# Patient Record
Sex: FEMALE | Race: WHITE | NOT HISPANIC OR LATINO | URBAN - METROPOLITAN AREA
[De-identification: names, ages, dates, MRNs, and addresses within clinical notes are randomized per-mention and may not be internally consistent; named-entity substitution may affect disease eponyms.]

---

## 2016-03-28 RX ORDER — IPRATROPIUM/ALBUTEROL SULFATE 18-103MCG
3 AEROSOL WITH ADAPTER (GRAM) INHALATION
Qty: 0 | Refills: 0 | COMMUNITY
Start: 2016-03-28

## 2016-03-28 RX ORDER — BUDESONIDE, MICRONIZED 100 %
2 POWDER (GRAM) MISCELLANEOUS
Qty: 0 | Refills: 0 | COMMUNITY
Start: 2016-03-28

## 2017-01-04 ENCOUNTER — OUTPATIENT (OUTPATIENT)
Dept: OUTPATIENT SERVICES | Facility: HOSPITAL | Age: 82
LOS: 1 days | End: 2017-01-04
Payer: MEDICARE

## 2017-01-04 ENCOUNTER — APPOINTMENT (OUTPATIENT)
Dept: ULTRASOUND IMAGING | Facility: HOSPITAL | Age: 82
End: 2017-01-04

## 2017-01-04 ENCOUNTER — APPOINTMENT (OUTPATIENT)
Age: 82
End: 2017-01-04

## 2017-01-04 DIAGNOSIS — Z98.89 OTHER SPECIFIED POSTPROCEDURAL STATES: Chronic | ICD-10-CM

## 2017-01-04 DIAGNOSIS — I72.4 ANEURYSM OF ARTERY OF LOWER EXTREMITY: ICD-10-CM

## 2017-01-04 DIAGNOSIS — Z01.810 ENCOUNTER FOR PREPROCEDURAL CARDIOVASCULAR EXAMINATION: ICD-10-CM

## 2017-01-04 PROCEDURE — 93925 LOWER EXTREMITY STUDY: CPT

## 2017-01-11 ENCOUNTER — OUTPATIENT (OUTPATIENT)
Dept: OUTPATIENT SERVICES | Facility: HOSPITAL | Age: 82
LOS: 1 days | End: 2017-01-11
Payer: MEDICARE

## 2017-01-11 VITALS
HEIGHT: 62 IN | WEIGHT: 125 LBS | SYSTOLIC BLOOD PRESSURE: 115 MMHG | TEMPERATURE: 98 F | RESPIRATION RATE: 14 BRPM | OXYGEN SATURATION: 97 % | HEART RATE: 53 BPM | DIASTOLIC BLOOD PRESSURE: 63 MMHG

## 2017-01-11 DIAGNOSIS — J44.9 CHRONIC OBSTRUCTIVE PULMONARY DISEASE, UNSPECIFIED: ICD-10-CM

## 2017-01-11 DIAGNOSIS — C22.0 LIVER CELL CARCINOMA: ICD-10-CM

## 2017-01-11 DIAGNOSIS — Z98.89 OTHER SPECIFIED POSTPROCEDURAL STATES: Chronic | ICD-10-CM

## 2017-01-11 LAB
BLD GP AB SCN SERPL QL: NEGATIVE — SIGNIFICANT CHANGE UP
RH IG SCN BLD-IMP: POSITIVE — SIGNIFICANT CHANGE UP

## 2017-01-11 PROCEDURE — 80053 COMPREHEN METABOLIC PANEL: CPT

## 2017-01-11 PROCEDURE — 86901 BLOOD TYPING SEROLOGIC RH(D): CPT

## 2017-01-11 PROCEDURE — 86850 RBC ANTIBODY SCREEN: CPT

## 2017-01-11 PROCEDURE — 86900 BLOOD TYPING SEROLOGIC ABO: CPT

## 2017-01-11 PROCEDURE — 85027 COMPLETE CBC AUTOMATED: CPT

## 2017-01-11 NOTE — H&P PST ADULT - HISTORY OF PRESENT ILLNESS
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 Patient was admitted about one month ago with declining functional status, was found to have cirrhosis with hepatic encephalopathy; AMS improved and patient was discharge to rehab. Patient states that for at least the last week, she has had a productive cough, with episodes of shaking and chills. No fevers or myalgias. No sore throat, rhinnorhea, nasal congestion or ear pain. No chest pain, SOB, or palpitations. No abdominal pain, nausea, vomiting. Reports diarrhea, but has been taking medications to help pass bowel movement. No dysuria, normal urinary frequency and no urinary uregency. No foul smelling urine, but urine has appeared more yellow recently.    In ED:  Vitals - T 97.8, HR 81, /70, RR 18, O2 sat 100% on room air  Received vancomycin 1 gram IV x1, cefepime 1 gram IV x1, 500 cc NS bolus x 1, cardizem 10 mg IV x1 86 y/o F PMH HTN, atrial fibrillation, hypothyroidism, CLL (diagnosed ~13 years s/p rituxan), cirrhosis of liver c/b encephalopathy, severe MR/AS , hx of COPD with bronchiectasis presents with liver cell carcinoma, pre vascular embolization scheduled for 1/19/17.

## 2017-01-11 NOTE — H&P PST ADULT - NSANTHOSAYNRD_GEN_A_CORE
13.5 inches/No. MARV screening performed.  STOP BANG Legend: 0-2 = LOW Risk; 3-4 = INTERMEDIATE Risk; 5-8 = HIGH Risk

## 2017-01-11 NOTE — H&P PST ADULT - PMH
AF (Atrial Fibrillation)  IF ON ALBUTEROL  Bleeding Esophageal Varices    CKD (chronic kidney disease), stage 3 (moderate)    CLL (Chronic Lymphoblastic Leukemia)    COPD (Chronic Obstructive Pulmonary Disease)    GIB (Gastrointestinal Bleeding)    Portal Hypertension AF (Atrial Fibrillation)  IF ON ALBUTEROL  Bleeding Esophageal Varices    CKD (chronic kidney disease), stage 3 (moderate)    CLL (Chronic Lymphoblastic Leukemia)    COPD (Chronic Obstructive Pulmonary Disease)    GIB (Gastrointestinal Bleeding)    Liver cell carcinoma    Portal Hypertension    Pulmonary HTN  moderate  PVD (peripheral vascular disease)    Severe aortic stenosis by prior echocardiogram

## 2017-01-12 LAB
ALBUMIN SERPL ELPH-MCNC: 3.8 G/DL — SIGNIFICANT CHANGE UP (ref 3.3–5)
ALP SERPL-CCNC: 145 U/L — HIGH (ref 40–120)
ALT FLD-CCNC: 23 U/L — SIGNIFICANT CHANGE UP (ref 10–45)
ANION GAP SERPL CALC-SCNC: 13 MMOL/L — SIGNIFICANT CHANGE UP (ref 5–17)
AST SERPL-CCNC: 35 U/L — SIGNIFICANT CHANGE UP (ref 10–40)
BILIRUB SERPL-MCNC: 3.3 MG/DL — HIGH (ref 0.2–1.2)
BUN SERPL-MCNC: 28 MG/DL — HIGH (ref 7–23)
CALCIUM SERPL-MCNC: 9.9 MG/DL — SIGNIFICANT CHANGE UP (ref 8.4–10.5)
CHLORIDE SERPL-SCNC: 98 MMOL/L — SIGNIFICANT CHANGE UP (ref 96–108)
CO2 SERPL-SCNC: 28 MMOL/L — SIGNIFICANT CHANGE UP (ref 22–31)
CREAT SERPL-MCNC: 1.18 MG/DL — SIGNIFICANT CHANGE UP (ref 0.5–1.3)
GLUCOSE SERPL-MCNC: 100 MG/DL — HIGH (ref 70–99)
HCT VFR BLD CALC: 40 % — SIGNIFICANT CHANGE UP (ref 34.5–45)
HGB BLD-MCNC: 13 G/DL — SIGNIFICANT CHANGE UP (ref 11.5–15.5)
MCHC RBC-ENTMCNC: 32.3 PG — SIGNIFICANT CHANGE UP (ref 27–34)
MCHC RBC-ENTMCNC: 32.5 GM/DL — SIGNIFICANT CHANGE UP (ref 32–36)
MCV RBC AUTO: 99.3 FL — SIGNIFICANT CHANGE UP (ref 80–100)
PLATELET # BLD AUTO: 84 K/UL — LOW (ref 150–400)
POTASSIUM SERPL-MCNC: 5.2 MMOL/L — SIGNIFICANT CHANGE UP (ref 3.5–5.3)
POTASSIUM SERPL-SCNC: 5.2 MMOL/L — SIGNIFICANT CHANGE UP (ref 3.5–5.3)
PROT SERPL-MCNC: 6.2 G/DL — SIGNIFICANT CHANGE UP (ref 6–8.3)
RBC # BLD: 4.03 M/UL — SIGNIFICANT CHANGE UP (ref 3.8–5.2)
RBC # FLD: 15.1 % — HIGH (ref 10.3–14.5)
SODIUM SERPL-SCNC: 139 MMOL/L — SIGNIFICANT CHANGE UP (ref 135–145)
WBC # BLD: 5.25 K/UL — SIGNIFICANT CHANGE UP (ref 3.8–10.5)
WBC # FLD AUTO: 5.25 K/UL — SIGNIFICANT CHANGE UP (ref 3.8–10.5)

## 2017-01-13 ENCOUNTER — OTHER (OUTPATIENT)
Age: 82
End: 2017-01-13

## 2017-01-13 DIAGNOSIS — Z91.013 ALLERGY TO SEAFOOD: ICD-10-CM

## 2017-01-24 ENCOUNTER — OTHER (OUTPATIENT)
Age: 82
End: 2017-01-24

## 2017-01-27 ENCOUNTER — OUTPATIENT (OUTPATIENT)
Dept: INPATIENT UNIT | Facility: HOSPITAL | Age: 82
LOS: 1 days | End: 2017-01-27
Payer: MEDICARE

## 2017-01-27 VITALS
SYSTOLIC BLOOD PRESSURE: 109 MMHG | OXYGEN SATURATION: 99 % | HEART RATE: 56 BPM | DIASTOLIC BLOOD PRESSURE: 54 MMHG | RESPIRATION RATE: 15 BRPM | TEMPERATURE: 98 F

## 2017-01-27 DIAGNOSIS — C22.0 LIVER CELL CARCINOMA: ICD-10-CM

## 2017-01-27 DIAGNOSIS — Z98.89 OTHER SPECIFIED POSTPROCEDURAL STATES: Chronic | ICD-10-CM

## 2017-01-27 DIAGNOSIS — K74.60 UNSPECIFIED CIRRHOSIS OF LIVER: ICD-10-CM

## 2017-01-27 DIAGNOSIS — J43.9 EMPHYSEMA, UNSPECIFIED: ICD-10-CM

## 2017-01-27 DIAGNOSIS — E03.9 HYPOTHYROIDISM, UNSPECIFIED: ICD-10-CM

## 2017-01-27 DIAGNOSIS — I48.2 CHRONIC ATRIAL FIBRILLATION: ICD-10-CM

## 2017-01-27 LAB
APTT BLD: 33.1 SEC — SIGNIFICANT CHANGE UP (ref 27.5–37.4)
INR BLD: 1.26 RATIO — HIGH (ref 0.88–1.16)
PROTHROM AB SERPL-ACNC: 13.6 SEC — HIGH (ref 10–13.1)

## 2017-01-27 PROCEDURE — 36247 INS CATH ABD/L-EXT ART 3RD: CPT

## 2017-01-27 PROCEDURE — 37242 VASC EMBOLIZE/OCCLUDE ARTERY: CPT

## 2017-01-27 PROCEDURE — 36245 INS CATH ABD/L-EXT ART 1ST: CPT | Mod: 59

## 2017-01-27 RX ORDER — IPRATROPIUM BROMIDE 0.2 MG/ML
500 SOLUTION, NON-ORAL INHALATION EVERY 6 HOURS
Qty: 0 | Refills: 0 | Status: DISCONTINUED | OUTPATIENT
Start: 2017-01-27 | End: 2017-01-28

## 2017-01-27 RX ORDER — OXYCODONE HYDROCHLORIDE 5 MG/1
5 TABLET ORAL EVERY 4 HOURS
Qty: 0 | Refills: 0 | Status: DISCONTINUED | OUTPATIENT
Start: 2017-01-27 | End: 2017-01-28

## 2017-01-27 RX ORDER — OXYCODONE HYDROCHLORIDE 5 MG/1
10 TABLET ORAL EVERY 6 HOURS
Qty: 0 | Refills: 0 | Status: DISCONTINUED | OUTPATIENT
Start: 2017-01-27 | End: 2017-01-28

## 2017-01-27 RX ORDER — SPIRONOLACTONE 25 MG/1
25 TABLET, FILM COATED ORAL DAILY
Qty: 0 | Refills: 0 | Status: DISCONTINUED | OUTPATIENT
Start: 2017-01-27 | End: 2017-01-28

## 2017-01-27 RX ORDER — LACTULOSE 10 G/15ML
20 SOLUTION ORAL EVERY 6 HOURS
Qty: 0 | Refills: 0 | Status: DISCONTINUED | OUTPATIENT
Start: 2017-01-27 | End: 2017-01-28

## 2017-01-27 RX ORDER — ONDANSETRON 8 MG/1
4 TABLET, FILM COATED ORAL EVERY 6 HOURS
Qty: 0 | Refills: 0 | Status: DISCONTINUED | OUTPATIENT
Start: 2017-01-27 | End: 2017-01-28

## 2017-01-27 RX ORDER — CHOLECALCIFEROL (VITAMIN D3) 125 MCG
2000 CAPSULE ORAL DAILY
Qty: 0 | Refills: 0 | Status: DISCONTINUED | OUTPATIENT
Start: 2017-01-27 | End: 2017-01-28

## 2017-01-27 RX ORDER — DIGOXIN 250 MCG
0.12 TABLET ORAL EVERY OTHER DAY
Qty: 0 | Refills: 0 | Status: DISCONTINUED | OUTPATIENT
Start: 2017-01-27 | End: 2017-01-28

## 2017-01-27 RX ORDER — LEVOTHYROXINE SODIUM 125 MCG
25 TABLET ORAL DAILY
Qty: 0 | Refills: 0 | Status: DISCONTINUED | OUTPATIENT
Start: 2017-01-27 | End: 2017-01-28

## 2017-01-27 RX ORDER — SODIUM CHLORIDE 9 MG/ML
1000 INJECTION, SOLUTION INTRAVENOUS
Qty: 0 | Refills: 0 | Status: DISCONTINUED | OUTPATIENT
Start: 2017-01-27 | End: 2017-01-28

## 2017-01-27 RX ORDER — ALBUTEROL 90 UG/1
2 AEROSOL, METERED ORAL EVERY 6 HOURS
Qty: 0 | Refills: 0 | Status: DISCONTINUED | OUTPATIENT
Start: 2017-01-27 | End: 2017-01-28

## 2017-01-27 RX ORDER — DIPHENHYDRAMINE HCL 50 MG
50 CAPSULE ORAL ONCE
Qty: 0 | Refills: 0 | Status: DISCONTINUED | OUTPATIENT
Start: 2017-01-27 | End: 2017-01-28

## 2017-01-27 RX ORDER — BUDESONIDE, MICRONIZED 100 %
0.5 POWDER (GRAM) MISCELLANEOUS DAILY
Qty: 0 | Refills: 0 | Status: DISCONTINUED | OUTPATIENT
Start: 2017-01-27 | End: 2017-01-28

## 2017-01-27 RX ORDER — ACETYLCYSTEINE 200 MG/ML
1200 VIAL (ML) MISCELLANEOUS EVERY 12 HOURS
Qty: 0 | Refills: 0 | Status: DISCONTINUED | OUTPATIENT
Start: 2017-01-27 | End: 2017-01-28

## 2017-01-27 RX ADMIN — Medication 500 MICROGRAM(S): at 23:47

## 2017-01-27 RX ADMIN — SODIUM CHLORIDE 75 MILLILITER(S): 9 INJECTION, SOLUTION INTRAVENOUS at 21:37

## 2017-01-27 RX ADMIN — ALBUTEROL 2 PUFF(S): 90 AEROSOL, METERED ORAL at 23:48

## 2017-01-27 RX ADMIN — Medication 1200 MILLIGRAM(S): at 21:35

## 2017-01-27 RX ADMIN — Medication 2000 UNIT(S): at 21:34

## 2017-01-27 RX ADMIN — LACTULOSE 20 GRAM(S): 10 SOLUTION ORAL at 21:36

## 2017-01-27 RX ADMIN — SODIUM CHLORIDE 75 MILLILITER(S): 9 INJECTION, SOLUTION INTRAVENOUS at 23:50

## 2017-01-27 NOTE — H&P ADULT. - ASSESSMENT
88F w/ PMH CLL (13yrs. s/p rituxan), afib, severe MR/AS, COPD, Liver cirrhosis, HCC, now s/p vascular embolization. Pain controlled at this time

## 2017-01-27 NOTE — H&P ADULT. - PROBLEM SELECTOR PLAN 1
s/p vascular embolization   f/u further as op   pain control w/ oxycodone prn  AM LFTs s/p vascular embolization, mucomyst ordered   f/u further as op   pain control w/ oxycodone prn  AM LFTs

## 2017-01-27 NOTE — H&P ADULT. - HISTORY OF PRESENT ILLNESS
88F w/ PMH CLL (13yrs. s/p rituxan), afib, severe MR/AS, COPD, Liver cirrhosis, HCC, now s/p vascular embolization.  Pt doing well, no c/o.  Pain is controlled. Pt to receive mucomyst.  Pt being admitted for o/n monitoring and pain control.  Chart reviewed and case d/w bedside RN- no issues currently, pt just arrived 10 mins ago.  Pt is to remain on bedrest x 4 hrs.    Current vitals are stable.

## 2017-01-27 NOTE — H&P ADULT. - LYMPHATIC
posterior cervical R/supraclavicular L/supraclavicular R/anterior cervical R/anterior cervical L/posterior cervical L

## 2017-01-28 ENCOUNTER — TRANSCRIPTION ENCOUNTER (OUTPATIENT)
Age: 82
End: 2017-01-28

## 2017-01-28 VITALS
OXYGEN SATURATION: 96 % | HEART RATE: 51 BPM | TEMPERATURE: 98 F | RESPIRATION RATE: 18 BRPM | SYSTOLIC BLOOD PRESSURE: 91 MMHG | DIASTOLIC BLOOD PRESSURE: 41 MMHG

## 2017-01-28 LAB
ALBUMIN SERPL ELPH-MCNC: 3.3 G/DL — SIGNIFICANT CHANGE UP (ref 3.3–5)
ALP SERPL-CCNC: 90 U/L — SIGNIFICANT CHANGE UP (ref 40–120)
ALT FLD-CCNC: 46 U/L RC — HIGH (ref 10–45)
ANION GAP SERPL CALC-SCNC: 15 MMOL/L — SIGNIFICANT CHANGE UP (ref 5–17)
AST SERPL-CCNC: 59 U/L — HIGH (ref 10–40)
BASOPHILS # BLD AUTO: 0 K/UL — SIGNIFICANT CHANGE UP (ref 0–0.2)
BASOPHILS NFR BLD AUTO: 0 % — SIGNIFICANT CHANGE UP (ref 0–2)
BILIRUB SERPL-MCNC: 2.1 MG/DL — HIGH (ref 0.2–1.2)
BUN SERPL-MCNC: 32 MG/DL — HIGH (ref 7–23)
CALCIUM SERPL-MCNC: 9.1 MG/DL — SIGNIFICANT CHANGE UP (ref 8.4–10.5)
CHLORIDE SERPL-SCNC: 100 MMOL/L — SIGNIFICANT CHANGE UP (ref 96–108)
CO2 SERPL-SCNC: 24 MMOL/L — SIGNIFICANT CHANGE UP (ref 22–31)
CREAT SERPL-MCNC: 1.25 MG/DL — SIGNIFICANT CHANGE UP (ref 0.5–1.3)
EOSINOPHIL # BLD AUTO: 0 K/UL — SIGNIFICANT CHANGE UP (ref 0–0.5)
EOSINOPHIL NFR BLD AUTO: 0.4 % — SIGNIFICANT CHANGE UP (ref 0–6)
GLUCOSE SERPL-MCNC: 149 MG/DL — HIGH (ref 70–99)
HCT VFR BLD CALC: 35.9 % — SIGNIFICANT CHANGE UP (ref 34.5–45)
HGB BLD-MCNC: 11.7 G/DL — SIGNIFICANT CHANGE UP (ref 11.5–15.5)
LYMPHOCYTES # BLD AUTO: 0.5 K/UL — LOW (ref 1–3.3)
LYMPHOCYTES # BLD AUTO: 7.4 % — LOW (ref 13–44)
MCHC RBC-ENTMCNC: 32.4 PG — SIGNIFICANT CHANGE UP (ref 27–34)
MCHC RBC-ENTMCNC: 32.5 GM/DL — SIGNIFICANT CHANGE UP (ref 32–36)
MCV RBC AUTO: 99.7 FL — SIGNIFICANT CHANGE UP (ref 80–100)
MONOCYTES # BLD AUTO: 0.7 K/UL — SIGNIFICANT CHANGE UP (ref 0–0.9)
MONOCYTES NFR BLD AUTO: 9.3 % — SIGNIFICANT CHANGE UP (ref 2–14)
NEUTROPHILS # BLD AUTO: 6.1 K/UL — SIGNIFICANT CHANGE UP (ref 1.8–7.4)
NEUTROPHILS NFR BLD AUTO: 82.8 % — HIGH (ref 43–77)
PLATELET # BLD AUTO: 89 K/UL — LOW (ref 150–400)
POTASSIUM SERPL-MCNC: 4.8 MMOL/L — SIGNIFICANT CHANGE UP (ref 3.5–5.3)
POTASSIUM SERPL-SCNC: 4.8 MMOL/L — SIGNIFICANT CHANGE UP (ref 3.5–5.3)
PROT SERPL-MCNC: 5.3 G/DL — LOW (ref 6–8.3)
RBC # BLD: 3.61 M/UL — LOW (ref 3.8–5.2)
RBC # FLD: 13.3 % — SIGNIFICANT CHANGE UP (ref 10.3–14.5)
SODIUM SERPL-SCNC: 139 MMOL/L — SIGNIFICANT CHANGE UP (ref 135–145)
WBC # BLD: 7.3 K/UL — SIGNIFICANT CHANGE UP (ref 3.8–10.5)
WBC # FLD AUTO: 7.3 K/UL — SIGNIFICANT CHANGE UP (ref 3.8–10.5)

## 2017-01-28 PROCEDURE — 85027 COMPLETE CBC AUTOMATED: CPT

## 2017-01-28 PROCEDURE — 85610 PROTHROMBIN TIME: CPT

## 2017-01-28 PROCEDURE — P9037: CPT

## 2017-01-28 PROCEDURE — 94640 AIRWAY INHALATION TREATMENT: CPT

## 2017-01-28 PROCEDURE — 37242 VASC EMBOLIZE/OCCLUDE ARTERY: CPT

## 2017-01-28 PROCEDURE — C1887: CPT

## 2017-01-28 PROCEDURE — 76937 US GUIDE VASCULAR ACCESS: CPT

## 2017-01-28 PROCEDURE — 36245 INS CATH ABD/L-EXT ART 1ST: CPT | Mod: 59

## 2017-01-28 PROCEDURE — C1889: CPT

## 2017-01-28 PROCEDURE — 80053 COMPREHEN METABOLIC PANEL: CPT

## 2017-01-28 PROCEDURE — 36247 INS CATH ABD/L-EXT ART 3RD: CPT

## 2017-01-28 PROCEDURE — 85730 THROMBOPLASTIN TIME PARTIAL: CPT

## 2017-01-28 PROCEDURE — C1894: CPT

## 2017-01-28 PROCEDURE — C1769: CPT

## 2017-01-28 PROCEDURE — 36430 TRANSFUSION BLD/BLD COMPNT: CPT

## 2017-01-28 RX ORDER — CHOLECALCIFEROL (VITAMIN D3) 125 MCG
2000 CAPSULE ORAL
Qty: 0 | Refills: 0 | COMMUNITY
Start: 2017-01-28

## 2017-01-28 RX ADMIN — LACTULOSE 20 GRAM(S): 10 SOLUTION ORAL at 06:20

## 2017-01-28 RX ADMIN — LACTULOSE 20 GRAM(S): 10 SOLUTION ORAL at 11:24

## 2017-01-28 RX ADMIN — Medication 25 MICROGRAM(S): at 06:20

## 2017-01-28 RX ADMIN — SPIRONOLACTONE 25 MILLIGRAM(S): 25 TABLET, FILM COATED ORAL at 06:20

## 2017-01-28 RX ADMIN — Medication 500 MICROGRAM(S): at 06:02

## 2017-01-28 RX ADMIN — ALBUTEROL 2 PUFF(S): 90 AEROSOL, METERED ORAL at 06:02

## 2017-01-28 RX ADMIN — Medication 2000 UNIT(S): at 11:22

## 2017-01-28 NOTE — DISCHARGE NOTE ADULT - CARE PROVIDERS DIRECT ADDRESSES
[email or web address]_Unknown

## 2017-01-28 NOTE — DISCHARGE NOTE ADULT - MEDICATION SUMMARY - MEDICATIONS TO TAKE
I will START or STAY ON the medications listed below when I get home from the hospital:    budesonide 0.25 mg/2 mL inhalation suspension  -- 2 milliliter(s) inhaled 2 times a day  -- Indication: For Pulmonary emphysema, unspecified emphysema type    spironolactone 25 mg oral tablet  --  by mouth once a day  -- Indication: For Liver cirrhosis    digoxin 125 mcg (0.125 mg) oral tablet  --  by mouth every 48 hours  -- Indication: For Chronic atrial fibrillation    diltiazem 30 mg oral tablet  -- 1 tab(s) by mouth every 6 hours  -- Indication: For Chronic atrial fibrillation    Coreg 3.125 mg oral tablet  -- 1 tab(s) by mouth 2 times a day  -- Indication: For Hypertension    ipratropium-albuterol 0.5 mg-2.5 mg/3 mLinhalation solution  -- 3 milliliter(s) inhaled 4 times a day  -- Indication: For Pulmonary emphysema, unspecified emphysema type    lactulose 10 g/15 mL oral syrup  -- 30 milliliter(s) by mouth every 6 hours  -- Indication: For Liver cirrhosis    rifAXIMin 550 mg oral tablet  -- 1 tab(s) by mouth 2 times a day  -- Indication: For Liver cell carcinoma    levothyroxine 25 mcg (0.025 mg) oral capsule  -- 1 cap(s) by mouth once a day  -- Indication: For Hypothyroid    cholecalciferol oral tablet  -- 2000 unit(s) by mouth once a day  -- Indication: For Vitamin D deficiency

## 2017-01-28 NOTE — DISCHARGE NOTE ADULT - PATIENT PORTAL LINK FT
“You can access the FollowHealth Patient Portal, offered by [facility], by registering with the following website: [email or web address]”

## 2017-01-28 NOTE — PROVIDER CONTACT NOTE (OTHER) - SITUATION
Pt received from PACU s/p hepatic vascular embolization. 1u platelets ordered for PACU and not given

## 2017-01-28 NOTE — DISCHARGE NOTE ADULT - HOSPITAL COURSE
88F w/ PMH CLL (13yrs. s/p rituxan), afib, severe MR/AS, COPD, Liver cirrhosis, HCC, now s/p vascular embolization. She received Mucomyst x1 and her pain was well controlled on prn Oxycodone IR 5, 10.  AM labs on day one  had mild LFT bump otherwise patient was medically stable with no complains. IR was notified of pt's status and they recommended discharge with outpatient follow-up. 88F w/ PMH CLL (13yrs. s/p rituxan), afib, severe MR/AS, COPD, Liver cirrhosis, HCC, now s/p vascular embolization. She received Mucomyst x1 and her pain was well controlled on prn Oxycodone IR 5, 10.  AM labs on day one  had mild LFT bump otherwise patient was medically stable with no complains. IR/ Dr. Ye was notified of pt's status and agreed with discharge with outpatient follow-up on Monday.

## 2017-01-28 NOTE — PROVIDER CONTACT NOTE (OTHER) - ACTION/TREATMENT ORDERED:
MD aware-states to hold platelets. MD will assign team to pt in AM. Will continue to monitor pt closely.

## 2017-01-28 NOTE — DISCHARGE NOTE ADULT - PLAN OF CARE
Patient would follow up with IR You have liver Cancer and had an IR procedure done. You remained stable without pain overnight.   - Please follow-up with Interventional Radiologist Dr. Ye [phone]  - Your appointment is on Monday Patient would continue with Home meds You have a history of liver cirrhosis. You take Rifaximin, Lactulose and Spironolactone for your liver Cirrhosis  -Please continue with you home meds as prescribed by your Patient would take home meds as prescribed You have a history of Emphysema, lung disease. At home you take Budesonide and Albuterol-Ipratropium for your Emphysema.  -Please continue taking your home meds as prescribed  - follow-up with your pulmonologist per routine Patient would continue taking home meds as prescribed You have a history of Atrial fibrillation, and you take Digoxin and Diltiazem for your A-fib.  -Please continue taking Digoxin and Diltiazem as prescribed  - We recommend that you follow-up with your cardiologist per routine You have a history of liver cirrhosis. You take Rifaximin, Lactulose and Spironolactone for your liver Cirrhosis  -Please continue with you home meds as prescribed by your doctor

## 2017-01-28 NOTE — DISCHARGE NOTE ADULT - CARE PLAN
Principal Discharge DX:	Liver cell carcinoma  Secondary Diagnosis:	Liver cirrhosis  Secondary Diagnosis:	Pulmonary emphysema, unspecified emphysema type  Secondary Diagnosis:	Chronic atrial fibrillation Principal Discharge DX:	Liver cell carcinoma  Goal:	Patient would follow up with IR  Instructions for follow-up, activity and diet:	You have liver Cancer and had an IR procedure done. You remained stable without pain overnight.   - Please follow-up with Interventional Radiologist Dr. Ye [phone]  - Your appointment is on Monday  Secondary Diagnosis:	Liver cirrhosis  Goal:	Patient would continue with Home meds  Instructions for follow-up, activity and diet:	You have a history of liver cirrhosis. You take Rifaximin, Lactulose and Spironolactone for your liver Cirrhosis  -Please continue with you home meds as prescribed by your  Secondary Diagnosis:	Pulmonary emphysema, unspecified emphysema type  Goal:	Patient would take home meds as prescribed  Instructions for follow-up, activity and diet:	You have a history of Emphysema, lung disease. At home you take Budesonide and Albuterol-Ipratropium for your Emphysema.  -Please continue taking your home meds as prescribed  - follow-up with your pulmonologist per routine  Secondary Diagnosis:	Chronic atrial fibrillation  Goal:	Patient would continue taking home meds as prescribed  Instructions for follow-up, activity and diet:	You have a history of Atrial fibrillation, and you take Digoxin and Diltiazem for your A-fib.  -Please continue taking Digoxin and Diltiazem as prescribed  - We recommend that you follow-up with your cardiologist per routine Principal Discharge DX:	Liver cell carcinoma  Goal:	Patient would follow up with IR  Instructions for follow-up, activity and diet:	You have liver Cancer and had an IR procedure done. You remained stable without pain overnight.   - Please follow-up with Interventional Radiologist Dr. Ye [phone]  - Your appointment is on Monday  Secondary Diagnosis:	Liver cirrhosis  Goal:	Patient would continue with Home meds  Instructions for follow-up, activity and diet:	You have a history of liver cirrhosis. You take Rifaximin, Lactulose and Spironolactone for your liver Cirrhosis  -Please continue with you home meds as prescribed by your doctor  Secondary Diagnosis:	Pulmonary emphysema, unspecified emphysema type  Goal:	Patient would take home meds as prescribed  Instructions for follow-up, activity and diet:	You have a history of Emphysema, lung disease. At home you take Budesonide and Albuterol-Ipratropium for your Emphysema.  -Please continue taking your home meds as prescribed  - follow-up with your pulmonologist per routine  Secondary Diagnosis:	Chronic atrial fibrillation  Goal:	Patient would continue taking home meds as prescribed  Instructions for follow-up, activity and diet:	You have a history of Atrial fibrillation, and you take Digoxin and Diltiazem for your A-fib.  -Please continue taking Digoxin and Diltiazem as prescribed  - We recommend that you follow-up with your cardiologist per routine Principal Discharge DX:	Liver cell carcinoma  Goal:	Patient would follow up with IR  Instructions for follow-up, activity and diet:	You have liver Cancer and had an IR procedure done. You remained stable without pain overnight.   - Please follow-up with Interventional Radiologist Dr. Ye [phone]  - Your appointment is on Monday  Secondary Diagnosis:	Liver cirrhosis  Goal:	Patient would continue with Home meds  Instructions for follow-up, activity and diet:	You have a history of liver cirrhosis. You take Rifaximin, Lactulose and Spironolactone for your liver Cirrhosis  -Please continue with you home meds as prescribed by your doctor  Secondary Diagnosis:	Pulmonary emphysema, unspecified emphysema type  Goal:	Patient would take home meds as prescribed  Instructions for follow-up, activity and diet:	You have a history of Emphysema, lung disease. At home you take Budesonide and Albuterol-Ipratropium for your Emphysema.  -Please continue taking your home meds as prescribed  - follow-up with your pulmonologist per routine  Secondary Diagnosis:	Chronic atrial fibrillation  Goal:	Patient would continue taking home meds as prescribed  Instructions for follow-up, activity and diet:	You have a history of Atrial fibrillation, and you take Digoxin and Diltiazem for your A-fib.  -Please continue taking Digoxin and Diltiazem as prescribed  - We recommend that you follow-up with your cardiologist per routine Principal Discharge DX:	Liver cell carcinoma  Goal:	Patient would follow up with IR  Instructions for follow-up, activity and diet:	You have liver Cancer and had an IR procedure done. You remained stable without pain overnight.   - Please follow-up with Interventional Radiologist Dr. Ye [phone]  - Your appointment is on Monday  Secondary Diagnosis:	Liver cirrhosis  Goal:	Patient would continue with Home meds  Instructions for follow-up, activity and diet:	You have a history of liver cirrhosis. You take Rifaximin, Lactulose and Spironolactone for your liver Cirrhosis  -Please continue with you home meds as prescribed by your doctor  Secondary Diagnosis:	Pulmonary emphysema, unspecified emphysema type  Goal:	Patient would take home meds as prescribed  Instructions for follow-up, activity and diet:	You have a history of Emphysema, lung disease. At home you take Budesonide and Albuterol-Ipratropium for your Emphysema.  -Please continue taking your home meds as prescribed  - follow-up with your pulmonologist per routine  Secondary Diagnosis:	Chronic atrial fibrillation  Goal:	Patient would continue taking home meds as prescribed  Instructions for follow-up, activity and diet:	You have a history of Atrial fibrillation, and you take Digoxin and Diltiazem for your A-fib.  -Please continue taking Digoxin and Diltiazem as prescribed  - We recommend that you follow-up with your cardiologist per routine

## 2017-01-28 NOTE — DISCHARGE NOTE ADULT - CARE PROVIDER_API CALL
Fallon Celaya), Critical Care Medicine; Internal Medicine; Pulmonary Disease  [address] [address]  Phone: [phone]  Fax: [phone]    Daniel Boston), Cardiovascular Disease; Internal Medicine  [address] [place] [address]  Phone: [phone]  Fax: [phone]    Ramos Ye), Diagnostic Radiology; [facility]  [address] [place] [address]  Phone: [phone]  Fax: [phone]

## 2017-01-30 PROBLEM — C22.0 LIVER CELL CARCINOMA: Chronic | Status: ACTIVE | Noted: 2017-01-11

## 2017-01-30 PROBLEM — I35.0 NONRHEUMATIC AORTIC (VALVE) STENOSIS: Chronic | Status: ACTIVE | Noted: 2017-01-11

## 2017-01-30 PROBLEM — I27.2 OTHER SECONDARY PULMONARY HYPERTENSION: Chronic | Status: ACTIVE | Noted: 2017-01-11

## 2017-01-30 PROBLEM — I73.9 PERIPHERAL VASCULAR DISEASE, UNSPECIFIED: Chronic | Status: ACTIVE | Noted: 2017-01-11

## 2017-02-01 ENCOUNTER — APPOINTMENT (OUTPATIENT)
Dept: INTERNAL MEDICINE | Facility: CLINIC | Age: 82
End: 2017-02-01

## 2017-02-01 VITALS
SYSTOLIC BLOOD PRESSURE: 108 MMHG | HEIGHT: 62 IN | OXYGEN SATURATION: 93 % | WEIGHT: 125 LBS | TEMPERATURE: 97.7 F | DIASTOLIC BLOOD PRESSURE: 62 MMHG | HEART RATE: 54 BPM | BODY MASS INDEX: 23 KG/M2

## 2017-02-01 VITALS — OXYGEN SATURATION: 98 %

## 2017-02-02 ENCOUNTER — RESULT CHARGE (OUTPATIENT)
Age: 82
End: 2017-02-02

## 2017-02-02 ENCOUNTER — OTHER (OUTPATIENT)
Age: 82
End: 2017-02-02

## 2017-02-02 LAB
GLUCOSE UR-MCNC: NORMAL
HGB UR QL STRIP.AUTO: NORMAL
KETONES UR-MCNC: NORMAL
LEUKOCYTE ESTERASE UR QL STRIP: NORMAL
NITRITE UR QL STRIP: NORMAL
PH UR STRIP: 6
PROT UR STRIP-MCNC: NORMAL
SP GR UR STRIP: 1.02

## 2017-02-02 RX ORDER — ACETYLCYSTEINE 200 MG/ML
0 VIAL (ML) MISCELLANEOUS
Qty: 0 | Refills: 0 | COMMUNITY

## 2017-02-06 ENCOUNTER — OTHER (OUTPATIENT)
Age: 82
End: 2017-02-06

## 2017-02-14 ENCOUNTER — NON-APPOINTMENT (OUTPATIENT)
Age: 82
End: 2017-02-14

## 2017-02-14 ENCOUNTER — APPOINTMENT (OUTPATIENT)
Dept: CARDIOLOGY | Facility: CLINIC | Age: 82
End: 2017-02-14

## 2017-02-14 VITALS
BODY MASS INDEX: 21.9 KG/M2 | HEART RATE: 58 BPM | DIASTOLIC BLOOD PRESSURE: 68 MMHG | WEIGHT: 119 LBS | SYSTOLIC BLOOD PRESSURE: 94 MMHG | HEIGHT: 62 IN

## 2017-02-17 ENCOUNTER — APPOINTMENT (OUTPATIENT)
Age: 82
End: 2017-02-17

## 2017-02-17 ENCOUNTER — APPOINTMENT (OUTPATIENT)
Dept: INTERNAL MEDICINE | Facility: CLINIC | Age: 82
End: 2017-02-17

## 2017-02-17 ENCOUNTER — LABORATORY RESULT (OUTPATIENT)
Age: 82
End: 2017-02-17

## 2017-02-17 VITALS
BODY MASS INDEX: 22.5 KG/M2 | HEART RATE: 64 BPM | WEIGHT: 123 LBS | TEMPERATURE: 97.3 F | SYSTOLIC BLOOD PRESSURE: 100 MMHG | OXYGEN SATURATION: 99 % | DIASTOLIC BLOOD PRESSURE: 64 MMHG

## 2017-02-17 VITALS — SYSTOLIC BLOOD PRESSURE: 102 MMHG | DIASTOLIC BLOOD PRESSURE: 60 MMHG

## 2017-02-17 VITALS
WEIGHT: 120 LBS | HEART RATE: 52 BPM | HEIGHT: 62 IN | RESPIRATION RATE: 17 BRPM | TEMPERATURE: 97.4 F | BODY MASS INDEX: 22.08 KG/M2 | SYSTOLIC BLOOD PRESSURE: 90 MMHG | DIASTOLIC BLOOD PRESSURE: 49 MMHG

## 2017-02-17 LAB
BACTERIA UR CULT: ABNORMAL
INR PPP: 1.12 RATIO
PT BLD: 12.7 SEC

## 2017-02-17 RX ORDER — METHYLPREDNISOLONE 32 MG/1
32 TABLET ORAL
Qty: 2 | Refills: 0 | Status: DISCONTINUED | COMMUNITY
Start: 2017-01-13 | End: 2017-02-17

## 2017-02-22 LAB
AFP-TM SERPL-MCNC: 1.6 NG/ML
ALBUMIN SERPL ELPH-MCNC: 3.7 G/DL
ALP BLD-CCNC: 111 U/L
ALT SERPL-CCNC: 31 U/L
ANION GAP SERPL CALC-SCNC: 15 MMOL/L
AST SERPL-CCNC: 39 U/L
BASOPHILS # BLD AUTO: 0.02 K/UL
BASOPHILS NFR BLD AUTO: 0.4 %
BILIRUB SERPL-MCNC: 2.9 MG/DL
BUN SERPL-MCNC: 28 MG/DL
CALCIUM SERPL-MCNC: 10.1 MG/DL
CHLORIDE SERPL-SCNC: 100 MMOL/L
CO2 SERPL-SCNC: 24 MMOL/L
CREAT SERPL-MCNC: 1.33 MG/DL
EOSINOPHIL # BLD AUTO: 0.1 K/UL
EOSINOPHIL NFR BLD AUTO: 2 %
GLUCOSE SERPL-MCNC: 99 MG/DL
HCT VFR BLD CALC: 35.8 %
HGB BLD-MCNC: 11.7 G/DL
IMM GRANULOCYTES NFR BLD AUTO: 0.2 %
LYMPHOCYTES # BLD AUTO: 0.79 K/UL
LYMPHOCYTES NFR BLD AUTO: 15.8 %
MAN DIFF?: NORMAL
MCHC RBC-ENTMCNC: 32.7 GM/DL
MCHC RBC-ENTMCNC: 32.7 PG
MCV RBC AUTO: 100 FL
MONOCYTES # BLD AUTO: 0.59 K/UL
MONOCYTES NFR BLD AUTO: 11.8 %
NEUTROPHILS # BLD AUTO: 3.48 K/UL
NEUTROPHILS NFR BLD AUTO: 69.8 %
PLATELET # BLD AUTO: 96 K/UL
POTASSIUM SERPL-SCNC: 4.2 MMOL/L
PROT SERPL-MCNC: 6.2 G/DL
RBC # BLD: 3.58 M/UL
RBC # FLD: 16.3 %
SODIUM SERPL-SCNC: 139 MMOL/L
WBC # FLD AUTO: 4.99 K/UL

## 2017-02-23 ENCOUNTER — APPOINTMENT (OUTPATIENT)
Dept: INTERVENTIONAL RADIOLOGY/VASCULAR | Facility: CLINIC | Age: 82
End: 2017-02-23

## 2017-02-23 ENCOUNTER — OTHER (OUTPATIENT)
Age: 82
End: 2017-02-23

## 2017-02-23 ENCOUNTER — OUTPATIENT (OUTPATIENT)
Dept: OUTPATIENT SERVICES | Facility: HOSPITAL | Age: 82
LOS: 1 days | End: 2017-02-23
Payer: MEDICARE

## 2017-02-23 VITALS
DIASTOLIC BLOOD PRESSURE: 66 MMHG | BODY MASS INDEX: 20.16 KG/M2 | RESPIRATION RATE: 16 BRPM | WEIGHT: 121 LBS | HEART RATE: 57 BPM | HEIGHT: 65 IN | OXYGEN SATURATION: 98 % | TEMPERATURE: 98.3 F | SYSTOLIC BLOOD PRESSURE: 105 MMHG

## 2017-02-23 DIAGNOSIS — R09.89 OTHER SPECIFIED SYMPTOMS AND SIGNS INVOLVING THE CIRCULATORY AND RESPIRATORY SYSTEMS: ICD-10-CM

## 2017-02-23 DIAGNOSIS — R05 COUGH: ICD-10-CM

## 2017-02-23 DIAGNOSIS — C22.0 LIVER CELL CARCINOMA: ICD-10-CM

## 2017-02-23 DIAGNOSIS — J06.9 ACUTE UPPER RESPIRATORY INFECTION, UNSPECIFIED: ICD-10-CM

## 2017-02-23 DIAGNOSIS — K74.60 UNSPECIFIED CIRRHOSIS OF LIVER: ICD-10-CM

## 2017-02-23 DIAGNOSIS — D69.6 THROMBOCYTOPENIA, UNSPECIFIED: ICD-10-CM

## 2017-02-23 DIAGNOSIS — Z98.89 OTHER SPECIFIED POSTPROCEDURAL STATES: Chronic | ICD-10-CM

## 2017-02-23 DIAGNOSIS — R82.79 OTHER ABNORMAL FINDINGS ON MICROBIOLOGICAL EXAMINATION OF URINE: ICD-10-CM

## 2017-02-23 DIAGNOSIS — I72.4 ANEURYSM OF ARTERY OF LOWER EXTREMITY: ICD-10-CM

## 2017-02-23 PROCEDURE — 76705 ECHO EXAM OF ABDOMEN: CPT

## 2017-02-23 PROCEDURE — 76705 ECHO EXAM OF ABDOMEN: CPT | Mod: 26

## 2017-02-23 RX ORDER — CIPROFLOXACIN HYDROCHLORIDE 250 MG/1
250 TABLET, FILM COATED ORAL
Qty: 10 | Refills: 0 | Status: DISCONTINUED | COMMUNITY
Start: 2017-02-17 | End: 2017-02-23

## 2017-02-27 ENCOUNTER — RX RENEWAL (OUTPATIENT)
Age: 82
End: 2017-02-27

## 2017-03-16 NOTE — H&P ADULT. - PROBLEM SELECTOR PROBLEM 4
After Visit Summary   3/16/2017    Toby Mcgrath    MRN: [id number]           Patient Information     Date Of Birth          1953        Visit Information        Provider Department      3/16/2017 1:00 PM Florence Amato, PT [facility]        Today's Diagnoses     Chronic pain    -  1    Failed back syndrome of lumbar spine           Follow-ups after your visit        Your next 10 appointments already scheduled     Mar 21, 2017  9:30 AM CDT   Return Visit with JIMBO Sutton CNP   [facility] ([facility])    [address]  [address]  [facility] [id number] [phone]            Mar 23, 2017  1:00 PM CDT   Return Visit with Florence Amato PT   [facility] ([facility])    [address]  [address]  [facility] [id number] [phone]            Apr 03, 2017  9:30 AM CDT   Return Visit with JIMBO Sutton CNP   [facility] ([facility])    [address]  [address]  [facility] [id number] [phone]              Who to contact     If you have questions or need follow up information about today's clinic visit or your schedule please contact [facility] directly at [phone].  Normal or non-critical lab and imaging results will be communicated to you by MyChart, letter or phone within 4 business days after the clinic has received the results. If you do not hear from us within 7 days, please contact the clinic through MyChart or phone. If you have a critical or abnormal lab result, we will notify you by phone as soon as possible.  Submit refill requests through [employer] or call your pharmacy and they will forward the refill request to us. Please allow 3 business days for your refill to be completed.          Additional Information About Your Visit        [employer] Information     [employer] lets you send messages to your  "doctor, view your test results, renew your prescriptions, schedule appointments and more. To sign up, go to [email or web address] . Click on \"Log in\" on the left side of the screen, which will take you to the Welcome page. Then click on \"Sign up Now\" on the right side of the page.     You will be asked to enter the access code listed below, as well as some personal information. Please follow the directions to create your username and password.     Your access code is: [email or web address]  Expires: 4/3/2017 12:19 PM     Your access code will  in 90 days. If you need help or a new code, please call your [facility] clinic or [phone].        Care EveryWhere ID     This is your Care EveryWhere ID. This could be used by other organizations to access your [facility] medical records  [id number]         Blood Pressure from Last 3 Encounters:   17 143/73   17 150/85   17 149/75    Weight from Last 3 Encounters:   17 120.2 kg (265 lb)   17 110.8 kg (244 lb 4.8 oz)   17 111.1 kg (245 lb)              We Performed the Following     NEUROMUSCULAR RE-EDUCATION     THERAPEUTIC EXERCISES        Primary Care Provider Office Phone # Fax #    Connie Haskins [phone]       [facility] [address] [address]        Thank you!     Thank you for choosing [facility]  for your care. Our goal is always to provide you with excellent care. Hearing back from our patients is one way we can continue to improve our services. Please take a few minutes to complete the written survey that you may receive in the mail after your visit with us. Thank you!             Your Updated Medication List - Protect others around you: Learn how to safely use, store and throw away your medicines at www.disposemymeds.org.          This list is accurate as of: 3/16/17  6:35 PM.  Always use your most recent med list.                   Brand Name " Dispense Instructions for use    allopurinol 100 MG tablet    ZYLOPRIM    270 tablet    Take 3 tablets (300 mg) by mouth daily       aspirin 81 MG tablet     100 tablet    Take 1 tablet (81 mg) by mouth daily       blood glucose monitoring lancets     3 Box    Use to test blood sugars 2-3 times daily as directed (lena contour meter)       blood glucose monitoring test strip    no brand specified    3 Box    Use to test blood sugar 2-3 times daily (lena contour meter)       finasteride 5 MG tablet    PROSCAR    30 tablet    Take 1 tablet (5 mg) by mouth daily For prostate enlargement. Please fill on $4 list       FLUoxetine 20 MG capsule    PROzac    270 capsule    TAKE THREE CAPSULES BY MOUTH ONCE DAILY       fluticasone 50 MCG/ACT spray    FLONASE    3 Bottle    Spray 1-2 sprays into both nostrils daily       metFORMIN 500 MG 24 hr tablet    GLUCOPHAGE-XR    90 tablet    Take 1 tablet (500 mg) by mouth daily (with dinner)       omeprazole 20 MG CR capsule    priLOSEC    90 capsule    Use every other day as needed       oxyCODONE 5 MG IR tablet    ROXICODONE    90 tablet    1 tab q4h, PRN max 6 per day. May dispense on/after 3/8/17. 15 days supply       pregabalin 50 MG capsule    LYRICA    90 capsule    Take 1 capsule (50 mg) by mouth 3 times daily       senna-docusate 8.6-50 MG per tablet    SENOKOT-S;PERICOLACE    60 tablet    Take 1 tablet by mouth 2 times daily       simvastatin 20 MG tablet    ZOCOR    90 tablet    Take 1 tablet (20 mg) by mouth At Bedtime       tamsulosin 0.4 MG capsule    FLOMAX    180 capsule    TAKE TWO CAPSULES BY MOUTH ONCE DAILY       triamcinolone 0.1 % cream    KENALOG    30 g    Apply sparingly to affected area twice daily for 7 days. Then stop and use only for flares          Chronic atrial fibrillation

## 2017-03-20 ENCOUNTER — RX RENEWAL (OUTPATIENT)
Age: 82
End: 2017-03-20

## 2017-03-20 ENCOUNTER — APPOINTMENT (OUTPATIENT)
Dept: CARDIOLOGY | Facility: CLINIC | Age: 82
End: 2017-03-20

## 2017-03-20 ENCOUNTER — NON-APPOINTMENT (OUTPATIENT)
Age: 82
End: 2017-03-20

## 2017-03-20 VITALS
DIASTOLIC BLOOD PRESSURE: 60 MMHG | WEIGHT: 122 LBS | HEART RATE: 59 BPM | SYSTOLIC BLOOD PRESSURE: 114 MMHG | RESPIRATION RATE: 14 BRPM | BODY MASS INDEX: 20.33 KG/M2 | OXYGEN SATURATION: 94 % | HEIGHT: 65 IN

## 2017-03-20 RX ORDER — LEVOFLOXACIN 250 MG/1
250 TABLET, FILM COATED ORAL
Qty: 7 | Refills: 0 | Status: COMPLETED | COMMUNITY
Start: 2016-11-22

## 2017-03-20 RX ORDER — DOXYCYCLINE HYCLATE 100 MG/1
100 CAPSULE ORAL
Qty: 14 | Refills: 0 | Status: COMPLETED | COMMUNITY
Start: 2017-02-01

## 2017-04-18 ENCOUNTER — RX RENEWAL (OUTPATIENT)
Age: 82
End: 2017-04-18

## 2017-04-18 ENCOUNTER — EMERGENCY (EMERGENCY)
Facility: HOSPITAL | Age: 82
LOS: 1 days | Discharge: HOME | End: 2017-04-18
Attending: EMERGENCY MEDICINE | Admitting: EMERGENCY MEDICINE
Payer: MEDICARE

## 2017-04-18 VITALS
DIASTOLIC BLOOD PRESSURE: 40 MMHG | HEART RATE: 52 BPM | TEMPERATURE: 98 F | OXYGEN SATURATION: 100 % | SYSTOLIC BLOOD PRESSURE: 111 MMHG | RESPIRATION RATE: 15 BRPM

## 2017-04-18 VITALS
DIASTOLIC BLOOD PRESSURE: 48 MMHG | HEART RATE: 70 BPM | OXYGEN SATURATION: 100 % | SYSTOLIC BLOOD PRESSURE: 110 MMHG | RESPIRATION RATE: 16 BRPM

## 2017-04-18 DIAGNOSIS — Z98.89 OTHER SPECIFIED POSTPROCEDURAL STATES: Chronic | ICD-10-CM

## 2017-04-18 LAB
ALBUMIN SERPL ELPH-MCNC: 3.5 G/DL — SIGNIFICANT CHANGE UP (ref 3.3–5)
ALP SERPL-CCNC: 128 U/L — HIGH (ref 40–120)
ALT FLD-CCNC: 23 U/L — SIGNIFICANT CHANGE UP (ref 4–33)
AMYLASE P1 CFR SERPL: 113 U/L — SIGNIFICANT CHANGE UP (ref 25–125)
APTT BLD: 38.7 SEC — HIGH (ref 27.5–37.4)
AST SERPL-CCNC: 40 U/L — HIGH (ref 4–32)
BASE EXCESS BLDV CALC-SCNC: 2 MMOL/L — SIGNIFICANT CHANGE UP
BASOPHILS # BLD AUTO: 0.02 K/UL — SIGNIFICANT CHANGE UP (ref 0–0.2)
BASOPHILS NFR BLD AUTO: 0.3 % — SIGNIFICANT CHANGE UP (ref 0–2)
BASOPHILS NFR SPEC: 0 % — SIGNIFICANT CHANGE UP (ref 0–2)
BILIRUB SERPL-MCNC: 1.9 MG/DL — HIGH (ref 0.2–1.2)
BLOOD GAS VENOUS - CREATININE: 1.4 MG/DL — HIGH (ref 0.5–1.3)
BUN SERPL-MCNC: 26 MG/DL — HIGH (ref 7–23)
CALCIUM SERPL-MCNC: 10 MG/DL — SIGNIFICANT CHANGE UP (ref 8.4–10.5)
CHLORIDE BLDV-SCNC: 104 MMOL/L — SIGNIFICANT CHANGE UP (ref 96–108)
CHLORIDE SERPL-SCNC: 105 MMOL/L — SIGNIFICANT CHANGE UP (ref 98–107)
CK MB BLD-MCNC: 1.76 NG/ML — SIGNIFICANT CHANGE UP (ref 1–4.7)
CK MB BLD-MCNC: SIGNIFICANT CHANGE UP (ref 0–2.5)
CK SERPL-CCNC: 39 U/L — SIGNIFICANT CHANGE UP (ref 25–170)
CO2 SERPL-SCNC: 25 MMOL/L — SIGNIFICANT CHANGE UP (ref 22–31)
CREAT SERPL-MCNC: 1.49 MG/DL — HIGH (ref 0.5–1.3)
DIGOXIN SERPL-MCNC: 0.5 NG/ML — LOW (ref 0.8–2)
EOSINOPHIL # BLD AUTO: 0.07 K/UL — SIGNIFICANT CHANGE UP (ref 0–0.5)
EOSINOPHIL NFR BLD AUTO: 1.2 % — SIGNIFICANT CHANGE UP (ref 0–6)
EOSINOPHIL NFR FLD: 0 % — SIGNIFICANT CHANGE UP (ref 0–6)
GAS PNL BLDV: 138 MMOL/L — SIGNIFICANT CHANGE UP (ref 136–146)
GLUCOSE BLDV-MCNC: 129 — HIGH (ref 70–99)
GLUCOSE SERPL-MCNC: 136 MG/DL — HIGH (ref 70–99)
HCO3 BLDV-SCNC: 25 MMOL/L — SIGNIFICANT CHANGE UP (ref 20–27)
HCT VFR BLD CALC: 34.7 % — SIGNIFICANT CHANGE UP (ref 34.5–45)
HCT VFR BLDV CALC: 35.7 % — SIGNIFICANT CHANGE UP (ref 34.5–45)
HGB BLD-MCNC: 11.3 G/DL — LOW (ref 11.5–15.5)
HGB BLDV-MCNC: 11.6 G/DL — SIGNIFICANT CHANGE UP (ref 11.5–15.5)
IMM GRANULOCYTES NFR BLD AUTO: 0.3 % — SIGNIFICANT CHANGE UP (ref 0–1.5)
INR BLD: 1.19 — HIGH (ref 0.88–1.17)
LACTATE BLDV-MCNC: 2.6 MMOL/L — HIGH (ref 0.5–2)
LIDOCAIN IGE QN: 39.4 U/L — SIGNIFICANT CHANGE UP (ref 7–60)
LYMPHOCYTES # BLD AUTO: 0.62 K/UL — LOW (ref 1–3.3)
LYMPHOCYTES # BLD AUTO: 10.7 % — LOW (ref 13–44)
LYMPHOCYTES NFR SPEC AUTO: 8 % — LOW (ref 13–44)
MANUAL SMEAR VERIFICATION: SIGNIFICANT CHANGE UP
MCHC RBC-ENTMCNC: 32.6 % — SIGNIFICANT CHANGE UP (ref 32–36)
MCHC RBC-ENTMCNC: 32.7 PG — SIGNIFICANT CHANGE UP (ref 27–34)
MCV RBC AUTO: 100.3 FL — HIGH (ref 80–100)
METAMYELOCYTES # FLD: 1 % — SIGNIFICANT CHANGE UP (ref 0–1)
MONOCYTES # BLD AUTO: 0.25 K/UL — SIGNIFICANT CHANGE UP (ref 0–0.9)
MONOCYTES NFR BLD AUTO: 4.3 % — SIGNIFICANT CHANGE UP (ref 2–14)
MONOCYTES NFR BLD: 3 % — SIGNIFICANT CHANGE UP (ref 2–9)
NEUTROPHIL AB SER-ACNC: 76 % — SIGNIFICANT CHANGE UP (ref 43–77)
NEUTROPHILS # BLD AUTO: 4.82 K/UL — SIGNIFICANT CHANGE UP (ref 1.8–7.4)
NEUTROPHILS NFR BLD AUTO: 83.2 % — HIGH (ref 43–77)
NEUTS BAND # BLD: 8 % — HIGH (ref 0–6)
PCO2 BLDV: 48 MMHG — SIGNIFICANT CHANGE UP (ref 41–51)
PH BLDV: 7.37 PH — SIGNIFICANT CHANGE UP (ref 7.32–7.43)
PLATELET # BLD AUTO: 82 K/UL — LOW (ref 150–400)
PMV BLD: 11 FL — SIGNIFICANT CHANGE UP (ref 7–13)
PO2 BLDV: 26 MMHG — LOW (ref 35–40)
POTASSIUM BLDV-SCNC: 4.4 MMOL/L — SIGNIFICANT CHANGE UP (ref 3.4–4.5)
POTASSIUM SERPL-MCNC: 4.6 MMOL/L — SIGNIFICANT CHANGE UP (ref 3.5–5.3)
POTASSIUM SERPL-SCNC: 4.6 MMOL/L — SIGNIFICANT CHANGE UP (ref 3.5–5.3)
PROT SERPL-MCNC: 5.7 G/DL — LOW (ref 6–8.3)
PROTHROM AB SERPL-ACNC: 13.4 SEC — HIGH (ref 9.8–13.1)
RBC # BLD: 3.46 M/UL — LOW (ref 3.8–5.2)
RBC # FLD: 14.7 % — HIGH (ref 10.3–14.5)
SAO2 % BLDV: 33.5 % — LOW (ref 60–85)
SODIUM SERPL-SCNC: 143 MMOL/L — SIGNIFICANT CHANGE UP (ref 135–145)
TROPONIN T SERPL-MCNC: < 0.06 NG/ML — SIGNIFICANT CHANGE UP (ref 0–0.06)
VARIANT LYMPHS # BLD: 4 % — SIGNIFICANT CHANGE UP
WBC # BLD: 5.8 K/UL — SIGNIFICANT CHANGE UP (ref 3.8–10.5)
WBC # FLD AUTO: 5.8 K/UL — SIGNIFICANT CHANGE UP (ref 3.8–10.5)

## 2017-04-18 PROCEDURE — 71010: CPT | Mod: 26

## 2017-04-18 PROCEDURE — 99285 EMERGENCY DEPT VISIT HI MDM: CPT | Mod: 25,GC

## 2017-04-18 PROCEDURE — 93010 ELECTROCARDIOGRAM REPORT: CPT | Mod: 59

## 2017-04-18 RX ORDER — SODIUM CHLORIDE 9 MG/ML
500 INJECTION INTRAMUSCULAR; INTRAVENOUS; SUBCUTANEOUS ONCE
Qty: 0 | Refills: 0 | Status: COMPLETED | OUTPATIENT
Start: 2017-04-18 | End: 2017-04-18

## 2017-04-18 RX ADMIN — SODIUM CHLORIDE 250 MILLILITER(S): 9 INJECTION INTRAMUSCULAR; INTRAVENOUS; SUBCUTANEOUS at 22:24

## 2017-04-18 NOTE — ED PROVIDER NOTE - OBJECTIVE STATEMENT
88F h.o a fib, CLL COPD cirrhosis liver CA states she had abdo cramps and diarrhea today and got light headed while on toilet. Family was present and states she passed out for several seconds. Now feels better but has mild cramps. She takes lactulose and has soft stools but this was more than usual. No CP, SOB, HA, fever, neuro deficits. Review of Symptoms in all systems otherwise negative, except as indicated. Family states her heart rate is slow in 50s often. 88F h.o a fib, CLL COPD cirrhosis liver CA states she had abdo cramps and diarrhea today and got light headed while on toilet. Family was present and states she passed out for several seconds. Now feels better but has mild cramps. She takes lactulose and has soft stools but this was more than usual.  No CP, SOB, HA, rectal bleeding, fever, neuro deficits. Review of Symptoms in all systems otherwise negative, except as indicated. Family states her heart rate is slow in 50s often.

## 2017-04-18 NOTE — ED ADULT NURSE NOTE - OBJECTIVE STATEMENT
Received pt in [facility]. AA0X3. Brought in after syncopal episode tonight while visiting family at [facility]. Pt is on lactulose and had bowel movement then became faint in wheelchair. Daughters stated that syncopal episode lasted 10 seconds approx. Denies dizziness, headache or chest pain at present. VS as noted. Noted to be 40s-50s on cardiac monitor. As per family that is baseline. Dr. Bishop aware. IV placed, labs sent. Will monitor.

## 2017-04-18 NOTE — ED PROVIDER NOTE - MEDICAL DECISION MAKING DETAILS
Syncope possibly related to diarrhea and dehydration, however in view of extensive history and a fib with slow VR, we will check labs CE Dig level CXR. Will observe on monitor and hydrate

## 2017-04-18 NOTE — ED PROVIDER NOTE - PROGRESS NOTE DETAILS
Feels much better and wishes to go home. No pain no light headedness. VSS HR 60, able to get up and walk. Labs and xrays reviewed. No indication for emergency admission. Patient given copies of all her results and EKG and advised to see PCP/cardiologist ASAP. DC with daughters

## 2017-04-18 NOTE — ED PROVIDER NOTE - PMH
AF (Atrial Fibrillation)  IF ON ALBUTEROL  Bleeding Esophageal Varices    CKD (chronic kidney disease), stage 3 (moderate)    CLL (Chronic Lymphoblastic Leukemia)    COPD (Chronic Obstructive Pulmonary Disease)    GIB (Gastrointestinal Bleeding)    Liver cell carcinoma    Portal Hypertension    Pulmonary HTN  moderate  PVD (peripheral vascular disease)    Severe aortic stenosis by prior echocardiogram

## 2017-04-18 NOTE — ED ADULT NURSE NOTE - CHIEF COMPLAINT QUOTE
Pt had witnessed syncopal episode x2 while visiting a family member at [facility].  No head trauma.  Pt bradycardic in triage, history of A-fib.

## 2017-04-18 NOTE — ED ADULT TRIAGE NOTE - CHIEF COMPLAINT QUOTE
Pt had witnessed syncopal episode x2 while visiting a family member at [facility].  No head trauma.  Pt bradycardic in triage, history of A-fib.

## 2017-04-19 ENCOUNTER — INPATIENT (INPATIENT)
Facility: HOSPITAL | Age: 82
LOS: 1 days | Discharge: HOME | DRG: 394 | End: 2017-04-21
Attending: HOSPITALIST | Admitting: INTERNAL MEDICINE
Payer: MEDICARE

## 2017-04-19 VITALS
TEMPERATURE: 98 F | OXYGEN SATURATION: 99 % | HEART RATE: 55 BPM | SYSTOLIC BLOOD PRESSURE: 119 MMHG | RESPIRATION RATE: 20 BRPM | DIASTOLIC BLOOD PRESSURE: 43 MMHG

## 2017-04-19 DIAGNOSIS — R55 SYNCOPE AND COLLAPSE: ICD-10-CM

## 2017-04-19 DIAGNOSIS — J44.9 CHRONIC OBSTRUCTIVE PULMONARY DISEASE, UNSPECIFIED: ICD-10-CM

## 2017-04-19 DIAGNOSIS — Z98.89 OTHER SPECIFIED POSTPROCEDURAL STATES: Chronic | ICD-10-CM

## 2017-04-19 DIAGNOSIS — K62.9 DISEASE OF ANUS AND RECTUM, UNSPECIFIED: ICD-10-CM

## 2017-04-19 DIAGNOSIS — I48.91 UNSPECIFIED ATRIAL FIBRILLATION: ICD-10-CM

## 2017-04-19 DIAGNOSIS — K62.5 HEMORRHAGE OF ANUS AND RECTUM: ICD-10-CM

## 2017-04-19 DIAGNOSIS — N18.3 CHRONIC KIDNEY DISEASE, STAGE 3 (MODERATE): ICD-10-CM

## 2017-04-19 LAB
ALBUMIN SERPL ELPH-MCNC: 3.4 G/DL — SIGNIFICANT CHANGE UP (ref 3.3–5)
ALP SERPL-CCNC: 120 U/L — SIGNIFICANT CHANGE UP (ref 40–120)
ALT FLD-CCNC: 23 U/L RC — SIGNIFICANT CHANGE UP (ref 10–45)
ANION GAP SERPL CALC-SCNC: 13 MMOL/L — SIGNIFICANT CHANGE UP (ref 5–17)
APTT BLD: 32.5 SEC — SIGNIFICANT CHANGE UP (ref 27.5–37.4)
AST SERPL-CCNC: 39 U/L — SIGNIFICANT CHANGE UP (ref 10–40)
BASOPHILS # BLD AUTO: 0 K/UL — SIGNIFICANT CHANGE UP (ref 0–0.2)
BASOPHILS NFR BLD AUTO: 0.3 % — SIGNIFICANT CHANGE UP (ref 0–2)
BILIRUB SERPL-MCNC: 2.2 MG/DL — HIGH (ref 0.2–1.2)
BLD GP AB SCN SERPL QL: NEGATIVE — SIGNIFICANT CHANGE UP
BUN SERPL-MCNC: 27 MG/DL — HIGH (ref 7–23)
CALCIUM SERPL-MCNC: 10 MG/DL — SIGNIFICANT CHANGE UP (ref 8.4–10.5)
CHLORIDE SERPL-SCNC: 104 MMOL/L — SIGNIFICANT CHANGE UP (ref 96–108)
CO2 SERPL-SCNC: 22 MMOL/L — SIGNIFICANT CHANGE UP (ref 22–31)
CREAT SERPL-MCNC: 1.15 MG/DL — SIGNIFICANT CHANGE UP (ref 0.5–1.3)
EOSINOPHIL # BLD AUTO: 0.1 K/UL — SIGNIFICANT CHANGE UP (ref 0–0.5)
EOSINOPHIL NFR BLD AUTO: 1.7 % — SIGNIFICANT CHANGE UP (ref 0–6)
GAS PNL BLDV: SIGNIFICANT CHANGE UP
GLUCOSE SERPL-MCNC: 88 MG/DL — SIGNIFICANT CHANGE UP (ref 70–99)
HCT VFR BLD CALC: 32.8 % — LOW (ref 34.5–45)
HGB BLD-MCNC: 11.2 G/DL — LOW (ref 11.5–15.5)
INR BLD: 1.26 RATIO — HIGH (ref 0.88–1.16)
LYMPHOCYTES # BLD AUTO: 0.8 K/UL — LOW (ref 1–3.3)
LYMPHOCYTES # BLD AUTO: 14.3 % — SIGNIFICANT CHANGE UP (ref 13–44)
MCHC RBC-ENTMCNC: 34 GM/DL — SIGNIFICANT CHANGE UP (ref 32–36)
MCHC RBC-ENTMCNC: 34.4 PG — HIGH (ref 27–34)
MCV RBC AUTO: 101 FL — HIGH (ref 80–100)
MONOCYTES # BLD AUTO: 0.7 K/UL — SIGNIFICANT CHANGE UP (ref 0–0.9)
MONOCYTES NFR BLD AUTO: 12.2 % — SIGNIFICANT CHANGE UP (ref 2–14)
NEUTROPHILS # BLD AUTO: 4.2 K/UL — SIGNIFICANT CHANGE UP (ref 1.8–7.4)
NEUTROPHILS NFR BLD AUTO: 71.4 % — SIGNIFICANT CHANGE UP (ref 43–77)
PLATELET # BLD AUTO: 62 K/UL — LOW (ref 150–400)
POTASSIUM SERPL-MCNC: 4.8 MMOL/L — SIGNIFICANT CHANGE UP (ref 3.5–5.3)
POTASSIUM SERPL-SCNC: 4.8 MMOL/L — SIGNIFICANT CHANGE UP (ref 3.5–5.3)
PROT SERPL-MCNC: 5.5 G/DL — LOW (ref 6–8.3)
PROTHROM AB SERPL-ACNC: 13.7 SEC — HIGH (ref 9.8–12.7)
RBC # BLD: 3.25 M/UL — LOW (ref 3.8–5.2)
RBC # FLD: 13.3 % — SIGNIFICANT CHANGE UP (ref 10.3–14.5)
RH IG SCN BLD-IMP: POSITIVE — SIGNIFICANT CHANGE UP
SODIUM SERPL-SCNC: 139 MMOL/L — SIGNIFICANT CHANGE UP (ref 135–145)
WBC # BLD: 5.9 K/UL — SIGNIFICANT CHANGE UP (ref 3.8–10.5)
WBC # FLD AUTO: 5.9 K/UL — SIGNIFICANT CHANGE UP (ref 3.8–10.5)

## 2017-04-19 PROCEDURE — 99223 1ST HOSP IP/OBS HIGH 75: CPT

## 2017-04-19 PROCEDURE — 71010: CPT | Mod: 26

## 2017-04-19 PROCEDURE — 99285 EMERGENCY DEPT VISIT HI MDM: CPT | Mod: GC

## 2017-04-19 RX ORDER — DIGOXIN 250 MCG
0 TABLET ORAL
Qty: 0 | Refills: 0 | COMMUNITY

## 2017-04-19 RX ORDER — SODIUM CHLORIDE 9 MG/ML
500 INJECTION INTRAMUSCULAR; INTRAVENOUS; SUBCUTANEOUS ONCE
Qty: 0 | Refills: 0 | Status: COMPLETED | OUTPATIENT
Start: 2017-04-19 | End: 2017-04-19

## 2017-04-19 RX ORDER — LEVOTHYROXINE SODIUM 125 MCG
25 TABLET ORAL DAILY
Qty: 0 | Refills: 0 | Status: DISCONTINUED | OUTPATIENT
Start: 2017-04-19 | End: 2017-04-21

## 2017-04-19 RX ORDER — IPRATROPIUM/ALBUTEROL SULFATE 18-103MCG
3 AEROSOL WITH ADAPTER (GRAM) INHALATION EVERY 6 HOURS
Qty: 0 | Refills: 0 | Status: DISCONTINUED | OUTPATIENT
Start: 2017-04-19 | End: 2017-04-21

## 2017-04-19 RX ORDER — CARVEDILOL PHOSPHATE 80 MG/1
3.12 CAPSULE, EXTENDED RELEASE ORAL EVERY 12 HOURS
Qty: 0 | Refills: 0 | Status: DISCONTINUED | OUTPATIENT
Start: 2017-04-19 | End: 2017-04-21

## 2017-04-19 RX ORDER — SODIUM CHLORIDE 9 MG/ML
1000 INJECTION, SOLUTION INTRAVENOUS
Qty: 0 | Refills: 0 | Status: DISCONTINUED | OUTPATIENT
Start: 2017-04-19 | End: 2017-04-19

## 2017-04-19 RX ORDER — SPIRONOLACTONE 25 MG/1
25 TABLET, FILM COATED ORAL DAILY
Qty: 0 | Refills: 0 | Status: DISCONTINUED | OUTPATIENT
Start: 2017-04-19 | End: 2017-04-20

## 2017-04-19 RX ORDER — CHOLECALCIFEROL (VITAMIN D3) 125 MCG
2000 CAPSULE ORAL DAILY
Qty: 0 | Refills: 0 | Status: DISCONTINUED | OUTPATIENT
Start: 2017-04-19 | End: 2017-04-21

## 2017-04-19 RX ORDER — CARVEDILOL PHOSPHATE 80 MG/1
1 CAPSULE, EXTENDED RELEASE ORAL
Qty: 0 | Refills: 0 | COMMUNITY

## 2017-04-19 RX ORDER — BUDESONIDE, MICRONIZED 100 %
0.25 POWDER (GRAM) MISCELLANEOUS
Qty: 0 | Refills: 0 | Status: DISCONTINUED | OUTPATIENT
Start: 2017-04-19 | End: 2017-04-21

## 2017-04-19 RX ORDER — DIGOXIN 250 MCG
0.12 TABLET ORAL DAILY
Qty: 0 | Refills: 0 | Status: DISCONTINUED | OUTPATIENT
Start: 2017-04-19 | End: 2017-04-20

## 2017-04-19 RX ORDER — PANTOPRAZOLE SODIUM 20 MG/1
40 TABLET, DELAYED RELEASE ORAL EVERY 12 HOURS
Qty: 0 | Refills: 0 | Status: DISCONTINUED | OUTPATIENT
Start: 2017-04-19 | End: 2017-04-21

## 2017-04-19 RX ADMIN — Medication 2000 UNIT(S): at 21:29

## 2017-04-19 RX ADMIN — SODIUM CHLORIDE 500 MILLILITER(S): 9 INJECTION INTRAMUSCULAR; INTRAVENOUS; SUBCUTANEOUS at 12:26

## 2017-04-19 RX ADMIN — CARVEDILOL PHOSPHATE 3.12 MILLIGRAM(S): 80 CAPSULE, EXTENDED RELEASE ORAL at 20:34

## 2017-04-19 RX ADMIN — PANTOPRAZOLE SODIUM 40 MILLIGRAM(S): 20 TABLET, DELAYED RELEASE ORAL at 20:34

## 2017-04-19 RX ADMIN — SPIRONOLACTONE 25 MILLIGRAM(S): 25 TABLET, FILM COATED ORAL at 21:28

## 2017-04-19 RX ADMIN — Medication 0.25 MILLIGRAM(S): at 21:35

## 2017-04-19 NOTE — H&P ADULT. - PROBLEM SELECTOR PLAN 1
NPO for now  will start Protonix 40 mg IVP q 12  GI evaluation NPO for now  will start Protonix 40 mg IVP q 12  GI evaluation  CBC q 6

## 2017-04-19 NOTE — H&P ADULT. - PROBLEM SELECTOR PLAN 3
Duonebs q 6 prn Not on AC   will continue Digoxin 125 mcg qd Not on AC (h/o GI bleed)  will continue Digoxin 125 mcg qd  will continue Diltiazem 30 mg q 6

## 2017-04-19 NOTE — H&P ADULT. - PROBLEM SELECTOR PLAN 2
Not on AC   will continue Digoxin 125 mcg qd Telemetry monitoring  Will check TTE  Fall precautions Telemetry monitoring  Will check TTE  Last CHEN 2/11/16 showed severe AS,severe MR,EF 65%  Fall precautions

## 2017-04-19 NOTE — ED PROVIDER NOTE - OBJECTIVE STATEMENT
Pt is an 87 YO F w/ hx of HCC, Afib not on AC, COPD, CLL, CKD p/w BRBPR. As per pt, had near syncopal episode last night x 2 w/ BM, seen at [facility] ED and discharged. As per pt and family at bedside, pt had mild stomach discomfort and BRBPR x 2 and brought to ED for evaluation. No new LOC, no AMS, No CP, N/V/D, fevers ,chills, weakness or other symptosm. last evaluated by GI 7 yrs ago for BRBPR, due to bleeding form external hemorrhoids, negative colonoscopy, no other episodes.

## 2017-04-19 NOTE — ED ADULT NURSE NOTE - OBJECTIVE STATEMENT
87 y/o female c/o bright red blood in stool this am, history of hemorrhoids.  States she had diarrhea with abd discomfort/ cramps past few days but noticed bright red blood in stool this am.  Pt denies abd pain, nausea, vomiting, diarrhea, fever, chills.  Abd soft.  No acute respiratory distress noted. 87 y/o female BIB family for BRBPR,  pt had near syncopal episode x 2 with BM last night. Pt with history of hemorrhoids as family and pt.  Pt seen at [facility] ED and discharged. As per pt and family, pt had mild stomach discomfort and BRBPR x 2 and brought to ED for evaluation. No n/v/d,  no chest pain, no  fevers ,no chills, no weakness.  Abd soft.  No acute respiratory distress noted.

## 2017-04-19 NOTE — ED PROVIDER NOTE - MEDICAL DECISION MAKING DETAILS
87 YO F w/ rectal bleeding, near syncopal episode last night, hemodynamically stable and well appearing today, guaiac positive stool w/ stable h/H since last night, d/w family about care and will admit, labs, type and screen, and admit.

## 2017-04-19 NOTE — ED PROVIDER NOTE - ATTENDING CONTRIBUTION TO CARE
Nemes - 87yo F w diarrhea yesterday and 2 episodes of syncope, evaluated at [facility] yesterday and DC home. Today for 2-3 episodes of mucus w blood specks from the rectum. No abdom pains, no other stx. Exam wnl, abdomen soft, nontender. Moist mucosae. Will get basic labs, likely admit, poss bloody diarrhea vs internal hemorrhoid vs diverticular bleed. Will trend CBC

## 2017-04-19 NOTE — H&P ADULT. - ASSESSMENT
88F with h/o HCC, hepatic encephalopathy, esophageal varices, Afib not on AC, COPD, CLL, CKD who presents today with c/o syncope and bright red blood per rectum x 1 day. On exam, vital signs are stable, labs show stable H/H. 88F with h/o HCC, hepatic encephalopathy, esophageal varices, Afib not on AC, severe AS,COPD, CLL, CKD who presents today with c/o syncope and bright red blood per rectum x 1 day. On exam, vital signs are stable, labs show stable H/H. 88F with h/o HCC, hepatic encephalopathy, esophageal varices, Afib not on AC, severe AS/MR,COPD, CLL, CKD who presents today with c/o syncope and bright red blood per rectum x 1 day. On exam, vital signs are stable, labs show stable H/H.

## 2017-04-19 NOTE — ED PROVIDER NOTE - GASTROINTESTINAL, MLM
Abdomen soft, non-tender, no guarding. Rectal: small non-bleeding external hemorrhodis, mixed red blood w/ stool, grosly guaiac positive

## 2017-04-19 NOTE — H&P ADULT. - PROBLEM SELECTOR PROBLEM 4
CKD (chronic kidney disease), stage 3 (moderate) Chronic obstructive pulmonary disease, unspecified COPD type

## 2017-04-20 LAB
HCT VFR BLD CALC: 29.4 % — LOW (ref 34.5–45)
HCT VFR BLD CALC: 32.3 % — LOW (ref 34.5–45)
HGB BLD-MCNC: 10.2 G/DL — LOW (ref 11.5–15.5)
HGB BLD-MCNC: 10.7 G/DL — LOW (ref 11.5–15.5)
MAGNESIUM SERPL-MCNC: 1.8 MG/DL — SIGNIFICANT CHANGE UP (ref 1.6–2.6)
MCHC RBC-ENTMCNC: 33.2 GM/DL — SIGNIFICANT CHANGE UP (ref 32–36)
MCHC RBC-ENTMCNC: 34 PG — SIGNIFICANT CHANGE UP (ref 27–34)
MCHC RBC-ENTMCNC: 34.7 GM/DL — SIGNIFICANT CHANGE UP (ref 32–36)
MCHC RBC-ENTMCNC: 35.2 PG — HIGH (ref 27–34)
MCV RBC AUTO: 102 FL — HIGH (ref 80–100)
MCV RBC AUTO: 102 FL — HIGH (ref 80–100)
PHOSPHATE SERPL-MCNC: 3.4 MG/DL — SIGNIFICANT CHANGE UP (ref 2.5–4.5)
PLATELET # BLD AUTO: 48 K/UL — LOW (ref 150–400)
PLATELET # BLD AUTO: 49 K/UL — LOW (ref 150–400)
RBC # BLD: 2.89 M/UL — LOW (ref 3.8–5.2)
RBC # BLD: 3.16 M/UL — LOW (ref 3.8–5.2)
RBC # FLD: 13.1 % — SIGNIFICANT CHANGE UP (ref 10.3–14.5)
RBC # FLD: 13.1 % — SIGNIFICANT CHANGE UP (ref 10.3–14.5)
WBC # BLD: 3.3 K/UL — LOW (ref 3.8–10.5)
WBC # BLD: 3.4 K/UL — LOW (ref 3.8–10.5)
WBC # FLD AUTO: 3.3 K/UL — LOW (ref 3.8–10.5)
WBC # FLD AUTO: 3.4 K/UL — LOW (ref 3.8–10.5)

## 2017-04-20 PROCEDURE — 99223 1ST HOSP IP/OBS HIGH 75: CPT | Mod: GC

## 2017-04-20 PROCEDURE — 99233 SBSQ HOSP IP/OBS HIGH 50: CPT

## 2017-04-20 RX ORDER — DIGOXIN 250 MCG
0.12 TABLET ORAL EVERY OTHER DAY
Qty: 0 | Refills: 0 | Status: DISCONTINUED | OUTPATIENT
Start: 2017-04-21 | End: 2017-04-21

## 2017-04-20 RX ADMIN — PANTOPRAZOLE SODIUM 40 MILLIGRAM(S): 20 TABLET, DELAYED RELEASE ORAL at 09:44

## 2017-04-20 RX ADMIN — Medication 0.25 MILLIGRAM(S): at 09:44

## 2017-04-20 RX ADMIN — Medication 3 MILLILITER(S): at 06:04

## 2017-04-20 RX ADMIN — Medication 25 MICROGRAM(S): at 06:04

## 2017-04-20 RX ADMIN — Medication 0.25 MILLIGRAM(S): at 22:30

## 2017-04-20 RX ADMIN — Medication 3 MILLILITER(S): at 14:07

## 2017-04-20 RX ADMIN — Medication 3 MILLILITER(S): at 23:41

## 2017-04-20 RX ADMIN — Medication 2000 UNIT(S): at 09:44

## 2017-04-20 RX ADMIN — Medication 3 MILLILITER(S): at 00:36

## 2017-04-20 RX ADMIN — CARVEDILOL PHOSPHATE 3.12 MILLIGRAM(S): 80 CAPSULE, EXTENDED RELEASE ORAL at 22:30

## 2017-04-20 RX ADMIN — Medication 3 MILLILITER(S): at 17:31

## 2017-04-20 RX ADMIN — PANTOPRAZOLE SODIUM 40 MILLIGRAM(S): 20 TABLET, DELAYED RELEASE ORAL at 22:31

## 2017-04-21 ENCOUNTER — TRANSCRIPTION ENCOUNTER (OUTPATIENT)
Age: 82
End: 2017-04-21

## 2017-04-21 VITALS
DIASTOLIC BLOOD PRESSURE: 49 MMHG | SYSTOLIC BLOOD PRESSURE: 112 MMHG | HEART RATE: 54 BPM | RESPIRATION RATE: 18 BRPM | TEMPERATURE: 98 F | OXYGEN SATURATION: 95 %

## 2017-04-21 LAB
ALBUMIN SERPL ELPH-MCNC: 3 G/DL — LOW (ref 3.3–5)
ALP SERPL-CCNC: 90 U/L — SIGNIFICANT CHANGE UP (ref 40–120)
ALT FLD-CCNC: 18 U/L RC — SIGNIFICANT CHANGE UP (ref 10–45)
ANION GAP SERPL CALC-SCNC: 10 MMOL/L — SIGNIFICANT CHANGE UP (ref 5–17)
APPEARANCE UR: CLEAR — SIGNIFICANT CHANGE UP
AST SERPL-CCNC: 31 U/L — SIGNIFICANT CHANGE UP (ref 10–40)
BILIRUB SERPL-MCNC: 2.2 MG/DL — HIGH (ref 0.2–1.2)
BILIRUB UR-MCNC: NEGATIVE — SIGNIFICANT CHANGE UP
BUN SERPL-MCNC: 18 MG/DL — SIGNIFICANT CHANGE UP (ref 7–23)
CALCIUM SERPL-MCNC: 9.6 MG/DL — SIGNIFICANT CHANGE UP (ref 8.4–10.5)
CHLORIDE SERPL-SCNC: 106 MMOL/L — SIGNIFICANT CHANGE UP (ref 96–108)
CO2 SERPL-SCNC: 25 MMOL/L — SIGNIFICANT CHANGE UP (ref 22–31)
COLOR SPEC: SIGNIFICANT CHANGE UP
CREAT SERPL-MCNC: 1.01 MG/DL — SIGNIFICANT CHANGE UP (ref 0.5–1.3)
DIFF PNL FLD: NEGATIVE — SIGNIFICANT CHANGE UP
DIGOXIN SERPL-MCNC: 0.5 NG/ML — LOW (ref 0.8–2)
EPI CELLS # UR: SIGNIFICANT CHANGE UP /HPF
GLUCOSE SERPL-MCNC: 86 MG/DL — SIGNIFICANT CHANGE UP (ref 70–99)
GLUCOSE UR QL: NEGATIVE — SIGNIFICANT CHANGE UP
HCT VFR BLD CALC: 30.6 % — LOW (ref 34.5–45)
HGB BLD-MCNC: 10.2 G/DL — LOW (ref 11.5–15.5)
KETONES UR-MCNC: NEGATIVE — SIGNIFICANT CHANGE UP
LEUKOCYTE ESTERASE UR-ACNC: ABNORMAL
MCHC RBC-ENTMCNC: 33.4 GM/DL — SIGNIFICANT CHANGE UP (ref 32–36)
MCHC RBC-ENTMCNC: 34.1 PG — HIGH (ref 27–34)
MCV RBC AUTO: 102 FL — HIGH (ref 80–100)
NITRITE UR-MCNC: NEGATIVE — SIGNIFICANT CHANGE UP
PH UR: 6 — SIGNIFICANT CHANGE UP (ref 5–8)
PLATELET # BLD AUTO: 50 K/UL — LOW (ref 150–400)
POTASSIUM SERPL-MCNC: 4.3 MMOL/L — SIGNIFICANT CHANGE UP (ref 3.5–5.3)
POTASSIUM SERPL-SCNC: 4.3 MMOL/L — SIGNIFICANT CHANGE UP (ref 3.5–5.3)
PROT SERPL-MCNC: 4.7 G/DL — LOW (ref 6–8.3)
PROT UR-MCNC: NEGATIVE — SIGNIFICANT CHANGE UP
RBC # BLD: 3 M/UL — LOW (ref 3.8–5.2)
RBC # FLD: 12.9 % — SIGNIFICANT CHANGE UP (ref 10.3–14.5)
SODIUM SERPL-SCNC: 141 MMOL/L — SIGNIFICANT CHANGE UP (ref 135–145)
SP GR SPEC: 1.01 — LOW (ref 1.01–1.02)
UROBILINOGEN FLD QL: NEGATIVE — SIGNIFICANT CHANGE UP
WBC # BLD: 2.7 K/UL — LOW (ref 3.8–10.5)
WBC # FLD AUTO: 2.7 K/UL — LOW (ref 3.8–10.5)
WBC UR QL: SIGNIFICANT CHANGE UP /HPF (ref 0–5)

## 2017-04-21 PROCEDURE — 82947 ASSAY GLUCOSE BLOOD QUANT: CPT

## 2017-04-21 PROCEDURE — 86901 BLOOD TYPING SEROLOGIC RH(D): CPT

## 2017-04-21 PROCEDURE — 99239 HOSP IP/OBS DSCHRG MGMT >30: CPT

## 2017-04-21 PROCEDURE — 84132 ASSAY OF SERUM POTASSIUM: CPT

## 2017-04-21 PROCEDURE — 82803 BLOOD GASES ANY COMBINATION: CPT

## 2017-04-21 PROCEDURE — 80162 ASSAY OF DIGOXIN TOTAL: CPT

## 2017-04-21 PROCEDURE — 99232 SBSQ HOSP IP/OBS MODERATE 35: CPT | Mod: GC

## 2017-04-21 PROCEDURE — 85027 COMPLETE CBC AUTOMATED: CPT

## 2017-04-21 PROCEDURE — 86850 RBC ANTIBODY SCREEN: CPT

## 2017-04-21 PROCEDURE — 83605 ASSAY OF LACTIC ACID: CPT

## 2017-04-21 PROCEDURE — 82330 ASSAY OF CALCIUM: CPT

## 2017-04-21 PROCEDURE — 82435 ASSAY OF BLOOD CHLORIDE: CPT

## 2017-04-21 PROCEDURE — 85014 HEMATOCRIT: CPT

## 2017-04-21 PROCEDURE — 85610 PROTHROMBIN TIME: CPT

## 2017-04-21 PROCEDURE — 80053 COMPREHEN METABOLIC PANEL: CPT

## 2017-04-21 PROCEDURE — 84295 ASSAY OF SERUM SODIUM: CPT

## 2017-04-21 PROCEDURE — 84100 ASSAY OF PHOSPHORUS: CPT

## 2017-04-21 PROCEDURE — 99285 EMERGENCY DEPT VISIT HI MDM: CPT | Mod: 25

## 2017-04-21 PROCEDURE — 85730 THROMBOPLASTIN TIME PARTIAL: CPT

## 2017-04-21 PROCEDURE — 82272 OCCULT BLD FECES 1-3 TESTS: CPT

## 2017-04-21 PROCEDURE — 83735 ASSAY OF MAGNESIUM: CPT

## 2017-04-21 PROCEDURE — 81001 URINALYSIS AUTO W/SCOPE: CPT

## 2017-04-21 PROCEDURE — 94640 AIRWAY INHALATION TREATMENT: CPT

## 2017-04-21 PROCEDURE — 71045 X-RAY EXAM CHEST 1 VIEW: CPT

## 2017-04-21 PROCEDURE — 93005 ELECTROCARDIOGRAM TRACING: CPT

## 2017-04-21 PROCEDURE — 86900 BLOOD TYPING SEROLOGIC ABO: CPT

## 2017-04-21 RX ORDER — POLYETHYLENE GLYCOL 3350 17 G/17G
17 POWDER, FOR SOLUTION ORAL
Qty: 0 | Refills: 0 | Status: DISCONTINUED | OUTPATIENT
Start: 2017-04-21 | End: 2017-04-21

## 2017-04-21 RX ORDER — LACTULOSE 10 G/15ML
30 SOLUTION ORAL
Qty: 0 | Refills: 1 | COMMUNITY
Start: 2017-04-21 | End: 2017-06-19

## 2017-04-21 RX ORDER — LACTULOSE 10 G/15ML
10 SOLUTION ORAL
Qty: 0 | Refills: 0 | Status: DISCONTINUED | OUTPATIENT
Start: 2017-04-21 | End: 2017-04-21

## 2017-04-21 RX ORDER — LACTULOSE 10 G/15ML
15 SOLUTION ORAL
Qty: 900 | Refills: 1 | OUTPATIENT
Start: 2017-04-21 | End: 2017-06-19

## 2017-04-21 RX ORDER — SPIRONOLACTONE 25 MG/1
25 TABLET, FILM COATED ORAL DAILY
Qty: 0 | Refills: 0 | Status: DISCONTINUED | OUTPATIENT
Start: 2017-04-21 | End: 2017-04-21

## 2017-04-21 RX ADMIN — Medication 25 MICROGRAM(S): at 05:59

## 2017-04-21 RX ADMIN — Medication 3 MILLILITER(S): at 05:59

## 2017-04-21 RX ADMIN — Medication 0.25 MILLIGRAM(S): at 09:20

## 2017-04-21 RX ADMIN — Medication 0.12 MILLIGRAM(S): at 13:29

## 2017-04-21 RX ADMIN — Medication 3 MILLILITER(S): at 13:30

## 2017-04-21 RX ADMIN — PANTOPRAZOLE SODIUM 40 MILLIGRAM(S): 20 TABLET, DELAYED RELEASE ORAL at 09:20

## 2017-04-21 RX ADMIN — Medication 2000 UNIT(S): at 09:20

## 2017-04-21 NOTE — DISCHARGE NOTE ADULT - HOSPITAL COURSE
88F with h/o HCC, hepatic encephalopathy, esophageal varices, Afib not on AC, Severe AS, Severe MR  COPD, CLL, CKD who presents today with c/o syncope and bright red blood per rectum x 1 day.Pt was visiting a friend at [facility] when she had a large loose bowel movement and subsequently passed out. She was taken to the ED , evaluated and subsequently discharged home with impression of possible viral GE.   While at home she had another bowel movement and this time she noted mucus and 2 blood clots, described as bright red.  The patient was admitted in Tele. No chest pain or SOB noted. Her Hb has been stable and GI- Dr Macdonald wanted observation, serial H/H. She remained afebrile, no more P/R bleed noted. Lactulose was decreased to 15 ml po bid, and she was c/w Rifaximin, Aldactone, PPI. Card- Dr Boston was contacted. Per his recommendation no need of any Echo or cardiac w/u. He will clear him for the IR microwave ablation in a week. Dr Ye-IR was informed. GI cleared as her Hb stable and she remained stable on Tele with Cardizem, Digoxin, Coreg. Plan of care was discussed in details with daughters.

## 2017-04-21 NOTE — DISCHARGE NOTE ADULT - ADDITIONAL INSTRUCTIONS
1. Keep your appointment with Dr Barber for microwave ablation of HCC  2. Card- Dr Boston will clear you prior to procedure.  3. Take all medicines.  3. F/U with Dr. MATT Macdonald (GI) outpatient.

## 2017-04-21 NOTE — PROVIDER CONTACT NOTE (OTHER) - ACTION/TREATMENT ORDERED:
NP made aware. AM labs ordered for magnesium and phosphorous.
hold Cardizem . will closely monitor the pt.

## 2017-04-21 NOTE — DISCHARGE NOTE ADULT - CARE PLAN
Principal Discharge DX:	Lower GI bleed  Goal:	resolved  Instructions for follow-up, activity and diet:	c/w all medicines and f/u with GI- Dr MATT Macdonald  Secondary Diagnosis:	HCC (hepatocellular carcinoma)  Instructions for follow-up, activity and diet:	You have appointment with Dr Ye(IR) for micro-ablation on 5/3/17. Cardiologist-Dr Boston aware to clear you prior to procedure. Please call for an appointment.  Secondary Diagnosis:	Atrial fibrillation, unspecified type  Instructions for follow-up, activity and diet:	Stable heart rate. c/w home medicines- coreg, Cardizem, digoxin. Dr Boston will f/u outpatient.  Secondary Diagnosis:	COPD (chronic obstructive pulmonary disease)  Instructions for follow-up, activity and diet:	c/w all home bronchodilators and inhaled steroids.  Secondary Diagnosis:	Severe aortic stenosis by prior echocardiogram  Instructions for follow-up, activity and diet:	No SOB, chest pain, Stable per DR. Boston

## 2017-04-21 NOTE — DISCHARGE NOTE ADULT - CARE PROVIDER_API CALL
Daniel Boston), Cardiovascular Disease; Internal Medicine  [address] [place] [address]  Phone: [phone]  Fax: [phone]    Ramos Ye), Diagnostic Radiology; VascularIntervent Radiology  [address] [address]  Phone: [phone]  Fax: [phone]    Neema Macdonald), Gastroenterology; Internal Medicine  [address] [place] [address] [place] [address]  Phone: [phone]  Fax: [phone]

## 2017-04-21 NOTE — DISCHARGE NOTE ADULT - CARE PROVIDERS DIRECT ADDRESSES
[email or web address]

## 2017-04-21 NOTE — DISCHARGE NOTE ADULT - PATIENT PORTAL LINK FT
“You can access the FollowHealth Patient Portal, offered by [facility], by registering with the following website: [email or web address]”

## 2017-04-21 NOTE — DISCHARGE NOTE ADULT - PLAN OF CARE
resolved c/w all medicines and f/u with GI- Dr MATT Macdonald You have appointment with Dr Ye(IR) for micro-ablation on 5/3/17. Cardiologist-Dr Boston aware to clear you prior to procedure. Please call for an appointment. No SOB, chest pain, Stable per DR. Boston c/w all home bronchodilators and inhaled steroids. Stable heart rate. c/w home medicines- coreg, Cardizem, digoxin. Dr Boston will f/u outpatient.

## 2017-04-21 NOTE — DISCHARGE NOTE ADULT - SECONDARY DIAGNOSIS.
HCC (hepatocellular carcinoma) Atrial fibrillation, unspecified type COPD (chronic obstructive pulmonary disease) Severe aortic stenosis by prior echocardiogram

## 2017-04-21 NOTE — DISCHARGE NOTE ADULT - MEDICATION SUMMARY - MEDICATIONS TO CHANGE
I will SWITCH the dose or number of times a day I take the medications listed below when I get home from the hospital:    lactulose 10 g/15 mL oral syrup  -- 30 milliliter(s) by mouth every 6 hours

## 2017-04-21 NOTE — DISCHARGE NOTE ADULT - MEDICATION SUMMARY - MEDICATIONS TO TAKE
I will START or STAY ON the medications listed below when I get home from the hospital:    budesonide 0.25 mg/2 mL inhalation suspension  -- 2 milliliter(s) inhaled 2 times a day  -- Indication: For COPD    spironolactone 25 mg oral tablet  --  by mouth once a day  -- Indication: For water pill    digoxin 125 mcg (0.125 mg) oral tablet  -- 1 tab(s) by mouth every other day  -- Indication: For Atrial fibrillation, unspecified type    diltiazem 30 mg oral tablet  -- 1 tab(s) by mouth every 6 hours  -- Indication: For Atrial fibrillation, unspecified type    carvedilol 3.125 mg oral tablet  -- 1 tab(s) by mouth 2 times a day, As Needed if systolic is >100  -- Indication: For hypertension    ipratropium-albuterol 0.5 mg-2.5 mg/3 mLinhalation solution  -- 3 milliliter(s) inhaled 2 times a day  -- Indication: For Copd    lactulose 10 g/15 mL oral syrup  -- 15 milliliter(s) by mouth 2 times a day  -- Indication: For Constipation/encephalopathy    rifAXIMin 550 mg oral tablet  -- 1 tab(s) by mouth 2 times a day  -- Indication: For encephalopathy/HCC    levothyroxine 25 mcg (0.025 mg) oral capsule  -- 1 cap(s) by mouth once a day  -- Indication: For hypothyroidism    cholecalciferol oral tablet  -- 2000 unit(s) by mouth once a day  -- Indication: For vitamin D

## 2017-05-01 ENCOUNTER — APPOINTMENT (OUTPATIENT)
Dept: CARDIOLOGY | Facility: CLINIC | Age: 82
End: 2017-05-01

## 2017-05-01 ENCOUNTER — NON-APPOINTMENT (OUTPATIENT)
Age: 82
End: 2017-05-01

## 2017-05-01 ENCOUNTER — APPOINTMENT (OUTPATIENT)
Dept: INTERNAL MEDICINE | Facility: CLINIC | Age: 82
End: 2017-05-01

## 2017-05-01 VITALS
DIASTOLIC BLOOD PRESSURE: 68 MMHG | WEIGHT: 122 LBS | HEIGHT: 63 IN | TEMPERATURE: 97.7 F | SYSTOLIC BLOOD PRESSURE: 108 MMHG | BODY MASS INDEX: 21.62 KG/M2 | OXYGEN SATURATION: 98 % | HEART RATE: 51 BPM

## 2017-05-01 VITALS
DIASTOLIC BLOOD PRESSURE: 41 MMHG | WEIGHT: 122 LBS | TEMPERATURE: 97.6 F | HEIGHT: 63 IN | BODY MASS INDEX: 21.62 KG/M2 | HEART RATE: 54 BPM | SYSTOLIC BLOOD PRESSURE: 112 MMHG | RESPIRATION RATE: 14 BRPM | OXYGEN SATURATION: 98 %

## 2017-05-01 DIAGNOSIS — Z78.9 OTHER SPECIFIED HEALTH STATUS: ICD-10-CM

## 2017-05-01 DIAGNOSIS — R93.5 ABNORMAL FINDINGS ON DIAGNOSTIC IMAGING OF OTHER ABDOMINAL REGIONS, INCLUDING RETROPERITONEUM: ICD-10-CM

## 2017-05-03 ENCOUNTER — APPOINTMENT (OUTPATIENT)
Dept: CT IMAGING | Facility: HOSPITAL | Age: 82
End: 2017-05-03

## 2017-05-03 ENCOUNTER — INPATIENT (INPATIENT)
Facility: HOSPITAL | Age: 82
LOS: 5 days | Discharge: HOME | DRG: 981 | End: 2017-05-09
Attending: STUDENT IN AN ORGANIZED HEALTH CARE EDUCATION/TRAINING PROGRAM | Admitting: INTERNAL MEDICINE
Payer: MEDICARE

## 2017-05-03 VITALS
TEMPERATURE: 98 F | HEART RATE: 51 BPM | RESPIRATION RATE: 22 BRPM | SYSTOLIC BLOOD PRESSURE: 149 MMHG | DIASTOLIC BLOOD PRESSURE: 64 MMHG | OXYGEN SATURATION: 96 %

## 2017-05-03 DIAGNOSIS — C22.0 LIVER CELL CARCINOMA: ICD-10-CM

## 2017-05-03 DIAGNOSIS — J44.9 CHRONIC OBSTRUCTIVE PULMONARY DISEASE, UNSPECIFIED: ICD-10-CM

## 2017-05-03 DIAGNOSIS — I35.0 NONRHEUMATIC AORTIC (VALVE) STENOSIS: ICD-10-CM

## 2017-05-03 DIAGNOSIS — I95.9 HYPOTENSION, UNSPECIFIED: ICD-10-CM

## 2017-05-03 DIAGNOSIS — Z98.89 OTHER SPECIFIED POSTPROCEDURAL STATES: Chronic | ICD-10-CM

## 2017-05-03 DIAGNOSIS — I48.91 UNSPECIFIED ATRIAL FIBRILLATION: ICD-10-CM

## 2017-05-03 DIAGNOSIS — N18.3 CHRONIC KIDNEY DISEASE, STAGE 3 (MODERATE): ICD-10-CM

## 2017-05-03 DIAGNOSIS — K74.60 UNSPECIFIED CIRRHOSIS OF LIVER: ICD-10-CM

## 2017-05-03 DIAGNOSIS — Z29.9 ENCOUNTER FOR PROPHYLACTIC MEASURES, UNSPECIFIED: ICD-10-CM

## 2017-05-03 LAB
ALBUMIN SERPL ELPH-MCNC: 3.8 G/DL — SIGNIFICANT CHANGE UP (ref 3.3–5)
ALP SERPL-CCNC: 105 U/L — SIGNIFICANT CHANGE UP (ref 40–120)
ALT FLD-CCNC: 25 U/L RC — SIGNIFICANT CHANGE UP (ref 10–45)
ANION GAP SERPL CALC-SCNC: 12 MMOL/L — SIGNIFICANT CHANGE UP (ref 5–17)
ANION GAP SERPL CALC-SCNC: 13 MMOL/L — SIGNIFICANT CHANGE UP (ref 5–17)
APTT BLD: 36.2 SEC — SIGNIFICANT CHANGE UP (ref 27.5–37.4)
AST SERPL-CCNC: 41 U/L — HIGH (ref 10–40)
BILIRUB SERPL-MCNC: 2 MG/DL — HIGH (ref 0.2–1.2)
BLD GP AB SCN SERPL QL: NEGATIVE — SIGNIFICANT CHANGE UP
BUN SERPL-MCNC: 27 MG/DL — HIGH (ref 7–23)
BUN SERPL-MCNC: 28 MG/DL — HIGH (ref 7–23)
CALCIUM SERPL-MCNC: 10.5 MG/DL — SIGNIFICANT CHANGE UP (ref 8.4–10.5)
CALCIUM SERPL-MCNC: 8.6 MG/DL — SIGNIFICANT CHANGE UP (ref 8.4–10.5)
CHLORIDE SERPL-SCNC: 105 MMOL/L — SIGNIFICANT CHANGE UP (ref 96–108)
CHLORIDE SERPL-SCNC: 107 MMOL/L — SIGNIFICANT CHANGE UP (ref 96–108)
CO2 SERPL-SCNC: 21 MMOL/L — LOW (ref 22–31)
CO2 SERPL-SCNC: 26 MMOL/L — SIGNIFICANT CHANGE UP (ref 22–31)
CREAT SERPL-MCNC: 1.03 MG/DL — SIGNIFICANT CHANGE UP (ref 0.5–1.3)
CREAT SERPL-MCNC: 1.14 MG/DL — SIGNIFICANT CHANGE UP (ref 0.5–1.3)
GAS PNL BLDA: SIGNIFICANT CHANGE UP
GLUCOSE SERPL-MCNC: 109 MG/DL — HIGH (ref 70–99)
GLUCOSE SERPL-MCNC: 94 MG/DL — SIGNIFICANT CHANGE UP (ref 70–99)
HCT VFR BLD CALC: 23.9 % — LOW (ref 34.5–45)
HCT VFR BLD CALC: 26.5 % — LOW (ref 34.5–45)
HCT VFR BLD CALC: 34.5 % — SIGNIFICANT CHANGE UP (ref 34.5–45)
HGB BLD-MCNC: 11.5 G/DL — SIGNIFICANT CHANGE UP (ref 11.5–15.5)
HGB BLD-MCNC: 8 G/DL — LOW (ref 11.5–15.5)
HGB BLD-MCNC: 9.3 G/DL — LOW (ref 11.5–15.5)
INR BLD: 1.18 RATIO — HIGH (ref 0.88–1.16)
MCHC RBC-ENTMCNC: 33.3 GM/DL — SIGNIFICANT CHANGE UP (ref 32–36)
MCHC RBC-ENTMCNC: 33.6 GM/DL — SIGNIFICANT CHANGE UP (ref 32–36)
MCHC RBC-ENTMCNC: 33.8 PG — SIGNIFICANT CHANGE UP (ref 27–34)
MCHC RBC-ENTMCNC: 34.1 PG — HIGH (ref 27–34)
MCHC RBC-ENTMCNC: 34.5 PG — HIGH (ref 27–34)
MCHC RBC-ENTMCNC: 35.2 GM/DL — SIGNIFICANT CHANGE UP (ref 32–36)
MCV RBC AUTO: 101 FL — HIGH (ref 80–100)
MCV RBC AUTO: 102 FL — HIGH (ref 80–100)
MCV RBC AUTO: 98 FL — SIGNIFICANT CHANGE UP (ref 80–100)
PLATELET # BLD AUTO: 105 K/UL — LOW (ref 150–400)
PLATELET # BLD AUTO: 110 K/UL — LOW (ref 150–400)
PLATELET # BLD AUTO: 63 K/UL — LOW (ref 150–400)
POTASSIUM SERPL-MCNC: 4.4 MMOL/L — SIGNIFICANT CHANGE UP (ref 3.5–5.3)
POTASSIUM SERPL-MCNC: 4.6 MMOL/L — SIGNIFICANT CHANGE UP (ref 3.5–5.3)
POTASSIUM SERPL-SCNC: 4.4 MMOL/L — SIGNIFICANT CHANGE UP (ref 3.5–5.3)
POTASSIUM SERPL-SCNC: 4.6 MMOL/L — SIGNIFICANT CHANGE UP (ref 3.5–5.3)
PROT SERPL-MCNC: 5.9 G/DL — LOW (ref 6–8.3)
PROTHROM AB SERPL-ACNC: 12.9 SEC — HIGH (ref 9.8–12.7)
RBC # BLD: 2.38 M/UL — LOW (ref 3.8–5.2)
RBC # BLD: 2.7 M/UL — LOW (ref 3.8–5.2)
RBC # BLD: 3.37 M/UL — LOW (ref 3.8–5.2)
RBC # FLD: 13.2 % — SIGNIFICANT CHANGE UP (ref 10.3–14.5)
RBC # FLD: 13.4 % — SIGNIFICANT CHANGE UP (ref 10.3–14.5)
RBC # FLD: 14.4 % — SIGNIFICANT CHANGE UP (ref 10.3–14.5)
RH IG SCN BLD-IMP: POSITIVE — SIGNIFICANT CHANGE UP
SODIUM SERPL-SCNC: 141 MMOL/L — SIGNIFICANT CHANGE UP (ref 135–145)
SODIUM SERPL-SCNC: 143 MMOL/L — SIGNIFICANT CHANGE UP (ref 135–145)
WBC # BLD: 4.6 K/UL — SIGNIFICANT CHANGE UP (ref 3.8–10.5)
WBC # BLD: 7 K/UL — SIGNIFICANT CHANGE UP (ref 3.8–10.5)
WBC # BLD: 9 K/UL — SIGNIFICANT CHANGE UP (ref 3.8–10.5)
WBC # FLD AUTO: 4.6 K/UL — SIGNIFICANT CHANGE UP (ref 3.8–10.5)
WBC # FLD AUTO: 7 K/UL — SIGNIFICANT CHANGE UP (ref 3.8–10.5)
WBC # FLD AUTO: 9 K/UL — SIGNIFICANT CHANGE UP (ref 3.8–10.5)

## 2017-05-03 PROCEDURE — 99291 CRITICAL CARE FIRST HOUR: CPT

## 2017-05-03 PROCEDURE — 71010: CPT | Mod: 26

## 2017-05-03 PROCEDURE — 77013 CT GUIDE FOR TISSUE ABLATION: CPT | Mod: 26

## 2017-05-03 PROCEDURE — 47382 PERCUT ABLATE LIVER RF: CPT

## 2017-05-03 RX ORDER — NOREPINEPHRINE BITARTRATE/D5W 8 MG/250ML
0.01 PLASTIC BAG, INJECTION (ML) INTRAVENOUS
Qty: 8 | Refills: 0 | Status: DISCONTINUED | OUTPATIENT
Start: 2017-05-03 | End: 2017-05-05

## 2017-05-03 RX ORDER — CHOLECALCIFEROL (VITAMIN D3) 125 MCG
2000 CAPSULE ORAL DAILY
Qty: 0 | Refills: 0 | Status: DISCONTINUED | OUTPATIENT
Start: 2017-05-03 | End: 2017-05-09

## 2017-05-03 RX ORDER — SODIUM CHLORIDE 9 MG/ML
1000 INJECTION INTRAMUSCULAR; INTRAVENOUS; SUBCUTANEOUS
Qty: 0 | Refills: 0 | Status: DISCONTINUED | OUTPATIENT
Start: 2017-05-03 | End: 2017-05-08

## 2017-05-03 RX ORDER — HYDROMORPHONE HYDROCHLORIDE 2 MG/ML
0.5 INJECTION INTRAMUSCULAR; INTRAVENOUS; SUBCUTANEOUS ONCE
Qty: 0 | Refills: 0 | Status: DISCONTINUED | OUTPATIENT
Start: 2017-05-03 | End: 2017-05-03

## 2017-05-03 RX ORDER — PHENYLEPHRINE HYDROCHLORIDE 10 MG/ML
1 INJECTION INTRAVENOUS
Qty: 80 | Refills: 0 | Status: DISCONTINUED | OUTPATIENT
Start: 2017-05-03 | End: 2017-05-03

## 2017-05-03 RX ORDER — ACETAMINOPHEN 500 MG
1000 TABLET ORAL ONCE
Qty: 0 | Refills: 0 | Status: COMPLETED | OUTPATIENT
Start: 2017-05-03 | End: 2017-05-03

## 2017-05-03 RX ORDER — LEVOTHYROXINE SODIUM 125 MCG
25 TABLET ORAL DAILY
Qty: 0 | Refills: 0 | Status: DISCONTINUED | OUTPATIENT
Start: 2017-05-03 | End: 2017-05-09

## 2017-05-03 RX ORDER — BUDESONIDE, MICRONIZED 100 %
0.25 POWDER (GRAM) MISCELLANEOUS
Qty: 0 | Refills: 0 | Status: DISCONTINUED | OUTPATIENT
Start: 2017-05-03 | End: 2017-05-09

## 2017-05-03 RX ORDER — HYDROMORPHONE HYDROCHLORIDE 2 MG/ML
0.25 INJECTION INTRAMUSCULAR; INTRAVENOUS; SUBCUTANEOUS ONCE
Qty: 0 | Refills: 0 | Status: DISCONTINUED | OUTPATIENT
Start: 2017-05-03 | End: 2017-05-03

## 2017-05-03 RX ORDER — NOREPINEPHRINE BITARTRATE/D5W 8 MG/250ML
0.01 PLASTIC BAG, INJECTION (ML) INTRAVENOUS
Qty: 8 | Refills: 0 | Status: DISCONTINUED | OUTPATIENT
Start: 2017-05-03 | End: 2017-05-03

## 2017-05-03 RX ORDER — LACTULOSE 10 G/15ML
10 SOLUTION ORAL
Qty: 0 | Refills: 0 | Status: DISCONTINUED | OUTPATIENT
Start: 2017-05-03 | End: 2017-05-04

## 2017-05-03 RX ADMIN — Medication 1000 MILLIGRAM(S): at 15:45

## 2017-05-03 RX ADMIN — HYDROMORPHONE HYDROCHLORIDE 0.25 MILLIGRAM(S): 2 INJECTION INTRAMUSCULAR; INTRAVENOUS; SUBCUTANEOUS at 16:20

## 2017-05-03 RX ADMIN — HYDROMORPHONE HYDROCHLORIDE 0.5 MILLIGRAM(S): 2 INJECTION INTRAMUSCULAR; INTRAVENOUS; SUBCUTANEOUS at 22:40

## 2017-05-03 RX ADMIN — LACTULOSE 10 GRAM(S): 10 SOLUTION ORAL at 20:44

## 2017-05-03 RX ADMIN — HYDROMORPHONE HYDROCHLORIDE 0.25 MILLIGRAM(S): 2 INJECTION INTRAMUSCULAR; INTRAVENOUS; SUBCUTANEOUS at 16:05

## 2017-05-03 RX ADMIN — HYDROMORPHONE HYDROCHLORIDE 0.5 MILLIGRAM(S): 2 INJECTION INTRAMUSCULAR; INTRAVENOUS; SUBCUTANEOUS at 22:55

## 2017-05-03 RX ADMIN — Medication 400 MILLIGRAM(S): at 15:30

## 2017-05-03 NOTE — H&P ADULT. - ATTENDING COMMENTS
Overall patient was unstable post extubation with respiratory distress requiring reintubation and pressors given hypotension. Patient had Hgb drop from 11.5 to 8.0 likely due to abdominal wall hematoma so MICU Was called and accepting patient. As discussed with other provider full H and P will be done by MICU as patient was not accepted to medicine floor service upon initial evaluation and MICU was called instead.
88F Hx decompensated HCV Cirrhosis, esoph varices, hepatic encephalopathy, Afib not on AC, Severe AS, Severe MR  COPD, CLL, CKD underwent IR RFA (radiofrequency ablation) of hepatoma in segment 6 of the liver. This was complicated by reintubation and need for pressors in PACU. The patient is now extubated but still requiring pressors for BP.   Admit to ICU for pressor support, transfusion of PRBCs or Platelet prn, pain and sedation plan in close monitoring setting.

## 2017-05-03 NOTE — H&P ADULT. - PMH
AF (Atrial Fibrillation)  IF ON ALBUTEROL  Bleeding Esophageal Varices    CKD (chronic kidney disease), stage 3 (moderate)    CLL (Chronic Lymphoblastic Leukemia)    COPD (Chronic Obstructive Pulmonary Disease)    GIB (Gastrointestinal Bleeding)    Liver cell carcinoma    Portal Hypertension    Pulmonary HTN  moderate  PVD (peripheral vascular disease)    Severe aortic stenosis by prior echocardiogram
AF (Atrial Fibrillation)  IF ON ALBUTEROL  Bleeding Esophageal Varices    CKD (chronic kidney disease), stage 3 (moderate)    CLL (Chronic Lymphoblastic Leukemia)    COPD (Chronic Obstructive Pulmonary Disease)    GIB (Gastrointestinal Bleeding)    Liver cell carcinoma    Portal Hypertension    Pulmonary HTN  moderate  PVD (peripheral vascular disease)    Severe aortic stenosis by prior echocardiogram

## 2017-05-03 NOTE — H&P ADULT. - PROBLEM SELECTOR PLAN 1
s/p IR embolization with IR. Dr Ye  -get cbc at 4pm per discussion with IR
-Patient with hypotension s/p radiofrequency ablation of segment 6 of the liver, started on neosynephrine in the PACU  -Will transition to Levophed given patient's bradycardia  -Monitor and attempt to wean pressors

## 2017-05-03 NOTE — H&P ADULT. - NSHPATTENDINGPLANDISCUSS_GEN_ALL_CORE
MICU resident Heladio, [facility], Anesthesia Dr. Petit and IR Dr. Ye
ICU and Primary Team

## 2017-05-03 NOTE — H&P ADULT. - ASSESSMENT
88F with h/o decompensated liver cirrhosis (hx of varices and hepatic encephalopathy), Afib not on AC, Severe AS, Severe MR  COPD, CLL, CKD s/p radiofrequency of a hepatoma in segment 6 of the liver. This was complicated by reintubation and need for pressors. The patient is now extubated but still requiring pressors for BP.

## 2017-05-03 NOTE — H&P ADULT. - PROBLEM SELECTOR PLAN 5
creatinine stable at baseline  -monitor creatinine
-No AC as patient is s/p ablation  -SCDs for DVT prophylaxis

## 2017-05-03 NOTE — H&P ADULT. - HISTORY OF PRESENT ILLNESS
88F with h/o decompensated liver cirrhosis (hx of varices and hepatic encephalopathy), Afib not on AC, Severe AS, Severe MR  COPD, CLL, CKD admitted for observation s/p ablation of hepatoma with IR, Dr. Ye. 88F with h/o decompensated liver cirrhosis (hx of varices and hepatic encephalopathy), Afib not on AC, Severe AS, Severe MR  COPD, CLL, CKDs/p ablation of hepatoma with IR, Dr. Ye.

## 2017-05-03 NOTE — H&P ADULT. - FAMILY HISTORY
No pertinent family history in first degree relatives
No pertinent family history in first degree relatives

## 2017-05-03 NOTE — H&P ADULT. - PROBLEM SELECTOR PROBLEM 4
Chronic obstructive pulmonary disease, unspecified COPD type
CKD (chronic kidney disease), stage 3 (moderate)

## 2017-05-03 NOTE — H&P ADULT. - ASSESSMENT
88F with h/o decompensated liver cirrhosis (hx of varices and hepatic encephalopathy), Afib not on AC, Severe AS, Severe MR  COPD, CLL, CKD admitted for observation s/p ablation of hepatoma with IR, Dr. Ye. 88F with h/o decompensated liver cirrhosis (hx of varices and hepatic encephalopathy), Afib not on AC, Severe AS, Severe MR  COPD, CLL, CKD s/p ablation of hepatoma with IR, Dr. Ye.

## 2017-05-03 NOTE — H&P ADULT. - HISTORY OF PRESENT ILLNESS
88F with h/o decompensated liver cirrhosis (hx of varices and hepatic encephalopathy), Afib not on AC, Severe AS, Severe MR  COPD, CLL, CKD who presented to the hospital for IR ablation of a hepatoma. This procedure was performed 88F with h/o decompensated liver cirrhosis (hx of varices and hepatic encephalopathy), Afib not on AC, Severe AS, Severe MR  COPD, CLL, CKD who presented to the hospital for IR radiofrequency ablation of a hepatoma in segment 6. This procedure was performed and the patient was extubated. The patient developed respiratory acidosis and was reintubated in the PACU. The patient was also found to be hypotensive and placed on neosynephrine. She was able to be extubated a second time but pressors were unable to be titrated off. The patient received 2 units of platelets pre-operatively. Given inability to take of pressors, the MICU was consulted for admission. The patient denies shortness of breath, chest pain, fevers/chills, and nausea/vomiting. He endorses right upper quadrant pain.

## 2017-05-03 NOTE — H&P ADULT. - PROBLEM SELECTOR PROBLEM 3
Severe aortic stenosis by prior echocardiogram
Chronic obstructive pulmonary disease, unspecified COPD type

## 2017-05-03 NOTE — H&P ADULT. - PSH
Esophageal Rupture    History of repair of hip fracture
Esophageal Rupture    History of repair of hip fracture

## 2017-05-04 LAB
ALBUMIN SERPL ELPH-MCNC: 3 G/DL — LOW (ref 3.3–5)
ALP SERPL-CCNC: 75 U/L — SIGNIFICANT CHANGE UP (ref 40–120)
ALT FLD-CCNC: 37 U/L RC — SIGNIFICANT CHANGE UP (ref 10–45)
AMMONIA BLD-MCNC: 74 UMOL/L — HIGH (ref 11–55)
ANION GAP SERPL CALC-SCNC: 12 MMOL/L — SIGNIFICANT CHANGE UP (ref 5–17)
ANION GAP SERPL CALC-SCNC: 13 MMOL/L — SIGNIFICANT CHANGE UP (ref 5–17)
AST SERPL-CCNC: 71 U/L — HIGH (ref 10–40)
BILIRUB DIRECT SERPL-MCNC: 0.6 MG/DL — HIGH (ref 0–0.2)
BILIRUB INDIRECT FLD-MCNC: 2.3 MG/DL — HIGH (ref 0.2–1)
BILIRUB SERPL-MCNC: 2.9 MG/DL — HIGH (ref 0.2–1.2)
BUN SERPL-MCNC: 28 MG/DL — HIGH (ref 7–23)
BUN SERPL-MCNC: 31 MG/DL — HIGH (ref 7–23)
CALCIUM SERPL-MCNC: 8.5 MG/DL — SIGNIFICANT CHANGE UP (ref 8.4–10.5)
CALCIUM SERPL-MCNC: 8.5 MG/DL — SIGNIFICANT CHANGE UP (ref 8.4–10.5)
CHLORIDE SERPL-SCNC: 108 MMOL/L — SIGNIFICANT CHANGE UP (ref 96–108)
CHLORIDE SERPL-SCNC: 109 MMOL/L — HIGH (ref 96–108)
CO2 SERPL-SCNC: 21 MMOL/L — LOW (ref 22–31)
CO2 SERPL-SCNC: 22 MMOL/L — SIGNIFICANT CHANGE UP (ref 22–31)
CREAT SERPL-MCNC: 1.09 MG/DL — SIGNIFICANT CHANGE UP (ref 0.5–1.3)
CREAT SERPL-MCNC: 1.19 MG/DL — SIGNIFICANT CHANGE UP (ref 0.5–1.3)
GLUCOSE SERPL-MCNC: 116 MG/DL — HIGH (ref 70–99)
GLUCOSE SERPL-MCNC: 91 MG/DL — SIGNIFICANT CHANGE UP (ref 70–99)
HCT VFR BLD CALC: 23.2 % — LOW (ref 34.5–45)
HCT VFR BLD CALC: 26.3 % — LOW (ref 34.5–45)
HGB BLD-MCNC: 8.2 G/DL — LOW (ref 11.5–15.5)
HGB BLD-MCNC: 9 G/DL — LOW (ref 11.5–15.5)
MAGNESIUM SERPL-MCNC: 1.7 MG/DL — SIGNIFICANT CHANGE UP (ref 1.6–2.6)
MAGNESIUM SERPL-MCNC: 1.9 MG/DL — SIGNIFICANT CHANGE UP (ref 1.6–2.6)
MCHC RBC-ENTMCNC: 34.2 PG — HIGH (ref 27–34)
MCHC RBC-ENTMCNC: 34.4 GM/DL — SIGNIFICANT CHANGE UP (ref 32–36)
MCHC RBC-ENTMCNC: 34.7 PG — HIGH (ref 27–34)
MCHC RBC-ENTMCNC: 35.6 GM/DL — SIGNIFICANT CHANGE UP (ref 32–36)
MCV RBC AUTO: 97.7 FL — SIGNIFICANT CHANGE UP (ref 80–100)
MCV RBC AUTO: 99.2 FL — SIGNIFICANT CHANGE UP (ref 80–100)
PHOSPHATE SERPL-MCNC: 2.7 MG/DL — SIGNIFICANT CHANGE UP (ref 2.5–4.5)
PHOSPHATE SERPL-MCNC: 3.3 MG/DL — SIGNIFICANT CHANGE UP (ref 2.5–4.5)
PLATELET # BLD AUTO: 102 K/UL — LOW (ref 150–400)
PLATELET # BLD AUTO: 108 K/UL — LOW (ref 150–400)
POTASSIUM SERPL-MCNC: 4.2 MMOL/L — SIGNIFICANT CHANGE UP (ref 3.5–5.3)
POTASSIUM SERPL-MCNC: 4.7 MMOL/L — SIGNIFICANT CHANGE UP (ref 3.5–5.3)
POTASSIUM SERPL-SCNC: 4.2 MMOL/L — SIGNIFICANT CHANGE UP (ref 3.5–5.3)
POTASSIUM SERPL-SCNC: 4.7 MMOL/L — SIGNIFICANT CHANGE UP (ref 3.5–5.3)
PROT SERPL-MCNC: 4.9 G/DL — LOW (ref 6–8.3)
RBC # BLD: 2.37 M/UL — LOW (ref 3.8–5.2)
RBC # BLD: 2.65 M/UL — LOW (ref 3.8–5.2)
RBC # FLD: 14.7 % — HIGH (ref 10.3–14.5)
RBC # FLD: 14.7 % — HIGH (ref 10.3–14.5)
SODIUM SERPL-SCNC: 142 MMOL/L — SIGNIFICANT CHANGE UP (ref 135–145)
SODIUM SERPL-SCNC: 143 MMOL/L — SIGNIFICANT CHANGE UP (ref 135–145)
WBC # BLD: 10.5 K/UL — SIGNIFICANT CHANGE UP (ref 3.8–10.5)
WBC # BLD: 7.9 K/UL — SIGNIFICANT CHANGE UP (ref 3.8–10.5)
WBC # FLD AUTO: 10.5 K/UL — SIGNIFICANT CHANGE UP (ref 3.8–10.5)
WBC # FLD AUTO: 7.9 K/UL — SIGNIFICANT CHANGE UP (ref 3.8–10.5)

## 2017-05-04 PROCEDURE — 93010 ELECTROCARDIOGRAM REPORT: CPT

## 2017-05-04 PROCEDURE — 99233 SBSQ HOSP IP/OBS HIGH 50: CPT | Mod: GC

## 2017-05-04 PROCEDURE — 99024 POSTOP FOLLOW-UP VISIT: CPT

## 2017-05-04 RX ORDER — MIDODRINE HYDROCHLORIDE 2.5 MG/1
10 TABLET ORAL EVERY 8 HOURS
Qty: 0 | Refills: 0 | Status: DISCONTINUED | OUTPATIENT
Start: 2017-05-04 | End: 2017-05-05

## 2017-05-04 RX ORDER — SODIUM CHLORIDE 9 MG/ML
250 INJECTION INTRAMUSCULAR; INTRAVENOUS; SUBCUTANEOUS ONCE
Qty: 0 | Refills: 0 | Status: DISCONTINUED | OUTPATIENT
Start: 2017-05-04 | End: 2017-05-04

## 2017-05-04 RX ORDER — ALBUMIN HUMAN 25 %
250 VIAL (ML) INTRAVENOUS ONCE
Qty: 0 | Refills: 0 | Status: COMPLETED | OUTPATIENT
Start: 2017-05-04 | End: 2017-05-04

## 2017-05-04 RX ORDER — LACTULOSE 10 G/15ML
10 SOLUTION ORAL THREE TIMES A DAY
Qty: 0 | Refills: 0 | Status: DISCONTINUED | OUTPATIENT
Start: 2017-05-04 | End: 2017-05-04

## 2017-05-04 RX ORDER — LACTULOSE 10 G/15ML
20 SOLUTION ORAL ONCE
Qty: 0 | Refills: 0 | Status: COMPLETED | OUTPATIENT
Start: 2017-05-04 | End: 2017-05-04

## 2017-05-04 RX ORDER — ACETAMINOPHEN 500 MG
650 TABLET ORAL EVERY 6 HOURS
Qty: 0 | Refills: 0 | Status: DISCONTINUED | OUTPATIENT
Start: 2017-05-04 | End: 2017-05-09

## 2017-05-04 RX ORDER — LACTULOSE 10 G/15ML
20 SOLUTION ORAL EVERY 6 HOURS
Qty: 0 | Refills: 0 | Status: DISCONTINUED | OUTPATIENT
Start: 2017-05-04 | End: 2017-05-05

## 2017-05-04 RX ORDER — MAGNESIUM SULFATE 500 MG/ML
2 VIAL (ML) INJECTION ONCE
Qty: 0 | Refills: 0 | Status: COMPLETED | OUTPATIENT
Start: 2017-05-04 | End: 2017-05-04

## 2017-05-04 RX ADMIN — Medication 125 MILLILITER(S): at 06:00

## 2017-05-04 RX ADMIN — LACTULOSE 20 GRAM(S): 10 SOLUTION ORAL at 21:38

## 2017-05-04 RX ADMIN — SODIUM CHLORIDE 50 MILLILITER(S): 9 INJECTION INTRAMUSCULAR; INTRAVENOUS; SUBCUTANEOUS at 11:51

## 2017-05-04 RX ADMIN — Medication 25 MICROGRAM(S): at 05:56

## 2017-05-04 RX ADMIN — LACTULOSE 20 GRAM(S): 10 SOLUTION ORAL at 18:09

## 2017-05-04 RX ADMIN — Medication 2000 UNIT(S): at 11:43

## 2017-05-04 RX ADMIN — MIDODRINE HYDROCHLORIDE 10 MILLIGRAM(S): 2.5 TABLET ORAL at 18:04

## 2017-05-04 RX ADMIN — Medication 1.04 MICROGRAM(S)/KG/MIN: at 21:38

## 2017-05-04 RX ADMIN — LACTULOSE 10 GRAM(S): 10 SOLUTION ORAL at 11:45

## 2017-05-04 RX ADMIN — Medication 50 GRAM(S): at 16:40

## 2017-05-04 RX ADMIN — Medication 1.04 MICROGRAM(S)/KG/MIN: at 11:50

## 2017-05-04 RX ADMIN — Medication 650 MILLIGRAM(S): at 05:57

## 2017-05-04 RX ADMIN — LACTULOSE 10 GRAM(S): 10 SOLUTION ORAL at 05:56

## 2017-05-04 RX ADMIN — Medication 0.25 MILLIGRAM(S): at 16:40

## 2017-05-05 ENCOUNTER — OTHER (OUTPATIENT)
Age: 82
End: 2017-05-05

## 2017-05-05 LAB
ALBUMIN SERPL ELPH-MCNC: 3.3 G/DL — SIGNIFICANT CHANGE UP (ref 3.3–5)
ALP SERPL-CCNC: 76 U/L — SIGNIFICANT CHANGE UP (ref 40–120)
ALT FLD-CCNC: 46 U/L RC — HIGH (ref 10–45)
AMMONIA BLD-MCNC: 60 UMOL/L — HIGH (ref 11–55)
ANION GAP SERPL CALC-SCNC: 12 MMOL/L — SIGNIFICANT CHANGE UP (ref 5–17)
APTT BLD: 32.7 SEC — SIGNIFICANT CHANGE UP (ref 27.5–37.4)
AST SERPL-CCNC: 90 U/L — HIGH (ref 10–40)
BILIRUB SERPL-MCNC: 2.3 MG/DL — HIGH (ref 0.2–1.2)
BUN SERPL-MCNC: 27 MG/DL — HIGH (ref 7–23)
CALCIUM SERPL-MCNC: 8.8 MG/DL — SIGNIFICANT CHANGE UP (ref 8.4–10.5)
CHLORIDE SERPL-SCNC: 109 MMOL/L — HIGH (ref 96–108)
CO2 SERPL-SCNC: 22 MMOL/L — SIGNIFICANT CHANGE UP (ref 22–31)
CREAT SERPL-MCNC: 1.17 MG/DL — SIGNIFICANT CHANGE UP (ref 0.5–1.3)
GLUCOSE SERPL-MCNC: 131 MG/DL — HIGH (ref 70–99)
HCT VFR BLD CALC: 23.5 % — LOW (ref 34.5–45)
HGB BLD-MCNC: 8.4 G/DL — LOW (ref 11.5–15.5)
INR BLD: 1.42 RATIO — HIGH (ref 0.88–1.16)
MAGNESIUM SERPL-MCNC: 2.4 MG/DL — SIGNIFICANT CHANGE UP (ref 1.6–2.6)
MCHC RBC-ENTMCNC: 34.9 PG — HIGH (ref 27–34)
MCHC RBC-ENTMCNC: 35.5 GM/DL — SIGNIFICANT CHANGE UP (ref 32–36)
MCV RBC AUTO: 98.2 FL — SIGNIFICANT CHANGE UP (ref 80–100)
PHOSPHATE SERPL-MCNC: 2.4 MG/DL — LOW (ref 2.5–4.5)
PLATELET # BLD AUTO: 101 K/UL — LOW (ref 150–400)
POTASSIUM SERPL-MCNC: 4.5 MMOL/L — SIGNIFICANT CHANGE UP (ref 3.5–5.3)
POTASSIUM SERPL-SCNC: 4.5 MMOL/L — SIGNIFICANT CHANGE UP (ref 3.5–5.3)
PROT SERPL-MCNC: 5.2 G/DL — LOW (ref 6–8.3)
PROTHROM AB SERPL-ACNC: 15.6 SEC — HIGH (ref 9.8–12.7)
RBC # BLD: 2.4 M/UL — LOW (ref 3.8–5.2)
RBC # FLD: 14.6 % — HIGH (ref 10.3–14.5)
SODIUM SERPL-SCNC: 143 MMOL/L — SIGNIFICANT CHANGE UP (ref 135–145)
WBC # BLD: 9.8 K/UL — SIGNIFICANT CHANGE UP (ref 3.8–10.5)
WBC # FLD AUTO: 9.8 K/UL — SIGNIFICANT CHANGE UP (ref 3.8–10.5)

## 2017-05-05 PROCEDURE — 99233 SBSQ HOSP IP/OBS HIGH 50: CPT | Mod: GC

## 2017-05-05 PROCEDURE — 99024 POSTOP FOLLOW-UP VISIT: CPT

## 2017-05-05 RX ORDER — MIDODRINE HYDROCHLORIDE 2.5 MG/1
20 TABLET ORAL EVERY 8 HOURS
Qty: 0 | Refills: 0 | Status: DISCONTINUED | OUTPATIENT
Start: 2017-05-05 | End: 2017-05-07

## 2017-05-05 RX ORDER — MIDODRINE HYDROCHLORIDE 2.5 MG/1
20 TABLET ORAL EVERY 8 HOURS
Qty: 0 | Refills: 0 | Status: DISCONTINUED | OUTPATIENT
Start: 2017-05-05 | End: 2017-05-05

## 2017-05-05 RX ORDER — LACTULOSE 10 G/15ML
20 SOLUTION ORAL EVERY 6 HOURS
Qty: 0 | Refills: 0 | Status: DISCONTINUED | OUTPATIENT
Start: 2017-05-05 | End: 2017-05-07

## 2017-05-05 RX ORDER — LACTULOSE 10 G/15ML
20 SOLUTION ORAL
Qty: 0 | Refills: 0 | Status: DISCONTINUED | OUTPATIENT
Start: 2017-05-05 | End: 2017-05-05

## 2017-05-05 RX ORDER — LACTULOSE 10 G/15ML
200 SOLUTION ORAL ONCE
Qty: 0 | Refills: 0 | Status: COMPLETED | OUTPATIENT
Start: 2017-05-05 | End: 2017-05-05

## 2017-05-05 RX ORDER — IPRATROPIUM/ALBUTEROL SULFATE 18-103MCG
3 AEROSOL WITH ADAPTER (GRAM) INHALATION EVERY 6 HOURS
Qty: 0 | Refills: 0 | Status: DISCONTINUED | OUTPATIENT
Start: 2017-05-05 | End: 2017-05-09

## 2017-05-05 RX ADMIN — Medication 1 MILLIGRAM(S): at 10:00

## 2017-05-05 RX ADMIN — Medication 0.25 MILLIGRAM(S): at 17:00

## 2017-05-05 RX ADMIN — MIDODRINE HYDROCHLORIDE 20 MILLIGRAM(S): 2.5 TABLET ORAL at 09:24

## 2017-05-05 RX ADMIN — Medication 3 MILLILITER(S): at 17:00

## 2017-05-05 RX ADMIN — Medication 3 MILLILITER(S): at 12:12

## 2017-05-05 RX ADMIN — Medication 25 MICROGRAM(S): at 06:00

## 2017-05-05 RX ADMIN — MIDODRINE HYDROCHLORIDE 20 MILLIGRAM(S): 2.5 TABLET ORAL at 17:22

## 2017-05-05 RX ADMIN — LACTULOSE 20 GRAM(S): 10 SOLUTION ORAL at 04:11

## 2017-05-05 RX ADMIN — Medication 2000 UNIT(S): at 13:44

## 2017-05-05 RX ADMIN — SODIUM CHLORIDE 50 MILLILITER(S): 9 INJECTION INTRAMUSCULAR; INTRAVENOUS; SUBCUTANEOUS at 01:46

## 2017-05-05 RX ADMIN — LACTULOSE 200 GRAM(S): 10 SOLUTION ORAL at 03:30

## 2017-05-05 RX ADMIN — MIDODRINE HYDROCHLORIDE 10 MILLIGRAM(S): 2.5 TABLET ORAL at 00:05

## 2017-05-05 RX ADMIN — LACTULOSE 20 GRAM(S): 10 SOLUTION ORAL at 06:00

## 2017-05-05 RX ADMIN — LACTULOSE 20 GRAM(S): 10 SOLUTION ORAL at 00:05

## 2017-05-05 RX ADMIN — Medication 63.75 MILLIMOLE(S): at 04:52

## 2017-05-05 NOTE — DIETITIAN INITIAL EVALUATION ADULT. - PROBLEM SELECTOR PLAN 1
-Patient with hypotension s/p radiofrequency ablation of segment 6 of the liver, started on neosynephrine in the PACU  -Will transition to Levophed given patient's bradycardia  -Monitor and attempt to wean pressors

## 2017-05-05 NOTE — DIETITIAN INITIAL EVALUATION ADULT. - PROBLEM SELECTOR PLAN 2
-Patient is s/p ablation  -Continue with pain control  -Resume cirrhosis medications as blood pressure stabilizes

## 2017-05-05 NOTE — DIETITIAN INITIAL EVALUATION ADULT. - ENERGY NEEDS
Ht: 64"  Wt: 122.3  BMI: 21 kg/m2   IBW:120  (+/-10%)   within IBW range  Edema: 1+ generalized   Skin: no pressure injuries

## 2017-05-05 NOTE — DIETITIAN INITIAL EVALUATION ADULT. - OTHER INFO
Pt seen for: length of stay  Adm dx: MICU adm for hypotension following IR ablation for hepatoma   GI issues: denies N/V  Last BM: multiple BM today w/ lactulose rx.   Food allergies: shellsish     Vit/supplement PTA: vitamin D

## 2017-05-06 LAB
ALBUMIN SERPL ELPH-MCNC: 2.9 G/DL — LOW (ref 3.3–5)
ALP SERPL-CCNC: 72 U/L — SIGNIFICANT CHANGE UP (ref 40–120)
ALT FLD-CCNC: 37 U/L RC — SIGNIFICANT CHANGE UP (ref 10–45)
ANION GAP SERPL CALC-SCNC: 12 MMOL/L — SIGNIFICANT CHANGE UP (ref 5–17)
APTT BLD: 31 SEC — SIGNIFICANT CHANGE UP (ref 27.5–37.4)
AST SERPL-CCNC: 69 U/L — HIGH (ref 10–40)
BASOPHILS # BLD AUTO: 0 K/UL — SIGNIFICANT CHANGE UP (ref 0–0.2)
BASOPHILS NFR BLD AUTO: 0.2 % — SIGNIFICANT CHANGE UP (ref 0–2)
BILIRUB SERPL-MCNC: 1.6 MG/DL — HIGH (ref 0.2–1.2)
BLD GP AB SCN SERPL QL: NEGATIVE — SIGNIFICANT CHANGE UP
BUN SERPL-MCNC: 25 MG/DL — HIGH (ref 7–23)
CALCIUM SERPL-MCNC: 8.1 MG/DL — LOW (ref 8.4–10.5)
CHLORIDE SERPL-SCNC: 108 MMOL/L — SIGNIFICANT CHANGE UP (ref 96–108)
CO2 SERPL-SCNC: 20 MMOL/L — LOW (ref 22–31)
CREAT SERPL-MCNC: 1.03 MG/DL — SIGNIFICANT CHANGE UP (ref 0.5–1.3)
EOSINOPHIL # BLD AUTO: 0.1 K/UL — SIGNIFICANT CHANGE UP (ref 0–0.5)
EOSINOPHIL NFR BLD AUTO: 2.4 % — SIGNIFICANT CHANGE UP (ref 0–6)
GLUCOSE SERPL-MCNC: 93 MG/DL — SIGNIFICANT CHANGE UP (ref 70–99)
HCT VFR BLD CALC: 21 % — CRITICAL LOW (ref 34.5–45)
HCT VFR BLD CALC: 21.3 % — LOW (ref 34.5–45)
HCT VFR BLD CALC: 23 % — LOW (ref 34.5–45)
HCT VFR BLD CALC: 24 % — LOW (ref 34.5–45)
HGB BLD-MCNC: 7.4 G/DL — LOW (ref 11.5–15.5)
HGB BLD-MCNC: 7.4 G/DL — LOW (ref 11.5–15.5)
HGB BLD-MCNC: 7.9 G/DL — LOW (ref 11.5–15.5)
HGB BLD-MCNC: 8.2 G/DL — LOW (ref 11.5–15.5)
INR BLD: 1.39 RATIO — HIGH (ref 0.88–1.16)
LYMPHOCYTES # BLD AUTO: 0.8 K/UL — LOW (ref 1–3.3)
LYMPHOCYTES # BLD AUTO: 18.8 % — SIGNIFICANT CHANGE UP (ref 13–44)
MAGNESIUM SERPL-MCNC: 2 MG/DL — SIGNIFICANT CHANGE UP (ref 1.6–2.6)
MCHC RBC-ENTMCNC: 34.1 GM/DL — SIGNIFICANT CHANGE UP (ref 32–36)
MCHC RBC-ENTMCNC: 34.1 PG — HIGH (ref 27–34)
MCHC RBC-ENTMCNC: 34.3 PG — HIGH (ref 27–34)
MCHC RBC-ENTMCNC: 34.4 GM/DL — SIGNIFICANT CHANGE UP (ref 32–36)
MCHC RBC-ENTMCNC: 34.5 PG — HIGH (ref 27–34)
MCHC RBC-ENTMCNC: 34.8 GM/DL — SIGNIFICANT CHANGE UP (ref 32–36)
MCHC RBC-ENTMCNC: 35.4 GM/DL — SIGNIFICANT CHANGE UP (ref 32–36)
MCHC RBC-ENTMCNC: 35.4 PG — HIGH (ref 27–34)
MCV RBC AUTO: 100 FL — SIGNIFICANT CHANGE UP (ref 80–100)
MCV RBC AUTO: 99.4 FL — SIGNIFICANT CHANGE UP (ref 80–100)
MCV RBC AUTO: 99.8 FL — SIGNIFICANT CHANGE UP (ref 80–100)
MCV RBC AUTO: 99.9 FL — SIGNIFICANT CHANGE UP (ref 80–100)
MONOCYTES # BLD AUTO: 0.5 K/UL — SIGNIFICANT CHANGE UP (ref 0–0.9)
MONOCYTES NFR BLD AUTO: 12.5 % — SIGNIFICANT CHANGE UP (ref 2–14)
NEUTROPHILS # BLD AUTO: 2.8 K/UL — SIGNIFICANT CHANGE UP (ref 1.8–7.4)
NEUTROPHILS NFR BLD AUTO: 66.2 % — SIGNIFICANT CHANGE UP (ref 43–77)
PHOSPHATE SERPL-MCNC: 2.5 MG/DL — SIGNIFICANT CHANGE UP (ref 2.5–4.5)
PLATELET # BLD AUTO: 55 K/UL — LOW (ref 150–400)
PLATELET # BLD AUTO: 61 K/UL — LOW (ref 150–400)
PLATELET # BLD AUTO: 61 K/UL — LOW (ref 150–400)
PLATELET # BLD AUTO: 76 K/UL — LOW (ref 150–400)
POTASSIUM SERPL-MCNC: 4.3 MMOL/L — SIGNIFICANT CHANGE UP (ref 3.5–5.3)
POTASSIUM SERPL-SCNC: 4.3 MMOL/L — SIGNIFICANT CHANGE UP (ref 3.5–5.3)
PROT SERPL-MCNC: 4.7 G/DL — LOW (ref 6–8.3)
PROTHROM AB SERPL-ACNC: 15.1 SEC — HIGH (ref 9.8–12.7)
RBC # BLD: 2.1 M/UL — LOW (ref 3.8–5.2)
RBC # BLD: 2.14 M/UL — LOW (ref 3.8–5.2)
RBC # BLD: 2.3 M/UL — LOW (ref 3.8–5.2)
RBC # BLD: 2.4 M/UL — LOW (ref 3.8–5.2)
RBC # FLD: 14.2 % — SIGNIFICANT CHANGE UP (ref 10.3–14.5)
RBC # FLD: 14.3 % — SIGNIFICANT CHANGE UP (ref 10.3–14.5)
RBC # FLD: 14.4 % — SIGNIFICANT CHANGE UP (ref 10.3–14.5)
RBC # FLD: 14.4 % — SIGNIFICANT CHANGE UP (ref 10.3–14.5)
RH IG SCN BLD-IMP: POSITIVE — SIGNIFICANT CHANGE UP
SODIUM SERPL-SCNC: 140 MMOL/L — SIGNIFICANT CHANGE UP (ref 135–145)
WBC # BLD: 4.3 K/UL — SIGNIFICANT CHANGE UP (ref 3.8–10.5)
WBC # BLD: 4.3 K/UL — SIGNIFICANT CHANGE UP (ref 3.8–10.5)
WBC # BLD: 4.9 K/UL — SIGNIFICANT CHANGE UP (ref 3.8–10.5)
WBC # BLD: 5.5 K/UL — SIGNIFICANT CHANGE UP (ref 3.8–10.5)
WBC # FLD AUTO: 4.3 K/UL — SIGNIFICANT CHANGE UP (ref 3.8–10.5)
WBC # FLD AUTO: 4.3 K/UL — SIGNIFICANT CHANGE UP (ref 3.8–10.5)
WBC # FLD AUTO: 4.9 K/UL — SIGNIFICANT CHANGE UP (ref 3.8–10.5)
WBC # FLD AUTO: 5.5 K/UL — SIGNIFICANT CHANGE UP (ref 3.8–10.5)

## 2017-05-06 PROCEDURE — 99233 SBSQ HOSP IP/OBS HIGH 50: CPT | Mod: GC

## 2017-05-06 PROCEDURE — 74176 CT ABD & PELVIS W/O CONTRAST: CPT | Mod: 26

## 2017-05-06 PROCEDURE — 99222 1ST HOSP IP/OBS MODERATE 55: CPT

## 2017-05-06 RX ORDER — DIGOXIN 250 MCG
0.12 TABLET ORAL EVERY OTHER DAY
Qty: 0 | Refills: 0 | Status: DISCONTINUED | OUTPATIENT
Start: 2017-05-06 | End: 2017-05-09

## 2017-05-06 RX ADMIN — Medication 3 MILLILITER(S): at 12:21

## 2017-05-06 RX ADMIN — LACTULOSE 20 GRAM(S): 10 SOLUTION ORAL at 05:52

## 2017-05-06 RX ADMIN — SODIUM CHLORIDE 50 MILLILITER(S): 9 INJECTION INTRAMUSCULAR; INTRAVENOUS; SUBCUTANEOUS at 04:22

## 2017-05-06 RX ADMIN — MIDODRINE HYDROCHLORIDE 20 MILLIGRAM(S): 2.5 TABLET ORAL at 11:27

## 2017-05-06 RX ADMIN — Medication 2000 UNIT(S): at 11:27

## 2017-05-06 RX ADMIN — Medication 0.25 MILLIGRAM(S): at 05:35

## 2017-05-06 RX ADMIN — Medication 0.25 MILLIGRAM(S): at 18:38

## 2017-05-06 RX ADMIN — MIDODRINE HYDROCHLORIDE 20 MILLIGRAM(S): 2.5 TABLET ORAL at 17:44

## 2017-05-06 RX ADMIN — Medication 3 MILLILITER(S): at 05:35

## 2017-05-06 RX ADMIN — Medication 3 MILLILITER(S): at 00:15

## 2017-05-06 RX ADMIN — MIDODRINE HYDROCHLORIDE 20 MILLIGRAM(S): 2.5 TABLET ORAL at 02:28

## 2017-05-06 RX ADMIN — LACTULOSE 20 GRAM(S): 10 SOLUTION ORAL at 11:27

## 2017-05-06 RX ADMIN — LACTULOSE 20 GRAM(S): 10 SOLUTION ORAL at 17:44

## 2017-05-06 RX ADMIN — Medication 25 MICROGRAM(S): at 05:52

## 2017-05-06 RX ADMIN — Medication 0.12 MILLIGRAM(S): at 11:39

## 2017-05-06 RX ADMIN — Medication 3 MILLILITER(S): at 17:29

## 2017-05-07 LAB
ANION GAP SERPL CALC-SCNC: 14 MMOL/L — SIGNIFICANT CHANGE UP (ref 5–17)
BUN SERPL-MCNC: 22 MG/DL — SIGNIFICANT CHANGE UP (ref 7–23)
CALCIUM SERPL-MCNC: 8.5 MG/DL — SIGNIFICANT CHANGE UP (ref 8.4–10.5)
CHLORIDE SERPL-SCNC: 108 MMOL/L — SIGNIFICANT CHANGE UP (ref 96–108)
CO2 SERPL-SCNC: 17 MMOL/L — LOW (ref 22–31)
CREAT SERPL-MCNC: 0.91 MG/DL — SIGNIFICANT CHANGE UP (ref 0.5–1.3)
GLUCOSE SERPL-MCNC: 89 MG/DL — SIGNIFICANT CHANGE UP (ref 70–99)
HCT VFR BLD CALC: 25.1 % — LOW (ref 34.5–45)
HCT VFR BLD CALC: 25.6 % — LOW (ref 34.5–45)
HGB BLD-MCNC: 8.2 G/DL — LOW (ref 11.5–15.5)
HGB BLD-MCNC: 9 G/DL — LOW (ref 11.5–15.5)
MAGNESIUM SERPL-MCNC: 1.9 MG/DL — SIGNIFICANT CHANGE UP (ref 1.6–2.6)
MCHC RBC-ENTMCNC: 32.7 GM/DL — SIGNIFICANT CHANGE UP (ref 32–36)
MCHC RBC-ENTMCNC: 32.9 PG — SIGNIFICANT CHANGE UP (ref 27–34)
MCHC RBC-ENTMCNC: 35.2 GM/DL — SIGNIFICANT CHANGE UP (ref 32–36)
MCHC RBC-ENTMCNC: 35.3 PG — HIGH (ref 27–34)
MCV RBC AUTO: 100 FL — SIGNIFICANT CHANGE UP (ref 80–100)
MCV RBC AUTO: 101 FL — HIGH (ref 80–100)
PHOSPHATE SERPL-MCNC: 2.3 MG/DL — LOW (ref 2.5–4.5)
PLATELET # BLD AUTO: 71 K/UL — LOW (ref 150–400)
PLATELET # BLD AUTO: 82 K/UL — LOW (ref 150–400)
POTASSIUM SERPL-MCNC: 4.7 MMOL/L — SIGNIFICANT CHANGE UP (ref 3.5–5.3)
POTASSIUM SERPL-SCNC: 4.7 MMOL/L — SIGNIFICANT CHANGE UP (ref 3.5–5.3)
RBC # BLD: 2.49 M/UL — LOW (ref 3.8–5.2)
RBC # BLD: 2.55 M/UL — LOW (ref 3.8–5.2)
RBC # FLD: 14.8 % — HIGH (ref 10.3–14.5)
RBC # FLD: 15.1 % — HIGH (ref 10.3–14.5)
SODIUM SERPL-SCNC: 139 MMOL/L — SIGNIFICANT CHANGE UP (ref 135–145)
WBC # BLD: 5.1 K/UL — SIGNIFICANT CHANGE UP (ref 3.8–10.5)
WBC # BLD: 5.4 K/UL — SIGNIFICANT CHANGE UP (ref 3.8–10.5)
WBC # FLD AUTO: 5.1 K/UL — SIGNIFICANT CHANGE UP (ref 3.8–10.5)
WBC # FLD AUTO: 5.4 K/UL — SIGNIFICANT CHANGE UP (ref 3.8–10.5)

## 2017-05-07 PROCEDURE — 99233 SBSQ HOSP IP/OBS HIGH 50: CPT | Mod: GC

## 2017-05-07 RX ORDER — LACTULOSE 10 G/15ML
20 SOLUTION ORAL EVERY 6 HOURS
Qty: 0 | Refills: 0 | Status: DISCONTINUED | OUTPATIENT
Start: 2017-05-07 | End: 2017-05-08

## 2017-05-07 RX ORDER — MIDODRINE HYDROCHLORIDE 2.5 MG/1
10 TABLET ORAL THREE TIMES A DAY
Qty: 0 | Refills: 0 | Status: DISCONTINUED | OUTPATIENT
Start: 2017-05-07 | End: 2017-05-08

## 2017-05-07 RX ADMIN — LACTULOSE 20 GRAM(S): 10 SOLUTION ORAL at 00:06

## 2017-05-07 RX ADMIN — LACTULOSE 20 GRAM(S): 10 SOLUTION ORAL at 13:04

## 2017-05-07 RX ADMIN — MIDODRINE HYDROCHLORIDE 10 MILLIGRAM(S): 2.5 TABLET ORAL at 22:46

## 2017-05-07 RX ADMIN — LACTULOSE 20 GRAM(S): 10 SOLUTION ORAL at 18:54

## 2017-05-07 RX ADMIN — Medication 3 MILLILITER(S): at 11:38

## 2017-05-07 RX ADMIN — Medication 25 MICROGRAM(S): at 05:48

## 2017-05-07 RX ADMIN — Medication 0.25 MILLIGRAM(S): at 17:34

## 2017-05-07 RX ADMIN — Medication 3 MILLILITER(S): at 17:14

## 2017-05-07 RX ADMIN — MIDODRINE HYDROCHLORIDE 20 MILLIGRAM(S): 2.5 TABLET ORAL at 04:24

## 2017-05-07 RX ADMIN — MIDODRINE HYDROCHLORIDE 10 MILLIGRAM(S): 2.5 TABLET ORAL at 13:05

## 2017-05-07 RX ADMIN — Medication 2000 UNIT(S): at 13:04

## 2017-05-07 NOTE — PHYSICAL THERAPY INITIAL EVALUATION ADULT - ADDITIONAL COMMENTS
as per pt, resides in a PH with daughter, 6STE with rails, 6 stairs indoor with rail, PTA, pt required assist and RW for amb, required assist for ADLs, +HHA, 7days a week, pt unsure about hours. as per pt, resides in a PH with daughter, 6STE with rails, 6 stairs indoor with rail, PTA, pt amb (I) with RW, required S for stair negotiation, required assist for ADLs, +HHA, 7days a week, pt unsure about hours.

## 2017-05-07 NOTE — PHYSICAL THERAPY INITIAL EVALUATION ADULT - PERTINENT HX OF CURRENT PROBLEM, REHAB EVAL
88yoF h/o decompensated liver cirrhosis, s/p microwave ablation of liver tumor (5/3/17), c/b developing respiratory acidosis and hypotension, 5/6/17 CT ABD, mod volume hemoperitoneum, cirrhosis, smaller liver lesion, portal hypotension, 5/4/17 ECG, siunus with PVCs,  5/13/17 CXR, pleural edema.

## 2017-05-07 NOTE — PHYSICAL THERAPY INITIAL EVALUATION ADULT - DISCHARGE DISPOSITION, PT EVAL
anticipate Home with Home PT for strengthening, bed mob, transfer, gait and balance training, home with assist as needed/home w/ assist/home/home w/ home PT

## 2017-05-08 ENCOUNTER — APPOINTMENT (OUTPATIENT)
Dept: CARDIOLOGY | Facility: CLINIC | Age: 82
End: 2017-05-08

## 2017-05-08 LAB
ANION GAP SERPL CALC-SCNC: 13 MMOL/L — SIGNIFICANT CHANGE UP (ref 5–17)
BUN SERPL-MCNC: 20 MG/DL — SIGNIFICANT CHANGE UP (ref 7–23)
CALCIUM SERPL-MCNC: 8.3 MG/DL — LOW (ref 8.4–10.5)
CHLORIDE SERPL-SCNC: 108 MMOL/L — SIGNIFICANT CHANGE UP (ref 96–108)
CO2 SERPL-SCNC: 20 MMOL/L — LOW (ref 22–31)
CREAT SERPL-MCNC: 0.88 MG/DL — SIGNIFICANT CHANGE UP (ref 0.5–1.3)
GLUCOSE SERPL-MCNC: 92 MG/DL — SIGNIFICANT CHANGE UP (ref 70–99)
HCT VFR BLD CALC: 25.6 % — LOW (ref 34.5–45)
HCT VFR BLD CALC: 26.6 % — LOW (ref 34.5–45)
HGB BLD-MCNC: 8.7 G/DL — LOW (ref 11.5–15.5)
HGB BLD-MCNC: 9 G/DL — LOW (ref 11.5–15.5)
MAGNESIUM SERPL-MCNC: 1.7 MG/DL — SIGNIFICANT CHANGE UP (ref 1.6–2.6)
MCHC RBC-ENTMCNC: 33.8 GM/DL — SIGNIFICANT CHANGE UP (ref 32–36)
MCHC RBC-ENTMCNC: 34.1 GM/DL — SIGNIFICANT CHANGE UP (ref 32–36)
MCHC RBC-ENTMCNC: 34.4 PG — HIGH (ref 27–34)
MCHC RBC-ENTMCNC: 34.7 PG — HIGH (ref 27–34)
MCV RBC AUTO: 102 FL — HIGH (ref 80–100)
MCV RBC AUTO: 102 FL — HIGH (ref 80–100)
PHOSPHATE SERPL-MCNC: 2.3 MG/DL — LOW (ref 2.5–4.5)
PLATELET # BLD AUTO: 76 K/UL — LOW (ref 150–400)
PLATELET # BLD AUTO: 83 K/UL — LOW (ref 150–400)
POTASSIUM SERPL-MCNC: 4.3 MMOL/L — SIGNIFICANT CHANGE UP (ref 3.5–5.3)
POTASSIUM SERPL-SCNC: 4.3 MMOL/L — SIGNIFICANT CHANGE UP (ref 3.5–5.3)
RBC # BLD: 2.52 M/UL — LOW (ref 3.8–5.2)
RBC # BLD: 2.62 M/UL — LOW (ref 3.8–5.2)
RBC # FLD: 15.2 % — HIGH (ref 10.3–14.5)
RBC # FLD: 15.4 % — HIGH (ref 10.3–14.5)
SODIUM SERPL-SCNC: 141 MMOL/L — SIGNIFICANT CHANGE UP (ref 135–145)
WBC # BLD: 4.6 K/UL — SIGNIFICANT CHANGE UP (ref 3.8–10.5)
WBC # BLD: 5 K/UL — SIGNIFICANT CHANGE UP (ref 3.8–10.5)
WBC # FLD AUTO: 4.6 K/UL — SIGNIFICANT CHANGE UP (ref 3.8–10.5)
WBC # FLD AUTO: 5 K/UL — SIGNIFICANT CHANGE UP (ref 3.8–10.5)

## 2017-05-08 PROCEDURE — 71010: CPT | Mod: 26

## 2017-05-08 PROCEDURE — 99233 SBSQ HOSP IP/OBS HIGH 50: CPT | Mod: GC

## 2017-05-08 PROCEDURE — 99223 1ST HOSP IP/OBS HIGH 75: CPT

## 2017-05-08 RX ORDER — LACTULOSE 10 G/15ML
20 SOLUTION ORAL EVERY 8 HOURS
Qty: 0 | Refills: 0 | Status: DISCONTINUED | OUTPATIENT
Start: 2017-05-08 | End: 2017-05-09

## 2017-05-08 RX ORDER — SODIUM,POTASSIUM PHOSPHATES 278-250MG
1 POWDER IN PACKET (EA) ORAL
Qty: 0 | Refills: 0 | Status: DISCONTINUED | OUTPATIENT
Start: 2017-05-09 | End: 2017-05-09

## 2017-05-08 RX ORDER — MIDODRINE HYDROCHLORIDE 2.5 MG/1
5 TABLET ORAL THREE TIMES A DAY
Qty: 0 | Refills: 0 | Status: DISCONTINUED | OUTPATIENT
Start: 2017-05-08 | End: 2017-05-09

## 2017-05-08 RX ADMIN — MIDODRINE HYDROCHLORIDE 5 MILLIGRAM(S): 2.5 TABLET ORAL at 13:26

## 2017-05-08 RX ADMIN — Medication 25 MICROGRAM(S): at 05:20

## 2017-05-08 RX ADMIN — Medication 0.12 MILLIGRAM(S): at 12:46

## 2017-05-08 RX ADMIN — MIDODRINE HYDROCHLORIDE 10 MILLIGRAM(S): 2.5 TABLET ORAL at 05:21

## 2017-05-08 RX ADMIN — Medication 0.25 MILLIGRAM(S): at 17:10

## 2017-05-08 RX ADMIN — Medication 3 MILLILITER(S): at 17:10

## 2017-05-08 RX ADMIN — LACTULOSE 20 GRAM(S): 10 SOLUTION ORAL at 12:46

## 2017-05-08 RX ADMIN — Medication 0.25 MILLIGRAM(S): at 05:30

## 2017-05-08 RX ADMIN — Medication 2000 UNIT(S): at 12:46

## 2017-05-08 RX ADMIN — Medication 3 MILLILITER(S): at 11:48

## 2017-05-08 RX ADMIN — LACTULOSE 20 GRAM(S): 10 SOLUTION ORAL at 22:18

## 2017-05-08 RX ADMIN — Medication 3 MILLILITER(S): at 05:30

## 2017-05-08 RX ADMIN — MIDODRINE HYDROCHLORIDE 5 MILLIGRAM(S): 2.5 TABLET ORAL at 22:18

## 2017-05-08 RX ADMIN — LACTULOSE 20 GRAM(S): 10 SOLUTION ORAL at 05:21

## 2017-05-09 ENCOUNTER — TRANSCRIPTION ENCOUNTER (OUTPATIENT)
Age: 82
End: 2017-05-09

## 2017-05-09 ENCOUNTER — OTHER (OUTPATIENT)
Age: 82
End: 2017-05-09

## 2017-05-09 VITALS
TEMPERATURE: 98 F | SYSTOLIC BLOOD PRESSURE: 101 MMHG | HEART RATE: 86 BPM | OXYGEN SATURATION: 96 % | RESPIRATION RATE: 18 BRPM | DIASTOLIC BLOOD PRESSURE: 65 MMHG

## 2017-05-09 LAB
ALBUMIN SERPL ELPH-MCNC: 3.1 G/DL — LOW (ref 3.3–5)
ALP SERPL-CCNC: 107 U/L — SIGNIFICANT CHANGE UP (ref 40–120)
ALT FLD-CCNC: 26 U/L RC — SIGNIFICANT CHANGE UP (ref 10–45)
AST SERPL-CCNC: 48 U/L — HIGH (ref 10–40)
BILIRUB DIRECT SERPL-MCNC: 0.6 MG/DL — HIGH (ref 0–0.2)
BILIRUB INDIRECT FLD-MCNC: 1.6 MG/DL — HIGH (ref 0.2–1)
BILIRUB SERPL-MCNC: 2.2 MG/DL — HIGH (ref 0.2–1.2)
BUN SERPL-MCNC: 17 MG/DL — SIGNIFICANT CHANGE UP (ref 7–23)
CALCIUM SERPL-MCNC: 8.9 MG/DL — SIGNIFICANT CHANGE UP (ref 8.4–10.5)
CHLORIDE SERPL-SCNC: 106 MMOL/L — SIGNIFICANT CHANGE UP (ref 96–108)
CO2 SERPL-SCNC: 21 MMOL/L — LOW (ref 22–31)
CREAT SERPL-MCNC: 0.85 MG/DL — SIGNIFICANT CHANGE UP (ref 0.5–1.3)
CULTURE RESULTS: SIGNIFICANT CHANGE UP
CULTURE RESULTS: SIGNIFICANT CHANGE UP
GLUCOSE SERPL-MCNC: 102 MG/DL — HIGH (ref 70–99)
HCT VFR BLD CALC: 26.3 % — LOW (ref 34.5–45)
HGB BLD-MCNC: 9.1 G/DL — LOW (ref 11.5–15.5)
MCHC RBC-ENTMCNC: 34.4 GM/DL — SIGNIFICANT CHANGE UP (ref 32–36)
MCHC RBC-ENTMCNC: 34.8 PG — HIGH (ref 27–34)
MCV RBC AUTO: 101 FL — HIGH (ref 80–100)
PLATELET # BLD AUTO: 80 K/UL — LOW (ref 150–400)
POTASSIUM SERPL-MCNC: 4.2 MMOL/L — SIGNIFICANT CHANGE UP (ref 3.5–5.3)
POTASSIUM SERPL-SCNC: 4.2 MMOL/L — SIGNIFICANT CHANGE UP (ref 3.5–5.3)
PROT SERPL-MCNC: 5.1 G/DL — LOW (ref 6–8.3)
RBC # BLD: 2.6 M/UL — LOW (ref 3.8–5.2)
RBC # FLD: 15.4 % — HIGH (ref 10.3–14.5)
SODIUM SERPL-SCNC: 140 MMOL/L — SIGNIFICANT CHANGE UP (ref 135–145)
SPECIMEN SOURCE: SIGNIFICANT CHANGE UP
SPECIMEN SOURCE: SIGNIFICANT CHANGE UP
WBC # BLD: 4.7 K/UL — SIGNIFICANT CHANGE UP (ref 3.8–10.5)
WBC # FLD AUTO: 4.7 K/UL — SIGNIFICANT CHANGE UP (ref 3.8–10.5)

## 2017-05-09 PROCEDURE — P9040: CPT

## 2017-05-09 PROCEDURE — 82330 ASSAY OF CALCIUM: CPT

## 2017-05-09 PROCEDURE — 85014 HEMATOCRIT: CPT

## 2017-05-09 PROCEDURE — 86923 COMPATIBILITY TEST ELECTRIC: CPT

## 2017-05-09 PROCEDURE — 82435 ASSAY OF BLOOD CHLORIDE: CPT

## 2017-05-09 PROCEDURE — P9045: CPT

## 2017-05-09 PROCEDURE — 80076 HEPATIC FUNCTION PANEL: CPT

## 2017-05-09 PROCEDURE — C1889: CPT

## 2017-05-09 PROCEDURE — 80048 BASIC METABOLIC PNL TOTAL CA: CPT

## 2017-05-09 PROCEDURE — 80053 COMPREHEN METABOLIC PANEL: CPT

## 2017-05-09 PROCEDURE — 85610 PROTHROMBIN TIME: CPT

## 2017-05-09 PROCEDURE — 71045 X-RAY EXAM CHEST 1 VIEW: CPT

## 2017-05-09 PROCEDURE — 86900 BLOOD TYPING SEROLOGIC ABO: CPT

## 2017-05-09 PROCEDURE — 99239 HOSP IP/OBS DSCHRG MGMT >30: CPT

## 2017-05-09 PROCEDURE — 97110 THERAPEUTIC EXERCISES: CPT

## 2017-05-09 PROCEDURE — 86850 RBC ANTIBODY SCREEN: CPT

## 2017-05-09 PROCEDURE — 94002 VENT MGMT INPAT INIT DAY: CPT

## 2017-05-09 PROCEDURE — 94640 AIRWAY INHALATION TREATMENT: CPT

## 2017-05-09 PROCEDURE — 83735 ASSAY OF MAGNESIUM: CPT

## 2017-05-09 PROCEDURE — 74176 CT ABD & PELVIS W/O CONTRAST: CPT

## 2017-05-09 PROCEDURE — 82947 ASSAY GLUCOSE BLOOD QUANT: CPT

## 2017-05-09 PROCEDURE — 93005 ELECTROCARDIOGRAM TRACING: CPT

## 2017-05-09 PROCEDURE — 77013 CT GUIDE FOR TISSUE ABLATION: CPT

## 2017-05-09 PROCEDURE — 36430 TRANSFUSION BLD/BLD COMPNT: CPT

## 2017-05-09 PROCEDURE — 84100 ASSAY OF PHOSPHORUS: CPT

## 2017-05-09 PROCEDURE — 84132 ASSAY OF SERUM POTASSIUM: CPT

## 2017-05-09 PROCEDURE — 97116 GAIT TRAINING THERAPY: CPT

## 2017-05-09 PROCEDURE — 87040 BLOOD CULTURE FOR BACTERIA: CPT

## 2017-05-09 PROCEDURE — 86901 BLOOD TYPING SEROLOGIC RH(D): CPT

## 2017-05-09 PROCEDURE — 83605 ASSAY OF LACTIC ACID: CPT

## 2017-05-09 PROCEDURE — 85027 COMPLETE CBC AUTOMATED: CPT

## 2017-05-09 PROCEDURE — 99024 POSTOP FOLLOW-UP VISIT: CPT

## 2017-05-09 PROCEDURE — P9037: CPT

## 2017-05-09 PROCEDURE — 85730 THROMBOPLASTIN TIME PARTIAL: CPT

## 2017-05-09 PROCEDURE — 97161 PT EVAL LOW COMPLEX 20 MIN: CPT

## 2017-05-09 PROCEDURE — 47382 PERCUT ABLATE LIVER RF: CPT

## 2017-05-09 PROCEDURE — 82803 BLOOD GASES ANY COMBINATION: CPT

## 2017-05-09 PROCEDURE — 82140 ASSAY OF AMMONIA: CPT

## 2017-05-09 PROCEDURE — 84295 ASSAY OF SERUM SODIUM: CPT

## 2017-05-09 RX ORDER — CARVEDILOL PHOSPHATE 80 MG/1
1 CAPSULE, EXTENDED RELEASE ORAL
Qty: 0 | Refills: 0 | COMMUNITY

## 2017-05-09 RX ORDER — LACTULOSE 10 G/15ML
20 SOLUTION ORAL EVERY 12 HOURS
Qty: 0 | Refills: 0 | Status: DISCONTINUED | OUTPATIENT
Start: 2017-05-09 | End: 2017-05-09

## 2017-05-09 RX ADMIN — Medication 0.25 MILLIGRAM(S): at 17:59

## 2017-05-09 RX ADMIN — LACTULOSE 20 GRAM(S): 10 SOLUTION ORAL at 06:04

## 2017-05-09 RX ADMIN — Medication 3 MILLILITER(S): at 10:03

## 2017-05-09 RX ADMIN — Medication 2000 UNIT(S): at 12:50

## 2017-05-09 RX ADMIN — Medication 1 TABLET(S): at 09:27

## 2017-05-09 RX ADMIN — Medication 3 MILLILITER(S): at 17:43

## 2017-05-09 RX ADMIN — Medication 25 MICROGRAM(S): at 06:04

## 2017-05-09 RX ADMIN — MIDODRINE HYDROCHLORIDE 5 MILLIGRAM(S): 2.5 TABLET ORAL at 06:04

## 2017-05-09 NOTE — DISCHARGE NOTE ADULT - MEDICATION SUMMARY - MEDICATIONS TO TAKE
I will START or STAY ON the medications listed below when I get home from the hospital:    budesonide 0.25 mg/2 mL inhalation suspension  -- 2 milliliter(s) inhaled 2 times a day  -- Indication: For Shortness of breath    spironolactone 25 mg oral tablet  --  by mouth once a day  -- Indication: For HTN    digoxin 125 mcg (0.125 mg) oral tablet  -- 1 tab(s) by mouth every other day  -- Indication: For Afib    ipratropium-albuterol 0.5 mg-2.5 mg/3 mLinhalation solution  -- 3 milliliter(s) inhaled 2 times a day  -- Indication: For Chronic obstructive pulmonary disease, unspecified COPD type    lactulose 10 g/15 mL oral syrup  -- 15 milliliter(s) by mouth 2 times a day  -- Indication: For Hepatic cirrhosis, unspecified hepatic cirrhosis type    rifAXIMin 550 mg oral tablet  -- 1 tab(s) by mouth 2 times a day  -- Indication: For Hepatic cirrhosis, unspecified hepatic cirrhosis type    levothyroxine 25 mcg (0.025 mg) oral capsule  -- 1 cap(s) by mouth once a day  -- Indication: For Hypothyroidism    cholecalciferol oral tablet  -- 2000 unit(s) by mouth once a day  -- Indication: For HLD

## 2017-05-09 NOTE — DISCHARGE NOTE ADULT - SECONDARY DIAGNOSIS.
Hepatic cirrhosis, unspecified hepatic cirrhosis type Atrial fibrillation, unspecified type Hemoperitoneum

## 2017-05-09 NOTE — DISCHARGE NOTE ADULT - PATIENT PORTAL LINK FT
“You can access the FollowHealth Patient Portal, offered by [facility], by registering with the following website: [email or web address]”

## 2017-05-09 NOTE — DISCHARGE NOTE ADULT - CARE PROVIDER_API CALL
Ramos Ye), Diagnostic Radiology; VascularIntervent Radiology  [address] [address]  Phone: [phone]  Fax: [phone]    Daniel Boston), Cardiovascular Disease; Internal Medicine  [facility] [place] [address]  Phone: [phone]  Fax: [phone]

## 2017-05-09 NOTE — DISCHARGE NOTE ADULT - CARE PLAN
Principal Discharge DX:	Hypotension  Goal:	You were admitted for low BP  Instructions for follow-up, activity and diet:	Follow up your cardiologist to restart coreg and diltiazem as outpatient as these medications may potentially decrease your bp.  Secondary Diagnosis:	Hepatic cirrhosis, unspecified hepatic cirrhosis type  Goal:	No signs of hepatic encephalopathy  Instructions for follow-up, activity and diet:	Please continue to take lactulose and titrate to BM 2-3 times per day. Continue to follow up with Interventional Radiology as instructed as outpatient.  Secondary Diagnosis:	Atrial fibrillation, unspecified type  Goal:	Continue to take digoxin as prescribed.  Instructions for follow-up, activity and diet:	Hold coreg and Cardizem for now. Discuss with your cardiologist regarding when to restart them.  Secondary Diagnosis:	Hemoperitoneum  Goal:	Bleeding stopped  Instructions for follow-up, activity and diet:	Your CT scan showed bleeding inside your abdomen; likely secondary to the recent IR procedure. We monitored you during your stay here in the hospital. You currently has stable hemoglobin and blood pressure.

## 2017-05-09 NOTE — DISCHARGE NOTE ADULT - HOSPITAL COURSE
88F with h/o decompensated liver cirrhosis (hx of varices and hepatic encephalopathy), Afib not on AC, Severe AS, Severe MR  COPD, CLL, CKD who presented to the hospital for IR radiofrequency ablation of a hepatoma in segment 6. This procedure was performed and the patient was extubated. The patient developed respiratory acidosis and was reintubated in the PACU. The patient was also found to be hypotensive and placed on neosynephrine. She was able to be extubated a second time but pressors were unable to be titrated off. The patient received 2 units of platelets pre-operatively. Given inability to take of pressors, the MICU was consulted for admission. The patient denies shortness of breath, chest pain, fevers/chills, and nausea/vomiting. She endorses right upper quadrant pain.    After admission, pt received a unit of PRBC, on levophed titrating for SBP > 100. Increased lactulose to TID. Levo was titrated down. Started on Midodrine 10 TID. Overnight, patient was increased on laculose. She remains on levophed gtt throughout the night. Patient tolerated midodrin PO. Mental status responds to stimuli. Finaly small BM. On 5/5, Mdodrine increased to 20 TID and able to take of levophed in the morning. Pt having multiple BM's and transferred to floor. On 5/6, patient restarted on digoxin. Discussed code status with family who is agreeable with DNR but not DNI. BP titrated back down to 10mg TID with stable vitals. CT A/P identified moderate volume hemoperitoneum likely secondary to IR procedure. IR was reconsulted and recommended patient to undergo CTA if bp unstable. CBC check q6hs overnight.  Hb stable and BP stable overnight and down titrating midodrine again in the am. On 5/9, IR reevaluated patient cleared patient to be discharged with follow up scheduled. 88F with h/o liver cirrhosis (hx of varices and hepatic encephalopathy), Afib not on AC, Severe AS, Severe MR, pulm HTN, COPD, CLL, CKD, recent admission in 4/2017 for GI bleed(managed conservatively) who presented to the hospital for IR radiofrequency ablation of a hepatoma in segment 6. Postprocedure was complicated by acute respiratory failure with hypoxia and patient was reintubated in the PACU. The patient was also found to be hypotensive and placed on neosynephrine gtt. She was able to be extubated a second time but pressors were unable to be titrated off. The patient received 2 units of platelets pre-operatively. Given inability to take off pressors, the MICU was consulted and patient was admitted to MICU. The patient denies shortness of breath, chest pain, fevers/chills, and nausea/vomiting. She endorses right upper quadrant pain.    After admission, pt received a unit of PRBC, on levophed titrating for SBP > 100. Levo was titrated down and patient was started on midodrine. Lactulose was titrated accordingly. On 5/6, patient restarted on digoxin. Discussed code status with family who is agreeable with DNR but not DNI. CT A/P done which showed moderate volume hemoperitoneum. IR was reconsulted and recommended to consider CTA if patient was unstable for possible embolization. H/H monitored frequently and remained stable. Midodrine was titrated off and BP remained stable as well. Patient's home meds(coreg, cardizem, aldactone) were held. Aldactone resumed on discharge given hx of liver cirrhosis, severe AS/MR. On 5/9, IR reevaluated patient cleared patient to be discharged with follow up scheduled. Patient to follow up with Dr. Boston as outpatient to determine when to resume coreg and cardizem. 88F with h/o liver cirrhosis (hx of varices and hepatic encephalopathy), Afib not on AC, Severe AS, Severe MR, pulm HTN, COPD, CLL, CKD, recent admission in 4/2017 for GI bleed(managed conservatively) who presented to the hospital for IR radiofrequency ablation of a hepatoma in segment 6. Postprocedure was complicated by acute respiratory failure with hypoxia and patient was reintubated in the PACU. The patient was also found to be hypotensive and placed on neosynephrine gtt. She was able to be extubated a second time but pressors were unable to be titrated off. The patient received 2 units of platelets pre-operatively. Given inability to take off pressors, the MICU was consulted and patient was admitted to MICU and started on levophed gtt.     After admission, pt received a unit of PRBC for acute blood loss anemia. CT A/P done which showed moderate volume hemoperitoneum. IR was reconsulted and recommended to consider CTA if patient was unstable for possible embolization. H/H monitored frequently and remained stable. Levo was eventually titrated down and patient was started on midodrine. Lactulose was titrated accordingly. On 5/6, patient restarted on digoxin. Discussed code status with family who is agreeable with DNR but not DNI. Midodrine was titrated off and BP remained stable. Patient's home meds(coreg, cardizem, aldactone) were held. Aldactone resumed on discharge given hx of liver cirrhosis, severe AS/MR. On 5/9, IR reevaluated patient cleared patient to be discharged with follow up scheduled. Patient to follow up with Dr. Boston as outpatient to determine when to resume coreg and cardizem.

## 2017-05-09 NOTE — DISCHARGE NOTE ADULT - MEDICATION SUMMARY - MEDICATIONS TO STOP TAKING
I will STOP taking the medications listed below when I get home from the hospital:    diltiazem 30 mg oral tablet  -- 1 tab(s) by mouth every 6 hours    carvedilol 3.125 mg oral tablet  -- 1 tab(s) by mouth 2 times a day, As Needed if systolic is >100

## 2017-05-09 NOTE — DISCHARGE NOTE ADULT - PLAN OF CARE
You were admitted for low BP Follow up your cardiologist to restart coreg and diltiazem as outpatient as these medications may potentially decrease your bp. No signs of hepatic encephalopathy Please continue to take lactulose and titrate to BM 2-3 times per day. Continue to follow up with Interventional Radiology as instructed as outpatient. Continue to take digoxin as prescribed. Hold coreg and Cardizem for now. Discuss with your cardiologist regarding when to restart them. Bleeding stopped Your CT scan showed bleeding inside your abdomen; likely secondary to the recent IR procedure. We monitored you during your stay here in the hospital. You currently has stable hemoglobin and blood pressure.

## 2017-05-10 ENCOUNTER — NON-APPOINTMENT (OUTPATIENT)
Age: 82
End: 2017-05-10

## 2017-05-10 ENCOUNTER — APPOINTMENT (OUTPATIENT)
Dept: CARDIOLOGY | Facility: CLINIC | Age: 82
End: 2017-05-10

## 2017-05-10 VITALS
HEART RATE: 82 BPM | BODY MASS INDEX: 21.62 KG/M2 | SYSTOLIC BLOOD PRESSURE: 124 MMHG | HEIGHT: 63 IN | WEIGHT: 122 LBS | OXYGEN SATURATION: 99 % | DIASTOLIC BLOOD PRESSURE: 65 MMHG

## 2017-05-12 ENCOUNTER — APPOINTMENT (OUTPATIENT)
Dept: CARDIOLOGY | Facility: CLINIC | Age: 82
End: 2017-05-12

## 2017-05-22 ENCOUNTER — OTHER (OUTPATIENT)
Age: 82
End: 2017-05-22

## 2017-05-24 NOTE — PATIENT PROFILE ADULT. - ALCOHOL USE HISTORY SINGLE SELECT
Infectious Disease Progress Note    Requested by: Dr. Yoli Gaming, dr. Henry Garnett    Reason: sepsis, acute paraplegia    Current abx Prior abx   Meropenem since 5/15  Fluconazole since 5/16/17   Cefepime 4/20-4/21  vancomycin  4/20-4/24  Gentamicin 4/21-4/24  Metronidazole  4/20-4/24  Ceftriaxone  4/24- 5/15  linezolid 5/14-5/17     Lines:   PICC RUE 4/26    Assessment :    77 y.o., right handed female, with an established history of hypertension, complete heart block with permanent pacemaker placed, diabetes mellitus, diabetic peripheral neuropathy, obesity, admitted to [facility] on 4/20/17. Clinical presentation consistent with  Sepsis (POA)  secondary to group B streptococcus bloodstream infection (positive blood cultures 4/20, negative blood cultures 4/21). Most likely source of bloodstream infection is infected right psoas hematoma/abscess. 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 No signs of neurological recovery on today's exam     2 week h/o pain at the site of abdominal pacemaker, tenderness at the site - however, patient doesn't have erythema at the site of pacemaker. No fluid collection noted at pacemaker pocket site per usg 4/24. Quick clearance of bacteremia would argue against endocarditis/endovascular infection. At this time risk of removal of pacemaker/general pocket change exceed the benefit. Discussed with cardiology. Would recommend close monitoring. Abdominal usg 4/24 doesn't reveal evidence of abscess/fluid collection at the site of pacemaker pocket. Enterococcus in urine cultures 4/20 likely colonizer    Acute renal failure: nephrology consult appreciated. Difficult to determine exact etiology of ARF at this time. Slight improvement in creatinine today.      Low grade fever overnight - Ct scan 5/4 reveals increasing focal area within the right psoas muscle inferior to the previously drained area. could represent either spread of infection inferiorly or intramuscular hematoma. S/p ct guided IR drain check - old drain removed. New drain placed inferiorly on 5/10    No evidence of pneumonia    High grade fevers with tm:102 on 5/10, tm:103 on 5/13 - likely due to aspiration pneumonia/hcap. two of two sets of blood cultures 5/14 positive for coagulase negative staphylococcus likely contaminant. Negative repeat blood cultures. unable to perform thoracentesis since inadequate fluid. Improved respiratory status/blood pressure after switch to meropenem    Acute renal failure: multifactorial - nephrology consult appreciated     bilateral LE dvt - s/p IVC filter placement 5/11    No leukocytosis     Resolved fevers. No evidence of abdominal wall cellulitis noted on ct scan. Clinically better    Recommendations:    1. cont fluconazole (d7/14) to cover for possible candida cystitis. Continue ceftriaxone till 6/14/17  to complete treatment for infected psoas abscess  2. D/c picc line once antibiotic therapy completed  3. D/c planning per primary team      Above plan was discussed in details with patient. Please call me if any further questions or concerns. Will continue to participate in the care of this patient. subjective:    Feels better. Denies cp, sob. Unable to move legs. No nausea, vomiting. No new rash/itching/joint pain/back pain. Home Medication List    Details   gabapentin (NEURONTIN) 400 mg capsule Take 1 Cap by mouth two (2) times a day. Indications: NEUROPATHIC PAIN  Qty: 30 Cap, Refills: 0    Associated Diagnoses: Iliopsoas muscle hematoma, right, subsequent encounter      cholecalciferol (VITAMIN D3) 1,000 unit tablet Take 2 Tabs by mouth daily. Indications: PREVENTION OF VITAMIN D DEFICIENCY  Qty: 30 Tab, Refills: 0      SITagliptin (JANUVIA) 50 mg tablet Take 50 mg by mouth daily.  Indications: type 2 diabetes mellitus      potassium chloride (KLOR-CON M20) 20 mEq tablet Take 20 mEq by mouth two (2) times a day. Indications: HYPOKALEMIA PREVENTION      ondansetron (ZOFRAN ODT) 4 mg disintegrating tablet Take 4 mg by mouth every eight (8) hours as needed for Nausea. cyclobenzaprine (FLEXERIL) 10 mg tablet Take 10 mg by mouth as needed for Muscle Spasm(s). Indications: MUSCLE SPASM      carvedilol (COREG) 6.25 mg tablet Take 1 Tab by mouth two (2) times daily (with meals). Indications: hypertension  Qty: 30 Tab, Refills: 0    Associated Diagnoses: Benign hypertensive heart disease with systolic CHF, NYHA class 2 (HCC)      acetaminophen (TYLENOL) 325 mg tablet Take 2 Tabs by mouth every four (4) hours as needed (for fever or pain level less than 5/10). Indications: Fever, Pain  Qty: 30 Tab, Refills: 0    Associated Diagnoses: Iliopsoas muscle hematoma, right, subsequent encounter      allopurinol (ZYLOPRIM) 100 mg tablet Take 1 Tab by mouth daily. Indications: GOUT  Qty: 15 Tab, Refills: 0    Associated Diagnoses: Chronic gout, unspecified cause, unspecified site      insulin glargine (LANTUS) 100 unit/mL injection 15 Units by SubCUTAneous route nightly. Indications: type 2 diabetes mellitus  Qty: 1 Vial, Refills: 0    Associated Diagnoses: Type 2 diabetes mellitus with diabetic neuropathy, with long-term current use of insulin ([facility])      docusate sodium (COLACE) 100 mg capsule Take 1 Cap by mouth daily (after breakfast). Indications: Constipation  Qty: 15 Cap, Refills: 0      senna-docusate (PERICOLACE) 8.6-50 mg per tablet Take 2 Tabs by mouth daily (after dinner). Indications: Constipation  Qty: 30 Tab, Refills: 0      oxyCODONE-acetaminophen (PERCOCET 7.5) 7.5-325 mg per tablet Take 1 Tab by mouth every four (4) hours as needed (for pain level greater than 4/10). Max Daily Amount: 6 Tabs.  Indications: Pain  Qty: 50 Tab, Refills: 0    Associated Diagnoses: Iliopsoas muscle hematoma, right, subsequent encounter      bumetanide (BUMEX) 1 mg tablet TAKE 1 TABLET TWICE A DAY  Qty: 180 Tab, Refills: 1      pravastatin (PRAVACHOL) 40 mg tablet Take 40 mg by mouth nightly. Indications: DYSLIPIDEMIA      ferrous sulfate 325 mg (65 mg iron) tablet Take 1 Tab by mouth two (2) times daily (with meals).   Qty: 30 Tab, Refills: 0      insulin aspart (NOVOLOG) 100 unit/mL injection INITIATE INSULIN CORRECTIVE PROTOCOL (HR): Normal Insulin Sensitivity  For Blood Sugar (mg/dL) of:    Less than 150 =   0 units          150 -199 =   2 units 200 -249 =   4 units 250 -299 =   6 units 300 -349 =   8 units 350 and above =   10 units If 2 glucose readings are above 200 mg/dL  Qty: 10 mL, Refills: 6             Current Facility-Administered Medications   Medication Dose Route Frequency    magnesium sulfate 2 g/50 ml IVPB (premix or compounded)  2 g IntraVENous ONCE    fluconazole (DIFLUCAN) tablet 200 mg  200 mg Oral DAILY    cefTRIAXone (ROCEPHIN) 2 g in 0.9% sodium chloride (MBP/ADV) 50 mL MBP  2 g IntraVENous Q24H    albuterol-ipratropium (DUO-NEB) 2.5 MG-0.5 MG/3 ML  3 mL Nebulization Q6HWA RT    famotidine (PEPCID) tablet 20 mg  20 mg Oral BID    insulin lispro (HUMALOG) injection   SubCUTAneous AC&HS    insulin glargine (LANTUS) injection 7 Units  7 Units SubCUTAneous DAILY    0.9% sodium chloride infusion 250 mL  250 mL IntraVENous PRN    midodrine (PROAMITINE) tablet 10 mg  10 mg Oral Q8H    acetaminophen (TYLENOL) suppository 650 mg  650 mg Rectal Q6H PRN    diclofenac (VOLTAREN) 1 % topical gel 2 g  2 g Topical Q8H PRN    allopurinol (ZYLOPRIM) tablet 50 mg  50 mg Oral DAILY    aluminum-magnesium hydroxide (MAALOX) oral suspension 15 mL  15 mL Oral QID PRN    Lactobacillus Acidoph & Bulgar (FLORANEX) tablet 1 Tab  1 Tab Oral BID    sodium chloride (NS) flush 5-10 mL  5-10 mL IntraVENous Q8H    naloxone (NARCAN) injection 0.1 mg  0.1 mg IntraVENous Multiple    acetaminophen (TYLENOL) tablet 650 mg  650 mg Oral Q4H PRN    0.9% sodium chloride infusion 250 mL  250 mL IntraVENous PRN    diphenhydrAMINE (BENADRYL) capsule 50 mg  50 mg Oral Q4H PRN    polyethylene glycol (MIRALAX) packet 17 g  17 g Oral TID    cholecalciferol (VITAMIN D3) tablet 2,000 Units  2,000 Units Oral DAILY    docusate sodium (COLACE) capsule 100 mg  100 mg Oral DAILY AFTER BREAKFAST    oxyCODONE-acetaminophen (PERCOCET 7.5) 7.5-325 mg per tablet 1 Tab  1 Tab Oral Q4H PRN    pravastatin (PRAVACHOL) tablet 40 mg  40 mg Oral QHS    senna-docusate (PERICOLACE) 8.6-50 mg per tablet 2 Tab  2 Tab Oral PCD    folic acid (FOLVITE) tablet 1 mg  1 mg Oral DAILY    ondansetron (ZOFRAN) injection 4 mg  4 mg IntraVENous Q8H PRN    glucose chewable tablet 16 g  16 g Oral PRN    glucagon (GLUCAGEN) injection 1 mg  1 mg IntraMUSCular PRN    dextrose (D50W) injection syrg 12.5-25 g  25-50 mL IntraVENous PRN       Allergies: Vancomycin; Ampicillin; Bactrim [sulfamethoxazole-trimethoprim]; Blueberry; Ciprofloxacin; Codeine; Crestor [rosuvastatin]; Darvocet a500 [propoxyphene n-acetaminophen]; Demerol [meperidine]; Levaquin [levofloxacin]; Lipitor [atorvastatin]; Magnesium oxide; Minocin [minocycline]; Pcn [penicillins]; Pravachol [pravastatin]; Sulfa (sulfonamide antibiotics); Ultracet [tramadol-acetaminophen]; Vicodin [hydrocodone-acetaminophen]; Vytorin 10-10 [ezetimibe-simvastatin]; and Percodan [oxycodone hcl-oxycodone-asa]    Temp (24hrs), Av.8 °F (36.6 °C), Min:97 °F (36.1 °C), Max:98.8 °F (37.1 °C)    Visit Vitals    /74 (BP 1 Location: Left arm, BP Patient Position: At rest)    Pulse 75    Temp 97 °F (36.1 °C)    Resp 18    Ht 5' 7\" (1.702 m)    Wt 110.9 kg (244 lb 6.4 oz)    SpO2 99%    Breastfeeding No    BMI 38.28 kg/m2       ROS: patient unable to communicate fluently. Detailed ros not feasible.     Physical Exam:    General: Well developed, well nourished female laying on the bed, AAOx3 NAD    General:   awake alert and oriented   HEENT:  Normocephalic, atraumatic, PERRL, EOMI, no scleral icterus or pallor; no conjunctival hemmohage;  nasal and oral mucous are moist and without evidence of lesions. Neck supple, no bruits. Lymph Nodes:   no cervical, axillary or inguinal adenopathy   Lungs:   non-labored, bilaterally clear to auscultation- no crackles wheezes rales or rhonchi   Heart:   s1 and s2 irregular; no rubs or gallops, no edema, + pedal pulses   Abdomen:  soft, non-distended, active bowel sounds, no hepatomegaly, no splenomegaly. no tenderness over the left abdominal pacemaker, no overlying erythema or fluctuance, drain right lower abdomen with serous drainage, mild linnette umbilical tenderness   Genitourinary:  deferred   Extremities:   no clubbing, cyanosis; no joint effusions or swelling; muscle mass appropriate for age   Neurologic:  No gross focal sensory abnormalities; 5/5 muscle strength to upper extremities. 0/5 strength in lower extremities. Cranial nerves intact                        Skin:  Surgical scars abdomen, left neck well healed   Back:   no paraspinal muscle guarding or rigidity, no right CVA tenderness     Psychiatric:  No suicidal or homicidal ideations, appropriate mood and affect         Labs: Results:   Chemistry Recent Labs      05/23/17 2056 05/23/17 0528 05/22/17 0449   GLU   --   77  79   NA   --   138  139   K  3.5  3.5  3.6   CL   --   100  102   CO2   --   30  29   BUN   --   13  14   CREA   --   1.09  1.08   CA   --   8.5  8.5   AGAP   --   8  8   BUCR   --   12  13   AP   --   157*  168*   TP   --   6.0*  6.0*   ALB   --   2.0*  2.1*   GLOB   --   4.0  3.9   AGRAT   --   0.5*  0.5*      CBC w/Diff Recent Labs      05/24/17   0500  05/23/17 0528 05/22/17   0449   WBC  13.3*  13.5*  11.3   RBC  3.30*  3.28*  3.24*   HGB  8.5*  8.5*  8.4*   HCT  26.4*  26.2*  26.0*   PLT  219  193  188   GRANS  75*  70  80*   LYMPH  16*  19*  13*   EOS  1  0  4      Microbiology No results for input(s): CULT in the last 72 hours.        RADIOLOGY:    All available imaging studies/reports in [facility] for this admission were reviewed    Dr. Peter Farooq, Infectious Disease Specialist  [phone]  May 24, 2017  3:49 PM yes...

## 2017-06-06 ENCOUNTER — RX RENEWAL (OUTPATIENT)
Age: 82
End: 2017-06-06

## 2017-06-14 ENCOUNTER — RX RENEWAL (OUTPATIENT)
Age: 82
End: 2017-06-14

## 2017-06-15 ENCOUNTER — RX RENEWAL (OUTPATIENT)
Age: 82
End: 2017-06-15

## 2017-06-19 ENCOUNTER — RX RENEWAL (OUTPATIENT)
Age: 82
End: 2017-06-19

## 2017-06-22 ENCOUNTER — MEDICATION RENEWAL (OUTPATIENT)
Age: 82
End: 2017-06-22

## 2017-06-22 ENCOUNTER — RX RENEWAL (OUTPATIENT)
Age: 82
End: 2017-06-22

## 2017-06-24 ENCOUNTER — APPOINTMENT (OUTPATIENT)
Dept: MRI IMAGING | Facility: IMAGING CENTER | Age: 82
End: 2017-06-24

## 2017-06-24 ENCOUNTER — OUTPATIENT (OUTPATIENT)
Dept: OUTPATIENT SERVICES | Facility: HOSPITAL | Age: 82
LOS: 1 days | End: 2017-06-24
Payer: MEDICARE

## 2017-06-24 DIAGNOSIS — Z00.8 ENCOUNTER FOR OTHER GENERAL EXAMINATION: ICD-10-CM

## 2017-06-24 DIAGNOSIS — Z98.89 OTHER SPECIFIED POSTPROCEDURAL STATES: Chronic | ICD-10-CM

## 2017-06-24 PROCEDURE — A9585: CPT

## 2017-06-24 PROCEDURE — 74183 MRI ABD W/O CNTR FLWD CNTR: CPT

## 2017-06-24 PROCEDURE — 82565 ASSAY OF CREATININE: CPT

## 2017-06-29 ENCOUNTER — APPOINTMENT (OUTPATIENT)
Dept: INTERVENTIONAL RADIOLOGY/VASCULAR | Facility: CLINIC | Age: 82
End: 2017-06-29

## 2017-06-29 VITALS
HEART RATE: 67 BPM | RESPIRATION RATE: 16 BRPM | DIASTOLIC BLOOD PRESSURE: 54 MMHG | SYSTOLIC BLOOD PRESSURE: 97 MMHG | TEMPERATURE: 98.2 F | OXYGEN SATURATION: 100 %

## 2017-07-26 ENCOUNTER — MEDICATION RENEWAL (OUTPATIENT)
Age: 82
End: 2017-07-26

## 2017-07-27 ENCOUNTER — MEDICATION RENEWAL (OUTPATIENT)
Age: 82
End: 2017-07-27

## 2017-07-28 ENCOUNTER — APPOINTMENT (OUTPATIENT)
Age: 82
End: 2017-07-28
Payer: MEDICARE

## 2017-07-28 VITALS
HEIGHT: 63 IN | BODY MASS INDEX: 20.91 KG/M2 | TEMPERATURE: 97.6 F | HEART RATE: 64 BPM | WEIGHT: 118 LBS | SYSTOLIC BLOOD PRESSURE: 114 MMHG | RESPIRATION RATE: 18 BRPM | DIASTOLIC BLOOD PRESSURE: 67 MMHG

## 2017-07-28 PROCEDURE — 99214 OFFICE O/P EST MOD 30 MIN: CPT

## 2017-08-28 ENCOUNTER — RX RENEWAL (OUTPATIENT)
Age: 82
End: 2017-08-28

## 2017-09-11 ENCOUNTER — RX RENEWAL (OUTPATIENT)
Age: 82
End: 2017-09-11

## 2017-09-25 ENCOUNTER — APPOINTMENT (OUTPATIENT)
Dept: CARDIOLOGY | Facility: CLINIC | Age: 82
End: 2017-09-25
Payer: MEDICARE

## 2017-09-25 ENCOUNTER — NON-APPOINTMENT (OUTPATIENT)
Age: 82
End: 2017-09-25

## 2017-09-25 VITALS
OXYGEN SATURATION: 99 % | WEIGHT: 116 LBS | SYSTOLIC BLOOD PRESSURE: 128 MMHG | HEART RATE: 58 BPM | BODY MASS INDEX: 20.55 KG/M2 | DIASTOLIC BLOOD PRESSURE: 72 MMHG

## 2017-09-25 PROCEDURE — 93000 ELECTROCARDIOGRAM COMPLETE: CPT

## 2017-09-25 PROCEDURE — 99215 OFFICE O/P EST HI 40 MIN: CPT

## 2017-10-02 ENCOUNTER — LABORATORY RESULT (OUTPATIENT)
Age: 82
End: 2017-10-02

## 2017-10-02 LAB
ALBUMIN SERPL ELPH-MCNC: 3.7 G/DL
ALP BLD-CCNC: 124 U/L
ALT SERPL-CCNC: 28 U/L
ANION GAP SERPL CALC-SCNC: 17 MMOL/L
AST SERPL-CCNC: 46 U/L
BILIRUB SERPL-MCNC: 2.5 MG/DL
BUN SERPL-MCNC: 30 MG/DL
CALCIUM SERPL-MCNC: 11 MG/DL
CHLORIDE SERPL-SCNC: 105 MMOL/L
CO2 SERPL-SCNC: 21 MMOL/L
CREAT SERPL-MCNC: 1.3 MG/DL
GLUCOSE SERPL-MCNC: 103 MG/DL
INR PPP: 1.08 RATIO
POTASSIUM SERPL-SCNC: 4.4 MMOL/L
PROT SERPL-MCNC: 6.1 G/DL
PT BLD: 12.2 SEC
SODIUM SERPL-SCNC: 143 MMOL/L

## 2017-10-03 LAB
AFP-TM SERPL-MCNC: 1.8 NG/ML
BASOPHILS # BLD AUTO: 0.02 K/UL
BASOPHILS NFR BLD AUTO: 0.5 %
EOSINOPHIL # BLD AUTO: 0.04 K/UL
EOSINOPHIL NFR BLD AUTO: 1 %
HCT VFR BLD CALC: 32.9 %
HGB BLD-MCNC: 10.9 G/DL
IMM GRANULOCYTES NFR BLD AUTO: 0.2 %
LYMPHOCYTES # BLD AUTO: 0.7 K/UL
LYMPHOCYTES NFR BLD AUTO: 17.1 %
MAN DIFF?: NORMAL
MCHC RBC-ENTMCNC: 32.7 PG
MCHC RBC-ENTMCNC: 33.1 GM/DL
MCV RBC AUTO: 98.8 FL
MONOCYTES # BLD AUTO: 0.49 K/UL
MONOCYTES NFR BLD AUTO: 12 %
NEUTROPHILS # BLD AUTO: 2.83 K/UL
NEUTROPHILS NFR BLD AUTO: 69.2 %
PLATELET # BLD AUTO: 73 K/UL
RBC # BLD: 3.33 M/UL
RBC # FLD: 15.3 %
WBC # FLD AUTO: 4.09 K/UL

## 2017-10-07 RX ORDER — PROPRANOLOL HCL 160 MG
1 CAPSULE, EXTENDED RELEASE 24HR ORAL
Qty: 0 | Refills: 0 | COMMUNITY
Start: 2017-10-07

## 2017-10-07 RX ORDER — PROPRANOLOL HCL 160 MG
0.5 CAPSULE, EXTENDED RELEASE 24HR ORAL
Qty: 0 | Refills: 0 | COMMUNITY
Start: 2017-10-07

## 2017-10-15 ENCOUNTER — INPATIENT (INPATIENT)
Facility: HOSPITAL | Age: 82
LOS: 2 days | Discharge: HOME | DRG: 871 | End: 2017-10-18
Attending: HOSPITALIST | Admitting: INTERNAL MEDICINE
Payer: MEDICARE

## 2017-10-15 VITALS
DIASTOLIC BLOOD PRESSURE: 34 MMHG | TEMPERATURE: 97 F | HEART RATE: 43 BPM | HEIGHT: 63 IN | WEIGHT: 113.98 LBS | OXYGEN SATURATION: 99 % | RESPIRATION RATE: 19 BRPM | SYSTOLIC BLOOD PRESSURE: 95 MMHG

## 2017-10-15 DIAGNOSIS — N17.9 ACUTE KIDNEY FAILURE, UNSPECIFIED: ICD-10-CM

## 2017-10-15 DIAGNOSIS — R50.9 FEVER, UNSPECIFIED: ICD-10-CM

## 2017-10-15 DIAGNOSIS — N30.00 ACUTE CYSTITIS WITHOUT HEMATURIA: ICD-10-CM

## 2017-10-15 DIAGNOSIS — C91.10 CHRONIC LYMPHOCYTIC LEUKEMIA OF B-CELL TYPE NOT HAVING ACHIEVED REMISSION: ICD-10-CM

## 2017-10-15 DIAGNOSIS — I35.0 NONRHEUMATIC AORTIC (VALVE) STENOSIS: ICD-10-CM

## 2017-10-15 DIAGNOSIS — Z98.89 OTHER SPECIFIED POSTPROCEDURAL STATES: Chronic | ICD-10-CM

## 2017-10-15 DIAGNOSIS — I48.91 UNSPECIFIED ATRIAL FIBRILLATION: ICD-10-CM

## 2017-10-15 DIAGNOSIS — K74.60 UNSPECIFIED CIRRHOSIS OF LIVER: ICD-10-CM

## 2017-10-15 DIAGNOSIS — Z29.9 ENCOUNTER FOR PROPHYLACTIC MEASURES, UNSPECIFIED: ICD-10-CM

## 2017-10-15 LAB
ALBUMIN SERPL ELPH-MCNC: 3.5 G/DL — SIGNIFICANT CHANGE UP (ref 3.3–5)
ALP SERPL-CCNC: 97 U/L — SIGNIFICANT CHANGE UP (ref 40–120)
ALT FLD-CCNC: 20 U/L RC — SIGNIFICANT CHANGE UP (ref 10–45)
AMMONIA BLD-MCNC: 51 UMOL/L — SIGNIFICANT CHANGE UP (ref 11–55)
ANION GAP SERPL CALC-SCNC: 13 MMOL/L — SIGNIFICANT CHANGE UP (ref 5–17)
APPEARANCE UR: CLEAR — SIGNIFICANT CHANGE UP
APTT BLD: 33.7 SEC — SIGNIFICANT CHANGE UP (ref 27.5–37.4)
AST SERPL-CCNC: 36 U/L — SIGNIFICANT CHANGE UP (ref 10–40)
BACTERIA # UR AUTO: ABNORMAL /HPF
BASE EXCESS BLDV CALC-SCNC: 1.1 MMOL/L — SIGNIFICANT CHANGE UP (ref -2–2)
BASOPHILS # BLD AUTO: 0 K/UL — SIGNIFICANT CHANGE UP (ref 0–0.2)
BASOPHILS NFR BLD AUTO: 0.3 % — SIGNIFICANT CHANGE UP (ref 0–2)
BILIRUB SERPL-MCNC: 2.6 MG/DL — HIGH (ref 0.2–1.2)
BILIRUB UR-MCNC: NEGATIVE — SIGNIFICANT CHANGE UP
BUN SERPL-MCNC: 39 MG/DL — HIGH (ref 7–23)
CA-I SERPL-SCNC: 1.32 MMOL/L — HIGH (ref 1.12–1.3)
CALCIUM SERPL-MCNC: 10.7 MG/DL — HIGH (ref 8.4–10.5)
CHLORIDE BLDV-SCNC: 103 MMOL/L — SIGNIFICANT CHANGE UP (ref 96–108)
CHLORIDE SERPL-SCNC: 100 MMOL/L — SIGNIFICANT CHANGE UP (ref 96–108)
CO2 BLDV-SCNC: 29 MMOL/L — SIGNIFICANT CHANGE UP (ref 22–30)
CO2 SERPL-SCNC: 23 MMOL/L — SIGNIFICANT CHANGE UP (ref 22–31)
COLOR SPEC: YELLOW — SIGNIFICANT CHANGE UP
CREAT SERPL-MCNC: 1.66 MG/DL — HIGH (ref 0.5–1.3)
DIFF PNL FLD: ABNORMAL
DIGOXIN SERPL-MCNC: 0.5 NG/ML — LOW (ref 0.8–2)
EOSINOPHIL # BLD AUTO: 0.1 K/UL — SIGNIFICANT CHANGE UP (ref 0–0.5)
EOSINOPHIL NFR BLD AUTO: 1.2 % — SIGNIFICANT CHANGE UP (ref 0–6)
GAS PNL BLDV: 132 MMOL/L — LOW (ref 136–145)
GAS PNL BLDV: SIGNIFICANT CHANGE UP
GAS PNL BLDV: SIGNIFICANT CHANGE UP
GLUCOSE BLDV-MCNC: 121 MG/DL — HIGH (ref 70–99)
GLUCOSE SERPL-MCNC: 120 MG/DL — HIGH (ref 70–99)
GLUCOSE UR QL: NEGATIVE — SIGNIFICANT CHANGE UP
HCO3 BLDV-SCNC: 27 MMOL/L — SIGNIFICANT CHANGE UP (ref 21–29)
HCT VFR BLD CALC: 34.5 % — SIGNIFICANT CHANGE UP (ref 34.5–45)
HCT VFR BLDA CALC: 37 % — LOW (ref 39–50)
HGB BLD CALC-MCNC: 12.1 G/DL — SIGNIFICANT CHANGE UP (ref 11.5–15.5)
HGB BLD-MCNC: 12.3 G/DL — SIGNIFICANT CHANGE UP (ref 11.5–15.5)
INR BLD: 1.28 RATIO — HIGH (ref 0.88–1.16)
KETONES UR-MCNC: NEGATIVE — SIGNIFICANT CHANGE UP
LACTATE BLDV-MCNC: 2.3 MMOL/L — HIGH (ref 0.7–2)
LEUKOCYTE ESTERASE UR-ACNC: ABNORMAL
LYMPHOCYTES # BLD AUTO: 0.9 K/UL — LOW (ref 1–3.3)
LYMPHOCYTES # BLD AUTO: 14.3 % — SIGNIFICANT CHANGE UP (ref 13–44)
MCHC RBC-ENTMCNC: 35.7 GM/DL — SIGNIFICANT CHANGE UP (ref 32–36)
MCHC RBC-ENTMCNC: 37.1 PG — HIGH (ref 27–34)
MCV RBC AUTO: 104 FL — HIGH (ref 80–100)
MONOCYTES # BLD AUTO: 0.9 K/UL — SIGNIFICANT CHANGE UP (ref 0–0.9)
MONOCYTES NFR BLD AUTO: 14.5 % — HIGH (ref 2–14)
NEUTROPHILS # BLD AUTO: 4.4 K/UL — SIGNIFICANT CHANGE UP (ref 1.8–7.4)
NEUTROPHILS NFR BLD AUTO: 69.7 % — SIGNIFICANT CHANGE UP (ref 43–77)
NITRITE UR-MCNC: NEGATIVE — SIGNIFICANT CHANGE UP
OTHER CELLS CSF MANUAL: 5 ML/DL — LOW (ref 18–22)
PCO2 BLDV: 52 MMHG — HIGH (ref 35–50)
PH BLDV: 7.34 — LOW (ref 7.35–7.45)
PH UR: 6 — SIGNIFICANT CHANGE UP (ref 5–8)
PLAT MORPH BLD: NORMAL — SIGNIFICANT CHANGE UP
PLATELET # BLD AUTO: 63 K/UL — LOW (ref 150–400)
PO2 BLDV: 24 MMHG — LOW (ref 25–45)
POTASSIUM BLDV-SCNC: 4.8 MMOL/L — SIGNIFICANT CHANGE UP (ref 3.5–5)
POTASSIUM SERPL-MCNC: 4.8 MMOL/L — SIGNIFICANT CHANGE UP (ref 3.5–5.3)
POTASSIUM SERPL-SCNC: 4.8 MMOL/L — SIGNIFICANT CHANGE UP (ref 3.5–5.3)
PROT SERPL-MCNC: 6 G/DL — SIGNIFICANT CHANGE UP (ref 6–8.3)
PROT UR-MCNC: NEGATIVE — SIGNIFICANT CHANGE UP
PROTHROM AB SERPL-ACNC: 13.9 SEC — HIGH (ref 9.8–12.7)
RBC # BLD: 3.32 M/UL — LOW (ref 3.8–5.2)
RBC # FLD: 13.2 % — SIGNIFICANT CHANGE UP (ref 10.3–14.5)
RBC BLD AUTO: SIGNIFICANT CHANGE UP
SAO2 % BLDV: 32 % — LOW (ref 67–88)
SODIUM SERPL-SCNC: 136 MMOL/L — SIGNIFICANT CHANGE UP (ref 135–145)
SP GR SPEC: 1.01 — LOW (ref 1.01–1.02)
UROBILINOGEN FLD QL: NEGATIVE — SIGNIFICANT CHANGE UP
WBC # BLD: 6.3 K/UL — SIGNIFICANT CHANGE UP (ref 3.8–10.5)
WBC # FLD AUTO: 6.3 K/UL — SIGNIFICANT CHANGE UP (ref 3.8–10.5)
WBC UR QL: SIGNIFICANT CHANGE UP /HPF (ref 0–5)

## 2017-10-15 PROCEDURE — 71010: CPT | Mod: 26

## 2017-10-15 PROCEDURE — 99285 EMERGENCY DEPT VISIT HI MDM: CPT | Mod: GC

## 2017-10-15 RX ORDER — ACETAMINOPHEN 500 MG
325 TABLET ORAL EVERY 6 HOURS
Qty: 0 | Refills: 0 | Status: DISCONTINUED | OUTPATIENT
Start: 2017-10-15 | End: 2017-10-16

## 2017-10-15 RX ORDER — LACTULOSE 10 G/15ML
20 SOLUTION ORAL ONCE
Qty: 0 | Refills: 0 | Status: COMPLETED | OUTPATIENT
Start: 2017-10-15 | End: 2017-10-15

## 2017-10-15 RX ORDER — IPRATROPIUM/ALBUTEROL SULFATE 18-103MCG
3 AEROSOL WITH ADAPTER (GRAM) INHALATION EVERY 12 HOURS
Qty: 0 | Refills: 0 | Status: DISCONTINUED | OUTPATIENT
Start: 2017-10-15 | End: 2017-10-18

## 2017-10-15 RX ORDER — PETROLATUM,WHITE
1 JELLY (GRAM) TOPICAL THREE TIMES A DAY
Qty: 0 | Refills: 0 | Status: DISCONTINUED | OUTPATIENT
Start: 2017-10-15 | End: 2017-10-18

## 2017-10-15 RX ORDER — ACETAMINOPHEN 500 MG
650 TABLET ORAL EVERY 6 HOURS
Qty: 0 | Refills: 0 | Status: DISCONTINUED | OUTPATIENT
Start: 2017-10-15 | End: 2017-10-15

## 2017-10-15 RX ORDER — CHOLECALCIFEROL (VITAMIN D3) 125 MCG
2000 CAPSULE ORAL DAILY
Qty: 0 | Refills: 0 | Status: DISCONTINUED | OUTPATIENT
Start: 2017-10-15 | End: 2017-10-18

## 2017-10-15 RX ORDER — DIGOXIN 250 MCG
0.12 TABLET ORAL DAILY
Qty: 0 | Refills: 0 | Status: DISCONTINUED | OUTPATIENT
Start: 2017-10-15 | End: 2017-10-15

## 2017-10-15 RX ORDER — BUDESONIDE, MICRONIZED 100 %
0.25 POWDER (GRAM) MISCELLANEOUS
Qty: 0 | Refills: 0 | Status: DISCONTINUED | OUTPATIENT
Start: 2017-10-15 | End: 2017-10-18

## 2017-10-15 RX ORDER — IPRATROPIUM BROMIDE 0.2 MG/ML
500 SOLUTION, NON-ORAL INHALATION ONCE
Qty: 0 | Refills: 0 | Status: COMPLETED | OUTPATIENT
Start: 2017-10-15 | End: 2017-10-15

## 2017-10-15 RX ORDER — CEFTRIAXONE 500 MG/1
1 INJECTION, POWDER, FOR SOLUTION INTRAMUSCULAR; INTRAVENOUS EVERY 24 HOURS
Qty: 0 | Refills: 0 | Status: DISCONTINUED | OUTPATIENT
Start: 2017-10-15 | End: 2017-10-18

## 2017-10-15 RX ORDER — CEFTRIAXONE 500 MG/1
1 INJECTION, POWDER, FOR SOLUTION INTRAMUSCULAR; INTRAVENOUS ONCE
Qty: 0 | Refills: 0 | Status: COMPLETED | OUTPATIENT
Start: 2017-10-15 | End: 2017-10-15

## 2017-10-15 RX ORDER — DIGOXIN 250 MCG
0.12 TABLET ORAL EVERY OTHER DAY
Qty: 0 | Refills: 0 | Status: DISCONTINUED | OUTPATIENT
Start: 2017-10-16 | End: 2017-10-18

## 2017-10-15 RX ORDER — LEVOTHYROXINE SODIUM 125 MCG
25 TABLET ORAL DAILY
Qty: 0 | Refills: 0 | Status: DISCONTINUED | OUTPATIENT
Start: 2017-10-15 | End: 2017-10-18

## 2017-10-15 RX ORDER — SODIUM CHLORIDE 9 MG/ML
500 INJECTION INTRAMUSCULAR; INTRAVENOUS; SUBCUTANEOUS ONCE
Qty: 0 | Refills: 0 | Status: COMPLETED | OUTPATIENT
Start: 2017-10-15 | End: 2017-10-15

## 2017-10-15 RX ORDER — LACTULOSE 10 G/15ML
20 SOLUTION ORAL
Qty: 0 | Refills: 0 | Status: DISCONTINUED | OUTPATIENT
Start: 2017-10-15 | End: 2017-10-18

## 2017-10-15 RX ORDER — SODIUM CHLORIDE 9 MG/ML
250 INJECTION INTRAMUSCULAR; INTRAVENOUS; SUBCUTANEOUS ONCE
Qty: 0 | Refills: 0 | Status: COMPLETED | OUTPATIENT
Start: 2017-10-15 | End: 2017-10-15

## 2017-10-15 RX ADMIN — CEFTRIAXONE 100 GRAM(S): 500 INJECTION, POWDER, FOR SOLUTION INTRAMUSCULAR; INTRAVENOUS at 18:38

## 2017-10-15 RX ADMIN — LACTULOSE 20 GRAM(S): 10 SOLUTION ORAL at 17:38

## 2017-10-15 RX ADMIN — Medication 650 MILLIGRAM(S): at 20:40

## 2017-10-15 RX ADMIN — Medication 1 APPLICATION(S): at 23:30

## 2017-10-15 RX ADMIN — Medication 500 MICROGRAM(S): at 17:38

## 2017-10-15 RX ADMIN — LACTULOSE 20 GRAM(S): 10 SOLUTION ORAL at 23:03

## 2017-10-15 RX ADMIN — SODIUM CHLORIDE 166.67 MILLILITER(S): 9 INJECTION INTRAMUSCULAR; INTRAVENOUS; SUBCUTANEOUS at 13:19

## 2017-10-15 RX ADMIN — SODIUM CHLORIDE 500 MILLILITER(S): 9 INJECTION INTRAMUSCULAR; INTRAVENOUS; SUBCUTANEOUS at 22:16

## 2017-10-15 NOTE — ED ADULT NURSE NOTE - OBJECTIVE STATEMENT
88 y/o female hx of liver cirrhosis, afib, pulm HTN, COPD, CLL, CKD, GI bleed, presents to the ED c/o fever tmax 101.2F, this AM. Family endorsing decreased appetite, slight cough, abdominal pain due to constipation which resolved, bradycardia to 40s since beginning propranolol. Denies CP, n/v, burning/difficult urination, diarrhea, constipation, abdominal pain. Abdomen soft, nontender, nondistended, A&Ox2, in no respiratory distress, no CP, resting comfortably with family at bedside and safety maintained.

## 2017-10-15 NOTE — H&P ADULT - NSHPPHYSICALEXAM_GEN_ALL_CORE
Vital Signs Last 24 Hrs  T(C): 38.3 (15 Oct 2017 20:22), Max: 38.3 (15 Oct 2017 20:22)  T(F): 101 (15 Oct 2017 20:22), Max: 101 (15 Oct 2017 20:22)  HR: 56 (15 Oct 2017 20:22) (43 - 59)  BP: 97/48 (15 Oct 2017 20:22) (95/34 - 129/47)  RR: 16 (15 Oct 2017 20:22) (16 - 19)  SpO2: 96% (15 Oct 2017 20:22) (96% - 100%)    PHYSICAL EXAM:  Constitutional: cachectic, NAD, appears chronically ill  Eyes: EOMI, PERRL  ENMT: moist mucous membranes, no oral lesions, no rinhorrhea, no post-nasal drip  Neck: no lymphadenopathy, supple neck, no thyromegaly  Breasts: deferred  Back: no CVA tenderness, kyphosis  Respiratory: CTAB  Cardiovascular: IV/VI ARCADIO, regular rhythm, no rubs or gallops appreciated  Gastrointestinal: soft, nontender, no suprapubic tenderness; no hepatosplenomegaly, NBS  Genitourinary: deferred  Rectal: deferred  Extremities: echymotic; mild tenderness bilaterally; symmetrical in size  Vascular: +2 DP pulses bilateraly  Neurological: AOx3, does not know why she is in the hospital; no focal deficits appreciated  Skin: echymotic  Lymph Nodes: no lymphadenopathy or splenomegaly  Musculoskeletal: no clubbing; no joint effusions; normal ROM  Psychiatric: calm, appropriate, following commands

## 2017-10-15 NOTE — ED PROVIDER NOTE - MEDICAL DECISION MAKING DETAILS
ARMY:  Pt's abdomen is benign.  Pt is well appearing, in no distress.  Labs c/w acute kidney injury.  Low grade fever per family in setting of unclear 'immune deficiency' and KARRIE with chronic cirrhosis warrants admission for renal support, further infectious work-up.

## 2017-10-15 NOTE — H&P ADULT - PROBLEM SELECTOR PLAN 1
-febrile, now hypotensive  -complicated by KARRIE; will treat for 7-10 w/ abx  -follow up urine and blood cultures  -continue w/ ceftriaxone 1 g QD  -if septic and hypotensive, give small amounts of fluids in setting of severe AS

## 2017-10-15 NOTE — H&P ADULT - HISTORY OF PRESENT ILLNESS
89 yo F w/ hx liver cirrhosis (hx of varices, hepatic encephalopathy), Afib (not on AC), severe AS, severe MR, COPD (not on O2), pulm HTN, CLL, CKD II-III (baseline Cr ~0.8), recently discharged on 5/2017, for IR radiofrequency ablation of hepatoma, complicated by acute respiratory failure requiring intubation + pressors for hypotension, found to have hemoperitonemum, treated nonoperatively; reports not currently on treatment for CLL. Brought in by her family for fever 101.2 F this AM. Per the family, patient has been more withdrawn, sleeping more; denies any localizing symptoms, but recently had the flu shot. Denies any rinorrhea; chronic cough but nonproductive, no shortness of breath. Denies any dysuria; when more altered and confused, she is incontinent, per family. Family reports that whenever her temperature reaches 99.5, she gets Tylenol; no true fevers recorded since morning of presentation. Has had decreased PO intake. Had an episode of abdominal pain 2-3 days ago, daughter thinks 2/2 change in formulation of lactulose; switched to regular formulation, then had 4 bowel movements, loose, nonwatery, no blood. Daughters also report that she has recently started propanolol (used to be on carvedilol, discontinued by PMD; started on propanolol by GI with cardiology agreeing.    Denies any sick contacts. In the ED had an episode of chills; given ceftriaxone 1 g x1. Family says that has waxing and waning mental status w/ "liver flap" but that at baseline she is able to ambulate with a walker, go shopping for food, able to go on trips / vacation; per them, her laying in bed all day dramatic change from baseline. Report that she also has a new ?stage 1 ulcer.

## 2017-10-15 NOTE — ED ADULT NURSE REASSESSMENT NOTE - NS ED NURSE REASSESS COMMENT FT1
Pt straight cathed for urine under sterile procedure with two RNs present. 500cc of clear yellow urine outputted, tolerated procedure well.

## 2017-10-15 NOTE — ED PROVIDER NOTE - ATTENDING CONTRIBUTION TO CARE
Attending MD [place].  Pt is an 88 yr old female with hx of liver cirrhosis (hx varices, hepatic encephalopathy), afib with no AC, severe AS, severe MR, pulm HTN, COPD, CLL, CKD, recent admission in april of this year for GI bleed which was managed conservatively given poor surg candidate.  Pt had complicated course at that admission.  Now pt presents with low grade fever.  When temp reaches 99.5 family administers Tylenol.  Today temp jamie to 101.  Family concerned 2/2 hx.  Decreased appetite, mild cough for sev’l days.  Pt endorsed episode of abdominal pain 3 days ago 2/2 change in lactulose and inability to pass stool transiently that has now completely resolved.  Mental status is at pt’s baseline.  PT recently switched from carvedilol to propranolol, carvedilol d/c’d but hepatologist recommended continuing because of varices.  Pt has known bradycardia but will remain on this regimen.  Her cardiologist is aware of planned continued double medication.  EKG unchanged morphology from baseline.  Pt has no fever currently and will not be dosed significant fluids given concern for flash pulm edema risk with marked AS.

## 2017-10-15 NOTE — H&P ADULT - NSHPSOCIALHISTORY_GEN_ALL_CORE
Lvies at home with daughter and her daughter's partner  Denies smoking, drinking or drugs  No recent sick contacts

## 2017-10-15 NOTE — H&P ADULT - PROBLEM SELECTOR PLAN 3
-CHADsVASC 8 but not on anticoagulation in setting of recent significant bleeds  -on rate control and rhythm control w/ digoxin and propanolol  -high risk for TIAs, and has hx of them per daughter

## 2017-10-15 NOTE — ED PROVIDER NOTE - GASTROINTESTINAL, MLM
"Pt arrived via EMS from home after what family reports to be ""seizures related to psych issues. \"" pt was admitted to [facility] for about 6 days 2 weeks ago after the first episode occurred. Pt reports she has had about 22 episodes of \""seizures\"" since she was sent home. Pt denies any incontinence, tongue biting. Pt currently alert and oriented x4, no episodes of postictal. Pt appears anxious, dry heaving. Pt reports a headache. Pt shaky.     " Abdomen soft, non-tender, no guarding.

## 2017-10-15 NOTE — H&P ADULT - ATTENDING COMMENTS
NIGHT HOSPITALIST:  Patient UNKNOWN to me previously, assigned to me at this point via the ER to admit this 89 y/o F--patient followed by the hepatology service at [facility] and by Dr. 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  History and review of systems from daughter with patient alert to self/hospital but defers to daughter.  Patient with nonproductive cough.  No chest pain/pressure.  No dyspnea.  No rigors.  No abdominal pain.  No dysuria.  No HA, no focal weakness.  No back pain, no tearing back pain.  No hematuria.  Remaining review of systems not contributory.  No tobacco or ethanol history.  Physical exam with an elderly, cachectic, chronically ill appearing F, nontoxic. NIGHT HOSPITALIST:  Patient UNKNOWN to me previously, assigned to me at this point via the ER to admit this 89 y/o F--patient followed by the hepatology service at [facility] and by Dr. 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  History and review of systems from daughter with patient alert to self/hospital but defers to daughter.  Patient with nonproductive cough.  No chest pain/pressure.  No dyspnea.  No rigors.  No abdominal pain.  No dysuria.  No HA, no focal weakness.  No back pain, no tearing back pain.  No hematuria.  Remaining review of systems not contributory.  No tobacco or ethanol history.  Physical exam with an elderly, cachectic, chronically ill appearing F, nontoxic.  BP  94/41  Afebrile.  HR  51.   RR 14.  HEENT< PERRL, EOMI, neck supple, chest poor effort, grossly clear, bony rib cage.  Cor with harsh 4/5 ARCADIO with no audible S2 from AS.  Abdomen soft, nontender, no rebound, normal bowel sounds.  Ext with diffuse sarcopenia.  No coarse tremor.  Neurologic exam as above.  Alert, oriented to person/hospital.  Interactive with daughter and examiner with minimal need for prompting.  Speech fluent.  Poor cognition with patient defers to daughter.  UE/LE 5/5.    WBC6 6.3.  Hgb 12.3.  Platelets of 63K.  INR 1.3.  Digoxin level of 0.5.  NH3 level nondiagnostic.  EKG tracing reviewed with sinus edel at 53 with nonspecific ST T changes.  Chest radiograph reviewed with no infiltrate or effusion.  Na_+ 136.  K+ 4.8.  Random glucose of 120.  Cr 0.8 (May 2017)>>1.6.  Alb 3.5.  Admitted for now resolved fever and confusional state.  No clear source but concern for SBP.  Would obtain abdominal US.  Follow urine/blood cultures.  Would be conservative with Tylenol in this patient with hepatic insufficiency.  Would follow creatinine but would temporarily HOLD diuretics and reevaluate in the AM>  Critical AS is problematic with no viable medical management approach>>would inform Dr. Boston of patient's admission.  Unclear to the goals of therapy>>daughters in attendance are not yet ready to discuss advance directives nor goals of care.  Prognosis is guarded and long term prognosis is poor.  Emotional support provided to patient/ daughter in attendance.  Daughters aware of course and agree with plan/care as above.  Care assumed by the DAY HOSPITALIST. NIGHT HOSPITALIST:  Patient UNKNOWN to me previously, assigned to me at this point via the ER to admit this 88 y/o F--patient followed by the hepatology service at [facility] and by Dr. 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  History and review of systems from daughter with patient alert to self/hospital but defers to daughter.  Patient with nonproductive cough.  No chest pain/pressure.  No dyspnea.  No rigors.  No abdominal pain.  No dysuria.  No HA, no focal weakness.  No back pain, no tearing back pain.  No hematuria.  Remaining review of systems not contributory.  No tobacco or ethanol history.  Physical exam with an elderly, cachectic, chronically ill appearing F, nontoxic.  BP  94/41  Afebrile.  HR  51.   RR 14.  HEENT< PERRL, EOMI, neck supple, chest poor effort, grossly clear, bony rib cage.  Cor with harsh 4/5 ARCADIO with no audible S2 from AS.  Abdomen soft, nontender, no rebound, normal bowel sounds.  Ext with diffuse sarcopenia.  No coarse tremor.  Neurologic exam as above.  Alert, oriented to person/hospital.  Interactive with daughter and examiner with minimal need for prompting.  Speech fluent.  Poor cognition with patient defers to daughter.  UE/LE 5/5.    WBC6 6.3.  Hgb 12.3.  Platelets of 63K.  INR 1.3.  Digoxin level of 0.5.  NH3 level nondiagnostic.  EKG tracing reviewed with sinus edel at 53 with nonspecific ST T changes.  Chest radiograph reviewed with no infiltrate or effusion.  Na_+ 136.  K+ 4.8.  Random glucose of 120.  Cr 0.8 (May 2017)>>1.6.  Alb 3.5.  Admitted for now resolved fever and confusional state.  No clear source but concern for SBP.  Would obtain abdominal US.  Follow urine/blood cultures.  Would be conservative with Tylenol in this patient with hepatic insufficiency.  Would follow creatinine but would temporarily HOLD diuretics and reevaluate in the AM>  Critical AS is problematic with no viable medical management approach>>would inform Dr. Boston of patient's admission.  Unclear to the goals of therapy>>daughters in attendance are not yet ready to discuss advance directives nor goals of care.  Daughters aware of risks/benefits of antibiotics and agree to continue as such.  Prognosis is guarded and long term prognosis is poor.  Emotional support provided to patient/ daughter in attendance.  Daughters aware of course and agree with plan/care as above.  Care assumed by the DAY HOSPITALIST.

## 2017-10-15 NOTE — H&P ADULT - PROBLEM SELECTOR PLAN 4
-KARRIE on CKD; suspect 2/2 poor PO intake + sepsis 2/2 UTI  -renally dose medications  -avoid nephrotoxins  -in setting of hypotension, will give small amount of IV fluid and monitor  -strict I/Os

## 2017-10-15 NOTE — ED PROVIDER NOTE - OBJECTIVE STATEMENT
88F with h/o liver cirrhosis (hx of varices and hepatic encephalopathy), Afib not on AC, Severe AS, Severe MR, pulm HTN, COPD, CLL, CKD, recent admission in 4/2017 for GI bleed(managed conservatively) who presented to the hospital for IR radiofrequency ablation of a hepatoma in segment 6. Postprocedure was complicated by acute respiratory failure with hypoxia and patient was reintubated in the PACU. The patient was also found to be hypotensive and placed on neosynephrine gtt. She was able to be extubated a second time but pressors were unable to be titrated off. The patient received 2 units of platelets pre-operatively. Given inability to take off pressors, the MICU was consulted and patient was admitted to MICU and started on levophed gtt.     After admission, pt received a unit of PRBC for acute blood loss anemia. CT A/P done which showed moderate volume hemoperitoneum. IR was reconsulted and recommended to consider CTA if patient was unstable for possible embolization. H/H monitored frequently and remained stable. Levo was eventually titrated down and patient was started on midodrine. Lactulose was titrated accordingly. On 5/6, patient restarted on digoxin. Discussed code status with family who is agreeable with DNR but not DN 88F with h/o liver cirrhosis (hx of varices and hepatic encephalopathy), Afib not on AC, Severe AS, Severe MR, pulm HTN, COPD, CLL, CKD, admission in 4/2017 for GI bleed(managed conservatively), recent admission for IR radiofrequency ablation of a hepatoma complicated by acute respiratory failure requiring intubation and hypotension requiring pressors, found to have hemoperitoneum, treated nonoperatively.  Not on active chemo.  She comes today with fever 88F with h/o liver cirrhosis (hx of varices and hepatic encephalopathy), Afib not on AC, Severe AS, Severe MR, pulm HTN, COPD, CLL, CKD, admission in 4/2017 for GI bleed(managed conservatively), recent admission for IR radiofrequency ablation of a hepatoma complicated by acute respiratory failure requiring intubation and hypotension requiring pressors, found to have hemoperitoneum, treated nonoperatively.  Not on active chemo.  She comes today with fever of 101.2 this morning.  For the past few days family has been measuring temperature tmax 99.5.  Whenever temp reaches 99.5 family has been giving tylenol.  No true fever until this morning.  Last tylenol 6 hours ago.  Patient has had mildly decreased appetite, had slight cough.  Had episode of abdominal pain precipitated by change to formulation of lactulose and inability to have bowel movement, resolved when switched to regular formulation, had 4 bowel movements yesterday (usually has 3/day).  Of note patient has been bradycardic for the past week to 40s since starting propranolol (carvedilol was discontinued by PMD and then GI doctor wanted beta blocker for varices).  Cardiologist is aware.

## 2017-10-15 NOTE — H&P ADULT - NSHPREVIEWOFSYSTEMS_GEN_ALL_CORE
REVIEW OF SYSTEMS    General:	fevers, no weight changes, + chills, fatigue w/o focal weakness  Skin/Breast: multiple echymoses / easing bleeding / fragile skin, tolerates only paper tape; no new rashes  Ophthalmologic: no vision changes  ENMT: no oral sores, no sore throat, no neck swelling, no hoarseness / potato voice, no rinorrhea, +cough (chronic, nonproductive)  Respiratry and Thorax: denies shortness of breath  Cardiovascular: denies any chest pain, palpitations  Gastrointestinal: brief episode of abdominal discomfort, improved with bowel movement; denies any constipation; no melena or hematochezia  Genitourinary: ?incontinence when delirious; no dysuria  Musculoskeletal: no myalgias or arthralgias, no hx of arthritis  Neurological: intermittent delirium 2/2 hepatic encephalopathy + TIAs, no focal residual deficits  Psychiatric: denies hallucinations; reports mood good  Hematology/Lymphatics: denies any lymphadenopathy or abdominal swelling; ?easy bleeding, multiple echymoses  Endocrine: denies any weight changes; hx of hypothyroidism  Allergic/Immunologic: multiple allergies listed, to macrolides and penicillins (rashes)

## 2017-10-15 NOTE — H&P ADULT - PROBLEM SELECTOR PLAN 6
-hepatically dose medications  -continue w/ rifaximin and with lactulose  -monitor CMP  -no evidence of ascites on exam  -holding propanolol in setting of hypotension from sepsis

## 2017-10-15 NOTE — H&P ADULT - NSHPLABSRESULTS_GEN_ALL_CORE
Conclusions:  1. Mitral annular calcification and calcified mitral  leaflets  Severe mitral regurgitation. Mean transmitral  valve gradient equals 3 mm Hg, consistent with mild mitral  stenosis.  2. Calcified trileaflet aortic valve with decreased  opening. Peak transaortic valve gradient equals 100 mm Hg,  estimated aortic valve area equals 0.5 sqcm (by continuity  equation), consistent with severe aortic stenosis. Moderate  aortic regurgitation.  3. Severely dilated left atrium.  LA volume index = 77  cc/m2.  4. Moderate concentric left ventricular hypertrophy.  5. Normal left ventricular systolic function.  6. Normal right ventricular size and function.  7. Normal tricuspid valve.   Moderate tricuspid  regurgitation.  8. Estimated pulmonary artery systolic pressure equals 56  mm Hg, assuming right atrial pressure equals 8 mm Hg,  consistent with moderate pulmonary pressures. LABS:                        12.3   6.3   )-----------( 63       ( 15 Oct 2017 11:29 )             34.5     WBC Trend: 6.3<--  10-15    136  |  100  |  39<H>  ----------------------------<  120<H>  4.8   |  23  |  1.66<H>    Ca    10.7<H>      15 Oct 2017 11:29    TPro  6.0  /  Alb  3.5  /  TBili  2.6<H>  /  DBili  x   /  AST  36  /  ALT  20  /  AlkPhos  97  10-15    Creatinine Trend: 1.66<--  PT/INR - ( 15 Oct 2017 11:29 )   PT: 13.9 sec;   INR: 1.28 ratio         PTT - ( 15 Oct 2017 11:29 )  PTT:33.7 sec      Urinalysis Basic - ( 15 Oct 2017 14:41 )    Color: Yellow / Appearance: Clear / S.009 / pH: x  Gluc: x / Ketone: Negative  / Bili: Negative / Urobili: Negative   Blood: x / Protein: Negative / Nitrite: Negative   Leuk Esterase: Small / RBC: x / WBC 6-10 /HPF   Sq Epi: x / Non Sq Epi: x / Bacteria: Few /HPF          RADIOLOGY & ADDITIONAL TESTS:    Imaging Personally Reviewed: yes  Normal cardiac silhoutte, no focal consolidations or pleural effusions, normal hilar and mediastinal structures. ' grossly clear lungs.    Consultant(s) Notes Reviewed:  None available    Care Discussed with Consultants/Other Providers:    TTE 2016  Conclusions:  1. Mitral annular calcification and calcified mitral  leaflets  Severe mitral regurgitation. Mean transmitral  valve gradient equals 3 mm Hg, consistent with mild mitral  stenosis.  2. Calcified trileaflet aortic valve with decreased  opening. Peak transaortic valve gradient equals 100 mm Hg,  estimated aortic valve area equals 0.5 sqcm (by continuity  equation), consistent with severe aortic stenosis. Moderate  aortic regurgitation.  3. Severely dilated left atrium.  LA volume index = 77  cc/m2.  4. Moderate concentric left ventricular hypertrophy.  5. Normal left ventricular systolic function.  6. Normal right ventricular size and function.  7. Normal tricuspid valve.   Moderate tricuspid  regurgitation.  8. Estimated pulmonary artery systolic pressure equals 56  mm Hg, assuming right atrial pressure equals 8 mm Hg,  consistent with moderate pulmonary pressures.

## 2017-10-15 NOTE — H&P ADULT - ASSESSMENT
89 yo F w/ hx liver cirrhosis (hx of varices, hepatic encephalopathy), Afib (not on AC), severe AS, severe MR, COPD (not on O2), pulm HTN, CLL, CKD II-III (baseline Cr ~0.8), presenting with fever, malaise, fatigue, now with fever and hypotension, suspect likely 2/2 UTI.

## 2017-10-15 NOTE — H&P ADULT - PROBLEM SELECTOR PLAN 2
-currently no evidence of fluid overload  -holding diuretics in setting of acute infection, poor PO intake, and KARRIE  -has not had TTE since 2016; may need recheck this admission  -follow up with cardiology as outpatient

## 2017-10-15 NOTE — H&P ADULT - PROBLEM SELECTOR PLAN 5
-not currently on chemotherapy  -no acute issues  -check CBC w/ diff in AM  -review outpatient records

## 2017-10-15 NOTE — ED ADULT NURSE NOTE - CHPI ED SYMPTOMS NEG
no decreased eating/drinking/no headache/no abdominal pain/no vomiting/no shortness of breath/no rash/no diarrhea/no chills/no cough

## 2017-10-15 NOTE — H&P ADULT - PROBLEM SELECTOR PLAN 7
-subq heparin for DVT ppx  -fall precuations  -PT / OT in AM, suspect high risk for decompensation given multiple comorbidites

## 2017-10-16 DIAGNOSIS — I48.2 CHRONIC ATRIAL FIBRILLATION: ICD-10-CM

## 2017-10-16 LAB
ALBUMIN SERPL ELPH-MCNC: 3.3 G/DL — SIGNIFICANT CHANGE UP (ref 3.3–5)
ALP SERPL-CCNC: 87 U/L — SIGNIFICANT CHANGE UP (ref 40–120)
ALT FLD-CCNC: 19 U/L — SIGNIFICANT CHANGE UP (ref 10–45)
ANION GAP SERPL CALC-SCNC: 12 MMOL/L — SIGNIFICANT CHANGE UP (ref 5–17)
ANISOCYTOSIS BLD QL: SIGNIFICANT CHANGE UP
AST SERPL-CCNC: 32 U/L — SIGNIFICANT CHANGE UP (ref 10–40)
BASOPHILS # BLD AUTO: 0.01 K/UL — SIGNIFICANT CHANGE UP (ref 0–0.2)
BASOPHILS NFR BLD AUTO: 0.2 % — SIGNIFICANT CHANGE UP (ref 0–2)
BILIRUB DIRECT SERPL-MCNC: 0.5 MG/DL — HIGH (ref 0–0.2)
BILIRUB INDIRECT FLD-MCNC: 1.4 MG/DL — HIGH (ref 0.2–1)
BILIRUB SERPL-MCNC: 1.9 MG/DL — HIGH (ref 0.2–1.2)
BILIRUB SERPL-MCNC: 1.9 MG/DL — HIGH (ref 0.2–1.2)
BUN SERPL-MCNC: 33 MG/DL — HIGH (ref 7–23)
CALCIUM SERPL-MCNC: 9.5 MG/DL — SIGNIFICANT CHANGE UP (ref 8.4–10.5)
CHLORIDE SERPL-SCNC: 103 MMOL/L — SIGNIFICANT CHANGE UP (ref 96–108)
CO2 SERPL-SCNC: 23 MMOL/L — SIGNIFICANT CHANGE UP (ref 22–31)
CREAT SERPL-MCNC: 1.38 MG/DL — HIGH (ref 0.5–1.3)
DIGOXIN SERPL-MCNC: 0.4 NG/ML — LOW (ref 0.8–2)
EOSINOPHIL # BLD AUTO: 0.04 K/UL — SIGNIFICANT CHANGE UP (ref 0–0.5)
EOSINOPHIL NFR BLD AUTO: 1 % — SIGNIFICANT CHANGE UP (ref 0–6)
FOLATE SERPL-MCNC: 12.6 NG/ML — SIGNIFICANT CHANGE UP (ref 4.8–24.2)
GLUCOSE SERPL-MCNC: 145 MG/DL — HIGH (ref 70–99)
HAPTOGLOB SERPL-MCNC: <20 MG/DL — LOW (ref 34–200)
HCT VFR BLD CALC: 31.9 % — LOW (ref 34.5–45)
HGB BLD-MCNC: 10.6 G/DL — LOW (ref 11.5–15.5)
IMM GRANULOCYTES NFR BLD AUTO: 0.2 % — SIGNIFICANT CHANGE UP (ref 0–1.5)
LDH SERPL L TO P-CCNC: 291 U/L — HIGH (ref 50–242)
LYMPHOCYTES # BLD AUTO: 0.55 K/UL — LOW (ref 1–3.3)
LYMPHOCYTES # BLD AUTO: 13.2 % — SIGNIFICANT CHANGE UP (ref 13–44)
MACROCYTES BLD QL: SIGNIFICANT CHANGE UP
MANUAL SMEAR VERIFICATION: SIGNIFICANT CHANGE UP
MCHC RBC-ENTMCNC: 32.5 PG — SIGNIFICANT CHANGE UP (ref 27–34)
MCHC RBC-ENTMCNC: 33.2 GM/DL — SIGNIFICANT CHANGE UP (ref 32–36)
MCV RBC AUTO: 97.9 FL — SIGNIFICANT CHANGE UP (ref 80–100)
MONOCYTES # BLD AUTO: 0.36 K/UL — SIGNIFICANT CHANGE UP (ref 0–0.9)
MONOCYTES NFR BLD AUTO: 8.6 % — SIGNIFICANT CHANGE UP (ref 2–14)
NEUTROPHILS # BLD AUTO: 3.2 K/UL — SIGNIFICANT CHANGE UP (ref 1.8–7.4)
NEUTROPHILS NFR BLD AUTO: 76.8 % — SIGNIFICANT CHANGE UP (ref 43–77)
PLAT MORPH BLD: NORMAL — SIGNIFICANT CHANGE UP
PLATELET # BLD AUTO: 58 K/UL — LOW (ref 150–400)
POTASSIUM SERPL-MCNC: 4.3 MMOL/L — SIGNIFICANT CHANGE UP (ref 3.5–5.3)
POTASSIUM SERPL-SCNC: 4.3 MMOL/L — SIGNIFICANT CHANGE UP (ref 3.5–5.3)
PROT SERPL-MCNC: 5.7 G/DL — LOW (ref 6–8.3)
RAPID RVP RESULT: SIGNIFICANT CHANGE UP
RBC # BLD: 3.26 M/UL — LOW (ref 3.8–5.2)
RBC # BLD: 3.26 M/UL — LOW (ref 3.8–5.2)
RBC # FLD: 14.7 % — HIGH (ref 10.3–14.5)
RBC BLD AUTO: ABNORMAL
RETICS #: 60.7 K/UL — SIGNIFICANT CHANGE UP (ref 25–125)
RETICS/RBC NFR: 1.9 % — SIGNIFICANT CHANGE UP (ref 0.5–2.5)
SODIUM SERPL-SCNC: 138 MMOL/L — SIGNIFICANT CHANGE UP (ref 135–145)
T4 FREE SERPL-MCNC: 1.3 NG/DL — SIGNIFICANT CHANGE UP (ref 0.9–1.8)
TSH SERPL-MCNC: 3.11 UIU/ML — SIGNIFICANT CHANGE UP (ref 0.27–4.2)
VIT B12 SERPL-MCNC: 362 PG/ML — SIGNIFICANT CHANGE UP (ref 243–894)
WBC # BLD: 4.17 K/UL — SIGNIFICANT CHANGE UP (ref 3.8–10.5)
WBC # FLD AUTO: 4.17 K/UL — SIGNIFICANT CHANGE UP (ref 3.8–10.5)

## 2017-10-16 PROCEDURE — 12345: CPT | Mod: GC,NC

## 2017-10-16 PROCEDURE — 76705 ECHO EXAM OF ABDOMEN: CPT | Mod: 26

## 2017-10-16 PROCEDURE — 99223 1ST HOSP IP/OBS HIGH 75: CPT | Mod: GC

## 2017-10-16 RX ORDER — PROPRANOLOL HCL 160 MG
20 CAPSULE, EXTENDED RELEASE 24HR ORAL
Qty: 0 | Refills: 0 | Status: DISCONTINUED | OUTPATIENT
Start: 2017-10-16 | End: 2017-10-16

## 2017-10-16 RX ORDER — ACETAMINOPHEN 500 MG
650 TABLET ORAL EVERY 12 HOURS
Qty: 0 | Refills: 0 | Status: DISCONTINUED | OUTPATIENT
Start: 2017-10-16 | End: 2017-10-18

## 2017-10-16 RX ADMIN — Medication 0.12 MILLIGRAM(S): at 18:36

## 2017-10-16 RX ADMIN — Medication 0.25 MILLIGRAM(S): at 08:42

## 2017-10-16 RX ADMIN — Medication 1 APPLICATION(S): at 21:34

## 2017-10-16 RX ADMIN — CEFTRIAXONE 100 GRAM(S): 500 INJECTION, POWDER, FOR SOLUTION INTRAMUSCULAR; INTRAVENOUS at 18:37

## 2017-10-16 RX ADMIN — Medication 0.25 MILLIGRAM(S): at 19:02

## 2017-10-16 RX ADMIN — Medication 3 MILLILITER(S): at 07:47

## 2017-10-16 RX ADMIN — Medication 2000 UNIT(S): at 12:36

## 2017-10-16 RX ADMIN — Medication 1 APPLICATION(S): at 18:37

## 2017-10-16 RX ADMIN — Medication 25 MICROGRAM(S): at 06:16

## 2017-10-16 RX ADMIN — Medication 1 APPLICATION(S): at 06:16

## 2017-10-16 RX ADMIN — Medication 3 MILLILITER(S): at 18:37

## 2017-10-16 RX ADMIN — LACTULOSE 20 GRAM(S): 10 SOLUTION ORAL at 06:16

## 2017-10-16 RX ADMIN — LACTULOSE 20 GRAM(S): 10 SOLUTION ORAL at 18:37

## 2017-10-16 NOTE — PROGRESS NOTE ADULT - SUBJECTIVE AND OBJECTIVE BOX
Patient is a 89y old  Female who presents with a chief complaint of Fever (15 Oct 2017 20:22)    SUBJECTIVE / OVERNIGHT EVENTS:  Patient seen at bedside in the ED. She reports feeling chills overnight, but denies increasing or new shortness of breath, dysuria, lightheadedness, confusion, or discomfort.       MEDICATIONS  (STANDING):  ALBUTerol/ipratropium for Nebulization 3 milliLiter(s) Nebulizer every 12 hours  AQUAPHOR (petrolatum Ointment) 1 Application(s) Topical three times a day  buDESOnide   0.25 milliGRAM(s) Respule 0.25 milliGRAM(s) Inhalation two times a day  cefTRIAXone   IVPB 1 Gram(s) IV Intermittent every 24 hours  cholecalciferol 2000 Unit(s) Oral daily  digoxin     Tablet 0.125 milliGRAM(s) Oral every other day  lactulose Syrup 20 Gram(s) Oral two times a day  levothyroxine 25 MICROGram(s) Oral daily  rifaximin 550 milliGRAM(s) Oral two times a day    MEDICATIONS  (PRN):  acetaminophen   Tablet 650 milliGRAM(s) Oral every 12 hours PRN For Temp greater than 38 C (100.4 F)        CAPILLARY BLOOD GLUCOSE        I&O's Summary    PHYSICAL EXAM:  	Constitutional: cachectic, NAD, appears chronically ill  	Eyes: EOMI, PERRL  	ENMT: moist mucous membranes, no oral lesions, no rinhorrhea, no post-nasal drip  	Neck: no lymphadenopathy, supple neck, no thyromegaly  	Back: no CVA tenderness, kyphosis  	Respiratory: CTAB  	Cardiovascular: IV/VI ARCADIO, regular rhythm, no rubs or gallops appreciated  	Gastrointestinal: soft, nontender, no suprapubic tenderness; no hepatosplenomegaly, NBS  	Extremities: echymotic; mild tenderness bilaterally; symmetrical in size  	Vascular: +2 DP pulses bilateraly  	Neurological: AOx3,  	Skin: echymotic, telangiectasias observed  	Lymph Nodes: no lymphadenopathy or splenomegaly  	Musculoskeletal: no clubbing; no joint effusions; normal ROM              Psychiatric: calm, appropriate, following commands    LABS:                        12.3   6.3   )-----------( 63       ( 15 Oct 2017 11:29 )             34.5     WBC Trend: 6.3<--  10-15    136  |  100  |  39<H>  ----------------------------<  120<H>  4.8   |  23  |  1.66<H>    Ca    10.7<H>      15 Oct 2017 11:29    TPro  6.0  /  Alb  3.5  /  TBili  2.6<H>  /  DBili  x   /  AST  36  /  ALT  20  /  AlkPhos  97  10-15    Creatinine Trend: 1.66<--  PT/INR - ( 15 Oct 2017 11:29 )   PT: 13.9 sec;   INR: 1.28 ratio         PTT - ( 15 Oct 2017 11:29 )  PTT:33.7 sec      Urinalysis Basic - ( 15 Oct 2017 14:41 )    Color: Yellow / Appearance: Clear / S.009 / pH: x  Gluc: x / Ketone: Negative  / Bili: Negative / Urobili: Negative   Blood: x / Protein: Negative / Nitrite: Negative   Leuk Esterase: Small / RBC: x / WBC 6-10 /HPF   Sq Epi: x / Non Sq Epi: x / Bacteria: Few /HPF      Chest Xray: Clear lungs    Ventricular Rate 53 BPM    Atrial Rate 53 BPM    P-R Interval 200 ms    QRS Duration 92 ms     ms    QTc 411 ms    P Axis 71 degrees    R Axis 49 degrees    T Axis -37 degrees    Diagnosis Line SINUS BRADYCARDIA  ST & T WAVE ABNORMALITY, CONSIDER ANTEROLATERAL ISCHEMIA

## 2017-10-16 NOTE — PROGRESS NOTE ADULT - SUBJECTIVE AND OBJECTIVE BOX
Patient is a 89y old  Female who presents with a chief complaint of Fever (15 Oct 2017 20:22)    SUBJECTIVE / OVERNIGHT EVENTS:  Patient seen at bedside in the ED. She reports feeling chills overnight, but denies increasing or new shortness of breath, dysuria, lightheadedness, confusion, or discomfort.  Reports slept well overnight. Denies pain. Febrile overnight to 101 F, got acetaminophen; hypotensive, but near baseline.    MEDICATIONS  (STANDING):  ALBUTerol/ipratropium for Nebulization 3 milliLiter(s) Nebulizer every 12 hours  AQUAPHOR (petrolatum Ointment) 1 Application(s) Topical three times a day  buDESOnide   0.25 milliGRAM(s) Respule 0.25 milliGRAM(s) Inhalation two times a day  cefTRIAXone   IVPB 1 Gram(s) IV Intermittent every 24 hours  cholecalciferol 2000 Unit(s) Oral daily  digoxin     Tablet 0.125 milliGRAM(s) Oral every other day  lactulose Syrup 20 Gram(s) Oral two times a day  levothyroxine 25 MICROGram(s) Oral daily  rifaximin 550 milliGRAM(s) Oral two times a day    MEDICATIONS  (PRN):  acetaminophen   Tablet 650 milliGRAM(s) Oral every 12 hours PRN For Temp greater than 38 C (100.4 F)        CAPILLARY BLOOD GLUCOSE    ICU Vital Signs Last 24 Hrs  T(C): 37.1 (16 Oct 2017 15:28), Max: 38.3 (15 Oct 2017 20:22)  T(F): 98.8 (16 Oct 2017 15:28), Max: 101 (15 Oct 2017 20:22)  HR: 74 (16 Oct 2017 15:28) (51 - 74)  BP: 103/47 (16 Oct 2017 15:28) (88/46 - 129/47)  RR: 18 (16 Oct 2017 15:28) (16 - 18)  SpO2: 95% (16 Oct 2017 15:28) (95% - 99%)      I&O's Summary    PHYSICAL EXAM:  	Constitutional: cachectic, NAD, appears chronically ill  	Eyes: EOMI, PERRL  	ENMT: moist mucous membranes, no oral lesions, no rhinorrhea no post-nasal drip  	Neck: no lymphadenopathy, supple neck, no thyromegaly  	Back: no CVA tenderness, kyphosis  	Respiratory: CTAB  	Cardiovascular: IV/VI ARCADIO, regular rhythm, no rubs or gallops appreciated  	Gastrointestinal: soft, nontender, no suprapubic tenderness; no hepatosplenomegaly, NBS  	Extremities: ecchymotic mild tenderness bilaterally; symmetrical in size  	Vascular: +2 DP pulses bilateraly  	Neurological: AOx3,  	Skin: ecchymotic, telangiectasias observed  	Lymph Nodes: no lymphadenopathy or splenomegaly  	Musculoskeletal: no clubbing; no joint effusions; normal ROM              Psychiatric: calm, appropriate, following commands    LABS:                        12.3   6.3   )-----------( 63       ( 15 Oct 2017 11:29 )             34.5     WBC Trend: 6.3<--  10-15    136  |  100  |  39<H>  ----------------------------<  120<H>  4.8   |  23  |  1.66<H>    Ca    10.7<H>      15 Oct 2017 11:29    TPro  6.0  /  Alb  3.5  /  TBili  2.6<H>  /  DBili  x   /  AST  36  /  ALT  20  /  AlkPhos  97  10-15    Creatinine Trend: 1.66<--  PT/INR - ( 15 Oct 2017 11:29 )   PT: 13.9 sec;   INR: 1.28 ratio         PTT - ( 15 Oct 2017 11:29 )  PTT:33.7 sec      Urinalysis Basic - ( 15 Oct 2017 14:41 )    Color: Yellow / Appearance: Clear / S.009 / pH: x  Gluc: x / Ketone: Negative  / Bili: Negative / Urobili: Negative   Blood: x / Protein: Negative / Nitrite: Negative   Leuk Esterase: Small / RBC: x / WBC 6-10 /HPF   Sq Epi: x / Non Sq Epi: x / Bacteria: Few /HPF      Chest Xray: Clear lungs  EKG: bradycardic, with ?LV strain, sinus    Ventricular Rate 53 BPM    Atrial Rate 53 BPM    P-R Interval 200 ms    QRS Duration 92 ms     ms    QTc 411 ms    P Axis 71 degrees    R Axis 49 degrees    T Axis -37 degrees    Diagnosis Line SINUS BRADYCARDIA  ST & T WAVE ABNORMALITY, CONSIDER ANTEROLATERAL ISCHEMIA        < from: US Abdomen Limited (10.16.17 @ 09:52) >  EXAM:  US ABDOMEN LIMITED                        PROCEDURE DATE:  10/16/2017    INTERPRETATION:  Clinical information: Cirrhosis, evaluate for ascites   prior to paracentesis.  Comparison: CT abdomen pelvis dated 6 May.  Findings: Sonographic survey of the abdomen reveals no ascites. Bilateral   Pleural effusions are noted.  Impression: No sonographic evidence of ascites.

## 2017-10-17 ENCOUNTER — TRANSCRIPTION ENCOUNTER (OUTPATIENT)
Age: 82
End: 2017-10-17

## 2017-10-17 LAB
-  AMIKACIN: SIGNIFICANT CHANGE UP
-  AMPICILLIN/SULBACTAM: SIGNIFICANT CHANGE UP
-  AMPICILLIN: SIGNIFICANT CHANGE UP
-  AZTREONAM: SIGNIFICANT CHANGE UP
-  CEFAZOLIN: SIGNIFICANT CHANGE UP
-  CEFEPIME: SIGNIFICANT CHANGE UP
-  CEFOXITIN: SIGNIFICANT CHANGE UP
-  CEFTAZIDIME: SIGNIFICANT CHANGE UP
-  CEFTRIAXONE: SIGNIFICANT CHANGE UP
-  CIPROFLOXACIN: SIGNIFICANT CHANGE UP
-  ERTAPENEM: SIGNIFICANT CHANGE UP
-  GENTAMICIN: SIGNIFICANT CHANGE UP
-  IMIPENEM: SIGNIFICANT CHANGE UP
-  LEVOFLOXACIN: SIGNIFICANT CHANGE UP
-  MEROPENEM: SIGNIFICANT CHANGE UP
-  NITROFURANTOIN: SIGNIFICANT CHANGE UP
-  PIPERACILLIN/TAZOBACTAM: SIGNIFICANT CHANGE UP
-  TOBRAMYCIN: SIGNIFICANT CHANGE UP
-  TRIMETHOPRIM/SULFAMETHOXAZOLE: SIGNIFICANT CHANGE UP
ALBUMIN SERPL ELPH-MCNC: 3 G/DL — LOW (ref 3.3–5)
ALP SERPL-CCNC: 94 U/L — SIGNIFICANT CHANGE UP (ref 40–120)
ALT FLD-CCNC: 17 U/L — SIGNIFICANT CHANGE UP (ref 10–45)
ANION GAP SERPL CALC-SCNC: 11 MMOL/L — SIGNIFICANT CHANGE UP (ref 5–17)
AST SERPL-CCNC: 32 U/L — SIGNIFICANT CHANGE UP (ref 10–40)
BILIRUB SERPL-MCNC: 1.4 MG/DL — HIGH (ref 0.2–1.2)
BUN SERPL-MCNC: 31 MG/DL — HIGH (ref 7–23)
CALCIUM SERPL-MCNC: 9.8 MG/DL — SIGNIFICANT CHANGE UP (ref 8.4–10.5)
CHLORIDE SERPL-SCNC: 105 MMOL/L — SIGNIFICANT CHANGE UP (ref 96–108)
CO2 SERPL-SCNC: 25 MMOL/L — SIGNIFICANT CHANGE UP (ref 22–31)
CREAT SERPL-MCNC: 1.29 MG/DL — SIGNIFICANT CHANGE UP (ref 0.5–1.3)
CULTURE RESULTS: SIGNIFICANT CHANGE UP
DIGOXIN SERPL-MCNC: 0.4 NG/ML — LOW (ref 0.8–2)
GLUCOSE SERPL-MCNC: 95 MG/DL — SIGNIFICANT CHANGE UP (ref 70–99)
HCT VFR BLD CALC: 34.2 % — LOW (ref 34.5–45)
HGB BLD-MCNC: 11.7 G/DL — SIGNIFICANT CHANGE UP (ref 11.5–15.5)
MCHC RBC-ENTMCNC: 33.1 PG — SIGNIFICANT CHANGE UP (ref 27–34)
MCHC RBC-ENTMCNC: 34.2 GM/DL — SIGNIFICANT CHANGE UP (ref 32–36)
MCV RBC AUTO: 96.9 FL — SIGNIFICANT CHANGE UP (ref 80–100)
METHOD TYPE: SIGNIFICANT CHANGE UP
ORGANISM # SPEC MICROSCOPIC CNT: SIGNIFICANT CHANGE UP
ORGANISM # SPEC MICROSCOPIC CNT: SIGNIFICANT CHANGE UP
PLATELET # BLD AUTO: 56 K/UL — LOW (ref 150–400)
POTASSIUM SERPL-MCNC: 4.3 MMOL/L — SIGNIFICANT CHANGE UP (ref 3.5–5.3)
POTASSIUM SERPL-SCNC: 4.3 MMOL/L — SIGNIFICANT CHANGE UP (ref 3.5–5.3)
PROT SERPL-MCNC: 5.8 G/DL — LOW (ref 6–8.3)
RBC # BLD: 3.53 M/UL — LOW (ref 3.8–5.2)
RBC # FLD: 14.6 % — HIGH (ref 10.3–14.5)
SODIUM SERPL-SCNC: 141 MMOL/L — SIGNIFICANT CHANGE UP (ref 135–145)
SPECIMEN SOURCE: SIGNIFICANT CHANGE UP
WBC # BLD: 3.73 K/UL — LOW (ref 3.8–10.5)
WBC # FLD AUTO: 3.73 K/UL — LOW (ref 3.8–10.5)

## 2017-10-17 PROCEDURE — 99223 1ST HOSP IP/OBS HIGH 75: CPT | Mod: GC

## 2017-10-17 PROCEDURE — 99233 SBSQ HOSP IP/OBS HIGH 50: CPT | Mod: GC

## 2017-10-17 RX ORDER — PROPRANOLOL HCL 160 MG
10 CAPSULE, EXTENDED RELEASE 24HR ORAL
Qty: 0 | Refills: 0 | Status: DISCONTINUED | OUTPATIENT
Start: 2017-10-17 | End: 2017-10-18

## 2017-10-17 RX ADMIN — Medication 0.25 MILLIGRAM(S): at 06:25

## 2017-10-17 RX ADMIN — CEFTRIAXONE 100 GRAM(S): 500 INJECTION, POWDER, FOR SOLUTION INTRAMUSCULAR; INTRAVENOUS at 18:02

## 2017-10-17 RX ADMIN — LACTULOSE 20 GRAM(S): 10 SOLUTION ORAL at 05:21

## 2017-10-17 RX ADMIN — Medication 1 APPLICATION(S): at 05:21

## 2017-10-17 RX ADMIN — Medication 2000 UNIT(S): at 12:29

## 2017-10-17 RX ADMIN — Medication 1 APPLICATION(S): at 20:48

## 2017-10-17 RX ADMIN — LACTULOSE 20 GRAM(S): 10 SOLUTION ORAL at 18:03

## 2017-10-17 RX ADMIN — Medication 1 APPLICATION(S): at 13:53

## 2017-10-17 RX ADMIN — Medication 3 MILLILITER(S): at 18:02

## 2017-10-17 RX ADMIN — Medication 0.25 MILLIGRAM(S): at 18:43

## 2017-10-17 RX ADMIN — Medication 3 MILLILITER(S): at 05:21

## 2017-10-17 RX ADMIN — Medication 25 MICROGRAM(S): at 05:21

## 2017-10-17 RX ADMIN — Medication 10 MILLIGRAM(S): at 18:12

## 2017-10-17 NOTE — DISCHARGE NOTE ADULT - NSFTFAASSIST2FT_GEN_ALL_CORE
Chronic frailty, worsened in setting of UTI; uses rolling walker at baseline, recommended for home w/ home PT

## 2017-10-17 NOTE — DISCHARGE NOTE ADULT - HOME CARE AGENCY
[facility] - [phone] - Registered Nurse will contact you to arrange initial visit.  Physical Therapy to follow.

## 2017-10-17 NOTE — DIETITIAN INITIAL EVALUATION ADULT. - NS FNS WEIGHT CHANGE REASON
unintentional/Pt's daughter reports pt weighed ~123 pounds 1 year ago and pt states the last time she was weighed at her doctors office she weighed 114 pounds, reflects dosing wt. Noted on 4/21/17 pt had a standing wt of 116.8 pounds, noted higher bed weights during previous admission. Standing wt at time of assessment was 111.7 pounds, reflecting total wt loss of 11.3 pounds.

## 2017-10-17 NOTE — DIETITIAN INITIAL EVALUATION ADULT. - ORAL INTAKE PTA
Pt's daughter reports pt has fluctuating po intake overall and 3-4 days PTA due feeling very sick pt was eating poorly. Typical intake: juice and coffee with oatmeal and blueberries or cereal or yogurt or eggs with toast for breakfast; sandwich for lunch; chicken, steak or tofu with vegetables for dinner. Pt was also taking Vitamin D PTA.

## 2017-10-17 NOTE — CHART NOTE - NSCHARTNOTEFT_GEN_A_CORE
Upon Nutritional Assessment by the Registered Dietitian your patient was determined to meet criteria / has evidence of the following diagnosis/diagnoses:          [ ]  Mild Protein Calorie Malnutrition        [X]  Moderate Protein Calorie Malnutrition        [ ] Severe Protein Calorie Malnutrition        [ ] Unspecified Protein Calorie Malnutrition        [ ] Underweight / BMI <19        [ ] Morbid Obesity / BMI > 40      Findings as based on:  [X] Comprehensive nutrition assessment   [ ] Nutrition Focused Physical Exam  [ ] Other:       Nutrition Plan/Recommendations:    Consider liberalize to soft, low sodium diet. Pt declines Ensure and other nutritional supplements at this time.  Encourage po intake with nutrient rich foods.       PROVIDER Section:     By signing this assessment you are acknowledging and agree with the diagnosis/diagnoses assigned by the Registered Dietitian    Comments:

## 2017-10-17 NOTE — DISCHARGE NOTE ADULT - PLAN OF CARE
Clear urinary infection -please continue taking your antibiotics as prescribed to treat your urinary tract infection  -please call your primary doctor to schedule a follow up appointment with your primary doctor in 1-2 weeks after discharge  -If after you finish your antibiotics you continue to have fevers, chills, painful urination, increased urinary frequency, or other symptoms of a urinary tract infection, please seek medical attention Prevent complications from cirrhosis -While you were in the hospital, we decreased your normal dose of propanolol from 20 mg twice a day since your heart rate was very low and your blood pressure was low in the setting of an infection. We are discharging you on propanolol 10 mg twice a day, a decreased dose, since your blood pressure might still be low in the setting of a recuperating infection. We are also holding your spirinolactone while you finish your antibiotics. After you clear your infection, please follow up with your gastroenterologist and your cardiologist to see if you can restart your regular dose of propanolol 20 mg twice a day and your spirinolactone.  -Please continue taking yoru lactulose and rifaximin as prescribed to prevent hepatic encephalopathy; if you are having diarrhea, you can decrease the dose; you should target to have 2-3 soft bowel movements per day  -If you develop swelling in your abdomen or worsening confusion, please call your hepatologist, as your medications may need to become adjusted  -It's ok for you to take Tylenol, but do not take more than 2,000 mg per day (decreased dose for cirrhosis patients) Prevent complciations from kidney injury You presented with an acute Please follow up with your primary doctor after discharge and your pulmonologist  It is ok for you to continue with your home medications for COPD -please continue taking your antibiotics as prescribed to treat your urinary tract infection; please continue taking them until Saturday   -please call your primary docto10/21/17  -If after you finish your antibiotics you continue to have fevers, chills, painful urination, increased urinary frequency, or other symptoms of a urinary tract infection, please seek medical attention You presented with an acute kidney injury which improved with supportive care for your infection  Please follow up with your primary care doctor to monitor your kidney function periodically; do not start any medications, including over the counter medication, without discussing it with your doctor, as both your lower kidney and liver function can result in medication levels to rise to harmful levels. Prevent complications of atrial fibrillation -We decreased your propanolol from 20 mg twice a day to 10 mg twice a day since your hear rate was very slow when you first came to the hospital (~40 beats per minute); you can use the same pills, but please cut them in half instead of taking the whole pill. After you finish your antibiotics, please follow up with your cardiologist and your hepatologist to see if you can go back to your regular dose.  -You are not on anticoagulation to prevent strokes given your high risk of bleeding from anticoagulation; please follow up with your primary doctor and your cardiologist after discharge to monitor your aortic stenosis  -Given that your heart rate was very slow and you had a kidney injury, we are also holding your digoxin; please follow up with your cardiologist after discharge to see if it is safe for you to restart it Prevent complications of aortic stenosis and monitor for symptoms You had evidence of exam and previous echocardiogram (Feb 2016) that you have severe aortic stenosis; however, you did not have any danger symptoms during your admission (no chest pain, no fainting episodes); still, this makes it difficult for your heart to handle fluid well. Please monitor for signs of fluid overload (worsening shortness of breath, pink frothy sputum, rapid weight gain of water weight, or lower leg swelling that is getting worse and worse). We are holding your diuretic, spirinolactone, since you came in with a kidney injury; after you finish your antibiotics, please follow up with your primary doctor, your cardiologist, and your hepatologist, as you will likely need to restart your spironolactone  -Please follow up with your cardiologist for further counseling on management of your aortic stenosis Monitor for signs of CLL Your counts during your hospitalizations were stable; please follow up with your hematologist / oncologist after discharge. If you develop gland swelling (neck, armpit, groin) or enlarged spleen or easy bleeding, please see urgent medical attention. Prevent COPD exacerbation Prevent complications from kidney injury -We decreased your propanolol from 20 mg twice a day to 10 mg twice a day since your hear rate was very slow when you first came to the hospital (~40 beats per minute); you can use the same pills, but please cut them in half instead of taking the whole pill. After you finish your antibiotics, please follow up with your cardiologist and your hepatologist to see if you can go back to your regular dose.  -You are not on anticoagulation to prevent strokes given your high risk of bleeding from anticoagulation; please follow up with your primary doctor and your cardiologist after discharge to monitor your aortic stenosis

## 2017-10-17 NOTE — DISCHARGE NOTE ADULT - NS AS ACTIVITY OBS
Showering allowed/Walking-Indoors allowed Showering allowed/Walking-Indoors allowed/Stairs allowed/Activity allowed as tolerated/Walking-Outdoors allowed

## 2017-10-17 NOTE — DISCHARGE NOTE ADULT - MEDICATION SUMMARY - MEDICATIONS TO TAKE
I will START or STAY ON the medications listed below when I get home from the hospital:    budesonide 0.25 mg/2 mL inhalation suspension  -- 2 milliliter(s) inhaled 2 times a day  -- Indication: For COPD    Tylenol 500 mg oral tablet  -- 1 tab(s) by mouth 3 times a day, As Needed  -- Indication: For Pain, fevers    propranolol 20 mg oral tablet  -- 0.5 tab(s) by mouth 2 times a day  -- Indication: For Atrial Fibrillation, Cirrhosis    digoxin 125 mcg (0.125 mg) oral tablet  -- 1 tab(s) by mouth every other day  -- Indication: For Atrial Fibrillation    ipratropium-albuterol 0.5 mg-2.5 mg/3 mLinhalation solution  -- 3 milliliter(s) inhaled 2-3 times a day, As Needed  -- Indication: For COPD    lactulose 10 g/15 mL oral syrup  -- 30 milliliter(s) by mouth 2-4 times a day, As Needed; titrate to 2-3 soft bowel movements per day  -- Indication: For Cirrhosis (prevention of hepatic encephalopathy)    rifAXIMin 550 mg oral tablet  -- 1 tab(s) by mouth 2 times a day  -- Indication: For Cirrhosis (prevention of hepatic encephalopathy)    ciprofloxacin 250 mg oral tablet  -- 1 tab(s) by mouth every 12 hours  -- Indication: For Klebsiella urinary tract infection    levothyroxine 25 mcg (0.025 mg) oral capsule  -- 1 cap(s) by mouth once a day  -- Indication: For Hypothyroidism    Vitamin D3 2000 intl units oral tablet  -- 1 tab(s) by mouth once a day  -- Indication: For Nutrition I will START or STAY ON the medications listed below when I get home from the hospital:    budesonide 0.25 mg/2 mL inhalation suspension  -- 2 milliliter(s) inhaled 2 times a day  -- Indication: For COPD    Tylenol 500 mg oral tablet  -- 1 tab(s) by mouth 3 times a day, As Needed  -- Indication: For Pain, fevers    propranolol 20 mg oral tablet  -- 0.5 tab(s) by mouth 2 times a day  -- Indication: For Atrial Fibrillation, Cirrhosis    digoxin 125 mcg (0.125 mg) oral tablet  -- 1 tab(s) by mouth every other day (at bedtime)  -- Indication: For Atrial Fibrillation    ipratropium-albuterol 0.5 mg-2.5 mg/3 mLinhalation solution  -- 3 milliliter(s) inhaled 2-3 times a day, As Needed  -- Indication: For COPD    lactulose 10 g/15 mL oral syrup  -- 30 milliliter(s) by mouth 2-4 times a day, As Needed; titrate to 2-3 soft bowel movements per day  -- Indication: For Cirrhosis (prevention of hepatic encephalopathy)    rifAXIMin 550 mg oral tablet  -- 1 tab(s) by mouth 2 times a day  -- Indication: For Cirrhosis (prevention of hepatic encephalopathy)    ciprofloxacin 250 mg oral tablet  -- 1 tab(s) by mouth every 12 hours  -- Indication: For Klebsiella urinary tract infection    levothyroxine 25 mcg (0.025 mg) oral capsule  -- 1 cap(s) by mouth once a day  -- Indication: For Hypothyroidism    Vitamin D3 2000 intl units oral tablet  -- 1 tab(s) by mouth once a day  -- Indication: For Nutrition

## 2017-10-17 NOTE — DISCHARGE NOTE ADULT - SECONDARY DIAGNOSIS.
Cirrhosis of liver without ascites, unspecified hepatic cirrhosis type KARRIE (acute kidney injury) Chronic atrial fibrillation Severe aortic stenosis by prior echocardiogram CLL (chronic lymphocytic leukemia) COPD (chronic obstructive pulmonary disease)

## 2017-10-17 NOTE — CONSULT NOTE ADULT - ASSESSMENT
89 yo F w/ hx liver cirrhosis (hx of varices, hepatic encephalopathy), Afib (not on AC), severe AS, severe MR, COPD (not on O2), pulm HTN, CLL, CKD II-III (baseline Cr ~0.8), presenting with fever, malaise, fatigue, now with fever and hypotension, suspect likely 2/2 UTI. Urine cultures positive for Klebsiella, sensitivities pending.

## 2017-10-17 NOTE — DIETITIAN INITIAL EVALUATION ADULT. - OTHER INFO
Nutrition consult received and appreciated. Pt reports her appetite has improved and reports good po intake. Noted 85% of breakfast documented and 75% of dinner last night. Pt declines Ensure and other nutrition supplements, health shakes or providing food preferences at this time. Reviewed alternate menu options and menu ordering procedure. Pt denies nausea or vomiting. Pt reports having 3 bowel movements today and reports normally having 2-3/day with lactulose. No chewing or swallowing difficulties at this time. Pt confirmed allergy to shellfish.

## 2017-10-17 NOTE — PROGRESS NOTE ADULT - SUBJECTIVE AND OBJECTIVE BOX
Patient is a 89y old  Female who presents with a chief complaint of Fever (15 Oct 2017 20:22)    SUBJECTIVE / OVERNIGHT EVENTS:  Patient seen at bedside. When asked, admitted to some dryness of mouth. Advised to drink water when thirsty, but instructed how not to overdo it. Endorses discomfort as a result of her lactulose. She has had two bowel movements this morning. Denies lightheadedness, dysuria, abdominal pain, dizziness, vertigo, fever, chills. During rounds, received call from telemetry, patient had run of nonsustained WCT x4 beats; transiently tachycardic.     MEDICATIONS  (STANDING):  ALBUTerol/ipratropium for Nebulization 3 milliLiter(s) Nebulizer every 12 hours  AQUAPHOR (petrolatum Ointment) 1 Application(s) Topical three times a day  buDESOnide   0.25 milliGRAM(s) Respule 0.25 milliGRAM(s) Inhalation two times a day  cefTRIAXone   IVPB 1 Gram(s) IV Intermittent every 24 hours  cholecalciferol 2000 Unit(s) Oral daily  digoxin     Tablet 0.125 milliGRAM(s) Oral every other day  lactulose Syrup 20 Gram(s) Oral two times a day  levothyroxine 25 MICROGram(s) Oral daily  rifaximin 550 milliGRAM(s) Oral two times a day    MEDICATIONS  (PRN):  acetaminophen   Tablet 650 milliGRAM(s) Oral every 12 hours PRN For Temp greater than 38 C (100.4 F)        CAPILLARY BLOOD GLUCOSE        I&O's Summary    16 Oct 2017 07:  -  17 Oct 2017 07:00  --------------------------------------------------------  IN: 250 mL / OUT: 0 mL / NET: 250 mL    17 Oct 2017 07:  -  17 Oct 2017 11:16  --------------------------------------------------------  IN: 285 mL / OUT: 0 mL / NET: 285 mL    Vital Signs Last 24 Hrs  T(C): 36.7 (17 Oct 2017 10:58), Max: 37.1 (16 Oct 2017 15:28)  T(F): 98.1 (17 Oct 2017 10:58), Max: 98.8 (16 Oct 2017 15:28)  HR: 53 (17 Oct 2017 10:58) (53 - 74)  BP: 101/46 (17 Oct 2017 10:58) (99/55 - 110/45)  RR: 18 (17 Oct 2017 10:58) (18 - 19)  SpO2: 95% (17 Oct 2017 10:58) (95% - 97%)    PHYSICAL EXAM:  	Constitutional: cachectic, NAD, appears chronically ill  	Eyes: EOMI, PERRL  	ENMT: Dry mucous membranes. no oral lesions, no rhinorrhea no post-nasal drip  	Neck: no lymphadenopathy, supple neck, no thyromegaly  	Back: no CVA tenderness, kyphosis  	Respiratory: CTAB  	Cardiovascular: IV/VI ARCADIO, regular rhythm, no rubs or gallops appreciated, S2 inaudible  	Gastrointestinal: soft, nontender, no suprapubic tenderness; no hepatosplenomegaly, NBS  	Extremities: ecchymotic mild tenderness bilaterally; symmetrical in size  	Vascular: +2 DP pulses bilaterally  	Neurological: AOx3,  	Skin: ecchymotic, telangiectasias observed  	Lymph Nodes: no lymphadenopathy or splenomegaly  	Musculoskeletal: no clubbing; no joint effusions; normal ROM              Psychiatric: calm, appropriate, following commands    LABS:                        11.7   3.73  )-----------( 56       ( 17 Oct 2017 09:13 )             34.2     WBC Trend: 3.73<--, 4.17<--, 6.3<--  10-17    141  |  105  |  31<H>  ----------------------------<  95  4.3   |  25  |  1.29    Ca    9.8      17 Oct 2017 08:53    TPro  5.8<L>  /  Alb  3.0<L>  /  TBili  1.4<H>  /  DBili  x   /  AST  32  /  ALT  17  /  AlkPhos  94  10-17    Creatinine Trend: 1.29<--, 1.38<--, 1.66<--  PT/INR - ( 15 Oct 2017 11:29 )   PT: 13.9 sec;   INR: 1.28 ratio         PTT - ( 15 Oct 2017 11:29 )  PTT:33.7 sec      Urinalysis Basic - ( 15 Oct 2017 14:41 )    Color: Yellow / Appearance: Clear / S.009 / pH: x  Gluc: x / Ketone: Negative  / Bili: Negative / Urobili: Negative   Blood: x / Protein: Negative / Nitrite: Negative   Leuk Esterase: Small / RBC: x / WBC 6-10 /HPF   Sq Epi: x / Non Sq Epi: x / Bacteria: Few /HPF      Culture - Urine (10.15.17 @ 16:34)    Specimen Source: .Urine Catheterized    Culture Results:   >100,000 CFU/ml Klebsiella pneumoniae        RADIOLOGY & ADDITIONAL TESTS:    Imaging Personally Reviewed:    Consultant(s) Notes Reviewed:  Cardiology 9MS4 note; attending signature pending)    Care Discussed with Consultants/Other Providers:

## 2017-10-17 NOTE — DIETITIAN INITIAL EVALUATION ADULT. - NUTRITION INTERVENTIONS
Pt and pt's daughter prefer to fill out menu selections rather than provide food preferences at this time./food preferences as requested by patient (specify)/nutrient dense snacks

## 2017-10-17 NOTE — DIETITIAN INITIAL EVALUATION ADULT. - NS AS NUTRI INTERV COLLABORAT
Malnutrition notification electronically placed in documents section in [place], nutrition intervention discussed with medical provider.

## 2017-10-17 NOTE — DISCHARGE NOTE ADULT - CARE PROVIDERS DIRECT ADDRESSES
,[email or web address]

## 2017-10-17 NOTE — DISCHARGE NOTE ADULT - PROVIDER TOKENS
TOKGREGG:'[id number]' TOKEN:'[id number]',TOKEN:'[id number] TOKEN:'[id number]'

## 2017-10-17 NOTE — DISCHARGE NOTE ADULT - CARE PLAN
Principal Discharge DX:	Acute cystitis without hematuria  Goal:	Clear urinary infection  Instructions for follow-up, activity and diet:	-please continue taking your antibiotics as prescribed to treat your urinary tract infection  -please call your primary doctor to schedule a follow up appointment with your primary doctor in 1-2 weeks after discharge  -If after you finish your antibiotics you continue to have fevers, chills, painful urination, increased urinary frequency, or other symptoms of a urinary tract infection, please seek medical attention  Secondary Diagnosis:	Cirrhosis of liver without ascites, unspecified hepatic cirrhosis type  Goal:	Prevent complications from cirrhosis  Instructions for follow-up, activity and diet:	-While you were in the hospital, we decreased your normal dose of propanolol from 20 mg twice a day since your heart rate was very low and your blood pressure was low in the setting of an infection. We are discharging you on propanolol 10 mg twice a day, a decreased dose, since your blood pressure might still be low in the setting of a recuperating infection. We are also holding your spirinolactone while you finish your antibiotics. After you clear your infection, please follow up with your gastroenterologist and your cardiologist to see if you can restart your regular dose of propanolol 20 mg twice a day and your spirinolactone.  -Please continue taking yoru lactulose and rifaximin as prescribed to prevent hepatic encephalopathy; if you are having diarrhea, you can decrease the dose; you should target to have 2-3 soft bowel movements per day  -If you develop swelling in your abdomen or worsening confusion, please call your hepatologist, as your medications may need to become adjusted  -It's ok for you to take Tylenol, but do not take more than 2,000 mg per day (decreased dose for cirrhosis patients)  Secondary Diagnosis:	KARRIE (acute kidney injury)  Goal:	Prevent complciations from kidney injury  Instructions for follow-up, activity and diet:	You presented with an acute  Secondary Diagnosis:	Chronic atrial fibrillation  Secondary Diagnosis:	Severe aortic stenosis by prior echocardiogram  Secondary Diagnosis:	CLL (chronic lymphocytic leukemia)  Secondary Diagnosis:	COPD (chronic obstructive pulmonary disease) Principal Discharge DX:	Acute cystitis without hematuria  Goal:	Clear urinary infection  Instructions for follow-up, activity and diet:	-please continue taking your antibiotics as prescribed to treat your urinary tract infection; please continue taking them until Saturday   -please call your primary docto10/21/17  -If after you finish your antibiotics you continue to have fevers, chills, painful urination, increased urinary frequency, or other symptoms of a urinary tract infection, please seek medical attention  Secondary Diagnosis:	Cirrhosis of liver without ascites, unspecified hepatic cirrhosis type  Goal:	Prevent complications from cirrhosis  Instructions for follow-up, activity and diet:	-While you were in the hospital, we decreased your normal dose of propanolol from 20 mg twice a day since your heart rate was very low and your blood pressure was low in the setting of an infection. We are discharging you on propanolol 10 mg twice a day, a decreased dose, since your blood pressure might still be low in the setting of a recuperating infection. We are also holding your spirinolactone while you finish your antibiotics. After you clear your infection, please follow up with your gastroenterologist and your cardiologist to see if you can restart your regular dose of propanolol 20 mg twice a day and your spirinolactone.  -Please continue taking yoru lactulose and rifaximin as prescribed to prevent hepatic encephalopathy; if you are having diarrhea, you can decrease the dose; you should target to have 2-3 soft bowel movements per day  -If you develop swelling in your abdomen or worsening confusion, please call your hepatologist, as your medications may need to become adjusted  -It's ok for you to take Tylenol, but do not take more than 2,000 mg per day (decreased dose for cirrhosis patients)  Secondary Diagnosis:	KARRIE (acute kidney injury)  Goal:	Prevent complciations from kidney injury  Instructions for follow-up, activity and diet:	You presented with an acute kidney injury which improved with supportive care for your infection  Please follow up with your primary care doctor to monitor your kidney function periodically; do not start any medications, including over the counter medication, without discussing it with your doctor, as both your lower kidney and liver function can result in medication levels to rise to harmful levels.  Secondary Diagnosis:	Chronic atrial fibrillation  Goal:	Prevent complications of atrial fibrillation  Instructions for follow-up, activity and diet:	-We decreased your propanolol from 20 mg twice a day to 10 mg twice a day since your hear rate was very slow when you first came to the hospital (~40 beats per minute); you can use the same pills, but please cut them in half instead of taking the whole pill. After you finish your antibiotics, please follow up with your cardiologist and your hepatologist to see if you can go back to your regular dose.  -You are not on anticoagulation to prevent strokes given your high risk of bleeding from anticoagulation; please follow up with your primary doctor and your cardiologist after discharge to monitor your aortic stenosis  -Given that your heart rate was very slow and you had a kidney injury, we are also holding your digoxin; please follow up with your cardiologist after discharge to see if it is safe for you to restart it  Secondary Diagnosis:	Severe aortic stenosis by prior echocardiogram  Goal:	Prevent complications of aortic stenosis and monitor for symptoms  Instructions for follow-up, activity and diet:	You had evidence of exam and previous echocardiogram (Feb 2016) that you have severe aortic stenosis; however, you did not have any danger symptoms during your admission (no chest pain, no fainting episodes); still, this makes it difficult for your heart to handle fluid well. Please monitor for signs of fluid overload (worsening shortness of breath, pink frothy sputum, rapid weight gain of water weight, or lower leg swelling that is getting worse and worse). We are holding your diuretic, spirinolactone, since you came in with a kidney injury; after you finish your antibiotics, please follow up with your primary doctor, your cardiologist, and your hepatologist, as you will likely need to restart your spironolactone  -Please follow up with your cardiologist for further counseling on management of your aortic stenosis  Secondary Diagnosis:	CLL (chronic lymphocytic leukemia)  Goal:	Monitor for signs of CLL  Instructions for follow-up, activity and diet:	Your counts during your hospitalizations were stable; please follow up with your hematologist / oncologist after discharge. If you develop gland swelling (neck, armpit, groin) or enlarged spleen or easy bleeding, please see urgent medical attention.  Secondary Diagnosis:	COPD (chronic obstructive pulmonary disease)  Goal:	Prevent COPD exacerbation  Instructions for follow-up, activity and diet:	Please follow up with your primary doctor after discharge and your pulmonologist  It is ok for you to continue with your home medications for COPD Principal Discharge DX:	Acute cystitis without hematuria  Goal:	Clear urinary infection  Instructions for follow-up, activity and diet:	-please continue taking your antibiotics as prescribed to treat your urinary tract infection; please continue taking them until Saturday   -please call your primary docto10/21/17  -If after you finish your antibiotics you continue to have fevers, chills, painful urination, increased urinary frequency, or other symptoms of a urinary tract infection, please seek medical attention  Secondary Diagnosis:	Cirrhosis of liver without ascites, unspecified hepatic cirrhosis type  Goal:	Prevent complications from cirrhosis  Instructions for follow-up, activity and diet:	-While you were in the hospital, we decreased your normal dose of propanolol from 20 mg twice a day since your heart rate was very low and your blood pressure was low in the setting of an infection. We are discharging you on propanolol 10 mg twice a day, a decreased dose, since your blood pressure might still be low in the setting of a recuperating infection. We are also holding your spirinolactone while you finish your antibiotics. After you clear your infection, please follow up with your gastroenterologist and your cardiologist to see if you can restart your regular dose of propanolol 20 mg twice a day and your spirinolactone.  -Please continue taking yoru lactulose and rifaximin as prescribed to prevent hepatic encephalopathy; if you are having diarrhea, you can decrease the dose; you should target to have 2-3 soft bowel movements per day  -If you develop swelling in your abdomen or worsening confusion, please call your hepatologist, as your medications may need to become adjusted  -It's ok for you to take Tylenol, but do not take more than 2,000 mg per day (decreased dose for cirrhosis patients)  Secondary Diagnosis:	KARRIE (acute kidney injury)  Goal:	Prevent complications from kidney injury  Instructions for follow-up, activity and diet:	You presented with an acute kidney injury which improved with supportive care for your infection  Please follow up with your primary care doctor to monitor your kidney function periodically; do not start any medications, including over the counter medication, without discussing it with your doctor, as both your lower kidney and liver function can result in medication levels to rise to harmful levels.  Secondary Diagnosis:	Chronic atrial fibrillation  Goal:	Prevent complications of atrial fibrillation  Instructions for follow-up, activity and diet:	-We decreased your propanolol from 20 mg twice a day to 10 mg twice a day since your hear rate was very slow when you first came to the hospital (~40 beats per minute); you can use the same pills, but please cut them in half instead of taking the whole pill. After you finish your antibiotics, please follow up with your cardiologist and your hepatologist to see if you can go back to your regular dose.  -You are not on anticoagulation to prevent strokes given your high risk of bleeding from anticoagulation; please follow up with your primary doctor and your cardiologist after discharge to monitor your aortic stenosis  -Given that your heart rate was very slow and you had a kidney injury, we are also holding your digoxin; please follow up with your cardiologist after discharge to see if it is safe for you to restart it  Secondary Diagnosis:	Severe aortic stenosis by prior echocardiogram  Goal:	Prevent complications of aortic stenosis and monitor for symptoms  Instructions for follow-up, activity and diet:	You had evidence of exam and previous echocardiogram (Feb 2016) that you have severe aortic stenosis; however, you did not have any danger symptoms during your admission (no chest pain, no fainting episodes); still, this makes it difficult for your heart to handle fluid well. Please monitor for signs of fluid overload (worsening shortness of breath, pink frothy sputum, rapid weight gain of water weight, or lower leg swelling that is getting worse and worse). We are holding your diuretic, spirinolactone, since you came in with a kidney injury; after you finish your antibiotics, please follow up with your primary doctor, your cardiologist, and your hepatologist, as you will likely need to restart your spironolactone  -Please follow up with your cardiologist for further counseling on management of your aortic stenosis  Secondary Diagnosis:	CLL (chronic lymphocytic leukemia)  Goal:	Monitor for signs of CLL  Instructions for follow-up, activity and diet:	Your counts during your hospitalizations were stable; please follow up with your hematologist / oncologist after discharge. If you develop gland swelling (neck, armpit, groin) or enlarged spleen or easy bleeding, please see urgent medical attention.  Secondary Diagnosis:	COPD (chronic obstructive pulmonary disease)  Goal:	Prevent COPD exacerbation  Instructions for follow-up, activity and diet:	Please follow up with your primary doctor after discharge and your pulmonologist  It is ok for you to continue with your home medications for COPD Principal Discharge DX:	Acute cystitis without hematuria  Goal:	Clear urinary infection  Instructions for follow-up, activity and diet:	-please continue taking your antibiotics as prescribed to treat your urinary tract infection; please continue taking them until Saturday   -please call your primary docto10/21/17  -If after you finish your antibiotics you continue to have fevers, chills, painful urination, increased urinary frequency, or other symptoms of a urinary tract infection, please seek medical attention  Secondary Diagnosis:	Cirrhosis of liver without ascites, unspecified hepatic cirrhosis type  Goal:	Prevent complications from cirrhosis  Instructions for follow-up, activity and diet:	-While you were in the hospital, we decreased your normal dose of propanolol from 20 mg twice a day since your heart rate was very low and your blood pressure was low in the setting of an infection. We are discharging you on propanolol 10 mg twice a day, a decreased dose, since your blood pressure might still be low in the setting of a recuperating infection. We are also holding your spirinolactone while you finish your antibiotics. After you clear your infection, please follow up with your gastroenterologist and your cardiologist to see if you can restart your regular dose of propanolol 20 mg twice a day and your spirinolactone.  -Please continue taking yoru lactulose and rifaximin as prescribed to prevent hepatic encephalopathy; if you are having diarrhea, you can decrease the dose; you should target to have 2-3 soft bowel movements per day  -If you develop swelling in your abdomen or worsening confusion, please call your hepatologist, as your medications may need to become adjusted  -It's ok for you to take Tylenol, but do not take more than 2,000 mg per day (decreased dose for cirrhosis patients)  Secondary Diagnosis:	KARRIE (acute kidney injury)  Goal:	Prevent complications from kidney injury  Instructions for follow-up, activity and diet:	You presented with an acute kidney injury which improved with supportive care for your infection  Please follow up with your primary care doctor to monitor your kidney function periodically; do not start any medications, including over the counter medication, without discussing it with your doctor, as both your lower kidney and liver function can result in medication levels to rise to harmful levels.  Secondary Diagnosis:	Chronic atrial fibrillation  Goal:	Prevent complications of atrial fibrillation  Instructions for follow-up, activity and diet:	-We decreased your propanolol from 20 mg twice a day to 10 mg twice a day since your hear rate was very slow when you first came to the hospital (~40 beats per minute); you can use the same pills, but please cut them in half instead of taking the whole pill. After you finish your antibiotics, please follow up with your cardiologist and your hepatologist to see if you can go back to your regular dose.  -You are not on anticoagulation to prevent strokes given your high risk of bleeding from anticoagulation; please follow up with your primary doctor and your cardiologist after discharge to monitor your aortic stenosis  Secondary Diagnosis:	Severe aortic stenosis by prior echocardiogram  Goal:	Prevent complications of aortic stenosis and monitor for symptoms  Instructions for follow-up, activity and diet:	You had evidence of exam and previous echocardiogram (Feb 2016) that you have severe aortic stenosis; however, you did not have any danger symptoms during your admission (no chest pain, no fainting episodes); still, this makes it difficult for your heart to handle fluid well. Please monitor for signs of fluid overload (worsening shortness of breath, pink frothy sputum, rapid weight gain of water weight, or lower leg swelling that is getting worse and worse). We are holding your diuretic, spirinolactone, since you came in with a kidney injury; after you finish your antibiotics, please follow up with your primary doctor, your cardiologist, and your hepatologist, as you will likely need to restart your spironolactone  -Please follow up with your cardiologist for further counseling on management of your aortic stenosis  Secondary Diagnosis:	CLL (chronic lymphocytic leukemia)  Goal:	Monitor for signs of CLL  Instructions for follow-up, activity and diet:	Your counts during your hospitalizations were stable; please follow up with your hematologist / oncologist after discharge. If you develop gland swelling (neck, armpit, groin) or enlarged spleen or easy bleeding, please see urgent medical attention.  Secondary Diagnosis:	COPD (chronic obstructive pulmonary disease)  Goal:	Prevent COPD exacerbation  Instructions for follow-up, activity and diet:	Please follow up with your primary doctor after discharge and your pulmonologist  It is ok for you to continue with your home medications for COPD

## 2017-10-17 NOTE — DIETITIAN INITIAL EVALUATION ADULT. - NUTRITION INTERVENTION
Meals and Snack/Medical Food Supplements Medical Food Supplements/Meals and Snack/Collaboration and Referral of Nutrition Care

## 2017-10-17 NOTE — CONSULT NOTE ADULT - PROBLEM SELECTOR RECOMMENDATION 9
- Per daughter, baseline HR is upper-mid 50's  - Hx of TIA's  - On digoxin currently, OK to restart propranolol  - Trend vitals  **********************Pending attending approval************** - Per daughter, baseline HR is upper-mid 50's  - Hx of TIA's  - On digoxin currently, OK to restart propranolol  - Trend vitals

## 2017-10-17 NOTE — DISCHARGE NOTE ADULT - MEDICATION SUMMARY - MEDICATIONS TO STOP TAKING
I will STOP taking the medications listed below when I get home from the hospital:    spironolactone 25 mg oral tablet  --  by mouth once a day

## 2017-10-17 NOTE — DIETITIAN INITIAL EVALUATION ADULT. - ENERGY NEEDS
Ht: 64“-stated, Wt: 111.7lbs- standing 10/17 , BMI: 19.2kg/m2, IBW: 120 lbs (+/-10%), %IBW: 93%  Pertinent Information: Pt presented with fever, malaise, fatigue, admitted with fever and hypotension.  89 yo F w/ hx liver cirrhosis (hx of varices, hepatic encephalopathy), Afib (not on AC), severe AS, severe MR, COPD (not on O2), pulm HTN, CLL- not on chemo, CKD II-III   no edema or pressure injury

## 2017-10-17 NOTE — DISCHARGE NOTE ADULT - PATIENT PORTAL LINK FT
“You can access the [facility] Patient Portal, offered by [facility], by registering with the following website: [email or web address]”

## 2017-10-17 NOTE — DISCHARGE NOTE ADULT - MEDICATION SUMMARY - MEDICATIONS TO CHANGE
I will SWITCH the dose or number of times a day I take the medications listed below when I get home from the hospital:    propranolol 20 mg oral tablet  -- 1 tab(s) by mouth 2 times a day

## 2017-10-17 NOTE — PHYSICAL THERAPY INITIAL EVALUATION ADULT - PERTINENT HX OF CURRENT PROBLEM, REHAB EVAL
89 yo F brought in by family for fever. Pt more withdrawn, sleeping more. 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  Report new ?stage 1 ulcer. CXR: grossly clear lungs.  Urine cultures positive for Klebsiella

## 2017-10-17 NOTE — DISCHARGE NOTE ADULT - CONDITIONS AT DISCHARGE
medically stable. medically stable. Patient PIVL removed. no  evidence of infiltration noted at discharge. Patient skin intact, vital signs stable, Patient with no complaints of SOB, or chest pain at discharge. Pt discharged as per MD orders in stable condition. Pt verbalized understanding discharge instructions. Pt provided with printed discharge instructions as well as medication list .

## 2017-10-17 NOTE — DISCHARGE NOTE ADULT - HOSPITAL COURSE
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 Brought in by her family for fever 101.2 F. Per the family, patient has been more withdrawn, sleeping more; denies any localizing symptoms, but recently had the flu shot. Denies any rinorrhea; chronic cough but nonproductive, no shortness of breath. Daughters also report that she has recently started propanolol (used to be on carvedilol, discontinued by PMD; started on propanolol by GI with cardiology agreeing.  In the hospital, the patient was started on ceftriaxone for a UTI. Her blood pressures remained in the 90's-100s systolic. Due to bradycardia in the 50's the patient's propanolol was temporarily discontinued, but then restarted after consultation with house cardiology. 87 yo F w/ hx liver cirrhosis (hx of varices, hepatic encephalopathy), Afib (not on AC), severe AS, severe MR, COPD (not on O2), pulm HTN, CLL, CKD II-III (baseline Cr ~0.8), recently discharged on 5/2017, for IR radiofrequency ablation of hepatoma, complicated by acute respiratory failure requiring intubation + pressors for hypotension, found to have hemoperitonemum, treated nonoperatively; reports not currently on treatment for CLL; presented to [facility] on 10/15/17, brought in by her family for fever 101.2 F, found to have . Per the family, patient has been more withdrawn, sleeping more; denies any localizing symptoms, but recently had the flu shot. Denies any rinorrhea; chronic cough but nonproductive, no shortness of breath. Daughters also report that she has recently started propanolol (used to be on carvedilol, discontinued by PMD; started on propanolol by GI with cardiology agreeing.  In the hospital, the patient was started on ceftriaxone for a UTI. Her blood pressures remained in the 90's-100s systolic. Due to bradycardia in the 50's the patient's propanolol was temporarily discontinued, but then restarted after consultation with house cardiology. 87 yo F w/ hx liver cirrhosis (hx of varices, hepatic encephalopathy), Afib (not on AC), severe AS, severe MR, COPD (not on O2), pulm HTN, CLL, CKD II-III (baseline Cr ~0.8), recently discharged on 5/2017, for IR radiofrequency ablation of hepatoma, complicated by acute respiratory failure requiring intubation + pressors for hypotension, found to have hemoperitonemum, treated nonoperatively; reports not currently on treatment for CLL; presented to [facility] on 10/15/17, brought in by her family for fever 101.2 F, found to have UTI 2/2 pan-sensitive klebsiella. Per the family, patient has been more withdrawn, sleeping more; denies any localizing symptoms, but recently had the flu shot. Denies any rinorrhea; chronic cough but nonproductive, no shortness of breath. Daughters also report that she has recently started propanolol (used to be on carvedilol, discontinued by PMD; started on propanolol by GI with cardiology agreeing.  In the hospital, the patient was started on ceftriaxone for a UTI. Her blood pressures remained in the 90's-100s systolic. Due to bradycardia in the 40-50's the patient's propanolol was temporarily discontinued, but then restarted after consultation with house cardiology; she was restarted at a lower dose of 10 mg BID and her heart rate improved to 50-60s. Patient's symptoms improved during hospitalization. She was switched from ceftriaxone to ciprofloxacin 250 mg BID after sensitivities came back. Patient's spirinolactone was held in setting of UTI complicated by KARRIE; instructed patient to hold it until she finished antibiotics and she followed up with her PMD, cardiologist, and hepatologist. Digoxin levels on admission were not elevated. She was euvolemic during hospitalization; limited ultrasound of the abdomen was negative for ascites, making SBP less likely. Was seen by PT, recommended home with home PT. Discussed plan with patient's daughter, Julio, agreeable with plan. Patient stable for discharge on 10/15/17.

## 2017-10-17 NOTE — CONSULT NOTE ADULT - ATTENDING COMMENTS
Patient interviewed, examined and chart reviewed.  Case discussed with fellow AND MS4.  Agree w/ Assessment and Plan as outlined.    Nicola Lynch MD [facility]  P: [phone]  Spectra:  [phone]  Office: [phone]

## 2017-10-17 NOTE — DISCHARGE NOTE ADULT - CARE PROVIDER_API CALL
Daniel Boston (MD), Cardiovascular Disease; Internal Medicine  [address] [address]  Phone: [phone]  Fax: [phone] Daniel Boston), Cardiovascular Disease; Internal Medicine  [address] [address]  Phone: [phone]  Fax: [phone]    Neema Macdonald), Gastroenterology; Internal Medicine  [address] [place] [address]  Phone: [phone]  Fax: [phone] Daniel Boston), Cardiovascular Disease; Internal Medicine  [address] [facility] [address]  [place] [address]  Phone: [phone]  Fax: [phone]    Neema Macdonald), Gastroenterology; Internal Medicine  [address] [place] [address]  Phone: [phone]  Fax: [phone]    Fallon Celaya), Critical Care Medicine; Internal Medicine; Pulmonary Disease  [address] [facility] [address]  [place] [address]  Phone: [phone]  Fax: [phone]

## 2017-10-17 NOTE — DISCHARGE NOTE ADULT - ADDITIONAL INSTRUCTIONS
Follow up with your primary doctor within 1-2 weeks after discharge  Follow up with your cardiologist and your hepatologist to see if it safe to restart your spirinolactone and your original dose of propanolol after you complete your antibiotic regiment; it was held due to low blood pressure, slow heart rate, and kidney injury; please follow up with them in 1-2 weeks after you are discharged

## 2017-10-18 VITALS
SYSTOLIC BLOOD PRESSURE: 114 MMHG | DIASTOLIC BLOOD PRESSURE: 72 MMHG | OXYGEN SATURATION: 96 % | RESPIRATION RATE: 18 BRPM | HEART RATE: 54 BPM | TEMPERATURE: 98 F

## 2017-10-18 LAB
ANION GAP SERPL CALC-SCNC: 10 MMOL/L — SIGNIFICANT CHANGE UP (ref 5–17)
BUN SERPL-MCNC: 30 MG/DL — HIGH (ref 7–23)
CALCIUM SERPL-MCNC: 9.4 MG/DL — SIGNIFICANT CHANGE UP (ref 8.4–10.5)
CHLORIDE SERPL-SCNC: 103 MMOL/L — SIGNIFICANT CHANGE UP (ref 96–108)
CO2 SERPL-SCNC: 24 MMOL/L — SIGNIFICANT CHANGE UP (ref 22–31)
CREAT SERPL-MCNC: 1.06 MG/DL — SIGNIFICANT CHANGE UP (ref 0.5–1.3)
GLUCOSE SERPL-MCNC: 82 MG/DL — SIGNIFICANT CHANGE UP (ref 70–99)
HCT VFR BLD CALC: 29.7 % — LOW (ref 34.5–45)
HGB BLD-MCNC: 10.3 G/DL — LOW (ref 11.5–15.5)
MCHC RBC-ENTMCNC: 33.1 PG — SIGNIFICANT CHANGE UP (ref 27–34)
MCHC RBC-ENTMCNC: 34.7 GM/DL — SIGNIFICANT CHANGE UP (ref 32–36)
MCV RBC AUTO: 95.5 FL — SIGNIFICANT CHANGE UP (ref 80–100)
PLATELET # BLD AUTO: 63 K/UL — LOW (ref 150–400)
POTASSIUM SERPL-MCNC: 4.2 MMOL/L — SIGNIFICANT CHANGE UP (ref 3.5–5.3)
POTASSIUM SERPL-SCNC: 4.2 MMOL/L — SIGNIFICANT CHANGE UP (ref 3.5–5.3)
RBC # BLD: 3.11 M/UL — LOW (ref 3.8–5.2)
RBC # FLD: 14.4 % — SIGNIFICANT CHANGE UP (ref 10.3–14.5)
SODIUM SERPL-SCNC: 137 MMOL/L — SIGNIFICANT CHANGE UP (ref 135–145)
WBC # BLD: 4.16 K/UL — SIGNIFICANT CHANGE UP (ref 3.8–10.5)
WBC # FLD AUTO: 4.16 K/UL — SIGNIFICANT CHANGE UP (ref 3.8–10.5)

## 2017-10-18 PROCEDURE — 99232 SBSQ HOSP IP/OBS MODERATE 35: CPT | Mod: GC

## 2017-10-18 PROCEDURE — 99239 HOSP IP/OBS DSCHRG MGMT >30: CPT

## 2017-10-18 RX ORDER — CIPROFLOXACIN LACTATE 400MG/40ML
1 VIAL (ML) INTRAVENOUS
Qty: 8 | Refills: 0 | OUTPATIENT
Start: 2017-10-18 | End: 2017-10-22

## 2017-10-18 RX ORDER — DIGOXIN 250 MCG
1 TABLET ORAL
Qty: 0 | Refills: 0 | COMMUNITY

## 2017-10-18 RX ORDER — SPIRONOLACTONE 25 MG/1
0 TABLET, FILM COATED ORAL
Qty: 0 | Refills: 0 | COMMUNITY

## 2017-10-18 RX ORDER — CIPROFLOXACIN LACTATE 400MG/40ML
250 VIAL (ML) INTRAVENOUS EVERY 12 HOURS
Qty: 0 | Refills: 0 | Status: DISCONTINUED | OUTPATIENT
Start: 2017-10-18 | End: 2017-10-18

## 2017-10-18 RX ADMIN — LACTULOSE 20 GRAM(S): 10 SOLUTION ORAL at 17:20

## 2017-10-18 RX ADMIN — Medication 0.25 MILLIGRAM(S): at 17:20

## 2017-10-18 RX ADMIN — Medication 1 APPLICATION(S): at 14:32

## 2017-10-18 RX ADMIN — Medication 1 APPLICATION(S): at 05:26

## 2017-10-18 RX ADMIN — Medication 10 MILLIGRAM(S): at 17:20

## 2017-10-18 RX ADMIN — Medication 0.25 MILLIGRAM(S): at 05:26

## 2017-10-18 RX ADMIN — Medication 25 MICROGRAM(S): at 05:26

## 2017-10-18 RX ADMIN — Medication 3 MILLILITER(S): at 05:26

## 2017-10-18 RX ADMIN — Medication 10 MILLIGRAM(S): at 05:28

## 2017-10-18 RX ADMIN — Medication 3 MILLILITER(S): at 17:20

## 2017-10-18 RX ADMIN — Medication 2000 UNIT(S): at 12:05

## 2017-10-18 RX ADMIN — Medication 250 MILLIGRAM(S): at 17:20

## 2017-10-18 RX ADMIN — LACTULOSE 20 GRAM(S): 10 SOLUTION ORAL at 05:26

## 2017-10-18 NOTE — PROGRESS NOTE ADULT - PROBLEM SELECTOR PLAN 2
-currently no evidence of fluid overload  -holding diuretics in setting of acute infection, poor PO intake, and KARRIE  -has not had TTE since 2016; may need recheck this admission  -follow up with cardiology as outpatient.
-currently no evidence of fluid overload  -holding diuretics in setting of acute infection, poor PO intake, and KARRIE  -has not had TTE since 2016; may need recheck this admission  -follow up with cardiology as outpatient.
-currently no evidence of fluid overload except for mild crackles; otherwise euvolemic  -holding diuretics in setting of acute infection, poor PO intake, and KARRIE; ok to restart spirinolactone 25 mg after abx course finished  -has not had TTE since 2016; will instruct to follow up with cardiology as outpatient.
-currently no evidence of fluid overload  -holding diuretics in setting of acute infection, poor PO intake, and KARRIE  -has not had TTE since 2016; may need recheck this admission  -follow up with cardiology as outpatient.
-currently no evidence of fluid overload  -holding diuretics in setting of acute infection, poor PO intake, and KARRIE  -has not had TTE since 2016; may need recheck this admission  -follow up with cardiology as outpatient.

## 2017-10-18 NOTE — PROGRESS NOTE ADULT - PROBLEM SELECTOR PLAN 6
Plan: -hepatically dose medications  -continue w/ rifaximin and with lactulose  -monitor CMP  -no evidence of ascites on exam, monitoring for signs of SBP, holding propanolol due to risk of bradycardia
Plan: -hepatically dose medications  -continue w/ rifaximin and with lactulose  -monitor CMP  -no evidence of ascites on exam, monitoring for signs of SBP, holding propanolol due to risk of bradycardia
-Stable  -hepatically dose medications  -continue w/ rifaximin and with lactulose (no encephalopathy on exam this morning)  -monitor CMP  -no evidence of ascites on ultrasound abdomen  -restarted propanolol at reduced dose in setting of recovering infection and bradycardia; hemodynamically stable; will have patient follow up with hepatology (Dr. Macdonald)
Plan: -hepatically dose medications  -continue w/ rifaximin and with lactulose  -monitor CMP  -no evidence of ascites on exam, monitoring for signs of SBP, holding propanolol due to risk of bradycardia
Plan: -hepatically dose medications  -continue w/ rifaximin and with lactulose  -monitor CMP  -no evidence of ascites on exam, monitoring for signs of SBP, holding propanolol due to risk of bradycardia

## 2017-10-18 NOTE — PROGRESS NOTE ADULT - PROBLEM SELECTOR PLAN 5
CLL (chronic lymphocytic leukemia).  Plan: -not currently on chemotherapy  -no acute issues  -check CBC w/ diff in AM  -review outpatient records.
CLL (chronic lymphocytic leukemia).  Plan: -not currently on chemotherapy, no lymphadenopathy, thrombocytopenia, or splenomegaly on exam  -no acute issues  -monitor CBC; follow up as outpatient

## 2017-10-18 NOTE — PROGRESS NOTE ADULT - ATTENDING COMMENTS
Patient interviewed, examined and chart reviewed.  Case discussed with ms4. Agree w/ Assessment and Plan as outlined.   Stable for d/c home from a CV standpoint.    Nicola Lynch MD [facility]  P: [phone]  Spectra:  [phone]  Office: [phone]
fever, complicated uti 2/2 klebsiella, acute kidney injury now improving, acute encephalopathy likely 2/2 UTI now improving. abdominal US neg for ascites, will not pursue sbp workup. continue rifaxmin and lactulose.   bradycardia to 40s present on admission, b blocker held temporarily and pt with 4 beats WCT, PAF overnight. Resume b blocker today but given bradycardia on admission will start lower dosing 10mg bid,. cardiology to see pt today.  PT consult pending.  continue remainder of plan per resident note.
fever, complicated uti 2/2 klebsiella, acute kidney injury now improving, acute encephalopathy likely 2/2 UTI now rsolved.   will d/c pt on cipro to complete 7 day course of antibiotics.  bradycardia to 40s on admission likely 2/2 b blocker- pt now with baseline HR in 50s on 10mg bid propranolol. will continue this and digoxin on d/c with close cards followup  PT consult recommending home PT.  pt is medically stable for discharge today.  d/c time 45 mins.
fever, complicated uti, acute kidney injury, acute encephalopathy likely 2/2 UTI. no other source of infection obvious. abdominal US neg for ascites, will not pursue sbp workup. continue rifaxmin and lactulose. titrate to 3-4 BMS per day. possible mild component of hepatic encephalopathy contributing to altered mental status (ammonia level borderline). f/u blood and urine cx.   continue remainder of plan per resident note.

## 2017-10-18 NOTE — PROGRESS NOTE ADULT - PROBLEM SELECTOR PROBLEM 1
Acute cystitis without hematuria
Chronic atrial fibrillation
Acute cystitis without hematuria
Acute cystitis without hematuria

## 2017-10-18 NOTE — PROGRESS NOTE ADULT - SUBJECTIVE AND OBJECTIVE BOX
Pt is an 89 y/o F with cc of fever and delirium admitted on 10/15. Recently started on low dose BB for Portal HTN  Background is remarkable for:  Severe AS/ 3+AI  Modeate MR  Severe PH  PAFL  HCC  Cirrhosis/ Portal HTN/ Varices/ UGIB  COPD/bronchiectasis  CLL  Hypothyroidism    Subjective:  Pt reports feeling well. ambulatory and sx free. Denies chest pain, SOB, vertigo.  Per daughter, HR at baseline is normally mid-upper 50's    MEDICATIONS  (STANDING):  ALBUTerol/ipratropium for Nebulization 3 milliLiter(s) Nebulizer every 12 hours  AQUAPHOR (petrolatum Ointment) 1 Application(s) Topical three times a day  buDESOnide   0.25 milliGRAM(s) Respule 0.25 milliGRAM(s) Inhalation two times a day  cholecalciferol 2000 Unit(s) Oral daily  digoxin     Tablet 0.125 milliGRAM(s) Oral every other day  lactulose Syrup 20 Gram(s) Oral two times a day  levothyroxine 25 MICROGram(s) Oral daily  propranolol 10 milliGRAM(s) Oral two times a day  rifaximin 550 milliGRAM(s) Oral two times a day    I&O's Summary    17 Oct 2017 07:01  -  18 Oct 2017 07:00  --------------------------------------------------------  IN: 825 mL / OUT: 200 mL / NET: 625 mL    ICU Vital Signs Last 24 Hrs  T(C): 36.4 (18 Oct 2017 04:22), Max: 36.7 (17 Oct 2017 20:18)  T(F): 97.6 (18 Oct 2017 04:22), Max: 98.1 (17 Oct 2017 20:18)  HR: 57 (18 Oct 2017 05:24) (51 - 72)  BP: 122/64 (18 Oct 2017 05:24) (93/53 - 125/80)  BP(mean): --  ABP: --  ABP(mean): --  RR: 18 (18 Oct 2017 04:22) (18 - 19)  SpO2: 99% (18 Oct 2017 04:22) (96% - 99%)    PHYSICAL EXAM:  	Eyes: EOMI, PERRL  	ENMT: no oral lesions, no rhinorrhea no post-nasal drip  	Neck: no lymphadenopathy, supple neck, no thyromegaly  	Back: no CVA tenderness, kyphosis  	Respiratory: CTAB  	Cardiovascular: Bradycardic. Absent A2 .. Loud P@ III/VI ARCADIO.  III?VI similar decr murmur @ apex.  Early apical diastolic murmur   	consistent w/ Ugo [place] no rubs or gallops appreciated  	Gastrointestinal: soft, nontender, no suprapubic tenderness; no hepatosplenomegaly, NBS  	Extremities:  symmetrical in size, symmetrical motor strength  	Vascular: +2 pulses bilateraly  	Neurological: AOx3,  	Skin: ecchymotic, telangiectasias observed  	Lymph Nodes: no lymphadenopathy or splenomegaly  	Musculoskeletal: no clubbing; no joint effusions; normal ROM                          10.3   4.16  )-----------( 63       ( 18 Oct 2017 08:30 )             29.7   10-18    137  |  103  |  30<H>  ----------------------------<  82  4.2   |  24  |  1.06    Ca    9.4      18 Oct 2017 08:33    TPro  5.8<L>  /  Alb  3.0<L>  /  TBili  1.4<H>  /  DBili  x   /  AST  32  /  ALT  17  /  AlkPhos  94  10-17 Pt is an 89 y/o F with cc of fever and delirium admitted on 10/15. Recently started on low dose BB for Portal HTN  Background is remarkable for:  Severe AS/ 3+AI  Moderate MR  Severe PH  PAFL  HCC  Cirrhosis/ Portal HTN/ Varices/ UGIB  COPD/bronchiectasis  CLL  Hypothyroidism    Subjective:  Pt reports feeling well. ambulatory and sx free. Denies chest pain, SOB, vertigo.  Per daughter, HR at baseline is normally mid-upper 50's    MEDICATIONS  (STANDING):  ALBUTerol/ipratropium for Nebulization 3 milliLiter(s) Nebulizer every 12 hours  AQUAPHOR (petrolatum Ointment) 1 Application(s) Topical three times a day  buDESOnide   0.25 milliGRAM(s) Respule 0.25 milliGRAM(s) Inhalation two times a day  cholecalciferol 2000 Unit(s) Oral daily  digoxin     Tablet 0.125 milliGRAM(s) Oral every other day  lactulose Syrup 20 Gram(s) Oral two times a day  levothyroxine 25 MICROGram(s) Oral daily  propranolol 10 milliGRAM(s) Oral two times a day  rifaximin 550 milliGRAM(s) Oral two times a day    I&O's Summary    17 Oct 2017 07:01  -  18 Oct 2017 07:00  --------------------------------------------------------  IN: 825 mL / OUT: 200 mL / NET: 625 mL    ICU Vital Signs Last 24 Hrs  T(C): 36.4 (18 Oct 2017 04:22), Max: 36.7 (17 Oct 2017 20:18)  T(F): 97.6 (18 Oct 2017 04:22), Max: 98.1 (17 Oct 2017 20:18)  HR: 57 (18 Oct 2017 05:24) (51 - 72)  BP: 122/64 (18 Oct 2017 05:24) (93/53 - 125/80)  BP(mean): --  ABP: --  ABP(mean): --  RR: 18 (18 Oct 2017 04:22) (18 - 19)  SpO2: 99% (18 Oct 2017 04:22) (96% - 99%)    PHYSICAL EXAM:  	Eyes: EOMI, PERRL  	ENMT: no oral lesions, no rhinorrhea no post-nasal drip  	Neck: no lymphadenopathy, supple neck, no thyromegaly  	Back: no CVA tenderness, kyphosis  	Respiratory: Coarse (?Velcro?) rales at both bases  	Cardiovascular: Bradycardic. Absent A2 .. Loud P@ III/VI ARCADIO.  III?VI similar decr murmur @ apex.  Early apical diastolic murmur   	consistent w/ Ugo [place] no rubs or gallops appreciated  	Gastrointestinal: soft, nontender, no suprapubic tenderness; no hepatosplenomegaly, NBS  	Extremities:  symmetrical in size, symmetrical motor strength  	Vascular: +2 pulses bilateraly  	Neurological: AOx3,  	Skin: ecchymotic, telangiectasias observed  	Lymph Nodes: no lymphadenopathy or splenomegaly  	Musculoskeletal: no clubbing; no joint effusions; normal ROM                          10.3   4.16  )-----------( 63       ( 18 Oct 2017 08:30 )             29.7   10-18    137  |  103  |  30<H>  ----------------------------<  82  4.2   |  24  |  1.06    Ca    9.4      18 Oct 2017 08:33    TPro  5.8<L>  /  Alb  3.0<L>  /  TBili  1.4<H>  /  DBili  x   /  AST  32  /  ALT  17  /  AlkPhos  94  10-17    TELE: NSR 50-65 w/ transient dec in rate to high 40s.

## 2017-10-18 NOTE — PROGRESS NOTE ADULT - PROBLEM SELECTOR PROBLEM 2
Severe aortic stenosis by prior echocardiogram

## 2017-10-18 NOTE — PROGRESS NOTE ADULT - PROBLEM SELECTOR PROBLEM 4
KARRIE (acute kidney injury)

## 2017-10-18 NOTE — PROGRESS NOTE ADULT - PROBLEM SELECTOR PLAN 4
KARRIE (acute kidney injury).  Plan: -KARRIE on CKD; suspect 2/2 poor PO intake + sepsis 2/2 UTI  -renally dose medications  -avoid nephrotoxins  -in setting of hypotension, will give small amount of IV fluid and monitor  -strict I/Os.

## 2017-10-18 NOTE — PROGRESS NOTE ADULT - PROBLEM SELECTOR PLAN 7
Plan: -subq heparin for DVT ppx  -fall precuations  -PT / OT in AM, suspect high risk for decompensation given multiple comorbidites.
Plan: -subq heparin for DVT ppx  -fall precautions  -PT / OT in AM, suspect high risk for decompensation given multiple comorbidities.
Plan: -subq heparin for DVT ppx  -fall precautions, rolling walker  -PT / OT

## 2017-10-18 NOTE — PROGRESS NOTE ADULT - ASSESSMENT
87 yo F w/ hx liver cirrhosis (hx of varices, hepatic encephalopathy), Afib (not on AC), severe AS, severe MR, COPD (not on O2), pulm HTN, CLL, CKD II-III (baseline Cr ~0.8), presenting with fever, malaise, fatigue, now with fever and hypotension, suspect likely 2/2 UTI. Urine cultures positive for Klebsiella, sensitivities pending. Patient overall improved.
89 yo F w/ hx liver cirrhosis (hx of varices, hepatic encephalopathy), Afib (not on AC), severe AS, severe MR, COPD (not on O2), pulm HTN, CLL, CKD II-III (baseline Cr ~0.8), presenting with fever, malaise, fatigue, now with fever and hypotension, suspect likely 2/2 UTI. Urine cultures positive for Klebsiella, sensitivities pending.
89 yo F w/ hx liver cirrhosis (hx of varices, hepatic encephalopathy), Afib (not on AC), severe AS, severe MR, COPD (not on O2), pulm HTN, CLL, CKD II-III (baseline Cr ~0.8), presenting with fever, malaise, fatigue, now with fever and hypotension, suspect likely 2/2 UTI. Urine cultures positive for Klebsiella, sensitivities pending.
89 yo F w/ hx liver cirrhosis (hx of varices, hepatic encephalopathy), Afib (not on AC), severe AS, severe MR, COPD (not on O2), pulm HTN, CLL, CKD II-III (baseline Cr ~0.8), presenting with fever, malaise, fatigue, now with fever and hypotension, suspect likely 2/2 UTI. Urine cultures positive for Klebsiella, sensitivities show ok to transition to ciprofloxacin. Dispo: likely discharge home today to complete oral abx course at home; home with home PT
87 yo F w/ hx liver cirrhosis (hx of varices, hepatic encephalopathy), Afib (not on AC), severe AS, severe MR, COPD (not on O2), pulm HTN, CLL, CKD II-III (baseline Cr ~0.8), presenting with fever, malaise, fatigue, now with fever and hypotension, suspect likely 2/2 UTI.
87 yo F w/ hx liver cirrhosis (hx of varices, hepatic encephalopathy), Afib (not on AC), severe AS, severe MR, COPD (not on O2), pulm HTN, CLL, CKD II-III (baseline Cr ~0.8), presenting with fever, malaise, fatigue, now with fever and hypotension, suspect likely 2/2 UTI.

## 2017-10-18 NOTE — PROGRESS NOTE ADULT - PROBLEM SELECTOR PLAN 3
-CHADsVASC 8 but not on anticoagulation in setting of recent significant bleeds  -on rate control and rhythm control w/ digoxin and propanolol - d/c'd propanolol due to bradycardia on EKG. Patient had several beats of nonsustained wide-complex tachycardia on telemetry. Cardiology consulted on whether to continue beta blockade and on dosing given bradycardia.  -high risk for TIAs, and has hx of them per daughter.
-CHADsVASC 8 but not on anticoagulation in setting of recent significant bleeds  -on rate control and rhythm control w/ digoxin and propanolol - will DC propanolol due to bradycardia on EKG. Patient had several beats of nonsustained wide-complex tachycardia on telemetry. Cardiology consulted on whether to continue beta blockade.  -high risk for TIAs, and has hx of them per daughter.
-CHADsVASC 8 but not on anticoagulation in setting of recent significant bleeds, cirrhosis (HASBLED score 4)  -on rate control and rhythm control w/ digoxin and propanolol; restarted propanolol 10 mg BID (decreased compared to patient's home 20 BID) in setting of recovering infection  -high risk for TIAs, and has hx of them per daughter; neuro exam today normal
-CHADsVASC 8 but not on anticoagulation in setting of recent significant bleeds  -on rate control and rhythm control w/ digoxin and propanolol - will DC propanolol due to bradycardia on EKG  -high risk for TIAs, and has hx of them per daughter.
-CHADsVASC 8 but not on anticoagulation in setting of recent significant bleeds  -on rate control and rhythm control w/ digoxin and propanolol - will DC propanolol due to bradycardia on EKG  -high risk for TIAs, and has hx of them per daughter.

## 2017-10-18 NOTE — PROGRESS NOTE ADULT - PROBLEM SELECTOR PLAN 1
- Per daughter, baseline HR is upper-mid 50's  - Hx of TIA's  - On digoxin currently, propranolol restarted 10/17  - Vitals are at baseline (BP 93/53 - 125/80) , (HR 51-73). Trend
-febrile, now hypotensive  -complicated by KARRIE; will treat for 7-10 w/ abx  -follow up blood cultures, urine sensitivities. Urine culture positive for klebsiella.  -continue w/ ceftriaxone 1 g QD  -if septic and hypotensive, give small amounts of fluids in setting of severe AS.
-febrile, now hypotensive  -complicated by KARRIE; will treat for 7-10 w/ abx  -follow up blood cultures, urine sensitivities. Urine culture positive for klebsiella.  -continue w/ ceftriaxone 1 g QD  -if septic and hypotensive, give small amounts of fluids in setting of severe AS.
-fever resolved, BP stable  -complicated by KARRIE; will treat for 7 days total (l10/15/17 through Sat 10/21/17)  -urine culture showing Klebsiella, sensitive to ciprofloxacin. Will start on oral ciprofloxacin and instruct to finish on discharge
-febrile, now hypotensive  -complicated by AKRRIE; will treat for 7-10 w/ abx  -follow up urine and blood cultures  -continue w/ ceftriaxone 1 g QD  -if septic and hypotensive, give small amounts of fluids in setting of severe AS.
-febrile, now hypotensive  -complicated by KARRIE; will treat for 7-10 w/ abx  -follow up urine and blood cultures  -continue w/ ceftriaxone 1 g QD  -if septic and hypotensive, give small amounts of fluids in setting of severe AS.

## 2017-10-18 NOTE — PROGRESS NOTE ADULT - SUBJECTIVE AND OBJECTIVE BOX
Patient is a 89y old  Female who presents with a chief complaint of Fever (17 Oct 2017 13:48)      SUBJECTIVE / OVERNIGHT EVENTS: No overnight events, slept well. Patient feels like she is at her baseline. Ambulating using rolling walker. No difficulties with diet, toileting. Denies chest pain, shortness of breath, dysuria, abdominal pain, or bleeding. Denies any syncope or pre-syncope episodes. Telemetry showed sinus 50-60s this AM after starting propanolol 10 mg overnight.     MEDICATIONS  (STANDING):  ALBUTerol/ipratropium for Nebulization 3 milliLiter(s) Nebulizer every 12 hours  AQUAPHOR (petrolatum Ointment) 1 Application(s) Topical three times a day  buDESOnide   0.25 milliGRAM(s) Respule 0.25 milliGRAM(s) Inhalation two times a day  cefTRIAXone   IVPB 1 Gram(s) IV Intermittent every 24 hours  cholecalciferol 2000 Unit(s) Oral daily  digoxin     Tablet 0.125 milliGRAM(s) Oral every other day  lactulose Syrup 20 Gram(s) Oral two times a day  levothyroxine 25 MICROGram(s) Oral daily  propranolol 10 milliGRAM(s) Oral two times a day  rifaximin 550 milliGRAM(s) Oral two times a day    MEDICATIONS  (PRN):  acetaminophen   Tablet 650 milliGRAM(s) Oral every 12 hours PRN For Temp greater than 38 C (100.4 F)        CAPILLARY BLOOD GLUCOSE    ICU Vital Signs Last 24 Hrs  T(C): 36.4 (18 Oct 2017 04:22), Max: 36.7 (17 Oct 2017 10:58)  T(F): 97.6 (18 Oct 2017 04:22), Max: 98.1 (17 Oct 2017 10:58)  HR: 57 (18 Oct 2017 05:24) (51 - 72)  BP: 122/64 (18 Oct 2017 05:24) (93/53 - 125/80)  RR: 18 (18 Oct 2017 04:22) (18 - 19)  SpO2: 99% (18 Oct 2017 04:22) (95% - 99%)      I&O's Summary    17 Oct 2017 07:01  -  18 Oct 2017 07:00  --------------------------------------------------------  IN: 825 mL / OUT: 200 mL / NET: 625 mL        PHYSICAL EXAM:  	Constitutional: cachectic, NAD, appears chronically ill  	Eyes: EOMI, PERRL  	ENMT: moist mucuous membranes, no oral lesions, no rhinorrhea no post-nasal drip  	Neck: no lymphadenopathy, supple neck, no thyromegaly  	Back: no CVA tenderness, kyphosis  	Respiratory: Basal crackles; otherwise clear lungs; good chest expansion, no wheezing.  	Cardiovascular: IV/VI ARCADIO, regular rhythm, no rubs or gallops appreciated, S2 inaudible  	Gastrointestinal: soft, nontender, no suprapubic tenderness; no hepatosplenomegaly, NBS  	Extremities: ecchymotic mild tenderness bilaterally; symmetrical in size  	Vascular: +2 DP pulses bilaterally  	Neurological: AOx3,  	Skin: ecchymotic, telangiectasias observed  	Lymph Nodes: no lymphadenopathy or splenomegaly  	Musculoskeletal: no clubbing; no joint effusions; normal ROM              Psychiatric: calm, appropriate, following commands      LABS:                        11.7   3.73  )-----------( 56       ( 17 Oct 2017 09:13 )             34.2     WBC Trend: 3.73<--, 4.17<--, 6.3<--  10-17    141  |  105  |  31<H>  ----------------------------<  95  4.3   |  25  |  1.29    Ca    9.8      17 Oct 2017 08:53    TPro  5.8<L>  /  Alb  3.0<L>  /  TBili  1.4<H>  /  DBili  x   /  AST  32  /  ALT  17  /  AlkPhos  94  10-17    Creatinine Trend: 1.29<--, 1.38<--, 1.66<--      RADIOLOGY & ADDITIONAL TESTS:    Imaging Personally Reviewed:    Consultant(s) Notes Reviewed:  Cardiology    Care Discussed with Consultants/Other Providers:

## 2017-10-20 LAB
CULTURE RESULTS: SIGNIFICANT CHANGE UP
CULTURE RESULTS: SIGNIFICANT CHANGE UP
SPECIMEN SOURCE: SIGNIFICANT CHANGE UP
SPECIMEN SOURCE: SIGNIFICANT CHANGE UP

## 2017-10-23 ENCOUNTER — APPOINTMENT (OUTPATIENT)
Dept: MRI IMAGING | Facility: CLINIC | Age: 82
End: 2017-10-23
Payer: MEDICARE

## 2017-10-23 ENCOUNTER — OUTPATIENT (OUTPATIENT)
Dept: OUTPATIENT SERVICES | Facility: HOSPITAL | Age: 82
LOS: 1 days | End: 2017-10-23
Payer: MEDICARE

## 2017-10-23 DIAGNOSIS — Z98.89 OTHER SPECIFIED POSTPROCEDURAL STATES: Chronic | ICD-10-CM

## 2017-10-23 DIAGNOSIS — C22.0 LIVER CELL CARCINOMA: ICD-10-CM

## 2017-10-23 PROCEDURE — 74183 MRI ABD W/O CNTR FLWD CNTR: CPT | Mod: 26

## 2017-10-23 PROCEDURE — 74183 MRI ABD W/O CNTR FLWD CNTR: CPT

## 2017-10-23 PROCEDURE — A9585: CPT

## 2017-10-27 ENCOUNTER — RX RENEWAL (OUTPATIENT)
Age: 82
End: 2017-10-27

## 2017-10-30 ENCOUNTER — APPOINTMENT (OUTPATIENT)
Dept: CARDIOLOGY | Facility: CLINIC | Age: 82
End: 2017-10-30

## 2017-11-03 ENCOUNTER — OUTPATIENT (OUTPATIENT)
Dept: OUTPATIENT SERVICES | Facility: HOSPITAL | Age: 82
LOS: 1 days | End: 2017-11-03
Payer: MEDICARE

## 2017-11-03 ENCOUNTER — APPOINTMENT (OUTPATIENT)
Age: 82
End: 2017-11-03
Payer: MEDICARE

## 2017-11-03 ENCOUNTER — APPOINTMENT (OUTPATIENT)
Dept: INTERVENTIONAL RADIOLOGY/VASCULAR | Facility: CLINIC | Age: 82
End: 2017-11-03
Payer: MEDICARE

## 2017-11-03 VITALS
HEIGHT: 63 IN | BODY MASS INDEX: 21.42 KG/M2 | HEART RATE: 56 BPM | TEMPERATURE: 97.7 F | RESPIRATION RATE: 14 BRPM | SYSTOLIC BLOOD PRESSURE: 137 MMHG | WEIGHT: 120.9 LBS | DIASTOLIC BLOOD PRESSURE: 67 MMHG

## 2017-11-03 VITALS
HEART RATE: 50 BPM | SYSTOLIC BLOOD PRESSURE: 101 MMHG | TEMPERATURE: 98.6 F | DIASTOLIC BLOOD PRESSURE: 43 MMHG | RESPIRATION RATE: 18 BRPM

## 2017-11-03 VITALS — OXYGEN SATURATION: 100 %

## 2017-11-03 DIAGNOSIS — C22.0 LIVER CELL CARCINOMA: ICD-10-CM

## 2017-11-03 DIAGNOSIS — Z98.89 OTHER SPECIFIED POSTPROCEDURAL STATES: Chronic | ICD-10-CM

## 2017-11-03 PROCEDURE — 76705 ECHO EXAM OF ABDOMEN: CPT | Mod: 26

## 2017-11-03 PROCEDURE — 99215 OFFICE O/P EST HI 40 MIN: CPT

## 2017-11-03 PROCEDURE — C9744: CPT

## 2017-11-03 PROCEDURE — 99214 OFFICE O/P EST MOD 30 MIN: CPT

## 2017-11-06 ENCOUNTER — OTHER (OUTPATIENT)
Age: 82
End: 2017-11-06

## 2017-11-15 ENCOUNTER — APPOINTMENT (OUTPATIENT)
Dept: CARDIOLOGY | Facility: CLINIC | Age: 82
End: 2017-11-15
Payer: MEDICARE

## 2017-11-15 VITALS
SYSTOLIC BLOOD PRESSURE: 128 MMHG | DIASTOLIC BLOOD PRESSURE: 82 MMHG | BODY MASS INDEX: 20.19 KG/M2 | OXYGEN SATURATION: 93 % | WEIGHT: 114 LBS | HEART RATE: 80 BPM

## 2017-11-15 PROCEDURE — 99214 OFFICE O/P EST MOD 30 MIN: CPT

## 2017-11-15 PROCEDURE — 93000 ELECTROCARDIOGRAM COMPLETE: CPT

## 2017-12-01 ENCOUNTER — APPOINTMENT (OUTPATIENT)
Dept: INTERNAL MEDICINE | Facility: CLINIC | Age: 82
End: 2017-12-01
Payer: MEDICARE

## 2017-12-01 VITALS
DIASTOLIC BLOOD PRESSURE: 70 MMHG | HEART RATE: 45 BPM | OXYGEN SATURATION: 96 % | TEMPERATURE: 97.6 F | SYSTOLIC BLOOD PRESSURE: 127 MMHG | WEIGHT: 116 LBS | HEIGHT: 63 IN | BODY MASS INDEX: 20.55 KG/M2

## 2017-12-01 VITALS — HEART RATE: 50 BPM | OXYGEN SATURATION: 98 %

## 2017-12-01 PROCEDURE — 99213 OFFICE O/P EST LOW 20 MIN: CPT

## 2017-12-15 ENCOUNTER — MEDICATION RENEWAL (OUTPATIENT)
Age: 82
End: 2017-12-15

## 2017-12-19 PROCEDURE — 85014 HEMATOCRIT: CPT

## 2017-12-19 PROCEDURE — 82435 ASSAY OF BLOOD CHLORIDE: CPT

## 2017-12-19 PROCEDURE — 76705 ECHO EXAM OF ABDOMEN: CPT

## 2017-12-19 PROCEDURE — 82248 BILIRUBIN DIRECT: CPT

## 2017-12-19 PROCEDURE — 80048 BASIC METABOLIC PNL TOTAL CA: CPT

## 2017-12-19 PROCEDURE — 87486 CHLMYD PNEUM DNA AMP PROBE: CPT

## 2017-12-19 PROCEDURE — 84295 ASSAY OF SERUM SODIUM: CPT

## 2017-12-19 PROCEDURE — 80053 COMPREHEN METABOLIC PANEL: CPT

## 2017-12-19 PROCEDURE — 51701 INSERT BLADDER CATHETER: CPT

## 2017-12-19 PROCEDURE — 97162 PT EVAL MOD COMPLEX 30 MIN: CPT

## 2017-12-19 PROCEDURE — 84443 ASSAY THYROID STIM HORMONE: CPT

## 2017-12-19 PROCEDURE — 87186 SC STD MICRODIL/AGAR DIL: CPT

## 2017-12-19 PROCEDURE — 71045 X-RAY EXAM CHEST 1 VIEW: CPT

## 2017-12-19 PROCEDURE — 99285 EMERGENCY DEPT VISIT HI MDM: CPT | Mod: 25

## 2017-12-19 PROCEDURE — 82140 ASSAY OF AMMONIA: CPT

## 2017-12-19 PROCEDURE — 83010 ASSAY OF HAPTOGLOBIN QUANT: CPT

## 2017-12-19 PROCEDURE — 87798 DETECT AGENT NOS DNA AMP: CPT

## 2017-12-19 PROCEDURE — 83605 ASSAY OF LACTIC ACID: CPT

## 2017-12-19 PROCEDURE — 80162 ASSAY OF DIGOXIN TOTAL: CPT

## 2017-12-19 PROCEDURE — 82607 VITAMIN B-12: CPT

## 2017-12-19 PROCEDURE — 81001 URINALYSIS AUTO W/SCOPE: CPT

## 2017-12-19 PROCEDURE — 85045 AUTOMATED RETICULOCYTE COUNT: CPT

## 2017-12-19 PROCEDURE — 85730 THROMBOPLASTIN TIME PARTIAL: CPT

## 2017-12-19 PROCEDURE — 82803 BLOOD GASES ANY COMBINATION: CPT

## 2017-12-19 PROCEDURE — 82247 BILIRUBIN TOTAL: CPT

## 2017-12-19 PROCEDURE — 87040 BLOOD CULTURE FOR BACTERIA: CPT

## 2017-12-19 PROCEDURE — 82947 ASSAY GLUCOSE BLOOD QUANT: CPT

## 2017-12-19 PROCEDURE — 87086 URINE CULTURE/COLONY COUNT: CPT

## 2017-12-19 PROCEDURE — 82746 ASSAY OF FOLIC ACID SERUM: CPT

## 2017-12-19 PROCEDURE — 84439 ASSAY OF FREE THYROXINE: CPT

## 2017-12-19 PROCEDURE — 85027 COMPLETE CBC AUTOMATED: CPT

## 2017-12-19 PROCEDURE — 94640 AIRWAY INHALATION TREATMENT: CPT

## 2017-12-19 PROCEDURE — 84132 ASSAY OF SERUM POTASSIUM: CPT

## 2017-12-19 PROCEDURE — 83615 LACTATE (LD) (LDH) ENZYME: CPT

## 2017-12-19 PROCEDURE — 87633 RESP VIRUS 12-25 TARGETS: CPT

## 2017-12-19 PROCEDURE — 82330 ASSAY OF CALCIUM: CPT

## 2017-12-19 PROCEDURE — 87581 M.PNEUMON DNA AMP PROBE: CPT

## 2017-12-19 PROCEDURE — 85610 PROTHROMBIN TIME: CPT

## 2017-12-20 ENCOUNTER — MEDICATION RENEWAL (OUTPATIENT)
Age: 82
End: 2017-12-20

## 2017-12-27 ENCOUNTER — NON-APPOINTMENT (OUTPATIENT)
Age: 82
End: 2017-12-27

## 2017-12-27 ENCOUNTER — APPOINTMENT (OUTPATIENT)
Dept: CARDIOLOGY | Facility: CLINIC | Age: 82
End: 2017-12-27
Payer: MEDICARE

## 2017-12-27 VITALS
BODY MASS INDEX: 20.55 KG/M2 | RESPIRATION RATE: 14 BRPM | HEIGHT: 63 IN | HEART RATE: 52 BPM | DIASTOLIC BLOOD PRESSURE: 64 MMHG | SYSTOLIC BLOOD PRESSURE: 139 MMHG | WEIGHT: 116 LBS

## 2017-12-27 PROCEDURE — 93000 ELECTROCARDIOGRAM COMPLETE: CPT

## 2017-12-27 PROCEDURE — 99214 OFFICE O/P EST MOD 30 MIN: CPT

## 2018-01-03 ENCOUNTER — MEDICATION RENEWAL (OUTPATIENT)
Age: 83
End: 2018-01-03

## 2018-01-10 ENCOUNTER — OUTPATIENT (OUTPATIENT)
Dept: OUTPATIENT SERVICES | Facility: HOSPITAL | Age: 83
LOS: 1 days | End: 2018-01-10
Payer: MEDICARE

## 2018-01-10 ENCOUNTER — APPOINTMENT (OUTPATIENT)
Dept: MRI IMAGING | Facility: CLINIC | Age: 83
End: 2018-01-10
Payer: MEDICARE

## 2018-01-10 DIAGNOSIS — Z98.89 OTHER SPECIFIED POSTPROCEDURAL STATES: Chronic | ICD-10-CM

## 2018-01-10 DIAGNOSIS — R93.5 ABNORMAL FINDINGS ON DIAGNOSTIC IMAGING OF OTHER ABDOMINAL REGIONS, INCLUDING RETROPERITONEUM: ICD-10-CM

## 2018-01-10 PROCEDURE — A9585: CPT

## 2018-01-10 PROCEDURE — 74183 MRI ABD W/O CNTR FLWD CNTR: CPT | Mod: 26

## 2018-01-10 PROCEDURE — 82565 ASSAY OF CREATININE: CPT

## 2018-01-10 PROCEDURE — 74183 MRI ABD W/O CNTR FLWD CNTR: CPT

## 2018-01-18 ENCOUNTER — APPOINTMENT (OUTPATIENT)
Dept: INTERVENTIONAL RADIOLOGY/VASCULAR | Facility: CLINIC | Age: 83
End: 2018-01-18
Payer: MEDICARE

## 2018-01-18 VITALS
SYSTOLIC BLOOD PRESSURE: 91 MMHG | HEART RATE: 50 BPM | WEIGHT: 112 LBS | TEMPERATURE: 98 F | RESPIRATION RATE: 16 BRPM | DIASTOLIC BLOOD PRESSURE: 51 MMHG | BODY MASS INDEX: 19.84 KG/M2 | OXYGEN SATURATION: 97 %

## 2018-01-18 DIAGNOSIS — R93.5 ABNORMAL FINDINGS ON DIAGNOSTIC IMAGING OF OTHER ABDOMINAL REGIONS, INCLUDING RETROPERITONEUM: ICD-10-CM

## 2018-01-18 DIAGNOSIS — Z87.09 PERSONAL HISTORY OF OTHER DISEASES OF THE RESPIRATORY SYSTEM: ICD-10-CM

## 2018-01-18 PROCEDURE — 99215 OFFICE O/P EST HI 40 MIN: CPT

## 2018-02-02 ENCOUNTER — APPOINTMENT (OUTPATIENT)
Dept: INTERNAL MEDICINE | Facility: CLINIC | Age: 83
End: 2018-02-02
Payer: MEDICARE

## 2018-02-02 VITALS
TEMPERATURE: 98 F | HEART RATE: 54 BPM | OXYGEN SATURATION: 99 % | SYSTOLIC BLOOD PRESSURE: 100 MMHG | DIASTOLIC BLOOD PRESSURE: 60 MMHG

## 2018-02-02 PROCEDURE — 99214 OFFICE O/P EST MOD 30 MIN: CPT

## 2018-02-04 ENCOUNTER — INPATIENT (INPATIENT)
Facility: HOSPITAL | Age: 83
LOS: 4 days | Discharge: HOME | DRG: 177 | End: 2018-02-09
Attending: STUDENT IN AN ORGANIZED HEALTH CARE EDUCATION/TRAINING PROGRAM | Admitting: HOSPITALIST
Payer: MEDICARE

## 2018-02-04 VITALS
RESPIRATION RATE: 18 BRPM | OXYGEN SATURATION: 95 % | DIASTOLIC BLOOD PRESSURE: 76 MMHG | SYSTOLIC BLOOD PRESSURE: 110 MMHG | HEART RATE: 56 BPM

## 2018-02-04 DIAGNOSIS — Z98.89 OTHER SPECIFIED POSTPROCEDURAL STATES: Chronic | ICD-10-CM

## 2018-02-04 LAB
ALBUMIN SERPL ELPH-MCNC: 3.2 G/DL — LOW (ref 3.3–5)
ALP SERPL-CCNC: 87 U/L — SIGNIFICANT CHANGE UP (ref 40–120)
ALT FLD-CCNC: 25 U/L RC — SIGNIFICANT CHANGE UP (ref 10–45)
ANION GAP SERPL CALC-SCNC: 13 MMOL/L — SIGNIFICANT CHANGE UP (ref 5–17)
APPEARANCE UR: CLEAR — SIGNIFICANT CHANGE UP
AST SERPL-CCNC: 49 U/L — HIGH (ref 10–40)
BACTERIA # UR AUTO: ABNORMAL /HPF
BASOPHILS # BLD AUTO: 0 K/UL — SIGNIFICANT CHANGE UP (ref 0–0.2)
BASOPHILS NFR BLD AUTO: 0.4 % — SIGNIFICANT CHANGE UP (ref 0–2)
BILIRUB SERPL-MCNC: 3.1 MG/DL — HIGH (ref 0.2–1.2)
BILIRUB UR-MCNC: NEGATIVE — SIGNIFICANT CHANGE UP
BUN SERPL-MCNC: 29 MG/DL — HIGH (ref 7–23)
CALCIUM SERPL-MCNC: 9.6 MG/DL — SIGNIFICANT CHANGE UP (ref 8.4–10.5)
CHLORIDE SERPL-SCNC: 101 MMOL/L — SIGNIFICANT CHANGE UP (ref 96–108)
CO2 SERPL-SCNC: 22 MMOL/L — SIGNIFICANT CHANGE UP (ref 22–31)
COLOR SPEC: YELLOW — SIGNIFICANT CHANGE UP
CREAT SERPL-MCNC: 1.5 MG/DL — HIGH (ref 0.5–1.3)
DIFF PNL FLD: NEGATIVE — SIGNIFICANT CHANGE UP
EOSINOPHIL # BLD AUTO: 0 K/UL — SIGNIFICANT CHANGE UP (ref 0–0.5)
EOSINOPHIL NFR BLD AUTO: 1.3 % — SIGNIFICANT CHANGE UP (ref 0–6)
GAS PNL BLDV: SIGNIFICANT CHANGE UP
GLUCOSE SERPL-MCNC: 100 MG/DL — HIGH (ref 70–99)
GLUCOSE UR QL: NEGATIVE — SIGNIFICANT CHANGE UP
HCT VFR BLD CALC: 31.1 % — LOW (ref 34.5–45)
HGB BLD-MCNC: 11 G/DL — LOW (ref 11.5–15.5)
KETONES UR-MCNC: NEGATIVE — SIGNIFICANT CHANGE UP
LEUKOCYTE ESTERASE UR-ACNC: NEGATIVE — SIGNIFICANT CHANGE UP
LYMPHOCYTES # BLD AUTO: 0.4 K/UL — LOW (ref 1–3.3)
LYMPHOCYTES # BLD AUTO: 12.9 % — LOW (ref 13–44)
MCHC RBC-ENTMCNC: 35.4 GM/DL — SIGNIFICANT CHANGE UP (ref 32–36)
MCHC RBC-ENTMCNC: 36.7 PG — HIGH (ref 27–34)
MCV RBC AUTO: 104 FL — HIGH (ref 80–100)
MONOCYTES # BLD AUTO: 0.3 K/UL — SIGNIFICANT CHANGE UP (ref 0–0.9)
MONOCYTES NFR BLD AUTO: 9.4 % — SIGNIFICANT CHANGE UP (ref 2–14)
NEUTROPHILS # BLD AUTO: 2.6 K/UL — SIGNIFICANT CHANGE UP (ref 1.8–7.4)
NEUTROPHILS NFR BLD AUTO: 76 % — SIGNIFICANT CHANGE UP (ref 43–77)
NITRITE UR-MCNC: NEGATIVE — SIGNIFICANT CHANGE UP
PH UR: 6 — SIGNIFICANT CHANGE UP (ref 5–8)
PLATELET # BLD AUTO: 50 K/UL — SIGNIFICANT CHANGE UP (ref 150–400)
POTASSIUM SERPL-MCNC: 4.6 MMOL/L — SIGNIFICANT CHANGE UP (ref 3.5–5.3)
POTASSIUM SERPL-SCNC: 4.6 MMOL/L — SIGNIFICANT CHANGE UP (ref 3.5–5.3)
PROT SERPL-MCNC: 5.4 G/DL — LOW (ref 6–8.3)
PROT UR-MCNC: NEGATIVE — SIGNIFICANT CHANGE UP
RBC # BLD: 2.99 M/UL — LOW (ref 3.8–5.2)
RBC # FLD: 13.4 % — SIGNIFICANT CHANGE UP (ref 10.3–14.5)
RBC CASTS # UR COMP ASSIST: SIGNIFICANT CHANGE UP /HPF (ref 0–2)
SODIUM SERPL-SCNC: 136 MMOL/L — SIGNIFICANT CHANGE UP (ref 135–145)
SP GR SPEC: 1.02 — SIGNIFICANT CHANGE UP (ref 1.01–1.02)
UROBILINOGEN FLD QL: NEGATIVE — SIGNIFICANT CHANGE UP
WBC # BLD: 3.8 K/UL — SIGNIFICANT CHANGE UP (ref 3.8–10.5)
WBC # FLD AUTO: 3.8 K/UL — SIGNIFICANT CHANGE UP (ref 3.8–10.5)
WBC UR QL: SIGNIFICANT CHANGE UP /HPF (ref 0–5)

## 2018-02-04 PROCEDURE — 99285 EMERGENCY DEPT VISIT HI MDM: CPT | Mod: 25,GC

## 2018-02-04 PROCEDURE — 71045 X-RAY EXAM CHEST 1 VIEW: CPT | Mod: 26

## 2018-02-04 RX ORDER — SODIUM CHLORIDE 9 MG/ML
1000 INJECTION INTRAMUSCULAR; INTRAVENOUS; SUBCUTANEOUS ONCE
Qty: 0 | Refills: 0 | Status: DISCONTINUED | OUTPATIENT
Start: 2018-02-04 | End: 2018-02-04

## 2018-02-04 RX ORDER — SODIUM CHLORIDE 9 MG/ML
500 INJECTION INTRAMUSCULAR; INTRAVENOUS; SUBCUTANEOUS ONCE
Qty: 0 | Refills: 0 | Status: COMPLETED | OUTPATIENT
Start: 2018-02-04 | End: 2018-02-04

## 2018-02-04 RX ORDER — IPRATROPIUM/ALBUTEROL SULFATE 18-103MCG
3 AEROSOL WITH ADAPTER (GRAM) INHALATION ONCE
Qty: 0 | Refills: 0 | Status: COMPLETED | OUTPATIENT
Start: 2018-02-04 | End: 2018-02-04

## 2018-02-04 RX ADMIN — SODIUM CHLORIDE 500 MILLILITER(S): 9 INJECTION INTRAMUSCULAR; INTRAVENOUS; SUBCUTANEOUS at 23:04

## 2018-02-04 RX ADMIN — Medication 3 MILLILITER(S): at 23:38

## 2018-02-04 NOTE — ED PROVIDER NOTE - PHYSICAL EXAMINATION
Attending MD Cline: A & O x 3, fatigued appearing, EOMI b/l, PERRL b/l; lungs bilateral scattered rhonchi posterior lung fields, heart with reg rhythm without murmur; abdomen soft NTND; extremities with no edema; affect appropriate. neuro exam non focal with no motor or sensory deficits.

## 2018-02-04 NOTE — ED PROVIDER NOTE - OBJECTIVE STATEMENT
89yo F with hx of liver cancer, CLL, aortic stenosis, hemorrhagic stroke, presents today with cough, fever for 4d. Cough getting more wet according to family. No AMS. fevers to 102 at home, taking tylenol by mouth. went to PMD on Friday and was given prescription of doxy to take when having continued fever. she had a fever and started to take doxy since yesterday, D2 of doxy today. last dose tylenol 730pm. denies dysuria, diarrhea, abdominal pain, cp, sob.

## 2018-02-04 NOTE — ED PROVIDER NOTE - NS ED ROS FT
ROS: denies HA, weakness, dizziness, nausea/vomiting, chest pain, diaphoresis, abdominal pain, diarrhea, joint pain, neuro deficits, dysuria/hematuria, rash    +f/c, cough

## 2018-02-04 NOTE — ED PROVIDER NOTE - ATTENDING CONTRIBUTION TO CARE
Attending MD Cline:  I personally have seen and examined this patient.  Resident note reviewed and agree on plan of care and except where noted.  See HPI, PE, and MDM for details.

## 2018-02-04 NOTE — ED ADULT NURSE NOTE - OBJECTIVE STATEMENT
89y female with hx of aortic stenosis, copd, liver ca presents to the ER c/o ams. PT BIBEMS. pt oriented x person and place. pt unable to answer most of nurses questions- family members provided hx- pt able to speak coherently. pt has wet, non productive cough. as per family since Thursday pt has become increasingly weak and confused, pt ambulating less and moaning to communicate with family. pt having multiple BM/ day due to lactulose. pt abdomen soft non tender. scattered bruising throughout body. pt febrile in ER. family states Tylenol given at 1730. ekg completed, pt on cm. pending md conn. will reassess.

## 2018-02-04 NOTE — ED PROVIDER NOTE - MEDICAL DECISION MAKING DETAILS
Attending MD Cline: 89F with COPD not on home O2, CLL, liver CA presenting with cough x 4 days, exam with scattered rhonchi b/l and pt febrile, ddx includes influenza, COPD exacerbation, PNA. Plan for RVP, labs, CXR, bronchodilators and reassess. Likely admission given advanced age, medical comorbidities and general ill appearance

## 2018-02-05 ENCOUNTER — APPOINTMENT (OUTPATIENT)
Age: 83
End: 2018-02-05

## 2018-02-05 DIAGNOSIS — R63.8 OTHER SYMPTOMS AND SIGNS CONCERNING FOOD AND FLUID INTAKE: ICD-10-CM

## 2018-02-05 DIAGNOSIS — N17.9 ACUTE KIDNEY FAILURE, UNSPECIFIED: ICD-10-CM

## 2018-02-05 DIAGNOSIS — K74.60 UNSPECIFIED CIRRHOSIS OF LIVER: ICD-10-CM

## 2018-02-05 DIAGNOSIS — B97.4 RESPIRATORY SYNCYTIAL VIRUS AS THE CAUSE OF DISEASES CLASSIFIED ELSEWHERE: ICD-10-CM

## 2018-02-05 DIAGNOSIS — I48.91 UNSPECIFIED ATRIAL FIBRILLATION: ICD-10-CM

## 2018-02-05 DIAGNOSIS — Z29.9 ENCOUNTER FOR PROPHYLACTIC MEASURES, UNSPECIFIED: ICD-10-CM

## 2018-02-05 DIAGNOSIS — R05 COUGH: ICD-10-CM

## 2018-02-05 LAB
ALBUMIN SERPL ELPH-MCNC: 3 G/DL — LOW (ref 3.3–5)
ALP SERPL-CCNC: 79 U/L — SIGNIFICANT CHANGE UP (ref 40–120)
ALT FLD-CCNC: 21 U/L RC — SIGNIFICANT CHANGE UP (ref 10–45)
ANION GAP SERPL CALC-SCNC: 11 MMOL/L — SIGNIFICANT CHANGE UP (ref 5–17)
APTT BLD: 33 SEC — SIGNIFICANT CHANGE UP (ref 27.5–37.4)
AST SERPL-CCNC: 44 U/L — HIGH (ref 10–40)
BILIRUB SERPL-MCNC: 2.5 MG/DL — HIGH (ref 0.2–1.2)
BUN SERPL-MCNC: 30 MG/DL — HIGH (ref 7–23)
CALCIUM SERPL-MCNC: 9.2 MG/DL — SIGNIFICANT CHANGE UP (ref 8.4–10.5)
CHLORIDE SERPL-SCNC: 101 MMOL/L — SIGNIFICANT CHANGE UP (ref 96–108)
CO2 SERPL-SCNC: 23 MMOL/L — SIGNIFICANT CHANGE UP (ref 22–31)
CREAT SERPL-MCNC: 1.41 MG/DL — HIGH (ref 0.5–1.3)
GLUCOSE SERPL-MCNC: 161 MG/DL — HIGH (ref 70–99)
HCT VFR BLD CALC: 29.1 % — LOW (ref 34.5–45)
HGB BLD-MCNC: 10.3 G/DL — LOW (ref 11.5–15.5)
INR BLD: 1.45 RATIO — HIGH (ref 0.88–1.16)
MCHC RBC-ENTMCNC: 35.6 GM/DL — SIGNIFICANT CHANGE UP (ref 32–36)
MCHC RBC-ENTMCNC: 36.7 PG — HIGH (ref 27–34)
MCV RBC AUTO: 103 FL — HIGH (ref 80–100)
PLATELET # BLD AUTO: 38 K/UL — LOW (ref 150–400)
POTASSIUM SERPL-MCNC: 4.2 MMOL/L — SIGNIFICANT CHANGE UP (ref 3.5–5.3)
POTASSIUM SERPL-SCNC: 4.2 MMOL/L — SIGNIFICANT CHANGE UP (ref 3.5–5.3)
PROT SERPL-MCNC: 5.1 G/DL — LOW (ref 6–8.3)
PROTHROM AB SERPL-ACNC: 15.8 SEC — HIGH (ref 9.8–12.7)
RAPID RVP RESULT: DETECTED
RBC # BLD: 2.82 M/UL — LOW (ref 3.8–5.2)
RBC # FLD: 13.4 % — SIGNIFICANT CHANGE UP (ref 10.3–14.5)
RSV RNA SPEC QL NAA+PROBE: DETECTED
SODIUM SERPL-SCNC: 135 MMOL/L — SIGNIFICANT CHANGE UP (ref 135–145)
WBC # BLD: 2.7 K/UL — LOW (ref 3.8–10.5)
WBC # FLD AUTO: 2.7 K/UL — LOW (ref 3.8–10.5)

## 2018-02-05 PROCEDURE — 12345: CPT | Mod: GC,NC

## 2018-02-05 PROCEDURE — 99223 1ST HOSP IP/OBS HIGH 75: CPT | Mod: GC

## 2018-02-05 RX ORDER — LACTULOSE 10 G/15ML
200 SOLUTION ORAL
Qty: 0 | Refills: 0 | Status: DISCONTINUED | OUTPATIENT
Start: 2018-02-05 | End: 2018-02-05

## 2018-02-05 RX ORDER — ACETAMINOPHEN 500 MG
500 TABLET ORAL ONCE
Qty: 0 | Refills: 0 | Status: COMPLETED | OUTPATIENT
Start: 2018-02-05 | End: 2018-02-05

## 2018-02-05 RX ORDER — CEFEPIME 1 G/1
INJECTION, POWDER, FOR SOLUTION INTRAMUSCULAR; INTRAVENOUS
Qty: 0 | Refills: 0 | Status: DISCONTINUED | OUTPATIENT
Start: 2018-02-05 | End: 2018-02-09

## 2018-02-05 RX ORDER — PROPRANOLOL HCL 160 MG
10 CAPSULE, EXTENDED RELEASE 24HR ORAL
Qty: 0 | Refills: 0 | Status: DISCONTINUED | OUTPATIENT
Start: 2018-02-05 | End: 2018-02-09

## 2018-02-05 RX ORDER — ACETAMINOPHEN 500 MG
500 TABLET ORAL ONCE
Qty: 0 | Refills: 0 | Status: DISCONTINUED | OUTPATIENT
Start: 2018-02-05 | End: 2018-02-05

## 2018-02-05 RX ORDER — CEFEPIME 1 G/1
2000 INJECTION, POWDER, FOR SOLUTION INTRAMUSCULAR; INTRAVENOUS EVERY 24 HOURS
Qty: 0 | Refills: 0 | Status: DISCONTINUED | OUTPATIENT
Start: 2018-02-06 | End: 2018-02-09

## 2018-02-05 RX ORDER — ACETAMINOPHEN 500 MG
1000 TABLET ORAL ONCE
Qty: 0 | Refills: 0 | Status: DISCONTINUED | OUTPATIENT
Start: 2018-02-05 | End: 2018-02-05

## 2018-02-05 RX ORDER — LACTULOSE 10 G/15ML
30 SOLUTION ORAL
Qty: 0 | Refills: 0 | Status: DISCONTINUED | OUTPATIENT
Start: 2018-02-05 | End: 2018-02-07

## 2018-02-05 RX ORDER — SODIUM CHLORIDE 9 MG/ML
500 INJECTION INTRAMUSCULAR; INTRAVENOUS; SUBCUTANEOUS
Qty: 0 | Refills: 0 | Status: COMPLETED | OUTPATIENT
Start: 2018-02-05 | End: 2018-02-05

## 2018-02-05 RX ORDER — IPRATROPIUM/ALBUTEROL SULFATE 18-103MCG
3 AEROSOL WITH ADAPTER (GRAM) INHALATION EVERY 4 HOURS
Qty: 0 | Refills: 0 | Status: DISCONTINUED | OUTPATIENT
Start: 2018-02-05 | End: 2018-02-09

## 2018-02-05 RX ORDER — LACTULOSE 10 G/15ML
200 SOLUTION ORAL
Qty: 0 | Refills: 0 | Status: DISCONTINUED | OUTPATIENT
Start: 2018-02-05 | End: 2018-02-06

## 2018-02-05 RX ORDER — ACETAMINOPHEN 500 MG
1 TABLET ORAL
Qty: 0 | Refills: 0 | COMMUNITY

## 2018-02-05 RX ORDER — CEFEPIME 1 G/1
2000 INJECTION, POWDER, FOR SOLUTION INTRAMUSCULAR; INTRAVENOUS ONCE
Qty: 0 | Refills: 0 | Status: COMPLETED | OUTPATIENT
Start: 2018-02-05 | End: 2018-02-05

## 2018-02-05 RX ORDER — CEFEPIME 1 G/1
INJECTION, POWDER, FOR SOLUTION INTRAMUSCULAR; INTRAVENOUS
Qty: 0 | Refills: 0 | Status: DISCONTINUED | OUTPATIENT
Start: 2018-02-05 | End: 2018-02-05

## 2018-02-05 RX ORDER — LEVOTHYROXINE SODIUM 125 MCG
12.5 TABLET ORAL AT BEDTIME
Qty: 0 | Refills: 0 | Status: DISCONTINUED | OUTPATIENT
Start: 2018-02-05 | End: 2018-02-08

## 2018-02-05 RX ORDER — BUDESONIDE, MICRONIZED 100 %
0.25 POWDER (GRAM) MISCELLANEOUS
Qty: 0 | Refills: 0 | Status: DISCONTINUED | OUTPATIENT
Start: 2018-02-05 | End: 2018-02-09

## 2018-02-05 RX ORDER — LEVOTHYROXINE SODIUM 125 MCG
25 TABLET ORAL DAILY
Qty: 0 | Refills: 0 | Status: DISCONTINUED | OUTPATIENT
Start: 2018-02-05 | End: 2018-02-05

## 2018-02-05 RX ORDER — DIGOXIN 250 MCG
0.12 TABLET ORAL EVERY OTHER DAY
Qty: 0 | Refills: 0 | Status: DISCONTINUED | OUTPATIENT
Start: 2018-02-06 | End: 2018-02-09

## 2018-02-05 RX ORDER — LACTULOSE 10 G/15ML
20 SOLUTION ORAL
Qty: 0 | Refills: 0 | Status: DISCONTINUED | OUTPATIENT
Start: 2018-02-05 | End: 2018-02-05

## 2018-02-05 RX ADMIN — Medication 200 MILLIGRAM(S): at 18:25

## 2018-02-05 RX ADMIN — Medication 200 MILLIGRAM(S): at 08:10

## 2018-02-05 RX ADMIN — Medication 200 MILLIGRAM(S): at 01:42

## 2018-02-05 RX ADMIN — Medication 25 MICROGRAM(S): at 06:31

## 2018-02-05 RX ADMIN — Medication 12.5 MICROGRAM(S): at 23:16

## 2018-02-05 RX ADMIN — Medication 3 MILLILITER(S): at 06:31

## 2018-02-05 RX ADMIN — SODIUM CHLORIDE 75 MILLILITER(S): 9 INJECTION INTRAMUSCULAR; INTRAVENOUS; SUBCUTANEOUS at 17:48

## 2018-02-05 RX ADMIN — LACTULOSE 20 GRAM(S): 10 SOLUTION ORAL at 09:55

## 2018-02-05 RX ADMIN — Medication 3 MILLILITER(S): at 18:45

## 2018-02-05 RX ADMIN — LACTULOSE 30 GRAM(S): 10 SOLUTION ORAL at 23:17

## 2018-02-05 RX ADMIN — CEFEPIME 100 MILLIGRAM(S): 1 INJECTION, POWDER, FOR SOLUTION INTRAMUSCULAR; INTRAVENOUS at 20:09

## 2018-02-05 RX ADMIN — Medication 3 MILLILITER(S): at 23:16

## 2018-02-05 RX ADMIN — Medication 3 MILLILITER(S): at 14:42

## 2018-02-05 RX ADMIN — Medication 3 MILLILITER(S): at 10:43

## 2018-02-05 NOTE — PROGRESS NOTE ADULT - SUBJECTIVE AND OBJECTIVE BOX
Patient is a 89y old  Female who presents with a chief complaint of Fever and cough x few days (05 Feb 2018 03:11)     INTERVAL HPI/OVERNIGHT EVENTS:  Pt unable to answer questions this AM. Per family at bedside, she is confused and has been lethargic and tired.    MEDICATIONS  (STANDING):  ALBUTerol/ipratropium for Nebulization 3 milliLiter(s) Nebulizer every 4 hours  levothyroxine 25 MICROGram(s) Oral daily  propranolol 10 milliGRAM(s) Oral two times a day  rifaximin 550 milliGRAM(s) Oral two times a day    MEDICATIONS  (PRN):  lactulose Syrup 20 Gram(s) Oral two times a day PRN HE prophylaxis    Allergies:  codeine (Rash)  erythromycin (Rash)  iv dye (Rash)  penicillin (Rash)  shellfish (Rash)    Intolerances:  adhesives (Other)  warfarin (Other)      REVIEW OF SYSTEMS:  Unable to obtain 2/2 pt's clinical status.    VITAL SIGNS:  T(C): 37 (02-05-18 @ 12:25), Max: 39.2 (02-04-18 @ 22:37)  HR: 72 (02-05-18 @ 12:25) (56 - 81)  BP: 95/58 (02-05-18 @ 12:25) (95/58 - 114/50)  RR: 18 (02-05-18 @ 12:25) (16 - 18)  SpO2: 94% (02-05-18 @ 12:25) (93% - 98%)    PHYSICAL EXAM:  General: Elderly lady lying in bed asleep, lethargic.  Eyes: Conjunctiva and sclera clear  ENMT: Moist mucous membranes  Neck: Supple  Nervous System: AAOX0, lethargic  Psych: Appropriate affect and mood  Chest: Clear to auscultation bilaterally; no rales, rhonchi, or wheezing; transmitted upper airway sounds audible  Heart: Regular rate and rhythm; systolic ejection murmur audible  Abd: +BS, soft, nontender, nondistended  Ext:  no LE edema    LABS:                      10.3   2.7   )-----------( 38       ( 05 Feb 2018 11:01 )             29.1     05 Feb 2018 11:01    135    |  101    |  30     ----------------------------<  161    4.2     |  23     |  1.41     Ca    9.2        05 Feb 2018 11:01    TPro  5.1    /  Alb  3.0    /  TBili  2.5    /  DBili  x      /  AST  44     /  ALT  21     /  AlkPhos  79     05 Feb 2018 11:01    PT/INR - ( 05 Feb 2018 11:01 )   PT: 15.8 sec;   INR: 1.45 ratio    PTT - ( 05 Feb 2018 11:01 )  PTT:33.0 sec      RADIOLOGY & ADDITIONAL TESTS:  < from: Xray Chest 1 View- PORTABLE-Urgent (02.04.18 @ 23:24) >  FINDINGS:   The heart size is not accurately evaluated on this projection.   The lungs are clear. There are no pleural effusions or pneumothorax.    IMPRESSION:   Clear lungs.    < end of copied text >    Imaging Personally Reviewed:  [X] YES Patient is a 89y old  Female who presents with a chief complaint of Fever and cough x few days (05 Feb 2018 03:11)     INTERVAL HPI/OVERNIGHT EVENTS:  Pt unable to answer questions this AM. Per family at bedside, she is confused and has been lethargic and tired.    MEDICATIONS  (STANDING):  ALBUTerol/ipratropium for Nebulization 3 milliLiter(s) Nebulizer every 4 hours  levothyroxine 25 MICROGram(s) Oral daily  propranolol 10 milliGRAM(s) Oral two times a day  rifaximin 550 milliGRAM(s) Oral two times a day    MEDICATIONS  (PRN):  lactulose Syrup 20 Gram(s) Oral two times a day PRN HE prophylaxis    Allergies:  codeine (Rash)  erythromycin (Rash)  iv dye (Rash)  penicillin (Rash)  shellfish (Rash)    Intolerances:  adhesives (Other)  warfarin (Other)      REVIEW OF SYSTEMS:  Unable to obtain 2/2 pt's clinical status.    VITAL SIGNS:  T(C): 37 (02-05-18 @ 12:25), Max: 39.2 (02-04-18 @ 22:37)  HR: 72 (02-05-18 @ 12:25) (56 - 81)  BP: 95/58 (02-05-18 @ 12:25) (95/58 - 114/50)  RR: 18 (02-05-18 @ 12:25) (16 - 18)  SpO2: 94% (02-05-18 @ 12:25) (93% - 98%)    PHYSICAL EXAM:  General: Elderly lady lying in bed asleep, lethargic.  Eyes: Conjunctiva and sclera clear  ENMT: Moist mucous membranes  Neck: Supple  Nervous System: AAOX0, lethargic  Psych: Appropriate affect and mood  Chest: Clear to auscultation bilaterally; no rales, rhonchi, or wheezing; transmitted upper airway sounds audible  Heart: Regular rate and rhythm; systolic ejection murmur audible  Abd: +BS, soft, nontender, nondistended  Ext:  no LE edema    LABS:           personally reviewed             10.3   2.7   )-----------( 38       ( 05 Feb 2018 11:01 )             29.1     05 Feb 2018 11:01    135    |  101    |  30     ----------------------------<  161    4.2     |  23     |  1.41     Ca    9.2        05 Feb 2018 11:01    TPro  5.1    /  Alb  3.0    /  TBili  2.5    /  DBili  x      /  AST  44     /  ALT  21     /  AlkPhos  79     05 Feb 2018 11:01    PT/INR - ( 05 Feb 2018 11:01 )   PT: 15.8 sec;   INR: 1.45 ratio    PTT - ( 05 Feb 2018 11:01 )  PTT:33.0 sec      RADIOLOGY & ADDITIONAL TESTS:  < from: Xray Chest 1 View- PORTABLE-Urgent (02.04.18 @ 23:24) >  FINDINGS:   The heart size is not accurately evaluated on this projection.   The lungs are clear. There are no pleural effusions or pneumothorax.    IMPRESSION:   Clear lungs.    < end of copied text >    Imaging Personally Reviewed:  [X] YES

## 2018-02-05 NOTE — DIETITIAN INITIAL EVALUATION ADULT. - NUTRITION INTERVENTION
Meals and Snack/Medical Food Supplements Medical Food Supplements/Nutrition Education/Collaboration and Referral of Nutrition Care/Meals and Snack

## 2018-02-05 NOTE — SWALLOW BEDSIDE ASSESSMENT ADULT - SLP PERTINENT HISTORY OF CURRENT PROBLEM
FOOD ALLERGY: SHELLFISH. 88 y/o F hx liver cirrhosis c/b varices and hepatic encephalopathy, Esophageal Rupture, atrial fibrillation not on anticoagulation, severe AS, severe MR, COPD not on home oxygen, pulmonary hypertension, CKD3, CLL presents with fever and productive cough for the past several days. Per patient's daughters at bedside, she was recently evaluated for these symptoms by PMD and prescribed a course of doxycycline to be initiated if she became febrile. Patient's daughter gave her the first dose yesterday. She sounds increasingly "wet" in reference to her cough. She also endorses decreased appetite, denies abdominal pain nausea, vomiting or diarrhea.

## 2018-02-05 NOTE — H&P ADULT - PROBLEM SELECTOR PLAN 1
Patient relative leukopenia and fever, clinical features of respiratory viral infection including productive cough, myalgias, weakness and decreased appetite. RVP positive for RSV. Chest imaging appears grossly clear.   -Monitor fever curve and hemodynamics  -Follow up pending blood and urine cultures  -Monitor off antibiotics for now  -Patient with significant cardiac history of severe AS and moderate MR; currently appears slightly hypovolemic. Careful fluid resuscitation with frequent assessment of volume status. Patient relative leukopenia and fever, clinical features of respiratory viral infection including productive cough, myalgias, weakness and decreased appetite. RVP positive for RSV. Chest imaging appears grossly clear.   -Monitor fever curve and hemodynamics  -Follow up pending blood and urine cultures  -Favor to treat with ceftriaxone and azithromycin   -Patient with significant cardiac history of severe AS and moderate MR; currently appears slightly hypovolemic. Careful fluid resuscitation with frequent assessment of volume status.

## 2018-02-05 NOTE — DIETITIAN INITIAL EVALUATION ADULT. - PT NOT SOURCE
Pt was able to answer some questions but was weak, fatigued, and had difficulty speaking at times Pt was able to answer some questions but was weak, fatigued, and had difficulty speaking at times- daughter at bedside helped to provide subjective information

## 2018-02-05 NOTE — H&P ADULT - PROBLEM SELECTOR PLAN 4
Patient with significant CHADSVASC 5 scoring for age, female gender, hx TIA however she has had hx of GIB and is not a candidate for anticoagulation.  Presently rate controlled on digoxin and propranolol.

## 2018-02-05 NOTE — ED ADULT NURSE REASSESSMENT NOTE - NS ED NURSE REASSESS COMMENT FT1
MD Pimentel states pt does not need tele monitoring. MD garnica delayed tylenol due to recent tylenol dosing prior to coming to ER.

## 2018-02-05 NOTE — DIETITIAN INITIAL EVALUATION ADULT. - FACTORS AFF FOOD INTAKE
persistent lack of appetite/Pt with persistent lack of appetite that has worsened within the past week. Pt also with difficulty swallowing pills, daughter suspects she might have difficulty swallowing certain foods although pt has never complained of this. SLP consult in place for swallowing evaluation. ?swallowing difficulty- speech and swallow attempted to see patient today, however, was unable to assess patient during time of visit/persistent lack of appetite

## 2018-02-05 NOTE — SWALLOW BEDSIDE ASSESSMENT ADULT - ADDITIONAL RECOMMENDATIONS
Maintain good oral hygiene.  This service will continue to follow to assess for improved swallow function and candidacy for PO diet when pt medically optimized. MD Banda aware and on board with plan. Maintain good oral hygiene.  This service will continue to follow to assess for improved swallow function and candidacy for PO diet when pt medically optimized. MD Banda aware and on board with plan. Of note, pt's daughter expressed concern to this service re: dehydration and IVF. MD made aware.

## 2018-02-05 NOTE — PROGRESS NOTE ADULT - PROBLEM SELECTOR PLAN 1
Patient relative leukopenia and fever, clinical features of respiratory viral infection including productive cough, myalgias, weakness and decreased appetite. RVP positive for RSV. Chest imaging appears grossly clear.  - Monitor fever curve  - Follow up BCxs and UCx, urine legionella  - Treat with levaquin

## 2018-02-05 NOTE — DIETITIAN INITIAL EVALUATION ADULT. - NS AS NUTRI INTERV ED CONTENT
Discussed the importance of po intake, consumption of meals/supplements to meet increased nutrient needs, small frequent meals during periods of increased appetite. RD remains available as needed and per follow-up protocol.

## 2018-02-05 NOTE — DIETITIAN INITIAL EVALUATION ADULT. - NS FNS WEIGHT CHANGE REASON
unintentional/No weight taken to assess weight change. Daughter reports  pounds for the past few months but states she has probably lost more weight secondary to poor po intake. Daughter reports  pounds for the past few months but states she has probably lost more weight secondary to poor po intake./unintentional

## 2018-02-05 NOTE — PROVIDER CONTACT NOTE (OTHER) - ASSESSMENT
pt A&OX1-2, currently no complaints of pain, SOB or HA. All other VSS
hr74, 122/58, 20rr, 97%ra
pt A&OX1-2, no complaints of SOB, CP or HA. temp elevated, all other VSS

## 2018-02-05 NOTE — PROGRESS NOTE ADULT - PROBLEM SELECTOR PLAN 6
DVT ppx: SCDs in setting of h/o GIB    Vicki Silvestre MD - PGY 1  Team 3  [phone]  After 7pm, contact [phone]

## 2018-02-05 NOTE — H&P ADULT - ASSESSMENT
89 y/o F hx liver cirrhosis c/b varices and hepatic encephalopathy, atrial fibrillation not on anticoagulation, severe AS, severe MR, COPD not on home oxygen, pulmonary hypertension, CKD3, CLL presents with viral syndrome secondary to RSV.

## 2018-02-05 NOTE — DIETITIAN INITIAL EVALUATION ADULT. - ENERGY NEEDS
Height: 64 inches, Weight: no weight taken at this time, BMI: unable to assess at this time, IBW: 120 pounds    Other pertinent medical information: 88yo female with nutrition consult for poor PO intake. Presented with fever, cough, decreased appetite for the past few days. PMH: bleeding, esophogeal varices, CKD stage 3, chronic lymphoblastic leukemia, COPD, GI bleeding, liver cell carcinoma, portan HTN, pulmonary HTN, PVD, severe aortic stenosis, afib. Height: 64 inches, Weight: 107 pounds, BMI: 18.4kg/m2, IBW: 120 pounds, 89%IBW    Other pertinent medical information: Per chart, this is a 89 Y/O female presented with fever, cough, decreased appetite for the past few days. PMH: bleeding, esophogeal varices, CKD stage 3, chronic lymphoblastic leukemia, COPD, GI bleeding, liver cell carcinoma, portal HTN, pulmonary HTN, PVD, severe aortic stenosis, afib.

## 2018-02-05 NOTE — H&P ADULT - PROBLEM SELECTOR PLAN 3
KARRIE on CKD, with baseline creatinine 1.0 most recently. She denies decreased urination, dark urine. Likely prerenal azotemia in the setting of ongoing viral illness.   -s/p 500 cc bolus  -monitor trend BUN/creatinine  -renally dose all medications, hold nephrotoxins

## 2018-02-05 NOTE — DIETITIAN INITIAL EVALUATION ADULT. - OTHER INFO
Pt seen for poor PO intake. Pt reports po intake <25% with chronic lack of appetite that has worsened within the past week. No new weight taken to assess weight change. Weight history per medical chart: 111.7 pounds (10/17), 122.3 pounds (5/17), 126.3 pounds (2/16). Pt daughter reports last week pt was 112 pounds, but suspects she has lost even more weight secondary to poor po intake. No n/v reported. Chronic loose stools secondary to lactulose syrup, +BM yesterday. Pt seen for poor PO intake consult on [facility]. Pt reports po intake <25% with lack of appetite that has worsened within the past week. Recent wt changes noted above. Weight history per previous RD notes: 111.7 pounds (10/17/17), 122.3 pounds (5/17/17), 126.3 pounds (2/16/16). No N+V reported. Pt reports loose stools- note pt receiving lactulose syrup- RN aware. +BM yesterday.

## 2018-02-05 NOTE — PROGRESS NOTE ADULT - PROBLEM SELECTOR PLAN 2
Patient follows with Dr. Macdonald outpatient. Has hx of varices though no recent assessment. Compliant with HE prophylaxis. More encephalopathic this AM in setting of infection with worsening lethargy.  - C/w propranolol 10mg BID  - C/w Rifaximin and Lactulose: goal 2-3 BMs daily

## 2018-02-05 NOTE — PROGRESS NOTE ADULT - PROBLEM SELECTOR PLAN 3
KARRIE on CKD, with baseline creatinine 1.0 most recently. Likely prerenal azotemia in the setting of ongoing viral illness and dehydration on presentation. S/p 500 cc bolus.  - Monitor Cr  - Renally dose meds, hold nephrotoxins  - Very careful fluid resuscitation in setting of severe AS

## 2018-02-05 NOTE — CHART NOTE - NSCHARTNOTEFT_GEN_A_CORE
Upon Nutritional Assessment by the Registered Dietitian your patient was determined to meet criteria / has evidence of the following diagnosis/diagnoses:          [ ]  Mild Protein Calorie Malnutrition        [ ]  Moderate Protein Calorie Malnutrition        [X] Severe Protein Calorie Malnutrition        [ ] Unspecified Protein Calorie Malnutrition        [X] Underweight / BMI <19        [ ] Morbid Obesity / BMI > 40      Findings as based on:  [X] Comprehensive nutrition assessment   [X] Nutrition Focused Physical Exam: severe temporal, clavicle, shoulders, and thigh muscle mass loss  [X] Other: <75% nutrition needs >/= 1 month, 3.58% wt loss x 4 months, BMI = 18.4kg/m2      Nutrition Plan/Recommendations:  Recommend mechanical soft diet as medically feasible. Consistency/texture per pending speech and swallow recommendations. Recommend 3 Ensure Pudding per day to increase po intake.        PROVIDER Section:     By signing this assessment you are acknowledging and agree with the diagnosis/diagnoses assigned by the Registered Dietitian    Comments:

## 2018-02-05 NOTE — DIETITIAN INITIAL EVALUATION ADULT. - ADHERENCE
PTA pt on low sodium, low fat diet w/ small protein serving at every meal./good PTA pt on low sodium, low fat diet with small protein serving at every meal./good

## 2018-02-05 NOTE — DIETITIAN INITIAL EVALUATION ADULT. - PERTINENT LABORATORY DATA
2/5 BUN 30H, Creatinine 1.41H, Gluc 161H, eGFR 33L 2/5 BUN 30H, Creatinine 1.41H, Gluc 161H, eGFR 33L, AST 44

## 2018-02-05 NOTE — DIETITIAN INITIAL EVALUATION ADULT. - NUTRITIONGOAL OUTCOME1
Pt to meet >75% estimated needs through PO intake; no further weight loss Pt to meet >75% estimated nutrient needs

## 2018-02-05 NOTE — H&P ADULT - NSHPREVIEWOFSYSTEMS_GEN_ALL_CORE
REVIEW OF SYSTEMS:    CONSTITUTIONAL: Endorses weakness and fever  EYES/ENT: No visual changes;  No vertigo or throat pain   NECK: No pain or stiffness  RESPIRATORY: Endorses cough; No shortness of breath  CARDIOVASCULAR: No chest pain or palpitations  GASTROINTESTINAL: No abdominal or epigastric pain. No nausea, vomiting, or hematemesis; No diarrhea or constipation. No melena or hematochezia.  GENITOURINARY: No dysuria, frequency or hematuria  NEUROLOGICAL: No numbness or weakness  SKIN: Endorses increasing fragility of skin on lower extremities  All other review of systems is negative unless indicated above.

## 2018-02-05 NOTE — DIETITIAN INITIAL EVALUATION ADULT. - SIGNS/SYMPTOMS
NFPE performed, history of weight loss, suspected additional weight loss, dietary recall <75% nutrition needs >/= 1 month, severe muscle mass loss, 3.58% wt loss x 4 months, BMI = 18.4kg/m2

## 2018-02-05 NOTE — H&P ADULT - ATTENDING COMMENTS
89F hx liver cirrhosis c/b varices and hepatic encephalopathy, atrial fibrillation, severe AS, severe MR, COPD, pulmonary hypertension, CKD3, CLL presents URI symptoms , febrile and tachycardic in mild resp distress, lung are rhodochrous , labs with leukopenia , KARRIE, liver test elevated slightly above baseline , rvp + for RSV; will provide supportive care, meets GOLD criteria for COPD exacerbation requiring antibiotics.

## 2018-02-05 NOTE — DIETITIAN INITIAL EVALUATION ADULT. - ORAL INTAKE PTA
Pt reports chronic lack of appetite that has worsened within the past week. Usually has 3 small meals/day, diet recall: yogurt, muffin, or cereal for breakfast; sandwich or salad for lunch; chicken with vegetables, salad for dinner. Daughter reports pt has difficulty swallowing pills, SLP consult for swallow assessment in place. Daughter reports she feeds patient soft foods at home (muffins, bread, etc.), pt on mechanical soft diet currently. No supplements taken at home. Pt reports allergy to shellfish./poor Pt reports persistent lack of appetite that has worsened within the past week. Usually has 3 small meals/day, diet recall: yogurt, muffin, or cereal for breakfast; sandwich or salad for lunch; chicken with vegetables, salad for dinner. Daughter reports pt has difficulty swallowing pills PTA. Daughter reports she feeds patient soft foods at home (muffins, bread, etc.). No supplements taken at home. Pt reports allergy to shellfish./poor

## 2018-02-05 NOTE — DIETITIAN INITIAL EVALUATION ADULT. - NS AS NUTRI INTERV MEDICAL AND FOOD SUPPLEMENTS
Ensure Enlive vanilla TID; Ensure pudding BID./Commercial beverage Commercial beverage/Recommend 3 Ensure pudding per day (provides additional 510 calories, 12 grams protein). Discussed with NP. Commercial beverage/Recommend 3 Ensure pudding per day (provides additional 510 calories, 12 grams protein). Discussed with team.

## 2018-02-05 NOTE — DIETITIAN INITIAL EVALUATION ADULT. - PHYSICAL APPEARANCE
No weight taken to assess BMI. NFPE performed, pt meets criteria for severe protein calorie malnutrition in context of chronic illness. Skin: no edema noted, WDL except bruising./emaciated BMI =18.4kg/m2, NFPE performed with RD. Findings include severe temporal, clavicle, shoulders, and thigh muscle mass loss. Skin: no edema noted, WDL except bruising./emaciated

## 2018-02-05 NOTE — DIETITIAN INITIAL EVALUATION ADULT. - NS AS NUTRI INTERV MEALS SNACK
Continue mechanical soft diet Continue regular, mechanical soft diet as medically feasible. Consistency/texture per pending speech and swallow recommendations.

## 2018-02-05 NOTE — PROVIDER CONTACT NOTE (OTHER) - BACKGROUND
pt admitted for AMS, fatigue and cough
pt admitted with rsv, npo
pt admitted for AMS, fatigue and cough.

## 2018-02-05 NOTE — H&P ADULT - NSHPLABSRESULTS_GEN_ALL_CORE
11.0   3.8   )-----------( 50       ( 2018 22:54 )             31.1       -    136  |  101  |  29<H>  ----------------------------<  100<H>  4.6   |  22  |  1.50<H>    Ca    9.6      2018 22:54    TPro  5.4<L>  /  Alb  3.2<L>  /  TBili  3.1<H>  /  DBili  x   /  AST  49<H>  /  ALT  25  /  AlkPhos  87  -              Urinalysis Basic - ( 2018 23:06 )    Color: Yellow / Appearance: Clear / S.018 / pH: x  Gluc: x / Ketone: Negative  / Bili: Negative / Urobili: Negative   Blood: x / Protein: Negative / Nitrite: Negative   Leuk Esterase: Negative / RBC: 0-2 /HPF / WBC 0-2 /HPF   Sq Epi: x / Non Sq Epi: x / Bacteria: Moderate /HPF            Lactate Trend            CAPILLARY BLOOD GLUCOSE    CXR: Grossly clear lungs 11.0   3.8   )-----------( 50       ( 2018 22:54 )             31.1       -    136  |  101  |  29<H>  ----------------------------<  100<H>  4.6   |  22  |  1.50<H>    Ca    9.6      2018 22:54    TPro  5.4<L>  /  Alb  3.2<L>  /  TBili  3.1<H>  /  DBili  x   /  AST  49<H>  /  ALT  25  /  AlkPhos  87  -              Urinalysis Basic - ( 2018 23:06 )    Color: Yellow / Appearance: Clear / S.018 / pH: x  Gluc: x / Ketone: Negative  / Bili: Negative / Urobili: Negative   Blood: x / Protein: Negative / Nitrite: Negative   Leuk Esterase: Negative / RBC: 0-2 /HPF / WBC 0-2 /HPF   Sq Epi: x / Non Sq Epi: x / Bacteria: Moderate /HPF            Lactate Trend            CAPILLARY BLOOD GLUCOSE    CXR personally reviewed : Grossly clear lungs, no focal consolidation    EKG personally reviewed: sinus rhythm , < 1mm s-t depression in I , V3-4

## 2018-02-05 NOTE — H&P ADULT - HISTORY OF PRESENT ILLNESS
CC/HPI:    88 y/o F hx liver cirrhosis c/b varices and hepatic encephalopathy, atrial fibrillation not on anticoagulation, severe AS, severe MR, COPD not on home oxygen, pulmonary hypertension, CKD3, CLL presents with fever and productive cough for the past several days.     Per patient's daughters at bedside, she was recently evaluated for these symptoms by PMD and prescribed a course of doxycycline to be initiated if she became febrile. Patient's daughter gave her the first dose yesterday. She sounds increasingly "wet" in reference to her cough. She also endorses decreased appetite, denies abdominal pain nausea, vomiting or diarrhea. She denies sick contacts or recent illness or hospitalization. Per patient's daughters, she has been increasingly fatigued and weak, unable to ambulate with a rolling walker as is her baseline. They have also noted her to have occasional difficulty swallowing, especially with larger pills, though she has been eventually tolerating them overall.     She was most recently admitted in 10/2017 for hepatic encephalopathy in the setting of Klebsiella urinary tract infection and improved with antibiotics.     ER Vital Signs: Temp 102.5, HR 50s, BP 110s/50-80s, RR 18, 93-98 on RA  She received Tylenol 500 x1, 500 cc bolus

## 2018-02-05 NOTE — DIETITIAN INITIAL EVALUATION ADULT. - SOURCE
patient/Medical chart; pt daughter present and able to help answer questions patient/Medical chart; pt daughter present at bedside, previous RD notes Team, Medical chart; pt daughter present at bedside, previous RD notes/patient

## 2018-02-05 NOTE — H&P ADULT - NSHPPHYSICALEXAM_GEN_ALL_CORE
GENERAL: Thin, chronically ill appearing  HEAD:  Atraumatic, Normocephalic  ENT: EOMI, PERRLA, conjunctiva and sclera clear, Neck supple, No JVD, dry mucous membranes, no pharyngeal erythema, no tonsillar enlargement or exudate  CHEST/LUNG: Diffuse rhonchi   HEART: Regular rate and rhythm; No murmurs, rubs, or gallops  ABDOMEN: Soft, Nontender, Nondistended; Bowel sounds present, no organomegaly  EXTREMITIES:  No clubbing, cyanosis, or edema  PSYCH: Nl behavior, nl affect  NEUROLOGY: AAOx2, non-focal, cranial nerves intact  SKIN: Chronic venous stasis changes bilateral lower extremities. Thin, fragile skin. Vital Signs Last 24 Hrs  T(C): 37.1 (05 Feb 2018 02:57), Max: 39.2 (04 Feb 2018 22:37)  T(F): 98.7 (05 Feb 2018 02:57), Max: 102.5 (04 Feb 2018 22:37)  HR: 57 (05 Feb 2018 02:57) (56 - 58)  BP: 108/88 (05 Feb 2018 02:57) (108/88 - 114/50)  BP(mean): --  RR: 17 (05 Feb 2018 02:57) (17 - 18)  SpO2: 94% (05 Feb 2018 02:57) (93% - 98%)    GENERAL: Thin, chronically ill appearing  HEAD:  Atraumatic, Normocephalic  ENT: EOMI, PERRLA, conjunctiva and sclera clear, Neck supple, No JVD, dry mucous membranes, no pharyngeal erythema, no tonsillar enlargement or exudate  CHEST/LUNG: Diffuse rhonchi   HEART: Regular rate and rhythm; No murmurs, rubs, or gallops  ABDOMEN: Soft, Nontender, Nondistended; Bowel sounds present, no organomegaly  EXTREMITIES:  No clubbing, cyanosis, or edema  PSYCH: Nl behavior, nl affect  NEUROLOGY: AAOx2, non-focal, cranial nerves intact  SKIN: Chronic venous stasis changes bilateral lower extremities. Thin, fragile skin.

## 2018-02-05 NOTE — PROGRESS NOTE ADULT - PROBLEM SELECTOR PLAN 4
Patient with significant CHADSVASC 5 with h/o GIB. Not on AC.  - C/w rate control with digoxin and propranolol

## 2018-02-05 NOTE — H&P ADULT - PROBLEM SELECTOR PLAN 2
Patient follows with Dr. Macdonald outpatient. Has hx of varices though no recent assessment. Compliant with HE prophylaxis and presently at baseline mental status per daughters at bedside. Hepatology c/s in AM.   -Continue with propranolol 10mg BID  -Continue with Rifaximin and Lactulose as needed to maintain 2-3 soft BM daily  -Patient does not take any diuretics at per medication review

## 2018-02-05 NOTE — SWALLOW BEDSIDE ASSESSMENT ADULT - SWALLOW EVAL: DIAGNOSIS
Attempted to see pt for bedside swallow evaluation. Pt transferred from ED to floor. RN currently with pt. This service to f/u later today as schedule permits. RN and pt's daughter made aware. Pt presents with an oral and suspected pharyngeal dysphagia superimposed upon a baseline cough and characterized by delayed oral transit time, delay in trigger of the pharyngeal swallow, reduced hyolaryngeal elevation upon palpation, repeat swallows post intake (up to 3xs) suggestive of pharyngeal residue and cough post PO intake of most conservative textures. Baseline cough makes subjective assessment difficult to a degree. Hoarse vocal quality.

## 2018-02-06 DIAGNOSIS — R13.10 DYSPHAGIA, UNSPECIFIED: ICD-10-CM

## 2018-02-06 LAB
ANION GAP SERPL CALC-SCNC: 13 MMOL/L — SIGNIFICANT CHANGE UP (ref 5–17)
BUN SERPL-MCNC: 30 MG/DL — HIGH (ref 7–23)
CALCIUM SERPL-MCNC: 9.1 MG/DL — SIGNIFICANT CHANGE UP (ref 8.4–10.5)
CHLORIDE SERPL-SCNC: 102 MMOL/L — SIGNIFICANT CHANGE UP (ref 96–108)
CO2 SERPL-SCNC: 24 MMOL/L — SIGNIFICANT CHANGE UP (ref 22–31)
CREAT SERPL-MCNC: 1.32 MG/DL — HIGH (ref 0.5–1.3)
GLUCOSE SERPL-MCNC: 97 MG/DL — SIGNIFICANT CHANGE UP (ref 70–99)
HCT VFR BLD CALC: 30.7 % — LOW (ref 34.5–45)
HGB BLD-MCNC: 10.4 G/DL — LOW (ref 11.5–15.5)
MAGNESIUM SERPL-MCNC: 1.7 MG/DL — SIGNIFICANT CHANGE UP (ref 1.6–2.6)
MANUAL SMEAR VERIFICATION: SIGNIFICANT CHANGE UP
MCHC RBC-ENTMCNC: 33.8 PG — SIGNIFICANT CHANGE UP (ref 27–34)
MCHC RBC-ENTMCNC: 33.9 GM/DL — SIGNIFICANT CHANGE UP (ref 32–36)
MCV RBC AUTO: 99.7 FL — SIGNIFICANT CHANGE UP (ref 80–100)
PHOSPHATE SERPL-MCNC: 2.4 MG/DL — LOW (ref 2.5–4.5)
PLAT MORPH BLD: SIGNIFICANT CHANGE UP
PLATELET # BLD AUTO: 38 K/UL — LOW (ref 150–400)
POTASSIUM SERPL-MCNC: 4.3 MMOL/L — SIGNIFICANT CHANGE UP (ref 3.5–5.3)
POTASSIUM SERPL-SCNC: 4.3 MMOL/L — SIGNIFICANT CHANGE UP (ref 3.5–5.3)
RBC # BLD: 3.08 M/UL — LOW (ref 3.8–5.2)
RBC # FLD: 15.5 % — HIGH (ref 10.3–14.5)
RBC BLD AUTO: SIGNIFICANT CHANGE UP
SODIUM SERPL-SCNC: 139 MMOL/L — SIGNIFICANT CHANGE UP (ref 135–145)
WBC # BLD: 2.72 K/UL — LOW (ref 3.8–10.5)
WBC # FLD AUTO: 2.72 K/UL — LOW (ref 3.8–10.5)

## 2018-02-06 PROCEDURE — 99233 SBSQ HOSP IP/OBS HIGH 50: CPT | Mod: GC

## 2018-02-06 RX ORDER — SODIUM CHLORIDE 9 MG/ML
1000 INJECTION, SOLUTION INTRAVENOUS
Qty: 0 | Refills: 0 | Status: DISCONTINUED | OUTPATIENT
Start: 2018-02-06 | End: 2018-02-07

## 2018-02-06 RX ADMIN — Medication 3 MILLILITER(S): at 18:26

## 2018-02-06 RX ADMIN — CEFEPIME 100 MILLIGRAM(S): 1 INJECTION, POWDER, FOR SOLUTION INTRAMUSCULAR; INTRAVENOUS at 18:59

## 2018-02-06 RX ADMIN — Medication 3 MILLILITER(S): at 02:15

## 2018-02-06 RX ADMIN — Medication 62.5 MILLIMOLE(S): at 10:34

## 2018-02-06 RX ADMIN — Medication 0.25 MILLIGRAM(S): at 05:07

## 2018-02-06 RX ADMIN — LACTULOSE 30 GRAM(S): 10 SOLUTION ORAL at 05:08

## 2018-02-06 RX ADMIN — Medication 3 MILLILITER(S): at 09:29

## 2018-02-06 RX ADMIN — SODIUM CHLORIDE 50 MILLILITER(S): 9 INJECTION, SOLUTION INTRAVENOUS at 13:42

## 2018-02-06 RX ADMIN — Medication 3 MILLILITER(S): at 05:06

## 2018-02-06 RX ADMIN — Medication 0.12 MILLIGRAM(S): at 11:33

## 2018-02-06 RX ADMIN — Medication 10 MILLIGRAM(S): at 05:07

## 2018-02-06 RX ADMIN — Medication 12.5 MICROGRAM(S): at 22:57

## 2018-02-06 RX ADMIN — Medication 3 MILLILITER(S): at 13:42

## 2018-02-06 RX ADMIN — LACTULOSE 30 GRAM(S): 10 SOLUTION ORAL at 18:26

## 2018-02-06 RX ADMIN — LACTULOSE 30 GRAM(S): 10 SOLUTION ORAL at 11:32

## 2018-02-06 RX ADMIN — Medication 0.25 MILLIGRAM(S): at 18:25

## 2018-02-06 RX ADMIN — Medication 3 MILLILITER(S): at 22:57

## 2018-02-06 NOTE — PROGRESS NOTE ADULT - PROBLEM SELECTOR PLAN 1
Patient relative leukopenia and fever, clinical features of respiratory viral infection including productive cough, myalgias, weakness and decreased appetite. RVP positive for RSV. Chest imaging appears grossly clear but pt continuing to spike high fevers.  - Monitor fever curve  - Follow up BCxs and UCx, urine legionella  - Escalated antibiotics to cefepime  - Monitor white count Patient relative leukopenia and fever, clinical features of respiratory viral infection including productive cough, myalgias, weakness and decreased appetite. RVP positive for RSV. Chest imaging appears grossly clear but pt continuing to spike high fevers.  - Monitor fever curve  - Follow up BCxs and UCx, urine legionella  - Escalated antibiotics to cefepime given persistent fever  - Monitor white count

## 2018-02-06 NOTE — PROGRESS NOTE ADULT - ASSESSMENT
88 y/o F hx liver cirrhosis c/b varices and hepatic encephalopathy, atrial fibrillation not on anticoagulation, severe AS, severe MR, COPD not on home oxygen, pulmonary hypertension, CKD3, CLL presents with viral syndrome secondary to RSV.

## 2018-02-06 NOTE — PROVIDER CONTACT NOTE (MEDICATION) - SITUATION
pt ordered for both lactulose PO and lactulose enema at 0600hrs. Does provider want pt to have both? or is enema only if Pt cannot tolerate PO?
pt had 5 bm's throughout day. receiving lactulose q6hrs. order states titrate for 2-3 bm's per day. does provider want pt to receive midnight dose?

## 2018-02-06 NOTE — PROGRESS NOTE ADULT - SUBJECTIVE AND OBJECTIVE BOX
Patient is a 89y old  Female who presents with a chief complaint of Fever and cough x few days (05 Feb 2018 03:11)     INTERVAL HPI/OVERNIGHT EVENTS:  Pt spiked a fever yesterday evening to 102.8. Pt has no complaints thi morning, more alert than yesterday.    MEDICATIONS  (STANDING):  ALBUTerol/ipratropium for Nebulization 3 milliLiter(s) Nebulizer every 4 hours  buDESOnide   0.25 milliGRAM(s) Respule 0.25 milliGRAM(s) Inhalation two times a day  cefepime  IVPB      cefepime  IVPB 2000 milliGRAM(s) IV Intermittent every 24 hours  digoxin     Tablet 0.125 milliGRAM(s) Oral every other day  lactulose Syrup 30 Gram(s) Oral four times a day  levothyroxine Injectable 12.5 MICROGram(s) IV Push at bedtime  propranolol 10 milliGRAM(s) Oral two times a day  rifaximin 550 milliGRAM(s) Oral two times a day    MEDICATIONS  (PRN):    Allergies:  codeine (Rash)  erythromycin (Rash)  iv dye (Rash)  penicillin (Rash)  shellfish (Rash)    Intolerances:  adhesives (Other)  warfarin (Other)      REVIEW OF SYSTEMS:  Constitutional: Denies fever, chills  Resp: Denies SOB, cough  CV: Denies chest pain, palpitations  GI: Denies abdominal pain, N/V/D/C  : Denies dysuria  Neuro: Denies HA  Skin: Denies rashes  Lymph Nodes: Denies enlarged glands  MSK: Denies swelling    VITAL SIGNS:  T(C): 37.1 (02-06-18 @ 11:24), Max: 39.3 (02-05-18 @ 18:02)  HR: 69 (02-06-18 @ 11:24) (64 - 85)  BP: 139/56 (02-06-18 @ 11:24) (95/55 - 139/56)  RR: 18 (02-06-18 @ 11:24) (18 - 20)  SpO2: 94% (02-06-18 @ 11:24) (94% - 97%)    PHYSICAL EXAM:  General: Cachectic elderly lady seen lying in bed in NAD  Eyes: Conjunctiva and sclera clear  ENMT: Moist mucous membranes  Neck: Supple  Nervous System: AAOX2, oriented to person and place  Psych: Appropriate affect and mood  Chest: Clear to auscultation bilaterally; no rales, rhonchi, or wheezing  Heart: Regular rate and rhythm; systolic ejection murmur  Abd: +BS, soft, nontender, nondistended  Ext:  no LE edema    LABS:    06 Feb 2018 07:50    139    |  102    |  30     ----------------------------<  97     4.3     |  24     |  1.32     Ca    9.1        06 Feb 2018 07:50  Phos  2.4       06 Feb 2018 07:50  Mg     1.7       06 Feb 2018 07:50      BLOOD CULTURE  02-05 @ 06:31   No growth to date.  --  --  02-05 @ 06:21   >100,000 CFU/ml Klebsiella pneumoniae  --  --    Consultant(s) Notes Reviewed:  Speech and swallow, dietician    Care Discussed with Consultants/Other Providers: Patient is a 89y old  Female who presents with a chief complaint of Fever and cough x few days (05 Feb 2018 03:11)     INTERVAL HPI/OVERNIGHT EVENTS:  Pt spiked a fever yesterday evening to 102.8. Pt has no complaints thi morning, more alert than yesterday.    MEDICATIONS  (STANDING):  ALBUTerol/ipratropium for Nebulization 3 milliLiter(s) Nebulizer every 4 hours  buDESOnide   0.25 milliGRAM(s) Respule 0.25 milliGRAM(s) Inhalation two times a day  cefepime  IVPB      cefepime  IVPB 2000 milliGRAM(s) IV Intermittent every 24 hours  digoxin     Tablet 0.125 milliGRAM(s) Oral every other day  lactulose Syrup 30 Gram(s) Oral four times a day  levothyroxine Injectable 12.5 MICROGram(s) IV Push at bedtime  propranolol 10 milliGRAM(s) Oral two times a day  rifaximin 550 milliGRAM(s) Oral two times a day    MEDICATIONS  (PRN):    Allergies:  codeine (Rash)  erythromycin (Rash)  iv dye (Rash)  penicillin (Rash)  shellfish (Rash)    Intolerances:  adhesives (Other)  warfarin (Other)      REVIEW OF SYSTEMS:  Constitutional: Denies fever, chills  Resp: Denies SOB, cough  CV: Denies chest pain, palpitations  GI: Denies abdominal pain, N/V/D/C  : Denies dysuria  Neuro: Denies HA  Skin: Denies rashes  Lymph Nodes: Denies enlarged glands  MSK: Denies swelling    VITAL SIGNS:  T(C): 37.1 (02-06-18 @ 11:24), Max: 39.3 (02-05-18 @ 18:02)  HR: 69 (02-06-18 @ 11:24) (64 - 85)  BP: 139/56 (02-06-18 @ 11:24) (95/55 - 139/56)  RR: 18 (02-06-18 @ 11:24) (18 - 20)  SpO2: 94% (02-06-18 @ 11:24) (94% - 97%)    PHYSICAL EXAM:  General: Cachectic elderly lady seen lying in bed in NAD  Eyes: Conjunctiva and sclera clear  ENMT: Moist mucous membranes  Neck: Supple  Nervous System: AAOX2, oriented to person and place  Psych: Appropriate affect and mood  Chest: Clear to auscultation bilaterally; no rales, rhonchi, or wheezing  Heart: Regular rate and rhythm; systolic ejection murmur  Abd: +BS, soft, nontender, nondistended  Ext:  no LE edema    LABS:  personally reviewed    06 Feb 2018 07:50    139    |  102    |  30     ----------------------------<  97     4.3     |  24     |  1.32     Ca    9.1        06 Feb 2018 07:50  Phos  2.4       06 Feb 2018 07:50  Mg     1.7       06 Feb 2018 07:50      BLOOD CULTURE  02-05 @ 06:31   No growth to date.  --  --  02-05 @ 06:21   >100,000 CFU/ml Klebsiella pneumoniae  --  --    Consultant(s) Notes Reviewed:  Speech and swallow, dietician    Care Discussed with Consultants/Other Providers:

## 2018-02-06 NOTE — PROGRESS NOTE ADULT - PROBLEM SELECTOR PLAN 2
Pt with increased difficulty swallowing since getting sick. S+S eval recommended NPO.  - Keep pt NPO except meds for now  - Spoke to family and they agreed to repeat S+S assessment once pt more alert. Was pretty lethargic during previous assessment and not oriented. Will re-consult S+S when pt's encephalopathy improves.  - Start IVF: D5NS @50cc/hr. Keep at low rate 2/2 severe aortic stenosis

## 2018-02-06 NOTE — PROGRESS NOTE ADULT - PROBLEM SELECTOR PLAN 3
Patient follows with Dr. Macdonald outpatient. Has hx of varices though no recent assessment. Compliant with HE prophylaxis. Fluctuating alertness and orientation.  - C/w propranolol 10mg BID  - C/w Rifaximin and Lactulose: goal 2-3 BMs daily

## 2018-02-06 NOTE — PROGRESS NOTE ADULT - PROBLEM SELECTOR PLAN 7
DVT ppx: SCDs in setting of h/o GIB    Vicki Silvestre MD - PGY 1  Team 3  [phone]  After 7pm, contact [phone]

## 2018-02-07 ENCOUNTER — TRANSCRIPTION ENCOUNTER (OUTPATIENT)
Age: 83
End: 2018-02-07

## 2018-02-07 LAB
-  AMIKACIN: SIGNIFICANT CHANGE UP
-  AMPICILLIN/SULBACTAM: SIGNIFICANT CHANGE UP
-  AMPICILLIN: SIGNIFICANT CHANGE UP
-  AZTREONAM: SIGNIFICANT CHANGE UP
-  CEFAZOLIN: SIGNIFICANT CHANGE UP
-  CEFEPIME: SIGNIFICANT CHANGE UP
-  CEFOXITIN: SIGNIFICANT CHANGE UP
-  CEFTAZIDIME: SIGNIFICANT CHANGE UP
-  CEFTRIAXONE: SIGNIFICANT CHANGE UP
-  CIPROFLOXACIN: SIGNIFICANT CHANGE UP
-  ERTAPENEM: SIGNIFICANT CHANGE UP
-  GENTAMICIN: SIGNIFICANT CHANGE UP
-  IMIPENEM: SIGNIFICANT CHANGE UP
-  LEVOFLOXACIN: SIGNIFICANT CHANGE UP
-  MEROPENEM: SIGNIFICANT CHANGE UP
-  NITROFURANTOIN: SIGNIFICANT CHANGE UP
-  PIPERACILLIN/TAZOBACTAM: SIGNIFICANT CHANGE UP
-  TOBRAMYCIN: SIGNIFICANT CHANGE UP
-  TRIMETHOPRIM/SULFAMETHOXAZOLE: SIGNIFICANT CHANGE UP
ANION GAP SERPL CALC-SCNC: 12 MMOL/L — SIGNIFICANT CHANGE UP (ref 5–17)
BUN SERPL-MCNC: 32 MG/DL — HIGH (ref 7–23)
CALCIUM SERPL-MCNC: 8.9 MG/DL — SIGNIFICANT CHANGE UP (ref 8.4–10.5)
CHLORIDE SERPL-SCNC: 109 MMOL/L — HIGH (ref 96–108)
CO2 SERPL-SCNC: 24 MMOL/L — SIGNIFICANT CHANGE UP (ref 22–31)
CREAT SERPL-MCNC: 1.11 MG/DL — SIGNIFICANT CHANGE UP (ref 0.5–1.3)
CULTURE RESULTS: SIGNIFICANT CHANGE UP
GLUCOSE SERPL-MCNC: 107 MG/DL — HIGH (ref 70–99)
HCT VFR BLD CALC: 28.6 % — LOW (ref 34.5–45)
HGB BLD-MCNC: 9.3 G/DL — LOW (ref 11.5–15.5)
MAGNESIUM SERPL-MCNC: 1.8 MG/DL — SIGNIFICANT CHANGE UP (ref 1.6–2.6)
MCHC RBC-ENTMCNC: 32.5 GM/DL — SIGNIFICANT CHANGE UP (ref 32–36)
MCHC RBC-ENTMCNC: 32.5 PG — SIGNIFICANT CHANGE UP (ref 27–34)
MCV RBC AUTO: 100 FL — SIGNIFICANT CHANGE UP (ref 80–100)
METHOD TYPE: SIGNIFICANT CHANGE UP
ORGANISM # SPEC MICROSCOPIC CNT: SIGNIFICANT CHANGE UP
ORGANISM # SPEC MICROSCOPIC CNT: SIGNIFICANT CHANGE UP
PHOSPHATE SERPL-MCNC: 3 MG/DL — SIGNIFICANT CHANGE UP (ref 2.5–4.5)
PLATELET # BLD AUTO: 47 K/UL — LOW (ref 150–400)
POTASSIUM SERPL-MCNC: 3.9 MMOL/L — SIGNIFICANT CHANGE UP (ref 3.5–5.3)
POTASSIUM SERPL-SCNC: 3.9 MMOL/L — SIGNIFICANT CHANGE UP (ref 3.5–5.3)
RBC # BLD: 2.86 M/UL — LOW (ref 3.8–5.2)
RBC # FLD: 15.1 % — HIGH (ref 10.3–14.5)
SODIUM SERPL-SCNC: 145 MMOL/L — SIGNIFICANT CHANGE UP (ref 135–145)
SPECIMEN SOURCE: SIGNIFICANT CHANGE UP
WBC # BLD: 2.4 K/UL — LOW (ref 3.8–10.5)
WBC # FLD AUTO: 2.4 K/UL — LOW (ref 3.8–10.5)

## 2018-02-07 PROCEDURE — 74230 X-RAY XM SWLNG FUNCJ C+: CPT | Mod: 26

## 2018-02-07 PROCEDURE — 99233 SBSQ HOSP IP/OBS HIGH 50: CPT | Mod: GC

## 2018-02-07 RX ORDER — SODIUM CHLORIDE 9 MG/ML
1000 INJECTION, SOLUTION INTRAVENOUS
Qty: 0 | Refills: 0 | Status: DISCONTINUED | OUTPATIENT
Start: 2018-02-07 | End: 2018-02-08

## 2018-02-07 RX ORDER — LACTULOSE 10 G/15ML
20 SOLUTION ORAL
Qty: 0 | Refills: 0 | Status: DISCONTINUED | OUTPATIENT
Start: 2018-02-07 | End: 2018-02-08

## 2018-02-07 RX ADMIN — Medication 3 MILLILITER(S): at 06:50

## 2018-02-07 RX ADMIN — CEFEPIME 100 MILLIGRAM(S): 1 INJECTION, POWDER, FOR SOLUTION INTRAMUSCULAR; INTRAVENOUS at 18:47

## 2018-02-07 RX ADMIN — SODIUM CHLORIDE 50 MILLILITER(S): 9 INJECTION, SOLUTION INTRAVENOUS at 17:31

## 2018-02-07 RX ADMIN — Medication 12.5 MICROGRAM(S): at 23:32

## 2018-02-07 RX ADMIN — Medication 0.25 MILLIGRAM(S): at 06:50

## 2018-02-07 RX ADMIN — Medication 3 MILLILITER(S): at 13:39

## 2018-02-07 RX ADMIN — Medication 3 MILLILITER(S): at 09:53

## 2018-02-07 RX ADMIN — Medication 0.25 MILLIGRAM(S): at 17:31

## 2018-02-07 RX ADMIN — SODIUM CHLORIDE 50 MILLILITER(S): 9 INJECTION, SOLUTION INTRAVENOUS at 09:53

## 2018-02-07 RX ADMIN — Medication 3 MILLILITER(S): at 03:01

## 2018-02-07 RX ADMIN — Medication 3 MILLILITER(S): at 23:32

## 2018-02-07 RX ADMIN — Medication 3 MILLILITER(S): at 17:31

## 2018-02-07 NOTE — PROGRESS NOTE ADULT - PROBLEM SELECTOR PLAN 2
Pt with increased difficulty swallowing since getting sick. S+S eval recommended NPO.  - Keep pt NPO except meds for now  - Spoke to family and they agreed to repeat S+S assessment once pt more alert. Was pretty lethargic during previous assessment and not oriented. Pt more alert and fully oriented this AM: will re-consult S+S.  - C/w IVF: D5NS @50cc/hr while NPO. Keep at low rate 2/2 severe aortic stenosis

## 2018-02-07 NOTE — DISCHARGE NOTE ADULT - PATIENT PORTAL LINK FT
You can access the [facility] Patient Portal, offered by [facility], by registering with the following website: [email or web address]

## 2018-02-07 NOTE — DISCHARGE NOTE ADULT - MEDICATION SUMMARY - MEDICATIONS TO TAKE
I will START or STAY ON the medications listed below when I get home from the hospital:    budesonide 0.25 mg/2 mL inhalation suspension  -- 2 milliliter(s) inhaled 2 times a day  -- Indication: For COPD    propranolol 20 mg oral tablet  -- 0.5 tab(s) by mouth 2 times a day  -- Indication: For Varices    digoxin 125 mcg (0.125 mg) oral tablet  -- 1 tab(s) by mouth every other day (at bedtime)  -- Indication: For Varices    ipratropium-albuterol 0.5 mg-2.5 mg/3 mLinhalation solution  -- 3 milliliter(s) inhaled 2-3 times a day, As Needed  -- Indication: For COPD    lactulose 10 g/15 mL oral syrup  -- 30 milliliter(s) by mouth 2-4 times a day, As Needed; titrate to 2-3 soft bowel movements per day  -- Indication: For Hepatic encephalopathy    rifAXIMin 550 mg oral tablet  -- 1 tab(s) by mouth 2 times a day  -- Indication: For Cirrhosis    levothyroxine 25 mcg (0.025 mg) oral capsule  -- 1 cap(s) by mouth once a day  -- Indication: For Hypothyroid    Vitamin D3 2000 intl units oral tablet  -- 1 tab(s) by mouth once a day  -- Indication: For Nutrition, metabolism, and development symptoms I will START or STAY ON the medications listed below when I get home from the hospital:    budesonide 0.25 mg/2 mL inhalation suspension  -- 2 milliliter(s) inhaled 2 times a day  -- Indication: For COPD    propranolol 20 mg oral tablet  -- 0.5 tab(s) by mouth 2 times a day  -- Indication: For Varices    digoxin 125 mcg (0.125 mg) oral tablet  -- 1 tab(s) by mouth every other day (at bedtime)  -- Indication: For Varices    ipratropium-albuterol 0.5 mg-2.5 mg/3 mLinhalation solution  -- 3 milliliter(s) inhaled 2-3 times a day, As Needed  -- Indication: For COPD    lactulose 10 g/15 mL oral syrup  -- 30 milliliter(s) by mouth 2-4 times a day, As Needed; titrate to 2-3 soft bowel movements per day  -- Indication: For Hepatic encephalopathy    rifAXIMin 550 mg oral tablet  -- 1 tab(s) by mouth 2 times a day  -- Indication: For Cirrhosis    levoFLOXacin 500 mg oral tablet  -- 1 tab(s) by mouth once a day starting 2/9/18  -- Avoid prolonged or excessive exposure to direct and/or artificial sunlight while taking this medication.  Do not take dairy products, antacids, or iron preparations within one hour of this medication.  Finish all this medication unless otherwise directed by prescriber.  May cause drowsiness or dizziness.  Medication should be taken with plenty of water.    -- Indication: For Pneumonia    levothyroxine 25 mcg (0.025 mg) oral capsule  -- 1 cap(s) by mouth once a day  -- Indication: For Hypothyroid    Vitamin D3 2000 intl units oral tablet  -- 1 tab(s) by mouth once a day  -- Indication: For Nutrition, metabolism, and development symptoms

## 2018-02-07 NOTE — SWALLOW BEDSIDE ASSESSMENT ADULT - PHARYNGEAL PHASE
Multiple swallows/Delayed pharyngeal swallow/Cough post oral intake/Decreased laryngeal elevation
Cough post oral intake/Delayed pharyngeal swallow/Multiple swallows

## 2018-02-07 NOTE — PHYSICAL THERAPY INITIAL EVALUATION ADULT - PERTINENT HX OF CURRENT PROBLEM, REHAB EVAL
89 yo F hx liver cirrhosis c/b varices and hepatic encephalopathy, atrial fibrillation not on anticoagulation, severe AS, severe MR, COPD not on home oxygen, pulmonary hypertension, CKD3, CLL presents with viral syndrome secondary to RSV.

## 2018-02-07 NOTE — DISCHARGE NOTE ADULT - PLAN OF CARE
Follow-up with your primary care doctor You came to the hospital because you were not feeling well and had become more confused than usual. You were found to have a viral illness called RSV. You were given IV fluids and started on antibiotics to treat a possible overlying bacterial infection of the lungs. You should continue to take your medications as prescribed and follow-up with your primary care doctor within 1-2 weeks. You came to the hospital because you were not feeling well and had become more confused than usual. You were found to have a viral illness called RSV. You were given IV fluids and started on antibiotics to treat a possible overlying bacterial infection of the lungs. You should continue to take your medications as prescribed and follow-up with your primary care doctor within 1-2 weeks.     You were also started on antibiotics to cover for pneumonia from bacteria due to your risk of aspiration. Please finish your course of levaquin.

## 2018-02-07 NOTE — SWALLOW BEDSIDE ASSESSMENT ADULT - ASR SWALLOW LABIAL MOBILITY
within functional limits
labial retraction WFL, unable to assess further 2/2 reduced command following.

## 2018-02-07 NOTE — SWALLOW BEDSIDE ASSESSMENT ADULT - NS ASR SWALLOW FINDINGS DISCUS
covering RN; pt's daughter in law; MD Silvestre/Physician/Nursing/Patient/Family
pt's daughters; MD Banda/Physician/Nursing/Patient/Family

## 2018-02-07 NOTE — PROGRESS NOTE ADULT - SUBJECTIVE AND OBJECTIVE BOX
Patient is a 89y old  Female who presents with a chief complaint of Fever and cough x few days (05 Feb 2018 03:11)     INTERVAL HPI/OVERNIGHT EVENTS:  Pt states she is feeling well but is having some diarrhea.    MEDICATIONS  (STANDING):  ALBUTerol/ipratropium for Nebulization 3 milliLiter(s) Nebulizer every 4 hours  buDESOnide   0.25 milliGRAM(s) Respule 0.25 milliGRAM(s) Inhalation two times a day  cefepime  IVPB      cefepime  IVPB 2000 milliGRAM(s) IV Intermittent every 24 hours  dextrose 5% + sodium chloride 0.9%. 1000 milliLiter(s) (50 mL/Hr) IV Continuous <Continuous>  digoxin     Tablet 0.125 milliGRAM(s) Oral every other day  lactulose Syrup 30 Gram(s) Oral four times a day  levothyroxine Injectable 12.5 MICROGram(s) IV Push at bedtime  propranolol 10 milliGRAM(s) Oral two times a day  rifaximin 550 milliGRAM(s) Oral two times a day    MEDICATIONS  (PRN):    Allergies:  codeine (Rash)  erythromycin (Rash)  iv dye (Rash)  penicillin (Rash)  shellfish (Rash)    Intolerances:  adhesives (Other)  warfarin (Other)      REVIEW OF SYSTEMS:  Constitutional: Denies fever, chills  Resp: Denies SOB, cough  CV: Denies chest pain  GI: Denies abdominal pain, N/V  : Denies dysuria, hematuria  Neuro: Denies HA  Skin: Denies rashes  MSK: Denies swelling    VITAL SIGNS:  T(C): 36.3 (02-07-18 @ 06:43), Max: 37.1 (02-06-18 @ 11:24)  HR: 58 (02-07-18 @ 06:43) (58 - 69)  BP: 108/52 (02-07-18 @ 06:43) (95/56 - 139/56)  RR: 18 (02-07-18 @ 06:43) (18 - 18)  SpO2: 95% (02-07-18 @ 06:43) (94% - 96%)    PHYSICAL EXAM:  General: Cachectic elderly female seen lying in bed in NAD  Eyes: Conjunctiva and sclera clear  ENMT: Moist mucous membranes  Neck: Supple  Nervous System: AAOX3  Psych: Appropriate affect and mood  Chest: Course breath sounds diffusely throughout lung fields, no wheezing or rhonchi  Heart: Regular rate and rhythm; systolic ejection murmur  Abd: +BS, soft, nontender, nondistended  Ext:  no LE edema    LABS:  Ca    9.1        06 Feb 2018 07:50    BLOOD CULTURE  02-05 @ 06:31   No growth to date.  --  --    Culture - Urine (02.05.18 @ 06:21)    Specimen Source: .Urine Catheterized    Culture Results:   >100,000 CFU/ml Klebsiella pneumoniae    Consultant(s) Notes Reviewed:  S+S    Care Discussed with Consultants/Other Providers: Patient is a 89y old  Female who presents with a chief complaint of Fever and cough x few days (05 Feb 2018 03:11)     INTERVAL HPI/OVERNIGHT EVENTS:  Pt states she is feeling well but is having some diarrhea. 11 BMs documented overnight.    MEDICATIONS  (STANDING):  ALBUTerol/ipratropium for Nebulization 3 milliLiter(s) Nebulizer every 4 hours  buDESOnide   0.25 milliGRAM(s) Respule 0.25 milliGRAM(s) Inhalation two times a day  cefepime  IVPB      cefepime  IVPB 2000 milliGRAM(s) IV Intermittent every 24 hours  dextrose 5% + sodium chloride 0.9%. 1000 milliLiter(s) (50 mL/Hr) IV Continuous <Continuous>  digoxin     Tablet 0.125 milliGRAM(s) Oral every other day  lactulose Syrup 30 Gram(s) Oral four times a day  levothyroxine Injectable 12.5 MICROGram(s) IV Push at bedtime  propranolol 10 milliGRAM(s) Oral two times a day  rifaximin 550 milliGRAM(s) Oral two times a day    MEDICATIONS  (PRN):    Allergies:  codeine (Rash)  erythromycin (Rash)  iv dye (Rash)  penicillin (Rash)  shellfish (Rash)    Intolerances:  adhesives (Other)  warfarin (Other)      REVIEW OF SYSTEMS:  Constitutional: Denies fever, chills  Resp: Denies SOB, cough  CV: Denies chest pain  GI: Denies abdominal pain, N/V  : Denies dysuria, hematuria  Neuro: Denies HA  Skin: Denies rashes  MSK: Denies swelling    VITAL SIGNS:  T(C): 36.3 (02-07-18 @ 06:43), Max: 37.1 (02-06-18 @ 11:24)  HR: 58 (02-07-18 @ 06:43) (58 - 69)  BP: 108/52 (02-07-18 @ 06:43) (95/56 - 139/56)  RR: 18 (02-07-18 @ 06:43) (18 - 18)  SpO2: 95% (02-07-18 @ 06:43) (94% - 96%)    PHYSICAL EXAM:  General: Cachectic elderly female seen lying in bed in NAD  Eyes: Conjunctiva and sclera clear  ENMT: Moist mucous membranes  Neck: Supple  Nervous System: AAOX3  Psych: Appropriate affect and mood  Chest: Course breath sounds diffusely throughout lung fields, no wheezing or rhonchi  Heart: Regular rate and rhythm; systolic ejection murmur  Abd: +BS, soft, nontender, nondistended  Ext:  no LE edema    LABS:  Ca    9.1        06 Feb 2018 07:50    BLOOD CULTURE  02-05 @ 06:31   No growth to date.  --  --    Culture - Urine (02.05.18 @ 06:21)    Specimen Source: .Urine Catheterized    Culture Results:   >100,000 CFU/ml Klebsiella pneumoniae    Consultant(s) Notes Reviewed:  S+S    Care Discussed with Consultants/Other Providers: Patient is a 89y old  Female who presents with a chief complaint of Fever and cough x few days (05 Feb 2018 03:11)     INTERVAL HPI/OVERNIGHT EVENTS:  Pt states she is feeling well but is having some diarrhea. 11 BMs documented overnight.  Being evaluated for swallow this morning.    MEDICATIONS  (STANDING):  ALBUTerol/ipratropium for Nebulization 3 milliLiter(s) Nebulizer every 4 hours  buDESOnide   0.25 milliGRAM(s) Respule 0.25 milliGRAM(s) Inhalation two times a day  cefepime  IVPB      cefepime  IVPB 2000 milliGRAM(s) IV Intermittent every 24 hours  dextrose 5% + sodium chloride 0.9%. 1000 milliLiter(s) (50 mL/Hr) IV Continuous <Continuous>  digoxin     Tablet 0.125 milliGRAM(s) Oral every other day  lactulose Syrup 30 Gram(s) Oral four times a day  levothyroxine Injectable 12.5 MICROGram(s) IV Push at bedtime  propranolol 10 milliGRAM(s) Oral two times a day  rifaximin 550 milliGRAM(s) Oral two times a day    MEDICATIONS  (PRN):    Allergies:  codeine (Rash)  erythromycin (Rash)  iv dye (Rash)  penicillin (Rash)  shellfish (Rash)    Intolerances:  adhesives (Other)  warfarin (Other)      REVIEW OF SYSTEMS:  Constitutional: Denies fever, chills  Resp: Denies SOB, cough  CV: Denies chest pain  GI: Denies abdominal pain, N/V  : Denies dysuria, hematuria  Neuro: Denies HA  Skin: Denies rashes  MSK: Denies swelling    VITAL SIGNS:  T(C): 36.3 (02-07-18 @ 06:43), Max: 37.1 (02-06-18 @ 11:24)  HR: 58 (02-07-18 @ 06:43) (58 - 69)  BP: 108/52 (02-07-18 @ 06:43) (95/56 - 139/56)  RR: 18 (02-07-18 @ 06:43) (18 - 18)  SpO2: 95% (02-07-18 @ 06:43) (94% - 96%)    PHYSICAL EXAM:  General: Cachectic elderly female seen lying in bed in NAD  Eyes: Conjunctiva and sclera clear  ENMT: Moist mucous membranes  Neck: Supple  Nervous System: AAOX3  Psych: Appropriate affect and mood  Chest: Course breath sounds diffusely throughout lung fields, no wheezing or rhonchi  Heart: Regular rate and rhythm; systolic ejection murmur  Abd: +BS, soft, nontender, nondistended  Ext:  no LE edema    LABS:  personally reviewed  Ca    9.1        06 Feb 2018 07:50    BLOOD CULTURE  02-05 @ 06:31   No growth to date.  --  --    Culture - Urine (02.05.18 @ 06:21)    Specimen Source: .Urine Catheterized    Culture Results:   >100,000 CFU/ml Klebsiella pneumoniae    Consultant(s) Notes Reviewed:  S+S    Care Discussed with Consultants/Other Providers: speech therapist Theresa bautista

## 2018-02-07 NOTE — DISCHARGE NOTE ADULT - INSTRUCTIONS
Regular diet. You are at risk of aspirating, however given your family's concern of you not eating you were restarted on a pleasure feed diet. Please eat what is most comfortable to you, but please take precautions to eat as safely as possible (softer foods, thickened liquids, chin tuck during eating, and sitting up while eating).

## 2018-02-07 NOTE — SWALLOW BEDSIDE ASSESSMENT ADULT - ASPIRATION PRECAUTIONS
for secretions and if enteral feeds are initiated./yes
for secretions and if enteral feeds are initiated./yes

## 2018-02-07 NOTE — SWALLOW BEDSIDE ASSESSMENT ADULT - SWALLOW EVAL: ORAL MUSCULATURE
anomalies present
anomalies present/unable to assess due to poor participation/comprehension/Unable to fully assess 2/2 reduced command following and significant baseline cough impacting assessment

## 2018-02-07 NOTE — DISCHARGE NOTE ADULT - OTHER SIGNIFICANT FINDINGS
Recommendations:   · Diagnostic Impressions	Pt presents with an oropharyngeal dysphagia characterized by delayed oral transit time, reduced A-P transport with oral residue post swallow, pharyngeal residue post swallow and silent laryngeal penetration without retrieval. Laryngeal penetration is to the level of the vocal folds with thin liquid and thick puree texture with subsequent silent aspiration with thick puree texture. Esophageal findings include retention of material in the visualized portion of the cervical esophagus. Disorders: reduced lingual strength/ROM/Rate of motion, reduced BOT to posterior pharyngeal wall contact, reduced hyo-laryngeal excursion, reduced laryngeal closure, reduced pharyngeal contractility, signs of reduced supraglottic sensation, signs of reduced subglottic sensation.	  · Recommended Consistencies	NPO, with non-oral nutrition/hydration/medications.	  · Aspiration Precautions	yes  for secretions and if enteral feeds are initiated.	  · Additional Recommendations	Maintain good oral hygiene.

## 2018-02-07 NOTE — SWALLOW VFSS/MBS ASSESSMENT ADULT - ORAL PHASE
Reduced anterior - posterior transport/Incomplete tongue to palate contact/Mild premature spillover to the valleculae/Delayed oral transit time/Residue in oral cavity/Uncontrolled bolus / spillover in leona-pharynx Incomplete tongue to palate contact/Uncontrolled bolus / spillover in leona-pharynx/Mild premature spillover to the valleculae within functional limits Residue in oral cavity/Reduced anterior - posterior transport Residue in oral cavity/Severe oral residue post swallow, + repeat swallow/Delayed oral transit time/Reduced anterior - posterior transport

## 2018-02-07 NOTE — DISCHARGE NOTE ADULT - CARE PROVIDER_API CALL
Daniel Boston), Cardiovascular Disease; Internal Medicine  [address] [address]  Phone: [phone]  Fax: [phone]    Neema Macdonald), Gastroenterology; Internal Medicine  [address] [place] [address]  Phone: [phone]  Fax: [phone]

## 2018-02-07 NOTE — SWALLOW VFSS/MBS ASSESSMENT ADULT - MODE OF PRESENTATION
fed by clinician/spoon cup/self fed/fed by clinician/spoon self fed/cup/spoon/fed by clinician fed by clinician spoon/fed by clinician cup/spoon/self fed/fed by clinician

## 2018-02-07 NOTE — DISCHARGE NOTE ADULT - HOME CARE AGENCY
[facility] [phone]  RN will call prior to visit   services to start day following discharge  Physical therapy to follow

## 2018-02-07 NOTE — SWALLOW BEDSIDE ASSESSMENT ADULT - ASR SWALLOW RECOMMEND DIAG
MD, please write order for Modified Barium Swallow Study. Will schedule exam upon receipt of order in Radiology.
Defer at this time. Will not likely change clinical management.

## 2018-02-07 NOTE — SWALLOW BEDSIDE ASSESSMENT ADULT - SLP PERTINENT HISTORY OF CURRENT PROBLEM
FOOD ALLERGY: SHELLFISH. 89 y/o F hx liver cirrhosis c/b varices and hepatic encephalopathy, Esophageal Rupture, atrial fibrillation not on anticoagulation, severe AS, severe MR, COPD not on home oxygen, pulmonary hypertension, CKD3, CLL presents with fever and productive cough for the past several days. Per patient's daughters at bedside, she was recently evaluated for these symptoms by PMD and prescribed a course of doxycycline to be initiated if she became febrile. Patient's daughter gave her the first dose yesterday. She sounds increasingly "wet" in reference to her cough. She also endorses decreased appetite, denies abdominal pain nausea, vomiting or diarrhea.

## 2018-02-07 NOTE — SWALLOW VFSS/MBS ASSESSMENT ADULT - PHARYNGEAL PHASE COMMENTS
Swallow was not consistently captured under fluoro. When fluoro re-initiated increase in amount of material in the laryngeal vestibule noted on the vocal folds and laryngeal surface of the epiglottis.

## 2018-02-07 NOTE — DISCHARGE NOTE ADULT - MEDICATION SUMMARY - MEDICATIONS TO STOP TAKING
I will STOP taking the medications listed below when I get home from the hospital:    doxycycline hyclate 100 mg oral capsule  -- 1 cap(s) by mouth 2 times a day

## 2018-02-07 NOTE — DISCHARGE NOTE ADULT - HOSPITAL COURSE
89 y/o F with PMH liver cirrhosis (c/b varices and hepatic encephalopathy), afib (not on AC), severe AS, severe MR, COPD (not on home O2) pulmonary hypertension, CKD3, and CLL presents with viral syndrome secondary to RSV. Pt had a high grade temperature on admission and was encephalopathic. CXR was clear and did not show any focal pneumonia however given pt's lethargy and altered mental status, along with her high grade fevers, pt was started on abx to cover possible CAP. Pt continued to spike high grade fevers and continued to be encephalopathic so her coverage was broadened. On day 3, her mental status and vital signs improved. During admission, pt was worked up for difficulty swallowing her medications. S+S evaluated her and MBS showed _____________________________ 88 y/o F with PMH liver cirrhosis (c/b varices and hepatic encephalopathy), afib (not on AC), severe AS, severe MR, COPD (not on home O2) pulmonary hypertension, CKD3, and CLL presents with viral syndrome secondary to RSV. Pt had a high grade temperature on admission and was encephalopathic. CXR was clear and did not show any focal pneumonia however given pt's lethargy and altered mental status, along with her high grade fevers, pt was started on abx to cover possible CAP. Pt continued to spike high grade fevers and continued to be encephalopathic so her coverage was broadened. On day 3, her mental status and vital signs improved. During admission, pt was worked up for difficulty swallowing her medications. S+S evaluated her and MBS showed silent aspiration with all consistencies. GOC conversation initiated with pt and family and they decided to continue with regular diet despite aspiration risks. Pt's respiratory status improved and she was found stable for discharge home. 89 y/o F with PMH liver cirrhosis (c/b varices and hepatic encephalopathy), afib (not on AC), severe AS, severe MR, COPD (not on home O2) pulmonary hypertension, CKD3, and CLL presents with viral syndrome secondary to RSV. Pt had a high grade temperature on admission and was encephalopathic. CXR was clear and did not show any focal pneumonia however given pt's lethargy and altered mental status, along with her high grade fevers, pt was started on abx to cover possible CAP. Pt continued to spike high grade fevers and continued to be encephalopathic so her coverage was broadened. On day 3, her mental status and vital signs improved. During admission, pt was worked up for difficulty swallowing her medications. S+S evaluated her and MBS showed silent aspiration with all consistencies. GOC conversation initiated with pt and family and they decided to continue with regular diet despite aspiration risks. Pt's respiratory status improved and she was found stable for discharge home. She is to complete a course of antibiotics at home.

## 2018-02-07 NOTE — SWALLOW BEDSIDE ASSESSMENT ADULT - SWALLOW EVAL: PATIENT/FAMILY GOALS STATEMENT
To eat/drink. Daughter in law reports pt has been tolerating medication crushed in applesauce without difficulty. Noted difficulty with large pill when it was not crushed.
Pt's daughter reports that on Friday it was difficult for pt to swallow pills, "getting stuck on tongue" despite drinking water. Daughter states that when pt is "sick, runs a fever" she gets delirious. Reports that pt has had reduced PO intake and has not wanted to eat/drink. Reports pt was given "poached eggs, protein shakes, soup, fine chicken. Daughter states that when pt is "not sick" she eats a regular texture diet. At baseline, pt is "an incredibly slow eater" and chews a significant amount. + coughing with food and liquid. Reports this has been occurring for a number of years. Reports having a swallow evaluation x1 here at this facility but has not had any further dysphagia w/u. {t had several pnas in the past, but daughter believes unrelated to aspiration. Pt also does not consistently sit upright after meals despite daughter's encouragement and prefers to lay reclined in the bed.

## 2018-02-07 NOTE — SWALLOW VFSS/MBS ASSESSMENT ADULT - LARYNGEAL PENETRATION AFTER THE SWALLOW - SILENT
Mild/of residue on the laryngeal surface of the epiglottis during repeat swallow, without retrieval./Mild/Trace of residue, shallow, on the laryngeal surface of the epiglottis/Mild

## 2018-02-07 NOTE — PROGRESS NOTE ADULT - PROBLEM SELECTOR PLAN 3
Patient follows with Dr. Macdonald outpatient. Has hx of varices though no recent assessment. Compliant with HE prophylaxis. Fluctuating alertness and orientation.  - C/w propranolol 10mg BID  - C/w Rifaximin and Lactulose: goal 2-3 BMs daily Patient follows with Dr. Macdonald outpatient. Has hx of varices though no recent assessment. Compliant with HE prophylaxis. Fluctuating alertness and orientation.  - C/w propranolol 10mg BID  - C/w Rifaximin and Lactulose: goal 2-3 BMs daily. Will decrease lactulose to 20 BID.

## 2018-02-07 NOTE — SWALLOW BEDSIDE ASSESSMENT ADULT - SLP GENERAL OBSERVATIONS
Pt received OOB in chair. Awake and alert. Hoarse vocal quality. Able to make wants/needs known. + baseline cough.
Pt received in bed. On RA. A&Ox1 (to self only). Daughter reports pt has delirium which she attributes to pt developing a fever. ALONZO Woody made aware of daughter's concern. Temp taken by RN WNL. Pt with inconsistent command following. Paucity of verbal output. Hoarse vocal quality. Significant baseline cough. RR 22.

## 2018-02-07 NOTE — PHYSICAL THERAPY INITIAL EVALUATION ADULT - DISCHARGE DISPOSITION, PT EVAL
home w/ home PT/TBD pending further ambulation and stair negotiation assessment. Anticipating Home with home PT for safety assessment, gait, balance, & strength training and to return pt to baseline functional mobility status. Pt owns rollator and wheelchair for long distance. Supervision/assist with mobility skills at this time (pt states HHA 11 hrs/7days and daughter at all times)/home w/ assist home w/ assist/Home with home PT for safety assessment, gait, balance, & strength training and to return pt to baseline functional mobility status. Pt owns rollator and wheelchair for long distance. Supervision/assist with mobility skills at this time (pt states HHA 11 hrs/7days and daughter at all times)/home w/ home PT

## 2018-02-07 NOTE — PHYSICAL THERAPY INITIAL EVALUATION ADULT - ADDITIONAL COMMENTS
Patient was living at home with daughter. Has 5 steps to enter the home and another 5 steps to get into bedroom. Has HHA 11 hrs/7days and ambulates using rollator and wheelchair for long distance.

## 2018-02-07 NOTE — PROGRESS NOTE ADULT - PROBLEM SELECTOR PLAN 1
Patient relative leukopenia and fever, clinical features of respiratory viral infection including productive cough, myalgias, weakness and decreased appetite. RVP positive for RSV. Chest imaging appears grossly clear but pt spiked high grade fevers. Afebrile over past 24 hours.  - Monitor fever curve  - Follow up BCxs and UCx, urine legionella  - C/w cefepime  - Monitor white count

## 2018-02-07 NOTE — DISCHARGE NOTE ADULT - ADDITIONAL INSTRUCTIONS
1) Follow-up with your primary care doctor within 1-2 weeks. 1) Follow-up with your primary care doctor within 1-2 weeks. Please finish your levaquin course.     If you notice side effects from levaquin (such as muscle aches) please call your doctor. If you have worsening shortness of breath or fevers please seek medical attention.

## 2018-02-07 NOTE — PROGRESS NOTE ADULT - PROBLEM SELECTOR PLAN 7
DVT ppx: SCDs in setting of h/o GIB    Vicki Silvestre MD - PGY 1  Team 3  [phone]  After 7pm, contact [phone]

## 2018-02-07 NOTE — SWALLOW BEDSIDE ASSESSMENT ADULT - COMMENTS
Hx cont:  She denies sick contacts or recent illness or hospitalization. Per patient's daughters, she has been increasingly fatigued and weak, unable to ambulate with a rolling walker as is her baseline. They have also noted her to have occasional difficulty swallowing, especially with larger pills, though she has been eventually tolerating them overall. She was most recently admitted in 10/2017 for hepatic encephalopathy in the setting of Klebsiella urinary tract infection and improved with antibiotics. ER Vital Signs: Temp 102.5, HR 50s, BP 110s/50-80s, RR 18, 93-98 on RA. She received Tylenol 500 x1, 500 cc bolus. Presents with viral syndrome secondary to RSV. +URI symptoms febrile and tachycardic in mild resp distress, lung are rhodochrous , labs with leukopenia. KARRIE on CKD. Meets GOLD criteria for COPD exacerbation requiring antibiotics. Medicine: Pt unable to answer questions this AM. Per family at bedside, she is confused and has been lethargic and tired. 2/4 CXR: Clear lungs.    SWALLOW HISTORY:  Known to this service. Bedside swallow evaluation 2/9/16: Patient presented with grossly functional oropharyngeal swallow. There are no overt signs of laryngeal penetration or aspiration across all consistencies trialed. Although patient denies dysphagia and odynophagia, there appears to be effortful swallowing on some thicker consistencies. In light of CT findings suggesting heterogeneity of left lateral tongue base, would recommend follow up by ENT for direct visualization, as suggested by radiologist. Rx from 2/9/16: continue Regular; ENT; to assess base of tongue (see CT spine findings).
Hx cont:  She denies sick contacts or recent illness or hospitalization. Per patient's daughters, she has been increasingly fatigued and weak, unable to ambulate with a rolling walker as is her baseline. They have also noted her to have occasional difficulty swallowing, especially with larger pills, though she has been eventually tolerating them overall. She was most recently admitted in 10/2017 for hepatic encephalopathy in the setting of Klebsiella urinary tract infection and improved with antibiotics. ER Vital Signs: Temp 102.5, HR 50s, BP 110s/50-80s, RR 18, 93-98 on RA. She received Tylenol 500 x1, 500 cc bolus. Presents with viral syndrome secondary to RSV. +URI symptoms febrile and tachycardic in mild resp distress, lung are rhodochrous , labs with leukopenia. KARRIE on CKD. Meets GOLD criteria for COPD exacerbation requiring antibiotics. Medicine: Pt unable to answer questions this AM. Per family at bedside, she is confused and has been lethargic and tired. 2/4 CXR: Clear lungs. 2/6 Medicine: Pt continuing to spike high fevers. Escalated antibiotics to cefepime given persistent fever. Spoke to family and they agreed to repeat S+S assessment once pt more alert. Was pretty lethargic during previous assessment and not oriented. Will re-consult S+S when pt's encephalopathy improves. Pt with clinical improvement but still with fluctuating mental status. 2/7 Afebrile over past 24 hours. Pt more alert and fully oriented this AM: will re-consult S+S.      Hx cont

## 2018-02-07 NOTE — SWALLOW VFSS/MBS ASSESSMENT ADULT - LARYNGEAL PENETRATION DURING THE SWALLOW - SILENT
Mild deep without retrieval/Mild/Trace Mild/deep without complete retrieval, trace amount remaining with descent to the vocal folds

## 2018-02-07 NOTE — SWALLOW VFSS/MBS ASSESSMENT ADULT - RESIDUE IN VALLECULAE
Moderate Mild/Moderate Trace Trace/Mild Reduced to mild to moderate amount as trials continued/Severe

## 2018-02-07 NOTE — SWALLOW VFSS/MBS ASSESSMENT ADULT - THE ABOVE FINDINGS WERE DISCUSSED WITH
pt's daughters and daughter in law/Nursing/Patient/Family pt's daughters and daughter in law; MD Silvestre/Nursing/Patient/Family

## 2018-02-07 NOTE — SWALLOW VFSS/MBS ASSESSMENT ADULT - DIAGNOSTIC IMPRESSIONS
Pt presents with an oropharyngeal dysphagia characterized by delayed oral transit time, reduced A-P transport with oral residue post swallow, pharyngeal residue post swallow and silent laryngeal penetration without retrieval. Laryngeal penetration is to the level of the vocal folds with thin liquid and thick puree texture with subsequent silent aspiration with thick puree texture. Esophageal findings include retention of material and air distention in the visualized portion of the cervical esophagus. Pt presents with an oropharyngeal dysphagia characterized by delayed oral transit time, reduced A-P transport with oral residue post swallow, pharyngeal residue post swallow and silent laryngeal penetration without retrieval. Laryngeal penetration is to the level of the vocal folds with thin liquid and thick puree texture with subsequent silent aspiration with thick puree texture. Esophageal findings include retention of material in the visualized portion of the cervical esophagus.  Disorders: reduced lingual strength/ROM/Rate of motion, reduced BOT to posterior pharyngeal wall contact, reduced hyo-laryngeal excursion, reduced laryngeal closure, reduced pharyngeal contractility, signs of reduced supraglottic sensation, signs of reduced subglottic sensation.

## 2018-02-07 NOTE — SWALLOW BEDSIDE ASSESSMENT ADULT - SWALLOW EVAL: RECOMMENDED DIET
NPO, with non-oral nutrition/hydration/medications.
NPO, with non-oral nutrition/hydration/medications.

## 2018-02-07 NOTE — SWALLOW BEDSIDE ASSESSMENT ADULT - SWALLOW EVAL: PROGNOSIS
Cont: SWALLOW HISTORY: Known to this service. Bedside swallow evaluation 2/9/16: Patient presented with grossly functional oropharyngeal swallow. There are no overt signs of laryngeal penetration or aspiration across all consistencies trialed. Although patient denies dysphagia and odynophagia, there appears to be effortful swallowing on some thicker consistencies. In light of CT findings suggesting heterogeneity of left lateral tongue base, would recommend follow up by ENT for direct visualization, as suggested by radiologist. Rx from 2/9/16: continue Regular; ENT; to assess base of tongue (see CT spine findings).

## 2018-02-07 NOTE — PROVIDER CONTACT NOTE (OTHER) - ACTION/TREATMENT ORDERED:
MD aware. will give IV tylenol
MD will reorder fluid and is aware of BP. Evening BP meds to be held.
MD notified, will continue to monitor.
see above

## 2018-02-07 NOTE — SWALLOW VFSS/MBS ASSESSMENT ADULT - ADDITIONAL INFORMATION
Cont: SWALLOW HISTORY: Known to this service. Bedside swallow evaluation 2/9/16: Patient presented with grossly functional oropharyngeal swallow. There are no overt signs of laryngeal penetration or aspiration across all consistencies trialed. Although patient denies dysphagia and odynophagia, there appears to be effortful swallowing on some thicker consistencies. In light of CT findings suggesting heterogeneity of left lateral tongue base, would recommend follow up by ENT for direct visualization, as suggested by radiologist. Rx from 2/9/16: continue Regular; ENT; to assess base of tongue (see CT spine findings). Cont: SWALLOW HISTORY: Known to this service. Bedside swallow evaluation 2/9/16: Patient presented with grossly functional oropharyngeal swallow. There are no overt signs of laryngeal penetration or aspiration across all consistencies trialed. Although patient denies dysphagia and odynophagia, there appears to be effortful swallowing on some thicker consistencies. In light of CT findings suggesting heterogeneity of left lateral tongue base, would recommend follow up by ENT for direct visualization, as suggested by radiologist. Rx from 2/9/16: continue Regular; ENT; to assess base of tongue (see CT spine findings).      Calcification of arytenoid cartilage.   Lordosis of cervical spine.

## 2018-02-07 NOTE — SWALLOW VFSS/MBS ASSESSMENT ADULT - COMMENTS
Hx cont:  She denies sick contacts or recent illness or hospitalization. Per patient's daughters, she has been increasingly fatigued and weak, unable to ambulate with a rolling walker as is her baseline. They have also noted her to have occasional difficulty swallowing, especially with larger pills, though she has been eventually tolerating them overall. She was most recently admitted in 10/2017 for hepatic encephalopathy in the setting of Klebsiella urinary tract infection and improved with antibiotics. ER Vital Signs: Temp 102.5, HR 50s, BP 110s/50-80s, RR 18, 93-98 on RA. She received Tylenol 500 x1, 500 cc bolus. Presents with viral syndrome secondary to RSV. +URI symptoms febrile and tachycardic in mild resp distress, lung are rhodochrous , labs with leukopenia. KARRIE on CKD. Meets GOLD criteria for COPD exacerbation requiring antibiotics. Medicine: Pt unable to answer questions this AM. Per family at bedside, she is confused and has been lethargic and tired. 2/4 CXR: Clear lungs. 2/6 Medicine: Pt continuing to spike high fevers. Escalated antibiotics to cefepime given persistent fever. Spoke to family and they agreed to repeat S+S assessment once pt more alert. Was pretty lethargic during previous assessment and not oriented. Will re-consult S+S when pt's encephalopathy improves. Pt with clinical improvement but still with fluctuating mental status. 2/7 Afebrile over past 24 hours. Pt more alert and fully oriented this AM: will re-consult S+S.      Hx cont

## 2018-02-07 NOTE — DISCHARGE NOTE ADULT - CARE PLAN
Principal Discharge DX:	RSV (respiratory syncytial virus infection)  Goal:	Follow-up with your primary care doctor  Assessment and plan of treatment:	You came to the hospital because you were not feeling well and had become more confused than usual. You were found to have a viral illness called RSV. You were given IV fluids and started on antibiotics to treat a possible overlying bacterial infection of the lungs. You should continue to take your medications as prescribed and follow-up with your primary care doctor within 1-2 weeks. Principal Discharge DX:	RSV (respiratory syncytial virus infection)  Goal:	Follow-up with your primary care doctor  Assessment and plan of treatment:	You came to the hospital because you were not feeling well and had become more confused than usual. You were found to have a viral illness called RSV. You were given IV fluids and started on antibiotics to treat a possible overlying bacterial infection of the lungs. You should continue to take your medications as prescribed and follow-up with your primary care doctor within 1-2 weeks.     You were also started on antibiotics to cover for pneumonia from bacteria due to your risk of aspiration. Please finish your course of levaquin.

## 2018-02-08 LAB
ANION GAP SERPL CALC-SCNC: 9 MMOL/L — SIGNIFICANT CHANGE UP (ref 5–17)
BUN SERPL-MCNC: 32 MG/DL — HIGH (ref 7–23)
CALCIUM SERPL-MCNC: 8.9 MG/DL — SIGNIFICANT CHANGE UP (ref 8.4–10.5)
CHLORIDE SERPL-SCNC: 111 MMOL/L — HIGH (ref 96–108)
CO2 SERPL-SCNC: 25 MMOL/L — SIGNIFICANT CHANGE UP (ref 22–31)
CREAT SERPL-MCNC: 0.94 MG/DL — SIGNIFICANT CHANGE UP (ref 0.5–1.3)
GLUCOSE SERPL-MCNC: 99 MG/DL — SIGNIFICANT CHANGE UP (ref 70–99)
HCT VFR BLD CALC: 28.8 % — LOW (ref 34.5–45)
HGB BLD-MCNC: 9.8 G/DL — LOW (ref 11.5–15.5)
MAGNESIUM SERPL-MCNC: 2 MG/DL — SIGNIFICANT CHANGE UP (ref 1.6–2.6)
MCHC RBC-ENTMCNC: 33.7 PG — SIGNIFICANT CHANGE UP (ref 27–34)
MCHC RBC-ENTMCNC: 34 GM/DL — SIGNIFICANT CHANGE UP (ref 32–36)
MCV RBC AUTO: 99 FL — SIGNIFICANT CHANGE UP (ref 80–100)
PHOSPHATE SERPL-MCNC: 2.2 MG/DL — LOW (ref 2.5–4.5)
PLATELET # BLD AUTO: 45 K/UL — LOW (ref 150–400)
POTASSIUM SERPL-MCNC: 3.7 MMOL/L — SIGNIFICANT CHANGE UP (ref 3.5–5.3)
POTASSIUM SERPL-SCNC: 3.7 MMOL/L — SIGNIFICANT CHANGE UP (ref 3.5–5.3)
RBC # BLD: 2.91 M/UL — LOW (ref 3.8–5.2)
RBC # FLD: 14.9 % — HIGH (ref 10.3–14.5)
SODIUM SERPL-SCNC: 145 MMOL/L — SIGNIFICANT CHANGE UP (ref 135–145)
WBC # BLD: 2.44 K/UL — LOW (ref 3.8–10.5)
WBC # FLD AUTO: 2.44 K/UL — LOW (ref 3.8–10.5)

## 2018-02-08 PROCEDURE — 99233 SBSQ HOSP IP/OBS HIGH 50: CPT | Mod: GC

## 2018-02-08 RX ORDER — LACTULOSE 10 G/15ML
30 SOLUTION ORAL
Qty: 0 | Refills: 0 | Status: DISCONTINUED | OUTPATIENT
Start: 2018-02-08 | End: 2018-02-09

## 2018-02-08 RX ORDER — POTASSIUM PHOSPHATE, MONOBASIC POTASSIUM PHOSPHATE, DIBASIC 236; 224 MG/ML; MG/ML
30 INJECTION, SOLUTION INTRAVENOUS ONCE
Qty: 0 | Refills: 0 | Status: COMPLETED | OUTPATIENT
Start: 2018-02-08 | End: 2018-02-08

## 2018-02-08 RX ORDER — LEVOTHYROXINE SODIUM 125 MCG
25 TABLET ORAL DAILY
Qty: 0 | Refills: 0 | Status: DISCONTINUED | OUTPATIENT
Start: 2018-02-08 | End: 2018-02-09

## 2018-02-08 RX ORDER — PETROLATUM,WHITE
1 JELLY (GRAM) TOPICAL DAILY
Qty: 0 | Refills: 0 | Status: DISCONTINUED | OUTPATIENT
Start: 2018-02-08 | End: 2018-02-09

## 2018-02-08 RX ADMIN — Medication 3 MILLILITER(S): at 05:47

## 2018-02-08 RX ADMIN — LACTULOSE 30 GRAM(S): 10 SOLUTION ORAL at 17:46

## 2018-02-08 RX ADMIN — Medication 10 MILLIGRAM(S): at 05:47

## 2018-02-08 RX ADMIN — Medication 0.25 MILLIGRAM(S): at 05:47

## 2018-02-08 RX ADMIN — Medication 3 MILLILITER(S): at 14:07

## 2018-02-08 RX ADMIN — LACTULOSE 20 GRAM(S): 10 SOLUTION ORAL at 05:46

## 2018-02-08 RX ADMIN — Medication 0.12 MILLIGRAM(S): at 11:57

## 2018-02-08 RX ADMIN — Medication 0.25 MILLIGRAM(S): at 17:46

## 2018-02-08 RX ADMIN — Medication 25 MICROGRAM(S): at 11:58

## 2018-02-08 RX ADMIN — Medication 3 MILLILITER(S): at 17:46

## 2018-02-08 RX ADMIN — Medication 10 MILLIGRAM(S): at 17:46

## 2018-02-08 RX ADMIN — POTASSIUM PHOSPHATE, MONOBASIC POTASSIUM PHOSPHATE, DIBASIC 50 MILLIMOLE(S): 236; 224 INJECTION, SOLUTION INTRAVENOUS at 11:57

## 2018-02-08 RX ADMIN — CEFEPIME 100 MILLIGRAM(S): 1 INJECTION, POWDER, FOR SOLUTION INTRAMUSCULAR; INTRAVENOUS at 20:10

## 2018-02-08 RX ADMIN — Medication 3 MILLILITER(S): at 22:17

## 2018-02-08 RX ADMIN — Medication 3 MILLILITER(S): at 10:43

## 2018-02-08 NOTE — PROGRESS NOTE ADULT - PROBLEM SELECTOR PLAN 1
Pt with increased difficulty swallowing since getting sick. S+S eval and MBS recommended NPO.  - Keep pt NPO except meds for now  - Awaiting decision by family re peg. Reached out to daughter Julio this AM but unable to get in touch. Will call back later to clarify any questions family has.  - C/w IVF: D5NS @50cc/hr while NPO. Keep at low rate 2/2 severe aortic stenosis

## 2018-02-08 NOTE — PROGRESS NOTE ADULT - PROBLEM SELECTOR PLAN 4
KARRIE on CKD, with baseline creatinine 1.0 most recently. Likely prerenal azotemia in the setting of ongoing viral illness and dehydration on presentation. S/p 500 cc bolus. Improving.  - Monitor Cr  - Renally dose meds, hold nephrotoxins  - Very careful fluid resuscitation in setting of severe AS

## 2018-02-08 NOTE — PROGRESS NOTE ADULT - SUBJECTIVE AND OBJECTIVE BOX
Patient is a 89y old  Female who presents with a chief complaint of Fever and cough x few days (07 Feb 2018 13:04)     INTERVAL HPI/OVERNIGHT EVENTS:  No acute events overnight. Diarrhea has improved with 1 BM in last 24 hours. Pt sleepy this morning, unable to answer questions.    MEDICATIONS  (STANDING):  ALBUTerol/ipratropium for Nebulization 3 milliLiter(s) Nebulizer every 4 hours  buDESOnide   0.25 milliGRAM(s) Respule 0.25 milliGRAM(s) Inhalation two times a day  cefepime  IVPB      cefepime  IVPB 2000 milliGRAM(s) IV Intermittent every 24 hours  dextrose 5% + sodium chloride 0.9%. 1000 milliLiter(s) (50 mL/Hr) IV Continuous <Continuous>  digoxin     Tablet 0.125 milliGRAM(s) Oral every other day  lactulose Syrup 20 Gram(s) Oral two times a day  levothyroxine Injectable 12.5 MICROGram(s) IV Push at bedtime  propranolol 10 milliGRAM(s) Oral two times a day  rifaximin 550 milliGRAM(s) Oral two times a day    MEDICATIONS  (PRN):    Allergies:  codeine (Rash)  erythromycin (Rash)  iv dye (Rash)  penicillin (Rash)  shellfish (Rash)    Intolerances:  adhesives (Other)  warfarin (Other)      REVIEW OF SYSTEMS:  Unable to obtain 2/2 pt lethargy.    VITAL SIGNS:  T(C): 36.6 (02-08-18 @ 05:54), Max: 36.6 (02-08-18 @ 05:54)  HR: 66 (02-08-18 @ 05:54) (63 - 73)  BP: 121/52 (02-08-18 @ 05:54) (90/52 - 121/52)  RR: 18 (02-08-18 @ 05:54) (18 - 18)  SpO2: 95% (02-08-18 @ 05:54) (94% - 95%)    PHYSICAL EXAM:  General: Elderly woman seen lying in bed asleep  Eyes: Conjunctiva and sclera clear  ENMT: Moist mucous membranes  Neck: Supple  Nervous System: Lethargic, falling asleep during conversation  Psych: Appropriate affect and mood when awake  Chest: Clear to auscultation bilaterally with coarse breath sounds  Heart: Regular rate and rhythm; no murmurs, rubs, or gallops  Abd: +BS, soft, nontender, nondistended  Ext:  no LE edema    LABS:                      9.3    2.40  )-----------( 47       ( 07 Feb 2018 09:20 )             28.6     07 Feb 2018 09:20    145    |  109    |  32     ----------------------------<  107    3.9     |  24     |  1.11     Ca    8.9        07 Feb 2018 09:20  Phos  3.0       07 Feb 2018 09:20  Mg     1.8       07 Feb 2018 09:20      BLOOD CULTURE  02-05 @ 06:31   No growth to date.  --  --  02-05 @ 06:21   >100,000 CFU/ml Klebsiella pneumoniae  --  Klebsiella pneumoniae    RADIOLOGY & ADDITIONAL TESTS:  · Diagnostic Impressions	Pt presents with an oropharyngeal dysphagia characterized by delayed oral transit time, reduced A-P transport with oral residue post swallow, pharyngeal residue post swallow and silent laryngeal penetration without retrieval. Laryngeal penetration is to the level of the vocal folds with thin liquid and thick puree texture with subsequent silent aspiration with thick puree texture. Esophageal findings include retention of material and air distention in the visualized portion of the cervical esophagus.	  · Recommended Consistencies	NPO, with non-oral nutrition/hydration/medications.	    Imaging Personally Reviewed:  [ ] YES     Consultant(s) Notes Reviewed:  S+S    Care Discussed with Consultants/Other Providers: Patient is a 89y old  Female who presents with a chief complaint of Fever and cough x few days (07 Feb 2018 13:04)     INTERVAL HPI/OVERNIGHT EVENTS:  No acute events overnight. Diarrhea has improved with 1 BM in last 24 hours. Pt sleepy this morning, unable to answer questions.    MEDICATIONS  (STANDING):  ALBUTerol/ipratropium for Nebulization 3 milliLiter(s) Nebulizer every 4 hours  buDESOnide   0.25 milliGRAM(s) Respule 0.25 milliGRAM(s) Inhalation two times a day  cefepime  IVPB      cefepime  IVPB 2000 milliGRAM(s) IV Intermittent every 24 hours  dextrose 5% + sodium chloride 0.9%. 1000 milliLiter(s) (50 mL/Hr) IV Continuous <Continuous>  digoxin     Tablet 0.125 milliGRAM(s) Oral every other day  lactulose Syrup 20 Gram(s) Oral two times a day  levothyroxine Injectable 12.5 MICROGram(s) IV Push at bedtime  propranolol 10 milliGRAM(s) Oral two times a day  rifaximin 550 milliGRAM(s) Oral two times a day    MEDICATIONS  (PRN):    Allergies:  codeine (Rash)  erythromycin (Rash)  iv dye (Rash)  penicillin (Rash)  shellfish (Rash)    Intolerances:  adhesives (Other)  warfarin (Other)      REVIEW OF SYSTEMS:  Unable to obtain 2/2 pt lethargy.    VITAL SIGNS:  T(C): 36.6 (02-08-18 @ 05:54), Max: 36.6 (02-08-18 @ 05:54)  HR: 66 (02-08-18 @ 05:54) (63 - 73)  BP: 121/52 (02-08-18 @ 05:54) (90/52 - 121/52)  RR: 18 (02-08-18 @ 05:54) (18 - 18)  SpO2: 95% (02-08-18 @ 05:54) (94% - 95%)    PHYSICAL EXAM:  General: Elderly woman seen lying in bed asleep  Eyes: Conjunctiva and sclera clear  ENMT: Moist mucous membranes  Neck: Supple  Nervous System: Lethargic, falling asleep during conversation  Psych: Appropriate affect and mood when awake  Chest: Clear to auscultation bilaterally with coarse breath sounds  Heart: Regular rate and rhythm; no murmurs, rubs, or gallops  Abd: +BS, soft, nontender, nondistended  Ext:  no LE edema    LABS:       personally reviewed                 9.3    2.40  )-----------( 47       ( 07 Feb 2018 09:20 )             28.6     07 Feb 2018 09:20    145    |  109    |  32     ----------------------------<  107    3.9     |  24     |  1.11     Ca    8.9        07 Feb 2018 09:20  Phos  3.0       07 Feb 2018 09:20  Mg     1.8       07 Feb 2018 09:20      BLOOD CULTURE  02-05 @ 06:31   No growth to date.  --  --  02-05 @ 06:21   >100,000 CFU/ml Klebsiella pneumoniae  --  Klebsiella pneumoniae    RADIOLOGY & ADDITIONAL TESTS:  · Diagnostic Impressions	Pt presents with an oropharyngeal dysphagia characterized by delayed oral transit time, reduced A-P transport with oral residue post swallow, pharyngeal residue post swallow and silent laryngeal penetration without retrieval. Laryngeal penetration is to the level of the vocal folds with thin liquid and thick puree texture with subsequent silent aspiration with thick puree texture. Esophageal findings include retention of material and air distention in the visualized portion of the cervical esophagus.	  · Recommended Consistencies	NPO, with non-oral nutrition/hydration/medications.	    Imaging Personally Reviewed:  [ x] YES    Consultant(s) Notes Reviewed:  S+S    Care Discussed with Consultants/Other Providers:

## 2018-02-08 NOTE — PROGRESS NOTE ADULT - PROBLEM SELECTOR PLAN 3
Patient follows with Dr. Macdonald outpatient. Has hx of varices though no recent assessment. Compliant with HE prophylaxis. Fluctuating alertness and orientation.  - C/w propranolol 10mg BID  - C/w Rifaximin and Lactulose: goal 2-3 BMs daily. Increase lactulose to 30 BID.

## 2018-02-08 NOTE — PROVIDER CONTACT NOTE (MEDICATION) - BACKGROUND
admitted with cough, RSV +, hx liver CA - on lactulose regularly.
pt admitted for cough, copd exacerbation . RVP. hx of pulmonary htn, ckd.
admitted with RSV. hx liver ca

## 2018-02-08 NOTE — PROVIDER CONTACT NOTE (MEDICATION) - ASSESSMENT
Pt NPO until 1900 last night, 2/5, pt only had 1 BM yesterday. First dose of lactulose given PO at midnight. Pt able to tolerate PO
pt currently getting potassium phosphate for phosphorus of 2.2. pt has 1 IV. pt has no am labs.
5 loose bm's

## 2018-02-08 NOTE — PROGRESS NOTE ADULT - PROBLEM SELECTOR PLAN 7
DVT ppx: SCDs in setting of h/o GIB    Vicki Silvestre MD - PGY 1  Team 3  [phone]  After 7pm, contact [phone]

## 2018-02-08 NOTE — PROGRESS NOTE ADULT - PROBLEM SELECTOR PLAN 2
Patient relative leukopenia and fever, clinical features of respiratory viral infection including productive cough, myalgias, weakness and decreased appetite. RVP positive for RSV. Chest imaging appears grossly clear but pt spiked high grade fevers. Afebrile over past 48 hours with marked improvement in symptoms.  - Monitor fever curve  - Follow up BCxs  - C/w cefepime  - Monitor white count

## 2018-02-08 NOTE — CHART NOTE - NSCHARTNOTEFT_GEN_A_CORE
Spoke to pt and daughters at length at bedside regarding results of S+S evaluation and MBS. Discussed findings of MBS that patient is silently aspirating on all consistencies and that she is at increased risk of pneumonia if she continues to take food PO. Alternatives to PO intake were discussed such as PEG but pt did not want a feeding tube and understood the risks of continuing a PO diet. Her daughters also understood the risks of developing PNA if she continued PO and that there were no consistencies that were safe to eat. Given the history, she may have been aspirating prior to hospitalization and there is a small chance that she will improve once she is feeling better, less weak and less lethargic but that this is not likely. Family and pt agreed that she should continue eating what she felt comfortable eating such as mechanical soft foods. Team will restart pleasure feeds on patient.    Vicki Silvestre MD  Team 3

## 2018-02-09 VITALS
TEMPERATURE: 98 F | SYSTOLIC BLOOD PRESSURE: 127 MMHG | RESPIRATION RATE: 18 BRPM | HEART RATE: 60 BPM | DIASTOLIC BLOOD PRESSURE: 68 MMHG | OXYGEN SATURATION: 98 %

## 2018-02-09 PROCEDURE — 85027 COMPLETE CBC AUTOMATED: CPT

## 2018-02-09 PROCEDURE — 71045 X-RAY EXAM CHEST 1 VIEW: CPT

## 2018-02-09 PROCEDURE — 99239 HOSP IP/OBS DSCHRG MGMT >30: CPT

## 2018-02-09 PROCEDURE — 87633 RESP VIRUS 12-25 TARGETS: CPT

## 2018-02-09 PROCEDURE — 99285 EMERGENCY DEPT VISIT HI MDM: CPT | Mod: 25

## 2018-02-09 PROCEDURE — 83735 ASSAY OF MAGNESIUM: CPT

## 2018-02-09 PROCEDURE — 81001 URINALYSIS AUTO W/SCOPE: CPT

## 2018-02-09 PROCEDURE — 87086 URINE CULTURE/COLONY COUNT: CPT

## 2018-02-09 PROCEDURE — 85610 PROTHROMBIN TIME: CPT

## 2018-02-09 PROCEDURE — 74230 X-RAY XM SWLNG FUNCJ C+: CPT

## 2018-02-09 PROCEDURE — 82435 ASSAY OF BLOOD CHLORIDE: CPT

## 2018-02-09 PROCEDURE — 82803 BLOOD GASES ANY COMBINATION: CPT

## 2018-02-09 PROCEDURE — 97530 THERAPEUTIC ACTIVITIES: CPT

## 2018-02-09 PROCEDURE — 97162 PT EVAL MOD COMPLEX 30 MIN: CPT

## 2018-02-09 PROCEDURE — 80053 COMPREHEN METABOLIC PANEL: CPT

## 2018-02-09 PROCEDURE — 80048 BASIC METABOLIC PNL TOTAL CA: CPT

## 2018-02-09 PROCEDURE — 85730 THROMBOPLASTIN TIME PARTIAL: CPT

## 2018-02-09 PROCEDURE — 87040 BLOOD CULTURE FOR BACTERIA: CPT

## 2018-02-09 PROCEDURE — 87486 CHLMYD PNEUM DNA AMP PROBE: CPT

## 2018-02-09 PROCEDURE — 94640 AIRWAY INHALATION TREATMENT: CPT

## 2018-02-09 PROCEDURE — 83605 ASSAY OF LACTIC ACID: CPT

## 2018-02-09 PROCEDURE — 84100 ASSAY OF PHOSPHORUS: CPT

## 2018-02-09 PROCEDURE — 82330 ASSAY OF CALCIUM: CPT

## 2018-02-09 PROCEDURE — 92611 MOTION FLUOROSCOPY/SWALLOW: CPT

## 2018-02-09 PROCEDURE — 85014 HEMATOCRIT: CPT

## 2018-02-09 PROCEDURE — 84132 ASSAY OF SERUM POTASSIUM: CPT

## 2018-02-09 PROCEDURE — 82947 ASSAY GLUCOSE BLOOD QUANT: CPT

## 2018-02-09 PROCEDURE — 97116 GAIT TRAINING THERAPY: CPT

## 2018-02-09 PROCEDURE — 87581 M.PNEUMON DNA AMP PROBE: CPT

## 2018-02-09 PROCEDURE — 93005 ELECTROCARDIOGRAM TRACING: CPT

## 2018-02-09 PROCEDURE — 92610 EVALUATE SWALLOWING FUNCTION: CPT

## 2018-02-09 PROCEDURE — 84295 ASSAY OF SERUM SODIUM: CPT

## 2018-02-09 PROCEDURE — 87186 SC STD MICRODIL/AGAR DIL: CPT

## 2018-02-09 PROCEDURE — 87798 DETECT AGENT NOS DNA AMP: CPT

## 2018-02-09 RX ADMIN — LACTULOSE 30 GRAM(S): 10 SOLUTION ORAL at 06:17

## 2018-02-09 RX ADMIN — Medication 0.25 MILLIGRAM(S): at 06:15

## 2018-02-09 RX ADMIN — Medication 25 MICROGRAM(S): at 06:16

## 2018-02-09 RX ADMIN — Medication 3 MILLILITER(S): at 11:58

## 2018-02-09 RX ADMIN — Medication 3 MILLILITER(S): at 06:14

## 2018-02-09 RX ADMIN — Medication 10 MILLIGRAM(S): at 06:15

## 2018-02-09 NOTE — PROGRESS NOTE ADULT - PROBLEM SELECTOR PLAN 1
Pt with increased difficulty swallowing since getting sick. S+S eval and MBS recommended NPO. After GOC discussion with pt and family, decision made by them to restart regular diet.  - Mechanical soft diet

## 2018-02-09 NOTE — PROGRESS NOTE ADULT - PROBLEM SELECTOR PLAN 7
DVT ppx: SCDs in setting of h/o GIB    Vicki Silvestre MD - PGY 1  Team 3  [phone]  After 7pm, contact [phone]

## 2018-02-09 NOTE — PROGRESS NOTE ADULT - SUBJECTIVE AND OBJECTIVE BOX
Patient is a 89y old  Female who presents with a chief complaint of Fever and cough x few days (07 Feb 2018 13:04)     INTERVAL HPI/OVERNIGHT EVENTS:  Pt restarted on PO food last night, tolerating well. Continues to have cough but says she feels well.    MEDICATIONS  (STANDING):  ALBUTerol/ipratropium for Nebulization 3 milliLiter(s) Nebulizer every 4 hours  buDESOnide   0.25 milliGRAM(s) Respule 0.25 milliGRAM(s) Inhalation two times a day  cefepime  IVPB      cefepime  IVPB 2000 milliGRAM(s) IV Intermittent every 24 hours  digoxin     Tablet 0.125 milliGRAM(s) Oral every other day  lactulose Syrup 30 Gram(s) Oral two times a day  levothyroxine 25 MICROGram(s) Oral daily  propranolol 10 milliGRAM(s) Oral two times a day  rifaximin 550 milliGRAM(s) Oral two times a day    MEDICATIONS  (PRN):  AQUAPHOR (petrolatum Ointment) 1 Application(s) Topical daily PRN Dry skin    Allergies:  codeine (Rash)  erythromycin (Rash)  iv dye (Rash)  penicillin (Rash)  shellfish (Rash)    Intolerances:  adhesives (Other)  warfarin (Other)      REVIEW OF SYSTEMS:  Constitutional: Denies fever  Resp: +cough; denies SOB  CV: Denies chest pain, palpitations  GI: Denies abdominal pain, N/V/D/C  : Denies dysuria  Neuro: +weakness  Skin: Denies rashes  Lymph Nodes: Denies enlarged glands  MSK: Denies joint pain    VITAL SIGNS:  T(C): 36.7 (02-09-18 @ 06:00), Max: 36.7 (02-08-18 @ 13:43)  HR: 60 (02-09-18 @ 06:00) (60 - 78)  BP: 127/68 (02-09-18 @ 06:00) (106/50 - 129/68)  RR: 18 (02-09-18 @ 06:00) (18 - 18)  SpO2: 98% (02-09-18 @ 06:00) (95% - 98%)    PHYSICAL EXAM:  General: Cachectic elderly female seen sitting in bed in NAD  Eyes: Conjunctiva and sclera clear  ENMT: Moist mucous membranes  Neck: Supple  Nervous System: AAOX3  Psych: Appropriate affect and mood  Chest: Course breath sounds throughout lung fields with rales, mild expiratory wheezes  Heart: Regular rate and rhythm; no murmurs, rubs, or gallops  Abd: +BS, soft, nontender, nondistended  Ext:  no LE edema    LABS:  Ca    8.9        08 Feb 2018 08:08

## 2018-02-09 NOTE — PROGRESS NOTE ADULT - ATTENDING COMMENTS
Pt with clinical improvement in mental status, back to baseline as per daughters at bedside.  s/p levaquin this morning for presumed superimposed bacterial PNA.   Continue to monitor clinical status    Lorrie Molina MD  Division of Hospital Medicine  Pager: [phone]  Office: [phone]
Discharge planning discussed with patient, daughters, residents and consultants. Pt with clinical improvement on current IV abx and mental status much improved. Pt and family in agreement with pleasure feeds at risk of aspiration. Pt clinically stable to be discharged home today. All questions and concerns were addressed
s/p MBS, demonstrating aspiration. Will have discussion with the daughters re: pleasure feeds.  Pt continues to improve on current regimen. Will transition to po meds when she is ready for discharge    Lorrie Molina MD  Division of Hospital Medicine  Pager: [phone]  Office: [phone]
Pt with clinical improvement but still with fluctuating mental status. Evaluated by s/s yesterday but pt was not alert enough to participate in the evaluation. Given the persistent fever while on levaquin, abx regimen escalated to cefepime. Will reassess her swallowing when she is more alert. Discussed the care with the daughters at the bedside and they are in agreement.     Lorrie Molina MD  Division of Hospital Medicine  Pager: [phone]  Office: [phone]
Pt with much improved mental status, tolerating speech evaluation much better this morning. Afebrile > 24 hrs. Will continue IV abx and tamiflu for her pneumonia and pending MBS for swallow eval.     Lorrie Molina MD  Division of Hospital Medicine  Pager: [phone]  Office: [phone]

## 2018-02-14 ENCOUNTER — APPOINTMENT (OUTPATIENT)
Dept: INTERNAL MEDICINE | Facility: CLINIC | Age: 83
End: 2018-02-14
Payer: MEDICARE

## 2018-02-14 VITALS
WEIGHT: 113 LBS | BODY MASS INDEX: 20.02 KG/M2 | DIASTOLIC BLOOD PRESSURE: 76 MMHG | TEMPERATURE: 97.5 F | OXYGEN SATURATION: 100 % | SYSTOLIC BLOOD PRESSURE: 110 MMHG | HEART RATE: 55 BPM

## 2018-02-14 DIAGNOSIS — Z87.898 PERSONAL HISTORY OF OTHER SPECIFIED CONDITIONS: ICD-10-CM

## 2018-02-14 PROCEDURE — 99214 OFFICE O/P EST MOD 30 MIN: CPT

## 2018-02-15 PROBLEM — Z87.898 HISTORY OF HOARSENESS: Status: RESOLVED | Noted: 2017-12-01 | Resolved: 2018-02-15

## 2018-02-22 ENCOUNTER — FORM ENCOUNTER (OUTPATIENT)
Age: 83
End: 2018-02-22

## 2018-02-22 ENCOUNTER — APPOINTMENT (OUTPATIENT)
Dept: INTERNAL MEDICINE | Facility: CLINIC | Age: 83
End: 2018-02-22
Payer: MEDICARE

## 2018-02-22 VITALS
TEMPERATURE: 97.7 F | OXYGEN SATURATION: 100 % | DIASTOLIC BLOOD PRESSURE: 60 MMHG | SYSTOLIC BLOOD PRESSURE: 94 MMHG | HEART RATE: 46 BPM

## 2018-02-22 PROCEDURE — 99214 OFFICE O/P EST MOD 30 MIN: CPT

## 2018-02-23 ENCOUNTER — APPOINTMENT (OUTPATIENT)
Dept: RADIOLOGY | Facility: CLINIC | Age: 83
End: 2018-02-23

## 2018-02-23 ENCOUNTER — APPOINTMENT (OUTPATIENT)
Dept: RADIOLOGY | Facility: IMAGING CENTER | Age: 83
End: 2018-02-23
Payer: MEDICARE

## 2018-02-23 ENCOUNTER — APPOINTMENT (OUTPATIENT)
Dept: CT IMAGING | Facility: IMAGING CENTER | Age: 83
End: 2018-02-23
Payer: MEDICARE

## 2018-02-23 ENCOUNTER — APPOINTMENT (OUTPATIENT)
Age: 83
End: 2018-02-23

## 2018-02-23 ENCOUNTER — OUTPATIENT (OUTPATIENT)
Dept: OUTPATIENT SERVICES | Facility: HOSPITAL | Age: 83
LOS: 1 days | End: 2018-02-23
Payer: MEDICARE

## 2018-02-23 VITALS
DIASTOLIC BLOOD PRESSURE: 39 MMHG | RESPIRATION RATE: 17 BRPM | HEART RATE: 51 BPM | SYSTOLIC BLOOD PRESSURE: 102 MMHG | TEMPERATURE: 97.3 F

## 2018-02-23 DIAGNOSIS — Z98.89 OTHER SPECIFIED POSTPROCEDURAL STATES: Chronic | ICD-10-CM

## 2018-02-23 DIAGNOSIS — S09.90XA UNSPECIFIED INJURY OF HEAD, INITIAL ENCOUNTER: ICD-10-CM

## 2018-02-23 LAB
RAPID RVP RESULT: DETECTED
RSV RNA SPEC QL NAA+PROBE: DETECTED

## 2018-02-23 PROCEDURE — 72040 X-RAY EXAM NECK SPINE 2-3 VW: CPT | Mod: 26

## 2018-02-23 PROCEDURE — 71046 X-RAY EXAM CHEST 2 VIEWS: CPT

## 2018-02-23 PROCEDURE — 70450 CT HEAD/BRAIN W/O DYE: CPT | Mod: 26

## 2018-02-23 PROCEDURE — 71046 X-RAY EXAM CHEST 2 VIEWS: CPT | Mod: 26

## 2018-02-23 PROCEDURE — 72070 X-RAY EXAM THORAC SPINE 2VWS: CPT | Mod: 26

## 2018-02-23 PROCEDURE — 72100 X-RAY EXAM L-S SPINE 2/3 VWS: CPT | Mod: 26

## 2018-02-23 PROCEDURE — 70450 CT HEAD/BRAIN W/O DYE: CPT

## 2018-02-23 PROCEDURE — 72040 X-RAY EXAM NECK SPINE 2-3 VW: CPT

## 2018-02-23 PROCEDURE — 72100 X-RAY EXAM L-S SPINE 2/3 VWS: CPT

## 2018-02-23 PROCEDURE — 72070 X-RAY EXAM THORAC SPINE 2VWS: CPT

## 2018-02-28 ENCOUNTER — LABORATORY RESULT (OUTPATIENT)
Age: 83
End: 2018-02-28

## 2018-02-28 ENCOUNTER — APPOINTMENT (OUTPATIENT)
Dept: INTERNAL MEDICINE | Facility: CLINIC | Age: 83
End: 2018-02-28
Payer: MEDICARE

## 2018-02-28 VITALS
WEIGHT: 106.8 LBS | BODY MASS INDEX: 18.92 KG/M2 | DIASTOLIC BLOOD PRESSURE: 60 MMHG | HEART RATE: 48 BPM | SYSTOLIC BLOOD PRESSURE: 120 MMHG | OXYGEN SATURATION: 99 % | TEMPERATURE: 97.8 F

## 2018-02-28 DIAGNOSIS — R19.7 DIARRHEA, UNSPECIFIED: ICD-10-CM

## 2018-02-28 PROCEDURE — 36415 COLL VENOUS BLD VENIPUNCTURE: CPT

## 2018-02-28 PROCEDURE — 99214 OFFICE O/P EST MOD 30 MIN: CPT | Mod: 25

## 2018-03-05 ENCOUNTER — NON-APPOINTMENT (OUTPATIENT)
Age: 83
End: 2018-03-05

## 2018-03-05 ENCOUNTER — APPOINTMENT (OUTPATIENT)
Dept: CARDIOLOGY | Facility: CLINIC | Age: 83
End: 2018-03-05
Payer: MEDICARE

## 2018-03-05 VITALS
WEIGHT: 106 LBS | SYSTOLIC BLOOD PRESSURE: 102 MMHG | DIASTOLIC BLOOD PRESSURE: 62 MMHG | HEART RATE: 50 BPM | HEIGHT: 63 IN | BODY MASS INDEX: 18.78 KG/M2

## 2018-03-05 DIAGNOSIS — E83.52 HYPERCALCEMIA: ICD-10-CM

## 2018-03-05 LAB
ALBUMIN SERPL ELPH-MCNC: 3.4 G/DL
ALP BLD-CCNC: 136 U/L
ALT SERPL-CCNC: 24 U/L
ANION GAP SERPL CALC-SCNC: 17 MMOL/L
AST SERPL-CCNC: 41 U/L
BASOPHILS # BLD AUTO: 0.05 K/UL
BASOPHILS NFR BLD AUTO: 0.9 %
BILIRUB SERPL-MCNC: 3.6 MG/DL
BUN SERPL-MCNC: 27 MG/DL
CALCIUM SERPL-MCNC: 11.6 MG/DL
CHLORIDE SERPL-SCNC: 101 MMOL/L
CO2 SERPL-SCNC: 26 MMOL/L
CREAT SERPL-MCNC: 1.37 MG/DL
EOSINOPHIL # BLD AUTO: 0.18 K/UL
EOSINOPHIL NFR BLD AUTO: 3.5
GLUCOSE SERPL-MCNC: 95 MG/DL
HBA1C MFR BLD HPLC: 4.2 %
HCT VFR BLD CALC: 33.9 %
HGB BLD-MCNC: 11 G/DL
LYMPHOCYTES # BLD AUTO: 0.9 K/UL
LYMPHOCYTES NFR BLD AUTO: 17.7 %
MAN DIFF?: NORMAL
MCHC RBC-ENTMCNC: 32.4 GM/DL
MCHC RBC-ENTMCNC: 34.3 PG
MCV RBC AUTO: 105.6 FL
MONOCYTES # BLD AUTO: 0.63 K/UL
MONOCYTES NFR BLD AUTO: 12.4 %
NEUTROPHILS # BLD AUTO: 3.26 K/UL
NEUTROPHILS NFR BLD AUTO: 63.7 %
PLATELET # BLD AUTO: 108 K/UL
POTASSIUM SERPL-SCNC: 4.6 MMOL/L
PROT SERPL-MCNC: 6.1 G/DL
RBC # BLD: 3.21 M/UL
RBC # FLD: 17.9 %
SODIUM SERPL-SCNC: 144 MMOL/L
TSH SERPL-ACNC: 3.14 UIU/ML
WBC # FLD AUTO: 5.11 K/UL

## 2018-03-05 PROCEDURE — 99214 OFFICE O/P EST MOD 30 MIN: CPT

## 2018-03-05 PROCEDURE — 93000 ELECTROCARDIOGRAM COMPLETE: CPT

## 2018-03-09 ENCOUNTER — APPOINTMENT (OUTPATIENT)
Age: 83
End: 2018-03-09
Payer: MEDICARE

## 2018-03-09 VITALS
HEART RATE: 51 BPM | DIASTOLIC BLOOD PRESSURE: 49 MMHG | RESPIRATION RATE: 17 BRPM | WEIGHT: 111 LBS | SYSTOLIC BLOOD PRESSURE: 96 MMHG | BODY MASS INDEX: 19.67 KG/M2 | HEIGHT: 63 IN | TEMPERATURE: 98.2 F

## 2018-03-09 LAB
ALBUMIN SERPL ELPH-MCNC: 3.4 G/DL
ALP BLD-CCNC: 222 U/L
ALT SERPL-CCNC: 21 U/L
ANION GAP SERPL CALC-SCNC: 15 MMOL/L
AST SERPL-CCNC: 44 U/L
BASOPHILS # BLD AUTO: 0.02 K/UL
BASOPHILS NFR BLD AUTO: 0.4 %
BILIRUB SERPL-MCNC: 2.5 MG/DL
BUN SERPL-MCNC: 31 MG/DL
CALCIUM SERPL-MCNC: 10.4 MG/DL
CHLORIDE SERPL-SCNC: 102 MMOL/L
CO2 SERPL-SCNC: 23 MMOL/L
CREAT SERPL-MCNC: 1.46 MG/DL
EOSINOPHIL # BLD AUTO: 0.09 K/UL
EOSINOPHIL NFR BLD AUTO: 1.9 %
GLUCOSE SERPL-MCNC: 110 MG/DL
HCT VFR BLD CALC: 32.2 %
HGB BLD-MCNC: 10.9 G/DL
IMM GRANULOCYTES NFR BLD AUTO: 0 %
INR PPP: 1.2 RATIO
LYMPHOCYTES # BLD AUTO: 0.67 K/UL
LYMPHOCYTES NFR BLD AUTO: 14.3 %
MAN DIFF?: NORMAL
MCHC RBC-ENTMCNC: 33.9 GM/DL
MCHC RBC-ENTMCNC: 34.8 PG
MCV RBC AUTO: 102.9 FL
MONOCYTES # BLD AUTO: 0.53 K/UL
MONOCYTES NFR BLD AUTO: 11.3 %
NEUTROPHILS # BLD AUTO: 3.36 K/UL
NEUTROPHILS NFR BLD AUTO: 72.1 %
PLATELET # BLD AUTO: 101 K/UL
POTASSIUM SERPL-SCNC: 4.5 MMOL/L
PROT SERPL-MCNC: 6 G/DL
PT BLD: 13.6 SEC
RBC # BLD: 3.13 M/UL
RBC # FLD: 16.7 %
SODIUM SERPL-SCNC: 140 MMOL/L
WBC # FLD AUTO: 4.67 K/UL

## 2018-03-09 PROCEDURE — 99214 OFFICE O/P EST MOD 30 MIN: CPT

## 2018-03-10 LAB — AFP-TM SERPL-MCNC: 1.2 NG/ML

## 2018-03-14 ENCOUNTER — APPOINTMENT (OUTPATIENT)
Dept: INTERNAL MEDICINE | Facility: CLINIC | Age: 83
End: 2018-03-14
Payer: MEDICARE

## 2018-03-14 VITALS
HEART RATE: 45 BPM | TEMPERATURE: 97.2 F | WEIGHT: 111 LBS | HEIGHT: 63 IN | BODY MASS INDEX: 19.67 KG/M2 | OXYGEN SATURATION: 99 %

## 2018-03-14 PROCEDURE — 99214 OFFICE O/P EST MOD 30 MIN: CPT

## 2018-03-19 ENCOUNTER — RX RENEWAL (OUTPATIENT)
Age: 83
End: 2018-03-19

## 2018-03-27 ENCOUNTER — MEDICATION RENEWAL (OUTPATIENT)
Age: 83
End: 2018-03-27

## 2018-03-27 ENCOUNTER — RESULT CHARGE (OUTPATIENT)
Age: 83
End: 2018-03-27

## 2018-03-27 LAB
GLUCOSE UR-MCNC: NORMAL
HGB UR QL STRIP.AUTO: NORMAL
KETONES UR-MCNC: NORMAL
LEUKOCYTE ESTERASE UR QL STRIP: NORMAL
NITRITE UR QL STRIP: NORMAL
PH UR STRIP: 5.5
PROT UR STRIP-MCNC: NORMAL
SP GR UR STRIP: 1.01

## 2018-03-30 LAB
APPEARANCE: ABNORMAL
BACTERIA UR CULT: ABNORMAL
BACTERIA: ABNORMAL
BILIRUBIN URINE: NEGATIVE
BLOOD URINE: NEGATIVE
COLOR: ABNORMAL
GLUCOSE QUALITATIVE U: NEGATIVE MG/DL
HYALINE CASTS: 0 /LPF
KETONES URINE: ABNORMAL
LEUKOCYTE ESTERASE URINE: ABNORMAL
MICROSCOPIC-UA: NORMAL
NITRITE URINE: NEGATIVE
PH URINE: 5
PROTEIN URINE: NEGATIVE MG/DL
RED BLOOD CELLS URINE: 8 /HPF
SPECIFIC GRAVITY URINE: 1.02
SQUAMOUS EPITHELIAL CELLS: 5 /HPF
UROBILINOGEN URINE: 1 MG/DL
WHITE BLOOD CELLS URINE: 211 /HPF

## 2018-04-09 ENCOUNTER — MEDICATION RENEWAL (OUTPATIENT)
Age: 83
End: 2018-04-09

## 2018-04-11 ENCOUNTER — APPOINTMENT (OUTPATIENT)
Dept: INTERNAL MEDICINE | Facility: CLINIC | Age: 83
End: 2018-04-11
Payer: MEDICARE

## 2018-04-11 VITALS
TEMPERATURE: 97.4 F | HEART RATE: 49 BPM | HEIGHT: 63 IN | OXYGEN SATURATION: 99 % | BODY MASS INDEX: 19.84 KG/M2 | WEIGHT: 112 LBS | DIASTOLIC BLOOD PRESSURE: 64 MMHG | SYSTOLIC BLOOD PRESSURE: 108 MMHG

## 2018-04-11 PROCEDURE — 99214 OFFICE O/P EST MOD 30 MIN: CPT

## 2018-05-07 ENCOUNTER — NON-APPOINTMENT (OUTPATIENT)
Age: 83
End: 2018-05-07

## 2018-05-07 ENCOUNTER — APPOINTMENT (OUTPATIENT)
Dept: CARDIOLOGY | Facility: CLINIC | Age: 83
End: 2018-05-07
Payer: MEDICARE

## 2018-05-07 VITALS
DIASTOLIC BLOOD PRESSURE: 60 MMHG | HEART RATE: 80 BPM | HEIGHT: 63 IN | SYSTOLIC BLOOD PRESSURE: 98 MMHG | RESPIRATION RATE: 16 BRPM

## 2018-05-07 PROCEDURE — 99214 OFFICE O/P EST MOD 30 MIN: CPT

## 2018-05-07 PROCEDURE — 93000 ELECTROCARDIOGRAM COMPLETE: CPT

## 2018-05-07 NOTE — PROVIDER CONTACT NOTE (OTHER) - ACTION/TREATMENT ORDERED:
medication stopped; contents aspirated, terbutaline given by MD, IV d/c'd.
Total Volume (Ccs): 0.5
Include Z78.9 (Other Specified Conditions Influencing Health Status) As An Associated Diagnosis?: No
X Size Of Lesion In Cm (Optional): 0
Administered By (Optional): MICHAEL Cheung
Kenalog Preparation: Kenalog
Detail Level: Detailed
Expiration Date For Kenalog (Optional): AUG 2019
Lot # For Kenalog (Optional): [id number]
Medical Necessity Clause: This procedure was medically necessary because the lesions that were treated were:
Concentration Of Kenalog Solution Injected (Mg/Ml): 40.0
Treatment Number (Optional): 1
Consent: The risks of atrophy were reviewed with the patient.

## 2018-05-10 ENCOUNTER — LABORATORY RESULT (OUTPATIENT)
Age: 83
End: 2018-05-10

## 2018-05-10 ENCOUNTER — APPOINTMENT (OUTPATIENT)
Dept: INTERNAL MEDICINE | Facility: CLINIC | Age: 83
End: 2018-05-10
Payer: MEDICARE

## 2018-05-10 VITALS
OXYGEN SATURATION: 100 % | DIASTOLIC BLOOD PRESSURE: 60 MMHG | SYSTOLIC BLOOD PRESSURE: 104 MMHG | HEART RATE: 64 BPM | TEMPERATURE: 97.4 F

## 2018-05-10 DIAGNOSIS — R27.8 OTHER LACK OF COORDINATION: ICD-10-CM

## 2018-05-10 LAB
GLUCOSE UR-MCNC: NORMAL
HGB UR QL STRIP.AUTO: NORMAL
KETONES UR-MCNC: NORMAL
LEUKOCYTE ESTERASE UR QL STRIP: NORMAL
NITRITE UR QL STRIP: POSITIVE
PH UR STRIP: 5.5
PROT UR STRIP-MCNC: NORMAL
SP GR UR STRIP: 1.02

## 2018-05-10 PROCEDURE — 36415 COLL VENOUS BLD VENIPUNCTURE: CPT

## 2018-05-10 PROCEDURE — 82570 ASSAY OF URINE CREATININE: CPT | Mod: QW

## 2018-05-10 PROCEDURE — 99214 OFFICE O/P EST MOD 30 MIN: CPT | Mod: 25

## 2018-05-10 PROCEDURE — 81003 URINALYSIS AUTO W/O SCOPE: CPT | Mod: QW

## 2018-05-10 RX ORDER — DOXYCYCLINE HYCLATE 100 MG/1
100 CAPSULE ORAL TWICE DAILY
Qty: 14 | Refills: 0 | Status: DISCONTINUED | COMMUNITY
Start: 2018-02-02 | End: 2018-05-10

## 2018-05-10 RX ORDER — CIPROFLOXACIN HYDROCHLORIDE 250 MG/1
250 TABLET, FILM COATED ORAL
Qty: 14 | Refills: 0 | Status: DISCONTINUED | COMMUNITY
Start: 2018-02-28 | End: 2018-05-10

## 2018-05-10 RX ORDER — NITROFURANTOIN (MONOHYDRATE/MACROCRYSTALS) 25; 75 MG/1; MG/1
100 CAPSULE ORAL
Qty: 14 | Refills: 0 | Status: DISCONTINUED | COMMUNITY
Start: 2018-03-27 | End: 2018-05-10

## 2018-05-10 RX ORDER — CIPROFLOXACIN HYDROCHLORIDE 250 MG/1
250 TABLET, FILM COATED ORAL
Qty: 20 | Refills: 0 | Status: DISCONTINUED | COMMUNITY
Start: 2018-04-11 | End: 2018-05-10

## 2018-05-14 LAB
ALBUMIN SERPL ELPH-MCNC: 3.6 G/DL
ALP BLD-CCNC: 126 U/L
ALT SERPL-CCNC: 34 U/L
ANION GAP SERPL CALC-SCNC: 16 MMOL/L
APPEARANCE: ABNORMAL
AST SERPL-CCNC: 53 U/L
BACTERIA UR CULT: ABNORMAL
BACTERIA: ABNORMAL
BASOPHILS # BLD AUTO: 0.02 K/UL
BASOPHILS NFR BLD AUTO: 0.4 %
BILIRUB SERPL-MCNC: 3.8 MG/DL
BILIRUBIN URINE: NEGATIVE
BLOOD URINE: NEGATIVE
BUN SERPL-MCNC: 38 MG/DL
CALCIUM OXALATE CRYSTALS: ABNORMAL
CALCIUM SERPL-MCNC: 10.7 MG/DL
CHLORIDE SERPL-SCNC: 105 MMOL/L
CO2 SERPL-SCNC: 23 MMOL/L
COLOR: ABNORMAL
CREAT SERPL-MCNC: 1.36 MG/DL
EOSINOPHIL # BLD AUTO: 0.09 K/UL
EOSINOPHIL NFR BLD AUTO: 1.8 %
GLUCOSE QUALITATIVE U: NEGATIVE MG/DL
GLUCOSE SERPL-MCNC: 100 MG/DL
HCT VFR BLD CALC: 39.4 %
HGB BLD-MCNC: 12.6 G/DL
HYALINE CASTS: 0 /LPF
IMM GRANULOCYTES NFR BLD AUTO: 0.2 %
KETONES URINE: ABNORMAL
LEUKOCYTE ESTERASE URINE: ABNORMAL
LYMPHOCYTES # BLD AUTO: 1.36 K/UL
LYMPHOCYTES NFR BLD AUTO: 26.9 %
MAN DIFF?: NORMAL
MCHC RBC-ENTMCNC: 32 GM/DL
MCHC RBC-ENTMCNC: 33.4 PG
MCV RBC AUTO: 104.5 FL
MICROSCOPIC-UA: NORMAL
MONOCYTES # BLD AUTO: 0.43 K/UL
MONOCYTES NFR BLD AUTO: 8.5 %
NEUTROPHILS # BLD AUTO: 3.14 K/UL
NEUTROPHILS NFR BLD AUTO: 62.2 %
NITRITE URINE: POSITIVE
PH URINE: 5.5
PLATELET # BLD AUTO: 93 K/UL
POTASSIUM SERPL-SCNC: 4.9 MMOL/L
PROT SERPL-MCNC: 6 G/DL
PROTEIN URINE: ABNORMAL MG/DL
RBC # BLD: 3.77 M/UL
RBC # FLD: 16.4 %
RED BLOOD CELLS URINE: 1 /HPF
SODIUM SERPL-SCNC: 144 MMOL/L
SPECIFIC GRAVITY URINE: 1.02
SQUAMOUS EPITHELIAL CELLS: 7 /HPF
URINE COMMENTS: NORMAL
UROBILINOGEN URINE: 1 MG/DL
WBC # FLD AUTO: 5.05 K/UL
WHITE BLOOD CELLS URINE: 12 /HPF

## 2018-06-07 ENCOUNTER — RX RENEWAL (OUTPATIENT)
Age: 83
End: 2018-06-07

## 2018-06-08 LAB
APPEARANCE: ABNORMAL
BACTERIA: ABNORMAL
BILIRUBIN URINE: NEGATIVE
BLOOD URINE: NEGATIVE
CALCIUM OXALATE CRYSTALS: ABNORMAL
COLOR: ABNORMAL
GLUCOSE QUALITATIVE U: NEGATIVE MG/DL
HYALINE CASTS: 0 /LPF
KETONES URINE: ABNORMAL
LEUKOCYTE ESTERASE URINE: ABNORMAL
MICROSCOPIC-UA: NORMAL
NITRITE URINE: POSITIVE
PH URINE: 5
PROTEIN URINE: ABNORMAL MG/DL
RED BLOOD CELLS URINE: 9 /HPF
SPECIFIC GRAVITY URINE: 1.02
SQUAMOUS EPITHELIAL CELLS: 5 /HPF
UROBILINOGEN URINE: 1 MG/DL
WHITE BLOOD CELLS URINE: 17 /HPF

## 2018-06-09 ENCOUNTER — INPATIENT (INPATIENT)
Facility: HOSPITAL | Age: 83
LOS: 4 days | Discharge: NURSING, REHAB OR LONG-TERM CARE | DRG: 871 | End: 2018-06-14
Attending: HOSPITALIST | Admitting: HOSPITALIST
Payer: MEDICARE

## 2018-06-09 VITALS
HEART RATE: 92 BPM | SYSTOLIC BLOOD PRESSURE: 149 MMHG | TEMPERATURE: 99 F | RESPIRATION RATE: 17 BRPM | OXYGEN SATURATION: 99 % | DIASTOLIC BLOOD PRESSURE: 85 MMHG

## 2018-06-09 DIAGNOSIS — Z98.89 OTHER SPECIFIED POSTPROCEDURAL STATES: Chronic | ICD-10-CM

## 2018-06-09 DIAGNOSIS — N30.90 CYSTITIS, UNSPECIFIED WITHOUT HEMATURIA: ICD-10-CM

## 2018-06-09 DIAGNOSIS — A41.9 SEPSIS, UNSPECIFIED ORGANISM: ICD-10-CM

## 2018-06-09 DIAGNOSIS — K74.60 UNSPECIFIED CIRRHOSIS OF LIVER: ICD-10-CM

## 2018-06-09 DIAGNOSIS — J44.9 CHRONIC OBSTRUCTIVE PULMONARY DISEASE, UNSPECIFIED: ICD-10-CM

## 2018-06-09 DIAGNOSIS — N17.9 ACUTE KIDNEY FAILURE, UNSPECIFIED: ICD-10-CM

## 2018-06-09 DIAGNOSIS — G93.41 METABOLIC ENCEPHALOPATHY: ICD-10-CM

## 2018-06-09 DIAGNOSIS — I48.2 CHRONIC ATRIAL FIBRILLATION: ICD-10-CM

## 2018-06-09 DIAGNOSIS — Z29.9 ENCOUNTER FOR PROPHYLACTIC MEASURES, UNSPECIFIED: ICD-10-CM

## 2018-06-09 DIAGNOSIS — E03.9 HYPOTHYROIDISM, UNSPECIFIED: ICD-10-CM

## 2018-06-09 LAB
ALBUMIN SERPL ELPH-MCNC: 3.4 G/DL — SIGNIFICANT CHANGE UP (ref 3.3–5)
ALP SERPL-CCNC: 114 U/L — SIGNIFICANT CHANGE UP (ref 40–120)
ALT FLD-CCNC: 32 U/L — SIGNIFICANT CHANGE UP (ref 10–45)
AMMONIA BLD-MCNC: 99 UMOL/L — HIGH (ref 11–55)
ANION GAP SERPL CALC-SCNC: 13 MMOL/L — SIGNIFICANT CHANGE UP (ref 5–17)
APPEARANCE UR: CLEAR — SIGNIFICANT CHANGE UP
AST SERPL-CCNC: 42 U/L — HIGH (ref 10–40)
BASE EXCESS BLDV CALC-SCNC: 3.8 MMOL/L — HIGH (ref -2–2)
BASOPHILS # BLD AUTO: 0 K/UL — SIGNIFICANT CHANGE UP (ref 0–0.2)
BASOPHILS NFR BLD AUTO: 0.7 % — SIGNIFICANT CHANGE UP (ref 0–2)
BILIRUB SERPL-MCNC: 3.7 MG/DL — HIGH (ref 0.2–1.2)
BILIRUB UR-MCNC: NEGATIVE — SIGNIFICANT CHANGE UP
BUN SERPL-MCNC: 35 MG/DL — HIGH (ref 7–23)
CA-I SERPL-SCNC: 1.26 MMOL/L — SIGNIFICANT CHANGE UP (ref 1.12–1.3)
CALCIUM SERPL-MCNC: 10 MG/DL — SIGNIFICANT CHANGE UP (ref 8.4–10.5)
CHLORIDE BLDV-SCNC: 104 MMOL/L — SIGNIFICANT CHANGE UP (ref 96–108)
CHLORIDE SERPL-SCNC: 102 MMOL/L — SIGNIFICANT CHANGE UP (ref 96–108)
CK MB CFR SERPL CALC: 2.8 NG/ML — SIGNIFICANT CHANGE UP (ref 0–3.8)
CK SERPL-CCNC: 32 U/L — SIGNIFICANT CHANGE UP (ref 25–170)
CO2 BLDV-SCNC: 29 MMOL/L — SIGNIFICANT CHANGE UP (ref 22–30)
CO2 SERPL-SCNC: 26 MMOL/L — SIGNIFICANT CHANGE UP (ref 22–31)
COLOR SPEC: YELLOW — SIGNIFICANT CHANGE UP
CREAT SERPL-MCNC: 1.3 MG/DL — SIGNIFICANT CHANGE UP (ref 0.5–1.3)
DIFF PNL FLD: NEGATIVE — SIGNIFICANT CHANGE UP
EOSINOPHIL # BLD AUTO: 0.1 K/UL — SIGNIFICANT CHANGE UP (ref 0–0.5)
EOSINOPHIL NFR BLD AUTO: 1.1 % — SIGNIFICANT CHANGE UP (ref 0–6)
EPI CELLS # UR: SIGNIFICANT CHANGE UP /HPF
GAS PNL BLDV: 138 MMOL/L — SIGNIFICANT CHANGE UP (ref 136–145)
GAS PNL BLDV: SIGNIFICANT CHANGE UP
GAS PNL BLDV: SIGNIFICANT CHANGE UP
GLUCOSE BLDV-MCNC: 117 MG/DL — HIGH (ref 70–99)
GLUCOSE SERPL-MCNC: 114 MG/DL — HIGH (ref 70–99)
GLUCOSE UR QL: NEGATIVE — SIGNIFICANT CHANGE UP
HCO3 BLDV-SCNC: 28 MMOL/L — SIGNIFICANT CHANGE UP (ref 21–29)
HCT VFR BLD CALC: 40.3 % — SIGNIFICANT CHANGE UP (ref 34.5–45)
HCT VFR BLDA CALC: 41 % — SIGNIFICANT CHANGE UP (ref 39–50)
HGB BLD CALC-MCNC: 13.4 G/DL — SIGNIFICANT CHANGE UP (ref 11.5–15.5)
HGB BLD-MCNC: 13.7 G/DL — SIGNIFICANT CHANGE UP (ref 11.5–15.5)
KETONES UR-MCNC: NEGATIVE — SIGNIFICANT CHANGE UP
LACTATE BLDV-MCNC: 2.6 MMOL/L — HIGH (ref 0.7–2)
LACTATE BLDV-MCNC: 2.9 MMOL/L — HIGH (ref 0.7–2)
LEUKOCYTE ESTERASE UR-ACNC: ABNORMAL
LYMPHOCYTES # BLD AUTO: 1.5 K/UL — SIGNIFICANT CHANGE UP (ref 1–3.3)
LYMPHOCYTES # BLD AUTO: 25.9 % — SIGNIFICANT CHANGE UP (ref 13–44)
MCHC RBC-ENTMCNC: 34.1 GM/DL — SIGNIFICANT CHANGE UP (ref 32–36)
MCHC RBC-ENTMCNC: 35.7 PG — HIGH (ref 27–34)
MCV RBC AUTO: 105 FL — HIGH (ref 80–100)
MONOCYTES # BLD AUTO: 0.5 K/UL — SIGNIFICANT CHANGE UP (ref 0–0.9)
MONOCYTES NFR BLD AUTO: 8.2 % — SIGNIFICANT CHANGE UP (ref 2–14)
NEUTROPHILS # BLD AUTO: 3.8 K/UL — SIGNIFICANT CHANGE UP (ref 1.8–7.4)
NEUTROPHILS NFR BLD AUTO: 64 % — SIGNIFICANT CHANGE UP (ref 43–77)
NITRITE UR-MCNC: POSITIVE
PCO2 BLDV: 41 MMHG — SIGNIFICANT CHANGE UP (ref 35–50)
PH BLDV: 7.45 — SIGNIFICANT CHANGE UP (ref 7.35–7.45)
PH UR: 6 — SIGNIFICANT CHANGE UP (ref 5–8)
PHOSPHATE SERPL-MCNC: 2.6 MG/DL — SIGNIFICANT CHANGE UP (ref 2.5–4.5)
PLATELET # BLD AUTO: 109 K/UL — LOW (ref 150–400)
PO2 BLDV: 41 MMHG — SIGNIFICANT CHANGE UP (ref 25–45)
POTASSIUM BLDV-SCNC: 4.8 MMOL/L — SIGNIFICANT CHANGE UP (ref 3.5–5.3)
POTASSIUM SERPL-MCNC: 4.9 MMOL/L — SIGNIFICANT CHANGE UP (ref 3.5–5.3)
POTASSIUM SERPL-SCNC: 4.9 MMOL/L — SIGNIFICANT CHANGE UP (ref 3.5–5.3)
PROT SERPL-MCNC: 6 G/DL — SIGNIFICANT CHANGE UP (ref 6–8.3)
PROT UR-MCNC: SIGNIFICANT CHANGE UP
RBC # BLD: 3.84 M/UL — SIGNIFICANT CHANGE UP (ref 3.8–5.2)
RBC # FLD: 14 % — SIGNIFICANT CHANGE UP (ref 10.3–14.5)
SAO2 % BLDV: 72 % — SIGNIFICANT CHANGE UP (ref 67–88)
SODIUM SERPL-SCNC: 141 MMOL/L — SIGNIFICANT CHANGE UP (ref 135–145)
SP GR SPEC: 1.02 — SIGNIFICANT CHANGE UP (ref 1.01–1.02)
TROPONIN T SERPL-MCNC: 0.01 NG/ML — SIGNIFICANT CHANGE UP (ref 0–0.06)
UROBILINOGEN FLD QL: 4 MG/DL
WBC # BLD: 5.9 K/UL — SIGNIFICANT CHANGE UP (ref 3.8–10.5)
WBC # FLD AUTO: 5.9 K/UL — SIGNIFICANT CHANGE UP (ref 3.8–10.5)
WBC UR QL: >50 /HPF (ref 0–5)

## 2018-06-09 PROCEDURE — 99223 1ST HOSP IP/OBS HIGH 75: CPT | Mod: AI,GC

## 2018-06-09 PROCEDURE — 93010 ELECTROCARDIOGRAM REPORT: CPT

## 2018-06-09 PROCEDURE — 99291 CRITICAL CARE FIRST HOUR: CPT | Mod: GC

## 2018-06-09 PROCEDURE — 71045 X-RAY EXAM CHEST 1 VIEW: CPT | Mod: 26

## 2018-06-09 RX ORDER — SODIUM CHLORIDE 9 MG/ML
1000 INJECTION, SOLUTION INTRAVENOUS
Qty: 0 | Refills: 0 | Status: DISCONTINUED | OUTPATIENT
Start: 2018-06-09 | End: 2018-06-09

## 2018-06-09 RX ORDER — MEROPENEM 1 G/30ML
500 INJECTION INTRAVENOUS
Qty: 0 | Refills: 0 | Status: DISCONTINUED | OUTPATIENT
Start: 2018-06-09 | End: 2018-06-11

## 2018-06-09 RX ORDER — MEROPENEM 1 G/30ML
500 INJECTION INTRAVENOUS EVERY 8 HOURS
Qty: 0 | Refills: 0 | Status: DISCONTINUED | OUTPATIENT
Start: 2018-06-09 | End: 2018-06-09

## 2018-06-09 RX ORDER — MEROPENEM 1 G/30ML
500 INJECTION INTRAVENOUS EVERY 12 HOURS
Qty: 0 | Refills: 0 | Status: DISCONTINUED | OUTPATIENT
Start: 2018-06-10 | End: 2018-06-11

## 2018-06-09 RX ORDER — LACTULOSE 10 G/15ML
20 SOLUTION ORAL ONCE
Qty: 0 | Refills: 0 | Status: DISCONTINUED | OUTPATIENT
Start: 2018-06-09 | End: 2018-06-09

## 2018-06-09 RX ORDER — LACTULOSE 10 G/15ML
200 SOLUTION ORAL THREE TIMES A DAY
Qty: 0 | Refills: 0 | Status: DISCONTINUED | OUTPATIENT
Start: 2018-06-09 | End: 2018-06-11

## 2018-06-09 RX ORDER — LEVOTHYROXINE SODIUM 125 MCG
20 TABLET ORAL AT BEDTIME
Qty: 0 | Refills: 0 | Status: DISCONTINUED | OUTPATIENT
Start: 2018-06-09 | End: 2018-06-11

## 2018-06-09 RX ORDER — IPRATROPIUM/ALBUTEROL SULFATE 18-103MCG
3 AEROSOL WITH ADAPTER (GRAM) INHALATION EVERY 6 HOURS
Qty: 0 | Refills: 0 | Status: DISCONTINUED | OUTPATIENT
Start: 2018-06-09 | End: 2018-06-13

## 2018-06-09 RX ORDER — MEROPENEM 1 G/30ML
500 INJECTION INTRAVENOUS ONCE
Qty: 0 | Refills: 0 | Status: COMPLETED | OUTPATIENT
Start: 2018-06-09 | End: 2018-06-09

## 2018-06-09 RX ORDER — MEROPENEM 1 G/30ML
500 INJECTION INTRAVENOUS ONCE
Qty: 0 | Refills: 0 | Status: DISCONTINUED | OUTPATIENT
Start: 2018-06-09 | End: 2018-06-09

## 2018-06-09 RX ORDER — ACETAMINOPHEN 500 MG
650 TABLET ORAL ONCE
Qty: 0 | Refills: 0 | Status: COMPLETED | OUTPATIENT
Start: 2018-06-09 | End: 2018-06-09

## 2018-06-09 RX ORDER — CEFTRIAXONE 500 MG/1
1 INJECTION, POWDER, FOR SOLUTION INTRAMUSCULAR; INTRAVENOUS ONCE
Qty: 0 | Refills: 0 | Status: COMPLETED | OUTPATIENT
Start: 2018-06-09 | End: 2018-06-09

## 2018-06-09 RX ORDER — CEFTRIAXONE 500 MG/1
1 INJECTION, POWDER, FOR SOLUTION INTRAMUSCULAR; INTRAVENOUS ONCE
Qty: 0 | Refills: 0 | Status: DISCONTINUED | OUTPATIENT
Start: 2018-06-09 | End: 2018-06-09

## 2018-06-09 RX ORDER — LACTULOSE 10 G/15ML
200 SOLUTION ORAL THREE TIMES A DAY
Qty: 0 | Refills: 0 | Status: DISCONTINUED | OUTPATIENT
Start: 2018-06-09 | End: 2018-06-09

## 2018-06-09 RX ORDER — MEROPENEM 1 G/30ML
INJECTION INTRAVENOUS
Qty: 0 | Refills: 0 | Status: DISCONTINUED | OUTPATIENT
Start: 2018-06-09 | End: 2018-06-09

## 2018-06-09 RX ORDER — SODIUM CHLORIDE 9 MG/ML
1000 INJECTION, SOLUTION INTRAVENOUS
Qty: 0 | Refills: 0 | Status: DISCONTINUED | OUTPATIENT
Start: 2018-06-09 | End: 2018-06-11

## 2018-06-09 RX ORDER — SODIUM CHLORIDE 9 MG/ML
1000 INJECTION, SOLUTION INTRAVENOUS
Qty: 0 | Refills: 0 | Status: COMPLETED | OUTPATIENT
Start: 2018-06-09 | End: 2018-06-09

## 2018-06-09 RX ORDER — SODIUM CHLORIDE 9 MG/ML
3 INJECTION INTRAMUSCULAR; INTRAVENOUS; SUBCUTANEOUS ONCE
Qty: 0 | Refills: 0 | Status: COMPLETED | OUTPATIENT
Start: 2018-06-09 | End: 2018-06-09

## 2018-06-09 RX ORDER — LACTULOSE 10 G/15ML
200 SOLUTION ORAL ONCE
Qty: 0 | Refills: 0 | Status: COMPLETED | OUTPATIENT
Start: 2018-06-09 | End: 2018-06-09

## 2018-06-09 RX ADMIN — CEFTRIAXONE 100 GRAM(S): 500 INJECTION, POWDER, FOR SOLUTION INTRAMUSCULAR; INTRAVENOUS at 09:57

## 2018-06-09 RX ADMIN — SODIUM CHLORIDE 3 MILLILITER(S): 9 INJECTION INTRAMUSCULAR; INTRAVENOUS; SUBCUTANEOUS at 09:44

## 2018-06-09 RX ADMIN — SODIUM CHLORIDE 2000 MILLILITER(S): 9 INJECTION, SOLUTION INTRAVENOUS at 10:07

## 2018-06-09 RX ADMIN — MEROPENEM 100 MILLIGRAM(S): 1 INJECTION INTRAVENOUS at 20:22

## 2018-06-09 RX ADMIN — SODIUM CHLORIDE 100 MILLILITER(S): 9 INJECTION, SOLUTION INTRAVENOUS at 20:11

## 2018-06-09 RX ADMIN — Medication 20 MICROGRAM(S): at 22:29

## 2018-06-09 RX ADMIN — Medication 100 MILLIGRAM(S): at 11:52

## 2018-06-09 RX ADMIN — Medication 650 MILLIGRAM(S): at 09:45

## 2018-06-09 RX ADMIN — LACTULOSE 200 GRAM(S): 10 SOLUTION ORAL at 12:44

## 2018-06-09 RX ADMIN — LACTULOSE 200 GRAM(S): 10 SOLUTION ORAL at 22:30

## 2018-06-09 NOTE — ED ADULT NURSE REASSESSMENT NOTE - NS ED NURSE REASSESS COMMENT FT1
patient resting comfortably in bed in no acute distress. VSS. waiting for LR to finish infusing and to repeat blood work as ordered by MD Ames. family aware

## 2018-06-09 NOTE — H&P ADULT - PROBLEM SELECTOR PLAN 9
- per family, holding chemical prophylaxis  - Discussed code status, daughter would like to discuss with family, currently full code - per family, holding chemical prophylaxis  - Discussed code status, pt DNR but not DNI and would want trial of pressors if needed for BP support

## 2018-06-09 NOTE — H&P ADULT - ATTENDING COMMENTS
I was physically present for the key portions of the evaluation and management (E/M) service provided.  I agree with the above history, physical, and plan which I have reviewed and edited where appropriate. Pt with AMS likely 2/2 HE + sepsis 2/2 UTI and PNA. Will treat with meropenem give pcn allergy. Overall prognosis poor. Daughter would like to consult with OP H/O pharmacologic DVT ppx, will d/w OP H/O. Pt unabel to take any PO, however would not place NG tube given known hx of varices and bleed risk. Pt currently DNR but not DNI and would want trial of pressors if needed for BP support.

## 2018-06-09 NOTE — CHART NOTE - NSCHARTNOTEFT_GEN_A_CORE
TO BE COMPLETED WITHIN 6 HOURS OF INITIAL ASSESSMENT:    For use in patients that have 2 sepsis criteria and new organ dysfunction   •	New or increased oxygen requirement  •	Creatinine >2mg/dL  •	Bilirubin>2mg/dL  •	Platelet <100,00/mm3  •	INR >1.5, PTT>60  •	Lactate >2    If patient persistent hypotension (SBP<90) or any lactate >4 then provider evaluation (Physician/PA/NP) within 30 minutes of bolus completion is required.    Vital Signs Last 24 Hrs  T(C): 36.4 (09 Jun 2018 16:31), Max: 37.1 (09 Jun 2018 09:46)  T(F): 97.5 (09 Jun 2018 16:31), Max: 98.7 (09 Jun 2018 09:46)  HR: 81 (09 Jun 2018 16:31) (73 - 92)  BP: 119/59 (09 Jun 2018 16:31) (119/59 - 149/85)  BP(mean): --  RR: 18 (09 Jun 2018 16:31) (15 - 18)  SpO2: 98% (09 Jun 2018 16:31) (98% - 99%)  		  LUNGS:  [  ]Clear bilaterally [  ] Wheeze [  ] Rhonchi [X] Rales [  ] Crackles; Other:  HEART: [  ]RRR [  ]No murmur [X]Normal S1S2 [  ]Tachycardia;  Other: Irregularly irregular  CAPILLARY REFULL:  	Fingers: [  ] less than 2 seconds [  ] more than 2 seconds                                           Toes: [  ]  less than 2 seconds [  ] more than 2 seconds   PERIPHERAL PULSES:  Radial: [X] Palpable  [  ]  non-palpable                                         Dorsalis Pedis: [X] Palpable  [  ] non-palpable                                         Posterior Tibial: [  ] Palpable  [  ] non-palpable                                          Other:  SKIN:   [  ]  Diaphoretic  [  ]  mottling  [X]  Cold extremities  [  ]  Warm [  ]  Dry                      Other: Periorbital ecchymosis of L eye    BEDSIDE ULTRASOUND FINDINGS (IF APPLICABLE):    Labs:  09 Jun 2018 09:59    141    |  102    |  35     ----------------------------<  114    4.9     |  26     |  1.30     Ca    10.0       09 Jun 2018 09:59  Phos  2.6       09 Jun 2018 09:59    TPro  6.0    /  Alb  3.4    /  TBili  3.7    /  DBili  x      /  AST  42     /  ALT  32     /  AlkPhos  114    09 Jun 2018 09:59                          13.7   5.9   )-----------( 109      ( 09 Jun 2018 09:59 )             40.3       Lactate: 2.9    Plan (orders must be placed in EMR):     [X]  Check Repeat Lactate   [  ]  No change in current plan  [  ]  Start Vasopressors:  [  ]  Repeat Fluid Bolus:  [X]  other: Titrate lactulose to     Care Discussed with Consultants/Other Providers [ ] YES  [ ] NO TO BE COMPLETED WITHIN 6 HOURS OF INITIAL ASSESSMENT:    For use in patients that have 2 sepsis criteria and new organ dysfunction   •	New or increased oxygen requirement  •	Creatinine >2mg/dL  •	Bilirubin>2mg/dL  •	Platelet <100,00/mm3  •	INR >1.5, PTT>60  •	Lactate >2    If patient persistent hypotension (SBP<90) or any lactate >4 then provider evaluation (Physician/PA/NP) within 30 minutes of bolus completion is required.    Vital Signs Last 24 Hrs  T(C): 36.4 (09 Jun 2018 16:31), Max: 37.1 (09 Jun 2018 09:46)  T(F): 97.5 (09 Jun 2018 16:31), Max: 98.7 (09 Jun 2018 09:46)  HR: 81 (09 Jun 2018 16:31) (73 - 92)  BP: 119/59 (09 Jun 2018 16:31) (119/59 - 149/85)  BP(mean): --  RR: 18 (09 Jun 2018 16:31) (15 - 18)  SpO2: 98% (09 Jun 2018 16:31) (98% - 99%)  		  LUNGS:  [  ]Clear bilaterally [  ] Wheeze [  ] Rhonchi [X] Rales [  ] Crackles; Other:  HEART: [  ]RRR [  ]No murmur [X]Normal S1S2 [  ]Tachycardia;  Other: Irregularly irregular  CAPILLARY REFULL:  	Fingers: [  ] less than 2 seconds [  ] more than 2 seconds                                           Toes: [  ]  less than 2 seconds [  ] more than 2 seconds   PERIPHERAL PULSES:  Radial: [X] Palpable  [  ]  non-palpable                                         Dorsalis Pedis: [X] Palpable  [  ] non-palpable                                         Posterior Tibial: [  ] Palpable  [  ] non-palpable                                          Other:  SKIN:   [  ]  Diaphoretic  [  ]  mottling  [X]  Cold extremities  [  ]  Warm [  ]  Dry                      Other: Periorbital ecchymosis of L eye    BEDSIDE ULTRASOUND FINDINGS (IF APPLICABLE):    Labs:  09 Jun 2018 09:59    141    |  102    |  35     ----------------------------<  114    4.9     |  26     |  1.30     Ca    10.0       09 Jun 2018 09:59  Phos  2.6       09 Jun 2018 09:59    TPro  6.0    /  Alb  3.4    /  TBili  3.7    /  DBili  x      /  AST  42     /  ALT  32     /  AlkPhos  114    09 Jun 2018 09:59                          13.7   5.9   )-----------( 109      ( 09 Jun 2018 09:59 )             40.3       Lactate: 2.9    Plan (orders must be placed in EMR):     [X]  Check Repeat Lactate   [  ]  No change in current plan  [  ]  Start Vasopressors:  [  ]  Repeat Fluid Bolus:  [X]  other: Titrate lactulose to 2-3 soft stools per day, continue with antibiotics for UTI and PNA    Care Discussed with Consultants/Other Providers [ ] YES  [ ] NO

## 2018-06-09 NOTE — H&P ADULT - PMH
AF (Atrial Fibrillation)  IF ON ALBUTEROL  Bleeding Esophageal Varices    CKD (chronic kidney disease), stage 3 (moderate)    CLL (Chronic Lymphoblastic Leukemia)    COPD (Chronic Obstructive Pulmonary Disease)    GIB (Gastrointestinal Bleeding)    Liver cell carcinoma    Portal Hypertension    Pulmonary HTN  moderate  PVD (peripheral vascular disease)    Severe aortic stenosis by prior echocardiogram AF (Atrial Fibrillation)    Bleeding Esophageal Varices    CKD (chronic kidney disease), stage 3 (moderate)    CLL (Chronic Lymphoblastic Leukemia)    COPD (Chronic Obstructive Pulmonary Disease)    GIB (Gastrointestinal Bleeding)    Liver cell carcinoma    Portal Hypertension    Pulmonary HTN  moderate  PVD (peripheral vascular disease)    Severe aortic stenosis by prior echocardiogram

## 2018-06-09 NOTE — H&P ADULT - PROBLEM SELECTOR PLAN 3
- UA positive per outpatient records and here, was started on cipro outpatient  - will start zosyn to cover UTI and possible asp pna - UA positive per outpatient records and here, was started on cipro outpatient  - will start meropenem to cover UTI and asp pna

## 2018-06-09 NOTE — ED PROVIDER NOTE - CRITICAL CARE PROVIDED
direct patient care (not related to procedure)/documentation/additional history taking/consult w/ pt's family directly relating to pts condition/conducted a detailed discussion of DNR status

## 2018-06-09 NOTE — H&P ADULT - NSHPPHYSICALEXAM_GEN_ALL_CORE
GENERAL: lethargic,   HEAD:  Atraumatic, Normocephalic  EYES: EOMI, PERRLA, conjunctiva and sclera clear  NECK: Supple, No JVD  CHEST/LUNG: Clear to auscultation bilaterally; No wheeze  HEART: Regular rate and rhythm; No murmurs, rubs, or gallops  ABDOMEN: Soft, Nontender, Nondistended; Bowel sounds present  EXTREMITIES:  2+ Peripheral Pulses, No clubbing, cyanosis, or edema  PSYCH: AAOx3  NEUROLOGY: non-focal  SKIN: No rashes or lesions GENERAL: lethargic, arousable to physical stimuli  HEAD:  Atraumatic, Normocephalic  EYES: pupils reactive b/l  NECK: Supple  CHEST/LUNG: b/l lower lobe crackles  HEART: irregularly irregular rhythm, regular rate, systolic murmur  ABDOMEN: Soft, Nontender, Nondistended; Bowel sounds present  EXTREMITIES:  2+ Peripheral Pulses, purple ecchymoses on all 4 extremities  PSYCH: AOx0, nonverbal at this time  NEUROLOGY: unable to assess  SKIN: diffusely dry, ecchymoses on all extremities

## 2018-06-09 NOTE — H&P ADULT - PROBLEM SELECTOR PLAN 5
- CHADSVASc - 5 however has had ICH in past and not on AC  - c/w home dose digoxin IV, will check level in AM - baseline creatinine appears to be around 0.9  - admission creatinine 1.3, in setting of likely dehydration and sepsis  - c/w IVF  - renally dose meds - baseline creatinine appears to be around 0.9  - admission creatinine 1.3, in setting of likely dehydration and sepsis  - c/w IVF  - renally dose meropenem

## 2018-06-09 NOTE — ED PROVIDER NOTE - OBJECTIVE STATEMENT
This is an 89F w/Hx of CLL, Afib not on AC due to bleeding, liver cancer, cirrhosis c/b varices and hepatic encephalopathy on rifaximin/lactulose, frequent UTIs who presents for altered mental status. The patient has had 2-3 days of increasing confusion and abnormal behavior typical of when she gets an infection. Her doctor ran a urine culture and urinalysis positive for klebsiella and she was started on cipro yesterday. Since then she has continued to have worse mental status. No reported fevers. 1 episode of vomiting en route by EMS, pt ?aspirated. No other vomiting per family. No diarrhea beyond usual after administration of lactulose, which she has not had today. No skin rash, no trauma.

## 2018-06-09 NOTE — H&P ADULT - PROBLEM SELECTOR PLAN 2
- tachycardic with AMS meeting qsofa criteria however did not meet sirs criteria  - also with lactic acidosis, now downtrending  - s/p 1L LR, c/w IV hydration  - antibiotics as above - tachycardic with AMS meeting qsofa criteria   - also with lactic acidosis, now downtrending  - s/p 1L LR, c/w IV hydration  - antibiotics as above

## 2018-06-09 NOTE — H&P ADULT - PROBLEM SELECTOR PLAN 6
- unable to use home inhalers given mental status  - will start on duonebs  - saturating well on RA - CHADSVASc - 5 however has had ICH in past and not on AC  - c/w home dose digoxin IV, will check level in AM

## 2018-06-09 NOTE — ED PROVIDER NOTE - MEDICAL DECISION MAKING DETAILS
ATTENDING MD Kwaku: Likely urosepsis, ?aspiration with vomiting and pneumonia, no increased work of breathing an normal O2 sats. Exam suggestive of hepatic encephalopathy. Will treat for sepsis, antibiotics by culture, lactulose, reassess. Will be admitted.

## 2018-06-09 NOTE — H&P ADULT - NSHPLABSRESULTS_GEN_ALL_CORE
Complete Blood Count + Automated Diff (06.09.18 @ 09:59)    WBC Count: 5.9 K/uL    RBC Count: 3.84 M/uL    Hemoglobin: 13.7 g/dL    Hematocrit: 40.3 %    Mean Cell Volume: 105.0 fl    Mean Cell Hemoglobin: 35.7 pg    Mean Cell Hemoglobin Conc: 34.1 gm/dL    Red Cell Distrib Width: 14.0 %    Platelet Count - Automated: 109 K/uL    Auto Neutrophil #: 3.8 K/uL    Auto Lymphocyte #: 1.5 K/uL    Auto Monocyte #: 0.5 K/uL    Auto Eosinophil #: 0.1 K/uL    Auto Basophil #: 0.0 K/uL    Auto Neutrophil %: 64.0: Differential percentages must be correlated with absolute numbers for  clinical significance. %    Auto Lymphocyte %: 25.9 %    Auto Monocyte %: 8.2 %    Auto Eosinophil %: 1.1 %    Auto Basophil %: 0.7 %    Comprehensive Metabolic Panel (06.09.18 @ 09:59)    Sodium, Serum: 141 mmol/L    Potassium, Serum: 4.9 mmol/L    Chloride, Serum: 102 mmol/L    Carbon Dioxide, Serum: 26 mmol/L    Anion Gap, Serum: 13 mmol/L    Blood Urea Nitrogen, Serum: 35 mg/dL    Creatinine, Serum: 1.30 mg/dL    Glucose, Serum: 114 mg/dL    Calcium, Total Serum: 10.0 mg/dL    Protein Total, Serum: 6.0 g/dL    Albumin, Serum: 3.4 g/dL    Bilirubin Total, Serum: 3.7 mg/dL    Alkaline Phosphatase, Serum: 114 U/L    Aspartate Aminotransferase (AST/SGOT): 42 U/L    Alanine Aminotransferase (ALT/SGPT): 32 U/L    eGFR if Non : 36: Interpretative comment      Blood Gas Venous - Lactate (06.09.18 @ 14:38)    Blood Gas Venous - Lactate: 2.6 mmoL/L    Ammonia, Serum (06.09.18 @ 14:42)    Ammonia, Serum: 99 umol/L

## 2018-06-09 NOTE — ED PROVIDER NOTE - PROGRESS NOTE DETAILS
worsening clonus, giving lactulose S/p lactulose looks improved, repeat lactate pending Lactate downtrending. Small bowel movement after enema. Clonus improved. Pt more awake, but still not awake enough for PO. Will give another enema, hopefully will be improved enough at that point to switch to PO meds. Lactate downtrending. Small bowel movement after enema. Clonus improved. Pt more awake, but still not awake enough for PO. Think will benefit from another enema, hopefully will be improved enough at that point to switch to PO meds. D/w MAR about additional enema asking me to hold the order and she will make her own assessment. As this patient has been admitted, I will defer to admitting team's judgement

## 2018-06-09 NOTE — ED ADULT NURSE NOTE - OBJECTIVE STATEMENT
88 yo female with PMH UTIs, A-fib, liver CA, hepatic encephelopathy presents to the ED from home via EMS c/o AMS since this AM. family states patient started having hallucinations 4 days ago, had urine testing done and was diagnosed with a UTI and started on antibiotics yesterday. family states patient started to "become altered" last night but woke up this AM and was more altered then usual. patient arrives to ED awake, non-verbal but responds to pain. family states family baseline is AAOx1, disoriented to place and time. lung sounds diminished on right lung, clear on left lobe of lung. cap refill <3sec. abdomen is soft, non-tender, non-distended. bruising noted to left arm. laceration noted to right side of forehead, dressing dry and intact. family states patient did not have laceration to right side of forehead and believes it was from EMS. straight catheter procedure performed with 2RNs at bedside. sterile technique maintained and patient tolerated well. 150cc dark yellow urine noted. A-fib on the monitor. SOBEIDA KRAUSE at the bedside. 88 yo female with PMH UTIs, A-fib, liver CA, hepatic encephelopathy presents to the ED from home via EMS c/o AMS since this AM. family states patient started having hallucinations 4 days ago, had urine testing done and was diagnosed with a UTI and started on antibiotics yesterday. family states patient started to "become altered" last night but woke up this AM and was more altered then usual. patient arrives to ED awake, non-verbal but responds to pain. family states family baseline is AAOx1, disoriented to place and time. lung sounds diminished on right lung, clear on left lobe of lung. cap refill <3sec. abdomen is soft, non-tender, non-distended. bruising noted to left arm. laceration noted to right side of forehead, dressing dry and intact. laceraton noted to right knee and left wrist. family states patient did not have laceration to right side of forehead and believes it was from EMS. straight catheter procedure performed with 2RNs at bedside. sterile technique maintained and patient tolerated well. 150cc dark yellow urine noted. A-fib on the monitor. SOBEIDA KRAUSE at the bedside. 89 yo female with PMH UTIs, A-fib, liver CA, hepatic encephelopathy presents to the ED from home via EMS c/o AMS since this AM. family states patient started having hallucinations 4 days ago, had urine testing done and was diagnosed with a UTI and started on antibiotics yesterday. family states patient started to "become altered" last night but woke up this AM and was more altered then usual. patient arrives to ED awake, non-verbal but responds to pain. family states family baseline is AAOx1, disoriented to place and time. lung sounds diminished on right lung, clear on left lobe of lung. cap refill <3sec. family states had productive yellow cough x2day. no coughing noted in ED. abdomen is soft, non-tender, non-distended. bruising noted to left arm. laceration noted to right side of forehead, dressing dry and intact. laceration noted to right knee and left wrist. family states patient did not have laceration to right side of forehead and believes it was from EMS. straight catheter procedure performed with 2RNs at bedside. sterile technique maintained and patient tolerated well. 150cc dark yellow urine noted. A-fib on the monitor. SOBEIDA KRAUSE at the bedside. 88 yo female with PMH UTIs, A-fib, liver CA, hepatic encephelopathy presents to the ED from home via EMS c/o AMS since this AM. family states patient started having hallucinations 4 days ago, had urine testing done and was diagnosed with a UTI and started on antibiotics yesterday. family states patient started to "become altered" last night but woke up this AM and was more altered then usual. patient arrives to ED awake, non-verbal but responds to pain. family states family baseline is AAOx1, disoriented to place and time. lung sounds diminished on right lung, clear on left lobe of lung. cap refill <3sec. family states had productive yellow cough x2day. no coughing noted in ED. abdomen is soft, non-tender, non-distended. bruising noted to left arm. laceration noted to right side of forehead, dressing dry and intact. laceration noted to right knee and left upper arm and wrist. family states patient did not have laceration to right side of forehead and believes it was from EMS. hematoma noted around left eye. straight catheter procedure performed with 2RNs at bedside. sterile technique maintained and patient tolerated well. 150cc dark yellow urine noted. A-fib on the monitor. PALAK. MD at the bedside.

## 2018-06-09 NOTE — H&P ADULT - PROBLEM SELECTOR PLAN 1
likely 2/2 infection (cystitis +/- asp pna) with component of hepatic encephalopathy  - presenting with 1 day of depressed mental status, cough with sputum, +UA from PMD on ciprofloxacin for 1 day  - afebrile at home and at hospital  - in the setting of decreased BM this past week despite taking lactulose and rifaximin  - previously hospitalization with MBS showing silent aspiration with all consistencies and patient currently on pleasure feeds  - ammonia elevated however does not correlate clinically to encephalopathy  - f/u blood and urine cultures  - will c/w zosyn for now pending cultures to cover UTI and asp pna  - c/w lactulose enema given mental status likely 2/2 infection (cystitis +/- asp pna) with component of hepatic encephalopathy  - presenting with 1 day of depressed mental status, cough with sputum, +UA from PMD on ciprofloxacin for 1 day  - afebrile at home and at hospital  - in the setting of decreased BM this past week despite taking lactulose and rifaximin  - previously hospitalization with MBS showing silent aspiration with all consistencies and patient currently on pleasure feeds  - ammonia elevated however does not correlate clinically to encephalopathy  - f/u blood and urine cultures  - meropenem for now pending cultures to cover UTI and asp pna  - c/w lactulose enema given mental status likely 2/2 infection (cystitis +/- asp pna) with component of hepatic encephalopathy  - presenting with 1 day of depressed mental status, cough with sputum, +UA from PMD on ciprofloxacin for 1 day  - afebrile at home and at hospital  - in the setting of decreased BM this past week despite taking lactulose and rifaximin  - previously hospitalization with MBS showing silent aspiration with all consistencies and patient currently on pleasure feeds  - ammonia elevated however does not correlate clinically to encephalopathy  - f/u blood and urine cultures  - meropenem for now pending cultures to cover UTI and asp pna  - c/w lactulose enema titrating to 3 BM a day likely 2/2 infection (cystitis +/- asp pna) with component of hepatic encephalopathy  - presenting with 1 day of depressed mental status, cough with sputum, +UA from PMD on ciprofloxacin for 1 day  - afebrile at home and at hospital  - in the setting of decreased BM this past week despite taking lactulose and rifaximin  - previously hospitalization with MBS showing silent aspiration with all consistencies and patient currently on pleasure feeds  - ammonia elevated however does not correlate clinically to encephalopathy  - f/u blood and urine cultures  - meropenem for now pending cultures to cover UTI and asp pna, renally dosed  - c/w lactulose enema titrating to 3 BM a day

## 2018-06-09 NOTE — H&P ADULT - PROBLEM SELECTOR PLAN 8
- previously hospitalizations with thrombocytopenia, CBC appears to be hemoconcentrated based on previous labs, will recheck and start hep sq - previous hospitalizations with thrombocytopenia, CBC appears to be hemoconcentrated based on previous labs, will recheck and start hep sq if able given hepatic coagulopathy - c/w home dose levothyroxine IV

## 2018-06-09 NOTE — ED PROVIDER NOTE - PHYSICAL EXAMINATION
ATTENDING MD Kwaku: GEN: groaning, obtunded, responsive to pain in all extremities, no sensical speech  HEAD: NCAT  EYES: clear, anicteric, pupils equal round and reactive to light, no photophobia, no pptopsis  ENT: mucus membranes are dry, some small yellow-green  vomitus in mouth and nmore on bed, no stridor  RESP: normal O2 sat, no tachypnea, no increased WOB, decreased breath sounds in R base otherwise clear lungs  CV: tachycardic, irregular, 2+ distal pulses, no LE edema  GI: abd soft, nontender, nondistended, no fluid wave, light brown stool on rectal  : no CVAT, no suprapuic fullness  MSK: no extremity deformity or stepoff,   SKIN: old appearing bruise on face above L-eye, 1cm , old-appearing bruise on R-foot 1cm  NEURO: localizes to pain, opens eyes to opain, no rigidity, +inducible clonus, moving all extremities, responsive to tactile stimuli in all extremities

## 2018-06-09 NOTE — ED PROVIDER NOTE - CARE PLAN
Principal Discharge DX:	Sepsis, due to unspecified organism  Secondary Diagnosis:	Urinary tract infection without hematuria, site unspecified  Secondary Diagnosis:	Hepatic encephalopathy syndrome

## 2018-06-09 NOTE — H&P ADULT - ASSESSMENT
89F with cirrhosis with varices, afib not on AC (hx of ICH), COPD, CLL (not currently on treatment), AS presenting with 1 day of decreased mental status and tachycardia 2/2 cystitis and hepatic encephalopathy.

## 2018-06-09 NOTE — H&P ADULT - HISTORY OF PRESENT ILLNESS
89F with cirrhosis with varices, afib not on AC (hx of ICH), COPD, CLL (not currently on treatment), AS presenting with 1 day of decreased mental status. History is obtained from daughter and daugther-in-law at bedside. Patient is unable to answer questions due to mental status. Patient lives with daughter with HHA and was noted over the past 2 days to be more lethargic and uninterested in doing her normal daily activities. Her PMD was contacted and urine specimen was sent as this has happened in the past when she has UTIs. Patient does not endorse typical UTI symptoms when she gets infections. UA was positive and patient was started on ciprofloxacin on 6/8. Over the past few days she has also had a cough with yellow thick sputum which is new. Daughter denies any fevers, chills at home. Throughout the day yesterday her mental status continued to worsen despite 2 doses of ciprofloxacin and therefore EMS was called.     Of note, patient is on rifaximin and lactulose for cirrhosis. This past week patient has been have less bowel movements, 1-2 a day, in which the daughter had to physically remove stool one day because of constipation. At baseline, patient is AOx3 per family, uses a rollator walker at home.    Patient previously hospitalized in February for AMS and pna and was treated with levaquin. MBS was done which showed silent aspiration with all consistencies. PEG tube was explained to family and after a GOC discussion it was decided to send patient home with pleasure feeds. Per daughter-in-law at bedside, patient has mostly been sticking to suggested diet however sometimes eat things that were suggested to avoid. 89F with cirrhosis with varices and HCC, afib not on AC (hx of ICH), COPD, CLL (not currently on treatment), AS presenting with 1 day of decreased mental status. History is obtained from daughter and daugther-in-law at bedside. Patient is unable to answer questions due to mental status. Patient lives with daughter with HHA and was noted over the past 2 days to be more lethargic and uninterested in doing her normal daily activities. Her PMD was contacted and urine specimen was sent as this has happened in the past when she has UTIs. Patient does not endorse typical UTI symptoms when she gets infections. UA was positive and patient was started on ciprofloxacin on 6/8. Over the past few days she has also had a cough with yellow thick sputum which is new. Daughter denies any fevers, chills at home. Throughout the day yesterday her mental status continued to worsen despite 2 doses of ciprofloxacin and therefore EMS was called.     Of note, patient is on rifaximin and lactulose for cirrhosis. This past week patient has been have less bowel movements, 1-2 a day, in which the daughter had to physically remove stool one day because of constipation. At baseline, patient is AOx3 per family, uses a rollator walker at home.    Patient previously hospitalized in February for AMS and pna and was treated with levaquin. MBS was done which showed silent aspiration with all consistencies. PEG tube was explained to family and after a GOC discussion it was decided to send patient home with pleasure feeds. Per daughter-in-law at bedside, patient has mostly been sticking to suggested diet however sometimes eat things that were suggested to avoid.

## 2018-06-09 NOTE — H&P ADULT - PROBLEM SELECTOR PLAN 4
- currently unable to take PO  - lactulose enemas as needed for 2-3 BM/day  - will resume propranolol and spironolactone when able

## 2018-06-10 LAB
CULTURE RESULTS: SIGNIFICANT CHANGE UP
SPECIMEN SOURCE: SIGNIFICANT CHANGE UP

## 2018-06-10 PROCEDURE — 99233 SBSQ HOSP IP/OBS HIGH 50: CPT | Mod: GC

## 2018-06-10 RX ORDER — DIGOXIN 250 MCG
0.1 TABLET ORAL EVERY OTHER DAY
Qty: 0 | Refills: 0 | Status: DISCONTINUED | OUTPATIENT
Start: 2018-06-10 | End: 2018-06-11

## 2018-06-10 RX ORDER — ACETAMINOPHEN 500 MG
1000 TABLET ORAL ONCE
Qty: 0 | Refills: 0 | Status: COMPLETED | OUTPATIENT
Start: 2018-06-10 | End: 2018-06-10

## 2018-06-10 RX ADMIN — Medication 400 MILLIGRAM(S): at 23:01

## 2018-06-10 RX ADMIN — LACTULOSE 200 GRAM(S): 10 SOLUTION ORAL at 16:28

## 2018-06-10 RX ADMIN — MEROPENEM 100 MILLIGRAM(S): 1 INJECTION INTRAVENOUS at 06:41

## 2018-06-10 RX ADMIN — Medication 1000 MILLIGRAM(S): at 23:16

## 2018-06-10 RX ADMIN — MEROPENEM 100 MILLIGRAM(S): 1 INJECTION INTRAVENOUS at 17:44

## 2018-06-10 RX ADMIN — Medication 20 MICROGRAM(S): at 23:00

## 2018-06-10 NOTE — PROGRESS NOTE ADULT - PROBLEM SELECTOR PLAN 3
- UA positive per outpatient records and here, was started on cipro outpatient  - will start meropenem to cover UTI and asp pna - UA positive per outpatient records and here, was started on cipro outpatient  - c/w meropenem to cover UTI and asp pna - UA positive per outpatient records and here, was started on cipro outpatient  - however urine cultures negative  - c/w meropenem to cover UTI and asp pna

## 2018-06-10 NOTE — PROGRESS NOTE ADULT - SUBJECTIVE AND OBJECTIVE BOX
Patient is a 89y old  Female who presents with a chief complaint of decreased mental status (2018 15:03)      SUBJECTIVE / OVERNIGHT EVENTS:    MEDICATIONS  (STANDING):  lactated ringers. 1000 milliLiter(s) (100 mL/Hr) IV Continuous <Continuous>  levothyroxine Injectable 20 MICROGram(s) IV Push at bedtime  meropenem  IVPB 500     meropenem  IVPB 500 milliGRAM(s) IV Intermittent every 12 hours    MEDICATIONS  (PRN):  ALBUTerol/ipratropium for Nebulization 3 milliLiter(s) Nebulizer every 6 hours PRN Shortness of Breath and/or Wheezing  lactulose Retention Enema 200 Gram(s) Rectal three times a day PRN induce bowel movement        PHYSICAL EXAM:  Vital Signs Last 24 Hrs  T(C): 36.3 (10 Blair 2018 00:17), Max: 37.1 (2018 09:46)  T(F): 97.4 (10 Blair 2018 00:17), Max: 98.7 (2018 09:46)  HR: 89 (10 Blair 2018 00:17) (73 - 92)  BP: 132/70 (10 Blair 2018 00:17) (119/59 - 149/85)  BP(mean): --  RR: 18 (10 Blair 2018 00:17) (15 - 18)  SpO2: 95% (10 Blair 2018 00:17) (95% - 99%)    GENERAL: lethargic, arousable to physical stimuli  HEAD:  Atraumatic, Normocephalic  EYES: pupils reactive b/l  NECK: Supple  CHEST/LUNG: b/l lower lobe crackles  HEART: irregularly irregular rhythm, regular rate, systolic murmur  ABDOMEN: Soft, Nontender, Nondistended; Bowel sounds present  EXTREMITIES:  2+ Peripheral Pulses, purple ecchymoses on all 4 extremities  PSYCH: AOx0, nonverbal at this time  NEUROLOGY: unable to assess  SKIN: diffusely dry, ecchymoses on all extremities    LABS:  (- @ 09:59)                        13.7  5.9 )-----------( 109                 40.3    Neutrophils = 3.8 (64.0%)  Lymphocytes = 1.5 (25.9%)  Eosinophils = 0.1 (1.1%)  Basophils = 0.0 (0.7%)  Monocytes = 0.5 (8.2%)  Bands = --%    WBC Trend: 5.9<--  Hb Trend: 13.7<--  Plt Trend: 109<--      141  |  102  |  35<H>  ----------------------------<  114<H>  4.9   |  26  |  1.30    Ca    10.0      2018 09:59  Phos  2.6         TPro  6.0  /  Alb  3.4  /  TBili  3.7<H>  /  DBili  x   /  AST  42<H>  /  ALT  32  /  AlkPhos  114      Creatinine Trend: 1.30<--    CARDIAC MARKERS ( 2018 09:59 )  Trop 0.01 ng/mL / CK 32 U/L / CKMB x           Urinalysis Basic - ( 2018 09:59 )    Color: Yellow / Appearance: Clear / S.019 / pH: x  Gluc: x / Ketone: Negative  / Bili: Negative / Urobili: 4 mg/dL   Blood: x / Protein: Trace / Nitrite: Positive   Leuk Esterase: Large / RBC: x / WBC >50 /HPF   Sq Epi: x / Non Sq Epi: Occasional /HPF / Bacteria: x        Microbiology:  Urine Cx:  Blood Cx:    RADIOLOGY & ADDITIONAL TESTS:  X- Ray:  CT:  Ultrasound:  [ ] imaging personally reviewed and interpreted by me    Consultant(s) Notes Reviewed:      Care Discussed with Consultants/Other Providers: Patient is a 89y old  Female who presents with a chief complaint of decreased mental status (2018 15:03)      SUBJECTIVE / OVERNIGHT EVENTS: No acute events overnight. Patient seen this morning slightly more alert and arousable however still unable to engage or follow commands.     MEDICATIONS  (STANDING):  lactated ringers. 1000 milliLiter(s) (100 mL/Hr) IV Continuous <Continuous>  levothyroxine Injectable 20 MICROGram(s) IV Push at bedtime  meropenem  IVPB 500     meropenem  IVPB 500 milliGRAM(s) IV Intermittent every 12 hours    MEDICATIONS  (PRN):  ALBUTerol/ipratropium for Nebulization 3 milliLiter(s) Nebulizer every 6 hours PRN Shortness of Breath and/or Wheezing  lactulose Retention Enema 200 Gram(s) Rectal three times a day PRN induce bowel movement        PHYSICAL EXAM:  Vital Signs Last 24 Hrs  T(C): 36.3 (10 Blair 2018 00:17), Max: 37.1 (2018 09:46)  T(F): 97.4 (10 Blair 2018 00:17), Max: 98.7 (2018 09:46)  HR: 89 (10 Blair 2018 00:17) (73 - 92)  BP: 132/70 (10 Blair 2018 00:17) (119/59 - 149/85)  BP(mean): --  RR: 18 (10 Blair 2018 00:17) (15 - 18)  SpO2: 95% (10 Blair 2018 00:17) (95% - 99%)    GENERAL: lethargic, arousable to physical stimuli  HEAD:  Atraumatic, Normocephalic  EYES: pupils reactive b/l  NECK: Supple  CHEST/LUNG: b/l lower lobe crackles  HEART: irregularly irregular rhythm, regular rate, systolic murmur  ABDOMEN: Soft, Nontender, Nondistended; Bowel sounds present  EXTREMITIES:  2+ Peripheral Pulses, purple ecchymoses on all 4 extremities  PSYCH: AOx0, nonverbal at this time  NEUROLOGY: unable to assess  SKIN: diffusely dry, ecchymoses on all extremities    LABS:  ( @ 09:59)                        13.7  5.9 )-----------( 109                 40.3    Neutrophils = 3.8 (64.0%)  Lymphocytes = 1.5 (25.9%)  Eosinophils = 0.1 (1.1%)  Basophils = 0.0 (0.7%)  Monocytes = 0.5 (8.2%)  Bands = --%    WBC Trend: 5.9<--  Hb Trend: 13.7<--  Plt Trend: 109<--      141  |  102  |  35<H>  ----------------------------<  114<H>  4.9   |  26  |  1.30    Ca    10.0      2018 09:59  Phos  2.6         TPro  6.0  /  Alb  3.4  /  TBili  3.7<H>  /  DBili  x   /  AST  42<H>  /  ALT  32  /  AlkPhos  114      Creatinine Trend: 1.30<--    CARDIAC MARKERS ( 2018 09:59 )  Trop 0.01 ng/mL / CK 32 U/L / CKMB x           Urinalysis Basic - ( 2018 09:59 )    Color: Yellow / Appearance: Clear / S.019 / pH: x  Gluc: x / Ketone: Negative  / Bili: Negative / Urobili: 4 mg/dL   Blood: x / Protein: Trace / Nitrite: Positive   Leuk Esterase: Large / RBC: x / WBC >50 /HPF   Sq Epi: x / Non Sq Epi: Occasional /HPF / Bacteria: x        Microbiology:  Urine Cx: pending  Blood Cx: pending    RADIOLOGY & ADDITIONAL TESTS:  X- Ray:  CT:  Ultrasound:  [ ] imaging personally reviewed and interpreted by me    Consultant(s) Notes Reviewed:      Care Discussed with Consultants/Other Providers:

## 2018-06-10 NOTE — CHART NOTE - NSCHARTNOTEFT_GEN_A_CORE
Called by night nurse around 10:40 PM - pt having total body dolor, unable to take PO - gave IV Tylenol to start - will assess and escalate as needed Called by night nurse around 10:40 PM - pt having total body dolor, unable to take PO - gave IV Tylenol to start - will assess and escalate as needed.  Per my assessment, unclear etiology of TBD - likely in the setting of multiple bruises on L arm and L eye.  Attempted to interact with pt, but she began to cry screaming "Oh God," and I was unable to examine her.  May benefit from a social work consult.

## 2018-06-10 NOTE — PROGRESS NOTE ADULT - PROBLEM SELECTOR PLAN 7
- unable to use home inhalers given mental status  - will start on duonebs prn  - saturating well on RA

## 2018-06-10 NOTE — PROGRESS NOTE ADULT - PROBLEM SELECTOR PLAN 2
- tachycardic with AMS meeting qsofa criteria   - also with lactic acidosis, now downtrending  - s/p 1L LR, c/w IV hydration  - antibiotics as above

## 2018-06-10 NOTE — PROGRESS NOTE ADULT - PROBLEM SELECTOR PLAN 1
likely 2/2 infection (cystitis +/- asp pna) with component of hepatic encephalopathy  - presenting with 1 day of depressed mental status, cough with sputum, +UA from PMD on ciprofloxacin for 1 day  - afebrile at home and at hospital  - in the setting of decreased BM this past week despite taking lactulose and rifaximin  - previously hospitalization with MBS showing silent aspiration with all consistencies and patient currently on pleasure feeds  - ammonia elevated however does not correlate clinically to encephalopathy  - f/u blood and urine cultures  - meropenem for now pending cultures to cover UTI and asp pna, renally dosed  - c/w lactulose enema titrating to 3 BM a day likely 2/2 infection (cystitis + asp pna) with component of hepatic encephalopathy  - presenting with 1 day of depressed mental status, cough with sputum, +UA from PMD on ciprofloxacin for 1 day  - afebrile at home and at hospital  - in the setting of decreased BM this past week despite taking lactulose and rifaximin  - previously hospitalization with MBS showing silent aspiration with all consistencies and patient currently on pleasure feeds  - ammonia elevated however does not correlate clinically to encephalopathy  - f/u blood and urine cultures  - meropenem for now pending cultures to cover UTI and asp pna, renally dosed  - c/w lactulose enema titrating to 3 BM a day likely 2/2 aspiration pneumonia with component of hepatic encephalopathy  - presenting with 1 day of depressed mental status, cough with sputum, +UA from PMD on ciprofloxacin for 1 day  - afebrile at home and at hospital  - in the setting of decreased BM this past week despite taking lactulose and rifaximin  - previously hospitalization with MBS showing silent aspiration with all consistencies and patient currently on pleasure feeds  - ammonia elevated however does not correlate clinically to encephalopathy  - urine cultures with <10k normal farzad, blood cultures NGTD, does not appear to have UTI based on cultures  - meropenem to cover asp pna, renally dosed  - c/w lactulose enema titrating to 3 BM a day

## 2018-06-10 NOTE — PROGRESS NOTE ADULT - PROBLEM SELECTOR PLAN 5
- baseline creatinine appears to be around 0.9  - admission creatinine 1.3, in setting of likely dehydration and sepsis  - c/w IVF  - renally dose meropenem

## 2018-06-10 NOTE — PROGRESS NOTE ADULT - PROBLEM SELECTOR PLAN 6
- CHADSVASc - 5 however has had ICH in past and not on AC  - c/w home dose digoxin IV, will check level in AM

## 2018-06-11 ENCOUNTER — TRANSCRIPTION ENCOUNTER (OUTPATIENT)
Age: 83
End: 2018-06-11

## 2018-06-11 LAB
ANION GAP SERPL CALC-SCNC: 12 MMOL/L — SIGNIFICANT CHANGE UP (ref 5–17)
BACTERIA UR CULT: ABNORMAL
BUN SERPL-MCNC: 34 MG/DL — HIGH (ref 7–23)
CALCIUM SERPL-MCNC: 8.8 MG/DL — SIGNIFICANT CHANGE UP (ref 8.4–10.5)
CHLORIDE SERPL-SCNC: 108 MMOL/L — SIGNIFICANT CHANGE UP (ref 96–108)
CO2 SERPL-SCNC: 22 MMOL/L — SIGNIFICANT CHANGE UP (ref 22–31)
CREAT SERPL-MCNC: 1.13 MG/DL — SIGNIFICANT CHANGE UP (ref 0.5–1.3)
DIGOXIN SERPL-MCNC: 0.7 NG/ML — LOW (ref 0.8–2)
GLUCOSE SERPL-MCNC: 72 MG/DL — SIGNIFICANT CHANGE UP (ref 70–99)
HCT VFR BLD CALC: 34 % — LOW (ref 34.5–45)
HGB BLD-MCNC: 11.3 G/DL — LOW (ref 11.5–15.5)
MCHC RBC-ENTMCNC: 33.4 GM/DL — SIGNIFICANT CHANGE UP (ref 32–36)
MCHC RBC-ENTMCNC: 35.1 PG — HIGH (ref 27–34)
MCV RBC AUTO: 105 FL — HIGH (ref 80–100)
PLATELET # BLD AUTO: 76 K/UL — LOW (ref 150–400)
POTASSIUM SERPL-MCNC: 4.5 MMOL/L — SIGNIFICANT CHANGE UP (ref 3.5–5.3)
POTASSIUM SERPL-SCNC: 4.5 MMOL/L — SIGNIFICANT CHANGE UP (ref 3.5–5.3)
RBC # BLD: 3.23 M/UL — LOW (ref 3.8–5.2)
RBC # FLD: 14 % — SIGNIFICANT CHANGE UP (ref 10.3–14.5)
SODIUM SERPL-SCNC: 142 MMOL/L — SIGNIFICANT CHANGE UP (ref 135–145)
WBC # BLD: 3.6 K/UL — LOW (ref 3.8–10.5)
WBC # FLD AUTO: 3.6 K/UL — LOW (ref 3.8–10.5)

## 2018-06-11 PROCEDURE — 99233 SBSQ HOSP IP/OBS HIGH 50: CPT | Mod: GC

## 2018-06-11 RX ORDER — SODIUM CHLORIDE 9 MG/ML
1000 INJECTION, SOLUTION INTRAVENOUS
Qty: 0 | Refills: 0 | Status: DISCONTINUED | OUTPATIENT
Start: 2018-06-11 | End: 2018-06-14

## 2018-06-11 RX ORDER — LACTULOSE 10 G/15ML
30 SOLUTION ORAL
Qty: 0 | Refills: 0 | Status: DISCONTINUED | OUTPATIENT
Start: 2018-06-11 | End: 2018-06-14

## 2018-06-11 RX ORDER — LACTULOSE 10 G/15ML
30 SOLUTION ORAL THREE TIMES A DAY
Qty: 0 | Refills: 0 | Status: DISCONTINUED | OUTPATIENT
Start: 2018-06-11 | End: 2018-06-11

## 2018-06-11 RX ORDER — LEVOTHYROXINE SODIUM 125 MCG
25 TABLET ORAL AT BEDTIME
Qty: 0 | Refills: 0 | Status: DISCONTINUED | OUTPATIENT
Start: 2018-06-11 | End: 2018-06-14

## 2018-06-11 RX ORDER — DIGOXIN 250 MCG
0.12 TABLET ORAL EVERY OTHER DAY
Qty: 0 | Refills: 0 | Status: DISCONTINUED | OUTPATIENT
Start: 2018-06-13 | End: 2018-06-14

## 2018-06-11 RX ADMIN — SODIUM CHLORIDE 100 MILLILITER(S): 9 INJECTION, SOLUTION INTRAVENOUS at 12:02

## 2018-06-11 RX ADMIN — LACTULOSE 30 GRAM(S): 10 SOLUTION ORAL at 12:02

## 2018-06-11 RX ADMIN — LACTULOSE 30 GRAM(S): 10 SOLUTION ORAL at 17:13

## 2018-06-11 RX ADMIN — MEROPENEM 100 MILLIGRAM(S): 1 INJECTION INTRAVENOUS at 06:37

## 2018-06-11 RX ADMIN — LACTULOSE 30 GRAM(S): 10 SOLUTION ORAL at 21:55

## 2018-06-11 RX ADMIN — Medication 0.1 MILLIGRAM(S): at 12:02

## 2018-06-11 RX ADMIN — LACTULOSE 30 GRAM(S): 10 SOLUTION ORAL at 15:02

## 2018-06-11 RX ADMIN — Medication 25 MICROGRAM(S): at 21:54

## 2018-06-11 RX ADMIN — MEROPENEM 100 MILLIGRAM(S): 1 INJECTION INTRAVENOUS at 17:13

## 2018-06-11 NOTE — DISCHARGE NOTE ADULT - CONDITION (STATED IN TERMS THAT PERMIT A SPECIFIC MEASURABLE COMPARISON WITH CONDITION ON ADMISSION):
Patient's clinical status drastically improved from admission and was at baseline mental status upon discharge. Patient is discharged to rehab for optimization of strength and mobility before returning home.

## 2018-06-11 NOTE — PROGRESS NOTE ADULT - PROBLEM SELECTOR PLAN 4
- currently unable to take PO  - lactulose enemas as needed for 2-3 BM/day  - will resume propranolol and spironolactone when able - baseline creatinine appears to be around 0.9  - admission creatinine 1.3, in setting of likely dehydration and sepsis  - c/w IVF  - renally dose meropenem

## 2018-06-11 NOTE — DISCHARGE NOTE ADULT - PLAN OF CARE
complete course of antibiotics You were admitted to the hospital because of lethargy, fever and coughing. You were found to likely have had an aspiration event causing an infection in your lungs. You were started on IV antibiotics and drastically improved. You will be treated with a 7 day course of antibiotics to be completed on 6/15. Please continue to take this with the last dose on 6/15 in the evening to complete the treatment course. Please ensure to follow the dysphagia diet in an attempt to decrease future risk of aspiration. Please continue to take small bites at a slow pace and ensure swallowing between each bite. Please maintain an upright posture while eating and for at least 30 minutes after eating. If you experience any more shortness of breath, increased cough with increased sputum, fevers, chills or lethargy please contact your doctor. You were admitted to the hospital with increased lethargy because of an aspiration pneumonia as well as lethargy due to decreased bowel movements which in the setting of cirrhosis causes ammonia to build up in your blood and causes confusion and lethargy. Please ensure to take the lactulose 3-4 a day to ensure at least 3 bowel movements a day to decrease the risk of this happening again. Please also continue to take you rifaximin. If you experience any increased confusion or lethargy similar to this episode please contact your doctor. Please continue to take your medications as prescribed and follow up with your cardiologist as previously scheduled. You were admitted to the hospital because of lethargy, fever and coughing. You were found to likely have had an aspiration event causing an infection in your lungs. You were started on IV antibiotics and drastically improved. You will be treated with a 7 day course of antibiotics to be completed on 6/16. Please continue to take this with the last dose on 6/16 in the evening to complete the treatment course. Please ensure to follow the dysphagia diet in an attempt to decrease future risk of aspiration. Please continue to take small bites at a slow pace and ensure swallowing between each bite. Please maintain an upright posture while eating and for at least 30 minutes after eating. If you experience any more shortness of breath, increased cough with increased sputum, fevers, chills or lethargy please contact your doctor.

## 2018-06-11 NOTE — PHYSICAL THERAPY INITIAL EVALUATION ADULT - PERTINENT HX OF CURRENT PROBLEM, REHAB EVAL
88 yo F p/w AMS. The pt has had 2-3 days of increasing confusion and abnormal behavior typical of when she gets an infection. Her doctor ran a urine culture and urinalysis positive for klebsiella and she was started on cipro yesterday. Since then she has continued to have worsened mental status. +metabolic encephalopthy.

## 2018-06-11 NOTE — PHYSICAL THERAPY INITIAL EVALUATION ADULT - DISCHARGE DISPOSITION, PT EVAL
rehabilitation facility/[facility]; if family decides to take pt home, pt will require assist with all functional mobility

## 2018-06-11 NOTE — PROGRESS NOTE ADULT - PROBLEM SELECTOR PLAN 5
- baseline creatinine appears to be around 0.9  - admission creatinine 1.3, in setting of likely dehydration and sepsis  - c/w IVF  - renally dose meropenem - CHADSVASc - 5 however has had ICH in past and not on AC  - c/w home dose digoxin

## 2018-06-11 NOTE — DISCHARGE NOTE ADULT - ADDITIONAL INSTRUCTIONS
Please continue to take the antibiotics through 6/15 to complete the treatment course for aspiration pneumonia. Please also continue to take your lactulose at least 3 times a day to ensure you have atleast 3 bowel movements a day. Please follow up with your PCP after discharge. Please continue to take the clindamycin through 6/15 and levaquin until 6/16 to complete the treatment course for aspiration pneumonia. Please also continue to take your lactulose at least 3 times a day to ensure you have atleast 3 bowel movements a day. Please follow up with your PCP after discharge.

## 2018-06-11 NOTE — DISCHARGE NOTE ADULT - HOSPITAL COURSE
89F with cirrhosis with varices, afib not on AC (hx of ICH), COPD, CLL (not currently on treatment), AS presented with 1 day of decreased mental status, cough with sputum and decreased bowel movements. History is obtained from daughter and daughter-in-law at bedside. Patient was noted over the past 2 days PTA to be more lethargic, PMD was contacted and urine specimen was sent as this has happened in the past when she has UTIs. UA was positive and patient was started on ciprofloxacin 1 day PTA. At baseline, patient is AOx3 and walks with rollator.    Patient previously hospitalized in February for AMS and pna and was treated with levaquin. MBS was done which showed silent aspiration with all consistencies. PEG tube was explained to family and after a GOC discussion it was decided to send patient home with pleasure feeds. In ED patient was noted to be febrile, otherwise HDS. CXR revealed atelectasis vs pna on left and patient was started on meropenem (pcn allergy) for asp pna. UA+ however urine culture negative. Patient was given lactualose enemas to induce BM while NPO for mental status. By second day of admission patients mental status greatly improved, was more alert and able to answer questions. Patient continued to improve with lactulose and antibiotics and was started on PO meds and diet (after reviewing aspiration risk with family) on day 3 of admission. 89F with cirrhosis with varices, afib not on AC (hx of ICH), COPD, CLL (not currently on treatment), AS presented with 1 day of decreased mental status, cough with sputum and decreased bowel movements. History is obtained from daughter and daughter-in-law at bedside. Patient was noted over the past 2 days PTA to be more lethargic, PMD was contacted and urine specimen was sent as this has happened in the past when she has UTIs. UA was positive and patient was started on ciprofloxacin 1 day PTA. At baseline, patient is AOx3 and walks with rollator.    Patient previously hospitalized in February for AMS and pna and was treated with levaquin. MBS was done which showed silent aspiration with all consistencies. PEG tube was explained to family and after a GOC discussion it was decided to send patient home with pleasure feeds. In ED patient was noted to be febrile, otherwise HDS. CXR revealed atelectasis vs pna on left and patient was started on meropenem (pcn allergy) for asp pna. UA+ however urine culture negative. Patient was given lactualose enemas to induce BM while NPO for mental status. By second day of admission patients mental status greatly improved, was more alert and able to answer questions. Patient continued to improve with lactulose and antibiotics and was started on PO meds and diet (after reviewing aspiration risk with family - mechanical soft, honey thick) on day 3 of admission. She was switched to PO antiobiotics (clinda and levaquin given pcn allergy) for 7day course for asp pna. Mental status was back to baseline for d/c. PT rec rehab and pt d/c to [facility].

## 2018-06-11 NOTE — PROGRESS NOTE ADULT - PROBLEM SELECTOR PLAN 8
- c/w home dose levothyroxine IV - per family, holding chemical prophylaxis  - Discussed code status, pt DNR but not DNI and would want trial of pressors if needed for BP support  - diet: pleasure feeds dysphagia diet per family request  - dispo: home with HHA

## 2018-06-11 NOTE — DISCHARGE NOTE ADULT - PROVIDER TOKENS
TOKGREGG:'[id number]' TOKEN:'[id number]:'[id number]:'[id number]'

## 2018-06-11 NOTE — PROGRESS NOTE ADULT - PROBLEM SELECTOR PLAN 3
- UA positive per outpatient records and here, was started on cipro outpatient  - however urine cultures negative - currently unable to take PO  - lactulose enemas as needed for 2-3 BM/day  - will resume propranolol and spironolactone when able

## 2018-06-11 NOTE — DISCHARGE NOTE ADULT - CARE PROVIDER_API CALL
Neema Macdonald), Gastroenterology; Internal Medicine  [address] [place] [address]  Phone: [phone]  Fax: [phone] Neema Macdonald), Gastroenterology; Internal Medicine  [address] [place] [address]  Phone: [phone]  Fax: [phone]    Fallon Celaya), Critical Care Medicine; Internal Medicine; Pulmonary Disease  [address] [facility] [address]  [place] [address]  Phone: [phone]  Fax: [phone]    Jose L Hendrix), Hematology; Internal Medicine; Medical Oncology  [address] [facility] [address]  [place] [address]  Phone: [phone]  Fax: [phone]

## 2018-06-11 NOTE — PROGRESS NOTE ADULT - PROBLEM SELECTOR PLAN 7
- unable to use home inhalers given mental status  - will start on duonebs prn  - saturating well on RA - c/w home dose levothyroxine

## 2018-06-11 NOTE — DISCHARGE NOTE ADULT - MEDICATION SUMMARY - MEDICATIONS TO TAKE
I will START or STAY ON the medications listed below when I get home from the hospital:    budesonide 0.25 mg/2 mL inhalation suspension  -- 2 milliliter(s) inhaled 2 times a day  -- Indication: For COPD (chronic obstructive pulmonary disease)    spironolactone 25 mg oral tablet  -- 1 tab(s) by mouth once per day  -- Indication: For Cirrhosis of liver    digoxin 125 mcg (0.125 mg) oral tablet  -- 1 tab(s) by mouth every other day (at bedtime)  -- Indication: For Chronic atrial fibrillation    propranolol 20 mg oral tablet  -- 0.5 tab(s) by mouth 2 times a day  -- Indication: For Cirrhosis of liver    ipratropium-albuterol 0.5 mg-2.5 mg/3 mLinhalation solution  -- 3 milliliter(s) by nebulizer  -3 times a day, As Needed  -- Indication: For COPD (chronic obstructive pulmonary disease)    lactulose 10 g/15 mL oral syrup  -- 30 milliliter(s) by mouth 2-4 times a day, As Needed; titrate to 2-3 soft bowel movements per day  -- Indication: For Cirrhosis of liver    clindamycin 300 mg oral capsule  -- 1 cap(s) by mouth every 8 hours through 6/15  -- Indication: For Aspiration pneumonia    rifAXIMin 550 mg oral tablet  -- 1 tab(s) by mouth 2 times a day  -- Indication: For Cirrhosis of liver    levoFLOXacin 750 mg oral tablet  -- 1 tab(s) by mouth every 48 hours through 6/15  -- Indication: For Aspiration pneumonia    levothyroxine 25 mcg (0.025 mg) oral capsule  -- 1 cap(s) by mouth once a day  -- Indication: For Hypothyroidism    Vitamin D3 2000 intl units oral tablet  -- 1 tab(s) by mouth once a day  -- Indication: For Health Maintenance    Vitamin C 500 mg oral tablet  -- 1 tab(s) by mouth once a day  -- Indication: For Health Maintenance I will START or STAY ON the medications listed below when I get home from the hospital:    budesonide 0.25 mg/2 mL inhalation suspension  -- 2 milliliter(s) inhaled 2 times a day  -- Indication: For COPD (chronic obstructive pulmonary disease)    spironolactone 25 mg oral tablet  -- 1 tab(s) by mouth once per day  -- Indication: For Cirrhosis of liver    digoxin 125 mcg (0.125 mg) oral tablet  -- 1 tab(s) by mouth every other day (at bedtime)  -- Indication: For Chronic atrial fibrillation    propranolol 20 mg oral tablet  -- 0.5 tab(s) by mouth 2 times a day  -- Indication: For Cirrhosis of liver    ipratropium-albuterol 0.5 mg-2.5 mg/3 mLinhalation solution  -- 3 milliliter(s) by nebulizer  -3 times a day, As Needed  -- Indication: For COPD (chronic obstructive pulmonary disease)    lactulose 10 g/15 mL oral syrup  -- 30 milliliter(s) by mouth 2-4 times a day, As Needed; titrate to 2-3 soft bowel movements per day  -- Indication: For Cirrhosis of liver    clindamycin 300 mg oral capsule  -- 1 cap(s) by mouth every 8 hours through 6/15  -- Indication: For Aspiration pneumonia    rifAXIMin 550 mg oral tablet  -- 1 tab(s) by mouth 2 times a day  -- Indication: For Cirrhosis of liver    levoFLOXacin 750 mg oral tablet  -- 1 tab(s) by mouth every 48 hours through 6/16  -- Indication: For Aspiration pneumonia    levothyroxine 25 mcg (0.025 mg) oral capsule  -- 1 cap(s) by mouth once a day  -- Indication: For Hypothyroidism    Vitamin D3 2000 intl units oral tablet  -- 1 tab(s) by mouth once a day  -- Indication: For Health Maintenance    Vitamin C 500 mg oral tablet  -- 1 tab(s) by mouth once a day  -- Indication: For Health Maintenance

## 2018-06-11 NOTE — DISCHARGE NOTE ADULT - CARE PROVIDERS DIRECT ADDRESSES
[email or web address]

## 2018-06-11 NOTE — PROGRESS NOTE ADULT - PROBLEM SELECTOR PLAN 2
- tachycardic with AMS meeting qsofa criteria   - also with lactic acidosis, now normalized  - s/p 1L LR, c/w IV hydration  - antibiotics as above - tachycardic with AMS meeting qsofa criteria on admission  - also with lactic acidosis, now normalized  - s/p 1L LR, c/w IV hydration until increased PO intake  - antibiotics as above

## 2018-06-11 NOTE — PROGRESS NOTE ADULT - PROBLEM SELECTOR PLAN 1
likely 2/2 aspiration pneumonia with component of hepatic encephalopathy  - presenting with 1 day of depressed mental status, cough with sputum, +UA from PMD on ciprofloxacin for 1 day  - afebrile at home and at hospital  - in the setting of decreased BM this past week despite taking lactulose and rifaximin  - previously hospitalization with MBS showing silent aspiration with all consistencies and patient currently on pleasure feeds  - ammonia elevated however does not correlate clinically to encephalopathy  - urine cultures with <10k normal farzad, blood cultures NGTD, does not appear to have UTI based on cultures  - meropenem to cover asp pna, renally dosed  - c/w lactulose enema titrating to 3 BM a day likely 2/2 aspiration pneumonia with component of hepatic encephalopathy  - presented with 1 day of depressed mental status, cough with sputum, +UA from PMD on ciprofloxacin for 1 day  - afebrile at home and at hospital  - in the setting of decreased BM this past week despite taking lactulose and rifaximin  - previously hospitalization with MBS showing silent aspiration with all consistencies and patient currently on pleasure feeds  - ammonia elevated however does not correlate clinically to encephalopathy  - urine cultures with <10k normal farzad, blood cultures NGTD, does not appear to have UTI based on cultures  - meropenem to cover asp pna, renally dosed  - mental status greatly improved and now awake and alert, per family discussion previously and on this admission would like to continue with pleasure feeds knowing risk of aspiration, will start dysphagia diet with aspiration precautions  - c/w lactulose, titrating to 3 BM/day

## 2018-06-11 NOTE — DISCHARGE NOTE ADULT - PATIENT PORTAL LINK FT
You can access the [facility] Portal, offered by [facility], by registering with the following website: [email or web address]

## 2018-06-11 NOTE — PROGRESS NOTE ADULT - PROBLEM SELECTOR PLAN 6
- CHADSVASc - 5 however has had ICH in past and not on AC  - c/w home dose digoxin IV, will check level in AM - was unable to use home inhalers given mental status on admission, will restart given improved mental status  - saturating well on RA

## 2018-06-11 NOTE — PHYSICAL THERAPY INITIAL EVALUATION ADULT - ADDITIONAL COMMENTS
per daughter: pt lives in private house with daughter, 6 steps to enter, 7 steps to pt's bedroom/bathroom where daughter states pt can stay on 1 floor. HHA 11hrs M-F, 9hrs Saturday&Sunday. Pt amb with a RW at baseline, able to negotiate stairs with assist.

## 2018-06-11 NOTE — CONSULT NOTE ADULT - GENERAL COMMENTS
frail female lying in the bed and was not in distress and slightly slow mentally but better than admit with the medical interventions,.

## 2018-06-11 NOTE — PROGRESS NOTE ADULT - PROBLEM SELECTOR PLAN 9
- per family, holding chemical prophylaxis  - Discussed code status, pt DNR but not DNI and would want trial of pressors if needed for BP support
- per family, holding chemical prophylaxis  - Discussed code status, pt DNR but not DNI and would want trial of pressors if needed for BP support

## 2018-06-11 NOTE — CONSULT NOTE ADULT - ASSESSMENT
pt is a 89 year old female with a history of cryptogenic cirrhosis, hepatoma, atrial fibrillation not on AC, history of ICH, COPD, CLL not on chemo at this time. The pt was treated with bendamustine and Rituxan about 8 years ago and has done well. The daughter was present and she stated that she has low platelet counts chronically. The pt about one or two days before admit was not eating or drinking and was becoming confused. There were no reports of fever but the pt was placed on cipro as a outpt for two doses. she did not improve and was brought into the ER. The pt at home was constipated and has been on rifaximin an lactulose.  The pt was lethargic and was told to come to the  ER.  The UA was + after the cipro on 6/8. The cough was yellow and antibiotics were given. The pt with fluids and above treatments is much more alert and is conversant. A large part of the problem was probably from dehydration and perhaps infection and elevated ammonia.     The daughter told me that the pt developed a hematoma with her cirrhosis and was treated with chemoembolization and the tumor is stable and not likely contributing to the present toxic metabolic state. The pt is not eating at home and probably not keeping up with her fluids and a feeding tube was refused and a ng tube was not placed due to varices. The white cell count was 3.6 hemoglobin 11.3 and platelet count was 66619 the bun was 34 and the creatinine was 1.13. A cxr was notable for left lower lung atelectasis and there is question that a pna is present.      The pt is not a candidate for aggressive therapy for the hepatoma and at this time she will be supported thru with fluids, antibiotics as needed and meds to control the high ammonia levels. A long talk was held with the daughter at the bedside and supportive care is in order.

## 2018-06-11 NOTE — DISCHARGE NOTE ADULT - CARE PLAN
Principal Discharge DX:	Aspiration pneumonia  Secondary Diagnosis:	Cirrhosis of liver  Secondary Diagnosis:	Chronic atrial fibrillation  Secondary Diagnosis:	Hypothyroidism Principal Discharge DX:	Aspiration pneumonia  Secondary Diagnosis:	Cirrhosis of liver  Secondary Diagnosis:	Chronic atrial fibrillation Principal Discharge DX:	Aspiration pneumonia  Goal:	complete course of antibiotics  Assessment and plan of treatment:	You were admitted to the hospital because of lethargy, fever and coughing. You were found to likely have had an aspiration event causing an infection in your lungs. You were started on IV antibiotics and drastically improved. You will be treated with a 7 day course of antibiotics to be completed on 6/15. Please continue to take this with the last dose on 6/15 in the evening to complete the treatment course. Please ensure to follow the dysphagia diet in an attempt to decrease future risk of aspiration. Please continue to take small bites at a slow pace and ensure swallowing between each bite. Please maintain an upright posture while eating and for at least 30 minutes after eating. If you experience any more shortness of breath, increased cough with increased sputum, fevers, chills or lethargy please contact your doctor.  Secondary Diagnosis:	Cirrhosis of liver  Assessment and plan of treatment:	You were admitted to the hospital with increased lethargy because of an aspiration pneumonia as well as lethargy due to decreased bowel movements which in the setting of cirrhosis causes ammonia to build up in your blood and causes confusion and lethargy. Please ensure to take the lactulose 3-4 a day to ensure at least 3 bowel movements a day to decrease the risk of this happening again. Please also continue to take you rifaximin. If you experience any increased confusion or lethargy similar to this episode please contact your doctor.  Secondary Diagnosis:	Chronic atrial fibrillation  Assessment and plan of treatment:	Please continue to take your medications as prescribed and follow up with your cardiologist as previously scheduled. Principal Discharge DX:	Aspiration pneumonia  Goal:	complete course of antibiotics  Assessment and plan of treatment:	You were admitted to the hospital because of lethargy, fever and coughing. You were found to likely have had an aspiration event causing an infection in your lungs. You were started on IV antibiotics and drastically improved. You will be treated with a 7 day course of antibiotics to be completed on 6/16. Please continue to take this with the last dose on 6/16 in the evening to complete the treatment course. Please ensure to follow the dysphagia diet in an attempt to decrease future risk of aspiration. Please continue to take small bites at a slow pace and ensure swallowing between each bite. Please maintain an upright posture while eating and for at least 30 minutes after eating. If you experience any more shortness of breath, increased cough with increased sputum, fevers, chills or lethargy please contact your doctor.  Secondary Diagnosis:	Cirrhosis of liver  Assessment and plan of treatment:	You were admitted to the hospital with increased lethargy because of an aspiration pneumonia as well as lethargy due to decreased bowel movements which in the setting of cirrhosis causes ammonia to build up in your blood and causes confusion and lethargy. Please ensure to take the lactulose 3-4 a day to ensure at least 3 bowel movements a day to decrease the risk of this happening again. Please also continue to take you rifaximin. If you experience any increased confusion or lethargy similar to this episode please contact your doctor.  Secondary Diagnosis:	Chronic atrial fibrillation  Assessment and plan of treatment:	Please continue to take your medications as prescribed and follow up with your cardiologist as previously scheduled.

## 2018-06-11 NOTE — PROGRESS NOTE ADULT - SUBJECTIVE AND OBJECTIVE BOX
Patient is a 89y old  Female who presents with a chief complaint of decreased mental status (2018 15:03)      SUBJECTIVE / OVERNIGHT EVENTS:    MEDICATIONS  (STANDING):  digoxin  Injectable 0.1 milliGRAM(s) IV Push every other day  lactated ringers. 1000 milliLiter(s) (100 mL/Hr) IV Continuous <Continuous>  levothyroxine Injectable 20 MICROGram(s) IV Push at bedtime  meropenem  IVPB 500     meropenem  IVPB 500 milliGRAM(s) IV Intermittent every 12 hours    MEDICATIONS  (PRN):  ALBUTerol/ipratropium for Nebulization 3 milliLiter(s) Nebulizer every 6 hours PRN Shortness of Breath and/or Wheezing  lactulose Retention Enema 200 Gram(s) Rectal three times a day PRN induce bowel movement        CAPILLARY BLOOD GLUCOSE        I&O's Summary    10 Blair 2018 07:01  -  2018 07:00  --------------------------------------------------------  IN: 50 mL / OUT: 0 mL / NET: 50 mL        PHYSICAL EXAM:  Vital Signs Last 24 Hrs  T(C): 36.6 (10 Blair 2018 22:01), Max: 37 (10 Blair 2018 16:11)  T(F): 97.9 (10 Blair 2018 22:01), Max: 98.6 (10 Blair 2018 16:11)  HR: 88 (10 Blair 2018 22:01) (88 - 89)  BP: 97/44 (10 Blair 2018 22:01) (97/44 - 126/53)  BP(mean): --  RR: 18 (10 Blair 2018 22:01) (18 - 18)  SpO2: 93% (10 Blair 2018 22:01) (93% - 98%)      GENERAL: awake, alert, able to verbalize answers  HEAD:  Atraumatic, Normocephalic  EYES: pupils reactive b/l, ecchymosis on left eye  NECK: Supple  CHEST/LUNG: b/l lower lobe crackles, upper airway congestion heard, occasional coughing  HEART: irregularly irregular rhythm, regular rate, systolic murmur  ABDOMEN: Soft, Nontender, Nondistended; Bowel sounds present, no ascites appreciated  EXTREMITIES:  2+ Peripheral Pulses, purple ecchymoses on all 4 extremities  PSYCH: AOx0, nonverbal at this time  NEUROLOGY: unable to assess  SKIN: diffusely dry, ecchymoses on all extremities    LABS:    WBC Trend: 5.9<--  Hb Trend: 13.7<--  Plt Trend: 109<--      141  |  102  |  35<H>  ----------------------------<  114<H>  4.9   |  26  |  1.30    Ca    10.0      2018 09:59  Phos  2.6         TPro  6.0  /  Alb  3.4  /  TBili  3.7<H>  /  DBili  x   /  AST  42<H>  /  ALT  32  /  AlkPhos  114      Creatinine Trend: 1.30<--    CARDIAC MARKERS ( 2018 09:59 )  Trop 0.01 ng/mL / CK 32 U/L / CKMB x           Urinalysis Basic - ( 2018 09:59 )    Color: Yellow / Appearance: Clear / S.019 / pH: x  Gluc: x / Ketone: Negative  / Bili: Negative / Urobili: 4 mg/dL   Blood: x / Protein: Trace / Nitrite: Positive   Leuk Esterase: Large / RBC: x / WBC >50 /HPF   Sq Epi: x / Non Sq Epi: Occasional /HPF / Bacteria: x        Microbiology:  Urine Cx:  Blood Cx:    RADIOLOGY & ADDITIONAL TESTS:  X- Ray:  CT:  Ultrasound:  [ ] imaging personally reviewed and interpreted by me    Consultant(s) Notes Reviewed:      Care Discussed with Consultants/Other Providers: Patient is a 89y old  Female who presents with a chief complaint of decreased mental status (2018 15:03)      SUBJECTIVE / OVERNIGHT EVENTS: No acute events overnight. Patient seen this morning more alert and responsive. Able to answer questions. Denies any CP, SOB, abdominal pain. Reports some cough.     MEDICATIONS  (STANDING):  digoxin  Injectable 0.1 milliGRAM(s) IV Push every other day  lactated ringers. 1000 milliLiter(s) (100 mL/Hr) IV Continuous <Continuous>  levothyroxine Injectable 20 MICROGram(s) IV Push at bedtime  meropenem  IVPB 500     meropenem  IVPB 500 milliGRAM(s) IV Intermittent every 12 hours    MEDICATIONS  (PRN):  ALBUTerol/ipratropium for Nebulization 3 milliLiter(s) Nebulizer every 6 hours PRN Shortness of Breath and/or Wheezing  lactulose Retention Enema 200 Gram(s) Rectal three times a day PRN induce bowel movement        CAPILLARY BLOOD GLUCOSE        I&O's Summary    10 Blair 2018 07:01  -  2018 07:00  --------------------------------------------------------  IN: 50 mL / OUT: 0 mL / NET: 50 mL        PHYSICAL EXAM:  Vital Signs Last 24 Hrs  T(C): 36.6 (10 Blair 2018 22:01), Max: 37 (10 Blair 2018 16:11)  T(F): 97.9 (10 Blair 2018 22:01), Max: 98.6 (10 Blair 2018 16:11)  HR: 88 (10 Blair 2018 22:01) (88 - 89)  BP: 97/44 (10 Blair 2018 22:01) (97/44 - 126/53)  BP(mean): --  RR: 18 (10 Blair 2018 22:01) (18 - 18)  SpO2: 93% (10 Blair 2018 22:01) (93% - 98%)      GENERAL: awake, alert, able to verbalize answers  HEAD:  Atraumatic, Normocephalic  EYES: pupils reactive b/l, ecchymosis on left eye  NECK: Supple  CHEST/LUNG: b/l lower lobe crackles, upper airway congestion heard, occasional coughing  HEART: irregularly irregular rhythm, regular rate, systolic murmur  ABDOMEN: Soft, Nontender, Nondistended; Bowel sounds present, no ascites appreciated  EXTREMITIES:  2+ Peripheral Pulses, purple ecchymoses on all 4 extremities  PSYCH: AOx2  NEUROLOGY: moving all 4 extremities freely  SKIN: diffusely dry, ecchymoses on all extremities    LABS:    WBC Trend: 5.9<--  Hb Trend: 13.7<--  Plt Trend: 109<--      141  |  102  |  35<H>  ----------------------------<  114<H>  4.9   |  26  |  1.30    Ca    10.0      2018 09:59  Phos  2.6         TPro  6.0  /  Alb  3.4  /  TBili  3.7<H>  /  DBili  x   /  AST  42<H>  /  ALT  32  /  AlkPhos  114      Creatinine Trend: 1.30<--    CARDIAC MARKERS ( 2018 09:59 )  Trop 0.01 ng/mL / CK 32 U/L / CKMB x           Urinalysis Basic - ( 2018 09:59 )    Color: Yellow / Appearance: Clear / S.019 / pH: x  Gluc: x / Ketone: Negative  / Bili: Negative / Urobili: 4 mg/dL   Blood: x / Protein: Trace / Nitrite: Positive   Leuk Esterase: Large / RBC: x / WBC >50 /HPF   Sq Epi: x / Non Sq Epi: Occasional /HPF / Bacteria: x        Microbiology:  Urine Cx: negative  Blood Cx: negative

## 2018-06-11 NOTE — CONSULT NOTE ADULT - SUBJECTIVE AND OBJECTIVE BOX
The pt is a 89 year old female with a history of cryptogenic cirrhosis, hepatoma, atrial fibrillation not on AC, history of ICH, COPD, CLL not on chemo at this time. The pt was treated with bendamustine and Rituxan about 8 years ago and has done well. The daughter was present and she stated that she has low platelet counts chronically. The pt about one or two days before admit was not eating or drinking and was becoming confused. There were no reports of fever but the pt was placed on cipro as a outpt for two doses. she did not improve and was brought into the ER. The pt at home was constipated and has been on rifaximin an lactulose.  The pt was lethargic and was told to come to the  ER.  The UA was + after the cipro on 6/8. The cough was yellow and antibiotics were given. The pt with fluids and above treatments is much more alert and is conversant. A large part of the problem was probably from dehydration and perhaps infection and elevated ammonia.     The daughter told me that the pt developed a hematoma with her cirrhosis and was treated with chemoembolization and the tumor is stable and not likely contributing to the present toxic metabolic state. The pt is not eating at home and probably not keeping up with her fluids and a feeding tube was refused and a ng tube was not placed due to varices. The white cell count was 3.6 hemoglobin 11.3 and platelet count was 19855 the bun was 34 and the creatinine was 1.13. A cxr was notable for left lower lung atelectasis and there is question that a pna is present.      The pt is not a candidate for aggressive therapy for the hepatoma and at this time she will be supported thru with fluids, antibiotics as needed and meds to control the high ammonia levels. A long talk was held with the daughter at the bedside and supportive care is in order.  PAST MEDICAL & SURGICAL HISTORY:  PVD (peripheral vascular disease)  Liver cell carcinoma  Severe aortic stenosis by prior echocardiogram  Pulmonary HTN: moderate  CKD (chronic kidney disease), stage 3 (moderate)  COPD (Chronic Obstructive Pulmonary Disease)  AF (Atrial Fibrillation)  Portal Hypertension  Bleeding Esophageal Varices  CLL (Chronic Lymphoblastic Leukemia)  GIB (Gastrointestinal Bleeding)  History of repair of hip fracture  Esophageal Rupture  Vital Signs Last 24 Hrs  T(C): 36.7 (11 Jun 2018 11:27), Max: 37 (10 Blair 2018 16:11)  T(F): 98.1 (11 Jun 2018 11:27), Max: 98.6 (10 Blair 2018 16:11)  HR: 66 (11 Jun 2018 11:27) (66 - 89)  BP: 92/54 (11 Jun 2018 11:27) (92/54 - 116/51)  BP(mean): --  RR: 18 (11 Jun 2018 11:27) (18 - 18)  SpO2: 97% (11 Jun 2018 11:27) (93% - 97%)  MEDICATIONS  (STANDING):  digoxin  Injectable 0.1 milliGRAM(s) IV Push every other day  lactated ringers. 1000 milliLiter(s) (100 mL/Hr) IV Continuous <Continuous>  lactulose Syrup 30 Gram(s) Oral three times a day  levothyroxine Injectable 20 MICROGram(s) IV Push at bedtime  meropenem  IVPB 500     meropenem  IVPB 500 milliGRAM(s) IV Intermittent every 12 hours  rifaximin 550 milliGRAM(s) Oral two times a day

## 2018-06-12 LAB
ANION GAP SERPL CALC-SCNC: 11 MMOL/L — SIGNIFICANT CHANGE UP (ref 5–17)
BUN SERPL-MCNC: 31 MG/DL — HIGH (ref 7–23)
CALCIUM SERPL-MCNC: 9.3 MG/DL — SIGNIFICANT CHANGE UP (ref 8.4–10.5)
CHLORIDE SERPL-SCNC: 111 MMOL/L — HIGH (ref 96–108)
CO2 SERPL-SCNC: 25 MMOL/L — SIGNIFICANT CHANGE UP (ref 22–31)
CREAT SERPL-MCNC: 1.05 MG/DL — SIGNIFICANT CHANGE UP (ref 0.5–1.3)
GLUCOSE SERPL-MCNC: 87 MG/DL — SIGNIFICANT CHANGE UP (ref 70–99)
HCT VFR BLD CALC: 35.6 % — SIGNIFICANT CHANGE UP (ref 34.5–45)
HGB BLD-MCNC: 11.9 G/DL — SIGNIFICANT CHANGE UP (ref 11.5–15.5)
MCHC RBC-ENTMCNC: 33.4 GM/DL — SIGNIFICANT CHANGE UP (ref 32–36)
MCHC RBC-ENTMCNC: 34.3 PG — HIGH (ref 27–34)
MCV RBC AUTO: 102.6 FL — HIGH (ref 80–100)
PLATELET # BLD AUTO: 77 K/UL — LOW (ref 150–400)
POTASSIUM SERPL-MCNC: 4 MMOL/L — SIGNIFICANT CHANGE UP (ref 3.5–5.3)
POTASSIUM SERPL-SCNC: 4 MMOL/L — SIGNIFICANT CHANGE UP (ref 3.5–5.3)
RBC # BLD: 3.47 M/UL — LOW (ref 3.8–5.2)
RBC # FLD: 15.9 % — HIGH (ref 10.3–14.5)
SODIUM SERPL-SCNC: 147 MMOL/L — HIGH (ref 135–145)
WBC # BLD: 3.66 K/UL — LOW (ref 3.8–10.5)
WBC # FLD AUTO: 3.66 K/UL — LOW (ref 3.8–10.5)

## 2018-06-12 PROCEDURE — 99233 SBSQ HOSP IP/OBS HIGH 50: CPT | Mod: GC

## 2018-06-12 RX ADMIN — LACTULOSE 30 GRAM(S): 10 SOLUTION ORAL at 11:33

## 2018-06-12 RX ADMIN — Medication 300 MILLIGRAM(S): at 18:56

## 2018-06-12 RX ADMIN — Medication 25 MICROGRAM(S): at 21:00

## 2018-06-12 RX ADMIN — LACTULOSE 30 GRAM(S): 10 SOLUTION ORAL at 23:56

## 2018-06-12 RX ADMIN — Medication 300 MILLIGRAM(S): at 06:41

## 2018-06-12 RX ADMIN — LACTULOSE 30 GRAM(S): 10 SOLUTION ORAL at 06:41

## 2018-06-12 RX ADMIN — LACTULOSE 30 GRAM(S): 10 SOLUTION ORAL at 18:56

## 2018-06-12 RX ADMIN — Medication 300 MILLIGRAM(S): at 21:00

## 2018-06-12 NOTE — PROGRESS NOTE ADULT - PROBLEM SELECTOR PLAN 6
- was unable to use home inhalers given mental status on admission, will restart given improved mental status  - saturating well on RA

## 2018-06-12 NOTE — PROGRESS NOTE ADULT - SUBJECTIVE AND OBJECTIVE BOX
Note Author: Tacho Ruelas, PGY 2  [facility] Pager: [phone]  [facility] Pager: [phone]    Patient is a 89y old  Female who presents with a chief complaint of decreased mental status (11 Jun 2018 15:09)      SUBJECTIVE / OVERNIGHT EVENTS:  No acute events overnight. Patient seen and examined in AM. Mentating well, no complaints, having     Patient denied fevers, CP, SOB, lower extremity pain.     MEDICATIONS  (STANDING):  clindamycin   Capsule 300 milliGRAM(s) Oral every 8 hours  digoxin     Tablet 0.125 milliGRAM(s) Oral every other day  lactated ringers. 1000 milliLiter(s) (100 mL/Hr) IV Continuous <Continuous>  lactulose Syrup 30 Gram(s) Oral four times a day  levoFLOXacin  Tablet 750 milliGRAM(s) Oral every 48 hours  levothyroxine 25 MICROGram(s) Oral at bedtime  rifaximin 550 milliGRAM(s) Oral two times a day    MEDICATIONS  (PRN):  ALBUTerol/ipratropium for Nebulization 3 milliLiter(s) Nebulizer every 6 hours PRN Shortness of Breath and/or Wheezing      CAPILLARY BLOOD GLUCOSE      I&O's Summary    12 Jun 2018 07:01  -  13 Jun 2018 07:00  --------------------------------------------------------  IN: 300 mL / OUT: 0 mL / NET: 300 mL        Vital Signs Last 24 Hrs  T(C): 37.4 (12 Jun 2018 21:10), Max: 37.4 (12 Jun 2018 21:10)  T(F): 99.3 (12 Jun 2018 21:10), Max: 99.3 (12 Jun 2018 21:10)  HR: 91 (12 Jun 2018 21:10) (60 - 91)  BP: 116/71 (12 Jun 2018 21:10) (100/60 - 116/71)  BP(mean): --  RR: 18 (12 Jun 2018 21:10) (18 - 18)  SpO2: 100% (12 Jun 2018 21:10) (96% - 100%)    PHYSICAL EXAM:  GENERAL: awake, alert, able to verbalize answers  HEAD:  Atraumatic, Normocephalic  EYES: pupils reactive b/l, ecchymosis on left eye  NECK: Supple  CHEST/LUNG: b/l lower lobe crackles, upper airway congestion heard, occasional coughing  HEART: irregularly irregular rhythm, regular rate, systolic murmur  ABDOMEN: Soft, Nontender, Nondistended; Bowel sounds present, no ascites appreciated  EXTREMITIES:  2+ Peripheral Pulses, purple ecchymoses on all 4 extremities  PSYCH: AOx2  NEUROLOGY: moving all 4 extremities freely  SKIN: diffusely dry, ecchymoses on all extremities      LABS:                        11.9   3.66  )-----------( 77       ( 12 Jun 2018 09:29 )             35.6       06-12    147<H>  |  111<H>  |  31<H>  ----------------------------<  87  4.0   |  25  |  1.05    Ca    9.3      12 Jun 2018 07:36      RADIOLOGY & ADDITIONAL TESTS:    Imaging Personally Reviewed:    Consultant(s) Notes Reviewed:      Care Discussed with Consultants/Other Providers:

## 2018-06-12 NOTE — PROGRESS NOTE ADULT - SUBJECTIVE AND OBJECTIVE BOX
MEDICATIONS  (STANDING):  clindamycin   Capsule 300 milliGRAM(s) Oral every 8 hours  lactated ringers. 1000 milliLiter(s) (100 mL/Hr) IV Continuous <Continuous>  lactulose Syrup 30 Gram(s) Oral four times a day  levoFLOXacin  Tablet 750 milliGRAM(s) Oral every 48 hours  levothyroxine 25 MICROGram(s) Oral at bedtime  rifaximin 550 milliGRAM(s) Oral two times a day     The pt is a 89 year old female with a history of cryptogenic cirrhosis, hepatoma, atrial fibrillation not on AC, history of ICH, COPD, CLL not on chemo at this time. The pt was treated with bendamustine and Rituxan about 8 years ago and has done well. The daughter was present and she stated that she has low platelet counts chronically. The pt about one or two days before admit was not eating or drinking and was becoming confused. There were no reports of fever but the pt was placed on cipro as a outpt for two doses. she did not improve and was brought into the ER. The pt at home was constipated and has been on rifaximin an lactulose.  The pt was lethargic and was told to come to the  ER.  The UA was + after the cipro on 6/8. The cough was yellow and antibiotics were given. The pt with fluids and above treatments is much more alert and is conversant. A large part of the problem was probably from dehydration and perhaps infection and elevated ammonia.     The daughter told me that the pt developed a hematoma with her cirrhosis and was treated with chemoembolization and the tumor is stable and not likely contributing to the present toxic metabolic state. The pt is not eating at home and probably not keeping up with her fluids and a feeding tube was refused and a ng tube was not placed due to varices. The white cell count was 3.6 hemoglobin 11.3 and platelet count was 14771 the bun was 34 and the creatinine was 1.13. A cxr was notable for left lower lung atelectasis and there is question that a pna is present.      The pt is not a candidate for aggressive therapy for the hepatoma and at this time she will be supported thru with fluids, antibiotics as needed and meds to control the high ammonia levels. A long talk was held with the daughter at the bedside and supportive care is in order.6/12 the pt is looking better and urine grew out klebsiella and she is on Levaquin   PAST MEDICAL & SURGICAL HISTORY:  PVD (peripheral vascular disease)  Liver cell carcinoma  Severe aortic stenosis by prior echocardiogram  Pulmonary HTN: moderate  CKD (chronic kidney disease), stage 3 (moderate)  COPD (Chronic Obstructive Pulmonary Disease)  AF (Atrial Fibrillation)  Portal Hypertension  Bleeding Esophageal Varices  CLL (Chronic Lymphoblastic Leukemia)  GIB (Gastrointestinal Bleeding)  History of repair of hip fracture  Esophageal Rupture  pe pt looked stronger and was not in distress daughter at the bedside and we went over the hospital plans mouth clear heart rr abdomen soft extremities no edema.

## 2018-06-12 NOTE — PROGRESS NOTE ADULT - PROBLEM SELECTOR PLAN 8
- per family, holding chemical prophylaxis  - Discussed code status, pt DNR but not DNI and would want trial of pressors if needed for BP support  - diet: pleasure feeds dysphagia diet per family request  - dispo: home with HHA

## 2018-06-12 NOTE — PROGRESS NOTE ADULT - PROBLEM SELECTOR PLAN 1
likely 2/2 aspiration pneumonia with component of hepatic encephalopathy  - presented with 1 day of depressed mental status, cough with sputum, +UA from PMD on ciprofloxacin for 1 day  - afebrile at home and at hospital  - in the setting of decreased BM this past week despite taking lactulose and rifaximin  - previously hospitalization with MBS showing silent aspiration with all consistencies and patient currently on pleasure feeds  - ammonia elevated however does not correlate clinically to encephalopathy  - urine cultures with <10k normal farzad, blood cultures NGTD, does not appear to have UTI based on cultures  - meropenem to cover asp pna, renally dosed  - mental status greatly improved and now awake and alert, per family discussion previously and on this admission would like to continue with pleasure feeds knowing risk of aspiration, will start dysphagia diet with aspiration precautions  - c/w lactulose, titrating to 3 BM/day

## 2018-06-12 NOTE — PROGRESS NOTE ADULT - PROBLEM SELECTOR PLAN 2
- tachycardic with AMS meeting qsofa criteria on admission  - also with lactic acidosis, now normalized  - s/p 1L LR, c/w IV hydration until increased PO intake  - antibiotics as above

## 2018-06-13 PROCEDURE — 99233 SBSQ HOSP IP/OBS HIGH 50: CPT | Mod: GC

## 2018-06-13 RX ORDER — SPIRONOLACTONE 25 MG/1
25 TABLET, FILM COATED ORAL DAILY
Qty: 0 | Refills: 0 | Status: DISCONTINUED | OUTPATIENT
Start: 2018-06-13 | End: 2018-06-14

## 2018-06-13 RX ORDER — PROPRANOLOL HCL 160 MG
10 CAPSULE, EXTENDED RELEASE 24HR ORAL
Qty: 0 | Refills: 0 | Status: DISCONTINUED | OUTPATIENT
Start: 2018-06-13 | End: 2018-06-14

## 2018-06-13 RX ORDER — IPRATROPIUM/ALBUTEROL SULFATE 18-103MCG
3 AEROSOL WITH ADAPTER (GRAM) INHALATION EVERY 6 HOURS
Qty: 0 | Refills: 0 | Status: DISCONTINUED | OUTPATIENT
Start: 2018-06-13 | End: 2018-06-14

## 2018-06-13 RX ADMIN — Medication 25 MICROGRAM(S): at 21:12

## 2018-06-13 RX ADMIN — Medication 300 MILLIGRAM(S): at 05:19

## 2018-06-13 RX ADMIN — Medication 10 MILLIGRAM(S): at 18:21

## 2018-06-13 RX ADMIN — LACTULOSE 30 GRAM(S): 10 SOLUTION ORAL at 05:19

## 2018-06-13 RX ADMIN — SPIRONOLACTONE 25 MILLIGRAM(S): 25 TABLET, FILM COATED ORAL at 13:41

## 2018-06-13 RX ADMIN — Medication 0.12 MILLIGRAM(S): at 12:37

## 2018-06-13 RX ADMIN — LACTULOSE 30 GRAM(S): 10 SOLUTION ORAL at 18:20

## 2018-06-13 RX ADMIN — Medication 300 MILLIGRAM(S): at 13:41

## 2018-06-13 RX ADMIN — Medication 300 MILLIGRAM(S): at 21:12

## 2018-06-13 RX ADMIN — LACTULOSE 30 GRAM(S): 10 SOLUTION ORAL at 12:37

## 2018-06-13 NOTE — PROGRESS NOTE ADULT - PROBLEM SELECTOR PLAN 4
- baseline creatinine appears to be around 0.9  - admission creatinine 1.3, in setting of likely dehydration and sepsis  - s/p IV hydration, continue with PO intake

## 2018-06-13 NOTE — PROGRESS NOTE ADULT - SUBJECTIVE AND OBJECTIVE BOX
MEDICATIONS  (STANDING):  clindamycin   Capsule 300 milliGRAM(s) Oral every 8 hours  lactated ringers. 1000 milliLiter(s) (100 mL/Hr) IV Continuous <Continuous>  lactulose Syrup 30 Gram(s) Oral four times a day  levoFLOXacin  Tablet 750 milliGRAM(s) Oral every 48 hours  levothyroxine 25 MICROGram(s) Oral at bedtime  rifaximin 550 milliGRAM(s) Oral two times a day     The pt is a 89 year old female with a history of cryptogenic cirrhosis, hepatoma, atrial fibrillation not on AC, history of ICH, COPD, CLL not on chemo at this time. The pt was treated with bendamustine and Rituxan about 8 years ago and has done well. The daughter was present and she stated that she has low platelet counts chronically. The pt about one or two days before admit was not eating or drinking and was becoming confused. There were no reports of fever but the pt was placed on cipro as a outpt for two doses. she did not improve and was brought into the ER. The pt at home was constipated and has been on rifaximin an lactulose.  The pt was lethargic and was told to come to the  ER.  The UA was + after the cipro on 6/8. The cough was yellow and antibiotics were given. The pt with fluids and above treatments is much more alert and is conversant. A large part of the problem was probably from dehydration and perhaps infection and elevated ammonia.     The daughter told me that the pt developed a hematoma with her cirrhosis and was treated with chemoembolization and the tumor is stable and not likely contributing to the present toxic metabolic state. The pt is not eating at home and probably not keeping up with her fluids and a feeding tube was refused and a ng tube was not placed due to varices. The white cell count was 3.6 hemoglobin 11.3 and platelet count was [id number] the bun was 34 and the creatinine was 1.13. A cxr was notable for left lower lung atelectasis and there is question that a pna is present.      The pt is not a candidate for aggressive therapy for the hepatoma and at this time she will be supported thru with fluids, antibiotics as needed and meds to control the high ammonia levels. A long talk was held with the daughter at the bedside and supportive care is in order.6/12 the pt is looking better and urine grew out klebsiella and she is on Levaquin.PAST MEDICAL & SURGICAL HISTORY:  PVD (peripheral vascular disease)  Liver cell carcinoma  Severe aortic stenosis by prior echocardiogram  Pulmonary HTN: moderate  CKD (chronic kidney disease), stage 3 (moderate)  COPD (Chronic Obstructive Pulmonary Disease)  AF (Atrial Fibrillation)  ICU Vital Signs Last 24 Hrs  T(C): 37 (13 Jun 2018 08:11), Max: 37.4 (12 Jun 2018 21:10)  T(F): 98.6 (13 Jun 2018 08:11), Max: 99.3 (12 Jun 2018 21:10)  HR: 73 (13 Jun 2018 08:11) (60 - 91)  BP: 115/60 (13 Jun 2018 08:11) (100/60 - 116/71)  BP(mean): --  ABP: --  ABP(mean): --  RR: 18 (13 Jun 2018 08:11) (18 - 18)  SpO2: 99% (13 Jun 2018 08:11) (99% - 100%)  Portal Hypertension  Bleeding Esophageal Varices  CLL (Chronic Lymphoblastic Leukemia)  GIB (Gastrointestinal Bleeding)  History of repair of hip fracture  Esophageal Rupture  pe pt looked stronger and was not in distress daughter at the bedside and we went over the hospital plans mouth clear heart rr abdomen soft extremities no edema.                        11.9   3.66  )-----------( 77       ( 12 Jun 2018 09:29 )             35.6   Basic Metabolic Panel in AM (06.12.18 @ 07:36)    Sodium, Serum: 147 mmol/L    Potassium, Serum: 4.0 mmol/L    Chloride, Serum: 111 mmol/L    Carbon Dioxide, Serum: 25 mmol/L    Anion Gap, Serum: 11 mmol/L    Blood Urea Nitrogen, Serum: 31 mg/dL    Creatinine, Serum: 1.05 mg/dL    Glucose, Serum: 87 mg/dL    Calcium, Total Serum: 9.3 mg/dL

## 2018-06-13 NOTE — PROGRESS NOTE ADULT - PROBLEM SELECTOR PLAN 8
- per family, holding chemical prophylaxis  - Discussed code status, pt DNR but not DNI and would want trial of pressors if needed for BP support  - diet: pleasure feeds dysphagia diet per family request  - dispo: home with HHA - per family, holding chemical prophylaxis  - Discussed code status, pt DNR but not DNI and would want trial of pressors if needed for BP support  - diet: pleasure feeds dysphagia diet per family request  - dispo: PT rec [facility], family undecided at this time, will discuss with CM regarding rehab duration possibilties

## 2018-06-13 NOTE — PROGRESS NOTE ADULT - PROBLEM SELECTOR PLAN 3
- currently unable to take PO  - lactulose enemas as needed for 2-3 BM/day  - will resume propranolol and spironolactone today

## 2018-06-13 NOTE — PROGRESS NOTE ADULT - PROBLEM SELECTOR PLAN 2
- tachycardic with AMS meeting qsofa criteria on admission  - also with lactic acidosis, now normalized  - s/p IVF resuscitation, now tolerating PO diet  - antibiotics as above

## 2018-06-13 NOTE — PROGRESS NOTE ADULT - PROBLEM SELECTOR PLAN 1
likely 2/2 aspiration pneumonia with component of hepatic encephalopathy  - presented with 1 day of depressed mental status, cough with sputum, +UA from PMD on ciprofloxacin for 1 day  - afebrile at home and at hospital  - in the setting of decreased BM this past week despite taking lactulose and rifaximin  - previously hospitalization with MBS showing silent aspiration with all consistencies and patient currently on pleasure feeds  - urine cultures with <10k normal farzad, blood cultures NGTD, does not appear to have UTI based on cultures  - transitioned to clinda and levaquin to cover asp pna, hepatically dosed  - mental status greatly improved and now awake and alert, per family discussion previously and on this admission would like to continue with pleasure feeds knowing risk of aspiration, c/w dysphagia diet with aspiration precautions  - c/w lactulose, titrating to 3 BM/day likely 2/2 aspiration pneumonia with component of hepatic encephalopathy  - presented with 1 day of depressed mental status, cough with sputum, +UA from PMD on ciprofloxacin for 1 day  - afebrile at home and at hospital  - in the setting of decreased BM this past week despite taking lactulose and rifaximin  - previously hospitalization with MBS showing silent aspiration with all consistencies and patient currently on pleasure feeds  - urine cultures with <10k normal farzad, blood cultures NGTD, does not appear to have UTI based on cultures  - transitioned to clinda and levaquin to cover asp pna, hepatically dosed - to complete 7 day course on 6/15  - mental status greatly improved and now awake and alert, per family discussion previously and on this admission would like to continue with pleasure feeds knowing risk of aspiration, c/w dysphagia diet with aspiration precautions  - c/w lactulose, titrating to 3 BM/day

## 2018-06-13 NOTE — PROGRESS NOTE ADULT - SUBJECTIVE AND OBJECTIVE BOX
Patient is a 89y old  Female who presents with a chief complaint of decreased mental status (11 Jun 2018 15:09)      SUBJECTIVE / OVERNIGHT EVENTS:    MEDICATIONS  (STANDING):  clindamycin   Capsule 300 milliGRAM(s) Oral every 8 hours  digoxin     Tablet 0.125 milliGRAM(s) Oral every other day  lactated ringers. 1000 milliLiter(s) (100 mL/Hr) IV Continuous <Continuous>  lactulose Syrup 30 Gram(s) Oral four times a day  levoFLOXacin  Tablet 750 milliGRAM(s) Oral every 48 hours  levothyroxine 25 MICROGram(s) Oral at bedtime  rifaximin 550 milliGRAM(s) Oral two times a day    MEDICATIONS  (PRN):  ALBUTerol/ipratropium for Nebulization 3 milliLiter(s) Nebulizer every 6 hours PRN Shortness of Breath and/or Wheezing        I&O's Summary    12 Jun 2018 07:01  -  13 Jun 2018 07:00  --------------------------------------------------------  IN: 300 mL / OUT: 0 mL / NET: 300 mL        PHYSICAL EXAM:  Vital Signs Last 24 Hrs  T(C): 37.4 (12 Jun 2018 21:10), Max: 37.4 (12 Jun 2018 21:10)  T(F): 99.3 (12 Jun 2018 21:10), Max: 99.3 (12 Jun 2018 21:10)  HR: 91 (12 Jun 2018 21:10) (60 - 91)  BP: 116/71 (12 Jun 2018 21:10) (100/60 - 116/71)  BP(mean): --  RR: 18 (12 Jun 2018 21:10) (18 - 18)  SpO2: 100% (12 Jun 2018 21:10) (96% - 100%)    GENERAL: awake, alert, able to verbalize answers  HEAD:  Atraumatic, Normocephalic  EYES: pupils reactive b/l, ecchymosis on left eye  NECK: Supple  CHEST/LUNG: b/l lower lobe crackles, upper airway congestion heard, occasional coughing  HEART: irregularly irregular rhythm, regular rate, systolic murmur  ABDOMEN: Soft, Nontender, Nondistended; Bowel sounds present, no ascites appreciated  EXTREMITIES:  2+ Peripheral Pulses, purple ecchymoses on all 4 extremities  PSYCH: AOx2  NEUROLOGY: moving all 4 extremities freely  SKIN: diffusely dry, ecchymoses on all extremities    LABS:  (06-12 @ 09:29)                        11.9  3.66 )-----------( 77                 35.6    Neutrophils = -- (--%)  Lymphocytes = -- (--%)  Eosinophils = -- (--%)  Basophils = -- (--%)  Monocytes = -- (--%)  Bands = --%    WBC Trend: 3.66<--, 3.6<--, 5.9<--  Hb Trend: 11.9<--, 11.3<--, 13.7<--  Plt Trend: 77<--, 76<--, 109<--  06-12    147<H>  |  111<H>  |  31<H>  ----------------------------<  87  4.0   |  25  |  1.05    Ca    9.3      12 Jun 2018 07:36      Creatinine Trend: 1.05<--, 1.13<--, 1.30<--            Microbiology:  Urine Cx:  Blood Cx:    RADIOLOGY & ADDITIONAL TESTS:  X- Ray:  CT:  Ultrasound:  [ ] imaging personally reviewed and interpreted by me    Consultant(s) Notes Reviewed:      Care Discussed with Consultants/Other Providers: Patient is a 89y old  Female who presents with a chief complaint of decreased mental status (11 Jun 2018 15:09)      SUBJECTIVE / OVERNIGHT EVENTS: no acute events overnight. patient seen this morning eating breakfast with no difficulties, no coughing or choking. Patient states she feels well, denies any SOB, CP, abdominal pain, N/V.     MEDICATIONS  (STANDING):  clindamycin   Capsule 300 milliGRAM(s) Oral every 8 hours  digoxin     Tablet 0.125 milliGRAM(s) Oral every other day  lactated ringers. 1000 milliLiter(s) (100 mL/Hr) IV Continuous <Continuous>  lactulose Syrup 30 Gram(s) Oral four times a day  levoFLOXacin  Tablet 750 milliGRAM(s) Oral every 48 hours  levothyroxine 25 MICROGram(s) Oral at bedtime  rifaximin 550 milliGRAM(s) Oral two times a day    MEDICATIONS  (PRN):  ALBUTerol/ipratropium for Nebulization 3 milliLiter(s) Nebulizer every 6 hours PRN Shortness of Breath and/or Wheezing        I&O's Summary    12 Jun 2018 07:01  -  13 Jun 2018 07:00  --------------------------------------------------------  IN: 300 mL / OUT: 0 mL / NET: 300 mL        PHYSICAL EXAM:  Vital Signs Last 24 Hrs  T(C): 37.4 (12 Jun 2018 21:10), Max: 37.4 (12 Jun 2018 21:10)  T(F): 99.3 (12 Jun 2018 21:10), Max: 99.3 (12 Jun 2018 21:10)  HR: 91 (12 Jun 2018 21:10) (60 - 91)  BP: 116/71 (12 Jun 2018 21:10) (100/60 - 116/71)  BP(mean): --  RR: 18 (12 Jun 2018 21:10) (18 - 18)  SpO2: 100% (12 Jun 2018 21:10) (96% - 100%)    GENERAL: awake, alert  HEAD:  Atraumatic, Normocephalic  EYES: pupils reactive b/l, ecchymosis on left eye  NECK: Supple  CHEST/LUNG: b/l lower lobe crackles, upper airway congestion heard, occasional coughing  HEART: irregularly irregular rhythm, regular rate, systolic murmur  ABDOMEN: Soft, Nontender, Nondistended; Bowel sounds present, no ascites appreciated  EXTREMITIES:  2+ Peripheral Pulses, purple ecchymoses on all 4 extremities  PSYCH: AOx3  NEUROLOGY: moving all 4 extremities freely  SKIN: diffusely dry, ecchymoses on all extremities    LABS:  (06-12 @ 09:29)                        11.9  3.66 )-----------( 77                 35.6    Neutrophils = -- (--%)  Lymphocytes = -- (--%)  Eosinophils = -- (--%)  Basophils = -- (--%)  Monocytes = -- (--%)  Bands = --%    WBC Trend: 3.66<--, 3.6<--, 5.9<--  Hb Trend: 11.9<--, 11.3<--, 13.7<--  Plt Trend: 77<--, 76<--, 109<--  06-12    147<H>  |  111<H>  |  31<H>  ----------------------------<  87  4.0   |  25  |  1.05    Ca    9.3      12 Jun 2018 07:36      Creatinine Trend: 1.05<--, 1.13<--, 1.30<--            Microbiology:  Urine Cx:  Blood Cx:    RADIOLOGY & ADDITIONAL TESTS:  X- Ray:  CT:  Ultrasound:  [ ] imaging personally reviewed and interpreted by me    Consultant(s) Notes Reviewed:      Care Discussed with Consultants/Other Providers:

## 2018-06-14 VITALS
HEART RATE: 99 BPM | DIASTOLIC BLOOD PRESSURE: 56 MMHG | OXYGEN SATURATION: 97 % | SYSTOLIC BLOOD PRESSURE: 110 MMHG | TEMPERATURE: 98 F | RESPIRATION RATE: 18 BRPM

## 2018-06-14 PROCEDURE — 51701 INSERT BLADDER CATHETER: CPT

## 2018-06-14 PROCEDURE — 84295 ASSAY OF SERUM SODIUM: CPT

## 2018-06-14 PROCEDURE — 82803 BLOOD GASES ANY COMBINATION: CPT

## 2018-06-14 PROCEDURE — 87040 BLOOD CULTURE FOR BACTERIA: CPT

## 2018-06-14 PROCEDURE — 82330 ASSAY OF CALCIUM: CPT

## 2018-06-14 PROCEDURE — 80162 ASSAY OF DIGOXIN TOTAL: CPT

## 2018-06-14 PROCEDURE — 80048 BASIC METABOLIC PNL TOTAL CA: CPT

## 2018-06-14 PROCEDURE — 71045 X-RAY EXAM CHEST 1 VIEW: CPT

## 2018-06-14 PROCEDURE — 82550 ASSAY OF CK (CPK): CPT

## 2018-06-14 PROCEDURE — 81001 URINALYSIS AUTO W/SCOPE: CPT

## 2018-06-14 PROCEDURE — 84132 ASSAY OF SERUM POTASSIUM: CPT

## 2018-06-14 PROCEDURE — 80053 COMPREHEN METABOLIC PANEL: CPT

## 2018-06-14 PROCEDURE — 82140 ASSAY OF AMMONIA: CPT

## 2018-06-14 PROCEDURE — 97161 PT EVAL LOW COMPLEX 20 MIN: CPT

## 2018-06-14 PROCEDURE — 96375 TX/PRO/DX INJ NEW DRUG ADDON: CPT | Mod: XU

## 2018-06-14 PROCEDURE — 99285 EMERGENCY DEPT VISIT HI MDM: CPT | Mod: 25

## 2018-06-14 PROCEDURE — 83605 ASSAY OF LACTIC ACID: CPT

## 2018-06-14 PROCEDURE — 87086 URINE CULTURE/COLONY COUNT: CPT

## 2018-06-14 PROCEDURE — 84484 ASSAY OF TROPONIN QUANT: CPT

## 2018-06-14 PROCEDURE — 97116 GAIT TRAINING THERAPY: CPT

## 2018-06-14 PROCEDURE — 82947 ASSAY GLUCOSE BLOOD QUANT: CPT

## 2018-06-14 PROCEDURE — 99239 HOSP IP/OBS DSCHRG MGMT >30: CPT

## 2018-06-14 PROCEDURE — 84100 ASSAY OF PHOSPHORUS: CPT

## 2018-06-14 PROCEDURE — 82435 ASSAY OF BLOOD CHLORIDE: CPT

## 2018-06-14 PROCEDURE — 85027 COMPLETE CBC AUTOMATED: CPT

## 2018-06-14 PROCEDURE — 93005 ELECTROCARDIOGRAM TRACING: CPT | Mod: XU

## 2018-06-14 PROCEDURE — 82553 CREATINE MB FRACTION: CPT

## 2018-06-14 PROCEDURE — 96374 THER/PROPH/DIAG INJ IV PUSH: CPT | Mod: XU

## 2018-06-14 PROCEDURE — 85014 HEMATOCRIT: CPT

## 2018-06-14 PROCEDURE — 97530 THERAPEUTIC ACTIVITIES: CPT

## 2018-06-14 RX ORDER — CIPROFLOXACIN LACTATE 400MG/40ML
1 VIAL (ML) INTRAVENOUS
Qty: 0 | Refills: 0 | COMMUNITY
Start: 2018-06-14 | End: 2018-06-16

## 2018-06-14 RX ORDER — CIPROFLOXACIN LACTATE 400MG/40ML
1 VIAL (ML) INTRAVENOUS
Qty: 0 | Refills: 0 | COMMUNITY
Start: 2018-06-14 | End: 2018-06-15

## 2018-06-14 RX ADMIN — LACTULOSE 30 GRAM(S): 10 SOLUTION ORAL at 05:34

## 2018-06-14 RX ADMIN — Medication 300 MILLIGRAM(S): at 05:29

## 2018-06-14 RX ADMIN — SPIRONOLACTONE 25 MILLIGRAM(S): 25 TABLET, FILM COATED ORAL at 05:34

## 2018-06-14 RX ADMIN — LACTULOSE 30 GRAM(S): 10 SOLUTION ORAL at 11:56

## 2018-06-14 RX ADMIN — Medication 300 MILLIGRAM(S): at 13:54

## 2018-06-14 RX ADMIN — Medication 10 MILLIGRAM(S): at 05:29

## 2018-06-14 NOTE — PROGRESS NOTE ADULT - PROBLEM SELECTOR PLAN 8
- per family, holding chemical prophylaxis  - Discussed code status, pt DNR but not DNI and would want trial of pressors if needed for BP support  - diet: pleasure feeds dysphagia diet per family request  - dispo: [facility]

## 2018-06-14 NOTE — PROGRESS NOTE ADULT - SUBJECTIVE AND OBJECTIVE BOX
Patient is a 89y old  Female who presents with a chief complaint of decreased mental status (11 Jun 2018 15:09)      SUBJECTIVE / OVERNIGHT EVENTS:    MEDICATIONS  (STANDING):  clindamycin   Capsule 300 milliGRAM(s) Oral every 8 hours  digoxin     Tablet 0.125 milliGRAM(s) Oral every other day  lactated ringers. 1000 milliLiter(s) (100 mL/Hr) IV Continuous <Continuous>  lactulose Syrup 30 Gram(s) Oral four times a day  levoFLOXacin  Tablet 750 milliGRAM(s) Oral every 48 hours  levothyroxine 25 MICROGram(s) Oral at bedtime  propranolol 10 milliGRAM(s) Oral two times a day  rifaximin 550 milliGRAM(s) Oral two times a day  spironolactone 25 milliGRAM(s) Oral daily    MEDICATIONS  (PRN):  ALBUTerol/ipratropium for Nebulization 3 milliLiter(s) Nebulizer every 6 hours PRN Shortness of Breath and/or Wheezing        CAPILLARY BLOOD GLUCOSE        I&O's Summary    13 Jun 2018 07:01  -  14 Jun 2018 07:00  --------------------------------------------------------  IN: 300 mL / OUT: 0 mL / NET: 300 mL        PHYSICAL EXAM:  Vital Signs Last 24 Hrs  T(C): 36.8 (13 Jun 2018 22:07), Max: 37 (13 Jun 2018 08:11)  T(F): 98.2 (13 Jun 2018 22:07), Max: 98.6 (13 Jun 2018 08:11)  HR: 71 (14 Jun 2018 05:36) (69 - 96)  BP: 107/66 (14 Jun 2018 05:36) (104/58 - 115/60)  BP(mean): --  RR: 18 (13 Jun 2018 22:07) (18 - 18)  SpO2: 100% (13 Jun 2018 22:07) (95% - 100%)    GENERAL: awake, alert  HEAD:  Atraumatic, Normocephalic  EYES: pupils reactive b/l, ecchymosis on left eye  NECK: Supple  CHEST/LUNG: b/l lower lobe crackles, upper airway congestion heard, occasional coughing  HEART: irregularly irregular rhythm, regular rate, systolic murmur  ABDOMEN: Soft, Nontender, Nondistended; Bowel sounds present, no ascites appreciated  EXTREMITIES:  2+ Peripheral Pulses, purple ecchymoses on all 4 extremities  PSYCH: AOx3  NEUROLOGY: moving all 4 extremities freely  SKIN: diffusely dry, ecchymoses on all extremities    LABS:    WBC Trend: 3.66<--, 3.6<--, 5.9<--  Hb Trend: 11.9<--, 11.3<--, 13.7<--  Plt Trend: 77<--, 76<--, 109<--  06-12    147<H>  |  111<H>  |  31<H>  ----------------------------<  87  4.0   |  25  |  1.05    Ca    9.3      12 Jun 2018 07:36      Creatinine Trend: 1.05<--, 1.13<--, 1.30<--            Microbiology:  Urine Cx:  Blood Cx:    RADIOLOGY & ADDITIONAL TESTS:  X- Ray:  CT:  Ultrasound:  [ ] imaging personally reviewed and interpreted by me    Consultant(s) Notes Reviewed:      Care Discussed with Consultants/Other Providers: Patient is a 89y old  Female who presents with a chief complaint of decreased mental status (11 Jun 2018 15:09)      SUBJECTIVE / OVERNIGHT EVENTS: no acute events overnight. Patient seen with daughter at bedside this morning. Eating breakfast and has no complaints. Denies any CP, SOB, cough, abdominal pain, N/V.     MEDICATIONS  (STANDING):  clindamycin   Capsule 300 milliGRAM(s) Oral every 8 hours  digoxin     Tablet 0.125 milliGRAM(s) Oral every other day  lactated ringers. 1000 milliLiter(s) (100 mL/Hr) IV Continuous <Continuous>  lactulose Syrup 30 Gram(s) Oral four times a day  levoFLOXacin  Tablet 750 milliGRAM(s) Oral every 48 hours  levothyroxine 25 MICROGram(s) Oral at bedtime  propranolol 10 milliGRAM(s) Oral two times a day  rifaximin 550 milliGRAM(s) Oral two times a day  spironolactone 25 milliGRAM(s) Oral daily    MEDICATIONS  (PRN):  ALBUTerol/ipratropium for Nebulization 3 milliLiter(s) Nebulizer every 6 hours PRN Shortness of Breath and/or Wheezing        CAPILLARY BLOOD GLUCOSE        I&O's Summary    13 Jun 2018 07:01  -  14 Jun 2018 07:00  --------------------------------------------------------  IN: 300 mL / OUT: 0 mL / NET: 300 mL        PHYSICAL EXAM:  Vital Signs Last 24 Hrs  T(C): 36.8 (13 Jun 2018 22:07), Max: 37 (13 Jun 2018 08:11)  T(F): 98.2 (13 Jun 2018 22:07), Max: 98.6 (13 Jun 2018 08:11)  HR: 71 (14 Jun 2018 05:36) (69 - 96)  BP: 107/66 (14 Jun 2018 05:36) (104/58 - 115/60)  BP(mean): --  RR: 18 (13 Jun 2018 22:07) (18 - 18)  SpO2: 100% (13 Jun 2018 22:07) (95% - 100%)    GENERAL: awake, alert  HEAD:  Atraumatic, Normocephalic  EYES: pupils reactive b/l, ecchymosis on left eye  NECK: Supple  CHEST/LUNG: upper airway congestion auscultated, no wheezing or rales  HEART: irregularly irregular rhythm, regular rate, systolic murmur  ABDOMEN: Soft, Nontender, Nondistended; Bowel sounds present, no ascites appreciated  EXTREMITIES:  2+ Peripheral Pulses, purple ecchymoses on all 4 extremities  PSYCH: AOx2  NEUROLOGY: moving all 4 extremities freely  SKIN: diffusely dry, ecchymoses on all extremities    LABS:    WBC Trend: 3.66<--, 3.6<--, 5.9<--  Hb Trend: 11.9<--, 11.3<--, 13.7<--  Plt Trend: 77<--, 76<--, 109<--  06-12    147<H>  |  111<H>  |  31<H>  ----------------------------<  87  4.0   |  25  |  1.05    Ca    9.3      12 Jun 2018 07:36      Creatinine Trend: 1.05<--, 1.13<--, 1.30<--            Microbiology:  Urine Cx: negative  Blood Cx: negative

## 2018-06-14 NOTE — PROGRESS NOTE ADULT - PROBLEM SELECTOR PROBLEM 2
Sepsis due to undetermined organism

## 2018-06-14 NOTE — PROGRESS NOTE ADULT - SUBJECTIVE AND OBJECTIVE BOX
MEDICATIONS  (STANDING):  clindamycin   Capsule 300 milliGRAM(s) Oral every 8 hours  lactated ringers. 1000 milliLiter(s) (100 mL/Hr) IV Continuous <Continuous>  lactulose Syrup 30 Gram(s) Oral four times a day  levoFLOXacin  Tablet 750 milliGRAM(s) Oral every 48 hours  levothyroxine 25 MICROGram(s) Oral at bedtime  rifaximin 550 milliGRAM(s) Oral two times a day     The pt is a 89 year old female with a history of cryptogenic cirrhosis, hepatoma, atrial fibrillation not on AC, history of ICH, COPD, CLL not on chemo at this time. The pt was treated with bendamustine and Rituxan about 8 years ago and has done well. The daughter was present and she stated that she has low platelet counts chronically. The pt about one or two days before admit was not eating or drinking and was becoming confused. There were no reports of fever but the pt was placed on cipro as a outpt for two doses. she did not improve and was brought into the ER. The pt at home was constipated and has been on rifaximin an lactulose.  The pt was lethargic and was told to come to the  ER.  The UA was + after the cipro on 6/8. The cough was yellow and antibiotics were given. The pt with fluids and above treatments is much more alert and is conversant. A large part of the problem was probably from dehydration and perhaps infection and elevated ammonia.     The daughter told me that the pt developed a hematoma with her cirrhosis and was treated with chemoembolization and the tumor is stable and not likely contributing to the present toxic metabolic state. The pt is not eating at home and probably not keeping up with her fluids and a feeding tube was refused and a ng tube was not placed due to varices. The white cell count was 3.6 hemoglobin 11.3 and platelet count was 92188 the bun was 34 and the creatinine was 1.13. A cxr was notable for left lower lung atelectasis and there is question that a pna is present.      The pt is not a candidate for aggressive therapy for the hepatoma and at this time she will be supported thru with fluids, antibiotics as needed and meds to control the high ammonia levels. A long talk was held with the daughter at the bedside and supportive care is in order.6/12 the pt is looking better and urine grew out klebsiella and she is on Levaquin. 6/14 stable clinically and looks better she will be going to [facility] on this date.  PVD (peripheral vascular disease)  Liver cell carcinoma  Severe aortic stenosis by prior echocardiogram  Pulmonary HTN: moderate  CKD (chronic kidney disease), stage 3 (moderate)  COPD (Chronic Obstructive Pulmonary Disease)  AF (Atrial Fibrillation)  RR: 18 (13 Jun 2018 08:11) (18 - 18)  SpO2: 99% (13 Jun 2018 08:11) (99% - 100%)  Portal Hypertension  Bleeding Esophageal Varices  CLL (Chronic Lymphoblastic Leukemia)  GIB (Gastrointestinal Bleeding)  History of repair of hip fracture  Esophageal Rupture  pe pt looked stronger and was not in distress daughter at the bedside and we went over the hospital plans mouth clear heart rr abdomen soft extremities no edema.              Basic Metabolic Panel in AM (06.12.18 @ 07:36)    Sodium, Serum: 147 mmol/L    Potassium, Serum: 4.0 mmol/L    Chloride, Serum: 111 mmol/L    Carbon Dioxide, Serum: 25 mmol/L    Anion Gap, Serum: 11 mmol/L    Blood Urea Nitrogen, Serum: 31 mg/dL    Creatinine, Serum: 1.05 mg/dL    Glucose, Serum: 87 mg/dL    Calcium, Total Serum: 9.3 mg/dL     eGFR if African American: 55 mL/min/1.73M2    PE pt looks the best that I have seen her and she will need to continue on the po fluids

## 2018-06-14 NOTE — PROGRESS NOTE ADULT - ASSESSMENT
.6/12 the pt is looking better and urine grew out klebsiella and she is on Levaquin
6/14 stable clinically and looks better she will be going to [facility] on this date.
89F with cirrhosis with varices, afib not on AC (hx of ICH), COPD, CLL (not currently on treatment), AS presenting with 1 day of decreased mental status and tachycardia 2/2 asp pna and hepatic encephalopathy.
89F with cirrhosis with varices, afib not on AC (hx of ICH), COPD, CLL (not currently on treatment), AS presenting with 1 day of decreased mental status and tachycardia 2/2 asp pna and hepatic encephalopathy.
89F with cirrhosis with varices, afib not on AC (hx of ICH), COPD, CLL (not currently on treatment), AS presenting with 1 day of decreased mental status and tachycardia 2/2 cystitis and hepatic encephalopathy.
pt looking better and stable from heme onc point of view.
89F with cirrhosis with varices, afib not on AC (hx of ICH), COPD, CLL (not currently on treatment), AS presenting with 1 day of decreased mental status and tachycardia 2/2 cystitis and hepatic encephalopathy.
89F with cirrhosis with varices, afib not on AC (hx of ICH), COPD, CLL (not currently on treatment), AS presenting with 1 day of decreased mental status and tachycardia 2/2 cystitis and hepatic encephalopathy.

## 2018-06-14 NOTE — PROGRESS NOTE ADULT - PROBLEM SELECTOR PLAN 3
- currently unable to take PO  - lactulose enemas as needed for 2-3 BM/day  - c/w propranolol and spironolactone - c/w lactulose 4x/day titrating to 3BM/day  - c/w propranolol and spironolactone

## 2018-06-14 NOTE — PROGRESS NOTE ADULT - ATTENDING COMMENTS
I was physically present for the key portions of the evaluation and management (E/M) service provided.  I agree with the above history, physical, and plan which I have reviewed and edited where appropriate. Pt clinically improved, afebrile, mentating at baseline. PT recommended TANI vs. home pt, pt's daughter would like to take her home. Will start DC planning with PO antibiotics to complete total 7 day course for aspiration pna. Pt and daughter understand aspiration risk of continued oral feeds.
I was physically present for the key portions of the evaluation and management (E/M) service provided.  I agree with the above history, physical, and plan which I have reviewed and edited where appropriate. Pt clinically stable and ready for discharge to rehab. Explained adverse effects of medications, including abx-related d.ciff with patient and daughter at bedside. Stable for discharge to rehab.    Total discharge time: 45 minutes.
I was physically present for the key portions of the evaluation and management (E/M) service provided.  I agree with the above history, physical, and plan which I have reviewed and edited where appropriate. Pt chronically ill with cirrhosis and varices, afib not on AC due to prior ICH now with HE and AMS 2/2 sepsis 2/2 aspiration PNA, clinically improved on meropenem. Discussed with daughter liver disease not protective against DVTs, daughter deferring DVT ppx decision to OP H/O Dr. Soto, who advised against it. Daughter also declining SCDs at present due to pt discomfort and easy bruising. Understands risks of DVT but would like to hold off on ppx at this time. Otherwise, patient euvolemic with improving mental status. Will continue iv meropenem, gentle fluid hydration, lactulose enemas. Will monitor on tele with q4 vital signs for now until clinically more stable. Guarded prognosis. Pt DNR but not DNI and would want trial of pressors if needed for BP management.
I was physically present for the key portions of the evaluation and management (E/M) service provided.  I agree with the above history, physical, and plan which I have reviewed and edited where appropriate. Pt clinically improved today- mental status back to baseline, respiratory status stable. Will perform bedside dysphagia and revert to PO medications as tolerated. Plan to complete total 7 day course of abx for aspiration pna.

## 2018-06-14 NOTE — PROGRESS NOTE ADULT - PROVIDER SPECIALTY LIST ADULT
Heme/Onc
Internal Medicine

## 2018-06-14 NOTE — PROGRESS NOTE ADULT - PROBLEM SELECTOR PLAN 1
likely 2/2 aspiration pneumonia with component of hepatic encephalopathy  - presented with 1 day of depressed mental status, cough with sputum, +UA from PMD on ciprofloxacin for 1 day  - afebrile at home and at hospital  - in the setting of decreased BM this past week despite taking lactulose and rifaximin  - previously hospitalization with MBS showing silent aspiration with all consistencies and patient currently on pleasure feeds  - urine cultures with <10k normal farzad, blood cultures NGTD, does not appear to have UTI based on cultures  - transitioned to clinda and levaquin to cover asp pna, hepatically dosed - to complete 7 day course on 6/15  - mental status greatly improved and now awake and alert, per family discussion previously and on this admission would like to continue with pleasure feeds knowing risk of aspiration, c/w dysphagia diet with aspiration precautions  - c/w lactulose, titrating to 3 BM/day resolved, likely 2/2 aspiration pneumonia with component of hepatic encephalopathy in setting of dec BM at home  - presented with 1 day of depressed mental status, cough with sputum  - afebrile at home and at hospital  - previously hospitalization with MBS showing silent aspiration with all consistencies and patient currently on pleasure feeds  - urine cultures with <10k normal farzad, blood cultures NGTD, does not appear to have UTI based on cultures  - transitioned to clinda and levaquin to cover asp pna, hepatically dosed - to complete 7 day course on 6/15  - per family discussion previously and on this admission would like to continue with pleasure feeds knowing risk of aspiration, c/w dysphagia diet with aspiration precautions  - c/w lactulose, titrating to 3 BM/day

## 2018-06-19 ENCOUNTER — FORM ENCOUNTER (OUTPATIENT)
Age: 83
End: 2018-06-19

## 2018-06-20 ENCOUNTER — OUTPATIENT (OUTPATIENT)
Dept: OUTPATIENT SERVICES | Facility: HOSPITAL | Age: 83
LOS: 1 days | End: 2018-06-20
Payer: MEDICARE

## 2018-06-20 DIAGNOSIS — Z00.00 ENCOUNTER FOR GENERAL ADULT MEDICAL EXAMINATION WITHOUT ABNORMAL FINDINGS: ICD-10-CM

## 2018-06-20 DIAGNOSIS — Z98.89 OTHER SPECIFIED POSTPROCEDURAL STATES: Chronic | ICD-10-CM

## 2018-06-20 PROCEDURE — 93970 EXTREMITY STUDY: CPT

## 2018-06-20 PROCEDURE — 93970 EXTREMITY STUDY: CPT | Mod: 26

## 2018-06-22 ENCOUNTER — APPOINTMENT (OUTPATIENT)
Dept: HEPATOLOGY | Facility: CLINIC | Age: 83
End: 2018-06-22
Payer: MEDICARE

## 2018-06-22 VITALS
HEART RATE: 61 BPM | WEIGHT: 112 LBS | DIASTOLIC BLOOD PRESSURE: 40 MMHG | RESPIRATION RATE: 12 BRPM | TEMPERATURE: 97.5 F | SYSTOLIC BLOOD PRESSURE: 91 MMHG | HEIGHT: 63 IN | BODY MASS INDEX: 19.84 KG/M2

## 2018-06-22 PROCEDURE — 99214 OFFICE O/P EST MOD 30 MIN: CPT

## 2018-06-22 RX ORDER — LACTULOSE 10 G/15ML
20 SOLUTION ORAL
Qty: 3600 | Refills: 3 | Status: DISCONTINUED | COMMUNITY
Start: 2018-05-03 | End: 2018-06-22

## 2018-07-10 ENCOUNTER — APPOINTMENT (OUTPATIENT)
Dept: INTERNAL MEDICINE | Facility: CLINIC | Age: 83
End: 2018-07-10

## 2018-07-11 ENCOUNTER — FORM ENCOUNTER (OUTPATIENT)
Age: 83
End: 2018-07-11

## 2018-07-13 ENCOUNTER — NON-APPOINTMENT (OUTPATIENT)
Age: 83
End: 2018-07-13

## 2018-07-13 ENCOUNTER — APPOINTMENT (OUTPATIENT)
Dept: CARDIOLOGY | Facility: CLINIC | Age: 83
End: 2018-07-13
Payer: MEDICARE

## 2018-07-13 VITALS — HEIGHT: 63 IN | HEART RATE: 94 BPM | SYSTOLIC BLOOD PRESSURE: 98 MMHG | DIASTOLIC BLOOD PRESSURE: 60 MMHG

## 2018-07-13 PROCEDURE — 99214 OFFICE O/P EST MOD 30 MIN: CPT

## 2018-07-13 PROCEDURE — 93000 ELECTROCARDIOGRAM COMPLETE: CPT

## 2018-07-17 ENCOUNTER — MEDICATION RENEWAL (OUTPATIENT)
Age: 83
End: 2018-07-17

## 2018-07-19 ENCOUNTER — APPOINTMENT (OUTPATIENT)
Dept: INTERNAL MEDICINE | Facility: CLINIC | Age: 83
End: 2018-07-19
Payer: MEDICARE

## 2018-07-19 VITALS
WEIGHT: 118 LBS | BODY MASS INDEX: 21.17 KG/M2 | HEIGHT: 62.5 IN | SYSTOLIC BLOOD PRESSURE: 96 MMHG | OXYGEN SATURATION: 96 % | DIASTOLIC BLOOD PRESSURE: 70 MMHG | TEMPERATURE: 97.9 F | HEART RATE: 80 BPM

## 2018-07-19 DIAGNOSIS — J45.909 UNSPECIFIED ASTHMA, UNCOMPLICATED: ICD-10-CM

## 2018-07-19 DIAGNOSIS — N39.0 URINARY TRACT INFECTION, SITE NOT SPECIFIED: ICD-10-CM

## 2018-07-19 DIAGNOSIS — T17.908A UNSPECIFIED FOREIGN BODY IN RESPIRATORY TRACT, PART UNSPECIFIED CAUSING OTHER INJURY, INITIAL ENCOUNTER: ICD-10-CM

## 2018-07-19 DIAGNOSIS — B34.9 VIRAL INFECTION, UNSPECIFIED: ICD-10-CM

## 2018-07-19 DIAGNOSIS — K76.9 LIVER DISEASE, UNSPECIFIED: ICD-10-CM

## 2018-07-19 DIAGNOSIS — Z86.19 PERSONAL HISTORY OF OTHER INFECTIOUS AND PARASITIC DISEASES: ICD-10-CM

## 2018-07-19 DIAGNOSIS — Z87.828 PERSONAL HISTORY OF OTHER (HEALED) PHYSICAL INJURY AND TRAUMA: ICD-10-CM

## 2018-07-19 DIAGNOSIS — Z87.440 PERSONAL HISTORY OF URINARY (TRACT) INFECTIONS: ICD-10-CM

## 2018-07-19 DIAGNOSIS — R41.82 ALTERED MENTAL STATUS, UNSPECIFIED: ICD-10-CM

## 2018-07-19 DIAGNOSIS — Z87.898 PERSONAL HISTORY OF OTHER SPECIFIED CONDITIONS: ICD-10-CM

## 2018-07-19 PROCEDURE — 99496 TRANSJ CARE MGMT HIGH F2F 7D: CPT

## 2018-07-19 RX ORDER — CIPROFLOXACIN HYDROCHLORIDE 250 MG/1
250 TABLET, FILM COATED ORAL
Qty: 14 | Refills: 0 | Status: DISCONTINUED | COMMUNITY
Start: 2018-06-08 | End: 2018-07-19

## 2018-07-19 RX ORDER — ACETYLCYSTEINE 100 MG/ML
10 SOLUTION ORAL; RESPIRATORY (INHALATION) TWICE DAILY
Qty: 30 | Refills: 5 | Status: DISCONTINUED | COMMUNITY
Start: 2018-02-22 | End: 2018-07-19

## 2018-07-19 RX ORDER — MULTIVIT-MIN/FOLIC/VIT K/LYCOP 400-300MCG
1000 TABLET ORAL
Refills: 0 | Status: ACTIVE | COMMUNITY

## 2018-07-19 RX ORDER — NITROFURANTOIN (MONOHYDRATE/MACROCRYSTALS) 25; 75 MG/1; MG/1
100 CAPSULE ORAL
Qty: 14 | Refills: 0 | Status: DISCONTINUED | COMMUNITY
Start: 2018-05-10 | End: 2018-07-19

## 2018-07-20 PROBLEM — T17.908A ASPIRATION, CHRONIC PULMONARY: Status: ACTIVE | Noted: 2018-02-15

## 2018-07-20 NOTE — HEALTH RISK ASSESSMENT
[Intercurrent ED visits] : went to ED [Intercurrent hospitalizations] : was admitted to the hospital  [Any fall with injury in past year] : Patient reported fall with injury in the past year [0] : 2) Feeling down, depressed, or hopeless: Not at all (0) [Patient declined mammogram] : Patient declined mammogram [Patient declined PAP Smear] : Patient declined PAP Smear [Patient declined bone density test] : Patient declined bone density test [Patient declined colonoscopy] : Patient declined colonoscopy [HIV test declined] : HIV test declined [Hepatitis C test declined] : Hepatitis C test declined [Change in mental status noted] : Change in mental status noted [Behavior] : difficulty with behavior [Learning/Retaining New Information] : difficulty learning/retaining new information [None] : None [With Family] : lives with family [# of Members in Household ___] :  household currently consist of [unfilled] member(s) [Retired] : retired [College] : College [] :  [# Of Children ___] : has [unfilled] children [Feels Safe at Home] : Feels safe at home [Fully functional (bathing, dressing, toileting, transferring, walking, feeding)] : Fully functional (bathing, dressing, toileting, transferring, walking, feeding) [Reports changes in hearing] : Reports changes in hearing [Reports changes in vision] : Reports changes in vision [Reports changes in dental health] : Reports changes in dental health [Smoke Detector] : smoke detector [Carbon Monoxide Detector] : carbon monoxide detector [Seat Belt] :  uses seat belt [Discussed at today's visit] : Advance Directives Discussed at today's visit [Designated Healthcare Proxy] : Designated healthcare proxy [Name: ___] : Health Care Proxy's Name: [unfilled]  [Relationship: ___] : Relationship: [unfilled] [DNR] : DNR [] : No [de-identified] : cardiology [[id number]] : 0 [Sexually Active] : not sexually active [Guns at Home] : no guns at home [Sunscreen] : does not use sunscreen [Travel to Developing Areas] : does not  travel to developing areas [TB Exposure] : is not being exposed to tuberculosis [Caregiver Concerns] : does not have caregiver concerns [de-identified] : with assistance [de-identified] : needs assistance

## 2018-07-20 NOTE — ASSESSMENT
[FreeTextEntry1] : Altered MS - clinically much improved after treatment for UTI/pneumonia\par Will monitor labs = she declined today\par Keep well-hydrated\par Completed antibiotics\par Keep wounds clean and cover with bacitracin and Telfa pads\par GI f/u\par \par Bronchiectasis\par Clinically stable\par Monitor CXR\par Unable to give sputum for C&S\par FVL declined\par Robitussin DM bid as needed\par Chest PT\par Budesonide nebs bid\par Duoneb nebs bid and prn\par Flonase daily\par \par Aspiration\par Had PT/OT/ST in rehab\par Had f/u swallowing evaluation and improved\par Diet was therefore advanced\par Aspiration precautions\par ENT f/u\par Rest voice\par Warm fluids\par Gargles/lozenges\par Steam/humidifier\par PT /OT/ST\par \par F/U with Cardiology\par F/U with Hepatology\par F/U with Renal\par F/U with all MD's\par Continue meds, diet, exercise as outlined\par RTC 4-6 weeks and as needed\par To call if worse \par To call for any medical/pulmonary issues\par D/W patient and daughter\par All questions answered\par Attempted to address code status and level of care but daughter declined to do so\par They do not want CPR for patient

## 2018-07-20 NOTE — COUNSELING
[Weight management counseling provided] : Weight management [Healthy eating counseling provided] : healthy eating [Activity counseling provided] : activity [Weight Self Once Weekly] : Weight self once weekly [Low Salt Diet] : Low salt diet [Walking] : Walking [None] : None [Good understanding] : Patient has a good understanding of lifestyle changes and the steps needed to achieve self management goals

## 2018-07-20 NOTE — REVIEW OF SYSTEMS
[Fatigue] : fatigue [Vision Problems] : vision problems [Hearing Loss] : hearing loss [Confusion] : confusion [Easy Bruising] : easy bruising [Negative] : Heme/Lymph

## 2018-07-20 NOTE — PHYSICAL EXAM
[No Acute Distress] : no acute distress [Well-Appearing] : well-appearing [Normal Voice/Communication] : normal voice/communication [Normal Sclera/Conjunctiva] : normal sclera/conjunctiva [PERRL] : pupils equal round and reactive to light [EOMI] : extraocular movements intact [Normal Outer Ear/Nose] : the outer ears and nose were normal in appearance [Normal Oropharynx] : the oropharynx was normal [No JVD] : no jugular venous distention [Supple] : supple [No Respiratory Distress] : no respiratory distress  [Clear to Auscultation] : lungs were clear to auscultation bilaterally [No Accessory Muscle Use] : no accessory muscle use [Normal Rate] : normal rate  [Normal S1, S2] : normal S1 and S2 [No Extremity Clubbing/Cyanosis] : no extremity clubbing/cyanosis [Soft] : abdomen soft [Non Tender] : non-tender [Normal Bowel Sounds] : normal bowel sounds [No CVA Tenderness] : no CVA  tenderness [No Spinal Tenderness] : no spinal tenderness [Speech Grossly Normal] : speech grossly normal [Alert and Oriented x3] : oriented to person, place, and time [High Complexity requires an extensive number of possible diagnoses and/or the management options, extensive complexity of the medical data (tests, etc.) to be reviewed, and a high risk of significant complications, morbidity, and/or mortality as well as c] : High Complexity  [de-identified] : thin [de-identified] : No ST [de-identified] : no stridor [de-identified] : R=16; no wheezing [de-identified] : murmurs loud and unchanged [de-identified] : no cords; mild edema [de-identified] : in wheelchair

## 2018-07-20 NOTE — HISTORY OF PRESENT ILLNESS
[Post-hospitalization from ___ Hospital] : Post-hospitalization from [unfilled] Hospital [Admitted on: ___] : The patient was admitted on [unfilled] [Discharged on ___] : discharged on [unfilled] [Discharge Summary] : discharge summary [Discharge Med List] : discharge medication list [Patient Contacted By: ____] : and contacted by [unfilled] [FreeTextEntry2] : Had UTI and on Cipro\par Had altered MS\par BIBA to [facility]-\par Developed pneumonia - felt due to aspiration.\par Treated with IV antibiotics.\par Discharged to [facility] 6/14/18 to 7/13/18.\par Had f/u swallowing study and diet /fluids liberalized.\par Stronger.\par Feeling well.\par Just had 89 birthday on 7/16/18.

## 2018-07-23 PROBLEM — J45.909 ASTHMATIC BRONCHITIS, UNSPECIFIED ASTHMA SEVERITY, UNCOMPLICATED: Status: RESOLVED | Noted: 2018-02-15 | Resolved: 2018-07-23

## 2018-08-10 NOTE — PROGRESS NOTE ADULT - SUBJECTIVE AND OBJECTIVE BOX
190 Patient is a 89y old  Female who presents with a chief complaint of Fever (15 Oct 2017 20:22)    SUBJECTIVE / OVERNIGHT EVENTS:  Patient seen at bedside. When asked, admitted to some dryness of mouth. Advised to drink water when thirsty, but instructed how not to overdo it. Endorses discomfort as a result of her lactulose. She has had two bowel movements this morning. Denies lightheadedness, dysuria, abdominal pain, dizziness, vertigo, fever, chills.      MEDICATIONS  (STANDING):  ALBUTerol/ipratropium for Nebulization 3 milliLiter(s) Nebulizer every 12 hours  AQUAPHOR (petrolatum Ointment) 1 Application(s) Topical three times a day  buDESOnide   0.25 milliGRAM(s) Respule 0.25 milliGRAM(s) Inhalation two times a day  cefTRIAXone   IVPB 1 Gram(s) IV Intermittent every 24 hours  cholecalciferol 2000 Unit(s) Oral daily  digoxin     Tablet 0.125 milliGRAM(s) Oral every other day  lactulose Syrup 20 Gram(s) Oral two times a day  levothyroxine 25 MICROGram(s) Oral daily  rifaximin 550 milliGRAM(s) Oral two times a day    MEDICATIONS  (PRN):  acetaminophen   Tablet 650 milliGRAM(s) Oral every 12 hours PRN For Temp greater than 38 C (100.4 F)        CAPILLARY BLOOD GLUCOSE        I&O's Summary    16 Oct 2017 07:  -  17 Oct 2017 07:00  --------------------------------------------------------  IN: 250 mL / OUT: 0 mL / NET: 250 mL    17 Oct 2017 07:  -  17 Oct 2017 11:16  --------------------------------------------------------  IN: 285 mL / OUT: 0 mL / NET: 285 mL    Vital Signs Last 24 Hrs  T(C): 36.7 (17 Oct 2017 10:58), Max: 37.1 (16 Oct 2017 15:28)  T(F): 98.1 (17 Oct 2017 10:58), Max: 98.8 (16 Oct 2017 15:28)  HR: 53 (17 Oct 2017 10:58) (53 - 74)  BP: 101/46 (17 Oct 2017 10:58) (99/55 - 110/45)  RR: 18 (17 Oct 2017 10:58) (18 - 19)  SpO2: 95% (17 Oct 2017 10:58) (95% - 97%)    PHYSICAL EXAM:  	Constitutional: cachectic, NAD, appears chronically ill  	Eyes: EOMI, PERRL  	ENMT: Dry mucous membranes. no oral lesions, no rhinorrhea no post-nasal drip  	Neck: no lymphadenopathy, supple neck, no thyromegaly  	Back: no CVA tenderness, kyphosis  	Respiratory: CTAB  	Cardiovascular: IV/VI ARCADIO, regular rhythm, no rubs or gallops appreciated  	Gastrointestinal: soft, nontender, no suprapubic tenderness; no hepatosplenomegaly, NBS  	Extremities: ecchymotic mild tenderness bilaterally; symmetrical in size  	Vascular: +2 DP pulses bilateraly  	Neurological: AOx3,  	Skin: ecchymotic, telangiectasias observed  	Lymph Nodes: no lymphadenopathy or splenomegaly  	Musculoskeletal: no clubbing; no joint effusions; normal ROM              Psychiatric: calm, appropriate, following commands    LABS:                        11.7   3.73  )-----------( 56       ( 17 Oct 2017 09:13 )             34.2     WBC Trend: 3.73<--, 4.17<--, 6.3<--  10-17    141  |  105  |  31<H>  ----------------------------<  95  4.3   |  25  |  1.29    Ca    9.8      17 Oct 2017 08:53    TPro  5.8<L>  /  Alb  3.0<L>  /  TBili  1.4<H>  /  DBili  x   /  AST  32  /  ALT  17  /  AlkPhos  94  10-17    Creatinine Trend: 1.29<--, 1.38<--, 1.66<--  PT/INR - ( 15 Oct 2017 11:29 )   PT: 13.9 sec;   INR: 1.28 ratio         PTT - ( 15 Oct 2017 11:29 )  PTT:33.7 sec      Urinalysis Basic - ( 15 Oct 2017 14:41 )    Color: Yellow / Appearance: Clear / S.009 / pH: x  Gluc: x / Ketone: Negative  / Bili: Negative / Urobili: Negative   Blood: x / Protein: Negative / Nitrite: Negative   Leuk Esterase: Small / RBC: x / WBC 6-10 /HPF   Sq Epi: x / Non Sq Epi: x / Bacteria: Few /HPF      Culture - Urine (10.15.17 @ 16:34)    Specimen Source: .Urine Catheterized    Culture Results:   >100,000 CFU/ml Klebsiella pneumoniae        RADIOLOGY & ADDITIONAL TESTS:    Imaging Personally Reviewed:    Consultant(s) Notes Reviewed:      Care Discussed with Consultants/Other Providers:

## 2018-08-22 ENCOUNTER — APPOINTMENT (OUTPATIENT)
Dept: CARDIOLOGY | Facility: CLINIC | Age: 83
End: 2018-08-22
Payer: MEDICARE

## 2018-08-22 ENCOUNTER — NON-APPOINTMENT (OUTPATIENT)
Age: 83
End: 2018-08-22

## 2018-08-22 VITALS
HEART RATE: 61 BPM | BODY MASS INDEX: 19.74 KG/M2 | DIASTOLIC BLOOD PRESSURE: 58 MMHG | HEIGHT: 62.5 IN | WEIGHT: 110 LBS | RESPIRATION RATE: 14 BRPM | SYSTOLIC BLOOD PRESSURE: 96 MMHG | OXYGEN SATURATION: 99 %

## 2018-08-22 PROCEDURE — 93000 ELECTROCARDIOGRAM COMPLETE: CPT

## 2018-08-22 PROCEDURE — 99214 OFFICE O/P EST MOD 30 MIN: CPT

## 2018-08-27 ENCOUNTER — APPOINTMENT (OUTPATIENT)
Dept: INTERNAL MEDICINE | Facility: CLINIC | Age: 83
End: 2018-08-27
Payer: MEDICARE

## 2018-08-27 VITALS
DIASTOLIC BLOOD PRESSURE: 60 MMHG | BODY MASS INDEX: 19.99 KG/M2 | HEART RATE: 61 BPM | SYSTOLIC BLOOD PRESSURE: 110 MMHG | HEIGHT: 62.25 IN | OXYGEN SATURATION: 99 % | TEMPERATURE: 97.5 F | WEIGHT: 110 LBS

## 2018-08-27 DIAGNOSIS — R49.0 DYSPHONIA: ICD-10-CM

## 2018-08-27 PROCEDURE — 99214 OFFICE O/P EST MOD 30 MIN: CPT

## 2018-08-27 NOTE — HISTORY OF PRESENT ILLNESS
[Family Member] : family member [de-identified] : See narrative below. [FreeTextEntry1] : Comes in for f/u medical/pulmonary visit.\par Brought in by daughter.\par Denies chest pain.\par Daughter notices occasional cough recently.\par No hemoptysis.\par No further fevers.\par Denies SOB and wheezing.\par No edema.\par Denies abdominal pain or n/v. \par Eating better.\par No UTI symptoms.\par Has chronic hoarseness.\par No further falls.\par Daughter feels MS improved.

## 2018-08-27 NOTE — PHYSICAL EXAM
[No Acute Distress] : no acute distress [Well-Appearing] : well-appearing [Normal Voice/Communication] : normal voice/communication [Normal Sclera/Conjunctiva] : normal sclera/conjunctiva [PERRL] : pupils equal round and reactive to light [EOMI] : extraocular movements intact [Normal Outer Ear/Nose] : the outer ears and nose were normal in appearance [Normal Oropharynx] : the oropharynx was normal [No JVD] : no jugular venous distention [Supple] : supple [No Respiratory Distress] : no respiratory distress  [Clear to Auscultation] : lungs were clear to auscultation bilaterally [No Accessory Muscle Use] : no accessory muscle use [Normal Rate] : normal rate  [Normal S1, S2] : normal S1 and S2 [No Extremity Clubbing/Cyanosis] : no extremity clubbing/cyanosis [Soft] : abdomen soft [Non Tender] : non-tender [Normal Bowel Sounds] : normal bowel sounds [No CVA Tenderness] : no CVA  tenderness [No Spinal Tenderness] : no spinal tenderness [Speech Grossly Normal] : speech grossly normal [Alert and Oriented x3] : oriented to person, place, and time [de-identified] : thin [de-identified] : No ST [de-identified] : no stridor [de-identified] : R=16; no wheezing [de-identified] : murmurs loud and unchanged [de-identified] : no cords; mild edema

## 2018-08-27 NOTE — ASSESSMENT
[FreeTextEntry1] : Altered MS - clinically much improved \par Will monitor labs / urine\par Keep well-hydrated\par GI f/u\par Continue lactulose/Xifaxan\par \par Bronchiectasis\par Clinically stable\par Robitussin DM bid as needed\par Chest PT\par Budesonide nebs bid\par Duoneb nebs bid and prn\par Flonase daily as needed\par She declined PEG\par Aspiration precautions\par \par Hoarseness\par ENT f/u\par Rest voice\par Warm fluids\par Gargles/lozenges\par Steam/humidifier\par Speech therapy \par PT \par \par RTC 12 weeks and as needed\par To call if worse \par To call for any medical/pulmonary issues\par D/W patient and daughter\par Continue meds, diet, exercise as outlined\par F/U with all MD's

## 2018-08-27 NOTE — REVIEW OF SYSTEMS
[Fatigue] : fatigue [Vision Problems] : vision problems [Hearing Loss] : hearing loss [Hoarseness] : hoarseness [Lower Ext Edema] : lower extremity edema [Cough] : cough [Diarrhea] : diarrhea [Memory Loss] : memory loss [Easy Bleeding] : easy bleeding [Easy Bruising] : easy bruising [Negative] : Heme/Lymph

## 2018-08-27 NOTE — HEALTH RISK ASSESSMENT
[Any fall with injury in past year] : Patient reported fall with injury in the past year [0] : 2) Feeling down, depressed, or hopeless: Not at all (0) [] : No [de-identified] : cardiology [[id number]] : 0

## 2018-09-21 ENCOUNTER — APPOINTMENT (OUTPATIENT)
Dept: HEPATOLOGY | Facility: CLINIC | Age: 83
End: 2018-09-21
Payer: MEDICARE

## 2018-09-21 VITALS
WEIGHT: 109 LBS | HEART RATE: 104 BPM | TEMPERATURE: 97.5 F | SYSTOLIC BLOOD PRESSURE: 108 MMHG | RESPIRATION RATE: 12 BRPM | BODY MASS INDEX: 21.4 KG/M2 | DIASTOLIC BLOOD PRESSURE: 68 MMHG | HEIGHT: 60 IN

## 2018-09-21 PROCEDURE — 99214 OFFICE O/P EST MOD 30 MIN: CPT

## 2018-09-22 LAB
AFP-TM SERPL-MCNC: 1.4 NG/ML
ALBUMIN SERPL ELPH-MCNC: 3.7 G/DL
ALP BLD-CCNC: 102 U/L
ALT SERPL-CCNC: 19 U/L
ANION GAP SERPL CALC-SCNC: 17 MMOL/L
AST SERPL-CCNC: 44 U/L
BASOPHILS # BLD AUTO: 0.03 K/UL
BASOPHILS NFR BLD AUTO: 0.6 %
BILIRUB SERPL-MCNC: 2.4 MG/DL
BUN SERPL-MCNC: 39 MG/DL
CALCIUM SERPL-MCNC: 10.5 MG/DL
CHLORIDE SERPL-SCNC: 103 MMOL/L
CO2 SERPL-SCNC: 21 MMOL/L
CREAT SERPL-MCNC: 1.3 MG/DL
EOSINOPHIL # BLD AUTO: 0.06 K/UL
EOSINOPHIL NFR BLD AUTO: 1.1 %
GLUCOSE SERPL-MCNC: 59 MG/DL
HCT VFR BLD CALC: 37.1 %
HGB BLD-MCNC: 12.2 G/DL
IMM GRANULOCYTES NFR BLD AUTO: 0.2 %
INR PPP: 1.11 RATIO
LYMPHOCYTES # BLD AUTO: 1.4 K/UL
LYMPHOCYTES NFR BLD AUTO: 26.1 %
MAN DIFF?: NORMAL
MCHC RBC-ENTMCNC: 32.9 GM/DL
MCHC RBC-ENTMCNC: 34.4 PG
MCV RBC AUTO: 104.5 FL
MONOCYTES # BLD AUTO: 0.43 K/UL
MONOCYTES NFR BLD AUTO: 8 %
NEUTROPHILS # BLD AUTO: 3.43 K/UL
NEUTROPHILS NFR BLD AUTO: 64 %
PLATELET # BLD AUTO: 101 K/UL
POTASSIUM SERPL-SCNC: 5.1 MMOL/L
PROT SERPL-MCNC: 5.8 G/DL
PT BLD: 12.6 SEC
RBC # BLD: 3.55 M/UL
RBC # FLD: 16 %
SODIUM SERPL-SCNC: 141 MMOL/L
WBC # FLD AUTO: 5.36 K/UL

## 2018-09-27 ENCOUNTER — APPOINTMENT (OUTPATIENT)
Dept: INTERNAL MEDICINE | Facility: CLINIC | Age: 83
End: 2018-09-27
Payer: MEDICARE

## 2018-09-27 VITALS
WEIGHT: 108 LBS | DIASTOLIC BLOOD PRESSURE: 60 MMHG | SYSTOLIC BLOOD PRESSURE: 100 MMHG | BODY MASS INDEX: 21.09 KG/M2 | HEART RATE: 62 BPM | TEMPERATURE: 97.6 F | OXYGEN SATURATION: 99 %

## 2018-09-27 PROCEDURE — 99214 OFFICE O/P EST MOD 30 MIN: CPT

## 2018-09-27 NOTE — HEALTH RISK ASSESSMENT
[No falls in past year] : Patient reported no falls in the past year [0] : 2) Feeling down, depressed, or hopeless: Not at all (0) [] : No [de-identified] : hepatology, cardiology [[id number]] : 0

## 2018-09-27 NOTE — ASSESSMENT
[FreeTextEntry1] : Daughter reports that MS seems off.\par Notes increased cough at hs with episode of green sputum/// had sick exposure\par Had diarrhea ? new UTI\par \par U/A with micro and urine C&S sent \par RVP sent\par To collect sputum for C&S and to do CXR\par Has PRN RX for antibiotics\par If not improving or worsening to go to ER \par RTC as needed\par To call for any medical issues

## 2018-09-27 NOTE — REVIEW OF SYSTEMS
[Fatigue] : fatigue [Vision Problems] : vision problems [Hearing Loss] : hearing loss [Confusion] : confusion [Easy Bruising] : easy bruising [Negative] : Heme/Lymph [Hoarseness] : hoarseness [Lower Ext Edema] : lower extremity edema [Cough] : cough [Diarrhea] : diarrhea

## 2018-09-27 NOTE — PHYSICAL EXAM
[No Acute Distress] : no acute distress [Well-Appearing] : well-appearing [Normal Voice/Communication] : normal voice/communication [Normal Sclera/Conjunctiva] : normal sclera/conjunctiva [PERRL] : pupils equal round and reactive to light [EOMI] : extraocular movements intact [Normal Outer Ear/Nose] : the outer ears and nose were normal in appearance [Normal Oropharynx] : the oropharynx was normal [Supple] : supple [No Respiratory Distress] : no respiratory distress  [Clear to Auscultation] : lungs were clear to auscultation bilaterally [No Accessory Muscle Use] : no accessory muscle use [Normal Rate] : normal rate  [Normal S1, S2] : normal S1 and S2 [No Extremity Clubbing/Cyanosis] : no extremity clubbing/cyanosis [Soft] : abdomen soft [Non Tender] : non-tender [Normal Bowel Sounds] : normal bowel sounds [No CVA Tenderness] : no CVA  tenderness [No Spinal Tenderness] : no spinal tenderness [No Rash] : no rash [Speech Grossly Normal] : speech grossly normal [Alert and Oriented x3] : oriented to person, place, and time [Normal Affect] : the affect was normal [de-identified] : thin [de-identified] : No ST [de-identified] : no stridor [de-identified] : R=16; no wheezing [de-identified] : murmurs loud and unchanged [de-identified] : no cords; mild b/l pedal edema [de-identified] : ambulating; MS seems intact

## 2018-09-27 NOTE — HISTORY OF PRESENT ILLNESS
[Family Member] : family member [FreeTextEntry8] : Comes in for acute medical visit. Daughter going on vacation on 10/3/18.\par Wants to be certain that patient is not getting ill.\par Daughter and partner with acute URI's - patient lives them and she was exposed to them while ill.\par Daughter has noticed worsening of mental status.\par No fever.\par Had diarrhea. No abdominal pain or n/v.\par Appetite and po intake good.\par Denies chest pain, SOB, hemoptysis. Daughter thinks that she is coughing more at night. Reports one episode of green sputum.\par Denies hematuria or flank pain or dysuria. Wants to make sure that no UTI after diarrhea last week.

## 2018-09-28 LAB
APPEARANCE: ABNORMAL
BACTERIA: ABNORMAL
BILIRUBIN URINE: NEGATIVE
BLOOD URINE: NEGATIVE
COLOR: YELLOW
GLUCOSE QUALITATIVE U: NEGATIVE MG/DL
HYALINE CASTS: 6 /LPF
KETONES URINE: NEGATIVE
LEUKOCYTE ESTERASE URINE: ABNORMAL
MICROSCOPIC-UA: NORMAL
NITRITE URINE: POSITIVE
PH URINE: 5.5
PROTEIN URINE: NEGATIVE MG/DL
RAPID RVP RESULT: NOT DETECTED
RED BLOOD CELLS URINE: 6 /HPF
SPECIFIC GRAVITY URINE: 1.01
SQUAMOUS EPITHELIAL CELLS: 9 /HPF
UROBILINOGEN URINE: 1 MG/DL
WHITE BLOOD CELLS URINE: 7 /HPF

## 2018-09-30 ENCOUNTER — FORM ENCOUNTER (OUTPATIENT)
Age: 83
End: 2018-09-30

## 2018-10-01 ENCOUNTER — APPOINTMENT (OUTPATIENT)
Dept: ULTRASOUND IMAGING | Facility: CLINIC | Age: 83
End: 2018-10-01
Payer: MEDICARE

## 2018-10-01 ENCOUNTER — APPOINTMENT (OUTPATIENT)
Dept: RADIOLOGY | Facility: CLINIC | Age: 83
End: 2018-10-01
Payer: MEDICARE

## 2018-10-01 ENCOUNTER — OUTPATIENT (OUTPATIENT)
Dept: OUTPATIENT SERVICES | Facility: HOSPITAL | Age: 83
LOS: 1 days | End: 2018-10-01
Payer: MEDICARE

## 2018-10-01 DIAGNOSIS — K74.60 UNSPECIFIED CIRRHOSIS OF LIVER: ICD-10-CM

## 2018-10-01 DIAGNOSIS — Z98.89 OTHER SPECIFIED POSTPROCEDURAL STATES: Chronic | ICD-10-CM

## 2018-10-01 LAB
BACTERIA SPT CULT: NORMAL
BACTERIA UR CULT: ABNORMAL

## 2018-10-01 PROCEDURE — 93975 VASCULAR STUDY: CPT

## 2018-10-01 PROCEDURE — 71046 X-RAY EXAM CHEST 2 VIEWS: CPT | Mod: 26

## 2018-10-01 PROCEDURE — 93975 VASCULAR STUDY: CPT | Mod: 26

## 2018-10-01 PROCEDURE — 71046 X-RAY EXAM CHEST 2 VIEWS: CPT

## 2018-10-05 ENCOUNTER — APPOINTMENT (OUTPATIENT)
Dept: CARDIOLOGY | Facility: CLINIC | Age: 83
End: 2018-10-05

## 2018-10-21 ENCOUNTER — INPATIENT (INPATIENT)
Facility: HOSPITAL | Age: 83
LOS: 7 days | Discharge: HOME | DRG: 441 | End: 2018-10-29
Attending: HOSPITALIST | Admitting: HOSPITALIST
Payer: MEDICARE

## 2018-10-21 VITALS
SYSTOLIC BLOOD PRESSURE: 121 MMHG | DIASTOLIC BLOOD PRESSURE: 43 MMHG | OXYGEN SATURATION: 100 % | HEART RATE: 51 BPM | RESPIRATION RATE: 20 BRPM

## 2018-10-21 DIAGNOSIS — D69.6 THROMBOCYTOPENIA, UNSPECIFIED: ICD-10-CM

## 2018-10-21 DIAGNOSIS — K74.60 UNSPECIFIED CIRRHOSIS OF LIVER: ICD-10-CM

## 2018-10-21 DIAGNOSIS — Z98.89 OTHER SPECIFIED POSTPROCEDURAL STATES: Chronic | ICD-10-CM

## 2018-10-21 DIAGNOSIS — Z29.9 ENCOUNTER FOR PROPHYLACTIC MEASURES, UNSPECIFIED: ICD-10-CM

## 2018-10-21 DIAGNOSIS — I48.91 UNSPECIFIED ATRIAL FIBRILLATION: ICD-10-CM

## 2018-10-21 DIAGNOSIS — G93.40 ENCEPHALOPATHY, UNSPECIFIED: ICD-10-CM

## 2018-10-21 DIAGNOSIS — R11.2 NAUSEA WITH VOMITING, UNSPECIFIED: ICD-10-CM

## 2018-10-21 DIAGNOSIS — I35.0 NONRHEUMATIC AORTIC (VALVE) STENOSIS: ICD-10-CM

## 2018-10-21 DIAGNOSIS — E87.5 HYPERKALEMIA: ICD-10-CM

## 2018-10-21 DIAGNOSIS — R00.1 BRADYCARDIA, UNSPECIFIED: ICD-10-CM

## 2018-10-21 DIAGNOSIS — N17.9 ACUTE KIDNEY FAILURE, UNSPECIFIED: ICD-10-CM

## 2018-10-21 LAB
ALBUMIN SERPL ELPH-MCNC: 3.4 G/DL — SIGNIFICANT CHANGE UP (ref 3.3–5)
ALBUMIN SERPL ELPH-MCNC: 3.6 G/DL — SIGNIFICANT CHANGE UP (ref 3.3–5)
ALBUMIN SERPL ELPH-MCNC: 3.8 G/DL — SIGNIFICANT CHANGE UP (ref 3.3–5)
ALP SERPL-CCNC: 73 U/L — SIGNIFICANT CHANGE UP (ref 40–120)
ALP SERPL-CCNC: 86 U/L — SIGNIFICANT CHANGE UP (ref 40–120)
ALP SERPL-CCNC: 96 U/L — SIGNIFICANT CHANGE UP (ref 40–120)
ALT FLD-CCNC: 15 U/L — SIGNIFICANT CHANGE UP (ref 10–45)
ALT FLD-CCNC: 25 U/L — SIGNIFICANT CHANGE UP (ref 10–45)
ALT FLD-CCNC: 28 U/L — SIGNIFICANT CHANGE UP (ref 10–45)
AMMONIA BLD-MCNC: 151 UMOL/L — HIGH (ref 11–55)
ANION GAP SERPL CALC-SCNC: 10 MMOL/L — SIGNIFICANT CHANGE UP (ref 5–17)
ANION GAP SERPL CALC-SCNC: 14 MMOL/L — SIGNIFICANT CHANGE UP (ref 5–17)
ANION GAP SERPL CALC-SCNC: 15 MMOL/L — SIGNIFICANT CHANGE UP (ref 5–17)
APPEARANCE UR: ABNORMAL
APTT BLD: 30.8 SEC — SIGNIFICANT CHANGE UP (ref 27.5–37.4)
AST SERPL-CCNC: 37 U/L — SIGNIFICANT CHANGE UP (ref 10–40)
AST SERPL-CCNC: 43 U/L — HIGH (ref 10–40)
AST SERPL-CCNC: 93 U/L — HIGH (ref 10–40)
BASOPHILS # BLD AUTO: 0 K/UL — SIGNIFICANT CHANGE UP (ref 0–0.2)
BASOPHILS NFR BLD AUTO: 0.2 % — SIGNIFICANT CHANGE UP (ref 0–2)
BILIRUB SERPL-MCNC: 2.6 MG/DL — HIGH (ref 0.2–1.2)
BILIRUB SERPL-MCNC: 2.6 MG/DL — HIGH (ref 0.2–1.2)
BILIRUB SERPL-MCNC: 2.9 MG/DL — HIGH (ref 0.2–1.2)
BILIRUB UR-MCNC: NEGATIVE — SIGNIFICANT CHANGE UP
BLD GP AB SCN SERPL QL: NEGATIVE — SIGNIFICANT CHANGE UP
BUN SERPL-MCNC: 60 MG/DL — HIGH (ref 7–23)
BUN SERPL-MCNC: 60 MG/DL — HIGH (ref 7–23)
BUN SERPL-MCNC: 61 MG/DL — HIGH (ref 7–23)
CALCIUM SERPL-MCNC: 10.4 MG/DL — SIGNIFICANT CHANGE UP (ref 8.4–10.5)
CALCIUM SERPL-MCNC: 9.9 MG/DL — SIGNIFICANT CHANGE UP (ref 8.4–10.5)
CALCIUM SERPL-MCNC: 9.9 MG/DL — SIGNIFICANT CHANGE UP (ref 8.4–10.5)
CHLORIDE SERPL-SCNC: 102 MMOL/L — SIGNIFICANT CHANGE UP (ref 96–108)
CHLORIDE SERPL-SCNC: 102 MMOL/L — SIGNIFICANT CHANGE UP (ref 96–108)
CHLORIDE SERPL-SCNC: 103 MMOL/L — SIGNIFICANT CHANGE UP (ref 96–108)
CK MB CFR SERPL CALC: 1.9 NG/ML — SIGNIFICANT CHANGE UP (ref 0–3.8)
CK SERPL-CCNC: 31 U/L — SIGNIFICANT CHANGE UP (ref 25–170)
CO2 SERPL-SCNC: 21 MMOL/L — LOW (ref 22–31)
CO2 SERPL-SCNC: 21 MMOL/L — LOW (ref 22–31)
CO2 SERPL-SCNC: 23 MMOL/L — SIGNIFICANT CHANGE UP (ref 22–31)
COLOR SPEC: YELLOW — SIGNIFICANT CHANGE UP
CREAT ?TM UR-MCNC: 132 MG/DL — SIGNIFICANT CHANGE UP
CREAT SERPL-MCNC: 1.59 MG/DL — HIGH (ref 0.5–1.3)
CREAT SERPL-MCNC: 1.63 MG/DL — HIGH (ref 0.5–1.3)
CREAT SERPL-MCNC: 1.72 MG/DL — HIGH (ref 0.5–1.3)
D DIMER BLD IA.RAPID-MCNC: 1137 NG/ML DDU — HIGH
DIFF PNL FLD: NEGATIVE — SIGNIFICANT CHANGE UP
EOSINOPHIL # BLD AUTO: 0.1 K/UL — SIGNIFICANT CHANGE UP (ref 0–0.5)
EOSINOPHIL NFR BLD AUTO: 1.4 % — SIGNIFICANT CHANGE UP (ref 0–6)
FIBRINOGEN PPP-MCNC: 369 MG/DL — SIGNIFICANT CHANGE UP (ref 310–510)
GAS PNL BLDV: SIGNIFICANT CHANGE UP
GAS PNL BLDV: SIGNIFICANT CHANGE UP
GLUCOSE SERPL-MCNC: 106 MG/DL — HIGH (ref 70–99)
GLUCOSE SERPL-MCNC: 106 MG/DL — HIGH (ref 70–99)
GLUCOSE SERPL-MCNC: 116 MG/DL — HIGH (ref 70–99)
GLUCOSE UR QL: NEGATIVE — SIGNIFICANT CHANGE UP
HCT VFR BLD CALC: 36.2 % — SIGNIFICANT CHANGE UP (ref 34.5–45)
HGB BLD-MCNC: 12 G/DL — SIGNIFICANT CHANGE UP (ref 11.5–15.5)
INR BLD: 1.19 RATIO — HIGH (ref 0.88–1.16)
KETONES UR-MCNC: NEGATIVE — SIGNIFICANT CHANGE UP
LDH SERPL L TO P-CCNC: 429 U/L — HIGH (ref 50–242)
LEUKOCYTE ESTERASE UR-ACNC: ABNORMAL
LYMPHOCYTES # BLD AUTO: 1.1 K/UL — SIGNIFICANT CHANGE UP (ref 1–3.3)
LYMPHOCYTES # BLD AUTO: 18.1 % — SIGNIFICANT CHANGE UP (ref 13–44)
MCHC RBC-ENTMCNC: 33.1 GM/DL — SIGNIFICANT CHANGE UP (ref 32–36)
MCHC RBC-ENTMCNC: 34.7 PG — HIGH (ref 27–34)
MCV RBC AUTO: 105 FL — HIGH (ref 80–100)
MONOCYTES # BLD AUTO: 0.7 K/UL — SIGNIFICANT CHANGE UP (ref 0–0.9)
MONOCYTES NFR BLD AUTO: 10.8 % — SIGNIFICANT CHANGE UP (ref 2–14)
NEUTROPHILS # BLD AUTO: 4.3 K/UL — SIGNIFICANT CHANGE UP (ref 1.8–7.4)
NEUTROPHILS NFR BLD AUTO: 69.5 % — SIGNIFICANT CHANGE UP (ref 43–77)
NITRITE UR-MCNC: NEGATIVE — SIGNIFICANT CHANGE UP
PH UR: 6 — SIGNIFICANT CHANGE UP (ref 5–8)
PLATELET # BLD AUTO: 100 K/UL — LOW (ref 150–400)
POTASSIUM SERPL-MCNC: 5.5 MMOL/L — HIGH (ref 3.5–5.3)
POTASSIUM SERPL-MCNC: 5.6 MMOL/L — HIGH (ref 3.5–5.3)
POTASSIUM SERPL-MCNC: >9 MMOL/L — CRITICAL HIGH (ref 3.5–5.3)
POTASSIUM SERPL-SCNC: 5.5 MMOL/L — HIGH (ref 3.5–5.3)
POTASSIUM SERPL-SCNC: 5.6 MMOL/L — HIGH (ref 3.5–5.3)
POTASSIUM SERPL-SCNC: >9 MMOL/L — CRITICAL HIGH (ref 3.5–5.3)
POTASSIUM UR-SCNC: 92 MMOL/L — SIGNIFICANT CHANGE UP
PROT SERPL-MCNC: 5.6 G/DL — LOW (ref 6–8.3)
PROT SERPL-MCNC: 6.2 G/DL — SIGNIFICANT CHANGE UP (ref 6–8.3)
PROT SERPL-MCNC: 6.5 G/DL — SIGNIFICANT CHANGE UP (ref 6–8.3)
PROT UR-MCNC: ABNORMAL
PROTHROM AB SERPL-ACNC: 12.9 SEC — HIGH (ref 9.8–12.7)
RBC # BLD: 3.45 M/UL — LOW (ref 3.8–5.2)
RBC # FLD: 14.8 % — HIGH (ref 10.3–14.5)
RH IG SCN BLD-IMP: POSITIVE — SIGNIFICANT CHANGE UP
SODIUM SERPL-SCNC: 133 MMOL/L — LOW (ref 135–145)
SODIUM SERPL-SCNC: 139 MMOL/L — SIGNIFICANT CHANGE UP (ref 135–145)
SODIUM SERPL-SCNC: 139 MMOL/L — SIGNIFICANT CHANGE UP (ref 135–145)
SODIUM UR-SCNC: 26 MMOL/L — SIGNIFICANT CHANGE UP
SP GR SPEC: 1.02 — SIGNIFICANT CHANGE UP (ref 1.01–1.02)
TROPONIN T, HIGH SENSITIVITY RESULT: 40 NG/L — SIGNIFICANT CHANGE UP (ref 0–51)
UROBILINOGEN FLD QL: ABNORMAL
WBC # BLD: 6.2 K/UL — SIGNIFICANT CHANGE UP (ref 3.8–10.5)
WBC # FLD AUTO: 6.2 K/UL — SIGNIFICANT CHANGE UP (ref 3.8–10.5)

## 2018-10-21 PROCEDURE — 70450 CT HEAD/BRAIN W/O DYE: CPT | Mod: 26

## 2018-10-21 PROCEDURE — 99291 CRITICAL CARE FIRST HOUR: CPT | Mod: GC

## 2018-10-21 PROCEDURE — 99223 1ST HOSP IP/OBS HIGH 75: CPT

## 2018-10-21 PROCEDURE — 74176 CT ABD & PELVIS W/O CONTRAST: CPT | Mod: 26

## 2018-10-21 PROCEDURE — 93010 ELECTROCARDIOGRAM REPORT: CPT

## 2018-10-21 PROCEDURE — 71045 X-RAY EXAM CHEST 1 VIEW: CPT | Mod: 26

## 2018-10-21 RX ORDER — SODIUM CHLORIDE 9 MG/ML
1000 INJECTION, SOLUTION INTRAVENOUS
Qty: 0 | Refills: 0 | Status: DISCONTINUED | OUTPATIENT
Start: 2018-10-21 | End: 2018-10-21

## 2018-10-21 RX ORDER — SODIUM CHLORIDE 9 MG/ML
500 INJECTION, SOLUTION INTRAVENOUS ONCE
Qty: 0 | Refills: 0 | Status: DISCONTINUED | OUTPATIENT
Start: 2018-10-21 | End: 2018-10-21

## 2018-10-21 RX ORDER — ONDANSETRON 8 MG/1
4 TABLET, FILM COATED ORAL ONCE
Qty: 0 | Refills: 0 | Status: COMPLETED | OUTPATIENT
Start: 2018-10-21 | End: 2018-10-21

## 2018-10-21 RX ORDER — CEFTRIAXONE 500 MG/1
1 INJECTION, POWDER, FOR SOLUTION INTRAMUSCULAR; INTRAVENOUS ONCE
Qty: 0 | Refills: 0 | Status: COMPLETED | OUTPATIENT
Start: 2018-10-21 | End: 2018-10-21

## 2018-10-21 RX ORDER — LACTULOSE 10 G/15ML
20 SOLUTION ORAL ONCE
Qty: 0 | Refills: 0 | Status: COMPLETED | OUTPATIENT
Start: 2018-10-21 | End: 2018-10-21

## 2018-10-21 RX ORDER — CHOLECALCIFEROL (VITAMIN D3) 125 MCG
2000 CAPSULE ORAL DAILY
Qty: 0 | Refills: 0 | Status: DISCONTINUED | OUTPATIENT
Start: 2018-10-21 | End: 2018-10-22

## 2018-10-21 RX ORDER — CEFTRIAXONE 500 MG/1
1 INJECTION, POWDER, FOR SOLUTION INTRAMUSCULAR; INTRAVENOUS EVERY 24 HOURS
Qty: 0 | Refills: 0 | Status: DISCONTINUED | OUTPATIENT
Start: 2018-10-22 | End: 2018-10-24

## 2018-10-21 RX ORDER — IPRATROPIUM/ALBUTEROL SULFATE 18-103MCG
3 AEROSOL WITH ADAPTER (GRAM) INHALATION EVERY 12 HOURS
Qty: 0 | Refills: 0 | Status: DISCONTINUED | OUTPATIENT
Start: 2018-10-21 | End: 2018-10-29

## 2018-10-21 RX ORDER — LACTULOSE 10 G/15ML
20 SOLUTION ORAL
Qty: 0 | Refills: 0 | Status: DISCONTINUED | OUTPATIENT
Start: 2018-10-21 | End: 2018-10-22

## 2018-10-21 RX ORDER — DIGOXIN 250 MCG
0.12 TABLET ORAL DAILY
Qty: 0 | Refills: 0 | Status: DISCONTINUED | OUTPATIENT
Start: 2018-10-22 | End: 2018-10-22

## 2018-10-21 RX ORDER — BUDESONIDE, MICRONIZED 100 %
0.25 POWDER (GRAM) MISCELLANEOUS
Qty: 0 | Refills: 0 | Status: DISCONTINUED | OUTPATIENT
Start: 2018-10-21 | End: 2018-10-29

## 2018-10-21 RX ORDER — ASCORBIC ACID 60 MG
1 TABLET,CHEWABLE ORAL
Qty: 0 | Refills: 0 | COMMUNITY

## 2018-10-21 RX ORDER — ONDANSETRON 8 MG/1
4 TABLET, FILM COATED ORAL EVERY 6 HOURS
Qty: 0 | Refills: 0 | Status: DISCONTINUED | OUTPATIENT
Start: 2018-10-21 | End: 2018-10-29

## 2018-10-21 RX ORDER — SPIRONOLACTONE 25 MG/1
1 TABLET, FILM COATED ORAL
Qty: 0 | Refills: 0 | COMMUNITY

## 2018-10-21 RX ORDER — LEVOTHYROXINE SODIUM 125 MCG
25 TABLET ORAL DAILY
Qty: 0 | Refills: 0 | Status: DISCONTINUED | OUTPATIENT
Start: 2018-10-21 | End: 2018-10-22

## 2018-10-21 RX ORDER — SODIUM CHLORIDE 9 MG/ML
1000 INJECTION, SOLUTION INTRAVENOUS
Qty: 0 | Refills: 0 | Status: DISCONTINUED | OUTPATIENT
Start: 2018-10-21 | End: 2018-10-22

## 2018-10-21 RX ADMIN — Medication 0.25 MILLIGRAM(S): at 22:09

## 2018-10-21 RX ADMIN — CEFTRIAXONE 100 GRAM(S): 500 INJECTION, POWDER, FOR SOLUTION INTRAMUSCULAR; INTRAVENOUS at 18:11

## 2018-10-21 RX ADMIN — LACTULOSE 20 GRAM(S): 10 SOLUTION ORAL at 15:55

## 2018-10-21 RX ADMIN — ONDANSETRON 4 MILLIGRAM(S): 8 TABLET, FILM COATED ORAL at 15:27

## 2018-10-21 RX ADMIN — SODIUM CHLORIDE 70 MILLILITER(S): 9 INJECTION, SOLUTION INTRAVENOUS at 15:27

## 2018-10-21 NOTE — H&P ADULT - HISTORY OF PRESENT ILLNESS
89 year-old woman with PMH of CLL s/p Rituximab, COPD, bronchiectasis, AFib (not on anticoagulation, severe aortic stenosis and mitral regurgitation, HTN, cervical osteomyelitis, h/o variceal bleed s/p banding, mini-strokes, Alzheimer's, decompensated cirrhosis with recurrent encephalopathy and HCC s/p ablation and incomplete treatment, presenting for nausea and vomiting. Patient was brought in from --.    In the ED patient's vitals were ---. Labs were significant for hyperkalemia (K>9). 89 year-old woman with PMH of CLL s/p Rituximab, COPD, bronchiectasis, AFib (not on anticoagulation, severe aortic stenosis and mitral regurgitation, HTN, cervical osteomyelitis, h/o variceal bleed s/p banding, mini-strokes, Alzheimer's, decompensated cirrhosis with recurrent encephalopathy and HCC s/p ablation and incomplete treatment, presenting for nausea and vomiting for 2 days. Family states patient had non-productive cough for a few days, which resolved. Since today, she had NBNB vomiting x 4 and decreased po intake. Patient complained fo abdominal pain once and had 1 BM today (baseline of 3-4 BMs on lactulose).    She had a UTI a few weeks ago, treated with ---. Has no rivero at home and denies  complaints, but had 2-3 episodes of nocturia (which is more than baseline). Patient ambulates with walker and can have conversation at baseline. At present she's responding and states 'I want to go home'.    In the ED patient's vitals were ---. EKG show sinus bradycardia (HR 47), T wave inversion in V1, V2, V3 and 1st degree heart block. Labs were significant for hyperkalemia (K>9). 89 year-old woman with PMH of CLL s/p Rituximab, COPD, bronchiectasis, AFib (not on anticoagulation, severe aortic stenosis and mitral regurgitation, HTN, cervical osteomyelitis, h/o variceal bleed s/p banding, mini-strokes, Alzheimer's, decompensated cirrhosis with recurrent encephalopathy and HCC s/p ablation and incomplete treatment, presenting for nausea and vomiting for 2 days. Family states patient had non-productive cough for a few days, which resolved. Since today, she had NBNB vomiting x 4 and decreased po intake. Patient complained fo abdominal pain once and had 1 BM today (baseline of 3-4 BMs on lactulose).    She had an E. Coli UTI a few weeks ago, treated with cipro bid for 5 days. Has no rivero at home and denies  complaints, but had 2-3 episodes of nocturia (which is more than baseline). Patient ambulates with walker and can have conversation at baseline. At present she's responding and states 'I want to go home'.    In the ED patient's vitals were ---. Patient received ceftriaxone and was started on LR 70 cc/hr. EKG shows sinus bradycardia (HR 47), T wave inversion in V1, V2, V3 and 1st degree heart block. Labs were significant for hyperkalemia (K>9) and Cr 1.72. 89 year-old woman with PMH of CLL s/p Rituximab, COPD, bronchiectasis, AFib (not on anticoagulation, severe aortic stenosis and mitral regurgitation, HTN, cervical osteomyelitis, h/o variceal bleed s/p banding, mini-strokes, Alzheimer's, decompensated cirrhosis with recurrent encephalopathy and HCC s/p ablation and incomplete treatment, presenting for nausea and vomiting for 2 days. Family states patient had non-productive cough for a few days, which resolved. Since today, she had NBNB vomiting x 4 and decreased po intake. Patient complained fo abdominal pain once and had 1 BM today (baseline of 3-4 BMs on lactulose).    She had an E. Coli UTI a few weeks ago, treated with cipro bid for 5 days. Has no rivero at home and denies  complaints, but had 2-3 episodes of nocturia (which is more than baseline). Patient ambulates with walker and can have conversation at baseline. At present she's responding and states 'I want to go home'.    Patient received ceftriaxone and was started on LR 70 cc/hr. EKG shows sinus bradycardia (HR 47), T wave inversion in V1, V2, V3 and 1st degree heart block. Labs were significant for hyperkalemia (K>9) and Cr 1.72.

## 2018-10-21 NOTE — H&P ADULT - PROBLEM SELECTOR PLAN 4
- Monitor on telemetry - Monitor on telemetry  - Hold propanolol Likely prerenal in the setting of nausea/vomiting and decreased PO intake  - BUN:creatinine ratio >30; likely prerenal KARRIE  - C/w NS @75cc/hr given cirrhosis and AS, monitor fluid status closely  - Will repeat BMP in an hour  - Avoid nephrotoxic agents and renally dose medications Likely prerenal in the setting of nausea/vomiting and decreased PO intake  - BUN:creatinine ratio >30; likely prerenal KARRIE  - C/w NS @75cc/hr given cirrhosis and AS, monitor fluid status closely  - Will repeat BMP in an hour  - Avoid nephrotoxic agents and renally dose medications  - F/u Urine lytes

## 2018-10-21 NOTE — H&P ADULT - PROBLEM SELECTOR PLAN 5
- F/u digoxin level Pt follows up with Dr. Macdonald as outpatient for cirrhosis- likely 2/2 alcohol use in the past  - Hepatology consult in AM  - Pt currently appears to be compensated with INR, T bili, liver enzymes at baseline  - No abnormal liver findings on CTAP  - Pt currently afebrile with no leukocytosis, although SBP is on the differential; c/w Ceftriaxone for empiric treatment, consider paracentesis if pt with no improvement  - C/w rifaximin, lactulose titrated to 2-3 BMs/day  - Holding propranolol in the setting of bradycardia  - Holding spironolactone in the setting of KARRIE and dehydration  - Monitor mental status daily  - Check ammonia level in AM  - Trend MELD labs daily; score today is 18 Pt follows up with Dr. Macdonald as outpatient for cirrhosis- likely 2/2 alcohol use in the past  - Hepatology consult in AM  - Pt currently appears to be compensated with INR, T bili, liver enzymes at baseline  - No abnormal liver findings on CTAP  - Pt currently afebrile with no leukocytosis, although SBP is on the differential; c/w Ceftriaxone for empiric treatment, consider paracentesis if pt with no improvement  - C/w rifaximin, lactulose titrated to 2-3 BMs/day  - Holding propranolol in the setting of bradycardia  - Holding spironolactone in the setting of KARRIE and dehydration  - Monitor mental status daily  - Check ammonia level in AM  - Trend MELD labs daily; score today is 18  - Patient with hyperbilirubinemia. F/u LDH to r/o DIC ; haptoglobin was <20.

## 2018-10-21 NOTE — H&P ADULT - PROBLEM SELECTOR PLAN 7
Pt with sinus bradycardia on telemetry, with rates ranging from 40-50, which appears to be new  - Holding propranolol  - TWI seen in V1-V3, pt currently encephalopathic, unable to assess for ongoing chest pain  - Continue to monitor on telemetry

## 2018-10-21 NOTE — H&P ADULT - PROBLEM SELECTOR PROBLEM 3
AF (Atrial Fibrillation) Hyperkalemia R/O Urinary tract infection without hematuria, site unspecified

## 2018-10-21 NOTE — H&P ADULT - NSHPLABSRESULTS_GEN_ALL_CORE
CBC Full  -  ( 21 Oct 2018 14:52 )  WBC Count : 6.2 K/uL  Hemoglobin : 12.0 g/dL  Hematocrit : 36.2 %  Platelet Count - Automated : 100 K/uL  Mean Cell Volume : 105.0 fl  Mean Cell Hemoglobin : 34.7 pg  Mean Cell Hemoglobin Concentration : 33.1 gm/dL  Auto Neutrophil # : 4.3 K/uL  Auto Lymphocyte # : 1.1 K/uL  Auto Monocyte # : 0.7 K/uL  Auto Eosinophil # : 0.1 K/uL  Auto Basophil # : 0.0 K/uL  Auto Neutrophil % : 69.5 %  Auto Lymphocyte % : 18.1 %  Auto Monocyte % : 10.8 %  Auto Eosinophil % : 1.4 %  Auto Basophil % : 0.2 %    10-21    139  |  102  |  60<H>  ----------------------------<  106<H>  5.5<H>   |  23  |  1.72<H>    Ca    10.4      21 Oct 2018 15:47    TPro  6.2  /  Alb  3.8  /  TBili  2.9<H>  /  DBili  x   /  AST  43<H>  /  ALT  28  /  AlkPhos  96  10-21

## 2018-10-21 NOTE — ED PROVIDER NOTE - PMH
AF (Atrial Fibrillation)    Bleeding Esophageal Varices    CKD (chronic kidney disease), stage 3 (moderate)    CLL (Chronic Lymphoblastic Leukemia)    COPD (Chronic Obstructive Pulmonary Disease)    GIB (Gastrointestinal Bleeding)    Liver cell carcinoma    Portal Hypertension    Pulmonary HTN  moderate  PVD (peripheral vascular disease)    Severe aortic stenosis by prior echocardiogram

## 2018-10-21 NOTE — ED PROVIDER NOTE - CARE PLAN
Principal Discharge DX:	KARRIE (acute kidney injury)  Secondary Diagnosis:	Nausea & vomiting  Secondary Diagnosis:	Liver cell carcinoma

## 2018-10-21 NOTE — H&P ADULT - ATTENDING COMMENTS
Saw patient at bedside with daughters and resident.  Pt altered and not able to provide history.  1. TME 2/2 infection SBP vs. UTI vs. hepatic encephalopathy- grade 3 encephalopathy w/ asterxis   -Continue abx, check paracentesis, and ammonia level  -F/U blood cultures, urine cultures  2. Nausea  Malignancy vs. thrombus vs. atypical ACS  -TWI in inferior leads, trend trops, would manage medically if this was the case given high risk comorbids  3. Thrombocytopenia   4. Hyperkalemia  5. KARRIE vs. CKD with proteinuria  -Would need albumin challenge before dx of hepatorenal  -Check urine lytes  6. Macrocytosis  -Check B12/folate and reticulocyte ocunt  7Hyperbilirubinemia  Check LDH/Haptoglobin  8. Afib not on A/C 2/2 significant bleed risk  9. Bradycardia- hold any BB    Will update note after final resident completion.  Saw pt on 10/21 7PM Saw patient at bedside with daughters and resident.  Pt altered and not able to provide history.  1. TME 2/2 infection SBP vs. UTI vs. hepatic encephalopathy- grade 3 encephalopathy w/ asterixes   -Continue abx, check paracentesis, and ammonia level  -F/U blood cultures, urine cultures  2. Nausea  Malignancy vs. thrombus vs. atypical ACS  -TWI in inferior leads, trend trops, would manage medically if this was the case given high risk comorbids  3. Thrombocytopenia   4. Hyperkalemia  5. KARRIE vs. CKD with proteinuria  -Would need albumin challenge before dx of hepatorenal  -Check urine lytes  6. Macrocytosis  -Check B12/folate and reticulocyte ocunt  7Hyperbilirubinemia  Check LDH/Haptoglobin  8. Afib not on A/C 2/2 significant bleed risk  9. Bradycardia- hold any BB    Saw pt on 10/21 7PM

## 2018-10-21 NOTE — H&P ADULT - PROBLEM SELECTOR PLAN 10
Pt with severe AS seen on TTE from 2016; currently follows with Dr. Boston as an outpatient  - Continue to monitor and assess fluid status daily; c/w gentle IVF hydration Pt with severe AS seen on TTE from 2016; currently follows with Dr. Boston as an outpatient  - Continue to monitor and assess fluid status daily; c/w gentle IVF hydration    Problem 11: Prophylactic measure  - Patient is DNR, but not DNI. Family would like pressors.

## 2018-10-21 NOTE — H&P ADULT - PROBLEM SELECTOR PLAN 9
Pt currently not on AC 2/2 history of ICH in the past  - CHADSVASC score 6  - C/w digoxin  - Pt currently in sinus bradycardia

## 2018-10-21 NOTE — ED PROVIDER NOTE - CRITICAL CARE PROVIDED
direct patient care (not related to procedure)/consult w/ pt's family directly relating to pts condition/additional history taking/interpretation of diagnostic studies/consultation with other physicians/documentation

## 2018-10-21 NOTE — H&P ADULT - NSHPLABSRESULTS_GEN_ALL_CORE
CBC Full  -  ( 21 Oct 2018 14:52 )  WBC Count : 6.2 K/uL  Hemoglobin : 12.0 g/dL  Hematocrit : 36.2 %  Platelet Count - Automated : 100 K/uL  Mean Cell Volume : 105.0 fl  Mean Cell Hemoglobin : 34.7 pg  Mean Cell Hemoglobin Concentration : 33.1 gm/dL  Auto Neutrophil # : 4.3 K/uL  Auto Lymphocyte # : 1.1 K/uL  Auto Monocyte # : 0.7 K/uL  Auto Eosinophil # : 0.1 K/uL  Auto Basophil # : 0.0 K/uL  Auto Neutrophil % : 69.5 %  Auto Lymphocyte % : 18.1 %  Auto Monocyte % : 10.8 %  Auto Eosinophil % : 1.4 %  Auto Basophil % : 0.2 %    10-21    139  |  102  |  60<H>  ----------------------------<  106<H>  5.5<H>   |  23  |  1.72<H>    Ca    10.4      21 Oct 2018 15:47    TPro  6.2  /  Alb  3.8  /  TBili  2.9<H>  /  DBili  x   /  AST  43<H>  /  ALT  28  /  AlkPhos  96  10-21 CBC Full  -  ( 21 Oct 2018 14:52 )  WBC Count : 6.2 K/uL  Hemoglobin : 12.0 g/dL  Hematocrit : 36.2 %  Platelet Count - Automated : 100 K/uL  Mean Cell Volume : 105.0 fl  Mean Cell Hemoglobin : 34.7 pg  Mean Cell Hemoglobin Concentration : 33.1 gm/dL  Auto Neutrophil # : 4.3 K/uL  Auto Lymphocyte # : 1.1 K/uL  Auto Monocyte # : 0.7 K/uL  Auto Eosinophil # : 0.1 K/uL  Auto Basophil # : 0.0 K/uL  Auto Neutrophil % : 69.5 %  Auto Lymphocyte % : 18.1 %  Auto Monocyte % : 10.8 %  Auto Eosinophil % : 1.4 %  Auto Basophil % : 0.2 %    10-21    139  |  102  |  60<H>  ----------------------------<  106<H>  5.5<H>   |  23  |  1.72<H>    Ca    10.4      21 Oct 2018 15:47    TPro  6.2  /  Alb  3.8  /  TBili  2.9<H>  /  DBili  x   /  AST  43<H>  /  ALT  28  /  AlkPhos  96  10-21    EKG: sinus bradycardia (HR 47), T wave inversion in V1, V2, V3 and 1st degree heart block.

## 2018-10-21 NOTE — H&P ADULT - PROBLEM SELECTOR PLAN 3
- Continue LR 70 cc/hr - Continue LR 70 cc/hr  - Trend BMP - Continue LR 70 cc/hr  - Trend BMP  - Hold spironolactone Pt recently treated as outpatient for UTI 3 weeks ago with 7 days of Cipro; urine culture grew pan-sensitive E. coli  - C/w Ceftriaxone; pt with no history of resistant organisms in the past (E. coli, Klebsiella)  - F/u urine culture; UA grossly positive  - Irwin placed for urine output monitoring given KARRIE

## 2018-10-21 NOTE — H&P ADULT - NSHPPHYSICALEXAM_GEN_ALL_CORE
Vital Signs Last 24 Hrs  T(C): 36.6 (21 Oct 2018 14:46), Max: 36.6 (21 Oct 2018 14:46)  T(F): 97.8 (21 Oct 2018 14:46), Max: 97.8 (21 Oct 2018 14:46)  HR: 45 (21 Oct 2018 15:55) (45 - 51)  BP: 136/39 (21 Oct 2018 15:55) (121/43 - 136/39)  BP(mean): --  RR: 20 (21 Oct 2018 15:55) (20 - 20)  SpO2: 98% (21 Oct 2018 15:55) (97% - 100%)
Vital Signs Last 24 Hrs  T(C): 36.6 (21 Oct 2018 14:46), Max: 36.6 (21 Oct 2018 14:46)  T(F): 97.8 (21 Oct 2018 14:46), Max: 97.8 (21 Oct 2018 14:46)  HR: 46 (21 Oct 2018 18:14) (45 - 51)  BP: 143/42 (21 Oct 2018 18:14) (121/43 - 143/42)  BP(mean): --  RR: 15 (21 Oct 2018 18:14) (15 - 20)  SpO2: 98% (21 Oct 2018 18:14) (97% - 100%)    GENERAL: NAD, cachectic  HEAD:  Atraumatic, Normocephalic  EYES: EOMI, conjunctiva and sclera clear  NECK: Supple, No JVD  CHEST/LUNG: Clear to auscultation bilaterally; No wheeze, ronchi or rales  HEART: No rubs, or gallops, systolic ejection murmur  ABDOMEN: Soft, Nontender, Nondistended; Bowel sounds present  EXTREMITIES:  2+ Peripheral Pulses, No clubbing, cyanosis, or edema  NEUROLOGY: Responds to questions, follows commands, otherwise unable to assess  SKIN: Multiple echymoses on bilateral arms

## 2018-10-21 NOTE — H&P ADULT - HISTORY OF PRESENT ILLNESS
A 89 year-old woman with PMH of CLL s/p Rituximab, COPD, bronchiectasis, AFib (not on anticoagulation, severe aortic stenosis and mitral regurgitation, HTN, cervical osteomyelitis, h/o variceal bleed s/p banding, mini-strokes and Alzheimer's, returns for follow up regarding decompensated cirrhosis s/p recurrent hepatic encephalopathy, and HCC post ablation with incomplete treatment.

## 2018-10-21 NOTE — H&P ADULT - PROBLEM SELECTOR PLAN 8
Likely in the setting of cirrhosis  - Baseline platelet count in the 60s-80s; currently 100, may be hemoconcentrated  - Trend CBC daily

## 2018-10-21 NOTE — ED PROVIDER NOTE - OBJECTIVE STATEMENT
89 y/o F w/ liver cancer, cirrhosis, afib not on AC p/w 5 episodes nausea, vomiting, lethargy since yesterday. Also with easy bruising, significant bleeding with minor stress. +Weight loss, decrease in BMs although pt not tolerating PO well. Will evaluate for SBP, SBO vs other. No fever, chills, cough, SOB, CP, abdominal pain.

## 2018-10-21 NOTE — H&P ADULT - NSHPREVIEWOFSYSTEMS_GEN_ALL_CORE
CONSTITUTIONAL: + weakness, no fevers or chills  EYES/ENT: No visual changes;  No vertigo or throat pain   NECK: No pain or stiffness  RESPIRATORY: + cough, no wheezing, hemoptysis; No shortness of breath  CARDIOVASCULAR: No chest pain or palpitations  GASTROINTESTINAL: No abdominal or epigastric pain. + nausea, + vomiting, no hematemesis; No diarrhea or constipation. No melena or hematochezia.  GENITOURINARY: No dysuria, frequency or hematuria  NEUROLOGICAL: No numbness or weakness  SKIN: No itching, rashes

## 2018-10-21 NOTE — ED PROVIDER NOTE - PHYSICAL EXAMINATION
PHYSICAL EXAM:  GENERAL: frail, lethargic appearing, laying in bed  HEAD:  NCAT  EYES: EOMI, PERRLA, conjunctiva and sclera clear  ENMT: No tonsillar erythema, exudates, or enlargement  NECK: Supple, No JVD, Normal thyroid  CHEST/LUNG: Clear to auscultation bilaterally; No rales, rhonchi, wheezing, or rubs  HEART: bradycardic, irregular rhythm; grade II/VI systolic murmur, no rubs or gallops  ABDOMEN: Soft, Nontender, mildly distended; Bowel sounds present  VASCULAR:  2+ Peripheral Pulses, No clubbing, cyanosis, or edema  SKIN: No rashes or lesions  NERVOUS SYSTEM:  Alert & Oriented X3, +flapping tremor, no focal deficits

## 2018-10-21 NOTE — H&P ADULT - ASSESSMENT
89 year-old woman with PMH of CLL s/p Rituximab, COPD, bronchiectasis, AFib (not on anticoagulation, severe aortic stenosis and mitral regurgitation, HTN, cervical osteomyelitis, h/o variceal bleed s/p banding, mini-strokes, Alzheimer's, decompensated cirrhosis with recurrent encephalopathy and HCC s/p ablation and incomplete treatment, concerning for ---. 89 year-old woman with PMH of CLL s/p Rituximab, COPD, bronchiectasis, AFib (not on anticoagulation, severe aortic stenosis and mitral regurgitation, HTN, cervical osteomyelitis, h/o variceal bleed s/p banding, mini-strokes, Alzheimer's, decompensated cirrhosis with recurrent encephalopathy and HCC s/p ablation and incomplete treatment, concerning for UTI vs 89 year-old woman with PMH of CLL s/p Rituximab, COPD, bronchiectasis, AFib (not on anticoagulation, severe aortic stenosis and mitral regurgitation, HTN, cervical osteomyelitis, h/o variceal bleed s/p banding, mini-strokes, Alzheimer's, decompensated cirrhosis with recurrent encephalopathy and HCC s/p ablation and incomplete treatment, concerning for UTI vs gastroenteritis vs failure to thrive. 89 year-old woman with PMH of CLL s/p Rituximab, COPD, bronchiectasis, AFib (not on anticoagulation, severe aortic stenosis and mitral regurgitation, HTN, cervical osteomyelitis, h/o variceal bleed s/p banding, mini-strokes, Alzheimer's, decompensated cirrhosis with recurrent encephalopathy and HCC s/p ablation and incomplete treatment, concerning for UTI vs hepatic encephalopathy vs failure to thrive. 89 year-old woman with PMH of CLL s/p Rituximab, COPD, bronchiectasis, AFib (not on anticoagulation, severe aortic stenosis and mitral regurgitation, HTN, cervical osteomyelitis, h/o variceal bleed s/p banding, mini-strokes, Alzheimer's, decompensated cirrhosis with recurrent encephalopathy and HCC s/p ablation and incomplete treatment, concerning for UTI vs hepatic encephalopathy vs failure to thrive vs digoxin toxicity. 89 year-old woman with PMH of CLL s/p Rituximab, COPD, bronchiectasis, AFib (not on anticoagulation, severe aortic stenosis and mitral regurgitation, HTN, cervical osteomyelitis, h/o variceal bleed s/p banding, mini-strokes, Alzheimer's, decompensated cirrhosis with recurrent encephalopathy and HCC s/p ablation and incomplete treatment, concerning for UTI vs NSTEMI vs failure to thrive vs digoxin toxicity.

## 2018-10-21 NOTE — ED PROVIDER NOTE - CHIEF COMPLAINT
The patient is a 89y Female complaining of The patient is a 89y Female complaining of nausea/vomiting

## 2018-10-21 NOTE — H&P ADULT - PMH
AF (Atrial Fibrillation)    Bleeding Esophageal Varices    CKD (chronic kidney disease), stage 3 (moderate)    CLL (Chronic Lymphoblastic Leukemia)    COPD (Chronic Obstructive Pulmonary Disease)    GIB (Gastrointestinal Bleeding)    Liver cell carcinoma    Portal Hypertension    Pulmonary HTN  moderate  PVD (peripheral vascular disease)    Severe aortic stenosis by prior echocardiogram
AF (Atrial Fibrillation)    Bleeding Esophageal Varices    CKD (chronic kidney disease), stage 3 (moderate)    CLL (Chronic Lymphoblastic Leukemia)    COPD (Chronic Obstructive Pulmonary Disease)    GIB (Gastrointestinal Bleeding)    Liver cell carcinoma    Portal Hypertension    Pulmonary HTN  moderate  PVD (peripheral vascular disease)    Severe aortic stenosis by prior echocardiogram

## 2018-10-21 NOTE — H&P ADULT - PROBLEM SELECTOR PLAN 6
DVT: heparin SQ DVT: heparin SQ  Diet: dysphagia diet (Patient is on pleasure feeds at home and history of aspiration pneumonia) Likely in the setting of KARRIE  - Repeat BMP now  - If K+ remains high may need medical management with insulin and D50  - Holding spironolactone  - C/w IVF to treat pre-renal KARRIE

## 2018-10-21 NOTE — ED ADULT NURSE REASSESSMENT NOTE - NS ED NURSE REASSESS COMMENT FT1
Report received from Patricia ROSADO. A&Ox4 gross neuro intact, lungs cta bilaterally, no difficulty speaking in complete sentences, s1s2 heart sounds heard, pulses x 4, deleon x4, abdomen soft nontender nondistended, skin intact. Irwin in place voiding clear yellow urine. Safety and comfort measures maintained. Continuous cardiac monitoring maintained. Remains bradycardic. Report received from Patricia ROSADO. A&Ox1 to self when name called gross neuro intact, lungs cta bilaterally, no difficulty speaking in complete sentences, s1s2 heart sounds heard, pulses x 4, deleon x4, abdomen soft nontender distended, skin intact. Irwin in place voiding clear yellow urine. Safety and comfort measures maintained. Continuous cardiac monitoring maintained. Remains bradycardic. Report received from Patricia ROSADO. A&Ox1 to self when name called, lethargic, lungs cta bilaterally, no difficulty speaking in complete sentences, s1s2 heart sounds heard, pulses x 4, deleon x4, abdomen soft nontender distended, skin intact. Irwin in place voiding clear yellow urine. Safety and comfort measures maintained. Continuous cardiac monitoring maintained. Remains bradycardic. Report received from Patricia ROSADO. A&Ox1 to self when name called, lethargic, lungs cta bilaterally, no difficulty speaking in complete sentences, s1s2 heart sounds heard, pulses x 4, deleon x4, abdomen soft nontender distended, skin intact. Irwin in place voiding clear yellow urine. Safety and comfort measures maintained. Continuous cardiac monitoring maintained. Remains bradycardic. DNR at this time. Family at bedside.

## 2018-10-21 NOTE — H&P ADULT - PROBLEM SELECTOR PLAN 1
- F/u UA, Blood, urine, stool culture, GI PCR  - IVF  - Continue with ciprofloxacin - F/u UA, Blood, urine, stool culture, GI PCR  - IVF  - s/p ceftriaxone - F/u urine, blood, stool culture, GI PCR  - Continue LR at 75 cc/hr  - Continue ceftriaxone - F/u urine, blood cultures  - Continue LR at 75 cc/hr; zofran prn  - Continue ceftriaxone - F/u urine, blood cultures. Continue trending trops.  - Continue LR at 75 cc/hr; zofran prn  - Continue ceftriaxone Pt presenting with acute onset of nausea and vomiting beginning this morning, with 4-5 episodes. Pt currently denies abdominal pain  - CT abdomen/pelvis negative for any acute pathology, only finding of cirrhosis with small amount of ascites  - N/V may be 2/2 ongoing infection versus gastroenteritis versus ACS  - QTc normal; c/w Zofran PRN for nausea/vomiting  - Trend troponin; initial troponin 48  - C/w NS @75cc/hr for dehydration; pt appears to be hypovolemic on exam  - Infectious workup ongoing Pt with encephalopathy on admission, baseline AAOx3 and walks with a walker  - Differential includes metabolic 2/2 infection, toxic (less likely from ingestion), neurologic, hepatic encephalopathy  - C/w Ceftriaxone empirically for UTI; no other abdominal sources of infection seen on CTAP. F/u blood and urine cx.  - CT head negative for any acute changes  - Per pt's family, pt has been having 3-4 BMs/day for the past 2 days prior to today- will c/w home lactulose dose and monitor for BMs  - F/u ammonia level

## 2018-10-21 NOTE — H&P ADULT - PROBLEM SELECTOR PLAN 2
Irwin inserted for urinary retention.  - Monitor I&os  - Continue IVF Pt with encephalopathy on admission, baseline AAOx3 and walks with a walker  - Differential includes metabolic 2/2 infection, toxic (less likely from ingestion), neurologic, hepatic encephalopathy  - C/w Ceftriaxone empirically for UTI; no other abdominal sources of infection seen on CTAP  - CT head negative for any acute changes  - Per pt's family, pt has been having 3-4 BMs/day for the past 2 days prior to today- will c/w home lactulose dose and monitor for BMs Pt presenting with acute onset of nausea and vomiting beginning this morning, with 4-5 episodes. Pt currently denies abdominal pain  - CT abdomen/pelvis negative for any acute pathology, only finding of cirrhosis with small amount of ascites  - N/V may be 2/2 ongoing infection versus gastroenteritis versus ACS  - QTc normal; c/w Zofran PRN for nausea/vomiting  - Trend troponin; initial troponin 48  - C/w NS @75cc/hr for dehydration; pt appears to be hypovolemic on exam  - Infectious workup ongoing  - F/u RUQ ultrasound with dopplers to r/o portal vein thrombus  - f/u digoxin level, TSH  - Trend trops, f/u TTE

## 2018-10-21 NOTE — H&P ADULT - PROBLEM SELECTOR PROBLEM 5
Prophylactic measure AF (Atrial Fibrillation) Cirrhosis of liver with ascites, unspecified hepatic cirrhosis type

## 2018-10-21 NOTE — ED ADULT NURSE NOTE - OBJECTIVE STATEMENT
89 y/o female BIBA for multiple episodes of n/v. Pt has long medical hx including liver CA. Denies chest pain, sob, ha, diarrhea , abdominal pain, f/c, urinary symptoms, hematuria. A&Ox4, bradycardic, pink palpable to gluteus, MAEx4, lungs CTA, abd soft nondistended. Pt resting comfortably with VSS, no complaints at this time. Patient's bed in the lowest position, explained plan of care to patient and family members. Will continue to reassess. Upon placement of IV, pt bled around sight, MD aware.

## 2018-10-21 NOTE — ED PROVIDER NOTE - MEDICAL DECISION MAKING DETAILS
Rand Cutler MD - Attending Physician: Pt here with nausea/vomiting, known Liver Ca/Varices. Significant bleeding on even minor stress. Labs, monitor, imaging

## 2018-10-21 NOTE — ED PROVIDER NOTE - ATTENDING CONTRIBUTION TO CARE
Rand Cutler MD - Attending Physician: I have personally seen and examined this patient with the resident/fellow.  I have fully participated in the care of this patient. I have reviewed all pertinent clinical information, including history, physical exam, plan and the Resident/Fellow’s note and agree except as noted. See MDM

## 2018-10-22 LAB
ALBUMIN SERPL ELPH-MCNC: 3.4 G/DL — SIGNIFICANT CHANGE UP (ref 3.3–5)
ALP SERPL-CCNC: 91 U/L — SIGNIFICANT CHANGE UP (ref 40–120)
ALT FLD-CCNC: 25 U/L — SIGNIFICANT CHANGE UP (ref 10–45)
ANION GAP SERPL CALC-SCNC: 12 MMOL/L — SIGNIFICANT CHANGE UP (ref 5–17)
ANISOCYTOSIS BLD QL: SLIGHT — SIGNIFICANT CHANGE UP
APTT BLD: 29.2 SEC — SIGNIFICANT CHANGE UP (ref 27.5–37.4)
AST SERPL-CCNC: 36 U/L — SIGNIFICANT CHANGE UP (ref 10–40)
BASOPHILS # BLD AUTO: 0 K/UL — SIGNIFICANT CHANGE UP (ref 0–0.2)
BILIRUB SERPL-MCNC: 3.3 MG/DL — HIGH (ref 0.2–1.2)
BUN SERPL-MCNC: 60 MG/DL — HIGH (ref 7–23)
CALCIUM SERPL-MCNC: 9.7 MG/DL — SIGNIFICANT CHANGE UP (ref 8.4–10.5)
CHLORIDE SERPL-SCNC: 104 MMOL/L — SIGNIFICANT CHANGE UP (ref 96–108)
CO2 SERPL-SCNC: 21 MMOL/L — LOW (ref 22–31)
CREAT SERPL-MCNC: 1.59 MG/DL — HIGH (ref 0.5–1.3)
CULTURE RESULTS: NO GROWTH — SIGNIFICANT CHANGE UP
DIGOXIN SERPL-MCNC: 0.8 NG/ML — SIGNIFICANT CHANGE UP (ref 0.8–2)
ELLIPTOCYTES BLD QL SMEAR: SLIGHT — SIGNIFICANT CHANGE UP
EOSINOPHIL # BLD AUTO: 0.1 K/UL — SIGNIFICANT CHANGE UP (ref 0–0.5)
GLUCOSE BLDC GLUCOMTR-MCNC: 109 MG/DL — HIGH (ref 70–99)
GLUCOSE BLDC GLUCOMTR-MCNC: 110 MG/DL — HIGH (ref 70–99)
GLUCOSE BLDC GLUCOMTR-MCNC: 119 MG/DL — HIGH (ref 70–99)
GLUCOSE BLDC GLUCOMTR-MCNC: 207 MG/DL — HIGH (ref 70–99)
GLUCOSE SERPL-MCNC: 106 MG/DL — HIGH (ref 70–99)
HCT VFR BLD CALC: 36.9 % — SIGNIFICANT CHANGE UP (ref 34.5–45)
HGB BLD-MCNC: 12.7 G/DL — SIGNIFICANT CHANGE UP (ref 11.5–15.5)
INR BLD: 1.2 RATIO — HIGH (ref 0.88–1.16)
LACTATE SERPL-SCNC: 2.3 MMOL/L — HIGH (ref 0.7–2)
LYMPHOCYTES # BLD AUTO: 1.1 K/UL — SIGNIFICANT CHANGE UP (ref 1–3.3)
LYMPHOCYTES # BLD AUTO: 10 % — LOW (ref 13–44)
MACROCYTES BLD QL: SLIGHT — SIGNIFICANT CHANGE UP
MAGNESIUM SERPL-MCNC: 2.1 MG/DL — SIGNIFICANT CHANGE UP (ref 1.6–2.6)
MCHC RBC-ENTMCNC: 34.4 GM/DL — SIGNIFICANT CHANGE UP (ref 32–36)
MCHC RBC-ENTMCNC: 35.7 PG — HIGH (ref 27–34)
MCV RBC AUTO: 104 FL — HIGH (ref 80–100)
MICROCYTES BLD QL: SLIGHT — SIGNIFICANT CHANGE UP
MONOCYTES # BLD AUTO: 0.8 K/UL — SIGNIFICANT CHANGE UP (ref 0–0.9)
MONOCYTES NFR BLD AUTO: 4 % — SIGNIFICANT CHANGE UP (ref 2–14)
NEUTROPHILS # BLD AUTO: 7.8 K/UL — HIGH (ref 1.8–7.4)
NEUTROPHILS NFR BLD AUTO: 85 % — HIGH (ref 43–77)
NEUTS BAND # BLD: 1 % — SIGNIFICANT CHANGE UP (ref 0–8)
OVALOCYTES BLD QL SMEAR: SLIGHT — SIGNIFICANT CHANGE UP
PHOSPHATE SERPL-MCNC: 3.2 MG/DL — SIGNIFICANT CHANGE UP (ref 2.5–4.5)
PLAT MORPH BLD: NORMAL — SIGNIFICANT CHANGE UP
PLATELET # BLD AUTO: 91 K/UL — LOW (ref 150–400)
POIKILOCYTOSIS BLD QL AUTO: SLIGHT — SIGNIFICANT CHANGE UP
POTASSIUM SERPL-MCNC: 5 MMOL/L — SIGNIFICANT CHANGE UP (ref 3.5–5.3)
POTASSIUM SERPL-SCNC: 5 MMOL/L — SIGNIFICANT CHANGE UP (ref 3.5–5.3)
PROT SERPL-MCNC: 5.6 G/DL — LOW (ref 6–8.3)
PROTHROM AB SERPL-ACNC: 13.1 SEC — HIGH (ref 9.8–12.7)
RBC # BLD: 3.56 M/UL — LOW (ref 3.8–5.2)
RBC # FLD: 13.8 % — SIGNIFICANT CHANGE UP (ref 10.3–14.5)
RBC BLD AUTO: ABNORMAL
SODIUM SERPL-SCNC: 137 MMOL/L — SIGNIFICANT CHANGE UP (ref 135–145)
SPECIMEN SOURCE: SIGNIFICANT CHANGE UP
TSH SERPL-MCNC: 3.49 UIU/ML — SIGNIFICANT CHANGE UP (ref 0.27–4.2)
WBC # BLD: 9.8 K/UL — SIGNIFICANT CHANGE UP (ref 3.8–10.5)
WBC # FLD AUTO: 9.8 K/UL — SIGNIFICANT CHANGE UP (ref 3.8–10.5)

## 2018-10-22 PROCEDURE — 99233 SBSQ HOSP IP/OBS HIGH 50: CPT | Mod: GC

## 2018-10-22 PROCEDURE — 93306 TTE W/DOPPLER COMPLETE: CPT | Mod: 26

## 2018-10-22 PROCEDURE — 71045 X-RAY EXAM CHEST 1 VIEW: CPT | Mod: 26

## 2018-10-22 PROCEDURE — 99222 1ST HOSP IP/OBS MODERATE 55: CPT

## 2018-10-22 PROCEDURE — 93975 VASCULAR STUDY: CPT | Mod: 26

## 2018-10-22 RX ORDER — ALBUMIN HUMAN 25 %
250 VIAL (ML) INTRAVENOUS EVERY 6 HOURS
Qty: 0 | Refills: 0 | Status: COMPLETED | OUTPATIENT
Start: 2018-10-22 | End: 2018-10-23

## 2018-10-22 RX ORDER — LACTULOSE 10 G/15ML
20 SOLUTION ORAL
Qty: 0 | Refills: 0 | Status: COMPLETED | OUTPATIENT
Start: 2018-10-22 | End: 2018-10-23

## 2018-10-22 RX ORDER — LACTULOSE 10 G/15ML
20 SOLUTION ORAL ONCE
Qty: 0 | Refills: 0 | Status: DISCONTINUED | OUTPATIENT
Start: 2018-10-22 | End: 2018-10-22

## 2018-10-22 RX ORDER — ALBUMIN HUMAN 25 %
250 VIAL (ML) INTRAVENOUS EVERY 6 HOURS
Qty: 0 | Refills: 0 | Status: DISCONTINUED | OUTPATIENT
Start: 2018-10-22 | End: 2018-10-22

## 2018-10-22 RX ORDER — LEVOTHYROXINE SODIUM 125 MCG
12.5 TABLET ORAL AT BEDTIME
Qty: 0 | Refills: 0 | Status: DISCONTINUED | OUTPATIENT
Start: 2018-10-22 | End: 2018-10-25

## 2018-10-22 RX ORDER — INFLUENZA VIRUS VACCINE 15; 15; 15; 15 UG/.5ML; UG/.5ML; UG/.5ML; UG/.5ML
0.5 SUSPENSION INTRAMUSCULAR ONCE
Qty: 0 | Refills: 0 | Status: DISCONTINUED | OUTPATIENT
Start: 2018-10-22 | End: 2018-10-29

## 2018-10-22 RX ORDER — DEXTROSE 50 % IN WATER 50 %
50 SYRINGE (ML) INTRAVENOUS ONCE
Qty: 0 | Refills: 0 | Status: COMPLETED | OUTPATIENT
Start: 2018-10-22 | End: 2018-10-22

## 2018-10-22 RX ORDER — INSULIN HUMAN 100 [IU]/ML
5 INJECTION, SOLUTION SUBCUTANEOUS ONCE
Qty: 0 | Refills: 0 | Status: COMPLETED | OUTPATIENT
Start: 2018-10-22 | End: 2018-10-22

## 2018-10-22 RX ORDER — DIGOXIN 250 MCG
0.12 TABLET ORAL DAILY
Qty: 0 | Refills: 0 | Status: DISCONTINUED | OUTPATIENT
Start: 2018-10-22 | End: 2018-10-23

## 2018-10-22 RX ORDER — LACTULOSE 10 G/15ML
200 SOLUTION ORAL ONCE
Qty: 0 | Refills: 0 | Status: COMPLETED | OUTPATIENT
Start: 2018-10-22 | End: 2018-10-22

## 2018-10-22 RX ORDER — LACTULOSE 10 G/15ML
20 SOLUTION ORAL
Qty: 0 | Refills: 0 | Status: DISCONTINUED | OUTPATIENT
Start: 2018-10-22 | End: 2018-10-22

## 2018-10-22 RX ADMIN — CEFTRIAXONE 100 GRAM(S): 500 INJECTION, POWDER, FOR SOLUTION INTRAMUSCULAR; INTRAVENOUS at 18:28

## 2018-10-22 RX ADMIN — Medication 0.12 MILLIGRAM(S): at 16:30

## 2018-10-22 RX ADMIN — LACTULOSE 20 GRAM(S): 10 SOLUTION ORAL at 18:54

## 2018-10-22 RX ADMIN — Medication 12.5 MICROGRAM(S): at 23:01

## 2018-10-22 RX ADMIN — SODIUM CHLORIDE 75 MILLILITER(S): 9 INJECTION, SOLUTION INTRAVENOUS at 01:26

## 2018-10-22 RX ADMIN — INSULIN HUMAN 5 UNIT(S): 100 INJECTION, SOLUTION SUBCUTANEOUS at 01:34

## 2018-10-22 RX ADMIN — Medication 125 MILLILITER(S): at 19:49

## 2018-10-22 RX ADMIN — Medication 50 MILLILITER(S): at 01:34

## 2018-10-22 RX ADMIN — LACTULOSE 200 GRAM(S): 10 SOLUTION ORAL at 06:50

## 2018-10-22 RX ADMIN — LACTULOSE 20 GRAM(S): 10 SOLUTION ORAL at 22:07

## 2018-10-22 RX ADMIN — LACTULOSE 20 GRAM(S): 10 SOLUTION ORAL at 16:30

## 2018-10-22 RX ADMIN — LACTULOSE 20 GRAM(S): 10 SOLUTION ORAL at 20:05

## 2018-10-22 RX ADMIN — LACTULOSE 20 GRAM(S): 10 SOLUTION ORAL at 23:54

## 2018-10-22 NOTE — CONSULT NOTE ADULT - ATTENDING COMMENTS
89 yo F with decompensated ALD cirrhosis with history of varices, PSE, and HCC, with mild cognitive impairment at baseline per her family as well as recurrent UTIs, admitted with acute AMS that was preceded by 2-3 days of nausea, non-bloody vomiting, and loss of appetite. They report that she was adherent with her home medications.    Currently, she is lethargic, opens her eyes and withdraws to noxious stimuli, but does not respond to verbal stimuli or follow commands, +asterixis. She appears hypovolemic on exam but hemodynamically stable and afebrile. Abdomen is non-distended. No edema.    Labs are notable for mild KARRIE (Cr 1.59, above her baseline Cr of 1.1-1.2), likely due to hypovolemia / pre-renal azotemia. UA with bacteria but was not clean catch.    Suspect PSE was triggered by dehydration in context of recent vomiting and poor oral intake, as well as possible UTI. No ascites on ultrasound to suggest SBP as a trigger.    Recommend aggressive "induction" regimen of lactulose via NGT, q2-3 hours until mental status improves, as well as rifaximin 550 mg ngt bid. If mental status continues to deteriorate despite these treatments, she may require ETT intubation for airway protection. This possibility was discussed with her family at bedside; currently, per their wishes, she is DNR but not DNI (okay for trial of intubation if it becomes necessary).    Agree with continued IV antibiotics for possible UTI. Also recommend to discontinue 1/2 NS and instead give boluses of NS as needed for hypovolemia (with close monitoring of volume status given her cardiac history) and as needed to replace stool losses while receiving aggressive lactulose.

## 2018-10-22 NOTE — CONSULT NOTE ADULT - ASSESSMENT
Impression:  89 year-old woman with PMH of CLL s/p Rituximab, COPD, bronchiectasis, AFib, severe AS and MR, HTN, cervical osteomyelitis, h/o variceal bleed s/p banding, TIAs, Alzheimer's, history of decompensated cirrhosis with recurrent encephalopathy and HCC s/p ablation and incomplete treatment, presents with encephalopathy.    Problems:  1) Encephalopathy - likely toxic metabolic in setting of infection (urinary source?)  2) Decompensated cirrhosis, felt likely to be 2/2 ETOH, MELD-Na 21 on admission        -varices: history of banding at outside facility in past, on Coreg > 1 year        - ascites: minimal on CT this admission, on spironolactone only as outpatient         - HE: history of recurrent HE, on lactulose and rifaximin as outpatient         - HCC: history of lesion s/p microwave ablation partially successful, patient's family wishes no further intervention  3) Likely urinary tract infection    Recs:  - full infectious workup per primary team, empiric abx   - no ascitic fluid to tap so unlikely SBP  - continue Xifaxan BID  - titrate lactulose to 2-3 soft bowel movements daily  - frequent orientation  - continue beta blocker for esophageal varices as BP tolerates  - ok to continue spironolactone as well   - trend CMP, INR, CBC daily  - low salt dysphagia diet Impression:  89 year-old woman with PMH of CLL s/p Rituximab, COPD, bronchiectasis, AFib, severe AS and MR, HTN, cervical osteomyelitis, h/o variceal bleed s/p banding, TIAs, Alzheimer's, history of decompensated cirrhosis with recurrent encephalopathy and HCC s/p ablation and incomplete treatment, presents with encephalopathy.    Problems:  1) Encephalopathy - likely toxic metabolic in setting of infection (urinary source?)  2) Decompensated cirrhosis, felt likely to be 2/2 ETOH, MELD-Na 21 on admission        -varices: history of banding at outside facility in past, on Coreg > 1 year        - ascites: minimal on CT this admission, on spironolactone only as outpatient         - HE: history of recurrent HE, on lactulose and rifaximin as outpatient         - HCC: history of lesion s/p microwave ablation partially successful, patient's family wishes no further intervention  3) Likely urinary tract infection    Recs:  - full infectious workup per primary team, empiric abx   - no ascitic fluid to tap so unlikely SBP  - continue Xifaxan BID  - titrate lactulose to 2-3 soft bowel movements daily - please place Dobhoff tube if unable to take by mouth   - frequent orientation  - continue beta blocker for esophageal varices as BP tolerates  - ok to continue spironolactone as well   - trend CMP, INR, CBC daily  - low salt dysphagia diet Impression:  89 year-old woman with PMH of CLL s/p Rituximab, COPD, bronchiectasis, AFib, severe AS and MR, HTN, cervical osteomyelitis, h/o variceal bleed s/p banding, TIAs, Alzheimer's, history of decompensated cirrhosis with recurrent encephalopathy and HCC s/p ablation and incomplete treatment, presents with encephalopathy.    Problems:  1) Encephalopathy - likely toxic metabolic in setting of infection (urinary source?)  2) Decompensated cirrhosis, felt likely to be 2/2 ETOH, MELD-Na 21 on admission        -varices: history of banding at outside facility in past, on Coreg > 1 year        - ascites: minimal on CT this admission, on spironolactone only as outpatient         - HE: history of recurrent HE, on lactulose and rifaximin as outpatient         - HCC: history of lesion s/p microwave ablation partially successful, patient's family wishes no further intervention  3) Likely urinary tract infection    Recs:  - full infectious workup per primary team, empiric abx   - no ascitic fluid to tap so unlikely SBP  - continue Xifaxan BID  - titrate lactulose to 2-3 soft bowel movements daily - please place Dobhoff tube if unable to take by mouth   - frequent orientation  - continue beta blocker for esophageal varices as BP tolerates  - hold spironolactone given hypovolemia on exam; recommend careful volume replacement with NS boluses with close monitoring for any development of fluid overload  - trend CMP, INR, CBC daily  - NPO until mental status improves

## 2018-10-22 NOTE — PROGRESS NOTE ADULT - PROBLEM SELECTOR PLAN 6
Likely in the setting of KARRIE  - Repeat BMP now  - If K+ remains high may need medical management with insulin and D50  - Holding spironolactone  - C/w IVF to treat pre-renal KARRIE Likely in the setting of KARRIE  - Repeat BMP now  - s/p insulin and D50 x1  - Holding spironolactone  - C/w IVF to treat pre-renal KARRIE

## 2018-10-22 NOTE — PROGRESS NOTE ADULT - PROBLEM SELECTOR PLAN 3
Pt recently treated as outpatient for UTI 3 weeks ago with 7 days of Cipro; urine culture grew pan-sensitive E. coli  - C/w Ceftriaxone; pt with no history of resistant organisms in the past (E. coli, Klebsiella)  - F/u urine culture; UA grossly positive  - Irwin placed for urine output monitoring given KARRIE Pt recently treated as outpatient for UTI 3 weeks ago with 7 days of Cipro; urine culture grew pan-sensitive E. coli  - C/w Ceftriaxone; pt with no history of resistant organisms in the past (E. coli, Klebsiella)  - F/u urine culture; UA equivocally positive  - Irwin placed for urine output monitoring given KARRIE

## 2018-10-22 NOTE — PROGRESS NOTE ADULT - ASSESSMENT
89 year-old woman with PMH of CLL s/p Rituximab, COPD, bronchiectasis, AFib (not on anticoagulation, severe aortic stenosis and mitral regurgitation, HTN, cervical osteomyelitis, h/o variceal bleed s/p banding, mini-strokes, Alzheimer's, decompensated cirrhosis with recurrent encephalopathy and HCC s/p ablation and incomplete treatment, concerning for UTI vs NSTEMI vs failure to thrive vs digoxin toxicity. 89 year-old woman with PMH of CLL s/p Rituximab, COPD, bronchiectasis, AFib (not on anticoagulation, severe aortic stenosis and mitral regurgitation, HTN, cervical osteomyelitis, h/o variceal bleed s/p banding, mini-strokes, Alzheimer's, decompensated cirrhosis with recurrent encephalopathy and HCC s/p ablation and incomplete treatment, concerning for metabolic encephalopathy 2/2 UTI vs hepatic decompensation.

## 2018-10-22 NOTE — PROGRESS NOTE ADULT - PROBLEM SELECTOR PLAN 2
Pt presenting with acute onset of nausea and vomiting beginning this morning, with 4-5 episodes. Pt currently denies abdominal pain  - CT abdomen/pelvis negative for any acute pathology, only finding of cirrhosis with small amount of ascites  - N/V may be 2/2 ongoing infection versus gastroenteritis versus ACS  - QTc normal; c/w Zofran PRN for nausea/vomiting  - Trend troponin; initial troponin 48  - C/w NS @75cc/hr for dehydration; pt appears to be hypovolemic on exam  - Infectious workup ongoing  - F/u RUQ ultrasound with dopplers to r/o portal vein thrombus  - f/u digoxin level, TSH  - Trend trops, f/u TTE Pt presenting with acute onset of nausea and vomiting beginning this morning, with 4-5 episodes. Pt currently denies abdominal pain  - CT abdomen/pelvis negative for any acute pathology, only finding of cirrhosis with small amount of ascites  - N/V may be 2/2 ongoing infection versus gastroenteritis versus ACS  - QTc normal; c/w Zofran PRN for nausea/vomiting  - Trend troponin; initial troponin 48  - C/w NS @75cc/hr for dehydration; pt appears to be hypovolemic on exam  - Infectious workup ongoing  - RUQ ultrasound with dopplers shows no portal vein thrombus  - TSH wnl; f/u digoxin level  - Troponins downtrending; f/u TTE

## 2018-10-22 NOTE — PROGRESS NOTE ADULT - PROBLEM SELECTOR PLAN 4
Likely prerenal in the setting of nausea/vomiting and decreased PO intake  - BUN:creatinine ratio >30; likely prerenal KARRIE  - C/w NS @75cc/hr given cirrhosis and AS, monitor fluid status closely  - Will repeat BMP in an hour  - Avoid nephrotoxic agents and renally dose medications  - F/u Urine lytes Likely prerenal in the setting of nausea/vomiting and decreased PO intake  - BUN:creatinine ratio >30; likely prerenal KARRIE  - C/w NS @75cc/hr given cirrhosis and AS, monitor fluid status closely  - Will repeat BMP in an hour  - Avoid nephrotoxic agents and renally dose medications  - FENa= 0.2%

## 2018-10-22 NOTE — CONSULT NOTE ADULT - SUBJECTIVE AND OBJECTIVE BOX
HEPATOLOGY INITIAL CONSULT:    HPI:  89 year-old woman with PMH of CLL s/p Rituximab, COPD, bronchiectasis, AFib (not on anticoagulation, severe aortic stenosis and mitral regurgitation, HTN, cervical osteomyelitis, h/o variceal bleed s/p banding, mini-strokes, Alzheimer's, decompensated cirrhosis with recurrent encephalopathy and HCC s/p ablation and incomplete treatment, presenting for nausea and vomiting for 2 days. Family states patient had non-productive cough for a few days, which resolved. Since today, she had NBNB vomiting x 4 and decreased po intake. Patient complained fo abdominal pain once and had 1 BM today (baseline of 3-4 BMs on lactulose).    She had an E. Coli UTI a few weeks ago, treated with cipro bid for 5 days. Has no rivero at home and denies  complaints, but had 2-3 episodes of nocturia (which is more than baseline). Patient ambulates with walker and can have conversation at baseline. At present she's responding and states 'I want to go home'.    Patient received ceftriaxone and was started on LR 70 cc/hr. EKG shows sinus bradycardia (HR 47), T wave inversion in V1, V2, V3 and 1st degree heart block. Labs were significant for hyperkalemia (K>9) and Cr 1.72. (21 Oct 2018 18:00)      Outpatient GI Provider: JACKELYN Macdonald    PAST MEDICAL & SURGICAL HISTORY:  PVD (peripheral vascular disease)  Liver cell carcinoma  Severe aortic stenosis by prior echocardiogram  Pulmonary HTN: moderate  CKD (chronic kidney disease), stage 3 (moderate)  COPD (Chronic Obstructive Pulmonary Disease)  AF (Atrial Fibrillation)  Portal Hypertension  Bleeding Esophageal Varices  CLL (Chronic Lymphoblastic Leukemia)  GIB (Gastrointestinal Bleeding)  History of repair of hip fracture  Esophageal Rupture      Review of Systems: Negative except as per HPI.    MEDICATIONS  (STANDING):  buDESOnide   0.25 milliGRAM(s) Respule 0.25 milliGRAM(s) Inhalation two times a day  cefTRIAXone   IVPB 1 Gram(s) IV Intermittent every 24 hours  cholecalciferol 2000 Unit(s) Oral daily  digoxin     Tablet 0.125 milliGRAM(s) Oral daily  lactulose Syrup 20 Gram(s) Oral two times a day  levothyroxine 25 MICROGram(s) Oral daily  rifaximin 550 milliGRAM(s) Oral two times a day  sodium chloride 0.45%. 1000 milliLiter(s) (75 mL/Hr) IV Continuous <Continuous>    MEDICATIONS  (PRN):  ALBUTerol/ipratropium for Nebulization 3 milliLiter(s) Nebulizer every 12 hours PRN Shortness of Breath and/or Wheezing  ondansetron Injectable 4 milliGRAM(s) IV Push every 6 hours PRN Nausea and/or Vomiting      ALLERGIES:  adhesives (Other)  warfarin (Other)      SOCIAL HISTORY:  - Alcohol: denies   - Recreational drug use: denies     FAMILY HISTORY:  No pertinent family history in first degree relatives      Vital Signs Last 24 Hrs  T(C): 36.5 (22 Oct 2018 05:00), Max: 36.7 (21 Oct 2018 20:13)  T(F): 97.7 (22 Oct 2018 05:00), Max: 98 (21 Oct 2018 20:13)  HR: 47 (22 Oct 2018 05:00) (45 - 51)  BP: 134/50 (22 Oct 2018 05:00) (104/62 - 143/42)  BP(mean): --  RR: 22 (22 Oct 2018 05:00) (15 - 22)  SpO2: 96% (22 Oct 2018 05:00) (96% - 100%)    PHYSICAL EXAM:  Constitutional: no acute distress  Eyes: no icterus  Neck: no masses, no LAD  Respiratory: normal inspiratory effort; no wheezing or crackles  Cardiovascular: RRR, normal S1/S2, no murmurs/rubs/gallops  Gastrointestinal: soft, nondistended, nontender, +BS  Extremities: no LE edema  Neurological: alert, not oriented, + asterixis   Skin: no rashes, bruises, petechiae    LABS:                        12.0   6.2   )-----------( 100      ( 21 Oct 2018 14:52 )             36.2     10-21    139  |  103  |  60<H>  ----------------------------<  116<H>  5.6<H>   |  21<L>  |  1.63<H>    Ca    9.9      21 Oct 2018 22:52    TPro  5.6<L>  /  Alb  3.4  /  TBili  2.6<H>  /  DBili  x   /  AST  37  /  ALT  25  /  AlkPhos  86  10-21    PT/INR - ( 21 Oct 2018 14:52 )   PT: 12.9 sec;   INR: 1.19 ratio         PTT - ( 21 Oct 2018 14:52 )  PTT:30.8 sec  LIVER FUNCTIONS - ( 21 Oct 2018 22:52 )  Alb: 3.4 g/dL / Pro: 5.6 g/dL / ALK PHOS: 86 U/L / ALT: 25 U/L / AST: 37 U/L / GGT: x             RADIOLOGY & ADDITIONAL STUDIES:  < from: CT Abdomen and Pelvis No Cont (10.21.18 @ 16:26) >    EXAM:  CT ABDOMEN AND PELVIS                            PROCEDURE DATE:  10/21/2018            INTERPRETATION:  CLINICAL INFORMATION: Abdominal pain clinical concern   for small bowel obstruction    COMPARISON: CT scan of the abdomen and pelvis acquired 5/6/2017.    PROCEDURE:   CT of the Abdomen and Pelvis was performed without intravenous contrast.   Intravenous contrast: None.  Oral contrast: None.  Sagittal and coronal reformats were performed.    FINDINGS:    LOWER CHEST: The heart is enlarged. Right lower lobe compressive   atelectasis. Left lower lobe linear atelectasis.    LIVER: Cirrhotic liver morphology. Right hepatic lobe calcified   granuloma. High density material is seen within the left lobe of the   liver, likely secondary to the patient's prior chemoembolization   procedure.  BILE DUCTS: Normal caliber.  GALLBLADDER: Cholelithiasis.  SPLEEN: Within normal limits.  PANCREAS: Within normal limits.  ADRENALS: Within normal limits.  KIDNEYS/URETERS: Bilateral renal cysts, stable from prior examination   5/6/2017. Left lower pole 2 mm nonobstructing renal calculus. No   hydronephrosis bilaterally.    Evaluation of the deep pelvis limited by streak artifact from left-sided   total hip replacement with plasty.    BLADDER: Within normal limits.  REPRODUCTIVE ORGANS: Multiple large degenerating uterine fibroids. No   adnexal mass.    BOWEL: No bowel obstruction. Duodenal diverticulum, unchanged. Scattered   colonic diverticulosis without evidence of diverticulitis. Appendix is   normal.  PERITONEUM: Small abdominal free fluid.  VESSELS:  Extensive atherosclerotic changes of the aorta and bilateral   iliac vessels.    RETROPERITONEUM: No lymphadenopathy.    ABDOMINAL WALL: Within normal limits.  BONES: Patient is status post left-sided total hip replacement with   plasty. Multilevel degenerative changes of the spine. High density bone   island present within the left iliac crest.    IMPRESSION:   No bowel obstruction as clinically questioned.  No acute intra-abdominal pathology.  Evaluation of the previously noted liver masses is limited without the   administration of IV contrast.                      TEOFILO GONSALVES M.D., RADIOLOGY RESIDENT  This document has been electronically signed.  LYLA VELA M.D.,ATTENDING RADIOLOGIST  This document has been electronically signed. Oct 21 2018  4:55PM                < end of copied text >    < from: CT Head No Cont (10.21.18 @ 16:23) >    EXAM:  CT BRAIN                            PROCEDURE DATE:  10/21/2018            INTERPRETATION:  INDICATIONS:  Confusion/delerium, altered loc,   unexplained      TECHNIQUE:  Serial axial images were obtained from the skull base to the   vertex without intravenous contrast. Coronal and sagittal reformatted   images were obtained.    COMPARISON EXAMINATION: Noncontrast CT scan of head acquired 2/23/2018    FINDINGS:    VENTRICLES AND SULCI:  The CT examination demonstrates age-appropriate   generalized volume loss as manifested by the enlargement of the   ventricles, cisternal spaces, and cortical sulci throughout.    INTRA-AXIAL:  No mass or hemorrhage. There is the redemonstration of a   chronic appearing left basal ganglia lacunar infarct, unchanged.  Mild to   moderate microvascular type white matter changes are again seen.    EXTRA-AXIAL:  No mass or collection is seen.    VISUALIZED SINUSES:  Clear.    VISUALIZED MASTOIDS:  Clear.    CALVARIUM:  Normal.    MISCELLANEOUS:   Patient is status post bilateral lens replacement surgery.    IMPRESSION:    There is no acute intracranial hemorrhage, mass effect, midline shift or   extra axial collections.     Stable exam.        TEOFILO GONSALVES M.D., RADIOLOGY RESIDENT  This document has been electronically signed.  ANNALISA STANFORD M.D., ATTENDING RADIOLOGIST  This document has been electronically signed. Oct 21 2018  4:59PM                < end of copied text > HEPATOLOGY INITIAL CONSULT:    HPI:  89 year-old woman with PMH of CLL s/p Rituximab, COPD, bronchiectasis, AFib (not on anticoagulation, severe aortic stenosis and mitral regurgitation, HTN, cervical osteomyelitis, h/o variceal bleed s/p banding, mini-strokes, Alzheimer's, decompensated cirrhosis with recurrent encephalopathy and HCC s/p ablation and incomplete treatment, presenting for nausea and vomiting for 2 days. Family states patient had non-productive cough for a few days, which resolved. Since today, she had NBNB vomiting x 4 and decreased po intake. Patient complained fo abdominal pain once and had 1 BM today (baseline of 3-4 BMs on lactulose).    She had an E. Coli UTI a few weeks ago, treated with cipro bid for 5 days. Has no rivero at home and denies  complaints, but had 2-3 episodes of nocturia (which is more than baseline). Patient ambulates with walker and can have conversation at baseline. At present she's responding and states 'I want to go home'.    Patient received ceftriaxone and was started on LR 70 cc/hr. EKG shows sinus bradycardia (HR 47), T wave inversion in V1, V2, V3 and 1st degree heart block. Labs were significant for hyperkalemia (K>9) and Cr 1.72. (21 Oct 2018 18:00)      Outpatient GI Provider: JACKELYN Macdonald    PAST MEDICAL & SURGICAL HISTORY:  PVD (peripheral vascular disease)  Liver cell carcinoma  Severe aortic stenosis by prior echocardiogram  Pulmonary HTN: moderate  CKD (chronic kidney disease), stage 3 (moderate)  COPD (Chronic Obstructive Pulmonary Disease)  AF (Atrial Fibrillation)  Portal Hypertension  Bleeding Esophageal Varices  CLL (Chronic Lymphoblastic Leukemia)  GIB (Gastrointestinal Bleeding)  History of repair of hip fracture  Esophageal Rupture      Review of Systems: Negative except as per HPI.    MEDICATIONS  (STANDING):  buDESOnide   0.25 milliGRAM(s) Respule 0.25 milliGRAM(s) Inhalation two times a day  cefTRIAXone   IVPB 1 Gram(s) IV Intermittent every 24 hours  cholecalciferol 2000 Unit(s) Oral daily  digoxin     Tablet 0.125 milliGRAM(s) Oral daily  lactulose Syrup 20 Gram(s) Oral two times a day  levothyroxine 25 MICROGram(s) Oral daily  rifaximin 550 milliGRAM(s) Oral two times a day  sodium chloride 0.45%. 1000 milliLiter(s) (75 mL/Hr) IV Continuous <Continuous>    MEDICATIONS  (PRN):  ALBUTerol/ipratropium for Nebulization 3 milliLiter(s) Nebulizer every 12 hours PRN Shortness of Breath and/or Wheezing  ondansetron Injectable 4 milliGRAM(s) IV Push every 6 hours PRN Nausea and/or Vomiting      ALLERGIES:  adhesives (Other)  warfarin (Other)      SOCIAL HISTORY:  - Alcohol: denies   - Recreational drug use: denies     FAMILY HISTORY:  No pertinent family history in first degree relatives      Vital Signs Last 24 Hrs  T(C): 36.5 (22 Oct 2018 05:00), Max: 36.7 (21 Oct 2018 20:13)  T(F): 97.7 (22 Oct 2018 05:00), Max: 98 (21 Oct 2018 20:13)  HR: 47 (22 Oct 2018 05:00) (45 - 51)  BP: 134/50 (22 Oct 2018 05:00) (104/62 - 143/42)  BP(mean): --  RR: 22 (22 Oct 2018 05:00) (15 - 22)  SpO2: 96% (22 Oct 2018 05:00) (96% - 100%)    PHYSICAL EXAM:  Constitutional: no acute distress  Eyes: no icterus  Neck: no masses, no LAD  Respiratory: normal inspiratory effort; no wheezing or crackles  Cardiovascular: RRR, normal S1/S2, no murmurs/rubs/gallops  Gastrointestinal: soft, nondistended, nontender, +BS  Extremities: no LE edema, dry skin with decreased skin turgor  Neurological: lethargic, eyes closed, withdraws to noxious stimuli, non-verbal, + asterixis   Skin: no rashes, bruises, petechiae    LABS:                        12.0   6.2   )-----------( 100      ( 21 Oct 2018 14:52 )             36.2     10-21    139  |  103  |  60<H>  ----------------------------<  116<H>  5.6<H>   |  21<L>  |  1.63<H>    Ca    9.9      21 Oct 2018 22:52    TPro  5.6<L>  /  Alb  3.4  /  TBili  2.6<H>  /  DBili  x   /  AST  37  /  ALT  25  /  AlkPhos  86  10-21    PT/INR - ( 21 Oct 2018 14:52 )   PT: 12.9 sec;   INR: 1.19 ratio         PTT - ( 21 Oct 2018 14:52 )  PTT:30.8 sec  LIVER FUNCTIONS - ( 21 Oct 2018 22:52 )  Alb: 3.4 g/dL / Pro: 5.6 g/dL / ALK PHOS: 86 U/L / ALT: 25 U/L / AST: 37 U/L / GGT: x             RADIOLOGY & ADDITIONAL STUDIES:  < from: CT Abdomen and Pelvis No Cont (10.21.18 @ 16:26) >    EXAM:  CT ABDOMEN AND PELVIS                            PROCEDURE DATE:  10/21/2018            INTERPRETATION:  CLINICAL INFORMATION: Abdominal pain clinical concern   for small bowel obstruction    COMPARISON: CT scan of the abdomen and pelvis acquired 5/6/2017.    PROCEDURE:   CT of the Abdomen and Pelvis was performed without intravenous contrast.   Intravenous contrast: None.  Oral contrast: None.  Sagittal and coronal reformats were performed.    FINDINGS:    LOWER CHEST: The heart is enlarged. Right lower lobe compressive   atelectasis. Left lower lobe linear atelectasis.    LIVER: Cirrhotic liver morphology. Right hepatic lobe calcified   granuloma. High density material is seen within the left lobe of the   liver, likely secondary to the patient's prior chemoembolization   procedure.  BILE DUCTS: Normal caliber.  GALLBLADDER: Cholelithiasis.  SPLEEN: Within normal limits.  PANCREAS: Within normal limits.  ADRENALS: Within normal limits.  KIDNEYS/URETERS: Bilateral renal cysts, stable from prior examination   5/6/2017. Left lower pole 2 mm nonobstructing renal calculus. No   hydronephrosis bilaterally.    Evaluation of the deep pelvis limited by streak artifact from left-sided   total hip replacement with plasty.    BLADDER: Within normal limits.  REPRODUCTIVE ORGANS: Multiple large degenerating uterine fibroids. No   adnexal mass.    BOWEL: No bowel obstruction. Duodenal diverticulum, unchanged. Scattered   colonic diverticulosis without evidence of diverticulitis. Appendix is   normal.  PERITONEUM: Small abdominal free fluid.  VESSELS:  Extensive atherosclerotic changes of the aorta and bilateral   iliac vessels.    RETROPERITONEUM: No lymphadenopathy.    ABDOMINAL WALL: Within normal limits.  BONES: Patient is status post left-sided total hip replacement with   plasty. Multilevel degenerative changes of the spine. High density bone   island present within the left iliac crest.    IMPRESSION:   No bowel obstruction as clinically questioned.  No acute intra-abdominal pathology.  Evaluation of the previously noted liver masses is limited without the   administration of IV contrast.                      TEOFILO GONSALVES M.D., RADIOLOGY RESIDENT  This document has been electronically signed.  LYLA VELA M.D.,ATTENDING RADIOLOGIST  This document has been electronically signed. Oct 21 2018  4:55PM                < end of copied text >    < from: CT Head No Cont (10.21.18 @ 16:23) >    EXAM:  CT BRAIN                            PROCEDURE DATE:  10/21/2018            INTERPRETATION:  INDICATIONS:  Confusion/delerium, altered loc,   unexplained      TECHNIQUE:  Serial axial images were obtained from the skull base to the   vertex without intravenous contrast. Coronal and sagittal reformatted   images were obtained.    COMPARISON EXAMINATION: Noncontrast CT scan of head acquired 2/23/2018    FINDINGS:    VENTRICLES AND SULCI:  The CT examination demonstrates age-appropriate   generalized volume loss as manifested by the enlargement of the   ventricles, cisternal spaces, and cortical sulci throughout.    INTRA-AXIAL:  No mass or hemorrhage. There is the redemonstration of a   chronic appearing left basal ganglia lacunar infarct, unchanged.  Mild to   moderate microvascular type white matter changes are again seen.    EXTRA-AXIAL:  No mass or collection is seen.    VISUALIZED SINUSES:  Clear.    VISUALIZED MASTOIDS:  Clear.    CALVARIUM:  Normal.    MISCELLANEOUS:   Patient is status post bilateral lens replacement surgery.    IMPRESSION:    There is no acute intracranial hemorrhage, mass effect, midline shift or   extra axial collections.     Stable exam.        TEOFILO GONSALVES M.D., RADIOLOGY RESIDENT  This document has been electronically signed.  ANNALISA STANFORD M.D., ATTENDING RADIOLOGIST  This document has been electronically signed. Oct 21 2018  4:59PM                < end of copied text >

## 2018-10-22 NOTE — PROGRESS NOTE ADULT - PROBLEM SELECTOR PLAN 5
Pt follows up with Dr. Macdonald as outpatient for cirrhosis- likely 2/2 alcohol use in the past  - Hepatology consult in AM  - Pt currently appears to be compensated with INR, T bili, liver enzymes at baseline  - No abnormal liver findings on CTAP  - Pt currently afebrile with no leukocytosis, although SBP is on the differential; c/w Ceftriaxone for empiric treatment, consider paracentesis if pt with no improvement  - C/w rifaximin, lactulose titrated to 2-3 BMs/day  - Holding propranolol in the setting of bradycardia  - Holding spironolactone in the setting of KARRIE and dehydration  - Monitor mental status daily  - Check ammonia level in AM  - Trend MELD labs daily; score today is 18  - Patient with hyperbilirubinemia. F/u LDH to r/o DIC ; haptoglobin was <20. Pt follows up with Dr. Macdonald as outpatient for cirrhosis- likely 2/2 alcohol use in the past  - Appreciate hepatology recs  - Pt currently appears to be compensated with INR, T bili, liver enzymes at baseline  - No abnormal liver findings on CTAP  - Pt currently afebrile with no leukocytosis, although SBP is on the differential; c/w Ceftriaxone for empiric treatment, consider paracentesis if pt with no improvement  - C/w rifaximin, lactulose titrated to 2-3 BMs/day  - Holding propranolol in the setting of bradycardia  - Holding spironolactone in the setting of KARRIE and dehydration  - Monitor mental status daily  - Trend MELD labs daily; score today is 18  - Patient with hyperbilirubinemia. LDH elevated, haptoglobin was <20, but low concern for DIC

## 2018-10-22 NOTE — PROGRESS NOTE ADULT - PROBLEM SELECTOR PLAN 10
Pt with severe AS seen on TTE from 2016; currently follows with Dr. Boston as an outpatient  - Continue to monitor and assess fluid status daily; c/w gentle IVF hydration    Problem 11: Prophylactic measure  - Patient is DNR, but not DNI. Family would like pressors.

## 2018-10-22 NOTE — PROGRESS NOTE ADULT - PROBLEM SELECTOR PLAN 1
Pt with encephalopathy on admission, baseline AAOx3 and walks with a walker  - Differential includes metabolic 2/2 infection, toxic (less likely from ingestion), neurologic, hepatic encephalopathy  - C/w Ceftriaxone empirically for UTI; no other abdominal sources of infection seen on CTAP. F/u blood and urine cx.  - CT head negative for any acute changes  - Per pt's family, pt has been having 3-4 BMs/day for the past 2 days prior to today- will c/w home lactulose dose and monitor for BMs  - F/u ammonia level Pt with encephalopathy on admission, baseline AAOx3 and walks with a walker  - Differential includes metabolic 2/2 infection, toxic (less likely from ingestion), neurologic, hepatic encephalopathy  - C/w Ceftriaxone empirically for UTI; no other abdominal sources of infection seen on CTAP. F/u blood and urine cx.  - CT head negative for any acute changes  - Per pt's family, pt has been having 3-4 BMs/day for the past 2 days prior to admission- GI recommends lactulose via NGT

## 2018-10-22 NOTE — PROVIDER CONTACT NOTE (OTHER) - ACTION/TREATMENT ORDERED:
Provider arrived to bedside. Ordered lactulose enema in setting of ammonia level. No other further interventions.

## 2018-10-23 LAB
ALBUMIN SERPL ELPH-MCNC: 2.8 G/DL — LOW (ref 3.3–5)
ALP SERPL-CCNC: 73 U/L — SIGNIFICANT CHANGE UP (ref 40–120)
ALT FLD-CCNC: 20 U/L — SIGNIFICANT CHANGE UP (ref 10–45)
ANION GAP SERPL CALC-SCNC: 11 MMOL/L — SIGNIFICANT CHANGE UP (ref 5–17)
AST SERPL-CCNC: 30 U/L — SIGNIFICANT CHANGE UP (ref 10–40)
BASOPHILS # BLD AUTO: 0.01 K/UL — SIGNIFICANT CHANGE UP (ref 0–0.2)
BASOPHILS NFR BLD AUTO: 0.2 % — SIGNIFICANT CHANGE UP (ref 0–2)
BILIRUB SERPL-MCNC: 2.6 MG/DL — HIGH (ref 0.2–1.2)
BUN SERPL-MCNC: 47 MG/DL — HIGH (ref 7–23)
CALCIUM SERPL-MCNC: 9.3 MG/DL — SIGNIFICANT CHANGE UP (ref 8.4–10.5)
CHLORIDE SERPL-SCNC: 112 MMOL/L — HIGH (ref 96–108)
CO2 SERPL-SCNC: 18 MMOL/L — LOW (ref 22–31)
CREAT SERPL-MCNC: 1.33 MG/DL — HIGH (ref 0.5–1.3)
EOSINOPHIL # BLD AUTO: 0.06 K/UL — SIGNIFICANT CHANGE UP (ref 0–0.5)
EOSINOPHIL NFR BLD AUTO: 1.3 % — SIGNIFICANT CHANGE UP (ref 0–6)
GAS PNL BLDV: SIGNIFICANT CHANGE UP
GLUCOSE BLDC GLUCOMTR-MCNC: 124 MG/DL — HIGH (ref 70–99)
GLUCOSE BLDC GLUCOMTR-MCNC: 89 MG/DL — SIGNIFICANT CHANGE UP (ref 70–99)
GLUCOSE BLDC GLUCOMTR-MCNC: 95 MG/DL — SIGNIFICANT CHANGE UP (ref 70–99)
GLUCOSE BLDC GLUCOMTR-MCNC: 97 MG/DL — SIGNIFICANT CHANGE UP (ref 70–99)
GLUCOSE SERPL-MCNC: 102 MG/DL — HIGH (ref 70–99)
HCT VFR BLD CALC: 34 % — LOW (ref 34.5–45)
HGB BLD-MCNC: 10.9 G/DL — LOW (ref 11.5–15.5)
IMM GRANULOCYTES NFR BLD AUTO: 0.2 % — SIGNIFICANT CHANGE UP (ref 0–1.5)
LYMPHOCYTES # BLD AUTO: 0.72 K/UL — LOW (ref 1–3.3)
LYMPHOCYTES # BLD AUTO: 15.4 % — SIGNIFICANT CHANGE UP (ref 13–44)
MAGNESIUM SERPL-MCNC: 2 MG/DL — SIGNIFICANT CHANGE UP (ref 1.6–2.6)
MCHC RBC-ENTMCNC: 32.1 GM/DL — SIGNIFICANT CHANGE UP (ref 32–36)
MCHC RBC-ENTMCNC: 34.2 PG — HIGH (ref 27–34)
MCV RBC AUTO: 106.6 FL — HIGH (ref 80–100)
MONOCYTES # BLD AUTO: 0.3 K/UL — SIGNIFICANT CHANGE UP (ref 0–0.9)
MONOCYTES NFR BLD AUTO: 6.4 % — SIGNIFICANT CHANGE UP (ref 2–14)
NEUTROPHILS # BLD AUTO: 3.57 K/UL — SIGNIFICANT CHANGE UP (ref 1.8–7.4)
NEUTROPHILS NFR BLD AUTO: 76.5 % — SIGNIFICANT CHANGE UP (ref 43–77)
PHOSPHATE SERPL-MCNC: 3 MG/DL — SIGNIFICANT CHANGE UP (ref 2.5–4.5)
PLATELET # BLD AUTO: 77 K/UL — LOW (ref 150–400)
POTASSIUM SERPL-MCNC: 4.2 MMOL/L — SIGNIFICANT CHANGE UP (ref 3.5–5.3)
POTASSIUM SERPL-SCNC: 4.2 MMOL/L — SIGNIFICANT CHANGE UP (ref 3.5–5.3)
PROT SERPL-MCNC: 5 G/DL — LOW (ref 6–8.3)
RBC # BLD: 3.19 M/UL — LOW (ref 3.8–5.2)
RBC # FLD: 15.8 % — HIGH (ref 10.3–14.5)
SODIUM SERPL-SCNC: 141 MMOL/L — SIGNIFICANT CHANGE UP (ref 135–145)
WBC # BLD: 4.67 K/UL — SIGNIFICANT CHANGE UP (ref 3.8–10.5)
WBC # FLD AUTO: 4.67 K/UL — SIGNIFICANT CHANGE UP (ref 3.8–10.5)

## 2018-10-23 PROCEDURE — 99223 1ST HOSP IP/OBS HIGH 75: CPT

## 2018-10-23 PROCEDURE — 99233 SBSQ HOSP IP/OBS HIGH 50: CPT | Mod: GC

## 2018-10-23 RX ORDER — LACTULOSE 10 G/15ML
20 SOLUTION ORAL EVERY 6 HOURS
Qty: 0 | Refills: 0 | Status: DISCONTINUED | OUTPATIENT
Start: 2018-10-23 | End: 2018-10-23

## 2018-10-23 RX ORDER — VANCOMYCIN HCL 1 G
1000 VIAL (EA) INTRAVENOUS ONCE
Qty: 0 | Refills: 0 | Status: COMPLETED | OUTPATIENT
Start: 2018-10-23 | End: 2018-10-23

## 2018-10-23 RX ORDER — LACTULOSE 10 G/15ML
20 SOLUTION ORAL THREE TIMES A DAY
Qty: 0 | Refills: 0 | Status: DISCONTINUED | OUTPATIENT
Start: 2018-10-23 | End: 2018-10-23

## 2018-10-23 RX ORDER — ACETAMINOPHEN 500 MG
325 TABLET ORAL ONCE
Qty: 0 | Refills: 0 | Status: COMPLETED | OUTPATIENT
Start: 2018-10-23 | End: 2018-10-23

## 2018-10-23 RX ORDER — LACTULOSE 10 G/15ML
20 SOLUTION ORAL EVERY 4 HOURS
Qty: 0 | Refills: 0 | Status: DISCONTINUED | OUTPATIENT
Start: 2018-10-23 | End: 2018-10-24

## 2018-10-23 RX ADMIN — Medication 125 MILLILITER(S): at 02:14

## 2018-10-23 RX ADMIN — Medication 325 MILLIGRAM(S): at 10:00

## 2018-10-23 RX ADMIN — Medication 125 MILLILITER(S): at 11:06

## 2018-10-23 RX ADMIN — LACTULOSE 20 GRAM(S): 10 SOLUTION ORAL at 11:07

## 2018-10-23 RX ADMIN — LACTULOSE 20 GRAM(S): 10 SOLUTION ORAL at 02:02

## 2018-10-23 RX ADMIN — LACTULOSE 20 GRAM(S): 10 SOLUTION ORAL at 05:03

## 2018-10-23 RX ADMIN — Medication 0.25 MILLIGRAM(S): at 06:53

## 2018-10-23 RX ADMIN — Medication 250 MILLIGRAM(S): at 09:11

## 2018-10-23 RX ADMIN — Medication 125 MILLILITER(S): at 16:06

## 2018-10-23 RX ADMIN — CEFTRIAXONE 100 GRAM(S): 500 INJECTION, POWDER, FOR SOLUTION INTRAMUSCULAR; INTRAVENOUS at 18:36

## 2018-10-23 RX ADMIN — Medication 325 MILLIGRAM(S): at 09:12

## 2018-10-23 RX ADMIN — Medication 0.12 MILLIGRAM(S): at 05:05

## 2018-10-23 RX ADMIN — Medication 0.25 MILLIGRAM(S): at 18:36

## 2018-10-23 RX ADMIN — Medication 12.5 MICROGRAM(S): at 21:41

## 2018-10-23 RX ADMIN — LACTULOSE 20 GRAM(S): 10 SOLUTION ORAL at 21:45

## 2018-10-23 RX ADMIN — LACTULOSE 20 GRAM(S): 10 SOLUTION ORAL at 18:36

## 2018-10-23 NOTE — PROGRESS NOTE ADULT - PROBLEM SELECTOR PLAN 9
Pt currently not on AC 2/2 history of ICH in the past  - CHADSVASC score 6  - C/w digoxin  - Pt currently in sinus bradycardia Pt currently not on AC 2/2 history of ICH in the past  - CHADSVASC score 6  - Hold digoxin  - Pt currently in sinus bradycardia

## 2018-10-23 NOTE — PROGRESS NOTE ADULT - SUBJECTIVE AND OBJECTIVE BOX
HA JACQUES  89y Female    Patient is a 89y old  Female who presents with a chief complaint of Nausea, vomiting (21 Oct 2018 18:00)    INTERVAL HPI/OVERNIGHT EVENTS:     MEDICATIONS  (STANDING):  albumin human  5% IVPB 250 milliLiter(s) IV Intermittent every 6 hours  buDESOnide   0.25 milliGRAM(s) Respule 0.25 milliGRAM(s) Inhalation two times a day  cefTRIAXone   IVPB 1 Gram(s) IV Intermittent every 24 hours  digoxin     Tablet 0.125 milliGRAM(s) Oral daily  influenza   Vaccine 0.5 milliLiter(s) IntraMuscular once  lactulose Syrup 20 Gram(s) Oral three times a day  levothyroxine Injectable 12.5 MICROGram(s) IV Push at bedtime  rifaximin 550 milliGRAM(s) Oral two times a day    MEDICATIONS  (PRN):  ALBUTerol/ipratropium for Nebulization 3 milliLiter(s) Nebulizer every 12 hours PRN Shortness of Breath and/or Wheezing  ondansetron Injectable 4 milliGRAM(s) IV Push every 6 hours PRN Nausea and/or Vomiting    Vital Signs Last 24 Hrs  T(C): 36.5 (23 Oct 2018 05:33), Max: 36.9 (22 Oct 2018 15:20)  T(F): 97.7 (23 Oct 2018 05:33), Max: 98.5 (22 Oct 2018 15:20)  HR: 58 (23 Oct 2018 05:33) (48 - 58)  BP: 146/54 (23 Oct 2018 05:33) (129/42 - 151/67)  BP(mean): 95 (22 Oct 2018 20:06) (95 - 95)  RR: 18 (23 Oct 2018 05:33) (16 - 21)  SpO2: 100% (23 Oct 2018 05:33) (98% - 100%)    PHYSICAL EXAM:      LABS: JACQUESHA ROYAL  89y Female    Patient is a 89y old  Female who presents with a chief complaint of Nausea, vomiting (21 Oct 2018 18:00)    INTERVAL HPI/OVERNIGHT EVENTS: R arm is notable more erythematous and warm to touch compared to yesterday. Peripheral IV was removed on this arm; patient was TTP on R arm. Had 6 BMs yesterday and received all doses of lactulose via NGT. Mental status is similar to yesterday; not responsive or following commands, but says 'ouch', withdraws from painful stimuli and moves extremities independently.    MEDICATIONS  (STANDING):  albumin human  5% IVPB 250 milliLiter(s) IV Intermittent every 6 hours  buDESOnide   0.25 milliGRAM(s) Respule 0.25 milliGRAM(s) Inhalation two times a day  cefTRIAXone   IVPB 1 Gram(s) IV Intermittent every 24 hours  digoxin     Tablet 0.125 milliGRAM(s) Oral daily  influenza   Vaccine 0.5 milliLiter(s) IntraMuscular once  lactulose Syrup 20 Gram(s) Oral every 6 hours  levothyroxine Injectable 12.5 MICROGram(s) IV Push at bedtime  rifaximin 550 milliGRAM(s) Oral two times a day    MEDICATIONS  (PRN):  ALBUTerol/ipratropium for Nebulization 3 milliLiter(s) Nebulizer every 12 hours PRN Shortness of Breath and/or Wheezing  ondansetron Injectable 4 milliGRAM(s) IV Push every 6 hours PRN Nausea and/or Vomiting    Vital Signs Last 24 Hrs  T(C): 36.5 (23 Oct 2018 05:33), Max: 36.9 (22 Oct 2018 15:20)  T(F): 97.7 (23 Oct 2018 05:33), Max: 98.5 (22 Oct 2018 15:20)  HR: 58 (23 Oct 2018 05:33) (48 - 58)  BP: 146/54 (23 Oct 2018 05:33) (129/42 - 151/67)  BP(mean): 95 (22 Oct 2018 20:06) (95 - 95)  RR: 18 (23 Oct 2018 05:33) (16 - 21)  SpO2: 100% (23 Oct 2018 05:33) (98% - 100%)    PHYSICAL EXAM:  GENERAL: NAD, cachectic  HEAD:  Atraumatic, Normocephalic  EYES: Eyes closed  NECK: Supple, No JVD  CHEST/LUNG: Clear to auscultation bilaterally; No wheeze, ronchi or rales  HEART: Regular rate and rhythm; No murmurs, rubs, or gallops  ABDOMEN: Soft, Nontender, Nondistended; Bowel sounds present  EXTREMITIES:  No clubbing, cyanosis, or edema  PSYCH: AAOx0  NEUROLOGY: Unable to assess  SKIN: Multiple ecchymoses on bilateral arms; R arm is erythematous, warm and tender to palpation    LABS:  Pending labs

## 2018-10-23 NOTE — PROGRESS NOTE ADULT - PROBLEM SELECTOR PLAN 1
Pt with encephalopathy on admission, baseline AAOx3 and walks with a walker  - Differential includes metabolic 2/2 infection, toxic (less likely from ingestion), neurologic, hepatic encephalopathy  - C/w Ceftriaxone empirically for UTI; no other abdominal sources of infection seen on CTAP. F/u blood and urine cx.  - CT head negative for any acute changes  - Per pt's family, pt has been having 3-4 BMs/day for the past 2 days prior to admission- GI recommends lactulose via NGT Pt with encephalopathy on admission, baseline AAOx3 and walks with a walker  - Differential includes metabolic 2/2 infection, toxic (less likely from ingestion), neurologic, hepatic encephalopathy  - C/w Ceftriaxone empirically for UTI; no other abdominal sources of infection seen on CTAP. Blood and urine culture show NGTD  - CT head negative for any acute changes  - Per pt's family, pt has been having 3-4 BMs/day for the past 2 days prior to admission  - All medications via NGT

## 2018-10-23 NOTE — PROGRESS NOTE ADULT - ASSESSMENT
89 year-old woman with PMH of CLL s/p Rituximab, COPD, bronchiectasis, AFib, severe AS and MR, HTN, cervical osteomyelitis, h/o variceal bleed s/p banding, TIAs, Alzheimer's, history of decompensated cirrhosis with recurrent encephalopathy and HCC s/p ablation and incomplete treatment, presents with encephalopathy.    Problems:  1) Encephalopathy - likely toxic metabolic in setting of infection (urinary source?)  2) Decompensated cirrhosis, felt likely to be 2/2 ETOH, MELD-Na 21 on admission        -varices: history of banding at outside facility in past, on Coreg > 1 year        - ascites: minimal on CT this admission, on spironolactone only as outpatient         - HE: history of recurrent HE, on lactulose and rifaximin as outpatient         - HCC: history of lesion s/p microwave ablation partially successful, patient's family wishes no further intervention  3) Likely urinary tract infection    Recs:  - full infectious workup per primary team, empiric abx   - no ascitic fluid to tap so unlikely SBP  - continue Xifaxan BID  - c/w lactulose q4h   - frequent orientation  - continue beta blocker for esophageal varices as BP tolerates  - hold spironolactone given hypovolemia on exam  - recommend continuing careful volume replacement with NS boluses with close monitoring for any development of fluid overload  - continue albumin q6h  - trend CMP, INR, CBC daily  - NPO until mental status improves 89 year-old woman with PMH of CLL s/p Rituximab, COPD, bronchiectasis, AFib, severe AS and MR, HTN, cervical osteomyelitis, h/o variceal bleed s/p banding, TIAs, Alzheimer's, history of decompensated cirrhosis with recurrent encephalopathy and HCC s/p ablation and incomplete treatment, presents with encephalopathy.    Problems:  1) Encephalopathy - likely toxic metabolic in setting of infection, suspect urinary etiology  2) Decompensated cirrhosis, MELD-Na 21 on admission        -varices: history of banding at outside facility in past, on Coreg > 1 year        - ascites: minimal on CT this admission, on spironolactone only as outpatient         - HE: history of recurrent HE, on lactulose and rifaximin as outpatient         - HCC: history of lesion s/p microwave ablation partially successful, patient's family wishes no further intervention  3) Likely urinary tract infection    Recs:  -  infectious workup per primary team, empiric abx, f/u Ucx and Bcx  - continue Xifaxan BID  - c/w lactulose q4h   - continue beta blocker for esophageal varices as BP tolerates  - recommend continuing careful volume replacement with NS boluses with close monitoring for any development of fluid overload  - continue albumin q6h  - trend CMP, INR, CBC daily  - NPO until mental status improves

## 2018-10-23 NOTE — PROGRESS NOTE ADULT - PROBLEM SELECTOR PLAN 10
Pt with severe AS seen on TTE from 2016; currently follows with Dr. Boston as an outpatient  - Continue to monitor and assess fluid status daily; c/w gentle IVF hydration    Problem 11: Prophylactic measure  - Patient is DNR, but not DNI. Family would like pressors. Pt with severe AS seen on TTE from 2016; currently follows with Dr. Boston as an outpatient  - Continue to monitor and assess fluid status daily; c/w gentle IVF hydration    Problem 11: Cellulitis of R arm  - Follow up blood cultures, peripheral IV removed from R arm  - Patient started on vancomycin; f/u trough    Problem 12: Prophylactic measure  - Patient is DNR, but not DNI. Family would like pressors.

## 2018-10-23 NOTE — CONSULT NOTE ADULT - ASSESSMENT
89 year-old woman with PMH of CLL s/p Rituximab, COPD, bronchiectasis, AFib (not on anticoagulation, severe aortic stenosis and mitral regurgitation, HTN, cervical osteomyelitis, h/o variceal bleed s/p banding, mini-strokes, Alzheimer's, decompensated cirrhosis with recurrent encephalopathy and HCC s/p ablation and incomplete treatment, presenting for nausea and vomiting for 2 days.  ·	Patient has critical AS with nl LV function but LVH which likely means small cavity and low cardiac output. Clinically she appears dry so reasonable for very cautious hydration with understanding that patient's window of euvolemia is small.   ·	Sinus edel agree with holding jeffy blockers. If HR over 60 would resume propranolol. There is likely little benefit to dig at this time.   ·	Continue care as per primary team and hepatology.   ·	D/w daughter at bedside.     =======================================================================  Nader Adams MD [facility]    Please page [phone] with questions  On nights and weekend please call the office [phone].

## 2018-10-23 NOTE — PROGRESS NOTE ADULT - PROBLEM SELECTOR PLAN 4
Likely prerenal in the setting of nausea/vomiting and decreased PO intake  - BUN:creatinine ratio >30; likely prerenal KARRIE  - C/w NS @75cc/hr given cirrhosis and AS, monitor fluid status closely  - Will repeat BMP in an hour  - Avoid nephrotoxic agents and renally dose medications  - FENa= 0.2% Improving. Likely prerenal in the setting of nausea/vomiting and decreased PO intake  - BUN:creatinine ratio >30; likely prerenal KARRIE  - C/w NS @75cc/hr given cirrhosis and AS, monitor fluid status closely  - Will repeat BMP in an hour  - Avoid nephrotoxic agents and renally dose medications  - FENa= 0.2%

## 2018-10-23 NOTE — PROGRESS NOTE ADULT - SUBJECTIVE AND OBJECTIVE BOX
Chief Complaint:  Patient is a 89y old  Female who presents with a chief complaint of Nausea, vomiting (23 Oct 2018 07:25)      Interval Events:   No acute event overnight. Pt had 7 watery BM overnight.     Allergies:  adhesives (Other)  codeine (Rash)  erythromycin (Rash)  iv dye (Rash)  penicillin (Rash)  shellfish (Rash)  warfarin (Other)      Hospital Medications:  albumin human  5% IVPB 250 milliLiter(s) IV Intermittent every 6 hours  ALBUTerol/ipratropium for Nebulization 3 milliLiter(s) Nebulizer every 12 hours PRN  buDESOnide   0.25 milliGRAM(s) Respule 0.25 milliGRAM(s) Inhalation two times a day  cefTRIAXone   IVPB 1 Gram(s) IV Intermittent every 24 hours  digoxin     Tablet 0.125 milliGRAM(s) Oral daily  influenza   Vaccine 0.5 milliLiter(s) IntraMuscular once  lactulose Syrup 20 Gram(s) Oral three times a day  levothyroxine Injectable 12.5 MICROGram(s) IV Push at bedtime  ondansetron Injectable 4 milliGRAM(s) IV Push every 6 hours PRN  rifaximin 550 milliGRAM(s) Oral two times a day      PMHX/PSHX:  PVD (peripheral vascular disease)  Liver cell carcinoma  Severe aortic stenosis by prior echocardiogram  Pulmonary HTN  CKD (chronic kidney disease), stage 3 (moderate)  COPD (Chronic Obstructive Pulmonary Disease)  AF (Atrial Fibrillation)  Portal Hypertension  Pneumonia  Metastasis to Liver  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  HTN (Hypertension)  Bleeding Esophageal Varices  CLL (Chronic Lymphoblastic Leukemia)  GIB (Gastrointestinal Bleeding)  History of repair of hip fracture  Esophageal Rupture      Family history:  No pertinent family history in first degree relatives      ROS:     General:  No wt loss, fevers, chills, night sweats, fatigue,   Eyes:  Good vision, no reported pain  ENT:  No sore throat, pain, runny nose, dysphagia  CV:  No pain, palpitations, hypo/hypertension  Resp:  No dyspnea, cough, tachypnea, wheezing  GI:  See HPI  :  No pain, bleeding, incontinence, nocturia  Muscle:  No pain, weakness  Neuro:  No weakness, tingling, memory problems  Psych:  No fatigue, insomnia, mood problems, depression  Endocrine:  No polyuria, polydipsia, cold/heat intolerance  Heme:  No petechiae, ecchymosis, easy bruisability  Skin:  No rash, edema      PHYSICAL EXAM:     GENERAL:  Appears stated age, lethargic  HEENT:  NC/AT,  conjunctivae clear, sclera -anicteric  CHEST:  Full & symmetric excursion, no increased effort, breath sounds clear  HEART:  Regular rhythm, S1, S2, no murmur/rub/S3/S4,  no edema  ABDOMEN:  Soft, non-tender, non-distended, normoactive bowel sounds,  no masses ,no hepato-splenomegaly,   EXTREMITIES:  no cyanosis,clubbing or edema  SKIN:  No rash/erythema/ecchymoses/petechiae/wounds/abscess/warm/dry  NEURO:  lethargic, no following verbal command    Vital Signs:  Vital Signs Last 24 Hrs  T(C): 36.5 (23 Oct 2018 05:33), Max: 36.9 (22 Oct 2018 15:20)  T(F): 97.7 (23 Oct 2018 05:33), Max: 98.5 (22 Oct 2018 15:20)  HR: 58 (23 Oct 2018 05:33) (48 - 58)  BP: 146/54 (23 Oct 2018 05:33) (141/51 - 151/67)  BP(mean): 95 (22 Oct 2018 20:06) (95 - 95)  RR: 18 (23 Oct 2018 05:33) (16 - 18)  SpO2: 100% (23 Oct 2018 05:33) (98% - 100%)  Daily Height in cm: 154.94 (22 Oct 2018 18:39)    Daily     LABS:                        12.7   9.8   )-----------( 91       ( 22 Oct 2018 14:12 )             36.9     10-22    137  |  104  |  60<H>  ----------------------------<  106<H>  5.0   |  21<L>  |  1.59<H>    Ca    9.7      22 Oct 2018 14:10  Phos  3.2     10-22  Mg     2.1     10-22    TPro  5.6<L>  /  Alb  3.4  /  TBili  3.3<H>  /  DBili  x   /  AST  36  /  ALT  25  /  AlkPhos  91  1022    LIVER FUNCTIONS - ( 22 Oct 2018 14:10 )  Alb: 3.4 g/dL / Pro: 5.6 g/dL / ALK PHOS: 91 U/L / ALT: 25 U/L / AST: 36 U/L / GGT: x           PT/INR - ( 22 Oct 2018 14:11 )   PT: 13.1 sec;   INR: 1.20 ratio         PTT - ( 22 Oct 2018 14:11 )  PTT:29.2 sec  Urinalysis Basic - ( 21 Oct 2018 17:10 )    Color: Yellow / Appearance: Slightly Turbid / S.021 / pH: x  Gluc: x / Ketone: Negative  / Bili: Negative / Urobili: 3 mg/dL   Blood: x / Protein: 30 mg/dL / Nitrite: Negative   Leuk Esterase: Large / RBC: 2 /hpf / WBC 12 /hpf   Sq Epi: x / Non Sq Epi: 5 /hpf / Bacteria: Many          Imaging: Chief Complaint:  Patient is a 89y old  Female who presents with a chief complaint of Nausea, vomiting (23 Oct 2018 07:25)      Interval Events:   No acute event overnight. Pt had 7 watery BM overnight.     Allergies:  adhesives (Other)  codeine (Rash)  erythromycin (Rash)  iv dye (Rash)  penicillin (Rash)  shellfish (Rash)  warfarin (Other)      Hospital Medications:  albumin human  5% IVPB 250 milliLiter(s) IV Intermittent every 6 hours  ALBUTerol/ipratropium for Nebulization 3 milliLiter(s) Nebulizer every 12 hours PRN  buDESOnide   0.25 milliGRAM(s) Respule 0.25 milliGRAM(s) Inhalation two times a day  cefTRIAXone   IVPB 1 Gram(s) IV Intermittent every 24 hours  digoxin     Tablet 0.125 milliGRAM(s) Oral daily  influenza   Vaccine 0.5 milliLiter(s) IntraMuscular once  lactulose Syrup 20 Gram(s) Oral three times a day  levothyroxine Injectable 12.5 MICROGram(s) IV Push at bedtime  ondansetron Injectable 4 milliGRAM(s) IV Push every 6 hours PRN  rifaximin 550 milliGRAM(s) Oral two times a day      PMHX/PSHX:  PVD (peripheral vascular disease)  Liver cell carcinoma  Severe aortic stenosis by prior echocardiogram  Pulmonary HTN  CKD (chronic kidney disease), stage 3 (moderate)  COPD (Chronic Obstructive Pulmonary Disease)  AF (Atrial Fibrillation)  Portal Hypertension  Pneumonia  Metastasis to Liver  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  HTN (Hypertension)  Bleeding Esophageal Varices  CLL (Chronic Lymphoblastic Leukemia)  GIB (Gastrointestinal Bleeding)  History of repair of hip fracture  Esophageal Rupture      Family history:  No pertinent family history in first degree relatives      ROS:     General:  No wt loss, fevers, chills, night sweats, fatigue,   Eyes:  Good vision, no reported pain  ENT:  No sore throat, pain, runny nose, dysphagia  CV:  No pain, palpitations, hypo/hypertension  Resp:  No dyspnea, cough, tachypnea, wheezing  GI:  See HPI  :  No pain, bleeding, incontinence, nocturia  Muscle:  No pain, weakness  Neuro:  No weakness, tingling, memory problems  Psych:  No fatigue, insomnia, mood problems, depression  Endocrine:  No polyuria, polydipsia, cold/heat intolerance  Heme:  No petechiae, ecchymosis, easy bruisability  Skin:  No rash, edema      PHYSICAL EXAM:     GENERAL:  Appears stated age, lethargic  HEENT:  NC/AT,  conjunctivae clear, sclera -anicteric  CHEST:  Full & symmetric excursion, no increased effort, breath sounds clear  HEART:  Regular rhythm, S1, S2, no murmur/rub/S3/S4,  no edema  ABDOMEN:  Soft, non-tender, non-distended, normoactive bowel sounds,  no masses ,no hepato-splenomegaly,   EXTREMITIES:  no cyanosis,clubbing or edema  SKIN:  Erythema/purple discoloration and blister to RUE, milder erythema to LUE. No warmth or purulence. No induration.  NEURO:  lethargic, no following verbal command    Vital Signs:  Vital Signs Last 24 Hrs  T(C): 36.5 (23 Oct 2018 05:33), Max: 36.9 (22 Oct 2018 15:20)  T(F): 97.7 (23 Oct 2018 05:33), Max: 98.5 (22 Oct 2018 15:20)  HR: 58 (23 Oct 2018 05:33) (48 - 58)  BP: 146/54 (23 Oct 2018 05:33) (141/51 - 151/67)  BP(mean): 95 (22 Oct 2018 20:06) (95 - 95)  RR: 18 (23 Oct 2018 05:33) (16 - 18)  SpO2: 100% (23 Oct 2018 05:33) (98% - 100%)  Daily Height in cm: 154.94 (22 Oct 2018 18:39)    Daily     LABS:                        12.7   9.8   )-----------( 91       ( 22 Oct 2018 14:12 )             36.9     10-22    137  |  104  |  60<H>  ----------------------------<  106<H>  5.0   |  21<L>  |  1.59<H>    Ca    9.7      22 Oct 2018 14:10  Phos  3.2     10-22  Mg     2.1     10-22    TPro  5.6<L>  /  Alb  3.4  /  TBili  3.3<H>  /  DBili  x   /  AST  36  /  ALT  25  /  AlkPhos  91  10-22    LIVER FUNCTIONS - ( 22 Oct 2018 14:10 )  Alb: 3.4 g/dL / Pro: 5.6 g/dL / ALK PHOS: 91 U/L / ALT: 25 U/L / AST: 36 U/L / GGT: x           PT/INR - ( 22 Oct 2018 14:11 )   PT: 13.1 sec;   INR: 1.20 ratio         PTT - ( 22 Oct 2018 14:11 )  PTT:29.2 sec  Urinalysis Basic - ( 21 Oct 2018 17:10 )    Color: Yellow / Appearance: Slightly Turbid / S.021 / pH: x  Gluc: x / Ketone: Negative  / Bili: Negative / Urobili: 3 mg/dL   Blood: x / Protein: 30 mg/dL / Nitrite: Negative   Leuk Esterase: Large / RBC: 2 /hpf / WBC 12 /hpf   Sq Epi: x / Non Sq Epi: 5 /hpf / Bacteria: Many          Imaging:

## 2018-10-23 NOTE — PROGRESS NOTE ADULT - PROBLEM SELECTOR PLAN 2
Pt presenting with acute onset of nausea and vomiting beginning this morning, with 4-5 episodes. Pt currently denies abdominal pain  - CT abdomen/pelvis negative for any acute pathology, only finding of cirrhosis with small amount of ascites  - N/V may be 2/2 ongoing infection versus gastroenteritis versus ACS  - QTc normal; c/w Zofran PRN for nausea/vomiting  - Trend troponin; initial troponin 48  - C/w NS @75cc/hr for dehydration; pt appears to be hypovolemic on exam  - Infectious workup ongoing  - RUQ ultrasound with dopplers shows no portal vein thrombus  - TSH wnl; f/u digoxin level  - Troponins downtrending; f/u TTE Pt presenting with acute onset of nausea and vomiting beginning this morning, with 4-5 episodes. Pt currently denies abdominal pain  - CT abdomen/pelvis negative for any acute pathology, only finding of cirrhosis with small amount of ascites  - N/V may be 2/2 ongoing infection versus gastroenteritis versus ACS  - QTc normal; c/w Zofran PRN for nausea/vomiting  - Troponin downtrending  - C/w NS @75cc/hr for dehydration; pt appears to be hypovolemic on exam  - Infectious workup ongoing  - RUQ ultrasound with dopplers shows no portal vein thrombus  - TSH and digoxin wml  - TTE shows normal EF  - Patient's symptoms are concerning for digoxin toxicity, even though level was therapeutic. (nausea, vomting, hyperkalemia, altered mental status). Plan to hold digoxin.

## 2018-10-23 NOTE — PROGRESS NOTE ADULT - ASSESSMENT
89 year-old woman with PMH of CLL s/p Rituximab, COPD, bronchiectasis, AFib (not on anticoagulation, severe aortic stenosis and mitral regurgitation, HTN, cervical osteomyelitis, h/o variceal bleed s/p banding, mini-strokes, Alzheimer's, decompensated cirrhosis with recurrent encephalopathy and HCC s/p ablation and incomplete treatment, concerning for metabolic encephalopathy 2/2 UTI vs hepatic decompensation.

## 2018-10-23 NOTE — CONSULT NOTE ADULT - SUBJECTIVE AND OBJECTIVE BOX
Cardiology Consult  Faculty Cardiology  ==========================================================================    Chief Complaint:     HPI:  89 year-old woman with PMH of CLL s/p Rituximab, COPD, bronchiectasis, AFib (not on anticoagulation, severe aortic stenosis and mitral regurgitation, HTN, cervical osteomyelitis, h/o variceal bleed s/p banding, mini-strokes, Alzheimer's, decompensated cirrhosis with recurrent encephalopathy and HCC s/p ablation and incomplete treatment, presenting for nausea and vomiting for 2 days. Family states patient had non-productive cough for a few days, which resolved. Since today, she had NBNB vomiting x 4 and decreased po intake. Patient complained fo abdominal pain once and had 1 BM today (baseline of 3-4 BMs on lactulose).    She had an E. Coli UTI a few weeks ago, treated with cipro bid for 5 days. Has no rivero at home and denies  complaints, but had 2-3 episodes of nocturia (which is more than baseline). Patient ambulates with walker and can have conversation at baseline. At present she's responding and states 'I want to go home'.    Patient received ceftriaxone and was started on LR 70 cc/hr. EKG shows sinus bradycardia (HR 47), T wave inversion in V1, V2, V3 and 1st degree heart block. Labs were significant for hyperkalemia (K>9) and Cr 1.72. 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  HTN (Hypertension)  Bleeding Esophageal Varices  CLL (Chronic Lymphoblastic Leukemia)  GIB (Gastrointestinal Bleeding)    PSH:   History of repair of hip fracture  Esophageal Rupture    Medications:   albumin human  5% IVPB 250 milliLiter(s) IV Intermittent every 6 hours  ALBUTerol/ipratropium for Nebulization 3 milliLiter(s) Nebulizer every 12 hours PRN  buDESOnide   0.25 milliGRAM(s) Respule 0.25 milliGRAM(s) Inhalation two times a day  cefTRIAXone   IVPB 1 Gram(s) IV Intermittent every 24 hours  influenza   Vaccine 0.5 milliLiter(s) IntraMuscular once  lactulose Syrup 20 Gram(s) Oral every 6 hours  levothyroxine Injectable 12.5 MICROGram(s) IV Push at bedtime  ondansetron Injectable 4 milliGRAM(s) IV Push every 6 hours PRN  rifaximin 550 milliGRAM(s) Oral two times a day    Allergies:  adhesives (Other)  codeine (Rash)  erythromycin (Rash)  iv dye (Rash)  penicillin (Rash)  shellfish (Rash)  warfarin (Other)    FAMILY HISTORY:  No pertinent family history in first degree relatives    Social History:  Smoking: No active  Alcohol:  Drugs:    Review of Systems:  Constitutional: [ ] Fever [ ] Chills [ ] Fatigue [ ] Weight change   HEENT: [ ] Blurred vision [ ] Eye Pain [ ] Headache [ ] Runny nose [ ] Sore Throat   Respiratory: [ ] Cough [ ] Wheezing [ ] Shortness of breath  Cardiovascular: [ ] Chest Pain [ ] Palpitations [ ] LYNNE [ ] PND [ ] Orthopnea  Gastrointestinal: [ ] Abdominal Pain [ ] Diarrhea [ ] Constipation [ ] Hemorrhoids [ ] Nausea [ ] Vomiting  Genitourinary: [ ] Nocturia [ ] Dysuria [ ] Incontinence  Extremities: [ ] Swelling [ ] Joint Pain  Neurologic: [ ] Focal deficit [ ] Paresthesias [ ] Syncope  Lymphatic: [ ] Swelling [ ] Lymphadenopathy   Skin: [ ] Rash [ ] Ecchymoses [ ] Wounds [ ] Lesions  Psychiatry: [ ] Depression [ ] Suicidal/Homicidal Ideation [ ] Anxiety [ ] Sleep Disturbances  [X ] 10 point review of systems is otherwise negative except as mentioned above            [ ]Unable to obtain    Physical Exam:  T(C): 36.6 (10-23-18 @ 11:41), Max: 36.6 (10-22-18 @ 18:09)  HR: 74 (10-23-18 @ 11:41) (48 - 74)  BP: 115/59 (10-23-18 @ 11:41) (112/54 - 151/67)  RR: 18 (10-23-18 @ 11:41) (18 - 18)  SpO2: 100% (10-23-18 @ 11:41) (98% - 100%)  Wt(kg): --    10-22 @ 07:01  -  10-23 @ 07:00  --------------------------------------------------------  IN: 0 mL / OUT: 1000 mL / NET: -1000 mL    10-23 @ 07:01  -  10-23 @ 15:39  --------------------------------------------------------  IN: 0 mL / OUT: 350 mL / NET: -350 mL      Daily Height in cm: 154.94 (22 Oct 2018 18:39)    Daily     Appearance: [x ] Normal [x ] NAD  Eyes: [x ] PERRL [x ] EOMI  HENT: [x ] Normal oral muscosa [x ]NC/AT  Neck: [x] Supple [x] FROM [x] No JVD  Cardiovascular: [x ] S1, S2 [x ] RRR [x ] No m/r/g [x ]No edema  Respiratory: [x ] Clear to auscultation bilaterally [x ] Normal Effort  Gastrointestinal: [x ] Soft [x ] Non-tender [x ] Non-distended [x ] BS+  Musculoskeletal: [x ] No clubbing [x] No joint deformity   Neurologic: [x ] Non-focal  Lymphatic: [x ] No lymphadenopathy  Psychiatry: [x ] AAOx3 [x ] Mood & affect appropriate  Skin: [ x] No rashes [ x] No ecchymoses [x ] No cyanosis [x ] warm and dry    Procedural Access Site: [ ] No hematoma [ ] Non-tender to palpation [ ] 2+ pulse [ ] No bruit [ ] No Ecchymosis    Cardiovascular Diagnostic Testing:  ECG:    Echo:    Stress Testing:    Cath:    Interpretation of Telemetry:    Imaging:    Labs:                        12.7   9.8   )-----------( 91       ( 22 Oct 2018 14:12 )             36.9     10-23    141  |  112<H>  |  47<H>  ----------------------------<  102<H>  4.2   |  18<L>  |  1.33<H>    Ca    9.3      23 Oct 2018 13:50  Phos  3.0     10-23  Mg     2.0     10-23    TPro  5.0<L>  /  Alb  2.8<L>  /  TBili  2.6<H>  /  DBili  x   /  AST  30  /  ALT  20  /  AlkPhos  73  10-23    PT/INR - ( 22 Oct 2018 14:11 )   PT: 13.1 sec;   INR: 1.20 ratio         PTT - ( 22 Oct 2018 14:11 )  PTT:29.2 sec  CARDIAC MARKERS ( 21 Oct 2018 22:52 )  x     / x     / 31 U/L / x     / 1.9 ng/mL  CARDIAC MARKERS ( 21 Oct 2018 15:47 )  x     / x     / 35 U/L / x     / 2.0 ng/mL            Thyroid Stimulating Hormone, Serum: 3.49 uIU/mL (10-22 @ 06:24) Cardiology Consult  [facility] Card[facility]  ==========================================================================    Chief Complaint: bradycardia, Severe AS    HPI:  89 year-old woman with PMH of CLL s/p Rituximab, COPD, bronchiectasis, AFib (not on anticoagulation, severe aortic stenosis and mitral regurgitation, HTN, cervical osteomyelitis, h/o variceal bleed s/p banding, mini-strokes, Alzheimer's, decompensated cirrhosis with recurrent encephalopathy and HCC s/p ablation and incomplete treatment, presenting for nausea and vomiting for 2 days. Family states patient had non-productive cough for a few days, which resolved. Since today, she had NBNB vomiting x 4 and decreased po intake. Patient complained fo abdominal pain once and had 1 BM today (baseline of 3-4 BMs on lactulose).    She had an E. Coli UTI a few weeks ago, treated with cipro bid for 5 days. Has no rivero at home and denies  complaints, but had 2-3 episodes of nocturia (which is more than baseline). Patient ambulates with walker and can have conversation at baseline. At present she's responding and states 'I want to go home'.    Patient received ceftriaxone and was started on LR 70 cc/hr. EKG shows sinus bradycardia (HR 47), T wave inversion in V1, V2, V3 and 1st degree heart block. Labs were significant for hyperkalemia (K>9) and Cr 1.72. (21 Oct 2018 18:00)    Patient does not participate in the history. Daughter present confirms history as stated above. Patient has a history of critical AS and severe AR confirmed on echo this admission. Normal LV systolic function but with LVH. She has PAF currently in NSR. She in not on A/C due to increased bleeding risk. She also has PHTN. 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  HTN (Hypertension)  Bleeding Esophageal Varices  CLL (Chronic Lymphoblastic Leukemia)  GIB (Gastrointestinal Bleeding)    PSH:   History of repair of hip fracture  Esophageal Rupture    Medications:   albumin human  5% IVPB 250 milliLiter(s) IV Intermittent every 6 hours  ALBUTerol/ipratropium for Nebulization 3 milliLiter(s) Nebulizer every 12 hours PRN  buDESOnide   0.25 milliGRAM(s) Respule 0.25 milliGRAM(s) Inhalation two times a day  cefTRIAXone   IVPB 1 Gram(s) IV Intermittent every 24 hours  influenza   Vaccine 0.5 milliLiter(s) IntraMuscular once  lactulose Syrup 20 Gram(s) Oral every 6 hours  levothyroxine Injectable 12.5 MICROGram(s) IV Push at bedtime  ondansetron Injectable 4 milliGRAM(s) IV Push every 6 hours PRN  rifaximin 550 milliGRAM(s) Oral two times a day    Allergies:  adhesives (Other)  codeine (Rash)  erythromycin (Rash)  iv dye (Rash)  penicillin (Rash)  shellfish (Rash)  warfarin (Other)    FAMILY HISTORY:  No pertinent family history in first degree relatives    Social History:  Smoking: No active  Alcohol:  Drugs:    Review of Systems:  Constitutional: [ ] Fever [ ] Chills [ ] Fatigue [ ] Weight change   HEENT: [ ] Blurred vision [ ] Eye Pain [ ] Headache [ ] Runny nose [ ] Sore Throat   Respiratory: [ ] Cough [ ] Wheezing [ ] Shortness of breath  Cardiovascular: [ ] Chest Pain [ ] Palpitations [ ] LYNNE [ ] PND [ ] Orthopnea  Gastrointestinal: [ ] Abdominal Pain [ ] Diarrhea [ ] Constipation [ ] Hemorrhoids [ ] Nausea [ ] Vomiting  Genitourinary: [ ] Nocturia [ ] Dysuria [ ] Incontinence  Extremities: [ ] Swelling [ ] Joint Pain  Neurologic: [ ] Focal deficit [ ] Paresthesias [ ] Syncope  Lymphatic: [ ] Swelling [ ] Lymphadenopathy   Skin: [ ] Rash [ ] Ecchymoses [ ] Wounds [ ] Lesions  Psychiatry: [ ] Depression [ ] Suicidal/Homicidal Ideation [ ] Anxiety [ ] Sleep Disturbances  [X ] 10 point review of systems is otherwise negative except as mentioned above            [ ]Unable to obtain    Physical Exam:  T(C): 36.6 (10-23-18 @ 11:41), Max: 36.6 (10-22-18 @ 18:09)  HR: 74 (10-23-18 @ 11:41) (48 - 74)  BP: 115/59 (10-23-18 @ 11:41) (112/54 - 151/67)  RR: 18 (10-23-18 @ 11:41) (18 - 18)  SpO2: 100% (10-23-18 @ 11:41) (98% - 100%)  Wt(kg): --    10-22 @ 07:01  -  10-23 @ 07:00  --------------------------------------------------------  IN: 0 mL / OUT: 1000 mL / NET: -1000 mL    10-23 @ 07:01  -  10-23 @ 15:39  --------------------------------------------------------  IN: 0 mL / OUT: 350 mL / NET: -350 mL      Daily Height in cm: 154.94 (22 Oct 2018 18:39)    Daily     Appearance: [x ] Normal [x ] NAD  Eyes: [x ] PERRL [x ] EOMI  HENT: [x ] Normal oral muscosa [x ]NC/AT  Neck: [x] Supple [x] FROM [x] No JVD  Cardiovascular: [x ] S1, blunted A2 [x ] RRR [x ] 3/6 ARCADIO[x ]No edema  Respiratory: [x ] Clear to auscultation bilaterally [x ] Normal Effort  Gastrointestinal: [x ] Soft [x ] Non-tender [x ] Non-distended [x ] BS+  Musculoskeletal: [x ] No clubbing [x] No joint deformity   Neurologic: [x ]Unable to assess  Lymphatic: [x ] No lymphadenopathy  Psychiatry: unable to assess  Skin: [ x] Changes consistent with PAD.  [ x] No ecchymoses [x ] No cyanosis [x ] warm and dry    Procedural Access Site: [ ] No hematoma [ ] Non-tender to palpation [ ] 2+ pulse [ ] No bruit [ ] No Ecchymosis    Cardiovascular Diagnostic Testing:  ECG: Sinus edel with anterolateral t-wave changes concerning for ischemia. These are present on old ECGs but seem more pronounced.     Echo: < from: Transthoracic Echocardiogram (10.22.18 @ 20:27) >  1. Mitral annular calcification. Severe mitral  regurgitation.  2. Calcified trileaflet aortic valve with decreased  opening. Peak transaortic valve gradient equals 85 mm Hg,  mean transaortic valve gradient equals 48 mm Hg, estimated  aortic valve area equals 0.5 sqcm (by continuity equation),  aortic valve velocity time integral equals 126 cm,  consistent with severe aortic stenosis. Severe aortic  regurgitation.  3. Severe left atrial enlargement.  LA volume index = 53  cc/m2.  4. Moderate concentric left ventricular hypertrophy.  5. Normal left ventricular systolic function.  6. Severe right atrial enlargement.  7. Right ventricle midly dilated with mildly reduced  systolic function.  8. Normal tricuspid valve. Mild tricuspid regurgitation.  9. Normal pulmonic valve. Minimal pulmonic regurgitation.  Estimated pulmonary artery systolic pressure equals 49  mm  Hg, assuming right atrial pressure equals 8 mm Hg,  consistent with mild pulmonary pressures.    < end of copied text >      Stress Testing:    Cath:    Interpretation of Telemetry:    Imaging:    Labs:                        12.7   9.8   )-----------( 91       ( 22 Oct 2018 14:12 )             36.9     10-23    141  |  112<H>  |  47<H>  ----------------------------<  102<H>  4.2   |  18<L>  |  1.33<H>    Ca    9.3      23 Oct 2018 13:50  Phos  3.0     10-23  Mg     2.0     10-23    TPro  5.0<L>  /  Alb  2.8<L>  /  TBili  2.6<H>  /  DBili  x   /  AST  30  /  ALT  20  /  AlkPhos  73  10-23    PT/INR - ( 22 Oct 2018 14:11 )   PT: 13.1 sec;   INR: 1.20 ratio         PTT - ( 22 Oct 2018 14:11 )  PTT:29.2 sec  CARDIAC MARKERS ( 21 Oct 2018 22:52 )  x     / x     / 31 U/L / x     / 1.9 ng/mL  CARDIAC MARKERS ( 21 Oct 2018 15:47 )  x     / x     / 35 U/L / x     / 2.0 ng/mL            Thyroid Stimulating Hormone, Serum: 3.49 uIU/mL (10-22 @ 06:24)

## 2018-10-23 NOTE — PROGRESS NOTE ADULT - PROBLEM SELECTOR PLAN 3
Pt recently treated as outpatient for UTI 3 weeks ago with 7 days of Cipro; urine culture grew pan-sensitive E. coli  - C/w Ceftriaxone; pt with no history of resistant organisms in the past (E. coli, Klebsiella)  - F/u urine culture; UA equivocally positive  - Irwin placed for urine output monitoring given KARRIE

## 2018-10-23 NOTE — PROGRESS NOTE ADULT - PROBLEM SELECTOR PLAN 6
Likely in the setting of KARRIE  - Repeat BMP now  - s/p insulin and D50 x1  - Holding spironolactone  - C/w IVF to treat pre-renal KARRIE Now resolved. Likely in the setting of KARRIE  - Repeat BMP now  - s/p insulin and D50 x1  - Holding spironolactone  - C/w IVF to treat pre-renal KARRIE

## 2018-10-23 NOTE — PROGRESS NOTE ADULT - PROBLEM SELECTOR PLAN 5
Pt follows up with Dr. Macdonald as outpatient for cirrhosis- likely 2/2 alcohol use in the past  - Appreciate hepatology recs  - Pt currently appears to be compensated with INR, T bili, liver enzymes at baseline  - No abnormal liver findings on CTAP  - Pt currently afebrile with no leukocytosis, although SBP is on the differential; c/w Ceftriaxone for empiric treatment, consider paracentesis if pt with no improvement  - C/w rifaximin, lactulose titrated to 2-3 BMs/day  - Holding propranolol in the setting of bradycardia  - Holding spironolactone in the setting of KARRIE and dehydration  - Monitor mental status daily  - Trend MELD labs daily; score today is 18  - Patient with hyperbilirubinemia. LDH elevated, haptoglobin was <20, but low concern for DIC Pt follows up with Dr. Macdonald as outpatient for cirrhosis- likely 2/2 alcohol use in the past  - Appreciate hepatology recs  - Pt currently appears to be compensated with INR, T bili, liver enzymes at baseline  - No abnormal liver findings on CTAP  - Pt currently afebrile with no leukocytosis, although SBP is on the differential; c/w Ceftriaxone for empiric treatment, consider paracentesis if pt with no improvement  - C/w rifaximin, lactulose titrated to 2-3 BMs/day  - Holding propranolol in the setting of bradycardia  - Holding spironolactone in the setting of KARRIE and dehydration  - Monitor mental status daily  - Trend MELD labs daily  - Patient with hyperbilirubinemia. LDH elevated, haptoglobin was <20, but low concern for DIC

## 2018-10-24 LAB
ALBUMIN SERPL ELPH-MCNC: 3.4 G/DL — SIGNIFICANT CHANGE UP (ref 3.3–5)
ALP SERPL-CCNC: 77 U/L — SIGNIFICANT CHANGE UP (ref 40–120)
ALT FLD-CCNC: 16 U/L — SIGNIFICANT CHANGE UP (ref 10–45)
ANION GAP SERPL CALC-SCNC: 11 MMOL/L — SIGNIFICANT CHANGE UP (ref 5–17)
AST SERPL-CCNC: 26 U/L — SIGNIFICANT CHANGE UP (ref 10–40)
BASOPHILS # BLD AUTO: 0 K/UL — SIGNIFICANT CHANGE UP (ref 0–0.2)
BASOPHILS NFR BLD AUTO: 0.4 % — SIGNIFICANT CHANGE UP (ref 0–2)
BILIRUB SERPL-MCNC: 2.7 MG/DL — HIGH (ref 0.2–1.2)
BUN SERPL-MCNC: 41 MG/DL — HIGH (ref 7–23)
CALCIUM SERPL-MCNC: 9.7 MG/DL — SIGNIFICANT CHANGE UP (ref 8.4–10.5)
CHLORIDE SERPL-SCNC: 111 MMOL/L — HIGH (ref 96–108)
CO2 SERPL-SCNC: 25 MMOL/L — SIGNIFICANT CHANGE UP (ref 22–31)
CREAT SERPL-MCNC: 1.28 MG/DL — SIGNIFICANT CHANGE UP (ref 0.5–1.3)
EOSINOPHIL # BLD AUTO: 0.1 K/UL — SIGNIFICANT CHANGE UP (ref 0–0.5)
EOSINOPHIL NFR BLD AUTO: 1.4 % — SIGNIFICANT CHANGE UP (ref 0–6)
GLUCOSE BLDC GLUCOMTR-MCNC: 83 MG/DL — SIGNIFICANT CHANGE UP (ref 70–99)
GLUCOSE BLDC GLUCOMTR-MCNC: 88 MG/DL — SIGNIFICANT CHANGE UP (ref 70–99)
GLUCOSE BLDC GLUCOMTR-MCNC: 91 MG/DL — SIGNIFICANT CHANGE UP (ref 70–99)
GLUCOSE SERPL-MCNC: 88 MG/DL — SIGNIFICANT CHANGE UP (ref 70–99)
HCT VFR BLD CALC: 31.5 % — LOW (ref 34.5–45)
HGB BLD-MCNC: 10.7 G/DL — LOW (ref 11.5–15.5)
INR BLD: 1.36 RATIO — HIGH (ref 0.88–1.16)
LYMPHOCYTES # BLD AUTO: 0.7 K/UL — LOW (ref 1–3.3)
LYMPHOCYTES # BLD AUTO: 17.4 % — SIGNIFICANT CHANGE UP (ref 13–44)
MAGNESIUM SERPL-MCNC: 1.9 MG/DL — SIGNIFICANT CHANGE UP (ref 1.6–2.6)
MCHC RBC-ENTMCNC: 33.9 GM/DL — SIGNIFICANT CHANGE UP (ref 32–36)
MCHC RBC-ENTMCNC: 35.9 PG — HIGH (ref 27–34)
MCV RBC AUTO: 106 FL — HIGH (ref 80–100)
MONOCYTES # BLD AUTO: 0.4 K/UL — SIGNIFICANT CHANGE UP (ref 0–0.9)
MONOCYTES NFR BLD AUTO: 10 % — SIGNIFICANT CHANGE UP (ref 2–14)
NEUTROPHILS # BLD AUTO: 3 K/UL — SIGNIFICANT CHANGE UP (ref 1.8–7.4)
NEUTROPHILS NFR BLD AUTO: 70.8 % — SIGNIFICANT CHANGE UP (ref 43–77)
PHOSPHATE SERPL-MCNC: 2.7 MG/DL — SIGNIFICANT CHANGE UP (ref 2.5–4.5)
PLATELET # BLD AUTO: 76 K/UL — LOW (ref 150–400)
POTASSIUM SERPL-MCNC: 4.1 MMOL/L — SIGNIFICANT CHANGE UP (ref 3.5–5.3)
POTASSIUM SERPL-SCNC: 4.1 MMOL/L — SIGNIFICANT CHANGE UP (ref 3.5–5.3)
PROT SERPL-MCNC: 5.4 G/DL — LOW (ref 6–8.3)
PROTHROM AB SERPL-ACNC: 14.9 SEC — HIGH (ref 9.8–12.7)
RBC # BLD: 2.98 M/UL — LOW (ref 3.8–5.2)
RBC # FLD: 13.5 % — SIGNIFICANT CHANGE UP (ref 10.3–14.5)
SODIUM SERPL-SCNC: 147 MMOL/L — HIGH (ref 135–145)
VANCOMYCIN FLD-MCNC: 7.5 UG/ML — SIGNIFICANT CHANGE UP
WBC # BLD: 4.2 K/UL — SIGNIFICANT CHANGE UP (ref 3.8–10.5)
WBC # FLD AUTO: 4.2 K/UL — SIGNIFICANT CHANGE UP (ref 3.8–10.5)

## 2018-10-24 PROCEDURE — 99233 SBSQ HOSP IP/OBS HIGH 50: CPT | Mod: GC

## 2018-10-24 PROCEDURE — 99232 SBSQ HOSP IP/OBS MODERATE 35: CPT | Mod: GC

## 2018-10-24 RX ORDER — LACTULOSE 10 G/15ML
20 SOLUTION ORAL EVERY 6 HOURS
Qty: 0 | Refills: 0 | Status: DISCONTINUED | OUTPATIENT
Start: 2018-10-24 | End: 2018-10-29

## 2018-10-24 RX ORDER — SODIUM CHLORIDE 9 MG/ML
1000 INJECTION, SOLUTION INTRAVENOUS
Qty: 0 | Refills: 0 | Status: DISCONTINUED | OUTPATIENT
Start: 2018-10-24 | End: 2018-10-25

## 2018-10-24 RX ADMIN — LACTULOSE 20 GRAM(S): 10 SOLUTION ORAL at 02:25

## 2018-10-24 RX ADMIN — LACTULOSE 20 GRAM(S): 10 SOLUTION ORAL at 06:00

## 2018-10-24 RX ADMIN — Medication 0.25 MILLIGRAM(S): at 06:41

## 2018-10-24 RX ADMIN — SODIUM CHLORIDE 50 MILLILITER(S): 9 INJECTION, SOLUTION INTRAVENOUS at 11:17

## 2018-10-24 RX ADMIN — Medication 12.5 MICROGRAM(S): at 21:37

## 2018-10-24 RX ADMIN — LACTULOSE 20 GRAM(S): 10 SOLUTION ORAL at 12:03

## 2018-10-24 RX ADMIN — Medication 0.25 MILLIGRAM(S): at 17:25

## 2018-10-24 RX ADMIN — LACTULOSE 20 GRAM(S): 10 SOLUTION ORAL at 17:25

## 2018-10-24 NOTE — PROGRESS NOTE ADULT - SUBJECTIVE AND OBJECTIVE BOX
HA JACQUES  89y Female    Patient is a 89y old  Female who presents with a chief complaint of Nausea, vomiting (21 Oct 2018 18:00)    INTERVAL HPI/OVERNIGHT EVENTS:    MEDICATIONS  (STANDING):  buDESOnide   0.25 milliGRAM(s) Respule 0.25 milliGRAM(s) Inhalation two times a day  cefTRIAXone   IVPB 1 Gram(s) IV Intermittent every 24 hours  influenza   Vaccine 0.5 milliLiter(s) IntraMuscular once  lactulose Syrup 20 Gram(s) Oral every 4 hours  levothyroxine Injectable 12.5 MICROGram(s) IV Push at bedtime  rifaximin 550 milliGRAM(s) Oral two times a day    MEDICATIONS  (PRN):  ALBUTerol/ipratropium for Nebulization 3 milliLiter(s) Nebulizer every 12 hours PRN Shortness of Breath and/or Wheezing  ondansetron Injectable 4 milliGRAM(s) IV Push every 6 hours PRN Nausea and/or Vomiting    Vital Signs Last 24 Hrs  T(C): 36.6 (24 Oct 2018 04:38), Max: 36.6 (23 Oct 2018 11:41)  T(F): 97.9 (24 Oct 2018 04:38), Max: 97.9 (23 Oct 2018 11:41)  HR: 58 (24 Oct 2018 04:38) (54 - 74)  BP: 102/43 (24 Oct 2018 04:38) (102/43 - 152/71)  BP(mean): --  RR: 18 (24 Oct 2018 04:38) (18 - 18)  SpO2: 100% (24 Oct 2018 04:38) (98% - 100%)    PHYSICAL EXAM:  SKIN: Multiple ecchymoses on bilateral arms; R arm is erythematous, warm and tender to palpation    LABS: ANA MARIA JACQUESIS  89y Female    Patient is a 89y old  Female who presents with a chief complaint of Nausea, vomiting (21 Oct 2018 18:00)    INTERVAL HPI/OVERNIGHT EVENTS: Patient is awake and responding to questions this AM and A+Ox1. States that the R arm is not painful, but appears considerably erythematous and shiny. Patient was retaining overnight and was straight catheterized.    MEDICATIONS  (STANDING):  buDESOnide   0.25 milliGRAM(s) Respule 0.25 milliGRAM(s) Inhalation two times a day  cefTRIAXone   IVPB 1 Gram(s) IV Intermittent every 24 hours  influenza   Vaccine 0.5 milliLiter(s) IntraMuscular once  lactulose Syrup 20 Gram(s) Oral every 4 hours  levothyroxine Injectable 12.5 MICROGram(s) IV Push at bedtime  rifaximin 550 milliGRAM(s) Oral two times a day    MEDICATIONS  (PRN):  ALBUTerol/ipratropium for Nebulization 3 milliLiter(s) Nebulizer every 12 hours PRN Shortness of Breath and/or Wheezing  ondansetron Injectable 4 milliGRAM(s) IV Push every 6 hours PRN Nausea and/or Vomiting    Vital Signs Last 24 Hrs  T(C): 36.6 (24 Oct 2018 04:38), Max: 36.6 (23 Oct 2018 11:41)  T(F): 97.9 (24 Oct 2018 04:38), Max: 97.9 (23 Oct 2018 11:41)  HR: 58 (24 Oct 2018 04:38) (54 - 74)  BP: 102/43 (24 Oct 2018 04:38) (102/43 - 152/71)  BP(mean): --  RR: 18 (24 Oct 2018 04:38) (18 - 18)  SpO2: 100% (24 Oct 2018 04:38) (98% - 100%)    PHYSICAL EXAM:  GENERAL: NAD, cachectic  HEAD:  Atraumatic, Normocephalic  EYES: EOMI, conjunctiva and sclera clear  NECK: Supple, No JVD  CHEST/LUNG: Clear to auscultation bilaterally; No wheeze, ronchi or rales  HEART: No rubs, or gallops, systolic ejection murmur  ABDOMEN: Soft, Nontender, Nondistended; Bowel sounds present  EXTREMITIES:  No edema on lower extremities  PSYCH: AAOx1  NEUROLOGY: non-focal  SKIN: Multiple ecchymoses on bilateral arms; R arm is erythematous, swolloen and warm and tender to palpation    LABS: ANA MARIA JACQUESIS  89y Female    Patient is a 89y old  Female who presents with a chief complaint of Nausea, vomiting (21 Oct 2018 18:00)    INTERVAL HPI/OVERNIGHT EVENTS: Patient is awake and responding to questions this AM and A+Ox1. States that the R arm is not painful, but appears considerably erythematous and shiny. Patient was retaining overnight and was straight catheterized.    MEDICATIONS  (STANDING):  buDESOnide   0.25 milliGRAM(s) Respule 0.25 milliGRAM(s) Inhalation two times a day  cefTRIAXone   IVPB 1 Gram(s) IV Intermittent every 24 hours  influenza   Vaccine 0.5 milliLiter(s) IntraMuscular once  lactulose Syrup 20 Gram(s) Oral every 4 hours  levothyroxine Injectable 12.5 MICROGram(s) IV Push at bedtime  rifaximin 550 milliGRAM(s) Oral two times a day    MEDICATIONS  (PRN):  ALBUTerol/ipratropium for Nebulization 3 milliLiter(s) Nebulizer every 12 hours PRN Shortness of Breath and/or Wheezing  ondansetron Injectable 4 milliGRAM(s) IV Push every 6 hours PRN Nausea and/or Vomiting    Vital Signs Last 24 Hrs  T(C): 36.6 (24 Oct 2018 04:38), Max: 36.6 (23 Oct 2018 11:41)  T(F): 97.9 (24 Oct 2018 04:38), Max: 97.9 (23 Oct 2018 11:41)  HR: 58 (24 Oct 2018 04:38) (54 - 74)  BP: 102/43 (24 Oct 2018 04:38) (102/43 - 152/71)  BP(mean): --  RR: 18 (24 Oct 2018 04:38) (18 - 18)  SpO2: 100% (24 Oct 2018 04:38) (98% - 100%)    PHYSICAL EXAM:  GENERAL: NAD, cachectic  HEAD:  Atraumatic, Normocephalic  EYES: EOMI, conjunctiva and sclera clear  NECK: Supple, No JVD  CHEST/LUNG: Clear to auscultation bilaterally; No wheeze, ronchi or rales  HEART: No rubs, or gallops, systolic ejection murmur  ABDOMEN: Soft, Nontender, Nondistended; Bowel sounds present  EXTREMITIES:  No edema on lower extremities  PSYCH: AAOx1  NEUROLOGY: non-focal  SKIN: Multiple ecchymoses on bilateral arms; R arm is erythematous, swollen and warm, but nontender to palpation    LABS:  CBC Full  -  ( 24 Oct 2018 10:49 )  WBC Count : 4.2 K/uL  Hemoglobin : 10.7 g/dL  Hematocrit : 31.5 %  Platelet Count - Automated : 76 K/uL  Mean Cell Volume : 106.0 fl  Mean Cell Hemoglobin : 35.9 pg  Mean Cell Hemoglobin Concentration : 33.9 gm/dL  Auto Neutrophil # : 3.0 K/uL  Auto Lymphocyte # : 0.7 K/uL  Auto Monocyte # : 0.4 K/uL  Auto Eosinophil # : 0.1 K/uL  Auto Basophil # : 0.0 K/uL  Auto Neutrophil % : 70.8 %  Auto Lymphocyte % : 17.4 %  Auto Monocyte % : 10.0 %  Auto Eosinophil % : 1.4 %  Auto Basophil % : 0.4 %    10-24    147<H>  |  111<H>  |  41<H>  ----------------------------<  88  4.1   |  25  |  1.28    Ca    9.7      24 Oct 2018 10:30  Phos  2.7     10-24  Mg     1.9     10-24    TPro  5.4<L>  /  Alb  3.4  /  TBili  2.7<H>  /  DBili  x   /  AST  26  /  ALT  16  /  AlkPhos  77  10-24

## 2018-10-24 NOTE — PROGRESS NOTE ADULT - SUBJECTIVE AND OBJECTIVE BOX
Chief Complaint:  Patient is a 89y old  Female who presents with a chief complaint of Nausea, vomiting (24 Oct 2018 07:25)      Interval Events:   This morning patient much more awake. Able to express thirst and also stating that she feels much better this morning.      Allergies:  adhesives (Other)  codeine (Rash)  erythromycin (Rash)  iv dye (Rash)  penicillin (Rash)  shellfish (Rash)  warfarin (Other)        Hospital Medications:  ALBUTerol/ipratropium for Nebulization 3 milliLiter(s) Nebulizer every 12 hours PRN  buDESOnide   0.25 milliGRAM(s) Respule 0.25 milliGRAM(s) Inhalation two times a day  cefTRIAXone   IVPB 1 Gram(s) IV Intermittent every 24 hours  influenza   Vaccine 0.5 milliLiter(s) IntraMuscular once  lactulose Syrup 20 Gram(s) Oral every 4 hours  levothyroxine Injectable 12.5 MICROGram(s) IV Push at bedtime  ondansetron Injectable 4 milliGRAM(s) IV Push every 6 hours PRN  rifaximin 550 milliGRAM(s) Oral two times a day      PMHX/PSHX:  PVD (peripheral vascular disease)  Liver cell carcinoma  Severe aortic stenosis by prior echocardiogram  Pulmonary HTN  CKD (chronic kidney disease), stage 3 (moderate)  COPD (Chronic Obstructive Pulmonary Disease)  AF (Atrial Fibrillation)  Portal Hypertension  Pneumonia  Metastasis to Liver  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  HTN (Hypertension)  Bleeding Esophageal Varices  CLL (Chronic Lymphoblastic Leukemia)  GIB (Gastrointestinal Bleeding)  History of repair of hip fracture  Esophageal Rupture      Family history:  No pertinent family history in first degree relatives      PHYSICAL EXAM:   Vital Signs:  Vital Signs Last 24 Hrs  T(C): 36.6 (24 Oct 2018 04:38), Max: 36.6 (23 Oct 2018 11:41)  T(F): 97.9 (24 Oct 2018 04:38), Max: 97.9 (23 Oct 2018 11:41)  HR: 58 (24 Oct 2018 04:38) (54 - 74)  BP: 102/43 (24 Oct 2018 04:38) (102/43 - 152/71)  BP(mean): --  RR: 18 (24 Oct 2018 04:38) (18 - 18)  SpO2: 100% (24 Oct 2018 04:38) (98% - 100%)  Daily     Daily     GENERAL:  NAD, asking for water  CHEST:  no increased effort  ABDOMEN:  Soft, non-tender, non-distended, normoactive bowel sounds,  no masses , no hepato-splenomegaly, no signs of chronic liver disease  EXTEREMITIES:  erythematous RUE noted  NEURO:  easily arousable, oriented    LABS:                        10.9   4.67  )-----------( 77       ( 23 Oct 2018 16:17 )             34.0     Mean Cell Volume: 106.6 fl (10-23-18 @ 16:17)    10-23    141  |  112<H>  |  47<H>  ----------------------------<  102<H>  4.2   |  18<L>  |  1.33<H>    Ca    9.3      23 Oct 2018 13:50  Phos  3.0     10-23  Mg     2.0     10-23    TPro  5.0<L>  /  Alb  2.8<L>  /  TBili  2.6<H>  /  DBili  x   /  AST  30  /  ALT  20  /  AlkPhos  73  10-23    LIVER FUNCTIONS - ( 23 Oct 2018 13:50 )  Alb: 2.8 g/dL / Pro: 5.0 g/dL / ALK PHOS: 73 U/L / ALT: 20 U/L / AST: 30 U/L / GGT: x           PT/INR - ( 22 Oct 2018 14:11 )   PT: 13.1 sec;   INR: 1.20 ratio         PTT - ( 22 Oct 2018 14:11 )  PTT:29.2 sec                            10.9   4.67  )-----------( 77       ( 23 Oct 2018 16:17 )             34.0                         12.7   9.8   )-----------( 91       ( 22 Oct 2018 14:12 )             36.9                         12.0   6.2   )-----------( 100      ( 21 Oct 2018 14:52 )             36.2     Imaging: Chief Complaint:  Patient is a 89y old  Female who presents with a chief complaint of Nausea, vomiting (24 Oct 2018 07:25)      Interval Events:   This morning patient much more awake. Able to express thirst and also stating that she feels much better this morning.      Allergies:  adhesives (Other)  codeine (Rash)  erythromycin (Rash)  iv dye (Rash)  penicillin (Rash)  shellfish (Rash)  warfarin (Other)        Hospital Medications:  ALBUTerol/ipratropium for Nebulization 3 milliLiter(s) Nebulizer every 12 hours PRN  buDESOnide   0.25 milliGRAM(s) Respule 0.25 milliGRAM(s) Inhalation two times a day  cefTRIAXone   IVPB 1 Gram(s) IV Intermittent every 24 hours  influenza   Vaccine 0.5 milliLiter(s) IntraMuscular once  lactulose Syrup 20 Gram(s) Oral every 4 hours  levothyroxine Injectable 12.5 MICROGram(s) IV Push at bedtime  ondansetron Injectable 4 milliGRAM(s) IV Push every 6 hours PRN  rifaximin 550 milliGRAM(s) Oral two times a day      PMHX/PSHX:  PVD (peripheral vascular disease)  Liver cell carcinoma  Severe aortic stenosis by prior echocardiogram  Pulmonary HTN  CKD (chronic kidney disease), stage 3 (moderate)  COPD (Chronic Obstructive Pulmonary Disease)  AF (Atrial Fibrillation)  Portal Hypertension  Pneumonia  Metastasis to Liver  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  HTN (Hypertension)  Bleeding Esophageal Varices  CLL (Chronic Lymphoblastic Leukemia)  GIB (Gastrointestinal Bleeding)  History of repair of hip fracture  Esophageal Rupture      Family history:  No pertinent family history in first degree relatives      PHYSICAL EXAM:   Vital Signs:  Vital Signs Last 24 Hrs  T(C): 36.6 (24 Oct 2018 04:38), Max: 36.6 (23 Oct 2018 11:41)  T(F): 97.9 (24 Oct 2018 04:38), Max: 97.9 (23 Oct 2018 11:41)  HR: 58 (24 Oct 2018 04:38) (54 - 74)  BP: 102/43 (24 Oct 2018 04:38) (102/43 - 152/71)  BP(mean): --  RR: 18 (24 Oct 2018 04:38) (18 - 18)  SpO2: 100% (24 Oct 2018 04:38) (98% - 100%)  Daily     Daily     GENERAL:  NAD, asking for water  CV: RRR  CHEST:  no increased effort, CTAB  ABDOMEN:  Soft, non-tender, non-distended, normoactive bowel sounds,  no masses , no hepato-splenomegaly,   EXTEREMITIES:  Cachexia, decreased skin turgor, no edema  NEURO:  easily arousable, oriented x3, +mild psychomotor slowing, +mild asterixis  SKIN:  Decreased skin turgor, erythematous RUE with blister    LABS:                        10.9   4.67  )-----------( 77       ( 23 Oct 2018 16:17 )             34.0     Mean Cell Volume: 106.6 fl (10-23-18 @ 16:17)    10-23    141  |  112<H>  |  47<H>  ----------------------------<  102<H>  4.2   |  18<L>  |  1.33<H>    Ca    9.3      23 Oct 2018 13:50  Phos  3.0     10-23  Mg     2.0     10-23    TPro  5.0<L>  /  Alb  2.8<L>  /  TBili  2.6<H>  /  DBili  x   /  AST  30  /  ALT  20  /  AlkPhos  73  10-23    LIVER FUNCTIONS - ( 23 Oct 2018 13:50 )  Alb: 2.8 g/dL / Pro: 5.0 g/dL / ALK PHOS: 73 U/L / ALT: 20 U/L / AST: 30 U/L / GGT: x           PT/INR - ( 22 Oct 2018 14:11 )   PT: 13.1 sec;   INR: 1.20 ratio         PTT - ( 22 Oct 2018 14:11 )  PTT:29.2 sec                            10.9   4.67  )-----------( 77       ( 23 Oct 2018 16:17 )             34.0                         12.7   9.8   )-----------( 91       ( 22 Oct 2018 14:12 )             36.9                         12.0   6.2   )-----------( 100      ( 21 Oct 2018 14:52 )             36.2     Imaging:

## 2018-10-24 NOTE — PROGRESS NOTE ADULT - PROBLEM SELECTOR PLAN 4
Improving. Likely prerenal in the setting of nausea/vomiting and decreased PO intake  - BUN:creatinine ratio >30; likely prerenal KARRIE  - C/w NS @75cc/hr given cirrhosis and AS, monitor fluid status closely  - Will repeat BMP in an hour  - Avoid nephrotoxic agents and renally dose medications  - FENa= 0.2%

## 2018-10-24 NOTE — PROGRESS NOTE ADULT - PROBLEM SELECTOR PLAN 7
Pt with sinus bradycardia on telemetry, with rates ranging from 40-50, which appears to be new  - Holding propranolol  - TWI seen in V1-V3, pt currently encephalopathic, unable to assess for ongoing chest pain  - Continue to monitor on telemetry Pt with sinus bradycardia on telemetry, with rates ranging from 40-50, which appears to be new  - Holding propranolol  - TWI seen in V1-V3, pt currently encephalopathic, unable to assess for ongoing chest pain  - Continue to monitor on telemetry, hold digoxin

## 2018-10-24 NOTE — PROGRESS NOTE ADULT - PROBLEM SELECTOR PLAN 3
Pt recently treated as outpatient for UTI 3 weeks ago with 7 days of Cipro; urine culture grew pan-sensitive E. coli  - C/w Ceftriaxone; pt with no history of resistant organisms in the past (E. coli, Klebsiella)  - F/u urine culture; UA equivocally positive  - Irwin placed for urine output monitoring given KARRIE Pt recently treated as outpatient for UTI 3 weeks ago with 7 days of Cipro; urine culture grew pan-sensitive E. coli  - C/w Ceftriaxone; pt with no history of resistant organisms in the past (E. coli, Klebsiella)  - Urine culture shows NGTD; UA equivocally positive  - Continue with TOV protocol Pt recently treated as outpatient for UTI 3 weeks ago with 7 days of Cipro; urine culture grew pan-sensitive E. coli  - last day of ceftriaxone on 10/24; pt with no history of resistant organisms in the past (E. coli, Klebsiella)  - Urine culture shows NGTD; UA equivocally positive  - Continue with TOV protocol

## 2018-10-24 NOTE — PROGRESS NOTE ADULT - ASSESSMENT
89 year-old woman with PMH of CLL s/p Rituximab, COPD, bronchiectasis, AFib (not on anticoagulation, severe aortic stenosis and mitral regurgitation, HTN, cervical osteomyelitis, h/o variceal bleed s/p banding, mini-strokes, Alzheimer's, decompensated cirrhosis with recurrent encephalopathy and HCC s/p ablation and incomplete treatment, concerning for metabolic encephalopathy 2/2 UTI vs hepatic decompensation. 89 year-old woman with PMH of CLL s/p Rituximab, COPD, bronchiectasis, AFib (not on anticoagulation, severe aortic stenosis and mitral regurgitation, HTN, cervical osteomyelitis, h/o variceal bleed s/p banding, mini-strokes, Alzheimer's, decompensated cirrhosis with recurrent encephalopathy and HCC s/p ablation and incomplete treatment, concerning for metabolic encephalopathy 2/2 UTI vs hepatic decompensation vs digoxin toxicity.

## 2018-10-24 NOTE — CDI QUERY NOTE - NSCDIOTHERTXTBX_GEN_ALL_CORE_HH
- Pt was admitted w/metabolic encephalopathy 2/2 UTI vs hepatic decompensation vs dig toxicity  - pt was treated as an outpatient 3weeks ago for UTI w/7 days of Cipro  - Medicine notes document r/o UTI: UA equivocally positive  - UCX negative  - pt treated w/IV Ceftriaxone from 10/21 - 24 for UTI  - pt has a h/o decompensated liver failure/hepatic encephalopathy, alcoholic liver cirrhosis w/ascites, pt being treated w/Rifaximin and Lactulose  - 10/23 Medicine note documents pt has R arm cellulitis at site of prior IV, then attending statement says no evidence of cellulitis, received IV Vancox1   - no SBP noted, no abdominal source of infection on CT A/P, BCX negative  - Medicine notes documented pt has KARRIE on CKD3 2/2 sepsis  - 10/23 Hepatology note documents does not meet SIRS criteria, decompensated cirrhosis: MELD-Na 21 on admission, liver function appears stable overall  - 0/24 Medicine note documents symptoms concerning for dig toxicity even though level is therapeutic (N/V, hyperkalemia, AMS)  - on admission: pt afebrile, not tachycardic, RR 15-20, WBC normal, Lactate 2.8, Creatinine elevated w/KARRIE, UA positive but UCX negative, BCX negative    Please clarify the status of sepsis in your notes:  > Sepsis ruled in  > Sepsis ruled out  > Sepsis resolving    Please document the probable/likely/suspected etiology of sepsis:  > Due to local infection (specify)  > Due to device, implant, prosthesis or other medical or surgical care (specify)  > Postoperative  > Other  > Unknown    Please document if it was present on admission:

## 2018-10-24 NOTE — PROGRESS NOTE ADULT - PROBLEM SELECTOR PLAN 2
Pt presenting with acute onset of nausea and vomiting beginning this morning, with 4-5 episodes. Pt currently denies abdominal pain  - CT abdomen/pelvis negative for any acute pathology, only finding of cirrhosis with small amount of ascites  - N/V may be 2/2 ongoing infection versus gastroenteritis versus ACS  - QTc normal; c/w Zofran PRN for nausea/vomiting  - Troponin downtrending  - C/w NS @75cc/hr for dehydration; pt appears to be hypovolemic on exam  - Infectious workup ongoing  - RUQ ultrasound with dopplers shows no portal vein thrombus  - TSH and digoxin wml  - TTE shows normal EF  - Patient's symptoms are concerning for digoxin toxicity, even though level was therapeutic. (nausea, vomting, hyperkalemia, altered mental status). Plan to hold digoxin. Pt presenting with acute onset of nausea and vomiting beginning this morning, with 4-5 episodes. Pt currently denies abdominal pain  - CT abdomen/pelvis negative for any acute pathology, only finding of cirrhosis with small amount of ascites  - N/V may be 2/2 ongoing infection versus gastroenteritis versus ACS  - QTc normal; c/w Zofran PRN for nausea/vomiting  - Troponin downtrending  - C/w NS @75cc/hr for dehydration; pt appears to be hypovolemic on exam  - Infectious workup ongoing  - RUQ ultrasound with dopplers shows no portal vein thrombus  - TSH and digoxin wnl  - TTE shows normal EF  - Patient's symptoms are concerning for digoxin toxicity, even though level was therapeutic. (nausea, vomting, hyperkalemia, altered mental status). Holding digoxin.

## 2018-10-24 NOTE — PROGRESS NOTE ADULT - PROBLEM SELECTOR PLAN 9
Pt currently not on AC 2/2 history of ICH in the past  - CHADSVASC score 6  - Hold digoxin  - Pt currently in sinus bradycardia Pt currently not on AC 2/2 history of ICH in the past  - CHADSVASC score 6  - Hold digoxin  - Pt currently in sinus bradycardia with occasional PATs

## 2018-10-24 NOTE — PROGRESS NOTE ADULT - PROBLEM SELECTOR PLAN 5
Pt follows up with Dr. Macdonald as outpatient for cirrhosis- likely 2/2 alcohol use in the past  - Appreciate hepatology recs  - Pt currently appears to be compensated with INR, T bili, liver enzymes at baseline  - No abnormal liver findings on CTAP  - Pt currently afebrile with no leukocytosis, although SBP is on the differential; c/w Ceftriaxone for empiric treatment, consider paracentesis if pt with no improvement  - C/w rifaximin, lactulose titrated to 2-3 BMs/day  - Holding propranolol in the setting of bradycardia  - Holding spironolactone in the setting of KARRIE and dehydration  - Monitor mental status daily  - Trend MELD labs daily  - Patient with hyperbilirubinemia. LDH elevated, haptoglobin was <20, but low concern for DIC Pt follows up with Dr. Macdonald as outpatient for cirrhosis- likely 2/2 alcohol use in the past  - Appreciate hepatology recs  - Pt currently appears to be compensated with INR, T bili, liver enzymes at baseline  - No abnormal liver findings on CTAP  - Pt currently afebrile with no leukocytosis, although SBP is on the differential  - C/w rifaximin, lactulose titrated to 2-3 BMs/day  - Holding propranolol in the setting of bradycardia  - Holding spironolactone in the setting of KARRIE and dehydration  - Monitor mental status daily  - Trend MELD labs daily  - Patient with hyperbilirubinemia. LDH elevated, haptoglobin was <20, but low concern for DIC

## 2018-10-24 NOTE — PROGRESS NOTE ADULT - ASSESSMENT
89 year-old woman with PMH of CLL s/p Rituximab, COPD, bronchiectasis, AFib, severe AS and MR, HTN, cervical osteomyelitis, h/o variceal bleed s/p banding, TIAs, Alzheimer's, history of decompensated cirrhosis with recurrent encephalopathy and HCC s/p ablation and incomplete treatment, presents with encephalopathy.    Problems:  1) Encephalopathy - likely toxic metabolic in setting of infection now with significant improvement  2) Decompensated cirrhosis, MELD-Na 21 on admission        - varices: history of banding at outside facility in past, on Coreg > 1 year        - ascites: minimal on CT this admission, on spironolactone only as outpatient         - HE: history of recurrent HE, on lactulose and rifaximin as outpatient         - HCC: history of lesion s/p microwave ablation partially successful, patient's family wishes no further intervention  3) Likely urinary tract infection vs cellulitis    Recommendations:  -  infectious workup per primary team, empiric abx, f/u Ucx and Bcx  - continue Xifaxan BID  - c/w lactulose q4h   - continue beta blocker for esophageal varices as BP tolerates  - recommend continuing careful volume replacement with NS boluses or PO intake now that patient is awake with close monitoring for any development of fluid overload  - trend CMP, INR, CBC daily    Tania Conte, PGY-4  Gastroenterology Fellow  Pager x [phone] or [phone]  (After 5 pm or on weekends please page GI on call) 89 year-old woman with PMH of CLL s/p Rituximab, COPD, bronchiectasis, AFib, severe AS and MR, HTN, cervical osteomyelitis, h/o variceal bleed s/p banding, TIAs, Alzheimer's, history of decompensated cirrhosis with recurrent encephalopathy and HCC s/p ablation and incomplete treatment, presents with encephalopathy.    Problems:  1) Encephalopathy - likely toxic / metabolic and PSE in setting of infection now with significant improvement  2) Decompensated cirrhosis, MELD-Na 21 on admission        - varices: history of banding at outside facility in past, on Coreg > 1 year        - ascites: minimal on CT this admission, on spironolactone only as outpatient         - HE: history of recurrent HE, on lactulose and rifaximin as outpatient         - HCC: history of lesion s/p microwave ablation partially successful, patient's family wishes no further intervention  3) Likely urinary tract infection vs cellulitis    Recommendations:  -  infectious workup per primary team, empiric abx, f/u Ucx and Bcx  - continue Xifaxan BID  - c/w lactulose q4h   - continue beta blocker for esophageal varices as BP tolerates  - recommend continuing careful volume replacement with NS boluses or PO intake now that patient is awake with close monitoring for any development of fluid overload  - trend CMP, INR, CBC daily    Tania Conte, PGY-4  Gastroenterology Fellow  Pager x [phone] or [phone]  (After 5 pm or on weekends please page GI on call)

## 2018-10-24 NOTE — PROGRESS NOTE ADULT - PROBLEM SELECTOR PLAN 6
Now resolved. Likely in the setting of KARRIE  - Repeat BMP now  - s/p insulin and D50 x1  - Holding spironolactone  - C/w IVF to treat pre-renal KARRIE

## 2018-10-24 NOTE — PROGRESS NOTE ADULT - PROBLEM SELECTOR PLAN 10
Pt with severe AS seen on TTE from 2016; currently follows with Dr. Boston as an outpatient  - Continue to monitor and assess fluid status daily; c/w gentle IVF hydration    Problem 11: Cellulitis of R arm  - Follow up blood cultures, peripheral IV removed from R arm  - Patient started on vancomycin; f/u trough    Problem 12: Prophylactic measure  - Patient is DNR, but not DNI. Family would like pressors. Pt with severe AS seen on TTE from 2016; currently follows with Dr. Boston as an outpatient  - Continue to monitor and assess fluid status daily; c/w gentle IVF hydration    Problem 11: IV infiltration of R arm  - Blood cultures show NGTD, peripheral IV removed from R arm  - s/p vancomycin x 1    Problem 12: Prophylactic measure  - Patient is DNR, but not DNI. Family would like pressors.

## 2018-10-25 LAB
ALBUMIN SERPL ELPH-MCNC: 3.3 G/DL — SIGNIFICANT CHANGE UP (ref 3.3–5)
ALP SERPL-CCNC: 82 U/L — SIGNIFICANT CHANGE UP (ref 40–120)
ALT FLD-CCNC: 16 U/L — SIGNIFICANT CHANGE UP (ref 10–45)
ANION GAP SERPL CALC-SCNC: 10 MMOL/L — SIGNIFICANT CHANGE UP (ref 5–17)
AST SERPL-CCNC: 30 U/L — SIGNIFICANT CHANGE UP (ref 10–40)
BASOPHILS # BLD AUTO: 0 K/UL — SIGNIFICANT CHANGE UP (ref 0–0.2)
BASOPHILS NFR BLD AUTO: 0 % — SIGNIFICANT CHANGE UP (ref 0–2)
BILIRUB SERPL-MCNC: 2.6 MG/DL — HIGH (ref 0.2–1.2)
BUN SERPL-MCNC: 38 MG/DL — HIGH (ref 7–23)
CALCIUM SERPL-MCNC: 10 MG/DL — SIGNIFICANT CHANGE UP (ref 8.4–10.5)
CHLORIDE SERPL-SCNC: 113 MMOL/L — HIGH (ref 96–108)
CO2 SERPL-SCNC: 24 MMOL/L — SIGNIFICANT CHANGE UP (ref 22–31)
CREAT SERPL-MCNC: 1.04 MG/DL — SIGNIFICANT CHANGE UP (ref 0.5–1.3)
DIGOXIN SERPL-MCNC: 0.5 NG/ML — LOW (ref 0.8–2)
EOSINOPHIL # BLD AUTO: 0.1 K/UL — SIGNIFICANT CHANGE UP (ref 0–0.5)
EOSINOPHIL NFR BLD AUTO: 2.4 % — SIGNIFICANT CHANGE UP (ref 0–6)
GLUCOSE BLDC GLUCOMTR-MCNC: 117 MG/DL — HIGH (ref 70–99)
GLUCOSE BLDC GLUCOMTR-MCNC: 88 MG/DL — SIGNIFICANT CHANGE UP (ref 70–99)
GLUCOSE SERPL-MCNC: 123 MG/DL — HIGH (ref 70–99)
HCT VFR BLD CALC: 33.7 % — LOW (ref 34.5–45)
HGB BLD-MCNC: 11.1 G/DL — LOW (ref 11.5–15.5)
INR BLD: 1.35 RATIO — HIGH (ref 0.88–1.16)
LYMPHOCYTES # BLD AUTO: 0.6 K/UL — LOW (ref 1–3.3)
LYMPHOCYTES # BLD AUTO: 12.3 % — LOW (ref 13–44)
MAGNESIUM SERPL-MCNC: 1.9 MG/DL — SIGNIFICANT CHANGE UP (ref 1.6–2.6)
MCHC RBC-ENTMCNC: 32.9 GM/DL — SIGNIFICANT CHANGE UP (ref 32–36)
MCHC RBC-ENTMCNC: 35 PG — HIGH (ref 27–34)
MCV RBC AUTO: 106 FL — HIGH (ref 80–100)
MONOCYTES # BLD AUTO: 0.4 K/UL — SIGNIFICANT CHANGE UP (ref 0–0.9)
MONOCYTES NFR BLD AUTO: 9.2 % — SIGNIFICANT CHANGE UP (ref 2–14)
NEUTROPHILS # BLD AUTO: 3.5 K/UL — SIGNIFICANT CHANGE UP (ref 1.8–7.4)
NEUTROPHILS NFR BLD AUTO: 76.1 % — SIGNIFICANT CHANGE UP (ref 43–77)
PHOSPHATE SERPL-MCNC: 2.1 MG/DL — LOW (ref 2.5–4.5)
PLATELET # BLD AUTO: 77 K/UL — LOW (ref 150–400)
POTASSIUM SERPL-MCNC: 4.4 MMOL/L — SIGNIFICANT CHANGE UP (ref 3.5–5.3)
POTASSIUM SERPL-SCNC: 4.4 MMOL/L — SIGNIFICANT CHANGE UP (ref 3.5–5.3)
PROT SERPL-MCNC: 5.6 G/DL — LOW (ref 6–8.3)
PROTHROM AB SERPL-ACNC: 14.7 SEC — HIGH (ref 9.8–12.7)
RBC # BLD: 3.17 M/UL — LOW (ref 3.8–5.2)
RBC # FLD: 13.8 % — SIGNIFICANT CHANGE UP (ref 10.3–14.5)
SODIUM SERPL-SCNC: 147 MMOL/L — HIGH (ref 135–145)
WBC # BLD: 4.6 K/UL — SIGNIFICANT CHANGE UP (ref 3.8–10.5)
WBC # FLD AUTO: 4.6 K/UL — SIGNIFICANT CHANGE UP (ref 3.8–10.5)

## 2018-10-25 PROCEDURE — 99232 SBSQ HOSP IP/OBS MODERATE 35: CPT | Mod: GC

## 2018-10-25 PROCEDURE — 99233 SBSQ HOSP IP/OBS HIGH 50: CPT | Mod: GC

## 2018-10-25 PROCEDURE — 93010 ELECTROCARDIOGRAM REPORT: CPT

## 2018-10-25 RX ORDER — SODIUM CHLORIDE 9 MG/ML
1000 INJECTION, SOLUTION INTRAVENOUS
Qty: 0 | Refills: 0 | Status: DISCONTINUED | OUTPATIENT
Start: 2018-10-25 | End: 2018-10-25

## 2018-10-25 RX ORDER — POTASSIUM PHOSPHATE, MONOBASIC POTASSIUM PHOSPHATE, DIBASIC 236; 224 MG/ML; MG/ML
15 INJECTION, SOLUTION INTRAVENOUS ONCE
Qty: 0 | Refills: 0 | Status: COMPLETED | OUTPATIENT
Start: 2018-10-25 | End: 2018-10-25

## 2018-10-25 RX ORDER — PROPRANOLOL HCL 160 MG
10 CAPSULE, EXTENDED RELEASE 24HR ORAL
Qty: 0 | Refills: 0 | Status: DISCONTINUED | OUTPATIENT
Start: 2018-10-25 | End: 2018-10-29

## 2018-10-25 RX ORDER — DIGOXIN 250 MCG
0.12 TABLET ORAL DAILY
Qty: 0 | Refills: 0 | Status: DISCONTINUED | OUTPATIENT
Start: 2018-10-25 | End: 2018-10-29

## 2018-10-25 RX ORDER — LEVOTHYROXINE SODIUM 125 MCG
25 TABLET ORAL DAILY
Qty: 0 | Refills: 0 | Status: DISCONTINUED | OUTPATIENT
Start: 2018-10-25 | End: 2018-10-29

## 2018-10-25 RX ORDER — SODIUM CHLORIDE 9 MG/ML
1000 INJECTION, SOLUTION INTRAVENOUS
Qty: 0 | Refills: 0 | Status: DISCONTINUED | OUTPATIENT
Start: 2018-10-25 | End: 2018-10-27

## 2018-10-25 RX ADMIN — Medication 25 MICROGRAM(S): at 17:52

## 2018-10-25 RX ADMIN — POTASSIUM PHOSPHATE, MONOBASIC POTASSIUM PHOSPHATE, DIBASIC 62.5 MILLIMOLE(S): 236; 224 INJECTION, SOLUTION INTRAVENOUS at 17:22

## 2018-10-25 RX ADMIN — SODIUM CHLORIDE 50 MILLILITER(S): 9 INJECTION, SOLUTION INTRAVENOUS at 17:21

## 2018-10-25 RX ADMIN — SODIUM CHLORIDE 50 MILLILITER(S): 9 INJECTION, SOLUTION INTRAVENOUS at 22:27

## 2018-10-25 RX ADMIN — Medication 0.12 MILLIGRAM(S): at 13:04

## 2018-10-25 RX ADMIN — LACTULOSE 20 GRAM(S): 10 SOLUTION ORAL at 17:22

## 2018-10-25 RX ADMIN — LACTULOSE 20 GRAM(S): 10 SOLUTION ORAL at 05:25

## 2018-10-25 RX ADMIN — LACTULOSE 20 GRAM(S): 10 SOLUTION ORAL at 23:07

## 2018-10-25 RX ADMIN — Medication 0.25 MILLIGRAM(S): at 05:25

## 2018-10-25 RX ADMIN — LACTULOSE 20 GRAM(S): 10 SOLUTION ORAL at 13:04

## 2018-10-25 RX ADMIN — Medication 0.25 MILLIGRAM(S): at 17:22

## 2018-10-25 RX ADMIN — Medication 10 MILLIGRAM(S): at 22:11

## 2018-10-25 NOTE — PROGRESS NOTE ADULT - SUBJECTIVE AND OBJECTIVE BOX
Cardiology Follow Up  ==========================================  CC:     HPI:    Review Of Systems:  Negative except as documented above    Medications:    buDESOnide   0.25 milliGRAM(s) Respule   0.25 milliGRAM(s) Inhalation (10-25-18 @ 05:25)   0.25 milliGRAM(s) Inhalation (10-24-18 @ 17:25)    digoxin     Tablet   0.125 milliGRAM(s) Oral (10-25-18 @ 13:04)    lactulose Syrup   20 Gram(s) Oral (10-25-18 @ 13:04)   20 Gram(s) Oral (10-25-18 @ 05:25)   20 Gram(s) Oral (10-24-18 @ 17:25)    levothyroxine Injectable   12.5 MICROGram(s) IV Push (10-24-18 @ 21:37)    rifaximin   550 milliGRAM(s) Oral (10-25-18 @ 05:25)   550 milliGRAM(s) Oral (10-24-18 @ 17:25)        Telemetry:     Vital Signs Last 24 Hrs  T(C): 36.9 (25 Oct 2018 11:56), Max: 37.1 (25 Oct 2018 04:10)  T(F): 98.4 (25 Oct 2018 11:56), Max: 98.7 (25 Oct 2018 04:10)  HR: 58 (25 Oct 2018 11:56) (58 - 100)  BP: 113/68 (25 Oct 2018 11:56) (98/62 - 129/62)  BP(mean): --  RR: 18 (25 Oct 2018 11:56) (17 - 18)  SpO2: 93% (25 Oct 2018 11:56) (92% - 94%)  I&O's Summary    24 Oct 2018 07:01  -  25 Oct 2018 07:00  --------------------------------------------------------  IN: 0 mL / OUT: 550 mL / NET: -550 mL    25 Oct 2018 07:01  -  25 Oct 2018 16:39  --------------------------------------------------------  IN: 260 mL / OUT: 0 mL / NET: 260 mL        Physical Exam:  General: [x ] NAD  HEENT: [x ] EOMI [ x] PERRL  Cor: [x ] S1,S2 [x ] RRR [x ] No M/R/G   Lungs: [x ] CTA B/L [ x] Normal effort  Abd: [x ] Soft [x ] Non-tender [x ] +BS  Ext: [ x] No edema [x ] No cyanosis    Labs:                        11.1   4.6   )-----------( 77       ( 25 Oct 2018 11:43 )             33.7     10-25    147<H>  |  113<H>  |  38<H>  ----------------------------<  123<H>  4.4   |  24  |  1.04    Ca    10.0      25 Oct 2018 11:42  Phos  2.1     10-25  Mg     1.9     10-25    TPro  5.6<L>  /  Alb  3.3  /  TBili  2.6<H>  /  DBili  x   /  AST  30  /  ALT  16  /  AlkPhos  82  10-25 Cardiology Follow Up  ==========================================  CC: AF    HPI: Patient much more alert today. She denies cardiac complaint. She did go into AF which she has a history of. Rates were elevated at times.     Review Of Systems:  Negative except as documented above    Medications:    buDESOnide   0.25 milliGRAM(s) Respule   0.25 milliGRAM(s) Inhalation (10-25-18 @ 05:25)   0.25 milliGRAM(s) Inhalation (10-24-18 @ 17:25)    digoxin     Tablet   0.125 milliGRAM(s) Oral (10-25-18 @ 13:04)    lactulose Syrup   20 Gram(s) Oral (10-25-18 @ 13:04)   20 Gram(s) Oral (10-25-18 @ 05:25)   20 Gram(s) Oral (10-24-18 @ 17:25)    levothyroxine Injectable   12.5 MICROGram(s) IV Push (10-24-18 @ 21:37)    rifaximin   550 milliGRAM(s) Oral (10-25-18 @ 05:25)   550 milliGRAM(s) Oral (10-24-18 @ 17:25)        Telemetry: AF up to 130s    Vital Signs Last 24 Hrs  T(C): 36.9 (25 Oct 2018 11:56), Max: 37.1 (25 Oct 2018 04:10)  T(F): 98.4 (25 Oct 2018 11:56), Max: 98.7 (25 Oct 2018 04:10)  HR: 58 (25 Oct 2018 11:56) (58 - 100)  BP: 113/68 (25 Oct 2018 11:56) (98/62 - 129/62)  BP(mean): --  RR: 18 (25 Oct 2018 11:56) (17 - 18)  SpO2: 93% (25 Oct 2018 11:56) (92% - 94%)  I&O's Summary    24 Oct 2018 07:01  -  25 Oct 2018 07:00  --------------------------------------------------------  IN: 0 mL / OUT: 550 mL / NET: -550 mL    25 Oct 2018 07:01  -  25 Oct 2018 16:39  --------------------------------------------------------  IN: 260 mL / OUT: 0 mL / NET: 260 mL        Physical Exam:  General: [x ] NAD  HEENT: [x ] EOMI [ x] PERRL  Cor: [x ] S1,S2 [x ] IRIR [x ] No M/R/G   Lungs: [x ] CTA B/L [ x] Normal effort  Abd: [x ] Soft [x ] Non-tender [x ] +BS  Ext: [ x] No edema [x ] No cyanosis    Labs:                        11.1   4.6   )-----------( 77       ( 25 Oct 2018 11:43 )             33.7     10-25    147<H>  |  113<H>  |  38<H>  ----------------------------<  123<H>  4.4   |  24  |  1.04    Ca    10.0      25 Oct 2018 11:42  Phos  2.1     10-25  Mg     1.9     10-25    TPro  5.6<L>  /  Alb  3.3  /  TBili  2.6<H>  /  DBili  x   /  AST  30  /  ALT  16  /  AlkPhos  82  10-25

## 2018-10-25 NOTE — PROVIDER CONTACT NOTE (OTHER) - SITUATION
notified by tele tech, pt had PAF up to 140 for 3.1 seconds; PAF up to 150 for 48 secs. VSS. Pt remains asymptomatic

## 2018-10-25 NOTE — PROGRESS NOTE ADULT - PROBLEM SELECTOR PLAN 2
Pt presenting with acute onset of nausea and vomiting beginning this morning, with 4-5 episodes. Pt currently denies abdominal pain  - CT abdomen/pelvis negative for any acute pathology, only finding of cirrhosis with small amount of ascites  - N/V may be 2/2 ongoing infection versus gastroenteritis versus ACS  - QTc normal; c/w Zofran PRN for nausea/vomiting  - Troponin downtrending  - C/w NS @75cc/hr for dehydration; pt appears to be hypovolemic on exam  - Infectious workup ongoing  - RUQ ultrasound with dopplers shows no portal vein thrombus  - TSH and digoxin wnl  - TTE shows normal EF  - Patient's symptoms are concerning for digoxin toxicity, even though level was therapeutic. (nausea, vomting, hyperkalemia, altered mental status). Holding digoxin. Pt presenting with acute onset of nausea and vomiting beginning this morning, with 4-5 episodes. Pt currently denies abdominal pain  - CT abdomen/pelvis negative for any acute pathology, only finding of cirrhosis with small amount of ascites  - N/V may be 2/2 ongoing infection versus gastroenteritis versus ACS  - QTc normal; c/w Zofran PRN for nausea/vomiting  - Troponin downtrending  - C/w NS @75cc/hr for dehydration; pt appears to be hypovolemic on exam  - Infectious workup negative so far.  - RUQ ultrasound with dopplers shows no portal vein thrombus  - TSH and digoxin wnl  - TTE shows normal EF  - Patient's symptoms were initially concerning for digoxin toxicity, even though level was therapeutic. (nausea, vomting, hyperkalemia, altered mental status). Holding digoxin. Pt presenting with acute onset of nausea and vomiting beginning this morning, with 4-5 episodes. Pt currently denies abdominal pain  - CT abdomen/pelvis negative for any acute pathology, only finding of cirrhosis with small amount of ascites  - N/V may be 2/2 ongoing infection versus gastroenteritis versus ACS  - QTc normal; c/w Zofran PRN for nausea/vomiting  - Troponin downtrending  - C/w NS @75cc/hr for dehydration; pt appears to be hypovolemic on exam  - Infectious workup negative so far.  - RUQ ultrasound with dopplers shows no portal vein thrombus  - TSH and digoxin wnl  - TTE shows normal EF  - Patient's symptoms were initially concerning for digoxin toxicity, even though level was therapeutic. (nausea, vomting, hyperkalemia, altered mental status). Digoxin was intermittently held.

## 2018-10-25 NOTE — PHYSICAL THERAPY INITIAL EVALUATION ADULT - ADDITIONAL COMMENTS
copied forward: per daughter: pt lives in private house with daughter, 6 steps to enter, 7 steps to pt's bedroom/bathroom where daughter states pt can stay on 1 floor. HHA 11hrs M-F, 9hrs Saturday&Sunday. Pt amb with a RW at baseline, able to negotiate stairs with assist.
PTA pt was living in a  + Stairs (6 steps into home, and 6-7 steps up to bed room + bilat hand rails on both sets) and was independent in all functional mobility uses a RW for gait occationally, has daughter and home health aid service for all ADL's as needed.

## 2018-10-25 NOTE — PROVIDER CONTACT NOTE (OTHER) - ACTION/TREATMENT ORDERED:
MD Roberts made aware; per MD Roberts-EKG; dig serum with Am labs. awaiting orders. will continue to monitor.

## 2018-10-25 NOTE — PROGRESS NOTE ADULT - PROBLEM SELECTOR PLAN 5
Pt follows up with Dr. Macdonald as outpatient for cirrhosis- likely 2/2 alcohol use in the past  - Appreciate hepatology recs  - Pt currently appears to be compensated with INR, T bili, liver enzymes at baseline  - No abnormal liver findings on CTAP  - Pt currently afebrile with no leukocytosis, although SBP is on the differential  - C/w rifaximin, lactulose titrated to 2-3 BMs/day  - Holding propranolol in the setting of bradycardia  - Holding spironolactone in the setting of KARRIE and dehydration  - Monitor mental status daily  - Trend MELD labs daily  - Patient with hyperbilirubinemia. LDH elevated, haptoglobin was <20, but low concern for DIC Pt follows up with Dr. Macdonald as outpatient for cirrhosis- likely 2/2 alcohol use in the past  - Appreciate hepatology recs  - Pt currently appears to be compensated with INR, T bili, liver enzymes at baseline  - No abnormal liver findings on CTAP  - Pt currently afebrile with no leukocytosis, although SBP is on the differential  - C/w rifaximin, lactulose titrated to 2-3 BMs/day  - Restart propranolol  - Holding spironolactone in the setting of KARRIE and dehydration  - Monitor mental status daily  - Trend MELD labs daily  - Patient with hyperbilirubinemia. LDH elevated, haptoglobin was <20, but low concern for DIC

## 2018-10-25 NOTE — PHYSICAL THERAPY INITIAL EVALUATION ADULT - CRITERIA FOR SKILLED THERAPEUTIC INTERVENTIONS
impairments found/therapy frequency/risk reduction/prevention/anticipated discharge recommendation/rehab potential/anticipated equipment needs at discharge/predicted duration of therapy intervention/functional limitations in following categories

## 2018-10-25 NOTE — PROGRESS NOTE ADULT - PROBLEM SELECTOR PLAN 3
Pt recently treated as outpatient for UTI 3 weeks ago with 7 days of Cipro; urine culture grew pan-sensitive E. coli  - last day of ceftriaxone on 10/24; pt with no history of resistant organisms in the past (E. coli, Klebsiella)  - Urine culture shows NGTD; UA equivocally positive  - Continue with TOV protocol Pt recently treated as outpatient for UTI 3 weeks ago with 7 days of Cipro; urine culture grew pan-sensitive E. coli  - Completed ceftriaxone on 10/24; pt with no history of resistant organisms in the past (E. coli, Klebsiella)  - Urine culture shows NGTD; UA equivocally positive  - Patient voiding on her own.

## 2018-10-25 NOTE — CHART NOTE - NSCHARTNOTEFT_GEN_A_CORE
Called by nurse 0350 regarding telemetry reading of sustained atrial fibrillation. Paroxysmal atrial fibrillation was noted earlier tonight. PAF up to 140 for 3.1 seconds and up to 150 for 48 seconds, HR 80 on cardiac monitor. Paroxysmal atrial fibrillation was noted 0010 with PAF up to 140 for 3.1 seconds and up to 150 for 48 seconds, HR 80 on cardiac monitor. Patient was asymptomatic. Patient was kept on monitoring for further change. Called by nursing again regarding conversion to atrial fibrillation 0320. STAT EKG was ordered and serum digoxin level was added to AM labs. Patient was seen and examined. Patient does not feel any chest pain, SOB, or dizziness. No diaphoresis or palpitation. BP 98/62, HR  on tele, T 98.7. Alert and oriented x2. Patient was able to carry on conversation. Irregular rate noted on cardiac auscultation. Clear lungs b/l. Discussed with MAR, and decided to keep monitoring vitals at this time and holding off of acute intervention in the setting of low BP and history of bradycardia during current hospitalization. Instructed nurse to page provider if HR continues to be >130 on tele. Also instructed to obtain another set of vitals @ 0600. Will discuss with day team. Paroxysmal atrial fibrillation was noted 0010 with PAF up to 140 for 3.1 seconds and up to 150 for 48 seconds, HR 80 on cardiac monitor. Patient was asymptomatic. Patient was kept on monitoring for further change. Called by nursing again regarding conversion to atrial fibrillation 0320. STAT EKG was ordered and serum digoxin level was added to AM labs. Patient was seen and examined. Patient does not feel any chest pain, SOB, or dizziness. No diaphoresis or palpitation. BP 98/62, HR  on tele, T 98.7. Alert and oriented x2. Patient was able to carry on conversation. Irregular rate noted on cardiac auscultation. Clear lungs b/l. Discussed with MAR, and decided to keep monitoring vitals at this time and holding off of acute intervention in the setting of low BP, loose BM x6 overnight, and history of bradycardia during current hospitalization. Instructed nurse to page provider if HR continues to be >130 on tele. Also instructed to obtain another set of vitals @ 0600. Will discuss with day team. Paroxysmal atrial fibrillation was noted 0010 with PAF up to 140 for 3.1 seconds and up to 150 for 48 seconds, HR 80 on cardiac monitor. Patient was asymptomatic. Patient was kept on monitoring for further change. Called by nursing again regarding conversion to atrial fibrillation 0350. STAT EKG was ordered and serum digoxin level was added to AM labs. Patient was seen and examined. Patient does not feel any chest pain, SOB, or dizziness. No diaphoresis or palpitation. BP 98/62, HR  on tele, T 98.7. Alert and oriented x2. Patient was able to carry on conversation. Irregular rate noted on cardiac auscultation. Clear lungs b/l. Discussed with MAR, and decided to keep monitoring vitals at this time and holding off of acute intervention in the setting of low BP, loose BM x6 overnight, and history of bradycardia during current hospitalization. Instructed nurse to page provider if HR continues to be >130 on tele. Also instructed to obtain another set of vitals @ 0600. Will discuss with day team.

## 2018-10-25 NOTE — PROGRESS NOTE ADULT - PROBLEM SELECTOR PLAN 10
Pt with severe AS seen on TTE from 2016; currently follows with Dr. Boston as an outpatient  - Continue to monitor and assess fluid status daily; c/w gentle IVF hydration    Problem 11: IV infiltration of R arm  - Blood cultures show NGTD, peripheral IV removed from R arm  - s/p vancomycin x 1    Problem 12: Prophylactic measure  - Patient is DNR, but not DNI. Family would like pressors.

## 2018-10-25 NOTE — PHYSICAL THERAPY INITIAL EVALUATION ADULT - PRECAUTIONS/LIMITATIONS, REHAB EVAL
fall precautions/cardiac precautions
fall precautions/10/24: Patient more alert today, will monitor and possibly remove NGT if stays this alert. Decreased lactulose, was having 6 BMs/day. 10/25: Removed NGT, tolerating po. Mental status is stable, patient is alert.

## 2018-10-25 NOTE — PROGRESS NOTE ADULT - PROBLEM SELECTOR PLAN 7
Pt with sinus bradycardia on telemetry, with rates ranging from 40-50, which appears to be new  - Holding propranolol  - TWI seen in V1-V3, pt currently encephalopathic, unable to assess for ongoing chest pain  - Continue to monitor on telemetry, hold digoxin Resolved, patietn now in Afib. Presented with sinus bradycardia on telemetry, with rates ranging from 40-50.  - Holding propranolol  - TWI seen in V1-V3, pt currently encephalopathic, unable to assess for ongoing chest pain  - Continue to monitor on telemetry, restart digoxin and propanolol.

## 2018-10-25 NOTE — PROGRESS NOTE ADULT - ASSESSMENT
89 year-old woman with PMH of CLL s/p Rituximab, COPD, bronchiectasis, AFib, severe AS and MR, HTN, cervical osteomyelitis, h/o variceal bleed s/p banding, TIAs, Alzheimer's, history of decompensated cirrhosis with recurrent encephalopathy and HCC s/p ablation and incomplete treatment, presents with encephalopathy.    Problems:  1) Encephalopathy - likely toxic / metabolic and PSE in setting of infection now with significant improvement  2) Decompensated cirrhosis, MELD-Na 21 on admission        - varices: history of banding at outside facility in past, on Coreg > 1 year        - ascites: minimal on CT this admission, on spironolactone only as outpatient         - HE: history of recurrent HE, on lactulose and rifaximin as outpatient         - HCC: history of lesion s/p microwave ablation partially successful, patient's family wishes no further intervention  3) Likely urinary tract infection vs cellulitis    Recommendations:  - continue with pureed diet, ensure patient hydration  - continue Xifaxan BID  - c/w lactulose now at home dose  - continue beta blocker for esophageal varices as BP tolerates  - trend CMP, INR, CBC daily  - follow up with Dr. Macdonald outpatient    Tania Conte, PGY-4  Gastroenterology Fellow  Pager x [phone] or [phone]  (After 5 pm or on weekends please page GI on call) 89 year-old woman with PMH of CLL s/p Rituximab, COPD, bronchiectasis, AFib, severe AS and MR, HTN, cervical osteomyelitis, h/o variceal bleed s/p banding, TIAs, Alzheimer's, history of decompensated cirrhosis with recurrent encephalopathy and HCC s/p ablation and incomplete treatment, presents with encephalopathy.    Problems:  1) Encephalopathy - likely toxic / metabolic and PSE in setting of infection now with significant improvement  2) Decompensated cirrhosis, MELD-Na 21 on admission        - varices: history of banding at outside facility in past, on Coreg > 1 year        - ascites: minimal on CT this admission, on spironolactone only as outpatient         - HE: history of recurrent HE, on lactulose and rifaximin as outpatient         - HCC: history of lesion s/p microwave ablation partially successful, patient's family wishes no further intervention  3) Likely urinary tract infection vs cellulitis    Recommendations:  - continue with pureed diet, ensure patient hydration and consider IV hydration given hypernatremia and KARRIE  - would have PT see patient before discharge especially given that she has steps in her house  - continue Xifaxan BID  - c/w lactulose now at home dose  - continue beta blocker for esophageal varices as BP tolerates  - trend CMP, INR, CBC daily  - follow up with Dr. Macdonald outpatient    Tania Conte, PGY-4  Gastroenterology Fellow  Pager x [phone] or [phone]  (After 5 pm or on weekends please page GI on call)

## 2018-10-25 NOTE — PROGRESS NOTE ADULT - SUBJECTIVE AND OBJECTIVE BOX
Chief Complaint:  Patient is a 89y old  Female who presents with a chief complaint of Nausea, vomiting (25 Oct 2018 07:23)      Interval Events:   The patient was started on IVF yesterday.  This morning patient was noted to go into atrial fibrillation with RVR.      Allergies:  adhesives (Other)  codeine (Rash)  erythromycin (Rash)  iv dye (Rash)  penicillin (Rash)  shellfish (Rash)  warfarin (Other)        Hospital Medications:  ALBUTerol/ipratropium for Nebulization 3 milliLiter(s) Nebulizer every 12 hours PRN  buDESOnide   0.25 milliGRAM(s) Respule 0.25 milliGRAM(s) Inhalation two times a day  digoxin     Tablet 0.125 milliGRAM(s) Oral daily  influenza   Vaccine 0.5 milliLiter(s) IntraMuscular once  lactulose Syrup 20 Gram(s) Oral every 6 hours  levothyroxine 25 MICROGram(s) Oral daily  ondansetron Injectable 4 milliGRAM(s) IV Push every 6 hours PRN  propranolol 10 milliGRAM(s) Oral two times a day  rifaximin 550 milliGRAM(s) Oral two times a day      PMHX/PSHX:  PVD (peripheral vascular disease)  Liver cell carcinoma  Severe aortic stenosis by prior echocardiogram  Pulmonary HTN  CKD (chronic kidney disease), stage 3 (moderate)  COPD (Chronic Obstructive Pulmonary Disease)  AF (Atrial Fibrillation)  Portal Hypertension  Pneumonia  Metastasis to Liver  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  HTN (Hypertension)  Bleeding Esophageal Varices  CLL (Chronic Lymphoblastic Leukemia)  GIB (Gastrointestinal Bleeding)  History of repair of hip fracture  Esophageal Rupture      Family history:  No pertinent family history in first degree relatives      PHYSICAL EXAM:   Vital Signs:  Vital Signs Last 24 Hrs  T(C): 37.1 (25 Oct 2018 04:10), Max: 37.1 (25 Oct 2018 04:10)  T(F): 98.7 (25 Oct 2018 04:10), Max: 98.7 (25 Oct 2018 04:10)  HR: 100 (25 Oct 2018 04:10) (53 - 100)  BP: 98/62 (25 Oct 2018 04:10) (93/46 - 129/62)  BP(mean): --  RR: 18 (25 Oct 2018 04:10) (17 - 18)  SpO2: 94% (25 Oct 2018 04:10) (92% - 100%)  Daily     Daily     GENERAL:  Appears stated age, well-groomed, well-nourished, no distress  CHEST:  no increased effort  ABDOMEN:  Soft, non-tender, non-distended, normoactive bowel sounds,  no masses , no hepato-splenomegaly, no signs of chronic liver disease  EXTEREMITIES:  no cyanosis, clubbing or edema  NEURO:  Alert, oriented, no tremor, no asterixis    LABS:                        10.7   4.2   )-----------( 76       ( 24 Oct 2018 10:49 )             31.5       10-24    147<H>  |  111<H>  |  41<H>  ----------------------------<  88  4.1   |  25  |  1.28    Ca    9.7      24 Oct 2018 10:30  Phos  2.7     10-24  Mg     1.9     10-24    TPro  5.4<L>  /  Alb  3.4  /  TBili  2.7<H>  /  DBili  x   /  AST  26  /  ALT  16  /  AlkPhos  77  10-24    LIVER FUNCTIONS - ( 24 Oct 2018 10:30 )  Alb: 3.4 g/dL / Pro: 5.4 g/dL / ALK PHOS: 77 U/L / ALT: 16 U/L / AST: 26 U/L / GGT: x           PT/INR - ( 24 Oct 2018 10:48 )   PT: 14.9 sec;   INR: 1.36 ratio                                     10.7   4.2   )-----------( 76       ( 24 Oct 2018 10:49 )             31.5                         10.9   4.67  )-----------( 77       ( 23 Oct 2018 16:17 )             34.0                         12.7   9.8   )-----------( 91       ( 22 Oct 2018 14:12 )             36.9     Imaging: Chief Complaint:  Patient is a 89y old  Female who presents with a chief complaint of Nausea, vomiting (25 Oct 2018 07:23)      Interval Events:   The patient was started on IVF yesterday.  This morning patient was noted to go into atrial fibrillation with RVR.      Allergies:  adhesives (Other)  codeine (Rash)  erythromycin (Rash)  iv dye (Rash)  penicillin (Rash)  shellfish (Rash)  warfarin (Other)        Hospital Medications:  ALBUTerol/ipratropium for Nebulization 3 milliLiter(s) Nebulizer every 12 hours PRN  buDESOnide   0.25 milliGRAM(s) Respule 0.25 milliGRAM(s) Inhalation two times a day  digoxin     Tablet 0.125 milliGRAM(s) Oral daily  influenza   Vaccine 0.5 milliLiter(s) IntraMuscular once  lactulose Syrup 20 Gram(s) Oral every 6 hours  levothyroxine 25 MICROGram(s) Oral daily  ondansetron Injectable 4 milliGRAM(s) IV Push every 6 hours PRN  propranolol 10 milliGRAM(s) Oral two times a day  rifaximin 550 milliGRAM(s) Oral two times a day      PMHX/PSHX:  PVD (peripheral vascular disease)  Liver cell carcinoma  Severe aortic stenosis by prior echocardiogram  Pulmonary HTN  CKD (chronic kidney disease), stage 3 (moderate)  COPD (Chronic Obstructive Pulmonary Disease)  AF (Atrial Fibrillation)  Portal Hypertension  Pneumonia  Metastasis to Liver  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  HTN (Hypertension)  Bleeding Esophageal Varices  CLL (Chronic Lymphoblastic Leukemia)  GIB (Gastrointestinal Bleeding)  History of repair of hip fracture  Esophageal Rupture      Family history:  No pertinent family history in first degree relatives      PHYSICAL EXAM:   Vital Signs:  Vital Signs Last 24 Hrs  T(C): 37.1 (25 Oct 2018 04:10), Max: 37.1 (25 Oct 2018 04:10)  T(F): 98.7 (25 Oct 2018 04:10), Max: 98.7 (25 Oct 2018 04:10)  HR: 100 (25 Oct 2018 04:10) (53 - 100)  BP: 98/62 (25 Oct 2018 04:10) (93/46 - 129/62)  BP(mean): --  RR: 18 (25 Oct 2018 04:10) (17 - 18)  SpO2: 94% (25 Oct 2018 04:10) (92% - 100%)  Daily     Daily     GENERAL:  Appears stated age, well-groomed, eating pureed diet  CHEST:  no increased effort  ABDOMEN:  Soft, non-tender, non-distended, normoactive bowel sounds,  no masses , no hepato-splenomegaly, no signs of chronic liver disease  EXTEREMITIES:  arm still erythematous but less then yesterday  NEURO:  Alert, oriented, no tremor, no asterixis    LABS:                        10.7   4.2   )-----------( 76       ( 24 Oct 2018 10:49 )             31.5       10-24    147<H>  |  111<H>  |  41<H>  ----------------------------<  88  4.1   |  25  |  1.28    Ca    9.7      24 Oct 2018 10:30  Phos  2.7     10-24  Mg     1.9     10-24    TPro  5.4<L>  /  Alb  3.4  /  TBili  2.7<H>  /  DBili  x   /  AST  26  /  ALT  16  /  AlkPhos  77  10-24    LIVER FUNCTIONS - ( 24 Oct 2018 10:30 )  Alb: 3.4 g/dL / Pro: 5.4 g/dL / ALK PHOS: 77 U/L / ALT: 16 U/L / AST: 26 U/L / GGT: x           PT/INR - ( 24 Oct 2018 10:48 )   PT: 14.9 sec;   INR: 1.36 ratio                                     10.7   4.2   )-----------( 76       ( 24 Oct 2018 10:49 )             31.5                         10.9   4.67  )-----------( 77       ( 23 Oct 2018 16:17 )             34.0                         12.7   9.8   )-----------( 91       ( 22 Oct 2018 14:12 )             36.9     Imaging: Chief Complaint:  Patient is a 89y old  Female who presents with a chief complaint of Nausea, vomiting (25 Oct 2018 07:23)      Interval Events:   The patient was started on IVF yesterday.  This morning patient was noted to go into atrial fibrillation with RVR.      Allergies:  adhesives (Other)  codeine (Rash)  erythromycin (Rash)  iv dye (Rash)  penicillin (Rash)  shellfish (Rash)  warfarin (Other)        Hospital Medications:  ALBUTerol/ipratropium for Nebulization 3 milliLiter(s) Nebulizer every 12 hours PRN  buDESOnide   0.25 milliGRAM(s) Respule 0.25 milliGRAM(s) Inhalation two times a day  digoxin     Tablet 0.125 milliGRAM(s) Oral daily  influenza   Vaccine 0.5 milliLiter(s) IntraMuscular once  lactulose Syrup 20 Gram(s) Oral every 6 hours  levothyroxine 25 MICROGram(s) Oral daily  ondansetron Injectable 4 milliGRAM(s) IV Push every 6 hours PRN  propranolol 10 milliGRAM(s) Oral two times a day  rifaximin 550 milliGRAM(s) Oral two times a day      PMHX/PSHX:  PVD (peripheral vascular disease)  Liver cell carcinoma  Severe aortic stenosis by prior echocardiogram  Pulmonary HTN  CKD (chronic kidney disease), stage 3 (moderate)  COPD (Chronic Obstructive Pulmonary Disease)  AF (Atrial Fibrillation)  Portal Hypertension  Pneumonia  Metastasis to Liver  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  HTN (Hypertension)  Bleeding Esophageal Varices  CLL (Chronic Lymphoblastic Leukemia)  GIB (Gastrointestinal Bleeding)  History of repair of hip fracture  Esophageal Rupture      Family history:  No pertinent family history in first degree relatives      PHYSICAL EXAM:   Vital Signs:  Vital Signs Last 24 Hrs  T(C): 37.1 (25 Oct 2018 04:10), Max: 37.1 (25 Oct 2018 04:10)  T(F): 98.7 (25 Oct 2018 04:10), Max: 98.7 (25 Oct 2018 04:10)  HR: 100 (25 Oct 2018 04:10) (53 - 100)  BP: 98/62 (25 Oct 2018 04:10) (93/46 - 129/62)  BP(mean): --  RR: 18 (25 Oct 2018 04:10) (17 - 18)  SpO2: 94% (25 Oct 2018 04:10) (92% - 100%)  Daily     Daily     GENERAL:  Appears stated age, well-groomed, eating pureed diet  CHEST:  no increased effort  ABDOMEN:  Soft, non-tender, non-distended, normoactive bowel sounds,  no masses , no hepato-splenomegaly, no signs of chronic liver disease  EXTEREMITIES:  arm still erythematous but less then yesterday  NEURO:  Alert, oriented except to year, no tremor, no asterixis    LABS:                        10.7   4.2   )-----------( 76       ( 24 Oct 2018 10:49 )             31.5       10-24    147<H>  |  111<H>  |  41<H>  ----------------------------<  88  4.1   |  25  |  1.28    Ca    9.7      24 Oct 2018 10:30  Phos  2.7     10-24  Mg     1.9     10-24    TPro  5.4<L>  /  Alb  3.4  /  TBili  2.7<H>  /  DBili  x   /  AST  26  /  ALT  16  /  AlkPhos  77  10-24    LIVER FUNCTIONS - ( 24 Oct 2018 10:30 )  Alb: 3.4 g/dL / Pro: 5.4 g/dL / ALK PHOS: 77 U/L / ALT: 16 U/L / AST: 26 U/L / GGT: x           PT/INR - ( 24 Oct 2018 10:48 )   PT: 14.9 sec;   INR: 1.36 ratio                                     10.7   4.2   )-----------( 76       ( 24 Oct 2018 10:49 )             31.5                         10.9   4.67  )-----------( 77       ( 23 Oct 2018 16:17 )             34.0                         12.7   9.8   )-----------( 91       ( 22 Oct 2018 14:12 )             36.9     Imaging:

## 2018-10-25 NOTE — PROGRESS NOTE ADULT - ASSESSMENT
89 year-old woman with PMH of CLL s/p Rituximab, COPD, bronchiectasis, AFib (not on anticoagulation, severe aortic stenosis and mitral regurgitation, HTN, cervical osteomyelitis, h/o variceal bleed s/p banding, mini-strokes, Alzheimer's, decompensated cirrhosis with recurrent encephalopathy and HCC s/p ablation and incomplete treatment, concerning for metabolic encephalopathy 2/2 UTI vs hepatic decompensation vs digoxin toxicity. 89 year-old woman with PMH of CLL s/p Rituximab, COPD, bronchiectasis, AFib (not on anticoagulation, severe aortic stenosis and mitral regurgitation, HTN, cervical osteomyelitis, h/o variceal bleed s/p banding, mini-strokes, Alzheimer's, decompensated cirrhosis with recurrent encephalopathy and HCC s/p ablation and incomplete treatment, concerning for metabolic encephalopathy 2/2 UTI vs hepatic decompensation.

## 2018-10-25 NOTE — PROGRESS NOTE ADULT - PROBLEM SELECTOR PLAN 9
Pt currently not on AC 2/2 history of ICH in the past  - CHADSVASC score 6  - Hold digoxin  - Pt currently in sinus bradycardia with occasional PATs Pt currently not on AC 2/2 history of ICH in the past  - CHADSVASC score 6  - Continue digoxin  - Pt currently in sinus bradycardia with occasional PATs

## 2018-10-25 NOTE — PROVIDER CONTACT NOTE (OTHER) - ASSESSMENT
Pt Aox2. Pt denies any chest pain, chest palpitation, sob. Pt has hx of afib. SR 80s on cardiac monitor
Pt aox1-2. VSS. pt denies any chest pain, chest palpitation, sob.
pt failed tov. Dc hauser'ruyd @ 1400 10/23. TOV due @2200. Pt failed TOV. .
Pt is AOx0, lethargic, less responsive.

## 2018-10-25 NOTE — PHYSICAL THERAPY INITIAL EVALUATION ADULT - PLANNED THERAPY INTERVENTIONS, PT EVAL
bed mobility training/Pt will negotiate 1 flight of stairs indepenedently in 2 weeks./gait training/balance training/strengthening/transfer training

## 2018-10-25 NOTE — PROGRESS NOTE ADULT - PROBLEM SELECTOR PLAN 1
Pt with encephalopathy on admission, baseline AAOx3 and walks with a walker  - Differential includes metabolic 2/2 infection, toxic (less likely from ingestion), neurologic, hepatic encephalopathy  - Last dose of ceftriaxone on 10/24 for UTI; no other abdominal sources of infection seen on CTAP. Blood and urine culture show NGTD  - CT head negative for any acute changes  - Per pt's family, pt has been having 3-4 BMs/day for the past 2 days prior to admission  - All medications via NGT. Will monitor mental status, and consider restarting on po intake if patient remains alert.  - Continue to hold digoxin for now. Pt with encephalopathy on admission, baseline AAOx3 and walks with a walker  - Differential includes metabolic 2/2 infection, toxic (less likely from ingestion), neurologic, hepatic encephalopathy  - Last dose of ceftriaxone on 10/24 for UTI; no other abdominal sources of infection seen on CTAP. Blood and urine culture show NGTD  - CT head negative for any acute changes  - Per pt's family, pt has been having 3-4 BMs/day for the past 2 days prior to admission  - Plan to remove NGT, since patient is alert enough to take po intake and medications.

## 2018-10-25 NOTE — PHYSICAL THERAPY INITIAL EVALUATION ADULT - PERTINENT HX OF CURRENT PROBLEM, REHAB EVAL
89 yo F Hx CLL, COPD, bronchiectasis, AFib, MR, HTN, cervical osteomyelitis, h/o variceal bleed s/p banding, mini-strokes, Alzheimer's, decompensated cirrhosis with recurrent encephalopathy and HCC s/p ablation and incomplete treatment, adm with nausea/vomiting x2 days.
89 year-old woman with PMH of CLL s/p Rituximab, COPD, bronchiectasis, AFib (not on anticoagulation, severe aortic stenosis and mitral regurgitation, HTN, cervical osteomyelitis, h/o variceal bleed s/p banding, mini-strokes, Alzheimer's, decompensated cirrhosis with recurrent encephalopathy and HCC s/p ablation admitted for sepsis likely 2/2 UTI with hepatic encephalopathy.

## 2018-10-25 NOTE — PROGRESS NOTE ADULT - ASSESSMENT
89 year-old woman with PMH of CLL s/p Rituximab, COPD, bronchiectasis, AFib (not on anticoagulation, severe aortic stenosis and mitral regurgitation, HTN, cervical osteomyelitis, h/o variceal bleed s/p banding, mini-strokes, Alzheimer's, decompensated cirrhosis with recurrent encephalopathy and HCC s/p ablation and incomplete treatment, presenting for nausea and vomiting for 2 days.  ·	Patient did return to AF likely driven by underlying illness and the fact the medications had to be held. She is now back on Propranolol and Dig. She remains in AF but rates are better controlled and patient is without symptoms  ·	Patient is not a candidate for full A/C  ·	Continue strict I and O. Patient is likely still on the dry side. Encourage PO intake  ·	D/w family at bedside.     =======================================================================  Nader Adams MD [facility]    Please page [phone] with questions  On nights and weekend please call the office [phone].

## 2018-10-25 NOTE — PROGRESS NOTE ADULT - SUBJECTIVE AND OBJECTIVE BOX
Past Medical History


Past Medical History:  No Pertinent History


Past Surgical History:  No Surgical History


Alcohol Use:  Occasionally


Drug Use:  Marijuana





Adult General


Chief Complaint


Chief Complaint:  LACERATION/AVULSION





HPI


HPI





Patient is a 53  year old F who was cutting up cabbage for a cabbage stew and 

cut into her L hand. Her tetanus is UTD.





Review of Systems


Review of Systems





Constitutional: Denies fever or chills 


Respiratory: Denies cough or shortness of breath 


Cardiovascular: Denies chest pain.


GI: Denies abdominal pain, nausea, vomiting, bloody stools or diarrhea 


Musculoskeletal: Denies back pain. Reports L hand pain. 


Integument: Reports L hand laceration.


Neurologic: Denies headache, focal weakness or sensory changes 








All other systems were reviewed and found to be within normal limits, except as 

documented in this note.





Allergies


Allergies





Allergies








Coded Allergies Type Severity Reaction Last Updated Verified


 


  No Known Drug Allergies    8/24/17 No











Physical Exam


Physical Exam





Constitutional: Well developed, well nourished, no acute distress, non-toxic 

appearance.  


Neck: Normal range of motion, no tenderness, supple, no stridor.  


Cardiovascular:Heart rate regular rhythm, no murmur 


Lungs & Thorax:  Bilateral breath sounds clear to auscultation 


Abdomen: Bowel sounds normal, soft, no tenderness, no masses, no pulsatile 

masses.  


Skin: 2.0 cm laceration on palmar aspect of L hand just proximal to 5th finger.

   


Extremities: No cyanosis, no clubbing, ROM intact, no edema. Laceration to L 

hand. Full ROM of hand and fingers. Cap refill intact.


Neurologic: Alert and oriented X 3, normal motor function, normal sensory 

function, no focal deficits noted. MIld tingling of finger.


Psychologic: Affect normal, judgement normal, mood normal.





Current Patient Data


Vital Signs





 Vital Signs








  Date Time  Temp Pulse Resp B/P (MAP) Pulse Ox O2 Delivery O2 Flow Rate FiO2


 


9/23/18 17:20 98.0 74 18 142/80 (100) 100 Room Air  





 98.0       











EKG


EKG


[]





Radiology/Procedures


Radiology/Procedures


[]





Course & Med Decision Making


Course & Med Decision Making


Pertinent Labs and Imaging studies reviewed. (See chart for details)





Absorbable sutures placed. Pt to monitor for any concerns for infection. Pt has 

full ROM currently. She has mild tingling of 5th finger which I would expect 

with location of wound. Discussed if she continues to have any sensory changes 

or deficits that she should f/u with her PCP or orthopedics for further 

evaluation.





Dragon Disclaimer


Dragon Disclaimer


This electronic medical record was generated, in whole or in part, using a 

voice recognition dictation system.











Staff Physician Addendum:


I was working in the ER during the course of this patient's visit.  I was 

available for consultation as needed, but I was not directly involved in the 

care of this patient.





PROCEDURE


Procedure


Wound was anesthetized with 1% licodaine (3 mls) and then cleansed with 

betadine soap and saline.  Wound was explored and there is no FB, no evidence 

of tendon injury. Wound edges were then approximated with 5-0 vicryl.  Pt 

tolerated procedure well and wound was bandaged prior to discharge.





Departure


Departure


Impression:  


 Primary Impression:  


 Hand laceration


Disposition:  01 HOME, SELF-CARE


Condition:  IMPROVED


Referrals:  


NO PCP (PCP)








LOGAN IVERSON MD


Patient Instructions:  Absorbable Suture Repair-Brief


Scripts


Naproxen (NAPROSYN) 500 Mg Tablet


1 TAB PO BID, #20 TAB 2 Refills


   Prov: PUJA JO         9/23/18











PUJA JO Sep 23, 2018 17:53


CONNER HEMPHILL MD Sep 24, 2018 12:32 HA JACQUES  89y Female    Patient is a 89y old  Female who presents with a chief complaint of Nausea, vomiting (21 Oct 2018 18:00)    INTERVAL HPI/OVERNIGHT EVENTS:    MEDICATIONS  (STANDING):  buDESOnide   0.25 milliGRAM(s) Respule 0.25 milliGRAM(s) Inhalation two times a day  influenza   Vaccine 0.5 milliLiter(s) IntraMuscular once  lactulose Syrup 20 Gram(s) Oral every 6 hours  levothyroxine Injectable 12.5 MICROGram(s) IV Push at bedtime  rifaximin 550 milliGRAM(s) Oral two times a day  sodium chloride 0.45%. 1000 milliLiter(s) (50 mL/Hr) IV Continuous <Continuous>    MEDICATIONS  (PRN):  ALBUTerol/ipratropium for Nebulization 3 milliLiter(s) Nebulizer every 12 hours PRN Shortness of Breath and/or Wheezing  ondansetron Injectable 4 milliGRAM(s) IV Push every 6 hours PRN Nausea and/or Vomiting    Vital Signs Last 24 Hrs  T(C): 37.1 (25 Oct 2018 04:10), Max: 37.1 (25 Oct 2018 04:10)  T(F): 98.7 (25 Oct 2018 04:10), Max: 98.7 (25 Oct 2018 04:10)  HR: 100 (25 Oct 2018 04:10) (53 - 100)  BP: 98/62 (25 Oct 2018 04:10) (93/46 - 129/62)  BP(mean): --  RR: 18 (25 Oct 2018 04:10) (17 - 18)  SpO2: 94% (25 Oct 2018 04:10) (92% - 100%)    PHYSICAL EXAM:  SKIN: Multiple ecchymoses on bilateral arms; R arm is erythematous, swollen and warm, but nontender to palpation    LABS: HA JACQUES  89y Female    Patient is a 89y old  Female who presents with a chief complaint of Nausea, vomiting (21 Oct 2018 18:00)    INTERVAL HPI/OVERNIGHT EVENTS: Patient converted to sustained A fib overnight with HR . A fib was not intervened overnight, d/t low BP. Patient remains alert this morning and was tolerating po intake. She was in a pleasant mood, responding to questions, feeding herself. Had 6 BMs over 24 hours, so further doses of lactulose were held overnight, but will be resumed this AM. Per family, patient is closer to baseline but not fully improved.    MEDICATIONS  (STANDING):  buDESOnide   0.25 milliGRAM(s) Respule 0.25 milliGRAM(s) Inhalation two times a day  influenza   Vaccine 0.5 milliLiter(s) IntraMuscular once  lactulose Syrup 20 Gram(s) Oral every 6 hours  levothyroxine Injectable 12.5 MICROGram(s) IV Push at bedtime  rifaximin 550 milliGRAM(s) Oral two times a day  sodium chloride 0.45%. 1000 milliLiter(s) (50 mL/Hr) IV Continuous <Continuous>    MEDICATIONS  (PRN):  ALBUTerol/ipratropium for Nebulization 3 milliLiter(s) Nebulizer every 12 hours PRN Shortness of Breath and/or Wheezing  ondansetron Injectable 4 milliGRAM(s) IV Push every 6 hours PRN Nausea and/or Vomiting    Vital Signs Last 24 Hrs  T(C): 37.1 (25 Oct 2018 04:10), Max: 37.1 (25 Oct 2018 04:10)  T(F): 98.7 (25 Oct 2018 04:10), Max: 98.7 (25 Oct 2018 04:10)  HR: 100 (25 Oct 2018 04:10) (53 - 100)  BP: 98/62 (25 Oct 2018 04:10) (93/46 - 129/62)  BP(mean): --  RR: 18 (25 Oct 2018 04:10) (17 - 18)  SpO2: 94% (25 Oct 2018 04:10) (92% - 100%)    PHYSICAL EXAM:  GENERAL: NAD, cachectic, alert, feeding herself, answering questions  HEAD:  Atraumatic, Normocephalic  EYES: EOMI, conjunctiva and sclera clear  NECK: Supple, No JVD  CHEST/LUNG: Clear to auscultation bilaterally; No wheeze, ronchi or rales  HEART: systolic ejection murmur, no rubs, or gallops  ABDOMEN: Soft, Nontender, Nondistended; Bowel sounds present  EXTREMITIES: Lower extremities are non-edematous, with chronic skin changes; LE skin is purple in appearance  SKIN: Multiple ecchymoses on bilateral arms; R arm is erythematous, swollen and warm, but nontender to palpation    LABS:  Pending HA JACQUES  89y Female    Patient is a 89y old  Female who presents with a chief complaint of Nausea, vomiting (21 Oct 2018 18:00)    INTERVAL HPI/OVERNIGHT EVENTS: Patient converted to sustained A fib overnight with HR . A fib was not intervened overnight, d/t low BP. Patient remains alert this morning and was tolerating po intake. She was in a pleasant mood, responding to questions, feeding herself. Had 6 BMs over 24 hours, so further doses of lactulose were held overnight, but will be resumed this AM. Per family, patient is closer to baseline but not fully improved.    MEDICATIONS  (STANDING):  buDESOnide   0.25 milliGRAM(s) Respule 0.25 milliGRAM(s) Inhalation two times a day  influenza   Vaccine 0.5 milliLiter(s) IntraMuscular once  lactulose Syrup 20 Gram(s) Oral every 6 hours  levothyroxine Injectable 12.5 MICROGram(s) IV Push at bedtime  rifaximin 550 milliGRAM(s) Oral two times a day  sodium chloride 0.45%. 1000 milliLiter(s) (50 mL/Hr) IV Continuous <Continuous>    MEDICATIONS  (PRN):  ALBUTerol/ipratropium for Nebulization 3 milliLiter(s) Nebulizer every 12 hours PRN Shortness of Breath and/or Wheezing  ondansetron Injectable 4 milliGRAM(s) IV Push every 6 hours PRN Nausea and/or Vomiting    Vital Signs Last 24 Hrs  T(C): 37.1 (25 Oct 2018 04:10), Max: 37.1 (25 Oct 2018 04:10)  T(F): 98.7 (25 Oct 2018 04:10), Max: 98.7 (25 Oct 2018 04:10)  HR: 100 (25 Oct 2018 04:10) (53 - 100)  BP: 98/62 (25 Oct 2018 04:10) (93/46 - 129/62)  BP(mean): --  RR: 18 (25 Oct 2018 04:10) (17 - 18)  SpO2: 94% (25 Oct 2018 04:10) (92% - 100%)    PHYSICAL EXAM:  GENERAL: NAD, cachectic, alert, feeding herself, answering questions  HEAD:  Atraumatic, Normocephalic  EYES: EOMI, conjunctiva and sclera clear  NECK: Supple, No JVD  CHEST/LUNG: Clear to auscultation bilaterally; No wheeze, ronchi or rales  HEART: systolic ejection murmur, no rubs, or gallops  ABDOMEN: Soft, Nontender, Nondistended; Bowel sounds present  EXTREMITIES: Lower extremities are non-edematous, with chronic skin changes; LE skin is purple in appearance  SKIN: Multiple ecchymoses on bilateral arms; R arm is less erythematous, swollen and warm compared to before, but nontender to palpation    LABS:  CBC Full  -  ( 25 Oct 2018 11:43 )  WBC Count : 4.6 K/uL  Hemoglobin : 11.1 g/dL  Hematocrit : 33.7 %  Platelet Count - Automated : 77 K/uL  Mean Cell Volume : 106.0 fl  Mean Cell Hemoglobin : 35.0 pg  Mean Cell Hemoglobin Concentration : 32.9 gm/dL  Auto Neutrophil # : 3.5 K/uL  Auto Lymphocyte # : 0.6 K/uL  Auto Monocyte # : 0.4 K/uL  Auto Eosinophil # : 0.1 K/uL  Auto Basophil # : 0.0 K/uL  Auto Neutrophil % : 76.1 %  Auto Lymphocyte % : 12.3 %  Auto Monocyte % : 9.2 %  Auto Eosinophil % : 2.4 %  Auto Basophil % : 0.0 %

## 2018-10-26 ENCOUNTER — APPOINTMENT (OUTPATIENT)
Dept: CARDIOLOGY | Facility: CLINIC | Age: 83
End: 2018-10-26

## 2018-10-26 ENCOUNTER — TRANSCRIPTION ENCOUNTER (OUTPATIENT)
Age: 83
End: 2018-10-26

## 2018-10-26 LAB
ALBUMIN SERPL ELPH-MCNC: 3.1 G/DL — LOW (ref 3.3–5)
ALP SERPL-CCNC: 82 U/L — SIGNIFICANT CHANGE UP (ref 40–120)
ALT FLD-CCNC: 16 U/L — SIGNIFICANT CHANGE UP (ref 10–45)
ANION GAP SERPL CALC-SCNC: 11 MMOL/L — SIGNIFICANT CHANGE UP (ref 5–17)
APPEARANCE UR: CLEAR — SIGNIFICANT CHANGE UP
AST SERPL-CCNC: 26 U/L — SIGNIFICANT CHANGE UP (ref 10–40)
BACTERIA # UR AUTO: NEGATIVE — SIGNIFICANT CHANGE UP
BILIRUB SERPL-MCNC: 1.9 MG/DL — HIGH (ref 0.2–1.2)
BILIRUB UR-MCNC: NEGATIVE — SIGNIFICANT CHANGE UP
BUN SERPL-MCNC: 37 MG/DL — HIGH (ref 7–23)
CALCIUM SERPL-MCNC: 9.6 MG/DL — SIGNIFICANT CHANGE UP (ref 8.4–10.5)
CHLORIDE SERPL-SCNC: 112 MMOL/L — HIGH (ref 96–108)
CO2 SERPL-SCNC: 22 MMOL/L — SIGNIFICANT CHANGE UP (ref 22–31)
COLOR SPEC: YELLOW — SIGNIFICANT CHANGE UP
COMMENT - URINE: SIGNIFICANT CHANGE UP
CREAT SERPL-MCNC: 0.88 MG/DL — SIGNIFICANT CHANGE UP (ref 0.5–1.3)
DIFF PNL FLD: NEGATIVE — SIGNIFICANT CHANGE UP
EPI CELLS # UR: 7 /HPF — HIGH
GLUCOSE SERPL-MCNC: 125 MG/DL — HIGH (ref 70–99)
GLUCOSE UR QL: NEGATIVE — SIGNIFICANT CHANGE UP
HCT VFR BLD CALC: 33 % — LOW (ref 34.5–45)
HGB BLD-MCNC: 11.1 G/DL — LOW (ref 11.5–15.5)
HYALINE CASTS # UR AUTO: 0 /LPF — SIGNIFICANT CHANGE UP (ref 0–2)
KETONES UR-MCNC: NEGATIVE — SIGNIFICANT CHANGE UP
LEUKOCYTE ESTERASE UR-ACNC: ABNORMAL
MAGNESIUM SERPL-MCNC: 1.7 MG/DL — SIGNIFICANT CHANGE UP (ref 1.6–2.6)
MCHC RBC-ENTMCNC: 33.6 GM/DL — SIGNIFICANT CHANGE UP (ref 32–36)
MCHC RBC-ENTMCNC: 35.6 PG — HIGH (ref 27–34)
MCV RBC AUTO: 106 FL — HIGH (ref 80–100)
NITRITE UR-MCNC: NEGATIVE — SIGNIFICANT CHANGE UP
PH UR: 6 — SIGNIFICANT CHANGE UP (ref 5–8)
PHOSPHATE SERPL-MCNC: 2.1 MG/DL — LOW (ref 2.5–4.5)
PLATELET # BLD AUTO: 86 K/UL — LOW (ref 150–400)
POTASSIUM SERPL-MCNC: 4 MMOL/L — SIGNIFICANT CHANGE UP (ref 3.5–5.3)
POTASSIUM SERPL-SCNC: 4 MMOL/L — SIGNIFICANT CHANGE UP (ref 3.5–5.3)
PROT SERPL-MCNC: 5.3 G/DL — LOW (ref 6–8.3)
PROT UR-MCNC: ABNORMAL
RBC # BLD: 3.11 M/UL — LOW (ref 3.8–5.2)
RBC # FLD: 13.5 % — SIGNIFICANT CHANGE UP (ref 10.3–14.5)
RBC CASTS # UR COMP ASSIST: 3 /HPF — SIGNIFICANT CHANGE UP (ref 0–4)
SODIUM SERPL-SCNC: 145 MMOL/L — SIGNIFICANT CHANGE UP (ref 135–145)
SP GR SPEC: 1.03 — HIGH (ref 1.01–1.02)
UROBILINOGEN FLD QL: NEGATIVE — SIGNIFICANT CHANGE UP
WBC # BLD: 5 K/UL — SIGNIFICANT CHANGE UP (ref 3.8–10.5)
WBC # FLD AUTO: 5 K/UL — SIGNIFICANT CHANGE UP (ref 3.8–10.5)
WBC UR QL: 9 /HPF — HIGH (ref 0–5)

## 2018-10-26 PROCEDURE — 99232 SBSQ HOSP IP/OBS MODERATE 35: CPT | Mod: GC

## 2018-10-26 PROCEDURE — 99233 SBSQ HOSP IP/OBS HIGH 50: CPT | Mod: GC

## 2018-10-26 PROCEDURE — 99232 SBSQ HOSP IP/OBS MODERATE 35: CPT

## 2018-10-26 RX ORDER — POTASSIUM PHOSPHATE, MONOBASIC POTASSIUM PHOSPHATE, DIBASIC 236; 224 MG/ML; MG/ML
15 INJECTION, SOLUTION INTRAVENOUS ONCE
Qty: 0 | Refills: 0 | Status: COMPLETED | OUTPATIENT
Start: 2018-10-26 | End: 2018-10-26

## 2018-10-26 RX ADMIN — LACTULOSE 20 GRAM(S): 10 SOLUTION ORAL at 18:58

## 2018-10-26 RX ADMIN — Medication 10 MILLIGRAM(S): at 18:58

## 2018-10-26 RX ADMIN — Medication 0.25 MILLIGRAM(S): at 18:58

## 2018-10-26 RX ADMIN — LACTULOSE 20 GRAM(S): 10 SOLUTION ORAL at 05:45

## 2018-10-26 RX ADMIN — Medication 0.25 MILLIGRAM(S): at 05:46

## 2018-10-26 RX ADMIN — Medication 0.12 MILLIGRAM(S): at 05:46

## 2018-10-26 RX ADMIN — POTASSIUM PHOSPHATE, MONOBASIC POTASSIUM PHOSPHATE, DIBASIC 62.5 MILLIMOLE(S): 236; 224 INJECTION, SOLUTION INTRAVENOUS at 13:17

## 2018-10-26 RX ADMIN — Medication 25 MICROGRAM(S): at 05:46

## 2018-10-26 RX ADMIN — Medication 10 MILLIGRAM(S): at 05:46

## 2018-10-26 NOTE — PROGRESS NOTE ADULT - PROBLEM SELECTOR PLAN 1
Pt with encephalopathy on admission, baseline AAOx3 and walks with a walker  - Differential includes metabolic 2/2 infection, toxic (less likely from ingestion), neurologic, hepatic encephalopathy  - Last dose of ceftriaxone on 10/24 for UTI; no other abdominal sources of infection seen on CTAP. Blood and urine culture show NGTD  - CT head negative for any acute changes  - Per pt's family, pt has been having 3-4 BMs/day for the past 2 days prior to admission  - Plan to remove NGT, since patient is alert enough to take po intake and medications. Pt follows up with Dr. Macdonald as outpatient for cirrhosis- likely 2/2 alcohol use in the past  - Appreciate hepatology recs  - Pt currently appears to be compensated with INR, T bili, liver enzymes at baseline  - No abnormal liver findings on CTAP  - Pt currently afebrile with no leukocytosis, although SBP is on the differential  - C/w rifaximin, lactulose titrated to 2-3 BMs/day and propranolol  - Holding spironolactone in the setting of KARRIE and dehydration  - Monitor mental status daily  - Trend MELD labs daily  - Patient with hyperbilirubinemia. LDH elevated, haptoglobin was <20, but low concern for DIC

## 2018-10-26 NOTE — DIETITIAN INITIAL EVALUATION ADULT. - NS AS NUTRI INTERV MEALS SNACK
Recommend continue with regular diet, no therapeutic diet restrictions. Tolerating texture modified diet, defer texture to speech/swallow. Continue to obtain/provide food preferences as feasible. Reviewed menu with daughter. RD remains available./General/healthful diet

## 2018-10-26 NOTE — PROGRESS NOTE ADULT - SUBJECTIVE AND OBJECTIVE BOX
Cardiology Follow Up  ==========================================  CC:     HPI:    Review Of Systems:  Negative except as documented above    Medications:    buDESOnide   0.25 milliGRAM(s) Respule   0.25 milliGRAM(s) Inhalation (10-26-18 @ 05:46)   0.25 milliGRAM(s) Inhalation (10-25-18 @ 17:22)    digoxin     Tablet   0.125 milliGRAM(s) Oral (10-26-18 @ 05:46)    lactulose Syrup   20 Gram(s) Oral (10-26-18 @ 05:45)   20 Gram(s) Oral (10-25-18 @ 23:07)   20 Gram(s) Oral (10-25-18 @ 17:22)    levothyroxine   25 MICROGram(s) Oral (10-26-18 @ 05:46)   25 MICROGram(s) Oral (10-25-18 @ 17:52)    potassium phosphate IVPB   62.5 mL/Hr IV Intermittent (10-26-18 @ 13:17)    potassium phosphate IVPB   62.5 mL/Hr IV Intermittent (10-25-18 @ 17:22)    propranolol   10 milliGRAM(s) Oral (10-26-18 @ 05:46)   10 milliGRAM(s) Oral (10-25-18 @ 22:11)    rifaximin   550 milliGRAM(s) Oral (10-26-18 @ 05:45)   550 milliGRAM(s) Oral (10-25-18 @ 17:22)    sodium chloride 0.45%.   50 mL/Hr IV Continuous (10-25-18 @ 15:50)    sodium chloride 0.45%.   50 mL/Hr IV Continuous (10-25-18 @ 19:36)        Telemetry:     Vital Signs Last 24 Hrs  T(C): 36.4 (26 Oct 2018 12:33), Max: 36.8 (25 Oct 2018 21:03)  T(F): 97.5 (26 Oct 2018 12:33), Max: 98.2 (25 Oct 2018 21:03)  HR: 80 (26 Oct 2018 12:33) (62 - 87)  BP: 133/78 (26 Oct 2018 12:33) (100/62 - 133/78)  BP(mean): --  RR: 18 (26 Oct 2018 12:33) (17 - 18)  SpO2: 96% (26 Oct 2018 12:33) (94% - 96%)  I&O's Summary    25 Oct 2018 07:01  -  26 Oct 2018 07:00  --------------------------------------------------------  IN: 700 mL / OUT: 0 mL / NET: 700 mL    26 Oct 2018 07:01  -  26 Oct 2018 15:31  --------------------------------------------------------  IN: 460 mL / OUT: 0 mL / NET: 460 mL        Physical Exam:  General: [x ] NAD  HEENT: [x ] EOMI [ x] PERRL  Cor: [x ] S1,S2 [x ] RRR [x ] No M/R/G   Lungs: [x ] CTA B/L [ x] Normal effort  Abd: [x ] Soft [x ] Non-tender [x ] +BS  Ext: [ x] No edema [x ] No cyanosis    Labs:                        11.1   5.0   )-----------( 86       ( 26 Oct 2018 11:31 )             33.0     10-26    145  |  112<H>  |  37<H>  ----------------------------<  125<H>  4.0   |  22  |  0.88    Ca    9.6      26 Oct 2018 11:29  Phos  2.1     10-26  Mg     1.7     10-26    TPro  5.3<L>  /  Alb  3.1<L>  /  TBili  1.9<H>  /  DBili  x   /  AST  26  /  ALT  16  /  AlkPhos  82  10-26

## 2018-10-26 NOTE — DIETITIAN INITIAL EVALUATION ADULT. - PROBLEM SELECTOR PLAN 2
Pt presenting with acute onset of nausea and vomiting beginning this morning, with 4-5 episodes. Pt currently denies abdominal pain  - CT abdomen/pelvis negative for any acute pathology, only finding of cirrhosis with small amount of ascites  - N/V may be 2/2 ongoing infection versus gastroenteritis versus ACS  - QTc normal; c/w Zofran PRN for nausea/vomiting  - Trend troponin; initial troponin 48  - C/w NS @75cc/hr for dehydration; pt appears to be hypovolemic on exam  - Infectious workup ongoing  - F/u RUQ ultrasound with dopplers to r/o portal vein thrombus  - f/u digoxin level, TSH  - Trend trops, f/u TTE

## 2018-10-26 NOTE — PROGRESS NOTE ADULT - PROBLEM SELECTOR PLAN 5
Pt follows up with Dr. Macdonald as outpatient for cirrhosis- likely 2/2 alcohol use in the past  - Appreciate hepatology recs  - Pt currently appears to be compensated with INR, T bili, liver enzymes at baseline  - No abnormal liver findings on CTAP  - Pt currently afebrile with no leukocytosis, although SBP is on the differential  - C/w rifaximin, lactulose titrated to 2-3 BMs/day  - Restart propranolol  - Holding spironolactone in the setting of KARRIE and dehydration  - Monitor mental status daily  - Trend MELD labs daily  - Patient with hyperbilirubinemia. LDH elevated, haptoglobin was <20, but low concern for DIC Improving. Likely prerenal in the setting of nausea/vomiting and decreased PO intake  - BUN:creatinine ratio >30; likely prerenal KARRIE  - C/w half NS @75cc/hr given cirrhosis and AS, monitor fluid status closely  - Will repeat BMP in an hour  - Avoid nephrotoxic agents and renally dose medications  - FENa= 0.2%

## 2018-10-26 NOTE — DIETITIAN INITIAL EVALUATION ADULT. - PROBLEM SELECTOR PLAN 3
Pt recently treated as outpatient for UTI 3 weeks ago with 7 days of Cipro; urine culture grew pan-sensitive E. coli  - C/w Ceftriaxone; pt with no history of resistant organisms in the past (E. coli, Klebsiella)  - F/u urine culture; UA grossly positive  - Irwin placed for urine output monitoring given KARRIE

## 2018-10-26 NOTE — PROGRESS NOTE ADULT - ASSESSMENT
89 year-old woman with PMH of CLL s/p Rituximab, COPD, bronchiectasis, AFib (not on anticoagulation, severe aortic stenosis and mitral regurgitation, HTN, cervical osteomyelitis, h/o variceal bleed s/p banding, mini-strokes, Alzheimer's, decompensated cirrhosis with recurrent encephalopathy and HCC s/p ablation and incomplete treatment, concerning for metabolic encephalopathy 2/2 UTI vs hepatic decompensation. 89 year-old woman with PMH of CLL s/p Rituximab, COPD, bronchiectasis, AFib (not on anticoagulation, severe aortic stenosis and mitral regurgitation, HTN, cervical osteomyelitis, h/o variceal bleed s/p banding, mini-strokes, Alzheimer's, decompensated cirrhosis with recurrent encephalopathy and HCC s/p ablation and incomplete treatment, concerning for metabolic encephalopathy 2/2 likely hepatic decompensation.

## 2018-10-26 NOTE — DIETITIAN INITIAL EVALUATION ADULT. - OTHER INFO
Pt seen for nutrition consult on 6TOW. Pt observed at breakfast time, consumed Pt will meet >75% of meal so far and tolerating texture modified diet since yesterday s/p removal of NGT per daughter. Pt with extended history of poor/fair po PTA, documented in previous RD notes and continued to current assessment. Pt with decreased appetite likely in setting of advanced age. Pt wt history per daughter and confirmed by chart is pt with  lbs, however had lost 5 lb prior to 2/2018 admit, wt noted as 112 lbs. Pt then lost additional 4 lbs from 2/2018 to 6/2018, noted with wt 108 lbs on 6/13/2018. Pt current wt noted at 107 lbs, indicating total 5 lb wt loss since 2/2018, but relatively stable since 6/2018. Continue to monitor. Per daughter, pt can tolerate most foods in regular texture diet per daughter and was working with speech/swallow as outpatient to tailor diet needs. Also was tolerating thin liquids at times. When pt would be feeling weak, daughter would change diet to thicker foods and beverages at home. Daughter does not want mother to be seen by speech and swallow at this time. No c/o N/V/D/C at this time, pt had been admitted with N/V x 2 days. Daughter amenable to ensure pudding supplements, declines nutritional supplement drinks. Reviewed pureed menu options with daughter.

## 2018-10-26 NOTE — PROGRESS NOTE ADULT - PROBLEM SELECTOR PLAN 3
Pt recently treated as outpatient for UTI 3 weeks ago with 7 days of Cipro; urine culture grew pan-sensitive E. coli  - Completed ceftriaxone on 10/24; pt with no history of resistant organisms in the past (E. coli, Klebsiella)  - Urine culture shows NGTD; UA equivocally positive  - Patient voiding on her own. Resolved. Pt presented with acute onset of nausea and vomiting beginning this morning, with 4-5 episodes. Pt currently denies abdominal pain  - CT abdomen/pelvis negative for any acute pathology, only finding of cirrhosis with small amount of ascites  - N/V may be 2/2 ongoing infection versus gastroenteritis versus ACS  - QTc normal; c/w Zofran PRN for nausea/vomiting  - Troponin downtrending  - C/w half NS @75cc/hr for dehydration; pt appears to be hypovolemic on exam  - Infectious workup negative so far.  - RUQ ultrasound with dopplers shows no portal vein thrombus  - TSH and digoxin wnl  - TTE shows normal EF  - Patient's symptoms were initially concerning for digoxin toxicity, even though level was therapeutic. (nausea, vomting, hyperkalemia, altered mental status). Digoxin was intermittently held, but now continued.

## 2018-10-26 NOTE — DIETITIAN INITIAL EVALUATION ADULT. - PROBLEM SELECTOR PLAN 1
Pt with encephalopathy on admission, baseline AAOx3 and walks with a walker  - Differential includes metabolic 2/2 infection, toxic (less likely from ingestion), neurologic, hepatic encephalopathy  - C/w Ceftriaxone empirically for UTI; no other abdominal sources of infection seen on CTAP. F/u blood and urine cx.  - CT head negative for any acute changes  - Per pt's family, pt has been having 3-4 BMs/day for the past 2 days prior to today- will c/w home lactulose dose and monitor for BMs  - F/u ammonia level

## 2018-10-26 NOTE — PROGRESS NOTE ADULT - PROBLEM SELECTOR PLAN 4
Improving. Likely prerenal in the setting of nausea/vomiting and decreased PO intake  - BUN:creatinine ratio >30; likely prerenal KARRIE  - C/w NS @75cc/hr given cirrhosis and AS, monitor fluid status closely  - Will repeat BMP in an hour  - Avoid nephrotoxic agents and renally dose medications  - FENa= 0.2% Pt recently treated as outpatient for UTI 3 weeks ago with 7 days of Cipro; urine culture grew pan-sensitive E. coli  - Completed ceftriaxone on 10/24; pt with no history of resistant organisms in the past (E. coli, Klebsiella)  - Urine culture shows NGTD; UA equivocally positive  - Patient voiding on her own.

## 2018-10-26 NOTE — DIETITIAN INITIAL EVALUATION ADULT. - PROBLEM SELECTOR PLAN 4
Likely prerenal in the setting of nausea/vomiting and decreased PO intake  - BUN:creatinine ratio >30; likely prerenal KARRIE  - C/w NS @75cc/hr given cirrhosis and AS, monitor fluid status closely  - Will repeat BMP in an hour  - Avoid nephrotoxic agents and renally dose medications  - F/u Urine lytes

## 2018-10-26 NOTE — DISCHARGE NOTE ADULT - MEDICATION SUMMARY - MEDICATIONS TO TAKE
I will START or STAY ON the medications listed below when I get home from the hospital:    budesonide 0.25 mg/2 mL inhalation suspension  -- 2 milliliter(s) inhaled 2 times a day  -- Indication: For COPD    propranolol 20 mg oral tablet  -- 0.5 tab(s) by mouth 2 times a day  -- Indication: For Atrial fibrillation    digoxin 125 mcg (0.125 mg) oral tablet  -- 1 tab(s) by mouth every other day (at bedtime)  -- Indication: For Atrial fibrillation     ipratropium-albuterol 0.5 mg-2.5 mg/3 mLinhalation solution  -- 3 milliliter(s) by nebulizer 2 times a day, As Needed  -- Indication: For COPD    lactulose 10 g/15 mL oral syrup  -- 30 milliliter(s) by mouth 2-4 times a day, As Needed; titrate to 2-3 soft bowel movements per day  -- Indication: For Cirrhosis of liver with ascites, unspecified hepatic cirrhosis type    rifAXIMin 550 mg oral tablet  -- 1 tab(s) by mouth 2 times a day  -- Indication: For Cirrhosis of liver with ascites, unspecified hepatic cirrhosis type    levothyroxine 25 mcg (0.025 mg) oral capsule  -- 1 cap(s) by mouth once a day  -- Indication: For Hypothyroidism     Vitamin D3 2000 intl units oral tablet  -- 1 tab(s) by mouth once a day  -- Indication: For Supplement

## 2018-10-26 NOTE — CHART NOTE - NSCHARTNOTEFT_GEN_A_CORE
Upon Nutritional Assessment by the Registered Dietitian your patient was determined to meet criteria / has evidence of the following diagnosis/diagnoses:          [ ]  Mild Protein Calorie Malnutrition        [ ]  Moderate Protein Calorie Malnutrition        [x] Severe Protein Calorie Malnutrition        [ ] Unspecified Protein Calorie Malnutrition        [ ] Underweight / BMI <19        [ ] Morbid Obesity / BMI > 40      Findings as based on:  [x] Comprehensive nutrition assessment   [ ] Nutrition Focused Physical Exam  [x] Other: inadequate po intake > 1 month, 5 lb (4.5% wt loss) x 8 months and history of additional wt loss prior, severe muscle wasting, fat loss,   in setting of swallowing difficulty and decreased appetite with advanced age and h/o catabolic illness      Nutrition Plan/Recommendations:    1. Continue current diet order, no therapeutic diet restrictions indicated at this time. Continue texture modified diet per pt tolerance. Defer texture to speech/swallow.  2. Continue to provide food preferences within diet restrictions as feasible   3. Provide 2 texture modified ensure pudding to help pt meet estimated nutrient needs  4. Feeding assistance and encouragement at meals as needed  5. Monitor lytes and replete as needed  6. Will continue to monitor po intake, wt, labs, f/u per protocol     RD remains available. Merline Rojo RD, Pager: [phone]       PROVIDER Section:     By signing this assessment you are acknowledging and agree with the diagnosis/diagnoses assigned by the Registered Dietitian    Comments: Upon Nutritional Assessment by the Registered Dietitian your patient was determined to meet criteria / has evidence of the following diagnosis/diagnoses:          [ ]  Mild Protein Calorie Malnutrition        [ ]  Moderate Protein Calorie Malnutrition        [x] Severe Protein Calorie Malnutrition        [ ] Unspecified Protein Calorie Malnutrition        [ ] Underweight / BMI <19        [ ] Morbid Obesity / BMI > 40      Findings as based on:  [x] Comprehensive nutrition assessment   [ ] Nutrition Focused Physical Exam  [x] Other: inadequate po intake > 1 month, 5 lb (4.5% wt loss) x 8 months and history of additional wt loss prior, severe muscle wasting, fat loss,   in setting of swallowing difficulty and decreased appetite with advanced age and h/o catabolic illness      Nutrition Plan/Recommendations:    1. Continue current diet order, no therapeutic diet restrictions indicated at this time. Continue texture modified diet per pt tolerance. Defer texture to speech/swallow.  2. Continue to provide food preferences within diet restrictions as feasible  3. Provide 2 texture modified ensure pudding and 2 healthshakes to help pt meet estimated nutrient needs  4. Feeding assistance and encouragement at meals as needed  5. Monitor lytes and replete as needed  6. Will continue to monitor po intake, wt, labs, f/u per protocol     RD remains available. Merline Rojo RD, Pager: [phone]       PROVIDER Section:     By signing this assessment you are acknowledging and agree with the diagnosis/diagnoses assigned by the Registered Dietitian    Comments:

## 2018-10-26 NOTE — PROGRESS NOTE ADULT - PROBLEM SELECTOR PLAN 7
Resolved, patietn now in Afib. Presented with sinus bradycardia on telemetry, with rates ranging from 40-50.  - Holding propranolol  - TWI seen in V1-V3, pt currently encephalopathic, unable to assess for ongoing chest pain  - Continue to monitor on telemetry, restart digoxin and propanolol. Likely in the setting of cirrhosis  - Baseline platelet count in the 60s-80s; currently 100, may be hemoconcentrated  - Trend CBC daily

## 2018-10-26 NOTE — DISCHARGE NOTE ADULT - HOME CARE AGENCY
A nurse will visit you in your home from [facility].  They will contact you to confirm their visit.  Call [phone] with any questions.

## 2018-10-26 NOTE — DISCHARGE NOTE ADULT - PATIENT PORTAL LINK FT
You can access the [facility] Patient Portal, offered by [facility], by registering with the following website: [email or web address]

## 2018-10-26 NOTE — DIETITIAN INITIAL EVALUATION ADULT. - ENERGY NEEDS
Height: 61 inches, Weight: 107 pounds  BMI: 20.2 kg/m2 IBW: 105 pounds (+/-10%), %IBW: 102%  Pertinent Info: No edema noted, no pressure injuries noted at this time in nursing flow sheet.  Other pertinent info: Pt is 89yoF admitted for N/V x 2 days, acute encephalopathy. Pt with small ascites and infectious work up negative so far. Extensive PMH significant for chronic lymphoblastic leukemia s/p Rituximab, Alzheimer's, mini strokes, cirrhosis with encephalopathy and HCC s/p ablation, h/o variceal bleeding s/p banding, COPD, Afib no AC, HTN, CKD III, GIB. Height: 61 inches, Weight: 107 pounds  BMI: 20.2 kg/m2 IBW: 105 pounds (+/-10%), %IBW: 102%  Pertinent Info: No edema noted, no pressure injuries noted at this time in nursing flow sheet.  Other pertinent info: Pt is 89yoF admitted for N/V x 2 days, acute encephalopathy. Pt with small ascites and infectious work up negative so far. Pt noted with low phosphorus lab and elevated sodium lab on 10/25. Extensive PMH significant for chronic lymphoblastic leukemia s/p Rituximab, Alzheimer's, mini strokes, cirrhosis with encephalopathy and HCC s/p ablation, h/o variceal bleeding s/p banding, COPD, Afib no AC, HTN, CKD III, GIB.

## 2018-10-26 NOTE — PROGRESS NOTE ADULT - PROBLEM SELECTOR PLAN 8
Likely in the setting of cirrhosis  - Baseline platelet count in the 60s-80s; currently 100, may be hemoconcentrated  - Trend CBC daily Pt currently not on AC 2/2 history of ICH in the past  - CHADSVASC score 6  - Continue digoxin  - Pt currently in Afib rate controlled

## 2018-10-26 NOTE — PROGRESS NOTE ADULT - PROBLEM SELECTOR PLAN 2
Pt presenting with acute onset of nausea and vomiting beginning this morning, with 4-5 episodes. Pt currently denies abdominal pain  - CT abdomen/pelvis negative for any acute pathology, only finding of cirrhosis with small amount of ascites  - N/V may be 2/2 ongoing infection versus gastroenteritis versus ACS  - QTc normal; c/w Zofran PRN for nausea/vomiting  - Troponin downtrending  - C/w NS @75cc/hr for dehydration; pt appears to be hypovolemic on exam  - Infectious workup negative so far.  - RUQ ultrasound with dopplers shows no portal vein thrombus  - TSH and digoxin wnl  - TTE shows normal EF  - Patient's symptoms were initially concerning for digoxin toxicity, even though level was therapeutic. (nausea, vomting, hyperkalemia, altered mental status). Digoxin was intermittently held. Resolved. Pt with encephalopathy on admission, baseline AAOx3 and walks with a walker  - Differential includes metabolic 2/2 infection, toxic (less likely from ingestion), neurologic, hepatic encephalopathy  - Last dose of ceftriaxone on 10/24 for UTI; no other abdominal sources of infection seen on CTAP. Blood and urine culture show NGTD  - CT head negative for any acute changes  - Per pt's family, pt has been having 3-4 BMs/day for the past 2 days prior to admission

## 2018-10-26 NOTE — PROGRESS NOTE ADULT - SUBJECTIVE AND OBJECTIVE BOX
HA JACQUES  89y Female    Patient is a 89y old  Female who presents with a chief complaint of Nausea, vomiting (21 Oct 2018 18:00)    INTERVAL HPI/OVERNIGHT EVENTS:     MEDICATIONS  (STANDING):  buDESOnide   0.25 milliGRAM(s) Respule 0.25 milliGRAM(s) Inhalation two times a day  digoxin     Tablet 0.125 milliGRAM(s) Oral daily  influenza   Vaccine 0.5 milliLiter(s) IntraMuscular once  lactulose Syrup 20 Gram(s) Oral every 6 hours  levothyroxine 25 MICROGram(s) Oral daily  propranolol 10 milliGRAM(s) Oral two times a day  rifaximin 550 milliGRAM(s) Oral two times a day  sodium chloride 0.45%. 1000 milliLiter(s) (50 mL/Hr) IV Continuous <Continuous>    MEDICATIONS  (PRN):  ALBUTerol/ipratropium for Nebulization 3 milliLiter(s) Nebulizer every 12 hours PRN Shortness of Breath and/or Wheezing  ondansetron Injectable 4 milliGRAM(s) IV Push every 6 hours PRN Nausea and/or Vomiting    Vital Signs Last 24 Hrs  T(C): 36.7 (26 Oct 2018 04:18), Max: 36.9 (25 Oct 2018 11:56)  T(F): 98 (26 Oct 2018 04:18), Max: 98.4 (25 Oct 2018 11:56)  HR: 64 (26 Oct 2018 04:18) (58 - 87)  BP: 122/51 (26 Oct 2018 04:18) (100/62 - 122/51)  BP(mean): --  RR: 18 (26 Oct 2018 04:18) (17 - 18)  SpO2: 94% (26 Oct 2018 04:18) (93% - 95%)    PHYSICAL EXAM:    SKIN: Multiple ecchymoses on bilateral arms; R arm is less erythematous, swollen and warm compared to before, but nontender to palpation    LABS: ANA MARIA JACQUESIS  89y Female    Patient is a 89y old  Female who presents with a chief complaint of Nausea, vomiting (21 Oct 2018 18:00)    INTERVAL HPI/OVERNIGHT EVENTS: Patient is sitting in a chair, feeding herself, alert and able to have a conversation. Telemetry shows Afib with HR . No acute events overnight.    MEDICATIONS  (STANDING):  buDESOnide   0.25 milliGRAM(s) Respule 0.25 milliGRAM(s) Inhalation two times a day  digoxin     Tablet 0.125 milliGRAM(s) Oral daily  influenza   Vaccine 0.5 milliLiter(s) IntraMuscular once  lactulose Syrup 20 Gram(s) Oral every 6 hours  levothyroxine 25 MICROGram(s) Oral daily  propranolol 10 milliGRAM(s) Oral two times a day  rifaximin 550 milliGRAM(s) Oral two times a day  sodium chloride 0.45%. 1000 milliLiter(s) (50 mL/Hr) IV Continuous <Continuous>    MEDICATIONS  (PRN):  ALBUTerol/ipratropium for Nebulization 3 milliLiter(s) Nebulizer every 12 hours PRN Shortness of Breath and/or Wheezing  ondansetron Injectable 4 milliGRAM(s) IV Push every 6 hours PRN Nausea and/or Vomiting    Vital Signs Last 24 Hrs  T(C): 36.7 (26 Oct 2018 04:18), Max: 36.9 (25 Oct 2018 11:56)  T(F): 98 (26 Oct 2018 04:18), Max: 98.4 (25 Oct 2018 11:56)  HR: 64 (26 Oct 2018 04:18) (58 - 87)  BP: 122/51 (26 Oct 2018 04:18) (100/62 - 122/51)  BP(mean): --  RR: 18 (26 Oct 2018 04:18) (17 - 18)  SpO2: 94% (26 Oct 2018 04:18) (93% - 95%)    PHYSICAL EXAM:  GENERAL: NAD, cachectic  HEAD:  Atraumatic, Normocephalic  EYES: EOMI, conjunctiva and sclera clear  NECK: Supple, No JVD  CHEST/LUNG: Clear to auscultation bilaterally; No wheeze, ronchi or rales  HEART: Systolic ejection murmur, no rubs, or gallops  ABDOMEN: Soft, Nontender, Nondistended; Bowel sounds present  EXTREMITIES: No clubbing or edema  NEUROLOGY: non-focal  SKIN: Multiple ecchymoses on bilateral arms; R arm is no longer swollen    LABS:  Pending

## 2018-10-26 NOTE — PROGRESS NOTE ADULT - PROBLEM SELECTOR PLAN 9
Pt currently not on AC 2/2 history of ICH in the past  - CHADSVASC score 6  - Continue digoxin  - Pt currently in sinus bradycardia with occasional PATs Pt with severe AS seen on TTE from 2016; currently follows with Dr. Boston as an outpatient  - Continue to monitor and assess fluid status daily; c/w gentle IVF hydration    Problem 11: IV infiltration of R arm  - Blood cultures show NGTD, peripheral IV removed from R arm  - s/p vancomycin x 1, now improved. Unlikely cellulitis.    Problem 12: Prophylactic measure  Patient is DNR, but not DNI. Family would like pressors.  Dispo: Home with home PT

## 2018-10-26 NOTE — DISCHARGE NOTE ADULT - CARE PROVIDER_API CALL
Fallon Celaya), Critical Care Medicine; Internal Medicine; Pulmonary Disease  [address] [address]  Phone: [phone]  Fax: [phone] Fallon Celaya), Critical Care Medicine; Internal Medicine; Pulmonary Disease  [address] [facility] [address]  [place] [address]  Phone: [phone]  Fax: [phone]    Daniel Boston), Cardiovascular Disease; Internal Medicine  [address] [facility] [address]  [place] [address]  Phone: [phone]  Fax: [phone]

## 2018-10-26 NOTE — PROGRESS NOTE ADULT - ASSESSMENT
Impression:  # Encephalopathy - Now significantly improved. Suspected to be secondary to infection (UTI, s/p Ceftriaxone) or PSE (on treatment with lactulose and rifaximin). CT Head negative.   # Decompensated cirrhosis, MELD-Na 14 yesterday (AM labs not drawn)        - Varices: Reported hx of banding, previously on Coreg, now on propranolol        - Ascites: Small on CT (10/21/18), on Spironolactone only as outpatient         - HE: history of recurrent HE, on lactulose and rifaximin as outpatient         - HCC: History of lesion s/p microwave ablation partially successful, patient's family wishes no further intervention  # Urinary tract infection: s/p Ceftriaxone    Recommendations:  - Check AM BMP for Na after IVF  - Continue with Xifaxan and lactulose  - Continue Propranolol   - Daily CMP, INR, CBC  - Follow up with Dr. Torres Ho MD  Gastroenterology Fellow  [phone]  Please page on call fellow on weekends and after 5pm on weekdays

## 2018-10-26 NOTE — DIETITIAN INITIAL EVALUATION ADULT. - SOURCE
daughter at bedside, electronic medical record, previous RD notes/other (specify)/patient/family/significant other

## 2018-10-26 NOTE — DIETITIAN INITIAL EVALUATION ADULT. - NS AS NUTRI INTERV MEDICAL AND FOOD SUPPLEMENTS
Recommend provide 2 texture modified ensure puddings (to provide additional 340 kcal, 8g pro) per pt preference to help pt meet estimated nutrient needs./Commercial food Recommend provide 2 texture modified ensure puddings (to provide additional 340 kcal, 8g pro) per pt preference and 2 health shakes to help pt meet estimated nutrient needs./Commercial food

## 2018-10-26 NOTE — DIETITIAN INITIAL EVALUATION ADULT. - ORAL INTAKE PTA
poor/Pt with fair to poor po intake PTA. Pt with shellfish allergy, documented in chart as well. Pt was taking vitamin C and vitamin D supplements PTA.

## 2018-10-26 NOTE — DISCHARGE NOTE ADULT - HOSPITAL COURSE
89 year-old woman with PMH of CLL s/p Rituximab, COPD, bronchiectasis, AFib (not on anticoagulation, severe aortic stenosis and mitral regurgitation, HTN, cervical osteomyelitis, h/o variceal bleed s/p banding, mini-strokes, Alzheimer's, decompensated cirrhosis with recurrent encephalopathy and HCC s/p ablation and incomplete treatment, presenting for nausea and vomiting for 2 days. Family states patient had non-productive cough for a few days, which resolved. Since today, she had NBNB vomiting x 4 and decreased po intake. Patient received ceftriaxone and was started on LR 70 cc/hr. EKG shows sinus bradycardia (HR 47), T wave inversion in V1, V2, V3 and 1st degree heart block. Labs were significant for hyperkalemia (K>9) and Cr 1.72.    Hospital course: 89 year-old woman with PMH of CLL s/p Rituximab, COPD, bronchiectasis, AFib (not on anticoagulation, severe aortic stenosis and mitral regurgitation, HTN, cervical osteomyelitis, h/o variceal bleed s/p banding, mini-strokes, Alzheimer's, decompensated cirrhosis with recurrent encephalopathy and HCC s/p ablation and incomplete treatment, presenting for nausea and vomiting for 2 days. Family states patient had non-productive cough for a few days, which resolved. Since today, she had NBNB vomiting x 4 and decreased po intake. Patient received ceftriaxone and was started on LR 70 cc/hr. EKG shows sinus bradycardia (HR 47), T wave inversion in V1, V2, V3 and 1st degree heart block. Labs were significant for hyperkalemia (K>9) and Cr 1.72.    Hospital course:  Patient has weakly positive UA and was treated empirically for UTI with ceftriaxone for 3 days. On presentation, patient was not alert enough to consume medications or po intake, so NGT was placed to administer lactulose and remaining medications. Hepatology was consulted and agreed with the plan and recommended uptitrating lactulose, given high serum ammonia level. Patient's mental status improved in a few days, and she was talking, responding to questions, feeding herself and NGT could be removed eventually. 89 year-old woman with PMH of CLL s/p Rituximab, COPD, bronchiectasis, AFib (not on anticoagulation, severe aortic stenosis and mitral regurgitation, HTN, cervical osteomyelitis, h/o variceal bleed s/p banding, mini-strokes, Alzheimer's, decompensated cirrhosis with recurrent encephalopathy and HCC s/p ablation and incomplete treatment, presenting for nausea and vomiting for 2 days. Family states patient had non-productive cough for a few days, which resolved. Since today, she had NBNB vomiting x 4 and decreased po intake. Patient received ceftriaxone and was started on LR 70 cc/hr. EKG shows sinus bradycardia (HR 47), T wave inversion in V1, V2, V3 and 1st degree heart block. Labs were significant for hyperkalemia (K>9) and Cr 1.72.    Hospital course:  Patient has weakly positive UA and was treated empirically for UTI with ceftriaxone for 3 days. On presentation, patient was not alert enough to consume medications or po intake, so NGT was placed to administer lactulose and remaining medications. Hepatology was consulted and agreed with the plan and recommended uptitrating lactulose, given high serum ammonia level. Patient's mental status improved in a few days, and she was talking, responding to questions, feeding herself and NGT could be removed eventually. Speech and swallow evaluation was done and patient was recommended dysphagia 3 diet. Patient was bradycardic on presentation but later converted to Afib. Rate was controlled with propanolol and digoxin was resumed. Spironolactone was held d/t hyperkalemia and since patient was volume deficient. Patient is now hemodynamically stable for discharge to home with home PT. 89 year-old woman with PMH of CLL s/p Rituximab, COPD, bronchiectasis, AFib (not on anticoagulation, severe aortic stenosis and mitral regurgitation, HTN, cervical osteomyelitis, h/o variceal bleed s/p banding, mini-strokes, Alzheimer's, decompensated cirrhosis with recurrent encephalopathy and HCC s/p ablation and incomplete treatment, presenting for nausea and vomiting for 2 days. Family states patient had non-productive cough for a few days, which resolved. Since today, she had NBNB vomiting x 4 and decreased po intake. Patient received ceftriaxone and was started on LR 70 cc/hr. EKG shows sinus bradycardia (HR 47), T wave inversion in V1, V2, V3 and 1st degree heart block. Labs were significant for hyperkalemia (K>9) and Cr 1.72.    Hospital course:  Patient has weakly positive UA and was treated empirically for UTI with ceftriaxone for 3 days. On presentation, patient was not alert enough to consume medications or po intake, so NGT was placed to administer lactulose and remaining medications. Hepatology was consulted and agreed with the plan and recommended uptitrating lactulose, given high serum ammonia level. Patient's mental status improved in a few days, and she was talking, responding to questions, feeding herself and NGT could be removed eventually. Speech and swallow evaluation was done and patient was recommended dysphagia 3 diet. Patient was bradycardic on presentation but later converted to Afib. Rate was controlled with propanolol and digoxin was resumed. Spironolactone was intially held d/t hyperkalemia and later on held due to low SBP 90s. Patient is now hemodynamically stable for discharge to home with home PT. 89 year-old woman with PMH of CLL s/p Rituximab, COPD, bronchiectasis, AFib (not on anticoagulation, severe aortic stenosis and mitral regurgitation, HTN, cervical osteomyelitis, h/o variceal bleed s/p banding, mini-strokes, Alzheimer's, decompensated cirrhosis with recurrent encephalopathy and HCC s/p ablation and incomplete treatment, presenting for nausea and vomiting for 2 days. Family states patient had non-productive cough for a few days, which resolved. Since today, she had NBNB vomiting x 4 and decreased po intake. Patient received ceftriaxone and was started on LR 70 cc/hr. EKG shows sinus bradycardia (HR 47), T wave inversion in V1, V2, V3 and 1st degree heart block. Labs were significant for hyperkalemia (K>9) and Cr 1.72.    Hospital course:  Patient has weakly positive UA and was treated empirically for UTI with ceftriaxone for 3 days. On presentation, patient was not alert enough to consume medications or po intake, so NGT was placed to administer lactulose and remaining medications. Hepatology was consulted and agreed with the plan and recommended uptitrating lactulose, given high serum ammonia level. Patient's mental status improved in a few days, and she was talking, responding to questions, feeding herself and NGT could be removed eventually. Speech and swallow evaluation was done and patient was recommended dysphagia 3 diet. Patient was bradycardic on presentation but later converted to Afib. Rate was controlled with propanolol and digoxin was resumed. Spironolactone was held d/t hyperkalemia (resolved) and low BP during admission and upon discharge. She was advised to follow up with her hepatologist regarding resuming this medicaiton. Patient is now hemodynamically stable for discharge to home with home PT.

## 2018-10-26 NOTE — DISCHARGE NOTE ADULT - PLAN OF CARE
Management of condition You were very confused when admitted to the hospital and your ammonia level was high. We treated you for a urine infection and increased your dose of lactulose and your mental status improved. You were seen by the liver doctors in the hospital, who recommended increasing your lactulose and continuing rifaximin. Please follow up with your liver doctor when you leave the hospital and continue to take your medications as prescribed. We You were seen by the liver doctors in the hospital, who recommended increasing your lactulose and continuing rifaximin. Please follow up with your liver doctor when you leave the hospital and continue to take your medications as prescribed. We also held your spironolactone in the hospital, because you fluid deficient. You had nausea and vomiting at home, which resolved on its own in the hospital. We held your digoxin for a few day, since digoxin toxicity can have similar symptoms, but resumed the medication later on. We treated you for a urinary infection with 3 days of ceftriaxone. We continued your home medication of digoxin. The intravenous line on your right arm was leaking, so we removed it. Your right arm appeared swollen and red initially, but the swelling has since subsided. If you continue to have swelling or pain on that arm, you can try to apply warm compresses to the area. You were very confused when admitted to the hospital and your ammonia level was high. We treated you for an urine infection and increased your dose of lactulose and your mental status improved. You were seen by the liver doctors in the hospital, who recommended increasing your lactulose and continuing rifaximin. Please follow up with your liver doctor when you leave the hospital and continue to take your medications as prescribed. We also held your spironolactone in the hospital, because you are fluid deficient. You had nausea and vomiting at home, which resolved on its own in the hospital. We held your digoxin for a few days, since digoxin toxicity can have similar symptoms, but resumed the medication later on. We continued your home medication of digoxin and propanolol. You were seen by the liver doctors in the hospital, who recommended increasing your lactulose and continuing rifaximin. Please follow up with your liver doctor when you leave the hospital and continue to take your medications as prescribed. We also held your spironolactone in the hospital, because you are fluid deficient. Please follow up with your liver doctor within 1-2 weeks of discharge regarding restarting this medication.

## 2018-10-26 NOTE — DISCHARGE NOTE ADULT - CARE PLAN
Principal Discharge DX:	Encephalopathy acute  Goal:	Management of condition  Secondary Diagnosis:	Cirrhosis of liver with ascites, unspecified hepatic cirrhosis type  Goal:	Management of condition  Secondary Diagnosis:	Nausea & vomiting  Goal:	Management of condition  Secondary Diagnosis:	UTI (urinary tract infection)  Goal:	Management of condition  Secondary Diagnosis:	AF (Atrial Fibrillation)  Goal:	Management of condition  Secondary Diagnosis:	IV infiltration  Goal:	Management of condition Principal Discharge DX:	Encephalopathy acute  Goal:	Management of condition  Assessment and plan of treatment:	You were very confused when admitted to the hospital and your ammonia level was high. We treated you for a urine infection and increased your dose of lactulose and your mental status improved.  Secondary Diagnosis:	Cirrhosis of liver with ascites, unspecified hepatic cirrhosis type  Goal:	Management of condition  Assessment and plan of treatment:	You were seen by the liver doctors in the hospital, who recommended increasing your lactulose and continuing rifaximin. Please follow up with your liver doctor when you leave the hospital and continue to take your medications as prescribed. We  Secondary Diagnosis:	Nausea & vomiting  Goal:	Management of condition  Secondary Diagnosis:	UTI (urinary tract infection)  Goal:	Management of condition  Secondary Diagnosis:	AF (Atrial Fibrillation)  Goal:	Management of condition  Secondary Diagnosis:	IV infiltration  Goal:	Management of condition Principal Discharge DX:	Encephalopathy acute  Goal:	Management of condition  Assessment and plan of treatment:	You were very confused when admitted to the hospital and your ammonia level was high. We treated you for a urine infection and increased your dose of lactulose and your mental status improved.  Secondary Diagnosis:	Cirrhosis of liver with ascites, unspecified hepatic cirrhosis type  Goal:	Management of condition  Assessment and plan of treatment:	You were seen by the liver doctors in the hospital, who recommended increasing your lactulose and continuing rifaximin. Please follow up with your liver doctor when you leave the hospital and continue to take your medications as prescribed. We also held your spironolactone in the hospital, because you fluid deficient.  Secondary Diagnosis:	Nausea & vomiting  Goal:	Management of condition  Assessment and plan of treatment:	You had nausea and vomiting at home, which resolved on its own in the hospital. We held your digoxin for a few day, since digoxin toxicity can have similar symptoms, but resumed the medication later on.  Secondary Diagnosis:	UTI (urinary tract infection)  Goal:	Management of condition  Assessment and plan of treatment:	We treated you for a urinary infection with 3 days of ceftriaxone.  Secondary Diagnosis:	AF (Atrial Fibrillation)  Goal:	Management of condition  Assessment and plan of treatment:	We continued your home medication of digoxin.  Secondary Diagnosis:	IV infiltration  Goal:	Management of condition  Assessment and plan of treatment:	The intravenous line on your right arm was leaking, so we removed it. Your right arm appeared swollen and red initially, but the swelling has since subsided. If you continue to have swelling or pain on that arm, you can try to apply warm compresses to the area. Principal Discharge DX:	Encephalopathy acute  Goal:	Management of condition  Assessment and plan of treatment:	You were very confused when admitted to the hospital and your ammonia level was high. We treated you for an urine infection and increased your dose of lactulose and your mental status improved.  Secondary Diagnosis:	Cirrhosis of liver with ascites, unspecified hepatic cirrhosis type  Goal:	Management of condition  Assessment and plan of treatment:	You were seen by the liver doctors in the hospital, who recommended increasing your lactulose and continuing rifaximin. Please follow up with your liver doctor when you leave the hospital and continue to take your medications as prescribed. We also held your spironolactone in the hospital, because you are fluid deficient.  Secondary Diagnosis:	Nausea & vomiting  Goal:	Management of condition  Assessment and plan of treatment:	You had nausea and vomiting at home, which resolved on its own in the hospital. We held your digoxin for a few days, since digoxin toxicity can have similar symptoms, but resumed the medication later on.  Secondary Diagnosis:	UTI (urinary tract infection)  Goal:	Management of condition  Assessment and plan of treatment:	We treated you for a urinary infection with 3 days of ceftriaxone.  Secondary Diagnosis:	AF (Atrial Fibrillation)  Goal:	Management of condition  Assessment and plan of treatment:	We continued your home medication of digoxin and propanolol.  Secondary Diagnosis:	IV infiltration  Goal:	Management of condition  Assessment and plan of treatment:	The intravenous line on your right arm was leaking, so we removed it. Your right arm appeared swollen and red initially, but the swelling has since subsided. If you continue to have swelling or pain on that arm, you can try to apply warm compresses to the area. Principal Discharge DX:	Encephalopathy acute  Goal:	Management of condition  Assessment and plan of treatment:	You were very confused when admitted to the hospital and your ammonia level was high. We treated you for an urine infection and increased your dose of lactulose and your mental status improved.  Secondary Diagnosis:	Cirrhosis of liver with ascites, unspecified hepatic cirrhosis type  Goal:	Management of condition  Assessment and plan of treatment:	You were seen by the liver doctors in the hospital, who recommended increasing your lactulose and continuing rifaximin. Please follow up with your liver doctor when you leave the hospital and continue to take your medications as prescribed. We also held your spironolactone in the hospital, because you are fluid deficient. Please follow up with your liver doctor within 1-2 weeks of discharge regarding restarting this medication.  Secondary Diagnosis:	Nausea & vomiting  Goal:	Management of condition  Assessment and plan of treatment:	You had nausea and vomiting at home, which resolved on its own in the hospital. We held your digoxin for a few days, since digoxin toxicity can have similar symptoms, but resumed the medication later on.  Secondary Diagnosis:	UTI (urinary tract infection)  Goal:	Management of condition  Assessment and plan of treatment:	We treated you for a urinary infection with 3 days of ceftriaxone.  Secondary Diagnosis:	AF (Atrial Fibrillation)  Goal:	Management of condition  Assessment and plan of treatment:	We continued your home medication of digoxin and propanolol.  Secondary Diagnosis:	IV infiltration  Goal:	Management of condition  Assessment and plan of treatment:	The intravenous line on your right arm was leaking, so we removed it. Your right arm appeared swollen and red initially, but the swelling has since subsided. If you continue to have swelling or pain on that arm, you can try to apply warm compresses to the area.

## 2018-10-26 NOTE — DISCHARGE NOTE ADULT - SECONDARY DIAGNOSIS.
Cirrhosis of liver with ascites, unspecified hepatic cirrhosis type Nausea & vomiting UTI (urinary tract infection) AF (Atrial Fibrillation) IV infiltration

## 2018-10-26 NOTE — DIETITIAN INITIAL EVALUATION ADULT. - PROBLEM SELECTOR PLAN 5
Pt follows up with Dr. Macdonald as outpatient for cirrhosis- likely 2/2 alcohol use in the past  - Hepatology consult in AM  - Pt currently appears to be compensated with INR, T bili, liver enzymes at baseline  - No abnormal liver findings on CTAP  - Pt currently afebrile with no leukocytosis, although SBP is on the differential; c/w Ceftriaxone for empiric treatment, consider paracentesis if pt with no improvement  - C/w rifaximin, lactulose titrated to 2-3 BMs/day  - Holding propranolol in the setting of bradycardia  - Holding spironolactone in the setting of KARRIE and dehydration  - Monitor mental status daily  - Check ammonia level in AM  - Trend MELD labs daily; score today is 18  - Patient with hyperbilirubinemia. F/u LDH to r/o DIC ; haptoglobin was <20.

## 2018-10-26 NOTE — DIETITIAN INITIAL EVALUATION ADULT. - PROBLEM SELECTOR PLAN 6
Likely in the setting of KARRIE  - Repeat BMP now  - If K+ remains high may need medical management with insulin and D50  - Holding spironolactone  - C/w IVF to treat pre-renal KARRIE

## 2018-10-26 NOTE — DIETITIAN INITIAL EVALUATION ADULT. - PHYSICAL APPEARANCE
BMI 20.2, pt with visible muscle wasting and fat loss at time of assessment. Pt with history of NFPE confirming severe muscle wasting in chart, still present on current admit. Deferred NFPE at time of current assessment as pt was eating breakfast.

## 2018-10-26 NOTE — PROGRESS NOTE ADULT - ASSESSMENT
89 year-old woman with PMH of CLL s/p Rituximab, COPD, bronchiectasis, AFib (not on anticoagulation, severe aortic stenosis and mitral regurgitation, HTN, cervical osteomyelitis, h/o variceal bleed s/p banding, mini-strokes, Alzheimer's, decompensated cirrhosis with recurrent encephalopathy and HCC s/p ablation and incomplete treatment, presenting for nausea and vomiting for 2 days.  ·	Continues to do well from a cardiac standpoint. SHe remains in AF but rate controlled.   ·	Continue present cardiac Rx  ·	Strict I and O.    =======================================================================  Nader Adams MD [facility]    Please page [phone] with questions  On nights and weekend please call the office [phone].

## 2018-10-26 NOTE — PROGRESS NOTE ADULT - PROBLEM SELECTOR PLAN 6
Now resolved. Likely in the setting of KARRIE  - Repeat BMP now  - s/p insulin and D50 x1  - Holding spironolactone  - C/w IVF to treat pre-renal KARRIE Now resolved. Likely in the setting of KARRIE  - Repeat BMP now  - s/p insulin and D50 x1  - Holding spironolactone

## 2018-10-26 NOTE — PROGRESS NOTE ADULT - SUBJECTIVE AND OBJECTIVE BOX
Chief Complaint:  Patient is a 89y old  Female who presents with a chief complaint of Nausea, vomiting (26 Oct 2018 07:24)    Interval Events:   No acute overnight events; patient has no complaints this morning    Allergies:  adhesives (Other)  codeine (Rash)  erythromycin (Rash)  iv dye (Rash)  penicillin (Rash)  shellfish (Rash)  warfarin (Other)    Hospital Medications:  ALBUTerol/ipratropium for Nebulization 3 milliLiter(s) Nebulizer every 12 hours PRN  buDESOnide   0.25 milliGRAM(s) Respule 0.25 milliGRAM(s) Inhalation two times a day  digoxin     Tablet 0.125 milliGRAM(s) Oral daily  influenza   Vaccine 0.5 milliLiter(s) IntraMuscular once  lactulose Syrup 20 Gram(s) Oral every 6 hours  levothyroxine 25 MICROGram(s) Oral daily  ondansetron Injectable 4 milliGRAM(s) IV Push every 6 hours PRN  propranolol 10 milliGRAM(s) Oral two times a day  rifaximin 550 milliGRAM(s) Oral two times a day  sodium chloride 0.45%. 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  HTN (Hypertension)  Bleeding Esophageal Varices  CLL (Chronic Lymphoblastic Leukemia)  GIB (Gastrointestinal Bleeding)  History of repair of hip fracture  Esophageal Rupture    ROS:   General:  No fevers, chills or night sweats  ENT:  No sore throat or dysphagia  CV:  No pain or palpitations  Resp:  No dyspnea, cough or  wheezing  GI:  No pain, No nausea, No vomiting, No diarrhea, No constipation  Skin:  No rash or edema    PHYSICAL EXAM:   Vital Signs:  Vital Signs Last 24 Hrs  T(C): 36.3 (26 Oct 2018 09:05), Max: 36.9 (25 Oct 2018 11:56)  T(F): 97.4 (26 Oct 2018 09:05), Max: 98.4 (25 Oct 2018 11:56)  HR: 70 (26 Oct 2018 09:05) (58 - 87)  BP: 109/50 (26 Oct 2018 09:05) (100/62 - 122/51)  BP(mean): --  RR: 18 (26 Oct 2018 09:05) (17 - 18)  SpO2: 96% (26 Oct 2018 09:05) (93% - 96%)  Daily     Daily Weight in k.5 (26 Oct 2018 09:10)    GENERAL:  NAD, Appears stated age  HEENT:  NC/AT,  conjunctivae clear and pink, sclera -anicteric  CHEST:  Normal Effort, Breath sounds clear  HEART:  S1 + S2, no murmurs  ABDOMEN:  Soft, non-tender, non-distended, normoactive bowel sounds  EXTEREMITIES:  no cyanosis or edema  SKIN:  Warm & Dry.  NEURO:  Alert, oriented x 2    LABS:                        11.1   4.6   )-----------( 77       ( 25 Oct 2018 11:43 )             33.7     Mean Cell Volume: 106.0 fl (10-25- @ 11:43)    10-25    147<H>  |  113<H>  |  38<H>  ----------------------------<  123<H>  4.4   |  24  |  1.04    Ca    10.0      25 Oct 2018 11:42  Phos  2.1     10-25  Mg     1.9     10-25    TPro  5.6<L>  /  Alb  3.3  /  TBili  2.6<H>  /  DBili  x   /  AST  30  /  ALT  16  /  AlkPhos  82  10    LIVER FUNCTIONS - ( 25 Oct 2018 11:42 )  Alb: 3.3 g/dL / Pro: 5.6 g/dL / ALK PHOS: 82 U/L / ALT: 16 U/L / AST: 30 U/L / GGT: x           PT/INR - ( 25 Oct 2018 11:44 )   PT: 14.7 sec;   INR: 1.35 ratio

## 2018-10-26 NOTE — DISCHARGE NOTE ADULT - CARE PROVIDERS DIRECT ADDRESSES
[email or web address]

## 2018-10-26 NOTE — DIETITIAN INITIAL EVALUATION ADULT. - NS AS NUTRI INTERV COLLABORAT
Discussed nutrition recommendations with Team 2. Malnutrition alert placed, will continue to monitor. Discussed nutrition recommendations with Team 2. Malnutrition alert placed, will continue to monitor. Monitor lytes and replete as needed.

## 2018-10-27 DIAGNOSIS — T80.1XXA VASCULAR COMPLICATIONS FOLLOWING INFUSION, TRANSFUSION AND THERAPEUTIC INJECTION, INITIAL ENCOUNTER: ICD-10-CM

## 2018-10-27 DIAGNOSIS — K72.90 HEPATIC FAILURE, UNSPECIFIED WITHOUT COMA: ICD-10-CM

## 2018-10-27 DIAGNOSIS — E87.0 HYPEROSMOLALITY AND HYPERNATREMIA: ICD-10-CM

## 2018-10-27 LAB
ALBUMIN SERPL ELPH-MCNC: 2.9 G/DL — LOW (ref 3.3–5)
ALP SERPL-CCNC: 82 U/L — SIGNIFICANT CHANGE UP (ref 40–120)
ALT FLD-CCNC: 16 U/L — SIGNIFICANT CHANGE UP (ref 10–45)
ANION GAP SERPL CALC-SCNC: 14 MMOL/L — SIGNIFICANT CHANGE UP (ref 5–17)
AST SERPL-CCNC: 28 U/L — SIGNIFICANT CHANGE UP (ref 10–40)
BASOPHILS # BLD AUTO: 0.01 K/UL — SIGNIFICANT CHANGE UP (ref 0–0.2)
BASOPHILS NFR BLD AUTO: 0.2 % — SIGNIFICANT CHANGE UP (ref 0–2)
BILIRUB SERPL-MCNC: 1.8 MG/DL — HIGH (ref 0.2–1.2)
BUN SERPL-MCNC: 36 MG/DL — HIGH (ref 7–23)
CALCIUM SERPL-MCNC: 9.8 MG/DL — SIGNIFICANT CHANGE UP (ref 8.4–10.5)
CHLORIDE SERPL-SCNC: 110 MMOL/L — HIGH (ref 96–108)
CO2 SERPL-SCNC: 23 MMOL/L — SIGNIFICANT CHANGE UP (ref 22–31)
CREAT SERPL-MCNC: 0.89 MG/DL — SIGNIFICANT CHANGE UP (ref 0.5–1.3)
CULTURE RESULTS: SIGNIFICANT CHANGE UP
CULTURE RESULTS: SIGNIFICANT CHANGE UP
EOSINOPHIL # BLD AUTO: 0.12 K/UL — SIGNIFICANT CHANGE UP (ref 0–0.5)
EOSINOPHIL NFR BLD AUTO: 2.4 % — SIGNIFICANT CHANGE UP (ref 0–6)
GLUCOSE SERPL-MCNC: 149 MG/DL — HIGH (ref 70–99)
HCT VFR BLD CALC: 35.6 % — SIGNIFICANT CHANGE UP (ref 34.5–45)
HGB BLD-MCNC: 11.5 G/DL — SIGNIFICANT CHANGE UP (ref 11.5–15.5)
IMM GRANULOCYTES NFR BLD AUTO: 0.2 % — SIGNIFICANT CHANGE UP (ref 0–1.5)
INR BLD: 1.33 RATIO — HIGH (ref 0.88–1.16)
LYMPHOCYTES # BLD AUTO: 0.66 K/UL — LOW (ref 1–3.3)
LYMPHOCYTES # BLD AUTO: 13 % — SIGNIFICANT CHANGE UP (ref 13–44)
MCHC RBC-ENTMCNC: 32.3 GM/DL — SIGNIFICANT CHANGE UP (ref 32–36)
MCHC RBC-ENTMCNC: 34.8 PG — HIGH (ref 27–34)
MCV RBC AUTO: 107.9 FL — HIGH (ref 80–100)
MONOCYTES # BLD AUTO: 0.39 K/UL — SIGNIFICANT CHANGE UP (ref 0–0.9)
MONOCYTES NFR BLD AUTO: 7.7 % — SIGNIFICANT CHANGE UP (ref 2–14)
NEUTROPHILS # BLD AUTO: 3.87 K/UL — SIGNIFICANT CHANGE UP (ref 1.8–7.4)
NEUTROPHILS NFR BLD AUTO: 76.5 % — SIGNIFICANT CHANGE UP (ref 43–77)
PHOSPHATE SERPL-MCNC: 2.4 MG/DL — LOW (ref 2.5–4.5)
PLATELET # BLD AUTO: 85 K/UL — LOW (ref 150–400)
POTASSIUM SERPL-MCNC: 4.4 MMOL/L — SIGNIFICANT CHANGE UP (ref 3.5–5.3)
POTASSIUM SERPL-SCNC: 4.4 MMOL/L — SIGNIFICANT CHANGE UP (ref 3.5–5.3)
PROT SERPL-MCNC: 5.2 G/DL — LOW (ref 6–8.3)
PROTHROM AB SERPL-ACNC: 15.1 SEC — HIGH (ref 10–13.1)
RBC # BLD: 3.3 M/UL — LOW (ref 3.8–5.2)
RBC # FLD: 15.5 % — HIGH (ref 10.3–14.5)
SODIUM SERPL-SCNC: 147 MMOL/L — HIGH (ref 135–145)
SPECIMEN SOURCE: SIGNIFICANT CHANGE UP
SPECIMEN SOURCE: SIGNIFICANT CHANGE UP
WBC # BLD: 5.06 K/UL — SIGNIFICANT CHANGE UP (ref 3.8–10.5)
WBC # FLD AUTO: 5.06 K/UL — SIGNIFICANT CHANGE UP (ref 3.8–10.5)

## 2018-10-27 PROCEDURE — 99233 SBSQ HOSP IP/OBS HIGH 50: CPT | Mod: GC

## 2018-10-27 RX ORDER — POTASSIUM PHOSPHATE, MONOBASIC POTASSIUM PHOSPHATE, DIBASIC 236; 224 MG/ML; MG/ML
15 INJECTION, SOLUTION INTRAVENOUS ONCE
Qty: 0 | Refills: 0 | Status: COMPLETED | OUTPATIENT
Start: 2018-10-27 | End: 2018-10-27

## 2018-10-27 RX ORDER — SODIUM CHLORIDE 9 MG/ML
1000 INJECTION, SOLUTION INTRAVENOUS
Qty: 0 | Refills: 0 | Status: COMPLETED | OUTPATIENT
Start: 2018-10-27 | End: 2018-10-27

## 2018-10-27 RX ADMIN — Medication 0.25 MILLIGRAM(S): at 06:19

## 2018-10-27 RX ADMIN — LACTULOSE 20 GRAM(S): 10 SOLUTION ORAL at 06:20

## 2018-10-27 RX ADMIN — LACTULOSE 20 GRAM(S): 10 SOLUTION ORAL at 20:24

## 2018-10-27 RX ADMIN — POTASSIUM PHOSPHATE, MONOBASIC POTASSIUM PHOSPHATE, DIBASIC 62.5 MILLIMOLE(S): 236; 224 INJECTION, SOLUTION INTRAVENOUS at 20:23

## 2018-10-27 RX ADMIN — Medication 25 MICROGRAM(S): at 06:20

## 2018-10-27 RX ADMIN — Medication 0.12 MILLIGRAM(S): at 06:20

## 2018-10-27 RX ADMIN — LACTULOSE 20 GRAM(S): 10 SOLUTION ORAL at 01:04

## 2018-10-27 RX ADMIN — Medication 10 MILLIGRAM(S): at 06:20

## 2018-10-27 RX ADMIN — SODIUM CHLORIDE 50 MILLILITER(S): 9 INJECTION, SOLUTION INTRAVENOUS at 20:30

## 2018-10-27 RX ADMIN — Medication 0.25 MILLIGRAM(S): at 20:24

## 2018-10-27 NOTE — PROGRESS NOTE ADULT - PROBLEM SELECTOR PROBLEM 4
R/O Urinary tract infection without hematuria, site unspecified Cirrhosis of liver with ascites, unspecified hepatic cirrhosis type

## 2018-10-27 NOTE — PROGRESS NOTE ADULT - PROBLEM SELECTOR PLAN 1
Pt follows up with Dr. Macdonald as outpatient for cirrhosis- likely 2/2 alcohol use in the past  - Appreciate hepatology recs  - Pt currently appears to be compensated with INR, T bili, liver enzymes at baseline  - No abnormal liver findings on CTAP  - Pt currently afebrile with no leukocytosis, although SBP is on the differential  - C/w rifaximin, lactulose titrated to 2-3 BMs/day and propranolol  - Holding spironolactone in the setting of KARRIE and dehydration  - Monitor mental status daily  - Trend MELD labs daily  - Patient with hyperbilirubinemia. LDH elevated, haptoglobin was <20, but low concern for DIC Resolved. Pt with encephalopathy on admission, baseline AAOx3 and walks with a walker  - Most likely 2/2 hepatic encephalopathy, but differential includes infection, toxic (less likely from ingestion), neurologic  - Last dose of ceftriaxone on 10/24 for UTI; no other abdominal sources of infection seen on CTAP. Blood and urine culture show NGTD  - CT head negative for any acute changes  - Per pt's family, pt has been having 3-4 BMs/day for the past 2 days prior to admission. Low concern for SBP.  - Continue with lactulose and rifaximin. Resolved. metabolic encephalopathy on admission, baseline AAOx3 and walks with a walker  - Most likely 2/2 hepatic encephalopathy, but possible due to UTI? now s/p treatment resolved  - Last dose of ceftriaxone on 10/24 for UTI; no other abdominal sources of infection seen on CTAP. Blood and urine culture show NGTD  - CT head negative for any acute changes  - Per pt's family, pt has been having 3-4 BMs/day for the past 2 days prior to admission.  - Continue with lactulose and rifaximin.

## 2018-10-27 NOTE — PROGRESS NOTE ADULT - SUBJECTIVE AND OBJECTIVE BOX
HA JACQUES  89y Female    Patient is a 89y old  Female who presents with a chief complaint of Nausea, vomiting (21 Oct 2018 18:00)    INTERVAL HPI/OVERNIGHT EVENTS:     MEDICATIONS  (STANDING):    Vital Signs Last 24 Hrs      PHYSICAL EXAM:  SKIN: Multiple ecchymoses on bilateral arms; R arm is no longer swollen    LABS:  Pending HA JACQUES  89y Female    Patient is a 89y old  Female who presents with a chief complaint of Nausea, vomiting (21 Oct 2018 18:00)    INTERVAL HPI/OVERNIGHT EVENTS: No acute events overnight. Patient was sleeping and appeared comfortable. She was afebrile overnight. On telemetry, patient was A fib with HR 70-80.    MEDICATIONS  (STANDING):  buDESOnide   0.25 milliGRAM(s) Respule 0.25 milliGRAM(s) Inhalation two times a day  digoxin     Tablet 0.125 milliGRAM(s) Oral daily  influenza   Vaccine 0.5 milliLiter(s) IntraMuscular once  lactulose Syrup 20 Gram(s) Oral every 6 hours  levothyroxine 25 MICROGram(s) Oral daily  propranolol 10 milliGRAM(s) Oral two times a day  rifaximin 550 milliGRAM(s) Oral two times a day  sodium chloride 0.45%. 1000 milliLiter(s) (50 mL/Hr) IV Continuous <Continuous>    MEDICATIONS  (PRN):  ALBUTerol/ipratropium for Nebulization 3 milliLiter(s) Nebulizer every 12 hours PRN Shortness of Breath and/or Wheezing  ondansetron Injectable 4 milliGRAM(s) IV Push every 6 hours PRN Nausea and/or Vomiting    Vital Signs Last 24 Hrs  T(C): 36.5 (27 Oct 2018 04:33), Max: 36.6 (26 Oct 2018 16:29)  T(F): 97.7 (27 Oct 2018 04:33), Max: 97.9 (26 Oct 2018 16:29)  HR: 60 (27 Oct 2018 04:33) (60 - 80)  BP: 102/61 (27 Oct 2018 04:33) (102/50 - 133/78)  BP(mean): --  RR: 18 (27 Oct 2018 04:33) (17 - 19)  SpO2: 94% (27 Oct 2018 04:33) (94% - 100%)    PHYSICAL EXAM:  SKIN: Multiple ecchymoses on bilateral arms; R arm is no longer swollen    LABS:  Pending ANA MARIA JACQUESIS  89y Female    Patient is a 89y old  Female who presents with a chief complaint of Nausea, vomiting (21 Oct 2018 18:00)    INTERVAL HPI/OVERNIGHT EVENTS: No acute events overnight. Patient was sleeping and appeared comfortable. She was afebrile overnight. On telemetry, patient was A fib with HR 70-80.    MEDICATIONS  (STANDING):  buDESOnide   0.25 milliGRAM(s) Respule 0.25 milliGRAM(s) Inhalation two times a day  digoxin     Tablet 0.125 milliGRAM(s) Oral daily  influenza   Vaccine 0.5 milliLiter(s) IntraMuscular once  lactulose Syrup 20 Gram(s) Oral every 6 hours  levothyroxine 25 MICROGram(s) Oral daily  propranolol 10 milliGRAM(s) Oral two times a day  rifaximin 550 milliGRAM(s) Oral two times a day  sodium chloride 0.45%. 1000 milliLiter(s) (50 mL/Hr) IV Continuous <Continuous>    MEDICATIONS  (PRN):  ALBUTerol/ipratropium for Nebulization 3 milliLiter(s) Nebulizer every 12 hours PRN Shortness of Breath and/or Wheezing  ondansetron Injectable 4 milliGRAM(s) IV Push every 6 hours PRN Nausea and/or Vomiting    Vital Signs Last 24 Hrs  T(C): 36.5 (27 Oct 2018 04:33), Max: 36.6 (26 Oct 2018 16:29)  T(F): 97.7 (27 Oct 2018 04:33), Max: 97.9 (26 Oct 2018 16:29)  HR: 60 (27 Oct 2018 04:33) (60 - 80)  BP: 102/61 (27 Oct 2018 04:33) (102/50 - 133/78)  BP(mean): --  RR: 18 (27 Oct 2018 04:33) (17 - 19)  SpO2: 94% (27 Oct 2018 04:33) (94% - 100%)    PHYSICAL EXAM:  GENERAL: NAD, sleeping comfortably  HEAD:  Atraumatic, Normocephalic  EYES: EOMI, conjunctiva and sclera clear  NECK: Supple, No JVD  CHEST/LUNG: Clear to auscultation bilaterally; No wheeze, ronchi or rales  HEART: Systolic ejection murmur, no rubs, or gallops  ABDOMEN: Soft, Nontender, Nondistended; Bowel sounds present  EXTREMITIES: chronic purple discoloration of skin on all extremities, no edema  NEUROLOGY: non-focal  SKIN: Multiple ecchymoses on bilateral arms; R arm has decreased swelling from before    LABS:  Pending

## 2018-10-27 NOTE — PROGRESS NOTE ADULT - PROBLEM SELECTOR PLAN 3
Resolved. Pt presented with acute onset of nausea and vomiting beginning this morning, with 4-5 episodes. Pt currently denies abdominal pain  - CT abdomen/pelvis negative for any acute pathology, only finding of cirrhosis with small amount of ascites  - N/V may be 2/2 ongoing infection versus gastroenteritis versus ACS  - QTc normal; c/w Zofran PRN for nausea/vomiting  - Troponin downtrending  - C/w half NS @75cc/hr for dehydration; pt appears to be hypovolemic on exam  - Infectious workup negative so far.  - RUQ ultrasound with dopplers shows no portal vein thrombus  - TSH and digoxin wnl  - TTE shows normal EF  - Patient's symptoms were initially concerning for digoxin toxicity, even though level was therapeutic. (nausea, vomting, hyperkalemia, altered mental status). Digoxin was intermittently held, but now continued. Pt currently not on AC 2/2 history of ICH in the past  - CHADSVASC score 6  - Continue digoxin  - Pt currently in Afib rate controlled

## 2018-10-27 NOTE — PROGRESS NOTE ADULT - ASSESSMENT
89 year-old woman with PMH of CLL s/p Rituximab, COPD, bronchiectasis, AFib (not on anticoagulation, severe aortic stenosis and mitral regurgitation, HTN, cervical osteomyelitis, h/o variceal bleed s/p banding, mini-strokes, Alzheimer's, decompensated cirrhosis with recurrent encephalopathy and HCC s/p ablation and incomplete treatment, concerning for metabolic encephalopathy 2/2 likely hepatic decompensation. 89 year-old woman with PMH of CLL s/p Rituximab, COPD, bronchiectasis, AFib (not on anticoagulation, severe aortic stenosis and mitral regurgitation, HTN, cervical osteomyelitis, h/o variceal bleed s/p banding, mini-strokes, Alzheimer's, decompensated cirrhosis with recurrent encephalopathy and HCC s/p ablation and incomplete treatment, concerning for metabolic encephalopathy 2/2 likely hepatic decompensation, now resolved.

## 2018-10-27 NOTE — PROGRESS NOTE ADULT - PROBLEM SELECTOR PLAN 9
Pt with severe AS seen on TTE from 2016; currently follows with Dr. Boston as an outpatient  - Continue to monitor and assess fluid status daily; c/w gentle IVF hydration    Problem 11: IV infiltration of R arm  - Blood cultures show NGTD, peripheral IV removed from R arm  - s/p vancomycin x 1, now improved. Unlikely cellulitis.    Problem 12: Prophylactic measure  Patient is DNR, but not DNI. Family would like pressors.  Dispo: Home with home PT - Blood cultures show NGTD, peripheral IV removed from R arm  - s/p vancomycin x 1, now improved. Unlikely cellulitis.

## 2018-10-27 NOTE — PROGRESS NOTE ADULT - PROBLEM SELECTOR PLAN 7
Likely in the setting of cirrhosis  - Baseline platelet count in the 60s-80s; currently 100, may be hemoconcentrated  - Trend CBC daily - Blood cultures show NGTD, peripheral IV removed from R arm  - s/p vancomycin x 1, now improved. Unlikely cellulitis.

## 2018-10-27 NOTE — PROGRESS NOTE ADULT - PROBLEM SELECTOR PLAN 8
Pt currently not on AC 2/2 history of ICH in the past  - CHADSVASC score 6  - Continue digoxin  - Pt currently in Afib rate controlled Patient is DNR, but not DNI. Family would like pressors.  Dispo: Home with home PT complete 3 days of ceftriaxone, afebrile, no symptoms, urine cx NGTD  -repeat UA minimally positive, no indication for further treatment

## 2018-10-27 NOTE — PROGRESS NOTE ADULT - PROBLEM SELECTOR PLAN 2
Resolved. Pt with encephalopathy on admission, baseline AAOx3 and walks with a walker  - Differential includes metabolic 2/2 infection, toxic (less likely from ingestion), neurologic, hepatic encephalopathy  - Last dose of ceftriaxone on 10/24 for UTI; no other abdominal sources of infection seen on CTAP. Blood and urine culture show NGTD  - CT head negative for any acute changes  - Per pt's family, pt has been having 3-4 BMs/day for the past 2 days prior to admission Improved. Likely prerenal in the setting of nausea/vomiting and decreased PO intake  - BUN:creatinine ratio >30; likely prerenal KARRIE  - C/w half NS @75cc/hr given cirrhosis and AS, monitor fluid status closely  - Avoid nephrotoxic agents and renally dose medications  - FENa= 0.2% Improved. Likely prerenal in the setting of nausea/vomiting and decreased PO intake  - BUN:creatinine ratio >30; likely prerenal KARRIE  - d/c IVF  - Avoid nephrotoxic agents and renally dose medications  - FENa= 0.2% Improved. Likely prerenal in the setting of nausea/vomiting and decreased PO intake, fena 0.2  - d/c IVF  - bmp daily  - Avoid nephrotoxic agents and renally dose medications

## 2018-10-27 NOTE — PROGRESS NOTE ADULT - PROBLEM SELECTOR PLAN 4
Pt recently treated as outpatient for UTI 3 weeks ago with 7 days of Cipro; urine culture grew pan-sensitive E. coli  - Completed ceftriaxone on 10/24; pt with no history of resistant organisms in the past (E. coli, Klebsiella)  - Urine culture shows NGTD; UA equivocally positive  - Patient voiding on her own. Pt follows up with Dr. Macdonald as outpatient for cirrhosis- likely 2/2 alcohol use in the past  - Appreciate hepatology recs  - Pt currently appears to be compensated with INR, T bili, liver enzymes at baseline  - No abnormal liver findings on CTAP  - Pt currently afebrile with no leukocytosis, although SBP is on the differential  - C/w rifaximin, lactulose titrated to 2-3 BMs/day and propranolol  - Holding spironolactone in the setting of KARRIE and dehydration  - Monitor mental status daily  - Trend MELD labs daily  - Patient with hyperbilirubinemia. LDH elevated, haptoglobin was <20, but low concern for DIC

## 2018-10-27 NOTE — PROGRESS NOTE ADULT - PROBLEM SELECTOR PLAN 6
Now resolved. Likely in the setting of KARRIE  - Repeat BMP now  - s/p insulin and D50 x1  - Holding spironolactone Likely in the setting of cirrhosis  - Baseline platelet count in the 60s-80s; currently 100, may be hemoconcentrated  - Trend CBC daily Pt with severe AS seen on TTE from 2016; currently follows with Dr. Boston as an outpatient  - Continue to monitor and assess fluid status daily

## 2018-10-27 NOTE — PROGRESS NOTE ADULT - PROBLEM SELECTOR PLAN 5
Improving. Likely prerenal in the setting of nausea/vomiting and decreased PO intake  - BUN:creatinine ratio >30; likely prerenal KARRIE  - C/w half NS @75cc/hr given cirrhosis and AS, monitor fluid status closely  - Will repeat BMP in an hour  - Avoid nephrotoxic agents and renally dose medications  - FENa= 0.2% Pt with severe AS seen on TTE from 2016; currently follows with Dr. Boston as an outpatient  - Continue to monitor and assess fluid status daily; c/w gentle IVF hydration Pt with severe AS seen on TTE from 2016; currently follows with Dr. Boston as an outpatient  - Continue to monitor and assess fluid status daily Na 147, low dose 1/2 NS today  -repeat bmp

## 2018-10-28 LAB
ANION GAP SERPL CALC-SCNC: 11 MMOL/L — SIGNIFICANT CHANGE UP (ref 5–17)
BUN SERPL-MCNC: 36 MG/DL — HIGH (ref 7–23)
CALCIUM SERPL-MCNC: 9.8 MG/DL — SIGNIFICANT CHANGE UP (ref 8.4–10.5)
CHLORIDE SERPL-SCNC: 111 MMOL/L — HIGH (ref 96–108)
CO2 SERPL-SCNC: 23 MMOL/L — SIGNIFICANT CHANGE UP (ref 22–31)
CREAT SERPL-MCNC: 1 MG/DL — SIGNIFICANT CHANGE UP (ref 0.5–1.3)
CULTURE RESULTS: SIGNIFICANT CHANGE UP
GLUCOSE SERPL-MCNC: 122 MG/DL — HIGH (ref 70–99)
PHOSPHATE SERPL-MCNC: 2.8 MG/DL — SIGNIFICANT CHANGE UP (ref 2.5–4.5)
POTASSIUM SERPL-MCNC: 4.5 MMOL/L — SIGNIFICANT CHANGE UP (ref 3.5–5.3)
POTASSIUM SERPL-SCNC: 4.5 MMOL/L — SIGNIFICANT CHANGE UP (ref 3.5–5.3)
SODIUM SERPL-SCNC: 145 MMOL/L — SIGNIFICANT CHANGE UP (ref 135–145)
SPECIMEN SOURCE: SIGNIFICANT CHANGE UP

## 2018-10-28 PROCEDURE — 99232 SBSQ HOSP IP/OBS MODERATE 35: CPT | Mod: GC

## 2018-10-28 RX ORDER — SODIUM CHLORIDE 9 MG/ML
1000 INJECTION, SOLUTION INTRAVENOUS
Qty: 0 | Refills: 0 | Status: DISCONTINUED | OUTPATIENT
Start: 2018-10-28 | End: 2018-10-28

## 2018-10-28 RX ADMIN — LACTULOSE 20 GRAM(S): 10 SOLUTION ORAL at 11:09

## 2018-10-28 RX ADMIN — Medication 0.12 MILLIGRAM(S): at 07:02

## 2018-10-28 RX ADMIN — Medication 0.25 MILLIGRAM(S): at 17:16

## 2018-10-28 RX ADMIN — LACTULOSE 20 GRAM(S): 10 SOLUTION ORAL at 07:02

## 2018-10-28 RX ADMIN — Medication 10 MILLIGRAM(S): at 17:16

## 2018-10-28 RX ADMIN — LACTULOSE 20 GRAM(S): 10 SOLUTION ORAL at 23:22

## 2018-10-28 RX ADMIN — SODIUM CHLORIDE 50 MILLILITER(S): 9 INJECTION, SOLUTION INTRAVENOUS at 12:28

## 2018-10-28 RX ADMIN — Medication 0.25 MILLIGRAM(S): at 07:02

## 2018-10-28 RX ADMIN — LACTULOSE 20 GRAM(S): 10 SOLUTION ORAL at 00:16

## 2018-10-28 RX ADMIN — Medication 10 MILLIGRAM(S): at 07:02

## 2018-10-28 RX ADMIN — Medication 25 MICROGRAM(S): at 07:02

## 2018-10-28 RX ADMIN — LACTULOSE 20 GRAM(S): 10 SOLUTION ORAL at 17:16

## 2018-10-28 NOTE — PROGRESS NOTE ADULT - PROBLEM SELECTOR PLAN 6
Pt with severe AS seen on TTE from 2016; currently follows with Dr. Boston as an outpatient  - Continue to monitor and assess fluid status daily severe AS seen on TTE from 2016; currently follows with Dr. Boston as an outpatient  - Continue to monitor and assess fluid status daily

## 2018-10-28 NOTE — PROGRESS NOTE ADULT - PROBLEM SELECTOR PLAN 10
DVT ppx: None given bleeding risk and thrombocytopenia  Diet: Dysphagia 1 with nectar thick liquids  Dispo: Home with home PT DVT ppx: None given bleeding risk and thrombocytopenia, venodynes  Diet: Dysphagia 1 with nectar thick liquids  Dispo: Home with home PT. Patient is medically stable for discharge, daughter refusing wants her monitored for another day, will given 24 hours discharge notice

## 2018-10-28 NOTE — PROGRESS NOTE ADULT - PROBLEM SELECTOR PLAN 4
Pt currently not on AC 2/2 history of ICH in the past  - CHADSVASC score 6  - Continue digoxin  - Pt currently in Afib, rate controlled

## 2018-10-28 NOTE — PROGRESS NOTE ADULT - PROBLEM SELECTOR PLAN 9
- Blood cultures show NGTD, peripheral IV removed from R arm  - s/p vancomycin x 1, now improved. Unlikely cellulitis. - Blood cultures show NGTD, peripheral IV removed from R arm  - s/p vancomycin x 1, now improved. No cellulitis.

## 2018-10-28 NOTE — PROGRESS NOTE ADULT - ASSESSMENT
89 year-old woman with PMH of CLL s/p Rituximab, COPD, bronchiectasis, AFib (not on anticoagulation, severe aortic stenosis and mitral regurgitation, HTN, cervical osteomyelitis, h/o variceal bleed s/p banding, mini-strokes, Alzheimer's, decompensated cirrhosis with recurrent encephalopathy and HCC s/p ablation and incomplete treatment admitted for hepatic encephalopathy, now resolved.

## 2018-10-28 NOTE — PROGRESS NOTE ADULT - SUBJECTIVE AND OBJECTIVE BOX
Patient is a 89y old  Female who presents with a chief complaint of Nausea, vomiting (27 Oct 2018 09:21)      INTERVAL HPI/OVERNIGHT EVENTS:    MEDICATIONS (STANDING):  buDESOnide   0.25 milliGRAM(s) Respule 0.25 milliGRAM(s) Inhalation two times a day  digoxin     Tablet 0.125 milliGRAM(s) Oral daily  influenza   Vaccine 0.5 milliLiter(s) IntraMuscular once  lactulose Syrup 20 Gram(s) Oral every 6 hours  levothyroxine 25 MICROGram(s) Oral daily  propranolol 10 milliGRAM(s) Oral two times a day  rifaximin 550 milliGRAM(s) Oral two times a day    MEDICATIONS  (PRN):  ALBUTerol/ipratropium for Nebulization 3 milliLiter(s) Nebulizer every 12 hours PRN  ondansetron Injectable 4 milliGRAM(s) IV Push every 6 hours PRN      REVIEW OF SYSTEMS:  CONSTITUTIONAL: No fever, weight loss, or fatigue  EYES: No eye pain, visual disturbances, or discharge  ENMT:  No difficulty hearing, tinnitus, vertigo; No sinus or throat pain  NECK: No pain or stiffness  RESPIRATORY: No cough, wheezing, chills or hemoptysis; No shortness of breath  CARDIOVASCULAR: No chest pain, palpitations, dizziness, or leg swelling  GASTROINTESTINAL: No abdominal or epigastric pain. No nausea, vomiting, or hematemesis; No diarrhea or constipation. No melena or hematochezia.  GENITOURINARY: No dysuria, frequency, hematuria, or incontinence  NEUROLOGICAL: No headaches, memory loss, loss of strength, numbness, or tremors  SKIN: No itching, burning, rashes, or lesions   MUSCULOSKELETAL: No joint pain or swelling; No muscle, back, or extremity pain    T(F): 97.9 (10-28-18 @ 04:38), Max: 98 (10-27-18 @ 12:46)  HR: 69 (10-28-18 @ 04:38) (66 - 88)  BP: 102/60 (10-28-18 @ 04:38) (98/67 - 108/66)  RR: 18 (10-28-18 @ 04:38) (17 - 18)  SpO2: 100% (10-28-18 @ 04:38) (95% - 100%)  Wt(kg): --  CAPILLARY BLOOD GLUCOSE        I&O's Summary    27 Oct 2018 07:01  -  28 Oct 2018 07:00  --------------------------------------------------------  IN: 1020 mL / OUT: 1 mL / NET: 1019 mL        PHYSICAL EXAM:  GENERAL: NAD, well-groomed, well-developed  HEAD:  Atraumatic, Normocephalic  EYES: EOMI, PERRLA, conjunctiva and sclera clear  ENMT: No tonsillar erythema, exudates, or enlargement; Moist mucous membranes  NECK: Supple, No JVD, Normal thyroid  NERVOUS SYSTEM:  Alert & Oriented X3, Good concentration; Motor Strength 5/5 B/L upper and lower extremities  CHEST/LUNG: Clear to percussion bilaterally; No rales, rhonchi, wheezing, or rubs  HEART: Regular rate and rhythm; No murmurs, rubs, or gallops  ABDOMEN: Soft, Nontender, Nondistended; Bowel sounds present  EXTREMITIES:  2+ Peripheral Pulses, No clubbing, cyanosis, or edema  LYMPH: No lymphadenopathy noted  SKIN: No rashes or lesions    LABS:                        11.5   5.06  )-----------( 85       ( 27 Oct 2018 15:22 )             35.6     10-    147<H>  |  110<H>  |  36<H>  ----------------------------<  149<H>  4.4   |  23  |  0.89    Ca    9.8      27 Oct 2018 12:23  Phos  2.4     10-  Mg     1.7     10-26    TPro  5.2<L>  /  Alb  2.9<L>  /  TBili  1.8<H>  /  DBili  x   /  AST  28  /  ALT  16  /  AlkPhos  82  10-    PT/INR - ( 27 Oct 2018 15:22 )   PT: 15.1 sec;   INR: 1.33 ratio           Urinalysis Basic - ( 26 Oct 2018 19:25 )    Color: Yellow / Appearance: Clear / S.026 / pH: x  Gluc: x / Ketone: Negative  / Bili: Negative / Urobili: Negative   Blood: x / Protein: 30 mg/dL / Nitrite: Negative   Leuk Esterase: Small / RBC: 3 /hpf / WBC 9 /hpf   Sq Epi: x / Non Sq Epi: 7 /hpf / Bacteria: Negative          RADIOLOGY & ADDITIONAL TESTS:    Lisseth Macdonald  Internal Medicine PGY-3  Pager #[phone] ([place]) / [phone] ([facility]) Patient is a 89y old  Female who presents with a chief complaint of Nausea, vomiting (27 Oct 2018 09:21)      INTERVAL HPI/OVERNIGHT EVENTS: Pt seen and examined at bedside. No overnight events. Pt with 4 bowel movements in past 24 hours. Pt currently denies nausea, vomiting, abdominal pain or dysuria.    MEDICATIONS (STANDING):  buDESOnide   0.25 milliGRAM(s) Respule 0.25 milliGRAM(s) Inhalation two times a day  digoxin     Tablet 0.125 milliGRAM(s) Oral daily  influenza   Vaccine 0.5 milliLiter(s) IntraMuscular once  lactulose Syrup 20 Gram(s) Oral every 6 hours  levothyroxine 25 MICROGram(s) Oral daily  propranolol 10 milliGRAM(s) Oral two times a day  rifaximin 550 milliGRAM(s) Oral two times a day    MEDICATIONS  (PRN):  ALBUTerol/ipratropium for Nebulization 3 milliLiter(s) Nebulizer every 12 hours PRN  ondansetron Injectable 4 milliGRAM(s) IV Push every 6 hours PRN      REVIEW OF SYSTEMS:  CONSTITUTIONAL: No fever, weight loss, or fatigue  EYES: No eye pain, visual disturbances, or discharge  ENMT:  No difficulty hearing, tinnitus, vertigo; No sinus or throat pain  NECK: No pain or stiffness  RESPIRATORY: No cough, wheezing, chills or hemoptysis; No shortness of breath  CARDIOVASCULAR: No chest pain, palpitations, dizziness, or leg swelling  GASTROINTESTINAL: No abdominal or epigastric pain. No nausea, vomiting, or hematemesis; No diarrhea or constipation. No melena or hematochezia.  GENITOURINARY: No dysuria, frequency, hematuria, or incontinence  NEUROLOGICAL: No headaches, memory loss, loss of strength, numbness, or tremors  SKIN: No itching, burning, rashes, or lesions   MUSCULOSKELETAL: No joint pain or swelling; No muscle, back, or extremity pain    T(F): 97.9 (10-28-18 @ 04:38), Max: 98 (10-27-18 @ 12:46)  HR: 69 (10-28-18 @ 04:38) (66 - 88)  BP: 102/60 (10-28-18 @ 04:38) (98/67 - 108/66)  RR: 18 (10-28-18 @ 04:38) (17 - 18)  SpO2: 100% (10-28-18 @ 04:38) (95% - 100%)  Wt(kg): --  CAPILLARY BLOOD GLUCOSE        I&O's Summary    27 Oct 2018 07:01  -  28 Oct 2018 07:00  --------------------------------------------------------  IN: 1020 mL / OUT: 1 mL / NET: 1019 mL        PHYSICAL EXAM:  GENERAL: NAD, well-groomed, well-developed  HEAD:  Atraumatic, Normocephalic  EYES: EOMI, PERRLA, conjunctiva and sclera clear  ENMT: No tonsillar erythema, exudates, or enlargement; Moist mucous membranes  NECK: Supple, No JVD  NERVOUS SYSTEM:  Alert & Oriented X3, Good concentration; Motor Strength 5/5 B/L upper and lower extremities  CHEST/LUNG: Clear to percussion bilaterally; No rales, rhonchi, wheezing, or rubs  HEART: Regular rate and rhythm; grade 4/6 systolic murmur, no rubs or gallops  ABDOMEN: Soft, Nontender, Nondistended; Bowel sounds present  EXTREMITIES:  2+ Peripheral Pulses, No clubbing, cyanosis, or edema  LYMPH: No lymphadenopathy noted  SKIN: Bruised, ecchymotic extremities    LABS:                        11.5   5.06  )-----------( 85       ( 27 Oct 2018 15:22 )             35.6     10-    147<H>  |  110<H>  |  36<H>  ----------------------------<  149<H>  4.4   |  23  |  0.89    Ca    9.8      27 Oct 2018 12:23  Phos  2.4     10-  Mg     1.7     10-    TPro  5.2<L>  /  Alb  2.9<L>  /  TBili  1.8<H>  /  DBili  x   /  AST  28  /  ALT  16  /  AlkPhos  82  10-27    PT/INR - ( 27 Oct 2018 15:22 )   PT: 15.1 sec;   INR: 1.33 ratio           Urinalysis Basic - ( 26 Oct 2018 19:25 )    Color: Yellow / Appearance: Clear / S.026 / pH: x  Gluc: x / Ketone: Negative  / Bili: Negative / Urobili: Negative   Blood: x / Protein: 30 mg/dL / Nitrite: Negative   Leuk Esterase: Small / RBC: 3 /hpf / WBC 9 /hpf   Sq Epi: x / Non Sq Epi: 7 /hpf / Bacteria: Negative          RADIOLOGY & ADDITIONAL TESTS: No new imaging    Lisseth Torres  Internal Medicine PGY-3  Pager #[phone] ([place]) / [phone] ([facility]) Patient is a 89y old  Female who presents with a chief complaint of Nausea, vomiting (27 Oct 2018 09:21)      INTERVAL HPI/OVERNIGHT EVENTS: Pt seen and examined at bedside. No overnight events. Pt with 4 bowel movements in past 24 hours. Pt currently denies nausea, vomiting, abdominal pain or dysuria, fever, dysuria.     MEDICATIONS (STANDING):  buDESOnide   0.25 milliGRAM(s) Respule 0.25 milliGRAM(s) Inhalation two times a day  digoxin     Tablet 0.125 milliGRAM(s) Oral daily  influenza   Vaccine 0.5 milliLiter(s) IntraMuscular once  lactulose Syrup 20 Gram(s) Oral every 6 hours  levothyroxine 25 MICROGram(s) Oral daily  propranolol 10 milliGRAM(s) Oral two times a day  rifaximin 550 milliGRAM(s) Oral two times a day    MEDICATIONS  (PRN):  ALBUTerol/ipratropium for Nebulization 3 milliLiter(s) Nebulizer every 12 hours PRN  ondansetron Injectable 4 milliGRAM(s) IV Push every 6 hours PRN      REVIEW OF SYSTEMS:  CONSTITUTIONAL: No fever, weight loss, or fatigue  EYES: No eye pain, visual disturbances, or discharge  ENMT:  No difficulty hearing, tinnitus, vertigo; No sinus or throat pain  NECK: No pain or stiffness  RESPIRATORY: No cough, wheezing, chills or hemoptysis; No shortness of breath  CARDIOVASCULAR: No chest pain, palpitations, dizziness, or leg swelling  GASTROINTESTINAL: No abdominal or epigastric pain. No nausea, vomiting, or hematemesis; No diarrhea or constipation. No melena or hematochezia.  GENITOURINARY: No dysuria, frequency, hematuria, or incontinence  NEUROLOGICAL: No headaches, memory loss, loss of strength, numbness, or tremors  SKIN: No itching, burning, rashes, or lesions   MUSCULOSKELETAL: No joint pain or swelling; No muscle, back, or extremity pain    T(F): 97.9 (10-28-18 @ 04:38), Max: 98 (10-27-18 @ 12:46)  HR: 69 (10-28-18 @ 04:38) (66 - 88)  BP: 102/60 (10-28-18 @ 04:38) (98/67 - 108/66)  RR: 18 (10-28-18 @ 04:38) (17 - 18)  SpO2: 100% (10-28-18 @ 04:38) (95% - 100%)  Wt(kg): --  CAPILLARY BLOOD GLUCOSE        I&O's Summary    27 Oct 2018 07:01  -  28 Oct 2018 07:00  --------------------------------------------------------  IN: 1020 mL / OUT: 1 mL / NET: 1019 mL        PHYSICAL EXAM:  GENERAL: NAD, well-groomed, well-developed  HEAD:  Atraumatic, Normocephalic  EYES: EOMI, PERRLA, conjunctiva and sclera clear  ENMT: No tonsillar erythema, exudates, or enlargement; Moist mucous membranes  NECK: Supple, No JVD  NERVOUS SYSTEM:  Alert & Oriented X3, Good concentration; Motor Strength 5/5 B/L upper and lower extremities  CHEST/LUNG: Clear to percussion bilaterally; No rales, rhonchi, wheezing, or rubs  HEART: Regular rate and rhythm; grade 4/6 systolic murmur, no rubs or gallops  ABDOMEN: Soft, Nontender, Nondistended; Bowel sounds present  EXTREMITIES:  2+ Peripheral Pulses, No clubbing, cyanosis, or edema  LYMPH: No lymphadenopathy noted  SKIN: chronic Bruised, ecchymotic extremities    LABS:                        11.5   5.06  )-----------( 85       ( 27 Oct 2018 15:22 )             35.6     10-27    147<H>  |  110<H>  |  36<H>  ----------------------------<  149<H>  4.4   |  23  |  0.89    Ca    9.8      27 Oct 2018 12:23  Phos  2.4     10-27  Mg     1.7     10-    TPro  5.2<L>  /  Alb  2.9<L>  /  TBili  1.8<H>  /  DBili  x   /  AST  28  /  ALT  16  /  AlkPhos  82  10-27    PT/INR - ( 27 Oct 2018 15:22 )   PT: 15.1 sec;   INR: 1.33 ratio           Urinalysis Basic - ( 26 Oct 2018 19:25 )    Color: Yellow / Appearance: Clear / S.026 / pH: x  Gluc: x / Ketone: Negative  / Bili: Negative / Urobili: Negative   Blood: x / Protein: 30 mg/dL / Nitrite: Negative   Leuk Esterase: Small / RBC: 3 /hpf / WBC 9 /hpf   Sq Epi: x / Non Sq Epi: 7 /hpf / Bacteria: Negative          RADIOLOGY & ADDITIONAL TESTS: No new imaging    Lisseth Macdonald  Internal Medicine PGY-3  Pager #[phone] ([place]) / [phone] ([facility])

## 2018-10-28 NOTE — PROGRESS NOTE ADULT - PROBLEM SELECTOR PLAN 2
Pt follows up with Dr. Macdonald as outpatient for cirrhosis- likely 2/2 alcohol use in the past  - Appreciate hepatology recs  - Pt currently appears to be compensated with INR, T bili, liver enzymes at baseline  - No abnormal liver findings on CTAP  - Pt currently afebrile with no leukocytosis, although SBP is on the differential  - C/w rifaximin, lactulose titrated to 2-3 BMs/day and propranolol  - Holding spironolactone in the setting of KARRIE and dehydration  - Trend MELD labs daily Pt follows up with Dr. Macdonald as outpatient for cirrhosis- likely 2/2 alcohol use in the past  - Appreciate hepatology recs  - Pt currently appears to be compensated with INR, T bili, liver enzymes at baseline  - No abnormal liver findings on CTAP  - Pt currently afebrile with no leukocytosis, although SBP is on the differential  - C/w rifaximin, lactulose titrated to 2-3 BMs/day and propranolol  - Holding spironolactone in the setting of recent KARRIE/dehydration, will likely hold on discharge as euvolemic, outpatient hepatology followup  - Trend MELD labs daily

## 2018-10-28 NOTE — PROGRESS NOTE ADULT - PROBLEM SELECTOR PLAN 3
Resolved. Likely prerenal in the setting of nausea/vomiting and decreased PO intake, fena 0.2  - Will give small boluses of 1/2 NS PRN, pt now eating and drinking  - Trend bmp daily  - Avoid nephrotoxic agents and renally dose medications

## 2018-10-28 NOTE — PROGRESS NOTE ADULT - PROBLEM SELECTOR PLAN 1
Resolved  - Last dose of ceftriaxone on 10/24 for UTI; no other abdominal sources of infection seen on CTAP. Blood and urine cultures show NGTD  - CT head negative for any acute changes  - Continue with lactulose and rifaximin Resolved, likely hepatic encephalopathy and possible UTI  - Last dose of ceftriaxone on 10/24 for UTI; no other abdominal sources of infection seen on CTAP. Blood and urine cultures show NGTD  - CT head negative for any acute changes  - Continue with lactulose and rifaximin

## 2018-10-28 NOTE — PROGRESS NOTE ADULT - PROBLEM SELECTOR PLAN 8
Completed 3 days of ceftriaxone, afebrile, no symptoms, urine cx NGTD  -repeat UA minimally positive, no indication for further treatment

## 2018-10-28 NOTE — PROGRESS NOTE ADULT - PROBLEM SELECTOR PLAN 5
Na 147 yesterday, given small bolus of 1/2 NS  -repeat bmp today  - Closely monitor fluid status Na 147 yesterday, given small bolus of 1/2 NS  - Na 145 today  - no need for further labs

## 2018-10-29 VITALS — OXYGEN SATURATION: 100 % | TEMPERATURE: 98 F | RESPIRATION RATE: 18 BRPM | HEART RATE: 59 BPM

## 2018-10-29 LAB
CULTURE RESULTS: SIGNIFICANT CHANGE UP
SPECIMEN SOURCE: SIGNIFICANT CHANGE UP

## 2018-10-29 PROCEDURE — 99239 HOSP IP/OBS DSCHRG MGMT >30: CPT

## 2018-10-29 RX ORDER — SPIRONOLACTONE 25 MG/1
1 TABLET, FILM COATED ORAL
Qty: 0 | Refills: 0 | COMMUNITY

## 2018-10-29 RX ADMIN — Medication 10 MILLIGRAM(S): at 06:21

## 2018-10-29 RX ADMIN — Medication 0.25 MILLIGRAM(S): at 06:21

## 2018-10-29 RX ADMIN — Medication 25 MICROGRAM(S): at 06:21

## 2018-10-29 RX ADMIN — Medication 0.12 MILLIGRAM(S): at 06:21

## 2018-10-29 RX ADMIN — LACTULOSE 20 GRAM(S): 10 SOLUTION ORAL at 06:21

## 2018-10-29 NOTE — CHART NOTE - NSCHARTNOTEFT_GEN_A_CORE
Nutrition Follow-up  Chart reviewed, events noted.        Source: Patient [x]    Family [ ]     other [x] Comprehensive review of medical records     Diet :   pt's diet was changed to dysphagia 3 with thin liquids by resident on 10/28.     Patient reports her appetite is pretty good. Pt reports consuming about 50% of meals. Pt likes the health shakes provided but has not tried the ensure pudding yet. RD assisted pt opening container to eat and drink. Encouraged pt to consume nutrient rich foods. No N+V. Last BM was today.      Enteral /Parenteral Nutrition: n/a       Current Weight: Weight (kg): 48.5 (10-22 @ 18:39)- 107 pounds- no new wt   % Weight Change    Pertinent Medications: MEDICATIONS  (STANDING):  buDESOnide   0.25 milliGRAM(s) Respule 0.25 milliGRAM(s) Inhalation two times a day  digoxin     Tablet 0.125 milliGRAM(s) Oral daily  influenza   Vaccine 0.5 milliLiter(s) IntraMuscular once  lactulose Syrup 20 Gram(s) Oral every 6 hours  levothyroxine 25 MICROGram(s) Oral daily  propranolol 10 milliGRAM(s) Oral two times a day  rifaximin 550 milliGRAM(s) Oral two times a day    MEDICATIONS  (PRN):  ALBUTerol/ipratropium for Nebulization 3 milliLiter(s) Nebulizer every 12 hours PRN Shortness of Breath and/or Wheezing  ondansetron Injectable 4 milliGRAM(s) IV Push every 6 hours PRN Nausea and/or Vomiting    Pertinent Labs:  10-28 Na145 mmol/L Glu 122 mg/dL<H> K+ 4.5 mmol/L Cr  1.00 mg/dL BUN 36 mg/dL<H> 10-28 Phos 2.8 mg/dL 10-27 Alb 2.9 g/dL<L>      Skin: No edema or pressure injury documented.     Estimated Needs:   [x] no change since previous assessment  [ ] recalculated:       Previous Nutrition Diagnosis:    [x] Malnutrition (severe)         Nutrition Diagnosis is [x] ongoing- being addressed with health shakes, ensure pudding          Interventions:     Recommend  1) Continue current diet. Defer texture/consistency to SLP/MD within goals of care.  2) Encourage po intake with nutrient dense foods and continue to provide health shakes.   3) Provide food preferences as able.   4) Monitor weight, lab values, skin, po intake and GI tolerance.     Monitoring and Evaluation:   follow up per protocol  RD to remain available for further nutritional interventions as indicated.   Apoorva Fallon, MS, RD, CDN #[phone]

## 2018-10-29 NOTE — PROGRESS NOTE ADULT - SUBJECTIVE AND OBJECTIVE BOX
CONTACT Sal Pastor MD                                                                                                                                              Internal Medicine PGY-1   [facility] Pager [phone], [facility] Pager [phone]  On weekdays after 7pm and weekends after 12pm, please contact [phone]      Patient is a 89y old  Female who presents with a chief complaint of Nausea, vomiting (28 Oct 2018 08:09)      INTERVAL HPI/OVERNIGHT EVENTS:  - No acute events overnight   - Denies CP, SOB, HA, dizziness, Fever, Abd pain, diarrhea or constipation  - Plan for discharge home today     T(C): 36.9 (10-29-18 @ 04:24), Max: 36.9 (10-29-18 @ 00:06)  HR: 67 (10-29-18 @ 04:24) (67 - 94)  BP: 98/60 (10-29-18 @ 04:24) (95/56 - 112/68)  RR: 18 (10-29-18 @ 04:24) (17 - 19)  SpO2: 98% (10-29-18 @ 04:24) (96% - 98%)    PHYSICAL EXAM:  GENERAL: NAD, frail appearing   HEAD:  Atraumatic, Normocephalic  EYES: EOMI, conjunctiva and sclera clear  NECK: Supple, No JVD  NERVOUS SYSTEM:  Fully oriented  CHEST/LUNG: R basilar posterior lungs has fine crackles, L posterior lung clear.   HEART: Regular rate and rhythm; +crescendo-decrescendo murmur 2/6 loudest at upper left sternal border   ABDOMEN: Soft, Nontender, Nondistended; Bowel sounds present. No suprapubic tenderness   EXTREMITIES:  Dark purple/bluish skin discoloration on b/l forearms, and b/l chins, may be ecchymoses. No asterixis   LYMPH: No lymphadenopathy noted  SKIN: No rashes or lesions    I&O's Summary    28 Oct 2018 07:01  -  29 Oct 2018 07:00  --------------------------------------------------------  IN: 830 mL / OUT: 0 mL / NET: 830 mL    29 Oct 2018 07:01  -  29 Oct 2018 11:40  --------------------------------------------------------  IN: 220 mL / OUT: 0 mL / NET: 220 mL        LABS:                        11.5   5.06  )-----------( 85       ( 27 Oct 2018 15:22 )             35.6     10-28    145  |  111<H>  |  36<H>  ----------------------------<  122<H>  4.5   |  23  |  1.00    Ca    9.8      28 Oct 2018 11:21  Phos  2.8     10-28    TPro  5.2<L>  /  Alb  2.9<L>  /  TBili  1.8<H>  /  DBili  x   /  AST  28  /  ALT  16  /  AlkPhos  82  10-27    PT/INR - ( 27 Oct 2018 15:22 )   PT: 15.1 sec;   INR: 1.33 ratio       CAPILLARY BLOOD GLUCOSE      HOSPITAL MEDICATIONS:    MEDICATIONS  (STANDING):  buDESOnide   0.25 milliGRAM(s) Respule 0.25 milliGRAM(s) Inhalation two times a day  digoxin     Tablet 0.125 milliGRAM(s) Oral daily  influenza   Vaccine 0.5 milliLiter(s) IntraMuscular once  lactulose Syrup 20 Gram(s) Oral every 6 hours  levothyroxine 25 MICROGram(s) Oral daily  propranolol 10 milliGRAM(s) Oral two times a day  rifaximin 550 milliGRAM(s) Oral two times a day      MEDICATIONS  (PRN):  ALBUTerol/ipratropium for Nebulization 3 milliLiter(s) Nebulizer every 12 hours PRN Shortness of Breath and/or Wheezing  ondansetron Injectable 4 milliGRAM(s) IV Push every 6 hours PRN Nausea and/or Vomiting      HOME MEDICATIONS  Home Medications:  budesonide 0.25 mg/2 mL inhalation suspension: 2 milliliter(s) inhaled 2 times a day (09 Jun 2018 14:47)  digoxin 125 mcg (0.125 mg) oral tablet: 1 tab(s) orally every other day (at bedtime) (09 Jun 2018 14:47)  ipratropium-albuterol 0.5 mg-2.5 mg/3 mLinhalation solution: 3 milliliter(s) by nebulizer 2 times a day, As Needed (21 Oct 2018 19:47)  lactulose 10 g/15 mL oral syrup: 30 milliliter(s) orally 2-4 times a day, As Needed; titrate to 2-3 soft bowel movements per day (09 Jun 2018 14:47)  levothyroxine 25 mcg (0.025 mg) oral capsule: 1 cap(s) orally once a day (09 Jun 2018 14:47)  propranolol 20 mg oral tablet: 0.5 tab(s) orally 2 times a day (09 Jun 2018 14:47)  rifAXIMin 550 mg oral tablet: 1 tab(s) orally 2 times a day (09 Jun 2018 14:47)  spironolactone 25 mg oral tablet: 1 tab(s) orally 2 times a day (21 Oct 2018 19:46)  Vitamin D3 2000 intl units oral tablet: 1 tab(s) orally once a day (09 Jun 2018 14:47)

## 2018-10-29 NOTE — PROGRESS NOTE ADULT - PROBLEM SELECTOR PROBLEM 10
Prophylactic measure
Nonrheumatic aortic valve stenosis
Prophylactic measure
Prophylactic measure

## 2018-10-29 NOTE — PROGRESS NOTE ADULT - PROBLEM SELECTOR PLAN 9
- Blood cultures show NGTD, peripheral IV removed from R arm  - s/p vancomycin x 1, now improved. No cellulitis.

## 2018-10-29 NOTE — PROGRESS NOTE ADULT - PROBLEM SELECTOR PLAN 6
severe AS seen on TTE from 2016; currently follows with Dr. Boston as an outpatient  - Continue to monitor and assess fluid status daily

## 2018-10-29 NOTE — PROGRESS NOTE ADULT - ATTENDING COMMENTS
Mental status appears to be back to her baseline. Tolerating dysphagia diet and nectar-thick liquids and ambulated with PT yesterday. Awaiting lab results to ensure she is no longer dehydrated and that hypernatremia has resolved. Continue rifaximin and maintenance-dosed lactulose (titrated to 3-4 BMs/day). Infectious work-up negative and can likely be discharged off antibiotics.
Mental status with slight improvement today, more responsive to noxious stimuli though still lethargic and mainly non-verbal.   Does not meet SIRS criteria but has RUE > LUE swelling and erythema, likely combination of thrombophlebitis and ecchymoses but cannot exclude the possibility of cellulitis. Recommend to add MRSA coverage with vancomycin. Urine and blood cultures negative thus far but would also continue ceftriaxone empirically for gram-negative coverage, especially given recent UTIs.  Clinically still appears hypovolemic with dry mucous membranes and decreased skin turgor, though renal function slowly improving (Cr 1.33 today from 1.59 yesterday, though still above baseline Cr of 1.1-1.2). Continue 5% albumin infusions and would recommend to add 250-500 mL NS boluses as needed as well, especially to replace stool losses while on lactulose therapy.  Continue rifaximin and lactulose via NGT.  Cannot exclude the possibility of progression of her HCC based on limited recent imaging (US and non-contrast CT only), though liver function appears stable overall.
Mental status much improved today, now alert and oriented x3 though with mild psychomotor slowing and mild asterixis still appreciated. Able to phonate clearly and likely can resume some form of oral diet; awaiting swallow evaluation. If unable to be safely started on oral diet/liquids ASAP then should receive D5 1/2 NS infusion both for maintenance IVF as well as for correction of her free water deficit given hypernatremia on labs. She also still appears hypovolemic on exam and would benefit from either continued albumin 5% or NS boluses, with close monitoring. Infectious work-up negative thus far but remains on ceftriaxone empirically given recurrent UTIs.
Clinically improved, now alert and oriented to self, place, situation, and season but not month or year (this may be a baseline deficit per patient and her family). No asterixis on exam. Continue maintenance dosing of rifaximin/lactulose. Discussed home titration of lactulose with patient's daughters.    Still appears mildly hypovolemic on exam and with labs notable for Na 147, though renal function progressively improving. Tolerating dysphagia diet and nectar-thick liquids. If hypernatremia persists despite her oral intake then would recommend D5 1/2 NS replacement via IV prior to discharge.    Has severe protein-calorie malnutrition and would benefit from Nutrition and PT consults/evaluations prior to discharge.
Patient seen and examined. Assessment and plan reviewed and discussed with medical team.   1. Acute encephalopathy: suspect due to decompensated liver failure/hepatic encephalopathy. much improved. cont lactulose/rifximin. monitor BMs. Sepsis ruled out. no evidence of SBP. tolerating diet.  2. KARRIE on CKD 3: resolving. likely pre-renal in etiology with N/V poor PO.   3. Afib: better rate control with propranolol and digoxin. no AC  4. Bilateral UE swelling: likely sec to IV infiltration. no evidence of cellulitis. s/p 1 dose Vanco. elevate UE and pain control.   d/c planning home  D/c time 40 mins
Patient seen and examined. Assessment and plan reviewed and discussed with medical team.   1. Acute encephalopathy: suspect due to decompensated liver failure/hepatic encephalopathy/UTI/. NGT for lactulose monitor BMs, cont with ABx therapy. follow up infectious w/u. no evidence of SBP.   2. UTI: cont abx and follow up cultures.   3. Bradycardia: hold propranolol. monitor HR.   4. KARRIE on CKD 3: sec to sepsis. will monitor on Abx and lactulose.   5. Afib: rate control. not on AC  6. Bilateral UE swelling: likely sec to IV infiltration. no evidence of cellulitis. s/p 1 dose vanco. elevate UE and pain control.   OVerall prognosis guarded. will cont with above therapy and monitor closely.   D/w daughter at bedside
stable for discharge with outpatient hepatology followup  please give 24 hour discharge notice to daughter
Patient seen and examined. Assessment and plan reviewed and discussed with medical team.   1. Acute encephalopathy: suspect due to decompensated liver failure/hepatic encephalopathy/UTI/. NGT for lactulose monitor BMs, cont with ABx therapy. follow up infectious w/u. no evidence of SBP.   2. UTI: cont abx and follow up cultures.   3. Bradycardia: hold propranolol. monitor HR.   4. KARRIE on CKD 3: sec to sepsis. will monitor on Abx and lactulose.   5. Afib: rate control. not on AC  OVerall prognosis guarded. will cont with above therapy and monitor closely.   D/w daughters at bedside
Patient seen and examined. Assessment and plan reviewed and discussed with medical team.   1. Acute encephalopathy: suspect due to decompensated liver failure/hepatic encephalopathy. much improved. cont lactulose/rifximin. monitor BMs. Sepsis ruled out. no evidence of SBP. Advance diet as tolerated.   2. KARRIE on CKD 3: resolving. likely pre-renal in etiology with N/V poor PO.   3. Afib: converted from SR to Afib overnight. resume back propranolol and digoxin. no AC  4. Bilateral UE swelling: likely sec to IV infiltration. no evidence of cellulitis. s/p 1 dose vanco. elevate UE and pain control.   OVerall prognosis guarded but improved given improving mental status.   D/w daughter at bedside
Patient seen and examined. Assessment and plan reviewed and discussed with medical team.   1. Acute encephalopathy: suspect due to decompensated liver failure/hepatic encephalopathy. much improved. NGT for lactulose/rifximin. monitor BMs. Sepsis ruled out. no evidence of SBP. Will consider restarting oral diet.   2. Bradycardia: hold propranolol. monitor HR.   3. KARRIE on CKD 3: resolving. likely pre-renal in etiology with N/V poor PO.   4. Afib: rate control. not on AC.   5. Bilateral UE swelling: likely sec to IV infiltration. no evidence of cellulitis. s/p 1 dose vanco. elevate UE and pain control.   OVerall prognosis guarded but improved given improcing mental status.   D/w daughter at bedside
Patient seen and examined. Assessment and plan reviewed and discussed with medical team.   1. Acute encephalopathy: suspect due to decompensated liver failure/hepatic encephalopathy. much improved. cont lactulose/rifximin. monitor BMs. Sepsis ruled out. no evidence of SBP. Advance diet as tolerated.   2. KARRIE on CKD 3: resolving. likely pre-renal in etiology with N/V poor PO.   3. Afib: better rate control with propranolol and digoxin. no AC  4. Bilateral UE swelling: likely sec to IV infiltration. no evidence of cellulitis. s/p 1 dose Vanco. elevate UE and pain control.   OVerall prognosis guarded but improved given improving mental status.   D/w daughter at bedside
consider discharge home joycelyn if remains stable

## 2018-10-29 NOTE — PROGRESS NOTE ADULT - PROBLEM SELECTOR PLAN 2
Pt follows up with Dr. Macdonald as outpatient for cirrhosis- likely 2/2 alcohol use in the past  - Appreciate hepatology recs  - Currently appears to be compensated with last INR, T bili, liver enzymes at baseline  - No abnormal liver findings on CTAP  - C/w rifaximin, lactulose titrated to 2-3 BMs/day and propranolol  - Holding spironolactone on discharge in the setting of recent KARRIE/dehydration, outpatient hepatology followup

## 2018-10-29 NOTE — PROGRESS NOTE ADULT - REASON FOR ADMISSION
Nausea, vomiting

## 2018-10-29 NOTE — PROGRESS NOTE ADULT - PROBLEM SELECTOR PLAN 10
DVT ppx: None given bleeding risk and thrombocytopenia, venodynes  Diet: Dysphagia 1 with nectar thick liquids  Dispo: Home with home PT (face to face only). Patient is medically stable for discharge, daughter refusing; she wants her monitored for another day. Gave 24 hours discharge notice on Sunday

## 2018-10-29 NOTE — PROGRESS NOTE ADULT - PROBLEM SELECTOR PLAN 3
Resolved. Likely prerenal in the setting of nausea/vomiting and decreased PO intake, fena 0.2  - small boluses of 1/2 NS PRN, pt now eating and drinking  - Avoid nephrotoxic agents and renally dose medications

## 2018-10-29 NOTE — PROGRESS NOTE ADULT - ASSESSMENT
CONTACT Sal Pastor MD                                                                                                                                              Internal Medicine PGY-1   [facility] Pager [phone], [facility] Pager [phone]  On weekdays after 7pm and weekends after 12pm, please contact [phone]      Patient is a 89y old  Female who presents with a chief complaint of Nausea, vomiting (28 Oct 2018 08:09)      INTERVAL HPI/OVERNIGHT EVENTS:  - No acute events overnight   - Denies CP, SOB, HA, dizziness, Fever, Abd pain, diarrhea or constipation  - Plan for discharge home today     T(C): 36.9 (10-29-18 @ 04:24), Max: 36.9 (10-29-18 @ 00:06)  HR: 67 (10-29-18 @ 04:24) (67 - 94)  BP: 98/60 (10-29-18 @ 04:24) (95/56 - 112/68)  RR: 18 (10-29-18 @ 04:24) (17 - 19)  SpO2: 98% (10-29-18 @ 04:24) (96% - 98%)    PHYSICAL EXAM:  GENERAL: NAD, frail appearing   HEAD:  Atraumatic, Normocephalic  EYES: EOMI, conjunctiva and sclera clear  NECK: Supple, No JVD  NERVOUS SYSTEM:  Fully oriented  CHEST/LUNG: R basilar posterior lungs has fine crackles, L posterior lung clear.   HEART: Regular rate and rhythm; +crescendo-decrescendo murmur 2/6 loudest at upper left sternal border   ABDOMEN: Soft, Nontender, Nondistended; Bowel sounds present. No suprapubic tenderness   EXTREMITIES:  Dark purple/bluish skin discoloration on b/l forearms, and b/l chins, may be ecchymoses. No asterixis   LYMPH: No lymphadenopathy noted  SKIN: No rashes or lesions    I&O's Summary    28 Oct 2018 07:01  -  29 Oct 2018 07:00  --------------------------------------------------------  IN: 830 mL / OUT: 0 mL / NET: 830 mL    29 Oct 2018 07:01  -  29 Oct 2018 11:40  --------------------------------------------------------  IN: 220 mL / OUT: 0 mL / NET: 220 mL        LABS:                        11.5   5.06  )-----------( 85       ( 27 Oct 2018 15:22 )             35.6     10-28    145  |  111<H>  |  36<H>  ----------------------------<  122<H>  4.5   |  23  |  1.00    Ca    9.8      28 Oct 2018 11:21  Phos  2.8     10-28    TPro  5.2<L>  /  Alb  2.9<L>  /  TBili  1.8<H>  /  DBili  x   /  AST  28  /  ALT  16  /  AlkPhos  82  10-27    PT/INR - ( 27 Oct 2018 15:22 )   PT: 15.1 sec;   INR: 1.33 ratio       CAPILLARY BLOOD GLUCOSE      HOSPITAL MEDICATIONS:    MEDICATIONS  (STANDING):  buDESOnide   0.25 milliGRAM(s) Respule 0.25 milliGRAM(s) Inhalation two times a day  digoxin     Tablet 0.125 milliGRAM(s) Oral daily  influenza   Vaccine 0.5 milliLiter(s) IntraMuscular once  lactulose Syrup 20 Gram(s) Oral every 6 hours  levothyroxine 25 MICROGram(s) Oral daily  propranolol 10 milliGRAM(s) Oral two times a day  rifaximin 550 milliGRAM(s) Oral two times a day      MEDICATIONS  (PRN):  ALBUTerol/ipratropium for Nebulization 3 milliLiter(s) Nebulizer every 12 hours PRN Shortness of Breath and/or Wheezing  ondansetron Injectable 4 milliGRAM(s) IV Push every 6 hours PRN Nausea and/or Vomiting      HOME MEDICATIONS  Home Medications:  budesonide 0.25 mg/2 mL inhalation suspension: 2 milliliter(s) inhaled 2 times a day (09 Jun 2018 14:47)  digoxin 125 mcg (0.125 mg) oral tablet: 1 tab(s) orally every other day (at bedtime) (09 Jun 2018 14:47)  ipratropium-albuterol 0.5 mg-2.5 mg/3 mLinhalation solution: 3 milliliter(s) by nebulizer 2 times a day, As Needed (21 Oct 2018 19:47)  lactulose 10 g/15 mL oral syrup: 30 milliliter(s) orally 2-4 times a day, As Needed; titrate to 2-3 soft bowel movements per day (09 Jun 2018 14:47)  levothyroxine 25 mcg (0.025 mg) oral capsule: 1 cap(s) orally once a day (09 Jun 2018 14:47)  propranolol 20 mg oral tablet: 0.5 tab(s) orally 2 times a day (09 Jun 2018 14:47)  rifAXIMin 550 mg oral tablet: 1 tab(s) orally 2 times a day (09 Jun 2018 14:47)  spironolactone 25 mg oral tablet: 1 tab(s) orally 2 times a day (21 Oct 2018 19:46)  Vitamin D3 2000 intl units oral tablet: 1 tab(s) orally once a day (09 Jun 2018 14:47) 89 year-old woman with PMH of CLL s/p Rituximab, COPD, bronchiectasis, AFib (not on anticoagulation, severe aortic stenosis and mitral regurgitation, HTN, cervical osteomyelitis, h/o variceal bleed s/p banding, mini-strokes, Alzheimer's, decompensated cirrhosis with recurrent encephalopathy and HCC s/p ablation and incomplete treatment admitted for hepatic encephalopathy, now resolved.

## 2018-10-30 ENCOUNTER — INBOUND DOCUMENT (OUTPATIENT)
Age: 83
End: 2018-10-30

## 2018-10-30 NOTE — DISCUSSION/SUMMARY
[Home] : patient was discharged to home [Follow Up Appt with Provider within 7 days] : follow up appt with provider within 7 days [FreeTextEntry1] : Spoke with daughter Jenny regarding this patient's discharge from from [facility].  She was hospitalized from 10/21/18-10/29/18.  Jenny reports that her mom is doing ok but they would like her to be better.  [facility] will be visiting her home today to evaluate.  No medication changes were made in the hospital.  A transition of care appointment has been scheduled.  Jenny was reminded to call the office for any issues or concerns prior to the appointment here.  Dr Celaya was made aware. [FreeTextEntry3] : A transition of care appointment has been scheduled for 11/5/18 with Dr Celaya

## 2018-11-02 ENCOUNTER — NON-APPOINTMENT (OUTPATIENT)
Age: 83
End: 2018-11-02

## 2018-11-02 ENCOUNTER — APPOINTMENT (OUTPATIENT)
Dept: CARDIOLOGY | Facility: CLINIC | Age: 83
End: 2018-11-02
Payer: MEDICARE

## 2018-11-02 VITALS — RESPIRATION RATE: 14 BRPM | SYSTOLIC BLOOD PRESSURE: 98 MMHG | DIASTOLIC BLOOD PRESSURE: 70 MMHG | HEART RATE: 52 BPM

## 2018-11-02 DIAGNOSIS — I34.0 NONRHEUMATIC MITRAL (VALVE) INSUFFICIENCY: ICD-10-CM

## 2018-11-02 PROCEDURE — 99215 OFFICE O/P EST HI 40 MIN: CPT

## 2018-11-02 PROCEDURE — 93000 ELECTROCARDIOGRAM COMPLETE: CPT

## 2018-11-02 RX ORDER — SPIRONOLACTONE 50 MG/1
50 TABLET ORAL
Qty: 30 | Refills: 0 | Status: COMPLETED | COMMUNITY
Start: 2018-09-21

## 2018-11-02 NOTE — HISTORY OF PRESENT ILLNESS
[FreeTextEntry1] : I saw her last in August, and she was recently hospitalized at [facility], presenting with N/V x 2 days and subsequently mental status changes.  She was given IV fluid and was treated for a UTI.  Hepatology was consulted as the ammonia level was elevated, and lactulose was administered by NG tube.  EKG recordings varied between SB and A. fib.  When she improved, she was d/c'd to home with PT.\par \par As she returns today, she complains of fatigue, but describes no cardiac sxs.  She reports no episodes of chest pain or unusual dyspnea.  She describes no palpitations.  She reports no orthopnea or PND.   At discharge, she was advised to hold spironolactone; LE edema has not been a problem.  Appetite is only fair.\par \par

## 2018-11-02 NOTE — PHYSICAL EXAM
[General Appearance - Well Developed] : well developed [Normal Appearance] : normal appearance [Well Groomed] : well groomed [General Appearance - Well Nourished] : well nourished [General Appearance - In No Acute Distress] : no acute distress [Normal Conjunctiva] : the conjunctiva exhibited no abnormalities [Eyelids - No Xanthelasma] : the eyelids demonstrated no xanthelasmas [Normal Jugular Venous A Waves Present] : normal jugular venous A waves present [Normal Jugular Venous V Waves Present] : normal jugular venous V waves present [Respiration, Rhythm And Depth] : normal respiratory rhythm and effort [Exaggerated Use Of Accessory Muscles For Inspiration] : no accessory muscle use [Auscultation Breath Sounds / Voice Sounds] : lungs were clear to auscultation bilaterally [Heart Rate And Rhythm] : heart rate and rhythm were normal [Bowel Sounds] : normal bowel sounds [Abnormal Walk] : normal gait [Nail Clubbing] : no clubbing of the fingernails [Cyanosis, Localized] : no localized cyanosis [Skin Color & Pigmentation] : normal skin color and pigmentation [] : no rash [Oriented To Time, Place, And Person] : oriented to person, place, and time [Impaired Insight] : insight and judgment were intact [Affect] : the affect was normal [Mood] : the mood was normal [FreeTextEntry1] : 2+ pulses in the upper and lower extremities. No edema.

## 2018-11-02 NOTE — REASON FOR VISIT
[FreeTextEntry1] : Steffanie Bustamante returns today for followup after a recent hospital stay, regarding aortic and mitral valve disease, paroxysmal atrial flutter and systolic hypertension.

## 2018-11-02 NOTE — DISCUSSION/SUMMARY
[FreeTextEntry1] : Paroxysmal atrial flutter - rate controlled.  Continue digoxin QOD and propranolol.  Not a candidate for anti-coagulation.\par \par Severe AS and moderate AI; moderate mitral and tricuspid valve insufficiency; remains compensated on current regimen of meds; will continue same.  Not a candidate for valve replacement.\par \par LE edema - not a problem at this time; will remain off spironolactone for now.  \par \par Continue low salt diet; encouraged her to maintain her calorie intake.\par \par She will return in 3 weeks.

## 2018-11-02 NOTE — ASSESSMENT
[FreeTextEntry1] : Paroxysmal atrial flutter, not a candidate for anti-coagulation. \par \par Aortic valve disease\par      severe aortic stenosis and moderate aortic valve insufficiency. Auscultatory characteristics of the murmur more consistent with a moderate AS; presence of AI may cause overestimation of the degree of AS on echo.       \par      moderate mitral and tricuspid valve insufficiency.\par \par Hx. of systolic hypertension of the elderly, accentuated by a widened pulse pressure in the presence of aortic valve insufficiency. \par \par Dependent edema, seems predominantly due to venous insufficiency.\par \par COPD. \par \par Moderate pulmonary hypertension, probably the combined effect of intrinsic lung disease and valvular heart disease.\par \par CLL with anemia and thrombocytopenia, requiring intermittent treatment with chemotherapy.\par \par Cirrhosis, esophageal varices.

## 2018-11-05 ENCOUNTER — APPOINTMENT (OUTPATIENT)
Dept: INTERNAL MEDICINE | Facility: CLINIC | Age: 83
End: 2018-11-05
Payer: MEDICARE

## 2018-11-05 VITALS
BODY MASS INDEX: 20.45 KG/M2 | HEART RATE: 87 BPM | WEIGHT: 114 LBS | HEIGHT: 62.5 IN | OXYGEN SATURATION: 98 % | TEMPERATURE: 97.4 F

## 2018-11-05 DIAGNOSIS — K80.20 CALCULUS OF GALLBLADDER W/OUT CHOLECYSTITIS W/OUT OBSTRUCTION: ICD-10-CM

## 2018-11-05 DIAGNOSIS — Z23 ENCOUNTER FOR IMMUNIZATION: ICD-10-CM

## 2018-11-05 DIAGNOSIS — Z87.19 PERSONAL HISTORY OF OTHER DISEASES OF THE DIGESTIVE SYSTEM: ICD-10-CM

## 2018-11-05 DIAGNOSIS — R41.82 ALTERED MENTAL STATUS, UNSPECIFIED: ICD-10-CM

## 2018-11-05 DIAGNOSIS — G93.40 ENCEPHALOPATHY, UNSPECIFIED: ICD-10-CM

## 2018-11-05 PROCEDURE — 90662 IIV NO PRSV INCREASED AG IM: CPT

## 2018-11-05 PROCEDURE — 99496 TRANSJ CARE MGMT HIGH F2F 7D: CPT | Mod: 25

## 2018-11-05 PROCEDURE — 36415 COLL VENOUS BLD VENIPUNCTURE: CPT

## 2018-11-05 PROCEDURE — G0008: CPT

## 2018-11-06 PROBLEM — K80.20 CHOLELITHIASIS: Status: ACTIVE | Noted: 2018-11-06

## 2018-11-06 PROBLEM — Z87.19 HISTORY OF CHOLELITHIASIS: Status: RESOLVED | Noted: 2018-11-06 | Resolved: 2018-11-06

## 2018-11-06 PROBLEM — R41.82 ALTERED MENTAL STATUS: Status: ACTIVE | Noted: 2018-07-20

## 2018-11-06 PROBLEM — G93.40 ENCEPHALOPATHY: Status: ACTIVE | Noted: 2018-11-06

## 2018-11-06 NOTE — PHYSICAL EXAM
[No Acute Distress] : no acute distress [Well-Appearing] : well-appearing [Normal Voice/Communication] : normal voice/communication [Normal Sclera/Conjunctiva] : normal sclera/conjunctiva [PERRL] : pupils equal round and reactive to light [EOMI] : extraocular movements intact [Normal Outer Ear/Nose] : the outer ears and nose were normal in appearance [Normal Oropharynx] : the oropharynx was normal [Supple] : supple [No Respiratory Distress] : no respiratory distress  [Clear to Auscultation] : lungs were clear to auscultation bilaterally [No Accessory Muscle Use] : no accessory muscle use [Normal Rate] : normal rate  [Normal S1, S2] : normal S1 and S2 [No Extremity Clubbing/Cyanosis] : no extremity clubbing/cyanosis [Soft] : abdomen soft [Non Tender] : non-tender [Normal Bowel Sounds] : normal bowel sounds [No CVA Tenderness] : no CVA  tenderness [No Spinal Tenderness] : no spinal tenderness [No Rash] : no rash [Speech Grossly Normal] : speech grossly normal [Normal Affect] : the affect was normal [Alert and Oriented x3] : oriented to person, place, and time [High Complexity requires an extensive number of possible diagnoses and/or the management options, extensive complexity of the medical data (tests, etc.) to be reviewed, and a high risk of significant complications, morbidity, and/or mortality as well as c] : High Complexity  [de-identified] : thin [de-identified] : No ST [de-identified] : no stridor [de-identified] : R=16; no wheezing [de-identified] : murmurs loud and unchanged [de-identified] : no cords; mild b/l pedal edema [de-identified] :  MS seems intact/ in wheelchair; moves all extremities

## 2018-11-06 NOTE — HEALTH RISK ASSESSMENT
[Intercurrent ED visits] : went to ED [Intercurrent hospitalizations] : was admitted to the hospital  [Any fall with injury in past year] : Patient reported fall with injury in the past year [0] : 2) Feeling down, depressed, or hopeless: Not at all (0) [Patient declined mammogram] : Patient declined mammogram [Patient declined PAP Smear] : Patient declined PAP Smear [Patient declined bone density test] : Patient declined bone density test [Patient declined colonoscopy] : Patient declined colonoscopy [HIV test declined] : HIV test declined [Hepatitis C test declined] : Hepatitis C test declined [Change in mental status noted] : Change in mental status noted [None] : None [With Family] : lives with family [# of Members in Household ___] :  household currently consist of [unfilled] member(s) [Retired] : retired [College] : College [] :  [# Of Children ___] : has [unfilled] children [Feels Safe at Home] : Feels safe at home [Reports changes in hearing] : Reports changes in hearing [Reports changes in vision] : Reports changes in vision [Smoke Detector] : smoke detector [Carbon Monoxide Detector] : carbon monoxide detector [Seat Belt] :  uses seat belt [Sunscreen] : uses sunscreen [Discussed at today's visit] : Advance Directives Discussed at today's visit [Designated Healthcare Proxy] : Designated healthcare proxy [Name: ___] : Health Care Proxy's Name: [unfilled]  [Relationship: ___] : Relationship: [unfilled] [] : No [de-identified] : cardiology; hepatology [[id number]] : 0 [Sexually Active] : not sexually active [Reports changes in dental health] : Reports no changes in dental health [Guns at Home] : no guns at home [Travel to Developing Areas] : does not  travel to developing areas [TB Exposure] : is not being exposed to tuberculosis [Caregiver Concerns] : does not have caregiver concerns

## 2018-11-06 NOTE — HISTORY OF PRESENT ILLNESS
[Post-hospitalization from ___ Hospital] : Post-hospitalization from [unfilled] Hospital [Admitted on: ___] : The patient was admitted on [unfilled] [Discharged on ___] : discharged on [unfilled] [Discharge Summary] : discharge summary [Discharge Med List] : discharge medication list [Patient Contacted By: ____] : and contacted by [unfilled] [FreeTextEntry2] : Per daughter Julio\par Had 2 days of abdominal discomfort and increased BM's followed by multiple episodes of n/v.\par Unable to keep up with oral hydration.\par Became dehydrated with poor mental status.\par BIBA to ER and admitted.\par MS became extremely poor necessitating NGT placement to give lactulose to treat hepatic encephalopathy,\par Seen by hepatology and cardiology.\par Given IVF.\par Treated initially for  UTI with 3 days of IV ceftriaxone.\par Had abdominal US positive for gallstones\par Clinically improved and sent home.\par Doing okay.\par MS fairly good.\par No fevers.\par Has mild edema and Aldactone 25 mg daily resumed as per Cardiology.\par No increased BM.\par Normal urination.\par Denies abdominal pain or n/v.\par Denies chest pain, SOB.\par Has cough at night. No sputum or hemoptysis.

## 2018-11-06 NOTE — REVIEW OF SYSTEMS
[Fatigue] : fatigue [Vision Problems] : vision problems [Hearing Loss] : hearing loss [Hoarseness] : hoarseness [Lower Ext Edema] : lower extremity edema [Cough] : cough [Diarrhea] : diarrhea [Confusion] : confusion [Easy Bruising] : easy bruising [Negative] : Heme/Lymph

## 2018-11-06 NOTE — ASSESSMENT
[FreeTextEntry1] : Steffanie was recently admitted with altered MS.\par Likely due to worsened hepatic encephalopathy in the setting of dehydration due to acute n/v/d.\par ? triggered by gastroenteritis / cholelithiasis/ dig toxicity\par She was also treated for an acute UTI with a 3 day course of IV ceftriaxone.\par \par Altered MS/ Hepatic encephalopathy\par MS improved. No further N/V/D.\par On lactulose/Xifaxan\par Hepatology f/u\par Keep well-hydrated\par Dig and Aldactone resumed as per Cardiology\par F/U with Dr. [place]\par Monitor weight\par CBC, CMP sent\par \par Cholelithiasis\par Not a surgical candidate\par Monitor US\par Low fat diet\par GI f/u\par Watch for fever, RUQ pain, jaundice, n/v\par \par Recurrent UTI\par S/P antibiotic therapy\par Collect urine for repeat u/a with micro and urine C&S\par \par \par Flu vaccine given\par RTC 4-6 weeks and as needed\par To call for any medical issues\par D/W patient and Julio in detail and all questions answered\par

## 2018-11-06 NOTE — COUNSELING
[Weight management counseling provided] : Weight management [Healthy eating counseling provided] : healthy eating [Activity counseling provided] : activity [Weight Self Once Weekly] : Weight self once weekly [Low Salt Diet] : Low salt diet [Walking] : Walking [None] : None

## 2018-11-07 LAB
ALBUMIN SERPL ELPH-MCNC: 3.3 G/DL
ALP BLD-CCNC: 97 U/L
ALT SERPL-CCNC: 21 U/L
ANION GAP SERPL CALC-SCNC: 11 MMOL/L
APPEARANCE: ABNORMAL
AST SERPL-CCNC: 40 U/L
BACTERIA: NEGATIVE
BASOPHILS # BLD AUTO: 0.03 K/UL
BASOPHILS NFR BLD AUTO: 0.6 %
BILIRUB SERPL-MCNC: 1.9 MG/DL
BILIRUBIN URINE: NEGATIVE
BLOOD URINE: NEGATIVE
BUN SERPL-MCNC: 33 MG/DL
CALCIUM OXALATE CRYSTALS: ABNORMAL
CALCIUM SERPL-MCNC: 9.6 MG/DL
CHLORIDE SERPL-SCNC: 104 MMOL/L
CO2 SERPL-SCNC: 21 MMOL/L
COLOR: YELLOW
CREAT SERPL-MCNC: 1.29 MG/DL
EOSINOPHIL # BLD AUTO: 0.09 K/UL
EOSINOPHIL NFR BLD AUTO: 1.7 %
GLUCOSE QUALITATIVE U: NEGATIVE MG/DL
GLUCOSE SERPL-MCNC: 93 MG/DL
HCT VFR BLD CALC: 33.1 %
HGB BLD-MCNC: 10.9 G/DL
HYALINE CASTS: 0 /LPF
IMM GRANULOCYTES NFR BLD AUTO: 0.2 %
KETONES URINE: ABNORMAL
LEUKOCYTE ESTERASE URINE: ABNORMAL
LYMPHOCYTES # BLD AUTO: 1.2 K/UL
LYMPHOCYTES NFR BLD AUTO: 22.6 %
MAN DIFF?: NORMAL
MCHC RBC-ENTMCNC: 32.8 PG
MCHC RBC-ENTMCNC: 32.9 GM/DL
MCV RBC AUTO: 99.7 FL
MICROSCOPIC-UA: NORMAL
MONOCYTES # BLD AUTO: 0.54 K/UL
MONOCYTES NFR BLD AUTO: 10.2 %
NEUTROPHILS # BLD AUTO: 3.45 K/UL
NEUTROPHILS NFR BLD AUTO: 64.7 %
NITRITE URINE: NEGATIVE
PH URINE: 5.5
PLATELET # BLD AUTO: 121 K/UL
POTASSIUM SERPL-SCNC: 4.5 MMOL/L
PROT SERPL-MCNC: 5.4 G/DL
PROTEIN URINE: ABNORMAL MG/DL
RBC # BLD: 3.32 M/UL
RBC # FLD: 15.4 %
RED BLOOD CELLS URINE: 0 /HPF
SODIUM SERPL-SCNC: 136 MMOL/L
SPECIFIC GRAVITY URINE: 1.02
SQUAMOUS EPITHELIAL CELLS: 18 /HPF
URINE COMMENTS: NORMAL
UROBILINOGEN URINE: NEGATIVE MG/DL
WBC # FLD AUTO: 5.32 K/UL
WHITE BLOOD CELLS URINE: 17 /HPF

## 2018-11-09 LAB — BACTERIA UR CULT: NORMAL

## 2018-11-11 PROCEDURE — P9045: CPT

## 2018-11-11 PROCEDURE — 81001 URINALYSIS AUTO W/SCOPE: CPT

## 2018-11-11 PROCEDURE — 97162 PT EVAL MOD COMPLEX 30 MIN: CPT

## 2018-11-11 PROCEDURE — 80162 ASSAY OF DIGOXIN TOTAL: CPT

## 2018-11-11 PROCEDURE — 82140 ASSAY OF AMMONIA: CPT

## 2018-11-11 PROCEDURE — 71045 X-RAY EXAM CHEST 1 VIEW: CPT

## 2018-11-11 PROCEDURE — 93975 VASCULAR STUDY: CPT

## 2018-11-11 PROCEDURE — 86901 BLOOD TYPING SEROLOGIC RH(D): CPT

## 2018-11-11 PROCEDURE — 99285 EMERGENCY DEPT VISIT HI MDM: CPT | Mod: 25

## 2018-11-11 PROCEDURE — 84300 ASSAY OF URINE SODIUM: CPT

## 2018-11-11 PROCEDURE — 82570 ASSAY OF URINE CREATININE: CPT

## 2018-11-11 PROCEDURE — 85014 HEMATOCRIT: CPT

## 2018-11-11 PROCEDURE — 83735 ASSAY OF MAGNESIUM: CPT

## 2018-11-11 PROCEDURE — 85384 FIBRINOGEN ACTIVITY: CPT

## 2018-11-11 PROCEDURE — 85379 FIBRIN DEGRADATION QUANT: CPT

## 2018-11-11 PROCEDURE — 82962 GLUCOSE BLOOD TEST: CPT

## 2018-11-11 PROCEDURE — 85730 THROMBOPLASTIN TIME PARTIAL: CPT

## 2018-11-11 PROCEDURE — 83605 ASSAY OF LACTIC ACID: CPT

## 2018-11-11 PROCEDURE — 85610 PROTHROMBIN TIME: CPT

## 2018-11-11 PROCEDURE — 82947 ASSAY GLUCOSE BLOOD QUANT: CPT

## 2018-11-11 PROCEDURE — 82553 CREATINE MB FRACTION: CPT

## 2018-11-11 PROCEDURE — 84295 ASSAY OF SERUM SODIUM: CPT

## 2018-11-11 PROCEDURE — 93005 ELECTROCARDIOGRAM TRACING: CPT

## 2018-11-11 PROCEDURE — 83615 LACTATE (LD) (LDH) ENZYME: CPT

## 2018-11-11 PROCEDURE — 84484 ASSAY OF TROPONIN QUANT: CPT

## 2018-11-11 PROCEDURE — 80053 COMPREHEN METABOLIC PANEL: CPT

## 2018-11-11 PROCEDURE — 93306 TTE W/DOPPLER COMPLETE: CPT

## 2018-11-11 PROCEDURE — 82435 ASSAY OF BLOOD CHLORIDE: CPT

## 2018-11-11 PROCEDURE — 86850 RBC ANTIBODY SCREEN: CPT

## 2018-11-11 PROCEDURE — 80048 BASIC METABOLIC PNL TOTAL CA: CPT

## 2018-11-11 PROCEDURE — 74176 CT ABD & PELVIS W/O CONTRAST: CPT

## 2018-11-11 PROCEDURE — 84133 ASSAY OF URINE POTASSIUM: CPT

## 2018-11-11 PROCEDURE — 86900 BLOOD TYPING SEROLOGIC ABO: CPT

## 2018-11-11 PROCEDURE — 87086 URINE CULTURE/COLONY COUNT: CPT

## 2018-11-11 PROCEDURE — 70450 CT HEAD/BRAIN W/O DYE: CPT

## 2018-11-11 PROCEDURE — 84100 ASSAY OF PHOSPHORUS: CPT

## 2018-11-11 PROCEDURE — 84132 ASSAY OF SERUM POTASSIUM: CPT

## 2018-11-11 PROCEDURE — 84443 ASSAY THYROID STIM HORMONE: CPT

## 2018-11-11 PROCEDURE — 96374 THER/PROPH/DIAG INJ IV PUSH: CPT

## 2018-11-11 PROCEDURE — 87040 BLOOD CULTURE FOR BACTERIA: CPT

## 2018-11-11 PROCEDURE — 94640 AIRWAY INHALATION TREATMENT: CPT

## 2018-11-11 PROCEDURE — 85027 COMPLETE CBC AUTOMATED: CPT

## 2018-11-11 PROCEDURE — 82330 ASSAY OF CALCIUM: CPT

## 2018-11-11 PROCEDURE — 82803 BLOOD GASES ANY COMBINATION: CPT

## 2018-11-11 PROCEDURE — 82550 ASSAY OF CK (CPK): CPT

## 2018-11-13 ENCOUNTER — APPOINTMENT (OUTPATIENT)
Dept: INTERNAL MEDICINE | Facility: CLINIC | Age: 83
End: 2018-11-13

## 2018-11-19 ENCOUNTER — APPOINTMENT (OUTPATIENT)
Dept: CARDIOLOGY | Facility: CLINIC | Age: 83
End: 2018-11-19
Payer: MEDICARE

## 2018-11-19 VITALS
WEIGHT: 113 LBS | SYSTOLIC BLOOD PRESSURE: 104 MMHG | HEIGHT: 62.5 IN | HEART RATE: 53 BPM | BODY MASS INDEX: 20.28 KG/M2 | DIASTOLIC BLOOD PRESSURE: 55 MMHG | OXYGEN SATURATION: 100 % | RESPIRATION RATE: 16 BRPM

## 2018-11-19 PROCEDURE — 99214 OFFICE O/P EST MOD 30 MIN: CPT

## 2018-11-19 PROCEDURE — 93000 ELECTROCARDIOGRAM COMPLETE: CPT

## 2018-11-19 NOTE — DISCUSSION/SUMMARY
[FreeTextEntry1] : Paroxysmal atrial flutter - continue digoxin QOD and propranolol.  Not a candidate for anti-coagulation.\par \par Severe AS and moderate AI; moderate mitral and tricuspid valve insufficiency; remains compensated on current regimen of meds; will continue same.  Not a candidate for valve replacement.\par \par LE edema - seems adequately controlled at this time on spironolactone bid.  She will continue to monitor this, and will call me if it worsens.  \par \par She will continue on a low salt diet; I reminded her to maintain her calorie intake.\par \par She will return in 2 months.

## 2018-11-19 NOTE — HISTORY OF PRESENT ILLNESS
[FreeTextEntry1] : I saw her last on Nov.2; she returns in the company of her aide.  \par \par Since I saw her last, she tells me she has remained well.  She reports no cardiac sxs.  There have been no episodes of chest pain or unusual dyspnea.  She describes no palpitations.  She reports no orthopnea or PND.   \par \par I spoke to her daughter last week, and currently she is taking spironolactone bid, as the LE edema had recurred.  It seems adequately controlled on the current dose.  \par \par She tells me her appetite has improved.

## 2018-11-19 NOTE — REASON FOR VISIT
[FreeTextEntry1] : Steffanie Bustamante returns today for followup regarding aortic and mitral valve disease, paroxysmal atrial flutter and systolic hypertension.

## 2018-11-20 ENCOUNTER — RX RENEWAL (OUTPATIENT)
Age: 83
End: 2018-11-20

## 2018-11-28 ENCOUNTER — APPOINTMENT (OUTPATIENT)
Dept: INTERNAL MEDICINE | Facility: CLINIC | Age: 83
End: 2018-11-28
Payer: MEDICARE

## 2018-11-28 VITALS
SYSTOLIC BLOOD PRESSURE: 100 MMHG | OXYGEN SATURATION: 98 % | DIASTOLIC BLOOD PRESSURE: 60 MMHG | BODY MASS INDEX: 20.34 KG/M2 | WEIGHT: 113 LBS | HEART RATE: 71 BPM | TEMPERATURE: 97.4 F

## 2018-11-28 PROCEDURE — 99214 OFFICE O/P EST MOD 30 MIN: CPT

## 2018-11-29 RX ORDER — CIPROFLOXACIN HYDROCHLORIDE 250 MG/1
250 TABLET, FILM COATED ORAL
Qty: 20 | Refills: 0 | Status: DISCONTINUED | COMMUNITY
Start: 2018-09-27 | End: 2018-11-29

## 2018-11-29 NOTE — PHYSICAL EXAM
[No Acute Distress] : no acute distress [Well-Appearing] : well-appearing [Normal Voice/Communication] : normal voice/communication [Normal Sclera/Conjunctiva] : normal sclera/conjunctiva [PERRL] : pupils equal round and reactive to light [EOMI] : extraocular movements intact [Normal Outer Ear/Nose] : the outer ears and nose were normal in appearance [Normal Oropharynx] : the oropharynx was normal [Supple] : supple [No Respiratory Distress] : no respiratory distress  [Clear to Auscultation] : lungs were clear to auscultation bilaterally [No Accessory Muscle Use] : no accessory muscle use [Normal Rate] : normal rate  [Normal S1, S2] : normal S1 and S2 [No Extremity Clubbing/Cyanosis] : no extremity clubbing/cyanosis [Soft] : abdomen soft [Non Tender] : non-tender [Normal Bowel Sounds] : normal bowel sounds [No CVA Tenderness] : no CVA  tenderness [No Spinal Tenderness] : no spinal tenderness [No Rash] : no rash [Speech Grossly Normal] : speech grossly normal [Normal Affect] : the affect was normal [Alert and Oriented x3] : oriented to person, place, and time [de-identified] : thin [de-identified] : No ST [de-identified] : no stridor [de-identified] : R=16; no wheezing [de-identified] : murmurs loud and unchanged [de-identified] :  MS seems intact/ in wheelchair; moves all extremities

## 2018-11-29 NOTE — HISTORY OF PRESENT ILLNESS
[Formal Caregiver] : formal caregiver [Family Member] : family member [FreeTextEntry8] : Comes in for acute medical visit.\par Exposed to sick family members with URI.\par Has developed URI symptoms.\par Nasal discharge.\par Yesterday with fever to 101.\par Reduced MS and tachycardia.\par Given Tylenol.\par Today feeling better.\par MS improved.\par Afebrile.\par Taking po.\par No UTI symptoms.\par No n/v/d.\par No abdominal pain.\par Denies chest pain, SOB/wheezing, hemoptysis.\par Coughing more with sputum.

## 2018-11-29 NOTE — HEALTH RISK ASSESSMENT
[One fall no injury in past year] : Patient reported one fall in the past year without injury [0] : 2) Feeling down, depressed, or hopeless: Not at all (0) [] : No [de-identified] : cardiology [[id number]] : 0

## 2018-11-29 NOTE — ASSESSMENT
[FreeTextEntry1] : Suspect acute viral URI with cough\par Hold on CXR for now\par No sputum for C&S\par Rest\par Fluids\par Tylenol as needed for fever control\par Robitussin DM as needed for cough\par Doxycycline 100 mg bid for 5-7 days given propensity for acute bronchitis\par Gargles / lozenges\par Steam\par Saline nasal rinses\par To call if worse or if not improving\par To call for any medical issues\par RTC as needed\par \par COPD\par Clinically stable at present despite URI\par Continue nebs with budesonide bid\par Continue duoneb nebs bid - can use qid if needed\par No need for po steroids\par Had flu vaccine

## 2018-11-29 NOTE — REVIEW OF SYSTEMS
[Fever] : fever [Fatigue] : fatigue [Vision Problems] : vision problems [Hearing Loss] : hearing loss [Hoarseness] : hoarseness [Lower Ext Edema] : lower extremity edema [Cough] : cough [Diarrhea] : diarrhea [Confusion] : confusion [Easy Bruising] : easy bruising [Negative] : Heme/Lymph

## 2018-12-18 ENCOUNTER — INPATIENT (INPATIENT)
Facility: HOSPITAL | Age: 83
LOS: 8 days | Discharge: HOME | DRG: 65 | End: 2018-12-27
Attending: STUDENT IN AN ORGANIZED HEALTH CARE EDUCATION/TRAINING PROGRAM | Admitting: HOSPITALIST
Payer: MEDICARE

## 2018-12-18 DIAGNOSIS — Z98.89 OTHER SPECIFIED POSTPROCEDURAL STATES: Chronic | ICD-10-CM

## 2018-12-18 PROCEDURE — 93010 ELECTROCARDIOGRAM REPORT: CPT

## 2018-12-18 PROCEDURE — 99285 EMERGENCY DEPT VISIT HI MDM: CPT | Mod: GC,25

## 2018-12-18 PROCEDURE — 70450 CT HEAD/BRAIN W/O DYE: CPT | Mod: 26

## 2018-12-18 PROCEDURE — 72125 CT NECK SPINE W/O DYE: CPT | Mod: 26

## 2018-12-18 NOTE — ED ADULT NURSE NOTE - OBJECTIVE STATEMENT
89F s/p fall at home while getting off toilet. Patient had lac to nose, and forehead. Pt c/o pain to b/l knees. patient was at dermotologist and had skin removed to L arm today. L arm is wrapped. patient is mildly confused at baseline as per family. Patient states she is never sure of what the date is, patient states she is at [facility], patient is aware of her identity. Patient is not sure if she remembers exactly the circumstances of her fall. patient does not take blood thinners. Patient has b/l strong grasping strength to upper extremities. Patient pupils equal and briskly reactive. Patient has history of head bleed r/t stroke as per family. Patient denies SOB and chest pain. 20 Ga IV to R Upper Arm.

## 2018-12-18 NOTE — ED ADULT NURSE NOTE - NSIMPLEMENTINTERV_GEN_ALL_ED
Implemented All Fall with Harm Risk Interventions:  [place] to call system. Call bell, personal items and telephone within reach. Instruct patient to call for assistance. Room bathroom lighting operational. Non-slip footwear when patient is off stretcher. Physically safe environment: no spills, clutter or unnecessary equipment. Stretcher in lowest position, wheels locked, appropriate side rails in place. Provide visual cue, wrist band, yellow gown, etc. Monitor gait and stability. Monitor for mental status changes and reorient to person, place, and time. Review medications for side effects contributing to fall risk. Reinforce activity limits and safety measures with patient and family. Provide visual clues: red socks.

## 2018-12-19 VITALS
OXYGEN SATURATION: 97 % | RESPIRATION RATE: 18 BRPM | DIASTOLIC BLOOD PRESSURE: 46 MMHG | TEMPERATURE: 98 F | HEART RATE: 101 BPM | SYSTOLIC BLOOD PRESSURE: 65 MMHG

## 2018-12-19 DIAGNOSIS — K74.60 UNSPECIFIED CIRRHOSIS OF LIVER: ICD-10-CM

## 2018-12-19 DIAGNOSIS — R55 SYNCOPE AND COLLAPSE: ICD-10-CM

## 2018-12-19 DIAGNOSIS — I63.9 CEREBRAL INFARCTION, UNSPECIFIED: ICD-10-CM
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
## 2018-12-19 DIAGNOSIS — I27.20 PULMONARY HYPERTENSION, UNSPECIFIED: ICD-10-CM

## 2018-12-19 DIAGNOSIS — J44.9 CHRONIC OBSTRUCTIVE PULMONARY DISEASE, UNSPECIFIED: ICD-10-CM

## 2018-12-19 DIAGNOSIS — I35.0 NONRHEUMATIC AORTIC (VALVE) STENOSIS: ICD-10-CM

## 2018-12-19 DIAGNOSIS — E03.9 HYPOTHYROIDISM, UNSPECIFIED: ICD-10-CM

## 2018-12-19 DIAGNOSIS — I48.91 UNSPECIFIED ATRIAL FIBRILLATION: ICD-10-CM

## 2018-12-19 LAB
ALBUMIN SERPL ELPH-MCNC: 3.3 G/DL — SIGNIFICANT CHANGE UP (ref 3.3–5)
ALP SERPL-CCNC: 110 U/L — SIGNIFICANT CHANGE UP (ref 40–120)
ALT FLD-CCNC: 24 U/L — SIGNIFICANT CHANGE UP (ref 10–45)
ANION GAP SERPL CALC-SCNC: 13 MMOL/L — SIGNIFICANT CHANGE UP (ref 5–17)
ANION GAP SERPL CALC-SCNC: 15 MMOL/L — SIGNIFICANT CHANGE UP (ref 5–17)
APPEARANCE UR: CLEAR — SIGNIFICANT CHANGE UP
AST SERPL-CCNC: 40 U/L — SIGNIFICANT CHANGE UP (ref 10–40)
BASOPHILS # BLD AUTO: 0 K/UL — SIGNIFICANT CHANGE UP (ref 0–0.2)
BASOPHILS NFR BLD AUTO: 0 % — SIGNIFICANT CHANGE UP (ref 0–2)
BILIRUB SERPL-MCNC: 2.1 MG/DL — HIGH (ref 0.2–1.2)
BILIRUB UR-MCNC: NEGATIVE — SIGNIFICANT CHANGE UP
BUN SERPL-MCNC: 49 MG/DL — HIGH (ref 7–23)
BUN SERPL-MCNC: 51 MG/DL — HIGH (ref 7–23)
CALCIUM SERPL-MCNC: 10.1 MG/DL — SIGNIFICANT CHANGE UP (ref 8.4–10.5)
CALCIUM SERPL-MCNC: 9.4 MG/DL — SIGNIFICANT CHANGE UP (ref 8.4–10.5)
CHLORIDE SERPL-SCNC: 102 MMOL/L — SIGNIFICANT CHANGE UP (ref 96–108)
CHLORIDE SERPL-SCNC: 104 MMOL/L — SIGNIFICANT CHANGE UP (ref 96–108)
CO2 SERPL-SCNC: 20 MMOL/L — LOW (ref 22–31)
CO2 SERPL-SCNC: 21 MMOL/L — LOW (ref 22–31)
COLOR SPEC: YELLOW — SIGNIFICANT CHANGE UP
CREAT SERPL-MCNC: 1.26 MG/DL — SIGNIFICANT CHANGE UP (ref 0.5–1.3)
CREAT SERPL-MCNC: 1.35 MG/DL — HIGH (ref 0.5–1.3)
DIFF PNL FLD: NEGATIVE — SIGNIFICANT CHANGE UP
DIGOXIN SERPL-MCNC: 0.8 NG/ML — SIGNIFICANT CHANGE UP (ref 0.8–2)
EOSINOPHIL # BLD AUTO: 0 K/UL — SIGNIFICANT CHANGE UP (ref 0–0.5)
EOSINOPHIL NFR BLD AUTO: 0.4 % — SIGNIFICANT CHANGE UP (ref 0–6)
GAS PNL BLDV: SIGNIFICANT CHANGE UP
GAS PNL BLDV: SIGNIFICANT CHANGE UP
GLUCOSE SERPL-MCNC: 110 MG/DL — HIGH (ref 70–99)
GLUCOSE SERPL-MCNC: 92 MG/DL — SIGNIFICANT CHANGE UP (ref 70–99)
GLUCOSE UR QL: NEGATIVE — SIGNIFICANT CHANGE UP
HCT VFR BLD CALC: 40.6 % — SIGNIFICANT CHANGE UP (ref 34.5–45)
HGB BLD-MCNC: 13.5 G/DL — SIGNIFICANT CHANGE UP (ref 11.5–15.5)
KETONES UR-MCNC: NEGATIVE — SIGNIFICANT CHANGE UP
LEUKOCYTE ESTERASE UR-ACNC: NEGATIVE — SIGNIFICANT CHANGE UP
LYMPHOCYTES # BLD AUTO: 0.8 K/UL — LOW (ref 1–3.3)
LYMPHOCYTES # BLD AUTO: 11.1 % — LOW (ref 13–44)
MCHC RBC-ENTMCNC: 33.3 GM/DL — SIGNIFICANT CHANGE UP (ref 32–36)
MCHC RBC-ENTMCNC: 34.8 PG — HIGH (ref 27–34)
MCV RBC AUTO: 104 FL — HIGH (ref 80–100)
MONOCYTES # BLD AUTO: 0.4 K/UL — SIGNIFICANT CHANGE UP (ref 0–0.9)
MONOCYTES NFR BLD AUTO: 5.8 % — SIGNIFICANT CHANGE UP (ref 2–14)
NEUTROPHILS # BLD AUTO: 6 K/UL — SIGNIFICANT CHANGE UP (ref 1.8–7.4)
NEUTROPHILS NFR BLD AUTO: 82.7 % — HIGH (ref 43–77)
NITRITE UR-MCNC: NEGATIVE — SIGNIFICANT CHANGE UP
PH UR: 6 — SIGNIFICANT CHANGE UP (ref 5–8)
PLATELET # BLD AUTO: 109 K/UL — LOW (ref 150–400)
POTASSIUM SERPL-MCNC: 5.3 MMOL/L — SIGNIFICANT CHANGE UP (ref 3.5–5.3)
POTASSIUM SERPL-MCNC: 5.4 MMOL/L — HIGH (ref 3.5–5.3)
POTASSIUM SERPL-SCNC: 5.3 MMOL/L — SIGNIFICANT CHANGE UP (ref 3.5–5.3)
POTASSIUM SERPL-SCNC: 5.4 MMOL/L — HIGH (ref 3.5–5.3)
PROT SERPL-MCNC: 6 G/DL — SIGNIFICANT CHANGE UP (ref 6–8.3)
PROT UR-MCNC: SIGNIFICANT CHANGE UP
RBC # BLD: 3.89 M/UL — SIGNIFICANT CHANGE UP (ref 3.8–5.2)
RBC # FLD: 13.6 % — SIGNIFICANT CHANGE UP (ref 10.3–14.5)
SODIUM SERPL-SCNC: 137 MMOL/L — SIGNIFICANT CHANGE UP (ref 135–145)
SODIUM SERPL-SCNC: 138 MMOL/L — SIGNIFICANT CHANGE UP (ref 135–145)
SP GR SPEC: 1.02 — SIGNIFICANT CHANGE UP (ref 1.01–1.02)
TROPONIN T, HIGH SENSITIVITY RESULT: 32 NG/L — SIGNIFICANT CHANGE UP (ref 0–51)
TROPONIN T, HIGH SENSITIVITY RESULT: 33 NG/L — SIGNIFICANT CHANGE UP (ref 0–51)
UROBILINOGEN FLD QL: ABNORMAL
WBC # BLD: 7.3 K/UL — SIGNIFICANT CHANGE UP (ref 3.8–10.5)
WBC # FLD AUTO: 7.3 K/UL — SIGNIFICANT CHANGE UP (ref 3.8–10.5)

## 2018-12-19 PROCEDURE — 99223 1ST HOSP IP/OBS HIGH 75: CPT

## 2018-12-19 PROCEDURE — 73110 X-RAY EXAM OF WRIST: CPT | Mod: 26,LT

## 2018-12-19 PROCEDURE — 73130 X-RAY EXAM OF HAND: CPT | Mod: 26,LT

## 2018-12-19 PROCEDURE — 70547 MR ANGIOGRAPHY NECK W/O DYE: CPT | Mod: 26

## 2018-12-19 PROCEDURE — 70551 MRI BRAIN STEM W/O DYE: CPT | Mod: 26

## 2018-12-19 PROCEDURE — 72170 X-RAY EXAM OF PELVIS: CPT | Mod: 26

## 2018-12-19 PROCEDURE — 71045 X-RAY EXAM CHEST 1 VIEW: CPT | Mod: 26

## 2018-12-19 PROCEDURE — 73562 X-RAY EXAM OF KNEE 3: CPT | Mod: 26,LT

## 2018-12-19 RX ORDER — LEVOTHYROXINE SODIUM 125 MCG
25 TABLET ORAL DAILY
Qty: 0 | Refills: 0 | Status: DISCONTINUED | OUTPATIENT
Start: 2018-12-19 | End: 2018-12-27

## 2018-12-19 RX ORDER — DIGOXIN 250 MCG
0.12 TABLET ORAL EVERY OTHER DAY
Qty: 0 | Refills: 0 | Status: DISCONTINUED | OUTPATIENT
Start: 2018-12-19 | End: 2018-12-27

## 2018-12-19 RX ORDER — SPIRONOLACTONE 25 MG/1
25 TABLET, FILM COATED ORAL DAILY
Qty: 0 | Refills: 0 | Status: DISCONTINUED | OUTPATIENT
Start: 2018-12-19 | End: 2018-12-24

## 2018-12-19 RX ORDER — ACETAMINOPHEN 500 MG
650 TABLET ORAL ONCE
Qty: 0 | Refills: 0 | Status: DISCONTINUED | OUTPATIENT
Start: 2018-12-19 | End: 2018-12-19

## 2018-12-19 RX ORDER — ACETAMINOPHEN 500 MG
650 TABLET ORAL EVERY 6 HOURS
Qty: 0 | Refills: 0 | Status: DISCONTINUED | OUTPATIENT
Start: 2018-12-19 | End: 2018-12-27

## 2018-12-19 RX ORDER — ALBUTEROL 90 UG/1
2 AEROSOL, METERED ORAL EVERY 6 HOURS
Qty: 0 | Refills: 0 | Status: DISCONTINUED | OUTPATIENT
Start: 2018-12-19 | End: 2018-12-19

## 2018-12-19 RX ORDER — DIGOXIN 250 MCG
0.12 TABLET ORAL DAILY
Qty: 0 | Refills: 0 | Status: DISCONTINUED | OUTPATIENT
Start: 2018-12-19 | End: 2018-12-19

## 2018-12-19 RX ORDER — CHOLECALCIFEROL (VITAMIN D3) 125 MCG
2000 CAPSULE ORAL DAILY
Qty: 0 | Refills: 0 | Status: DISCONTINUED | OUTPATIENT
Start: 2018-12-19 | End: 2018-12-27

## 2018-12-19 RX ORDER — ENOXAPARIN SODIUM 100 MG/ML
40 INJECTION SUBCUTANEOUS EVERY 24 HOURS
Qty: 0 | Refills: 0 | Status: DISCONTINUED | OUTPATIENT
Start: 2018-12-19 | End: 2018-12-19

## 2018-12-19 RX ORDER — HEPARIN SODIUM 5000 [USP'U]/ML
5000 INJECTION INTRAVENOUS; SUBCUTANEOUS EVERY 12 HOURS
Qty: 0 | Refills: 0 | Status: DISCONTINUED | OUTPATIENT
Start: 2018-12-19 | End: 2018-12-27

## 2018-12-19 RX ORDER — TIOTROPIUM BROMIDE 18 UG/1
1 CAPSULE ORAL; RESPIRATORY (INHALATION) DAILY
Qty: 0 | Refills: 0 | Status: DISCONTINUED | OUTPATIENT
Start: 2018-12-19 | End: 2018-12-19

## 2018-12-19 RX ORDER — BACITRACIN ZINC 500 UNIT/G
1 OINTMENT IN PACKET (EA) TOPICAL ONCE
Qty: 0 | Refills: 0 | Status: COMPLETED | OUTPATIENT
Start: 2018-12-19 | End: 2018-12-19

## 2018-12-19 RX ORDER — SODIUM CHLORIDE 9 MG/ML
500 INJECTION INTRAMUSCULAR; INTRAVENOUS; SUBCUTANEOUS ONCE
Qty: 0 | Refills: 0 | Status: COMPLETED | OUTPATIENT
Start: 2018-12-19 | End: 2018-12-19

## 2018-12-19 RX ORDER — TETANUS TOXOID, REDUCED DIPHTHERIA TOXOID AND ACELLULAR PERTUSSIS VACCINE, ADSORBED 5; 2.5; 8; 8; 2.5 [IU]/.5ML; [IU]/.5ML; UG/.5ML; UG/.5ML; UG/.5ML
0.5 SUSPENSION INTRAMUSCULAR ONCE
Qty: 0 | Refills: 0 | Status: COMPLETED | OUTPATIENT
Start: 2018-12-19 | End: 2018-12-27

## 2018-12-19 RX ORDER — ACETAMINOPHEN 500 MG
325 TABLET ORAL ONCE
Qty: 0 | Refills: 0 | Status: COMPLETED | OUTPATIENT
Start: 2018-12-19 | End: 2018-12-19

## 2018-12-19 RX ORDER — IPRATROPIUM/ALBUTEROL SULFATE 18-103MCG
3 AEROSOL WITH ADAPTER (GRAM) INHALATION EVERY 12 HOURS
Qty: 0 | Refills: 0 | Status: DISCONTINUED | OUTPATIENT
Start: 2018-12-19 | End: 2018-12-27

## 2018-12-19 RX ORDER — BUDESONIDE, MICRONIZED 100 %
0.25 POWDER (GRAM) MISCELLANEOUS
Qty: 0 | Refills: 0 | Status: DISCONTINUED | OUTPATIENT
Start: 2018-12-19 | End: 2018-12-19

## 2018-12-19 RX ORDER — PROPRANOLOL HCL 160 MG
10 CAPSULE, EXTENDED RELEASE 24HR ORAL
Qty: 0 | Refills: 0 | Status: DISCONTINUED | OUTPATIENT
Start: 2018-12-19 | End: 2018-12-24

## 2018-12-19 RX ORDER — LACTULOSE 10 G/15ML
20 SOLUTION ORAL
Qty: 0 | Refills: 0 | Status: DISCONTINUED | OUTPATIENT
Start: 2018-12-19 | End: 2018-12-20

## 2018-12-19 RX ADMIN — SODIUM CHLORIDE 500 MILLILITER(S): 9 INJECTION INTRAMUSCULAR; INTRAVENOUS; SUBCUTANEOUS at 02:02

## 2018-12-19 RX ADMIN — Medication 650 MILLIGRAM(S): at 14:34

## 2018-12-19 RX ADMIN — LACTULOSE 20 GRAM(S): 10 SOLUTION ORAL at 18:21

## 2018-12-19 RX ADMIN — Medication 1 APPLICATION(S): at 01:33

## 2018-12-19 RX ADMIN — Medication 2000 UNIT(S): at 12:21

## 2018-12-19 RX ADMIN — Medication 3 MILLILITER(S): at 18:22

## 2018-12-19 RX ADMIN — LACTULOSE 20 GRAM(S): 10 SOLUTION ORAL at 12:21

## 2018-12-19 RX ADMIN — SPIRONOLACTONE 25 MILLIGRAM(S): 25 TABLET, FILM COATED ORAL at 12:22

## 2018-12-19 RX ADMIN — Medication 10 MILLIGRAM(S): at 12:22

## 2018-12-19 NOTE — ED PROVIDER NOTE - PROGRESS NOTE DETAILS
updated patient and family on results. agree with plan for admission.  - berto Huerta patient and family refusing splint. patient had recent excision with dermatology and and wound needs to be dressed everyday. upon reassessment patient does not have snuff box tenderness. will defer splint. family to follow-up with ortho hand as outpatient for eval and repeat xray. - resident Ran Huerta

## 2018-12-19 NOTE — H&P ADULT - NSHPLABSRESULTS_GEN_ALL_CORE
.  LABS:                         13.5   7.3   )-----------( 109      ( 19 Dec 2018 00:50 )             40.6         138  |  104  |  51<H>  ----------------------------<  92  5.3   |  21<L>  |  1.26    Ca    9.4      19 Dec 2018 04:56    TPro  6.0  /  Alb  3.3  /  TBili  2.1<H>  /  DBili  x   /  AST  40  /  ALT  24  /  AlkPhos  110        Urinalysis Basic - ( 19 Dec 2018 01:53 )    Color: Yellow / Appearance: Clear / S.020 / pH: x  Gluc: x / Ketone: Negative  / Bili: Negative / Urobili: 3 mg/dL   Blood: x / Protein: Trace / Nitrite: Negative   Leuk Esterase: Negative / RBC: x / WBC x   Sq Epi: x / Non Sq Epi: x / Bacteria: x            RADIOLOGY, EKG & ADDITIONAL TESTS: Reviewed.    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

## 2018-12-19 NOTE — H&P ADULT - NSHPREVIEWOFSYSTEMS_GEN_ALL_CORE
GENERAL: No fevers, no chills.  EYES: No blurry vision,  No photophobia  ENT: No sore throat.  No dysphagia  Cardiovascular: No chest pain, palpitations, orthopnea  Pulmonary: No cough, no wheezing. No shortness of breath  Gastrointestinal: No abdominal pain, no diarrhea, no constipation.   : No dysuria.  No hematuria  Musculoskeletal: pain in left knee and left wrist and forehead  Dermatology:  No rashes.  Neuro: No Headache.  No vertigo.  No dizziness.  Psych: No anxiety, no depression.  Denies suicidal thoughts.

## 2018-12-19 NOTE — H&P ADULT - ASSESSMENT
Patient is a 89 year old female with CKD, CLL, liver cirrhosis  A fib (not on AC, given hemorrhagic CVA- told by outpatient neurology she can not even be on aspirin), pulmonary hypertension, aortic valve stenosis and hypothyroidism presents to the ED with syncope likely vasovagal however will r/o acute ischemic infarct Patient is a 89 year old female with CKD, CLL, liver cirrhosis  A fib (not on AC, given hemorrhagic CVA- told by outpatient neurology she can not even be on aspirin), pulmonary hypertension, aortic valve stenosis and hypothyroidism presents to the ED with syncope likely vasovagal however will r/o acute ischemic infarct.

## 2018-12-19 NOTE — ED PROVIDER NOTE - OBJECTIVE STATEMENT
89F, pmh of pulm HTN, AF, COPD, hemorrhagic stroke presenting after a fall. Patient had unwitnessed fall and was found down for unknown period of time. Patient remembers walking to the bathroom but is unsure why she fell. Denies any changes in vision, chest pain, difficulty breathing, abdominal pain, nausea or vomiting.

## 2018-12-19 NOTE — H&P ADULT - HISTORY OF PRESENT ILLNESS
Patient is a 89 year old female with CKD, CLL, liver cirrhosis  A fib (not on AC, given hemorrhagic CVA- told by outpatient neurology she can not even be on aspirin), pulmonary hypertension, aortic valve stenosis and hypothyroidism presents to the ED with syncope. Patient last spoke to her daughter around 9 pm and was fine. Her other daughter called around 9:30 pm and when the patient did not  the phone she rushed to the patients house where she found her on the floor of the bathroom. Daughter noticed stool in the toilet. Patient was awake when the daughter found her but she does not recall the event. The daughter did not appreciate any tongue biting, shaking, incontinence. The patient was relatively alert. The patient remembers being in the bathroom but does not remember how she felt prior that. Daughter remarks that the patient has been more confused and agitated in the last two weeks. Currently, she denies chest pain, shortness of breath, palpitations, dizziness, lightheadedness.     In the ED, HR 90s, BP 99. Trop negative x 2. Cr 1.26 (baseline 1.00). CT head with right frontal subacute infarct. CT spine, XR knee/chest/pelvis unremarkable. 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 EKG with TWI in lateral lead  (noted on old ekg, slightly deeper today) Patient is a 89 year old female with CKD stage 3, CLL in remission, liver cirrhosis  A fib (not on AC, given hemorrhagic CVA- told by outpatient neurology she can not even be on aspirin), pulmonary hypertension, aortic valve stenosis and hypothyroidism presents to the ED with syncope. Patient last spoke to her daughter around 9 pm and was fine. Her other daughter called around 9:30 pm and when the patient did not  the phone she rushed to the patients house where she found her on the floor of the bathroom. Daughter noticed stool in the toilet. Patient was awake when the daughter found her but she does not recall the event. The daughter did not appreciate any tongue biting, shaking, incontinence. The patient was relatively alert. The patient remembers being in the bathroom but does not remember how she felt prior that. Daughter remarks that the patient has been more confused and agitated in the last two weeks. Currently, she denies chest pain, shortness of breath, palpitations, dizziness, lightheadedness.     In the ED, HR 90s, BP 99. Trop negative x 2. Cr 1.26 (baseline 1.00). CT head with right frontal subacute infarct. CT spine, XR knee/chest/pelvis unremarkable. 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 EKG with TWI in lateral lead  (noted on old ekg, slightly deeper today)

## 2018-12-19 NOTE — ED PROVIDER NOTE - ATTENDING CONTRIBUTION TO CARE
GEN: no acute respiratory distress. nontoxic, speaking comfortably in full sentences  HEENT: abrasion forehead  and nose no active bleeding. face symmetrical. PERRL 4mm, EOMI, normal auditory canal b/l, normal TM b/l. no hemotympanum. nose midline and without discharge,  MMM, oropharynx wnl.  Neck: no JVD, trachea midline, no LAD  CV: irreg RR. +S1S2, no murmur. 2+ pulses in 4 extremities  Chest: CTA B/l. no wheezing, rales, rhonchi. no retractions. good air movement.   ABD: +BS, soft, non distended, non tender. No lesions,   : no cva or suprapubic tenderness  MSK: No clubbing, cyanosis, edema. FROM of all extremities. +large abrasion over anterior left knee  tenderness to palpation. left wrist ttp. no snuff box ttp. FROM. No midline or paraspinal tenderness. no spinal step-offs.  Neuro: GCS15. CN 2-12 intact; Sensation intact, motor 5/5 throughout.   SKIN: see above    89F, pmh of pulm HTN, Afib, COPD,hx hemorrhagic stroke s/p fall. cause unclear pt in afib reg rate. plan: labs, imaging, admit. abrasions not requiring primary closure.

## 2018-12-19 NOTE — ED PROVIDER NOTE - CARE PLAN
Principal Discharge DX:	Fall Principal Discharge DX:	CVA (cerebral vascular accident)  Secondary Diagnosis:	Syncope

## 2018-12-19 NOTE — ED PROVIDER NOTE - PHYSICAL EXAMINATION
General: well appearing female, no acute distress   HEENT: blood on face, skin tear on forehead and bridge of nose, EOMI  REspiratory: no difficulty breathing, no shortness of breath   Cardiac: regular rate and rhythm   Abdomen: soft, non-tender   MSK: no midline spinal tenderness, left wrist tenderness   Neuro: A&Ox3, 5/5 strength in all extremities

## 2018-12-19 NOTE — H&P ADULT - PROBLEM SELECTOR PLAN 1
- patient likely with vasovagal episode given syncope immediately following bowel movement, however given CT head finding will need to r/o acute ischemic stroke  - CT head with subacute right front infarct, will get MRI brain and carotid US; echocardiogram ordered   - troponin negative x 2  - c/w telemetry  - given  cc bolus, however given severe AS will hold off on further IVF  - orthostatics ordered STAT  - Case discussed with neurologist as bedside Dr. Calloway - patient likely with vasovagal episode given syncope immediately following bowel movement, however given CT head finding will need to r/o acute ischemic stroke  - CT head with subacute right front infarct, will get MRI brain and carotid US; echocardiogram ordered   - troponin negative x 2  - c/w telemetry  - given  cc bolus, however given severe AS will hold off on further IVF  - orthostatics ordered STAT  - Case discussed with neurologist as bedside Dr. Calloway  - patients daughter REFUSING aspirin - patient likely with vasovagal episode given syncope immediately following bowel movement, however given CT head finding will need to r/o acute ischemic stroke  - CT head with subacute right front infarct, will get MRI brain, MRA brain and MRA neck; echocardiogram ordered   - troponin negative x 2  - c/w telemetry  - given  cc bolus, however given severe AS will hold off on further IVF  - orthostatics ordered STAT  - Case discussed with neurologist as bedside Dr. Calloway  - patients daughter REFUSING aspirin

## 2018-12-19 NOTE — ED PROVIDER NOTE - MEDICAL DECISION MAKING DETAILS
89F presenting s/p fall. unknown etiology of fall. concern for stroke, acs. plan for cbc, cmp, ct head/neck, xray right wrist. likely admission.

## 2018-12-19 NOTE — CONSULT NOTE ADULT - SUBJECTIVE AND OBJECTIVE BOX
HPI: 89 year old female with pmhx of CVA (hemorraghic CVA), AF not on AC.  who presented to ER after a fall yesterday after BM.  Patient doesn't remember the exact episode.  Daughter found her on the floor next to toilet after BM.  Patient has multiple abrasions on scalp/forehead, left knee.          Review of Systems:  All review of systems negative, except for those marked:  General: Fall with brief LOC.  Denies any tongue biting, occured after BM.  Denies any CP/SOB/N/V.  		     PAST MEDICAL & SURGICAL HISTORY:  PVD (peripheral vascular disease)  Liver cell carcinoma  Severe aortic stenosis by prior echocardiogram  Pulmonary HTN: moderate  CKD (chronic kidney disease), stage 3 (moderate)  COPD (Chronic Obstructive Pulmonary Disease)  AF (Atrial Fibrillation)  Portal Hypertension  Bleeding Esophageal Varices  CLL (Chronic Lymphoblastic Leukemia)  GIB (Gastrointestinal Bleeding)  History of repair of hip fracture  Esophageal Rupture    Past Hospitalizations:  MEDICATIONS  (STANDING):  diphtheria/tetanus/pertussis (acellular) Vaccine (ADAcel) 0.5 milliLiter(s) IntraMuscular once    MEDICATIONS  (PRN):    Allergies    codeine (Rash)  erythromycin (Rash)  iv dye (Rash)  penicillin (Rash)  shellfish (Rash)    Intolerances    adhesives (Other)  warfarin (Other)        FAMILY HISTORY:  No pertinent family history in first degree relatives      Social History  Lives with: family/daughter     Vital Signs Last 24 Hrs  T(C): 36.7 (19 Dec 2018 08:30), Max: 36.8 (19 Dec 2018 06:10)  T(F): 98.1 (19 Dec 2018 08:30), Max: 98.2 (19 Dec 2018 06:10)  HR: 91 (19 Dec 2018 08:30) (83 - 101)  BP: 99/64 (19 Dec 2018 08:30) (99/64 - 108/57)  BP(mean): --  RR: 17 (19 Dec 2018 08:30) (16 - 18)  SpO2: 97% (19 Dec 2018 08:30) (97% - 98%)  Daily     Daily          NEUROLOGIC EXAM  Mental Status:     Oriented to time/place/person; Good eye contact ; follow simple commands ;  Age appropriate   speech fluent  Cranial Nerves:   PERRL, EOMI, no facial asymmetry , V1-V3 intact , symmetric palate, tongue midline.  frontal abrasions.     Muscle Strength:	 moves all 4 extremities with good strength, at least 4/5.  Muscle Tone:	Normal tone     Plantar Response:	Plantar reflexes flexion bilaterally  Sensation:		Intact to pain, light touch,   Coordination/	No dysmetria in finger to nose test bilaterally  Cerebellum	  Tandem Gait/Romberg	Not tested    Lab Results:                        13.5   7.3   )-----------( 109      ( 19 Dec 2018 00:50 )             40.6     12-19    138  |  104  |  51<H>  ----------------------------<  92  5.3   |  21<L>  |  1.26    Ca    9.4      19 Dec 2018 04:56    TPro  6.0  /  Alb  3.3  /  TBili  2.1<H>  /  DBili  x   /  AST  40  /  ALT  24  /  AlkPhos  110  12-19    LIVER FUNCTIONS - ( 19 Dec 2018 00:50 )  Alb: 3.3 g/dL / Pro: 6.0 g/dL / ALK PHOS: 110 U/L / ALT: 24 U/L / AST: 40 U/L / GGT: x                Imaging Studies:    < from: CT Head No Cont (12.18.18 @ 23:41) >  IMPRESSION:     Head: Right frontal infarct of uncertain age likely subacute. Correlate   with onset of symptomatology.    Cervical spine: Multilevel degenerative changes without evidence of a   fracture or traumatic spondylolisthesis.    Call Back:  I discussed this case with Dr. Huerta at 12:54 AM on   12/19/2018.  Hospital policies for call back including read back policy   were followed. The verbal communication call back supplements this   written report.      < end of copied text >

## 2018-12-19 NOTE — H&P ADULT - NSHPPHYSICALEXAM_GEN_ALL_CORE
Vital Signs Last 24 Hrs  T(C): 36.7 (19 Dec 2018 08:30), Max: 36.8 (19 Dec 2018 06:10)  T(F): 98.1 (19 Dec 2018 08:30), Max: 98.2 (19 Dec 2018 06:10)  HR: 91 (19 Dec 2018 08:30) (83 - 101)  BP: 99/64 (19 Dec 2018 08:30) (99/64 - 108/57)  BP(mean): --  RR: 17 (19 Dec 2018 08:30) (16 - 18)  SpO2: 97% (19 Dec 2018 08:30) (97% - 98%)    GENERAL: NAD, frail appearing  HEAD:  Atraumatic, Normocephalic  EYES: EOMI, PERRLA, conjunctiva and sclera clear  ENT: Pharynx not erythematous  PULMONARY: Clear to auscultation bilaterally; No wheeze  CARDIOVASCULAR: Regular rate and rhythm; No murmurs, rubs, or gallops  ABDOMEN: Soft, Nontender, Nondistended; Bowel sounds present  EXTREMITIES:  2+ Peripheral Pulses, No clubbing, cyanosis, or edema  MUSCULOSKELETAL: No calf tenderness  PSYCH: AAOx3, normal affect  NEUROLOGY: CN2-12 grossly intact, no gross focal sensory or motor deficits   SKIN: large abrasion and bruising on forehead, bruising on left knee and left wrist- wrapped

## 2018-12-19 NOTE — ED ADULT NURSE REASSESSMENT NOTE - NS ED NURSE REASSESS COMMENT FT1
Patient is straight cath using sterile technique as per hospital policy as per order. Two RNs at bedside.

## 2018-12-20 ENCOUNTER — TRANSCRIPTION ENCOUNTER (OUTPATIENT)
Age: 83
End: 2018-12-20

## 2018-12-20 DIAGNOSIS — D69.6 THROMBOCYTOPENIA, UNSPECIFIED: ICD-10-CM

## 2018-12-20 DIAGNOSIS — Z29.9 ENCOUNTER FOR PROPHYLACTIC MEASURES, UNSPECIFIED: ICD-10-CM

## 2018-12-20 LAB
ANION GAP SERPL CALC-SCNC: 12 MMOL/L — SIGNIFICANT CHANGE UP (ref 5–17)
BUN SERPL-MCNC: 42 MG/DL — HIGH (ref 7–23)
CALCIUM SERPL-MCNC: 9.8 MG/DL — SIGNIFICANT CHANGE UP (ref 8.4–10.5)
CHLORIDE SERPL-SCNC: 105 MMOL/L — SIGNIFICANT CHANGE UP (ref 96–108)
CHOLEST SERPL-MCNC: 131 MG/DL — SIGNIFICANT CHANGE UP (ref 10–199)
CO2 SERPL-SCNC: 21 MMOL/L — LOW (ref 22–31)
CREAT SERPL-MCNC: 1.17 MG/DL — SIGNIFICANT CHANGE UP (ref 0.5–1.3)
CULTURE RESULTS: SIGNIFICANT CHANGE UP
GLUCOSE SERPL-MCNC: 85 MG/DL — SIGNIFICANT CHANGE UP (ref 70–99)
HDLC SERPL-MCNC: 61 MG/DL — SIGNIFICANT CHANGE UP
LIPID PNL WITH DIRECT LDL SERPL: 60 MG/DL — SIGNIFICANT CHANGE UP
MAGNESIUM SERPL-MCNC: 1.9 MG/DL — SIGNIFICANT CHANGE UP (ref 1.6–2.6)
POTASSIUM SERPL-MCNC: 5 MMOL/L — SIGNIFICANT CHANGE UP (ref 3.5–5.3)
POTASSIUM SERPL-SCNC: 5 MMOL/L — SIGNIFICANT CHANGE UP (ref 3.5–5.3)
SODIUM SERPL-SCNC: 138 MMOL/L — SIGNIFICANT CHANGE UP (ref 135–145)
SPECIMEN SOURCE: SIGNIFICANT CHANGE UP
TOTAL CHOLESTEROL/HDL RATIO MEASUREMENT: 2.1 RATIO — LOW (ref 3.3–7.1)
TRIGL SERPL-MCNC: 49 MG/DL — SIGNIFICANT CHANGE UP (ref 10–149)

## 2018-12-20 PROCEDURE — 99223 1ST HOSP IP/OBS HIGH 75: CPT | Mod: GC

## 2018-12-20 PROCEDURE — 93306 TTE W/DOPPLER COMPLETE: CPT | Mod: 26

## 2018-12-20 PROCEDURE — 99233 SBSQ HOSP IP/OBS HIGH 50: CPT

## 2018-12-20 RX ORDER — LACTULOSE 10 G/15ML
20 SOLUTION ORAL ONCE
Qty: 0 | Refills: 0 | Status: COMPLETED | OUTPATIENT
Start: 2018-12-20 | End: 2018-12-20

## 2018-12-20 RX ORDER — LACTULOSE 10 G/15ML
20 SOLUTION ORAL
Qty: 0 | Refills: 0 | Status: DISCONTINUED | OUTPATIENT
Start: 2018-12-20 | End: 2018-12-27

## 2018-12-20 RX ORDER — BACITRACIN ZINC 500 UNIT/G
1 OINTMENT IN PACKET (EA) TOPICAL DAILY
Qty: 0 | Refills: 0 | Status: DISCONTINUED | OUTPATIENT
Start: 2018-12-20 | End: 2018-12-27

## 2018-12-20 RX ADMIN — LACTULOSE 20 GRAM(S): 10 SOLUTION ORAL at 17:38

## 2018-12-20 RX ADMIN — Medication 10 MILLIGRAM(S): at 05:13

## 2018-12-20 RX ADMIN — HEPARIN SODIUM 5000 UNIT(S): 5000 INJECTION INTRAVENOUS; SUBCUTANEOUS at 12:48

## 2018-12-20 RX ADMIN — Medication 0.12 MILLIGRAM(S): at 09:10

## 2018-12-20 RX ADMIN — Medication 3 MILLILITER(S): at 05:13

## 2018-12-20 RX ADMIN — LACTULOSE 20 GRAM(S): 10 SOLUTION ORAL at 05:13

## 2018-12-20 RX ADMIN — LACTULOSE 20 GRAM(S): 10 SOLUTION ORAL at 15:27

## 2018-12-20 RX ADMIN — Medication 2000 UNIT(S): at 09:09

## 2018-12-20 RX ADMIN — Medication 25 MICROGRAM(S): at 05:13

## 2018-12-20 RX ADMIN — Medication 3 MILLILITER(S): at 17:38

## 2018-12-20 RX ADMIN — SPIRONOLACTONE 25 MILLIGRAM(S): 25 TABLET, FILM COATED ORAL at 05:14

## 2018-12-20 RX ADMIN — LACTULOSE 20 GRAM(S): 10 SOLUTION ORAL at 23:06

## 2018-12-20 RX ADMIN — Medication 1 APPLICATION(S): at 21:48

## 2018-12-20 NOTE — DISCHARGE NOTE ADULT - CARE PROVIDER_API CALL
Fallon Celaya), Critical Care Medicine; Internal Medicine; Pulmonary Disease  [address] [address]  Phone: [phone]  Fax: [phone] Fallon Celaya), Critical Care Medicine; Internal Medicine; Pulmonary Disease  [address] [address]  Phone: [phone]  Fax: [phone] Fallon Celaya), Critical Care Medicine; Internal Medicine; Pulmonary Disease  [address] [facility] [address]  [place] [address]  Phone: [phone]  Fax: [phone]    Daniel Boston), Cardiovascular Disease; Internal Medicine  [address] [facility] [address]  [place] [address]  Phone: [phone]  Fax: [phone]    Parth Hdez), Neurology  [address] [place] [address]  Phone: [phone]  Fax: [phone]

## 2018-12-20 NOTE — DISCHARGE NOTE ADULT - MEDICATION SUMMARY - MEDICATIONS TO TAKE
I will START or STAY ON the medications listed below when I get home from the hospital:    acetaminophen 325 mg oral tablet  -- 2 tab(s) by mouth every 6 hours, As needed, Moderate Pain (4 - 6)  -- Indication: For Pain    propranolol 20 mg oral tablet  -- 0.5 tab(s) by mouth 2 times a day  -- Indication: For Pulmonary HTN    digoxin 125 mcg (0.125 mg) oral tablet  -- 1 tab(s) by mouth every other day (at bedtime)  -- Indication: For Pulmonary HTN    ipratropium-albuterol 0.5 mg-2.5 mg/3 mLinhalation solution  -- 3 milliliter(s) by nebulizer 2 times a day, As Needed  -- Indication: For Chronic obstructive pulmonary disease, unspecified COPD type    bacitracin 500 units/g topical ointment  -- 1 application on skin once a day  -- Indication: For Fall    lactulose 10 g/15 mL oral syrup  -- 30 milliliter(s) by mouth 2-4 times a day, As Needed; titrate to 2-3 soft bowel movements per day  -- Indication: For Cirrhosis of liver without ascites, unspecified hepatic cirrhosis type    rifAXIMin 550 mg oral tablet  -- 1 tab(s) by mouth 2 times a day  -- Indication: For Cirrhosis of liver without ascites, unspecified hepatic cirrhosis type    levothyroxine 25 mcg (0.025 mg) oral capsule  -- 1 cap(s) by mouth once a day  -- Indication: For Hypothyroidism, unspecified type    Multiple Vitamins oral tablet  -- 1 tab(s) by mouth once a day  -- Indication: For Supplement    ascorbic acid 500 mg oral tablet  -- 1 tab(s) by mouth once a day  -- Indication: For Supplement    Vitamin D3 2000 intl units oral tablet  -- 1 tab(s) by mouth once a day  -- Indication: For Supplement

## 2018-12-20 NOTE — DISCHARGE NOTE ADULT - CARE PLAN
Principal Discharge DX:	Syncope and collapse  Secondary Diagnosis:	CVA (cerebral vascular accident)  Secondary Diagnosis:	Cirrhosis of liver without ascites, unspecified hepatic cirrhosis type Principal Discharge DX:	Syncope and collapse  Goal:	Improved symptoms  Assessment and plan of treatment:	HOME CARE INSTRUCTIONS  Have someone stay with you until you feel stable.  Do not drive, operate machinery, or play sports until your caregiver says it is okay.  Keep all follow-up appointments as directed by your caregiver.   Lie down right away if you start feeling like you might faint. Breathe deeply and steadily. Wait until all the symptoms have passed.Drink enough fluids to keep your urine clear or pale yellow.  If you are taking blood pressure or heart medicine, get up slowly, taking several minutes to sit and then stand. This can reduce dizziness.  SEEK IMMEDIATE MEDICAL CARE IF:  You have a severe headache.  You have unusual pain in the chest, abdomen, or back.  You are bleeding from the mouth or rectum, or you have black or tarry stool.  You have an irregular or very fast heartbeat.  You have pain with breathing.  You have repeated fainting or seizure-like jerking during an episode.  You faint when sitting or lying down.  You have confusion.  You have difficulty walking.  You have severe weakness.  You have vision problems.  If you fainted, call your local emergency services. Do not drive yourself to the hospital  Secondary Diagnosis:	CVA (cerebral vascular accident)  Assessment and plan of treatment:	History of TIA, continue medications as ordered. TIA is a small transient stroke . A stroke is a brain attack that occurs when an artery or a blood vessel becomes occluded breaks, interrupting blood flow to an area of the brain cells begin to die. Please call 911 for facial droop slurring speech, unable to move a limb or sudden weakness in one limb or one side of the body or confusion.  Secondary Diagnosis:	Cirrhosis of liver without ascites, unspecified hepatic cirrhosis type  Assessment and plan of treatment:	continue with medication as prescribed by your doctor.  Follow-up with your primary care physician within 1 week. Call for appointment.

## 2018-12-20 NOTE — PROGRESS NOTE ADULT - SUBJECTIVE AND OBJECTIVE BOX
Patient is a 89y old  Female who presents with a chief complaint of syncope (20 Dec 2018 06:08)      SUBJECTIVE / OVERNIGHT EVENTS:    patient seen and examined. feels good. no cp. sob, palpitations or abdominal pain. had URI prior to admission. eating and drinking well. no fevers, chills, night sweats    ROS:  14 point ROS negative in detail except stated as above    MEDICATIONS  (STANDING):  ALBUTerol/ipratropium for Nebulization 3 milliLiter(s) Nebulizer every 12 hours  cholecalciferol 2000 Unit(s) Oral daily  digoxin     Tablet 0.125 milliGRAM(s) Oral every other day  diphtheria/tetanus/pertussis (acellular) Vaccine (ADAcel) 0.5 milliLiter(s) IntraMuscular once  heparin  Injectable 5000 Unit(s) SubCutaneous every 12 hours  lactulose Syrup 20 Gram(s) Oral two times a day  levothyroxine 25 MICROGram(s) Oral daily  propranolol 10 milliGRAM(s) Oral two times a day  rifaximin 550 milliGRAM(s) Oral two times a day  spironolactone 25 milliGRAM(s) Oral daily    MEDICATIONS  (PRN):  acetaminophen   Tablet .. 650 milliGRAM(s) Oral every 6 hours PRN Moderate Pain (4 - 6)      CAPILLARY BLOOD GLUCOSE        I&O's Summary    19 Dec 2018 07:01  -  20 Dec 2018 07:00  --------------------------------------------------------  IN: 100 mL / OUT: 0 mL / NET: 100 mL        PHYSICAL EXAM:  Vital Signs Last 24 Hrs  T(C): 36.4 (20 Dec 2018 04:45), Max: 37 (19 Dec 2018 18:20)  T(F): 97.5 (20 Dec 2018 04:45), Max: 98.6 (19 Dec 2018 18:20)  HR: 79 (20 Dec 2018 04:45) (61 - 79)  BP: 111/61 (20 Dec 2018 04:45) (94/48 - 111/61)  BP(mean): --  RR: 18 (20 Dec 2018 04:45) (17 - 18)  SpO2: 98% (20 Dec 2018 04:45) (93% - 98%)    	GENERAL:  frail   	HEAD:  Atraumatic, Normocephalic  	EYES: EOMI, PERRL, conjunctiva and sclera clear    CHEST: Clear to auscultation bilaterally; No wheezes rales or rhonchyi  	CARDIOVASCULAR: s1 s2 systolic ejection mumur 3/6  	ABDOMEN: Soft, Nontender, Nondistended; Bowel sounds present  	EXTREMITIES:  2+ Peripheral Pulses, No clubbing, cyanosis, or edema venous stasis changes  	PSYCH: AAOx3, normal affect  	NEUROLOGY: CN2-12 grossly intact, no gross focal sensory or motor deficits   SKIN: large abrasion and bruising on forehead, bruising on left knee and left wrist- wrapped    LABS:                        11.4   3.8   )-----------( 74       ( 20 Dec 2018 09:33 )             34.1     12-    138  |  105  |  42<H>  ----------------------------<  85  5.0   |  21<L>  |  1.17    Ca    9.8      20 Dec 2018 09:29  Mg     1.9         TPro  6.0  /  Alb  3.3  /  TBili  2.1<H>  /  DBili  x   /  AST  40  /  ALT  24  /  AlkPhos  110  -          Urinalysis Basic - ( 19 Dec 2018 01:53 )    Color: Yellow / Appearance: Clear / S.020 / pH: x  Gluc: x / Ketone: Negative  / Bili: Negative / Urobili: 3 mg/dL   Blood: x / Protein: Trace / Nitrite: Negative   Leuk Esterase: Negative / RBC: x / WBC x   Sq Epi: x / Non Sq Epi: x / Bacteria: x          Care Discussed with Consultants/Other Providers:  LAYLA Moon Patient is a 89y old  Female who presents with a chief complaint of syncope (20 Dec 2018 06:08)      SUBJECTIVE / OVERNIGHT EVENTS:    patient seen and examined. feels good. no cp. sob, palpitations or abdominal pain. had URI prior to admission. eating and drinking well. no fevers, chills, night sweats    ROS:  14 point ROS negative in detail except stated as above    MEDICATIONS  (STANDING):  ALBUTerol/ipratropium for Nebulization 3 milliLiter(s) Nebulizer every 12 hours  cholecalciferol 2000 Unit(s) Oral daily  digoxin     Tablet 0.125 milliGRAM(s) Oral every other day  diphtheria/tetanus/pertussis (acellular) Vaccine (ADAcel) 0.5 milliLiter(s) IntraMuscular once  heparin  Injectable 5000 Unit(s) SubCutaneous every 12 hours  lactulose Syrup 20 Gram(s) Oral two times a day  levothyroxine 25 MICROGram(s) Oral daily  propranolol 10 milliGRAM(s) Oral two times a day  rifaximin 550 milliGRAM(s) Oral two times a day  spironolactone 25 milliGRAM(s) Oral daily    MEDICATIONS  (PRN):  acetaminophen   Tablet .. 650 milliGRAM(s) Oral every 6 hours PRN Moderate Pain (4 - 6)      CAPILLARY BLOOD GLUCOSE        I&O's Summary    19 Dec 2018 07:01  -  20 Dec 2018 07:00  --------------------------------------------------------  IN: 100 mL / OUT: 0 mL / NET: 100 mL        PHYSICAL EXAM:  Vital Signs Last 24 Hrs  T(C): 36.4 (20 Dec 2018 04:45), Max: 37 (19 Dec 2018 18:20)  T(F): 97.5 (20 Dec 2018 04:45), Max: 98.6 (19 Dec 2018 18:20)  HR: 79 (20 Dec 2018 04:45) (61 - 79)  BP: 111/61 (20 Dec 2018 04:45) (94/48 - 111/61)  BP(mean): --  RR: 18 (20 Dec 2018 04:45) (17 - 18)  SpO2: 98% (20 Dec 2018 04:45) (93% - 98%)    	GENERAL:  frail   	HEAD:  Atraumatic, Normocephalic  	EYES: EOMI, PERRL, conjunctiva and sclera clear    CHEST: Clear to auscultation bilaterally; No wheezes rales or rhonchyi  	CARDIOVASCULAR: s1 s2 systolic ejection mumur 3/6  	ABDOMEN: Soft, Nontender, Nondistended; Bowel sounds present  	EXTREMITIES:  2+ Peripheral Pulses, No clubbing, cyanosis, or edema venous stasis changes  	PSYCH: AAOx3, normal affect  	NEUROLOGY: CN2-12 grossly intact, no gross focal sensory or motor deficits   SKIN: large abrasion and bruising on forehead, bruising on left knee and left wrist- wrapped    LABS:                        11.4   3.8   )-----------( 74       ( 20 Dec 2018 09:33 )             34.1         138  |  105  |  42<H>  ----------------------------<  85  5.0   |  21<L>  |  1.17    Ca    9.8      20 Dec 2018 09:29  Mg     1.9         TPro  6.0  /  Alb  3.3  /  TBili  2.1<H>  /  DBili  x   /  AST  40  /  ALT  24  /  AlkPhos  110            Urinalysis Basic - ( 19 Dec 2018 01:53 )    Color: Yellow / Appearance: Clear / S.020 / pH: x  Gluc: x / Ketone: Negative  / Bili: Negative / Urobili: 3 mg/dL   Blood: x / Protein: Trace / Nitrite: Negative   Leuk Esterase: Negative / RBC: x / WBC x   Sq Epi: x / Non Sq Epi: x / Bacteria: x      Brain MRI:    Magnetic resonance imaging of the brain was carried out with transaxial   SPGR, FLAIR, fast spin echo T2 weighted images, axial susceptibility   weighted series, diffusion weighted series and sagittal T1 weighted   series on a 1.5 [place] magnet.    Comparison is made with the prior CT of 2018.    Ventricular and sulcal prominence is consistent with age-appropriate   involutional change. Small vessel white matter ischemic changes are noted.    The right frontal cortical infarct does not appear restricted on ADC   mapping consistent with a subacute infarct. No infarcts are identified.   Acute hemorrhage is identified. There is a small amount of hemosiderin   staining in the left posterior frontal parietal parasagittal region. Has   been previous bilateral lens replacement surgery. Left maxillary sinus   mucosal thickening is noted.    Sellar and parasellar structures are unremarkable.      MRA head and neck:      Magnetic resonance angiography of the carotid and vertebral circulation   the neck was obtained using 2D time-of-flight technique with an   additional 3D time-of-flight acquisition through the carotid bifurcation.   The intracranial circulation centered the [id number] was evaluated   using 3D time-of-flight technique.       MRA of the head and neck is limited by artifact. No gross narrowing of   the carotid vertebral arteries is identified.      The proximal vertebral arteries are identified. The distal vertebral   arteries are not well seen. The basilar artery is visualized as is the   left posterior cerebral artery. The anterior and middle cerebral arteries   are visualized along the proximal segments. Limited evaluation of the   distal middle cerebral arteries.        IMPRESSION: Subacute right frontal cortical infarct. Atrophy. Small   vessel white matter ischemic changes. Old left posterior frontal parietal   cortical hemosiderin deposition. Limited MRA of the head and neck. For   further evaluation CT angiography may be helpful.        Care Discussed with Consultants/Other Providers:  LAYLA Moon

## 2018-12-20 NOTE — PROGRESS NOTE ADULT - ASSESSMENT
Patient is a 89 year old female with CKD, CLL, liver cirrhosis  A fib (not on AC, given hemorrhagic CVA- told by outpatient neurology she can not even be on aspirin), pulmonary hypertension, aortic valve stenosis and hypothyroidism presents to the ED with syncope likely vasovagal however will r/o acute ischemic infarct.

## 2018-12-20 NOTE — DISCHARGE NOTE ADULT - NS AS DC FOLLOWUP STROKE INST
Smoking Cessation/Influenza vaccination (VIS Pub Date: August 7, 2015)/Stroke (includes: TIA/SAH/ICH/Ischemic Stroke) Influenza vaccination (VIS Pub Date: August 7, 2015)/Smoking Cessation Influenza vaccination (VIS Pub Date: August 7, 2015)

## 2018-12-20 NOTE — DISCHARGE NOTE ADULT - MEDICATION SUMMARY - MEDICATIONS TO STOP TAKING
I will STOP taking the medications listed below when I get home from the hospital:    spironolactone 25 mg oral tablet  -- 1 tab(s) by mouth once a day

## 2018-12-20 NOTE — PROGRESS NOTE ADULT - PROBLEM SELECTOR PLAN 1
- differentials include cardiac from severe aortic stenosis noted on 2d echo from 10/18/ vasovagal following her BM or neurological given subacute infarct noted on MRI  follow up neurology recs  cardiology c/s re: aortic stenosis  repeat 2d echo  - troponin negative x 2  - d/u orthostatics  - patients daughter REFUSING aspirin

## 2018-12-20 NOTE — DISCHARGE NOTE ADULT - SECONDARY DIAGNOSIS.
CVA (cerebral vascular accident) Cirrhosis of liver without ascites, unspecified hepatic cirrhosis type

## 2018-12-20 NOTE — PROGRESS NOTE ADULT - SUBJECTIVE AND OBJECTIVE BOX
HA JACQUES  Patient is a 89y old  Female who presents with a chief complaint of syncope (19 Dec 2018 10:33)    HPI:  Patient is a 89 year old female with CKD stage 3, CLL in remission, liver cirrhosis  A fib (not on AC, given hemorrhagic CVA- told by outpatient neurology she can not even be on aspirin), pulmonary hypertension, aortic valve stenosis and hypothyroidism presents to the ED with syncope. Patient last spoke to her daughter around 9 pm and was fine. Her other daughter called around 9:30 pm and when the patient did not  the phone she rushed to the patients house where she found her on the floor of the bathroom. Daughter noticed stool in the toilet. Patient was awake when the daughter found her but she does not recall the event. The daughter did not appreciate any tongue biting, shaking, incontinence. The patient was relatively alert. The patient remembers being in the bathroom but does not remember how she felt prior that. Daughter remarks that the patient has been more confused and agitated in the last two weeks. Currently, she denies chest pain, shortness of breath, palpitations, dizziness, lightheadedness.     In the ED, HR 90s, BP 99. Trop negative x 2. Cr 1.26 (baseline 1.00). CT head with right frontal subacute infarct. CT spine, XR knee/chest/pelvis unremarkable. 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 EKG with TWI in lateral lead  (noted on old ekg, slightly deeper today) (19 Dec 2018 10:33)    No events overnight. Patient feels well, requesting to go home.    acetaminophen   Tablet .. 650 milliGRAM(s) Oral every 6 hours PRN  ALBUTerol/ipratropium for Nebulization 3 milliLiter(s) Nebulizer every 12 hours  cholecalciferol 2000 Unit(s) Oral daily  digoxin     Tablet 0.125 milliGRAM(s) Oral every other day  diphtheria/tetanus/pertussis (acellular) Vaccine (ADAcel) 0.5 milliLiter(s) IntraMuscular once  heparin  Injectable 5000 Unit(s) SubCutaneous every 12 hours  lactulose Syrup 20 Gram(s) Oral two times a day  levothyroxine 25 MICROGram(s) Oral daily  propranolol 10 milliGRAM(s) Oral two times a day  rifaximin 550 milliGRAM(s) Oral two times a day  spironolactone 25 milliGRAM(s) Oral daily    Vital Signs Last 24 Hrs  T(C): 36.4 (20 Dec 2018 04:45), Max: 37 (19 Dec 2018 18:20)  T(F): 97.5 (20 Dec 2018 04:45), Max: 98.6 (19 Dec 2018 18:20)  HR: 79 (20 Dec 2018 04:45) (61 - 91)  BP: 111/61 (20 Dec 2018 04:45) (94/48 - 111/61)  BP(mean): --  RR: 18 (20 Dec 2018 04:45) (16 - 18)  SpO2: 98% (20 Dec 2018 04:45) (93% - 98%)  Awake, alert, affable and communicative.  EOMI, No facial asymmetry, PERRL.  RODRIGUEZ against gravity, normal tone.  Gait NT.                        13.5   7.3   )-----------( 109      ( 19 Dec 2018 00:50 )             40.6     12-19    138  |  104  |  51<H>  ----------------------------<  92  5.3   |  21<L>  |  1.26    Ca    9.4      19 Dec 2018 04:56    TPro  6.0  /  Alb  3.3  /  TBili  2.1<H>  /  DBili  x   /  AST  40  /  ALT  24  /  AlkPhos  110  12-19      Impression: Syncope.                    Possible CVA  Plan: MRI/MRA done, results pending          PT evaluation

## 2018-12-20 NOTE — DISCHARGE NOTE ADULT - ADDITIONAL INSTRUCTIONS
Follow-up with your primary care physician within 1 week. Call for appointment. Follow-up with your primary care physician within 1 week. Call for appointment.  Please bring all discharge paperwork and list of medications to all follow up appointments  Please call for follow up appoints one day after discharge

## 2018-12-20 NOTE — CONSULT NOTE ADULT - ASSESSMENT
1. Syncope      Circumstances consistent with vasovagal      However has critical aortic stenosis    2. Atrial fibrillation     History of hemorrhagic stroke, reportedly has been told cannot be on aspirin or any anticoagulation     This finding is not supported by MRI of brain or in conformity with recommendations in most cases, will need to explore further.    3. Critical aortic stenosis     Preserved ventricular systolic function.     Preserved renal function.     Patient mentally intact.     Not immediately evident why cannot be considered for TAVR.

## 2018-12-20 NOTE — CONSULT NOTE ADULT - NSHPATTENDINGPLANDISCUSS_GEN_ALL_CORE
To reach Cardiology Attending call during weekdays Spectra [phone] or Fellow [phone].
medical attending.

## 2018-12-20 NOTE — DISCHARGE NOTE ADULT - HOSPITAL COURSE
89 y/o F with valvular disease including severe AS/AR/MR, mild-mod MS, CLL in remission, liver cirrhosis, HCC s/p microwave ablation, afib (not on AC, given hemorrhagic CVA- told by outpatient neurology she can not even be on aspirin), pulmonary hypertension, CKD 3, UTI, and hypothyroidism a/w presumed syncope. Patient found to have subacute infarct in right frontal region, UTI, KARRIE.           Problem/Plan - 1:  ·  Problem: Syncope and collapse.  Plan: - in setting of known severe AS +/- vasovagal.   - TTE on 12/20 with severe to critical AS, severe AR/MR, mild-mod MS, normal LVSF  - troponin negative x 2.      Problem/Plan - 2:  ·  Problem: Cerebrovascular accident (CVA), unspecified mechanism.  Plan: - MRI head showing subacute infarct in right frontal region  - high risk of intracranial bleeding with AC per neuro  - patient's daughter refusing aspirin at this time  - neuro follow up appreciated. EEG can be done as outpatient.      Problem/Plan - 3:  ·  Problem: UTI (urinary tract infection).  Plan: - Ucx + ecoli. complete 3 days of IV Ceftriaxone.      Problem/Plan - 4:  ·  Problem: KARRIE (acute kidney injury).  Plan: - may be in setting of UTI, poor po intake of fluid, hypotension  - complete course of abx and gentle IVF with NS @ 50cc for 500cc  - serum cr improving  - encouraged po intake  - hold aldactone  - SBP in 90's. hold parameter added to propranolol. may need to hold it altogether if SBP remains in 90's.      Problem/Plan - 5:  ·  Problem: Knee pain, left.  Plan: - repeat knee xray without joint effusion  - continue with tylenol.      Problem/Plan - 6:  Problem: Cirrhosis of liver without ascites, unspecified hepatic cirrhosis type. Plan: - c/w propanolol, lactulose, and rifaximin  - aldactone held due to hypotension. no edema b/l in legs so far.     Problem/Plan - 7:  ·  Problem: Atrial fibrillation, unspecified type.  Plan: - rate controlled  - c/w digoxin every other day.   - not on ac due to past hemorrhagic stroke.      Problem/Plan - 8:  ·  Problem: Severe aortic stenosis by prior echocardiogram.  Plan: - severe critical aortic stenosis  - continue to hold aldactone given relative hypotension per discussion with patient's cardiologist (Dr. Boston)  - outpatient follow up.      Problem/Plan - 9:  ·  Problem: Hypothyroidism, unspecified type.  Plan: - c/w synthroid 25 mcg.      Problem/Plan - 10:  Problem: Thrombocytopenia. Plan; - likely in the setting of her cryptogenic cirrhosis and CLL  - continue to monitor.    Attending Attestation:   11. Advance Care Planning:   Discussed with the patient's daughter regarding advanced directives and plan of care on 12/26. Patient's daughter(Julio) is the primary HCP. Patient is DNR/DNI. Given advanced age and multiple comorbid conditions, daughter is open to discussion with palliative care team but not interested in hospice at this time. Patient refusing to go to rehab. Possible d/c home with home PT today after daughter speaks to palliative care team. d/c time 40 mins. 89 y/o F with valvular disease including severe AS/AR/MR, mild-mod MS, CLL in remission, liver cirrhosis, HCC s/p microwave ablation, afib (not on AC, given hemorrhagic CVA- told by outpatient neurology she can not even be on aspirin), pulmonary hypertension, CKD 3, UTI, and hypothyroidism a/w presumed syncope. Patient found to have subacute infarct in right frontal region, UTI, KARRIE.     Problem/Plan - 1:  ·  Problem: Syncope and collapse:   - in setting of known severe AS +/- vasovagal.   - TTE on 12/20 with severe to critical AS, severe AR/MR, mild-mod MS, normal LVSF  - troponin negative x 2.      Problem/Plan - 2:  ·  Problem: Cerebrovascular accident (CVA), unspecified mechanism.    - MRI head showing subacute infarct in right frontal region  - Patient deemed high risk for intracranial bleeding with AC per neuro  - Patient's daughter refusing aspirin at this time  - EEG can be done as outpatient per neuro     Problem/Plan - 3:  ·  Problem: UTI (urinary tract infection):   - Ucx + ecoli. completed 3 days of IV Ceftriaxone.      Problem/Plan - 4:  ·  Problem: KARRIE (acute kidney injury).  Plan:  - thought to be due to UTI, poor po intake of fluid, hypotension  - completed course of abx and gentle IVF with NS with improvement in serum cr  - aldactone held     Problem/Plan - 5:  ·  Problem: Knee pain, left:  - knee xray showed "Patellar enthesophytes at the insertion of the quadriceps tendon. No acute fracture or dislocation. Mild joint space narrowing, most prominent at the medial compartment. Vascular calcifications and chondrocalcinosis are noted."  - improved with tylenol prn     Problem/Plan - 6:  ·  Problem: Severe aortic stenosis:  - severe critical aortic stenosis  - aldactone held given relative hypotension and KARRIE.   - outpatient follow up with patient's cardiologist (Dr. oBston)    Advance Care Planning:   Discussed with the patient's daughter regarding advanced directives and plan of care. Patient's daughter(Julio) is the primary HCP. Patient is DNR/DNI. Given advanced age and multiple comorbid conditions, daughter was open to discussion with palliative care team but not interested in hospice at this time. Patient refusing to go to rehab. Patient was discharged home with home PT.

## 2018-12-20 NOTE — DISCHARGE NOTE ADULT - PLAN OF CARE
Improved symptoms HOME CARE INSTRUCTIONS  Have someone stay with you until you feel stable.  Do not drive, operate machinery, or play sports until your caregiver says it is okay.  Keep all follow-up appointments as directed by your caregiver.   Lie down right away if you start feeling like you might faint. Breathe deeply and steadily. Wait until all the symptoms have passed.Drink enough fluids to keep your urine clear or pale yellow.  If you are taking blood pressure or heart medicine, get up slowly, taking several minutes to sit and then stand. This can reduce dizziness.  SEEK IMMEDIATE MEDICAL CARE IF:  You have a severe headache.  You have unusual pain in the chest, abdomen, or back.  You are bleeding from the mouth or rectum, or you have black or tarry stool.  You have an irregular or very fast heartbeat.  You have pain with breathing.  You have repeated fainting or seizure-like jerking during an episode.  You faint when sitting or lying down.  You have confusion.  You have difficulty walking.  You have severe weakness.  You have vision problems.  If you fainted, call your local emergency services. Do not drive yourself to the hospital History of TIA, continue medications as ordered. TIA is a small transient stroke . A stroke is a brain attack that occurs when an artery or a blood vessel becomes occluded breaks, interrupting blood flow to an area of the brain cells begin to die. Please call 911 for facial droop slurring speech, unable to move a limb or sudden weakness in one limb or one side of the body or confusion. continue with medication as prescribed by your doctor.  Follow-up with your primary care physician within 1 week. Call for appointment.

## 2018-12-20 NOTE — DISCHARGE NOTE ADULT - PATIENT PORTAL LINK FT
You can access the [facility] Portal, offered by [facility], by registering with the following website: [email or web address]

## 2018-12-20 NOTE — CONSULT NOTE ADULT - SUBJECTIVE AND OBJECTIVE BOX
Patient seen and evaluated at bedside    Chief Complaint:    HPI:  Patient is a 89 year old female with CKD stage 3, CLL in remission, liver cirrhosis  A fib (not on AC, given hemorrhagic CVA- told by outpatient neurology she can not even be on aspirin), pulmonary hypertension, aortic valve stenosis and hypothyroidism presents to the ED with syncope. Patient last spoke to her daughter around 9 pm and was fine. Her other daughter called around 9:30 pm and when the patient did not  the phone she rushed to the patients house where she found her on the floor of the bathroom. Daughter noticed stool in the toilet. Patient was awake when the daughter found her but she does not recall the event. The daughter did not appreciate any tongue biting, shaking, incontinence. The patient was relatively alert. The patient remembers being in the bathroom but does not remember how she felt prior that. Daughter remarks that the patient has been more confused and agitated in the last two weeks. Currently, she denies chest pain, shortness of breath, palpitations, dizziness, lightheadedness.     In the ED, HR 90s, BP 99. Trop negative x 2. Cr 1.26 (baseline 1.00). CT head with right frontal subacute infarct. CT spine, XR knee/chest/pelvis unremarkable. 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 650 milliGRAM(s) Oral every 6 hours PRN  ALBUTerol/ipratropium for Nebulization 3 milliLiter(s) Nebulizer every 12 hours  cholecalciferol 2000 Unit(s) Oral daily  digoxin     Tablet 0.125 milliGRAM(s) Oral every other day  diphtheria/tetanus/pertussis (acellular) Vaccine (ADAcel) 0.5 milliLiter(s) IntraMuscular once  heparin  Injectable 5000 Unit(s) SubCutaneous every 12 hours  lactulose Syrup 20 Gram(s) Oral once  lactulose Syrup 20 Gram(s) Oral four times a day  levothyroxine 25 MICROGram(s) Oral daily  propranolol 10 milliGRAM(s) Oral two times a day  rifaximin 550 milliGRAM(s) Oral two times a day  spironolactone 25 milliGRAM(s) Oral daily      FAMILY HISTORY:  Family history of hyperlipidemia (Father)      Social History:  Smoking History:  Alcohol Use:  Drug Use:    REVIEW OF SYSTEMS:  CONSTITUTIONAL: No weakness, fevers or chills  EYES/ENT: No visual changes;  No dysphagia  NECK: No pain or stiffness  RESPIRATORY: No cough, wheezing, hemoptysis; No shortness of breath  CARDIOVASCULAR: No chest pain or palpitations; No lower extremity edema  GASTROINTESTINAL: No abdominal or epigastric pain. No nausea, vomiting, or hematemesis; No diarrhea or constipation. No melena or hematochezia.  BACK: No back pain  GENITOURINARY: No dysuria, frequency or hematuria  NEUROLOGICAL: No numbness or weakness  SKIN: No itching, burning, rashes, or lesions   All other review of systems is negative unless indicated above.    Physical Exam:  T(F): 98.1 (12-20), Max: 98.6 (12-19)  HR: 60 (12-20) (60 - 79)  BP: 104/58 (12-20) (94/48 - 111/61)  RR: 18 (12-20)  SpO2: 96% (12-20)  GENERAL: No acute distress, well-developed  HEAD:  Atraumatic, Normocephalic  ENT: EOMI, PERRLA, conjunctiva and sclera clear, Neck supple, No JVD, moist mucosa  CHEST/LUNG: Clear to auscultation bilaterally; No wheeze, equal breath sounds bilaterally   BACK: No spinal tenderness  HEART: Regular rate and rhythm; No murmurs, rubs, or gallops  ABDOMEN: Soft, Nontender, Nondistended; Bowel sounds present  EXTREMITIES:  No clubbing, cyanosis, or edema  PSYCH: Nl behavior, nl affect  NEUROLOGY: AAOx3, non-focal, cranial nerves intact  SKIN: Normal color, No rashes or lesions  LINES:    Cardiovascular Diagnostic Testing:    ECG: Personally reviewed:    Echo: Personally reviewed:    Stress Testing:    Cath:    Imaging:    CXR: Personally reviewed    Labs: Personally reviewed                        11.4   3.8   )-----------( 74       ( 20 Dec 2018 09:33 )             34.1     12-20    138  |  105  |  42<H>  ----------------------------<  85  5.0   |  21<L>  |  1.17    Ca    9.8      20 Dec 2018 09:29  Mg     1.9     12-20    TPro  6.0  /  Alb  3.3  /  TBili  2.1<H>  /  DBili  x   /  AST  40  /  ALT  24  /  AlkPhos  110  12-19        CARDIAC MARKERS ( 19 Dec 2018 04:56 )  32 ng/L / x     / x     / x     / x     / x      CARDIAC MARKERS ( 19 Dec 2018 00:50 )  33 ng/L / x     / x     / x     / x     / x Patient seen and evaluated at bedside    Chief Complaint:    HPI:  Patient is a 89 year old female with CKD stage 3, CLL in remission, liver cirrhosis  A fib (not on AC, given hemorrhagic CVA- told by outpatient neurology she can not even be on aspirin), pulmonary hypertension, aortic valve stenosis and hypothyroidism presents to the ED with syncope. Patient last spoke to her daughter around 9 pm and was fine. Her other daughter called around 9:30 pm and when the patient did not  the phone she rushed to the patients house where she found her on the floor of the bathroom. Daughter noticed stool in the toilet. Patient was awake when the daughter found her but she does not recall the event. The daughter did not appreciate any tongue biting, shaking, incontinence. The patient was relatively alert. The patient remembers being in the bathroom but does not remember how she felt prior that. Daughter remarks that the patient has been more confused and agitated in the last two weeks. Currently, she denies chest pain, shortness of breath, palpitations, dizziness, lightheadedness.     In the ED, HR 90s, BP 99. Trop negative x 2. Cr 1.26 (baseline 1.00). CT head with right frontal subacute infarct. CT spine, XR knee/chest/pelvis unremarkable. 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 650 milliGRAM(s) Oral every 6 hours PRN  ALBUTerol/ipratropium for Nebulization 3 milliLiter(s) Nebulizer every 12 hours  cholecalciferol 2000 Unit(s) Oral daily  digoxin     Tablet 0.125 milliGRAM(s) Oral every other day  diphtheria/tetanus/pertussis (acellular) Vaccine (ADAcel) 0.5 milliLiter(s) IntraMuscular once  heparin  Injectable 5000 Unit(s) SubCutaneous every 12 hours  lactulose Syrup 20 Gram(s) Oral once  lactulose Syrup 20 Gram(s) Oral four times a day  levothyroxine 25 MICROGram(s) Oral daily  propranolol 10 milliGRAM(s) Oral two times a day  rifaximin 550 milliGRAM(s) Oral two times a day  spironolactone 25 milliGRAM(s) Oral daily      FAMILY HISTORY:  Family history of hyperlipidemia (Father)      Social History:  Smoking History: former smoker  Alcohol Use: none  Drug Use: never    REVIEW OF SYSTEMS:  CONSTITUTIONAL: No weakness, fevers or chills  EYES/ENT: No visual changes;  No dysphagia  NECK: No pain or stiffness  RESPIRATORY: occasional cough, no wheezing, hemoptysis; No shortness of breath  CARDIOVASCULAR: No chest pain or palpitations; No lower extremity edema  GASTROINTESTINAL: No abdominal or epigastric pain. No nausea, vomiting, or hematemesis; No diarrhea or constipation. No melena or hematochezia.  BACK: No back pain  GENITOURINARY: No dysuria, frequency or hematuria  NEUROLOGICAL: No numbness or weakness  SKIN: No itching, burning, rashes, or lesions   All other review of systems is negative unless indicated above.    Physical Exam:  T(F): 98.1 (12-20), Max: 98.6 (12-19)  HR: 60 (12-20) (60 - 79)  BP: 104/58 (12-20) (94/48 - 111/61)  RR: 18 (12-20)  SpO2: 96% (12-20)  GENERAL: No acute distress, well-developed  HEAD:  facial trauma and ecchymosis, Normocephalic  ENT: EOMI, PERRLA, conjunctiva and sclera clear, Neck supple, No JVD, moist mucosa  CHEST/LUNG: Clear to auscultation bilaterally; No wheeze, equal breath sounds bilaterally   BACK: No spinal tenderness  HEART: apex prominent, non-displaced, irregularly irregular rhythm; S1 normal, S2 soft and single, gr iv/vi late peaking systolic murmur base to carotids, gr iii/vi holosystolic murmur apex, without rubs, or gallops  ABDOMEN: Soft, Nontender, Nondistended; Bowel sounds present  EXTREMITIES:  No clubbing, cyanosis, or edema, chronic stasis  PSYCH: Nl behavior, nl affect  NEUROLOGY: AAOx3, non-focal, cranial nerves intact  SKIN: Normal color, No rashes or lesions  LINES:    Cardiovascular Diagnostic Testing:    ECG: < from: 12 Lead ECG (12.19.18 @ 00:30) >  ATRIAL FIBRILLATION  MINIMAL VOLTAGE CRITERIA FOR LVH, ST & T WAVE ABNORMALITY,   ABNORMAL ECG    < end of copied text >      Echo: < from: Transthoracic Echocardiogram (10.22.18 @ 20:27) >  EF (Lee Rule): 57 %Doppler Peak Velocity (m/sec):  AoV=4.61. Mitral annular calcification. Severe mitral  regurgitation.  2. Calcified trileaflet aortic valve with decreased  opening. Peak transaortic valve gradient equals 85 mm Hg,  mean transaortic valve gradient equals 48 mm Hg, estimated  aortic valve area equals 0.5 sqcm (by continuity equation),  aortic valve velocity time integral equals 126 cm,  consistent with severe aortic stenosis. Severe aortic  regurgitation.  3. Severe left atrial enlargement.  LA volume index = 53  cc/m2.  4. Moderate concentric left ventricular hypertrophy.  5. Normal left ventricular systolic function.  6. Severe right atrial enlargement.  7. Right ventricle midly dilated with mildly reduced  systolic function.  8. Normal tricuspid valve. Mild tricuspid regurgitation.  9. Normal pulmonic valve. Minimal pulmonic regurgitation.  Estimated pulmonary artery systolic pressure equals 49  mm  Hg, assuming right atrial pressure equals 8 mm Hg,  consistent with mild pulmonary pressures.    < end of copied text >    Imaging:    CXR: < from: Xray Chest 1 View- PORTABLE-Urgent (12.19.18 @ 01:20) >  The lungs are clear.  There are no pleural effusions or pneumothorax.  The cardiomediastinal silhouette is enlarged.    < end of copied text >      Labs:                       11.4   3.8   )-----------( 74       ( 20 Dec 2018 09:33 )             34.1     12-20    138  |  105  |  42<H>  ----------------------------<  85  5.0   |  21<L>  |  1.17    Ca    9.8      20 Dec 2018 09:29  Mg     1.9     12-20    TPro  6.0  /  Alb  3.3  /  TBili  2.1<H>  /  DBili  x   /  AST  40  /  ALT  24  /  AlkPhos  110  12-19        CARDIAC MARKERS ( 19 Dec 2018 04:56 )  32 ng/L / x     / x     / x     / x     / x      CARDIAC MARKERS ( 19 Dec 2018 00:50 )  33 ng/L / x     / x     / x     / x     / x

## 2018-12-20 NOTE — DISCHARGE NOTE ADULT - CARE PROVIDERS DIRECT ADDRESSES
,[email or web address]

## 2018-12-21 LAB
ANION GAP SERPL CALC-SCNC: 10 MMOL/L — SIGNIFICANT CHANGE UP (ref 5–17)
BUN SERPL-MCNC: 42 MG/DL — HIGH (ref 7–23)
CALCIUM SERPL-MCNC: 9.8 MG/DL — SIGNIFICANT CHANGE UP (ref 8.4–10.5)
CHLORIDE SERPL-SCNC: 104 MMOL/L — SIGNIFICANT CHANGE UP (ref 96–108)
CO2 SERPL-SCNC: 25 MMOL/L — SIGNIFICANT CHANGE UP (ref 22–31)
CREAT SERPL-MCNC: 1.33 MG/DL — HIGH (ref 0.5–1.3)
GLUCOSE SERPL-MCNC: 89 MG/DL — SIGNIFICANT CHANGE UP (ref 70–99)
HCT VFR BLD CALC: 31.4 % — LOW (ref 34.5–45)
HGB BLD-MCNC: 10.3 G/DL — LOW (ref 11.5–15.5)
MCHC RBC-ENTMCNC: 32.8 GM/DL — SIGNIFICANT CHANGE UP (ref 32–36)
MCHC RBC-ENTMCNC: 33.9 PG — SIGNIFICANT CHANGE UP (ref 27–34)
MCV RBC AUTO: 103.3 FL — HIGH (ref 80–100)
PLATELET # BLD AUTO: 85 K/UL — LOW (ref 150–400)
POTASSIUM SERPL-MCNC: 4.8 MMOL/L — SIGNIFICANT CHANGE UP (ref 3.5–5.3)
POTASSIUM SERPL-SCNC: 4.8 MMOL/L — SIGNIFICANT CHANGE UP (ref 3.5–5.3)
RBC # BLD: 3.04 M/UL — LOW (ref 3.8–5.2)
RBC # FLD: 15.6 % — HIGH (ref 10.3–14.5)
SODIUM SERPL-SCNC: 139 MMOL/L — SIGNIFICANT CHANGE UP (ref 135–145)
WBC # BLD: 3.8 K/UL — SIGNIFICANT CHANGE UP (ref 3.8–10.5)
WBC # FLD AUTO: 3.8 K/UL — SIGNIFICANT CHANGE UP (ref 3.8–10.5)

## 2018-12-21 PROCEDURE — 99233 SBSQ HOSP IP/OBS HIGH 50: CPT

## 2018-12-21 PROCEDURE — 99233 SBSQ HOSP IP/OBS HIGH 50: CPT | Mod: GC

## 2018-12-21 RX ADMIN — Medication 650 MILLIGRAM(S): at 15:31

## 2018-12-21 RX ADMIN — Medication 1 APPLICATION(S): at 13:06

## 2018-12-21 RX ADMIN — Medication 0.12 MILLIGRAM(S): at 13:05

## 2018-12-21 RX ADMIN — Medication 2000 UNIT(S): at 13:06

## 2018-12-21 RX ADMIN — Medication 25 MICROGRAM(S): at 06:19

## 2018-12-21 RX ADMIN — Medication 10 MILLIGRAM(S): at 17:30

## 2018-12-21 RX ADMIN — Medication 3 MILLILITER(S): at 17:30

## 2018-12-21 RX ADMIN — LACTULOSE 20 GRAM(S): 10 SOLUTION ORAL at 17:30

## 2018-12-21 RX ADMIN — SPIRONOLACTONE 25 MILLIGRAM(S): 25 TABLET, FILM COATED ORAL at 06:19

## 2018-12-21 RX ADMIN — LACTULOSE 20 GRAM(S): 10 SOLUTION ORAL at 06:19

## 2018-12-21 RX ADMIN — LACTULOSE 20 GRAM(S): 10 SOLUTION ORAL at 23:16

## 2018-12-21 RX ADMIN — Medication 10 MILLIGRAM(S): at 06:19

## 2018-12-21 RX ADMIN — Medication 650 MILLIGRAM(S): at 15:01

## 2018-12-21 RX ADMIN — Medication 3 MILLILITER(S): at 06:19

## 2018-12-21 NOTE — PROGRESS NOTE ADULT - ATTENDING COMMENTS
89 year old female with hx of afib not on anticoagulation admitted for syncope.     Subacute stroke   -pt with hx of afib   -MRI brain reviewed     per MRI from 2/2016 there is a concern that she may have amyloid vs. hypertensive angiopathy.  A comparison read between MRI done on this admission and from 2016 would be of use.    -TTE completed  -LDL 60, check HbA1C   -PT/OT     Syncope   -recommend VEEG   -MRI brain and MRA reviewed   -fall precaution    Plan d/c with Sarai

## 2018-12-21 NOTE — PROGRESS NOTE ADULT - PROBLEM SELECTOR PLAN 1
- differentials include cardiac from severe aortic stenosis noted on 2d echo from 10/18 and essentially the same on repeat 2d echo 12/20 with severe as and severe MR  likely vasovagal however following BM  cardiology c/s ? TAVR will follow up  repeat 2d echo- troponin negative x 2  - patients daughter REFUSING aspirin at this time  MRI head showing subacute infarct- neuro f/u  patients daughter endorsing 3 weeks ago patient did have some neuro sxs of aphasia/left leg weakness which resolved, however doubt this is what precipitated this hospitalization

## 2018-12-21 NOTE — PHYSICAL THERAPY INITIAL EVALUATION ADULT - PRECAUTIONS/LIMITATIONS, REHAB EVAL
Daughter noticed stool in the toilet. Patient was awake when the daughter found her but she does not recall the event. The daughter did not appreciate any tongue biting, shaking, incontinence.  The patient was relatively alert. The patient remembers being in the bathroom but does not remember how she felt prior that. Daughter remarks that the patient has been more confused and agitated in the last two weeks.  CT head with right frontal subacute infarct. CT spine, XR knee/chest/pelvis unremarkable. 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

## 2018-12-21 NOTE — PROGRESS NOTE ADULT - SUBJECTIVE AND OBJECTIVE BOX
Patient is a 89y old  Female who presents with a chief complaint of syncope (21 Dec 2018 10:17).    Pt seen and examined at bedside.  No new complaints.     PHYSICAL EXAM:  Awake and alert, oriented to place, month and year, able to name the president   CN: grossly II-XII intact   Motor: moving all extremities symmetrically except LLE due to pain in the left knee   Sensation: intact to light touch IAE   Coordination: FNF intact   Gait: deferred       Vital Signs Last 24 Hrs  T(C): 36.8 (21 Dec 2018 04:27), Max: 37.1 (20 Dec 2018 20:31)  T(F): 98.3 (21 Dec 2018 04:27), Max: 98.7 (20 Dec 2018 20:31)  HR: 71 (21 Dec 2018 06:09) (66 - 86)  BP: 106/60 (21 Dec 2018 06:09) (95/49 - 106/60)  BP(mean): --  RR: 18 (21 Dec 2018 04:27) (18 - 18)  SpO2: 96% (21 Dec 2018 04:27) (96% - 97%)    MEDICATIONS  (STANDING):  ALBUTerol/ipratropium for Nebulization 3 milliLiter(s) Nebulizer every 12 hours  BACItracin   Ointment 1 Application(s) Topical daily  cholecalciferol 2000 Unit(s) Oral daily  digoxin     Tablet 0.125 milliGRAM(s) Oral every other day  diphtheria/tetanus/pertussis (acellular) Vaccine (ADAcel) 0.5 milliLiter(s) IntraMuscular once  heparin  Injectable 5000 Unit(s) SubCutaneous every 12 hours  lactulose Syrup 20 Gram(s) Oral four times a day  levothyroxine 25 MICROGram(s) Oral daily  propranolol 10 milliGRAM(s) Oral two times a day  rifaximin 550 milliGRAM(s) Oral two times a day  spironolactone 25 milliGRAM(s) Oral daily    MEDICATIONS  (PRN):  acetaminophen   Tablet .. 650 milliGRAM(s) Oral every 6 hours PRN Moderate Pain (4 - 6)      Images:   < from: MR Head No Cont (12.19.18 @ 19:21) >    IMPRESSION: Subacute right frontal cortical infarct. Atrophy. Small   vessel white matter ischemic changes. Old left posterior frontal parietal   cortical hemosiderin deposition. Limited MRA of the head and neck. For   further evaluation CTangiography may be helpful.    < end of copied text >

## 2018-12-21 NOTE — PHYSICAL THERAPY INITIAL EVALUATION ADULT - ADDITIONAL COMMENTS
Pt lives in a split level house (7+7) with dtr. Pt has a HHA M-F for 11hrs and the weekend 9hrs. The remaining hours, pt's dtr provides assistance. Pt amb with RW. Pt has RW, rollator, wheelchair.

## 2018-12-21 NOTE — PROGRESS NOTE ADULT - PROBLEM SELECTOR PLAN 4
- rate controlled  - c/w digoxin every other day (next dose tomorrow)  - not on ac due to past hemorrhagic stroke

## 2018-12-21 NOTE — PHYSICAL THERAPY INITIAL EVALUATION ADULT - PLANNED THERAPY INTERVENTIONS, PT EVAL
stair negotiation: GOAL: Pt will be able to negotiate 10 steps +HR independently with reciprocal pattern in 2 weeks./transfer training/bed mobility training/gait training/strengthening

## 2018-12-21 NOTE — PROGRESS NOTE ADULT - ASSESSMENT
Patient is a 89 year old female with severe AS, CLL, liver cirrhosis  A fib (not on AC, given hemorrhagic CVA- told by outpatient neurology she can not even be on aspirin), pulmonary hypertension, aortic valve stenosis and hypothyroidism presents to the ED with syncope likely vasovagal h Patient is a 89 year old female with severe AS, CLL, liver cirrhosis  A fib (not on AC, given hemorrhagic CVA- told by outpatient neurology she can not even be on aspirin), pulmonary hypertension, aortic valve stenosis and hypothyroidism presents to the ED with syncope likely vasovagal

## 2018-12-21 NOTE — PROGRESS NOTE ADULT - ASSESSMENT
89 year old female with CKD stage 3, CLL in remission, liver cirrhosis  A fib (not on AC, given hemorrhagic CVA- told by outpatient neurology she can not even be on aspirin), pulmonary hypertension, aortic valve stenosis and hypothyroidism presents to the ED with syncope.    1. Syncope      Circumstances consistent with vasovagal      However has critical aortic stenosis    2. Atrial fibrillation     History of hemorrhagic stroke, reportedly has been told cannot be on aspirin or any anticoagulation     This finding is not supported by MRI of brain or in conformity with recommendations in most cases, will need to explore further.    3. Critical aortic stenosis     Preserved ventricular systolic function.     Preserved renal function.     Patient mentally intact.     Not immediately evident why cannot be considered for TAVR.

## 2018-12-21 NOTE — PROGRESS NOTE ADULT - SUBJECTIVE AND OBJECTIVE BOX
Patient seen and examined at bedside.    Overnight Events:   No overnight events    REVIEW OF SYSTEMS:  CONSTITUTIONAL: No weakness, fevers or chills  EYES/ENT: No visual changes;  No dysphagia  NECK: No pain or stiffness  RESPIRATORY: No cough, wheezing, hemoptysis; No shortness of breath  CARDIOVASCULAR: No chest pain or palpitations; No lower extremity edema  GASTROINTESTINAL: No abdominal or epigastric pain. No nausea, vomiting, or hematemesis; No diarrhea or constipation. No melena or hematochezia.  BACK: No back pain  GENITOURINARY: No dysuria, frequency or hematuria  NEUROLOGICAL: No numbness or weakness  SKIN: No itching, burning, rashes, or lesions   All other review of systems is negative unless indicated above.            Current Meds:  acetaminophen   Tablet .. 650 milliGRAM(s) Oral every 6 hours PRN  ALBUTerol/ipratropium for Nebulization 3 milliLiter(s) Nebulizer every 12 hours  BACItracin   Ointment 1 Application(s) Topical daily  cholecalciferol 2000 Unit(s) Oral daily  digoxin     Tablet 0.125 milliGRAM(s) Oral every other day  diphtheria/tetanus/pertussis (acellular) Vaccine (ADAcel) 0.5 milliLiter(s) IntraMuscular once  heparin  Injectable 5000 Unit(s) SubCutaneous every 12 hours  lactulose Syrup 20 Gram(s) Oral four times a day  levothyroxine 25 MICROGram(s) Oral daily  propranolol 10 milliGRAM(s) Oral two times a day  rifaximin 550 milliGRAM(s) Oral two times a day  spironolactone 25 milliGRAM(s) Oral daily      Vitals:  T(F): 98.3 (12-), Max: 98.7 (-20)  HR: 71 (-) (60 - 86)  BP: 106/60 (12-) (95/49 - 106/60)  RR: 18 (-)  SpO2: 96% (-)  I&O's Summary    20 Dec 2018 07:01  -  21 Dec 2018 07:00  --------------------------------------------------------  IN: 360 mL / OUT: 0 mL / NET: 360 mL        Physical Exam:  GENERAL: No acute distress, well-developed  HEAD:  facial trauma and ecchymosis, Normocephalic  ENT: EOMI, PERRLA, conjunctiva and sclera clear, Neck supple, No JVD, moist mucosa  CHEST/LUNG: Clear to auscultation bilaterally; No wheeze, equal breath sounds bilaterally   BACK: No spinal tenderness  HEART: apex prominent, non-displaced, irregularly irregular rhythm; S1 normal, S2 soft and single, gr iv/vi late peaking systolic murmur base to carotids, gr iii/vi holosystolic murmur apex, without rubs, or gallops  ABDOMEN: Soft, Nontender, Nondistended; Bowel sounds present  EXTREMITIES:  No clubbing, cyanosis, or edema, chronic stasis  PSYCH: Nl behavior, nl affect  NEUROLOGY: AAOx3, non-focal, cranial nerves intact  SKIN: Normal color, No rashes or lesions                          11.4   3.8   )-----------( 74       ( 20 Dec 2018 09:33 )             34.1     12-    139  |  104  |  42<H>  ----------------------------<  89  4.8   |  25  |  1.33<H>    Ca    9.8      21 Dec 2018 06:08  Mg     1.9     12-20    Total Cholesterol: 131  LDL: 60  HDL: 61  T

## 2018-12-21 NOTE — PROGRESS NOTE ADULT - ATTENDING COMMENTS
89 year old woman with critical AS, chronic atrial fibrillation, history of hemorrhagic stroke. There is no specific cardiac management at this time, but have reviewed history and anticoagulation seems indicated for atrial fibrillation with high thromboembolic risk. The remote hemorrhagic stroke would usually not be a contraindication and requesting neurology to address. Also history of cirrhosis and in absence of bleeding varices should not be a contraindication. Would favor starting apixaban 2.5 mg BID once these questions answered and discharge when ready with follow up with Dr. Boston to reconsider TAVR evaluation.

## 2018-12-21 NOTE — PHYSICAL THERAPY INITIAL EVALUATION ADULT - GAIT DEVIATIONS NOTED, PT EVAL
decreased velocity of limb motion/decreased weight-shifting ability/decreased step length/decreased ashley

## 2018-12-21 NOTE — PHYSICAL THERAPY INITIAL EVALUATION ADULT - PERTINENT HX OF CURRENT PROBLEM, REHAB EVAL
Pt is a 89 y/o female admitted to [facility] on 12/19/18 presents to the ED with syncope. Patient last spoke to her daughter around 9 pm and was fine. Her other daughter called around 9:30 pm and when the patient did not  the phone she rushed to the patients house where she found her on the floor of the bathroom.

## 2018-12-21 NOTE — PROGRESS NOTE ADULT - SUBJECTIVE AND OBJECTIVE BOX
Patient is a 89y old  Female who presents with a chief complaint of syncope (21 Dec 2018 08:00)      SUBJECTIVE / OVERNIGHT EVENTS:    patient seen and examined. feels ok, no cp, sob, palpitations.    ROS:  14 point ROS negative in detail except stated as above    MEDICATIONS  (STANDING):  ALBUTerol/ipratropium for Nebulization 3 milliLiter(s) Nebulizer every 12 hours  BACItracin   Ointment 1 Application(s) Topical daily  cholecalciferol 2000 Unit(s) Oral daily  digoxin     Tablet 0.125 milliGRAM(s) Oral every other day  diphtheria/tetanus/pertussis (acellular) Vaccine (ADAcel) 0.5 milliLiter(s) IntraMuscular once  heparin  Injectable 5000 Unit(s) SubCutaneous every 12 hours  lactulose Syrup 20 Gram(s) Oral four times a day  levothyroxine 25 MICROGram(s) Oral daily  propranolol 10 milliGRAM(s) Oral two times a day  rifaximin 550 milliGRAM(s) Oral two times a day  spironolactone 25 milliGRAM(s) Oral daily    MEDICATIONS  (PRN):  acetaminophen   Tablet .. 650 milliGRAM(s) Oral every 6 hours PRN Moderate Pain (4 - 6)      CAPILLARY BLOOD GLUCOSE        I&O's Summary    20 Dec 2018 07:01  -  21 Dec 2018 07:00  --------------------------------------------------------  IN: 360 mL / OUT: 0 mL / NET: 360 mL        PHYSICAL EXAM:  Vital Signs Last 24 Hrs  T(C): 36.8 (21 Dec 2018 04:27), Max: 37.1 (20 Dec 2018 20:31)  T(F): 98.3 (21 Dec 2018 04:27), Max: 98.7 (20 Dec 2018 20:31)  HR: 71 (21 Dec 2018 06:09) (60 - 86)  BP: 106/60 (21 Dec 2018 06:09) (95/49 - 106/60)  BP(mean): --  RR: 18 (21 Dec 2018 04:27) (18 - 18)  SpO2: 96% (21 Dec 2018 04:27) (96% - 97%)      	GENERAL:  frail   	EYES: EOMI, PERRL, conjunctiva and sclera clear    CHEST: Clear to auscultation bilaterally; No wheezes rales or rhonchyi  	CARDIOVASCULAR: s1 s2 systolic ejection mumur 3/6  	ABDOMEN: Soft, Nontender, Nondistended; Bowel sounds present  	EXTREMITIES:  2+ Peripheral Pulses, No clubbing, cyanosis, or edema venous stasis changes  	PSYCH: AAOx2  	NEUROLOGY: CN2-12 grossly intact, no gross focal sensory or motor deficits    SKIN: large abrasion and bruising on forehead, bruising on left knee and left wrist- wrapped    LABS:                        11.4   3.8   )-----------( 74       ( 20 Dec 2018 09:33 )             34.1     12-21    139  |  104  |  42<H>  ----------------------------<  89  4.8   |  25  |  1.33<H>    Ca    9.8      21 Dec 2018 06:08  Mg     1.9     12-20                  Consultant(s) Notes Reviewed:    cardiology  Care Discussed with Consultants/Other Providers:  LAYLA Moon

## 2018-12-22 LAB
ANION GAP SERPL CALC-SCNC: 11 MMOL/L — SIGNIFICANT CHANGE UP (ref 5–17)
BUN SERPL-MCNC: 41 MG/DL — HIGH (ref 7–23)
CALCIUM SERPL-MCNC: 10.1 MG/DL — SIGNIFICANT CHANGE UP (ref 8.4–10.5)
CHLORIDE SERPL-SCNC: 104 MMOL/L — SIGNIFICANT CHANGE UP (ref 96–108)
CO2 SERPL-SCNC: 22 MMOL/L — SIGNIFICANT CHANGE UP (ref 22–31)
CREAT SERPL-MCNC: 1.18 MG/DL — SIGNIFICANT CHANGE UP (ref 0.5–1.3)
GLUCOSE SERPL-MCNC: 96 MG/DL — SIGNIFICANT CHANGE UP (ref 70–99)
HBA1C BLD-MCNC: 4.7 % — SIGNIFICANT CHANGE UP (ref 4–5.6)
MAGNESIUM SERPL-MCNC: 1.7 MG/DL — SIGNIFICANT CHANGE UP (ref 1.6–2.6)
POTASSIUM SERPL-MCNC: 4.6 MMOL/L — SIGNIFICANT CHANGE UP (ref 3.5–5.3)
POTASSIUM SERPL-SCNC: 4.6 MMOL/L — SIGNIFICANT CHANGE UP (ref 3.5–5.3)
SODIUM SERPL-SCNC: 137 MMOL/L — SIGNIFICANT CHANGE UP (ref 135–145)

## 2018-12-22 PROCEDURE — 99233 SBSQ HOSP IP/OBS HIGH 50: CPT

## 2018-12-22 RX ADMIN — Medication 10 MILLIGRAM(S): at 05:16

## 2018-12-22 RX ADMIN — Medication 3 MILLILITER(S): at 05:16

## 2018-12-22 RX ADMIN — LACTULOSE 20 GRAM(S): 10 SOLUTION ORAL at 05:15

## 2018-12-22 RX ADMIN — Medication 1 APPLICATION(S): at 12:44

## 2018-12-22 RX ADMIN — Medication 3 MILLILITER(S): at 17:41

## 2018-12-22 RX ADMIN — LACTULOSE 20 GRAM(S): 10 SOLUTION ORAL at 23:13

## 2018-12-22 RX ADMIN — SPIRONOLACTONE 25 MILLIGRAM(S): 25 TABLET, FILM COATED ORAL at 05:15

## 2018-12-22 RX ADMIN — Medication 2000 UNIT(S): at 12:45

## 2018-12-22 RX ADMIN — LACTULOSE 20 GRAM(S): 10 SOLUTION ORAL at 17:42

## 2018-12-22 RX ADMIN — HEPARIN SODIUM 5000 UNIT(S): 5000 INJECTION INTRAVENOUS; SUBCUTANEOUS at 21:18

## 2018-12-22 RX ADMIN — Medication 10 MILLIGRAM(S): at 17:42

## 2018-12-22 RX ADMIN — Medication 25 MICROGRAM(S): at 05:16

## 2018-12-22 NOTE — PROGRESS NOTE ADULT - ASSESSMENT
Ms. Bustamante is a 89 year old female with severe AS, CLL, liver cirrhosis  A fib (not on AC, given hemorrhagic CVA- told by outpatient neurology she can not even be on aspirin), pulmonary hypertension, aortic valve stenosis and hypothyroidism presents to the ED with syncope likely vasovagal

## 2018-12-22 NOTE — PROGRESS NOTE ADULT - SUBJECTIVE AND OBJECTIVE BOX
Patient is a 89y old  Female who presents with a chief complaint of syncope (21 Dec 2018 12:34)    Pt seen and examined at bedside.  No new complaints.     PHYSICAL EXAM:  Awake and alert, oriented to place, month and year, able to name the president   CN: grossly II-XII intact   Motor: moving all extremities symmetrically except limited LLE movement due to pain in the left knee   Sensation: intact to light touch IAE   Coordination: FNF intact   Gait: deferred     Vital Signs Last 24 Hrs  T(C): 36.7 (22 Dec 2018 04:06), Max: 37.1 (21 Dec 2018 14:35)  T(F): 98.1 (22 Dec 2018 04:06), Max: 98.7 (21 Dec 2018 14:35)  HR: 62 (22 Dec 2018 04:06) (62 - 69)  BP: 97/60 (22 Dec 2018 04:06) (93/56 - 109/58)  BP(mean): --  RR: 18 (22 Dec 2018 04:06) (18 - 18)  SpO2: 98% (22 Dec 2018 04:06) (97% - 98%)  MEDICATIONS  (STANDING):  ALBUTerol/ipratropium for Nebulization 3 milliLiter(s) Nebulizer every 12 hours  BACItracin   Ointment 1 Application(s) Topical daily  cholecalciferol 2000 Unit(s) Oral daily  digoxin     Tablet 0.125 milliGRAM(s) Oral every other day  diphtheria/tetanus/pertussis (acellular) Vaccine (ADAcel) 0.5 milliLiter(s) IntraMuscular once  heparin  Injectable 5000 Unit(s) SubCutaneous every 12 hours  lactulose Syrup 20 Gram(s) Oral four times a day  levothyroxine 25 MICROGram(s) Oral daily  propranolol 10 milliGRAM(s) Oral two times a day  rifaximin 550 milliGRAM(s) Oral two times a day  spironolactone 25 milliGRAM(s) Oral daily    MEDICATIONS  (PRN):  acetaminophen   Tablet .. 650 milliGRAM(s) Oral every 6 hours PRN Moderate Pain (4 - 6)    < from: MR Angio Head No Cont (12.19.18 @ 19:31) >    *** ADDENDUM 12/21/2018  ***    Typographical error in the body of the original report which should say   that there is NO acute hemorrhage identified      *** END OF ADDENDUM 12/21/2018  ***      *** ADDENDUM 12/21/2018  ***    Comparison is made with the prior MRI of 2/9/2016. The right frontal   cortical infarct which appears subacute is new in the interval since the   prior exam.     Mild age-appropriate involutional changes have progressed since the prior   exam. Old foci of hemosiderin deposition are unchanged.      *** END OF ADDENDUM 12/21/2018  ***    < end of copied text >  < from: MR Angio Head No Cont (12.19.18 @ 19:31) >  IMPRESSION: Subacute right frontal cortical infarct. Atrophy. Small   vessel white matter ischemic changes. Old left posterior frontal parietal   cortical hemosiderin deposition. Limited MRA of the head and neck. For   further evaluation CT angiography may be helpful.          ***Please see the addendum at the top of this report. It may contain   additional important information or changes.****    < end of copied text >

## 2018-12-22 NOTE — CONSULT NOTE ADULT - SUBJECTIVE AND OBJECTIVE BOX
HA [id number] is a 89y old  Female who presents with a chief complaint of syncope (22 Dec 2018 10:08)      HPI: 89 y/o female with past medical history of CKD3, CLL, Liver cirrhosis, Atrial fibrillation not on AC, Pulmonary HTN, prior hemorrhagic CVA, Aortic Valve stenosis, Hypothyroidism initially presented to the ED with syncope on 12/19 likely secondary to vasovagal syncope in the setting of having a bowel movement. CT head demonstrated subacute stroke in Right frontal region. MRI brain demonstrated Subacute right frontal cortical infarct. Atrophy. Small vessel white matter ischemic changes. Old left posterior frontal parietal cortical hemosiderin deposition. MRA demonstrated no occlusions but limited study.   Neurology consulted for initiation of anticoagulation/antiplatelet as Cardiology is suggesting starting Eliquis 2.5mg BID for critical aortic stenosis while patient decided on TAVR.     MEDICATIONS  (STANDING):  ALBUTerol/ipratropium for Nebulization 3 milliLiter(s) Nebulizer every 12 hours  BACItracin   Ointment 1 Application(s) Topical daily  cholecalciferol 2000 Unit(s) Oral daily  digoxin     Tablet 0.125 milliGRAM(s) Oral every other day  diphtheria/tetanus/pertussis (acellular) Vaccine (ADAcel) 0.5 milliLiter(s) IntraMuscular once  heparin  Injectable 5000 Unit(s) SubCutaneous every 12 hours  lactulose Syrup 20 Gram(s) Oral four times a day  levothyroxine 25 MICROGram(s) Oral daily  propranolol 10 milliGRAM(s) Oral two times a day  rifaximin 550 milliGRAM(s) Oral two times a day  spironolactone 25 milliGRAM(s) Oral daily    MEDICATIONS  (PRN):  acetaminophen   Tablet .. 650 milliGRAM(s) Oral every 6 hours PRN Moderate Pain (4 - 6)    PAST MEDICAL & SURGICAL HISTORY:  PVD (peripheral vascular disease)  Liver cell carcinoma  Severe aortic stenosis by prior echocardiogram  Pulmonary HTN: moderate  CKD (chronic kidney disease), stage 3 (moderate)  COPD (Chronic Obstructive Pulmonary Disease)  AF (Atrial Fibrillation)  Portal Hypertension  Bleeding Esophageal Varices  CLL (Chronic Lymphoblastic Leukemia)  GIB (Gastrointestinal Bleeding)  History of repair of hip fracture  Esophageal Rupture    FAMILY HISTORY:  Family history of hyperlipidemia (Father)    Allergies    codeine (Rash)  erythromycin (Rash)  iv dye (Rash)  penicillin (Rash)  shellfish (Rash)    Intolerances    adhesives (Other)  warfarin (Other)      SHx - No smoking, No ETOH, No drug abuse      Review of Systems:  As per HPI, otherwise negative.     Vital Signs Last 24 Hrs  T(C): 36.7 (22 Dec 2018 11:59), Max: 37.1 (21 Dec 2018 14:35)  T(F): 98.1 (22 Dec 2018 11:59), Max: 98.7 (21 Dec 2018 14:35)  HR: 80 (22 Dec 2018 11:59) (62 - 80)  BP: 104/57 (22 Dec 2018 11:59) (93/56 - 109/58)  BP(mean): --  RR: 18 (22 Dec 2018 11:59) (18 - 18)  SpO2: 97% (22 Dec 2018 11:59) (97% - 98%)    General Exam:   General appearance: No acute distress           Neurological Exam:  Mental Status: Orientated to self, date and place.  Attention intact.  No dysarthria. Speech fluent.  Cranial Nerves:   PERRL, EOMI, VFF, no nystagmus.    CN V1-3 intact to light touch .  No facial asymmetry. Tongue, uvula and palate midline.  Sternocleidomastoid and Trapezius intact bilaterally.    Motor:   Tone: normal.                  Strength:     [] Upper extremity                      Delt       Bicep    Tricep                                                  R         5/5        5/5        5/5       5/5                                               L          5/5        5/5        5/5       5/5  [] Lower extremity                       HF          KE          KF        DF         PF                                               R        5/5        5/5        5/5       5/5       5/5                                               L         5/5        5/5       5/5       5/5        5/5  Pronator drift: none                 Dysmetria: None to finger-nose-finger   Tremor: No resting, postural or action tremor.  No myoclonus.    Sensation: intact to light touch  Gait: Deferred    Other:    12-22    137  |  104  |  41<H>  ----------------------------<  96  4.6   |  22  |  1.18    Ca    10.1      22 Dec 2018 06:06  Mg     1.7     12-22                              10.3   3.80  )-----------( 85       ( 21 Dec 2018 08:28 )             31.4       Radiology    CT: < from: CT Head No Cont (12.18.18 @ 23:41) >  IMPRESSION:     Head: Right frontal infarct of uncertain age likely subacute. Correlate   with onset of symptomatology.    Cervical spine: Multilevel degenerative changes without evidence of a   fracture or traumatic spondylolisthesis.    < end of copied text >    MRI: < from: MR Angio Neck No Cont (12.19.18 @ 19:52) >    IMPRESSION: Subacute right frontal cortical infarct. Atrophy. Small   vessel white matter ischemic changes. Old left posterior frontal parietal   cortical hemosiderin deposition. Limited MRA of the head and neck. For   further evaluation CT angiography may be helpful.      < end of copied text >    EKG:  tele:  TTE:  EEG: HA [id number] is a 89y old  Female who presents with a chief complaint of syncope (22 Dec 2018 10:08)      HPI: 89 y/o female with past medical history of CKD3, CLL, Liver cirrhosis, Atrial fibrillation not on AC, Pulmonary HTN, prior hemorrhagic CVA, Aortic Valve stenosis, Hypothyroidism initially presented to the ED with syncope on 12/19 likely secondary to vasovagal syncope in the setting of having a bowel movement. CT head demonstrated subacute stroke in Right frontal region. MRI brain demonstrated Subacute right frontal cortical infarct. Atrophy. Small vessel white matter ischemic changes. Old left posterior frontal parietal cortical hemosiderin deposition. MRA demonstrated no occlusions but limited study.   Neurology consulted for initiation of anticoagulation/antiplatelet as Cardiology is suggesting starting Eliquis 2.5mg BID for  atrial fibrillation  while patient decided on TAVR.     MEDICATIONS  (STANDING):  ALBUTerol/ipratropium for Nebulization 3 milliLiter(s) Nebulizer every 12 hours  BACItracin   Ointment 1 Application(s) Topical daily  cholecalciferol 2000 Unit(s) Oral daily  digoxin     Tablet 0.125 milliGRAM(s) Oral every other day  diphtheria/tetanus/pertussis (acellular) Vaccine (ADAcel) 0.5 milliLiter(s) IntraMuscular once  heparin  Injectable 5000 Unit(s) SubCutaneous every 12 hours  lactulose Syrup 20 Gram(s) Oral four times a day  levothyroxine 25 MICROGram(s) Oral daily  propranolol 10 milliGRAM(s) Oral two times a day  rifaximin 550 milliGRAM(s) Oral two times a day  spironolactone 25 milliGRAM(s) Oral daily    MEDICATIONS  (PRN):  acetaminophen   Tablet .. 650 milliGRAM(s) Oral every 6 hours PRN Moderate Pain (4 - 6)    PAST MEDICAL & SURGICAL HISTORY:  PVD (peripheral vascular disease)  Liver cell carcinoma  Severe aortic stenosis by prior echocardiogram  Pulmonary HTN: moderate  CKD (chronic kidney disease), stage 3 (moderate)  COPD (Chronic Obstructive Pulmonary Disease)  AF (Atrial Fibrillation)  Portal Hypertension  Bleeding Esophageal Varices  CLL (Chronic Lymphoblastic Leukemia)  GIB (Gastrointestinal Bleeding)  History of repair of hip fracture  Esophageal Rupture    FAMILY HISTORY:  Family history of hyperlipidemia (Father)    Allergies    codeine (Rash)  erythromycin (Rash)  iv dye (Rash)  penicillin (Rash)  shellfish (Rash)    Intolerances    adhesives (Other)  warfarin (Other)      SHx - No smoking, No ETOH, No drug abuse      Review of Systems:  As per HPI, otherwise negative.     Vital Signs Last 24 Hrs  T(C): 36.7 (22 Dec 2018 11:59), Max: 37.1 (21 Dec 2018 14:35)  T(F): 98.1 (22 Dec 2018 11:59), Max: 98.7 (21 Dec 2018 14:35)  HR: 80 (22 Dec 2018 11:59) (62 - 80)  BP: 104/57 (22 Dec 2018 11:59) (93/56 - 109/58)  BP(mean): --  RR: 18 (22 Dec 2018 11:59) (18 - 18)  SpO2: 97% (22 Dec 2018 11:59) (97% - 98%)    General Exam:   General appearance: No acute distress           Neurological Exam:  Mental Status: Orientated to self, date and place.  Attention intact.  No dysarthria. Speech fluent.  Cranial Nerves:   PERRL, EOMI, VFF, no nystagmus.    CN V1-3 intact to light touch .  No facial asymmetry. Tongue, uvula and palate midline.  Sternocleidomastoid and Trapezius intact bilaterally.    Motor:   Tone: normal.                  Strength:     [] Upper extremity                      Delt       Bicep    Tricep                                                  R         5/5        5/5        5/5       5/5                                               L          5/5        5/5        5/5       5/5  [] Lower extremity                       HF          KE          KF        DF         PF                                               R        5/5        5/5        5/5       5/5       5/5                                               L         5/5        5/5       5/5       5/5        5/5  Pronator drift: none                 Dysmetria: None to finger-nose-finger   Tremor: No resting, postural or action tremor.  No myoclonus.    Sensation: intact to light touch  Gait: Deferred    Other:    12-22    137  |  104  |  41<H>  ----------------------------<  96  4.6   |  22  |  1.18    Ca    10.1      22 Dec 2018 06:06  Mg     1.7     12-22                              10.3   3.80  )-----------( 85       ( 21 Dec 2018 08:28 )             31.4       Radiology    CT: < from: CT Head No Cont (12.18.18 @ 23:41) >  IMPRESSION:     Head: Right frontal infarct of uncertain age likely subacute. Correlate   with onset of symptomatology.    Cervical spine: Multilevel degenerative changes without evidence of a   fracture or traumatic spondylolisthesis.    < end of copied text >    MRI: < from: MR Angio Neck No Cont (12.19.18 @ 19:52) >    IMPRESSION: Subacute right frontal cortical infarct. Atrophy. Small   vessel white matter ischemic changes. Old left posterior frontal parietal   cortical hemosiderin deposition. Limited MRA of the head and neck. For   further evaluation CT angiography may be helpful.      < end of copied text >    EKG:  tele:  TTE:  EEG:

## 2018-12-22 NOTE — CONSULT NOTE ADULT - ATTENDING COMMENTS
89 year old woman with critical AS, chronic atrial fibrillation, history of hemorrhagic stroke. There is no specific cardiac management at this time, but will review history with her cardiologist and other issues and re-ask the question regarding consideration for TAVR which does no seem infeasible.
VASCULAR NEUROLOGY ATTENDING  The patient is seen and examined the history and imaging are reviewed. I agree with the resident note unless otherwise noted. Patient with stroke in the setting of AF of AC. Also with reported CKD, thrombocytopenia and cirrhosis. Elevated HAS-BLED score. Discussed risks of stroke vs hemorrhage  at great length with daughter at bedside. At this point they wish to hold AC.

## 2018-12-22 NOTE — CONSULT NOTE ADULT - ASSESSMENT
89 y/o female with past medical history of CKD3, CLL, Liver cirrhosis, Atrial fibrillation not on AC, Pulmonary HTN, prior hemorrhagic CVA, Aortic Valve stenosis, Hypothyroidism initially presented to the ED with syncope on 12/19 likely secondary to vasovagal syncope in the setting of having a bowel movement. CT head demonstrated subacute stroke in Right frontal region. MRI brain demonstrated Subacute right frontal cortical infarct. Atrophy. Small vessel white matter ischemic changes. Old left posterior frontal parietal cortical hemosiderin deposition. MRA demonstrated no occlusions but limited study.   Neurology consulted for initiation of anticoagulation/antiplatelet as Cardiology is suggesting starting Eliquis 2.5mg BID for critical aortic stenosis while patient decided on TAVR. Neurologic exam non-focal. CT/MR/MRA findings reviewed as described.     Recommendations:  [] Plan to be discussed with Stroke Attending regarding clearance for initiating anti-coagulation. 89 y/o female with past medical history of CKD3, CLL, Liver cirrhosis, Atrial fibrillation not on AC, Pulmonary HTN, prior hemorrhagic CVA, Aortic Valve stenosis, Hypothyroidism initially presented to the ED with syncope on 12/19 likely secondary to vasovagal syncope in the setting of having a bowel movement. CT head demonstrated subacute stroke in Right frontal region. MRI brain demonstrated Subacute right frontal cortical infarct. Atrophy. Small vessel white matter ischemic changes. Old left posterior frontal parietal cortical hemosiderin deposition. MRA demonstrated no occlusions but limited study.   Neurology consulted for initiation of anticoagulation/antiplatelet as Cardiology is suggesting starting Eliquis 2.5mg BID for atrial fibrillation while patient decided on TAVR. Neurologic exam non-focal. CT/MR/MRA findings reviewed as described.     Recommendations:  [] In this case, for secondary stroke prevention, patient is high risk for intracranial bleeding if placed on anti-coagulation.     Plan discussed with Stroke Attending.

## 2018-12-22 NOTE — PROGRESS NOTE ADULT - SUBJECTIVE AND OBJECTIVE BOX
cc: syncope    ON w/ out acute events, HD stable afebrile.   This AM resting comfortably, in good spirits no acute concerns.     REVIEW OF SYSTEMS:  CONSTITUTIONAL: No fever, weight loss, + fatigue  EYES: No eye pain, visual disturbances, or discharge  ENMT:  No difficulty hearing, tinnitus, vertigo; No sinus or throat pain  NECK: No pain or stiffness  RESPIRATORY: No cough, wheezing, chills or hemoptysis; No shortness of breath  CARDIOVASCULAR: No chest pain, palpitations, dizziness, or leg swelling  GASTROINTESTINAL: No abdominal or epigastric pain. No nausea, vomiting, or hematemesis; No diarrhea or constipation. No melena or hematochezia.  GENITOURINARY: No dysuria, frequency, hematuria, or incontinence  NEUROLOGICAL: No headaches, memory loss, loss of strength, numbness, or tremors  SKIN: No itching, burning, rashes, or lesions   ENDOCRINE: No heat or cold intolerance; No hair loss  MUSCULOSKELETAL: +L knee pain.   PSYCHIATRIC: No depression, +anxiety, no mood swings, or difficulty sleeping  HEME/LYMPH: ++ easy bruising, no bleeding gums    T(C): 36.7 (12-22-18 @ 11:59), Max: 37.1 (12-21-18 @ 14:35)  HR: 80 (12-22-18 @ 11:59) (62 - 80)  BP: 104/57 (12-22-18 @ 11:59) (93/56 - 109/58)  RR: 18 (12-22-18 @ 11:59) (18 - 18)  SpO2: 97% (12-22-18 @ 11:59) (97% - 98%)  Wt(kg): --Vital Signs Last 24 Hrs  T(C): 36.7 (22 Dec 2018 11:59), Max: 37.1 (21 Dec 2018 14:35)  T(F): 98.1 (22 Dec 2018 11:59), Max: 98.7 (21 Dec 2018 14:35)  HR: 80 (22 Dec 2018 11:59) (62 - 80)  BP: 104/57 (22 Dec 2018 11:59) (93/56 - 109/58)  BP(mean): --  RR: 18 (22 Dec 2018 11:59) (18 - 18)  SpO2: 97% (22 Dec 2018 11:59) (97% - 98%)    PHYSICAL EXAM:  GENERAL: NAD, well-groomed. Senile purpura. Frail, cachectic, in good spirits and AO x3. NC in place.   HEAD:  Atraumatic, Normocephalic  EYES: EOMI, conjunctiva and sclera clear  NECK: Supple, No JVD  NERVOUS SYSTEM:  Alert & Oriented X3, Good concentration;.  CHEST/LUNG: Clear to percussion bilaterally; No rales, rhonchi, wheezing  HEART: Regular rate and rhythm; No murmurs  ABDOMEN: Soft, Nontender, Nondistended; Bowel sounds present  EXTREMITIES:  2+ Peripheral Pulses, No clubbing, cyanosis, or edema  SKIN: No rashes or lesions    Consultant(s) Notes Reviewed:  [x ] YES  [ ] NO    LABS:  12/22: hgb 10.3 from 11.4, no leukocytosis, stable thrombocytopenia. Cr 1.18, BUN 40s.              10.3   3.80  )-----------( 85       ( 21 Dec 2018 08:28 )             31.4     12-22    137  |  104  |  41<H>  ----------------------------<  96  4.6   |  22  |  1.18    Ca    10.1      22 Dec 2018 06:06  Mg     1.7     12-22    CAPILLARY BLOOD GLUCOSE    RADIOLOGY & ADDITIONAL TESTS:  Imaging Personally Reviewed: yes  Care discussed w/ other providers: --  Consultant notes reviewed: yes

## 2018-12-22 NOTE — PROGRESS NOTE ADULT - PROBLEM SELECTOR PLAN 1
- severe AS +/- vasovagal.   - continued TAVR eval as outpatient.  - high risk of intracranial bleeding w/ AC, will d/w family further, appreciate neuro input.   - repeat 2d echo- troponin negative x 2  - patients daughter REFUSING aspirin at this time  - MRI head showing subacute infarct- neuro f/u

## 2018-12-22 NOTE — PROGRESS NOTE ADULT - ATTENDING COMMENTS
89 year old female with hx of afib not on anticoagulation admitted for syncope.     Subacute stroke   -pt with hx of afib   -MRI brain reviewed     per MRI from 2/2016 there is a concern that she may have amyloid vs. hypertensive angiopathy.        comparison read noted, pt would benefit from a stroke consult regarding the risk vs. benefit of antiplatelet vs. AC    - Of note, initially the daughter was saying an outside neurologist had told her the patient should never be on AC however, at today's encounter daughter mentioned it may have been the doctor caring for the patient's CLL that made that comment   -TTE completed  -LDL 60, check HbA1C   -PT/OT     Syncope   -recommend VEEG, can be done as outpatient  -MRI brain and MRA reviewed   -fall precaution 89 year old female with hx of afib not on anticoagulation admitted for syncope.     Subacute stroke   -pt with hx of afib   -MRI brain reviewed     per MRI from 2/2016 there is a concern that she may have amyloid vs. hypertensive angiopathy.        comparison read noted, pt would benefit from a stroke consult regarding the risk vs. benefit of antiplatelet vs. AC    - Of note, initially the daughter was saying an outside neurologist had told her the patient should never be on AC however, at today's encounter daughter mentioned it may have been the doctor caring for the patient's CLL that made that comment   -TTE completed  -LDL 60, check HbA1C   -PT/OT     Syncope   -recommend VEEG, can be done as outpatient  -MRI brain and MRA reviewed   -fall precaution    Plan d/c with primary team.    Case d/c with stroke consult resident

## 2018-12-23 LAB
ANION GAP SERPL CALC-SCNC: 10 MMOL/L — SIGNIFICANT CHANGE UP (ref 5–17)
BUN SERPL-MCNC: 42 MG/DL — HIGH (ref 7–23)
CALCIUM SERPL-MCNC: 9.8 MG/DL — SIGNIFICANT CHANGE UP (ref 8.4–10.5)
CHLORIDE SERPL-SCNC: 105 MMOL/L — SIGNIFICANT CHANGE UP (ref 96–108)
CO2 SERPL-SCNC: 23 MMOL/L — SIGNIFICANT CHANGE UP (ref 22–31)
CREAT SERPL-MCNC: 1.12 MG/DL — SIGNIFICANT CHANGE UP (ref 0.5–1.3)
GLUCOSE SERPL-MCNC: 86 MG/DL — SIGNIFICANT CHANGE UP (ref 70–99)
HCT VFR BLD CALC: 31 % — LOW (ref 34.5–45)
HGB BLD-MCNC: 10.4 G/DL — LOW (ref 11.5–15.5)
MCHC RBC-ENTMCNC: 33.4 PG — SIGNIFICANT CHANGE UP (ref 27–34)
MCHC RBC-ENTMCNC: 33.5 GM/DL — SIGNIFICANT CHANGE UP (ref 32–36)
MCV RBC AUTO: 99.7 FL — SIGNIFICANT CHANGE UP (ref 80–100)
PLATELET # BLD AUTO: 84 K/UL — LOW (ref 150–400)
POTASSIUM SERPL-MCNC: 4.8 MMOL/L — SIGNIFICANT CHANGE UP (ref 3.5–5.3)
POTASSIUM SERPL-SCNC: 4.8 MMOL/L — SIGNIFICANT CHANGE UP (ref 3.5–5.3)
RBC # BLD: 3.11 M/UL — LOW (ref 3.8–5.2)
RBC # FLD: 15.8 % — HIGH (ref 10.3–14.5)
SODIUM SERPL-SCNC: 138 MMOL/L — SIGNIFICANT CHANGE UP (ref 135–145)
WBC # BLD: 4 K/UL — SIGNIFICANT CHANGE UP (ref 3.8–10.5)
WBC # FLD AUTO: 4 K/UL — SIGNIFICANT CHANGE UP (ref 3.8–10.5)

## 2018-12-23 PROCEDURE — 99233 SBSQ HOSP IP/OBS HIGH 50: CPT

## 2018-12-23 PROCEDURE — 73562 X-RAY EXAM OF KNEE 3: CPT | Mod: 26,LT

## 2018-12-23 RX ORDER — ONDANSETRON 8 MG/1
4 TABLET, FILM COATED ORAL ONCE
Qty: 0 | Refills: 0 | Status: DISCONTINUED | OUTPATIENT
Start: 2018-12-23 | End: 2018-12-27

## 2018-12-23 RX ADMIN — LACTULOSE 20 GRAM(S): 10 SOLUTION ORAL at 18:41

## 2018-12-23 RX ADMIN — HEPARIN SODIUM 5000 UNIT(S): 5000 INJECTION INTRAVENOUS; SUBCUTANEOUS at 21:22

## 2018-12-23 RX ADMIN — SPIRONOLACTONE 25 MILLIGRAM(S): 25 TABLET, FILM COATED ORAL at 05:47

## 2018-12-23 RX ADMIN — Medication 3 MILLILITER(S): at 05:47

## 2018-12-23 RX ADMIN — Medication 3 MILLILITER(S): at 18:42

## 2018-12-23 RX ADMIN — Medication 650 MILLIGRAM(S): at 13:56

## 2018-12-23 RX ADMIN — LACTULOSE 20 GRAM(S): 10 SOLUTION ORAL at 05:47

## 2018-12-23 RX ADMIN — Medication 25 MICROGRAM(S): at 05:47

## 2018-12-23 RX ADMIN — Medication 10 MILLIGRAM(S): at 05:47

## 2018-12-23 RX ADMIN — Medication 2000 UNIT(S): at 12:52

## 2018-12-23 RX ADMIN — LACTULOSE 20 GRAM(S): 10 SOLUTION ORAL at 12:53

## 2018-12-23 RX ADMIN — HEPARIN SODIUM 5000 UNIT(S): 5000 INJECTION INTRAVENOUS; SUBCUTANEOUS at 12:53

## 2018-12-23 RX ADMIN — Medication 1 APPLICATION(S): at 12:52

## 2018-12-23 RX ADMIN — Medication 0.12 MILLIGRAM(S): at 12:53

## 2018-12-23 NOTE — PROGRESS NOTE ADULT - PROBLEM SELECTOR PLAN 1
- severe AS +/- vasovagal.   - continued TAVR eval as outpatient.  - high risk of intracranial bleeding w/ AC, will d/w family further, appreciate neuro input. discussed this AM at length, seems as though daughter is leaning against AC/ASA for now.   - repeat 2d echo- troponin negative x 2  - patients daughter REFUSING aspirin at this time  - MRI head showing subacute infarct- neuro f/u

## 2018-12-23 NOTE — PROGRESS NOTE ADULT - SUBJECTIVE AND OBJECTIVE BOX
cc: syncope    ON w/ out acute events, HD stable afebrile.   This AM reports worsened L knee pain. Wants to go home.     REVIEW OF SYSTEMS:  CONSTITUTIONAL: No fever, weight loss, + fatigue  EYES: No eye pain, visual disturbances, or discharge  ENMT:  No difficulty hearing, tinnitus, vertigo; No sinus or throat pain  NECK: No pain or stiffness  RESPIRATORY: No cough, wheezing, chills or hemoptysis; No shortness of breath  CARDIOVASCULAR: No chest pain, palpitations, dizziness, or leg swelling  GASTROINTESTINAL: No abdominal or epigastric pain. No nausea, vomiting, or hematemesis; No diarrhea or constipation. No melena or hematochezia.  GENITOURINARY: No dysuria, frequency, hematuria, or incontinence  NEUROLOGICAL: No headaches, memory loss, loss of strength, numbness, or tremors  SKIN: No itching, burning, rashes, or lesions   ENDOCRINE: No heat or cold intolerance; No hair loss  MUSCULOSKELETAL: +L knee pain.   PSYCHIATRIC: No depression, +anxiety, no mood swings, or difficulty sleeping  HEME/LYMPH: ++ easy bruising, no bleeding gums    Vital Signs Last 24 Hrs  T(C): 36.6 (23 Dec 2018 11:56), Max: 36.7 (22 Dec 2018 21:00)  T(F): 97.8 (23 Dec 2018 11:56), Max: 98 (22 Dec 2018 21:00)  HR: 76 (23 Dec 2018 11:56) (73 - 81)  BP: 95/55 (23 Dec 2018 11:56) (94/58 - 99/58)  BP(mean): --  RR: 18 (23 Dec 2018 11:56) (18 - 18)  SpO2: 98% (23 Dec 2018 11:56) (97% - 98%)    PHYSICAL EXAM:  GENERAL: NAD, well-groomed. Senile purpura. Frail, cachectic, in good spirits and AO x3. NC in place.   HEAD:  Atraumatic, Normocephalic  EYES: EOMI, conjunctiva and sclera clear  NECK: Supple, No JVD  NERVOUS SYSTEM:  Alert & Oriented X3, Good concentration;.  CHEST/LUNG: Clear to percussion bilaterally; No rales, rhonchi, wheezing  HEART: Regular rate and rhythm; No murmurs  ABDOMEN: Soft, Nontender, Nondistended; Bowel sounds present  EXTREMITIES:  frail LE b/l. L knee pain to palpation.   SKIN: No rashes or lesions    Consultant(s) Notes Reviewed:  [x ] YES  [ ] NO    LABS:  12/23: stable cbc/bmp., thrombocytopenia at baseline.   12/22: hgb 10.3 from 11.4, no leukocytosis, stable thrombocytopenia. Cr 1.18, BUN 40s.              10.3   3.80  )-----------( 85       ( 21 Dec 2018 08:28 )             31.4     12-22    137  |  104  |  41<H>  ----------------------------<  96  4.6   |  22  |  1.18    Ca    10.1      22 Dec 2018 06:06  Mg     1.7     12-22    CAPILLARY BLOOD GLUCOSE    RADIOLOGY & ADDITIONAL TESTS:  Imaging Personally Reviewed: yes  Care discussed w/ other providers: --  Consultant notes reviewed: yes

## 2018-12-23 NOTE — PROGRESS NOTE ADULT - ASSESSMENT
Ms. Bustamante is a 89 year old woman with severe AS, CLL, liver cirrhosis, afib (not on AC, given hemorrhagic CVA- told by outpatient neurology she can not even be on aspirin), pulmonary hypertension, aortic valve stenosis and hypothyroidism presents to the ED with syncope likely vasovagal. Patient eager to return home, await PT re-eval given complicated dispo as below. She can't navigate steps and PT recc home w/ home PT, daughter is quite nervous about this dispo. We discussed possibility of putting in chair to carry her up steps, patient refuses. Daughter seems understandably a bit overwhelmed caring for this progressively frail and weak patient. Her independence is further c/b progressive L knee pain. 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 course of PO steroids for tx of acute crystalline dz. Will get repeat X-ray to r/o accumulating effusion, it would be ideal to minimize knee pain prior to d/c.     Await X-ray.  Dispo home vs. rehab to [facility] pending conversation w/ family and PT re-eval.

## 2018-12-24 DIAGNOSIS — M25.562 PAIN IN LEFT KNEE: ICD-10-CM

## 2018-12-24 LAB
APPEARANCE UR: ABNORMAL
BILIRUB UR-MCNC: NEGATIVE — SIGNIFICANT CHANGE UP
COLOR SPEC: YELLOW — SIGNIFICANT CHANGE UP
DIFF PNL FLD: ABNORMAL
GLUCOSE UR QL: NEGATIVE — SIGNIFICANT CHANGE UP
KETONES UR-MCNC: NEGATIVE — SIGNIFICANT CHANGE UP
LEUKOCYTE ESTERASE UR-ACNC: ABNORMAL
NITRITE UR-MCNC: POSITIVE
PH UR: 5.5 — SIGNIFICANT CHANGE UP (ref 5–8)
PROT UR-MCNC: SIGNIFICANT CHANGE UP
SP GR SPEC: 1.02 — SIGNIFICANT CHANGE UP (ref 1.01–1.02)
UROBILINOGEN FLD QL: NEGATIVE — SIGNIFICANT CHANGE UP

## 2018-12-24 PROCEDURE — 99233 SBSQ HOSP IP/OBS HIGH 50: CPT

## 2018-12-24 RX ORDER — CEFTRIAXONE 500 MG/1
1 INJECTION, POWDER, FOR SOLUTION INTRAMUSCULAR; INTRAVENOUS EVERY 24 HOURS
Qty: 0 | Refills: 0 | Status: DISCONTINUED | OUTPATIENT
Start: 2018-12-25 | End: 2018-12-26

## 2018-12-24 RX ORDER — CEFTRIAXONE 500 MG/1
INJECTION, POWDER, FOR SOLUTION INTRAMUSCULAR; INTRAVENOUS
Qty: 0 | Refills: 0 | Status: DISCONTINUED | OUTPATIENT
Start: 2018-12-24 | End: 2018-12-26

## 2018-12-24 RX ORDER — PROPRANOLOL HCL 160 MG
10 CAPSULE, EXTENDED RELEASE 24HR ORAL
Qty: 0 | Refills: 0 | Status: DISCONTINUED | OUTPATIENT
Start: 2018-12-24 | End: 2018-12-27

## 2018-12-24 RX ORDER — CEFTRIAXONE 500 MG/1
1 INJECTION, POWDER, FOR SOLUTION INTRAMUSCULAR; INTRAVENOUS ONCE
Qty: 0 | Refills: 0 | Status: COMPLETED | OUTPATIENT
Start: 2018-12-24 | End: 2018-12-24

## 2018-12-24 RX ADMIN — Medication 10 MILLIGRAM(S): at 17:28

## 2018-12-24 RX ADMIN — HEPARIN SODIUM 5000 UNIT(S): 5000 INJECTION INTRAVENOUS; SUBCUTANEOUS at 11:47

## 2018-12-24 RX ADMIN — Medication 2000 UNIT(S): at 11:48

## 2018-12-24 RX ADMIN — LACTULOSE 20 GRAM(S): 10 SOLUTION ORAL at 11:48

## 2018-12-24 RX ADMIN — CEFTRIAXONE 100 GRAM(S): 500 INJECTION, POWDER, FOR SOLUTION INTRAMUSCULAR; INTRAVENOUS at 14:58

## 2018-12-24 RX ADMIN — Medication 3 MILLILITER(S): at 05:15

## 2018-12-24 RX ADMIN — Medication 1 APPLICATION(S): at 11:47

## 2018-12-24 RX ADMIN — Medication 25 MICROGRAM(S): at 05:15

## 2018-12-24 RX ADMIN — LACTULOSE 20 GRAM(S): 10 SOLUTION ORAL at 00:36

## 2018-12-24 RX ADMIN — LACTULOSE 20 GRAM(S): 10 SOLUTION ORAL at 05:16

## 2018-12-24 RX ADMIN — HEPARIN SODIUM 5000 UNIT(S): 5000 INJECTION INTRAVENOUS; SUBCUTANEOUS at 21:58

## 2018-12-24 RX ADMIN — SPIRONOLACTONE 25 MILLIGRAM(S): 25 TABLET, FILM COATED ORAL at 05:15

## 2018-12-24 RX ADMIN — Medication 650 MILLIGRAM(S): at 12:25

## 2018-12-24 RX ADMIN — LACTULOSE 20 GRAM(S): 10 SOLUTION ORAL at 23:27

## 2018-12-24 RX ADMIN — Medication 10 MILLIGRAM(S): at 05:15

## 2018-12-24 RX ADMIN — Medication 3 MILLILITER(S): at 17:27

## 2018-12-24 NOTE — PROGRESS NOTE ADULT - PROBLEM SELECTOR PLAN 4
- repeat knee xray without joint effusion  - continue with tylenol, PT for now. If no improvement in symptoms, can consider short course of steroid.

## 2018-12-24 NOTE — PROGRESS NOTE ADULT - PROBLEM SELECTOR PLAN 1
- insetting of known severe AS +/- vasovagal.   - TAVR eval as outpatient.  - TTE on 12/20 with severe to critical AS, severe AR/MR, mild-mod MS, normal LVSF  - troponin negative x 2 - insetting of known severe AS +/- vasovagal.   - TTE on 12/20 with severe to critical AS, severe AR/MR, mild-mod MS, normal LVSF  - troponin negative x 2

## 2018-12-24 NOTE — PROGRESS NOTE ADULT - PROBLEM SELECTOR PLAN 3
- Initial UA/Ucx negative but repeat UA on 12/23 grossly +  - given prior hx of UTI, + UA on repeat testing with ? episode of confusion last night per daughter, will treat empirically for presumed UTI with IV ceftriaxone. Recent Ucx + klebsiella and it appears that the patient has tolerated ceftriaxone, cefepime, fluoroquinolones in the past.

## 2018-12-24 NOTE — PROGRESS NOTE ADULT - SUBJECTIVE AND OBJECTIVE BOX
Patient is a 89y old  Female who presents with a chief complaint of syncope (23 Dec 2018 15:53)      SUBJECTIVE / OVERNIGHT EVENTS: Daughter at bedside reports patient was a bit confused yesterday which is usually the case when she has UTI. Patient denies any urinary symptoms. Denies any dizziness but also has not gotten out of bed. No SOB. + left knee pain since the fall which improved after tylenol.    MEDICATIONS  (STANDING):  ALBUTerol/ipratropium for Nebulization 3 milliLiter(s) Nebulizer every 12 hours  BACItracin   Ointment 1 Application(s) Topical daily  cefTRIAXone   IVPB      cholecalciferol 2000 Unit(s) Oral daily  digoxin     Tablet 0.125 milliGRAM(s) Oral every other day  diphtheria/tetanus/pertussis (acellular) Vaccine (ADAcel) 0.5 milliLiter(s) IntraMuscular once  heparin  Injectable 5000 Unit(s) SubCutaneous every 12 hours  lactulose Syrup 20 Gram(s) Oral four times a day  levothyroxine 25 MICROGram(s) Oral daily  ondansetron Injectable 4 milliGRAM(s) IV Push once  propranolol 10 milliGRAM(s) Oral two times a day  rifaximin 550 milliGRAM(s) Oral two times a day  spironolactone 25 milliGRAM(s) Oral daily    MEDICATIONS  (PRN):  acetaminophen   Tablet .. 650 milliGRAM(s) Oral every 6 hours PRN Moderate Pain (4 - 6)      Vital Signs Last 24 Hrs  T(C): 36.8 (24 Dec 2018 04:36), Max: 36.8 (24 Dec 2018 04:36)  T(F): 98.2 (24 Dec 2018 04:36), Max: 98.2 (24 Dec 2018 04:36)  HR: 84 (24 Dec 2018 04:36) (62 - 85)  BP: 108/65 (24 Dec 2018 04:36) (85/51 - 108/65)  BP(mean): --  RR: 18 (24 Dec 2018 04:36) (18 - 18)  SpO2: 98% (24 Dec 2018 04:36) (96% - 98%)  CAPILLARY BLOOD GLUCOSE        I&O's Summary    23 Dec 2018 07:01  -  24 Dec 2018 07:00  --------------------------------------------------------  IN: 240 mL / OUT: 150 mL / NET: 90 mL    24 Dec 2018 07:01  -  24 Dec 2018 14:33  --------------------------------------------------------  IN: 0 mL / OUT: 1 mL / NET: -1 mL        PHYSICAL EXAM:  GENERAL: NAD  HEENT: neck supple  LUNG: Clear to auscultation bilaterally; No wheeze  HEART: S1, S2 + ARCADIO  ABDOMEN: Soft, Nontender, Nondistended; Bowel sounds present  EXTREMITIES: No leg edema, adequate ROM of left knee  PSYCH: normal affect, calm  NEUROLOGY: AAO x3, moves all extremities  SKIN: hyperpigmented skin changes      LABS:                        10.4   4.00  )-----------( 84       ( 23 Dec 2018 08:17 )             31.0     12-    138  |  105  |  42<H>  ----------------------------<  86  4.8   |  23  |  1.12    Ca    9.8      23 Dec 2018 06:59            Urinalysis Basic - ( 23 Dec 2018 22:51 )    Color: Yellow / Appearance: Slightly Turbid / S.022 / pH: x  Gluc: x / Ketone: Negative  / Bili: Negative / Urobili: Negative   Blood: x / Protein: Trace / Nitrite: Positive   Leuk Esterase: Moderate / RBC: 48 /hpf / WBC 8 /hpf   Sq Epi: x / Non Sq Epi: 9 /hpf / Bacteria: Many        RADIOLOGY & ADDITIONAL TESTS:    Imaging Personally Reviewed:    < from: Xray Knee 3 Views, Left (18 @ 17:23) >  IMPRESSION:  Patellar enthesophytes at the insertion of the quadriceps tendon.  No acute fracture or dislocation.  Mild joint space narrowing, most prominent at the medial compartment.  Vascular calcifications and chondrocalcinosis are noted.    < end of copied text >    Consultant(s) Notes Reviewed: cardiology, neurology     Care Discussed with Consultants/Other Providers: medicine NP

## 2018-12-24 NOTE — PROGRESS NOTE ADULT - PROBLEM SELECTOR PLAN 7
- severe critical aortic stenosis  - outpatient follow up - severe critical aortic stenosis  - hold aldactone given relative hypotension per discussion with patient's cardiologist (Dr. Boston) but if develops LE edema, consider resuming at lower dose with hold parameter  - outpatient follow up

## 2018-12-24 NOTE — PROGRESS NOTE ADULT - ASSESSMENT
89 y/o F with valvular disease including severe AS/AR/MR, mild-mod MS, CLL in remission, liver cirrhosis, HCC s/p microwave ablation, afib (not on AC, given hemorrhagic CVA- told by outpatient neurology she can not even be on aspirin), pulmonary hypertension, CKD 3, UTI, and hypothyroidism a/w presumed syncope. Patient found to have subacute infarct in right frontal region.

## 2018-12-24 NOTE — PROGRESS NOTE ADULT - PROBLEM SELECTOR PLAN 9
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

## 2018-12-24 NOTE — PROGRESS NOTE ADULT - PROBLEM SELECTOR PLAN 5
- c/w propanolol, lactulose, spironolactone and rifaximin  - patient's SBP in 80's at times. per daughter, usual SBP in 110's as outpatient. Discussed the need to likely adjust/hold one of the meds (propanolol or aldactone). daughter requesting me to discuss with patient's cardiologist (Dr. Boston) first. Left message and awaiting call back. In the meantime, will add hold parameter. - c/w propanolol, lactulose, spironolactone and rifaximin  - patient's SBP in 80's at times. per daughter, usual SBP in 110's as outpatient. Discussed the need to likely adjust/hold one of the meds (propanolol or aldactone). daughter requesting me to discuss with patient's cardiologist (Dr. Boston) first. Per my discussion with Dr. Boston, ok to hold aldactone for now but monitor for development of LE edema.

## 2018-12-25 DIAGNOSIS — N39.0 URINARY TRACT INFECTION, SITE NOT SPECIFIED: ICD-10-CM

## 2018-12-25 PROCEDURE — 99233 SBSQ HOSP IP/OBS HIGH 50: CPT

## 2018-12-25 RX ADMIN — LACTULOSE 20 GRAM(S): 10 SOLUTION ORAL at 23:21

## 2018-12-25 RX ADMIN — HEPARIN SODIUM 5000 UNIT(S): 5000 INJECTION INTRAVENOUS; SUBCUTANEOUS at 21:43

## 2018-12-25 RX ADMIN — CEFTRIAXONE 100 GRAM(S): 500 INJECTION, POWDER, FOR SOLUTION INTRAMUSCULAR; INTRAVENOUS at 17:55

## 2018-12-25 RX ADMIN — LACTULOSE 20 GRAM(S): 10 SOLUTION ORAL at 18:50

## 2018-12-25 RX ADMIN — Medication 650 MILLIGRAM(S): at 12:33

## 2018-12-25 RX ADMIN — LACTULOSE 20 GRAM(S): 10 SOLUTION ORAL at 06:09

## 2018-12-25 RX ADMIN — LACTULOSE 20 GRAM(S): 10 SOLUTION ORAL at 12:34

## 2018-12-25 RX ADMIN — Medication 25 MICROGRAM(S): at 06:09

## 2018-12-25 RX ADMIN — HEPARIN SODIUM 5000 UNIT(S): 5000 INJECTION INTRAVENOUS; SUBCUTANEOUS at 12:32

## 2018-12-25 RX ADMIN — Medication 2000 UNIT(S): at 12:34

## 2018-12-25 RX ADMIN — Medication 3 MILLILITER(S): at 06:09

## 2018-12-25 RX ADMIN — Medication 0.12 MILLIGRAM(S): at 12:35

## 2018-12-25 RX ADMIN — Medication 3 MILLILITER(S): at 18:49

## 2018-12-25 RX ADMIN — Medication 1 APPLICATION(S): at 11:37

## 2018-12-25 RX ADMIN — Medication 10 MILLIGRAM(S): at 06:10

## 2018-12-25 NOTE — PROGRESS NOTE ADULT - PROBLEM SELECTOR PLAN 5
- c/w propanolol, lactulose,  and rifaximin  - aldactone held/ no edema b/l in legs. if becomes edematous can start aldactone 12.5mg daily

## 2018-12-25 NOTE — PROGRESS NOTE ADULT - PROBLEM SELECTOR PLAN 1
- insetting of known severe AS +/- vasovagal.   - TTE on 12/20 with severe to critical AS, severe AR/MR, mild-mod MS, normal LVSF  - troponin negative x 2

## 2018-12-25 NOTE — PROGRESS NOTE ADULT - SUBJECTIVE AND OBJECTIVE BOX
Patient is a 89y old  Female who presents with a chief complaint of syncope (24 Dec 2018 14:33)  Pt seen and examined at bedside.  No new complaints.     PHYSICAL EXAM:  Awake and alert, oriented to place, month and date, able to name the president   CN: grossly II-XII intact   Motor: moving all extremities symmetrically except limited LLE movement due to pain in the left knee   Sensation: intact to light touch IAE   Coordination: FNF intact   Gait: deferred     Vital Signs Last 24 Hrs  T(C): 36.3 (25 Dec 2018 03:54), Max: 36.7 (24 Dec 2018 14:38)  T(F): 97.3 (25 Dec 2018 03:54), Max: 98 (24 Dec 2018 14:38)  HR: 96 (25 Dec 2018 03:54) (67 - 97)  BP: 100/61 (25 Dec 2018 03:54) (85/51 - 108/62)  BP(mean): --  RR: 18 (25 Dec 2018 03:54) (18 - 18)  SpO2: 96% (25 Dec 2018 03:54) (96% - 98%)    MEDICATIONS  (STANDING):  ALBUTerol/ipratropium for Nebulization 3 milliLiter(s) Nebulizer every 12 hours  BACItracin   Ointment 1 Application(s) Topical daily  cefTRIAXone   IVPB 1 Gram(s) IV Intermittent every 24 hours  cefTRIAXone   IVPB      cholecalciferol 2000 Unit(s) Oral daily  digoxin     Tablet 0.125 milliGRAM(s) Oral every other day  diphtheria/tetanus/pertussis (acellular) Vaccine (ADAcel) 0.5 milliLiter(s) IntraMuscular once  heparin  Injectable 5000 Unit(s) SubCutaneous every 12 hours  lactulose Syrup 20 Gram(s) Oral four times a day  levothyroxine 25 MICROGram(s) Oral daily  ondansetron Injectable 4 milliGRAM(s) IV Push once  propranolol 10 milliGRAM(s) Oral two times a day  rifaximin 550 milliGRAM(s) Oral two times a day    MEDICATIONS  (PRN):  acetaminophen   Tablet .. 650 milliGRAM(s) Oral every 6 hours PRN Moderate Pain (4 - 6)

## 2018-12-25 NOTE — PROGRESS NOTE ADULT - PROBLEM SELECTOR PLAN 7
- severe critical aortic stenosis  - hold aldactone given relative hypotension per discussion with patient's cardiologist (Dr. Boston) but if develops LE edema, consider resuming at lower dose with hold parameter  - outpatient follow up

## 2018-12-25 NOTE — PROGRESS NOTE ADULT - ATTENDING COMMENTS
89 year old female with hx of afib not on anticoagulation admitted for syncope.     Subacute stroke   -pt with hx of afib   -MRI brain reviewed with addendum         neurology stroke team rec noted   -TTE completed  -LDL 60, HbA1C 4.7  -PT/OT     Syncope   -recommend VEEG, can be done as outpatient  -MRI brain and MRA reviewed   -fall precaution    Rest per primary team 89 year old female with hx of afib not on anticoagulation admitted for syncope.     Subacute stroke   -pt with hx of afib   -MRI brain reviewed with addendum         neurology stroke team rec noted, high risk for bleed with AC   -TTE completed  -LDL 60, HbA1C 4.7  -PT/OT     Syncope   -recommend VEEG, can be done as outpatient  -MRI brain and MRA reviewed   -fall precaution    Rest per primary team

## 2018-12-25 NOTE — PROGRESS NOTE ADULT - ASSESSMENT
89 y/o F with valvular disease including severe AS/AR/MR, mild-mod MS, CLL in remission, liver cirrhosis, HCC s/p microwave ablation, afib (not on AC, given hemorrhagic CVA- told by outpatient neurology she can not even be on aspirin), pulmonary hypertension, CKD 3, UTI, and hypothyroidism a/w presumed syncope. Patient found to have subacute infarct in right frontal region.

## 2018-12-25 NOTE — PROGRESS NOTE ADULT - SUBJECTIVE AND OBJECTIVE BOX
Patient is a 89y old  Female who presents with a chief complaint of syncope (25 Dec 2018 06:55)      SUBJECTIVE / OVERNIGHT EVENTS:    patient seen and examined. feels well. no cp, sob, palpitations or abdominal pain.    discussed plan with daughter at bedside.      ROS:  14 point ROS negative in detail except stated as above    MEDICATIONS  (STANDING):  ALBUTerol/ipratropium for Nebulization 3 milliLiter(s) Nebulizer every 12 hours  BACItracin   Ointment 1 Application(s) Topical daily  cefTRIAXone   IVPB 1 Gram(s) IV Intermittent every 24 hours  cefTRIAXone   IVPB      cholecalciferol 2000 Unit(s) Oral daily  digoxin     Tablet 0.125 milliGRAM(s) Oral every other day  diphtheria/tetanus/pertussis (acellular) Vaccine (ADAcel) 0.5 milliLiter(s) IntraMuscular once  heparin  Injectable 5000 Unit(s) SubCutaneous every 12 hours  lactulose Syrup 20 Gram(s) Oral four times a day  levothyroxine 25 MICROGram(s) Oral daily  ondansetron Injectable 4 milliGRAM(s) IV Push once  propranolol 10 milliGRAM(s) Oral two times a day  rifaximin 550 milliGRAM(s) Oral two times a day    MEDICATIONS  (PRN):  acetaminophen   Tablet .. 650 milliGRAM(s) Oral every 6 hours PRN Moderate Pain (4 - 6)      CAPILLARY BLOOD GLUCOSE        I&O's Summary    24 Dec 2018 07:01  -  25 Dec 2018 07:00  --------------------------------------------------------  IN: 290 mL / OUT: 3 mL / NET: 287 mL        PHYSICAL EXAM:  Vital Signs Last 24 Hrs  T(C): 36.3 (25 Dec 2018 03:54), Max: 36.7 (24 Dec 2018 14:38)  T(F): 97.3 (25 Dec 2018 03:54), Max: 98 (24 Dec 2018 14:38)  HR: 96 (25 Dec 2018 03:54) (67 - 97)  BP: 100/61 (25 Dec 2018 03:54) (85/51 - 108/62)  BP(mean): --  RR: 18 (25 Dec 2018 03:54) (18 - 18)  SpO2: 96% (25 Dec 2018 03:54) (96% - 98%)    GENERAL: NAD/ frail  NECK: Supple, No JVD  CHEST/LUNG: Clear to auscultation bilaterally; No wheeze  HEART: s1 s2 systolic ejection murmur rics  ABDOMEN: Soft, Nontender, Nondistended; Bowel sounds present  EXTREMITIES:  2+ Peripheral Pulses, No clubbing, cyanosis, or edema  NEUROLOGY: AAOx3; non-focal    LABS:                Urinalysis Basic - ( 23 Dec 2018 22:51 )    Color: Yellow / Appearance: Slightly Turbid / S.022 / pH: x  Gluc: x / Ketone: Negative  / Bili: Negative / Urobili: Negative   Blood: x / Protein: Trace / Nitrite: Positive   Leuk Esterase: Moderate / RBC: 48 /hpf / WBC 8 /hpf   Sq Epi: x / Non Sq Epi: 9 /hpf / Bacteria: Many          Consultant(s) Notes Reviewed:    neurology  Care Discussed with Consultants/Other Providers:  LAYLA Blackwood

## 2018-12-26 DIAGNOSIS — N17.9 ACUTE KIDNEY FAILURE, UNSPECIFIED: ICD-10-CM

## 2018-12-26 DIAGNOSIS — F43.20 ADJUSTMENT DISORDER, UNSPECIFIED: ICD-10-CM

## 2018-12-26 DIAGNOSIS — R55 SYNCOPE AND COLLAPSE: ICD-10-CM

## 2018-12-26 DIAGNOSIS — F05 DELIRIUM DUE TO KNOWN PHYSIOLOGICAL CONDITION: ICD-10-CM

## 2018-12-26 LAB
-  AMIKACIN: SIGNIFICANT CHANGE UP
-  AMOXICILLIN/CLAVULANIC ACID: SIGNIFICANT CHANGE UP
-  AMPICILLIN/SULBACTAM: SIGNIFICANT CHANGE UP
-  AMPICILLIN: SIGNIFICANT CHANGE UP
-  AZTREONAM: SIGNIFICANT CHANGE UP
-  CEFAZOLIN: SIGNIFICANT CHANGE UP
-  CEFEPIME: SIGNIFICANT CHANGE UP
-  CEFOXITIN: SIGNIFICANT CHANGE UP
-  CEFTRIAXONE: SIGNIFICANT CHANGE UP
-  CIPROFLOXACIN: SIGNIFICANT CHANGE UP
-  ERTAPENEM: SIGNIFICANT CHANGE UP
-  GENTAMICIN: SIGNIFICANT CHANGE UP
-  IMIPENEM: SIGNIFICANT CHANGE UP
-  LEVOFLOXACIN: SIGNIFICANT CHANGE UP
-  MEROPENEM: SIGNIFICANT CHANGE UP
-  NITROFURANTOIN: SIGNIFICANT CHANGE UP
-  PIPERACILLIN/TAZOBACTAM: SIGNIFICANT CHANGE UP
-  TIGECYCLINE: SIGNIFICANT CHANGE UP
-  TOBRAMYCIN: SIGNIFICANT CHANGE UP
-  TRIMETHOPRIM/SULFAMETHOXAZOLE: SIGNIFICANT CHANGE UP
ANION GAP SERPL CALC-SCNC: 11 MMOL/L — SIGNIFICANT CHANGE UP (ref 5–17)
BUN SERPL-MCNC: 51 MG/DL — HIGH (ref 7–23)
CALCIUM SERPL-MCNC: 10.5 MG/DL — SIGNIFICANT CHANGE UP (ref 8.4–10.5)
CHLORIDE SERPL-SCNC: 103 MMOL/L — SIGNIFICANT CHANGE UP (ref 96–108)
CO2 SERPL-SCNC: 25 MMOL/L — SIGNIFICANT CHANGE UP (ref 22–31)
CREAT ?TM UR-MCNC: 86 MG/DL — SIGNIFICANT CHANGE UP
CREAT SERPL-MCNC: 1.53 MG/DL — HIGH (ref 0.5–1.3)
CULTURE RESULTS: SIGNIFICANT CHANGE UP
GLUCOSE SERPL-MCNC: 90 MG/DL — SIGNIFICANT CHANGE UP (ref 70–99)
HCT VFR BLD CALC: 33 % — LOW (ref 34.5–45)
HGB BLD-MCNC: 10.8 G/DL — LOW (ref 11.5–15.5)
MCHC RBC-ENTMCNC: 32.7 GM/DL — SIGNIFICANT CHANGE UP (ref 32–36)
MCHC RBC-ENTMCNC: 34.3 PG — HIGH (ref 27–34)
MCV RBC AUTO: 104.8 FL — HIGH (ref 80–100)
METHOD TYPE: SIGNIFICANT CHANGE UP
ORGANISM # SPEC MICROSCOPIC CNT: SIGNIFICANT CHANGE UP
ORGANISM # SPEC MICROSCOPIC CNT: SIGNIFICANT CHANGE UP
PLATELET # BLD AUTO: 86 K/UL — LOW (ref 150–400)
POTASSIUM SERPL-MCNC: 4.8 MMOL/L — SIGNIFICANT CHANGE UP (ref 3.5–5.3)
POTASSIUM SERPL-SCNC: 4.8 MMOL/L — SIGNIFICANT CHANGE UP (ref 3.5–5.3)
RBC # BLD: 3.15 M/UL — LOW (ref 3.8–5.2)
RBC # FLD: 16.5 % — HIGH (ref 10.3–14.5)
SODIUM SERPL-SCNC: 139 MMOL/L — SIGNIFICANT CHANGE UP (ref 135–145)
SODIUM UR-SCNC: 26 MMOL/L — SIGNIFICANT CHANGE UP
SPECIMEN SOURCE: SIGNIFICANT CHANGE UP
WBC # BLD: 3.8 K/UL — SIGNIFICANT CHANGE UP (ref 3.8–10.5)
WBC # FLD AUTO: 3.8 K/UL — SIGNIFICANT CHANGE UP (ref 3.8–10.5)

## 2018-12-26 PROCEDURE — 99233 SBSQ HOSP IP/OBS HIGH 50: CPT

## 2018-12-26 PROCEDURE — 99497 ADVNCD CARE PLAN 30 MIN: CPT | Mod: 25

## 2018-12-26 PROCEDURE — 99222 1ST HOSP IP/OBS MODERATE 55: CPT | Mod: GC

## 2018-12-26 RX ORDER — SODIUM CHLORIDE 9 MG/ML
500 INJECTION INTRAMUSCULAR; INTRAVENOUS; SUBCUTANEOUS
Qty: 0 | Refills: 0 | Status: DISCONTINUED | OUTPATIENT
Start: 2018-12-26 | End: 2018-12-26

## 2018-12-26 RX ORDER — ASCORBIC ACID 60 MG
500 TABLET,CHEWABLE ORAL DAILY
Qty: 0 | Refills: 0 | Status: DISCONTINUED | OUTPATIENT
Start: 2018-12-26 | End: 2018-12-27

## 2018-12-26 RX ADMIN — Medication 650 MILLIGRAM(S): at 19:22

## 2018-12-26 RX ADMIN — LACTULOSE 20 GRAM(S): 10 SOLUTION ORAL at 23:13

## 2018-12-26 RX ADMIN — Medication 3 MILLILITER(S): at 05:27

## 2018-12-26 RX ADMIN — Medication 25 MICROGRAM(S): at 05:27

## 2018-12-26 RX ADMIN — Medication 3 MILLILITER(S): at 17:22

## 2018-12-26 RX ADMIN — Medication 2000 UNIT(S): at 11:49

## 2018-12-26 RX ADMIN — LACTULOSE 20 GRAM(S): 10 SOLUTION ORAL at 05:27

## 2018-12-26 RX ADMIN — HEPARIN SODIUM 5000 UNIT(S): 5000 INJECTION INTRAVENOUS; SUBCUTANEOUS at 10:00

## 2018-12-26 RX ADMIN — LACTULOSE 20 GRAM(S): 10 SOLUTION ORAL at 11:49

## 2018-12-26 RX ADMIN — Medication 10 MILLIGRAM(S): at 17:23

## 2018-12-26 RX ADMIN — LACTULOSE 20 GRAM(S): 10 SOLUTION ORAL at 17:22

## 2018-12-26 RX ADMIN — SODIUM CHLORIDE 50 MILLILITER(S): 9 INJECTION INTRAMUSCULAR; INTRAVENOUS; SUBCUTANEOUS at 11:05

## 2018-12-26 RX ADMIN — HEPARIN SODIUM 5000 UNIT(S): 5000 INJECTION INTRAVENOUS; SUBCUTANEOUS at 21:03

## 2018-12-26 RX ADMIN — Medication 1 APPLICATION(S): at 11:49

## 2018-12-26 RX ADMIN — CEFTRIAXONE 100 GRAM(S): 500 INJECTION, POWDER, FOR SOLUTION INTRAMUSCULAR; INTRAVENOUS at 13:40

## 2018-12-26 RX ADMIN — Medication 650 MILLIGRAM(S): at 18:52

## 2018-12-26 NOTE — DIETITIAN INITIAL EVALUATION ADULT. - ORAL INTAKE PTA
Pt reports good appetite and PO intake at home. Confirms allergy to shellfish and dye, denies other food allergies/intolerances./good

## 2018-12-26 NOTE — DIETITIAN INITIAL EVALUATION ADULT. - FACTORS AFF FOOD INTAKE
Daughter reports pt was seen by SLP who recommended regular diet/fluid consistencies, states ordering food that she knows pt will tolerate; pt with PMH of COPD, denies difficulty breathing and eating at the same time./none

## 2018-12-26 NOTE — PROGRESS NOTE ADULT - SUBJECTIVE AND OBJECTIVE BOX
Patient is a 89y old  Female who presents with a chief complaint of syncope (25 Dec 2018 10:51)      NEUROLOGIC EXAM  Mental Status:     Oriented to place/person; Good eye contact ; follow simple commands ;   speech fluent  Cranial Nerves:   PERRL, EOMI, no facial asymmetry , V1-V3 intact , symmetric palate, tongue midline.   Muscle Strength:	5/5 in all extremities  Muscle Tone:	Normal tone     Coordination/	No dysmetria in finger to nose test bilaterally  Cerebellum	  Tandem Gait/Romberg	Not tested.      Vital Signs Last 24 Hrs  T(C): 36.8 (26 Dec 2018 04:52), Max: 36.8 (26 Dec 2018 04:52)  T(F): 98.2 (26 Dec 2018 04:52), Max: 98.2 (26 Dec 2018 04:52)  HR: 91 (26 Dec 2018 04:52) (80 - 91)  BP: 92/79 (26 Dec 2018 04:52) (92/79 - 98/62)  BP(mean): --  RR: 18 (26 Dec 2018 04:52) (18 - 20)  SpO2: 96% (26 Dec 2018 04:52) (96% - 98%)  MEDICATIONS  (STANDING):  ALBUTerol/ipratropium for Nebulization 3 milliLiter(s) Nebulizer every 12 hours  BACItracin   Ointment 1 Application(s) Topical daily  cefTRIAXone   IVPB 1 Gram(s) IV Intermittent every 24 hours  cefTRIAXone   IVPB      cholecalciferol 2000 Unit(s) Oral daily  digoxin     Tablet 0.125 milliGRAM(s) Oral every other day  diphtheria/tetanus/pertussis (acellular) Vaccine (ADAcel) 0.5 milliLiter(s) IntraMuscular once  heparin  Injectable 5000 Unit(s) SubCutaneous every 12 hours  lactulose Syrup 20 Gram(s) Oral four times a day  levothyroxine 25 MICROGram(s) Oral daily  ondansetron Injectable 4 milliGRAM(s) IV Push once  propranolol 10 milliGRAM(s) Oral two times a day  rifaximin 550 milliGRAM(s) Oral two times a day    MEDICATIONS  (PRN):  acetaminophen   Tablet .. 650 milliGRAM(s) Oral every 6 hours PRN Moderate Pain (4 - 6)  < from: MR Head No Cont (12.19.18 @ 19:21) >    *** ADDENDUM 12/21/2018  ***    Typographical error in the body of the original report which should say   that there is NO acute hemorrhage identified      *** END OF ADDENDUM 12/21/2018  ***      *** ADDENDUM 12/21/2018  ***    Comparison is made with the prior MRI of 2/9/2016. The right frontal   cortical infarct which appears subacute is new in the interval since the   prior exam.     Mild age-appropriate involutional changes have progressed since the prior   exam. Old foci of hemosiderin deposition are unchanged.      *** END OF ADDENDUM 12/21/2018  ***      PROCEDURE DATE:  12/19/2018            < end of copied text >

## 2018-12-26 NOTE — DIETITIAN INITIAL EVALUATION ADULT. - NS AS NUTRI INTERV MEALS SNACK
Recommend continue DASH/TLC diet (daughter requests strict low salt diet). Encourage PO intake, obtain food preferences, provide feeding assistance as needed.

## 2018-12-26 NOTE — BEHAVIORAL HEALTH ASSESSMENT NOTE - AXIS III
CKD stage 3, CLL in remission, liver cirrhosis  A fib, pulmonary hypertension, aortic valve stenosis and hypothyroidism

## 2018-12-26 NOTE — BEHAVIORAL HEALTH ASSESSMENT NOTE - SUMMARY
89 year old female domiciled with daughter, with no formal PPH and PMH significant for CKD stage 3, CLL in remission, liver cirrhosis  A fib, pulmonary hypertension, aortic valve stenosis and hypothyroidism presented initially with syncope and found to have subacute right frontal infarct.  Primary team consulted psych as primary team stated that daughter/patient wanted consult.    Patient likely was delirious- and this is resolving. She also likely has an adjustment disorder given her recent falls and new normal.    Recommend:  - team talk to patient/daughter regarding disposition timeline, PT/OT needs, in order to ensure safe discharge planning  - melatonin 3mg HS  [] given elevated MCV- check B12, folate

## 2018-12-26 NOTE — DIETITIAN INITIAL EVALUATION ADULT. - ENERGY NEEDS
Ht: 63 inches UBW: 109 pounds BMI: 19.3 kg/m2 IBW: 115 (+/-10%) 94.7 %IBW  Pertinent information: Pt 89 y/o F with PMH: CKD 3, CLL in remission, liver cirrhosis/carcinoma, A-fib, HTN, aortic valve stenosis, hypothyroidism, bleeding esophageal varices, COPD, GIB, PVD, admitted with syncope, found with CVA, EGG pending (maybe outpatient), UTI.    No noted edema as per flow sheets. Skin: pressure ulcers in sacrum, left lateral foot, and left ankle stage 1 as per documentation.

## 2018-12-26 NOTE — DIETITIAN INITIAL EVALUATION ADULT. - OTHER INFO
Pt seen for length of stay initial assessment. Pt reports good appetite and PO intake. Denies difficulty chewing/swallowing. Pt denies nausea, vomiting, diarrhea, or constipation, reports last BM today (12/26). Denies weight changes PTA, reports UBW approximately 109 pounds. Weight as per previous RD notes (02/05/2018) 107 pounds -> (10/26/2018) 107 pounds. Weight as per flow sheets (12/19) 119.9 pounds - ?accuracy, daughter states "that weight is not accurate", recommend obtain new weight.

## 2018-12-26 NOTE — PROGRESS NOTE ADULT - PROBLEM SELECTOR PLAN 6
- c/w propanolol, lactulose, and rifaximin  - aldactone held due to hypotension. no edema b/l in legs so far.

## 2018-12-26 NOTE — DIETITIAN INITIAL EVALUATION ADULT. - ADHERENCE
good/Pt reports following "a strict" low salt diet at home, states consuming healthy foods. Reports taking vitamin C and vitamin D PTA.

## 2018-12-26 NOTE — PROGRESS NOTE ADULT - PROBLEM SELECTOR PLAN 1
- in setting of known severe AS +/- vasovagal.   - TTE on 12/20 with severe to critical AS, severe AR/MR, mild-mod MS, normal LVSF  - troponin negative x 2

## 2018-12-26 NOTE — BEHAVIORAL HEALTH ASSESSMENT NOTE - NSBHCHARTREVIEWIMAGING_PSY_A_CORE FT
12/19/18 MRI  IMPRESSION: Subacute right frontal cortical infarct. Atrophy. Small   vessel white matter ischemic changes. Old left posterior frontal parietal   cortical hemosiderin deposition. Limited MRA of the head and neck. For   further evaluation CT angiography may be helpful.

## 2018-12-26 NOTE — DIETITIAN INITIAL EVALUATION ADULT. - PROBLEM SELECTOR PLAN 1
- patient likely with vasovagal episode given syncope immediately following bowel movement, however given CT head finding will need to r/o acute ischemic stroke  - CT head with subacute right front infarct, will get MRI brain, MRA brain and MRA neck; echocardiogram ordered   - troponin negative x 2  - c/w telemetry  - given  cc bolus, however given severe AS will hold off on further IVF  - orthostatics ordered STAT  - Case discussed with neurologist as bedside Dr. Calloway  - patients daughter REFUSING aspirin

## 2018-12-26 NOTE — BEHAVIORAL HEALTH ASSESSMENT NOTE - NSBHCHARTREVIEWLAB_PSY_A_CORE FT
10.8   3.80  )-----------( 86       ( 26 Dec 2018 08:15 )             33.0     12-26    139  |  103  |  51<H>  ----------------------------<  90  4.8   |  25  |  1.53<H>    Ca    10.5      26 Dec 2018 06:39      Ammonia, Serum (10.21.18 @ 22:52)    Ammonia, Serum: 151 umol/L

## 2018-12-26 NOTE — PROGRESS NOTE ADULT - PROBLEM SELECTOR PLAN 3
- c/w Ceftriaxone (12/24-).   - ucx growing e coli, follow up sensitivities and will likely treat for short course ~ 3 days. - c/w Ceftriaxone (12/24-).   - ucx growing e coli, follow up sensitivities and will likely treat for short course ~ 5 days.

## 2018-12-26 NOTE — DIETITIAN INITIAL EVALUATION ADULT. - NS AS NUTRI INTERV ED CONTENT
Stressed the importance of protein consumption to help with healing, provided recommendations to increase PO and protein intake in case of decreased appetite, recommended small frequent meals starting with protein to optimize intake, reviewed menu order procedures in hospital, described DASH/TLC diet. Encouraged pt to continue to limit salt intake at home and avoid fried foods/saturated fats. RD remains available.

## 2018-12-26 NOTE — BEHAVIORAL HEALTH ASSESSMENT NOTE - NSBHCHARTREVIEWINVESTIGATE_PSY_A_CORE FT
12/19/18  Ventricular Rate 70 BPM    Atrial Rate 90 BPM    QRS Duration 92 ms    Q-T Interval 344 ms    QTC Calculation(Bezet) 371 ms    R Axis 55 degrees    T Axis 185 degrees    Diagnosis Line ATRIAL FIBRILLATION  MINIMAL VOLTAGE CRITERIA FOR LVH, MAY BE NORMAL VARIANT  ST & T WAVE ABNORMALITY, CONSIDER ANTEROLATERAL ISCHEMIA  ABNORMAL ECG

## 2018-12-26 NOTE — PROGRESS NOTE ADULT - PROBLEM SELECTOR PLAN 8
- severe critical aortic stenosis  - continue to hold aldactone given relative hypotension per discussion with patient's cardiologist (Dr. Boston)  - outpatient follow up

## 2018-12-26 NOTE — PROGRESS NOTE ADULT - PROBLEM SELECTOR PLAN 7
- rate controlled  - c/w digoxin every other day. check dig level in setting of KARRIE.  - not on ac due to past hemorrhagic stroke

## 2018-12-26 NOTE — BEHAVIORAL HEALTH ASSESSMENT NOTE - NSBHCHARTREVIEWVS_PSY_A_CORE FT
Vital Signs Last 24 Hrs  T(C): 36.4 (26 Dec 2018 14:52), Max: 36.8 (26 Dec 2018 04:52)  T(F): 97.6 (26 Dec 2018 14:52), Max: 98.2 (26 Dec 2018 04:52)  HR: 88 (26 Dec 2018 14:52) (80 - 91)  BP: 99/62 (26 Dec 2018 14:52) (92/79 - 99/62)  BP(mean): --  RR: 18 (26 Dec 2018 14:52) (18 - 20)  SpO2: 96% (26 Dec 2018 14:52) (96% - 98%)

## 2018-12-26 NOTE — DIETITIAN INITIAL EVALUATION ADULT. - NS AS NUTRI INTERV VITAMIN
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 Having care in [place] and seeing Dr. Cagle due to AMA. Was here in [place] visiting a friend and presented for evaluation of contractions. She appeared initially to be jarrett every 6 minutes but then contractions spaced out to every 12 at most. Cervix per RN was closed/10%/-2. FHTs reactive 130 baseline. Home with PTL instructions.   
Recommend Multivitamin and Vitamin C to help with healing and as per pt's daughter request. Spoke to provider.

## 2018-12-26 NOTE — PROGRESS NOTE ADULT - SUBJECTIVE AND OBJECTIVE BOX
Patient is a 89y old  Female who presents with a chief complaint of syncope (26 Dec 2018 09:35)      SUBJECTIVE / OVERNIGHT EVENTS: Left knee pain is better. Per daughter, patient is not drinking as much. No SOB. Patient is urinating without difficulty.    MEDICATIONS  (STANDING):  ALBUTerol/ipratropium for Nebulization 3 milliLiter(s) Nebulizer every 12 hours  BACItracin   Ointment 1 Application(s) Topical daily  cefTRIAXone   IVPB 1 Gram(s) IV Intermittent every 24 hours  cefTRIAXone   IVPB      cholecalciferol 2000 Unit(s) Oral daily  digoxin     Tablet 0.125 milliGRAM(s) Oral every other day  diphtheria/tetanus/pertussis (acellular) Vaccine (ADAcel) 0.5 milliLiter(s) IntraMuscular once  heparin  Injectable 5000 Unit(s) SubCutaneous every 12 hours  lactulose Syrup 20 Gram(s) Oral four times a day  levothyroxine 25 MICROGram(s) Oral daily  ondansetron Injectable 4 milliGRAM(s) IV Push once  propranolol 10 milliGRAM(s) Oral two times a day  rifaximin 550 milliGRAM(s) Oral two times a day  sodium chloride 0.9%. 500 milliLiter(s) (50 mL/Hr) IV Continuous <Continuous>    MEDICATIONS  (PRN):  acetaminophen   Tablet .. 650 milliGRAM(s) Oral every 6 hours PRN Moderate Pain (4 - 6)      Vital Signs Last 24 Hrs  T(C): 36.8 (26 Dec 2018 04:52), Max: 36.8 (26 Dec 2018 04:52)  T(F): 98.2 (26 Dec 2018 04:52), Max: 98.2 (26 Dec 2018 04:52)  HR: 91 (26 Dec 2018 04:52) (80 - 91)  BP: 92/79 (26 Dec 2018 04:52) (92/79 - 98/62)  BP(mean): --  RR: 18 (26 Dec 2018 04:52) (18 - 20)  SpO2: 96% (26 Dec 2018 04:52) (96% - 98%)  CAPILLARY BLOOD GLUCOSE        I&O's Summary    25 Dec 2018 07:01  -  26 Dec 2018 07:00  --------------------------------------------------------  IN: 480 mL / OUT: 100 mL / NET: 380 mL    26 Dec 2018 07:01  -  26 Dec 2018 12:28  --------------------------------------------------------  IN: 0 mL / OUT: 200 mL / NET: -200 mL        PHYSICAL EXAM:  GENERAL: NAD  HEENT: neck supple  LUNG: Clear to auscultation bilaterally; No wheeze  HEART: S1, S2 + ARCADIO  ABDOMEN: Soft, Nontender, Nondistended; Bowel sounds present  EXTREMITIES: No leg edema, adequate ROM of legs  PSYCH: normal affect, calm  NEUROLOGY: AAO x3, moves all extremities  SKIN: hyperpigmented skin changes, ecchymosis in arms, face      LABS:                        10.8   3.80  )-----------( 86       ( 26 Dec 2018 08:15 )             33.0     12-26    139  |  103  |  51<H>  ----------------------------<  90  4.8   |  25  |  1.53<H>    Ca    10.5      26 Dec 2018 06:39                RADIOLOGY & ADDITIONAL TESTS:    Imaging Personally Reviewed:    Consultant(s) Notes Reviewed:  neuro    Care Discussed with Consultants/Other Providers: medicine NP

## 2018-12-26 NOTE — PROGRESS NOTE ADULT - ATTENDING COMMENTS
Ongoing discussion with patient and daughter regarding dispo. Patient was able to complete 7 steps with PT on 12/24. Patient reluctant to go to rehab. Possible d/c home with home PT if KARRIE improves in next day or so. 11. Advance Care Planning:   Discussed with the patient's daughter regarding advanced directives and plan of care. Patient is DNR/DNI. Given advanced age and multiple comorbid conditions, daughter is open to discussion with palliative care team but not interested in hospice at this time. Ongoing discussion with patient and daughter regarding disposition. Patient was able to complete 7 steps with PT on 12/24. Patient refusing to go to rehab. Possible d/c home with home PT if KARRIE improves in next day or so. 11. Advance Care Planning:   Discussed with the patient's daughter regarding advanced directives and plan of care. Patient's daughter(Julio) is the primary HCP. Patient is DNR/DNI. Given advanced age and multiple comorbid conditions, daughter is open to discussion with palliative care team but not interested in hospice at this time. Ongoing discussion with patient and daughter regarding disposition. Patient was able to complete 7 steps with PT on 12/24. Patient refusing to go to rehab. Possible d/c home with home PT if KARRIE improves in next day or so. Face to face encounter time spent 20 mins.

## 2018-12-26 NOTE — BEHAVIORAL HEALTH ASSESSMENT NOTE - RISK ASSESSMENT
Patient has strong support system, no history of SI, and is future oriented. She has low imminent risk for self harm.

## 2018-12-26 NOTE — BEHAVIORAL HEALTH ASSESSMENT NOTE - HPI (INCLUDE ILLNESS QUALITY, SEVERITY, DURATION, TIMING, CONTEXT, MODIFYING FACTORS, ASSOCIATED SIGNS AND SYMPTOMS)
89 year old female domiciled with daughter, with no formal PPH and PMH significant for CKD stage 3, CLL in remission, liver cirrhosis  A fib, pulmonary hypertension, aortic valve stenosis and hypothyroidism presented initially with syncope and found to have subacute right frontal infarct.  Primary team consulted psych as primary team stated that daughter/patient wanted consult.    Patient seen this afternoon. Daughter states that she was hoping for a psychologist, not a psychiatrist as she did not expect any medications to be offered. Daughter states that she was hoping someone can work with patient and her to help them better communicate and work through the new normal that is her mother's (patient) situation.  Daughter states that her mother has not fully appreciated her physical limitations- though she notes that after the recent PT eval today, her mother is more understanding.  The daughter's main concern is that she needs time to plan/stage the house if it is determined that the patient can go home.    The patient overall expresses some frustration that she has had falls. She denies feeling depressed. No SI/HI elicited. No AH/VH elicited. She knows the date. She knows what floor she is on. She knows she is at [facility], [place]. She admits to socially drinking alcohol everyday- though states that she recently stopped. She recognizes that she will need to adjust to a new normal back at home regards to safety and monitoring.

## 2018-12-26 NOTE — PROGRESS NOTE ADULT - ASSESSMENT
89 y/o F with valvular disease including severe AS/AR/MR, mild-mod MS, CLL in remission, liver cirrhosis, HCC s/p microwave ablation, afib (not on AC, given hemorrhagic CVA- told by outpatient neurology she can not even be on aspirin), pulmonary hypertension, CKD 3, UTI, and hypothyroidism a/w presumed syncope. Patient found to have subacute infarct in right frontal region, UTI, KARRIE.

## 2018-12-26 NOTE — DIETITIAN INITIAL EVALUATION ADULT. - PHYSICAL APPEARANCE
well nourished/Performed Nutrition Focused Physical Exam with pt's consent and noted mild muscle loss in temporales and interosseus muscles; severe muscle loss in clavicles and shoulders; and moderate muscle loss in calf and thigh; suspected normal due to older age, no further signs of muscle/fat loss noted in other areas.

## 2018-12-26 NOTE — BEHAVIORAL HEALTH ASSESSMENT NOTE - CASE SUMMARY
89 year old female domiciled with daughter, with no formal PPH and PMH significant for CKD stage 3, CLL in remission, liver cirrhosis  A fib, pulmonary hypertension, aortic valve stenosis and hypothyroidism presented initially with syncope and found to have subacute right frontal infarct.  Primary team consulted psych as primary team stated that daughter/patient wanted consult.    I have seen and evaluated this patient myself. Chart, labs, meds reviewed. I agree with fellow's assessment and plan. This is a 89-year-old CF pt. domiciled with daughter, with no formal PPH and PMH significant for CKD stage 3, CLL in remission, liver cirrhosis  A fib, pulmonary hypertension, aortic valve stenosis and hypothyroidism presented initially with syncope and found to have subacute right frontal infarct.  Primary team consulted psych as primary team stated that daughter/patient wanted consult.    I have seen and evaluated this patient myself. Chart, labs, meds reviewed. I agree with fellow's assessment and plan.

## 2018-12-26 NOTE — PROGRESS NOTE ADULT - PROBLEM SELECTOR PLAN 4
- may be in setting of UTI, poor po intake of fluid, hypotension  - continue abx as above  - gentle IVF with NS @ 50cc for 500cc  - encouraged po intake  - hold aldactone  - SBP in 90's. hold parameter added to propranolol. may need to hold it altogether if SBP remains in 90's.

## 2018-12-27 ENCOUNTER — INBOUND DOCUMENT (OUTPATIENT)
Age: 83
End: 2018-12-27

## 2018-12-27 VITALS
OXYGEN SATURATION: 99 % | TEMPERATURE: 97 F | SYSTOLIC BLOOD PRESSURE: 103 MMHG | DIASTOLIC BLOOD PRESSURE: 56 MMHG | RESPIRATION RATE: 18 BRPM | HEART RATE: 82 BPM

## 2018-12-27 DIAGNOSIS — R53.2 FUNCTIONAL QUADRIPLEGIA: ICD-10-CM

## 2018-12-27 DIAGNOSIS — Z71.89 OTHER SPECIFIED COUNSELING: ICD-10-CM

## 2018-12-27 LAB
AMMONIA BLD-MCNC: 87 UMOL/L — HIGH (ref 11–55)
ANION GAP SERPL CALC-SCNC: 10 MMOL/L — SIGNIFICANT CHANGE UP (ref 5–17)
BUN SERPL-MCNC: 49 MG/DL — HIGH (ref 7–23)
CALCIUM SERPL-MCNC: 10 MG/DL — SIGNIFICANT CHANGE UP (ref 8.4–10.5)
CHLORIDE SERPL-SCNC: 105 MMOL/L — SIGNIFICANT CHANGE UP (ref 96–108)
CO2 SERPL-SCNC: 23 MMOL/L — SIGNIFICANT CHANGE UP (ref 22–31)
CREAT SERPL-MCNC: 1.19 MG/DL — SIGNIFICANT CHANGE UP (ref 0.5–1.3)
DIGOXIN SERPL-MCNC: 0.5 NG/ML — LOW (ref 0.8–2)
GLUCOSE SERPL-MCNC: 86 MG/DL — SIGNIFICANT CHANGE UP (ref 70–99)
POTASSIUM SERPL-MCNC: 4.7 MMOL/L — SIGNIFICANT CHANGE UP (ref 3.5–5.3)
POTASSIUM SERPL-SCNC: 4.7 MMOL/L — SIGNIFICANT CHANGE UP (ref 3.5–5.3)
SODIUM SERPL-SCNC: 138 MMOL/L — SIGNIFICANT CHANGE UP (ref 135–145)

## 2018-12-27 PROCEDURE — 82947 ASSAY GLUCOSE BLOOD QUANT: CPT

## 2018-12-27 PROCEDURE — 85027 COMPLETE CBC AUTOMATED: CPT

## 2018-12-27 PROCEDURE — 99285 EMERGENCY DEPT VISIT HI MDM: CPT | Mod: 25

## 2018-12-27 PROCEDURE — 82803 BLOOD GASES ANY COMBINATION: CPT

## 2018-12-27 PROCEDURE — 97116 GAIT TRAINING THERAPY: CPT

## 2018-12-27 PROCEDURE — 87186 SC STD MICRODIL/AGAR DIL: CPT

## 2018-12-27 PROCEDURE — 83735 ASSAY OF MAGNESIUM: CPT

## 2018-12-27 PROCEDURE — 84300 ASSAY OF URINE SODIUM: CPT

## 2018-12-27 PROCEDURE — 84484 ASSAY OF TROPONIN QUANT: CPT

## 2018-12-27 PROCEDURE — 85014 HEMATOCRIT: CPT

## 2018-12-27 PROCEDURE — 87086 URINE CULTURE/COLONY COUNT: CPT

## 2018-12-27 PROCEDURE — 94640 AIRWAY INHALATION TREATMENT: CPT

## 2018-12-27 PROCEDURE — 82140 ASSAY OF AMMONIA: CPT

## 2018-12-27 PROCEDURE — 99223 1ST HOSP IP/OBS HIGH 75: CPT

## 2018-12-27 PROCEDURE — 72125 CT NECK SPINE W/O DYE: CPT

## 2018-12-27 PROCEDURE — 97530 THERAPEUTIC ACTIVITIES: CPT

## 2018-12-27 PROCEDURE — 82435 ASSAY OF BLOOD CHLORIDE: CPT

## 2018-12-27 PROCEDURE — 90715 TDAP VACCINE 7 YRS/> IM: CPT

## 2018-12-27 PROCEDURE — 70551 MRI BRAIN STEM W/O DYE: CPT

## 2018-12-27 PROCEDURE — 73130 X-RAY EXAM OF HAND: CPT

## 2018-12-27 PROCEDURE — 80048 BASIC METABOLIC PNL TOTAL CA: CPT

## 2018-12-27 PROCEDURE — 73110 X-RAY EXAM OF WRIST: CPT

## 2018-12-27 PROCEDURE — 70547 MR ANGIOGRAPHY NECK W/O DYE: CPT

## 2018-12-27 PROCEDURE — 71045 X-RAY EXAM CHEST 1 VIEW: CPT

## 2018-12-27 PROCEDURE — 73562 X-RAY EXAM OF KNEE 3: CPT

## 2018-12-27 PROCEDURE — 84295 ASSAY OF SERUM SODIUM: CPT

## 2018-12-27 PROCEDURE — 83036 HEMOGLOBIN GLYCOSYLATED A1C: CPT

## 2018-12-27 PROCEDURE — 80061 LIPID PANEL: CPT

## 2018-12-27 PROCEDURE — 83605 ASSAY OF LACTIC ACID: CPT

## 2018-12-27 PROCEDURE — 80053 COMPREHEN METABOLIC PANEL: CPT

## 2018-12-27 PROCEDURE — 81003 URINALYSIS AUTO W/O SCOPE: CPT

## 2018-12-27 PROCEDURE — 82330 ASSAY OF CALCIUM: CPT

## 2018-12-27 PROCEDURE — 93306 TTE W/DOPPLER COMPLETE: CPT

## 2018-12-27 PROCEDURE — 80162 ASSAY OF DIGOXIN TOTAL: CPT

## 2018-12-27 PROCEDURE — 97161 PT EVAL LOW COMPLEX 20 MIN: CPT

## 2018-12-27 PROCEDURE — 72170 X-RAY EXAM OF PELVIS: CPT

## 2018-12-27 PROCEDURE — 82570 ASSAY OF URINE CREATININE: CPT

## 2018-12-27 PROCEDURE — 81001 URINALYSIS AUTO W/SCOPE: CPT

## 2018-12-27 PROCEDURE — 93005 ELECTROCARDIOGRAM TRACING: CPT | Mod: XU

## 2018-12-27 PROCEDURE — 99239 HOSP IP/OBS DSCHRG MGMT >30: CPT

## 2018-12-27 PROCEDURE — 51701 INSERT BLADDER CATHETER: CPT

## 2018-12-27 PROCEDURE — 70544 MR ANGIOGRAPHY HEAD W/O DYE: CPT

## 2018-12-27 PROCEDURE — 70450 CT HEAD/BRAIN W/O DYE: CPT

## 2018-12-27 PROCEDURE — 84132 ASSAY OF SERUM POTASSIUM: CPT

## 2018-12-27 RX ORDER — ASCORBIC ACID 60 MG
1 TABLET,CHEWABLE ORAL
Qty: 0 | Refills: 0 | DISCHARGE
Start: 2018-12-27

## 2018-12-27 RX ORDER — ACETAMINOPHEN 500 MG
2 TABLET ORAL
Qty: 0 | Refills: 0 | COMMUNITY
Start: 2018-12-27

## 2018-12-27 RX ORDER — BACITRACIN ZINC 500 UNIT/G
1 OINTMENT IN PACKET (EA) TOPICAL
Qty: 0 | Refills: 0 | COMMUNITY
Start: 2018-12-27

## 2018-12-27 RX ORDER — SPIRONOLACTONE 25 MG/1
1 TABLET, FILM COATED ORAL
Qty: 0 | Refills: 0 | COMMUNITY

## 2018-12-27 RX ADMIN — TETANUS TOXOID, REDUCED DIPHTHERIA TOXOID AND ACELLULAR PERTUSSIS VACCINE, ADSORBED 0.5 MILLILITER(S): 5; 2.5; 8; 8; 2.5 SUSPENSION INTRAMUSCULAR at 13:15

## 2018-12-27 RX ADMIN — Medication 500 MILLIGRAM(S): at 11:49

## 2018-12-27 RX ADMIN — LACTULOSE 20 GRAM(S): 10 SOLUTION ORAL at 05:09

## 2018-12-27 RX ADMIN — LACTULOSE 20 GRAM(S): 10 SOLUTION ORAL at 11:49

## 2018-12-27 RX ADMIN — Medication 3 MILLILITER(S): at 05:09

## 2018-12-27 RX ADMIN — Medication 0.12 MILLIGRAM(S): at 11:48

## 2018-12-27 RX ADMIN — Medication 650 MILLIGRAM(S): at 13:45

## 2018-12-27 RX ADMIN — Medication 1 TABLET(S): at 11:49

## 2018-12-27 RX ADMIN — HEPARIN SODIUM 5000 UNIT(S): 5000 INJECTION INTRAVENOUS; SUBCUTANEOUS at 09:51

## 2018-12-27 RX ADMIN — Medication 2000 UNIT(S): at 11:49

## 2018-12-27 RX ADMIN — Medication 1 APPLICATION(S): at 11:48

## 2018-12-27 RX ADMIN — Medication 25 MICROGRAM(S): at 05:09

## 2018-12-27 RX ADMIN — Medication 10 MILLIGRAM(S): at 05:09

## 2018-12-27 NOTE — PROGRESS NOTE ADULT - REASON FOR ADMISSION
syncope

## 2018-12-27 NOTE — CONSULT NOTE ADULT - PROBLEM SELECTOR RECOMMENDATION 5
Spent more than 16 minutes discussing and completing MOLST form.  Pt DNR/DNI but family would like to have continued medical treatment.  Pt lives home with aides.  Signing off

## 2018-12-27 NOTE — PROGRESS NOTE ADULT - ATTENDING COMMENTS
11. Advance Care Planning:   Discussed with the patient's daughter regarding advanced directives and plan of care on 12/26. Patient's daughter(Julio) is the primary HCP. Patient is DNR/DNI. Given advanced age and multiple comorbid conditions, daughter is open to discussion with palliative care team but not interested in hospice at this time. Patient refusing to go to rehab. Possible d/c home with home PT today after daughter speaks to palliative care team. d/c time 40 mins.

## 2018-12-27 NOTE — CONSULT NOTE ADULT - PROBLEM SELECTOR RECOMMENDATION 9
fall , possible syncope.  Recommend cardiology eval. Patient with history of Afib but not a candidate for AC.  PT, fall precaution.  check orthostatics.
subacute infarcts per CT Head

## 2018-12-27 NOTE — PROGRESS NOTE ADULT - PROBLEM SELECTOR PLAN 4
- may be in setting of UTI, poor po intake of fluid, hypotension  - complete course of abx and gentle IVF with NS @ 50cc for 500cc  - serum cr improving  - encouraged po intake  - hold aldactone  - SBP in 90's. hold parameter added to propranolol. may need to hold it altogether if SBP remains in 90's.

## 2018-12-27 NOTE — CONSULT NOTE ADULT - ASSESSMENT
89 y/o female with history of valvular disease, liver cirrhosis, HCC, CLL in remission, admitted s/p syncope , CT with subacute infarcts, UTI, KARRIE   called for goals of care

## 2018-12-27 NOTE — PROGRESS NOTE ADULT - ASSESSMENT
89 y/o F with valvular disease including severe AS/AR/MR, mild-mod MS, CLL in remission, liver cirrhosis, HCC s/p microwave ablation, afib (not on AC, given hemorrhagic CVA- told by outpatient neurology she can not even be on aspirin), pulmonary hypertension, CKD 3, UTI, and hypothyroidism a/w presumed syncope. Patient found to have subacute infarct in right frontal region, UTI, KARRIE.

## 2018-12-27 NOTE — PROGRESS NOTE ADULT - PROBLEM SELECTOR PLAN 10
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
- likely in the settting of her cryptogenic cirrhosis and CLL  - continue to monitor
HSQ for now while monitoring thrombocytopenia closely  pt reccs home w/ home PT though daughter is unable to take care of her at home. Pt. has room upstairs, family cannot make it downstairs. Patient cannot navigate steps and daughter is understandably concerned about bringing her home w/ home PT. Patient refusing rehab (were it an option) and is also hesitant to put in chair lift at home.   PT to follow up regarding stairs
HSQ for now while monitoring thrombocytopenia closely  pt reccs home w/ home PT though daughter is unable to take care of her at home. Pt. has room upstairs, family cannot make it downstairs. Patient cannot navigate steps and daughter is understandably concerned about bringing her home w/ home PT. Patient refusing rehab (were it an option) and is also hesitant to put in chair lift at home.   PT to follow up regarding stairs
hsq  pt pending
hsq  pt reccs home w/ home PT though daughter is unable to take care of her at home. Pt. has room upstairs, family cannot make it downstairs. Patient cannot navigate steps and daughter is understandably concerned about bringing her home w/ home PT. Patient refusing rehab (were it an option) and is also hesitant to put in chair lift at home. Dispo pending further discussions.
hsq  pt reccs home w/ home PT though daughter is unable to take care of her at home. Pt. has room upstairs, family cannot make it downstairs. Patient cannot navigate steps and daughter is understandably concerned about bringing her home w/ home PT. Patient refusing rehab (were it an option) and is also hesitant to put in chair lift at home. Dispo pending further discussions.
hsq  pt pending

## 2018-12-27 NOTE — CONSULT NOTE ADULT - SUBJECTIVE AND OBJECTIVE BOX
HPI:  Patient is a 89 year old female with CKD stage 3, CLL in remission, liver cirrhosis  A fib (not on AC, given hemorrhagic CVA- told by outpatient neurology she can not even be on aspirin), pulmonary hypertension, aortic valve stenosis and hypothyroidism presents to the ED with syncope. Patient last spoke to her daughter around 9 pm and was fine. Her other daughter called around 9:30 pm and when the patient did not  the phone she rushed to the patients house where she found her on the floor of the bathroom. Daughter noticed stool in the toilet. Patient was awake when the daughter found her but she does not recall the event. The daughter did not appreciate any tongue biting, shaking, incontinence. The patient was relatively alert. The patient remembers being in the bathroom but does not remember how she felt prior that. Daughter remarks that the patient has been more confused and agitated in the last two weeks. Currently, she denies chest pain, shortness of breath, palpitations, dizziness, lightheadedness.     In the ED, HR 90s, BP 99. Trop negative x 2. Cr 1.26 (baseline 1.00). CT head with right frontal subacute infarct. CT spine, XR knee/chest/pelvis unremarkable. 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  HTN (Hypertension)  Bleeding Esophageal Varices  CLL (Chronic Lymphoblastic Leukemia)  GIB (Gastrointestinal Bleeding)    History of repair of hip fracture  Esophageal Rupture    FAMILY HISTORY:  Family history of hyperlipidemia (Father)    ITEMS NOT CHECKED ARE NOT PRESENT    SOCIAL HISTORY:   Significant other/partner:  [X]  Children:  [ ]  Restoration/Spirituality: Gnosticism   Substance hx:  [ ]   Tobacco hx:  [ ]   Alcohol hx: [ ]   Home Opioid hx:  [ ] I-Stop Reference No:  Living Situation: [X ]Home  [ ]Long term care  [ ]Rehab [ ]Other    ADVANCE DIRECTIVES:    DNR  Yes  MOLST  [X ]  Living Will  [ ]   DECISION MAKER(s):  [X ] Health Care Proxy(s)  [ ] Surrogate(s)  [ ] Guardian           Name(s): Phone Number(s):  MARYBETH JACQUES &  ONEIL JACQUES :  DAUGHTERS   ;    [phone]  BASELINE (I)ADL(s) (prior to admission):  [place]: [ ]Total  [ X] Moderate [ ]Dependent    Allergies    codeine (Rash)  erythromycin (Rash)  iv dye (Rash)  penicillin (Rash)  shellfish (Rash)    Intolerances    adhesives (Other)  warfarin (Other)  MEDICATIONS  (STANDING):  ALBUTerol/ipratropium for Nebulization 3 milliLiter(s) Nebulizer every 12 hours  ascorbic acid 500 milliGRAM(s) Oral daily  BACItracin   Ointment 1 Application(s) Topical daily  cholecalciferol 2000 Unit(s) Oral daily  digoxin     Tablet 0.125 milliGRAM(s) Oral every other day  heparin  Injectable 5000 Unit(s) SubCutaneous every 12 hours  lactulose Syrup 20 Gram(s) Oral four times a day  levothyroxine 25 MICROGram(s) Oral daily  multivitamin 1 Tablet(s) Oral daily  ondansetron Injectable 4 milliGRAM(s) IV Push once  propranolol 10 milliGRAM(s) Oral two times a day  rifaximin 550 milliGRAM(s) Oral two times a day    MEDICATIONS  (PRN):  acetaminophen   Tablet .. 650 milliGRAM(s) Oral every 6 hours PRN Moderate Pain (4 - 6)    PRESENT SYMPTOMS: [ ]Unable to obtain due to poor mentation   Source if other than patient:  [ ]Family   [ ]Team     Pain (Impact on QOL):    Location -       UPPER EXTREMITIES  , PATIENT OFFERS "  YOU KNOW, THE USUAL ACHES AND PAINS"  Minimal acceptable level (0-10 scale):                    Aggrevating factors - AMBULATION   Quality - DULL  Radiation -NONE  Severity (0-10 scale) -  5  Timing - SPORATIC    PAIN AD Score:     [email or web address] (press ctrl +  left click to view)    Dyspnea:                           [ ]Mild [ ]Moderate [ ]Severe  Anxiety:                             [ X]Mild [ ]Moderate [ ]Severe  Fatigue:                             [ ]Mild [ ]Moderate [ ]Severe  Nausea:                             [ ]Mild [ ]Moderate [ ]Severe  Loss of appetite:              [ ]Mild [X ]Moderate [ ]Severe  Constipation:                    [ ]Mild [ ]Moderate [ ]Severe    Other Symptoms:  [X ]All other review of systems negative     Karnofsky Performance Score/Palliative Performance Status Version 2:     40    %  PHYSICAL EXAM:  Vital Signs Last 24 Hrs  T(C): 36.3 (27 Dec 2018 11:47), Max: 36.7 (26 Dec 2018 20:21)  T(F): 97.3 (27 Dec 2018 11:47), Max: 98 (26 Dec 2018 20:21)  HR: 82 (27 Dec 2018 11:47) (65 - 97)  BP: 103/56 (27 Dec 2018 11:47) (98/60 - 118/71)  BP(mean): --  RR: 18 (27 Dec 2018 11:47) (18 - 18)  SpO2: 99% (27 Dec 2018 11:47) (94% - 99%) I&O's Summary    26 Dec 2018 07:01  -  27 Dec 2018 07:00  --------------------------------------------------------  IN: 770 mL / OUT: 201 mL / NET: 569 mL    GENERAL:  [X ]Alert  [X ]Oriented x 3 : DEFERS DECISIONS TO DAUGHTERS   [ ]Lethargic  [X ]Cachexia  [ ]Unarousable  [ ]Verbal  [ ]Non-Verbal  Behavioral:   [ ] Anxiety  [ ] Delirium [ ] Agitation [ ] Other  HEENT:  [ ]Normal   [X ]Dry mouth   [ ]ET Tube/Trach  [ ]Oral lesions  PULMONARY:   [ ]Clear [ ]Tachypnea  [X ]MILD Audible UPPER AIRWAY  secretions   [ ]Rhonchi        [ ]Right [ ]Left [ ]Bilateral  [ ]Crackles        [ ]Right [ ]Left [ ]Bilateral  [ ]Wheezing     [ ]Right [ ]Left [ ]Bilateral  CARDIOVASCULAR:    [ ]Regular [X ]Irregular [ ]Tachy  [ ]Kwadwo [ ]Murmur [ ]Other  GASTROINTESTINAL:  X[ ]Soft  [ ]Distended   [X ]+BS  [ X]Non tender [ ]Tender  [ ]PEG [ ]OGT/ NGT  Last BM:   12-20-18 @ 07:01  -  12-21-18 @ 07:00  --------------------------------------------------------  OUT: 0 mL    12-21-18 @ 07:01  -  12-22-18 @ 07:00  --------------------------------------------------------  OUT: 7 mL    12-22-18 @ 07:01  -  12-23-18 @ 07:00  --------------------------------------------------------  OUT: 0 mL    12-23-18 @ 07:01  -  12-24-18 @ 07:00  --------------------------------------------------------  OUT: 0 mL    12-24-18 @ 07:01  -  12-25-18 @ 07:00  --------------------------------------------------------  OUT: 3 mL    12-25-18 @ 07:01  -  12-26-18 @ 07:00  --------------------------------------------------------  OUT: 0 mL    12-26-18 @ 07:01  -  12-27-18 @ 07:00  --------------------------------------------------------  OUT: 1 mL    GENITOURINARY:  [X ]Normal [ ] Incontinent   [ ]Oliguria/Anuria   [ ][place]  MUSCULOSKELETAL:   [ ]Normal   [X ]Weakness  [ ]Bed/Wheelchair bound [ ]Edema  NEUROLOGIC:   [X ]No focal deficits  [ ] Cognitive impairment  [ ] Dysphagia [ ]Dysarthria [ ] Paresis [ ]Other   SKIN:   [ ]Normal   [ ]Pressure ulcer(s)  [ ]Rash       MULTIPLE AREAS OF ECCHYMOSIS > IN UPPER EXTREMITIES     CRITICAL CARE:  [ ] Shock Present  [ ]Septic [ ]Cardiogenic [ ]Neurologic [ ]Hypovolemic  [ ]  Vasopressors [ ]  Inotropes   [ ] Respiratory failure present  [ ] Acute  [ ] Chronic [ ] Hypoxic  [ ] Hypercarbic [ ] Other  [ ] Other organ failure     LABS:                        10.8   3.80  )-----------( 86       ( 26 Dec 2018 08:15 )             33.0   12-27    138  |  105  |  49<H>  ----------------------------<  86  4.7   |  23  |  1.19    Ca    10.0      27 Dec 2018 07:07          RADIOLOGY & ADDITIONAL STUDIES:    PROTEIN CALORIE MALNUTRITION:   [ ] PPSV2 < or = to 30% [ ] significant weight loss  [X ] poor nutritional intake [ ] catabolic state [ ] anasarca     Albumin, Serum: 3.3 g/dL (12-19-18 @ 00:50)  Artificial Nutrition [ ]     REFERRALS:   [ ]Chaplaincy  [ ] Hospice  [ ]Child Life  [ ]Social Work  [X ]Case management [ ]Holistic Therapy   Goals of Care Discussion Document:

## 2018-12-27 NOTE — PROGRESS NOTE ADULT - SUBJECTIVE AND OBJECTIVE BOX
Patient is a 89y old  Female who presents with a chief complaint of syncope (26 Dec 2018 12:28)      SUBJECTIVE / OVERNIGHT EVENTS: No acute complaints. Denies SOB. Left knee pain is better with tylenol.    MEDICATIONS  (STANDING):  ALBUTerol/ipratropium for Nebulization 3 milliLiter(s) Nebulizer every 12 hours  ascorbic acid 500 milliGRAM(s) Oral daily  BACItracin   Ointment 1 Application(s) Topical daily  cholecalciferol 2000 Unit(s) Oral daily  digoxin     Tablet 0.125 milliGRAM(s) Oral every other day  diphtheria/tetanus/pertussis (acellular) Vaccine (ADAcel) 0.5 milliLiter(s) IntraMuscular once  heparin  Injectable 5000 Unit(s) SubCutaneous every 12 hours  lactulose Syrup 20 Gram(s) Oral four times a day  levothyroxine 25 MICROGram(s) Oral daily  multivitamin 1 Tablet(s) Oral daily  ondansetron Injectable 4 milliGRAM(s) IV Push once  propranolol 10 milliGRAM(s) Oral two times a day  rifaximin 550 milliGRAM(s) Oral two times a day    MEDICATIONS  (PRN):  acetaminophen   Tablet .. 650 milliGRAM(s) Oral every 6 hours PRN Moderate Pain (4 - 6)      Vital Signs Last 24 Hrs  T(C): 36.4 (27 Dec 2018 04:41), Max: 36.7 (26 Dec 2018 20:21)  T(F): 97.5 (27 Dec 2018 04:41), Max: 98 (26 Dec 2018 20:21)  HR: 66 (27 Dec 2018 04:41) (65 - 97)  BP: 118/71 (27 Dec 2018 04:41) (98/60 - 118/71)  BP(mean): --  RR: 18 (27 Dec 2018 04:41) (18 - 18)  SpO2: 97% (27 Dec 2018 04:41) (94% - 97%)  CAPILLARY BLOOD GLUCOSE        I&O's Summary    26 Dec 2018 07:01  -  27 Dec 2018 07:00  --------------------------------------------------------  IN: 770 mL / OUT: 201 mL / NET: 569 mL        PHYSICAL EXAM:  GENERAL: NAD  HEENT: neck supple  LUNG: Clear to auscultation bilaterally; No wheeze  HEART: S1, S2 + ARCADIO  ABDOMEN: Soft, Nontender, Nondistended; Bowel sounds present  EXTREMITIES: No leg edema  PSYCH: normal affect, calm  NEUROLOGY: AAO x3, moves all extremities  SKIN: + ecchymosis on face, arms      LABS:                        10.8   3.80  )-----------( 86       ( 26 Dec 2018 08:15 )             33.0     12-27    138  |  105  |  49<H>  ----------------------------<  86  4.7   |  23  |  1.19    Ca    10.0      27 Dec 2018 07:07                RADIOLOGY & ADDITIONAL TESTS:    Imaging Personally Reviewed:    Consultant(s) Notes Reviewed: psych     Care Discussed with Consultants/Other Providers: medicine NP

## 2018-12-27 NOTE — PROGRESS NOTE ADULT - PROBLEM SELECTOR PROBLEM 2
CVA (cerebral vascular accident)
Cerebrovascular accident (CVA), unspecified mechanism

## 2018-12-27 NOTE — PROGRESS NOTE ADULT - PROBLEM SELECTOR PLAN 2
subacute R frontal CVA, afib, not on AC secondary to high bleeding risk.  Vascular neurology consult noted.  Patient will f/u with Dr. Hdez (neurology) as outpatient.  Fall precautions.   EEG Pending, can be outpatient.
- MRI head showing subacute infarct in right frontal region  - high risk of intracranial bleeding with AC per neuro  - patient's daughter refusing aspirin at this time  - neuro follow up appreciated. EEG can be done as outpatient.
- MRI head showing subacute infarct in right frontal region  - high risk of intracranial bleeding with AC per neuro  - patient's daughter refusing aspirin at this time  - neuro follow up appreciated. EEG can be done as outpatient.
- MRI head showing subacute infarct in right frontal region  - high risk of intracranial bleeding with AC per neuro  - patients daughter refusing aspirin at this time
- MRI head showing subacute infarct in right frontal region  - high risk of intracranial bleeding with AC per neuro  - patients daughter refusing aspirin at this time per prior discussion
- SEE ABOVE

## 2018-12-27 NOTE — CONSULT NOTE ADULT - PROBLEM SELECTOR RECOMMENDATION 2
CT head noted. Possible subacute CVA in right frontal region. Recommend MRI of brain.  also recommend MRA of head and neck if able.  Echo.  consider aspirin 81 if no medical contraindications.  check lipid profile.  EEG (maybe outpatient).
Likely in setting of CVA/UTI

## 2018-12-27 NOTE — PROGRESS NOTE ADULT - PROBLEM SELECTOR PROBLEM 10
Need for prophylactic measure
Thrombocytopenia
Thrombocytopenia
Need for prophylactic measure

## 2018-12-28 NOTE — DISCUSSION/SUMMARY
[FreeTextEntry1] : T/C w patient's daughter, Julio re:hospital discharge f/u appt. Patient's daughter stated she will call to schedule  appt..Will follow up.

## 2019-01-03 ENCOUNTER — APPOINTMENT (OUTPATIENT)
Dept: INTERNAL MEDICINE | Facility: CLINIC | Age: 84
End: 2019-01-03
Payer: MEDICARE

## 2019-01-03 VITALS
TEMPERATURE: 97.5 F | WEIGHT: 116 LBS | HEART RATE: 61 BPM | HEIGHT: 62.5 IN | DIASTOLIC BLOOD PRESSURE: 70 MMHG | BODY MASS INDEX: 20.81 KG/M2 | OXYGEN SATURATION: 98 % | SYSTOLIC BLOOD PRESSURE: 100 MMHG

## 2019-01-03 DIAGNOSIS — Z87.898 PERSONAL HISTORY OF OTHER SPECIFIED CONDITIONS: ICD-10-CM

## 2019-01-03 DIAGNOSIS — Z91.81 HISTORY OF FALLING: ICD-10-CM

## 2019-01-03 DIAGNOSIS — I63.9 CEREBRAL INFARCTION, UNSPECIFIED: ICD-10-CM

## 2019-01-03 PROCEDURE — 99496 TRANSJ CARE MGMT HIGH F2F 7D: CPT

## 2019-01-06 PROBLEM — I63.9 CVA (CEREBRAL VASCULAR ACCIDENT): Status: ACTIVE | Noted: 2019-01-06

## 2019-01-06 PROBLEM — Z91.81 STATUS POST FALL: Status: ACTIVE | Noted: 2019-01-06

## 2019-01-06 PROBLEM — Z87.898 HISTORY OF SYNCOPE: Status: ACTIVE | Noted: 2019-01-06

## 2019-01-06 PROBLEM — Z91.81 STATUS POST FALL: Status: RESOLVED | Noted: 2018-02-22 | Resolved: 2019-01-06

## 2019-01-06 RX ORDER — GENTAMICIN SULFATE 1 MG/G
0.1 CREAM TOPICAL
Qty: 15 | Refills: 0 | Status: ACTIVE | COMMUNITY
Start: 2018-11-13

## 2019-01-06 NOTE — ASSESSMENT
[FreeTextEntry1] : S/P fall in home after presumed syncopal event (vasovagal; dehydration)\par Fall precautions\par HHA - avoid leaving patient in home alone\par Stair lift\par Spironolactone on hold - to d/w Cardiology when to resume\par Local wound care\par Completed course of therapy for UTI - can do f/u urine testing\par \par Recent CVA\par No new symptoms\par Not a candidate for AC\par Neuro f/u\par PT/OT\par ST\par Aspiration precautions\par \par RTC 6-8 weeks and as needed\par To call for any medical issues\par To f/u with all MD's\par To continue meds, diet, exercise as outlined\par Labs declined today\par D/W patient and Julio in detail

## 2019-01-06 NOTE — HEALTH RISK ASSESSMENT
[Intercurrent ED visits] : went to ED [Intercurrent hospitalizations] : was admitted to the hospital  [Any fall with injury in past year] : Patient reported fall with injury in the past year [0] : 2) Feeling down, depressed, or hopeless: Not at all (0) [Patient declined mammogram] : Patient declined mammogram [Patient declined PAP Smear] : Patient declined PAP Smear [Patient declined bone density test] : Patient declined bone density test [Patient declined colonoscopy] : Patient declined colonoscopy [HIV test declined] : HIV test declined [Hepatitis C test declined] : Hepatitis C test declined [Change in mental status noted] : Change in mental status noted [Language] : difficulty with language [Behavior] : difficulty with behavior [None] : None [With Family] : lives with family [# of Members in Household ___] :  household currently consist of [unfilled] member(s) [Retired] : retired [College] : College [] :  [# Of Children ___] : has [unfilled] children [Feels Safe at Home] : Feels safe at home [Smoke Detector] : smoke detector [Carbon Monoxide Detector] : carbon monoxide detector [Seat Belt] :  uses seat belt [Sunscreen] : uses sunscreen [Discussed at today's visit] : Advance Directives Discussed at today's visit [Designated Healthcare Proxy] : Designated healthcare proxy [Name: ___] : Health Care Proxy's Name: [unfilled]  [Relationship: ___] : Relationship: [unfilled] [] : No [[id number]] : 0 [Sexually Active] : not sexually active [Reports changes in hearing] : Reports no changes in hearing [Reports changes in vision] : Reports no changes in vision [Reports changes in dental health] : Reports no changes in dental health [Guns at Home] : no guns at home [Travel to Developing Areas] : does not  travel to developing areas [TB Exposure] : is not being exposed to tuberculosis [Caregiver Concerns] : does not have caregiver concerns

## 2019-01-06 NOTE — PHYSICAL EXAM
[No Acute Distress] : no acute distress [Well-Appearing] : well-appearing [Normal Voice/Communication] : normal voice/communication [Normal Sclera/Conjunctiva] : normal sclera/conjunctiva [PERRL] : pupils equal round and reactive to light [EOMI] : extraocular movements intact [Normal Outer Ear/Nose] : the outer ears and nose were normal in appearance [Normal Oropharynx] : the oropharynx was normal [Supple] : supple [No Respiratory Distress] : no respiratory distress  [Clear to Auscultation] : lungs were clear to auscultation bilaterally [No Accessory Muscle Use] : no accessory muscle use [Normal Rate] : normal rate  [Normal S1, S2] : normal S1 and S2 [No Extremity Clubbing/Cyanosis] : no extremity clubbing/cyanosis [Soft] : abdomen soft [Non Tender] : non-tender [Normal Bowel Sounds] : normal bowel sounds [No CVA Tenderness] : no CVA  tenderness [No Spinal Tenderness] : no spinal tenderness [No Rash] : no rash [Speech Grossly Normal] : speech grossly normal [Normal Affect] : the affect was normal [Alert and Oriented x3] : oriented to person, place, and time [High Complexity requires an extensive number of possible diagnoses and/or the management options, extensive complexity of the medical data (tests, etc.) to be reviewed, and a high risk of significant complications, morbidity, and/or mortality as well as c] : High Complexity  [de-identified] : thin [de-identified] : No ST [de-identified] : no stridor [de-identified] : R=16; no wheezing [de-identified] : murmurs loud and unchanged [de-identified] :  MS seems intact/ in wheelchair; moves all extremities

## 2019-01-06 NOTE — HISTORY OF PRESENT ILLNESS
[Post-hospitalization from ___ Hospital] : Post-hospitalization from [unfilled] Hospital [Admitted on: ___] : The patient was admitted on [unfilled] [Discharged on ___] : discharged on [unfilled] [Discharge Summary] : discharge summary [Discharge Med List] : discharge medication list [Patient Contacted By: ____] : and contacted by [unfilled] [FreeTextEntry2] : Found by daughter after fall in bathroom at home.\par Had bleeding head wound and extremity abrasions/lacerations.\par Presumed to have had syncopal event while having BM.\par Was on floor for 30-60 minutes.\par Was conscious and lucid when daughter arrived home to find her.\par 911 called and brought in by ambulance to ER and then admitted.\par Seen by Neuro. Brain imaging revealed prior recent CVA. No AC prescribed.\par Seen by Cardiology and spironolactone stopped.\par Treated for recurrent UTI.\par Getting home VNS, PT, ST.\par Feeling well.\par No LE edema.\par Normal BM.\par No UTI symptoms.\par Denies abdominal pain or n/v.\par Good appetite and eating well.\par Denies chest pain, cough, hemoptysis, SOB, fever, palpitations.\par Wounds healing well.\par No LOC, seizure, h/a, visual or speech disturbance.\par Mood improved.\par Denies weakness in arms/legs.

## 2019-01-06 NOTE — REVIEW OF SYSTEMS
[Fatigue] : fatigue [Recent Change In Weight] : ~T recent weight change [Vision Problems] : vision problems [Hearing Loss] : hearing loss [Hoarseness] : hoarseness [Lower Ext Edema] : lower extremity edema [Diarrhea] : diarrhea [Fainting] : fainting [Memory Loss] : memory loss [Unsteady Walking] : ataxia [Easy Bleeding] : easy bleeding [Easy Bruising] : easy bruising [Negative] : Heme/Lymph

## 2019-01-23 ENCOUNTER — RX RENEWAL (OUTPATIENT)
Age: 84
End: 2019-01-23

## 2019-01-23 PROBLEM — R55 SYNCOPE AND COLLAPSE: Status: ACTIVE | Noted: 2019-01-23

## 2019-01-24 ENCOUNTER — RX RENEWAL (OUTPATIENT)
Age: 84
End: 2019-01-24

## 2019-01-24 ENCOUNTER — APPOINTMENT (OUTPATIENT)
Dept: CARDIOLOGY | Facility: CLINIC | Age: 84
End: 2019-01-24
Payer: MEDICARE

## 2019-01-24 ENCOUNTER — NON-APPOINTMENT (OUTPATIENT)
Age: 84
End: 2019-01-24

## 2019-01-24 VITALS
DIASTOLIC BLOOD PRESSURE: 91 MMHG | OXYGEN SATURATION: 91 % | HEART RATE: 93 BPM | SYSTOLIC BLOOD PRESSURE: 133 MMHG | HEIGHT: 62.5 IN

## 2019-01-24 DIAGNOSIS — R55 SYNCOPE AND COLLAPSE: ICD-10-CM

## 2019-01-24 PROCEDURE — 93000 ELECTROCARDIOGRAM COMPLETE: CPT

## 2019-01-24 PROCEDURE — 99215 OFFICE O/P EST HI 40 MIN: CPT

## 2019-01-24 NOTE — HISTORY OF PRESENT ILLNESS
[FreeTextEntry1] : I saw her last in November.  Since that time, she was hospitalized in December, after episodes of possible LOC in bathroom, found to have head wound and abrasions.  Was alert and lucid when found by daughter.  Brought to [facility] ED.  Admitted and \par \par scan to have a subacute infarct in the R frontal region.  \par \par \par Spironolactone d/c's while in hospital; subsequently resumed for LE edema.\par \par \par As she returns today,\par \par \par \par Nov.2; she returns in the company of her aide.  \par \par Since I saw her last, she tells me she has remained well.  She reports no cardiac sxs.  There have been no episodes of chest pain or unusual dyspnea.  She describes no palpitations.  She reports no orthopnea or PND.   \par \par I spoke to her daughter last week, and currently she is taking spironolactone bid, as the LE edema had recurred.  It seems adequately controlled on the current dose.  \par \par She tells me her appetite has improved.

## 2019-01-24 NOTE — HISTORY OF PRESENT ILLNESS
[FreeTextEntry1] : I saw her last in November.  Since that time, she was hospitalized in December, after episodes of possible LOC in bathroom, found to have head wound and abrasions.  Was alert and lucid when found by daughter.  Brought to [facility] ED.  Admitted and \par \par scan to have a subacute infarct in the R frontal region.  \par \par \par Spironolactone d/c's while in hospital; subsequently resumed for LE edema.\par \par \par As she returns today,\par \par \par \par Nov.2; she returns in the company of her aide.  \par \par Since I saw her last, she tells me she has remained well.  She reports no cardiac sxs.  There have been no episodes of chest pain or unusual dyspnea.  She describes no palpitations.  She reports no orthopnea or PND.   \par \par I spoke to her daughter last week, and currently she is taking spironolactone bid, as the LE edema had recurred.  It seems adequately controlled on the current dose.  \par \par She tells me her appetite has improved.

## 2019-01-25 ENCOUNTER — LABORATORY RESULT (OUTPATIENT)
Age: 84
End: 2019-01-25

## 2019-01-25 ENCOUNTER — APPOINTMENT (OUTPATIENT)
Dept: HEPATOLOGY | Facility: CLINIC | Age: 84
End: 2019-01-25
Payer: MEDICARE

## 2019-01-25 VITALS
SYSTOLIC BLOOD PRESSURE: 115 MMHG | HEART RATE: 58 BPM | WEIGHT: 116 LBS | DIASTOLIC BLOOD PRESSURE: 51 MMHG | RESPIRATION RATE: 16 BRPM | HEIGHT: 62.5 IN | BODY MASS INDEX: 20.81 KG/M2

## 2019-01-25 PROCEDURE — 99214 OFFICE O/P EST MOD 30 MIN: CPT

## 2019-01-26 LAB
AFP-TM SERPL-MCNC: 1.4 NG/ML
ALBUMIN SERPL ELPH-MCNC: 3.4 G/DL
ALP BLD-CCNC: 113 U/L
ALT SERPL-CCNC: 22 U/L
ANION GAP SERPL CALC-SCNC: 12 MMOL/L
AST SERPL-CCNC: 47 U/L
BASOPHILS # BLD AUTO: 0.03 K/UL
BASOPHILS NFR BLD AUTO: 0.7 %
BILIRUB SERPL-MCNC: 1.6 MG/DL
BUN SERPL-MCNC: 42 MG/DL
CALCIUM SERPL-MCNC: 10.3 MG/DL
CHLORIDE SERPL-SCNC: 107 MMOL/L
CO2 SERPL-SCNC: 22 MMOL/L
CREAT SERPL-MCNC: 1.11 MG/DL
EOSINOPHIL # BLD AUTO: 0.06 K/UL
EOSINOPHIL NFR BLD AUTO: 1.4 %
GLUCOSE SERPL-MCNC: 94 MG/DL
HCT VFR BLD CALC: 37.1 %
HGB BLD-MCNC: 11.8 G/DL
IMM GRANULOCYTES NFR BLD AUTO: 0.2 %
INR PPP: 1.16 RATIO
LYMPHOCYTES # BLD AUTO: 1.22 K/UL
LYMPHOCYTES NFR BLD AUTO: 28.1 %
MAN DIFF?: NORMAL
MCHC RBC-ENTMCNC: 31.8 GM/DL
MCHC RBC-ENTMCNC: 33.6 PG
MCV RBC AUTO: 105.7 FL
MONOCYTES # BLD AUTO: 0.4 K/UL
MONOCYTES NFR BLD AUTO: 9.2 %
NEUTROPHILS # BLD AUTO: 2.62 K/UL
NEUTROPHILS NFR BLD AUTO: 60.4 %
PLATELET # BLD AUTO: 96 K/UL
POTASSIUM SERPL-SCNC: 5.3 MMOL/L
PROT SERPL-MCNC: 5.9 G/DL
PT BLD: 13 SEC
RBC # BLD: 3.51 M/UL
RBC # FLD: 15.4 %
SODIUM SERPL-SCNC: 141 MMOL/L
WBC # FLD AUTO: 4.34 K/UL

## 2019-01-28 ENCOUNTER — MEDICATION RENEWAL (OUTPATIENT)
Age: 84
End: 2019-01-28

## 2019-02-06 ENCOUNTER — RX RENEWAL (OUTPATIENT)
Age: 84
End: 2019-02-06

## 2019-02-21 ENCOUNTER — APPOINTMENT (OUTPATIENT)
Dept: INTERNAL MEDICINE | Facility: CLINIC | Age: 84
End: 2019-02-21
Payer: MEDICARE

## 2019-02-21 VITALS — HEART RATE: 75 BPM | TEMPERATURE: 97.4 F | OXYGEN SATURATION: 100 %

## 2019-02-21 DIAGNOSIS — R05 COUGH: ICD-10-CM

## 2019-02-21 PROCEDURE — 99214 OFFICE O/P EST MOD 30 MIN: CPT

## 2019-02-22 NOTE — REVIEW OF SYSTEMS
[Vision Problems] : vision problems [Hearing Loss] : hearing loss [Hoarseness] : hoarseness [Nasal Discharge] : nasal discharge [Postnasal Drip] : postnasal drip [Cough] : cough [Dyspnea on Exertion] : dyspnea on exertion [Memory Loss] : memory loss [Unsteady Walking] : ataxia [Easy Bleeding] : easy bleeding [Easy Bruising] : easy bruising [Negative] : Heme/Lymph

## 2019-02-22 NOTE — COUNSELING
[Healthy eating counseling provided] : healthy eating [Activity counseling provided] : activity [Good understanding] : Patient has a good understanding of disease, goals and obesity follow-up plan [Low Salt Diet] : Low salt diet [Walking] : Walking

## 2019-02-22 NOTE — HISTORY OF PRESENT ILLNESS
[FreeTextEntry1] : Comes in for acute medical visit. [de-identified] : Brought in by daughter due to cough.\par 2 daughters ill with URI.\par Patient with URI symptoms since Tuesday.\par Has rhinitis. Denies sore throat or ear pain.\par Denies chest pain.\par Bad cough on Wednesday - sounds wet as per Julio.\par Coughs through the night.\par No hemoptysis.\par One episode of yellow sputum yesterday.\par No fevers.\par Denies SOB/wheezing.\par Denies change in MS.\par No body aches.\par No n/v. Eating and drinking well.\par Not moving bowels despite lactulose.

## 2019-02-22 NOTE — ASSESSMENT
[FreeTextEntry1] : Suspect acute viral URI triggering cough/sputum\par Rest\par Assure adequate hydration\par To speak with GI regarding reduced BM's\par Continue nebs\par Chest PT\par Robitussin as needed\par Low threshold to start Doxycycline 100 mg bid for 5-7 days\par Gargles/lozenges\par Steam\par Saline nasal rinses\par Flonase daily\par To call if worse or if not improving\par To call for any medical issues\par RTC 6-8 weeks and as needed

## 2019-02-22 NOTE — HEALTH RISK ASSESSMENT
[] : No [Any fall with injury in past year] : Patient reported fall with injury in the past year [0] : 2) Feeling down, depressed, or hopeless: Not at all (0) [de-identified] : GI/Cardiology [de-identified] : none [de-identified] : low salt [[id number]] : 0

## 2019-02-22 NOTE — PHYSICAL EXAM
[No Acute Distress] : no acute distress [Well-Appearing] : well-appearing [Normal Voice/Communication] : normal voice/communication [Normal Sclera/Conjunctiva] : normal sclera/conjunctiva [PERRL] : pupils equal round and reactive to light [EOMI] : extraocular movements intact [Normal Outer Ear/Nose] : the outer ears and nose were normal in appearance [Normal Oropharynx] : the oropharynx was normal [Supple] : supple [No Respiratory Distress] : no respiratory distress  [Clear to Auscultation] : lungs were clear to auscultation bilaterally [No Accessory Muscle Use] : no accessory muscle use [Normal Rate] : normal rate  [Normal S1, S2] : normal S1 and S2 [No Extremity Clubbing/Cyanosis] : no extremity clubbing/cyanosis [Soft] : abdomen soft [Non Tender] : non-tender [Normal Bowel Sounds] : normal bowel sounds [No CVA Tenderness] : no CVA  tenderness [No Spinal Tenderness] : no spinal tenderness [No Rash] : no rash [Speech Grossly Normal] : speech grossly normal [Normal Affect] : the affect was normal [Alert and Oriented x3] : oriented to person, place, and time [de-identified] : thin [de-identified] : No ST [de-identified] : no stridor [de-identified] : R=16; no wheezing [de-identified] : murmurs loud and unchanged [de-identified] : improved edema; no cords [de-identified] :  MS seems intact/ in wheelchair; moves all extremities

## 2019-03-08 ENCOUNTER — RX RENEWAL (OUTPATIENT)
Age: 84
End: 2019-03-08

## 2019-03-12 NOTE — PROGRESS NOTE ADULT - PROBLEM SELECTOR PROBLEM 5
Please contact this patient. Pregnancy test was negative. I can send her prescription for birth control once she has chosen from the options that we discussed in the office.   Pulmonary HTN

## 2019-03-14 ENCOUNTER — APPOINTMENT (OUTPATIENT)
Dept: INTERNAL MEDICINE | Facility: CLINIC | Age: 84
End: 2019-03-14

## 2019-03-18 ENCOUNTER — INPATIENT (INPATIENT)
Facility: HOSPITAL | Age: 84
LOS: 1 days | Discharge: HOME | DRG: 690 | End: 2019-03-20
Attending: STUDENT IN AN ORGANIZED HEALTH CARE EDUCATION/TRAINING PROGRAM | Admitting: HOSPITALIST
Payer: MEDICARE

## 2019-03-18 VITALS
WEIGHT: 110.01 LBS | SYSTOLIC BLOOD PRESSURE: 129 MMHG | DIASTOLIC BLOOD PRESSURE: 69 MMHG | TEMPERATURE: 97 F | HEART RATE: 64 BPM | HEIGHT: 64 IN | OXYGEN SATURATION: 99 % | RESPIRATION RATE: 20 BRPM

## 2019-03-18 DIAGNOSIS — C91.90 LYMPHOID LEUKEMIA, UNSPECIFIED NOT HAVING ACHIEVED REMISSION: ICD-10-CM

## 2019-03-18 DIAGNOSIS — R82.71 BACTERIURIA: ICD-10-CM

## 2019-03-18 DIAGNOSIS — K74.60 UNSPECIFIED CIRRHOSIS OF LIVER: ICD-10-CM

## 2019-03-18 DIAGNOSIS — I48.91 UNSPECIFIED ATRIAL FIBRILLATION: ICD-10-CM

## 2019-03-18 DIAGNOSIS — N39.0 URINARY TRACT INFECTION, SITE NOT SPECIFIED: ICD-10-CM

## 2019-03-18 DIAGNOSIS — E03.9 HYPOTHYROIDISM, UNSPECIFIED: ICD-10-CM

## 2019-03-18 DIAGNOSIS — Z98.89 OTHER SPECIFIED POSTPROCEDURAL STATES: Chronic | ICD-10-CM

## 2019-03-18 DIAGNOSIS — J44.9 CHRONIC OBSTRUCTIVE PULMONARY DISEASE, UNSPECIFIED: ICD-10-CM

## 2019-03-18 DIAGNOSIS — N18.3 CHRONIC KIDNEY DISEASE, STAGE 3 (MODERATE): ICD-10-CM

## 2019-03-18 LAB
ALBUMIN SERPL ELPH-MCNC: 3.5 G/DL — SIGNIFICANT CHANGE UP (ref 3.3–5)
ALP SERPL-CCNC: 110 U/L — SIGNIFICANT CHANGE UP (ref 40–120)
ALT FLD-CCNC: 21 U/L — SIGNIFICANT CHANGE UP (ref 10–45)
ANION GAP SERPL CALC-SCNC: 12 MMOL/L — SIGNIFICANT CHANGE UP (ref 5–17)
ANISOCYTOSIS BLD QL: SLIGHT — SIGNIFICANT CHANGE UP
APPEARANCE UR: CLEAR — SIGNIFICANT CHANGE UP
APPEARANCE: CLEAR
AST SERPL-CCNC: 39 U/L — SIGNIFICANT CHANGE UP (ref 10–40)
BACTERIA # UR AUTO: ABNORMAL
BACTERIA UR CULT: ABNORMAL
BACTERIA: ABNORMAL
BASE EXCESS BLDV CALC-SCNC: 1.9 MMOL/L — SIGNIFICANT CHANGE UP (ref -2–2)
BASOPHILS # BLD AUTO: 0 K/UL — SIGNIFICANT CHANGE UP (ref 0–0.2)
BASOPHILS NFR BLD AUTO: 0.3 % — SIGNIFICANT CHANGE UP (ref 0–2)
BILIRUB SERPL-MCNC: 1.9 MG/DL — HIGH (ref 0.2–1.2)
BILIRUB UR-MCNC: NEGATIVE — SIGNIFICANT CHANGE UP
BILIRUBIN URINE: NEGATIVE
BLOOD URINE: NEGATIVE
BUN SERPL-MCNC: 49 MG/DL — HIGH (ref 7–23)
CA-I SERPL-SCNC: 1.25 MMOL/L — SIGNIFICANT CHANGE UP (ref 1.12–1.3)
CALCIUM SERPL-MCNC: 10.3 MG/DL — SIGNIFICANT CHANGE UP (ref 8.4–10.5)
CHLORIDE BLDV-SCNC: 108 MMOL/L — SIGNIFICANT CHANGE UP (ref 96–108)
CHLORIDE SERPL-SCNC: 103 MMOL/L — SIGNIFICANT CHANGE UP (ref 96–108)
CO2 BLDV-SCNC: 30 MMOL/L — SIGNIFICANT CHANGE UP (ref 22–30)
CO2 SERPL-SCNC: 23 MMOL/L — SIGNIFICANT CHANGE UP (ref 22–31)
COLOR SPEC: YELLOW — SIGNIFICANT CHANGE UP
COLOR: YELLOW
CREAT SERPL-MCNC: 1.36 MG/DL — HIGH (ref 0.5–1.3)
DIFF PNL FLD: NEGATIVE — SIGNIFICANT CHANGE UP
ELLIPTOCYTES BLD QL SMEAR: SLIGHT — SIGNIFICANT CHANGE UP
EOSINOPHIL # BLD AUTO: 0.1 K/UL — SIGNIFICANT CHANGE UP (ref 0–0.5)
EOSINOPHIL NFR BLD AUTO: 1.5 % — SIGNIFICANT CHANGE UP (ref 0–6)
EPI CELLS # UR: 1 /HPF — SIGNIFICANT CHANGE UP
GAS PNL BLDV: 132 MMOL/L — LOW (ref 136–145)
GAS PNL BLDV: SIGNIFICANT CHANGE UP
GLUCOSE BLDV-MCNC: 112 MG/DL — HIGH (ref 70–99)
GLUCOSE QUALITATIVE U: NEGATIVE
GLUCOSE SERPL-MCNC: 118 MG/DL — HIGH (ref 70–99)
GLUCOSE UR QL: NEGATIVE — SIGNIFICANT CHANGE UP
HCO3 BLDV-SCNC: 28 MMOL/L — SIGNIFICANT CHANGE UP (ref 21–29)
HCT VFR BLD CALC: 38.2 % — SIGNIFICANT CHANGE UP (ref 34.5–45)
HCT VFR BLDA CALC: 40 % — SIGNIFICANT CHANGE UP (ref 39–50)
HGB BLD CALC-MCNC: 12.9 G/DL — SIGNIFICANT CHANGE UP (ref 11.5–15.5)
HGB BLD-MCNC: 12.9 G/DL — SIGNIFICANT CHANGE UP (ref 11.5–15.5)
HYALINE CASTS # UR AUTO: 0 /LPF — SIGNIFICANT CHANGE UP (ref 0–2)
HYALINE CASTS: 2 /LPF
HYPOCHROMIA BLD QL: SLIGHT — SIGNIFICANT CHANGE UP
KETONES UR-MCNC: NEGATIVE — SIGNIFICANT CHANGE UP
KETONES URINE: NEGATIVE
LACTATE BLDV-MCNC: 2.5 MMOL/L — HIGH (ref 0.7–2)
LEUKOCYTE ESTERASE UR-ACNC: ABNORMAL
LEUKOCYTE ESTERASE URINE: ABNORMAL
LYMPHOCYTES # BLD AUTO: 1 K/UL — SIGNIFICANT CHANGE UP (ref 1–3.3)
LYMPHOCYTES # BLD AUTO: 20.8 % — SIGNIFICANT CHANGE UP (ref 13–44)
MACROCYTES BLD QL: SLIGHT — SIGNIFICANT CHANGE UP
MCHC RBC-ENTMCNC: 33.8 GM/DL — SIGNIFICANT CHANGE UP (ref 32–36)
MCHC RBC-ENTMCNC: 35.3 PG — HIGH (ref 27–34)
MCV RBC AUTO: 104 FL — HIGH (ref 80–100)
MICROSCOPIC-UA: NORMAL
MONOCYTES # BLD AUTO: 0.5 K/UL — SIGNIFICANT CHANGE UP (ref 0–0.9)
MONOCYTES NFR BLD AUTO: 11 % — SIGNIFICANT CHANGE UP (ref 2–14)
NEUTROPHILS # BLD AUTO: 3.2 K/UL — SIGNIFICANT CHANGE UP (ref 1.8–7.4)
NEUTROPHILS NFR BLD AUTO: 66.3 % — SIGNIFICANT CHANGE UP (ref 43–77)
NITRITE UR-MCNC: POSITIVE
NITRITE URINE: POSITIVE
OTHER CELLS CSF MANUAL: 5 ML/DL — LOW (ref 18–22)
PCO2 BLDV: 53 MMHG — HIGH (ref 35–50)
PH BLDV: 7.34 — LOW (ref 7.35–7.45)
PH UR: 6 — SIGNIFICANT CHANGE UP (ref 5–8)
PH URINE: 6
PLAT MORPH BLD: NORMAL — SIGNIFICANT CHANGE UP
PLATELET # BLD AUTO: 90 K/UL — LOW (ref 150–400)
PO2 BLDV: 25 MMHG — SIGNIFICANT CHANGE UP (ref 25–45)
POIKILOCYTOSIS BLD QL AUTO: SLIGHT — SIGNIFICANT CHANGE UP
POTASSIUM BLDV-SCNC: 5.1 MMOL/L — SIGNIFICANT CHANGE UP (ref 3.5–5.3)
POTASSIUM SERPL-MCNC: 5 MMOL/L — SIGNIFICANT CHANGE UP (ref 3.5–5.3)
POTASSIUM SERPL-SCNC: 5 MMOL/L — SIGNIFICANT CHANGE UP (ref 3.5–5.3)
PROT SERPL-MCNC: 6 G/DL — SIGNIFICANT CHANGE UP (ref 6–8.3)
PROT UR-MCNC: NEGATIVE — SIGNIFICANT CHANGE UP
PROTEIN URINE: NEGATIVE
RBC # BLD: 3.66 M/UL — LOW (ref 3.8–5.2)
RBC # FLD: 12.8 % — SIGNIFICANT CHANGE UP (ref 10.3–14.5)
RBC BLD AUTO: ABNORMAL
RBC CASTS # UR COMP ASSIST: 6 /HPF — HIGH (ref 0–4)
RED BLOOD CELLS URINE: 3 /HPF
SAO2 % BLDV: 30 % — LOW (ref 67–88)
SODIUM SERPL-SCNC: 138 MMOL/L — SIGNIFICANT CHANGE UP (ref 135–145)
SP GR SPEC: 1.02 — SIGNIFICANT CHANGE UP (ref 1.01–1.02)
SPECIFIC GRAVITY URINE: 1.01
SQUAMOUS EPITHELIAL CELLS: 2 /HPF
UROBILINOGEN FLD QL: NEGATIVE — SIGNIFICANT CHANGE UP
UROBILINOGEN URINE: NORMAL
WBC # BLD: 4.8 K/UL — SIGNIFICANT CHANGE UP (ref 3.8–10.5)
WBC # FLD AUTO: 4.8 K/UL — SIGNIFICANT CHANGE UP (ref 3.8–10.5)
WBC UR QL: 20 /HPF — HIGH (ref 0–5)
WHITE BLOOD CELLS URINE: 46 /HPF

## 2019-03-18 PROCEDURE — 99285 EMERGENCY DEPT VISIT HI MDM: CPT | Mod: GC

## 2019-03-18 PROCEDURE — 99223 1ST HOSP IP/OBS HIGH 75: CPT | Mod: AI

## 2019-03-18 RX ORDER — BUDESONIDE, MICRONIZED 100 %
0.25 POWDER (GRAM) MISCELLANEOUS
Qty: 0 | Refills: 0 | Status: DISCONTINUED | OUTPATIENT
Start: 2019-03-18 | End: 2019-03-20

## 2019-03-18 RX ORDER — LACTULOSE 10 G/15ML
20 SOLUTION ORAL THREE TIMES A DAY
Qty: 0 | Refills: 0 | Status: DISCONTINUED | OUTPATIENT
Start: 2019-03-18 | End: 2019-03-20

## 2019-03-18 RX ORDER — TIOTROPIUM BROMIDE 18 UG/1
1 CAPSULE ORAL; RESPIRATORY (INHALATION) DAILY
Qty: 0 | Refills: 0 | Status: DISCONTINUED | OUTPATIENT
Start: 2019-03-18 | End: 2019-03-20

## 2019-03-18 RX ORDER — LACTULOSE 10 G/15ML
20 SOLUTION ORAL ONCE
Qty: 0 | Refills: 0 | Status: COMPLETED | OUTPATIENT
Start: 2019-03-18 | End: 2019-03-18

## 2019-03-18 RX ORDER — ALBUTEROL 90 UG/1
2 AEROSOL, METERED ORAL EVERY 6 HOURS
Qty: 0 | Refills: 0 | Status: DISCONTINUED | OUTPATIENT
Start: 2019-03-18 | End: 2019-03-19

## 2019-03-18 RX ORDER — LEVOTHYROXINE SODIUM 125 MCG
25 TABLET ORAL DAILY
Qty: 0 | Refills: 0 | Status: DISCONTINUED | OUTPATIENT
Start: 2019-03-18 | End: 2019-03-20

## 2019-03-18 RX ORDER — SODIUM CHLORIDE 9 MG/ML
1000 INJECTION INTRAMUSCULAR; INTRAVENOUS; SUBCUTANEOUS ONCE
Qty: 0 | Refills: 0 | Status: COMPLETED | OUTPATIENT
Start: 2019-03-18 | End: 2019-03-18

## 2019-03-18 RX ORDER — ASCORBIC ACID 60 MG
500 TABLET,CHEWABLE ORAL DAILY
Qty: 0 | Refills: 0 | Status: DISCONTINUED | OUTPATIENT
Start: 2019-03-18 | End: 2019-03-20

## 2019-03-18 RX ORDER — PROPRANOLOL HCL 160 MG
10 CAPSULE, EXTENDED RELEASE 24HR ORAL
Qty: 0 | Refills: 0 | Status: DISCONTINUED | OUTPATIENT
Start: 2019-03-18 | End: 2019-03-20

## 2019-03-18 RX ORDER — ACETAMINOPHEN 500 MG
650 TABLET ORAL EVERY 6 HOURS
Qty: 0 | Refills: 0 | Status: DISCONTINUED | OUTPATIENT
Start: 2019-03-18 | End: 2019-03-20

## 2019-03-18 RX ORDER — ERTAPENEM SODIUM 1 G/1
1000 INJECTION, POWDER, LYOPHILIZED, FOR SOLUTION INTRAMUSCULAR; INTRAVENOUS ONCE
Qty: 0 | Refills: 0 | Status: COMPLETED | OUTPATIENT
Start: 2019-03-18 | End: 2019-03-18

## 2019-03-18 RX ORDER — SPIRONOLACTONE 25 MG/1
25 TABLET, FILM COATED ORAL
Qty: 0 | Refills: 0 | Status: DISCONTINUED | OUTPATIENT
Start: 2019-03-18 | End: 2019-03-20

## 2019-03-18 RX ORDER — DIGOXIN 250 MCG
0.12 TABLET ORAL EVERY OTHER DAY
Qty: 0 | Refills: 0 | Status: DISCONTINUED | OUTPATIENT
Start: 2019-03-19 | End: 2019-03-20

## 2019-03-18 RX ORDER — LACTULOSE 10 G/15ML
10 SOLUTION ORAL ONCE
Qty: 0 | Refills: 0 | Status: DISCONTINUED | OUTPATIENT
Start: 2019-03-18 | End: 2019-03-18

## 2019-03-18 RX ORDER — LACTULOSE 10 G/15ML
20 SOLUTION ORAL DAILY
Qty: 0 | Refills: 0 | Status: DISCONTINUED | OUTPATIENT
Start: 2019-03-18 | End: 2019-03-20

## 2019-03-18 RX ORDER — ERTAPENEM SODIUM 1 G/1
500 INJECTION, POWDER, LYOPHILIZED, FOR SOLUTION INTRAMUSCULAR; INTRAVENOUS EVERY 24 HOURS
Qty: 0 | Refills: 0 | Status: DISCONTINUED | OUTPATIENT
Start: 2019-03-19 | End: 2019-03-20

## 2019-03-18 RX ORDER — CHOLECALCIFEROL (VITAMIN D3) 125 MCG
2000 CAPSULE ORAL DAILY
Qty: 0 | Refills: 0 | Status: DISCONTINUED | OUTPATIENT
Start: 2019-03-18 | End: 2019-03-20

## 2019-03-18 RX ADMIN — ERTAPENEM SODIUM 120 MILLIGRAM(S): 1 INJECTION, POWDER, LYOPHILIZED, FOR SOLUTION INTRAMUSCULAR; INTRAVENOUS at 17:13

## 2019-03-18 RX ADMIN — Medication 500 MILLIGRAM(S): at 23:07

## 2019-03-18 RX ADMIN — SODIUM CHLORIDE 1000 MILLILITER(S): 9 INJECTION INTRAMUSCULAR; INTRAVENOUS; SUBCUTANEOUS at 14:59

## 2019-03-18 RX ADMIN — Medication 10 MILLIGRAM(S): at 23:07

## 2019-03-18 RX ADMIN — LACTULOSE 20 GRAM(S): 10 SOLUTION ORAL at 18:36

## 2019-03-18 RX ADMIN — LACTULOSE 20 GRAM(S): 10 SOLUTION ORAL at 23:07

## 2019-03-18 NOTE — ED PROVIDER NOTE - NS ED ROS FT
Review of Systems:  	•	CONSTITUTIONAL: no fever  	•	SKIN: no rash  	•	RESPIRATORY: no shortness of breath  	•	CARDIAC: no chest pain, no palpitations  	•	GI:  no abd pain, no nausea, no vomiting, no diarrhea  	•	GENITO-URINARY:  no dysuria; no hematuria    	•	MUSCULOSKELETAL:  no back pain  	•	NEUROLOGIC: no weakness, intermittent AMS per daughter  	•	ALLERGY: no rhinitis  	•	PSYCHIATRIC: no anxiety

## 2019-03-18 NOTE — ED ADULT TRIAGE NOTE - CHIEF COMPLAINT QUOTE
Yomi KRAUSE sent pt to ED to be treated with IV antibiotics for UTI  pt denies urinary symptoms, daughter reports that occasionally has confused thoughts.

## 2019-03-18 NOTE — ED PROVIDER NOTE - OBJECTIVE STATEMENT
Pertinent PMH/PSH/FHx/SHx and Review of Systems contained within HPI.  89yo PMH liver cancer, CLL in remission, aortic stenosis, afib (no anticoag), and hypothyroidism sent in by PCP for IV abx after urine culture from Friday grew pan-resistant klebsiella over the weekend. Pt has h/o recurrent UTIs and often becomes delirious with them. Daughter at bedside reports that her sister lives at home with their mother and that the pt started making some intermittent "funny comments" last week that prompted the daughter to bring this patient to leave a urine sample at the doctor's office. Pt denies any urinary urgency, frequency, hematuria, dysuria. ROS otherwise negative.  ROS positive for: intermittent AMS  ROS negative for: fever, Chest pain, SOB, Nausea, vomiting, diarrhea, abdominal pain, dysuria    FamilyHx and SocialHx not otherwise contributory

## 2019-03-18 NOTE — H&P ADULT - HISTORY OF PRESENT ILLNESS
Patient is a 89 year old female with CKD stage 3, CLL in remission, liver cirrhosis/HCC on lactulose/rifaximin, AF not on AC, COPD not on O2, aortic stenosis and hypothyroidism who presents to the ED with a reported UTI. Per the daughter the patient was not acting normally last week, making unusual comments erratically. She exhibited similar symptoms when she had UTIs previously. The family collected a urine sample and sent it to the patient's physician, Dr. Celaya, for testing. The daughter reports receiving a phone call from Dr. Celaya this morning stating that the urine grew a resistant bacteria and that the patient needed to come to the hospital for treatement. Review of the microbiology report demonstrated greater than 100,000 ESBL Klebsiella pneumoniae that was sensitive to Amikacin, Cefoxitin, Ertapenem, Imipenem, Levofloxacin, Meropenem, and Tigecycline. The patient was given a dose of ertapenem in the ED. The patient feels well, denies any symptoms of a UTI including fever, dysuria, frequency, or foul smelling urine. Per the daughter at the bedside the patient is at baseline. The patient and daughter report a penicillin allergy but states that she has taken Augmentin in the past without any issues.

## 2019-03-18 NOTE — H&P ADULT - NSICDXPROBLEM_GEN_ALL_CORE_FT
PROBLEM DIAGNOSES  Problem: Bacteriuria  Assessment and Plan: Treatment started with ertapenem. The patient is currently asymptomatic. Consider changing to PO levaquin to facilitate discharge home if treatment indicated.     Problem: Cirrhosis  Assessment and Plan: Continue Lactulose and Rifaximin  Continue propanolol     Problem: Hypothyroidism  Assessment and Plan: Continue Levothyroxine    Problem: CLL (chronic lymphocytic leukemia)  Assessment and Plan: Stable, Not actively receiving treatement. Cell counts noted on admission CBC    Problem: CKD (chronic kidney disease) stage 3, GFR 30-59 ml/min  Assessment and Plan: Stable. Renally dose meds accordingly    Problem: COPD, mild  Assessment and Plan: Continue budesonide INH  Albuterol/Spiriva interchange for duonebs    Problem: Atrial fibrillation  Assessment and Plan: Continue digoxin every other day dosing, due tomorrow 3/19  Not on AC due to risks of GI hemorrhage

## 2019-03-18 NOTE — ED STATDOCS - OBJECTIVE STATEMENT
89 year old F with PMHx of CLL, liver cancer, stenosis  referred to ED for abnormal UA. PMD: Fallon Cealya MD performed a UA this week, results showed a UTI and recommended IV Abx. Per daughter, pt becomes delirious with UTIs. +cough. Denies belly pain. 89 year old F with PMHx of CLL, liver cancer, stenosis  referred to ED for abnormal UA and urine culture. PMD: Fallon Celaya MD performed a UA this week, results showed a resistant bacteria. Per daughter, pt becomes delirious with UTIs. +cough. Denies belly pain.

## 2019-03-18 NOTE — ED ADULT NURSE NOTE - OBJECTIVE STATEMENT
as per pt family, "she lives with my sister and I was told that she would say some things a bit off and they sent a urine sample to her doctor. The doctor called and told us that she has a UTI that is resistant to many Abx." pt denies any lightheadedness, dizziness, chest pain, SOB, abd. pain, n/v/d, or urinary symptoms at present.

## 2019-03-18 NOTE — ED ADULT NURSE REASSESSMENT NOTE - NS ED NURSE REASSESS COMMENT FT1
Pt resting comfortably.  No complaints at this time. Safety and comfort maintained.  Will continue to monitor.  Pending bed upstairs.

## 2019-03-18 NOTE — H&P ADULT - NSICDXPASTMEDICALHX_GEN_ALL_CORE_FT
PAST MEDICAL HISTORY:  AF (Atrial Fibrillation)     Bleeding Esophageal Varices     CKD (chronic kidney disease), stage 3 (moderate)     CLL (Chronic Lymphoblastic Leukemia)     COPD (Chronic Obstructive Pulmonary Disease)     GIB (Gastrointestinal Bleeding)     Liver cell carcinoma     Portal Hypertension     Pulmonary HTN moderate    PVD (peripheral vascular disease)     Severe aortic stenosis by prior echocardiogram

## 2019-03-18 NOTE — ED PROVIDER NOTE - CLINICAL SUMMARY MEDICAL DECISION MAKING FREE TEXT BOX
melisa - pt with urinary complaints seen by pcp with ua pos and cx c/w panresistant bacteria - gilberto admit for iv abx melisa - pt without  urinary complaints but had waxing waning mental status w freq uti in past  seen by pcp with ua pos and cx c/w panresistant bacteria - gilberto admit for iv abx

## 2019-03-18 NOTE — H&P ADULT - NSHPOUTPATIENTPROVIDERS_GEN_ALL_CORE
Dr. Fallon Celaya (IM/pulm)  Dr. Boston (cardiology)  Dr. Soto (Heme/Onc)  Dr. Macdonald (hepatology)

## 2019-03-18 NOTE — ED ADULT NURSE NOTE - NSIMPLEMENTINTERV_GEN_ALL_ED
Implemented All Fall with Harm Risk Interventions:  [place] to call system. Call bell, personal items and telephone within reach. Instruct patient to call for assistance. Room bathroom lighting operational. Non-slip footwear when patient is off stretcher. Physically safe environment: no spills, clutter or unnecessary equipment. Stretcher in lowest position, wheels locked, appropriate side rails in place. Provide visual cue, wrist band, yellow gown, etc. Monitor gait and stability. Monitor for mental status changes and reorient to person, place, and time. Review medications for side effects contributing to fall risk. Reinforce activity limits and safety measures with patient and family. Provide visual clues: red socks.

## 2019-03-18 NOTE — H&P ADULT - ASSESSMENT
Patient is a 89 year old female with CKD stage 3, CLL in remission, liver cirrhosis/HCC on lactulose/rifaximin, AF not on AC, COPD not on O2, aortic stenosis and hypothyroidism who presents to the ED with bacteruria.

## 2019-03-19 PROCEDURE — 99232 SBSQ HOSP IP/OBS MODERATE 35: CPT

## 2019-03-19 PROCEDURE — 99222 1ST HOSP IP/OBS MODERATE 55: CPT | Mod: GC

## 2019-03-19 RX ORDER — IPRATROPIUM/ALBUTEROL SULFATE 18-103MCG
3 AEROSOL WITH ADAPTER (GRAM) INHALATION EVERY 6 HOURS
Qty: 0 | Refills: 0 | Status: DISCONTINUED | OUTPATIENT
Start: 2019-03-19 | End: 2019-03-20

## 2019-03-19 RX ADMIN — Medication 0.12 MILLIGRAM(S): at 11:04

## 2019-03-19 RX ADMIN — Medication 0.25 MILLIGRAM(S): at 07:07

## 2019-03-19 RX ADMIN — SPIRONOLACTONE 25 MILLIGRAM(S): 25 TABLET, FILM COATED ORAL at 19:27

## 2019-03-19 RX ADMIN — ERTAPENEM SODIUM 110 MILLIGRAM(S): 1 INJECTION, POWDER, LYOPHILIZED, FOR SOLUTION INTRAMUSCULAR; INTRAVENOUS at 20:20

## 2019-03-19 RX ADMIN — ALBUTEROL 2 PUFF(S): 90 AEROSOL, METERED ORAL at 11:05

## 2019-03-19 RX ADMIN — Medication 10 MILLIGRAM(S): at 20:03

## 2019-03-19 RX ADMIN — Medication 10 MILLIGRAM(S): at 07:07

## 2019-03-19 RX ADMIN — LACTULOSE 20 GRAM(S): 10 SOLUTION ORAL at 22:12

## 2019-03-19 RX ADMIN — Medication 2000 UNIT(S): at 11:04

## 2019-03-19 RX ADMIN — LACTULOSE 20 GRAM(S): 10 SOLUTION ORAL at 13:21

## 2019-03-19 RX ADMIN — Medication 0.25 MILLIGRAM(S): at 20:12

## 2019-03-19 RX ADMIN — LACTULOSE 20 GRAM(S): 10 SOLUTION ORAL at 19:27

## 2019-03-19 RX ADMIN — SPIRONOLACTONE 25 MILLIGRAM(S): 25 TABLET, FILM COATED ORAL at 07:07

## 2019-03-19 RX ADMIN — Medication 25 MICROGRAM(S): at 07:07

## 2019-03-19 RX ADMIN — ALBUTEROL 2 PUFF(S): 90 AEROSOL, METERED ORAL at 18:03

## 2019-03-19 RX ADMIN — Medication 500 MILLIGRAM(S): at 11:04

## 2019-03-19 RX ADMIN — LACTULOSE 20 GRAM(S): 10 SOLUTION ORAL at 07:07

## 2019-03-19 NOTE — PROGRESS NOTE ADULT - NSICDXPROBLEM_GEN_ALL_CORE_FT
PROBLEM DIAGNOSES  Problem: Bacteriuria  Assessment and Plan: ESBL by outpatient culture, sensitive to carbapenems and levofloxacin  -Continue invanz for now.  -ID evaluation called.  -If ID and daughter agreeable to transition to levaquin therapy, can continue PO course at home  -F/U repeat UCx, BCx sent yesterday    Problem: Cirrhosis  Assessment and Plan: hepatic cirrhosis without ascites  -Continue lactulose/rifaximin for HE PPx, monitor stool output  -Continue aldactone 25 mg PO BID  -Continue propranolol 10 mg PO BID for presumed portal hypertension    Problem: Hypothyroidism  Assessment and Plan: -Continue synthroid 25 mcg PO daily    Problem: CKD (chronic kidney disease) stage 3, GFR 30-59 ml/min  Assessment and Plan: BUN/Cr near baseline  -Trend BMP  -Monitor urine output  -Limit nephrotoxic medications    Problem: COPD, mild  Assessment and Plan: No signs of acute exacerbation  -Continue duoneb PRN  -Continue inhaled budesonide    Problem: Atrial fibrillation  Assessment and Plan: -Not on systemic anticoagulation given bleeding risk  -Continue digoxin every other day  Discharge planning to home pending ID evaluation to assess need for continuing IV antibiotics

## 2019-03-19 NOTE — CONSULT NOTE ADULT - SUBJECTIVE AND OBJECTIVE BOX
Consultation Requested by:    Patient is a 89y old  Female who presents with a chief complaint of UTI (18 Mar 2019 18:08)    HPI:  Patient is a 89 year old female with CKD stage 3, CLL in remission, liver cirrhosis/HCC on lactulose/rifaximin, AF not on AC, COPD not on O2, aortic stenosis and hypothyroidism who presents to the ED with a reported UTI. Per the daughter the patient was not acting normally last week, making unusual comments erratically. She exhibited similar symptoms when she had UTIs previously. The family collected a urine sample and sent it to the patient's physician, Dr. Celaya, for testing. The daughter reports receiving a phone call from Dr. Celaya this morning stating that the urine grew a resistant bacteria and that the patient needed to come to the hospital for treatement. Review of the microbiology report demonstrated greater than 100,000 ESBL Klebsiella pneumoniae that was sensitive to Amikacin, Cefoxitin, Ertapenem, Imipenem, Levofloxacin, Meropenem, and Tigecycline. The patient was given a dose of ertapenem in the ED. The patient feels well, denies any symptoms of a UTI including fever, dysuria, frequency, or foul smelling urine. Per the daughter at the bedside the patient is at baseline. The patient and daughter report a penicillin allergy but states that she has taken Augmentin in the past without any issues. (18 Mar 2019 18:08)      REVIEW OF SYSTEMS  All review of systems negative, except for those marked:  General:                         [] Abnormal:  	                    [] Night Sweats		[] Fever		[] Weight Loss  Pulmonary/Cough:	[] Abnormal:  Cardiac/Chest Pain:	[] Abnormal:  Gastrointestinal:	[] Abnormal:  Eyes:		[] Abnormal:  ENT:		[] Abnormal:  :		[] Abnormal:  Musculoskeletal	[] Abnormal:  Endocrine:		[] Abnormal:  Lymph Nodes:	[] Abnormal:  Neuro:		[] Abnormal:  Skin:		[] Abnormal:  Allergy/Immune:	[] Abnormal:  Psychiatric:	[] Abnormal:  [] All other review of systems negative  [] Unable to obtain (explain):    Recent Ill Contacts:	[] No	[] Yes:  Recent Travel History:	[] No	[] Yes:  Recent Animal/Insect Exposure/Tick Bites:	[] No	[] Yes:    Allergies    codeine (Rash)  erythromycin (Rash)  iv dye (Rash)  penicillin (Rash)  shellfish (Rash)    Intolerances    adhesives (Other)  warfarin (Other)    Antimicrobials:  ertapenem  IVPB 500 milliGRAM(s) IV Intermittent every 24 hours  rifaximin 550 milliGRAM(s) Oral two times a day      Other Medications:  acetaminophen   Tablet .. 650 milliGRAM(s) Oral every 6 hours PRN  ALBUTerol    90 MICROgram(s) HFA Inhaler 2 Puff(s) Inhalation every 6 hours  ascorbic acid 500 milliGRAM(s) Oral daily  buDESOnide   0.25 milliGRAM(s) Respule 0.25 milliGRAM(s) Inhalation two times a day  cholecalciferol 2000 Unit(s) Oral daily  digoxin     Tablet 0.125 milliGRAM(s) Oral every other day  lactulose Syrup 20 Gram(s) Oral three times a day  lactulose Syrup 20 Gram(s) Oral daily PRN  levothyroxine 25 MICROGram(s) Oral daily  propranolol 10 milliGRAM(s) Oral two times a day  spironolactone 25 milliGRAM(s) Oral two times a day  tiotropium 18 MICROgram(s) Capsule 1 Capsule(s) Inhalation daily      FAMILY HISTORY:  Family history of hyperlipidemia    PAST MEDICAL & SURGICAL HISTORY:  PVD (peripheral vascular disease)  Liver cell carcinoma  Severe aortic stenosis by prior echocardiogram  Pulmonary HTN: moderate  CKD (chronic kidney disease), stage 3 (moderate)  COPD (Chronic Obstructive Pulmonary Disease)  AF (Atrial Fibrillation)  Portal Hypertension  Bleeding Esophageal Varices  CLL (Chronic Lymphoblastic Leukemia)  GIB (Gastrointestinal Bleeding)  History of repair of hip fracture  Esophageal Rupture    SOCIAL HISTORY:        Vital Signs Last 24 Hrs  T(C): 36.5 (19 Mar 2019 08:39), Max: 36.7 (19 Mar 2019 05:46)  T(F): 97.7 (19 Mar 2019 08:39), Max: 98.1 (19 Mar 2019 05:46)  HR: 95 (19 Mar 2019 08:39) (55 - 95)  BP: 97/71 (19 Mar 2019 08:39) (97/71 - 129/69)  BP(mean): 89 (18 Mar 2019 18:08) (89 - 89)  RR: 20 (19 Mar 2019 08:39) (18 - 20)  SpO2: 96% (19 Mar 2019 08:39) (96% - 99%)    PHYSICAL EXAM  All physical exam findings normal, except for those marked:  General:	                    Normal: alert, no respiratory distress  .		[] Abnormal:  Eyes		Normal: no conjunctival injection, no discharge,  .		[] Abnormal:  ENT:		Normal: no pharyngeal erythema or exudates  .		[] Abnormal:  Neck		Normal: supple, full range of motion, no nuchal rigidity  .		[] Abnormal:  Lymph Nodes	Normal: normal size and consistency, non-tender  .		[] Abnormal:  Cardiovascular	Normal: regular rate and variability; Normal S1, S2; No murmur  .		[] Abnormal:  Respiratory	Normal: no wheezing or crackles, bilateral audible breath sounds  .		[] Abnormal:  Abdominal	                    Normal: soft; non-distended; non-tender; no hepatosplenomegaly or masses  .		[] Abnormal:  		Normal: normal external genitalia, no rash  .		[] Abnormal:  Extremities	Normal: FROM x4, no cyanosis or edema, symmetric pulses  .		[] Abnormal:  Skin		Normal: skin intact and not indurated; no rash, no desquamation  .		[] Abnormal:  Neurologic	                    Normal: alert, oriented as age-appropriate, moves all extremities,  .		[] Abnormal:  Musculoskeletal	Normal: no joint swelling, erythema, or tenderness;  .		[] Abnormal    Respiratory Support:		[] No	[] Yes:  Vasoactive medication infusion:	[] No	[] Yes:  Venous catheters:		[] No	[] Yes:  Bladder catheter:		[] No	[] Yes:  Other catheters or tubes:	[] No	[] Yes:    Lab Results:                        12.9   4.8   )-----------( 90       ( 18 Mar 2019 13:03 )             38.2         138  |  103  |  49<H>  ----------------------------<  118<H>  5.0   |  23  |  1.36<H>    Ca    10.3      18 Mar 2019 13:03    TPro  6.0  /  Alb  3.5  /  TBili  1.9<H>  /  DBili  x   /  AST  39  /  ALT  21  /  AlkPhos  110      LIVER FUNCTIONS - ( 18 Mar 2019 13:03 )  Alb: 3.5 g/dL / Pro: 6.0 g/dL / ALK PHOS: 110 U/L / ALT: 21 U/L / AST: 39 U/L / GGT: x             Urinalysis Basic - ( 18 Mar 2019 15:48 )    Color: Yellow / Appearance: Clear / S.018 / pH: x  Gluc: x / Ketone: Negative  / Bili: Negative / Urobili: Negative   Blood: x / Protein: Negative / Nitrite: Positive   Leuk Esterase: Large / RBC: 6 /hpf / WBC 20 /HPF   Sq Epi: x / Non Sq Epi: 1 /hpf / Bacteria: Many        MICROBIOLOGY Consultation Requested by:    Patient is a 89y old  Female who presents with a chief complaint of UTI (18 Mar 2019 18:08)    HPI:  Patient is a 89 year old female with CKD stage 3, CLL in remission, liver cirrhosis/HCC on lactulose/rifaximin, AF not on AC, COPD not on O2, aortic stenosis and hypothyroidism who presents to the ED with a reported UTI. Per the daughter the patient was not acting normally last week, making unusual comments erratically. She exhibited similar symptoms when she had UTIs previously. The family collected a urine sample and sent it to the patient's physician, Dr. Celaya, for testing. The daughter reports receiving a phone call from Dr. Celaya this morning stating that the urine grew a resistant bacteria and that the patient needed to come to the hospital for treatement. Review of the microbiology report demonstrated greater than 100,000 ESBL Klebsiella pneumoniae that was sensitive to Amikacin, Cefoxitin, Ertapenem, Imipenem, Levofloxacin, Meropenem, and Tigecycline. The patient was given a dose of ertapenem in the ED. The patient feels well, denies any symptoms of a UTI including fever, dysuria, frequency, or foul smelling urine. Per the daughter at the bedside the patient is at baseline. The patient and daughter report a penicillin allergy but states that she has taken Augmentin in the past without any issues. (18 Mar 2019 18:08)      REVIEW OF SYSTEMS  CONSTITUTIONAL: No weakness, fevers or chills  HEENT: No visual changes; No vertigo or throat pain   NECK: No pain or stiffness  RESPIRATORY: No cough, wheezing, hemoptysis; No shortness of breath  CARDIOVASCULAR: No chest pain or palpitations  GASTROINTESTINAL: no abdominal discomfort, No nausea, vomiting, or hematemesis; occasional constipation. No melena or hematochezia.  GENITOURINARY: No dysuria, frequency or hematuria  NEUROLOGICAL: No numbness or weakness  MSK: no joint tenderness  SKIN: No itching, no rash      Allergies    codeine (Rash)  erythromycin (Rash)  iv dye (Rash)  penicillin (Rash)  shellfish (Rash)    Intolerances    adhesives (Other)  warfarin (Other)    Antimicrobials:  ertapenem  IVPB 500 milliGRAM(s) IV Intermittent every 24 hours  rifaximin 550 milliGRAM(s) Oral two times a day      Other Medications:  acetaminophen   Tablet .. 650 milliGRAM(s) Oral every 6 hours PRN  ALBUTerol    90 MICROgram(s) HFA Inhaler 2 Puff(s) Inhalation every 6 hours  ascorbic acid 500 milliGRAM(s) Oral daily  buDESOnide   0.25 milliGRAM(s) Respule 0.25 milliGRAM(s) Inhalation two times a day  cholecalciferol 2000 Unit(s) Oral daily  digoxin     Tablet 0.125 milliGRAM(s) Oral every other day  lactulose Syrup 20 Gram(s) Oral three times a day  lactulose Syrup 20 Gram(s) Oral daily PRN  levothyroxine 25 MICROGram(s) Oral daily  propranolol 10 milliGRAM(s) Oral two times a day  spironolactone 25 milliGRAM(s) Oral two times a day  tiotropium 18 MICROgram(s) Capsule 1 Capsule(s) Inhalation daily      FAMILY HISTORY:  Family history of hyperlipidemia    PAST MEDICAL & SURGICAL HISTORY:  PVD (peripheral vascular disease)  Liver cell carcinoma  Severe aortic stenosis by prior echocardiogram  Pulmonary HTN: moderate  CKD (chronic kidney disease), stage 3 (moderate)  COPD (Chronic Obstructive Pulmonary Disease)  AF (Atrial Fibrillation)  Portal Hypertension  Bleeding Esophageal Varices  CLL (Chronic Lymphoblastic Leukemia)  GIB (Gastrointestinal Bleeding)  History of repair of hip fracture  Esophageal Rupture    SOCIAL HISTORY:        Vital Signs Last 24 Hrs  T(C): 36.5 (19 Mar 2019 08:39), Max: 36.7 (19 Mar 2019 05:46)  T(F): 97.7 (19 Mar 2019 08:39), Max: 98.1 (19 Mar 2019 05:46)  HR: 95 (19 Mar 2019 08:39) (55 - 95)  BP: 97/71 (19 Mar 2019 08:39) (97/71 - 129/69)  BP(mean): 89 (18 Mar 2019 18:08) (89 - 89)  RR: 20 (19 Mar 2019 08:39) (18 - 20)  SpO2: 96% (19 Mar 2019 08:39) (96% - 99%)    PHYSICAL EXAM  CONSTITUTIONAL: well appearing, well developed, no apparent distress  HEAD: Head atraumatic, normal cephalic shape  EYES: sclera clear bilaterally, EOMI  CARDIAC: irreg irreg, systolic murmur  RESPIRATORY: no respiratory distress, normal breath sounds  CHEST: no erythema, warmth, tenderness or crepitus  GASTROINTESTINAL: soft, nontender, bowel sounds present  MUSCULOSKELETAL: normal strength and ROM, no muscle or joint tenderness  NEUROLOGICAL: AAOx3, no focal deficits, full strength   SKIN: chronic skin changes with ecchymosis diffusely  PSYCH: normal mood/affect    Lab Results:                        12.9   4.8   )-----------( 90       ( 18 Mar 2019 13:03 )             38.2     -18    138  |  103  |  49<H>  ----------------------------<  118<H>  5.0   |  23  |  1.36<H>    Ca    10.3      18 Mar 2019 13:03    TPro  6.0  /  Alb  3.5  /  TBili  1.9<H>  /  DBili  x   /  AST  39  /  ALT  21  /  AlkPhos  110      LIVER FUNCTIONS - ( 18 Mar 2019 13:03 )  Alb: 3.5 g/dL / Pro: 6.0 g/dL / ALK PHOS: 110 U/L / ALT: 21 U/L / AST: 39 U/L / GGT: x             Urinalysis Basic - ( 18 Mar 2019 15:48 )    Color: Yellow / Appearance: Clear / S.018 / pH: x  Gluc: x / Ketone: Negative  / Bili: Negative / Urobili: Negative   Blood: x / Protein: Negative / Nitrite: Positive   Leuk Esterase: Large / RBC: 6 /hpf / WBC 20 /HPF   Sq Epi: x / Non Sq Epi: 1 /hpf / Bacteria: Many        MICROBIOLOGY

## 2019-03-19 NOTE — CONSULT NOTE ADULT - ATTENDING COMMENTS
89 year old with CLL and hepatocellular cancer who presents with altered mental status.    1) Altered mental status: associated in past with UTI  Improved today    Unclear that uti is the cause of change in mental status- likely multifactorial    2) Bacteruria with associated pyuria  Culture is growing ESBL   Treated with ertapenem- continue through 3/22    3) PCN allergy  Tolerating carbapenem and cephalosporin in past 89 year old with CLL and hepatocellular cancer who presents with altered mental status.    1) Altered mental status: associated in past with UTI  Improved today    Unclear that uti is the cause of change in mental status- likely multifactorial    2) Bacteruria with associated pyuria  Culture is growing ESBL   Treated with ertapenem- continue through 3/22    Another option would be to given ertapenem trough 3/20 and then give fosfomycin 3 grams po times one on 3/20    3) PCN allergy  Tolerating carbapenem and cephalosporin in past

## 2019-03-19 NOTE — CONSULT NOTE ADULT - ASSESSMENT
Patient is a 89 year old female with CKD stage 3, CLL in remission, liver cirrhosis/HCC on lactulose/rifaximin, AF not on AC, COPD not on O2, aortic stenosis and hypothyroidism who presents to the ED with bacteruria.       ATTENDING ATTESTATION PENDING    - greater than 100,000 ESBL Klebsiella pneumoniae that was sensitive to Amikacin, Cefoxitin, Ertapenem, Imipenem, Levofloxacin, Meropenem, and Tigecycline  - per documentation pt appears to be back to baseline in terms of mental status  - currently on ertapenem, can switch to Levofloxacin  every 48 hours to complete 7 day course Patient is a 89 year old female with CKD stage 3, CLL in remission, liver cirrhosis/HCC on lactulose/rifaximin, AF not on AC, COPD not on O2, aortic stenosis and hypothyroidism who presents to the ED with bacteruria.     - greater than 100,000 ESBL Klebsiella pneumoniae that was sensitive to Amikacin, Cefoxitin, Ertapenem, Imipenem, Levofloxacin, Meropenem, and Tigecycline  - per documentation pt appears to be back to baseline in terms of mental status  - currently on ertapenem, unable to switch to Levofloxacin given resistance to cipro, can consider fosfomycin prior to d/c to complete abx course

## 2019-03-20 ENCOUNTER — TRANSCRIPTION ENCOUNTER (OUTPATIENT)
Age: 84
End: 2019-03-20

## 2019-03-20 VITALS
RESPIRATION RATE: 18 BRPM | DIASTOLIC BLOOD PRESSURE: 49 MMHG | SYSTOLIC BLOOD PRESSURE: 108 MMHG | WEIGHT: 102.07 LBS | OXYGEN SATURATION: 97 % | HEART RATE: 68 BPM

## 2019-03-20 DIAGNOSIS — E03.9 HYPOTHYROIDISM, UNSPECIFIED: ICD-10-CM

## 2019-03-20 DIAGNOSIS — A49.9 BACTERIAL INFECTION, UNSPECIFIED: ICD-10-CM

## 2019-03-20 DIAGNOSIS — K74.60 UNSPECIFIED CIRRHOSIS OF LIVER: ICD-10-CM

## 2019-03-20 DIAGNOSIS — I48.2 CHRONIC ATRIAL FIBRILLATION: ICD-10-CM

## 2019-03-20 LAB
-  AMIKACIN: SIGNIFICANT CHANGE UP
-  AMPICILLIN/SULBACTAM: SIGNIFICANT CHANGE UP
-  AMPICILLIN: SIGNIFICANT CHANGE UP
-  AZTREONAM: SIGNIFICANT CHANGE UP
-  CEFAZOLIN: SIGNIFICANT CHANGE UP
-  CEFEPIME: SIGNIFICANT CHANGE UP
-  CEFOXITIN: SIGNIFICANT CHANGE UP
-  CEFTRIAXONE: SIGNIFICANT CHANGE UP
-  CIPROFLOXACIN: SIGNIFICANT CHANGE UP
-  ERTAPENEM: SIGNIFICANT CHANGE UP
-  GENTAMICIN: SIGNIFICANT CHANGE UP
-  IMIPENEM: SIGNIFICANT CHANGE UP
-  LEVOFLOXACIN: SIGNIFICANT CHANGE UP
-  MEROPENEM: SIGNIFICANT CHANGE UP
-  NITROFURANTOIN: SIGNIFICANT CHANGE UP
-  PIPERACILLIN/TAZOBACTAM: SIGNIFICANT CHANGE UP
-  TIGECYCLINE: SIGNIFICANT CHANGE UP
-  TOBRAMYCIN: SIGNIFICANT CHANGE UP
-  TRIMETHOPRIM/SULFAMETHOXAZOLE: SIGNIFICANT CHANGE UP
ANION GAP SERPL CALC-SCNC: 13 MMOL/L — SIGNIFICANT CHANGE UP (ref 5–17)
BASOPHILS # BLD AUTO: 0.02 K/UL — SIGNIFICANT CHANGE UP (ref 0–0.2)
BASOPHILS NFR BLD AUTO: 0.4 % — SIGNIFICANT CHANGE UP (ref 0–2)
BUN SERPL-MCNC: 36 MG/DL — HIGH (ref 7–23)
CALCIUM SERPL-MCNC: 9.8 MG/DL — SIGNIFICANT CHANGE UP (ref 8.4–10.5)
CHLORIDE SERPL-SCNC: 107 MMOL/L — SIGNIFICANT CHANGE UP (ref 96–108)
CO2 SERPL-SCNC: 21 MMOL/L — LOW (ref 22–31)
CREAT SERPL-MCNC: 1.13 MG/DL — SIGNIFICANT CHANGE UP (ref 0.5–1.3)
CULTURE RESULTS: SIGNIFICANT CHANGE UP
EOSINOPHIL # BLD AUTO: 0.1 K/UL — SIGNIFICANT CHANGE UP (ref 0–0.5)
EOSINOPHIL NFR BLD AUTO: 2.1 % — SIGNIFICANT CHANGE UP (ref 0–6)
GLUCOSE SERPL-MCNC: 94 MG/DL — SIGNIFICANT CHANGE UP (ref 70–99)
HCT VFR BLD CALC: 37.7 % — SIGNIFICANT CHANGE UP (ref 34.5–45)
HGB BLD-MCNC: 12 G/DL — SIGNIFICANT CHANGE UP (ref 11.5–15.5)
IMM GRANULOCYTES NFR BLD AUTO: 0.2 % — SIGNIFICANT CHANGE UP (ref 0–1.5)
LYMPHOCYTES # BLD AUTO: 0.91 K/UL — LOW (ref 1–3.3)
LYMPHOCYTES # BLD AUTO: 19.4 % — SIGNIFICANT CHANGE UP (ref 13–44)
MCHC RBC-ENTMCNC: 31.8 GM/DL — LOW (ref 32–36)
MCHC RBC-ENTMCNC: 33.1 PG — SIGNIFICANT CHANGE UP (ref 27–34)
MCV RBC AUTO: 104.1 FL — HIGH (ref 80–100)
METHOD TYPE: SIGNIFICANT CHANGE UP
MONOCYTES # BLD AUTO: 0.52 K/UL — SIGNIFICANT CHANGE UP (ref 0–0.9)
MONOCYTES NFR BLD AUTO: 11.1 % — SIGNIFICANT CHANGE UP (ref 2–14)
NEUTROPHILS # BLD AUTO: 3.13 K/UL — SIGNIFICANT CHANGE UP (ref 1.8–7.4)
NEUTROPHILS NFR BLD AUTO: 66.8 % — SIGNIFICANT CHANGE UP (ref 43–77)
ORGANISM # SPEC MICROSCOPIC CNT: SIGNIFICANT CHANGE UP
ORGANISM # SPEC MICROSCOPIC CNT: SIGNIFICANT CHANGE UP
PLATELET # BLD AUTO: 77 K/UL — LOW (ref 150–400)
POTASSIUM SERPL-MCNC: 4.9 MMOL/L — SIGNIFICANT CHANGE UP (ref 3.5–5.3)
POTASSIUM SERPL-SCNC: 4.9 MMOL/L — SIGNIFICANT CHANGE UP (ref 3.5–5.3)
RBC # BLD: 3.62 M/UL — LOW (ref 3.8–5.2)
RBC # FLD: 13.5 % — SIGNIFICANT CHANGE UP (ref 10.3–14.5)
SODIUM SERPL-SCNC: 141 MMOL/L — SIGNIFICANT CHANGE UP (ref 135–145)
SPECIMEN SOURCE: SIGNIFICANT CHANGE UP
WBC # BLD: 4.69 K/UL — SIGNIFICANT CHANGE UP (ref 3.8–10.5)
WBC # FLD AUTO: 4.69 K/UL — SIGNIFICANT CHANGE UP (ref 3.8–10.5)

## 2019-03-20 PROCEDURE — 87186 SC STD MICRODIL/AGAR DIL: CPT

## 2019-03-20 PROCEDURE — 85014 HEMATOCRIT: CPT

## 2019-03-20 PROCEDURE — 84295 ASSAY OF SERUM SODIUM: CPT

## 2019-03-20 PROCEDURE — 99285 EMERGENCY DEPT VISIT HI MDM: CPT

## 2019-03-20 PROCEDURE — 97161 PT EVAL LOW COMPLEX 20 MIN: CPT

## 2019-03-20 PROCEDURE — 99239 HOSP IP/OBS DSCHRG MGMT >30: CPT

## 2019-03-20 PROCEDURE — 81001 URINALYSIS AUTO W/SCOPE: CPT

## 2019-03-20 PROCEDURE — 84132 ASSAY OF SERUM POTASSIUM: CPT

## 2019-03-20 PROCEDURE — 80053 COMPREHEN METABOLIC PANEL: CPT

## 2019-03-20 PROCEDURE — 82330 ASSAY OF CALCIUM: CPT

## 2019-03-20 PROCEDURE — 82803 BLOOD GASES ANY COMBINATION: CPT

## 2019-03-20 PROCEDURE — 83605 ASSAY OF LACTIC ACID: CPT

## 2019-03-20 PROCEDURE — 85027 COMPLETE CBC AUTOMATED: CPT

## 2019-03-20 PROCEDURE — 87040 BLOOD CULTURE FOR BACTERIA: CPT

## 2019-03-20 PROCEDURE — 80048 BASIC METABOLIC PNL TOTAL CA: CPT

## 2019-03-20 PROCEDURE — 99232 SBSQ HOSP IP/OBS MODERATE 35: CPT

## 2019-03-20 PROCEDURE — 82435 ASSAY OF BLOOD CHLORIDE: CPT

## 2019-03-20 PROCEDURE — 87086 URINE CULTURE/COLONY COUNT: CPT

## 2019-03-20 PROCEDURE — 82947 ASSAY GLUCOSE BLOOD QUANT: CPT

## 2019-03-20 PROCEDURE — 94640 AIRWAY INHALATION TREATMENT: CPT

## 2019-03-20 RX ORDER — TIOTROPIUM BROMIDE 18 UG/1
1 CAPSULE ORAL; RESPIRATORY (INHALATION)
Qty: 0 | Refills: 0 | DISCHARGE
Start: 2019-03-20

## 2019-03-20 RX ORDER — FOSFOMYCIN TROMETHAMINE 3 G/1
3 POWDER ORAL ONCE
Qty: 0 | Refills: 0 | Status: COMPLETED | OUTPATIENT
Start: 2019-03-20 | End: 2019-03-20

## 2019-03-20 RX ORDER — IPRATROPIUM/ALBUTEROL SULFATE 18-103MCG
3 AEROSOL WITH ADAPTER (GRAM) INHALATION
Qty: 0 | Refills: 0 | DISCHARGE
Start: 2019-03-20

## 2019-03-20 RX ORDER — LEVOTHYROXINE SODIUM 125 MCG
1 TABLET ORAL
Qty: 0 | Refills: 0 | COMMUNITY

## 2019-03-20 RX ORDER — PROPRANOLOL HCL 160 MG
1 CAPSULE, EXTENDED RELEASE 24HR ORAL
Qty: 0 | Refills: 0 | DISCHARGE
Start: 2019-03-20

## 2019-03-20 RX ORDER — DIGOXIN 250 MCG
1 TABLET ORAL
Qty: 0 | Refills: 0 | COMMUNITY

## 2019-03-20 RX ORDER — SPIRONOLACTONE 25 MG/1
1 TABLET, FILM COATED ORAL
Qty: 0 | Refills: 0 | COMMUNITY

## 2019-03-20 RX ORDER — LEVOTHYROXINE SODIUM 125 MCG
1 TABLET ORAL
Qty: 0 | Refills: 0 | DISCHARGE
Start: 2019-03-20

## 2019-03-20 RX ORDER — LACTULOSE 10 G/15ML
30 SOLUTION ORAL
Qty: 0 | Refills: 0 | DISCHARGE
Start: 2019-03-20

## 2019-03-20 RX ORDER — DIGOXIN 250 MCG
1 TABLET ORAL
Qty: 0 | Refills: 0 | DISCHARGE
Start: 2019-03-20

## 2019-03-20 RX ORDER — SPIRONOLACTONE 25 MG/1
1 TABLET, FILM COATED ORAL
Qty: 0 | Refills: 0 | DISCHARGE
Start: 2019-03-20

## 2019-03-20 RX ADMIN — LACTULOSE 20 GRAM(S): 10 SOLUTION ORAL at 06:45

## 2019-03-20 RX ADMIN — ERTAPENEM SODIUM 110 MILLIGRAM(S): 1 INJECTION, POWDER, LYOPHILIZED, FOR SOLUTION INTRAMUSCULAR; INTRAVENOUS at 12:19

## 2019-03-20 RX ADMIN — Medication 500 MILLIGRAM(S): at 12:18

## 2019-03-20 RX ADMIN — SPIRONOLACTONE 25 MILLIGRAM(S): 25 TABLET, FILM COATED ORAL at 06:45

## 2019-03-20 RX ADMIN — Medication 2000 UNIT(S): at 12:18

## 2019-03-20 RX ADMIN — TIOTROPIUM BROMIDE 1 CAPSULE(S): 18 CAPSULE ORAL; RESPIRATORY (INHALATION) at 12:18

## 2019-03-20 RX ADMIN — Medication 0.25 MILLIGRAM(S): at 07:00

## 2019-03-20 RX ADMIN — Medication 3 MILLILITER(S): at 06:45

## 2019-03-20 RX ADMIN — Medication 10 MILLIGRAM(S): at 08:36

## 2019-03-20 RX ADMIN — Medication 3 MILLILITER(S): at 12:18

## 2019-03-20 RX ADMIN — FOSFOMYCIN TROMETHAMINE 3 GRAM(S): 3 POWDER ORAL at 14:56

## 2019-03-20 RX ADMIN — Medication 25 MICROGRAM(S): at 06:46

## 2019-03-20 NOTE — DISCUSSION/SUMMARY
[FreeTextEntry1] : Paroxysmal atrial flutter - continue digoxin QOD and propranolol.  Not a candidate for anti-coagulation.  No objection to ASA from a cardiac standpoint given concern of embolic CVA, but explained to patient and daughter that GI would have to agree to this, given risk of bleeding with esophageal varices.\par \par Severe AS and moderate AI; moderate mitral and tricuspid valve insufficiency; remains compensated on current regimen of meds; will continue same.  Not a candidate for valve replacement.\par \par LE edema - seems adequately controlled at this time on spironolactone bid.  BP reasonable today; would continue same dose.  Also using knee high compression stockings.  \par \par To continue on a low salt diet; I reminded her to maintain her calorie intake.\par \par She will return in 2 months.

## 2019-03-20 NOTE — REASON FOR VISIT
[FreeTextEntry1] : Steffanie Bustamante returns today for followup regarding aortic and mitral valve disease, paroxysmal atrial flutter and systolic hypertension, and after a recent hospital stay after likely syncope and possible CVA.

## 2019-03-20 NOTE — PHYSICAL THERAPY INITIAL EVALUATION ADULT - PERTINENT HX OF CURRENT PROBLEM, REHAB EVAL
89 y/o F w/ CKD stage 3, CLL in remission, liver cirrhosis/HCC on lactulose/rifaximin, AF not on AC, COPD not on O2, aortic stenosis and hypothyroidism who presents to the ED with a reported UTI.

## 2019-03-20 NOTE — DISCHARGE NOTE NURSING/CASE MANAGEMENT/SOCIAL WORK - NSDCDPATPORTLINK_GEN_ALL_CORE
You can access the [facility] Patient Portal, offered by [facility], by registering with the following website: [email or web address]

## 2019-03-20 NOTE — PROGRESS NOTE ADULT - SUBJECTIVE AND OBJECTIVE BOX
[facility] Division of Hospital Medicine  Fredy Travis MD  Pager (M-F, 0D-[phone]): [phone]  Other Times:  [phone]    Patient is a 89y old  Female who presents with a chief complaint of UTI (19 Mar 2019 10:42)    SUBJECTIVE / OVERNIGHT EVENTS: Patient seen and examined. Has no complaints. Denies dysuria, hematuria, fever, chills, lower abdominal pain or nausea.    MEDICATIONS  (STANDING):  ALBUTerol    90 MICROgram(s) HFA Inhaler 2 Puff(s) Inhalation every 6 hours  ascorbic acid 500 milliGRAM(s) Oral daily  buDESOnide   0.25 milliGRAM(s) Respule 0.25 milliGRAM(s) Inhalation two times a day  cholecalciferol 2000 Unit(s) Oral daily  digoxin     Tablet 0.125 milliGRAM(s) Oral every other day  ertapenem  IVPB 500 milliGRAM(s) IV Intermittent every 24 hours  lactulose Syrup 20 Gram(s) Oral three times a day  levothyroxine 25 MICROGram(s) Oral daily  propranolol 10 milliGRAM(s) Oral two times a day  rifaximin 550 milliGRAM(s) Oral two times a day  spironolactone 25 milliGRAM(s) Oral two times a day  tiotropium 18 MICROgram(s) Capsule 1 Capsule(s) Inhalation daily    MEDICATIONS  (PRN):  acetaminophen   Tablet .. 650 milliGRAM(s) Oral every 6 hours PRN Temp greater or equal to 38C (100.4F)  lactulose Syrup 20 Gram(s) Oral daily PRN IF NO BM AFTER SCHEDULED 3 DOSES OF LACTULOSE      PHYSICAL EXAM:  Vital Signs Last 24 Hrs  T(C): 36.4 (19 Mar 2019 10:45), Max: 36.7 (19 Mar 2019 05:46)  T(F): 97.6 (19 Mar 2019 10:45), Max: 98.1 (19 Mar 2019 05:46)  HR: 89 (19 Mar 2019 10:45) (55 - 95)  BP: 100/61 (19 Mar 2019 10:45) (97/71 - 129/69)  BP(mean): 89 (18 Mar 2019 18:08) (89 - 89)  RR: 18 (19 Mar 2019 10:45) (18 - 20)  SpO2: 100% (19 Mar 2019 10:45) (96% - 100%)  GENERAL: NAD, frail appearing  EYES: EOMI, conjunctiva and sclera clear  NECK: Supple, No JVD  CHEST/LUNG: Clear to auscultation bilaterally; No wheeze  HEART: Regular rate and rhythm; + low pitched systolic murmur  ABDOMEN: Soft, Nontender, Nondistended; Bowel sounds present  EXTREMITIES:  2+ Peripheral Pulses, No clubbing, cyanosis, or edema  PSYCH: AAOx3    LABS:                        12.9   4.8   )-----------( 90       ( 18 Mar 2019 13:03 )             38.2     -18    138  |  103  |  49<H>  ----------------------------<  118<H>  5.0   |  23  |  1.36<H>    Ca    10.3      18 Mar 2019 13:03    TPro  6.0  /  Alb  3.5  /  TBili  1.9<H>  /  DBili  x   /  AST  39  /  ALT  21  /  AlkPhos  110            Urinalysis Basic - ( 18 Mar 2019 15:48 )    Color: Yellow / Appearance: Clear / S.018 / pH: x  Gluc: x / Ketone: Negative  / Bili: Negative / Urobili: Negative   Blood: x / Protein: Negative / Nitrite: Positive   Leuk Esterase: Large / RBC: 6 /hpf / WBC 20 /HPF   Sq Epi: x / Non Sq Epi: 1 /hpf / Bacteria: Many
Follow Up:      Inverval History/ROS:Patient is a 89y old  Female who presents with a chief complaint of UTI (19 Mar 2019 12:03)  No fever  No pain      Allergies    codeine (Rash)  erythromycin (Rash)  iv dye (Rash)  penicillin (Rash)  shellfish (Rash)    Intolerances    adhesives (Other)  warfarin (Other)      ANTIMICROBIALS:  ertapenem  IVPB 500 every 24 hours  fosfomycin 3 once  rifaximin 550 two times a day      OTHER MEDS:  acetaminophen   Tablet .. 650 milliGRAM(s) Oral every 6 hours PRN  ALBUTerol/ipratropium for Nebulization 3 milliLiter(s) Nebulizer every 6 hours  ascorbic acid 500 milliGRAM(s) Oral daily  buDESOnide   0.25 milliGRAM(s) Respule 0.25 milliGRAM(s) Inhalation two times a day  cholecalciferol 2000 Unit(s) Oral daily  digoxin     Tablet 0.125 milliGRAM(s) Oral every other day  lactulose Syrup 20 Gram(s) Oral three times a day  lactulose Syrup 20 Gram(s) Oral daily PRN  levothyroxine 25 MICROGram(s) Oral daily  propranolol 10 milliGRAM(s) Oral two times a day  spironolactone 25 milliGRAM(s) Oral two times a day  tiotropium 18 MICROgram(s) Capsule 1 Capsule(s) Inhalation daily      Vital Signs Last 24 Hrs  T(C): 36.5 (20 Mar 2019 06:30), Max: 36.9 (19 Mar 2019 22:15)  T(F): 97.7 (20 Mar 2019 06:30), Max: 98.4 (19 Mar 2019 22:15)  HR: 64 (20 Mar 2019 06:30) (64 - 92)  BP: 111/69 (20 Mar 2019 06:30) (100/61 - 124/49)  BP(mean): --  RR: 18 (20 Mar 2019 06:30) (18 - 18)  SpO2: 96% (20 Mar 2019 06:30) (96% - 100%)    PHYSICAL EXAM:  General: [x ] non-toxic  HEAD/EYES: [ ] PERRL [x ] white sclera [ ] icterus  ENT:  [ ] normal [ ] supple [ ] thrush [ ] pharyngeal exudate  Cardiovascular:   [ ] murmur [x ] normal [ ] PPM/AICD  Respiratory:  [x ] clear to ausculation bilaterally  GI:  x[ ] soft, non-tender, normal bowel sounds  :  [ ] rivero [x ] no CVA tenderness   Musculoskeletal:  [x ] no synovitis  Neurologic:  [x ] non-focal exam   Skin:  x[ ] no rash  Lymph: [ ] no lymphadenopathy  Psychiatric:  [ ] appropriate affect [x ] alert & oriented  Lines:  [x ] no phlebitis [ ] central line                                12.9   4.8   )-----------( 90       ( 18 Mar 2019 13:03 )             38.2           138  |  103  |  49<H>  ----------------------------<  118<H>  5.0   |  23  |  1.36<H>    Ca    10.3      18 Mar 2019 13:03    TPro  6.0  /  Alb  3.5  /  TBili  1.9<H>  /  DBili  x   /  AST  39  /  ALT  21  /  AlkPhos  110        Urinalysis Basic - ( 18 Mar 2019 15:48 )    Color: Yellow / Appearance: Clear / S.018 / pH: x  Gluc: x / Ketone: Negative  / Bili: Negative / Urobili: Negative   Blood: x / Protein: Negative / Nitrite: Positive   Leuk Esterase: Large / RBC: 6 /hpf / WBC 20 /HPF   Sq Epi: x / Non Sq Epi: 1 /hpf / Bacteria: Many        MICROBIOLOGY:Culture Results:   >100,000 CFU/ml Gram Negative Rods (19 @ 18:54)  Culture Results:   No growth to date. (19 @ 18:17)  Culture Results:   No growth to date. (19 @ 18:17)      RADIOLOGY:
Patient is a 89y old  Female who presents with a chief complaint of UTI (20 Mar 2019 09:47)      SUBJECTIVE / OVERNIGHT EVENTS: Patient seen and examined at bedside. She denies any abdominal pain, dysuria, CP, SOB and n/v. Per dtr she will be able to pick her up at 3pm today.     ROS:  All other review of systems negative    Allergies    codeine (Rash)  erythromycin (Rash)  iv dye (Rash)  penicillin (Rash)  shellfish (Rash)    Intolerances    adhesives (Other)  warfarin (Other)      MEDICATIONS  (STANDING):  ALBUTerol/ipratropium for Nebulization 3 milliLiter(s) Nebulizer every 6 hours  ascorbic acid 500 milliGRAM(s) Oral daily  buDESOnide   0.25 milliGRAM(s) Respule 0.25 milliGRAM(s) Inhalation two times a day  cholecalciferol 2000 Unit(s) Oral daily  digoxin     Tablet 0.125 milliGRAM(s) Oral every other day  ertapenem  IVPB 500 milliGRAM(s) IV Intermittent every 24 hours  fosfomycin 3 Gram(s) Oral once  lactulose Syrup 20 Gram(s) Oral three times a day  levothyroxine 25 MICROGram(s) Oral daily  propranolol 10 milliGRAM(s) Oral two times a day  rifaximin 550 milliGRAM(s) Oral two times a day  spironolactone 25 milliGRAM(s) Oral two times a day  tiotropium 18 MICROgram(s) Capsule 1 Capsule(s) Inhalation daily    MEDICATIONS  (PRN):  acetaminophen   Tablet .. 650 milliGRAM(s) Oral every 6 hours PRN Temp greater or equal to 38C (100.4F)  lactulose Syrup 20 Gram(s) Oral daily PRN IF NO BM AFTER SCHEDULED 3 DOSES OF LACTULOSE      Vital Signs Last 24 Hrs  T(C): 36.5 (20 Mar 2019 06:30), Max: 36.9 (19 Mar 2019 22:15)  T(F): 97.7 (20 Mar 2019 06:30), Max: 98.4 (19 Mar 2019 22:15)  HR: 68 (20 Mar 2019 10:37) (64 - 92)  BP: 108/49 (20 Mar 2019 10:37) (106/65 - 124/49)  BP(mean): --  RR: 18 (20 Mar 2019 10:37) (18 - 18)  SpO2: 97% (20 Mar 2019 10:37) (96% - 99%)  CAPILLARY BLOOD GLUCOSE        I&O's Summary    19 Mar 2019 07:01  -  20 Mar 2019 07:00  --------------------------------------------------------  IN: 0 mL / OUT: 450 mL / NET: -450 mL        PHYSICAL EXAM:  GENERAL: NAD, frail   HEAD:  Atraumatic, Normocephalic  EYES: EOMI, PERRLA, conjunctiva and sclera clear  CHEST/LUNG: Clear to auscultation bilaterally; No wheeze  HEART: Regular rate and rhythm; No murmurs, rubs, or gallops  ABDOMEN: Soft, Nontender, Nondistended; Bowel sounds present  EXTREMITIES:  2+ Peripheral Pulses, No clubbing, cyanosis, or edema  NEUROLOGY: alert   SKIN: No rashes or lesions    LABS:                        12.9   4.8   )-----------( 90       ( 18 Mar 2019 13:03 )             38.2     03-20    141  |  107  |  36<H>  ----------------------------<  94  4.9   |  21<L>  |  1.13    Ca    9.8      20 Mar 2019 09:17    TPro  6.0  /  Alb  3.5  /  TBili  1.9<H>  /  DBili  x   /  AST  39  /  ALT  21  /  AlkPhos  110  18          Urinalysis Basic - ( 18 Mar 2019 15:48 )    Color: Yellow / Appearance: Clear / S.018 / pH: x  Gluc: x / Ketone: Negative  / Bili: Negative / Urobili: Negative   Blood: x / Protein: Negative / Nitrite: Positive   Leuk Esterase: Large / RBC: 6 /hpf / WBC 20 /HPF   Sq Epi: x / Non Sq Epi: 1 /hpf / Bacteria: Many        RADIOLOGY & ADDITIONAL TESTS:    Consultant(s) Notes Reviewed:  ASA metcalf noted     Care Discussed with Consultants/Other Providers: ASA Rivera    Case Discussed with Dtr at bedside, she is in agreement with d/c plan

## 2019-03-20 NOTE — DISCHARGE NOTE PROVIDER - HOSPITAL COURSE
Patient is a 89 year old female with CKD stage 3, CLL in remission, liver cirrhosis/HCC on lactulose/rifaximin, AF not on AC, COPD not on O2, aortic stenosis and hypothyroidism who presents to the ED with a reported UTI. Per the daughter the patient was not acting normally last week, making unusual comments erratically. She exhibited similar symptoms when she had UTIs previously. The family collected a urine sample and sent it to the patient's physician, Dr. Celaya, for testing. The daughter reports receiving a phone call from Dr. Celaya this morning stating that the urine grew a resistant bacteria and that the patient needed to come to the hospital for treatement. Review of the microbiology report demonstrated greater than 100,000 ESBL Klebsiella pneumoniae that was sensitive to Amikacin, Cefoxitin, Ertapenem, Imipenem, Levofloxacin, Meropenem, and Tigecycline. The patient was given a dose of ertapenem in the ED. The patient feels well, denies any symptoms of a UTI including fever, dysuria, frequency, or foul smelling urine. Per the daughter at the bedside the patient is at baseline. The patient and daughter report a penicillin allergy but states that she has taken Augmentin in the past without any issues. (18 Mar 2019 18:08)    Pt was treated for bacteruria and was giving invanz, , pt back to baseline and received prior discharge fosfamycin po. Pt advised to follow up with PMD for total resolution of UTI . Pt to follow up with Dr Fallon Celaya outpatient. Patient is a 89 year old female with CKD stage 3, CLL in remission, liver cirrhosis/HCC on lactulose/rifaximin, AF not on AC, COPD not on O2, aortic stenosis and hypothyroidism who presents to the ED with a reported UTI. Per the daughter the patient was not acting normally last week, making unusual comments erratically. She exhibited similar symptoms when she had UTIs previously. The family collected a urine sample and sent it to the patient's physician, Dr. Celaya, for testing. The daughter reports receiving a phone call from Dr. Celaya this morning stating that the urine grew a resistant bacteria and that the patient needed to come to the hospital for treatement. Review of the microbiology report demonstrated greater than 100,000 ESBL Klebsiella pneumoniae that was sensitive to Amikacin, Cefoxitin, Ertapenem, Imipenem, Levofloxacin, Meropenem, and Tigecycline. The patient was given a dose of ertapenem in the ED. The patient feels well, denies any symptoms of a UTI including fever, dysuria, frequency, or foul smelling urine. Per the daughter at the bedside the patient is at baseline. The patient and daughter report a penicillin allergy but states that she has taken Augmentin in the past without any issues. (18 Mar 2019 18:08)    Pt was treated for bacteruria and was broad spectrum antibiotic , pt back to baseline . Pt advised to follow up with PMD for total resolution of UTI . Patient is a 89 year old female with CKD stage 3, CLL in remission, liver cirrhosis/HCC on lactulose/rifaximin, AF not on AC, COPD not on O2, aortic stenosis and hypothyroidism who presents to the ED with a reported UTI. Per the daughter the patient was not acting normally last week, making unusual comments erratically. She exhibited similar symptoms when she had UTIs previously. The family collected a urine sample and sent it to the patient's physician, Dr. Celaya, for testing. The daughter reports receiving a phone call from Dr. Celaya this morning stating that the urine grew a resistant bacteria and that the patient needed to come to the hospital for treatement. Review of the microbiology report demonstrated greater than 100,000 ESBL Klebsiella pneumoniae that was sensitive to Amikacin, Cefoxitin, Ertapenem, Imipenem, Levofloxacin, Meropenem, and Tigecycline. The patient was given a dose of ertapenem in the ED. The patient feels well, denies any symptoms of a UTI including fever, dysuria, frequency, or foul smelling urine. Per the daughter at the bedside the patient is at baseline. The patient and daughter report a penicillin allergy but states that she has taken Augmentin in the past without any issues. (18 Mar 2019 18:08). Pt was treated for bacteruria and was broad spectrum antibiotic due to prior h/x of ESBL + UTI, pt back to baseline . Pt advised to follow up with PMD for total resolution of UTI .

## 2019-03-20 NOTE — PHYSICAL THERAPY INITIAL EVALUATION ADULT - ADDITIONAL COMMENTS
Pt lives in the 2nd floor of a private house 6 plus 7 steps to enter, bilateral handrails. Pt has an aide 5 days, 8 hours a day who assists pt in ambulation using a RW and for ADLs. Pt owns RW, wheelchair, commode, shower chair

## 2019-03-20 NOTE — DISCHARGE NOTE PROVIDER - CARE PROVIDER_API CALL
Fallon Celaya)  Critical Care Medicine; Internal Medicine; Pulmonary Disease  [address]  [place] [address]  Phone: [phone]  Fax: [phone]  Follow Up Time:

## 2019-03-20 NOTE — PROGRESS NOTE ADULT - PROBLEM SELECTOR PLAN 4
hepatic cirrhosis without ascites  -Continue lactulose/rifaximin for HE PPx, monitor stool output  -Continue aldactone 25 mg PO BID  -Continue propranolol 10 mg PO BID for presumed portal hypertension

## 2019-03-20 NOTE — ASSESSMENT
[FreeTextEntry1] : Paroxysmal atrial flutter. \par \par Severe aortic stenosis and moderate aortic valve insufficiency (although auscultatory more consistent with moderate AS; presence of AI may cause overestimation of the degree of AS on echo.       \par      moderate mitral and tricuspid valve insufficiency.\par \par Systolic hypertension of the elderly, accentuated by a widened pulse pressure in the presence of aortic valve insufficiency. \par \par Dependent edema, predominantly due to venous insufficiency.\par \par COPD. \par \par Moderate pulmonary hypertension, probably the combined effect of intrinsic lung disease and valvular heart disease.\par \par CLL with anemia and thrombocytopenia, requiring intermittent treatment with chemotherapy.\par \par Cirrhosis, esophageal varices.

## 2019-03-20 NOTE — PROGRESS NOTE ADULT - PROBLEM SELECTOR PLAN 1
ESBL by outpatient culture, sensitive to carbapenems and levofloxacin  -Continue invanz last dose today and give fosfomycin 3 grams po times one on 3/20  -ID evaluation appreciated    - Pt is stable for d/c today , d/w Dtr and ID

## 2019-03-20 NOTE — PROGRESS NOTE ADULT - ASSESSMENT
Patient is a 89 year old female with CKD stage 3, CLL in remission, liver cirrhosis/HCC on lactulose/rifaximin, AF not on AC, COPD not on O2, aortic stenosis and hypothyroidism who presents to the ED with bacteruria with outpatient culture growing ESBL E. coli. At present, is not manifesting signs/symptoms of urinary tract infection.
89 year old with CLL and hepatocellular cancer who presents with altered mental status.    1) Altered mental status: associated in past with UTI  Improved today    Unclear that uti is the cause of change in mental status- likely multifactorial    2) Bacteruria with associated pyuria  Culture is growing ESBL   Ertapenem today and then give fosfomycin 3 grams po times one on 3/20    3) PCN allergy  Tolerating carbapenem and cephalosporin in past
Patient is a 89 year old female with CKD stage 3, CLL in remission, liver cirrhosis/HCC on lactulose/rifaximin, AF not on AC, COPD not on O2, aortic stenosis and hypothyroidism who presents to the ED with bacteruria with outpatient culture growing ESBL E. coli. At present, is not manifesting signs/symptoms of Bacteriuria

## 2019-03-20 NOTE — DISCHARGE NOTE PROVIDER - NSDCCPCAREPLAN_GEN_ALL_CORE_FT
PRINCIPAL DISCHARGE DIAGNOSIS  Diagnosis: Urinary tract infection without hematuria, site unspecified  Assessment and Plan of Treatment: HOME CARE INSTRUCTIONS  f you were prescribed antibiotics, take them exactly as your caregiver instructs you. Finish the medication even if you feel better after you have only taken some of the medication.  Drink enough water and fluids to keep your urine clear or pale yellow.  Avoid caffeine, tea, and carbonated beverages. They tend to irritate your bladder.  Empty your bladder often. Avoid holding urine for long periods of time.  Empty your bladder before and after sexual intercourse.  After a bowel movement, women should cleanse from front to back. Use each tissue only once.  SEEK MEDICAL CARE IF:  You have back pain.  You develop a fever.  Your symptoms do not begin to resolve within 3 days.  SEEK IMMEDIATE MEDICAL CARE IF:  You have severe back pain or lower abdominal pain.  You develop chills.  You have nausea or vomiting.  You have continued burning or discomfort with urination.        SECONDARY DISCHARGE DIAGNOSES  Diagnosis: Hypothyroidism  Assessment and Plan of Treatment: you do not make enough thyroid hormone  signs & symptoms of low levels of thyroid hormone - tired, getting cold easily, coarse or thin hair, constipation, shortness of breath, swelling, irregular periods  your doctor will do thyroid hormone blood tests at least once a year to monitor if medication dose is adequate  take your thyroid medicine as directed by your doctor & on empty stomach      Diagnosis: Cirrhosis of liver without ascites, unspecified hepatic cirrhosis type  Assessment and Plan of Treatment: Cirrhosis of liver without ascites, unspecified hepatic cirrhosis type. continue lactulose and xifaxan and follow up with hepatology    Diagnosis: Chronic atrial fibrillation  Assessment and Plan of Treatment: Atrial fibrillation is the most common heart rhythm problem.  The condition puts you at risk for has stroke and heart attack  You were started on blood thinners to prevent possible clot and stroke complications.   Monitor for any signs of bleeding and avoid injury while on this medication  If any bleeding persistent and symptomatic stop the medication and notify your doctor immediately.  It helps if you control your blood pressure, not drink more than 1-2 alcohol drinks per day, cut down on caffeine, getting treatment for over active thyroid gland, and get regular exercise  Call your doctor if you feel your heart racing or beating unusually, chest tightness or pain, lightheaded, faint, shortness of breath especially with exercise  It is important to take your heart medication as prescribed  You may be on anticoagulation which is very important to take as directed - you may need blood work to monitor drug levels

## 2019-03-20 NOTE — HISTORY OF PRESENT ILLNESS
[FreeTextEntry1] : I saw her last in January.  Since that time, \par \par \par she was hospitalized in December, after episodes of possible LOC in bathroom, found to have head wound and abrasions.  Was alert and lucid when found by daughter.  Brought to [facility] ED and was admitted.  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  \par \par Given her presentation with apparent LOC, spironolactone d/c'd while in hospital.  Since returning home, she has recurrence LE edema, and it has been restarted.\par \par As she returns today, she feels remarkably well. At present, she reports no episodes of chest pain or unusual dyspnea.  She describes no palpitations.  She reports no orthopnea or PND.   She reports no lightheadedness or faint spells, and has not suffered recurrence of LOC.\par \par Her appetite is reasonably good.  She can walk at home using a walker.  \par \par She saw Dr. Strickland the neurologist, who suggested taking ASA twice weekly.

## 2019-03-21 ENCOUNTER — APPOINTMENT (OUTPATIENT)
Dept: CARDIOLOGY | Facility: CLINIC | Age: 84
End: 2019-03-21

## 2019-03-26 ENCOUNTER — APPOINTMENT (OUTPATIENT)
Dept: INTERNAL MEDICINE | Facility: CLINIC | Age: 84
End: 2019-03-26
Payer: MEDICARE

## 2019-03-26 VITALS
TEMPERATURE: 97.4 F | OXYGEN SATURATION: 94 % | SYSTOLIC BLOOD PRESSURE: 100 MMHG | HEART RATE: 85 BPM | DIASTOLIC BLOOD PRESSURE: 66 MMHG

## 2019-03-26 DIAGNOSIS — J47.1 BRONCHIECTASIS WITH (ACUTE) EXACERBATION: ICD-10-CM

## 2019-03-26 PROCEDURE — 99496 TRANSJ CARE MGMT HIGH F2F 7D: CPT

## 2019-03-28 PROBLEM — J47.1 BRONCHIECTASIS WITH ACUTE EXACERBATION: Status: ACTIVE | Noted: 2019-03-28

## 2019-03-31 NOTE — PHYSICAL EXAM
[High Complexity requires an extensive number of possible diagnoses and/or the management options, extensive complexity of the medical data (tests, etc.) to be reviewed, and a high risk of significant complications, morbidity, and/or mortality as well as c] : High Complexity  [No Acute Distress] : no acute distress [Well-Appearing] : well-appearing [Normal Voice/Communication] : normal voice/communication [Normal Sclera/Conjunctiva] : normal sclera/conjunctiva [PERRL] : pupils equal round and reactive to light [EOMI] : extraocular movements intact [Normal Outer Ear/Nose] : the outer ears and nose were normal in appearance [Normal Oropharynx] : the oropharynx was normal [Supple] : supple [No Respiratory Distress] : no respiratory distress  [No Accessory Muscle Use] : no accessory muscle use [Normal Rate] : normal rate  [Normal S1, S2] : normal S1 and S2 [No Extremity Clubbing/Cyanosis] : no extremity clubbing/cyanosis [Soft] : abdomen soft [Non Tender] : non-tender [Normal Bowel Sounds] : normal bowel sounds [No CVA Tenderness] : no CVA  tenderness [No Spinal Tenderness] : no spinal tenderness [No Rash] : no rash [Speech Grossly Normal] : speech grossly normal [Normal Affect] : the affect was normal [Alert and Oriented x3] : oriented to person, place, and time [de-identified] : thin [de-identified] : No ST [de-identified] : no stridor [de-identified] : R=16; wet cough; b/l rhonchi and wheezing [de-identified] : murmurs loud and unchanged [de-identified] : improved edema; no cords [de-identified] :  MS seems intact/ in wheelchair; moves all extremities

## 2019-03-31 NOTE — ASSESSMENT
[FreeTextEntry1] : UTI = recurrent\par S/P IV antibiotics\par Asymptomatic\par Will monitor urinalysis and urine C&S\par Urology f/u\par \par Acute exacerbation of bronchiectasis\par Likely triggered by acute viral URI\par Chest PT\par Add Mucomyst 2 cc to albuterol nebs bid\par Budesonide nebs bid\par Duoneb nebs qid\par Has RX for doxycycline\par Will add po steroids if wheezing does not romina\par \par Cirrhosis\par GI f/u\par Continue Lactulose/Xifaxan/ Propranolol/Aldactone\par \par Hypothyroidism\par On daily Synthroid\par Will monitor TFT's\par \par RTC 1 week and as needed\par To call if worse or if not improving\par To call for any medical/pulmonary issues\par Continue meds, diet, PT\par F/U with all MD's

## 2019-03-31 NOTE — COUNSELING
[Weight management counseling provided] : Weight management [Healthy eating counseling provided] : healthy eating [Activity counseling provided] : activity [Needs reinforcement, provided] : Patient needs reinforcement on understanding of disease, goals and obesity follow-up plan; reinforcement was provided [Weight Self Once Weekly] : Weight self once weekly [Low Salt Diet] : Low salt diet [Walking] : Walking

## 2019-03-31 NOTE — HISTORY OF PRESENT ILLNESS
[Post-hospitalization from ___ Hospital] : Post-hospitalization from [unfilled] Hospital [Admitted on: ___] : The patient was admitted on [unfilled] [Discharged on ___] : discharged on [unfilled] [Discharge Summary] : discharge summary [Pertinent Labs] : pertinent labs [Discharge Med List] : discharge medication list [Med Reconciliation] : medication reconciliation has been completed [Patient Contacted By: ____] : and contacted by [unfilled] [FreeTextEntry2] :  Patient HA JACQUES Invision MRN [id number] Hospital Visit [id number] [facility] - Attending Physician Gabrielle Reddy  Status Complete     Hospital Course:  Discharge Date	20-Mar-2019  Admission Date	18-Mar-2019 15:30  Reason for Admission	UTI  Medication Reconciliation Status	Admission Reconciliation is Completed  Discharge Reconciliation is Completed  Hospital Course	  Patient is a 89 year old female with CKD stage 3, CLL in remission, liver  cirrhosis/HCC on lactulose/rifaximin, AF not on AC, COPD not on O2, aortic  stenosis and hypothyroidism who presents to the ED with a reported UTI. Per the  daughter the patient was not acting normally last week, making unusual comments  erratically. She exhibited similar symptoms when she had UTIs previously. The  family collected a urine sample and sent it to the patient's physician, Dr. Celaya, for testing. The daughter reports receiving a phone call from Dr. Celaya  this morning stating that the urine grew a resistant bacteria and that the  patient needed to come to the hospital for treatement. Review of the  microbiology report demonstrated greater than 100,000 ESBL Klebsiella  pneumoniae that was sensitive to Amikacin, Cefoxitin, Ertapenem, Imipenem,  Levofloxacin, Meropenem, and Tigecycline. The patient was given a dose of  ertapenem in the ED. The patient feels well, denies any symptoms of a UTI  including fever, dysuria, frequency, or foul smelling urine. Per the daughter  at the bedside the patient is at baseline. The patient and daughter report a  penicillin allergy but states that she has taken Augmentin in the past without  any issues. (18 Mar 2019 18:08). Pt was treated for bacteruria and was broad  spectrum antibiotic due to prior h/x of ESBL + UTI, pt back to baseline . Pt  advised to follow up with PMD for total resolution of UTI .    Care Plan/Procedures:  Goal(s)	To get better and follow your care plan as instructed.  Discharge Diagnoses, Assessment and Plan of Treatment	PRINCIPAL DISCHARGE  DIAGNOSIS  Diagnosis: Urinary tract infection without hematuria, site unspecified  Assessment and Plan of Treatment: HOME CARE INSTRUCTIONS  f you were prescribed antibiotics, take them exactly as your caregiver  instructs you. Finish the medication even if you feel better after you have  only taken some of the medication.  Drink enough water and fluids to keep your urine clear or pale yellow.  Avoid caffeine, tea, and carbonated beverages. They tend to irritate your  bladder.  Empty your bladder often. Avoid holding urine for long periods of time.  Empty your bladder before and after sexual intercourse.  After a bowel movement, women should cleanse from front to back. Use each  tissue only once.  SEEK MEDICAL CARE IF:  You have back pain.  You develop a fever.  Your symptoms do not begin to resolve within 3 days.  SEEK IMMEDIATE MEDICAL CARE IF:  You have severe back pain or lower abdominal pain.  You develop chills.  You have nausea or vomiting.  You have continued burning or discomfort with urination.     SECONDARY DISCHARGE DIAGNOSES  Diagnosis: Hypothyroidism  Assessment and Plan of Treatment: you do not make enough thyroid hormone  signs & symptoms of low levels of thyroid hormone - tired, getting cold easily,  coarse or thin hair, constipation, shortness of breath, swelling, irregular  periods  your doctor will do thyroid hormone blood tests at least once a year to monitor  if medication dose is adequate  take your thyroid medicine as directed by your doctor & on empty stomach    Diagnosis: Cirrhosis of liver without ascites, unspecified hepatic cirrhosis  type  Assessment and Plan of Treatment: Cirrhosis of liver without ascites,  unspecified hepatic cirrhosis type. continue lactulose and xifaxan and follow  up with hepatology   Diagnosis: Chronic atrial fibrillation  Assessment and Plan of Treatment: Atrial fibrillation is the most common heart  rhythm problem.  The condition puts you at risk for has stroke and heart attack  You were started on blood thinners to prevent possible clot and stroke  complications.  Monitor for any signs of bleeding and avoid injury while on this medication  If any bleeding persistent and symptomatic stop the medication and notify your  doctor immediately.  It helps if you control your blood pressure, not drink more than 1-2 alcohol  drinks per day, cut down on caffeine, getting treatment for over active thyroid  gland, and get regular exercise  Call your doctor if you feel your heart racing or beating unusually, chest  tightness or pain, lightheaded, faint, shortness of breath especially with  exercise  It is important to take your heart medication as prescribed  You may be on anticoagulation which is very important to take as directed - you  may need blood work to monitor drug levels   Follow Up:  Care Providers for Follow up (PCP/Outpatient Provider)	Fallon Celaya)  Critical Care Medicine; Internal Medicine; Pulmonary Disease  [address]  [place] [address]  Phone: [phone]  Fax: [phone]  Follow Up Time:  Diet Instructions	regular diet  Activity	Bathing allowed   Quality Measures:  Does the patient have difficulty running errands alone like visiting a doctors  office or shopping?	Yes  Does the patient have difficulty climbing stairs?	No  Patient Condition	Stable  Hospice Patient	No  Cognition: The patient has	Difficulty concentrating  Did the patient present with or suffer from an ischemic stroke, hemorrhagic  stroke or TIA during this admission?	No  Has the patient had an Acute Myocardial Infarction?	No  Has the patient had a Percutaneous Coronary Intervention?	No   Document Complete:  Care Provider Seen in Hospital	Daniela Cross  Physician Section Complete	This document is complete and the patient is ready  for discharge.  For questions about your prescriptions, please call:	[phone]  Is this contact telephone number correct?	Yes     Electronic Signatures:  Gabrielle Reddy)  (Signed 22-Mar-2019 12:04)  	Authored: Discharge Note Provider  	Co-Signer: Discharge Note Provider  Daniela Cross)  (Signed 20-Mar-2019 14:44)  	Authored: Discharge Note Provider    Last Updated: 22-Mar-2019 12:04 by Gabrielle Reddy)   Remains weak.\par No edema or calf pain.\par Has chronic diarrhea with Lactulose.\par Denies dysuria or hematuria.\par Good UO.\par Denies abdominal pain or n/v, or flank pain.\par Eating well.\par Had low grade fever and URI.\par Denies chest pain, SOB.\par No hemoptysis.\par Has wet cough with wheezing.\par MS improved

## 2019-03-31 NOTE — REVIEW OF SYSTEMS
[Fever] : fever [Vision Problems] : vision problems [Hearing Loss] : hearing loss [Hoarseness] : hoarseness [Nasal Discharge] : nasal discharge [Postnasal Drip] : postnasal drip [Wheezing] : wheezing [Cough] : cough [Dyspnea on Exertion] : dyspnea on exertion [Diarrhea] : diarrhea [Memory Loss] : memory loss [Unsteady Walking] : ataxia [Easy Bleeding] : easy bleeding [Easy Bruising] : easy bruising [Negative] : Heme/Lymph

## 2019-03-31 NOTE — HEALTH RISK ASSESSMENT
[Intercurrent ED visits] : went to ED [Intercurrent hospitalizations] : was admitted to the hospital  [Any fall with injury in past year] : Patient reported fall with injury in the past year [0] : 2) Feeling down, depressed, or hopeless: Not at all (0) [Patient declined mammogram] : Patient declined mammogram [Patient declined PAP Smear] : Patient declined PAP Smear [Patient declined bone density test] : Patient declined bone density test [Patient declined colonoscopy] : Patient declined colonoscopy [HIV test declined] : HIV test declined [Hepatitis C test declined] : Hepatitis C test declined [None] : None [With Family] : lives with family [# of Members in Household ___] :  household currently consist of [unfilled] member(s) [Retired] : retired [] :  [# Of Children ___] : has [unfilled] children [Feels Safe at Home] : Feels safe at home [Reports changes in hearing] : Reports changes in hearing [Reports changes in vision] : Reports changes in vision [Reports changes in dental health] : Reports changes in dental health [Smoke Detector] : smoke detector [Carbon Monoxide Detector] : carbon monoxide detector [Seat Belt] :  uses seat belt [Sunscreen] : uses sunscreen [Caregiver Concerns] : has caregiver concerns [With Patient/Caregiver] : With Patient/Caregiver [Designated Healthcare Proxy] : Designated healthcare proxy [Name: ___] : Health Care Proxy's Name: [unfilled]  [Relationship: ___] : Relationship: [unfilled] [] : No [de-identified] : PT [de-identified] : regular [[id number]] : 0 [Change in mental status noted] : No change in mental status noted [Sexually Active] : not sexually active [Guns at Home] : no guns at home [Travel to Developing Areas] : does not  travel to developing areas [TB Exposure] : is not being exposed to tuberculosis [AdvancecareDate] : 03/19

## 2019-04-02 ENCOUNTER — APPOINTMENT (OUTPATIENT)
Dept: CARDIOLOGY | Facility: CLINIC | Age: 84
End: 2019-04-02
Payer: MEDICARE

## 2019-04-02 ENCOUNTER — NON-APPOINTMENT (OUTPATIENT)
Age: 84
End: 2019-04-02

## 2019-04-02 VITALS
DIASTOLIC BLOOD PRESSURE: 52 MMHG | WEIGHT: 113 LBS | HEIGHT: 62.5 IN | SYSTOLIC BLOOD PRESSURE: 113 MMHG | OXYGEN SATURATION: 100 % | HEART RATE: 80 BPM | BODY MASS INDEX: 20.28 KG/M2

## 2019-04-02 PROCEDURE — 99214 OFFICE O/P EST MOD 30 MIN: CPT

## 2019-04-02 PROCEDURE — 93000 ELECTROCARDIOGRAM COMPLETE: CPT

## 2019-04-02 RX ORDER — DOXYCYCLINE 100 MG/1
100 CAPSULE ORAL TWICE DAILY
Qty: 20 | Refills: 0 | Status: COMPLETED | COMMUNITY
Start: 2019-02-21 | End: 2019-04-02

## 2019-04-02 RX ORDER — DOXYCYCLINE 100 MG/1
100 CAPSULE ORAL TWICE DAILY
Qty: 20 | Refills: 0 | Status: COMPLETED | COMMUNITY
Start: 2018-11-28 | End: 2019-04-02

## 2019-04-02 RX ORDER — IPRATROPIUM BROMIDE AND ALBUTEROL SULFATE 2.5; .5 MG/3ML; MG/3ML
0.5-2.5 (3) SOLUTION RESPIRATORY (INHALATION)
Qty: 180 | Refills: 1 | Status: COMPLETED | COMMUNITY
Start: 2018-01-18 | End: 2019-04-02

## 2019-04-02 NOTE — REASON FOR VISIT
ems/stretcher
[FreeTextEntry1] : Steffanie Bustamante returns today for followup regarding aortic and mitral valve disease, paroxysmal atrial flutter and systolic hypertension, and after a recent hospital stay after likely syncope and possible CVA.

## 2019-04-02 NOTE — DISCUSSION/SUMMARY
[FreeTextEntry1] : A. fib - good rate control; continue digoxin QOD and propranolol.  Not a candidate for anti-coagulation.  No objection to ASA from a cardiac standpoint given concern of embolic CVA.\par \par Severe AS and moderate AI; moderate mitral and tricuspid valve insufficiency; remains compensated on current regimen of meds; will continue same.  Not a candidate for valve replacement.\par \par LE edema - controlled at this time on spironolactone bid; will continue same dose and knee high compression stockings.  \par \par HTN - not a problem at this time.\par \par To continue on a low salt diet.\par \par She will return in 2 months.

## 2019-04-02 NOTE — HISTORY OF PRESENT ILLNESS
[FreeTextEntry1] : I saw her last in January.  Since that time, she was recently hospitalized due to a UTI with K. pneumonia, requiring IV antbiotics.\par \par As she presents today, she has been feeling well overall.  She reports no episodes of chest pain or unusual dyspnea.  She describes no palpitations.  She reports no orthopnea or PND.   She reports no lightheadedness or faint spells, and has not suffered recurrence of LOC.\par \par Her appetite is good.  She can walk using a walker.  She is sleeping well.

## 2019-04-03 ENCOUNTER — APPOINTMENT (OUTPATIENT)
Dept: INTERNAL MEDICINE | Facility: CLINIC | Age: 84
End: 2019-04-03
Payer: MEDICARE

## 2019-04-03 VITALS
OXYGEN SATURATION: 98 % | TEMPERATURE: 97.4 F | HEART RATE: 69 BPM | SYSTOLIC BLOOD PRESSURE: 90 MMHG | DIASTOLIC BLOOD PRESSURE: 60 MMHG

## 2019-04-03 DIAGNOSIS — K76.6 PORTAL HYPERTENSION: ICD-10-CM

## 2019-04-03 PROCEDURE — 99214 OFFICE O/P EST MOD 30 MIN: CPT

## 2019-04-04 NOTE — COUNSELING
[Healthy eating counseling provided] : healthy eating [Activity counseling provided] : activity [Needs reinforcement, provided] : Patient needs reinforcement on understanding of disease, goals and obesity follow-up plan; reinforcement was provided [Low Salt Diet] : Low salt diet [Weight management counseling provided] : Weight management [Weight Self Once Weekly] : Weight self once weekly

## 2019-04-04 NOTE — REVIEW OF SYSTEMS
[Vision Problems] : vision problems [Hearing Loss] : hearing loss [Hoarseness] : hoarseness [Nasal Discharge] : nasal discharge [Postnasal Drip] : postnasal drip [Cough] : cough [Dyspnea on Exertion] : dyspnea on exertion [Memory Loss] : memory loss [Unsteady Walking] : ataxia [Easy Bleeding] : easy bleeding [Easy Bruising] : easy bruising [Negative] : Heme/Lymph [Diarrhea] : diarrhea

## 2019-04-04 NOTE — ASSESSMENT
[FreeTextEntry1] : Bronchiectasis with COPD\par S/P acute exacerbation likely triggered by acute viral URI\par Chest PT\par Can stop Mucomyst\par Budesonide nebs bid\par Duoneb nebs qid\par Has RX for doxycycline\par Will add po steroids if wheezing does not romina\par \par Cirrhosis with portal hypertension\par GI f/u\par Continue Lactulose/Xifaxan/ Propranolol/Aldactone\par If increased diarrhea persists to check stool for C. difficile\par \par RTC 6 weeks and as needed\par To call if worse or if not improving\par To call for any medical/pulmonary issues\par Continue meds, diet, PT\par F/U with all MD's

## 2019-04-04 NOTE — HEALTH RISK ASSESSMENT
[Any fall with injury in past year] : Patient reported fall with injury in the past year [0] : 2) Feeling down, depressed, or hopeless: Not at all (0) [] : No [de-identified] : Cardiology [de-identified] : none [de-identified] : low salt [[id number]] : 0

## 2019-04-04 NOTE — HISTORY OF PRESENT ILLNESS
[FreeTextEntry1] : Comes in for f/u medical visit. [de-identified] : Brought in by daughter f/u cough.\par Has rhinitis as seasonal allergies active. Denies sore throat or ear pain.\par Denies chest pain.\par No hemoptysis.\par Cleared globs of sputum on several occasions. Still has wet cough but better.\par No fevers.\par Denies SOB/wheezing.\par Denies change in MS. No UTI symptoms.\par No body aches.\par No n/v. Eating and drinking well.\par Had one day of increased BM's.

## 2019-04-04 NOTE — PHYSICAL EXAM
[No Acute Distress] : no acute distress [Well-Appearing] : well-appearing [Normal Voice/Communication] : normal voice/communication [Normal Sclera/Conjunctiva] : normal sclera/conjunctiva [PERRL] : pupils equal round and reactive to light [EOMI] : extraocular movements intact [Normal Outer Ear/Nose] : the outer ears and nose were normal in appearance [Normal Oropharynx] : the oropharynx was normal [Supple] : supple [No Respiratory Distress] : no respiratory distress  [Clear to Auscultation] : lungs were clear to auscultation bilaterally [No Accessory Muscle Use] : no accessory muscle use [No Extremity Clubbing/Cyanosis] : no extremity clubbing/cyanosis [Soft] : abdomen soft [Non Tender] : non-tender [Normal Bowel Sounds] : normal bowel sounds [No CVA Tenderness] : no CVA  tenderness [No Spinal Tenderness] : no spinal tenderness [No Rash] : no rash [Speech Grossly Normal] : speech grossly normal [Normal Affect] : the affect was normal [Alert and Oriented x3] : oriented to person, place, and time [de-identified] : thin [de-identified] : No ST [de-identified] : no stridor [de-identified] : R=16; wet cough at times [de-identified] : murmurs loud and unchanged; AF [de-identified] : improved edema; no cords [de-identified] :  MS seems intact/ in wheelchair; moves all extremities

## 2019-04-19 ENCOUNTER — RESULT CHARGE (OUTPATIENT)
Age: 84
End: 2019-04-19

## 2019-04-20 ENCOUNTER — APPOINTMENT (OUTPATIENT)
Dept: INTERNAL MEDICINE | Facility: CLINIC | Age: 84
End: 2019-04-20
Payer: MEDICARE

## 2019-04-20 VITALS — TEMPERATURE: 97.7 F | SYSTOLIC BLOOD PRESSURE: 100 MMHG | DIASTOLIC BLOOD PRESSURE: 70 MMHG

## 2019-04-20 VITALS — HEART RATE: 67 BPM | OXYGEN SATURATION: 98 % | TEMPERATURE: 97.6 F

## 2019-04-20 DIAGNOSIS — N39.0 URINARY TRACT INFECTION, SITE NOT SPECIFIED: ICD-10-CM

## 2019-04-20 PROCEDURE — 99214 OFFICE O/P EST MOD 30 MIN: CPT

## 2019-04-20 NOTE — PHYSICAL EXAM
[Well Nourished] : well nourished [No Acute Distress] : no acute distress [No Respiratory Distress] : no respiratory distress  [Clear to Auscultation] : lungs were clear to auscultation bilaterally [Well Developed] : well developed [No Accessory Muscle Use] : no accessory muscle use [Normal Rate] : normal rate  [Regular Rhythm] : with a regular rhythm [Normal S1, S2] : normal S1 and S2 [No Edema] : there was no peripheral edema [Soft] : abdomen soft [Non Tender] : non-tender [Non-distended] : non-distended [No HSM] : no HSM [No CVA Tenderness] : no CVA  tenderness [No Focal Deficits] : no focal deficits

## 2019-04-20 NOTE — HISTORY OF PRESENT ILLNESS
[FreeTextEntry8] : The patient comes in with her daughter, Jenny.  She was recently hospitalized with a Klebsiella UTI that was multi-drug resistant.  She was feeling better but her daughter now notes that she is a little weaker and there is mild confusion.  Her daughter feels that this is similar to past episodes in which she had UTIs and had decompensation of her other medical issues.  She says the symptoms are mild now.  The patient denies fever, chills, lightheadedness, chest pain, dyspnea, abdominal pain, back pain, dysuria.

## 2019-04-20 NOTE — ASSESSMENT
[FreeTextEntry1] : The patient has early symptoms suggestive of a recurrent UTI with weakness and mild confusion.  She doesn't have typical UTI symptoms but a U/A done yesterday shows WBCs.  She appears comfortable and nontoxic and her mental status is ok now, no fever.  She does not appear that she needs to be hospitalized now but she needs to be watched closely.  She lives with her daughter and she has an aide. She should go to the ER if there is any deterioration.\par \par The other issue is that the previous UTI shows a MDR Klebsiella and there are very few oral antibiotic options.  A UCX sent yesterday is pending and the result will be checked.  For now,start Nitrofurantoin which was intermediate on the last UCX.  Note the previous culture showed resistance to PCN, Cephalosporins, quinolones and sulfa.  If she does not respond to the oral antibiotics promptly, she'll need IV antibiotics.  Po fluids advised and watch closely.

## 2019-04-22 LAB — BACTERIA UR CULT: ABNORMAL

## 2019-04-25 ENCOUNTER — APPOINTMENT (OUTPATIENT)
Dept: INTERNAL MEDICINE | Facility: CLINIC | Age: 84
End: 2019-04-25

## 2019-04-27 ENCOUNTER — RX RENEWAL (OUTPATIENT)
Age: 84
End: 2019-04-27

## 2019-05-15 ENCOUNTER — APPOINTMENT (OUTPATIENT)
Dept: INTERNAL MEDICINE | Facility: CLINIC | Age: 84
End: 2019-05-15
Payer: MEDICARE

## 2019-05-15 VITALS
TEMPERATURE: 97.5 F | HEART RATE: 73 BPM | SYSTOLIC BLOOD PRESSURE: 90 MMHG | DIASTOLIC BLOOD PRESSURE: 60 MMHG | OXYGEN SATURATION: 100 %

## 2019-05-15 PROCEDURE — 99214 OFFICE O/P EST MOD 30 MIN: CPT

## 2019-05-15 RX ORDER — LEVOFLOXACIN 250 MG/1
250 TABLET, FILM COATED ORAL DAILY
Qty: 5 | Refills: 0 | Status: DISCONTINUED | COMMUNITY
Start: 2019-04-22 | End: 2019-05-15

## 2019-05-15 RX ORDER — DOXYCYCLINE 100 MG/1
100 CAPSULE ORAL TWICE DAILY
Qty: 20 | Refills: 0 | Status: DISCONTINUED | COMMUNITY
Start: 2019-03-26 | End: 2019-05-15

## 2019-05-15 RX ORDER — ALBUTEROL SULFATE 2.5 MG/3ML
(2.5 MG/3ML) SOLUTION RESPIRATORY (INHALATION) 4 TIMES DAILY
Qty: 3 | Refills: 5 | Status: DISCONTINUED | COMMUNITY
Start: 2019-03-26 | End: 2019-05-15

## 2019-05-15 RX ORDER — ACETYLCYSTEINE 100 MG/ML
10 SOLUTION ORAL; RESPIRATORY (INHALATION) TWICE DAILY
Qty: 3 | Refills: 5 | Status: DISCONTINUED | COMMUNITY
Start: 2019-03-26 | End: 2019-05-15

## 2019-05-15 RX ORDER — NITROFURANTOIN (MONOHYDRATE/MACROCRYSTALS) 25; 75 MG/1; MG/1
100 CAPSULE ORAL TWICE DAILY
Qty: 20 | Refills: 0 | Status: DISCONTINUED | COMMUNITY
Start: 2019-04-20 | End: 2019-05-15

## 2019-05-15 NOTE — REVIEW OF SYSTEMS
[Vision Problems] : vision problems [Hoarseness] : hoarseness [Hearing Loss] : hearing loss [Postnasal Drip] : postnasal drip [Nasal Discharge] : nasal discharge [Cough] : cough [Dyspnea on Exertion] : dyspnea on exertion [Diarrhea] : diarrhea [Memory Loss] : memory loss [Unsteady Walking] : ataxia [Easy Bleeding] : easy bleeding [Easy Bruising] : easy bruising [Negative] : Heme/Lymph

## 2019-05-15 NOTE — HEALTH RISK ASSESSMENT
[Any fall with injury in past year] : Patient reported fall with injury in the past year [0] : 1) Little interest or pleasure doing things: Not at all (0) [] : No [de-identified] : low salt [de-identified] : none [[id number]] : 0

## 2019-05-15 NOTE — HISTORY OF PRESENT ILLNESS
[FreeTextEntry1] : Comes in for f/u medical visit. [de-identified] : Brought in by daughter.\par Has rhinitis as seasonal allergies active. Denies sore throat or ear pain. Voice hoarse.\par Denies chest pain.\par No hemoptysis.\par Coughs intermittently but sputum is clear.\par No fevers.\par Denies SOB/wheezing.\par Denies change in MS. No UTI symptoms.\par No body aches.\par No n/v. Eating and drinking well.\par BM's unchanged.

## 2019-05-15 NOTE — ASSESSMENT
[FreeTextEntry1] : Bronchiectasis with COPD\par Chest PT\par Budesonide nebs bid\par Duoneb nebs qid\par \par Cirrhosis with portal hypertension\par GI f/u\par Continue Lactulose/Xifaxan/ Propranolol/Aldactone\par \par RTC 12 weeks and as needed\par To call if worse \par To call for any medical/pulmonary issues\par Continue meds, diet, PT\par F/U with all MD's

## 2019-05-15 NOTE — COUNSELING
[Healthy eating counseling provided] : healthy eating [Weight management counseling provided] : Weight management [Needs reinforcement, provided] : Patient needs reinforcement on understanding of disease, goals and obesity follow-up plan; reinforcement was provided [Weight Self Once Weekly] : Weight self once weekly [Activity counseling provided] : activity [Walking] : Walking [Low Salt Diet] : Low salt diet

## 2019-05-15 NOTE — PHYSICAL EXAM
[No Acute Distress] : no acute distress [Well-Appearing] : well-appearing [Normal Sclera/Conjunctiva] : normal sclera/conjunctiva [PERRL] : pupils equal round and reactive to light [Normal Voice/Communication] : normal voice/communication [EOMI] : extraocular movements intact [Normal Outer Ear/Nose] : the outer ears and nose were normal in appearance [No Respiratory Distress] : no respiratory distress  [Supple] : supple [Normal Oropharynx] : the oropharynx was normal [Clear to Auscultation] : lungs were clear to auscultation bilaterally [No Extremity Clubbing/Cyanosis] : no extremity clubbing/cyanosis [No Accessory Muscle Use] : no accessory muscle use [Non Tender] : non-tender [Soft] : abdomen soft [No Spinal Tenderness] : no spinal tenderness [No CVA Tenderness] : no CVA  tenderness [Normal Bowel Sounds] : normal bowel sounds [Normal Affect] : the affect was normal [No Rash] : no rash [Speech Grossly Normal] : speech grossly normal [Alert and Oriented x3] : oriented to person, place, and time [de-identified] : thin, hoarse [de-identified] : No ST [de-identified] : no stridor [de-identified] : R=16 [de-identified] : improved edema; no cords [de-identified] : murmurs loud and unchanged; AF [de-identified] :  MS seems intact/ in wheelchair; moves all extremities

## 2019-06-03 ENCOUNTER — APPOINTMENT (OUTPATIENT)
Dept: CARDIOLOGY | Facility: CLINIC | Age: 84
End: 2019-06-03
Payer: MEDICARE

## 2019-06-03 ENCOUNTER — NON-APPOINTMENT (OUTPATIENT)
Age: 84
End: 2019-06-03

## 2019-06-03 VITALS
SYSTOLIC BLOOD PRESSURE: 90 MMHG | BODY MASS INDEX: 20.63 KG/M2 | WEIGHT: 115 LBS | HEIGHT: 62.5 IN | HEART RATE: 70 BPM | DIASTOLIC BLOOD PRESSURE: 80 MMHG | OXYGEN SATURATION: 97 %

## 2019-06-03 PROCEDURE — 93000 ELECTROCARDIOGRAM COMPLETE: CPT

## 2019-06-03 PROCEDURE — 99214 OFFICE O/P EST MOD 30 MIN: CPT

## 2019-06-03 NOTE — REASON FOR VISIT
[FreeTextEntry1] : \par Steffanie Bustamante returns today for followup regarding aortic and mitral valve disease, paroxysmal atrial flutter and systolic hypertension, and after a recent hospital stay after likely syncope and possible CVA.

## 2019-06-03 NOTE — ASSESSMENT
[FreeTextEntry1] : Paroxysmal atrial flutter. \par \par Valvular disease\par      Severe aortic stenosis and moderate aortic valve insufficiency (although auscultatory more consistent with moderate AS; presence of AI may cause overestimation of the degree of AS on echo).  \par      Moderate mitral and tricuspid valve insufficiency.\par \par Systolic hypertension of the elderly, accentuated by a widened pulse pressure in the presence of aortic valve insufficiency. \par \par Dependent edema, predominantly due to venous insufficiency.\par \par COPD. \par \par Moderate pulmonary hypertension, probably the combined effect of intrinsic lung disease and valvular heart disease.\par \par CLL with anemia and thrombocytopenia, requiring intermittent treatment with chemotherapy.\par \par Cirrhosis, esophageal varices.

## 2019-06-03 NOTE — HISTORY OF PRESENT ILLNESS
[FreeTextEntry1] : I saw her last in April.   Since that time, she has remained well.  She remains free of cardiac sxs.; she reports no episodes of chest pain or unusual dyspnea.  There have been no palpitations.  She recounts no episodes of orthopnea or PND.   She reports no lightheadedness or faint spells, and has not suffered recurrence of LOC.

## 2019-06-03 NOTE — PHYSICAL EXAM
[General Appearance - Well Developed] : well developed [Normal Appearance] : normal appearance [General Appearance - Well Nourished] : well nourished [Well Groomed] : well groomed [Eyelids - No Xanthelasma] : the eyelids demonstrated no xanthelasmas [General Appearance - In No Acute Distress] : no acute distress [Normal Conjunctiva] : the conjunctiva exhibited no abnormalities [Normal Jugular Venous A Waves Present] : normal jugular venous A waves present [Normal Jugular Venous V Waves Present] : normal jugular venous V waves present [Respiration, Rhythm And Depth] : normal respiratory rhythm and effort [Exaggerated Use Of Accessory Muscles For Inspiration] : no accessory muscle use [Heart Rate And Rhythm] : heart rate and rhythm were normal [Auscultation Breath Sounds / Voice Sounds] : lungs were clear to auscultation bilaterally [Bowel Sounds] : normal bowel sounds [Abnormal Walk] : normal gait [Cyanosis, Localized] : no localized cyanosis [Nail Clubbing] : no clubbing of the fingernails [] : no rash [Skin Color & Pigmentation] : normal skin color and pigmentation [Impaired Insight] : insight and judgment were intact [Oriented To Time, Place, And Person] : oriented to person, place, and time [Affect] : the affect was normal [Mood] : the mood was normal [FreeTextEntry1] : 2+ pulses in the upper and lower extremities. No edema.

## 2019-06-03 NOTE — DISCUSSION/SUMMARY
[FreeTextEntry1] : \par A. fib - good rate control; continue digoxin QOD and propranolol.  Not a candidate for anti-coagulation.  Dr. Strickland had advised ASA twice weekly; no objection from a cardiac standpoint, given PAF and concern of embolic CVA.\par \par Severe AS and moderate AI; moderate mitral and tricuspid valve insufficiency; remains compensated on current regimen of meds; will continue same.  Not a candidate for valve replacement.\par \par LE edema - remains controlled at this time on spironolactone bid; will continue same dose and knee high compression stockings.  \par \par HTN - not a problem at this time.\par \par To continue on a low salt diet.\par \par She will return in 3 months.

## 2019-06-04 ENCOUNTER — APPOINTMENT (OUTPATIENT)
Dept: HEPATOLOGY | Facility: CLINIC | Age: 84
End: 2019-06-04
Payer: MEDICARE

## 2019-06-04 VITALS
WEIGHT: 112 LBS | TEMPERATURE: 97.9 F | RESPIRATION RATE: 16 BRPM | DIASTOLIC BLOOD PRESSURE: 66 MMHG | HEART RATE: 54 BPM | HEIGHT: 62.5 IN | BODY MASS INDEX: 20.09 KG/M2 | SYSTOLIC BLOOD PRESSURE: 105 MMHG

## 2019-06-04 PROCEDURE — 99214 OFFICE O/P EST MOD 30 MIN: CPT

## 2019-06-05 LAB
AFP-TM SERPL-MCNC: <1.8 NG/ML
ALBUMIN SERPL ELPH-MCNC: 3.7 G/DL
ALP BLD-CCNC: 89 U/L
ALT SERPL-CCNC: 21 U/L
ANION GAP SERPL CALC-SCNC: 19 MMOL/L
AST SERPL-CCNC: 41 U/L
BASOPHILS # BLD AUTO: 0.05 K/UL
BASOPHILS NFR BLD AUTO: 1 %
BILIRUB SERPL-MCNC: 2.2 MG/DL
BUN SERPL-MCNC: 48 MG/DL
CALCIUM SERPL-MCNC: 10.4 MG/DL
CHLORIDE SERPL-SCNC: 105 MMOL/L
CO2 SERPL-SCNC: 19 MMOL/L
CREAT SERPL-MCNC: 1.28 MG/DL
EOSINOPHIL # BLD AUTO: 0.06 K/UL
EOSINOPHIL NFR BLD AUTO: 1.2 %
GLUCOSE SERPL-MCNC: 78 MG/DL
HCT VFR BLD CALC: 39.9 %
HGB BLD-MCNC: 12.6 G/DL
IMM GRANULOCYTES NFR BLD AUTO: 0.2 %
INR PPP: 1.06 RATIO
LYMPHOCYTES # BLD AUTO: 0.99 K/UL
LYMPHOCYTES NFR BLD AUTO: 20.3 %
MAN DIFF?: NORMAL
MCHC RBC-ENTMCNC: 31.6 GM/DL
MCHC RBC-ENTMCNC: 34.1 PG
MCV RBC AUTO: 107.8 FL
MONOCYTES # BLD AUTO: 0.49 K/UL
MONOCYTES NFR BLD AUTO: 10.1 %
NEUTROPHILS # BLD AUTO: 3.27 K/UL
NEUTROPHILS NFR BLD AUTO: 67.2 %
PLATELET # BLD AUTO: 96 K/UL
POTASSIUM SERPL-SCNC: 5.6 MMOL/L
PROT SERPL-MCNC: 5.8 G/DL
PT BLD: 12 SEC
RBC # BLD: 3.7 M/UL
RBC # FLD: 14.5 %
SODIUM SERPL-SCNC: 143 MMOL/L
WBC # FLD AUTO: 4.87 K/UL

## 2019-06-06 ENCOUNTER — APPOINTMENT (OUTPATIENT)
Dept: INTERNAL MEDICINE | Facility: CLINIC | Age: 84
End: 2019-06-06
Payer: MEDICARE

## 2019-06-06 LAB
ALBUMIN SERPL ELPH-MCNC: 3.8 G/DL
ALP BLD-CCNC: 113 U/L
ALT SERPL-CCNC: 29 U/L
ANION GAP SERPL CALC-SCNC: 14 MMOL/L
AST SERPL-CCNC: 43 U/L
BILIRUB SERPL-MCNC: 2 MG/DL
BUN SERPL-MCNC: 49 MG/DL
CALCIUM SERPL-MCNC: 10.3 MG/DL
CHLORIDE SERPL-SCNC: 103 MMOL/L
CO2 SERPL-SCNC: 24 MMOL/L
CREAT SERPL-MCNC: 1.44 MG/DL
GLUCOSE SERPL-MCNC: 97 MG/DL
POTASSIUM SERPL-SCNC: 5.1 MMOL/L
PROT SERPL-MCNC: 6.1 G/DL
SODIUM SERPL-SCNC: 140 MMOL/L

## 2019-06-06 PROCEDURE — 36415 COLL VENOUS BLD VENIPUNCTURE: CPT

## 2019-06-15 NOTE — ED PROVIDER NOTE - NS ED MD DISPO DIVISION
Skin care given and repositioned. Able to take pudding and applesauce but refused some other foods. Slept off and on. Cooperative for most of day. Bed alarm on and avasys for safety. Call light in reach. Seems to understand when spoken to and more alert today. [facility]

## 2019-06-24 LAB
APPEARANCE: CLEAR
BACTERIA UR CULT: ABNORMAL
BACTERIA: ABNORMAL
BILIRUBIN URINE: NEGATIVE
BLOOD URINE: NEGATIVE
COLOR: YELLOW
GLUCOSE QUALITATIVE U: NEGATIVE
HYALINE CASTS: 0 /LPF
KETONES URINE: NEGATIVE
LEUKOCYTE ESTERASE URINE: ABNORMAL
MICROSCOPIC-UA: NORMAL
NITRITE URINE: POSITIVE
PH URINE: 6
PROTEIN URINE: NEGATIVE
RED BLOOD CELLS URINE: 1 /HPF
SPECIFIC GRAVITY URINE: 1.01
SQUAMOUS EPITHELIAL CELLS: 3 /HPF
UROBILINOGEN URINE: NORMAL
WHITE BLOOD CELLS URINE: 6 /HPF

## 2019-06-24 NOTE — PROGRESS NOTE ADULT - PROBLEM SELECTOR PROBLEM 7
Need for prophylactic measure Acitretin Counseling:  I discussed with the patient the risks of acitretin including but not limited to hair loss, dry lips/skin/eyes, liver damage, hyperlipidemia, depression/suicidal ideation, photosensitivity.  Serious rare side effects can include but are not limited to pancreatitis, pseudotumor cerebri, bony changes, clot formation/stroke/heart attack.  Patient understands that alcohol is contraindicated since it can result in liver toxicity and significantly prolong the elimination of the drug by many years.

## 2019-08-07 ENCOUNTER — APPOINTMENT (OUTPATIENT)
Dept: INTERNAL MEDICINE | Facility: CLINIC | Age: 84
End: 2019-08-07
Payer: MEDICARE

## 2019-08-07 VITALS
OXYGEN SATURATION: 99 % | DIASTOLIC BLOOD PRESSURE: 60 MMHG | HEART RATE: 56 BPM | SYSTOLIC BLOOD PRESSURE: 110 MMHG | TEMPERATURE: 97.6 F

## 2019-08-07 DIAGNOSIS — C91.90 LYMPHOID LEUKEMIA, UNSPECIFIED NOT HAVING ACHIEVED REMISSION: ICD-10-CM

## 2019-08-07 DIAGNOSIS — R47.89 OTHER SPEECH DISTURBANCES: ICD-10-CM

## 2019-08-07 PROCEDURE — 99214 OFFICE O/P EST MOD 30 MIN: CPT

## 2019-08-07 NOTE — HEALTH RISK ASSESSMENT
[Any fall with injury in past year] : Patient reported fall with injury in the past year [0] : 2) Feeling down, depressed, or hopeless: Not at all (0) [No] : In the past 12 months have you used drugs other than those required for medical reasons? No [] : No [de-identified] : hepatology, cardiology, PT [de-identified] : PT [de-identified] : low salt [[id number]] : 0

## 2019-08-07 NOTE — PHYSICAL EXAM
[Well-Appearing] : well-appearing [No Acute Distress] : no acute distress [Normal Sclera/Conjunctiva] : normal sclera/conjunctiva [Normal Voice/Communication] : normal voice/communication [PERRL] : pupils equal round and reactive to light [EOMI] : extraocular movements intact [Normal Outer Ear/Nose] : the outer ears and nose were normal in appearance [Normal Oropharynx] : the oropharynx was normal [Supple] : supple [No Respiratory Distress] : no respiratory distress  [Clear to Auscultation] : lungs were clear to auscultation bilaterally [No Accessory Muscle Use] : no accessory muscle use [No Extremity Clubbing/Cyanosis] : no extremity clubbing/cyanosis [Soft] : abdomen soft [Non Tender] : non-tender [Normal Bowel Sounds] : normal bowel sounds [No Spinal Tenderness] : no spinal tenderness [No CVA Tenderness] : no CVA  tenderness [No Rash] : no rash [Speech Grossly Normal] : speech grossly normal [Normal Affect] : the affect was normal [Alert and Oriented x3] : oriented to person, place, and time [de-identified] : thin, hoarse [de-identified] : No ST [de-identified] : no stridor [de-identified] : R=16 [de-identified] : murmurs loud and unchanged; AF [de-identified] : improved edema; no cords [de-identified] :  MS seems intact/ in wheelchair; moves all extremities

## 2019-08-07 NOTE — HISTORY OF PRESENT ILLNESS
[Family Member] : family member [FreeTextEntry1] : Comes in for f/u medical visit. [de-identified] : Brought in by daughter.\par Has sneezing and rhinitis as seasonal allergies active. Denies sore throat or ear pain. \par Denies chest pain.\par No hemoptysis.\par Coughs intermittently but sputum is clear.\par No fevers.\par Denies SOB/wheezing.\par Denies change in MS. No UTI symptoms.\par No body aches.\par No n/v. Eating and drinking well.\par BM's unchanged. Had multiple accidents this week.\par Never started ASA due to bleeding risk as advised by oncology.\par Daughter notices some word-finding difficulty in patient. She is concerned about recurrent stroke vs. infection-UTI.

## 2019-08-07 NOTE — ASSESSMENT
[FreeTextEntry1] : Word-finding difficulty\par ? reflective of infection = UTI vs. CVA\par To collect urine for U/A with micro and urine C&S\par Neuro f/u\par ASA held due to thrombocytopenia/bleeding risk\par \par Bronchiectasis with COPD\par Will monitor CT and PFT's\par Monitor sputum cultures\par Flu vaccine in fall\par PT\par Chest PT\par Budesonide nebs bid\par Duoneb nebs tid\par \par Cirrhosis with portal hypertension\par GI f/u\par Continue Lactulose/Xifaxan/ Propranolol/Aldactone\par \par CLL\par Oncology f/u\par Monitor CBC \par \par RTC 12 weeks and as needed\par To call if worse \par To call for any medical/pulmonary issues\par Continue meds, diet, PT\par F/U with all MD's

## 2019-08-07 NOTE — REVIEW OF SYSTEMS
[Hearing Loss] : hearing loss [Vision Problems] : vision problems [Hoarseness] : hoarseness [Nasal Discharge] : nasal discharge [Postnasal Drip] : postnasal drip [Cough] : cough [Diarrhea] : diarrhea [Dyspnea on Exertion] : dyspnea on exertion [Memory Loss] : memory loss [Unsteady Walking] : ataxia [Easy Bruising] : easy bruising [Easy Bleeding] : easy bleeding [Negative] : Heme/Lymph

## 2019-08-14 ENCOUNTER — OTHER (OUTPATIENT)
Age: 84
End: 2019-08-14

## 2019-08-15 LAB
APPEARANCE: CLEAR
BACTERIA: NEGATIVE
BILIRUBIN URINE: NEGATIVE
BLOOD URINE: NEGATIVE
COLOR: NORMAL
GLUCOSE QUALITATIVE U: NEGATIVE
HYALINE CASTS: 0 /LPF
KETONES URINE: NEGATIVE
LEUKOCYTE ESTERASE URINE: ABNORMAL
MICROSCOPIC-UA: NORMAL
NITRITE URINE: NEGATIVE
PH URINE: 6
PROTEIN URINE: NEGATIVE
RED BLOOD CELLS URINE: 1 /HPF
SPECIFIC GRAVITY URINE: 1.01
SQUAMOUS EPITHELIAL CELLS: 3 /HPF
UROBILINOGEN URINE: NORMAL
WHITE BLOOD CELLS URINE: 7 /HPF

## 2019-08-19 LAB — BACTERIA UR CULT: ABNORMAL

## 2019-08-20 ENCOUNTER — MEDICATION RENEWAL (OUTPATIENT)
Age: 84
End: 2019-08-20

## 2019-08-20 RX ORDER — EPINEPHRINE 0.3 MG/.3ML
0.3 INJECTION INTRAMUSCULAR
Qty: 1 | Refills: 2 | Status: ACTIVE | COMMUNITY
Start: 2017-11-15 | End: 1900-01-01

## 2019-09-04 ENCOUNTER — APPOINTMENT (OUTPATIENT)
Dept: INTERNAL MEDICINE | Facility: CLINIC | Age: 84
End: 2019-09-04
Payer: MEDICARE

## 2019-09-04 VITALS
SYSTOLIC BLOOD PRESSURE: 90 MMHG | TEMPERATURE: 98.2 F | OXYGEN SATURATION: 92 % | HEART RATE: 71 BPM | DIASTOLIC BLOOD PRESSURE: 60 MMHG

## 2019-09-04 DIAGNOSIS — J06.9 ACUTE UPPER RESPIRATORY INFECTION, UNSPECIFIED: ICD-10-CM

## 2019-09-04 DIAGNOSIS — B97.89 ACUTE UPPER RESPIRATORY INFECTION, UNSPECIFIED: ICD-10-CM

## 2019-09-04 PROCEDURE — 99213 OFFICE O/P EST LOW 20 MIN: CPT

## 2019-09-05 NOTE — HEALTH RISK ASSESSMENT
[No falls in past year] : Patient reported no falls in the past year [No] : In the past 12 months have you used drugs other than those required for medical reasons? No [0] : 2) Feeling down, depressed, or hopeless: Not at all (0) [] : No [de-identified] : regular [[id number]] : 0 [de-identified] : none

## 2019-09-05 NOTE — PHYSICAL EXAM
[No Acute Distress] : no acute distress [Well Nourished] : well nourished [Well-Appearing] : well-appearing [Well Developed] : well developed [Normal Sclera/Conjunctiva] : normal sclera/conjunctiva [PERRL] : pupils equal round and reactive to light [Normal Voice/Communication] : normal voice/communication [EOMI] : extraocular movements intact [Normal Outer Ear/Nose] : the outer ears and nose were normal in appearance [Normal Oropharynx] : the oropharynx was normal [Normal TMs] : both tympanic membranes were normal [No JVD] : no jugular venous distention [Supple] : supple [No Lymphadenopathy] : no lymphadenopathy [No Respiratory Distress] : no respiratory distress  [No Accessory Muscle Use] : no accessory muscle use [Normal Rate] : normal rate  [Clear to Auscultation] : lungs were clear to auscultation bilaterally [No Edema] : there was no peripheral edema [Soft] : abdomen soft [No Extremity Clubbing/Cyanosis] : no extremity clubbing/cyanosis [Normal Bowel Sounds] : normal bowel sounds [No CVA Tenderness] : no CVA  tenderness [No Spinal Tenderness] : no spinal tenderness [No Focal Deficits] : no focal deficits [Normal Affect] : the affect was normal [Speech Grossly Normal] : speech grossly normal [Alert and Oriented x3] : oriented to person, place, and time [de-identified] : R=16; no wheezing; good air entry; wet cough [de-identified] : No ST [de-identified] : No stridor [de-identified] : AF; murmur unchanged [de-identified] : no cords [de-identified] : in wheelchair

## 2019-09-05 NOTE — HISTORY OF PRESENT ILLNESS
[FreeTextEntry8] : Comes in for acute medical visit.\par Has noticed increased rhinitis and postnasal drip which she attributes to active fall allergies.\par Recently exposed to someone with an acute URI.\par Became ill on Monday.\par Denies any chest pain.\par Has a deep hacking cough with chest congestion.\par Not clearing secretions.\par Denies hemoptysis. May have low-grade fever.\par Denies any shortness of breath or significant wheezing.\par Denies any myalgias or new GI symptoms.

## 2019-09-05 NOTE — REVIEW OF SYSTEMS
[Fever] : fever [Vision Problems] : vision problems [Hearing Loss] : hearing loss [Hoarseness] : hoarseness [Postnasal Drip] : postnasal drip [Nasal Discharge] : nasal discharge [Cough] : cough [Dyspnea on Exertion] : dyspnea on exertion [Memory Loss] : memory loss [Diarrhea] : diarrhea [Unsteady Walking] : ataxia [Easy Bleeding] : easy bleeding [Easy Bruising] : easy bruising [Negative] : Heme/Lymph

## 2019-09-05 NOTE — ASSESSMENT
[FreeTextEntry1] : Suspect acute viral URI triggering cough\par \par Rest\par Fluids\par Tylenol for fever\par Doxycycline 100 mg bid\par Budesonide nebs bid\par Duoneb nebs qid\par Add 2 cc of Mucomyst to nebs in AM/PM\par Chest PT\par Gargles/lozenges\par Steam/humidifier\par Saline rinse\par Flonase daily\par Will add Prednisone 30 mg daily for 5 days if increased SOB/wheezing\par To call if worse/not improving\par To call for any medical issues\par RTC 1 week and as needed

## 2019-09-06 ENCOUNTER — NON-APPOINTMENT (OUTPATIENT)
Age: 84
End: 2019-09-06

## 2019-09-06 ENCOUNTER — APPOINTMENT (OUTPATIENT)
Dept: CARDIOLOGY | Facility: CLINIC | Age: 84
End: 2019-09-06
Payer: MEDICARE

## 2019-09-06 VITALS
SYSTOLIC BLOOD PRESSURE: 120 MMHG | HEART RATE: 97 BPM | DIASTOLIC BLOOD PRESSURE: 70 MMHG | WEIGHT: 110 LBS | HEIGHT: 62.5 IN | RESPIRATION RATE: 16 BRPM | BODY MASS INDEX: 19.74 KG/M2

## 2019-09-06 PROCEDURE — 99214 OFFICE O/P EST MOD 30 MIN: CPT | Mod: PD

## 2019-09-06 PROCEDURE — 93000 ELECTROCARDIOGRAM COMPLETE: CPT | Mod: PD

## 2019-09-06 NOTE — DISCUSSION/SUMMARY
[FreeTextEntry1] : \par A. fib - rate remains adequately controlled on current regimen of digoxin QOD and propranolol.  \par Not a candidate for anti-coagulation.  Not taking ASA, apparently upon advice of hematologist.\par \par Severe AS and moderate AI; moderate mitral and tricuspid valve insufficiency.  Murmur unchanged; she remains compensated on current regimen of meds.  Not a candidate for valve replacement.\par \par LE edema - well controlled at this time on spironolactone bid; will continue diuretic and knee high compression stockings.  \par \par HTN - not a problem at this time.\par \par To continue on a low salt diet.\par \par She will return in 3 months.

## 2019-09-06 NOTE — PHYSICAL EXAM
[General Appearance - Well Developed] : well developed [Normal Appearance] : normal appearance [Well Groomed] : well groomed [General Appearance - In No Acute Distress] : no acute distress [General Appearance - Well Nourished] : well nourished [Normal Conjunctiva] : the conjunctiva exhibited no abnormalities [Eyelids - No Xanthelasma] : the eyelids demonstrated no xanthelasmas [Normal Jugular Venous A Waves Present] : normal jugular venous A waves present [Normal Jugular Venous V Waves Present] : normal jugular venous V waves present [Respiration, Rhythm And Depth] : normal respiratory rhythm and effort [Exaggerated Use Of Accessory Muscles For Inspiration] : no accessory muscle use [Auscultation Breath Sounds / Voice Sounds] : lungs were clear to auscultation bilaterally [Heart Rate And Rhythm] : heart rate and rhythm were normal [Bowel Sounds] : normal bowel sounds [Abnormal Walk] : normal gait [Nail Clubbing] : no clubbing of the fingernails [Cyanosis, Localized] : no localized cyanosis [Skin Color & Pigmentation] : normal skin color and pigmentation [] : no rash [Oriented To Time, Place, And Person] : oriented to person, place, and time [Impaired Insight] : insight and judgment were intact [Affect] : the affect was normal [Mood] : the mood was normal [FreeTextEntry1] : 2+ pulses in the upper and lower extremities. No edema.

## 2019-09-06 NOTE — HISTORY OF PRESENT ILLNESS
[FreeTextEntry1] : I saw her last in September.  Since that time, she was seen earlier this week by Dr. Celaya for a likely viral URI.  From a cardiac standpoint, she has remained well.  She reports no episodes of chest pain or unusual dyspnea.  There have been no palpitations.  She recounts no episodes of orthopnea or PND.   She reports no lightheadedness or faint spells, and has not suffered recurrence of LOC.  LE edema remains well controlled.

## 2019-09-08 ENCOUNTER — INPATIENT (INPATIENT)
Facility: HOSPITAL | Age: 84
LOS: 6 days | Discharge: HOME | DRG: 871 | End: 2019-09-15
Attending: INTERNAL MEDICINE | Admitting: STUDENT IN AN ORGANIZED HEALTH CARE EDUCATION/TRAINING PROGRAM
Payer: MEDICARE

## 2019-09-08 VITALS
TEMPERATURE: 99 F | OXYGEN SATURATION: 95 % | WEIGHT: 111.99 LBS | HEART RATE: 114 BPM | SYSTOLIC BLOOD PRESSURE: 126 MMHG | HEIGHT: 64 IN | DIASTOLIC BLOOD PRESSURE: 56 MMHG | RESPIRATION RATE: 22 BRPM

## 2019-09-08 DIAGNOSIS — I50.9 HEART FAILURE, UNSPECIFIED: ICD-10-CM

## 2019-09-08 DIAGNOSIS — Z98.89 OTHER SPECIFIED POSTPROCEDURAL STATES: Chronic | ICD-10-CM

## 2019-09-08 LAB
ALBUMIN SERPL ELPH-MCNC: 3.6 G/DL — SIGNIFICANT CHANGE UP (ref 3.3–5)
ALP SERPL-CCNC: 116 U/L — SIGNIFICANT CHANGE UP (ref 40–120)
ALT FLD-CCNC: 22 U/L — SIGNIFICANT CHANGE UP (ref 10–45)
ANION GAP SERPL CALC-SCNC: 13 MMOL/L — SIGNIFICANT CHANGE UP (ref 5–17)
APPEARANCE UR: CLEAR — SIGNIFICANT CHANGE UP
APTT BLD: 23.6 SEC — LOW (ref 27.5–36.3)
AST SERPL-CCNC: 31 U/L — SIGNIFICANT CHANGE UP (ref 10–40)
BACTERIA # UR AUTO: ABNORMAL
BASE EXCESS BLDV CALC-SCNC: -0.5 MMOL/L — SIGNIFICANT CHANGE UP (ref -2–2)
BASOPHILS # BLD AUTO: 0 K/UL — SIGNIFICANT CHANGE UP (ref 0–0.2)
BASOPHILS NFR BLD AUTO: 0.3 % — SIGNIFICANT CHANGE UP (ref 0–2)
BILIRUB SERPL-MCNC: 2.6 MG/DL — HIGH (ref 0.2–1.2)
BILIRUB UR-MCNC: NEGATIVE — SIGNIFICANT CHANGE UP
BUN SERPL-MCNC: 36 MG/DL — HIGH (ref 7–23)
CA-I SERPL-SCNC: 1.33 MMOL/L — HIGH (ref 1.12–1.3)
CALCIUM SERPL-MCNC: 10.5 MG/DL — SIGNIFICANT CHANGE UP (ref 8.4–10.5)
CHLORIDE BLDV-SCNC: 104 MMOL/L — SIGNIFICANT CHANGE UP (ref 96–108)
CHLORIDE SERPL-SCNC: 102 MMOL/L — SIGNIFICANT CHANGE UP (ref 96–108)
CO2 BLDV-SCNC: 24 MMOL/L — SIGNIFICANT CHANGE UP (ref 22–30)
CO2 SERPL-SCNC: 22 MMOL/L — SIGNIFICANT CHANGE UP (ref 22–31)
COLOR SPEC: YELLOW — SIGNIFICANT CHANGE UP
CREAT SERPL-MCNC: 1.19 MG/DL — SIGNIFICANT CHANGE UP (ref 0.5–1.3)
DIFF PNL FLD: ABNORMAL
EOSINOPHIL # BLD AUTO: 0.1 K/UL — SIGNIFICANT CHANGE UP (ref 0–0.5)
EOSINOPHIL NFR BLD AUTO: 1 % — SIGNIFICANT CHANGE UP (ref 0–6)
EPI CELLS # UR: 0 /HPF — SIGNIFICANT CHANGE UP
FLU A RESULT: SIGNIFICANT CHANGE UP
FLU A RESULT: SIGNIFICANT CHANGE UP
FLUAV AG NPH QL: SIGNIFICANT CHANGE UP
FLUBV AG NPH QL: SIGNIFICANT CHANGE UP
GAS PNL BLDV: 132 MMOL/L — LOW (ref 135–145)
GAS PNL BLDV: SIGNIFICANT CHANGE UP
GAS PNL BLDV: SIGNIFICANT CHANGE UP
GLUCOSE BLDV-MCNC: 98 MG/DL — SIGNIFICANT CHANGE UP (ref 70–99)
GLUCOSE SERPL-MCNC: 106 MG/DL — HIGH (ref 70–99)
GLUCOSE UR QL: NEGATIVE — SIGNIFICANT CHANGE UP
HCO3 BLDV-SCNC: 23 MMOL/L — SIGNIFICANT CHANGE UP (ref 21–29)
HCT VFR BLD CALC: 42.2 % — SIGNIFICANT CHANGE UP (ref 34.5–45)
HCT VFR BLDA CALC: 40 % — SIGNIFICANT CHANGE UP (ref 39–50)
HGB BLD CALC-MCNC: 13.1 G/DL — SIGNIFICANT CHANGE UP (ref 11.5–15.5)
HGB BLD-MCNC: 13.2 G/DL — SIGNIFICANT CHANGE UP (ref 11.5–15.5)
HYALINE CASTS # UR AUTO: 1 /LPF — SIGNIFICANT CHANGE UP (ref 0–2)
INR BLD: 1.25 RATIO — HIGH (ref 0.88–1.16)
KETONES UR-MCNC: NEGATIVE — SIGNIFICANT CHANGE UP
LACTATE BLDV-MCNC: 2.1 MMOL/L — HIGH (ref 0.7–2)
LEUKOCYTE ESTERASE UR-ACNC: ABNORMAL
LYMPHOCYTES # BLD AUTO: 0.9 K/UL — LOW (ref 1–3.3)
LYMPHOCYTES # BLD AUTO: 9.8 % — LOW (ref 13–44)
MCHC RBC-ENTMCNC: 31.2 GM/DL — LOW (ref 32–36)
MCHC RBC-ENTMCNC: 33.6 PG — SIGNIFICANT CHANGE UP (ref 27–34)
MCV RBC AUTO: 108 FL — HIGH (ref 80–100)
MONOCYTES # BLD AUTO: 0.5 K/UL — SIGNIFICANT CHANGE UP (ref 0–0.9)
MONOCYTES NFR BLD AUTO: 6 % — SIGNIFICANT CHANGE UP (ref 2–14)
NEUTROPHILS # BLD AUTO: 7.4 K/UL — SIGNIFICANT CHANGE UP (ref 1.8–7.4)
NEUTROPHILS NFR BLD AUTO: 82.9 % — HIGH (ref 43–77)
NITRITE UR-MCNC: NEGATIVE — SIGNIFICANT CHANGE UP
OTHER CELLS CSF MANUAL: 11 ML/DL — LOW (ref 18–22)
PCO2 BLDV: 37 MMHG — SIGNIFICANT CHANGE UP (ref 35–50)
PH BLDV: 7.41 — SIGNIFICANT CHANGE UP (ref 7.35–7.45)
PH UR: 6 — SIGNIFICANT CHANGE UP (ref 5–8)
PLATELET # BLD AUTO: 114 K/UL — LOW (ref 150–400)
PO2 BLDV: 34 MMHG — SIGNIFICANT CHANGE UP (ref 25–45)
POTASSIUM BLDV-SCNC: 4.9 MMOL/L — SIGNIFICANT CHANGE UP (ref 3.5–5.3)
POTASSIUM SERPL-MCNC: 5.3 MMOL/L — SIGNIFICANT CHANGE UP (ref 3.5–5.3)
POTASSIUM SERPL-SCNC: 5.3 MMOL/L — SIGNIFICANT CHANGE UP (ref 3.5–5.3)
PROT SERPL-MCNC: 6.2 G/DL — SIGNIFICANT CHANGE UP (ref 6–8.3)
PROT UR-MCNC: ABNORMAL
PROTHROM AB SERPL-ACNC: 14.3 SEC — HIGH (ref 10–12.9)
RBC # BLD: 3.92 M/UL — SIGNIFICANT CHANGE UP (ref 3.8–5.2)
RBC # FLD: 13.2 % — SIGNIFICANT CHANGE UP (ref 10.3–14.5)
RBC CASTS # UR COMP ASSIST: 2 /HPF — SIGNIFICANT CHANGE UP (ref 0–4)
RSV RESULT: SIGNIFICANT CHANGE UP
RSV RNA RESP QL NAA+PROBE: SIGNIFICANT CHANGE UP
SAO2 % BLDV: 63 % — LOW (ref 67–88)
SODIUM SERPL-SCNC: 137 MMOL/L — SIGNIFICANT CHANGE UP (ref 135–145)
SP GR SPEC: 1.02 — SIGNIFICANT CHANGE UP (ref 1.01–1.02)
UROBILINOGEN FLD QL: NEGATIVE — SIGNIFICANT CHANGE UP
WBC # BLD: 9 K/UL — SIGNIFICANT CHANGE UP (ref 3.8–10.5)
WBC # FLD AUTO: 9 K/UL — SIGNIFICANT CHANGE UP (ref 3.8–10.5)
WBC UR QL: 6 /HPF — HIGH (ref 0–5)

## 2019-09-08 PROCEDURE — 99291 CRITICAL CARE FIRST HOUR: CPT

## 2019-09-08 PROCEDURE — 71250 CT THORAX DX C-: CPT | Mod: 26

## 2019-09-08 PROCEDURE — 71045 X-RAY EXAM CHEST 1 VIEW: CPT | Mod: 26

## 2019-09-08 PROCEDURE — 99291 CRITICAL CARE FIRST HOUR: CPT | Mod: GC

## 2019-09-08 PROCEDURE — 99292 CRITICAL CARE ADDL 30 MIN: CPT | Mod: GC

## 2019-09-08 RX ORDER — NOREPINEPHRINE BITARTRATE/D5W 8 MG/250ML
0.05 PLASTIC BAG, INJECTION (ML) INTRAVENOUS
Qty: 8 | Refills: 0 | Status: DISCONTINUED | OUTPATIENT
Start: 2019-09-08 | End: 2019-09-09

## 2019-09-08 RX ORDER — CHLORHEXIDINE GLUCONATE 213 G/1000ML
1 SOLUTION TOPICAL
Refills: 0 | Status: DISCONTINUED | OUTPATIENT
Start: 2019-09-08 | End: 2019-09-09

## 2019-09-08 RX ORDER — IPRATROPIUM/ALBUTEROL SULFATE 18-103MCG
3 AEROSOL WITH ADAPTER (GRAM) INHALATION EVERY 6 HOURS
Refills: 0 | Status: DISCONTINUED | OUTPATIENT
Start: 2019-09-08 | End: 2019-09-15

## 2019-09-08 RX ORDER — MEROPENEM 1 G/30ML
1000 INJECTION INTRAVENOUS EVERY 12 HOURS
Refills: 0 | Status: COMPLETED | OUTPATIENT
Start: 2019-09-08 | End: 2019-09-15

## 2019-09-08 RX ORDER — BUDESONIDE AND FORMOTEROL FUMARATE DIHYDRATE 160; 4.5 UG/1; UG/1
2 AEROSOL RESPIRATORY (INHALATION)
Refills: 0 | Status: DISCONTINUED | OUTPATIENT
Start: 2019-09-08 | End: 2019-09-08

## 2019-09-08 RX ORDER — SODIUM CHLORIDE 9 MG/ML
500 INJECTION INTRAMUSCULAR; INTRAVENOUS; SUBCUTANEOUS ONCE
Refills: 0 | Status: COMPLETED | OUTPATIENT
Start: 2019-09-08 | End: 2019-09-08

## 2019-09-08 RX ORDER — VANCOMYCIN HCL 1 G
750 VIAL (EA) INTRAVENOUS EVERY 24 HOURS
Refills: 0 | Status: DISCONTINUED | OUTPATIENT
Start: 2019-09-09 | End: 2019-09-09

## 2019-09-08 RX ORDER — BUDESONIDE, MICRONIZED 100 %
0.5 POWDER (GRAM) MISCELLANEOUS EVERY 12 HOURS
Refills: 0 | Status: DISCONTINUED | OUTPATIENT
Start: 2019-09-08 | End: 2019-09-15

## 2019-09-08 RX ORDER — IPRATROPIUM/ALBUTEROL SULFATE 18-103MCG
3 AEROSOL WITH ADAPTER (GRAM) INHALATION EVERY 6 HOURS
Refills: 0 | Status: DISCONTINUED | OUTPATIENT
Start: 2019-09-08 | End: 2019-09-08

## 2019-09-08 RX ORDER — ACETAMINOPHEN 500 MG
650 TABLET ORAL ONCE
Refills: 0 | Status: COMPLETED | OUTPATIENT
Start: 2019-09-08 | End: 2019-09-08

## 2019-09-08 RX ORDER — LACTULOSE 10 G/15ML
20 SOLUTION ORAL
Refills: 0 | Status: DISCONTINUED | OUTPATIENT
Start: 2019-09-08 | End: 2019-09-09

## 2019-09-08 RX ORDER — VANCOMYCIN HCL 1 G
VIAL (EA) INTRAVENOUS
Refills: 0 | Status: DISCONTINUED | OUTPATIENT
Start: 2019-09-08 | End: 2019-09-09

## 2019-09-08 RX ORDER — SODIUM CHLORIDE 9 MG/ML
100 INJECTION INTRAMUSCULAR; INTRAVENOUS; SUBCUTANEOUS ONCE
Refills: 0 | Status: DISCONTINUED | OUTPATIENT
Start: 2019-09-08 | End: 2019-09-08

## 2019-09-08 RX ORDER — VANCOMYCIN HCL 1 G
750 VIAL (EA) INTRAVENOUS ONCE
Refills: 0 | Status: COMPLETED | OUTPATIENT
Start: 2019-09-08 | End: 2019-09-08

## 2019-09-08 RX ORDER — LEVOTHYROXINE SODIUM 125 MCG
25 TABLET ORAL DAILY
Refills: 0 | Status: DISCONTINUED | OUTPATIENT
Start: 2019-09-08 | End: 2019-09-15

## 2019-09-08 RX ADMIN — Medication 250 MILLIGRAM(S): at 07:41

## 2019-09-08 RX ADMIN — MEROPENEM 100 MILLIGRAM(S): 1 INJECTION INTRAVENOUS at 17:54

## 2019-09-08 RX ADMIN — SODIUM CHLORIDE 500 MILLILITER(S): 9 INJECTION INTRAMUSCULAR; INTRAVENOUS; SUBCUTANEOUS at 06:23

## 2019-09-08 RX ADMIN — SODIUM CHLORIDE 500 MILLILITER(S): 9 INJECTION INTRAMUSCULAR; INTRAVENOUS; SUBCUTANEOUS at 06:42

## 2019-09-08 RX ADMIN — SODIUM CHLORIDE 1000 MILLILITER(S): 9 INJECTION INTRAMUSCULAR; INTRAVENOUS; SUBCUTANEOUS at 05:25

## 2019-09-08 RX ADMIN — LACTULOSE 20 GRAM(S): 10 SOLUTION ORAL at 23:09

## 2019-09-08 RX ADMIN — LACTULOSE 20 GRAM(S): 10 SOLUTION ORAL at 11:46

## 2019-09-08 RX ADMIN — Medication 650 MILLIGRAM(S): at 04:23

## 2019-09-08 RX ADMIN — Medication 4.76 MICROGRAM(S)/KG/MIN: at 06:39

## 2019-09-08 RX ADMIN — SODIUM CHLORIDE 500 MILLILITER(S): 9 INJECTION INTRAMUSCULAR; INTRAVENOUS; SUBCUTANEOUS at 04:24

## 2019-09-08 RX ADMIN — Medication 25 MICROGRAM(S): at 11:46

## 2019-09-08 RX ADMIN — LACTULOSE 20 GRAM(S): 10 SOLUTION ORAL at 17:54

## 2019-09-08 NOTE — H&P ADULT - ATTENDING COMMENTS
Patient seen and examined. Laboratory data and imaging reviewed. Patient with extensive medical history including critical AS and COPD who presents with fevers, cough with sputum production and hypotension. She was noted to be hypotensive in the ED after 1.5 L of IVF and was started on vasopressors given concern for volume overload. She did not have B-lines on US but was started on vasopressors    1. Septic Shock  - Continue vasopressors with goal MAP > 65 with aim to titrate off  - Will give another 1 L IVF and monitor respiratory status as patient appears dry  - Likely due to pneumonia - followup cultures and check sputum culture if able  2. Acute Hypoxemic Respiratory Failure - in setting of pneumonia  - CT scan reviewed  - Aggressive chest PT and airway clearance  - Patient is clear that she does not want to be intubated though this does not seem necessary at this time  - Continue antibiotics  3. Cardiovascular  - Critical AS - monitor volume status    Attending Critical Care Time 45 minutes not including teaching or procedures. Patient seen and examined. Laboratory data and imaging reviewed. Patient with extensive medical history including critical AS and COPD who presents with fevers, cough with sputum production and hypotension. She was noted to be hypotensive in the ED after 1.5 L of IVF and was started on vasopressors given concern for volume overload. She did not have B-lines on US but was started on vasopressors    1. Septic Shock  - Continue vasopressors with goal MAP > 65 with aim to titrate off  - Will give another 1 L IVF and monitor respiratory status as patient appears dry  - Likely due to pneumonia - followup cultures and check sputum culture if able  2. Acute Hypoxemic Respiratory Failure - in setting of pneumonia  - CT scan reviewed  - Aggressive chest PT and airway clearance  - Patient is clear that she does not want to be intubated though this does not seem necessary at this time  - Continue antibiotics  3. Cardiovascular  - Critical AS - monitor volume status  4. GOC  - Patient is DNR/DNI and has a MOLST  - She does want full medical treatment otherwise    Attending Critical Care Time 45 minutes not including teaching or procedures. Patient seen and examined. Laboratory data and imaging reviewed. Patient with extensive medical history including critical AS and COPD who presents with fevers, cough with sputum production and hypotension. She was noted to be hypotensive in the ED after 1.5 L of IVF and was started on vasopressors given concern for volume overload. She did not have B-lines on US but was started on vasopressors    1. Septic Shock  - Continue vasopressors with goal MAP > 65 with aim to titrate off  - Will give another 1 L IVF and monitor respiratory status as patient appears dry  - Likely due to pneumonia - followup cultures and check sputum culture if able  2. Acute Hypoxemic Respiratory Failure - in setting of pneumonia  - CT scan reviewed  - Aggressive chest PT and airway clearance  - DNR but not DNI  - Continue antibiotics  3. Cardiovascular  - Critical AS - monitor volume status  4. GOC  - Patient is DNR, but not DNI by her report, and has a MOLST  - She does want full medical treatment otherwise    Attending Critical Care Time 45 minutes not including teaching or procedures.

## 2019-09-08 NOTE — ED ADULT NURSE REASSESSMENT NOTE - NS ED NURSE REASSESS COMMENT FT1
Called MICU to give report and was told they would call back to take report since bed is still dirty.
Patient BP continued to decrease, 70's/40's. MD Monroy made aware. Discussed that patient will need to be on pressors to increase BP as IVF were not successful. Patient family agreed to IV pressors. MD Arias to place ultrasound guided IV for pressor infusion.
Patient awake and alert with family at bedside.  Patient and family aware of plan of care.  IV site in left bicep is free of redness, swelling or pain and right hand IV is flushed easily and site is free of redness or swelling or pain.
Patient became hypotensive, 80's/40's. cuff changed to smaller size, both arms utilized. Patient mentating well, continues to be A&OX1, appears less lethargic, communicating with family. Patient able to follow commands, no c/o dizziness, lightheadedness.
Patient becoming more alert, communicating with staff and family. Patient opens eyes spontaneously to verbal stimuli, mentating well.
Patient mentating well, continues to be A&OX1, communicating with family. Responds to verbal stimuli, able to follow commands. Patient denies lightheadedness, dizziness, states "I feel fine".
Patient resting comfortably in stretcher and denies shortness of breath.  Patient and family updated on plan of care.  Left bicep IV site is free of redness or swelling.
Patient updated on plan of care and oral swabs given to patient per Dr. Staples patient may have oral swabs.  Patient denies shortness of breath and is resting comfortably in stretcher.
Straight urinary catheter inserted using sterile technique. Procedure, risks, and benefits of catheter explained to patient, patient verbalized understanding. Second RN present to confirm sterility. Pt tolerated well. Urinary catheter drained 500ml of clear oscar urine, no clots visualized. Urinalysis and urine cultures obtained. Catheter removed promptly.
Patient awake and alert to self and place on 5L NC reports no shortness of breath.  MICU doctor at bedside speaking with patient and family and ED Resident Jovon speaking with patient and family regarding plan of care.  Left lower lobe crackles auscultated.  IV site is free of redness, swelling or pain in right bicep where levophed is running.  Family aware of abx pending.

## 2019-09-08 NOTE — H&P ADULT - NSHPSOCIALHISTORY_GEN_ALL_CORE
Lives with daughter. Uses walker at home.  Remote history of smoking, 1-2 cigarettes per day, 60 years ago. Former EtOH use, 1 glass wine/day, stopped 5 years ago d/t diagnosis of cirrhosis/HCC.

## 2019-09-08 NOTE — H&P ADULT - NSHPPHYSICALEXAM_GEN_ALL_CORE
ICU Vital Signs Last 24 Hrs  T(C): 37 (08 Sep 2019 10:15), Max: 40.4 (08 Sep 2019 04:16)  T(F): 98.6 (08 Sep 2019 10:15), Max: 104.7 (08 Sep 2019 04:16)  HR: 72 (08 Sep 2019 14:00) (68 - 114)  BP: 94/52 (08 Sep 2019 14:00) (77/41 - 126/56)  BP(mean): 68 (08 Sep 2019 14:00) (53 - 77)  ABP: --  ABP(mean): --  RR: 35 (08 Sep 2019 14:00) (16 - 35)  SpO2: 98% (08 Sep 2019 14:00) (95% - 99%)    PHYSICAL EXAM:  GENERAL: NAD, well-groomed, well-developed  HEAD: Atraumatic, Normocephalic  EYES: EOMI, PERRLA, conjunctiva and sclera clear  ENMT: No tonsillar erythema, exudates, or enlargement; Moist mucous membranes  NECK: Supple, No JVD, Normal Thyroid  NERVOUS SYSTEM: Alert & Oriented X3, Good concentration; Motor Strength 5/5 B/L upper and lower extremities  CHEST/LUNG: Clear to auscultation bilaterally; No rales, rhonchi, wheezing, or rubs  HEART: Regular rate and rhythm; S1 and S2; No murmurs, rubs, or gallops  ABDOMEN: Soft, Nontender, Nondistended: Bowel sounds present  EXTREMITIES: 2+ Peripheral Pulses, No clubbing, cyanosis, or edema  LYMPH: No lymphadenopathy noted  SKIN: No rashes or lesions ICU Vital Signs Last 24 Hrs  T(C): 37 (08 Sep 2019 10:15), Max: 40.4 (08 Sep 2019 04:16)  T(F): 98.6 (08 Sep 2019 10:15), Max: 104.7 (08 Sep 2019 04:16)  HR: 72 (08 Sep 2019 14:00) (68 - 114)  BP: 94/52 (08 Sep 2019 14:00) (77/41 - 126/56)  BP(mean): 68 (08 Sep 2019 14:00) (53 - 77)  ABP: --  ABP(mean): --  RR: 35 (08 Sep 2019 14:00) (16 - 35)  SpO2: 98% (08 Sep 2019 14:00) (95% - 99%)    PHYSICAL EXAM:  GENERAL: NAD, well-groomed, well-developed  HEAD: Atraumatic, Normocephalic  EYES: EOMI, PERRLA, conjunctiva and sclera clear  ENMT: No tonsillar erythema, exudates, or enlargement; Moist mucous membranes  NECK: Supple, No JVD, Normal Thyroid  NERVOUS SYSTEM: Alert & Oriented X3, Good concentration; Motor Strength 5/5 B/L upper and lower extremities  CHEST/LUNG: unlabored on NC, Soft crackles in left lung base, right lung clear to auscultation  HEART: Irregular rate and rhythm; S1 and S2; 4/6 holosystolic murmur best heard at RSB (pt has known AS)  ABDOMEN: Soft, Nontender, Nondistended: Bowel sounds present  EXTREMITIES: 2+ Peripheral Pulses, No clubbing, cyanosis, or edema  LYMPH: No lymphadenopathy noted  SKIN: No rashes or lesions

## 2019-09-08 NOTE — H&P ADULT - ASSESSMENT
Start on Ijeoma, hx of ESBL     Continue Levo   More fluids (?) 89 year old female with a pmhx of COPD, CKD, CLL, HCC/cirrhosis, a-fib, AS, and hypothyroid, presented with fever and cough, found to be hypotensive in ED requiring pressors and now admitted to the MICU for sepsis.    #Neuro  -A/Ox4    #Resp  -Unlabored on 3L NC, not on oxygen at home, wean to room air as tolerated  -h/o COPD, c/w duonebs and symbicort    #CV  -Septic shock, on levophed gtt, goal MAP > 65 -- try to wean as tolerated  -h/o AS, POCUS this AM without significant B lines, patient does not appear in fluid overload  -h/o Afib [id number] score 3, on digoxin at home - rate currently controlled in     #GI  -h/o Cirrhosis/HCC, c/w home lactulose 20g four times/day and rifaximin  -DASH Diet    #  -UA negative    #ID  -Sepsis 2/2 pneumonia, with ITALIA and LLL consolidations on CT chest 9/8  -h/o ESBL Klebsiella  -Started on Vancomycin/Levaquin in ED --> change to Vancomycin/Meropenem  -F/u blood Cxs (9/8) and urine Cx (9/8)    #Heme  -CLL in remission, last chemotherapy 8-9 years ago.  -No active issues    #Endo  -h/o Hypothyroidism, c/w home levothyroxine 25mcg daily 89 year old female with a pmhx of COPD, CKD, CLL, HCC/cirrhosis, a-fib, AS, and hypothyroid, presented with fever and cough, found to be hypotensive in ED requiring pressors and now admitted to the MICU for sepsis 2/2 PNA.    #Neuro  -A/Ox4    #Resp  -Unlabored on 3L NC, not on oxygen at home, wean to room air as tolerated  -h/o COPD, c/w duonebs and symbicort    #CV  -Septic shock, s/p 1.5L in ED without response, started on levophed gtt  -c/w levophed gtt with goal MAP > 65 -- try to wean as tolerated  -Treatment of pneumonia as below  -h/o AS, POCUS this AM without significant B lines, patient does not appear in fluid overload  -h/o Afib, [id number] score 3, on digoxin at home - rate currently controlled 70s-80s    #GI  -h/o Cirrhosis/HCC, c/w home lactulose 20g four times/day and rifaximin  -DASH Diet    #  -UA+ moderate LE, WBCs, many bacteria, however patient has no urinary symptoms  -F/u UCx (9/8)    #ID  -Sepsis 2/2 pneumonia, with ITALIA and LLL consolidations on CT chest 9/8  -h/o ESBL Klebsiella  -Started on Vancomycin/Levaquin in ED --> change to Vancomycin/Meropenem  -F/u blood Cxs (9/8) and urine Cx (9/8)    #Renal  -Good renal function    #Heme  -CLL in remission, last chemotherapy 8-9 years ago.  -No active issues    #Endo  -h/o Hypothyroidism, c/w home levothyroxine 25mcg daily    #DVT PPx  -SCDs    #GOC  -DNR, MOLST in paper chart    Sada Molina, PGY-1  Internal Medicine  Pager #: [phone] / [facility] [phone]

## 2019-09-08 NOTE — ED ADULT NURSE NOTE - CHPI ED NUR SYMPTOMS NEG
no vomiting/no weakness/no tingling/no decreased eating/drinking/no nausea/no dizziness/no chills/no pain

## 2019-09-08 NOTE — ED PROVIDER NOTE - CARE PLAN
Principal Discharge DX:	CHF (congestive heart failure) Principal Discharge DX:	Pneumonia  Secondary Diagnosis:	COPD (Chronic Obstructive Pulmonary Disease)  Secondary Diagnosis:	Aortic stenosis  Secondary Diagnosis:	Portal hypertension  Secondary Diagnosis:	Septic shock

## 2019-09-08 NOTE — ED PROVIDER NOTE - OBJECTIVE STATEMENT
89 year old female with a pmhx of COPD, CKD, CLL, HCC/cirrhosis, a-fib, AS, and hypothyroid, is brought to ED by EMS for evaluation of fever. Per daughters, patient was making sounds during the night and when the checked on her she was less responsive than usual. They took her temperature at home and she was 102F oral. She has cough and sob. Per daughters, patient saw her PCP earlier this week for a cough and was prescribed Doxycycline, only to take if her temperature goes above 101F. They never ended up giving her the abx because she was feeling well prior. Pt has had no vomiting, diarrhea, or dysuria. She lives at home with her daughter.

## 2019-09-08 NOTE — GOALS OF CARE CONVERSATION - PERSONAL ADVANCE DIRECTIVE - CONVERSATION DETAILS
Clarified MOLST form with patient and patient's daughter at bedside. Patient is DNR but not DNI at this time. they agree that they do no want CPR in the case of cardiac arrest. If patient's clinical status were to decline to the point of requiring intubation, for any reason, patient's daughters would like to be notified first so they can make the decision then.    Sada Molina, PGY-1  Internal Medicine  Pager #: [phone] / [facility] [phone]

## 2019-09-08 NOTE — H&P ADULT - HISTORY OF PRESENT ILLNESS
89 year old female with a pmhx of COPD, CKD, CLL, HCC/cirrhosis, a-fib, AS, and hypothyroid, is brought to ED by EMS for evaluation of fever to 102.The pt reports that she has been having cough for the past week that was productive of clear sputum. The pt went to a PMD due to temperature in the 99 range at home, which is above her baseline, who prescribed Doxy but the pt didn't take the Abx because she was told to take Abx only if she spiked a fever to 101. The pt denies chills/n/v/d or other issues, In the ED pt was found to have SBP in 80s and MAPs in 50s after 1.5 L; she was started on levo .06 due to poor response to fluids, now SBPs in 90s with MAPs in 60s. Pt also received Levaquin for suspected PNA. 89 year old female with a pmhx of COPD, CKD, CLL, HCC/cirrhosis, a-fib, AS, and hypothyroid, is brought to ED by EMS for evaluation of fever to 102.The pt reports that she has been having cough for the past week that was productive of clear sputum. The pt went to a PMD due to temperature in the 99 range at home, which is above her baseline, who prescribed Doxy but the pt didn't take the Abx because she was told to take Abx only if she spiked a fever to 101. The pt denies chills/n/v/d or other issues, In the ED pt was found to have SBP in 80s and MAPs in 50s after 1.5 L; she was started on levo .06 due to poor response to fluids, now SBPs in 90s with MAPs in 60s. Pt also received Levaquin for suspected PNA. Now admitted to the MICU for management of septic shock.

## 2019-09-08 NOTE — CONSULT NOTE ADULT - SUBJECTIVE AND OBJECTIVE BOX
CHIEF COMPLAINT:    HPI:    PAST MEDICAL & SURGICAL HISTORY:  PVD (peripheral vascular disease)  Liver cell carcinoma  Severe aortic stenosis by prior echocardiogram  Pulmonary HTN: moderate  CKD (chronic kidney disease), stage 3 (moderate)  COPD (Chronic Obstructive Pulmonary Disease)  AF (Atrial Fibrillation)  Portal Hypertension  Bleeding Esophageal Varices  CLL (Chronic Lymphoblastic Leukemia)  GIB (Gastrointestinal Bleeding)  History of repair of hip fracture  Esophageal Rupture      FAMILY HISTORY:  No pertinent family history in first degree relatives      SOCIAL HISTORY:  Smoking: __ packs x ___ years  EtOH Use:  Marital Status:  Occupation:  Recent Travel:  Country of Birth:  Advance Directives:    Allergies    codeine (Rash)  erythromycin (Rash)  iv dye (Rash)  penicillin (Rash)  shellfish (Rash)    Intolerances    adhesives (Other)  warfarin (Other)      HOME MEDICATIONS:    REVIEW OF SYSTEMS:  Constitutional:   Eyes:  ENT:  CV:  Resp:  GI:  :  MSK:  Integumentary:  Neurological:  Psychiatric:  Endocrine:  Hematologic/Lymphatic:  Allergic/Immunologic:  [ ] All other systems negative  [ ] Unable to assess ROS because ________    OBJECTIVE:  ICU Vital Signs Last 24 Hrs  T(C): 40.4 (08 Sep 2019 04:16), Max: 40.4 (08 Sep 2019 04:16)  T(F): 104.7 (08 Sep 2019 04:16), Max: 104.7 (08 Sep 2019 04:16)  HR: 93 (08 Sep 2019 07:11) (87 - 114)  BP: 86/41 (08 Sep 2019 07:16) (77/41 - 126/56)  BP(mean): 70 (08 Sep 2019 06:57) (53 - 70)  ABP: --  ABP(mean): --  RR: 22 (08 Sep 2019 07:11) (18 - 22)  SpO2: 97% (08 Sep 2019 07:11) (95% - 99%)        CAPILLARY BLOOD GLUCOSE          PHYSICAL EXAM:  General:   HEENT:   Lymph Nodes:  Neck:   Respiratory:   Cardiovascular:   Abdomen:   Extremities:   Skin:   Neurological:  Psychiatry:    HOSPITAL MEDICATIONS:  MEDICATIONS  (STANDING):  levoFLOXacin IVPB 500 milliGRAM(s) IV Intermittent every 24 hours  norepinephrine Infusion 0.05 MICROgram(s)/kG/Min (4.763 mL/Hr) IV Continuous <Continuous>  vancomycin  IVPB      vancomycin  IVPB 750 milliGRAM(s) IV Intermittent once    MEDICATIONS  (PRN):      LABS:                        13.2   9.0   )-----------( 114      ( 08 Sep 2019 04:21 )             42.2         137  |  102  |  36<H>  ----------------------------<  106<H>  5.3   |  22  |  1.19    Ca    10.5      08 Sep 2019 04:21    TPro  6.2  /  Alb  3.6  /  TBili  2.6<H>  /  DBili  x   /  AST  31  /  ALT  22  /  AlkPhos  116      PT/INR - ( 08 Sep 2019 04:21 )   PT: 14.3 sec;   INR: 1.25 ratio         PTT - ( 08 Sep 2019 04:21 )  PTT:23.6 sec  Urinalysis Basic - ( 08 Sep 2019 04:21 )    Color: Yellow / Appearance: Clear / S.017 / pH: x  Gluc: x / Ketone: Negative  / Bili: Negative / Urobili: Negative   Blood: x / Protein: Trace / Nitrite: Negative   Leuk Esterase: Moderate / RBC: 2 /hpf / WBC 6 /HPF   Sq Epi: x / Non Sq Epi: 0 /hpf / Bacteria: Many        Venous Blood Gas:   @ 04:21  7.41/37/34/23/63  VBG Lactate: 2.1      MICROBIOLOGY:     RADIOLOGY:  [ ] Reviewed and interpreted by me    EKG: CHIEF COMPLAINT:    HPI:    PAST MEDICAL & SURGICAL HISTORY:  PVD (peripheral vascular disease)  Liver cell carcinoma  Severe aortic stenosis by prior echocardiogram  Pulmonary HTN: moderate  CKD (chronic kidney disease), stage 3 (moderate)  COPD (Chronic Obstructive Pulmonary Disease)  AF (Atrial Fibrillation)  Portal Hypertension  Bleeding Esophageal Varices  CLL (Chronic Lymphoblastic Leukemia)  GIB (Gastrointestinal Bleeding)  History of repair of hip fracture  Esophageal Rupture      FAMILY HISTORY:  No pertinent family history in first degree relatives      SOCIAL HISTORY:  Smoking: __ packs x ___ years  EtOH Use:  Marital Status:  Occupation:  Recent Travel:  Country of Birth:  Advance Directives:    Allergies    codeine (Rash)  erythromycin (Rash)  iv dye (Rash)  penicillin (Rash)  shellfish (Rash)    Intolerances    adhesives (Other)  warfarin (Other)      HOME MEDICATIONS:    REVIEW OF SYSTEMS:  Constitutional:   Eyes:  ENT:  CV:  Resp:  GI:  :  MSK:  Integumentary:  Neurological:  Psychiatric:  Endocrine:  Hematologic/Lymphatic:  Allergic/Immunologic:  [ ] All other systems negative  [ ] Unable to assess ROS because ________    OBJECTIVE:  ICU Vital Signs Last 24 Hrs  T(C): 40.4 (08 Sep 2019 04:16), Max: 40.4 (08 Sep 2019 04:16)  T(F): 104.7 (08 Sep 2019 04:16), Max: 104.7 (08 Sep 2019 04:16)  HR: 93 (08 Sep 2019 07:11) (87 - 114)  BP: 86/41 (08 Sep 2019 07:16) (77/41 - 126/56)  BP(mean): 70 (08 Sep 2019 06:57) (53 - 70)  ABP: --  ABP(mean): --  RR: 22 (08 Sep 2019 07:11) (18 - 22)  SpO2: 97% (08 Sep 2019 07:11) (95% - 99%)        CAPILLARY BLOOD GLUCOSE          PHYSICAL EXAM:  General:   HEENT:   Lymph Nodes:  Neck:   Respiratory:   Cardiovascular:   Abdomen:   Extremities:   Skin:   Neurological:  Psychiatry:    HOSPITAL MEDICATIONS:  MEDICATIONS  (STANDING):  levoFLOXacin IVPB 500 milliGRAM(s) IV Intermittent every 24 hours  norepinephrine Infusion 0.05 MICROgram(s)/kG/Min (4.763 mL/Hr) IV Continuous <Continuous>  vancomycin  IVPB      vancomycin  IVPB 750 milliGRAM(s) IV Intermittent once    MEDICATIONS  (PRN):      LABS:                        13.2   9.0   )-----------( 114      ( 08 Sep 2019 04:21 )             42.2         137  |  102  |  36<H>  ----------------------------<  106<H>  5.3   |  22  |  1.19    Ca    10.5      08 Sep 2019 04:21    TPro  6.2  /  Alb  3.6  /  TBili  2.6<H>  /  DBili  x   /  AST  31  /  ALT  22  /  AlkPhos  116      PT/INR - ( 08 Sep 2019 04:21 )   PT: 14.3 sec;   INR: 1.25 ratio         PTT - ( 08 Sep 2019 04:21 )  PTT:23.6 sec  Urinalysis Basic - ( 08 Sep 2019 04:21 )    Color: Yellow / Appearance: Clear / S.017 / pH: x  Gluc: x / Ketone: Negative  / Bili: Negative / Urobili: Negative   Blood: x / Protein: Trace / Nitrite: Negative   Leuk Esterase: Moderate / RBC: 2 /hpf / WBC 6 /HPF   Sq Epi: x / Non Sq Epi: 0 /hpf / Bacteria: Many        Venous Blood Gas:   @ 04:21  7.41/37/34/23/63  VBG Lactate: 2.1      MICROBIOLOGY:     RADIOLOGY:  [ ] Reviewed and interpreted by me    EKG:    #Septic shock  Pt w/ SBP in 80s and MAPs in 50s after 1.5 L; was started on levo .06 due to poor response to fluids, now SBPs in 90s with MAPs in 60s CHIEF COMPLAINT:    HPI:    PAST MEDICAL & SURGICAL HISTORY:  PVD (peripheral vascular disease)  Liver cell carcinoma  Severe aortic stenosis by prior echocardiogram  Pulmonary HTN: moderate  CKD (chronic kidney disease), stage 3 (moderate)  COPD (Chronic Obstructive Pulmonary Disease)  AF (Atrial Fibrillation)  Portal Hypertension  Bleeding Esophageal Varices  CLL (Chronic Lymphoblastic Leukemia)  GIB (Gastrointestinal Bleeding)  History of repair of hip fracture  Esophageal Rupture      FAMILY HISTORY:  No pertinent family history in first degree relatives      SOCIAL HISTORY:  Smoking: __ packs x ___ years  EtOH Use:  Marital Status:  Occupation:  Recent Travel:  Country of Birth:  Advance Directives:    Allergies    codeine (Rash)  erythromycin (Rash)  iv dye (Rash)  penicillin (Rash)  shellfish (Rash)    Intolerances    adhesives (Other)  warfarin (Other)      HOME MEDICATIONS:    REVIEW OF SYSTEMS:  Constitutional:   Eyes:  ENT:  CV:  Resp:  GI:  :  MSK:  Integumentary:  Neurological:  Psychiatric:  Endocrine:  Hematologic/Lymphatic:  Allergic/Immunologic:  [ ] All other systems negative  [ ] Unable to assess ROS because ________    OBJECTIVE:  ICU Vital Signs Last 24 Hrs  T(C): 40.4 (08 Sep 2019 04:16), Max: 40.4 (08 Sep 2019 04:16)  T(F): 104.7 (08 Sep 2019 04:16), Max: 104.7 (08 Sep 2019 04:16)  HR: 93 (08 Sep 2019 07:11) (87 - 114)  BP: 86/41 (08 Sep 2019 07:16) (77/41 - 126/56)  BP(mean): 70 (08 Sep 2019 06:57) (53 - 70)  ABP: --  ABP(mean): --  RR: 22 (08 Sep 2019 07:11) (18 - 22)  SpO2: 97% (08 Sep 2019 07:11) (95% - 99%)        CAPILLARY BLOOD GLUCOSE          PHYSICAL EXAM:  General:   HEENT:   Lymph Nodes:  Neck:   Respiratory:   Cardiovascular:   Abdomen:   Extremities:   Skin:   Neurological:  Psychiatry:    HOSPITAL MEDICATIONS:  MEDICATIONS  (STANDING):  levoFLOXacin IVPB 500 milliGRAM(s) IV Intermittent every 24 hours  norepinephrine Infusion 0.05 MICROgram(s)/kG/Min (4.763 mL/Hr) IV Continuous <Continuous>  vancomycin  IVPB      vancomycin  IVPB 750 milliGRAM(s) IV Intermittent once    MEDICATIONS  (PRN):      LABS:                        13.2   9.0   )-----------( 114      ( 08 Sep 2019 04:21 )             42.2         137  |  102  |  36<H>  ----------------------------<  106<H>  5.3   |  22  |  1.19    Ca    10.5      08 Sep 2019 04:21    TPro  6.2  /  Alb  3.6  /  TBili  2.6<H>  /  DBili  x   /  AST  31  /  ALT  22  /  AlkPhos  116      PT/INR - ( 08 Sep 2019 04:21 )   PT: 14.3 sec;   INR: 1.25 ratio         PTT - ( 08 Sep 2019 04:21 )  PTT:23.6 sec  Urinalysis Basic - ( 08 Sep 2019 04:21 )    Color: Yellow / Appearance: Clear / S.017 / pH: x  Gluc: x / Ketone: Negative  / Bili: Negative / Urobili: Negative   Blood: x / Protein: Trace / Nitrite: Negative   Leuk Esterase: Moderate / RBC: 2 /hpf / WBC 6 /HPF   Sq Epi: x / Non Sq Epi: 0 /hpf / Bacteria: Many        Venous Blood Gas:   @ 04:21  7.41/37/34/23/63  VBG Lactate: 2.1      MICROBIOLOGY:     RADIOLOGY:  [ ] Reviewed and interpreted by me    EKG:    #Septic shock  Pt w/ SBP in 80s and MAPs in 50s after 1.5 L; was started on levo .06 due to poor response to fluids, now SBPs in 90s with MAPs in 60s  Recommend giving another 1-2 L of fluids; please reassess after fluid administration    **note in progress, pending attending attestation

## 2019-09-08 NOTE — ED PROVIDER NOTE - SEVERE SEPSIS ALERT DETAILS
Tachycardic, febrile, probable pna.  Will considered septic, started empiric abx, unable to give 30 cc/kg 2/2 severe AS and high probability of intubation from iatrogenic pulm edema.  Pan cx.  --BMM

## 2019-09-08 NOTE — ED PROVIDER NOTE - CLINICAL SUMMARY MEDICAL DECISION MAKING FREE TEXT BOX
Elderly woman c multiple comorbidities including HFrEF, pHTN, severe AS/AR c fever, hypoxia, cough, AMS/lethargy concerning for PNA.  Septic but unable to give 30cc/kg due to concern for pulm edema.  DNR but family (daughter, NOK) amenable to trial of intubation if necessary.  Broad spectrum abx, gentle fluids, labs, cx, reassess.  Low threshold for ICU admission.  --BMM

## 2019-09-08 NOTE — H&P ADULT - NSHPREVIEWOFSYSTEMS_GEN_ALL_CORE
REVIEW OF SYSTEMS:    CONSTITUTIONAL: No weakness. +fevers  EYES/ENT: No visual changes;  No vertigo or throat pain   NECK: No pain or stiffness  RESPIRATORY: +cough, SOB.  No wheezing, hemoptysis  CARDIOVASCULAR: No chest pain or palpitations  GASTROINTESTINAL: No abdominal or epigastric pain. No nausea, vomiting, or hematemesis; No diarrhea or constipation. No melena or hematochezia.  GENITOURINARY: No dysuria, frequency or hematuria  NEUROLOGICAL: No numbness or weakness  All other review of systems is negative unless indicated above.

## 2019-09-08 NOTE — ED PROVIDER NOTE - PROGRESS NOTE DETAILS
Bel KRAUSE: Received signout on the patient. BP did not respond to IVF challenge so pressors were started given tight AS and pHTN hx. MICU was called at approx 6:30. They were at bedside around 7:20. Awaiting dispo

## 2019-09-08 NOTE — ED PROVIDER NOTE - CRITICAL CARE PROVIDED
direct patient care (not related to procedure)/documentation/conducted a detailed discussion of DNR status/interpretation of diagnostic studies/additional history taking/consultation with other physicians/consult w/ pt's family directly relating to pts condition direct patient care (not related to procedure)/interpretation of diagnostic studies/consultation with other physicians/additional history taking/conducted a detailed discussion of DNR status/documentation/consult w/ pt's family directly relating to pts condition

## 2019-09-09 DIAGNOSIS — Z29.9 ENCOUNTER FOR PROPHYLACTIC MEASURES, UNSPECIFIED: ICD-10-CM

## 2019-09-09 DIAGNOSIS — N18.3 CHRONIC KIDNEY DISEASE, STAGE 3 (MODERATE): ICD-10-CM

## 2019-09-09 DIAGNOSIS — I48.91 UNSPECIFIED ATRIAL FIBRILLATION: ICD-10-CM

## 2019-09-09 DIAGNOSIS — J44.9 CHRONIC OBSTRUCTIVE PULMONARY DISEASE, UNSPECIFIED: ICD-10-CM

## 2019-09-09 DIAGNOSIS — A41.9 SEPSIS, UNSPECIFIED ORGANISM: ICD-10-CM

## 2019-09-09 LAB
ALBUMIN SERPL ELPH-MCNC: 3 G/DL — LOW (ref 3.3–5)
ALP SERPL-CCNC: 101 U/L — SIGNIFICANT CHANGE UP (ref 40–120)
ALT FLD-CCNC: 18 U/L — SIGNIFICANT CHANGE UP (ref 10–45)
ANION GAP SERPL CALC-SCNC: 12 MMOL/L — SIGNIFICANT CHANGE UP (ref 5–17)
APTT BLD: 30.8 SEC — SIGNIFICANT CHANGE UP (ref 27.5–36.3)
AST SERPL-CCNC: 30 U/L — SIGNIFICANT CHANGE UP (ref 10–40)
BASOPHILS # BLD AUTO: 0 K/UL — SIGNIFICANT CHANGE UP (ref 0–0.2)
BASOPHILS NFR BLD AUTO: 0.2 % — SIGNIFICANT CHANGE UP (ref 0–2)
BILIRUB SERPL-MCNC: 2.1 MG/DL — HIGH (ref 0.2–1.2)
BUN SERPL-MCNC: 38 MG/DL — HIGH (ref 7–23)
CALCIUM SERPL-MCNC: 9.7 MG/DL — SIGNIFICANT CHANGE UP (ref 8.4–10.5)
CHLORIDE SERPL-SCNC: 104 MMOL/L — SIGNIFICANT CHANGE UP (ref 96–108)
CO2 SERPL-SCNC: 20 MMOL/L — LOW (ref 22–31)
CREAT SERPL-MCNC: 1.32 MG/DL — HIGH (ref 0.5–1.3)
EOSINOPHIL # BLD AUTO: 0.1 K/UL — SIGNIFICANT CHANGE UP (ref 0–0.5)
EOSINOPHIL NFR BLD AUTO: 1 % — SIGNIFICANT CHANGE UP (ref 0–6)
GLUCOSE SERPL-MCNC: 111 MG/DL — HIGH (ref 70–99)
HCT VFR BLD CALC: 36.1 % — SIGNIFICANT CHANGE UP (ref 34.5–45)
HGB BLD-MCNC: 11.8 G/DL — SIGNIFICANT CHANGE UP (ref 11.5–15.5)
INR BLD: 1.42 RATIO — HIGH (ref 0.88–1.16)
LYMPHOCYTES # BLD AUTO: 1.4 K/UL — SIGNIFICANT CHANGE UP (ref 1–3.3)
LYMPHOCYTES # BLD AUTO: 11.1 % — LOW (ref 13–44)
MAGNESIUM SERPL-MCNC: 1.6 MG/DL — SIGNIFICANT CHANGE UP (ref 1.6–2.6)
MCHC RBC-ENTMCNC: 32.7 GM/DL — SIGNIFICANT CHANGE UP (ref 32–36)
MCHC RBC-ENTMCNC: 34.7 PG — HIGH (ref 27–34)
MCV RBC AUTO: 106 FL — HIGH (ref 80–100)
MONOCYTES # BLD AUTO: 1.1 K/UL — HIGH (ref 0–0.9)
MONOCYTES NFR BLD AUTO: 8.7 % — SIGNIFICANT CHANGE UP (ref 2–14)
NEUTROPHILS # BLD AUTO: 9.8 K/UL — HIGH (ref 1.8–7.4)
NEUTROPHILS NFR BLD AUTO: 79 % — HIGH (ref 43–77)
PHOSPHATE SERPL-MCNC: 2.9 MG/DL — SIGNIFICANT CHANGE UP (ref 2.5–4.5)
PLATELET # BLD AUTO: 108 K/UL — LOW (ref 150–400)
POTASSIUM SERPL-MCNC: 4.5 MMOL/L — SIGNIFICANT CHANGE UP (ref 3.5–5.3)
POTASSIUM SERPL-SCNC: 4.5 MMOL/L — SIGNIFICANT CHANGE UP (ref 3.5–5.3)
PROT SERPL-MCNC: 5.3 G/DL — LOW (ref 6–8.3)
PROTHROM AB SERPL-ACNC: 16.5 SEC — HIGH (ref 10–12.9)
RBC # BLD: 3.41 M/UL — LOW (ref 3.8–5.2)
RBC # FLD: 13.2 % — SIGNIFICANT CHANGE UP (ref 10.3–14.5)
SODIUM SERPL-SCNC: 136 MMOL/L — SIGNIFICANT CHANGE UP (ref 135–145)
WBC # BLD: 12.4 K/UL — HIGH (ref 3.8–10.5)
WBC # FLD AUTO: 12.4 K/UL — HIGH (ref 3.8–10.5)

## 2019-09-09 PROCEDURE — 99233 SBSQ HOSP IP/OBS HIGH 50: CPT | Mod: GC

## 2019-09-09 RX ORDER — MIDODRINE HYDROCHLORIDE 2.5 MG/1
10 TABLET ORAL EVERY 8 HOURS
Refills: 0 | Status: DISCONTINUED | OUTPATIENT
Start: 2019-09-09 | End: 2019-09-10

## 2019-09-09 RX ORDER — LACTULOSE 10 G/15ML
20 SOLUTION ORAL
Refills: 0 | Status: DISCONTINUED | OUTPATIENT
Start: 2019-09-09 | End: 2019-09-11

## 2019-09-09 RX ORDER — PETROLATUM,WHITE
1 JELLY (GRAM) TOPICAL
Refills: 0 | Status: DISCONTINUED | OUTPATIENT
Start: 2019-09-09 | End: 2019-09-15

## 2019-09-09 RX ORDER — MIDODRINE HYDROCHLORIDE 2.5 MG/1
10 TABLET ORAL EVERY 8 HOURS
Refills: 0 | Status: DISCONTINUED | OUTPATIENT
Start: 2019-09-09 | End: 2019-09-09

## 2019-09-09 RX ORDER — DIGOXIN 250 MCG
0.12 TABLET ORAL EVERY OTHER DAY
Refills: 0 | Status: DISCONTINUED | OUTPATIENT
Start: 2019-09-09 | End: 2019-09-15

## 2019-09-09 RX ADMIN — MEROPENEM 100 MILLIGRAM(S): 1 INJECTION INTRAVENOUS at 05:01

## 2019-09-09 RX ADMIN — MEROPENEM 100 MILLIGRAM(S): 1 INJECTION INTRAVENOUS at 18:45

## 2019-09-09 RX ADMIN — MIDODRINE HYDROCHLORIDE 10 MILLIGRAM(S): 2.5 TABLET ORAL at 13:26

## 2019-09-09 RX ADMIN — Medication 0.12 MILLIGRAM(S): at 13:26

## 2019-09-09 RX ADMIN — Medication 1 APPLICATION(S): at 22:59

## 2019-09-09 RX ADMIN — LACTULOSE 20 GRAM(S): 10 SOLUTION ORAL at 05:02

## 2019-09-09 RX ADMIN — Medication 3 MILLILITER(S): at 18:45

## 2019-09-09 RX ADMIN — Medication 0.5 MILLIGRAM(S): at 05:18

## 2019-09-09 RX ADMIN — Medication 0.5 MILLIGRAM(S): at 18:45

## 2019-09-09 RX ADMIN — Medication 3 MILLILITER(S): at 05:18

## 2019-09-09 RX ADMIN — Medication 3 MILLILITER(S): at 11:40

## 2019-09-09 RX ADMIN — MIDODRINE HYDROCHLORIDE 10 MILLIGRAM(S): 2.5 TABLET ORAL at 05:01

## 2019-09-09 RX ADMIN — Medication 25 MICROGRAM(S): at 05:02

## 2019-09-09 RX ADMIN — Medication 250 MILLIGRAM(S): at 06:02

## 2019-09-09 RX ADMIN — LACTULOSE 20 GRAM(S): 10 SOLUTION ORAL at 22:58

## 2019-09-09 RX ADMIN — CHLORHEXIDINE GLUCONATE 1 APPLICATION(S): 213 SOLUTION TOPICAL at 05:01

## 2019-09-09 RX ADMIN — MIDODRINE HYDROCHLORIDE 10 MILLIGRAM(S): 2.5 TABLET ORAL at 22:58

## 2019-09-09 NOTE — CHART NOTE - NSCHARTNOTEFT_GEN_A_CORE
MICU Transfer Note    Transfer from: MICU    Transfer to: ( X ) Medicine    (  ) Telemetry     (   ) RCU        (    ) Palliative         (   ) Stroke Unit          (   ) __________________    Accepting physician:    PCP: Latoya Celaya    HPI:   89 year old female with a pmhx of COPD, CKD, CLL, HCC/cirrhosis, a-fib(off AC due to brain bleed), AS, and hypothyroid, is brought to ED by EMS for evaluation of fever to 102.The pt reports that she has been having cough for the past week that was productive of clear sputum. The pt went to a PMD due to temperature in the 99 range at home, which is above her baseline, who prescribed Doxy but the pt didn't take the Abx because she was told to take Abx only if she spiked a fever to 101. The pt denies chills/n/v/d or other issues, In the ED pt was found to have SBP in 80s and MAPs in 50s after 1.5 L; she was started on levo .06 due to poor response to fluids, now SBPs in 90s with MAPs in 60s. Pt also received Levaquin for suspected PNA. Now admitted to the MICU for management of septic shock.     MICU Course:  Pt was continued on levophed in ED. CT Chest showed ITALIA and LLL PNA. Pt was transitioned to vancomycin and meropenem in setting of hx of ESBL kleb. Pt was weaned off levophed this AM and transitioned to midodrine 10mg TID. Pt sating well on RA. Pt  was taken off vancomycin due to low suspicion for MRSA PNA. remained hemodynamically stable and ready for transfer to floor.       ASSESSMENT & PLAN:   89 year old female with a pmhx of COPD, CKD, CLL, HCC/cirrhosis, a-fib, AS, and hypothyroid, presented with fever and cough, found to be hypotensive in ED requiring pressors and now admitted to the MICU for sepsis 2/2 PNA.    #Neuro  -A/Ox4    #Resp  Septic Shock 2/2 CAP with ITALIA and LLL PNA on CT ch  - Abx with meropenem and vanc due to hx of ESBL  - not on oxygen at home, weaned to room air    h/o COPD  - c/w duonebs and symbicort    #CV  Septic shock, s/p 1.5L in ED without response, started on levophed gtt now titrated off  -Treatment of pneumonia as above    h/o AS  - cont to monitor    h/o Afib  - [id number] score 3, on digoxin at home - rate currently controlled 70s-80s  - f/u dig level in AM    #GI  -h/o Cirrhosis/HCC, c/w home lactulose 20g four times/day and rifaximin  -DASH Diet    #  Bacteriuria  -UA+ moderate LE, WBCs, many bacteria, however patient has no urinary symptoms likely colonized  - UCx (9/8) - >100k GNR    #ID  -Sepic shock 2/2 pneumonia, with ITALIA and LLL consolidations on CT chest 9/8  -h/o ESBL Klebsiella  - cont Vancomycin/Meropenem  - f/u vanc level  -F/u blood Cxs (9/8) pending  -urine Cx (9/8) with >100k GNR Klebsiella    #Renal  - No evidence of KARRIE, appears near baseline  - cont to monitor bmp qd    #Heme  -CLL in remission, last chemotherapy 8-9 years ago.  -No active issues    #Endo  -h/o Hypothyroidism, c/w home levothyroxine 25mcg daily    #DVT PPx  -SCDs    #GOC  -DNR but not DNI, MOLST in paper chart          For Followup:  [] f/u bl clx, urine cultures, ur legionella and sputum clx        Vital Signs Last 24 Hrs  T(C): 36.9 (09 Sep 2019 08:00), Max: 36.9 (09 Sep 2019 08:00)  T(F): 98.4 (09 Sep 2019 08:00), Max: 98.4 (09 Sep 2019 08:00)  HR: 71 (09 Sep 2019 11:40) (68 - 97)  BP: 104/49 (09 Sep 2019 10:30) (86/54 - 138/57)  BP(mean): 70 (09 Sep 2019 10:30) (61 - 101)  RR: 33 (09 Sep 2019 10:30) (16 - 43)  SpO2: 95% (09 Sep 2019 11:40) (89% - 100%)  I&O's Summary    08 Sep 2019 07:01  -  09 Sep 2019 07:00  --------------------------------------------------------  IN: 243.6 mL / OUT: 100 mL / NET: 143.6 mL    09 Sep 2019 07:01  -  09 Sep 2019 13:04  --------------------------------------------------------  IN: 0 mL / OUT: 150 mL / NET: -150 mL        MEDICATIONS  (STANDING):  ALBUTerol/ipratropium for Nebulization 3 milliLiter(s) Nebulizer every 6 hours  buDESOnide    Inhalation Suspension 0.5 milliGRAM(s) Inhalation every 12 hours  chlorhexidine 4% Liquid 1 Application(s) Topical <User Schedule>  digoxin     Tablet 0.125 milliGRAM(s) Oral every other day  lactulose Syrup 20 Gram(s) Oral four times a day  levothyroxine 25 MICROGram(s) Oral daily  meropenem  IVPB 1000 milliGRAM(s) IV Intermittent every 12 hours  midodrine 10 milliGRAM(s) Oral every 8 hours  norepinephrine Infusion 0.05 MICROgram(s)/kG/Min (4.763 mL/Hr) IV Continuous <Continuous>  rifaximin 550 milliGRAM(s) Oral two times a day    MEDICATIONS  (PRN):        LABS                                            11.8                  Neurophils% (auto):   79.0   (09-09 @ 01:07):    12.4 )-----------(108          Lymphocytes% (auto):  11.1                                          36.1                   Eosinphils% (auto):   1.0      Manual%: Neutrophils x    ; Lymphocytes x    ; Eosinophils x    ; Bands%: x    ; Blasts x                                    136    |  104    |  38                  Calcium: 9.7   / iCa: x      (09-09 @ 01:06)    ----------------------------<  111       Magnesium: 1.6                              4.5     |  20     |  1.32             Phosphorous: 2.9      TPro  5.3    /  Alb  3.0    /  TBili  2.1    /  DBili  x      /  AST  30     /  ALT  18     /  AlkPhos  101    09 Sep 2019 01:06    ( 09-09 @ 01:07 )   PT: 16.5 sec;   INR: 1.42 ratio  aPTT: 30.8 sec MICU Transfer Note    Transfer from: MICU    Transfer to: ( X ) Medicine    (  ) Telemetry     (   ) RCU        (    ) Palliative         (   ) Stroke Unit          (   ) __________________    Accepting physician: BENY Diop    PCP: Latoya Celaya    HPI:   89 year old female with a pmhx of COPD, CKD, CLL, HCC/cirrhosis, a-fib(off AC due to brain bleed), AS, and hypothyroid, is brought to ED by EMS for evaluation of fever to 102.The pt reports that she has been having cough for the past week that was productive of clear sputum. The pt went to a PMD due to temperature in the 99 range at home, which is above her baseline, who prescribed Doxy but the pt didn't take the Abx because she was told to take Abx only if she spiked a fever to 101. The pt denies chills/n/v/d or other issues, In the ED pt was found to have SBP in 80s and MAPs in 50s after 1.5 L; she was started on levo .06 due to poor response to fluids, now SBPs in 90s with MAPs in 60s. Pt also received Levaquin for suspected PNA. Now admitted to the MICU for management of septic shock.     MICU Course:  Pt was continued on levophed in ED. CT Chest showed ITALIA and LLL PNA. Pt was transitioned to vancomycin and meropenem in setting of hx of ESBL kleb. Pt was weaned off levophed this AM and transitioned to midodrine 10mg TID. Pt sating well on RA. Pt  was taken off vancomycin due to low suspicion for MRSA PNA. remained hemodynamically stable and ready for transfer to floor.       ASSESSMENT & PLAN:   89 year old female with a pmhx of COPD, CKD, CLL, HCC/cirrhosis, a-fib, AS, and hypothyroid, presented with fever and cough, found to be hypotensive in ED requiring pressors admitted to the MICU for septic shock 2/2 multifocal PNA, weaned off pressors, breathing well on RA.    #Neuro  -A/Ox4    #Resp  Septic Shock 2/2 CAP with ITALIA and LLL PNA on CT ch  - Abx with meropenem and vanc due to hx of ESBL  - not on oxygen at home, weaned to room air    h/o COPD  - c/w duonebs and symbicort    #CV  Septic shock, s/p 1.5L in ED without response, started on levophed gtt now titrated off  -Treatment of pneumonia as above    h/o AS  - cont to monitor    h/o Afib  - [id number] score 3, on digoxin at home - rate currently controlled 70s-80s  - f/u dig level in AM    #GI  -h/o Cirrhosis/HCC, c/w home lactulose 20g four times/day and rifaximin  -DASH Diet    #  Bacteriuria  -UA+ moderate LE, WBCs, many bacteria, however patient has no urinary symptoms likely colonized  - UCx (9/8) - >100k GNR    #ID  -Sepic shock 2/2 pneumonia, with ITALIA and LLL consolidations on CT chest 9/8  -h/o ESBL Klebsiella  - cont Vancomycin/Meropenem  - f/u vanc level  -F/u blood Cxs (9/8) pending  -urine Cx (9/8) with >100k GNR Klebsiella    #Renal  - No evidence of KARRIE, appears near baseline  - cont to monitor bmp qd    #Heme  -CLL in remission, last chemotherapy 8-9 years ago.  -No active issues    #Endo  -h/o Hypothyroidism, c/w home levothyroxine 25mcg daily    #DVT PPx  -SCDs    #GOC  -DNR but not DNI, MOLST in paper chart          For Followup:  [] f/u bl clx, urine cultures, ur legionella and sputum clx        Vital Signs Last 24 Hrs  T(C): 36.9 (09 Sep 2019 08:00), Max: 36.9 (09 Sep 2019 08:00)  T(F): 98.4 (09 Sep 2019 08:00), Max: 98.4 (09 Sep 2019 08:00)  HR: 71 (09 Sep 2019 11:40) (68 - 97)  BP: 104/49 (09 Sep 2019 10:30) (86/54 - 138/57)  BP(mean): 70 (09 Sep 2019 10:30) (61 - 101)  RR: 33 (09 Sep 2019 10:30) (16 - 43)  SpO2: 95% (09 Sep 2019 11:40) (89% - 100%)  I&O's Summary    08 Sep 2019 07:01  -  09 Sep 2019 07:00  --------------------------------------------------------  IN: 243.6 mL / OUT: 100 mL / NET: 143.6 mL    09 Sep 2019 07:01  -  09 Sep 2019 13:04  --------------------------------------------------------  IN: 0 mL / OUT: 150 mL / NET: -150 mL        MEDICATIONS  (STANDING):  ALBUTerol/ipratropium for Nebulization 3 milliLiter(s) Nebulizer every 6 hours  buDESOnide    Inhalation Suspension 0.5 milliGRAM(s) Inhalation every 12 hours  chlorhexidine 4% Liquid 1 Application(s) Topical <User Schedule>  digoxin     Tablet 0.125 milliGRAM(s) Oral every other day  lactulose Syrup 20 Gram(s) Oral four times a day  levothyroxine 25 MICROGram(s) Oral daily  meropenem  IVPB 1000 milliGRAM(s) IV Intermittent every 12 hours  midodrine 10 milliGRAM(s) Oral every 8 hours  norepinephrine Infusion 0.05 MICROgram(s)/kG/Min (4.763 mL/Hr) IV Continuous <Continuous>  rifaximin 550 milliGRAM(s) Oral two times a day    MEDICATIONS  (PRN):        LABS                                            11.8                  Neurophils% (auto):   79.0   (09-09 @ 01:07):    12.4 )-----------(108          Lymphocytes% (auto):  11.1                                          36.1                   Eosinphils% (auto):   1.0      Manual%: Neutrophils x    ; Lymphocytes x    ; Eosinophils x    ; Bands%: x    ; Blasts x                                    136    |  104    |  38                  Calcium: 9.7   / iCa: x      (09-09 @ 01:06)    ----------------------------<  111       Magnesium: 1.6                              4.5     |  20     |  1.32             Phosphorous: 2.9      TPro  5.3    /  Alb  3.0    /  TBili  2.1    /  DBili  x      /  AST  30     /  ALT  18     /  AlkPhos  101    09 Sep 2019 01:06    ( 09-09 @ 01:07 )   PT: 16.5 sec;   INR: 1.42 ratio  aPTT: 30.8 sec MICU Transfer Note    Transfer from: MICU    Transfer to: ( X ) Medicine    (  ) Telemetry     (   ) RCU        (    ) Palliative         (   ) Stroke Unit          (   ) __________________    Accepting physician: BENY Diop    PCP: Latoya Celaya    HPI:   89 year old female with a pmhx of COPD, CKD, CLL, HCC/cirrhosis, a-fib(off AC due to brain bleed), AS, and hypothyroid, is brought to ED by EMS for evaluation of fever to 102.The pt reports that she has been having cough for the past week that was productive of clear sputum. The pt went to a PMD due to temperature in the 99 range at home, which is above her baseline, who prescribed Doxy but the pt didn't take the Abx because she was told to take Abx only if she spiked a fever to 101. The pt denies chills/n/v/d or other issues, In the ED pt was found to have SBP in 80s and MAPs in 50s after 1.5 L; she was started on levo .06 due to poor response to fluids, now SBPs in 90s with MAPs in 60s. Pt also received Levaquin for suspected PNA. Now admitted to the MICU for management of septic shock.     MICU Course:  Pt was continued on levophed in ED. CT Chest showed ITALIA and LLL PNA. Pt was transitioned to vancomycin and meropenem in setting of hx of ESBL kleb. Pt was weaned off levophed this AM and transitioned to midodrine 10mg TID. Pt sating well on RA. Pt  was taken off vancomycin due to low suspicion for MRSA PNA. remained hemodynamically stable and ready for transfer to floor.       ASSESSMENT & PLAN:   89 year old female with a pmhx of COPD, CKD, CLL, HCC/cirrhosis, a-fib, AS, and hypothyroid, presented with fever and cough, found to be hypotensive in ED requiring pressors admitted to the MICU for septic shock 2/2 multifocal PNA, weaned off pressors, breathing well on RA.    #Neuro  -A/Ox4    #Resp  Septic Shock 2/2 CAP with ITALIA and LLL PNA on CT ch  - Abx with meropenem and vanc due to hx of ESBL, will dc vanc as low suspicion for MRSA pna  - f/u sputum clx  - not on oxygen at home, weaned to room air    h/o COPD  - c/w duonebs and symbicort    #CV  Septic shock, s/p 1.5L in ED without response, started on levophed gtt now titrated off  -Treatment of pneumonia as above    h/o AS  - cont to monitor    h/o Afib  - [id number] score 3, on digoxin at home - rate currently controlled 70s-80s  - f/u dig level in AM  - off A/c due to prior brain bleed in past    #GI  -h/o Cirrhosis/HCC, c/w home lactulose 20g four times/day and rifaximin  -DASH Diet    #  Bacteriuria  -UA+ moderate LE, WBCs, many bacteria, however patient has no urinary symptoms likely colonized  - UCx (9/8) - >100k GNR    #ID  Septic shock 2/2 pneumonia, with ITALIA and LLL consolidations on CT chest 9/8  - cont meropenem  h/o ESBL Klebsiella in urine  - likely colonized  - cont Meropenem  -F/u blood Cxs (9/8) pending  -urine Cx (9/8) with >100k GNR Klebsiella  - f/u ur legionella and sputum clx    #Renal  - No evidence of KARRIE, appears near baseline  - cont to monitor bmp qd    #Heme  Hx of CLL in remission, last chemotherapy 8-9 years ago.  -No active issues    #Endo  -h/o Hypothyroidism, c/w home levothyroxine 25mcg daily    #DVT PPx  -SCDs    #GOC  -DNR but not DNI, MOLST in paper chart        For Followup:  [] f/u bl clx, urine cultures, ur legionella and sputum clx  [] f/u dig level      Vital Signs Last 24 Hrs  T(C): 36.9 (09 Sep 2019 08:00), Max: 36.9 (09 Sep 2019 08:00)  T(F): 98.4 (09 Sep 2019 08:00), Max: 98.4 (09 Sep 2019 08:00)  HR: 71 (09 Sep 2019 11:40) (68 - 97)  BP: 104/49 (09 Sep 2019 10:30) (86/54 - 138/57)  BP(mean): 70 (09 Sep 2019 10:30) (61 - 101)  RR: 33 (09 Sep 2019 10:30) (16 - 43)  SpO2: 95% (09 Sep 2019 11:40) (89% - 100%)  I&O's Summary    08 Sep 2019 07:01  -  09 Sep 2019 07:00  --------------------------------------------------------  IN: 243.6 mL / OUT: 100 mL / NET: 143.6 mL    09 Sep 2019 07:01  -  09 Sep 2019 13:04  --------------------------------------------------------  IN: 0 mL / OUT: 150 mL / NET: -150 mL        MEDICATIONS  (STANDING):  ALBUTerol/ipratropium for Nebulization 3 milliLiter(s) Nebulizer every 6 hours  buDESOnide    Inhalation Suspension 0.5 milliGRAM(s) Inhalation every 12 hours  chlorhexidine 4% Liquid 1 Application(s) Topical <User Schedule>  digoxin     Tablet 0.125 milliGRAM(s) Oral every other day  lactulose Syrup 20 Gram(s) Oral four times a day  levothyroxine 25 MICROGram(s) Oral daily  meropenem  IVPB 1000 milliGRAM(s) IV Intermittent every 12 hours  midodrine 10 milliGRAM(s) Oral every 8 hours  norepinephrine Infusion 0.05 MICROgram(s)/kG/Min (4.763 mL/Hr) IV Continuous <Continuous>  rifaximin 550 milliGRAM(s) Oral two times a day    MEDICATIONS  (PRN):        LABS                                            11.8                  Neurophils% (auto):   79.0   (09-09 @ 01:07):    12.4 )-----------(108          Lymphocytes% (auto):  11.1                                          36.1                   Eosinphils% (auto):   1.0      Manual%: Neutrophils x    ; Lymphocytes x    ; Eosinophils x    ; Bands%: x    ; Blasts x                                    136    |  104    |  38                  Calcium: 9.7   / iCa: x      (09-09 @ 01:06)    ----------------------------<  111       Magnesium: 1.6                              4.5     |  20     |  1.32             Phosphorous: 2.9      TPro  5.3    /  Alb  3.0    /  TBili  2.1    /  DBili  x      /  AST  30     /  ALT  18     /  AlkPhos  101    09 Sep 2019 01:06    ( 09-09 @ 01:07 )   PT: 16.5 sec;   INR: 1.42 ratio  aPTT: 30.8 sec

## 2019-09-09 NOTE — CHART NOTE - NSCHARTNOTEFT_GEN_A_CORE
[facility] Acceptance Note    Transfer from: MICU  Transfer to:  ( X ) Medicine    (  ) Telemetry    (  ) RCU    (  ) Palliative    (  ) Stroke Unit    (  ) _______________  Accepting physican: BENY Diop    HPI/MICU Course:  89 year old female with a pmhx of COPD, CKD, CLL, HCC/cirrhosis, a-fib (off AC due to brain bleed), AS, and hypothyroid, is brought to ED by EMS for evaluation of fever to 102F. The pt reports that she has been having cough for the past week that was productive of clear sputum. The pt went to a PMD due to temperature in the 99 range at home, which is above her baseline, who prescribed Doxy but the pt didn't take the Abx because she was told to take Abx only if she spiked a fever to 101. The pt denies chills/n/v/d or other issues, In the ED pt was found to have SBP in 80s and MAPs in 50s after 1.5 L; she was started on levo due to poor response to fluids and transferred to MICU for septic shock. She was started on Vancomycin and Levaquin for suspected PNA. RVP negative. CT Chest showed ITALIA and LLL PNA. UA was positive with positive urine culture pending sensitivity. Pt was transitioned to vancomycin and meropenem in setting of hx of ESBL kleb (multiple UTI in the past). Pt was subsequently wean off IV pressor and transitioned to midodrine 10mg TID. Pt sating well on RA. Pt  was taken off vancomycin due to low suspicion for MRSA PNA. Patient was also noted to have slightly increase in Cr from 1.19 -> 1.32. Patient was deemed hemodynamically stable to be transferred to medicine floor for further management.       For Follow-Up:  [] F/u blood and urine cultures, ur legionella and sputum clx - de-escalate abx with culture result.   [] Continue with midodrine and wean as tolerated.   [] Trend Cr.   [] PT senia Lopez M.D. - PGY3  Internal Medicine Resident  MAR - [id number]

## 2019-09-09 NOTE — PROGRESS NOTE ADULT - PROBLEM SELECTOR PLAN 1
-currently on Meropenem -source is likely PNA. ith ITALIA and LLL consolidations on CT chest 9/8. Blood cx pending  UA+ moderate LE, WBCs, many bacteria, however patient has no urinary symptoms likely colonized  - UCx (9/8) - >100k GNR  -currently on Meropenem  -Midodrine 10mg TID PO, will maintain today  -ID consulted to evaluate Abx plan

## 2019-09-09 NOTE — PROGRESS NOTE ADULT - SUBJECTIVE AND OBJECTIVE BOX
Internal Medicine Progress Note    Montez Stratton  PGY-1 Internal Medicine  [id number]    Patient is a 89y old  Female who presents with a chief complaint of Sepsis (09 Sep 2019 08:42)      SUBJECTIVE / OVERNIGHT EVENTS:    89 year old female with a pmhx of COPD, CKD, CLL, HCC/cirrhosis, a-fib, AS, and hypothyroid, is brought to ED by EMS for evaluation of fever to 102.The pt reports that she has been having cough for the past week that was productive of clear sputum. The pt went to a PMD due to temperature in the 99 range at home, which is above her baseline, who prescribed Doxy but the pt didn't take the Abx because she was told to take Abx only if she spiked a fever to 101. The pt denies chills/n/v/d or other issues, In the ED pt was found to have SBP in 80s and MAPs in 50s after 1.5 L; she was started on levo .06 due to poor response to fluids. After initial fluid bolus, became SBPs in 90s with MAPs in 60s.  She was initially admitted to MICU for septic shock on .    She was started on Vancomycin and Levaquin for suspected PNA. RVP negative. CT Chest showed ITALIA and LLL PNA. UA was positive with positive urine culture pending sensitivity. Pt was transitioned to vancomycin and meropenem in setting of hx of ESBL kleb (multiple UTI in the past). Pt was subsequently weaned off IV pressors and transitioned to midodrine 10mg TID. Pt sating well on RA. Pt was taken off vancomycin due to low suspicion for MRSA PNA. Patient was also noted to have slightly increase in Cr from 1.19 -> 1.32. Patient was deemed hemodynamically stable to be transferred to medicine floor for further management.     Currently, pt is doing well. She is eating lunch, and denies nausea, vomiting, fevers, chills, chest pain, SOB, headache, visual changes, dizziness. Reports +cough with mild whitish sputum production occasionally, but without blood streaks. Needs walker for ambulation.       MEDICATIONS  (STANDING):  ALBUTerol/ipratropium for Nebulization 3 milliLiter(s) Nebulizer every 6 hours  buDESOnide    Inhalation Suspension 0.5 milliGRAM(s) Inhalation every 12 hours  digoxin     Tablet 0.125 milliGRAM(s) Oral every other day  lactulose Syrup 20 Gram(s) Oral four times a day  levothyroxine 25 MICROGram(s) Oral daily  meropenem  IVPB 1000 milliGRAM(s) IV Intermittent every 12 hours  midodrine 10 milliGRAM(s) Oral every 8 hours  rifaximin 550 milliGRAM(s) Oral two times a day    MEDICATIONS  (PRN):    Vital Signs Last 24 Hrs  T(C): 36.9 (09 Sep 2019 12:00), Max: 36.9 (09 Sep 2019 08:00)  T(F): 98.5 (09 Sep 2019 12:00), Max: 98.5 (09 Sep 2019 12:00)  HR: 91 (09 Sep 2019 14:00) (68 - 97)  BP: 99/48 (09 Sep 2019 14:00) (86/54 - 138/57)  BP(mean): 66 (09 Sep 2019 14:00) (61 - 101)  RR: 26 (09 Sep 2019 14:00) (16 - 43)  SpO2: 94% (09 Sep 2019 14:00) (86% - 100%)    CAPILLARY BLOOD GLUCOSE        I&O's Summary    08 Sep 2019 07:01  -  09 Sep 2019 07:00  --------------------------------------------------------  IN: 243.6 mL / OUT: 100 mL / NET: 143.6 mL    09 Sep 2019 07:01  -  09 Sep 2019 14:28  --------------------------------------------------------  IN: 0 mL / OUT: 150 mL / NET: -150 mL        PHYSICAL EXAM  GENERAL: NAD  HEAD:  Atraumatic  EYES: EOMI, PERRLA, conjunctiva and sclera clear  NECK: Supple, No JVD  CHEST/LUNG: +diminished breat sounds bilaterally, no rales, wheezes, rhonchi  HEART: Regular rate and rhythm; No murmurs, rubs, or gallops  ABDOMEN: Soft, Nontender, Nondistended; Bowel sounds present  EXTREMITIES:  2+ Peripheral Pulses, No clubbing, cyanosis, or edema  NEURO/PSYCH: AAOx3, nonfocal  SKIN: many benign seborrheic keratoses throughout face and back, but no concerning lesions    LABS:                        11.8   12.4  )-----------( 108      ( 09 Sep 2019 01:07 )             36.1     Hemoglobin: 11.8 g/dL ( @ 01:07)  Hemoglobin: 13.2 g/dL ( @ 04:21)    CBC Full  -  ( 09 Sep 2019 01:07 )  WBC Count : 12.4 K/uL  RBC Count : 3.41 M/uL  Hemoglobin : 11.8 g/dL  Hematocrit : 36.1 %  Platelet Count - Automated : 108 K/uL  Mean Cell Volume : 106.0 fl  Mean Cell Hemoglobin : 34.7 pg  Mean Cell Hemoglobin Concentration : 32.7 gm/dL  Auto Neutrophil # : 9.8 K/uL  Auto Lymphocyte # : 1.4 K/uL  Auto Monocyte # : 1.1 K/uL  Auto Eosinophil # : 0.1 K/uL  Auto Basophil # : 0.0 K/uL  Auto Neutrophil % : 79.0 %  Auto Lymphocyte % : 11.1 %  Auto Monocyte % : 8.7 %  Auto Eosinophil % : 1.0 %  Auto Basophil % : 0.2 %        136  |  104  |  38<H>  ----------------------------<  111<H>  4.5   |  20<L>  |  1.32<H>    Ca    9.7      09 Sep 2019 01:06  Phos  2.9       Mg     1.6         TPro  5.3<L>  /  Alb  3.0<L>  /  TBili  2.1<H>  /  DBili  x   /  AST  30  /  ALT  18  /  AlkPhos  101      Creatinine Trend: 1.32<--, 1.19<--  LIVER FUNCTIONS - ( 09 Sep 2019 01:06 )  Alb: 3.0 g/dL / Pro: 5.3 g/dL / ALK PHOS: 101 U/L / ALT: 18 U/L / AST: 30 U/L / GGT: x           PT/INR - ( 09 Sep 2019 01:07 )   PT: 16.5 sec;   INR: 1.42 ratio         PTT - ( 09 Sep 2019 01:07 )  PTT:30.8 sec    hs Troponin:            Urinalysis Basic - ( 08 Sep 2019 04:21 )    Color: Yellow / Appearance: Clear / S.017 / pH: x  Gluc: x / Ketone: Negative  / Bili: Negative / Urobili: Negative   Blood: x / Protein: Trace / Nitrite: Negative   Leuk Esterase: Moderate / RBC: 2 /hpf / WBC 6 /HPF   Sq Epi: x / Non Sq Epi: 0 /hpf / Bacteria: Many      CSF:                      EKG:   MICROBIOLOGY:    Culture - Urine (collected 08 Sep 2019 08:57)  Source: .Urine  Preliminary Report (09 Sep 2019 07:55):    >100,000 CFU/ml Gram Negative Rods    Culture - Blood (collected 08 Sep 2019 08:55)  Source: .Blood  Preliminary Report (09 Sep 2019 09:01):    No growth to date.    Culture - Blood (collected 08 Sep 2019 08:55)  Source: .Blood  Preliminary Report (09 Sep 2019 09:01):    No growth to date.      IMAGING:      Labs, imaging, EKG personally reviewed     CONSULTS: Internal Medicine Progress Note    Montez Stratton  PGY-1 Internal Medicine  [phone]    Patient is a 89y old  Female who presents with a chief complaint of Sepsis (09 Sep 2019 08:42)      SUBJECTIVE / OVERNIGHT EVENTS:    89 year old female with a pmhx of COPD, CKD, CLL, HCC/cirrhosis, a-fib, AS, and hypothyroid, is brought to ED by EMS for evaluation of fever to 102.The pt reports that she has been having cough for the past week that was productive of clear sputum. The pt went to a PMD due to temperature in the 99 range at home, which is above her baseline, who prescribed Doxy but the pt didn't take the Abx because she was told to take Abx only if she spiked a fever to 101. The pt denies chills/n/v/d or other issues, In the ED pt was found to have SBP in 80s and MAPs in 50s after 1.5 L; she was started on levo .06 due to poor response to fluids. After initial fluid bolus, became SBPs in 90s with MAPs in 60s.  She was initially admitted to MICU for septic shock on .    She was started on Vancomycin and Levaquin for suspected PNA. RVP negative. CT Chest showed ITALIA and LLL PNA. UA was positive with positive urine culture pending sensitivity. Pt was transitioned to vancomycin and meropenem in setting of hx of ESBL kleb (multiple UTI in the past). Pt was subsequently weaned off IV pressors and transitioned to midodrine 10mg TID. Pt sating well on RA. Pt was taken off vancomycin due to low suspicion for MRSA PNA. Patient was also noted to have slightly increase in Cr from 1.19 -> 1.32. Patient was deemed hemodynamically stable to be transferred to medicine floor for further management.     Currently, pt is doing well. She is eating lunch, and denies nausea, vomiting, fevers, chills, chest pain, SOB, headache, visual changes, dizziness. Reports +cough with mild whitish sputum production occasionally, but without blood streaks. Needs walker for ambulation.       MEDICATIONS  (STANDING):  ALBUTerol/ipratropium for Nebulization 3 milliLiter(s) Nebulizer every 6 hours  buDESOnide    Inhalation Suspension 0.5 milliGRAM(s) Inhalation every 12 hours  digoxin     Tablet 0.125 milliGRAM(s) Oral every other day  lactulose Syrup 20 Gram(s) Oral four times a day  levothyroxine 25 MICROGram(s) Oral daily  meropenem  IVPB 1000 milliGRAM(s) IV Intermittent every 12 hours  midodrine 10 milliGRAM(s) Oral every 8 hours  rifaximin 550 milliGRAM(s) Oral two times a day    MEDICATIONS  (PRN):    Vital Signs Last 24 Hrs  T(C): 36.9 (09 Sep 2019 12:00), Max: 36.9 (09 Sep 2019 08:00)  T(F): 98.5 (09 Sep 2019 12:00), Max: 98.5 (09 Sep 2019 12:00)  HR: 91 (09 Sep 2019 14:00) (68 - 97)  BP: 99/48 (09 Sep 2019 14:00) (86/54 - 138/57)  BP(mean): 66 (09 Sep 2019 14:00) (61 - 101)  RR: 26 (09 Sep 2019 14:00) (16 - 43)  SpO2: 94% (09 Sep 2019 14:00) (86% - 100%)    CAPILLARY BLOOD GLUCOSE        I&O's Summary    08 Sep 2019 07:01  -  09 Sep 2019 07:00  --------------------------------------------------------  IN: 243.6 mL / OUT: 100 mL / NET: 143.6 mL    09 Sep 2019 07:01  -  09 Sep 2019 14:28  --------------------------------------------------------  IN: 0 mL / OUT: 150 mL / NET: -150 mL        PHYSICAL EXAM  GENERAL: NAD, elderly woman sitting in bed  HEAD:  Atraumatic, normocephalic  EYES: EOMI, PERRLA, conjunctiva and sclera clear  NECK: Supple, No JVD  CHEST/LUNG: +diminished breat sounds bilaterally, no rales, wheezes, rhonchi  HEART: Regular rate and rhythm; No murmurs, rubs, or gallops  ABDOMEN: Soft, Nontender, Nondistended; Bowel sounds present  EXTREMITIES:  2+ Peripheral Pulses, No clubbing, cyanosis, or edema  NEURO/PSYCH: AAOx3, nonfocal  SKIN: many benign seborrheic keratoses throughout face and back, but no concerning lesions    LABS:                        11.8   12.4  )-----------( 108      ( 09 Sep 2019 01:07 )             36.1     Hemoglobin: 11.8 g/dL ( @ 01:07)  Hemoglobin: 13.2 g/dL ( @ 04:21)    CBC Full  -  ( 09 Sep 2019 01:07 )  WBC Count : 12.4 K/uL  RBC Count : 3.41 M/uL  Hemoglobin : 11.8 g/dL  Hematocrit : 36.1 %  Platelet Count - Automated : 108 K/uL  Mean Cell Volume : 106.0 fl  Mean Cell Hemoglobin : 34.7 pg  Mean Cell Hemoglobin Concentration : 32.7 gm/dL  Auto Neutrophil # : 9.8 K/uL  Auto Lymphocyte # : 1.4 K/uL  Auto Monocyte # : 1.1 K/uL  Auto Eosinophil # : 0.1 K/uL  Auto Basophil # : 0.0 K/uL  Auto Neutrophil % : 79.0 %  Auto Lymphocyte % : 11.1 %  Auto Monocyte % : 8.7 %  Auto Eosinophil % : 1.0 %  Auto Basophil % : 0.2 %        136  |  104  |  38<H>  ----------------------------<  111<H>  4.5   |  20<L>  |  1.32<H>    Ca    9.7      09 Sep 2019 01:06  Phos  2.9       Mg     1.6         TPro  5.3<L>  /  Alb  3.0<L>  /  TBili  2.1<H>  /  DBili  x   /  AST  30  /  ALT  18  /  AlkPhos  101      Creatinine Trend: 1.32<--, 1.19<--  LIVER FUNCTIONS - ( 09 Sep 2019 01:06 )  Alb: 3.0 g/dL / Pro: 5.3 g/dL / ALK PHOS: 101 U/L / ALT: 18 U/L / AST: 30 U/L / GGT: x           PT/INR - ( 09 Sep 2019 01:07 )   PT: 16.5 sec;   INR: 1.42 ratio         PTT - ( 09 Sep 2019 01:07 )  PTT:30.8 sec    hs Troponin:            Urinalysis Basic - ( 08 Sep 2019 04:21 )    Color: Yellow / Appearance: Clear / S.017 / pH: x  Gluc: x / Ketone: Negative  / Bili: Negative / Urobili: Negative   Blood: x / Protein: Trace / Nitrite: Negative   Leuk Esterase: Moderate / RBC: 2 /hpf / WBC 6 /HPF   Sq Epi: x / Non Sq Epi: 0 /hpf / Bacteria: Many      CSF:                      EKG:   MICROBIOLOGY:    Culture - Urine (collected 08 Sep 2019 08:57)  Source: .Urine  Preliminary Report (09 Sep 2019 07:55):    >100,000 CFU/ml Gram Negative Rods    Culture - Blood (collected 08 Sep 2019 08:55)  Source: .Blood  Preliminary Report (09 Sep 2019 09:01):    No growth to date.    Culture - Blood (collected 08 Sep 2019 08:55)  Source: .Blood  Preliminary Report (09 Sep 2019 09:01):    No growth to date.      IMAGING:      Labs, imaging, EKG personally reviewed     CONSULTS:

## 2019-09-09 NOTE — PROGRESS NOTE ADULT - PROBLEM SELECTOR PLAN 2
-not on anticoag due to history of bleeding  - [id number] score 3, on digoxin at home - rate currently controlled 70s-80s  - f/u dig level in AM  -Dr. Boston (cardiology) spoken with over phone, will see pt tomorrow  -vitals check q4. No need for tele at this time per cards

## 2019-09-09 NOTE — PROGRESS NOTE ADULT - SUBJECTIVE AND OBJECTIVE BOX
INTERVAL HPI/OVERNIGHT EVENTS:   Pt started on midodrine overnight. Reports doing well in AM without events. Denies difficulty breathing. Currently off pressors.     SUBJECTIVE: Patient seen and examined at bedside.     CONSTITUTIONAL: No weakness, fevers or chills  EYES/ENT: No visual changes;  No vertigo or throat pain   NECK: No pain or stiffness  RESPIRATORY: No cough, wheezing, hemoptysis; No shortness of breath  CARDIOVASCULAR: No chest pain or palpitations  GASTROINTESTINAL: No abdominal or epigastric pain. No nausea, vomiting, or hematemesis; No diarrhea or constipation. No melena or hematochezia.  GENITOURINARY: No dysuria, frequency or hematuria  NEUROLOGICAL: No numbness or weakness  SKIN: No itching, rashes    OBJECTIVE:    VITAL SIGNS:  ICU Vital Signs Last 24 Hrs  T(C): 36.9 (09 Sep 2019 08:00), Max: 37 (08 Sep 2019 10:15)  T(F): 98.4 (09 Sep 2019 08:00), Max: 98.6 (08 Sep 2019 10:15)  HR: 87 (09 Sep 2019 08:15) (68 - 97)  BP: 103/45 (09 Sep 2019 08:15) (84/41 - 138/57)  BP(mean): 65 (09 Sep 2019 08:15) (59 - 101)  ABP: --  ABP(mean): --  RR: 37 (09 Sep 2019 08:15) (16 - 43)  SpO2: 92% (09 Sep 2019 08:15) (89% - 100%)         @ 07:01  -   @ 07:00  --------------------------------------------------------  IN: 243.6 mL / OUT: 100 mL / NET: 143.6 mL      CAPILLARY BLOOD GLUCOSE          PHYSICAL EXAM:    General: NAD  HEENT: NC/AT; PERRL, clear conjunctiva  Neck: supple  Respiratory: diminished breath sounds b/l   Cardiovascular: +S1/S2; RRR  Abdomen: soft, NT/ND; +BS x4  Extremities: WWP, 2+ peripheral pulses b/l; no LE edema  Skin: normal color and turgor; no rash  Neurological:    MEDICATIONS:  MEDICATIONS  (STANDING):  ALBUTerol/ipratropium for Nebulization 3 milliLiter(s) Nebulizer every 6 hours  buDESOnide    Inhalation Suspension 0.5 milliGRAM(s) Inhalation every 12 hours  chlorhexidine 4% Liquid 1 Application(s) Topical <User Schedule>  lactulose Syrup 20 Gram(s) Oral four times a day  levothyroxine 25 MICROGram(s) Oral daily  meropenem  IVPB 1000 milliGRAM(s) IV Intermittent every 12 hours  midodrine 10 milliGRAM(s) Oral every 8 hours  norepinephrine Infusion 0.05 MICROgram(s)/kG/Min (4.763 mL/Hr) IV Continuous <Continuous>  rifaximin 550 milliGRAM(s) Oral two times a day  vancomycin  IVPB      vancomycin  IVPB 750 milliGRAM(s) IV Intermittent every 24 hours    MEDICATIONS  (PRN):      ALLERGIES:  Allergies    codeine (Rash)  erythromycin (Rash)  iv dye (Rash)  penicillin (Rash)  shellfish (Rash)    Intolerances    adhesives (Other)  warfarin (Other)      LABS:                        11.8   12.4  )-----------( 108      ( 09 Sep 2019 01:07 )             36.1         136  |  104  |  38<H>  ----------------------------<  111<H>  4.5   |  20<L>  |  1.32<H>    Ca    9.7      09 Sep 2019 01:06  Phos  2.9       Mg     1.6         TPro  5.3<L>  /  Alb  3.0<L>  /  TBili  2.1<H>  /  DBili  x   /  AST  30  /  ALT  18  /  AlkPhos  101      PT/INR - ( 09 Sep 2019 01:07 )   PT: 16.5 sec;   INR: 1.42 ratio         PTT - ( 09 Sep 2019 01:07 )  PTT:30.8 sec  Urinalysis Basic - ( 08 Sep 2019 04:21 )    Color: Yellow / Appearance: Clear / S.017 / pH: x  Gluc: x / Ketone: Negative  / Bili: Negative / Urobili: Negative   Blood: x / Protein: Trace / Nitrite: Negative   Leuk Esterase: Moderate / RBC: 2 /hpf / WBC 6 /HPF   Sq Epi: x / Non Sq Epi: 0 /hpf / Bacteria: Many        RADIOLOGY & ADDITIONAL TESTS: Reviewed.

## 2019-09-09 NOTE — PROGRESS NOTE ADULT - ASSESSMENT
89 year old female with a pmhx of COPD, CKD, CLL, HCC/cirrhosis, a-fib, AS, and hypothyroid, presented with fever and cough, found to be hypotensive in ED requiring pressors and now admitted to the MICU for sepsis 2/2 PNA.    #Neuro  -A/Ox4    #Resp  -Unlabored on 3L NC, not on oxygen at home, wean to room air as tolerated  -h/o COPD, c/w duonebs and symbicort    #CV  -Septic shock, s/p 1.5L in ED without response, started on levophed gtt  -c/w levophed gtt with goal MAP > 65 -- try to wean as tolerated  -Treatment of pneumonia as below  -h/o AS, POCUS this AM without significant B lines, patient does not appear in fluid overload  -h/o Afib, [id number] score 3, on digoxin at home - rate currently controlled 70s-80s    #GI  -h/o Cirrhosis/HCC, c/w home lactulose 20g four times/day and rifaximin  -DASH Diet    #  -UA+ moderate LE, WBCs, many bacteria, however patient has no urinary symptoms  -F/u UCx (9/8)    #ID  -Sepsis 2/2 pneumonia, with ITALIA and LLL consolidations on CT chest 9/8  -h/o ESBL Klebsiella  -Started on Vancomycin/Levaquin in ED --> change to Vancomycin/Meropenem  -F/u blood Cxs (9/8) and urine Cx (9/8)    #Renal  -Good renal function    #Heme  -CLL in remission, last chemotherapy 8-9 years ago.  -No active issues    #Endo  -h/o Hypothyroidism, c/w home levothyroxine 25mcg daily    #DVT PPx  -SCDs    #GOC  -DNR, MOLST in paper chart    Sada Molina, PGY-1  Internal Medicine  Pager #: GLENNA [phone] / [facility] [phone] 89 year old female with a pmhx of COPD, CKD, CLL, HCC/cirrhosis, a-fib, AS, and hypothyroid, presented with fever and cough, found to be hypotensive in ED requiring pressors and now admitted to the MICU for sepsis 2/2 PNA.    #Neuro  -A/Ox4    #Resp  Septic Shock 2/2 CAP with ITALIA and LLL PNA on CT ch  - Abx with meropenem and vanc due to hx of ESBL  - not on oxygen at home, weaned to room air    h/o COPD  - c/w duonebs and symbicort    #CV  Septic shock, s/p 1.5L in ED without response, started on levophed gtt now titrated off  -Treatment of pneumonia as above    h/o AS  - cont to monitor    h/o Afib  - [id number] score 3, on digoxin at home - rate currently controlled 70s-80s  - f/u dig level in AM    #GI  -h/o Cirrhosis/HCC, c/w home lactulose 20g four times/day and rifaximin  -DASH Diet    #  Bacteriuria  -UA+ moderate LE, WBCs, many bacteria, however patient has no urinary symptoms likely colonized  - UCx (9/8) - >100k GNR    #ID  -Sepic shock 2/2 pneumonia, with ITALIA and LLL consolidations on CT chest 9/8  -h/o ESBL Klebsiella  - cont Vancomycin/Meropenem  - f/u vanc level  -F/u blood Cxs (9/8) pending  -urine Cx (9/8) with >100k GNR Klebsiella    #Renal  - No evidence of KARRIE, appears near baseline  - cont to monitor bmp qd    #Heme  -CLL in remission, last chemotherapy 8-9 years ago.  -No active issues    #Endo  -h/o Hypothyroidism, c/w home levothyroxine 25mcg daily    #DVT PPx  -SCDs    #GOC  -DNR but not DNI, MOLST in paper chart

## 2019-09-09 NOTE — PROGRESS NOTE ADULT - ASSESSMENT
89 year old female with a pmhx of COPD, CKD, CLL, HCC/cirrhosis, a-fib, AS, and hypothyroid, admitted to MICU for septic shock, now improving on abx, afebrile, and transferred to floor.     #Neuro  -A/Ox4    #Resp  Septic Shock 2/2 CAP with ITALIA and LLL PNA on CT ch  - Abx with meropenem and vanc due to hx of ESBL  - not on oxygen at home, weaned to room air    h/o COPD  - c/w duonebs and symbicort    #CV  Septic shock, s/p 1.5L in ED without response, started on levophed gtt now titrated off  -Treatment of pneumonia as above    h/o AS  - cont to monitor    h/o Afib  - [id number] score 3, on digoxin at home - rate currently controlled 70s-80s  - f/u dig level in AM    #GI  -h/o Cirrhosis/HCC, c/w home lactulose 20g four times/day and rifaximin  -DASH Diet    #  Bacteriuria  -UA+ moderate LE, WBCs, many bacteria, however patient has no urinary symptoms likely colonized  - UCx (9/8) - >100k GNR    #ID  -Sepic shock 2/2 pneumonia, with ITALIA and LLL consolidations on CT chest 9/8  -h/o ESBL Klebsiella  - cont Vancomycin/Meropenem  - f/u vanc level  -F/u blood Cxs (9/8) pending  -urine Cx (9/8) with >100k GNR Klebsiella    #Renal  - No evidence of KARRIE, appears near baseline  - cont to monitor bmp qd    #Heme  -CLL in remission, last chemotherapy 8-9 years ago.  -No active issues    #Endo  -h/o Hypothyroidism, c/w home levothyroxine 25mcg daily    #DVT PPx  -SCDs    #GOC  -DNR but not DNI, MOLST in paper chart 89 year old female with a pmhx of COPD, CKD, CLL, HCC/cirrhosis, a-fib, AS, and hypothyroid, admitted to MICU for septic shock, now improving on abx, afebrile, and transferred to floor. DNR but not DNI, MOLST in paper chart.  Pt improving but still frail. Will monitor closely

## 2019-09-10 DIAGNOSIS — K76.6 PORTAL HYPERTENSION: ICD-10-CM

## 2019-09-10 DIAGNOSIS — I35.0 NONRHEUMATIC AORTIC (VALVE) STENOSIS: ICD-10-CM

## 2019-09-10 LAB
-  AMIKACIN: SIGNIFICANT CHANGE UP
-  AMPICILLIN/SULBACTAM: SIGNIFICANT CHANGE UP
-  AMPICILLIN: SIGNIFICANT CHANGE UP
-  AZTREONAM: SIGNIFICANT CHANGE UP
-  CEFAZOLIN: SIGNIFICANT CHANGE UP
-  CEFEPIME: SIGNIFICANT CHANGE UP
-  CEFOXITIN: SIGNIFICANT CHANGE UP
-  CEFTRIAXONE: SIGNIFICANT CHANGE UP
-  CIPROFLOXACIN: SIGNIFICANT CHANGE UP
-  ERTAPENEM: SIGNIFICANT CHANGE UP
-  GENTAMICIN: SIGNIFICANT CHANGE UP
-  IMIPENEM: SIGNIFICANT CHANGE UP
-  LEVOFLOXACIN: SIGNIFICANT CHANGE UP
-  MEROPENEM: SIGNIFICANT CHANGE UP
-  NITROFURANTOIN: SIGNIFICANT CHANGE UP
-  PIPERACILLIN/TAZOBACTAM: SIGNIFICANT CHANGE UP
-  TIGECYCLINE: SIGNIFICANT CHANGE UP
-  TOBRAMYCIN: SIGNIFICANT CHANGE UP
-  TRIMETHOPRIM/SULFAMETHOXAZOLE: SIGNIFICANT CHANGE UP
ALBUMIN SERPL ELPH-MCNC: 2.6 G/DL — LOW (ref 3.3–5)
ALP SERPL-CCNC: 82 U/L — SIGNIFICANT CHANGE UP (ref 40–120)
ALT FLD-CCNC: 17 U/L — SIGNIFICANT CHANGE UP (ref 10–45)
ANION GAP SERPL CALC-SCNC: 9 MMOL/L — SIGNIFICANT CHANGE UP (ref 5–17)
AST SERPL-CCNC: 25 U/L — SIGNIFICANT CHANGE UP (ref 10–40)
BILIRUB SERPL-MCNC: 1.9 MG/DL — HIGH (ref 0.2–1.2)
BUN SERPL-MCNC: 36 MG/DL — HIGH (ref 7–23)
CALCIUM SERPL-MCNC: 9.5 MG/DL — SIGNIFICANT CHANGE UP (ref 8.4–10.5)
CHLORIDE SERPL-SCNC: 104 MMOL/L — SIGNIFICANT CHANGE UP (ref 96–108)
CO2 SERPL-SCNC: 22 MMOL/L — SIGNIFICANT CHANGE UP (ref 22–31)
CREAT SERPL-MCNC: 1.11 MG/DL — SIGNIFICANT CHANGE UP (ref 0.5–1.3)
CULTURE RESULTS: SIGNIFICANT CHANGE UP
DIGOXIN SERPL-MCNC: 0.8 NG/ML — SIGNIFICANT CHANGE UP (ref 0.8–2)
GLUCOSE SERPL-MCNC: 101 MG/DL — HIGH (ref 70–99)
HCT VFR BLD CALC: 31.4 % — LOW (ref 34.5–45)
HGB BLD-MCNC: 10.4 G/DL — LOW (ref 11.5–15.5)
MAGNESIUM SERPL-MCNC: 1.5 MG/DL — LOW (ref 1.6–2.6)
MCHC RBC-ENTMCNC: 33.1 GM/DL — SIGNIFICANT CHANGE UP (ref 32–36)
MCHC RBC-ENTMCNC: 35 PG — HIGH (ref 27–34)
MCV RBC AUTO: 106 FL — HIGH (ref 80–100)
METHOD TYPE: SIGNIFICANT CHANGE UP
NT-PROBNP SERPL-SCNC: 9585 PG/ML — HIGH (ref 0–300)
ORGANISM # SPEC MICROSCOPIC CNT: SIGNIFICANT CHANGE UP
ORGANISM # SPEC MICROSCOPIC CNT: SIGNIFICANT CHANGE UP
PHOSPHATE SERPL-MCNC: 2.6 MG/DL — SIGNIFICANT CHANGE UP (ref 2.5–4.5)
PLATELET # BLD AUTO: 84 K/UL — LOW (ref 150–400)
POTASSIUM SERPL-MCNC: 4.5 MMOL/L — SIGNIFICANT CHANGE UP (ref 3.5–5.3)
POTASSIUM SERPL-SCNC: 4.5 MMOL/L — SIGNIFICANT CHANGE UP (ref 3.5–5.3)
PROT SERPL-MCNC: 4.9 G/DL — LOW (ref 6–8.3)
RBC # BLD: 2.97 M/UL — LOW (ref 3.8–5.2)
RBC # FLD: 12.9 % — SIGNIFICANT CHANGE UP (ref 10.3–14.5)
SODIUM SERPL-SCNC: 135 MMOL/L — SIGNIFICANT CHANGE UP (ref 135–145)
SPECIMEN SOURCE: SIGNIFICANT CHANGE UP
WBC # BLD: 7.7 K/UL — SIGNIFICANT CHANGE UP (ref 3.8–10.5)
WBC # FLD AUTO: 7.7 K/UL — SIGNIFICANT CHANGE UP (ref 3.8–10.5)

## 2019-09-10 PROCEDURE — 99223 1ST HOSP IP/OBS HIGH 75: CPT

## 2019-09-10 PROCEDURE — 99233 SBSQ HOSP IP/OBS HIGH 50: CPT | Mod: GC

## 2019-09-10 PROCEDURE — 99222 1ST HOSP IP/OBS MODERATE 55: CPT | Mod: GC

## 2019-09-10 RX ORDER — MIDODRINE HYDROCHLORIDE 2.5 MG/1
5 TABLET ORAL EVERY 8 HOURS
Refills: 0 | Status: DISCONTINUED | OUTPATIENT
Start: 2019-09-10 | End: 2019-09-11

## 2019-09-10 RX ORDER — MAGNESIUM SULFATE 500 MG/ML
2 VIAL (ML) INJECTION ONCE
Refills: 0 | Status: COMPLETED | OUTPATIENT
Start: 2019-09-10 | End: 2019-09-10

## 2019-09-10 RX ORDER — MAGNESIUM SULFATE 500 MG/ML
2 VIAL (ML) INJECTION ONCE
Refills: 0 | Status: DISCONTINUED | OUTPATIENT
Start: 2019-09-10 | End: 2019-09-10

## 2019-09-10 RX ADMIN — MIDODRINE HYDROCHLORIDE 5 MILLIGRAM(S): 2.5 TABLET ORAL at 22:13

## 2019-09-10 RX ADMIN — Medication 1 APPLICATION(S): at 17:53

## 2019-09-10 RX ADMIN — LACTULOSE 20 GRAM(S): 10 SOLUTION ORAL at 06:30

## 2019-09-10 RX ADMIN — Medication 3 MILLILITER(S): at 23:51

## 2019-09-10 RX ADMIN — MIDODRINE HYDROCHLORIDE 10 MILLIGRAM(S): 2.5 TABLET ORAL at 06:30

## 2019-09-10 RX ADMIN — Medication 25 MICROGRAM(S): at 06:30

## 2019-09-10 RX ADMIN — Medication 50 GRAM(S): at 07:46

## 2019-09-10 RX ADMIN — MEROPENEM 100 MILLIGRAM(S): 1 INJECTION INTRAVENOUS at 06:29

## 2019-09-10 RX ADMIN — MEROPENEM 100 MILLIGRAM(S): 1 INJECTION INTRAVENOUS at 17:54

## 2019-09-10 RX ADMIN — MIDODRINE HYDROCHLORIDE 10 MILLIGRAM(S): 2.5 TABLET ORAL at 14:01

## 2019-09-10 RX ADMIN — Medication 1 APPLICATION(S): at 06:29

## 2019-09-10 NOTE — PROGRESS NOTE ADULT - SUBJECTIVE AND OBJECTIVE BOX
Patient is a 89y old  Female who presents with a chief complaint of Sepsis (09 Sep 2019 14:28)      SUBJECTIVE / OVERNIGHT EVENTS: Transferred from the MICU overnight s/p deemed hemodynamically stable. NAEO. Today, patient has a productive cough and rhinorrhea. Denies pain or n/v/d. Denies dizziness. She had one BM overnight. Grimaces when urinating but denies dysuria.      MEDICATIONS  (STANDING):  ALBUTerol/ipratropium for Nebulization 3 milliLiter(s) Nebulizer every 6 hours  AQUAPHOR (petrolatum Ointment) 1 Application(s) Topical two times a day  buDESOnide    Inhalation Suspension 0.5 milliGRAM(s) Inhalation every 12 hours  digoxin     Tablet 0.125 milliGRAM(s) Oral every other day  lactulose Syrup 20 Gram(s) Oral two times a day  levothyroxine 25 MICROGram(s) Oral daily  magnesium sulfate  IVPB 2 Gram(s) IV Intermittent once  meropenem  IVPB 1000 milliGRAM(s) IV Intermittent every 12 hours  midodrine 10 milliGRAM(s) Oral every 8 hours  rifaximin 550 milliGRAM(s) Oral two times a day    MEDICATIONS  (PRN):      Vital Signs Last 24 Hrs  T(C): 37 (10 Sep 2019 05:10), Max: 37.1 (10 Sep 2019 00:00)  T(F): 98.6 (10 Sep 2019 05:10), Max: 98.8 (10 Sep 2019 00:00)  HR: 88 (10 Sep 2019 05:10) (71 - 96)  BP: 100/61 (10 Sep 2019 05:10) (96/42 - 144/50)  BP(mean): 64 (09 Sep 2019 14:30) (61 - 73)  RR: 20 (10 Sep 2019 05:10) (17 - 35)  SpO2: 95% (10 Sep 2019 05:10) (86% - 98%)  CAPILLARY BLOOD GLUCOSE        I&O's Summary    09 Sep 2019 07:01  -  10 Sep 2019 07:00  --------------------------------------------------------  IN: 50 mL / OUT: 400 mL / NET: -350 mL        PHYSICAL EXAM:  GENERAL: cachectic, NAD  HEENT: EOMI, MMM, neck supple  Cardiac: +grade 3 holosystolic murmur in 2nd intercostal space on the right  Chest: ctab  Abdomen: soft, nontender, +BS  Pelvis: +external female catheter connected to suction  Ext: dry skin treated with lotion, +ecchymosis from lab draw sites with no acute bleeding, +2 peripheral pulses, scds on, sensation/motor intact in ext x4    LABS:                        10.4   7.7   )-----------( 84       ( 10 Sep 2019 06:59 )             31.4     09-10    135  |  104  |  36<H>  ----------------------------<  101<H>  4.5   |  22  |  1.11    Ca    9.5      10 Sep 2019 06:58  Phos  2.6     09-10  Mg     1.5     09-10    TPro  4.9<L>  /  Alb  2.6<L>  /  TBili  1.9<H>  /  DBili  x   /  AST  25  /  ALT  17  /  AlkPhos  82  09-10    PT/INR - ( 09 Sep 2019 01:07 )   PT: 16.5 sec;   INR: 1.42 ratio         PTT - ( 09 Sep 2019 01:07 )  PTT:30.8 sec          Culture - Urine (collected 08 Sep 2019 08:57)  Source: .Urine  Preliminary Report (09 Sep 2019 07:55):    >100,000 CFU/ml Gram Negative Rods    Culture - Blood (collected 08 Sep 2019 08:55)  Source: .Blood  Preliminary Report (09 Sep 2019 09:01):    No growth to date.    Culture - Blood (collected 08 Sep 2019 08:55)  Source: .Blood  Preliminary Report (09 Sep 2019 09:01):    No growth to date.          RADIOLOGY & ADDITIONAL TESTS:    Imaging Personally Reviewed:    Consultant(s) Notes Reviewed:      Care Discussed with Consultants/Other Providers: Patient is a 89y old  Female who presents with a chief complaint of Sepsis (09 Sep 2019 14:28)      SUBJECTIVE / OVERNIGHT EVENTS: Transferred from the MICU overnight s/p deemed hemodynamically stable. NAEO. Today, patient has a productive cough and rhinorrhea. Denies pain or n/v/d. Denies dizziness. She had one BM overnight. Grimaces when urinating but denies dysuria.      MEDICATIONS  (STANDING):  ALBUTerol/ipratropium for Nebulization 3 milliLiter(s) Nebulizer every 6 hours  AQUAPHOR (petrolatum Ointment) 1 Application(s) Topical two times a day  buDESOnide    Inhalation Suspension 0.5 milliGRAM(s) Inhalation every 12 hours  digoxin     Tablet 0.125 milliGRAM(s) Oral every other day  lactulose Syrup 20 Gram(s) Oral two times a day  levothyroxine 25 MICROGram(s) Oral daily  magnesium sulfate  IVPB 2 Gram(s) IV Intermittent once  meropenem  IVPB 1000 milliGRAM(s) IV Intermittent every 12 hours  midodrine 10 milliGRAM(s) Oral every 8 hours  rifaximin 550 milliGRAM(s) Oral two times a day    MEDICATIONS  (PRN):      Vital Signs Last 24 Hrs  T(C): 37 (10 Sep 2019 05:10), Max: 37.1 (10 Sep 2019 00:00)  T(F): 98.6 (10 Sep 2019 05:10), Max: 98.8 (10 Sep 2019 00:00)  HR: 88 (10 Sep 2019 05:10) (71 - 96)  BP: 100/61 (10 Sep 2019 05:10) (96/42 - 144/50)  BP(mean): 64 (09 Sep 2019 14:30) (61 - 73)  RR: 20 (10 Sep 2019 05:10) (17 - 35)  SpO2: 95% (10 Sep 2019 05:10) (86% - 98%)  CAPILLARY BLOOD GLUCOSE        I&O's Summary    09 Sep 2019 07:01  -  10 Sep 2019 07:00  --------------------------------------------------------  IN: 50 mL / OUT: 400 mL / NET: -350 mL        PHYSICAL EXAM:  GENERAL: cachectic, NAD  HEENT: EOMI, MMM, neck supple  Cardiac: +grade 3 holosystolic murmur in 2nd intercostal space on the right  Chest: ctab  Abdomen: soft, nontender, +BS  Pelvis: +external female catheter connected to suction  Ext: dry skin treated with lotion, +ecchymosis from lab draw sites with no acute bleeding, +2 peripheral pulses, scds on, sensation/motor intact in ext x4    .  LABS:                         10.4   7.7   )-----------( 84       ( 10 Sep 2019 06:59 )             31.4     09-10    135  |  104  |  36<H>  ----------------------------<  101<H>  4.5   |  22  |  1.11    Ca    9.5      10 Sep 2019 06:58  Phos  2.6     09-10  Mg     1.5     09-10    TPro  4.9<L>  /  Alb  2.6<L>  /  TBili  1.9<H>  /  DBili  x   /  AST  25  /  ALT  17  /  AlkPhos  82  09-10    PT/INR - ( 09 Sep 2019 01:07 )   PT: 16.5 sec;   INR: 1.42 ratio         PTT - ( 09 Sep 2019 01:07 )  PTT:30.8 sec    Serum Pro-Brain Natriuretic Peptide: 9585 pg/mL (09-10 @ 06:58)    CULTURE RESULTS:                09-08-19 @ 08:57  Specimen Source: --  Method Type: GALINDO  Gram Stain - RRL: --  Gram Stain - Wound: --  Bacteria: --  Culture Results:   >100,000 CFU/ml Klebsiella pneumoniae ESBL      Specimen Source:   Method Type: Method Type: GALINDO (09-08-19 @ 08:57)    Gram Stain:   Culture Results: Culture Results:   >100,000 CFU/ml Klebsiella pneumoniae ESBL (09-08-19 @ 08:57)  Culture Results:   No growth to date. (09-08-19 @ 08:55)  Culture Results:   No growth to date. (09-08-19 @ 08:55)    Bacteria: Bacteria: Many (09-08-19 @ 04:21)      RADIOLOGY & ADDITIONAL TESTS: no new    Imaging Personally Reviewed: yes    Consultant(s) Notes Reviewed:  yes, ID and cardiology	    Care Discussed with Consultants/Other Providers: yes

## 2019-09-10 NOTE — CONSULT NOTE ADULT - SUBJECTIVE AND OBJECTIVE BOX
89 F with hx of severe AS and moderate AI as well as moderate MR and TR.  Also PAF and significant COPD, as well as cirrhosis, hepatocellular Ca, esophageal varices, CKD & CLL.    Admitted thru ED for evaluation of fever to 102.  Over past week, had cough productive of clear sputum.  Saw her pulmonary MD, Dr. Celaya, and started on doxycycline.      In the ED, pt was found to have SBP in 80s and MAPs in 50s after 1.5 L; she was started on levo .06 due to poor response to fluids, now SBPs in 90s with MAPs in 60s. Pt also received Levaquin for suspected PNA and was admitted to the MICU; now transferred to floor.            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  HTN (Hypertension)  Bleeding Esophageal Varices  CLL (Chronic Lymphoblastic Leukemia)  GIB (Gastrointestinal Bleeding)    PSH:   History of repair of hip fracture      Medications:   ALBUTerol/ipratropium for Nebulization 3 milliLiter(s) Nebulizer every 6 hours  AQUAPHOR (petrolatum Ointment) 1 Application(s) Topical two times a day  buDESOnide    Inhalation Suspension 0.5 milliGRAM(s) Inhalation every 12 hours  digoxin     Tablet 0.125 milliGRAM(s) Oral every other day  lactulose Syrup 20 Gram(s) Oral two times a day  levothyroxine 25 MICROGram(s) Oral daily  magnesium sulfate  IVPB 2 Gram(s) IV Intermittent once  meropenem  IVPB 1000 milliGRAM(s) IV Intermittent every 12 hours  midodrine 10 milliGRAM(s) Oral every 8 hours  rifaximin 550 milliGRAM(s) Oral two times a day    Allergies:  adhesives (Other)  codeine (Rash)  erythromycin (Rash)  iv dye (Rash)  penicillin (Rash)  shellfish (Rash)  warfarin (Other)    FAMILY HISTORY:  No pertinent cardiac history in first degree relatives    Social History:  Smoking:  No  Alcohol:  no  Drugs:  no    ROS:  General: no fatigue/malaise, weight loss/gain.  Skin: no rashes.  Eyes: no blurred vision, no loss of vision. 	  Cardiovascular: no chest pain, dyspnea, palpitations, orthopnea or paroxysmal nocturnal dyspnea.   Gastrointestinal:  no N/V/D, no melena/hematemesis/hematochezia.  Genitourinary: no dysuria/hesitancy or hematuria.  Musculoskeletal: no myalgias or arthralgias.  Neurological: no changes in vision or hearing, no lightheadedness/dizziness, no syncope/near syncope	  Psychiatric: no unusual stress/anxiety.   Hematology/Lymphatics: no unusual bleeding, bruising and no lymphadenopathy.  All others negative except as stated above and in HPI.        Vital Signs Last 24 Hrs  T(C): 36.4 (10 Sep 2019 09:50), Max: 37.1 (10 Sep 2019 00:00)  T(F): 97.5 (10 Sep 2019 09:50), Max: 98.8 (10 Sep 2019 00:00)  HR: 67 (10 Sep 2019 09:50) (67 - 94)  BP: 108/58 (10 Sep 2019 09:50) (97/46 - 144/50)  BP(mean): 64 (09 Sep 2019 14:30) (61 - 73)  RR: 18 (10 Sep 2019 09:50) (17 - 35)  SpO2: 95% (10 Sep 2019 09:50) (86% - 98%)      EXAM:  GENERAL:  Alert, no distress  HEENT: Normocephalic, EOM intact, PERRLA  NECK: Normal carotid upstrokes, no bruit; No JVD  CHEST:  Clear to auscultation  HEART: PMI not displaced. Normal S1 and S2.  No murmur, rub or gallop.  ABDOMEN: Normal bowel sounds, no bruit. Soft, non-tender.  Liver and spleen not palpable; aorta not palpable.  EXT:  No edema, no varicosities, 2+ pulses in upper and lower extremities.  PSYCH:  A&Ox3, normal insight, no anxiety  NEURO: Non-focal  SKIN:  No rash       12 Lead ECG (09.08.19 @ 07:32)   A. fib with VR approx. 107 BPM   QRS Duration 94 ms, Q-T Interval 344 ms   [place] 44 degrees   NS ST/T ABNORMALITY      LABS:                      10.4   7.7   )-----------( 84       ( 10 Sep 2019 06:59 )             31.4     09-10    135  |  104  |  36<H>  ----------------------------<  101<H>  4.5   |  22  |  1.11    Ca    9.5      10 Sep 2019 06:58  Phos  2.6     09-10  Mg     1.5     09-10    TPro  4.9<L>  /  Alb  2.6<L>  /  TBili  1.9<H>  /  DBili  x   /  AST  25  /  ALT  17  /  AlkPhos  82  09-10    PT/INR - ( 09 Sep 2019 01:07 )   PT: 16.5 sec;   INR: 1.42 ratio    PTT - ( 09 Sep 2019 01:07 )  PTT:30.8 sec    Serum Pro-Brain Natriuretic Peptide: 9585 pg/mL (09-10 @ 06:58)    Digoxin Level, Serum: 0.8 ng/mL (09.10.19 @ 06:59)          Xray Chest 1 View- PORTABLE-Urgent (09.08.19 @ 04:12) >  INTERPRETATION:  CLINICAL INFORMATION: Cough and fever.    TECHNIQUE: Single frontal radiograph of the chest dated 9/8/2019 4:12 AM.    COMPARISON:Chest radiograph 12/19/2018.    FINDINGS:  Left basilar opacity which may represent atelectasis or infectious etiology.  No pleural effusions. No pneumothorax.  Cardiac size cannot accurately be assessed in this projection. Aortic  calcifications.  IMPRESSION: Left basilar opacity which may represent atelectasis or  infectious etiology.    CAITLIN TOMLINSON M.D., RADIOLOGY RESIDENT  This document has been electronically signed.  TAMMY SÁNCHEZ M.D., ATTENDING RADIOLOGIST  This document has been electronically signed. Sep  8 2019  6:10AM        CT Chest No Cont (09.08.19 @ 09:59) >  INTERPRETATION:  CLINICAL INFORMATION: Shortness of breath, pneumonia    COMPARISON: CT chest 3/19/2016    FINDINGS:    LUNGS AND AIRWAYS: Near obliteration of left lower lobe bronchus secondary to retained secretions. The remaining central airways are patent.  Peribronchovascular and nodular opacities within the left upper and lower lobes representing pneumonia.  PLEURA: Small bilateral pleural effusions.  MEDIASTINUM AND JOHNATHAN: No lymphadenopathy.  VESSELS: Dilated pulmonary artery measuring 3.8 cm may represent pulmonary hypertension. Thoracic aorta normal in course and caliber. Aortic atherosclerosis.  HEART: Severe cardiomegaly. No pericardial effusion. Moderate calcific coronary atherosclerosis. Severe aortic valve leaflet calcification. Severe mitral annulus calcification.  CHEST WALL AND LOWER NECK: Within normal limits.  VISUALIZED UPPER ABDOMEN: Aortic atherosclerosis. Cirrhosis. Splenic varices. Calcified granulomas within the liver.  BONES: Within normal limits.    IMPRESSION:   Peribronchovascular and nodular opacities within the left upper and lower lobes representing pneumonia.  Near obliteration of left lower lobe bronchus secondary to retained secretions.  Cardiomegaly. Severe aortic valve leaflet calcifications.  Dilated main pulmonary artery may represent pulmonary hypertension.  Coronary and aortic atherosclerosis.    CASSIDY GREY M.D., ATTENDING RADIOLOGIST  This document has been electronically signed. Sep  8 2019 10:40AM 89 F with hx of severe AS and moderate AI as well as moderate MR and TR.  Also PAF and significant COPD, as well as cirrhosis, hepatocellular Ca, esophageal varices, CKD & CLL.    Over past week, had cough productive of clear sputum.  Saw her pulmonary MD, Dr. Celaya, but was afebrile.  Presented to ED with mental status changes and found to have fever to 102.   Patient found to have SBP in 80s and given IV fluid and started on Levophen.  Received Levaquin for suspected PNA and was admitted to the MICU; now transferred to floor.    Alert at present, feeling better. Still with cough, altho' slowly improving.  Breathing comfortably.  No CP; no palps.      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  HTN (Hypertension)  Bleeding Esophageal Varices  CLL (Chronic Lymphoblastic Leukemia)  GIB (Gastrointestinal Bleeding)    PSH:   History of repair of hip fracture      Medications:   ALBUTerol/ipratropium for Nebulization 3 milliLiter(s) Nebulizer every 6 hours  AQUAPHOR (petrolatum Ointment) 1 Application(s) Topical two times a day  buDESOnide    Inhalation Suspension 0.5 milliGRAM(s) Inhalation every 12 hours  digoxin     Tablet 0.125 milliGRAM(s) Oral every other day  lactulose Syrup 20 Gram(s) Oral two times a day  levothyroxine 25 MICROGram(s) Oral daily  magnesium sulfate  IVPB 2 Gram(s) IV Intermittent once  meropenem  IVPB 1000 milliGRAM(s) IV Intermittent every 12 hours  midodrine 10 milliGRAM(s) Oral every 8 hours  rifaximin 550 milliGRAM(s) Oral two times a day    Allergies:  adhesives (Other)  codeine (Rash)  erythromycin (Rash)  iv dye (Rash)  penicillin (Rash)  shellfish (Rash)  warfarin (Other)    FAMILY HISTORY:  No pertinent cardiac history in first degree relatives    Social History:  Smoking:  No  Alcohol:  no  Drugs:  no    ROS:  General: no fatigue/malaise, weight loss/gain.  Skin: no rashes.  Eyes: no blurred vision, no loss of vision. 	  Cardiovascular: no chest pain, dyspnea, palpitations, orthopnea or paroxysmal nocturnal dyspnea.   Gastrointestinal:  no N/V/D, no melena/hematemesis/hematochezia.  Genitourinary: no dysuria/hesitancy or hematuria.  Musculoskeletal: no myalgias or arthralgias.  Neurological: no changes in vision or hearing, no lightheadedness/dizziness, no syncope/near syncope	  Psychiatric: no unusual stress/anxiety.   Hematology/Lymphatics: no unusual bleeding, bruising and no lymphadenopathy.  All others negative except as stated above and in HPI.        Vital Signs Last 24 Hrs  T(C): 36.4 (10 Sep 2019 09:50), Max: 37.1 (10 Sep 2019 00:00)  T(F): 97.5 (10 Sep 2019 09:50), Max: 98.8 (10 Sep 2019 00:00)  HR: 67 (10 Sep 2019 09:50) (67 - 94)  BP: 108/58 (10 Sep 2019 09:50) (97/46 - 144/50)  BP(mean): 64 (09 Sep 2019 14:30) (61 - 73)  RR: 18 (10 Sep 2019 09:50) (17 - 35)  SpO2: 95% (10 Sep 2019 09:50) (86% - 98%)      EXAM:  GENERAL:  Alert, no distress  HEENT: Normocephalic, EOM intact, PERRLA  NECK: Normal carotid upstrokes, no bruit; No JVD  CHEST:  Clear to auscultation  HEART: PMI not displaced. Normal S1 and S2.  No murmur, rub or gallop.  ABDOMEN: Normal bowel sounds, no bruit. Soft, non-tender.  Liver and spleen not palpable; aorta not palpable.  EXT:  No edema, no varicosities, 2+ pulses in upper and lower extremities.  PSYCH:  A&Ox3, normal insight, no anxiety  NEURO: Non-focal  SKIN:  No rash       12 Lead ECG (09.08.19 @ 07:32)   A. fib with VR approx. 107 BPM   QRS Duration 94 ms, Q-T Interval 344 ms   [place] 44 degrees   NS ST/T ABNORMALITY      LABS:                      10.4   7.7   )-----------( 84       ( 10 Sep 2019 06:59 )             31.4     09-10    135  |  104  |  36<H>  ----------------------------<  101<H>  4.5   |  22  |  1.11    Ca    9.5      10 Sep 2019 06:58  Phos  2.6     09-10  Mg     1.5     09-10    TPro  4.9<L>  /  Alb  2.6<L>  /  TBili  1.9<H>  /  DBili  x   /  AST  25  /  ALT  17  /  AlkPhos  82  09-10    PT/INR - ( 09 Sep 2019 01:07 )   PT: 16.5 sec;   INR: 1.42 ratio    PTT - ( 09 Sep 2019 01:07 )  PTT:30.8 sec    Serum Pro-Brain Natriuretic Peptide: 9585 pg/mL (09-10 @ 06:58)    Digoxin Level, Serum: 0.8 ng/mL (09.10.19 @ 06:59)          Xray Chest 1 View- PORTABLE-Urgent (09.08.19 @ 04:12) >  INTERPRETATION:  CLINICAL INFORMATION: Cough and fever.    TECHNIQUE: Single frontal radiograph of the chest dated 9/8/2019 4:12 AM.    COMPARISON:Chest radiograph 12/19/2018.    FINDINGS:  Left basilar opacity which may represent atelectasis or infectious etiology.  No pleural effusions. No pneumothorax.  Cardiac size cannot accurately be assessed in this projection. Aortic  calcifications.  IMPRESSION: Left basilar opacity which may represent atelectasis or  infectious etiology.    CAITLIN TOMLINSON M.D., RADIOLOGY RESIDENT  This document has been electronically signed.  TAMMY SÁNCHEZ M.D., ATTENDING RADIOLOGIST  This document has been electronically signed. Sep  8 2019  6:10AM        CT Chest No Cont (09.08.19 @ 09:59) >  INTERPRETATION:  CLINICAL INFORMATION: Shortness of breath, pneumonia    COMPARISON: CT chest 3/19/2016    FINDINGS:    LUNGS AND AIRWAYS: Near obliteration of left lower lobe bronchus secondary to retained secretions. The remaining central airways are patent.  Peribronchovascular and nodular opacities within the left upper and lower lobes representing pneumonia.  PLEURA: Small bilateral pleural effusions.  MEDIASTINUM AND JOHNATHAN: No lymphadenopathy.  VESSELS: Dilated pulmonary artery measuring 3.8 cm may represent pulmonary hypertension. Thoracic aorta normal in course and caliber. Aortic atherosclerosis.  HEART: Severe cardiomegaly. No pericardial effusion. Moderate calcific coronary atherosclerosis. Severe aortic valve leaflet calcification. Severe mitral annulus calcification.  CHEST WALL AND LOWER NECK: Within normal limits.  VISUALIZED UPPER ABDOMEN: Aortic atherosclerosis. Cirrhosis. Splenic varices. Calcified granulomas within the liver.  BONES: Within normal limits.    IMPRESSION:   Peribronchovascular and nodular opacities within the left upper and lower lobes representing pneumonia.  Near obliteration of left lower lobe bronchus secondary to retained secretions.  Cardiomegaly. Severe aortic valve leaflet calcifications.  Dilated main pulmonary artery may represent pulmonary hypertension.  Coronary and aortic atherosclerosis.    CASSIDY GREY M.D., ATTENDING RADIOLOGIST  This document has been electronically signed. Sep  8 2019 10:40AM

## 2019-09-10 NOTE — CONSULT NOTE ADULT - SUBJECTIVE AND OBJECTIVE BOX
Patient is a 89y old  Female who presents with a chief complaint of Sepsis (10 Sep 2019 08:59)    HPI:  89 year old female with a pmhx of COPD, CKD, CLL, HCC/cirrhosis, a-fib, AS, and hypothyroid, is brought to ED by EMS for evaluation of fever to 102.The pt reports that she has been having cough for the past week that was productive of clear sputum. The pt went to a PMD due to temperature in the 99 range at home, which is above her baseline, who prescribed Doxy but the pt didn't take the Abx because she was told to take Abx only if she spiked a fever to 101. The pt denies chills/n/v/d or other issues, In the ED pt was found to have SBP in 80s and MAPs in 50s after 1.5 L; she was started on levo .06 due to poor response to fluids, now SBPs in 90s with MAPs in 60s. Pt also received Levaquin for suspected PNA. Now admitted to the MICU for management of septic shock. (08 Sep 2019 08:34)    Patient seen at bedside. She endorses rhinorrhea and cough that is improving with no sputum production. She has diarrhea likely secondary to Lactulose (it has not changed since admission). Patient denies any nausea/vomiting/HA/neck stiffness/chest pain/abdominal pain/dysuria/back pain/melena.     prior hospital charts reviewed [x  ]  primary team notes reviewed [ x ]  other consultant notes reviewed [x  ]  PAST MEDICAL & SURGICAL HISTORY:  PVD (peripheral vascular disease)  Liver cell carcinoma  Severe aortic stenosis by prior echocardiogram  Pulmonary HTN: moderate  CKD (chronic kidney disease), stage 3 (moderate)  COPD (Chronic Obstructive Pulmonary Disease)  AF (Atrial Fibrillation)  Portal Hypertension  Bleeding Esophageal Varices  CLL (Chronic Lymphoblastic Leukemia)  GIB (Gastrointestinal Bleeding)  History of repair of hip fracture  Esophageal Rupture    Allergies  codeine (Rash)  erythromycin (Rash)  iv dye (Rash)  penicillin (Rash)  shellfish (Rash)    ANTIMICROBIALS (past 90 days)  MEDICATIONS  (STANDING):  levoFLOXacin IVPB   100 mL/Hr IV Intermittent (09-08-19 @ 04:24)    meropenem  IVPB   100 mL/Hr IV Intermittent (09-10-19 @ 06:29)   100 mL/Hr IV Intermittent (09-09-19 @ 18:45)   100 mL/Hr IV Intermittent (09-09-19 @ 05:01)   100 mL/Hr IV Intermittent (09-08-19 @ 17:54)    rifaximin   550 milliGRAM(s) Oral (09-09-19 @ 23:06)   550 milliGRAM(s) Oral (09-09-19 @ 11:29)   550 milliGRAM(s) Oral (09-08-19 @ 23:09)   550 milliGRAM(s) Oral (09-08-19 @ 11:46)    vancomycin  IVPB   250 mL/Hr IV Intermittent (09-08-19 @ 07:41)    vancomycin  IVPB   250 mL/Hr IV Intermittent (09-09-19 @ 06:02)      ANTIMICROBIALS:    meropenem  IVPB 1000 every 12 hours  rifaximin 550 two times a day    OTHER MEDS: MEDICATIONS  (STANDING):  ALBUTerol/ipratropium for Nebulization 3 every 6 hours  buDESOnide    Inhalation Suspension 0.5 every 12 hours  digoxin     Tablet 0.125 every other day  lactulose Syrup 20 two times a day  levothyroxine 25 daily  midodrine 10 every 8 hours      REVIEW OF SYSTEMS  [  ] ROS unobtainable because:    [ x ] All other systems negative except as noted below:	    Constitutional:  [ ] fever [ ] chills  [ ] weight loss  [ ] weakness  Skin:  [ ] rash [ ] phlebitis	  Eyes: [ ] icterus [ ] pain  [ ] discharge	  ENMT: [ ] sore throat  [ ] thrush [ ] ulcers [ ] exudates [x] rhinorrhea  Respiratory: [ ] dyspnea [ ] hemoptysis [x ] cough [ ] sputum	  Cardiovascular:  [ ] chest pain [ ] palpitations [ ] edema	  Gastrointestinal:  [ ] nausea [ ] vomiting [x ] diarrhea [ ] constipation [ ] pain	  Genitourinary:  [ ] dysuria [ ] frequency [ ] hematuria [ ] discharge [ ] flank pain  [ ] incontinence  Musculoskeletal:  [ ] myalgias [ ] arthralgias [ ] arthritis  [ ] back pain  Neurological:  [ ] headache [ ] seizures  [ ] confusion/altered mental status  Psychiatric:  [ ] anxiety [ ] depression	  Hematology/Lymphatics:  [ ] lymphadenopathy  Endocrine:  [ ] adrenal [ ] thyroid  Allergic/Immunologic:	 [ ] transplant [ ] seasonal    Vital Signs Last 24 Hrs  T(F): 97.5 (09-10-19 @ 09:50), Max: 104.7 (09-08-19 @ 04:16)  Vital Signs Last 24 Hrs  HR: 67 (09-10-19 @ 09:50) (67 - 94)  BP: 108/58 (09-10-19 @ 09:50) (97/46 - 144/50)  RR: 18 (09-10-19 @ 09:50)  SpO2: 95% (09-10-19 @ 09:50) (86% - 98%)  Wt(kg): --    PHYSICAL EXAM:  General: sitting comfortably in NAD, AO x 4  HEAD/EYES: NCAT, anicteric, some conjunctival pallor  MOUTH/ENT:  good dentition  Cardiovascular:   +S1, S2, loud crescendo-decrescendo systolic murmur  Respiratory:  some mild scattered wheezes/crackles in left lung, right lung clear to auscultation b/l in anterior and posterior lung field  GI:  soft, non-tender in all 4 quadrants  : no suprapubic tenderness, no CVA tenderness   Musculoskeletal:  no synovitis  Neurologic:  grossly non-focal  Skin:  no rash, ecchymosis on b/l arms  Lymph: no lymphadenopathy  Psychiatric:  appropriate affect  Vascular:  no phlebitis, no lower extremity edema b/l                            10.4   7.7   )-----------( 84       ( 10 Sep 2019 06:59 )             31.4     09-10    135  |  104  |  36<H>  ----------------------------<  101<H>  4.5   |  22  |  1.11    Ca    9.5      10 Sep 2019 06:58  Phos  2.6     09-10  Mg     1.5     09-10    TPro  4.9<L>  /  Alb  2.6<L>  /  TBili  1.9<H>  /  DBili  x   /  AST  25  /  ALT  17  /  AlkPhos  82  09-10    MICROBIOLOGY:  Culture - Urine (collected 08 Sep 2019 08:57)  Source: .Urine  Preliminary Report (09 Sep 2019 07:55):    >100,000 CFU/ml Gram Negative Rods    Culture - Blood (collected 08 Sep 2019 08:55)  Source: .Blood  Preliminary Report (09 Sep 2019 09:01):    No growth to date.    Culture - Blood (collected 08 Sep 2019 08:55)  Source: .Blood  Preliminary Report (09 Sep 2019 09:01):    No growth to date.              RADIOLOGY:  imaging below personally reviewed  < from: CT Chest No Cont (09.08.19 @ 09:59) >  Peribronchovascular and nodular opacities within the left upper and lower   lobes representing pneumonia.    Near obliteration of left lower lobe bronchus secondary to retained   secretions.    Cardiomegaly. Severe aortic valve leaflet calcifications.    Dilated main pulmonary artery may represent pulmonary hypertension.    Coronary and aortic atherosclerosis.    < end of copied text >

## 2019-09-10 NOTE — PROGRESS NOTE ADULT - PROBLEM SELECTOR PLAN 2
not on anticoag due to multiple comorbidities (ie. history of bleeding, esophageal varices)  - [id number] score 3, on digoxin at home - rate currently controlled 70s-80s  - dig level = 0.8 wnl  - Dr. Boston (cardiology) recs appreciated

## 2019-09-10 NOTE — PHYSICAL THERAPY INITIAL EVALUATION ADULT - ADDITIONAL COMMENTS
Pt lives with daughter in private house. 6-7 steps to bedroom, (+)rails. Pt ambulates with rolling walker, is independent when she feels good. Negotiates stairs with LIO rails. Has HHA 10 hrs/day, daughter is present to assist at other times. Pt goes to OP PT for maintenance once per week. 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

## 2019-09-10 NOTE — PROGRESS NOTE ADULT - ASSESSMENT
89 year old female with a pmhx of COPD, CKD, CLL, HCC/cirrhosis, a-fib, AS, and hypothyroid, admitted to MICU for septic shock, now improving on abx, afebrile, and transferred to medicine floor in hemodynamically stable condition. DNR but not DNI, MOLST in paper chart. Pt improving but still frail and endorsing a cough with rhinorrhea. She is able to be breathe comfortably >92% O2 saturation on room air and can be weaned off of midodrine. At this time, she requires inpatient hospitalization to receive IV antibiotics and to monitor BP as we taper midodrine.

## 2019-09-10 NOTE — PHYSICAL THERAPY INITIAL EVALUATION ADULT - PERTINENT HX OF CURRENT PROBLEM, REHAB EVAL
89 y/oF admitted 9/8 with fever of 102. Cough x1 wk with productive clear sputum. +Pna. In the ED pt was found to have SBP in 80s and MAPs in 50s after 1.5 L; she was started on levo .06 due to poor response to fluids, then SBPs in 90s with MAPs in 60s. Was admitted to MICU for management of septic shock. Was subsequently weaned off pressors and transferred to medicine. PMH COPD, CKD, CLL, HCC/cirrhosis, a-fib, AS, and hypothyroid.

## 2019-09-10 NOTE — PROGRESS NOTE ADULT - PROBLEM SELECTOR PLAN 1
-source is likely PNA with ITALIA and LLL consolidations on CT chest 9/8. Blood cx pending. UA+, UCx (9/8) - >100k Klebsiella EBSL  -c/w with Meropenem 1 g q 12 hours  -monitor patient's WBC, temperature curve and clinical status  -monitor sputum culture  -tapering Midodrine due to BP above baseline - 5mg TID PO  -speech and swallow consult placed  -ID, speech and swallow consult recs appreciated

## 2019-09-10 NOTE — CONSULT NOTE ADULT - ASSESSMENT
89 F with valvular heart disease (severe AS/moderate AI, moderate MR and TR), PAF, subclinicial CAD as well as significant COPD, as well as cirrhosis, hepatocellular Ca, esophageal varices, CKD & CLL.  Now admitted with cough and fever; CT chest shows pneumonia.  Hypotensive in ED, attributed to sepsis.      REC: 89 F with valvular heart disease (severe AS/moderate AI, moderate MR and TR), PAF, subclinicial CAD as well as significant COPD, as well as cirrhosis with portal HTN, hepatocellular Ca, esophageal varices, CKD & CLL.  Now admitted with cough and fever; CT chest shows pneumonia.  Hypotensive in ED, attributed to sepsis.      REC:    1.  A. fib  - reasonable rate control at present.  Continue current digoxin dose.  - not a candidate for A/C due to co-morbidities, kelli. esoph varices.    2.  Severe AS/mod AI; mod MR and TR  - has been clinically stable.  - not a candidate for AVR due to multiple co-morbidities  - reasonable to hold spironolactone for now; would resume if LE edema recurs.    3.  Portal HTN  - if BP remains stable, would consider resuming propranolol 10 mg bid tomorrow.    4.  PNA/sepsis  - continue antibiotics    5.  General debility  -  OOB as tolerated  -  physical therapy    Will discuss with house officer.    Daniel Boston M.D.  Cardiology Attending, Consult Service  [phone]

## 2019-09-10 NOTE — PROGRESS NOTE ADULT - PROBLEM SELECTOR PLAN 7
-DVT: SCD's  -Dispo: per PT, home w/ home PT; home w/ assist; Has HHA 10 hrs/day, daughter assists at other times. Patient uses rolling walker and rollater at home.

## 2019-09-10 NOTE — PHYSICAL THERAPY INITIAL EVALUATION ADULT - GAIT DEVIATIONS NOTED, PT EVAL
RLE with small buckle in weight bearing, able to self correct. As per daughter this is her baseline s/p old CVA/decreased ashley

## 2019-09-10 NOTE — PHYSICAL THERAPY INITIAL EVALUATION ADULT - PLANNED THERAPY INTERVENTIONS, PT EVAL
bed mobility training/stair training- GOAL: 7 steps with LIO rails and supervision, 2 wks/gait training/transfer training

## 2019-09-10 NOTE — CONSULT NOTE ADULT - ATTENDING COMMENTS
Patient seen and examined  Above verified  Agree with above unless as noted below.  89 year old female with a pmhx of COPD, CKD, CLL, HCC/cirrhosis, a-fib, AS, and hypothyroid, is brought to ED by EMS for evaluation of fever to 102.The pt reports that she has been having cough for the past week that was productive of clear sputum. In the ED, patient's SBP in 80s and MAP in 50s after 1.5L.  Chest CT showing nodular opacities w/i ITALIA and LLL. She is afebrile with improved leukocytosis (7.7 today). Low platelet count likely secondary to liver disease and history of CLL. Blood cultures x 2 NGTD and urine culture growing >100,000 GNR with no dysuria or other urinary symptoms. Patient's pneumonia is highly suspicious for aspiration pneumonia. Urine culture may be indicative of colonization. Of note, she has a history of ESBL Klebsiella.       Recommendations  -c/w with Meropenem 1 g q 12 hours  -monitor patient's WBC, temperature curve and clinical status  -monitor sputum culture  -consider speech and swallow consult  Will tailor plan for ID issues  per course,results.Will defer to primary team on management of other issues.  case d/w primary team, Dr Molina  Will Follow.  Beeper [phone]-vprs/afterhours/No response-[phone]

## 2019-09-10 NOTE — CONSULT NOTE ADULT - ASSESSMENT
89 year old female with a pmhx of COPD, CKD, CLL, HCC/cirrhosis, a-fib, AS, and hypothyroid, is brought to ED by EMS for evaluation of fever to 102.The pt reports that she has been having cough for the past week that was productive of clear sputum. In the ED, patient's SBP in 80s and MAP in 50s after 1.5L.  Chest CT showing nodular opacities w/i ITALIA and LLL. She is afebrile with improved leukocytosis (7.7 today). Low platelet count likely secondary to liver disease and history of CLL. Blood cultures x 2 NGTD and urine culture growing >100,000 GNR with no dysuria or other urinary symptoms. Patient's pneumonia is highly suspicious for aspiration pneumonia. Urine culture may be indicative of colonization. Of note, she has a history of ESBL Klebsiella.       Recommendations  -c/w with Meropenem 1 g q 12 hours  -monitor patient's WBC, temperature curve and clinical status  -monitor sputum culture, Legionella  -consider speech and swallow consult 89 year old female with a pmhx of COPD, CKD, CLL, HCC/cirrhosis, a-fib, AS, and hypothyroid, is brought to ED by EMS for evaluation of fever to 102.The pt reports that she has been having cough for the past week that was productive of clear sputum. In the ED, patient's SBP in 80s and MAP in 50s after 1.5L.  Chest CT showing nodular opacities w/i ITALIA and LLL. She is afebrile with improved leukocytosis (7.7 today). Low platelet count likely secondary to liver disease and history of CLL. Blood cultures x 2 NGTD and urine culture growing >100,000 GNR with no dysuria or other urinary symptoms. Patient's pneumonia is highly suspicious for aspiration pneumonia. Urine culture may be indicative of colonization. Of note, she has a history of ESBL Klebsiella.       Recommendations  -c/w with Meropenem 1 g q 12 hours  -monitor patient's WBC, temperature curve and clinical status  -monitor sputum culture, Legionella  -consider speech and swallow consult    Attending Note to Follow

## 2019-09-11 LAB
ALBUMIN SERPL ELPH-MCNC: 2.8 G/DL — LOW (ref 3.3–5)
ALP SERPL-CCNC: 96 U/L — SIGNIFICANT CHANGE UP (ref 40–120)
ALT FLD-CCNC: 19 U/L — SIGNIFICANT CHANGE UP (ref 10–45)
ANION GAP SERPL CALC-SCNC: 12 MMOL/L — SIGNIFICANT CHANGE UP (ref 5–17)
AST SERPL-CCNC: 27 U/L — SIGNIFICANT CHANGE UP (ref 10–40)
BILIRUB SERPL-MCNC: 1.4 MG/DL — HIGH (ref 0.2–1.2)
BUN SERPL-MCNC: 32 MG/DL — HIGH (ref 7–23)
CALCIUM SERPL-MCNC: 9.6 MG/DL — SIGNIFICANT CHANGE UP (ref 8.4–10.5)
CHLORIDE SERPL-SCNC: 103 MMOL/L — SIGNIFICANT CHANGE UP (ref 96–108)
CO2 SERPL-SCNC: 22 MMOL/L — SIGNIFICANT CHANGE UP (ref 22–31)
CREAT SERPL-MCNC: 1.07 MG/DL — SIGNIFICANT CHANGE UP (ref 0.5–1.3)
GLUCOSE SERPL-MCNC: 111 MG/DL — HIGH (ref 70–99)
HCT VFR BLD CALC: 34.3 % — LOW (ref 34.5–45)
HGB BLD-MCNC: 11.6 G/DL — SIGNIFICANT CHANGE UP (ref 11.5–15.5)
MAGNESIUM SERPL-MCNC: 1.9 MG/DL — SIGNIFICANT CHANGE UP (ref 1.6–2.6)
MCHC RBC-ENTMCNC: 33.8 GM/DL — SIGNIFICANT CHANGE UP (ref 32–36)
MCHC RBC-ENTMCNC: 35.8 PG — HIGH (ref 27–34)
MCV RBC AUTO: 106 FL — HIGH (ref 80–100)
PLATELET # BLD AUTO: 87 K/UL — LOW (ref 150–400)
POTASSIUM SERPL-MCNC: 4.6 MMOL/L — SIGNIFICANT CHANGE UP (ref 3.5–5.3)
POTASSIUM SERPL-SCNC: 4.6 MMOL/L — SIGNIFICANT CHANGE UP (ref 3.5–5.3)
PROT SERPL-MCNC: 5.3 G/DL — LOW (ref 6–8.3)
RBC # BLD: 3.24 M/UL — LOW (ref 3.8–5.2)
RBC # FLD: 12.9 % — SIGNIFICANT CHANGE UP (ref 10.3–14.5)
SODIUM SERPL-SCNC: 137 MMOL/L — SIGNIFICANT CHANGE UP (ref 135–145)
WBC # BLD: 6.6 K/UL — SIGNIFICANT CHANGE UP (ref 3.8–10.5)
WBC # FLD AUTO: 6.6 K/UL — SIGNIFICANT CHANGE UP (ref 3.8–10.5)

## 2019-09-11 PROCEDURE — 99233 SBSQ HOSP IP/OBS HIGH 50: CPT | Mod: GC

## 2019-09-11 PROCEDURE — 99233 SBSQ HOSP IP/OBS HIGH 50: CPT

## 2019-09-11 PROCEDURE — 99232 SBSQ HOSP IP/OBS MODERATE 35: CPT

## 2019-09-11 RX ORDER — HEPARIN SODIUM 5000 [USP'U]/ML
5000 INJECTION INTRAVENOUS; SUBCUTANEOUS EVERY 12 HOURS
Refills: 0 | Status: DISCONTINUED | OUTPATIENT
Start: 2019-09-11 | End: 2019-09-12

## 2019-09-11 RX ORDER — LACTULOSE 10 G/15ML
20 SOLUTION ORAL
Refills: 0 | Status: DISCONTINUED | OUTPATIENT
Start: 2019-09-11 | End: 2019-09-15

## 2019-09-11 RX ADMIN — Medication 1 APPLICATION(S): at 05:31

## 2019-09-11 RX ADMIN — Medication 1 APPLICATION(S): at 17:02

## 2019-09-11 RX ADMIN — Medication 0.5 MILLIGRAM(S): at 17:42

## 2019-09-11 RX ADMIN — Medication 3 MILLILITER(S): at 17:42

## 2019-09-11 RX ADMIN — Medication 3 MILLILITER(S): at 05:52

## 2019-09-11 RX ADMIN — Medication 3 MILLILITER(S): at 11:49

## 2019-09-11 RX ADMIN — Medication 0.12 MILLIGRAM(S): at 12:25

## 2019-09-11 RX ADMIN — MEROPENEM 100 MILLIGRAM(S): 1 INJECTION INTRAVENOUS at 17:03

## 2019-09-11 RX ADMIN — Medication 0.5 MILLIGRAM(S): at 05:52

## 2019-09-11 RX ADMIN — LACTULOSE 20 GRAM(S): 10 SOLUTION ORAL at 05:31

## 2019-09-11 RX ADMIN — LACTULOSE 20 GRAM(S): 10 SOLUTION ORAL at 17:02

## 2019-09-11 RX ADMIN — Medication 25 MICROGRAM(S): at 05:31

## 2019-09-11 RX ADMIN — MEROPENEM 100 MILLIGRAM(S): 1 INJECTION INTRAVENOUS at 05:30

## 2019-09-11 RX ADMIN — MIDODRINE HYDROCHLORIDE 5 MILLIGRAM(S): 2.5 TABLET ORAL at 05:32

## 2019-09-11 NOTE — PROGRESS NOTE ADULT - SUBJECTIVE AND OBJECTIVE BOX
Patient is a 89y old  Female who presents with a chief complaint of Sepsis (11 Sep 2019 08:49)    Being followed by ID for pna, UTI     Interval history:feels better     No acute events      ROS:  minimal  cough, no SOB,CP  No N/V/D./abd pain  No urinary complaints   No other complaints      Antimicrobials:    meropenem  IVPB 1000 milliGRAM(s) IV Intermittent every 12 hours  rifaximin 550 milliGRAM(s) Oral two times a day    Other medications reviewed    Vital Signs Last 24 Hrs  T(C): 36.6 (09-11-19 @ 09:00), Max: 36.9 (09-10-19 @ 21:29)  T(F): 97.8 (09-11-19 @ 09:00), Max: 98.4 (09-10-19 @ 21:29)  HR: 86 (09-11-19 @ 11:52) (80 - 105)  BP: 103/60 (09-11-19 @ 09:00) (100/62 - 127/60)  BP(mean): --  RR: 18 (09-11-19 @ 09:00) (17 - 18)  SpO2: 96% (09-11-19 @ 11:52) (94% - 97%)    Physical Exam:        HEENT PERRLA EOMI    No oral exudate or erythema    Chest Good AE,minimal crackles    CVS RRR S1 S2 WNl No murmur or rub or gallop    Abd soft BS normal No tenderness no masses    IV site no erythema tenderness or discharge    CNS AAO X 3 no focal    Lab Data:                          11.6   6.6   )-----------( 87       ( 11 Sep 2019 10:40 )             34.3       09-11    137  |  103  |  32<H>  ----------------------------<  111<H>  4.6   |  22  |  1.07    Ca    9.6      11 Sep 2019 10:40  Phos  2.6     09-10  Mg     1.9     09-11    TPro  5.3<L>  /  Alb  2.8<L>  /  TBili  1.4<H>  /  DBili  x   /  AST  27  /  ALT  19  /  AlkPhos  96  09-11        Culture - Urine (collected 08 Sep 2019 08:57)  Source: .Urine  Final Report (10 Sep 2019 11:21):    >100,000 CFU/ml Klebsiella pneumoniae ESBL  Organism: Klebsiella pneumoniae ESBL (10 Sep 2019 11:21)  Organism: Klebsiella pneumoniae ESBL (10 Sep 2019 11:21)      -  Amikacin: S <=16      -  Ampicillin: R >16 These ampicillin results predict results for amoxicillin      -  Ampicillin/Sulbactam: R >16/8 Enterobacter, Citrobacter, and Serratia may develop resistance during prolonged therapy (3-4 days)      -  Aztreonam: R >16      -  Cefazolin: R >16 (MIC_CL_COM_[id number]) For uncomplicated UTI with K. pneumoniae, E. coli, or P. mirablis: GALINDO <=16 is sensitive and GALINDO >=32 is resistant. This also predicts results for oral agents cefaclor, cefdinir, cefpodoxime, cefprozil, cefuroxime axetil, cephalexin and locarbef for uncomplicated UTI. Note that some isolates may be susceptible to these agents while testing resistant to cefazolin.      -  Cefepime: R >16      -  Cefoxitin: S <=8      -  Ceftriaxone: R >32 Enterobacter, Citrobacter, and Serratia may develop resistance during prolonged therapy      -  Ciprofloxacin: R >2      -  Ertapenem: S <=1      -  Gentamicin: R >8      -  Imipenem: S <=1      -  Levofloxacin: I 4      -  Meropenem: S <=1      -  Nitrofurantoin: S <=32 Should not be used to treat pyelonephritis      -  Piperacillin/Tazobactam: R <=16      -  Tigecycline: S <=2      -  Tobramycin: R >8      -  Trimethoprim/Sulfamethoxazole: R >2/38      Method Type: GALINDO    Culture - Blood (collected 08 Sep 2019 08:55)  Source: .Blood  Preliminary Report (09 Sep 2019 09:01):    No growth to date.    Culture - Blood (collected 08 Sep 2019 08:55)  Source: .Blood  Preliminary Report (09 Sep 2019 09:01):    No growth to date.        < from: CT Chest No Cont (09.08.19 @ 09:59) >  IMPRESSION:     Peribronchovascular and nodular opacities within the left upper and lower   lobes representing pneumonia.    Near obliteration of left lower lobe bronchus secondary to retained   secretions.    Cardiomegaly. Severe aortic valve leaflet calcifications.    Dilated main pulmonary artery may represent pulmonary hypertension.    Coronary and aortic atherosclerosis.        < end of copied text >

## 2019-09-11 NOTE — PROGRESS NOTE ADULT - SUBJECTIVE AND OBJECTIVE BOX
Medications:  ALBUTerol/ipratropium for Nebulization 3 milliLiter(s) Nebulizer every 6 hours  AQUAPHOR (petrolatum Ointment) 1 Application(s) Topical two times a day  buDESOnide    Inhalation Suspension 0.5 milliGRAM(s) Inhalation every 12 hours  digoxin     Tablet 0.125 milliGRAM(s) Oral every other day  lactulose Syrup 20 Gram(s) Oral two times a day  levothyroxine 25 MICROGram(s) Oral daily  meropenem  IVPB 1000 milliGRAM(s) IV Intermittent every 12 hours  rifaximin 550 milliGRAM(s) Oral two times a day    PMH/PSH/FH/SH:  Unchanged    ROS:  Unchanged    Vitals:  T(C): 36.6 (19 @ 09:00), Max: 36.9 (09-10-19 @ 21:29)  HR: 86 (19 @ 11:52) (86 - 105)  BP: 103/60 (19 @ 09:00) (100/62 - 127/60)  RR: 18 (19 @ 09:00) (17 - 18)  SpO2: 96% (19 @ 11:52) (94% - 97%)  Daily Weight in k.8 (11 Sep 2019 11:00)      EXAM:  GENERAL:  Alert, no distress  HEENT: Normocephalic, EOM intact, PERRLA  NECK: Normal carotid upstrokes, no bruit; No JVD  CHEST:  Clear to auscultation  HEART: PMI not displaced. Normal S1 and S2.  No murmur, rub or gallop.  ABDOMEN: Normal bowel sounds, no bruit. Soft, non-tender.  Liver and spleen not palpable; aorta not palpable.  EXT:  No edema, no varicosities, 2+ pulses in upper and lower extremities.  PSYCH:  A&Ox3, normal insight, no anxiety  NEURO: Non-focal  SKIN:  No rash             LABS:                      11.6   6.6   )-----------( 87       ( 11 Sep 2019 10:40 )             34.3       137  |  103  |  32<H>  ----------------------------<  111<H>  4.6   |  22  |  1.07    Ca    9.6      11 Sep 2019 10:40  Phos  2.6     -10  Mg     1.9         TPro  5.3<L>  /  Alb  2.8<L>  /  TBili  1.4<H>  /  DBili  x   /  AST  27  /  ALT  19  /  AlkPhos  96      Serum Pro-Brain Natriuretic Peptide: 9585 pg/mL (09-10-19 @ 06:58) Alert, OOB to chair.  No cardiac sxs.  Cough improving.  Seen by PT this AM; able to walk to BR with assistance.    Medications:  ALBUTerol/ipratropium for Nebulization 3 milliLiter(s) Nebulizer every 6 hours  AQUAPHOR (petrolatum Ointment) 1 Application(s) Topical two times a day  buDESOnide    Inhalation Suspension 0.5 milliGRAM(s) Inhalation every 12 hours  digoxin     Tablet 0.125 milliGRAM(s) Oral every other day  lactulose Syrup 20 Gram(s) Oral two times a day  levothyroxine 25 MICROGram(s) Oral daily  meropenem  IVPB 1000 milliGRAM(s) IV Intermittent every 12 hours  rifaximin 550 milliGRAM(s) Oral two times a day    PMH/PSH/FH/SH:  Unchanged    ROS:  Unchanged    Vitals:  T(C): 36.6 (19 @ 09:00), Max: 36.9 (09-10-19 @ 21:29)  HR: 86 (19 @ 11:52) (86 - 105)  BP: 103/60 (19 @ 09:00) (100/62 - 127/60)  RR: 18 (19 @ 09:00) (17 - 18)  SpO2: 96% (19 @ 11:52) (94% - 97%)  Daily Weight in k.8 (11 Sep 2019 11:00)      EXAM:  GENERAL:  Alert, no distress  HEENT: Normocephalic, EOM intact, PERRLA  NECK: Normal carotid upstrokes, no bruit; No JVD  CHEST:  Clear to auscultation  HEART: PMI not displaced. Normal S1 and soft S2.  3/6 harsh systolic murmur across precordium to carotids.  No rub or gallop.  ABDOMEN: Normal bowel sounds, no bruit. Soft, non-tender.  Liver and spleen not palpable; aorta not palpable.  EXT:  No edema, no varicosities, 2+ pulses in upper and lower extremities.  PSYCH:  A&Ox3, normal insight, no anxiety  NEURO: Non-focal  SKIN:  No rash         LABS:                      11.6   6.6   )-----------( 87       ( 11 Sep 2019 10:40 )             34.3       137  |  103  |  32<H>  ----------------------------<  111<H>  4.6   |  22  |  1.07    Ca    9.6      11 Sep 2019 10:40  Phos  2.6     09-10  Mg     1.9         TPro  5.3<L>  /  Alb  2.8<L>  /  TBili  1.4<H>  /  DBili  x   /  AST  27  /  ALT  19  /  AlkPhos  96      Serum Pro-Brain Natriuretic Peptide: 9585 pg/mL (09-10-19 @ 06:58) Alert, OOB to chair.  No cardiac sxs.  Cough improving.  Seen by PT this AM; able to walk to BR with assistance.    Medications:  ALBUTerol/ipratropium for Nebulization 3 milliLiter(s) Nebulizer every 6 hours  AQUAPHOR (petrolatum Ointment) 1 Application(s) Topical two times a day  buDESOnide    Inhalation Suspension 0.5 milliGRAM(s) Inhalation every 12 hours  digoxin     Tablet 0.125 milliGRAM(s) Oral every other day  lactulose Syrup 20 Gram(s) Oral two times a day  levothyroxine 25 MICROGram(s) Oral daily  meropenem  IVPB 1000 milliGRAM(s) IV Intermittent every 12 hours  rifaximin 550 milliGRAM(s) Oral two times a day    PMH/PSH/FH/SH:  Unchanged    ROS:  Unchanged    Vitals:  T(C): 36.6 (19 @ 09:00), Max: 36.9 (09-10-19 @ 21:29)  HR: 86 (19 @ 11:52) (86 - 105)  BP: 103/60 (19 @ 09:00) (100/62 - 127/60)  RR: 18 (19 @ 09:00) (17 - 18)  SpO2: 96% (19 @ 11:52) (94% - 97%)  Daily Weight in k.8 (11 Sep 2019 11:00)      EXAM:  GENERAL:  Alert, no distress  HEENT: Normocephalic, EOM intact, PERRLA  NECK: Normal carotid upstrokes, no bruit; No JVD  CHEST:  Coarse BS, scattered rales/rhonchi L>R  HEART: PMI not displaced. Normal S1 and soft S2.  3/6 harsh systolic murmur across precordium to carotids.  No rub or gallop.  ABDOMEN: Normal bowel sounds, no bruit. Soft, non-tender.  Liver and spleen not palpable; aorta not palpable.  EXT:  No edema, no varicosities, 2+ pulses in upper and lower extremities.  PSYCH:  A&Ox3, normal insight, no anxiety  NEURO: Non-focal  SKIN:  No rash         LABS:                      11.6   6.6   )-----------( 87       ( 11 Sep 2019 10:40 )             34.3       137  |  103  |  32<H>  ----------------------------<  111<H>  4.6   |  22  |  1.07    Ca    9.6      11 Sep 2019 10:40  Phos  2.6     09-10  Mg     1.9         TPro  5.3<L>  /  Alb  2.8<L>  /  TBili  1.4<H>  /  DBili  x   /  AST  27  /  ALT  19  /  AlkPhos  96      Serum Pro-Brain Natriuretic Peptide: 9585 pg/mL (09-10-19 @ 06:58)

## 2019-09-11 NOTE — PROVIDER CONTACT NOTE (MEDICATION) - ACTION/TREATMENT ORDERED:
MD made aware. explained to patient importance of medication, but patient still refused. will continue to monitor.

## 2019-09-11 NOTE — PROGRESS NOTE ADULT - PROBLEM SELECTOR PLAN 1
-source is likely PNA with ITALIA and LLL consolidations on CT chest 9/8. Blood cx pending. UA+, UCx (9/8) - >100k Klebsiella EBSL  -c/w with Meropenem 1 g q 12 hours (day 4 of 7)  -monitor patient's WBC, temperature curve and clinical status  -monitor sputum culture  -tapering Midodrine due to BP above baseline - 5mg TID PO  -speech and swallow consult placed  -ID, speech and swallow consult recs appreciated -source is likely PNA with ITALIA and LLL consolidations on CT chest 9/8. Blood cx pending. UA+, UCx (9/8) - >100k Klebsiella EBSL  -c/w with Meropenem 1 g q 12 hours (day 4 of 5)  -monitor patient's WBC, temperature curve and clinical status  -monitor sputum culture  -tapering Midodrine due to BP above baseline - stop all midodrine  -speech and swallow consult placed  -ID, speech and swallow consult recs appreciated

## 2019-09-11 NOTE — PROGRESS NOTE ADULT - PROBLEM SELECTOR PLAN 5
-continuous pulse ox, vitals q4  -Chest PT and Duonebs q6 -breathing comfortably on RA, d/c continuous pulse ox.  -vitals q4  -Chest PT and Duonebs q6

## 2019-09-11 NOTE — PROGRESS NOTE ADULT - PROBLEM SELECTOR PLAN 2
not on anticoag due to multiple comorbidities (ie. history of bleeding, esophageal varices)  - [id number] score 3, on digoxin at home - rate currently controlled 70s-80s  - dig level = 0.8 wnl  - Dr. Boston (cardiology) recs appreciated

## 2019-09-11 NOTE — PROGRESS NOTE ADULT - SUBJECTIVE AND OBJECTIVE BOX
Patient is a 89y old  Female who presents with a chief complaint of Sepsis (10 Sep 2019 11:13)      SUBJECTIVE / OVERNIGHT EVENTS: NAEO. Today, ***    MEDICATIONS  (STANDING):  ALBUTerol/ipratropium for Nebulization 3 milliLiter(s) Nebulizer every 6 hours  AQUAPHOR (petrolatum Ointment) 1 Application(s) Topical two times a day  buDESOnide    Inhalation Suspension 0.5 milliGRAM(s) Inhalation every 12 hours  digoxin     Tablet 0.125 milliGRAM(s) Oral every other day  lactulose Syrup 20 Gram(s) Oral two times a day  levothyroxine 25 MICROGram(s) Oral daily  meropenem  IVPB 1000 milliGRAM(s) IV Intermittent every 12 hours  midodrine 5 milliGRAM(s) Oral every 8 hours  rifaximin 550 milliGRAM(s) Oral two times a day    MEDICATIONS  (PRN):      Vital Signs Last 24 Hrs  T(C): 36.7 (11 Sep 2019 04:55), Max: 36.9 (10 Sep 2019 21:29)  T(F): 98 (11 Sep 2019 04:55), Max: 98.4 (10 Sep 2019 21:29)  HR: 92 (11 Sep 2019 05:55) (67 - 102)  BP: 113/68 (11 Sep 2019 05:35) (96/44 - 127/60)  BP(mean): --  RR: 18 (11 Sep 2019 04:55) (17 - 18)  SpO2: 97% (11 Sep 2019 05:55) (94% - 99%)  CAPILLARY BLOOD GLUCOSE        I&O's Summary    10 Sep 2019 07:01  -  11 Sep 2019 07:00  --------------------------------------------------------  IN: 50 mL / OUT: 0 mL / NET: 50 mL        PHYSICAL EXAM:  ***    LABS:                        10.4   7.7   )-----------( 84       ( 10 Sep 2019 06:59 )             31.4     09-10    135  |  104  |  36<H>  ----------------------------<  101<H>  4.5   |  22  |  1.11    Ca    9.5      10 Sep 2019 06:58  Phos  2.6     09-10  Mg     1.5     09-10    TPro  4.9<L>  /  Alb  2.6<L>  /  TBili  1.9<H>  /  DBili  x   /  AST  25  /  ALT  17  /  AlkPhos  82  09-10              Culture - Urine (collected 08 Sep 2019 08:57)  Source: .Urine  Final Report (10 Sep 2019 11:21):    >100,000 CFU/ml Klebsiella pneumoniae ESBL  Organism: Klebsiella pneumoniae ESBL (10 Sep 2019 11:21)  Organism: Klebsiella pneumoniae ESBL (10 Sep 2019 11:21)    Culture - Blood (collected 08 Sep 2019 08:55)  Source: .Blood  Preliminary Report (09 Sep 2019 09:01):    No growth to date.    Culture - Blood (collected 08 Sep 2019 08:55)  Source: .Blood  Preliminary Report (09 Sep 2019 09:01):    No growth to date.          RADIOLOGY & ADDITIONAL TESTS:    Imaging Personally Reviewed:    Consultant(s) Notes Reviewed:      Care Discussed with Consultants/Other Providers: Patient is a 89y old  Female who presents with a chief complaint of Sepsis (10 Sep 2019 11:13)      SUBJECTIVE / OVERNIGHT EVENTS: NAEO. Today, patient continues to endorse cough. Denies any pain. Denies n/v/d.    MEDICATIONS  (STANDING):  ALBUTerol/ipratropium for Nebulization 3 milliLiter(s) Nebulizer every 6 hours  AQUAPHOR (petrolatum Ointment) 1 Application(s) Topical two times a day  buDESOnide    Inhalation Suspension 0.5 milliGRAM(s) Inhalation every 12 hours  digoxin     Tablet 0.125 milliGRAM(s) Oral every other day  lactulose Syrup 20 Gram(s) Oral two times a day  levothyroxine 25 MICROGram(s) Oral daily  meropenem  IVPB 1000 milliGRAM(s) IV Intermittent every 12 hours  midodrine 5 milliGRAM(s) Oral every 8 hours  rifaximin 550 milliGRAM(s) Oral two times a day    MEDICATIONS  (PRN):      Vital Signs Last 24 Hrs  T(C): 36.7 (11 Sep 2019 04:55), Max: 36.9 (10 Sep 2019 21:29)  T(F): 98 (11 Sep 2019 04:55), Max: 98.4 (10 Sep 2019 21:29)  HR: 92 (11 Sep 2019 05:55) (67 - 102)  BP: 113/68 (11 Sep 2019 05:35) (96/44 - 127/60)  BP(mean): --  RR: 18 (11 Sep 2019 04:55) (17 - 18)  SpO2: 97% (11 Sep 2019 05:55) (94% - 99%)  CAPILLARY BLOOD GLUCOSE        I&O's Summary    10 Sep 2019 07:01  -  11 Sep 2019 07:00  --------------------------------------------------------  IN: 50 mL / OUT: 0 mL / NET: 50 mL      PHYSICAL EXAM:  GENERAL: cachectic, NAD  HEENT: EOMI, MMM, neck supple  Cardiac: +grade 3 holosystolic murmur in 2nd intercostal space on the right  Chest: ctab  Abdomen: soft, nontender, +BS  Pelvis: +external female catheter connected to suction  Ext: dry skin treated with lotion, +ecchymosis from lab draw sites with no acute bleeding, +2 peripheral pulses, scds on, sensation/motor intact in ext x4    LABS:                        10.4   7.7   )-----------( 84       ( 10 Sep 2019 06:59 )             31.4     09-10    135  |  104  |  36<H>  ----------------------------<  101<H>  4.5   |  22  |  1.11    Ca    9.5      10 Sep 2019 06:58  Phos  2.6     09-10  Mg     1.5     09-10    TPro  4.9<L>  /  Alb  2.6<L>  /  TBili  1.9<H>  /  DBili  x   /  AST  25  /  ALT  17  /  AlkPhos  82  09-10              Culture - Urine (collected 08 Sep 2019 08:57)  Source: .Urine  Final Report (10 Sep 2019 11:21):    >100,000 CFU/ml Klebsiella pneumoniae ESBL  Organism: Klebsiella pneumoniae ESBL (10 Sep 2019 11:21)  Organism: Klebsiella pneumoniae ESBL (10 Sep 2019 11:21)    Culture - Blood (collected 08 Sep 2019 08:55)  Source: .Blood  Preliminary Report (09 Sep 2019 09:01):    No growth to date.    Culture - Blood (collected 08 Sep 2019 08:55)  Source: .Blood  Preliminary Report (09 Sep 2019 09:01):    No growth to date.          RADIOLOGY & ADDITIONAL TESTS: no new    Imaging Personally Reviewed: yes    Consultant(s) Notes Reviewed:  yes, cardiology and ID	    Care Discussed with Consultants/Other Providers: yes

## 2019-09-11 NOTE — PROGRESS NOTE ADULT - ASSESSMENT
89 F with valvular heart disease (severe AS/moderate AI, moderate MR and TR), PAF, subclinicial CAD as well as significant COPD, as well as cirrhosis with portal HTN, hepatocellular Ca, esophageal varices, CKD & CLL.  Now admitted with cough and fever; CT chest shows pneumonia.  Hypotensive in ED, attributed to sepsis.      REC:    1.  A. fib  - reasonable rate control at present.  Continue current digoxin dose.  - not a candidate for A/C due to co-morbidities, kelli. esoph varices.    2.  Severe AS/mod AI; mod MR and TR  - has been clinically stable.  - not a candidate for AVR due to multiple co-morbidities  - reasonable to hold spironolactone for now; would resume if LE edema recurs.    3.  Portal HTN  - if BP remains stable, would consider resuming propranolol 10 mg bid tomorrow.    4.  PNA/sepsis  - continue antibiotics    5.  General debility  -  OOB as tolerated  -  physical therapy    Will discuss with house officer.    Daniel Boston M.D.  Cardiology Attending, Consult Service  [phone] 89 F with valvular heart disease (severe AS/moderate AI, moderate MR and TR), PAF, subclinicial CAD as well as significant COPD, as well as cirrhosis with portal HTN, hepatocellular Ca, esophageal varices, CKD & CLL.  Now admitted with cough and fever; CT chest shows pneumonia.  Hypotensive in ED, attributed to sepsis.      REC:    1.  A. fib  - reasonable rate control at present.  Continue current digoxin dose.  - not a candidate for A/C due to co-morbidities, kelli. esoph varices.    2.  Severe AS/mod AI; mod MR and TR  - has been clinically stable.  - not a candidate for AVR due to multiple co-morbidities  - No need to resume spironolactone at present as no LE edema on exam.    3.  Portal HTN  - BP seems stable, would resume propranolol 10 mg bid tomorrow.    4.  PNA/sepsis  - continue antibiotics    5.  General debility  -  OOB as tolerated  -  continue physical therapy        Daniel Boston M.D.  [phone]

## 2019-09-11 NOTE — ED ADULT NURSE NOTE - CAS TRG GEN SKIN CONDITION
Problem List Items Addressed This Visit        Unprioritized    Chronic fatigue    Diabetes mellitus, insulin dependent (IDDM), uncontrolled ([facility] [address] - Primary     Blood sugars continue to be elevated. Last A1c was at 9.0.   Home sugars are anywhere between 1 Ongoing issues with MS and worsening weakness and fatigue. Unable to complete her daytime activities as easily as she did in the past.  Has been cutting back on the oxcarbazepine due to the fatigue. She continues to have the muscle spasms.   Trial of Warm

## 2019-09-11 NOTE — PROGRESS NOTE ADULT - ASSESSMENT
89 year old female with a pmhx of COPD, CKD, CLL, HCC/cirrhosis, a-fib, AS, and hypothyroid, is brought to ED by EMS for evaluation of fever to 102.The pt reports that she has been having cough for the past week that was productive of clear sputum. In the ED, patient's SBP in 80s and MAP in 50s after 1.5L.  Chest CT showing nodular opacities w/i ITALIA and LLL. She is afebrile with improved leukocytosis (7.7 today). Low platelet count likely secondary to liver disease and history of CLL. Blood cultures x 2 NGTD and urine culture growing >100,000 GNR with no dysuria or other urinary symptoms. Patient's pneumonia is highly suspicious for aspiration pneumonia. Urine culture may be indicative of colonization. Of note, she has a history of ESBL Klebsiella.   stable  improving  Rec;  1) ?sputum Cx  2) Chest PT,aspiration precautions  3) continue meropenem   adjust dose per renal function  4) Monitor for s/s any other focus    Will tailor plan for ID issues  per course,results.Will defer to primary team on management of other issues.  Will Follow.  Beeper [id number]-[id number]/afterhours/No response-[phone]

## 2019-09-12 LAB
ALBUMIN SERPL ELPH-MCNC: 2.9 G/DL — LOW (ref 3.3–5)
ALP SERPL-CCNC: 106 U/L — SIGNIFICANT CHANGE UP (ref 40–120)
ALT FLD-CCNC: 21 U/L — SIGNIFICANT CHANGE UP (ref 10–45)
ANION GAP SERPL CALC-SCNC: 11 MMOL/L — SIGNIFICANT CHANGE UP (ref 5–17)
AST SERPL-CCNC: 29 U/L — SIGNIFICANT CHANGE UP (ref 10–40)
BASOPHILS # BLD AUTO: 0 K/UL — SIGNIFICANT CHANGE UP (ref 0–0.2)
BASOPHILS NFR BLD AUTO: 0.3 % — SIGNIFICANT CHANGE UP (ref 0–2)
BILIRUB SERPL-MCNC: 1.1 MG/DL — SIGNIFICANT CHANGE UP (ref 0.2–1.2)
BUN SERPL-MCNC: 32 MG/DL — HIGH (ref 7–23)
CALCIUM SERPL-MCNC: 9.7 MG/DL — SIGNIFICANT CHANGE UP (ref 8.4–10.5)
CHLORIDE SERPL-SCNC: 103 MMOL/L — SIGNIFICANT CHANGE UP (ref 96–108)
CO2 SERPL-SCNC: 23 MMOL/L — SIGNIFICANT CHANGE UP (ref 22–31)
CREAT SERPL-MCNC: 1.11 MG/DL — SIGNIFICANT CHANGE UP (ref 0.5–1.3)
EOSINOPHIL # BLD AUTO: 0.1 K/UL — SIGNIFICANT CHANGE UP (ref 0–0.5)
EOSINOPHIL NFR BLD AUTO: 1.4 % — SIGNIFICANT CHANGE UP (ref 0–6)
GLUCOSE SERPL-MCNC: 94 MG/DL — SIGNIFICANT CHANGE UP (ref 70–99)
HCT VFR BLD CALC: 33.4 % — LOW (ref 34.5–45)
HGB BLD-MCNC: 11.1 G/DL — LOW (ref 11.5–15.5)
LYMPHOCYTES # BLD AUTO: 0.8 K/UL — LOW (ref 1–3.3)
LYMPHOCYTES # BLD AUTO: 13 % — SIGNIFICANT CHANGE UP (ref 13–44)
MCHC RBC-ENTMCNC: 33.2 GM/DL — SIGNIFICANT CHANGE UP (ref 32–36)
MCHC RBC-ENTMCNC: 34.9 PG — HIGH (ref 27–34)
MCV RBC AUTO: 105 FL — HIGH (ref 80–100)
MONOCYTES # BLD AUTO: 0.7 K/UL — SIGNIFICANT CHANGE UP (ref 0–0.9)
MONOCYTES NFR BLD AUTO: 12.5 % — SIGNIFICANT CHANGE UP (ref 2–14)
NEUTROPHILS # BLD AUTO: 4.2 K/UL — SIGNIFICANT CHANGE UP (ref 1.8–7.4)
NEUTROPHILS NFR BLD AUTO: 72.6 % — SIGNIFICANT CHANGE UP (ref 43–77)
PLATELET # BLD AUTO: 100 K/UL — LOW (ref 150–400)
POTASSIUM SERPL-MCNC: 4.5 MMOL/L — SIGNIFICANT CHANGE UP (ref 3.5–5.3)
POTASSIUM SERPL-SCNC: 4.5 MMOL/L — SIGNIFICANT CHANGE UP (ref 3.5–5.3)
PROT SERPL-MCNC: 5.3 G/DL — LOW (ref 6–8.3)
RBC # BLD: 3.17 M/UL — LOW (ref 3.8–5.2)
RBC # FLD: 12.7 % — SIGNIFICANT CHANGE UP (ref 10.3–14.5)
SODIUM SERPL-SCNC: 137 MMOL/L — SIGNIFICANT CHANGE UP (ref 135–145)
WBC # BLD: 5.8 K/UL — SIGNIFICANT CHANGE UP (ref 3.8–10.5)
WBC # FLD AUTO: 5.8 K/UL — SIGNIFICANT CHANGE UP (ref 3.8–10.5)

## 2019-09-12 PROCEDURE — 99232 SBSQ HOSP IP/OBS MODERATE 35: CPT

## 2019-09-12 PROCEDURE — 99233 SBSQ HOSP IP/OBS HIGH 50: CPT | Mod: GC

## 2019-09-12 RX ADMIN — HEPARIN SODIUM 5000 UNIT(S): 5000 INJECTION INTRAVENOUS; SUBCUTANEOUS at 11:02

## 2019-09-12 RX ADMIN — Medication 0.5 MILLIGRAM(S): at 18:00

## 2019-09-12 RX ADMIN — LACTULOSE 20 GRAM(S): 10 SOLUTION ORAL at 00:27

## 2019-09-12 RX ADMIN — Medication 1 APPLICATION(S): at 18:18

## 2019-09-12 RX ADMIN — Medication 1 APPLICATION(S): at 06:31

## 2019-09-12 RX ADMIN — LACTULOSE 20 GRAM(S): 10 SOLUTION ORAL at 06:31

## 2019-09-12 RX ADMIN — Medication 3 MILLILITER(S): at 18:03

## 2019-09-12 RX ADMIN — LACTULOSE 20 GRAM(S): 10 SOLUTION ORAL at 11:01

## 2019-09-12 RX ADMIN — MEROPENEM 100 MILLIGRAM(S): 1 INJECTION INTRAVENOUS at 18:18

## 2019-09-12 RX ADMIN — Medication 25 MICROGRAM(S): at 06:31

## 2019-09-12 RX ADMIN — MEROPENEM 100 MILLIGRAM(S): 1 INJECTION INTRAVENOUS at 06:31

## 2019-09-12 RX ADMIN — Medication 100 MILLIGRAM(S): at 11:01

## 2019-09-12 RX ADMIN — Medication 3 MILLILITER(S): at 05:49

## 2019-09-12 RX ADMIN — Medication 3 MILLILITER(S): at 12:07

## 2019-09-12 RX ADMIN — Medication 0.5 MILLIGRAM(S): at 05:49

## 2019-09-12 NOTE — PROGRESS NOTE ADULT - ASSESSMENT
89 F with valvular heart disease (severe AS/moderate AI, moderate MR and TR), PAF, subclinicial CAD as well as significant COPD, as well as cirrhosis with portal HTN, hepatocellular Ca, esophageal varices, CKD & CLL.  Now admitted with cough and fever; CT chest shows pneumonia.  Hypotensive in ED, attributed to sepsis.      REC:    1.  A. fib  - reasonable rate control at present.  Continue current digoxin dose.  - not a candidate for A/C due to co-morbidities, kelli. esoph varices.    2.  Severe AS/mod AI; mod MR and TR  - has been clinically stable.  - not a candidate for AVR due to multiple co-morbidities  - No need to resume spironolactone at present as no LE edema on exam.    3.  Portal HTN  - BP seems stable, would resume propranolol 10 mg bid tomorrow.    4.  PNA/sepsis  - continue antibiotics    5.  General debility  -  OOB as tolerated  -  continue physical therapy        Daniel Boston M.D.  [phone] 89 F with valvular heart disease (severe AS/moderate AI, moderate MR and TR), PAF, subclinicial CAD as well as significant COPD, as well as cirrhosis with portal HTN, hepatocellular Ca, esophageal varices, CKD & CLL.  Admitted with cough and fever; CT chest shows pneumonia.  Hypotensive in ED, attributed to sepsis, now resolved.      REC:    1.  A. fib  - Continue current digoxin dose.  - not a candidate for A/C due to co-morbidities, kelli. esoph varices.    2.  Severe AS/mod AI; mod MR and TR  - remains clinically stable.  - not a candidate for AVR due to multiple co-morbidities  - No need to resume spironolactone at present as no LE edema on exam.    3.  Portal HTN  - continue propranolol 10 mg bid tomorrow.    4.  PNA/sepsis  - continue antibiotics    5.  General debility  -  OOB as tolerated  -  continue physical therapy        Daniel Boston M.D.  [phone]

## 2019-09-12 NOTE — PROGRESS NOTE ADULT - SUBJECTIVE AND OBJECTIVE BOX
Patient is a 89y old  Female who presents with a chief complaint of Sepsis (11 Sep 2019 13:40)    Being followed by ID for pna , ? UTI    Interval history:per daughter was complaining of urinary burning yesterday though denies now   No acute events      ROS:  Improved cough,no SOB,CP  No N/V/D./abd pain  No other complaints      Antimicrobials:    meropenem  IVPB 1000 milliGRAM(s) IV Intermittent every 12 hours  rifaximin 550 milliGRAM(s) Oral two times a day    Other medications reviewed    Vital Signs Last 24 Hrs  T(C): 36.5 (09-12-19 @ 09:47), Max: 37.3 (09-11-19 @ 21:15)  T(F): 97.7 (09-12-19 @ 09:47), Max: 99.2 (09-11-19 @ 21:15)  HR: 85 (09-12-19 @ 09:47) (84 - 108)  BP: 104/54 (09-12-19 @ 09:47) (104/54 - 143/63)  BP(mean): --  RR: 16 (09-12-19 @ 09:47) (16 - 18)  SpO2: 96% (09-12-19 @ 09:47) (94% - 98%)    Physical Exam:        HEENT PERRLA EOMI    No oral exudate or erythema    Chest Good AE,minimal crackles    CVS RRR S1 S2 WNl No murmur or rub or gallop    Abd soft BS normal No tenderness no masses    IV site no erythema tenderness or discharge    CNS AAO X 3 no focal    Lab Data:                          11.1   5.8   )-----------( 100      ( 12 Sep 2019 06:47 )             33.4       09-12    137  |  103  |  32<H>  ----------------------------<  94  4.5   |  23  |  1.11    Ca    9.7      12 Sep 2019 06:48  Mg     1.9     09-11    TPro  5.3<L>  /  Alb  2.9<L>  /  TBili  1.1  /  DBili  x   /  AST  29  /  ALT  21  /  AlkPhos  106  09-12        Culture - Urine (collected 08 Sep 2019 08:57)  Source: .Urine  Final Report (10 Sep 2019 11:21):    >100,000 CFU/ml Klebsiella pneumoniae ESBL  Organism: Klebsiella pneumoniae ESBL (10 Sep 2019 11:21)  Organism: Klebsiella pneumoniae ESBL (10 Sep 2019 11:21)      -  Amikacin: S <=16      -  Ampicillin: R >16 These ampicillin results predict results for amoxicillin      -  Ampicillin/Sulbactam: R >16/8 Enterobacter, Citrobacter, and Serratia may develop resistance during prolonged therapy (3-4 days)      -  Aztreonam: R >16      -  Cefazolin: R >16 (MIC_CL_[id number]) For uncomplicated UTI with K. pneumoniae, E. coli, or P. mirablis: GALINDO <=16 is sensitive and GALINDO >=32 is resistant. This also predicts results for oral agents cefaclor, cefdinir, cefpodoxime, cefprozil, cefuroxime axetil, cephalexin and locarbef for uncomplicated UTI. Note that some isolates may be susceptible to these agents while testing resistant to cefazolin.      -  Cefepime: R >16      -  Cefoxitin: S <=8      -  Ceftriaxone: R >32 Enterobacter, Citrobacter, and Serratia may develop resistance during prolonged therapy      -  Ciprofloxacin: R >2      -  Ertapenem: S <=1      -  Gentamicin: R >8      -  Imipenem: S <=1      -  Levofloxacin: I 4      -  Meropenem: S <=1      -  Nitrofurantoin: S <=32 Should not be used to treat pyelonephritis      -  Piperacillin/Tazobactam: R <=16      -  Tigecycline: S <=2      -  Tobramycin: R >8      -  Trimethoprim/Sulfamethoxazole: R >2/38      Method Type: GALINDO    Culture - Blood (collected 08 Sep 2019 08:55)  Source: .Blood  Preliminary Report (09 Sep 2019 09:01):    No growth to date.    Culture - Blood (collected 08 Sep 2019 08:55)  Source: .Blood  Preliminary Report (09 Sep 2019 09:01):    No growth to date.

## 2019-09-12 NOTE — PROGRESS NOTE ADULT - PROBLEM SELECTOR PLAN 2
not on anticoag due to multiple comorbidities (ie. history of bleeding, esophageal varices)  - [id number] score 3, on digoxin at home - rate currently controlled 70s-80s  - dig level = 0.8 wnl  - Dr. Boston (cardiology) recs appreciated

## 2019-09-12 NOTE — PROGRESS NOTE ADULT - SUBJECTIVE AND OBJECTIVE BOX
Patient is a 89y old  Female who presents with a chief complaint of Sepsis (12 Sep 2019 11:20)      SUBJECTIVE / OVERNIGHT EVENTS: NAEO. Today, patient continues to endorse cough on deep inspirations. She is using acapella device at bedside for airway clearance. Denies abdominal pain or dysuria. Denies n/v/d. Denies sob, cp. Denies fever/chills.     MEDICATIONS  (STANDING):  ALBUTerol/ipratropium for Nebulization 3 milliLiter(s) Nebulizer every 6 hours  AQUAPHOR (petrolatum Ointment) 1 Application(s) Topical two times a day  buDESOnide    Inhalation Suspension 0.5 milliGRAM(s) Inhalation every 12 hours  digoxin     Tablet 0.125 milliGRAM(s) Oral every other day  heparin  Injectable 5000 Unit(s) SubCutaneous every 12 hours  lactulose Syrup 20 Gram(s) Oral four times a day  levothyroxine 25 MICROGram(s) Oral daily  meropenem  IVPB 1000 milliGRAM(s) IV Intermittent every 12 hours  rifaximin 550 milliGRAM(s) Oral two times a day    MEDICATIONS  (PRN):  guaiFENesin    Syrup 100 milliGRAM(s) Oral every 6 hours PRN Cough      Vital Signs Last 24 Hrs  T(C): 36.5 (12 Sep 2019 09:47), Max: 37.3 (11 Sep 2019 21:15)  T(F): 97.7 (12 Sep 2019 09:47), Max: 99.2 (11 Sep 2019 21:15)  HR: 85 (12 Sep 2019 09:47) (84 - 108)  BP: 104/54 (12 Sep 2019 09:47) (104/54 - 143/63)  BP(mean): --  RR: 16 (12 Sep 2019 09:47) (16 - 18)  SpO2: 96% (12 Sep 2019 09:47) (94% - 98%)  CAPILLARY BLOOD GLUCOSE        I&O's Summary    11 Sep 2019 07:01  -  12 Sep 2019 07:00  --------------------------------------------------------  IN: 630 mL / OUT: 600 mL / NET: 30 mL        PHYSICAL EXAM:  GENERAL: cachectic, NAD, lying in hospital bed, alert and oriented x3  HEENT: EOMI, MMM, neck supple  Cardiac: +grade 3 holosystolic murmur in 2nd intercostal space on the right  Chest: crackles on inspiration in lower left lung, right lung clear to auscultation  Abdomen: soft, nontender to shallow/deep palpation, +BS  Pelvis: +external female catheter connected to suction  Ext: dry skin treated with lotion, +ecchymosis from lab draw sites with no acute bleeding, +2 peripheral pulses, scds on, sensation/motor intact in ext x4      LABS:                        11.1   5.8   )-----------( 100      ( 12 Sep 2019 06:47 )             33.4     09-12    137  |  103  |  32<H>  ----------------------------<  94  4.5   |  23  |  1.11    Ca    9.7      12 Sep 2019 06:48  Mg     1.9     09-11    TPro  5.3<L>  /  Alb  2.9<L>  /  TBili  1.1  /  DBili  x   /  AST  29  /  ALT  21  /  AlkPhos  106  09-12                  RADIOLOGY & ADDITIONAL TESTS: no new.    Imaging Personally Reviewed: yes    Consultant(s) Notes Reviewed:  yes, ID and cards    Care Discussed with Consultants/Other Providers: yes

## 2019-09-12 NOTE — PROGRESS NOTE ADULT - SUBJECTIVE AND OBJECTIVE BOX
Medications:  ALBUTerol/ipratropium for Nebulization 3 milliLiter(s) Nebulizer every 6 hours  AQUAPHOR (petrolatum Ointment) 1 Application(s) Topical two times a day  buDESOnide    Inhalation Suspension 0.5 milliGRAM(s) Inhalation every 12 hours  digoxin     Tablet 0.125 milliGRAM(s) Oral every other day  guaiFENesin    Syrup 100 milliGRAM(s) Oral every 6 hours PRN  heparin  Injectable 5000 Unit(s) SubCutaneous every 12 hours  lactulose Syrup 20 Gram(s) Oral four times a day  levothyroxine 25 MICROGram(s) Oral daily  meropenem  IVPB 1000 milliGRAM(s) IV Intermittent every 12 hours  propranolol 10 milliGRAM(s) Oral two times a day  rifaximin 550 milliGRAM(s) Oral two times a day    PMH/PSH/FH/SH:  Unchanged    ROS:  Unchanged    Vitals:  T(C): 36.5 (09-12-19 @ 09:47), Max: 37.3 (09-11-19 @ 21:15)  HR: 82 (09-12-19 @ 12:09) (82 - 108)  BP: 104/54 (09-12-19 @ 09:47) (104/54 - 143/63)  RR: 16 (09-12-19 @ 09:47) (16 - 18)  SpO2: 99% (09-12-19 @ 12:09) (94% - 99%)      EXAM:  GENERAL:  Alert, no distress  HEENT: Normocephalic, EOM intact, PERRLA  NECK: Normal carotid upstrokes, no bruit; No JVD  CHEST:  Coarse BS, scattered rales/rhonchi L>R  HEART: PMI not displaced. Normal S1 and soft S2.  3/6 harsh systolic murmur across precordium to carotids.  No rub or gallop.  ABDOMEN: Normal bowel sounds, no bruit. Soft, non-tender.  Liver and spleen not palpable; aorta not palpable.  EXT:  No edema, no varicosities, 2+ pulses in upper and lower extremities.  PSYCH:  A&Ox3, normal insight, no anxiety  NEURO: Non-focal  SKIN:  No rash             I&Os  11 Sep 2019 07:01  -  12 Sep 2019 07:00  --------------------------------------------------------  IN: 630 mL / OUT: 600 mL / NET: 30 mL    12 Sep 2019 07:01  -  12 Sep 2019 14:25  --------------------------------------------------------  IN: 480 mL / OUT: 900 mL / NET: -420 mL      LABS:                       11.1   5.8   )-----------( 100      ( 12 Sep 2019 06:47 )             33.4     09-12  137  |  103  |  32<H>  ----------------------------<  94  4.5   |  23  |  1.11    Ca    9.7      12 Sep 2019 06:48  Mg     1.9     09-11    TPro  5.3<L>  /  Alb  2.9<L>  /  TBili  1.1  /  DBili  x   /  AST  29  /  ALT  21  /  AlkPhos  106  09-12    Serum Pro-Brain Natriuretic Peptide: 9585 pg/mL (09-10-19 @ 06:58) Alert, no distress.  No cardiac sxs.  Cough persists.  Not OOB to chair yet today.      Medications:  ALBUTerol/ipratropium for Nebulization 3 milliLiter(s) Nebulizer every 6 hours  AQUAPHOR (petrolatum Ointment) 1 Application(s) Topical two times a day  buDESOnide    Inhalation Suspension 0.5 milliGRAM(s) Inhalation every 12 hours  digoxin     Tablet 0.125 milliGRAM(s) Oral every other day  guaiFENesin    Syrup 100 milliGRAM(s) Oral every 6 hours PRN  heparin  Injectable 5000 Unit(s) SubCutaneous every 12 hours  lactulose Syrup 20 Gram(s) Oral four times a day  levothyroxine 25 MICROGram(s) Oral daily  meropenem  IVPB 1000 milliGRAM(s) IV Intermittent every 12 hours  propranolol 10 milliGRAM(s) Oral two times a day  rifaximin 550 milliGRAM(s) Oral two times a day    PMH/PSH/FH/SH:  Unchanged    ROS:  Unchanged    Vitals:  T(C): 36.5 (09-12-19 @ 09:47), Max: 37.3 (09-11-19 @ 21:15)  HR: 82 (09-12-19 @ 12:09) (82 - 108)  BP: 104/54 (09-12-19 @ 09:47) (104/54 - 143/63)  RR: 16 (09-12-19 @ 09:47) (16 - 18)  SpO2: 99% (09-12-19 @ 12:09) (94% - 99%)      EXAM:  GENERAL:  Alert, no distress  HEENT: Normocephalic, EOM intact, PERRLA  NECK: Normal carotid upstrokes, no bruit; No JVD  CHEST:  Coarse BS, scattered rales/rhonchi L>R  HEART: PMI not displaced. Normal S1 and soft S2.  3/6 harsh systolic murmur across precordium to carotids.  No rub or gallop.  ABDOMEN: Normal bowel sounds, no bruit. Soft, non-tender.  Liver and spleen not palpable; aorta not palpable.  EXT:  No edema, no varicosities, 2+ pulses in upper and lower extremities.  PSYCH:  A&Ox3, normal insight, no anxiety  NEURO: Non-focal  SKIN:  No rash       I&Os  11 Sep 2019 07:01  -  12 Sep 2019 07:00  --------------------------------------------------------  IN: 630 mL / OUT: 600 mL / NET: 30 mL    12 Sep 2019 07:01  -  12 Sep 2019 14:25  --------------------------------------------------------  IN: 480 mL / OUT: 900 mL / NET: -420 mL      LABS:                       11.1   5.8   )-----------( 100      ( 12 Sep 2019 06:47 )             33.4     09-12  137  |  103  |  32<H>  ----------------------------<  94  4.5   |  23  |  1.11    Ca    9.7      12 Sep 2019 06:48  Mg     1.9     09-11    TPro  5.3<L>  /  Alb  2.9<L>  /  TBili  1.1  /  DBili  x   /  AST  29  /  ALT  21  /  AlkPhos  106  09-12    Serum Pro-Brain Natriuretic Peptide: 9585 pg/mL (09-10-19 @ 06:58)

## 2019-09-12 NOTE — PROGRESS NOTE ADULT - ASSESSMENT
89 year old female with a pmhx of COPD, CKD, CLL, HCC/cirrhosis, a-fib, AS, and hypothyroid, admitted to MICU for septic shock, now improving on abx, afebrile, and transferred to medicine floor in hemodynamically stable condition. DNR but not DNI, MOLST in paper chart. Pt improving but still frail and endorsing a cough with rhinorrhea. Today, she is on meropenem day 5 of 8, extended due to poor clinical improvement. She is able to be breathe comfortably >92% O2 saturation on room air and is hemodynamically stable weaned off of midocrine. However, she continues to appear symptomatic with a productive cough and abnormal lung exam. At this time, she requires inpatient hospitalization to receive IV antibiotics and to monitor BP.

## 2019-09-12 NOTE — PROGRESS NOTE ADULT - PROBLEM SELECTOR PLAN 1
-source is likely aspiration PNA with ITALIA and LLL consolidations on CT chest 9/8. Blood cx pending. UA+, UCx (9/8) - >100k Klebsiella EBSL. off midodrine - hemodynamically stable.  -c/w with Meropenem 1 g q 12 hours (day 5 of 8 - extended regimen)  -monitor patient's WBC, temperature curve and clinical status  -f/u sputum culture  -speech and swallow consult placed  -ID, speech and swallow consult recs appreciated

## 2019-09-12 NOTE — PROGRESS NOTE ADULT - ASSESSMENT
89 year old female with a pmhx of COPD, CKD, CLL, HCC/cirrhosis, a-fib, AS, and hypothyroid, is brought to ED by EMS for evaluation of fever to 102.The pt reports that she has been having cough for the past week that was productive of clear sputum. In the ED, patient's SBP in 80s and MAP in 50s after 1.5L.  Chest CT showing nodular opacities w/i ITALIA and LLL. stable  improving  ?UTI-ESBL on CX   Rec;  1) ?sputum Cx  2) Chest PT,aspiration precautions  3) continue meropenem   adjust dose per renal function  If stable could Dc in 3 days   4) Monitor for s/s any other focus    Will tailor plan for ID issues  per course,results.Will defer to primary team on management of other issues.  Will Follow.  case d/w Med team   Beeper [id number]-vesd/afterhours/No response-[phone]

## 2019-09-12 NOTE — PROGRESS NOTE ADULT - PROBLEM SELECTOR PLAN 7
-DVT: subq heparin  -Dispo: per PT, home w/ home PT; home w/ assist; Has HHA 10 hrs/day, daughter assists at other times. Patient uses rolling walker and rollater at home. -DVT: subq heparin  -Dispo: per PT, home w/ home PT; home w/ assist; Has HHA 10 hrs/day, daughter assists at other times. Patient uses rolling walker and rollator at home.

## 2019-09-13 LAB
ALBUMIN SERPL ELPH-MCNC: 2.6 G/DL — LOW (ref 3.3–5)
ALP SERPL-CCNC: 95 U/L — SIGNIFICANT CHANGE UP (ref 40–120)
ALT FLD-CCNC: 18 U/L — SIGNIFICANT CHANGE UP (ref 10–45)
ANION GAP SERPL CALC-SCNC: 10 MMOL/L — SIGNIFICANT CHANGE UP (ref 5–17)
AST SERPL-CCNC: 28 U/L — SIGNIFICANT CHANGE UP (ref 10–40)
BASOPHILS # BLD AUTO: 0 K/UL — SIGNIFICANT CHANGE UP (ref 0–0.2)
BASOPHILS NFR BLD AUTO: 0.3 % — SIGNIFICANT CHANGE UP (ref 0–2)
BILIRUB SERPL-MCNC: 1.2 MG/DL — SIGNIFICANT CHANGE UP (ref 0.2–1.2)
BUN SERPL-MCNC: 29 MG/DL — HIGH (ref 7–23)
CALCIUM SERPL-MCNC: 9.4 MG/DL — SIGNIFICANT CHANGE UP (ref 8.4–10.5)
CHLORIDE SERPL-SCNC: 101 MMOL/L — SIGNIFICANT CHANGE UP (ref 96–108)
CO2 SERPL-SCNC: 24 MMOL/L — SIGNIFICANT CHANGE UP (ref 22–31)
CREAT SERPL-MCNC: 0.99 MG/DL — SIGNIFICANT CHANGE UP (ref 0.5–1.3)
CULTURE RESULTS: SIGNIFICANT CHANGE UP
CULTURE RESULTS: SIGNIFICANT CHANGE UP
EOSINOPHIL # BLD AUTO: 0.1 K/UL — SIGNIFICANT CHANGE UP (ref 0–0.5)
EOSINOPHIL NFR BLD AUTO: 2.6 % — SIGNIFICANT CHANGE UP (ref 0–6)
GLUCOSE SERPL-MCNC: 81 MG/DL — SIGNIFICANT CHANGE UP (ref 70–99)
HCT VFR BLD CALC: 33.5 % — LOW (ref 34.5–45)
HGB BLD-MCNC: 11.5 G/DL — SIGNIFICANT CHANGE UP (ref 11.5–15.5)
LYMPHOCYTES # BLD AUTO: 1 K/UL — SIGNIFICANT CHANGE UP (ref 1–3.3)
LYMPHOCYTES # BLD AUTO: 17.6 % — SIGNIFICANT CHANGE UP (ref 13–44)
MCHC RBC-ENTMCNC: 34.4 GM/DL — SIGNIFICANT CHANGE UP (ref 32–36)
MCHC RBC-ENTMCNC: 36.1 PG — HIGH (ref 27–34)
MCV RBC AUTO: 105 FL — HIGH (ref 80–100)
MONOCYTES # BLD AUTO: 0.7 K/UL — SIGNIFICANT CHANGE UP (ref 0–0.9)
MONOCYTES NFR BLD AUTO: 12.6 % — SIGNIFICANT CHANGE UP (ref 2–14)
NEUTROPHILS # BLD AUTO: 3.7 K/UL — SIGNIFICANT CHANGE UP (ref 1.8–7.4)
NEUTROPHILS NFR BLD AUTO: 66.9 % — SIGNIFICANT CHANGE UP (ref 43–77)
PLATELET # BLD AUTO: 104 K/UL — LOW (ref 150–400)
POTASSIUM SERPL-MCNC: 4.8 MMOL/L — SIGNIFICANT CHANGE UP (ref 3.5–5.3)
POTASSIUM SERPL-SCNC: 4.8 MMOL/L — SIGNIFICANT CHANGE UP (ref 3.5–5.3)
PROT SERPL-MCNC: 5 G/DL — LOW (ref 6–8.3)
RBC # BLD: 3.18 M/UL — LOW (ref 3.8–5.2)
RBC # FLD: 12.8 % — SIGNIFICANT CHANGE UP (ref 10.3–14.5)
SODIUM SERPL-SCNC: 135 MMOL/L — SIGNIFICANT CHANGE UP (ref 135–145)
SPECIMEN SOURCE: SIGNIFICANT CHANGE UP
SPECIMEN SOURCE: SIGNIFICANT CHANGE UP
WBC # BLD: 5.6 K/UL — SIGNIFICANT CHANGE UP (ref 3.8–10.5)
WBC # FLD AUTO: 5.6 K/UL — SIGNIFICANT CHANGE UP (ref 3.8–10.5)

## 2019-09-13 PROCEDURE — 99232 SBSQ HOSP IP/OBS MODERATE 35: CPT

## 2019-09-13 PROCEDURE — 99232 SBSQ HOSP IP/OBS MODERATE 35: CPT | Mod: GC

## 2019-09-13 RX ADMIN — Medication 3 MILLILITER(S): at 00:04

## 2019-09-13 RX ADMIN — Medication 100 MILLIGRAM(S): at 21:22

## 2019-09-13 RX ADMIN — Medication 0.12 MILLIGRAM(S): at 11:16

## 2019-09-13 RX ADMIN — Medication 1 APPLICATION(S): at 18:32

## 2019-09-13 RX ADMIN — LACTULOSE 20 GRAM(S): 10 SOLUTION ORAL at 06:19

## 2019-09-13 RX ADMIN — LACTULOSE 20 GRAM(S): 10 SOLUTION ORAL at 18:32

## 2019-09-13 RX ADMIN — LACTULOSE 20 GRAM(S): 10 SOLUTION ORAL at 11:15

## 2019-09-13 RX ADMIN — Medication 0.5 MILLIGRAM(S): at 17:40

## 2019-09-13 RX ADMIN — Medication 3 MILLILITER(S): at 23:45

## 2019-09-13 RX ADMIN — Medication 25 MICROGRAM(S): at 06:18

## 2019-09-13 RX ADMIN — MEROPENEM 100 MILLIGRAM(S): 1 INJECTION INTRAVENOUS at 18:31

## 2019-09-13 RX ADMIN — Medication 1 APPLICATION(S): at 06:18

## 2019-09-13 RX ADMIN — MEROPENEM 100 MILLIGRAM(S): 1 INJECTION INTRAVENOUS at 06:18

## 2019-09-13 RX ADMIN — Medication 3 MILLILITER(S): at 11:42

## 2019-09-13 RX ADMIN — Medication 3 MILLILITER(S): at 17:40

## 2019-09-13 NOTE — PROGRESS NOTE ADULT - PROBLEM SELECTOR PLAN 7
-DVT: subq heparin  -Dispo: per PT, home w/ home PT; home w/ assist; Has HHA 10 hrs/day, daughter assists at other times. Patient uses rolling walker and rollator at home.

## 2019-09-13 NOTE — PROGRESS NOTE ADULT - SUBJECTIVE AND OBJECTIVE BOX
Patient is a 89y old  Female who presents with a chief complaint of Sepsis (13 Sep 2019 12:02)    Being followed by ID for pna, UTI     Interval history:feels well  no urinary complaints  No acute events      ROS:  decreased  cough,no SOB,CP  No N/V/D./abd pain  No other complaints      Antimicrobials:    meropenem  IVPB 1000 milliGRAM(s) IV Intermittent every 12 hours  rifaximin 550 milliGRAM(s) Oral two times a day    Other medications reviewed    Vital Signs Last 24 Hrs  T(C): 36.7 (09-13-19 @ 05:04), Max: 36.7 (09-12-19 @ 14:57)  T(F): 98 (09-13-19 @ 05:04), Max: 98.1 (09-13-19 @ 01:34)  HR: 74 (09-13-19 @ 11:43) (65 - 106)  BP: 117/71 (09-13-19 @ 05:04) (106/65 - 120/67)  BP(mean): --  RR: 18 (09-13-19 @ 05:04) (16 - 18)  SpO2: 97% (09-13-19 @ 11:43) (93% - 97%)    Physical Exam:          HEENT PERRLA EOMI    No oral exudate or erythema    Chest Good AE,minimal crackles    CVS RRR S1 S2 WNl No murmur or rub or gallop    Abd soft BS normal No tenderness no masses    IV site no erythema tenderness or discharge    CNS AAO X 3 no focal    Lab Data:                          11.5   5.6   )-----------( 104      ( 13 Sep 2019 07:17 )             33.5       09-13    135  |  101  |  29<H>  ----------------------------<  81  4.8   |  24  |  0.99    Ca    9.4      13 Sep 2019 07:17    TPro  5.0<L>  /  Alb  2.6<L>  /  TBili  1.2  /  DBili  x   /  AST  28  /  ALT  18  /  AlkPhos  95  09-13

## 2019-09-13 NOTE — PROGRESS NOTE ADULT - ASSESSMENT
89 F with valvular heart disease (severe AS/moderate AI, moderate MR and TR), PAF, subclinicial CAD as well as significant COPD, as well as cirrhosis with portal HTN, hepatocellular Ca, esophageal varices, CKD & CLL.  Admitted with cough and fever; CT chest shows pneumonia.  Hypotensive in ED, attributed to sepsis, now resolved.    REC:  1.  A. fib  - Continue current digoxin dose.  - not a candidate for A/C due to co-morbidities, kelli. esoph varices.    2.  Severe AS/mod AI; mod MR and TR  - remains clinically stable.  - not a candidate for AVR due to multiple co-morbidities  - No need to resume spironolactone at present as no LE edema on exam.    3.  Portal HTN  - continue propranolol 10 mg bid tomorrow.    4.  PNA/sepsis  - continue antibiotics    5.  General debility  -  OOB as tolerated  -  continue physical therapy    Daniel Boston M.D.  [phone] 89 F with valvular heart disease (severe AS/moderate AI, moderate MR and TR), PAF, subclinicial CAD as well as significant COPD, as well as cirrhosis with portal HTN, hepatocellular Ca, esophageal varices, CKD & CLL.  Admitted with cough and fever; CT chest shows pneumonia.  Hypotensive in ED, attributed to sepsis, now resolved.    REC:  1.  A. fib  - Continue current digoxin dose.  - not a candidate for A/C due to co-morbidities, kelli. esoph varices.    2.  Severe AS/mod AI; mod MR and TR  - remains clinically stable.  - not a candidate for AVR due to multiple co-morbidities  - No need to resume spironolactone at present; no LE edema on exam today.    3.  Portal HTN  - continue propranolol 10 mg bid    4.  PNA/sepsis  - continue antibiotics    5.  General debility  -  OOB as tolerated  -  continue physical therapy    Daniel Boston M.D.  [phone]

## 2019-09-13 NOTE — PROGRESS NOTE ADULT - ASSESSMENT
89 year old female with a pmhx of COPD, CKD, CLL, HCC/cirrhosis, a-fib, AS, and hypothyroid, admitted to MICU for septic shock, now improving on abx, afebrile, and transferred to medicine floor in hemodynamically stable condition. DNR but not DNI, MOLST in paper chart. Pt improving but still frail and endorsing a cough with rhinorrhea. Today, she is on meropenem day 6 of 8, extended due to poor clinical improvement. She is able to be breathe comfortably >92% O2 saturation on room air and is hemodynamically stable weaned off of midocrine. Today, she continues to have a productive cough and abnormal lung exam. At this time, she requires inpatient hospitalization to receive IV antibiotics and to monitor BP.

## 2019-09-13 NOTE — PROGRESS NOTE ADULT - ASSESSMENT
89 year old female with a pmhx of COPD, CKD, CLL, HCC/cirrhosis, a-fib, AS, and hypothyroid, is brought to ED by EMS for evaluation of fever to 102.The pt reports that she has been having cough for the past week that was productive of clear sputum. In the ED, patient's SBP in 80s and MAP in 50s after 1.5L.  Chest CT showing nodular opacities w/i ITALIA and LLL. stable  improving  ?UTI-ESBL on CX   Rec;  1) ?sputum Cx  2) Chest PT,aspiration precautions  3) continue meropenem   adjust dose per renal function  If stable could Dc in 2 days   4) Monitor for s/s any other focus    Will tailor plan for ID issues  per course,results.Will defer to primary team on management of other issues.  Case d/w Med team  Infectious Diseases Service will cover over weekend.  Please call [phone] if issues

## 2019-09-13 NOTE — PROGRESS NOTE ADULT - PROBLEM SELECTOR PLAN 2
not on anticoag due to multiple comorbidities (ie. history of bleeding, esophageal varices)  - [id number] score 3, on digoxin at home - rate currently controlled 70s-80s  - dig level = 0.8 wnl  - Dr. Boston (cardiology) recs appreciated

## 2019-09-13 NOTE — PROGRESS NOTE ADULT - SUBJECTIVE AND OBJECTIVE BOX
Alert, no distress.  No cardiac sxs.  Cough persists.  Not OOB to chair yet today.        Medications:  ALBUTerol/ipratropium for Nebulization 3 milliLiter(s) Nebulizer every 6 hours  AQUAPHOR (petrolatum Ointment) 1 Application(s) Topical two times a day  buDESOnide    Inhalation Suspension 0.5 milliGRAM(s) Inhalation every 12 hours  digoxin     Tablet 0.125 milliGRAM(s) Oral every other day  guaiFENesin    Syrup 100 milliGRAM(s) Oral every 6 hours PRN  lactulose Syrup 20 Gram(s) Oral four times a day  levothyroxine 25 MICROGram(s) Oral daily  meropenem  IVPB 1000 milliGRAM(s) IV Intermittent every 12 hours  propranolol 10 milliGRAM(s) Oral two times a day  rifaximin 550 milliGRAM(s) Oral two times a day    PMH/PSH/FH/SH: ] Unchanged    ROS:  Unchanged    Vitals:  T(C): 36.7 (09-13-19 @ 05:04), Max: 36.7 (09-12-19 @ 14:57)  HR: 74 (09-13-19 @ 11:43) (65 - 106)  BP: 117/71 (09-13-19 @ 05:04) (106/65 - 120/67)  RR: 18 (09-13-19 @ 05:04) (16 - 18)  SpO2: 97% (09-13-19 @ 11:43) (93% - 97%)        EXAM:  GENERAL:  Alert, no distress  HEENT: Normocephalic, EOM intact, PERRLA  NECK: Normal carotid upstrokes, no bruit; No JVD  CHEST:  Coarse BS, scattered rales/rhonchi L>R  HEART: PMI not displaced. Normal S1 and soft S2.  3/6 harsh systolic murmur across precordium to carotids.  No rub or gallop.  ABDOMEN: Normal bowel sounds, no bruit. Soft, non-tender.  Liver and spleen not palpable; aorta not palpable.  EXT:  No edema, no varicosities, 2+ pulses in upper and lower extremities.  PSYCH:  A&Ox3, normal insight, no anxiety  NEURO: Non-focal  SKIN:  No rash           I&O's Summary    12 Sep 2019 07:01  -  13 Sep 2019 07:00  --------------------------------------------------------  IN: 710 mL / OUT: 1410 mL / NET: -700 mL      LABS:                      11.5   5.6   )-----------( 104      ( 13 Sep 2019 07:17 )             33.5     09-13  135  |  101  |  29<H>  ----------------------------<  81  4.8   |  24  |  0.99    Ca    9.4      13 Sep 2019 07:17    TPro  5.0<L>  /  Alb  2.6<L>  /  TBili  1.2  /  DBili  x   /  AST  28  /  ALT  18  /  AlkPhos  95  09-13    Serum Pro-Brain Natriuretic Peptide: 9585 pg/mL (09-10-19 @ 06:58) Alert, no distress.  No cardiac sxs.  Cough persists but she feels that it is improving.  OOB to chair and with PT today.      Medications:  ALBUTerol/ipratropium for Nebulization 3 milliLiter(s) Nebulizer every 6 hours  AQUAPHOR (petrolatum Ointment) 1 Application(s) Topical two times a day  buDESOnide    Inhalation Suspension 0.5 milliGRAM(s) Inhalation every 12 hours  digoxin     Tablet 0.125 milliGRAM(s) Oral every other day  guaiFENesin    Syrup 100 milliGRAM(s) Oral every 6 hours PRN  lactulose Syrup 20 Gram(s) Oral four times a day  levothyroxine 25 MICROGram(s) Oral daily  meropenem  IVPB 1000 milliGRAM(s) IV Intermittent every 12 hours  propranolol 10 milliGRAM(s) Oral two times a day  rifaximin 550 milliGRAM(s) Oral two times a day    PMH/PSH/FH/SH: ] Unchanged    ROS:  Unchanged    Vitals:  T(C): 36.7 (09-13-19 @ 05:04), Max: 36.7 (09-12-19 @ 14:57)  HR: 74 (09-13-19 @ 11:43) (65 - 106)  BP: 117/71 (09-13-19 @ 05:04) (106/65 - 120/67)  RR: 18 (09-13-19 @ 05:04) (16 - 18)  SpO2: 97% (09-13-19 @ 11:43) (93% - 97%)    EXAM:  GENERAL:  Alert, no distress  HEENT: Normocephalic, EOM intact, PERRLA  NECK: Normal carotid upstrokes, no bruit; No JVD  CHEST:  Coarse BS, scattered rales/rhonchi L>R  HEART: PMI not displaced. Normal S1 and soft S2.  3/6 harsh systolic murmur across precordium to carotids.  No rub or gallop.  ABDOMEN: Normal bowel sounds, no bruit. Soft, non-tender.  Liver and spleen not palpable; aorta not palpable.  EXT:  No edema, no varicosities, 2+ pulses in upper and lower extremities.  PSYCH:  A&Ox3, normal insight, no anxiety  NEURO: Non-focal  SKIN:  No rash     I&O's Summary    12 Sep 2019 07:01  -  13 Sep 2019 07:00  --------------------------------------------------------  IN: 710 mL / OUT: 1410 mL / NET: -700 mL      LABS:                      11.5   5.6   )-----------( 104      ( 13 Sep 2019 07:17 )             33.5     09-13  135  |  101  |  29<H>  ----------------------------<  81  4.8   |  24  |  0.99    Ca    9.4      13 Sep 2019 07:17    TPro  5.0<L>  /  Alb  2.6<L>  /  TBili  1.2  /  DBili  x   /  AST  28  /  ALT  18  /  AlkPhos  95  09-13    Serum Pro-Brain Natriuretic Peptide: 9585 pg/mL (09-10-19 @ 06:58)

## 2019-09-13 NOTE — PROGRESS NOTE ADULT - SUBJECTIVE AND OBJECTIVE BOX
Patient is a 89y old  Female who presents with a chief complaint of Sepsis (12 Sep 2019 11:51)      SUBJECTIVE / OVERNIGHT EVENTS: NAEO. Today, patient's abdominal pain/dysuria is improving but the cough remains. Denies n/v/d. Denies cp/sob.    MEDICATIONS  (STANDING):  ALBUTerol/ipratropium for Nebulization 3 milliLiter(s) Nebulizer every 6 hours  AQUAPHOR (petrolatum Ointment) 1 Application(s) Topical two times a day  buDESOnide    Inhalation Suspension 0.5 milliGRAM(s) Inhalation every 12 hours  digoxin     Tablet 0.125 milliGRAM(s) Oral every other day  lactulose Syrup 20 Gram(s) Oral four times a day  levothyroxine 25 MICROGram(s) Oral daily  meropenem  IVPB 1000 milliGRAM(s) IV Intermittent every 12 hours  propranolol 10 milliGRAM(s) Oral two times a day  rifaximin 550 milliGRAM(s) Oral two times a day    MEDICATIONS  (PRN):  guaiFENesin    Syrup 100 milliGRAM(s) Oral every 6 hours PRN Cough      Vital Signs Last 24 Hrs  T(C): 36.7 (13 Sep 2019 05:04), Max: 36.7 (12 Sep 2019 14:57)  T(F): 98 (13 Sep 2019 05:04), Max: 98.1 (13 Sep 2019 01:34)  HR: 74 (13 Sep 2019 11:43) (65 - 106)  BP: 117/71 (13 Sep 2019 05:04) (106/65 - 120/67)  BP(mean): --  RR: 18 (13 Sep 2019 05:04) (16 - 18)  SpO2: 97% (13 Sep 2019 11:43) (93% - 99%)  CAPILLARY BLOOD GLUCOSE        I&O's Summary    12 Sep 2019 07:01  -  13 Sep 2019 07:00  --------------------------------------------------------  IN: 710 mL / OUT: 1410 mL / NET: -700 mL        PHYSICAL EXAM:  GENERAL: cachectic, NAD, lying in hospital bed, sleepy but eventually alert and oriented x3  HEENT: EOMI, MMM, neck supple  Cardiac: +grade 3 holosystolic murmur in 2nd intercostal space on the right  Chest: crackles on inspiration in lower left lung, right lung clear to auscultation  Abdomen: soft, nontender to shallow/deep palpation, +BS  Pelvis: +external female catheter connected to suction  Ext: dry skin, +ecchymosis from lab draw sites with no acute bleeding (IV in R bicep and L hand), +2 peripheral pulses, sensation/motor intact in ext x4    LABS:                        11.5   5.6   )-----------( 104      ( 13 Sep 2019 07:17 )             33.5     09-13    135  |  101  |  29<H>  ----------------------------<  81  4.8   |  24  |  0.99    Ca    9.4      13 Sep 2019 07:17    TPro  5.0<L>  /  Alb  2.6<L>  /  TBili  1.2  /  DBili  x   /  AST  28  /  ALT  18  /  AlkPhos  95  09-13                  RADIOLOGY & ADDITIONAL TESTS: no new    Imaging Personally Reviewed: yes    Consultant(s) Notes Reviewed:  yes, cardiology, ID    Care Discussed with Consultants/Other Providers: yes

## 2019-09-13 NOTE — PROGRESS NOTE ADULT - PROBLEM SELECTOR PLAN 1
-source is likely aspiration PNA with ITALIA and LLL consolidations on CT chest 9/8. Blood cx pending. UA+, UCx (9/8) - >100k Klebsiella EBSL. off midodrine - hemodynamically stable.  -c/w with Meropenem 1 g q 12 hours (day 6 of 8 - extended regimen)  -start *** for cough suppression and expectorant  -monitor patient's WBC, temperature curve and clinical status  -speech and swallow consult placed  -ID, speech and swallow consult recs appreciated -source is likely aspiration PNA with ITALIA and LLL consolidations on CT chest 9/8. Blood cx pending. UA+, UCx (9/8) - >100k Klebsiella EBSL. off midodrine - hemodynamically stable.  -c/w with Meropenem 1 g q 12 hours (day 6 of 8 - extended regimen)  -given incentive spirometer to encourage deep inspirations  -c/w use acapella to facilitate airway clearance  -c/w guaifenesin syrup for cough (w/o dextromethorphan due to pt refusal)  -monitor patient's WBC, temperature curve and clinical status  -speech and swallow consult placed  -ID, speech and swallow consult recs appreciated

## 2019-09-14 DIAGNOSIS — R13.10 DYSPHAGIA, UNSPECIFIED: ICD-10-CM

## 2019-09-14 LAB
ALBUMIN SERPL ELPH-MCNC: 2.9 G/DL — LOW (ref 3.3–5)
ALP SERPL-CCNC: 121 U/L — HIGH (ref 40–120)
ALT FLD-CCNC: 24 U/L — SIGNIFICANT CHANGE UP (ref 10–45)
ANION GAP SERPL CALC-SCNC: 13 MMOL/L — SIGNIFICANT CHANGE UP (ref 5–17)
AST SERPL-CCNC: 43 U/L — HIGH (ref 10–40)
BILIRUB SERPL-MCNC: 1.2 MG/DL — SIGNIFICANT CHANGE UP (ref 0.2–1.2)
BUN SERPL-MCNC: 29 MG/DL — HIGH (ref 7–23)
CALCIUM SERPL-MCNC: 9.9 MG/DL — SIGNIFICANT CHANGE UP (ref 8.4–10.5)
CHLORIDE SERPL-SCNC: 104 MMOL/L — SIGNIFICANT CHANGE UP (ref 96–108)
CO2 SERPL-SCNC: 23 MMOL/L — SIGNIFICANT CHANGE UP (ref 22–31)
CREAT SERPL-MCNC: 0.96 MG/DL — SIGNIFICANT CHANGE UP (ref 0.5–1.3)
GLUCOSE SERPL-MCNC: 88 MG/DL — SIGNIFICANT CHANGE UP (ref 70–99)
HCT VFR BLD CALC: 41.2 % — SIGNIFICANT CHANGE UP (ref 34.5–45)
HGB BLD-MCNC: 13.2 G/DL — SIGNIFICANT CHANGE UP (ref 11.5–15.5)
MCHC RBC-ENTMCNC: 32.1 GM/DL — SIGNIFICANT CHANGE UP (ref 32–36)
MCHC RBC-ENTMCNC: 33.9 PG — SIGNIFICANT CHANGE UP (ref 27–34)
MCV RBC AUTO: 106 FL — HIGH (ref 80–100)
PLATELET # BLD AUTO: 133 K/UL — LOW (ref 150–400)
POTASSIUM SERPL-MCNC: 5.1 MMOL/L — SIGNIFICANT CHANGE UP (ref 3.5–5.3)
POTASSIUM SERPL-SCNC: 5.1 MMOL/L — SIGNIFICANT CHANGE UP (ref 3.5–5.3)
PROT SERPL-MCNC: 5.5 G/DL — LOW (ref 6–8.3)
RBC # BLD: 3.9 M/UL — SIGNIFICANT CHANGE UP (ref 3.8–5.2)
RBC # FLD: 12.8 % — SIGNIFICANT CHANGE UP (ref 10.3–14.5)
SODIUM SERPL-SCNC: 140 MMOL/L — SIGNIFICANT CHANGE UP (ref 135–145)
WBC # BLD: 7 K/UL — SIGNIFICANT CHANGE UP (ref 3.8–10.5)
WBC # FLD AUTO: 7 K/UL — SIGNIFICANT CHANGE UP (ref 3.8–10.5)

## 2019-09-14 PROCEDURE — 99232 SBSQ HOSP IP/OBS MODERATE 35: CPT | Mod: GC

## 2019-09-14 RX ORDER — SODIUM CHLORIDE 0.65 %
1 AEROSOL, SPRAY (ML) NASAL EVERY 4 HOURS
Refills: 0 | Status: DISCONTINUED | OUTPATIENT
Start: 2019-09-14 | End: 2019-09-15

## 2019-09-14 RX ORDER — ACETYLCYSTEINE 200 MG/ML
4 VIAL (ML) MISCELLANEOUS THREE TIMES A DAY
Refills: 0 | Status: COMPLETED | OUTPATIENT
Start: 2019-09-14 | End: 2019-09-15

## 2019-09-14 RX ADMIN — LACTULOSE 20 GRAM(S): 10 SOLUTION ORAL at 23:28

## 2019-09-14 RX ADMIN — Medication 0.5 MILLIGRAM(S): at 06:12

## 2019-09-14 RX ADMIN — Medication 0.5 MILLIGRAM(S): at 17:25

## 2019-09-14 RX ADMIN — Medication 1 APPLICATION(S): at 05:54

## 2019-09-14 RX ADMIN — LACTULOSE 20 GRAM(S): 10 SOLUTION ORAL at 11:02

## 2019-09-14 RX ADMIN — Medication 4 MILLILITER(S): at 11:49

## 2019-09-14 RX ADMIN — Medication 25 MICROGRAM(S): at 05:55

## 2019-09-14 RX ADMIN — MEROPENEM 100 MILLIGRAM(S): 1 INJECTION INTRAVENOUS at 17:34

## 2019-09-14 RX ADMIN — LACTULOSE 20 GRAM(S): 10 SOLUTION ORAL at 17:34

## 2019-09-14 RX ADMIN — Medication 3 MILLILITER(S): at 06:12

## 2019-09-14 RX ADMIN — Medication 3 MILLILITER(S): at 11:49

## 2019-09-14 RX ADMIN — Medication 1 APPLICATION(S): at 17:33

## 2019-09-14 RX ADMIN — Medication 3 MILLILITER(S): at 23:23

## 2019-09-14 RX ADMIN — Medication 4 MILLILITER(S): at 23:24

## 2019-09-14 RX ADMIN — LACTULOSE 20 GRAM(S): 10 SOLUTION ORAL at 05:55

## 2019-09-14 RX ADMIN — Medication 3 MILLILITER(S): at 17:25

## 2019-09-14 RX ADMIN — MEROPENEM 100 MILLIGRAM(S): 1 INJECTION INTRAVENOUS at 05:55

## 2019-09-14 RX ADMIN — LACTULOSE 20 GRAM(S): 10 SOLUTION ORAL at 00:57

## 2019-09-14 NOTE — PROGRESS NOTE ADULT - PROBLEM SELECTOR PLAN 8
-DVT: subq heparin  -diet: dysphagia 3 nectar consistency  -Dispo: per PT, home w/ home PT; home w/ assist; Has HHA 10 hrs/day, daughter assists at other times. Patient uses rolling walker and rollator at home.

## 2019-09-14 NOTE — SWALLOW BEDSIDE ASSESSMENT ADULT - SLP GENERAL OBSERVATIONS
Pt awake OOB in chair, daughter present at bedside. Pt able to communicate needs and follow simple and complex directions for exam. + Baseline cough.

## 2019-09-14 NOTE — PROGRESS NOTE ADULT - PROBLEM SELECTOR PLAN 1
-source is likely aspiration PNA with ITALIA and LLL consolidations on CT chest 9/8. Blood cx pending. UA+, UCx (9/8) - >100k Klebsiella EBSL. off midodrine - hemodynamically stable.  -c/w with Meropenem 1 g q 12 hours (day 6 of 8 - extended regimen)  -given incentive spirometer to encourage deep inspirations  -c/w use acapella to facilitate airway clearance  -c/w guaifenesin syrup for cough (w/o dextromethorphan due to pt refusal)  -monitor patient's WBC, temperature curve and clinical status  -speech and swallow consult placed  -ID, speech and swallow consult recs appreciated -source is likely aspiration PNA with ITALIA and LLL consolidations on CT chest 9/8. Blood cx pending. UA+, UCx (9/8) - >100k Klebsiella EBSL. off midodrine - hemodynamically stable. afebrile.  -c/w with Meropenem 1 g q 12 hours (day 7 of 8 - extended regimen)  -c/w incentive spirometer to encourage deep inspirations  -c/w use acapella to facilitate airway clearance  -c/w guaifenesin syrup for cough (w/o dextromethorphan due to pt refusal)  -monitor patient's WBC, temperature curve and clinical status  -speech and swallow consult placed  -ID, speech and swallow consult recs appreciated

## 2019-09-14 NOTE — SWALLOW BEDSIDE ASSESSMENT ADULT - ASR SWALLOW ASPIRATION MONITOR
fever/pneumonia/throat clearing/upper respiratory infection/Monitor for s/s aspiration/laryngeal penetration. If noted:  D/C p.o. intake, provide non-oral nutrition/hydration/meds, and contact this service @ [phone]/cough/gurgly voice/change of breathing pattern

## 2019-09-14 NOTE — PROGRESS NOTE ADULT - PROBLEM SELECTOR PLAN 2
not on anticoag due to multiple comorbidities (ie. history of bleeding, esophageal varices)  - [id number]-Meex score 3, on digoxin at home - rate currently controlled 70s-80s  - dig level = 0.8 wnl  - Dr. Boston (cardiology) recs appreciated

## 2019-09-14 NOTE — SWALLOW BEDSIDE ASSESSMENT ADULT - SWALLOW EVAL: PATIENT/FAMILY GOALS STATEMENT
Pt's daughter reported that whenever pt gets sick, swallowing gets worse and daughter therefore thickens liquids to aid with swallowing. Pt was admitted to this hospital 2 years ago with MBS testing resulting in recommendations for NPO, with non-oral nutrition/hydration/medications. Per daughter, repeat swallow study at rehab with significantly improved outcomes however daughter unable to specify results.

## 2019-09-14 NOTE — SWALLOW BEDSIDE ASSESSMENT ADULT - PHARYNGEAL PHASE
episodes of audible swallows/Decreased laryngeal elevation Decreased laryngeal elevation episodes of audible swallows, daughter reported frequent coughing with PO intake of thin liquids./Decreased laryngeal elevation

## 2019-09-14 NOTE — PROGRESS NOTE ADULT - SUBJECTIVE AND OBJECTIVE BOX
Patient is a 89y old  Female who presents with a chief complaint of Sepsis (13 Sep 2019 13:55)      SUBJECTIVE / OVERNIGHT EVENTS:    MEDICATIONS  (STANDING):  acetylcysteine 20%  Inhalation 4 milliLiter(s) Inhalation three times a day  ALBUTerol/ipratropium for Nebulization 3 milliLiter(s) Nebulizer every 6 hours  AQUAPHOR (petrolatum Ointment) 1 Application(s) Topical two times a day  buDESOnide    Inhalation Suspension 0.5 milliGRAM(s) Inhalation every 12 hours  digoxin     Tablet 0.125 milliGRAM(s) Oral every other day  lactulose Syrup 20 Gram(s) Oral four times a day  levothyroxine 25 MICROGram(s) Oral daily  meropenem  IVPB 1000 milliGRAM(s) IV Intermittent every 12 hours  propranolol 10 milliGRAM(s) Oral two times a day  rifaximin 550 milliGRAM(s) Oral two times a day    MEDICATIONS  (PRN):  guaiFENesin    Syrup 100 milliGRAM(s) Oral every 6 hours PRN Cough  sodium chloride 0.65% Nasal 1 Spray(s) Both Nostrils every 4 hours PRN Nasal Congestion      Vital Signs Last 24 Hrs  T(C): 36.3 (14 Sep 2019 11:16), Max: 36.7 (13 Sep 2019 22:00)  T(F): 97.4 (14 Sep 2019 11:16), Max: 98.1 (14 Sep 2019 01:35)  HR: 70 (14 Sep 2019 11:50) (56 - 84)  BP: 95/43 (14 Sep 2019 11:16) (95/43 - 117/73)  BP(mean): --  RR: 18 (14 Sep 2019 11:16) (18 - 18)  SpO2: 96% (14 Sep 2019 11:50) (93% - 98%)  CAPILLARY BLOOD GLUCOSE        I&O's Summary    13 Sep 2019 07:01  -  14 Sep 2019 07:00  --------------------------------------------------------  IN: 1090 mL / OUT: 800 mL / NET: 290 mL    14 Sep 2019 07:01  -  14 Sep 2019 12:52  --------------------------------------------------------  IN: 200 mL / OUT: 0 mL / NET: 200 mL      PHYSICAL EXAM:  GENERAL: cachectic, NAD, sitting in hospital bed and eating breakfast, alert and oriented x3  HEENT: EOMI, MMM, neck supple  Cardiac: +grade 3 holosystolic murmur in 2nd intercostal space on the right  Chest: crackles on inspiration in lower left lung, right lung clear to auscultation  Abdomen: soft, nontender to shallow/deep palpation, +BS  Pelvis: +external female catheter connected to suction  Ext: dry skin, +ecchymosis from lab draw sites with no acute bleeding (IV in R bicep and L hand), +2 peripheral pulses, sensation/motor intact in ext x4      LABS:                        13.2   7.0   )-----------( 133      ( 14 Sep 2019 07:04 )             41.2     09-14    140  |  104  |  29<H>  ----------------------------<  88  5.1   |  23  |  0.96    Ca    9.9      14 Sep 2019 07:06    TPro  5.5<L>  /  Alb  2.9<L>  /  TBili  1.2  /  DBili  x   /  AST  43<H>  /  ALT  24  /  AlkPhos  121<H>  09-14                  RADIOLOGY & ADDITIONAL TESTS:    Imaging Personally Reviewed:    Consultant(s) Notes Reviewed:      Care Discussed with Consultants/Other Providers: Patient is a 89y old  Female who presents with a chief complaint of Sepsis (13 Sep 2019 13:55)      SUBJECTIVE / OVERNIGHT EVENTS: NAEO. Today, patient is comfortable but still endorses congested cough. Feels that she is improving symptomatically. Denies pain. Denies sob/cp. Denies n/v/d. Denies dysuria.    MEDICATIONS  (STANDING):  acetylcysteine 20%  Inhalation 4 milliLiter(s) Inhalation three times a day  ALBUTerol/ipratropium for Nebulization 3 milliLiter(s) Nebulizer every 6 hours  AQUAPHOR (petrolatum Ointment) 1 Application(s) Topical two times a day  buDESOnide    Inhalation Suspension 0.5 milliGRAM(s) Inhalation every 12 hours  digoxin     Tablet 0.125 milliGRAM(s) Oral every other day  lactulose Syrup 20 Gram(s) Oral four times a day  levothyroxine 25 MICROGram(s) Oral daily  meropenem  IVPB 1000 milliGRAM(s) IV Intermittent every 12 hours  propranolol 10 milliGRAM(s) Oral two times a day  rifaximin 550 milliGRAM(s) Oral two times a day    MEDICATIONS  (PRN):  guaiFENesin    Syrup 100 milliGRAM(s) Oral every 6 hours PRN Cough  sodium chloride 0.65% Nasal 1 Spray(s) Both Nostrils every 4 hours PRN Nasal Congestion      Vital Signs Last 24 Hrs  T(C): 36.3 (14 Sep 2019 11:16), Max: 36.7 (13 Sep 2019 22:00)  T(F): 97.4 (14 Sep 2019 11:16), Max: 98.1 (14 Sep 2019 01:35)  HR: 70 (14 Sep 2019 11:50) (56 - 84)  BP: 95/43 (14 Sep 2019 11:16) (95/43 - 117/73)  BP(mean): --  RR: 18 (14 Sep 2019 11:16) (18 - 18)  SpO2: 96% (14 Sep 2019 11:50) (93% - 98%)  CAPILLARY BLOOD GLUCOSE        I&O's Summary    13 Sep 2019 07:01  -  14 Sep 2019 07:00  --------------------------------------------------------  IN: 1090 mL / OUT: 800 mL / NET: 290 mL    14 Sep 2019 07:01  -  14 Sep 2019 12:52  --------------------------------------------------------  IN: 200 mL / OUT: 0 mL / NET: 200 mL      PHYSICAL EXAM:  GENERAL: cachectic, NAD, sitting in hospital bed and eating breakfast, alert and oriented x3  HEENT: EOMI, MMM, neck supple  Cardiac: +grade 3 holosystolic murmur in 2nd intercostal space on the right  Chest: mild crackles on inspiration in lower left lung, right lung clear to auscultation  Abdomen: soft, nontender to shallow/deep palpation, +BS  Pelvis: +external female catheter connected to suction  Ext: dry skin, +ecchymosis from lab draw sites with no acute bleeding (IV in R bicep and L hand), +2 peripheral pulses, sensation/motor intact in ext x4      LABS:                        13.2   7.0   )-----------( 133      ( 14 Sep 2019 07:04 )             41.2     09-14    140  |  104  |  29<H>  ----------------------------<  88  5.1   |  23  |  0.96    Ca    9.9      14 Sep 2019 07:06    TPro  5.5<L>  /  Alb  2.9<L>  /  TBili  1.2  /  DBili  x   /  AST  43<H>  /  ALT  24  /  AlkPhos  121<H>  09-14                  RADIOLOGY & ADDITIONAL TESTS: no new    Imaging Personally Reviewed: yes    Consultant(s) Notes Reviewed:  yes, speech and swallow and ID and cardiology    Care Discussed with Consultants/Other Providers: Patient is a 89y old  Female who presents with a chief complaint of Sepsis (13 Sep 2019 13:55)      SUBJECTIVE / OVERNIGHT EVENTS: NAEO. Today, patient is comfortable but still endorses congested cough. Feels that she is improving. Denies pain. Denies sob/cp. Denies n/v/d. Denies dysuria.    MEDICATIONS  (STANDING):  acetylcysteine 20%  Inhalation 4 milliLiter(s) Inhalation three times a day  ALBUTerol/ipratropium for Nebulization 3 milliLiter(s) Nebulizer every 6 hours  AQUAPHOR (petrolatum Ointment) 1 Application(s) Topical two times a day  buDESOnide    Inhalation Suspension 0.5 milliGRAM(s) Inhalation every 12 hours  digoxin     Tablet 0.125 milliGRAM(s) Oral every other day  lactulose Syrup 20 Gram(s) Oral four times a day  levothyroxine 25 MICROGram(s) Oral daily  meropenem  IVPB 1000 milliGRAM(s) IV Intermittent every 12 hours  propranolol 10 milliGRAM(s) Oral two times a day  rifaximin 550 milliGRAM(s) Oral two times a day    MEDICATIONS  (PRN):  guaiFENesin    Syrup 100 milliGRAM(s) Oral every 6 hours PRN Cough  sodium chloride 0.65% Nasal 1 Spray(s) Both Nostrils every 4 hours PRN Nasal Congestion      Vital Signs Last 24 Hrs  T(C): 36.3 (14 Sep 2019 11:16), Max: 36.7 (13 Sep 2019 22:00)  T(F): 97.4 (14 Sep 2019 11:16), Max: 98.1 (14 Sep 2019 01:35)  HR: 70 (14 Sep 2019 11:50) (56 - 84)  BP: 95/43 (14 Sep 2019 11:16) (95/43 - 117/73)  BP(mean): --  RR: 18 (14 Sep 2019 11:16) (18 - 18)  SpO2: 96% (14 Sep 2019 11:50) (93% - 98%)  CAPILLARY BLOOD GLUCOSE        I&O's Summary    13 Sep 2019 07:01  -  14 Sep 2019 07:00  --------------------------------------------------------  IN: 1090 mL / OUT: 800 mL / NET: 290 mL    14 Sep 2019 07:01  -  14 Sep 2019 12:52  --------------------------------------------------------  IN: 200 mL / OUT: 0 mL / NET: 200 mL      PHYSICAL EXAM:  GENERAL: cachectic, NAD, sitting in hospital bed and eating breakfast, alert and oriented x3  HEENT: EOMI, MMM, neck supple  Cardiac: +grade 3 holosystolic murmur in 2nd intercostal space on the right  Chest: mild crackles on inspiration in lower left lung, right lung clear to auscultation  Abdomen: soft, nontender to shallow/deep palpation, +BS  Pelvis: +external female catheter connected to suction  Ext: dry skin, +ecchymosis from lab draw sites with no acute bleeding (IV in R bicep and L hand), +2 peripheral pulses, sensation/motor intact in ext x4      LABS:                        13.2   7.0   )-----------( 133      ( 14 Sep 2019 07:04 )             41.2     09-14    140  |  104  |  29<H>  ----------------------------<  88  5.1   |  23  |  0.96    Ca    9.9      14 Sep 2019 07:06    TPro  5.5<L>  /  Alb  2.9<L>  /  TBili  1.2  /  DBili  x   /  AST  43<H>  /  ALT  24  /  AlkPhos  121<H>  09-14                  RADIOLOGY & ADDITIONAL TESTS: no new    Imaging Personally Reviewed: yes    Consultant(s) Notes Reviewed:  yes, speech and swallow and ID and cardiology    Care Discussed with Consultants/Other Providers: yes

## 2019-09-14 NOTE — SWALLOW BEDSIDE ASSESSMENT ADULT - SLP PERTINENT HISTORY OF CURRENT PROBLEM
89 year old female with a pmhx of COPD, CKD, CLL, HCC/cirrhosis, a-fib, AS, and hypothyroid, is brought to ED by EMS for evaluation of fever to 102.The pt reports that she has been having cough for the past week that was productive of clear sputum. The pt went to a PMD due to temperature in the 99 range at home, which is above her baseline, who prescribed Doxy but the pt didn't take the Abx because she was told to take Abx only if she spiked a fever to 101. The pt denies chills/n/v/d or other issues, In the ED pt was found to have SBP in 80s and MAPs in 50s after 1.5 L; she was started on levo .06 due to poor response to fluids, now SBPs in 90s with MAPs in 60s. Pt also received Levaquin for suspected PNA. Now admitted to the MICU for management of septic shock.

## 2019-09-14 NOTE — SWALLOW BEDSIDE ASSESSMENT ADULT - SWALLOW EVAL: RECOMMENDED DIET
Dysphagia III with nectar thick liquids pending MBS (however pt is refusing objective testing at present time).

## 2019-09-14 NOTE — SWALLOW BEDSIDE ASSESSMENT ADULT - SWALLOW EVAL: RECOMMENDED FEEDING/EATING TECHNIQUES
no straws/position upright (90 degrees)/maintain upright posture during/after eating for 30 mins/small sips/bites

## 2019-09-14 NOTE — PROGRESS NOTE ADULT - PROBLEM SELECTOR PLAN 7
-DVT: subq heparin  -Dispo: per PT, home w/ home PT; home w/ assist; Has HHA 10 hrs/day, daughter assists at other times. Patient uses rolling walker and rollator at home. -DVT: subq heparin  -diet: dysphagia 3 nectar consistency  -Dispo: per PT, home w/ home PT; home w/ assist; Has HHA 10 hrs/day, daughter assists at other times. Patient uses rolling walker and rollator at home. Per daughter, pt has increased coughing when drinking water.  - change to dysphagia 3 nectar diet  - per speech and swallow, rec home speech therapist f/u  - per s&s, tentative recs for MBS - pt refuses  - speech and swallow recs appreciated.

## 2019-09-14 NOTE — SWALLOW BEDSIDE ASSESSMENT ADULT - COMMENTS
Pt presented with fever and cough, found to be hypotensive in ED requiring pressors and now admitted to the MICU for sepsis 2/2 PNA. On 9/9 pt weaned off pressors, satting well on RA, deemed stable for floors. On 9/10 ID called for sepsis, ?pna, ?uti, hx esbl  Patient's pneumonia is highly suspicious for aspiration pneumonia. Urine culture may be indicative of colonization. Of note, she has a history of ESBL Klebsiella. Recommendations included S+S  9/12 medicine documenting Pt still tenuous, too weak to clear up airway. D/w ID plan to extend abx for 3 more days.   Chest CT on 9/8: Peribronchovascular and nodular opacities within the left upper and lower lobes representing pneumonia. Near obliteration of left lower lobe bronchus secondary to retained secretions. Cardiomegaly. Severe aortic valve leaflet calcifications. Dilated main pulmonary artery may represent pulmonary hypertension. Coronary and aortic atherosclerosis.  CXR: Left basilar opacity which may represent atelectasis or infectious etiology.    Please see addendum for swallow history

## 2019-09-14 NOTE — SWALLOW BEDSIDE ASSESSMENT ADULT - SWALLOW EVAL: DIAGNOSIS
Pt presents with an oral and suspected pharyngeal dysphagia marked by prolonged mastication and delayed oral transit time (with mechanical soft and solid textures), reduced hyolaryngeal excursion, episodes of audible swallows, and reports of coughing post PO intake of thin liquids.

## 2019-09-14 NOTE — SWALLOW BEDSIDE ASSESSMENT ADULT - ADDITIONAL RECOMMENDATIONS
Maintain good oral hygiene    Results of exam, rationale for evaluation, and recommendations including dysphagia diet pending MBS testing discussed extensively with pt and daughter. Daughter educated re: risks/complications associated with laryngeal penetration/aspiration and has accepted the same and wishes pt to remain on PO diet for quality of life.   This service to remain available.

## 2019-09-14 NOTE — PROGRESS NOTE ADULT - ASSESSMENT
89 year old female with a pmhx of COPD, CKD, CLL, HCC/cirrhosis, a-fib, AS, and hypothyroid, admitted to MICU for septic shock, now improving on abx, afebrile, and transferred to medicine floor in hemodynamically stable condition. DNR but not DNI, MOLST in paper chart. Pt improving but still frail and endorsing a cough with rhinorrhea. Today, she is on meropenem day 6 of 8, extended due to poor clinical improvement. She is able to be breathe comfortably >92% O2 saturation on room air and is hemodynamically stable weaned off of midocrine. Today, she continues to have a productive cough and abnormal lung exam. At this time, she requires inpatient hospitalization to receive IV antibiotics and to monitor BP. 89 year old female with a pmhx of COPD, CKD, CLL, HCC/cirrhosis, a-fib, AS, and hypothyroid, admitted to MICU for septic shock, now improving on abx, afebrile, and transferred to medicine floor in hemodynamically stable condition. DNR but not DNI, MOLST in paper chart. Pt improving but still frail and endorsing a cough with rhinorrhea. Today, she is on meropenem day 7 of 8, extended due to poor clinical improvement. She is able to be breathe comfortably >92% O2 saturation on room air and is hemodynamically stable weaned off of midocrine. Today, although improving, she continues to have a productive cough and abnormal lung exam. At this time, she requires inpatient hospitalization to receive IV antibiotics and to monitor BP.

## 2019-09-15 ENCOUNTER — TRANSCRIPTION ENCOUNTER (OUTPATIENT)
Age: 84
End: 2019-09-15

## 2019-09-15 VITALS — OXYGEN SATURATION: 95 %

## 2019-09-15 PROCEDURE — 99239 HOSP IP/OBS DSCHRG MGMT >30: CPT

## 2019-09-15 RX ADMIN — Medication 25 MICROGRAM(S): at 06:07

## 2019-09-15 RX ADMIN — Medication 3 MILLILITER(S): at 17:30

## 2019-09-15 RX ADMIN — Medication 1 APPLICATION(S): at 17:41

## 2019-09-15 RX ADMIN — LACTULOSE 20 GRAM(S): 10 SOLUTION ORAL at 11:53

## 2019-09-15 RX ADMIN — Medication 4 MILLILITER(S): at 05:33

## 2019-09-15 RX ADMIN — Medication 3 MILLILITER(S): at 12:25

## 2019-09-15 RX ADMIN — Medication 1 APPLICATION(S): at 06:07

## 2019-09-15 RX ADMIN — Medication 0.5 MILLIGRAM(S): at 17:30

## 2019-09-15 RX ADMIN — LACTULOSE 20 GRAM(S): 10 SOLUTION ORAL at 17:41

## 2019-09-15 RX ADMIN — Medication 0.12 MILLIGRAM(S): at 11:52

## 2019-09-15 RX ADMIN — Medication 3 MILLILITER(S): at 05:33

## 2019-09-15 RX ADMIN — LACTULOSE 20 GRAM(S): 10 SOLUTION ORAL at 06:07

## 2019-09-15 RX ADMIN — MEROPENEM 100 MILLIGRAM(S): 1 INJECTION INTRAVENOUS at 06:08

## 2019-09-15 NOTE — PROGRESS NOTE ADULT - PROBLEM SELECTOR PROBLEM 5
COPD (Chronic Obstructive Pulmonary Disease)
Prophylactic measure

## 2019-09-15 NOTE — PROGRESS NOTE ADULT - PROBLEM SELECTOR PLAN 1
-Resolving  -Initally presented with septic shock secondary to aspiration PNA with ITALIA and LLL consolidations on CT chest 9/8.   -Blood cx pending. UA+, UCx (9/8) - >100k Klebsiella EBSL. off midodrine - hemodynamically stable. afebrile.  -c/w with Meropenem 1 g q 12 hours (day 7 of 8 - extended regimen)  -c/w incentive spirometer to encourage deep inspirations  -c/w use acapella to facilitate airway clearance  -c/w guaifenesin syrup for cough (w/o dextromethorphan due to pt refusal)  -monitor patient's WBC, temperature curve and clinical status  -speech and swallow consult placed  -ID, speech and swallow consult recs appreciated -Resolving  -Initally presented with septic shock secondary to aspiration PNA with ITALIA and LLL consolidations on CT chest 9/8.   -Blood cx pending. UA+, UCx (9/8) - >100k Klebsiella EBSL.   -Currently HD stable, remains afebrile.  -Continue with Meropenem 1 g q 12 hours.  Today will be last dose per ID consult.  -Continue with incentive spirometer to encourage deep inspirations  -Continue with acapella to facilitate airway clearance  -c/w guaifenesin syrup for cough (w/o dextromethorphan due to pt refusal)

## 2019-09-15 NOTE — PROGRESS NOTE ADULT - PROBLEM SELECTOR PROBLEM 4
Portal hypertension
CKD (chronic kidney disease) stage 3, GFR 30-59 ml/min

## 2019-09-15 NOTE — PROGRESS NOTE ADULT - PROBLEM SELECTOR PLAN 6
-renally dose all meds. Meropenem dose ok per pharmacy  -avoid nephrotoxins -Stable  -Avoid nephrotoxins

## 2019-09-15 NOTE — DISCHARGE NOTE PROVIDER - HOSPITAL COURSE
Patient is 89 y/o female with medical history of COPD, CKD, CLL (last chemo 8-9 yrs ago), HCC/cirrhosis (on lactulose/rifaximin), a-fib, AS, and hypothyroidism, who was BIBEMS presenting with fever/cough x 1 week. Patient saw PMD who prescribed doxycycline but pt did not take any as she was told to take only if she was febrile to 101F. In ED patient was found to be hypotensive but BP remained in 90s with MAPs in 60s s/p 1.5L IVF bolus. Patient given levaquin and started on levophed gtt. Admitted to MICU for management of septic shock 2/2 pneumonia with LLL and ITALIA consolidations on CT chest.  Patient was given vancomycin and meropenem.  Infectious disease consulted and recommended IV antibiotic for 7 days.  Patient clinically improved back to baseline.  Patient medically cleared for discharge to home with home services.

## 2019-09-15 NOTE — PROGRESS NOTE ADULT - PROBLEM SELECTOR PLAN 3
Severe AS/mod AI; mod MR and TR in stable condition  - per cards, not a candidate for AVR due to multiple co-morbidities  - per cards, reasonable to hold spironolactone for now; would resume if LE edema recurs.  - cardiology rec appreciated
-continuous pulse ox, vitals q4  -Chest PT and Duonebs q6

## 2019-09-15 NOTE — DISCHARGE NOTE PROVIDER - NSDCCPCAREPLAN_GEN_ALL_CORE_FT
PRINCIPAL DISCHARGE DIAGNOSIS  Diagnosis: Pneumonia  Assessment and Plan of Treatment:       SECONDARY DISCHARGE DIAGNOSES  Diagnosis: Septic shock  Assessment and Plan of Treatment: Septic shock    Diagnosis: Portal hypertension  Assessment and Plan of Treatment: Portal hypertension    Diagnosis: Aortic stenosis  Assessment and Plan of Treatment: Aortic stenosis    Diagnosis: COPD (Chronic Obstructive Pulmonary Disease)  Assessment and Plan of Treatment: COPD (Chronic Obstructive Pulmonary Disease)

## 2019-09-15 NOTE — PROGRESS NOTE ADULT - ATTENDING COMMENTS
Patient seen and examined. Laboratory data and imaging reviewed. Patient with extensive medical history including critical AS and COPD who presents with fevers, cough with sputum production and hypotension. She was noted to be hypotensive in the ED after 1.5 L of IVF and was started on vasopressors given concern for volume overload and admitted to MICU.    1. Septic Shock  - Continue Midodrine  -  Patient has been off vasopressors and maintains MAP > 65  - Likely due to pneumonia - followup cultures and check sputum culture if able  2. Acute Hypoxemic Respiratory Failure - in setting of pneumonia  - CT scan reviewed  - Aggressive chest PT and airway clearance - will need Acapella  - DNR but not DNI  - Continue antibiotics  3. Cardiovascular  - Critical AS - monitor volume status  4. ID  - Likely pneumonia - followup cultures  - Urine culture > 100K GNR and patient with history of ESBL Klebsiella but without urinary symptoms  5. GOC  - MOLST in chart  - She does want full medical treatment otherwise    Patient is medically stable for transfer to the medical floors for further monitoring and care.
pneumonia causing septic shock - off Pressors and now on Midodrine.  appears frail.  +cough - unable to fully expectorate mucus  a.fib not on AC due to prior bleeding  AS/pulm HTN  denies urinary symptoms.    continue abx, nebs, chest PT.  vitals q4, pulse ox monitoring.  suggest ID consult to optimize abx mgmt - pneumonia seems to be the primary  of illness not UTI currently.    CKD III: avoid nephrotoxics. monitor intake and output.     patient with multiple co-morbid conditions; high risk for future complications despite optimal medical therapy.  DNR not DNI    Ken Solis MD, MHA, FACP, FHM  Pager: [phone]  If no response or off-hours, page [phone]
Likely aspiration PNA with ESBL Kleb UTI - on day 4/5 meropenem  BP much improved now, dc midodrine, will resume BB tmrw  appreciate ID, card recs  analisa planning if continues to improve    Lorrie Molina MD  Division of Hospital Medicine  Pager: [phone]  Office: [phone]
Kim Pimentel D.O.  Hospitalist 
Pt still tenuous, too weak to clear up airway  care discussed with Dr. Irizarry - recommend extension of meropenem for 3 more days  updated daughter/patient who are in agreement with the plan  dc planning on Sunday    Lorrie Molina MD  Division of Hospital Medicine  Pager: [phone]  Office: [phone]
septic shock 2/2 aspiration pna +/- UTI  care discussed with Dr. Irizarry and Dr. Boston - agree with continuation of meropenem pending culture results. wean off midodrine as tolerated (baseline SBP outpatient 100-110s)  PT consult, OOB to chair    Lorrie Molina MD  Division of Hospital Medicine  Pager: [phone]  Office: [phone]
septic shock 2/2 PNA/UTI  on meropenem with improvement  dc planning tmrw after meropenem  pt/daughter in agreement    Lorrie Molina MD  Division of Hospital Medicine  Pager: [phone]  Office: [phone]
89F admitted with septic shock  2/2 aspiration PNA and UTI, s/p course of meropenem with much improvement. Pt is stable for discharge to home with close follow up with PMD/cardiologist. Pt/daughters in agreement with dc planning. All questions and concerns were addressed    43 minutes spent on discharge process    Lorrie Molina MD  Division of Hospital Medicine  Pager: [phone]  Office: [phone]

## 2019-09-15 NOTE — DISCHARGE NOTE PROVIDER - NSDCFUSCHEDAPPT_GEN_ALL_CORE_FT
HA JACQUES ; 10/15/2019 ; NP Hepatology [address]  HA JACQUES ; 11/14/2019 ; NPP Med Int [address] [facility]  HA JACQUES ; 12/13/2019 ; NP Cardio [address] [facility] HA JACQUES ; 10/15/2019 ; NP Hepatology [address]  HA JACQUES ; 11/14/2019 ; NPP Med Int [address] [facility]  HA JACQUES ; 12/13/2019 ; NP Cardio [address] [facility] HA JACQUES ; 10/15/2019 ; NP Hepatology [address]  HA JACQUES ; 11/14/2019 ; NPP Med Int [address] [facility]  HA JACQUES ; 12/13/2019 ; NP Cardio [address] [facility] HA JACQUES ; 10/15/2019 ; NP Hepatology [address]  HA JACQUES ; 11/14/2019 ; NPP Med Int [address] [facility]  HA JACQUES ; 12/13/2019 ; NP Cardio [address] [facility]

## 2019-09-15 NOTE — DISCHARGE NOTE NURSING/CASE MANAGEMENT/SOCIAL WORK - PATIENT PORTAL LINK FT
You can access the FollowMyHealth Patient Portal offered by [facility] by registering at the following website: [email or web address]. By joining [facility] FollowMyHealth portal, you will also be able to view your health information using other applications (apps) compatible with our system.

## 2019-09-15 NOTE — PROGRESS NOTE ADULT - PROBLEM SELECTOR PLAN 2
not on anticoag due to multiple comorbidities (ie. history of bleeding, esophageal varices)  - [id number]-Cykv score 3, on digoxin at home - rate currently controlled 70s-80s  - dig level = 0.8 wnl  - Dr. Boston (cardiology) recs appreciated -Currently not on anticoagulation due to multiple comorbidities (ie. history of bleeding, esophageal varices)  - [id number]-Acmt score 3, on digoxin at home - rate currently controlled 70s-80s  - dig level = 0.8 wnl  - Dr. Boston (cardiology) following patient, follow-up recommendations.

## 2019-09-15 NOTE — PROGRESS NOTE ADULT - ASSESSMENT
89 year old female with a pmhx of COPD, CKD, CLL, HCC/cirrhosis, a-fib, AS, and hypothyroid, admitted to MICU for septic shock, now improving on abx, afebrile, and transferred to medicine floor in hemodynamically stable condition. DNR but not DNI, MOLST in paper chart. Pt improving but still frail and endorsing a cough with rhinorrhea. Today, she is on meropenem day 7 of 8, extended due to poor clinical improvement. She is able to be breathe comfortably >92% O2 saturation on room air and is hemodynamically stable weaned off of midocrine. Today, although improving, she continues to have a productive cough and abnormal lung exam. At this time, she requires inpatient hospitalization to receive IV antibiotics and to monitor BP. 89 year old female with a pmhx of COPD, CKD, CLL, HCC/cirrhosis, a-fib, AS, and hypothyroid, admitted to MICU for septic shock secondary to pneumonia, now improving on abx, afebrile, and transferred to medicine floor for further management.

## 2019-09-15 NOTE — DISCHARGE NOTE PROVIDER - CARE PROVIDERS DIRECT ADDRESSES
[email or web address]

## 2019-09-15 NOTE — PROGRESS NOTE ADULT - PROBLEM SELECTOR PROBLEM 3
Aortic stenosis
COPD (Chronic Obstructive Pulmonary Disease)

## 2019-09-15 NOTE — PROGRESS NOTE ADULT - PROBLEM SELECTOR PLAN 4
- per cards, BP stable, propanolol 10mg BID started  -cardiology rec appreciated. - per cards, BP stable, propanolol 10mg BID started

## 2019-09-15 NOTE — PROGRESS NOTE ADULT - SUBJECTIVE AND OBJECTIVE BOX
Patient is a 89y old  Female who presents with a chief complaint of Sepsis (14 Sep 2019 12:52)      SUBJECTIVE / OVERNIGHT EVENTS: No acute events overnight.    MEDICATIONS  (STANDING):  ALBUTerol/ipratropium for Nebulization 3 milliLiter(s) Nebulizer every 6 hours  AQUAPHOR (petrolatum Ointment) 1 Application(s) Topical two times a day  buDESOnide    Inhalation Suspension 0.5 milliGRAM(s) Inhalation every 12 hours  digoxin     Tablet 0.125 milliGRAM(s) Oral every other day  lactulose Syrup 20 Gram(s) Oral four times a day  levothyroxine 25 MICROGram(s) Oral daily  propranolol 10 milliGRAM(s) Oral two times a day  rifaximin 550 milliGRAM(s) Oral two times a day    MEDICATIONS  (PRN):  guaiFENesin    Syrup 100 milliGRAM(s) Oral every 6 hours PRN Cough  sodium chloride 0.65% Nasal 1 Spray(s) Both Nostrils every 4 hours PRN Nasal Congestion      Vital Signs Last 24 Hrs  T(C): 36.7 (15 Sep 2019 04:46), Max: 36.7 (15 Sep 2019 00:16)  T(F): 98.1 (15 Sep 2019 04:46), Max: 98.1 (15 Sep 2019 00:16)  HR: 67 (15 Sep 2019 05:37) (56 - 91)  BP: 107/61 (15 Sep 2019 04:46) (95/43 - 118/66)  BP(mean): --  RR: 18 (15 Sep 2019 04:46) (18 - 18)  SpO2: 96% (15 Sep 2019 05:37) (96% - 98%)  CAPILLARY BLOOD GLUCOSE        I&O's Summary    14 Sep 2019 07:01  -  15 Sep 2019 07:00  --------------------------------------------------------  IN: 440 mL / OUT: 650 mL / NET: -210 mL        PHYSICAL EXAM:  GENERAL:   HEAD:    EYES:   NECK: Supple  CHEST/LUNG:  HEART:   ABDOMEN:   EXTREMITIES:    PSYCH:   NEUROLOGY:       LABS:                        13.2   7.0   )-----------( 133      ( 14 Sep 2019 07:04 )             41.2     09-14    140  |  104  |  29<H>  ----------------------------<  88  5.1   |  23  |  0.96    Ca    9.9      14 Sep 2019 07:06    TPro  5.5<L>  /  Alb  2.9<L>  /  TBili  1.2  /  DBili  x   /  AST  43<H>  /  ALT  24  /  AlkPhos  121<H>  09-14                  RADIOLOGY & ADDITIONAL TESTS:    Imaging Personally Reviewed:    Consultant(s) Notes Reviewed:      Care Discussed with Consultants/Other Providers: Patient is a 89y old  Female who presents with a chief complaint of Sepsis (14 Sep 2019 12:52)      SUBJECTIVE / OVERNIGHT EVENTS: No acute events overnight.  Patient feels improved and ready to be discharged home.    MEDICATIONS  (STANDING):  ALBUTerol/ipratropium for Nebulization 3 milliLiter(s) Nebulizer every 6 hours  AQUAPHOR (petrolatum Ointment) 1 Application(s) Topical two times a day  buDESOnide    Inhalation Suspension 0.5 milliGRAM(s) Inhalation every 12 hours  digoxin     Tablet 0.125 milliGRAM(s) Oral every other day  lactulose Syrup 20 Gram(s) Oral four times a day  levothyroxine 25 MICROGram(s) Oral daily  propranolol 10 milliGRAM(s) Oral two times a day  rifaximin 550 milliGRAM(s) Oral two times a day    MEDICATIONS  (PRN):  guaiFENesin    Syrup 100 milliGRAM(s) Oral every 6 hours PRN Cough  sodium chloride 0.65% Nasal 1 Spray(s) Both Nostrils every 4 hours PRN Nasal Congestion      Vital Signs Last 24 Hrs  T(C): 36.7 (15 Sep 2019 04:46), Max: 36.7 (15 Sep 2019 00:16)  T(F): 98.1 (15 Sep 2019 04:46), Max: 98.1 (15 Sep 2019 00:16)  HR: 67 (15 Sep 2019 05:37) (56 - 91)  BP: 107/61 (15 Sep 2019 04:46) (95/43 - 118/66)  BP(mean): --  RR: 18 (15 Sep 2019 04:46) (18 - 18)  SpO2: 96% (15 Sep 2019 05:37) (96% - 98%)  CAPILLARY BLOOD GLUCOSE        I&O's Summary    14 Sep 2019 07:01  -  15 Sep 2019 07:00  --------------------------------------------------------  IN: 440 mL / OUT: 650 mL / NET: -210 mL        PHYSICAL EXAM:  GENERAL: NAD, thin frame  HEAD: atraumatic, normocephalic  EYES: PERRLA, EOMI  NECK: Supple  CHEST/LUNG: trace bibasilar crackles  HEART: S1/S2, RRR  ABDOMEN: soft, NTND  EXTREMITIES:  warm, well-perfused, 2+ pulses  PSYCH: AXOX3  NEUROLOGY: grossly intact      LABS:                        13.2   7.0   )-----------( 133      ( 14 Sep 2019 07:04 )             41.2     09-14    140  |  104  |  29<H>  ----------------------------<  88  5.1   |  23  |  0.96    Ca    9.9      14 Sep 2019 07:06    TPro  5.5<L>  /  Alb  2.9<L>  /  TBili  1.2  /  DBili  x   /  AST  43<H>  /  ALT  24  /  AlkPhos  121<H>  09-14                  RADIOLOGY & ADDITIONAL TESTS:    Imaging Personally Reviewed:    Consultant(s) Notes Reviewed:      Care Discussed with Consultants/Other Providers: Patient is a 89y old  Female who presents with a chief complaint of Sepsis (14 Sep 2019 12:52)      SUBJECTIVE / OVERNIGHT EVENTS: No acute events overnight.  Patient feels improved and ready to be discharged home.    MEDICATIONS  (STANDING):  ALBUTerol/ipratropium for Nebulization 3 milliLiter(s) Nebulizer every 6 hours  AQUAPHOR (petrolatum Ointment) 1 Application(s) Topical two times a day  buDESOnide    Inhalation Suspension 0.5 milliGRAM(s) Inhalation every 12 hours  digoxin     Tablet 0.125 milliGRAM(s) Oral every other day  lactulose Syrup 20 Gram(s) Oral four times a day  levothyroxine 25 MICROGram(s) Oral daily  propranolol 10 milliGRAM(s) Oral two times a day  rifaximin 550 milliGRAM(s) Oral two times a day    MEDICATIONS  (PRN):  guaiFENesin    Syrup 100 milliGRAM(s) Oral every 6 hours PRN Cough  sodium chloride 0.65% Nasal 1 Spray(s) Both Nostrils every 4 hours PRN Nasal Congestion      Vital Signs Last 24 Hrs  T(C): 36.7 (15 Sep 2019 04:46), Max: 36.7 (15 Sep 2019 00:16)  T(F): 98.1 (15 Sep 2019 04:46), Max: 98.1 (15 Sep 2019 00:16)  HR: 67 (15 Sep 2019 05:37) (56 - 91)  BP: 107/61 (15 Sep 2019 04:46) (95/43 - 118/66)  BP(mean): --  RR: 18 (15 Sep 2019 04:46) (18 - 18)  SpO2: 96% (15 Sep 2019 05:37) (96% - 98%)  CAPILLARY BLOOD GLUCOSE        I&O's Summary    14 Sep 2019 07:01  -  15 Sep 2019 07:00  --------------------------------------------------------  IN: 440 mL / OUT: 650 mL / NET: -210 mL        PHYSICAL EXAM:  GENERAL: NAD, thin frame  HEAD: atraumatic, normocephalic  EYES: PERRLA, EOMI  NECK: Supple  CHEST/LUNG: trace bibasilar crackles  HEART: S1/S2, RRR  ABDOMEN: soft, NTND  EXTREMITIES:  warm, well-perfused, 2+ pulses  PSYCH: AXOX3  NEUROLOGY: grossly intact      LABS:                        13.2   7.0   )-----------( 133      ( 14 Sep 2019 07:04 )             41.2     09-14    140  |  104  |  29<H>  ----------------------------<  88  5.1   |  23  |  0.96    Ca    9.9      14 Sep 2019 07:06    TPro  5.5<L>  /  Alb  2.9<L>  /  TBili  1.2  /  DBili  x   /  AST  43<H>  /  ALT  24  /  AlkPhos  121<H>  09-14

## 2019-09-15 NOTE — PROGRESS NOTE ADULT - PROBLEM SELECTOR PLAN 5
-breathing comfortably on RA, d/c continuous pulse ox.  -vitals q4  -Chest PT and Duonebs q6 -Stable, currently not using supplemental oxygen.  -Chest PT and Duonebs q6

## 2019-09-15 NOTE — PROGRESS NOTE ADULT - PROBLEM SELECTOR PLAN 7
Per daughter, pt has increased coughing when drinking water.  - change to dysphagia 3 nectar diet  - per speech and swallow, rec home speech therapist f/u  - per s&s, tentative recs for MBS - pt refuses  - speech and swallow recs appreciated. -continue with dysphagia 3 diet.

## 2019-09-15 NOTE — DISCHARGE NOTE PROVIDER - CARE PROVIDER_API CALL
Fallon Celaya)  Critical Care Medicine; Internal Medicine; Pulmonary Disease  [address], [address]  [place] [address]  Phone: [phone]  Fax: [phone]  Follow Up Time:     Daniel Boston)  Cardiovascular Disease; Internal Medicine  [address], [facility]  [place] [address]  Phone: [phone]  Fax: [phone]  Follow Up Time:

## 2019-09-15 NOTE — PROGRESS NOTE ADULT - PROVIDER SPECIALTY LIST ADULT
Cardiology
Infectious Disease
Internal Medicine
MICU
Internal Medicine

## 2019-09-15 NOTE — PROGRESS NOTE ADULT - PROBLEM SELECTOR PLAN 8
-DVT: subq heparin  -diet: dysphagia 3 nectar consistency  -Dispo: per PT, home w/ home PT; home w/ assist; Has HHA 10 hrs/day, daughter assists at other times. Patient uses rolling walker and rollator at home. -DVT: Patient refused HSQ, understands risks and benefits.  -diet: dysphagia 3 nectar consistency  -Dispo: per PT, home w/ home PT; home w/ assist; Has HHA 10 hrs/day, daughter assists at other times. Patient uses rolling walker and rollator at home. -DVT: Patient refused HSQ, understands risks and benefits.  -diet: dysphagia 3 nectar consistency  -Dispo: per PT, home w/ home PT; home w/ assist; Has HHA 10 hrs/day, daughter assists at other times. Patient uses rolling walker and rollator at home.  DNR but not DNI, MOLST in paper chart.

## 2019-09-15 NOTE — PROGRESS NOTE ADULT - PROBLEM SELECTOR PROBLEM 2
AF (Atrial Fibrillation)

## 2019-09-16 ENCOUNTER — INBOUND DOCUMENT (OUTPATIENT)
Age: 84
End: 2019-09-16

## 2019-09-16 NOTE — DISCUSSION/SUMMARY
[Home] : patient was discharged to home [Med Rec Performed] : med rec performed [FreeTextEntry3] : Spoke with Pt. daughter in regards to pt. recent hospital stay and d/c. Pt. d/c home s/p treatment for Pneumonia and septic shock. Dr. Celaya made aware. HFU appt. in progress to be scheduled. Pt. is recovering well. Daughter is concerned about [facility] services, [facility] Pt. daughter advised to contact the number provided if no f/u is made by tomorrow. She verbally understood.

## 2019-09-23 ENCOUNTER — APPOINTMENT (OUTPATIENT)
Dept: INTERNAL MEDICINE | Facility: CLINIC | Age: 84
End: 2019-09-23
Payer: MEDICARE

## 2019-09-23 VITALS
DIASTOLIC BLOOD PRESSURE: 70 MMHG | SYSTOLIC BLOOD PRESSURE: 118 MMHG | HEART RATE: 71 BPM | TEMPERATURE: 97.9 F | OXYGEN SATURATION: 98 %

## 2019-09-23 DIAGNOSIS — Z87.01 PERSONAL HISTORY OF PNEUMONIA (RECURRENT): ICD-10-CM

## 2019-09-23 PROCEDURE — 99495 TRANSJ CARE MGMT MOD F2F 14D: CPT

## 2019-09-23 RX ORDER — ACETYLCYSTEINE 100 MG/ML
10 SOLUTION ORAL; RESPIRATORY (INHALATION) TWICE DAILY
Qty: 3 | Refills: 5 | Status: DISCONTINUED | COMMUNITY
Start: 2019-09-04 | End: 2019-09-23

## 2019-09-23 RX ORDER — NITROFURANTOIN (MONOHYDRATE/MACROCRYSTALS) 25; 75 MG/1; MG/1
100 CAPSULE ORAL
Qty: 14 | Refills: 0 | Status: DISCONTINUED | COMMUNITY
Start: 2019-06-24 | End: 2019-09-23

## 2019-09-23 RX ORDER — PREDNISONE 20 MG/1
20 TABLET ORAL
Qty: 30 | Refills: 5 | Status: DISCONTINUED | COMMUNITY
Start: 2019-09-04 | End: 2019-09-23

## 2019-09-23 RX ORDER — DOXYCYCLINE 100 MG/1
100 CAPSULE ORAL TWICE DAILY
Qty: 20 | Refills: 0 | Status: DISCONTINUED | COMMUNITY
Start: 2019-09-04 | End: 2019-09-23

## 2019-09-23 NOTE — COUNSELING
[Weight management counseling provided] : Weight management [Activity counseling provided] : activity [Healthy eating counseling provided] : healthy eating [Low Salt Diet] : Low salt diet [Weight Self Once Weekly] : Weight self once weekly [Good understanding] : Patient has a good understanding of lifestyle changes and the steps needed to achieve self management goals [Walking] : Walking

## 2019-09-23 NOTE — HEALTH RISK ASSESSMENT
[Intercurrent ED visits] : went to ED [Intercurrent hospitalizations] : was admitted to the hospital  [No] : In the past 12 months have you used drugs other than those required for medical reasons? No [Any fall with injury in past year] : Patient reported fall with injury in the past year [0] : 2) Feeling down, depressed, or hopeless: Not at all (0) [Patient declined mammogram] : Patient declined mammogram [Patient declined PAP Smear] : Patient declined PAP Smear [Patient declined bone density test] : Patient declined bone density test [Patient declined colonoscopy] : Patient declined colonoscopy [HIV test declined] : HIV test declined [Hepatitis C test declined] : Hepatitis C test declined [None] : None [With Family] : lives with family [# of Members in Household ___] :  household currently consist of [unfilled] member(s) [Retired] : retired [] :  [# Of Children ___] : has [unfilled] children [Feels Safe at Home] : Feels safe at home [Reports changes in hearing] : Reports changes in hearing [Reports changes in vision] : Reports changes in vision [Reports changes in dental health] : Reports changes in dental health [Smoke Detector] : smoke detector [Carbon Monoxide Detector] : carbon monoxide detector [Sunscreen] : uses sunscreen [Seat Belt] :  uses seat belt [Caregiver Concerns] : has caregiver concerns [With Patient/Caregiver] : With Patient/Caregiver [Designated Healthcare Proxy] : Designated healthcare proxy [Name: ___] : Health Care Proxy's Name: [unfilled]  [Relationship: ___] : Relationship: [unfilled] [] : No [de-identified] : cardiology [de-identified] : PT [de-identified] : regular [[id number]] : 0 [Change in mental status noted] : No change in mental status noted [Sexually Active] : not sexually active [Guns at Home] : no guns at home [Travel to Developing Areas] : does not  travel to developing areas [TB Exposure] : is not being exposed to tuberculosis [de-identified] : needs assistance [de-identified] : no [AdvancecareDate] : 09/19

## 2019-09-23 NOTE — PHYSICAL EXAM
[77846 - Moderate Complexity requires multiple possible diagnoses and/or the management options, moderate complexity of the medical data (tests, etc.) to be reviewed, and moderate risk of significant complications, morbidity, and/or mortality as well as co] : Moderate Complexity [Well-Appearing] : well-appearing [No Acute Distress] : no acute distress [Normal Voice/Communication] : normal voice/communication [Normal Sclera/Conjunctiva] : normal sclera/conjunctiva [PERRL] : pupils equal round and reactive to light [EOMI] : extraocular movements intact [Normal Outer Ear/Nose] : the outer ears and nose were normal in appearance [Supple] : supple [Normal Oropharynx] : the oropharynx was normal [No Respiratory Distress] : no respiratory distress  [Clear to Auscultation] : lungs were clear to auscultation bilaterally [Normal Rate] : normal rate  [No Accessory Muscle Use] : no accessory muscle use [Normal S1, S2] : normal S1 and S2 [No Extremity Clubbing/Cyanosis] : no extremity clubbing/cyanosis [Non Tender] : non-tender [Soft] : abdomen soft [No Spinal Tenderness] : no spinal tenderness [No CVA Tenderness] : no CVA  tenderness [Normal Bowel Sounds] : normal bowel sounds [Speech Grossly Normal] : speech grossly normal [No Rash] : no rash [Normal Affect] : the affect was normal [Alert and Oriented x3] : oriented to person, place, and time [de-identified] : thin [de-identified] : No ST [de-identified] : no stridor [de-identified] : R=16; no cough; no wheezing [de-identified] : murmurs loud and unchanged [de-identified] : increased edema; no cords [de-identified] :  MS seems intact/ in wheelchair; moves all extremities

## 2019-09-23 NOTE — HISTORY OF PRESENT ILLNESS
[Post-hospitalization from ___ Hospital] : Post-hospitalization from [unfilled] Hospital [Admitted on: ___] : The patient was admitted on [unfilled] [Discharged on ___] : discharged on [unfilled] [Discharge Summary] : discharge summary [Med Reconciliation] : medication reconciliation has been completed [Patient Contacted By: ____] : and contacted by [unfilled] [FreeTextEntry2] : \par Patient HA JACQUES Invision MRN [id number] Hospital Visit [id number] [facility] - Attending Physician Kim Pimentel \par Status Complete \par  \par \par Hospital Course: \par Discharge Date	15-Sep-2019 \par Admission Date	08-Sep-2019 08:29 \par Reason for Admission	Sepsis \par Medication Reconciliation Status	Admission Reconciliation is Completed \par Discharge Reconciliation is Completed \par Hospital Course	 \par Patient is 89 y/o female with medical history of COPD, CKD, CLL (last chemo 8-9 \par yrs ago), HCC/cirrhosis (on lactulose/rifaximin), a-fib, AS, and \par hypothyroidism, who was BIBEMS presenting with fever/cough x 1 week. Patient \par saw PMD who prescribed doxycycline but pt did not take any as she was told to \par take only if she was febrile to 101F. In ED patient was found to be hypotensive \par but BP remained in 90s with MAPs in 60s s/p 1.5L IVF bolus. Patient given \par levaquin and started on levophed gtt. Admitted to MICU for management of septic \par shock 2/2 pneumonia with LLL and ITALIA consolidations on CT chest.  Patient was \par given vancomycin and meropenem.  Infectious disease consulted and recommended \par IV antibiotic for 7 days.  Patient clinically improved back to baseline. \par Patient medically cleared for discharge to home with home services. \par \par \par Care Plan/Procedures: \par Goal(s)	To get better and follow your care plan as instructed. \par Discharge Diagnoses, Assessment and Plan of Treatment	PRINCIPAL DISCHARGE \par DIAGNOSIS \par Diagnosis: Pneumonia \par Assessment and Plan of Treatment: \par \par \par SECONDARY DISCHARGE DIAGNOSES \par Diagnosis: Septic shock \par Assessment and Plan of Treatment: Septic shock \par \par Diagnosis: Portal hypertension \par Assessment and Plan of Treatment: Portal hypertension \par \par Diagnosis: Aortic stenosis \par Assessment and Plan of Treatment: Aortic stenosis \par \par Diagnosis: COPD (Chronic Obstructive Pulmonary Disease) \par Assessment and Plan of Treatment: COPD (Chronic Obstructive Pulmonary Disease) \par \par Follow Up: \par Care Providers for Follow up (PCP/Outpatient Provider)	Fallon Celaya) \par Critical Care Medicine; Internal Medicine; Pulmonary Disease \par [address], [address] \[place] [address] \par Phone: [phone] \par Fax: [phone] \par Follow Up Time: \par \par Daniel Boston) \par Cardiovascular Disease; Internal Medicine \par [address], [address] \[place] [address] \par Phone: [phone] \par Fax: [phone] \par Follow Up Time: \par Patient's Scheduled Appointments	HA JACQUES ; 10/15/2019 ; [facility] Hepatology [address] \par HA JACQUES ; 11/14/2019 ; [facility] [address] \par HA JACQUES ; 12/13/2019 ; [facility] Cardio [address] \par Diet Instructions	Dysphagia 3, soft nectar-consistency \par Activity	Bathing allowed, Do not drive or operate machinery, Showering allowed \par \par Quality Measures: \par Hospice Patient	No \par Did the patient present with or suffer from an ischemic stroke, hemorrhagic \par stroke or TIA during this admission?	No \par Has the patient had an Acute Myocardial Infarction?	No \par Has the patient had a Percutaneous Coronary Intervention?	No \par \par Home Health: \par Discharged with Home Health Care Services?	Yes \par Face-To-Face Contact	As certified below, I, or a nurse practitioner or \par physician assistant working with me, had a face-to-face encounter that meets \par the physician face-to-face encounter requirements. \par Need for Skilled Services	Observation and assessment \par Based on the above findings, the following intermittent skilled services are \par medically necessary home health services:	Nursing \par Home Bound Status	Fall risk \par Patient Needs Assistance to Leave Residence... \par Attending Certification	My signature below certifies that the above stated \par patient is homebound and upon completion of the Face-To-Face encounter, has the \par need for intermittent skilled nursing, physical therapy and/or speech or \par occupational therapy services in their home for their current diagnosis as \par outlined in their initial plan of care. These services will continue to be \par monitored by myself or another physician. \par Encounter Date	15-Sep-2019 \par \par Document Complete: \par Care Provider Seen in Hospital	[facility], 5M Care Model Team B \par Physician Section Complete	This document is complete and the patient is ready \par for discharge. \par For questions about your prescriptions, please call:	[phone] \par Is this contact telephone number correct?	Yes \par \par \par \par Electronic Signatures: \par Nury Jerry (ALNOZO)  (Signed 16-Sep-2019 10:59) \par 	Authored: Discharge Note Provider \par Lorrie Molina)  (Signed 15-Sep-2019 16:50) \par 	Authored: Discharge Note Provider \par 	Co-Signer: Discharge Note Provider \par Sun Diaz)  (Signed 16-Sep-2019 10:47) \par 	Authored: Discharge Note Provider \par Micaela Rahman)  (Signed 15-Sep-2019 16:40) \par 	Authored: Discharge Note Provider \par \par \par Last Updated: 16-Sep-2019 10:59 by Nury Jerry (ALONZO) \par \par Doing well.\par No new fevers.\par Has LE edema - back on Spironolactone as per Cardiology.\par Has chronic loose stools.\par Daughter worried about recurrent UTI.\par No abdominal pain or n/v.\par Appetite fair.\par Denies chest pain. Denies SOB/wheezing.\par Cough improved. No hemoptysis.\par MS improved.

## 2019-09-23 NOTE — ASSESSMENT
[FreeTextEntry1] : Recent bacterial pneumonia with sepsis\par Has completed 7 day course of antibiotic therapy per ID\par Remains afebrile with reduced cough\par Just fatigued\par Will needed f/u Chest CT to ensure clearance of infiltrates\par PT\par Labs declined today\par \par COPD\par Clinically improved with no wheezing on exam\par Duoneb nebs - decrease to bid and as needed\par Continue Budesonide nebs bid\par Will monitor FVL\par Annual flu vaccine\par \par UTI symptoms\par Daughter concerned about recurrent UTI given loose stools\par Will collect urine for u/a with micro and urine C&S\par \par LE edema\par Was off diuretics and given IVF with sepsis/low BP\par Back on bid Spironolactone\par Low salt diet\par Monitor weight and I/O's\par Support hose\par Cardiology f/u\par \par RTC 4-6 weeks and as needed\par To call for any medical issues\par To call if worse or if not improving\par F/U with all MD's\par Continue meds, diet, PT

## 2019-09-23 NOTE — REVIEW OF SYSTEMS
[Hearing Loss] : hearing loss [Vision Problems] : vision problems [Nasal Discharge] : nasal discharge [Postnasal Drip] : postnasal drip [Cough] : cough [Dyspnea on Exertion] : dyspnea on exertion [Memory Loss] : memory loss [Diarrhea] : diarrhea [Unsteady Walking] : ataxia [Easy Bleeding] : easy bleeding [Easy Bruising] : easy bruising [Negative] : Heme/Lymph

## 2019-09-26 LAB
APPEARANCE: CLEAR
BACTERIA: NEGATIVE
BILIRUBIN URINE: NEGATIVE
BLOOD URINE: NEGATIVE
COLOR: YELLOW
GLUCOSE QUALITATIVE U: NEGATIVE
HYALINE CASTS: 0 /LPF
KETONES URINE: NEGATIVE
LEUKOCYTE ESTERASE URINE: ABNORMAL
MICROSCOPIC-UA: NORMAL
NITRITE URINE: NEGATIVE
PH URINE: 6
PROTEIN URINE: NEGATIVE
RED BLOOD CELLS URINE: 1 /HPF
SPECIFIC GRAVITY URINE: 1.01
SQUAMOUS EPITHELIAL CELLS: 1 /HPF
UROBILINOGEN URINE: NORMAL
WHITE BLOOD CELLS URINE: 4 /HPF

## 2019-09-27 LAB — BACTERIA UR CULT: ABNORMAL

## 2019-10-01 ENCOUNTER — APPOINTMENT (OUTPATIENT)
Dept: CARDIOLOGY | Facility: CLINIC | Age: 84
End: 2019-10-01
Payer: MEDICARE

## 2019-10-01 ENCOUNTER — RX RENEWAL (OUTPATIENT)
Age: 84
End: 2019-10-01

## 2019-10-01 ENCOUNTER — NON-APPOINTMENT (OUTPATIENT)
Age: 84
End: 2019-10-01

## 2019-10-01 VITALS
DIASTOLIC BLOOD PRESSURE: 72 MMHG | WEIGHT: 117 LBS | HEIGHT: 62.5 IN | OXYGEN SATURATION: 95 % | HEART RATE: 60 BPM | BODY MASS INDEX: 20.99 KG/M2 | SYSTOLIC BLOOD PRESSURE: 110 MMHG

## 2019-10-01 DIAGNOSIS — I35.1 NONRHEUMATIC AORTIC (VALVE) INSUFFICIENCY: ICD-10-CM

## 2019-10-01 PROCEDURE — 71045 X-RAY EXAM CHEST 1 VIEW: CPT

## 2019-10-01 PROCEDURE — 94640 AIRWAY INHALATION TREATMENT: CPT

## 2019-10-01 PROCEDURE — 96365 THER/PROPH/DIAG IV INF INIT: CPT | Mod: XU

## 2019-10-01 PROCEDURE — 81001 URINALYSIS AUTO W/SCOPE: CPT

## 2019-10-01 PROCEDURE — 83735 ASSAY OF MAGNESIUM: CPT

## 2019-10-01 PROCEDURE — 87086 URINE CULTURE/COLONY COUNT: CPT

## 2019-10-01 PROCEDURE — 82330 ASSAY OF CALCIUM: CPT

## 2019-10-01 PROCEDURE — 93000 ELECTROCARDIOGRAM COMPLETE: CPT

## 2019-10-01 PROCEDURE — 80162 ASSAY OF DIGOXIN TOTAL: CPT

## 2019-10-01 PROCEDURE — 97161 PT EVAL LOW COMPLEX 20 MIN: CPT

## 2019-10-01 PROCEDURE — 87631 RESP VIRUS 3-5 TARGETS: CPT

## 2019-10-01 PROCEDURE — 87186 SC STD MICRODIL/AGAR DIL: CPT

## 2019-10-01 PROCEDURE — 82435 ASSAY OF BLOOD CHLORIDE: CPT

## 2019-10-01 PROCEDURE — 82803 BLOOD GASES ANY COMBINATION: CPT

## 2019-10-01 PROCEDURE — 99214 OFFICE O/P EST MOD 30 MIN: CPT

## 2019-10-01 PROCEDURE — 85027 COMPLETE CBC AUTOMATED: CPT

## 2019-10-01 PROCEDURE — 84132 ASSAY OF SERUM POTASSIUM: CPT

## 2019-10-01 PROCEDURE — 85610 PROTHROMBIN TIME: CPT

## 2019-10-01 PROCEDURE — 82947 ASSAY GLUCOSE BLOOD QUANT: CPT

## 2019-10-01 PROCEDURE — 80053 COMPREHEN METABOLIC PANEL: CPT

## 2019-10-01 PROCEDURE — 83605 ASSAY OF LACTIC ACID: CPT

## 2019-10-01 PROCEDURE — 92610 EVALUATE SWALLOWING FUNCTION: CPT

## 2019-10-01 PROCEDURE — 51701 INSERT BLADDER CATHETER: CPT

## 2019-10-01 PROCEDURE — 85730 THROMBOPLASTIN TIME PARTIAL: CPT

## 2019-10-01 PROCEDURE — 96375 TX/PRO/DX INJ NEW DRUG ADDON: CPT | Mod: XU

## 2019-10-01 PROCEDURE — 83880 ASSAY OF NATRIURETIC PEPTIDE: CPT

## 2019-10-01 PROCEDURE — 84295 ASSAY OF SERUM SODIUM: CPT

## 2019-10-01 PROCEDURE — 93005 ELECTROCARDIOGRAM TRACING: CPT | Mod: XU

## 2019-10-01 PROCEDURE — 71250 CT THORAX DX C-: CPT

## 2019-10-01 PROCEDURE — 99292 CRITICAL CARE ADDL 30 MIN: CPT | Mod: 25

## 2019-10-01 PROCEDURE — 85014 HEMATOCRIT: CPT

## 2019-10-01 PROCEDURE — 87040 BLOOD CULTURE FOR BACTERIA: CPT

## 2019-10-01 PROCEDURE — 99291 CRITICAL CARE FIRST HOUR: CPT | Mod: 25

## 2019-10-01 PROCEDURE — 84100 ASSAY OF PHOSPHORUS: CPT

## 2019-10-01 NOTE — PHYSICAL EXAM
[Normal Appearance] : normal appearance [General Appearance - Well Developed] : well developed [General Appearance - Well Nourished] : well nourished [Well Groomed] : well groomed [General Appearance - In No Acute Distress] : no acute distress [Normal Conjunctiva] : the conjunctiva exhibited no abnormalities [Eyelids - No Xanthelasma] : the eyelids demonstrated no xanthelasmas [Normal Jugular Venous A Waves Present] : normal jugular venous A waves present [Normal Jugular Venous V Waves Present] : normal jugular venous V waves present [Respiration, Rhythm And Depth] : normal respiratory rhythm and effort [Exaggerated Use Of Accessory Muscles For Inspiration] : no accessory muscle use [Auscultation Breath Sounds / Voice Sounds] : lungs were clear to auscultation bilaterally [Heart Rate And Rhythm] : heart rate and rhythm were normal [Bowel Sounds] : normal bowel sounds [Abnormal Walk] : normal gait [Cyanosis, Localized] : no localized cyanosis [Nail Clubbing] : no clubbing of the fingernails [] : no rash [Skin Color & Pigmentation] : normal skin color and pigmentation [Oriented To Time, Place, And Person] : oriented to person, place, and time [Impaired Insight] : insight and judgment were intact [Mood] : the mood was normal [Affect] : the affect was normal [FreeTextEntry1] : 2+ pulses in the upper and lower extremities. No edema.

## 2019-10-01 NOTE — HISTORY OF PRESENT ILLNESS
[FreeTextEntry1] : As she returns today, in the company of 2 daughters, she has been stable overall.  She had a mild recurrence of lower extremity edema, and I spoke to her daughter last week, and Spironolactone was resumed, currently at 25 mg 3 times daily.  She has an occasional cough, but does not seem to be active of phlegm.\par \par She has had occasional chest pains, these seem musculoskeletal rather than cardiac.  Her respiratory status has been stable without any unusual dyspnea.  There have been no palpitations.  She recounts no episodes of orthopnea or PND.   She reports no lightheadedness or faint spells, and has not suffered recurrence of LOC.

## 2019-10-01 NOTE — REASON FOR VISIT
[FreeTextEntry1] : \par Steffanie Bustamante returns today for followup regarding aortic and mitral valve disease, paroxysmal atrial flutter and systolic hypertension, and after a recent hospital stay for fever due to pneumonia.

## 2019-10-01 NOTE — DISCUSSION/SUMMARY
[FreeTextEntry1] : \par A. fib - rate controlled on current regimen of digoxin QOD and propranolol.  Not a candidate for anti-coagulation.  Not taking ASA, apparently upon advice of hematologist.\par \par Severe AS and moderate AI; moderate mitral and tricuspid valve insufficiency.  Murmur unchanged; she remains compensated on current regimen of meds.  Not a candidate for valve replacement.\par \par LE edema -seems adequately controlled on present 3 times daily dose of spironolactone; will continue diuretic and knee high compression stockings.  Her daughter will call me should the edema increased; will adjust the diuretic dosage as needed.\par \par To continue on a low salt diet.\par \par She will return in 2 months.

## 2019-10-15 ENCOUNTER — APPOINTMENT (OUTPATIENT)
Dept: HEPATOLOGY | Facility: CLINIC | Age: 84
End: 2019-10-15
Payer: MEDICARE

## 2019-10-15 VITALS
HEART RATE: 47 BPM | WEIGHT: 117 LBS | DIASTOLIC BLOOD PRESSURE: 41 MMHG | BODY MASS INDEX: 20.99 KG/M2 | SYSTOLIC BLOOD PRESSURE: 117 MMHG | TEMPERATURE: 94.5 F | RESPIRATION RATE: 16 BRPM | HEIGHT: 62.5 IN

## 2019-10-15 DIAGNOSIS — K74.60 UNSPECIFIED CIRRHOSIS OF LIVER: ICD-10-CM

## 2019-10-15 DIAGNOSIS — K72.90 HEPATIC FAILURE, UNSPECIFIED W/OUT COMA: ICD-10-CM

## 2019-10-15 DIAGNOSIS — C22.0 LIVER CELL CARCINOMA: ICD-10-CM

## 2019-10-15 DIAGNOSIS — I85.00 ESOPHAGEAL VARICES W/OUT BLEEDING: ICD-10-CM

## 2019-10-15 PROCEDURE — 99214 OFFICE O/P EST MOD 30 MIN: CPT

## 2019-10-16 ENCOUNTER — APPOINTMENT (OUTPATIENT)
Dept: CARDIOLOGY | Facility: CLINIC | Age: 84
End: 2019-10-16
Payer: MEDICARE

## 2019-10-16 LAB
AFP-TM SERPL-MCNC: <1.8 NG/ML
ALBUMIN SERPL ELPH-MCNC: 3.6 G/DL
ALP BLD-CCNC: 84 U/L
ALT SERPL-CCNC: 15 U/L
ANION GAP SERPL CALC-SCNC: 14 MMOL/L
ANION GAP SERPL CALC-SCNC: 17 MMOL/L
AST SERPL-CCNC: 64 U/L
BASOPHILS # BLD AUTO: 0.04 K/UL
BASOPHILS NFR BLD AUTO: 0.7 %
BILIRUB SERPL-MCNC: 2.2 MG/DL
BUN SERPL-MCNC: 33 MG/DL
BUN SERPL-MCNC: 33 MG/DL
CALCIUM SERPL-MCNC: 10.3 MG/DL
CALCIUM SERPL-MCNC: 10.4 MG/DL
CHLORIDE SERPL-SCNC: 105 MMOL/L
CHLORIDE SERPL-SCNC: 105 MMOL/L
CO2 SERPL-SCNC: 20 MMOL/L
CO2 SERPL-SCNC: 21 MMOL/L
CREAT SERPL-MCNC: 1.17 MG/DL
CREAT SERPL-MCNC: 1.23 MG/DL
EOSINOPHIL # BLD AUTO: 0.05 K/UL
EOSINOPHIL NFR BLD AUTO: 0.9 %
GLUCOSE SERPL-MCNC: 96 MG/DL
HCT VFR BLD CALC: 38.8 %
HGB BLD-MCNC: 12 G/DL
IMM GRANULOCYTES NFR BLD AUTO: 0.3 %
INR PPP: 1.11 RATIO
LYMPHOCYTES # BLD AUTO: 1.08 K/UL
LYMPHOCYTES NFR BLD AUTO: 18.7 %
MAN DIFF?: NORMAL
MCHC RBC-ENTMCNC: 30.9 GM/DL
MCHC RBC-ENTMCNC: 33.6 PG
MCV RBC AUTO: 108.7 FL
MONOCYTES # BLD AUTO: 0.45 K/UL
MONOCYTES NFR BLD AUTO: 7.8 %
NEUTROPHILS # BLD AUTO: 4.14 K/UL
NEUTROPHILS NFR BLD AUTO: 71.6 %
PLATELET # BLD AUTO: 158 K/UL
POTASSIUM SERPL-SCNC: 4.9 MMOL/L
POTASSIUM SERPL-SCNC: 6 MMOL/L
PROT SERPL-MCNC: 5.6 G/DL
PT BLD: 12.6 SEC
RBC # BLD: 3.57 M/UL
RBC # FLD: 14.6 %
SODIUM SERPL-SCNC: 140 MMOL/L
SODIUM SERPL-SCNC: 141 MMOL/L
WBC # FLD AUTO: 5.78 K/UL

## 2019-10-16 PROCEDURE — 36415 COLL VENOUS BLD VENIPUNCTURE: CPT

## 2019-10-17 ENCOUNTER — MEDICATION RENEWAL (OUTPATIENT)
Age: 84
End: 2019-10-17

## 2019-10-17 RX ORDER — DIGOXIN 125 UG/1
125 TABLET ORAL
Qty: 45 | Refills: 3 | Status: ACTIVE | COMMUNITY
Start: 2017-08-28 | End: 1900-01-01

## 2019-10-27 ENCOUNTER — FORM ENCOUNTER (OUTPATIENT)
Age: 84
End: 2019-10-27

## 2019-10-28 ENCOUNTER — APPOINTMENT (OUTPATIENT)
Dept: ULTRASOUND IMAGING | Facility: CLINIC | Age: 84
End: 2019-10-28
Payer: MEDICARE

## 2019-10-28 ENCOUNTER — OUTPATIENT (OUTPATIENT)
Dept: OUTPATIENT SERVICES | Facility: HOSPITAL | Age: 84
LOS: 1 days | End: 2019-10-28
Payer: MEDICARE

## 2019-10-28 ENCOUNTER — APPOINTMENT (OUTPATIENT)
Dept: INTERNAL MEDICINE | Facility: CLINIC | Age: 84
End: 2019-10-28
Payer: MEDICARE

## 2019-10-28 DIAGNOSIS — Z98.89 OTHER SPECIFIED POSTPROCEDURAL STATES: Chronic | ICD-10-CM

## 2019-10-28 DIAGNOSIS — Z00.8 ENCOUNTER FOR OTHER GENERAL EXAMINATION: ICD-10-CM

## 2019-10-28 PROCEDURE — G0008: CPT

## 2019-10-28 PROCEDURE — 93975 VASCULAR STUDY: CPT | Mod: 26

## 2019-10-28 PROCEDURE — 90653 IIV ADJUVANT VACCINE IM: CPT

## 2019-10-28 PROCEDURE — 93975 VASCULAR STUDY: CPT

## 2019-11-14 ENCOUNTER — APPOINTMENT (OUTPATIENT)
Dept: INTERNAL MEDICINE | Facility: CLINIC | Age: 84
End: 2019-11-14
Payer: MEDICARE

## 2019-11-14 VITALS
SYSTOLIC BLOOD PRESSURE: 90 MMHG | DIASTOLIC BLOOD PRESSURE: 50 MMHG | OXYGEN SATURATION: 95 % | TEMPERATURE: 97.5 F | HEART RATE: 61 BPM

## 2019-11-14 DIAGNOSIS — R39.9 UNSPECIFIED SYMPTOMS AND SIGNS INVOLVING THE GENITOURINARY SYSTEM: ICD-10-CM

## 2019-11-14 DIAGNOSIS — J47.9 BRONCHIECTASIS, UNCOMPLICATED: ICD-10-CM

## 2019-11-14 DIAGNOSIS — J44.9 CHRONIC OBSTRUCTIVE PULMONARY DISEASE, UNSPECIFIED: ICD-10-CM

## 2019-11-14 PROCEDURE — 81003 URINALYSIS AUTO W/O SCOPE: CPT | Mod: QW

## 2019-11-14 PROCEDURE — 82570 ASSAY OF URINE CREATININE: CPT | Mod: QW

## 2019-11-14 PROCEDURE — 99214 OFFICE O/P EST MOD 30 MIN: CPT | Mod: 25

## 2019-11-15 PROBLEM — R39.9 UTI SYMPTOMS: Status: ACTIVE | Noted: 2018-09-27

## 2019-11-15 NOTE — HEALTH RISK ASSESSMENT
[Any fall with injury in past year] : Patient reported fall with injury in the past year [No] : In the past 12 months have you used drugs other than those required for medical reasons? No [0] : 1) Little interest or pleasure doing things: Not at all (0) [] : No [de-identified] : cardiology, hepatology [de-identified] : PT [de-identified] : regular [[id number]] : 0

## 2019-11-15 NOTE — PHYSICAL EXAM
[No Acute Distress] : no acute distress [Well-Appearing] : well-appearing [Normal Voice/Communication] : normal voice/communication [PERRL] : pupils equal round and reactive to light [Normal Sclera/Conjunctiva] : normal sclera/conjunctiva [EOMI] : extraocular movements intact [Normal Outer Ear/Nose] : the outer ears and nose were normal in appearance [Supple] : supple [Normal Oropharynx] : the oropharynx was normal [No Respiratory Distress] : no respiratory distress  [No Accessory Muscle Use] : no accessory muscle use [Clear to Auscultation] : lungs were clear to auscultation bilaterally [Normal S1, S2] : normal S1 and S2 [Normal Rate] : normal rate  [No Edema] : there was no peripheral edema [Non Tender] : non-tender [Soft] : abdomen soft [No Extremity Clubbing/Cyanosis] : no extremity clubbing/cyanosis [Normal Bowel Sounds] : normal bowel sounds [No CVA Tenderness] : no CVA  tenderness [No Spinal Tenderness] : no spinal tenderness [Normal Affect] : the affect was normal [No Rash] : no rash [Speech Grossly Normal] : speech grossly normal [Alert and Oriented x3] : oriented to person, place, and time [de-identified] : thin [de-identified] : No ST [de-identified] : no stridor [de-identified] : R=16; no cough; no wheezing [de-identified] : murmurs loud and unchanged [de-identified] :  MS seems intact/ in wheelchair; moves all extremities [de-identified] :  no cords

## 2019-11-15 NOTE — ASSESSMENT
[FreeTextEntry1] : Bronchiectasis/COPD\par Clinically stable with no wheezing/ increased rhonchi on exam\par Duoneb nebs bid and as needed\par Continue Budesonide nebs bid\par Will need f/u CT chest to make certain of infiltrate resolution\par Will monitor FVL\par Can collect sputum for full cultures if able\par Annual flu vaccine given for 2019\par PT\par Aspiration precautions\par Monitor fever curve\par Has RX for doxycycline if needed\par \par UTI symptoms\par Daughter concerned about recurrent UTI \par POCT U/A done\par Will collect urine for u/a with micro and urine C&S\par Has RX for Macrobid on hold\par Adequate hydration advised\par \par RTC 12 weeks and as needed\par To call for any medical issues\par To call if worse \par F/U with all MD's\par Continue meds, diet, PT\par She declined labs today\par D/W Julio in detail via phone

## 2019-11-15 NOTE — HISTORY OF PRESENT ILLNESS
[Family Member] : family member [Formal Caregiver] : formal caregiver [FreeTextEntry1] : Comes in for f/u medical visit. [de-identified] : Daughter concerned because urine sample looks dark ? dehydrated ? UTI.\par Had some nausea earlier - now gone.\par Has occasional cough/sputum.\par No hemoptysis.\par No fevers.\par No chest pain or SOB.\par MS off at times as per daughter.

## 2019-11-15 NOTE — REVIEW OF SYSTEMS
[Vision Problems] : vision problems [Nasal Discharge] : nasal discharge [Hearing Loss] : hearing loss [Cough] : cough [Postnasal Drip] : postnasal drip [Dyspnea on Exertion] : dyspnea on exertion [Diarrhea] : diarrhea [Memory Loss] : memory loss [Easy Bruising] : easy bruising [Easy Bleeding] : easy bleeding [Unsteady Walking] : ataxia [Negative] : Psychiatric

## 2019-11-15 NOTE — COUNSELING
[Weight management counseling provided] : Weight management [Healthy eating counseling provided] : healthy eating [Activity counseling provided] : activity [Weight Self Once Weekly] : Weight self once weekly [Low Salt Diet] : Low salt diet [None] : None [Good understanding] : Patient has a good understanding of lifestyle changes and the steps needed to achieve self management goals [Walking] : Walking

## 2019-11-18 LAB
APPEARANCE: ABNORMAL
BACTERIA UR CULT: ABNORMAL
BACTERIA: NEGATIVE
BILIRUBIN URINE: NEGATIVE
BLOOD URINE: NEGATIVE
COLOR: YELLOW
GLUCOSE QUALITATIVE U: NEGATIVE
HYALINE CASTS: 0 /LPF
KETONES URINE: NEGATIVE
LEUKOCYTE ESTERASE URINE: NEGATIVE
MICROSCOPIC-UA: NORMAL
NITRITE URINE: NEGATIVE
PH URINE: 6
PROTEIN URINE: NEGATIVE
RED BLOOD CELLS URINE: 2 /HPF
SPECIFIC GRAVITY URINE: 1.01
SQUAMOUS EPITHELIAL CELLS: 2 /HPF
UROBILINOGEN URINE: NORMAL
WHITE BLOOD CELLS URINE: 4 /HPF

## 2019-12-11 ENCOUNTER — APPOINTMENT (OUTPATIENT)
Dept: CARDIOLOGY | Facility: CLINIC | Age: 84
End: 2019-12-11

## 2019-12-12 PROBLEM — R60.0 EDEMA, LOWER EXTREMITY: Status: ACTIVE | Noted: 2019-03-20

## 2019-12-12 PROBLEM — I48.92 PAROXYSMAL ATRIAL FLUTTER: Status: ACTIVE | Noted: 2017-05-01

## 2019-12-13 ENCOUNTER — APPOINTMENT (OUTPATIENT)
Dept: CARDIOLOGY | Facility: CLINIC | Age: 84
End: 2019-12-13
Payer: MEDICARE

## 2019-12-13 ENCOUNTER — NON-APPOINTMENT (OUTPATIENT)
Age: 84
End: 2019-12-13

## 2019-12-13 VITALS
RESPIRATION RATE: 16 BRPM | OXYGEN SATURATION: 98 % | WEIGHT: 118 LBS | BODY MASS INDEX: 21.17 KG/M2 | HEART RATE: 64 BPM | SYSTOLIC BLOOD PRESSURE: 119 MMHG | TEMPERATURE: 98.1 F | HEIGHT: 62.5 IN | DIASTOLIC BLOOD PRESSURE: 72 MMHG

## 2019-12-13 DIAGNOSIS — R60.0 LOCALIZED EDEMA: ICD-10-CM

## 2019-12-13 DIAGNOSIS — I48.92 UNSPECIFIED ATRIAL FLUTTER: ICD-10-CM

## 2019-12-13 DIAGNOSIS — I10 ESSENTIAL (PRIMARY) HYPERTENSION: ICD-10-CM

## 2019-12-13 DIAGNOSIS — I35.0 NONRHEUMATIC AORTIC (VALVE) STENOSIS: ICD-10-CM

## 2019-12-13 PROCEDURE — 99214 OFFICE O/P EST MOD 30 MIN: CPT

## 2019-12-13 PROCEDURE — 93000 ELECTROCARDIOGRAM COMPLETE: CPT

## 2019-12-13 NOTE — DISCUSSION/SUMMARY
[FreeTextEntry1] : \par A. fib - rate remains well controlled on current regimen of digoxin QOD and propranolol.  Not a candidate for anti-coagulation.  Not taking ASA, apparently upon advice of hematologist.\par \par Mild LE edema and mild inspiratory rales - - will have her take an additional dose of spironolactone each day.  Her daughter will call me with an update next week.  \par \par Severe AS and moderate AI; moderate mitral and tricuspid valve insufficiency.  Murmur unchanged; not a candidate for valve replacement.\par \par To continue on a low salt diet.\par \par She will return in 2 months.

## 2019-12-13 NOTE — HISTORY OF PRESENT ILLNESS
[FreeTextEntry1] : I saw her last in October.  As she returns today, in the company of her daughter, Dee Dee, and her aide. she reports more weakness in the legs and has fallen twice.  She reports mild lower extremity edema, L>R, and mild LYNNE, but no dyspnea at rest and no orthopnea or PND.\par \par She reports no recent chest pain; there have been no palpitations.  She recounts no episodes of orthopnea or PND.   She reports no lightheadedness or faint spells, and has not suffered recurrence of LOC, although she has fallen twice.

## 2019-12-13 NOTE — PHYSICAL EXAM
[General Appearance - Well Developed] : well developed [Normal Appearance] : normal appearance [Well Groomed] : well groomed [General Appearance - Well Nourished] : well nourished [General Appearance - In No Acute Distress] : no acute distress [Normal Conjunctiva] : the conjunctiva exhibited no abnormalities [Eyelids - No Xanthelasma] : the eyelids demonstrated no xanthelasmas [Normal Jugular Venous A Waves Present] : normal jugular venous A waves present [Normal Jugular Venous V Waves Present] : normal jugular venous V waves present [Respiration, Rhythm And Depth] : normal respiratory rhythm and effort [Heart Rate And Rhythm] : heart rate and rhythm were normal [Bowel Sounds] : normal bowel sounds [Abnormal Walk] : normal gait [Nail Clubbing] : no clubbing of the fingernails [Cyanosis, Localized] : no localized cyanosis [Skin Color & Pigmentation] : normal skin color and pigmentation [] : no rash [Oriented To Time, Place, And Person] : oriented to person, place, and time [Impaired Insight] : insight and judgment were intact [Affect] : the affect was normal [Mood] : the mood was normal [FreeTextEntry1] : 2+ pulses in the upper and lower extremities. 1+ L pedal/shin edema; trace on R.

## 2019-12-23 ENCOUNTER — MEDICATION RENEWAL (OUTPATIENT)
Age: 84
End: 2019-12-23

## 2019-12-24 ENCOUNTER — LABORATORY RESULT (OUTPATIENT)
Age: 84
End: 2019-12-24

## 2019-12-31 LAB
APPEARANCE: ABNORMAL
BACTERIA UR CULT: ABNORMAL
BILIRUBIN URINE: NEGATIVE
BLOOD URINE: NEGATIVE
COLOR: YELLOW
GLUCOSE QUALITATIVE U: NEGATIVE
KETONES URINE: NEGATIVE
LEUKOCYTE ESTERASE URINE: ABNORMAL
NITRITE URINE: NEGATIVE
PH URINE: 5.5
PROTEIN URINE: NORMAL
SPECIFIC GRAVITY URINE: 1.02
UROBILINOGEN URINE: NORMAL

## 2019-12-31 RX ORDER — NITROFURANTOIN (MONOHYDRATE/MACROCRYSTALS) 25; 75 MG/1; MG/1
100 CAPSULE ORAL TWICE DAILY
Qty: 14 | Refills: 0 | Status: ACTIVE | COMMUNITY
Start: 2019-12-31 | End: 1900-01-01

## 2020-01-01 ENCOUNTER — TRANSCRIPTION ENCOUNTER (OUTPATIENT)
Age: 85
End: 2020-01-01

## 2020-01-03 ENCOUNTER — APPOINTMENT (OUTPATIENT)
Dept: INTERNAL MEDICINE | Facility: CLINIC | Age: 85
End: 2020-01-03
Payer: MEDICARE

## 2020-01-03 VITALS — TEMPERATURE: 97.6 F | OXYGEN SATURATION: 96 % | HEART RATE: 55 BPM

## 2020-01-03 DIAGNOSIS — S41.119A LACERATION W/OUT FOREIGN BODY OF UNSPECIFIED UPPER ARM, INITIAL ENCOUNTER: ICD-10-CM

## 2020-01-03 DIAGNOSIS — E03.9 HYPOTHYROIDISM, UNSPECIFIED: ICD-10-CM

## 2020-01-03 PROCEDURE — 99214 OFFICE O/P EST MOD 30 MIN: CPT

## 2020-01-03 NOTE — REVIEW OF SYSTEMS
[Vision Problems] : no vision problems [Fever] : no fever [Nasal Discharge] : no nasal discharge [Shortness Of Breath] : no shortness of breath [Chest Pain] : no chest pain [Abdominal Pain] : no abdominal pain

## 2020-01-03 NOTE — COUNSELING
[Fall prevention counseling provided] : Fall prevention counseling provided [No throw rugs] : No throw rugs [Use recommended devices] : Use recommended devices [Adequate lighting] : Adequate lighting

## 2020-01-03 NOTE — HISTORY OF PRESENT ILLNESS
[FreeTextEntry8] : HA JACQUES is a 89 yo woman with liver disease/cirrhosis, hypothyroidism here for evaluation of skin tears.  The patient's daugther reports that the patient slipped and injured her bilateral arms a few days ago.  She developed skin tears and was seen at an urgent care center.  Dry dressings were placed and the patient's daughter does them twice daily.  The patient and her daughter are interested in wound care at home.  They deny purulent discharge, fever, spreading redness\par \par The patient appears comfortable, sitting in wheelchair\par \par The patient is seen with her daughter Julio and health aide Margarita today

## 2020-01-03 NOTE — PHYSICAL EXAM
[No Acute Distress] : no acute distress [No Lymphadenopathy] : no lymphadenopathy [Supple] : supple [Normal Outer Ear/Nose] : the outer ears and nose were normal in appearance [No Accessory Muscle Use] : no accessory muscle use [Clear to Auscultation] : lungs were clear to auscultation bilaterally [No Respiratory Distress] : no respiratory distress  [Regular Rhythm] : with a regular rhythm [Normal Rate] : normal rate  [Soft] : abdomen soft [Normal S1, S2] : normal S1 and S2 [de-identified] : right forearm with linear skin tear.  Left upper arm with skin tear with overlying skin. No purulent discharge or redness. [de-identified] : ARCADIO

## 2020-01-06 ENCOUNTER — INPATIENT (INPATIENT)
Facility: HOSPITAL | Age: 85
LOS: 2 days | Discharge: NURSING, REHAB OR LONG-TERM CARE | DRG: 640 | End: 2020-01-09
Attending: STUDENT IN AN ORGANIZED HEALTH CARE EDUCATION/TRAINING PROGRAM | Admitting: HOSPITALIST
Payer: MEDICARE

## 2020-01-06 ENCOUNTER — APPOINTMENT (OUTPATIENT)
Dept: SURGERY | Facility: CLINIC | Age: 85
End: 2020-01-06

## 2020-01-06 VITALS
OXYGEN SATURATION: 94 % | SYSTOLIC BLOOD PRESSURE: 132 MMHG | WEIGHT: 108.03 LBS | DIASTOLIC BLOOD PRESSURE: 77 MMHG | RESPIRATION RATE: 20 BRPM | HEIGHT: 63 IN | HEART RATE: 62 BPM

## 2020-01-06 DIAGNOSIS — C22.0 LIVER CELL CARCINOMA: ICD-10-CM

## 2020-01-06 DIAGNOSIS — G93.41 METABOLIC ENCEPHALOPATHY: ICD-10-CM

## 2020-01-06 DIAGNOSIS — N17.9 ACUTE KIDNEY FAILURE, UNSPECIFIED: ICD-10-CM
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
## 2020-01-06 DIAGNOSIS — Z98.89 OTHER SPECIFIED POSTPROCEDURAL STATES: Chronic | ICD-10-CM

## 2020-01-06 DIAGNOSIS — I48.91 UNSPECIFIED ATRIAL FIBRILLATION: ICD-10-CM

## 2020-01-06 DIAGNOSIS — E83.52 HYPERCALCEMIA: ICD-10-CM

## 2020-01-06 DIAGNOSIS — Z02.9 ENCOUNTER FOR ADMINISTRATIVE EXAMINATIONS, UNSPECIFIED: ICD-10-CM

## 2020-01-06 DIAGNOSIS — R53.1 WEAKNESS: ICD-10-CM

## 2020-01-06 DIAGNOSIS — Z29.9 ENCOUNTER FOR PROPHYLACTIC MEASURES, UNSPECIFIED: ICD-10-CM

## 2020-01-06 DIAGNOSIS — K72.90 HEPATIC FAILURE, UNSPECIFIED WITHOUT COMA: ICD-10-CM

## 2020-01-06 DIAGNOSIS — J44.9 CHRONIC OBSTRUCTIVE PULMONARY DISEASE, UNSPECIFIED: ICD-10-CM

## 2020-01-06 LAB
ALBUMIN SERPL ELPH-MCNC: 3.2 G/DL — LOW (ref 3.3–5)
ALP SERPL-CCNC: 103 U/L — SIGNIFICANT CHANGE UP (ref 40–120)
ALT FLD-CCNC: 17 U/L — SIGNIFICANT CHANGE UP (ref 10–45)
ANION GAP SERPL CALC-SCNC: 14 MMOL/L — SIGNIFICANT CHANGE UP (ref 5–17)
APPEARANCE UR: CLEAR — SIGNIFICANT CHANGE UP
AST SERPL-CCNC: 32 U/L — SIGNIFICANT CHANGE UP (ref 10–40)
BASE EXCESS BLDV CALC-SCNC: -0.4 MMOL/L — SIGNIFICANT CHANGE UP (ref -2–2)
BASOPHILS # BLD AUTO: 0 K/UL — SIGNIFICANT CHANGE UP (ref 0–0.2)
BASOPHILS NFR BLD AUTO: 0 % — SIGNIFICANT CHANGE UP (ref 0–2)
BILIRUB SERPL-MCNC: 3.4 MG/DL — HIGH (ref 0.2–1.2)
BILIRUB UR-MCNC: NEGATIVE — SIGNIFICANT CHANGE UP
BUN SERPL-MCNC: 59 MG/DL — HIGH (ref 7–23)
CA-I SERPL-SCNC: 1.5 MMOL/L — HIGH (ref 1.12–1.3)
CALCIUM SERPL-MCNC: 12.5 MG/DL — HIGH (ref 8.4–10.5)
CHLORIDE BLDV-SCNC: 113 MMOL/L — HIGH (ref 96–108)
CHLORIDE SERPL-SCNC: 110 MMOL/L — HIGH (ref 96–108)
CO2 BLDV-SCNC: 25 MMOL/L — SIGNIFICANT CHANGE UP (ref 22–30)
CO2 SERPL-SCNC: 16 MMOL/L — LOW (ref 22–31)
COLOR SPEC: YELLOW — SIGNIFICANT CHANGE UP
CREAT SERPL-MCNC: 1.44 MG/DL — HIGH (ref 0.5–1.3)
DIFF PNL FLD: NEGATIVE — SIGNIFICANT CHANGE UP
DIGOXIN SERPL-MCNC: 1 NG/ML — SIGNIFICANT CHANGE UP (ref 0.8–2)
EOSINOPHIL # BLD AUTO: 0.15 K/UL — SIGNIFICANT CHANGE UP (ref 0–0.5)
EOSINOPHIL NFR BLD AUTO: 1.7 % — SIGNIFICANT CHANGE UP (ref 0–6)
FLU A RESULT: SIGNIFICANT CHANGE UP
FLU A RESULT: SIGNIFICANT CHANGE UP
FLUAV AG NPH QL: SIGNIFICANT CHANGE UP
FLUBV AG NPH QL: SIGNIFICANT CHANGE UP
GAS PNL BLDV: 138 MMOL/L — SIGNIFICANT CHANGE UP (ref 135–145)
GAS PNL BLDV: SIGNIFICANT CHANGE UP
GLUCOSE BLDV-MCNC: 88 MG/DL — SIGNIFICANT CHANGE UP (ref 70–99)
GLUCOSE SERPL-MCNC: 87 MG/DL — SIGNIFICANT CHANGE UP (ref 70–99)
GLUCOSE UR QL: NEGATIVE — SIGNIFICANT CHANGE UP
HCO3 BLDV-SCNC: 23 MMOL/L — SIGNIFICANT CHANGE UP (ref 21–29)
HCT VFR BLD CALC: 41.7 % — SIGNIFICANT CHANGE UP (ref 34.5–45)
HCT VFR BLDA CALC: 33 % — LOW (ref 39–50)
HGB BLD CALC-MCNC: 10.6 G/DL — LOW (ref 11.5–15.5)
HGB BLD-MCNC: 13.3 G/DL — SIGNIFICANT CHANGE UP (ref 11.5–15.5)
KETONES UR-MCNC: SIGNIFICANT CHANGE UP
LACTATE BLDV-MCNC: 2.9 MMOL/L — HIGH (ref 0.7–2)
LEUKOCYTE ESTERASE UR-ACNC: NEGATIVE — SIGNIFICANT CHANGE UP
LYMPHOCYTES # BLD AUTO: 1.64 K/UL — SIGNIFICANT CHANGE UP (ref 1–3.3)
LYMPHOCYTES # BLD AUTO: 18.4 % — SIGNIFICANT CHANGE UP (ref 13–44)
MCHC RBC-ENTMCNC: 31.9 GM/DL — LOW (ref 32–36)
MCHC RBC-ENTMCNC: 33.1 PG — SIGNIFICANT CHANGE UP (ref 27–34)
MCV RBC AUTO: 103.7 FL — HIGH (ref 80–100)
MONOCYTES # BLD AUTO: 0.47 K/UL — SIGNIFICANT CHANGE UP (ref 0–0.9)
MONOCYTES NFR BLD AUTO: 5.3 % — SIGNIFICANT CHANGE UP (ref 2–14)
NEUTROPHILS # BLD AUTO: 6.65 K/UL — SIGNIFICANT CHANGE UP (ref 1.8–7.4)
NEUTROPHILS NFR BLD AUTO: 74.6 % — SIGNIFICANT CHANGE UP (ref 43–77)
NITRITE UR-MCNC: NEGATIVE — SIGNIFICANT CHANGE UP
OTHER CELLS CSF MANUAL: 6 ML/DL — LOW (ref 18–22)
PCO2 BLDV: 38 MMHG — SIGNIFICANT CHANGE UP (ref 35–50)
PH BLDV: 7.41 — SIGNIFICANT CHANGE UP (ref 7.35–7.45)
PH UR: 5.5 — SIGNIFICANT CHANGE UP (ref 5–8)
PLATELET # BLD AUTO: 90 K/UL — LOW (ref 150–400)
PO2 BLDV: 25 MMHG — SIGNIFICANT CHANGE UP (ref 25–45)
POTASSIUM BLDV-SCNC: 4.7 MMOL/L — SIGNIFICANT CHANGE UP (ref 3.5–5.3)
POTASSIUM SERPL-MCNC: 4.7 MMOL/L — SIGNIFICANT CHANGE UP (ref 3.5–5.3)
POTASSIUM SERPL-SCNC: 4.7 MMOL/L — SIGNIFICANT CHANGE UP (ref 3.5–5.3)
PROT SERPL-MCNC: 5.9 G/DL — LOW (ref 6–8.3)
PROT UR-MCNC: NEGATIVE — SIGNIFICANT CHANGE UP
RBC # BLD: 4.02 M/UL — SIGNIFICANT CHANGE UP (ref 3.8–5.2)
RBC # FLD: 15.2 % — HIGH (ref 10.3–14.5)
RSV RESULT: SIGNIFICANT CHANGE UP
RSV RNA RESP QL NAA+PROBE: SIGNIFICANT CHANGE UP
SAO2 % BLDV: 40 % — LOW (ref 67–88)
SODIUM SERPL-SCNC: 140 MMOL/L — SIGNIFICANT CHANGE UP (ref 135–145)
SP GR SPEC: 1.02 — SIGNIFICANT CHANGE UP (ref 1.01–1.02)
TROPONIN T, HIGH SENSITIVITY RESULT: 55 NG/L — HIGH (ref 0–51)
TROPONIN T, HIGH SENSITIVITY RESULT: 58 NG/L — HIGH (ref 0–51)
UROBILINOGEN FLD QL: NEGATIVE — SIGNIFICANT CHANGE UP
WBC # BLD: 8.92 K/UL — SIGNIFICANT CHANGE UP (ref 3.8–10.5)
WBC # FLD AUTO: 8.92 K/UL — SIGNIFICANT CHANGE UP (ref 3.8–10.5)

## 2020-01-06 PROCEDURE — 73110 X-RAY EXAM OF WRIST: CPT | Mod: 26,RT

## 2020-01-06 PROCEDURE — 73080 X-RAY EXAM OF ELBOW: CPT | Mod: 26,RT

## 2020-01-06 PROCEDURE — 99285 EMERGENCY DEPT VISIT HI MDM: CPT | Mod: GC

## 2020-01-06 PROCEDURE — 73030 X-RAY EXAM OF SHOULDER: CPT | Mod: 26,LT

## 2020-01-06 PROCEDURE — 93010 ELECTROCARDIOGRAM REPORT: CPT

## 2020-01-06 PROCEDURE — 99223 1ST HOSP IP/OBS HIGH 75: CPT | Mod: GC

## 2020-01-06 PROCEDURE — 73090 X-RAY EXAM OF FOREARM: CPT | Mod: 26,LT

## 2020-01-06 PROCEDURE — 70450 CT HEAD/BRAIN W/O DYE: CPT | Mod: 26

## 2020-01-06 PROCEDURE — 73060 X-RAY EXAM OF HUMERUS: CPT | Mod: 26,LT

## 2020-01-06 PROCEDURE — 71045 X-RAY EXAM CHEST 1 VIEW: CPT | Mod: 26

## 2020-01-06 RX ORDER — SPIRONOLACTONE 25 MG/1
25 TABLET, FILM COATED ORAL DAILY
Refills: 0 | Status: DISCONTINUED | OUTPATIENT
Start: 2020-01-06 | End: 2020-01-09

## 2020-01-06 RX ORDER — SODIUM CHLORIDE 9 MG/ML
1000 INJECTION INTRAMUSCULAR; INTRAVENOUS; SUBCUTANEOUS ONCE
Refills: 0 | Status: COMPLETED | OUTPATIENT
Start: 2020-01-06 | End: 2020-01-06

## 2020-01-06 RX ORDER — IPRATROPIUM/ALBUTEROL SULFATE 18-103MCG
3 AEROSOL WITH ADAPTER (GRAM) INHALATION EVERY 6 HOURS
Refills: 0 | Status: DISCONTINUED | OUTPATIENT
Start: 2020-01-06 | End: 2020-01-09

## 2020-01-06 RX ORDER — LACTULOSE 10 G/15ML
20 SOLUTION ORAL
Refills: 0 | Status: DISCONTINUED | OUTPATIENT
Start: 2020-01-06 | End: 2020-01-09

## 2020-01-06 RX ORDER — SPIRONOLACTONE 25 MG/1
25 TABLET, FILM COATED ORAL DAILY
Refills: 0 | Status: DISCONTINUED | OUTPATIENT
Start: 2020-01-06 | End: 2020-01-06

## 2020-01-06 RX ORDER — BUDESONIDE, MICRONIZED 100 %
0.25 POWDER (GRAM) MISCELLANEOUS
Refills: 0 | Status: DISCONTINUED | OUTPATIENT
Start: 2020-01-06 | End: 2020-01-09

## 2020-01-06 RX ORDER — DIGOXIN 250 MCG
0.12 TABLET ORAL EVERY OTHER DAY
Refills: 0 | Status: DISCONTINUED | OUTPATIENT
Start: 2020-01-06 | End: 2020-01-09

## 2020-01-06 RX ORDER — LEVOTHYROXINE SODIUM 125 MCG
25 TABLET ORAL DAILY
Refills: 0 | Status: DISCONTINUED | OUTPATIENT
Start: 2020-01-06 | End: 2020-01-09

## 2020-01-06 RX ORDER — NITROFURANTOIN MACROCRYSTAL 50 MG
100 CAPSULE ORAL
Refills: 0 | Status: COMPLETED | OUTPATIENT
Start: 2020-01-06 | End: 2020-01-07

## 2020-01-06 RX ORDER — HEPARIN SODIUM 5000 [USP'U]/ML
5000 INJECTION INTRAVENOUS; SUBCUTANEOUS EVERY 12 HOURS
Refills: 0 | Status: DISCONTINUED | OUTPATIENT
Start: 2020-01-06 | End: 2020-01-09

## 2020-01-06 RX ADMIN — Medication 0.12 MILLIGRAM(S): at 21:55

## 2020-01-06 RX ADMIN — SPIRONOLACTONE 25 MILLIGRAM(S): 25 TABLET, FILM COATED ORAL at 21:55

## 2020-01-06 RX ADMIN — LACTULOSE 20 GRAM(S): 10 SOLUTION ORAL at 19:01

## 2020-01-06 RX ADMIN — SODIUM CHLORIDE 1000 MILLILITER(S): 9 INJECTION INTRAMUSCULAR; INTRAVENOUS; SUBCUTANEOUS at 13:32

## 2020-01-06 RX ADMIN — Medication 100 MILLIGRAM(S): at 22:46

## 2020-01-06 RX ADMIN — HEPARIN SODIUM 5000 UNIT(S): 5000 INJECTION INTRAVENOUS; SUBCUTANEOUS at 19:01

## 2020-01-06 RX ADMIN — SODIUM CHLORIDE 1000 MILLILITER(S): 9 INJECTION INTRAMUSCULAR; INTRAVENOUS; SUBCUTANEOUS at 11:40

## 2020-01-06 RX ADMIN — SODIUM CHLORIDE 1000 MILLILITER(S): 9 INJECTION INTRAMUSCULAR; INTRAVENOUS; SUBCUTANEOUS at 09:55

## 2020-01-06 RX ADMIN — Medication 25 MICROGRAM(S): at 19:01

## 2020-01-06 NOTE — ED PROVIDER NOTE - CLINICAL SUMMARY MEDICAL DECISION MAKING FREE TEXT BOX
Attending MD Barragan: Patient is 89 y/o female with medical history of COPD, CKD, CLL (last chemo 8-9 yrs ago), HCC/cirrhosis (on lactulose/rifaximin), a-fib, AS, and hypothyroidism, admitted in Sept 2019 with septic shock due to PNA and now BIBEMS Attending MD Barragan: Patient is 89 y/o female with medical history of COPD, CKD, CLL (last chemo 8-9 yrs ago), HCC/cirrhosis (on lactulose/rifaximin), a-fib on dig, no AC, AS, and hypothyroidism, admitted in Sept 2019 with septic shock due to PNA and now BIBEMS presents with 3 days of arm pain sp fall on New Years Michaela.  Right forearm and left humerus. Recemt dx of UTI 5 days ago and completed course of Macrobid.  Family noted some AMS and lethargy this morning and increased falls recently.  Denies fevers or cough.  Exam: A & O x 3, NAD, HEENT WNL but oral mucosa dry and no facial asymmetry; lungs CTAB, heart with reg rhythm 3/6 systolic murmur; abdomen soft NTND; extremities with no edema; skin with no rashes but some skin tears to bilateral upper extremity, neuro exam non focal with no motor or sensory deficits. DDX infection (UTI/PNA), head injury, upper extremity injury, flu.  Plan: rule out injury, chest xray, upper extremity imaging, head CT, labs, UA and urine culture

## 2020-01-06 NOTE — ED ADULT NURSE REASSESSMENT NOTE - NS ED NURSE REASSESS COMMENT FT1
Report taken from Kaylan ROSADO [id number]. Family requesting pt be able to eat pudding, yogurt, previous RN unable to complete dysphagia screen, MD Barragan made aware, family insisting on feeding pt. Educated about dysphagia screening family will get pt pudding to eat as tolerated. caffeine

## 2020-01-06 NOTE — H&P ADULT - PROBLEM SELECTOR PLAN 10
Transitions of Care Status:  1.  Name of PCP: Dr Fallon Celaya  2.  PCP Contacted on Admission: [ ] Y    [x] N    3.  PCP contacted at Discharge: [ ] Y    [ ] N    [ ] N/A  4.  Post-Discharge Appointment Date and Location:  5.  Summary of Handoff given to PCP:

## 2020-01-06 NOTE — H&P ADULT - PROBLEM SELECTOR PLAN 7
a/w cirrhosis. Not on DMT.  - hx of esophageal varices s/p banding  - prior HE; currently AOx3; c/w home lactulose and rifaximin  - no overt ascites  - daughter reports baseline systolic BP in 100s a/w HCC, not on DMT.  - hx of esophageal varices s/p banding  - prior HE; currently AOx3; c/w home lactulose and rifaximin  - no overt ascites  - daughter reports baseline systolic BP in 100s  - elevated Tbili  - check fractionated bili and INR

## 2020-01-06 NOTE — ED PROVIDER NOTE - CARE PLAN
Principal Discharge DX:	KARRIE (acute kidney injury)  Secondary Diagnosis:	Hypercalcemia  Secondary Diagnosis:	Dehydration

## 2020-01-06 NOTE — ED ADULT NURSE NOTE - OBJECTIVE STATEMENT
0730 89 yr old WF brought to ER via ambulance on stretcher for further eval and tx of increasing weakness and altered mental status. Recent fall, witnessed by family. No LOC. did not hit head. Per family, '"Pt ambulating to bathroom  on own even though told not to with episodes of diarrhea  and confusion.since yesterday." recent UTI. Was on antibiotic x 7 days. No fever or chills per family. Poor skin turgor. Bandages intact at right wrist and left upper arm. "Skin tears" per family. c/o left shoulder pain and decreased ROM. Dr conn pt. Pt wearing diaper. Discoloration lower legs bilat. Pt tachypneic. dfficulty moving around on stretcher. loose cough audible. Rhonchi with breath sounds decreased throughout. color pink. skin W&D. A&Ox3. Poor historian

## 2020-01-06 NOTE — H&P ADULT - PROBLEM SELECTOR PLAN 1
Patient brought in by daughter/primary caretaker with concern for worsened weakness over the past day in the setting of recent fall, recent UTI, and recent poor PO intake  - Likely multifactorial including progressive age-related debility with acute worsening from recent fall, UTI, poor PO intake, and possible dehydration from diarrhea on lactulose  - Currently improved per daughter at bedside s/p 2L IVF  - PT eval  - Fall and aspiration precautions Patient brought in from home by daughter/primary caretaker with concern for worsened weakness over the past day in the setting of recent fall, recent UTI, recent poor PO intake, and diarrhea in setting of lactulose use.  - Likely multifactorial including progressive age-related debility with acute worsening from recent fall, UTI, poor PO intake, and possible dehydration from diarrhea on lactulose  - Currently improved per daughter at bedside s/p 2L IVF  - PT eval  - Fall and aspiration precautions Patient brought in from home by daughter/primary caretaker with concern for worsened weakness over the past day in the setting of recent fall, recent UTI, recent poor PO intake, and diarrhea in setting of lactulose use.  - Likely multifactorial including dehydration, possible recent UTI, progressive age and fall-related debility  - Currently improved per daughter at bedside s/p 2L IVF  - UA negative for infection  - Check TSH  - PT eval  - Fall and aspiration precautions Patient brought in from home by daughter/primary caretaker with concern for worsened weakness over the past day in the setting of recent fall, recent UTI, recent poor PO intake, and diarrhea in setting of lactulose use.  - Likely multifactorial including dehydration, possible recent UTI, progressive age and fall-related debility, possibly severe AS  - Currently improved per daughter at bedside s/p 2L IVF  - UA negative for infection  - check RVP  - Check TSH  - PT eval  - Fall and aspiration precautions

## 2020-01-06 NOTE — H&P ADULT - ASSESSMENT
89y F w/ PMH afib on digoxin, severe AS, COPD, CLL previously on treanda and rituxan, cirrhosis a/w HCC c/b esophageal varices s/p banding, CKD, PVD brought in by daughter with cc of acute on chronic weakness a/w recent mechanical falls at home and recent UTI treated with macrobid. 89y F w/ PMH afib on digoxin, severe AS, COPD, CLL previously on treanda and rituxan, cirrhosis a/w HCC c/b esophageal varices s/p banding, CKD, PVD brought in by daughter with cc of acute on chronic weakness a/w recent mechanical falls at home, recent UTI treated with macrobid, poor PO intake, and diarrhea in setting of lactulose use.

## 2020-01-06 NOTE — H&P ADULT - NSHPREVIEWOFSYSTEMS_GEN_ALL_CORE
General: + improved generalized weakness; No fevers, chills  HEENT: No headaches, rhinorrhea, sore throat  CVS: No chest pain, palpitations  Resp: + stable chronic cough; no dyspnea, wheezing  GI: + brown watery diarrhea, No abdominal pain, nausea, vomiting, hematochezia  : No dysuria, hematuria  MSK: No joint pain or swelling  Ext: No edema, no claudication  Skin: chronic stasis dermatitis of b/l LE  Heme: No easy bruising or petechiae  Neuro: + improved mild confusion; no numbness, weakness, or loss of consciousness  Endocrine: No excessive heat or cold symptoms, polyuria, polydipsia

## 2020-01-06 NOTE — H&P ADULT - NSHPPHYSICALEXAM_GEN_ALL_CORE
Vital Signs Last 24 Hrs  T(C): 36.1 (06 Jan 2020 14:54), Max: 36.6 (06 Jan 2020 09:15)  T(F): 97 (06 Jan 2020 14:54), Max: 97.9 (06 Jan 2020 09:15)  HR: 64 (06 Jan 2020 14:54) (56 - 64)  BP: 127/109 (06 Jan 2020 14:54) (97/48 - 132/77)  BP(mean): --  RR: 22 (06 Jan 2020 14:54) (20 - 28)  SpO2: 99% (06 Jan 2020 14:54) (94% - 100%)    Physical Exam:  Gen: Alert, NAD  HEENT: NCAT, PERRL, EOMI, clear conjunctiva, no scleral icterus  Neck: Supple, no JVD, no LAD  CV: RRR, S1S2, no m/r/g  Resp: CTAB, normal respiratory effort  Abd: Soft, NT, ND, normal bowel sounds  Ext: trace b/l LE edema, no clubbing or cyanosis  Neuro: AOx3, CN2-12 grossly intact, RODRIGUEZ  Skin: warm, perfused b/l LE stasis dermatitis Vital Signs Last 24 Hrs  T(C): 36.1 (06 Jan 2020 14:54), Max: 36.6 (06 Jan 2020 09:15)  T(F): 97 (06 Jan 2020 14:54), Max: 97.9 (06 Jan 2020 09:15)  HR: 64 (06 Jan 2020 14:54) (56 - 64)  BP: 127/109 (06 Jan 2020 14:54) (97/48 - 132/77)  BP(mean): --  RR: 22 (06 Jan 2020 14:54) (20 - 28)  SpO2: 99% (06 Jan 2020 14:54) (94% - 100%)    Physical Exam:  Gen: Chronically ill appearing elderly female, in no acute distress  EYES: EOMI, clear conjunctiva, no scleral icterus  ENMT: Dry mucous membranes, no pharyngeal exudates noted  Neck: Supple, no JVD, no LAD  CV: RRR, S1S2, no m/r/g  Resp: CTAB, normal respiratory effort  Abd: Soft, NT, ND, normal bowel sounds  Ext: trace b/l LE edema, no clubbing or cyanosis  Neuro: AOx3, CN2-12 grossly intact, RODRIGUEZ  Skin: warm, perfused b/l LE stasis dermatitis

## 2020-01-06 NOTE — ED ADULT NURSE NOTE - NSIMPLEMENTINTERV_GEN_ALL_ED
Implemented All Fall with Harm Risk Interventions:  [place] to call system. Call bell, personal items and telephone within reach. Instruct patient to call for assistance. Room bathroom lighting operational. Non-slip footwear when patient is off stretcher. Physically safe environment: no spills, clutter or unnecessary equipment. Stretcher in lowest position, wheels locked, appropriate side rails in place. Provide visual cue, wrist band, yellow gown, etc. Monitor gait and stability. Monitor for mental status changes and reorient to person, place, and time. Review medications for side effects contributing to fall risk. Reinforce activity limits and safety measures with patient and family. Provide visual clues: red socks.

## 2020-01-06 NOTE — H&P ADULT - PROBLEM SELECTOR PLAN 5
Ca 12.5 with mild generalized weakness  - ECG QTc 436  - symptomatically improved and denies any symptoms  - finishing 2nd liter of NS  - check PTH, PTHrP, vit D, iCa  - monitor BMP

## 2020-01-06 NOTE — H&P ADULT - PROBLEM SELECTOR PLAN 2
Family and HHA reports history of frequent falls a/w gradual weakness with walking. Last fall on 12/31/19 at home c/b trauma to arms with mild subsequent pain. Patient walks with walker at home. Family denies head trauma, LOC, seizure activity. Patient denies pain or associated symptoms.  - CTH negative for acute pathology  - No fracture or dislocation on XR L shoulder/humerus, R elbow/forearm/wrist  - CXR clear lungs  - Fall precautions  - PT eval Family reports history of frequent falls attributed to gradual weakness with walking. Last fall on 12/31/19 at home c/b trauma to arms with mild subsequent arm pain. Patient walks with walker at home. Family denies head trauma, LOC, seizure activity. Patient denies pain or associated symptoms.  - CTH negative for acute pathology  - No fracture or dislocation on XR L shoulder/humerus, R elbow/forearm/wrist  - CXR clear lungs  - Fall precautions  - PT eval

## 2020-01-06 NOTE — ED PROVIDER NOTE - ATTENDING CONTRIBUTION TO CARE
Attending MD Barragan:  I personally have seen and examined this patient.  Resident note reviewed and agree on plan of care and except where noted.

## 2020-01-06 NOTE — ED PROVIDER NOTE - PROGRESS NOTE DETAILS
Simon Givens PGY2  has KARRIE, elevated lactate, elevated Ca, given IVF, RVP, CThead, UA negative. admitted to medicine.

## 2020-01-06 NOTE — H&P ADULT - PROBLEM SELECTOR PLAN 9
DVT ppx: HSQ  Dysphagia diet with nectar thickened liquids  Fall precaution DVT ppx: HSQ  Dysphagia diet with nectar thickened liquids  Fall precaution  Code status: DNR/DNI, yes to trial of bipap, yes to trial of IV pressors, MOLST in chart

## 2020-01-06 NOTE — ED PROVIDER NOTE - OBJECTIVE STATEMENT
89 yo female with pmhx Afib on dig no AC, aortic stenosis, CKD, HCC with varacies, pHTN, CLL, head bleed, presenting with BL arm aches for 5 days s/p fall. Patient fell 7 days ago while ambulating with walker, unwitness, uncertain if head trauma. Per family, she has had 3 falls in past 2-3 weeks, mostly unwitnessed, and they feel that she is getting weaker with walking. Went to  7 days ago for the fall but they did not have xray machine working. Of note was dx with UTI 5 days ago, just finished abx course of macrobid. Family states she has h/o UTIs in past and fecal incontinence. +sick contacts with family member with cough. Uncertain if LOC    Denies CP, SOB, fevers, cough

## 2020-01-06 NOTE — H&P ADULT - PROBLEM SELECTOR PLAN 3
Family reports patient seemed slightly altered yesterday (described as seeming more tired, less disappointed than usual to not be able to go outside, trying to get her walker through a narrow space that she previously knew her walker didn’t fit in)  - May have been a/w dehydration   - Family and HHA at bedside reports that patient seems more alert and more herself now after 2L IVF  - Patient AOx3, appropriate speech, following basic commands  - check TSH Family reports patient seemed slightly altered yesterday (described as seeming more tired, less disappointed than usual to not be able to go outside, trying to get her walker through a narrow space that she previously knew her walker didn’t fit in)  - May have been a/w dehydration   - Also hypercalcemic to 12  - Family and HHA at bedside reports that patient seems more alert and more herself now after 2L IVF  - Lactate improved from 4.2 to 2.9  - Patient AOx3, appropriate speech, following basic commands  - check TSH, B12, folate  - monitor BMP Family reports patient seemed slightly altered yesterday (described as seeming more tired, less disappointed than usual to not be able to go outside, trying to get her walker through a narrow space that she previously knew her walker didn’t fit in)  - May have been a/w dehydration   - Also hypercalcemic to 12  - Family and HHA at bedside reports that patient seems more alert and more herself now after 2L IVF  - Lactate improved from 4.2 to 2.9  - Patient AOx3, appropriate speech, following basic commands  - check TSH, B12, folate  - monitor BMP, lactate

## 2020-01-06 NOTE — H&P ADULT - PROBLEM SELECTOR PLAN 4
sCr 1.44 from baseline 1,   - suspect pre-renal at this time  - check response to 2L IVF  - monitor BMP, I/Os, avoid nephrotoxins, renally dose meds  - sCr 1.44 from baseline 1  - suspect pre-renal at this time  - check response to 2L IVF  - check urine lytes  - monitor BMP, I/Os, avoid nephrotoxins, renally dose meds

## 2020-01-06 NOTE — H&P ADULT - NSHPSOCIALHISTORY_GEN_ALL_CORE
Lives at home with her daughter, has a HHA, walks with a walker, smoked socially when she was younger, no history of heavy etoh, no etoh for years nor other drug use Lives at home with her daughter, has a HHA, walks with a walker, smoked cigarettes socially when she was younger, no history of heavy etoh, no cigarettes or etoh for years nor other drug use no neck mass

## 2020-01-06 NOTE — ED PROVIDER NOTE - SKIN, MLM
skin tears on right forearm and left proximal UE, with no lacerations, generalized bruising on UE LE BL. skin dry

## 2020-01-06 NOTE — ED ADULT NURSE NOTE - ED STAT RN HANDOFF DETAILS
hand off given to oncoming RN Isis Christy. Pt awaiting bed. IV not flushing. removed with IV cath intact. New IV inserted by Dr right GONSALES Pt TBA. no bed yet. family at [facility]. Fall risk precautions maintained.

## 2020-01-06 NOTE — H&P ADULT - ATTENDING COMMENTS
Patient seen and examined with resident Dr. Faulkner  Patient is a 89 year old female with a notable history of AF (not on A/C), severe AS, COPD, CLL, decompensated cirrhosis c/b HCC, EV s/p banding, PSE, CKD stage 3, PVD who presents with chronic progressive weakness, frequent falls, and a few days of general malaise, encephalopathy found to have significant metabolic derangements including KARRIE, NAGMA/ AGMA (LA), hypercalcemia.   Majority of history was provided by her family at bedside, who described recent UTI this week, along with multiple episodes of diarrhea, and decreased oral intake. In addition, diuretic was recently added to her home medication list. Her labs were consistent with hypovolemia.   She was given 2 L IVF in the ED, with symptomatic improvement and resolution to her baseline mental status. Plan as above for encephalopathy, weakness, and acute metabolic derangements. Suspect that much will resolve with hydration. We will hold off on additional fluids today considering her AS.     Patient has multiple high risk co-morbidities. Prognosis is guarded.   Goals of care discussed with patient and family at bedside. JOHNATHON reviewed from prior admission. DNR, Trial of NIPPV, no intubation

## 2020-01-06 NOTE — H&P ADULT - PROBLEM SELECTOR PLAN 8
Rate controlled  - not on AC given falls  - c/w home digoxin; level wnl  - c/w home propranolol Rate controlled  - not on AC per outpt neuro given prior CVA and concern for hemorrhagic conversion; also has frequent falls  - c/w home digoxin; level wnl  - c/w home propranolol

## 2020-01-06 NOTE — H&P ADULT - HISTORY OF PRESENT ILLNESS
89y F w/ PMH afib on digoxin, severe AS, COPD, CLL previously on treanda and rituxan, cirrhosis a/w HCC c/b esophageal varices, CKD, PVD presents with b/l arm aches over the past 5 days after a mechanical fall at home 7 days ago. Patient was ambulating at home with her walker when she had an unwitnessed fall. Per family she has had 3 falls in the past few weeks and think it is because she is getting weaker with walking. Patient was also diagnosed with a UTI 5 days ago and has been on a course of macrobid which was supposed to be completed today. ROS + stable chronic intermittent cough, brown watery diarrhea on lactulose. Denies fevers, chills, headaches, dizziness, cp, sob, abd pain, n/v, urinary symptoms, joint pain, numbness, weakness, rash. +sick contacts from family member with cough, no recent travels, no recent change in meds other than recent spironolactone 2 weeks ago    In ED: T97, HR 62 with isolated reading of 56, /77 -> 97/48 -> 127/109, RR 20-28, sating 99% on RA. Given 2L NS. 89y F w/ PMH afib on digoxin, severe AS, COPD, CLL previously on treanda and rituxan, cirrhosis a/w HCC c/b esophageal varices s/p banding, CKD, PVD presents brought in by daughter for concern for worsened weakness in setting of fall 1 week ago. Daughter at bedside reports that while patient is able to walk with her walker, she has been getting gradually weaker. Patient had an unwitnessed fall at home 1 week ago, 3 falls in the past few weeks. Patient denied any associated symptoms. Family denied associated head trauma, LOC, seizure activity. Patient was also diagnosed with a UTI 5 days ago and has been on a 7-day course of macrobid which was supposed to be completed today. ROS + stable chronic intermittent cough, brown watery diarrhea on lactulose. Denies fevers, chills, headaches, dizziness, cp, sob, abd pain, n/v, urinary symptoms, joint pain, numbness, weakness, rash. +sick contacts from family member with cough. No recent travels. Recent addition of spironolactone 2 weeks ago however patient has had falls prior to initiation of this med. Denies other recent change in meds.    In ED: T97, HR 62 with isolated reading of 56, /77 -> 97/48 -> 127/109, RR 20-28, sating 99% on RA. Given 2L NS. 89y F w/ PMH afib on digoxin, severe AS, COPD, CLL previously on treanda and rituxan, cirrhosis a/w HCC c/b esophageal varices s/p banding, CKD, PVD presents brought in by daughter for concern for worsened weakness in setting of fall 1 week ago. Daughter at bedside reports that while patient is able to walk with her walker, she has been getting gradually weaker. Patient had an unwitnessed fall at home 1 week ago, 3 falls in the past few weeks. Patient denied any associated symptoms. Family denied associated head trauma, LOC, seizure activity. Patient was also diagnosed with a UTI 6 days ago and has been on a 7-day course of macrobid which was supposed to be completed today. ROS + stable chronic intermittent cough, brown watery diarrhea on lactulose. Denies fevers, chills, headaches, dizziness, cp, sob, abd pain, n/v, urinary symptoms, joint pain, numbness, weakness, rash. +sick contacts from family member with cough. No recent travels. Recent addition of spironolactone 2 weeks ago however patient has had falls prior to initiation of this med. Denies other recent change in meds.    In ED: T97, HR 62 with isolated reading of 56, /77 -> 97/48 -> 127/109, RR 20-28, sating 99% on RA. Given 2L NS.

## 2020-01-06 NOTE — H&P ADULT - NSHPLABSRESULTS_GEN_ALL_CORE
LABS: Personally reviewed labs, imaging, and ECG                          13.3   8.92  )-----------( 90       ( 2020 08:55 )             41.7       -    140  |  110<H>  |  59<H>  ----------------------------<  87  4.7   |  16<L>  |  1.44<H>    Ca    12.5<H>      2020 08:55    TPro  5.9<L>  /  Alb  3.2<L>  /  TBili  3.4<H>  /  DBili  x   /  AST  32  /  ALT  17  /  AlkPhos  103           Urinalysis Basic - ( 2020 10:03 )    Color: Yellow / Appearance: Clear / S.019 / pH: x  Gluc: x / Ketone: Trace  / Bili: Negative / Urobili: Negative   Blood: x / Protein: Negative / Nitrite: Negative   Leuk Esterase: Negative / RBC: x / WBC x   Sq Epi: x / Non Sq Epi: x / Bacteria: x        RADIOLOGY & ADDITIONAL TESTS:   < from: CT Head No Cont (20 @ 11:46) >    FINDINGS:    Redemonstration enlarged ventricles and sulci greater then expected for age is with unchanged volume loss. There is redemonstration nonspecific decreased aeration to the white matter, likely sequela microvascular disease, with remote lacunar and age indeterminate lacunar infarcts. No new intraparenchymal hematoma, mass effect or midline shift. No new abnormal extra-axial fluid collections are present. Carotid siphon calcification. Basal cisterns remain patent.    Calvarium is intact, with thickened diploic space. Absent orbital lenses with cataract surgery, left medial globe deviation with denervation atrophy of the left lateral rectus muscle.. Paranasal sinuses and mastoid air cells unchanged, with sclerosis and opacification left mastoid tip.    IMPRESSION:    Redemonstration volume loss, microvascular disease, age indeterminate lacunar infarct, carotid siphon calcification, no new hemorrhage or midline shift. If symptoms persist consider follow-up head CT or MR if no contraindications.    < end of copied text >      < from: Xray Chest 1 View- PORTABLE-Urgent (20 @ 11:31) >    FINDINGS:  The lungs are clear.  There are no pleural effusions or pneumothorax.  The cardiomediastinal silhouette not well evaluated on this projection. Splaying of the elicia suggestive of left atrial enlargement. The aorta appears diony uncoiled     IMPRESSION:   Clear lungs. Uncoiled aorta.    < end of copied text >      < from: 12 Lead ECG (20 @ 08:29) >    Ventricular Rate 71 BPM    Atrial Rate 110 BPM    QRS Duration 100 ms    Q-T Interval 402 ms    QTC Calculation(Bezet) 436 ms    R Axis 48 degrees    T Axis 255 degrees    Diagnosis Line ATRIAL FIBRILLATION  VOLTAGE CRITERIA FOR LEFT VENTRICULAR HYPERTROPHY    < end of copied text >      No fracture or dislocation on XR L shoulder/humerus, R elbow/forearm/wrist LABS: Personally reviewed labs, imaging, and ECG                          13.3   8.92  )-----------( 90       ( 2020 08:55 )             41.7       -    140  |  110<H>  |  59<H>  ----------------------------<  87  4.7   |  16<L>  |  1.44<H>    Ca    12.5<H>      2020 08:55    TPro  5.9<L>  /  Alb  3.2<L>  /  TBili  3.4<H>  /  DBili  x   /  AST  32  /  ALT  17  /  AlkPhos  103           Urinalysis Basic - ( 2020 10:03 )    Color: Yellow / Appearance: Clear / S.019 / pH: x  Gluc: x / Ketone: Trace  / Bili: Negative / Urobili: Negative   Blood: x / Protein: Negative / Nitrite: Negative   Leuk Esterase: Negative / RBC: x / WBC x   Sq Epi: x / Non Sq Epi: x / Bacteria: x        RADIOLOGY & ADDITIONAL TESTS:   < from: CT Head No Cont (20 @ 11:46) >    FINDINGS:    Redemonstration enlarged ventricles and sulci greater then expected for age is with unchanged volume loss. There is redemonstration nonspecific decreased aeration to the white matter, likely sequela microvascular disease, with remote lacunar and age indeterminate lacunar infarcts. No new intraparenchymal hematoma, mass effect or midline shift. No new abnormal extra-axial fluid collections are present. Carotid siphon calcification. Basal cisterns remain patent.    Calvarium is intact, with thickened diploic space. Absent orbital lenses with cataract surgery, left medial globe deviation with denervation atrophy of the left lateral rectus muscle.. Paranasal sinuses and mastoid air cells unchanged, with sclerosis and opacification left mastoid tip.    IMPRESSION:    Redemonstration volume loss, microvascular disease, age indeterminate lacunar infarct, carotid siphon calcification, no new hemorrhage or midline shift. If symptoms persist consider follow-up head CT or MR if no contraindications.    < end of copied text >      < from: Xray Chest 1 View- PORTABLE-Urgent (20 @ 11:31) >    FINDINGS:  The lungs are clear.  There are no pleural effusions or pneumothorax.  The cardiomediastinal silhouette not well evaluated on this projection. Splaying of the elicia suggestive of left atrial enlargement. The aorta appears diony uncoiled     IMPRESSION:   Clear lungs. Uncoiled aorta.    < end of copied text >      < from: 12 Lead ECG (20 @ 08:29) >    Ventricular Rate 71 BPM    Atrial Rate 110 BPM    QRS Duration 100 ms    Q-T Interval 402 ms    QTC Calculation(Bezet) 436 ms    R Axis 48 degrees    T Axis 255 degrees    Diagnosis Line ATRIAL FIBRILLATION  VOLTAGE CRITERIA FOR LEFT VENTRICULAR HYPERTROPHY    < end of copied text >      No fracture or dislocation on XR L shoulder/humerus, R elbow/forearm/wrist        Personal review of CXR: Clear lungs, no infiltrate or consolidation, no effusion  Personal review of ECG: AF, rate controlled, LVH

## 2020-01-07 LAB
24R-OH-CALCIDIOL SERPL-MCNC: 50.5 NG/ML — SIGNIFICANT CHANGE UP (ref 30–80)
ANION GAP SERPL CALC-SCNC: 8 MMOL/L — SIGNIFICANT CHANGE UP (ref 5–17)
BASOPHILS # BLD AUTO: 0.03 K/UL — SIGNIFICANT CHANGE UP (ref 0–0.2)
BASOPHILS NFR BLD AUTO: 0.6 % — SIGNIFICANT CHANGE UP (ref 0–2)
BILIRUB DIRECT SERPL-MCNC: 0.5 MG/DL — HIGH (ref 0–0.2)
BUN SERPL-MCNC: 52 MG/DL — HIGH (ref 7–23)
CA-I BLD-SCNC: 1.46 MMOL/L — HIGH (ref 1.12–1.3)
CALCIUM SERPL-MCNC: 10.8 MG/DL — HIGH (ref 8.4–10.5)
CALCIUM SERPL-MCNC: 10.9 MG/DL — HIGH (ref 8.4–10.5)
CHLORIDE SERPL-SCNC: 113 MMOL/L — HIGH (ref 96–108)
CO2 SERPL-SCNC: 20 MMOL/L — LOW (ref 22–31)
CREAT SERPL-MCNC: 1.19 MG/DL — SIGNIFICANT CHANGE UP (ref 0.5–1.3)
CULTURE RESULTS: SIGNIFICANT CHANGE UP
EOSINOPHIL # BLD AUTO: 0.06 K/UL — SIGNIFICANT CHANGE UP (ref 0–0.5)
EOSINOPHIL NFR BLD AUTO: 1.2 % — SIGNIFICANT CHANGE UP (ref 0–6)
FOLATE SERPL-MCNC: 10.4 NG/ML — SIGNIFICANT CHANGE UP
GLUCOSE SERPL-MCNC: 80 MG/DL — SIGNIFICANT CHANGE UP (ref 70–99)
HCT VFR BLD CALC: 37.8 % — SIGNIFICANT CHANGE UP (ref 34.5–45)
HGB BLD-MCNC: 12.3 G/DL — SIGNIFICANT CHANGE UP (ref 11.5–15.5)
IMM GRANULOCYTES NFR BLD AUTO: 0.6 % — SIGNIFICANT CHANGE UP (ref 0–1.5)
INR BLD: 1.17 RATIO — HIGH (ref 0.88–1.16)
LYMPHOCYTES # BLD AUTO: 0.63 K/UL — LOW (ref 1–3.3)
LYMPHOCYTES # BLD AUTO: 12.1 % — LOW (ref 13–44)
MAGNESIUM SERPL-MCNC: 1.5 MG/DL — LOW (ref 1.6–2.6)
MCHC RBC-ENTMCNC: 32.5 GM/DL — SIGNIFICANT CHANGE UP (ref 32–36)
MCHC RBC-ENTMCNC: 33.7 PG — SIGNIFICANT CHANGE UP (ref 27–34)
MCV RBC AUTO: 103.6 FL — HIGH (ref 80–100)
MONOCYTES # BLD AUTO: 0.54 K/UL — SIGNIFICANT CHANGE UP (ref 0–0.9)
MONOCYTES NFR BLD AUTO: 10.4 % — SIGNIFICANT CHANGE UP (ref 2–14)
NEUTROPHILS # BLD AUTO: 3.91 K/UL — SIGNIFICANT CHANGE UP (ref 1.8–7.4)
NEUTROPHILS NFR BLD AUTO: 75.1 % — SIGNIFICANT CHANGE UP (ref 43–77)
NRBC # BLD: 0 /100 WBCS — SIGNIFICANT CHANGE UP (ref 0–0)
PHOSPHATE SERPL-MCNC: 2.2 MG/DL — LOW (ref 2.5–4.5)
PLATELET # BLD AUTO: 73 K/UL — LOW (ref 150–400)
POTASSIUM SERPL-MCNC: 5.3 MMOL/L — SIGNIFICANT CHANGE UP (ref 3.5–5.3)
POTASSIUM SERPL-SCNC: 5.3 MMOL/L — SIGNIFICANT CHANGE UP (ref 3.5–5.3)
PROTHROM AB SERPL-ACNC: 13.4 SEC — HIGH (ref 10–12.9)
PTH-INTACT FLD-MCNC: 10 PG/ML — LOW (ref 15–65)
RBC # BLD: 3.65 M/UL — LOW (ref 3.8–5.2)
RBC # FLD: 15.4 % — HIGH (ref 10.3–14.5)
SODIUM SERPL-SCNC: 141 MMOL/L — SIGNIFICANT CHANGE UP (ref 135–145)
SPECIMEN SOURCE: SIGNIFICANT CHANGE UP
TSH SERPL-MCNC: 1.54 UIU/ML — SIGNIFICANT CHANGE UP (ref 0.27–4.2)
VIT B12 SERPL-MCNC: 923 PG/ML — SIGNIFICANT CHANGE UP (ref 232–1245)
VIT D25+D1,25 OH+D1,25 PNL SERPL-MCNC: 18.9 PG/ML — LOW (ref 19.9–79.3)
WBC # BLD: 5.2 K/UL — SIGNIFICANT CHANGE UP (ref 3.8–10.5)
WBC # FLD AUTO: 5.2 K/UL — SIGNIFICANT CHANGE UP (ref 3.8–10.5)

## 2020-01-07 PROCEDURE — 99233 SBSQ HOSP IP/OBS HIGH 50: CPT | Mod: GC

## 2020-01-07 RX ORDER — SODIUM CHLORIDE 9 MG/ML
500 INJECTION INTRAMUSCULAR; INTRAVENOUS; SUBCUTANEOUS
Refills: 0 | Status: DISCONTINUED | OUTPATIENT
Start: 2020-01-07 | End: 2020-01-08

## 2020-01-07 RX ORDER — PETROLATUM,WHITE
1 JELLY (GRAM) TOPICAL DAILY
Refills: 0 | Status: DISCONTINUED | OUTPATIENT
Start: 2020-01-07 | End: 2020-01-09

## 2020-01-07 RX ADMIN — Medication 0.25 MILLIGRAM(S): at 17:17

## 2020-01-07 RX ADMIN — LACTULOSE 20 GRAM(S): 10 SOLUTION ORAL at 01:31

## 2020-01-07 RX ADMIN — HEPARIN SODIUM 5000 UNIT(S): 5000 INJECTION INTRAVENOUS; SUBCUTANEOUS at 17:51

## 2020-01-07 RX ADMIN — LACTULOSE 20 GRAM(S): 10 SOLUTION ORAL at 12:09

## 2020-01-07 RX ADMIN — LACTULOSE 20 GRAM(S): 10 SOLUTION ORAL at 06:23

## 2020-01-07 RX ADMIN — Medication 0.25 MILLIGRAM(S): at 05:51

## 2020-01-07 RX ADMIN — Medication 100 MILLIGRAM(S): at 10:13

## 2020-01-07 RX ADMIN — LACTULOSE 20 GRAM(S): 10 SOLUTION ORAL at 17:52

## 2020-01-07 RX ADMIN — SODIUM CHLORIDE 75 MILLILITER(S): 9 INJECTION INTRAMUSCULAR; INTRAVENOUS; SUBCUTANEOUS at 21:57

## 2020-01-07 RX ADMIN — SPIRONOLACTONE 25 MILLIGRAM(S): 25 TABLET, FILM COATED ORAL at 06:23

## 2020-01-07 RX ADMIN — HEPARIN SODIUM 5000 UNIT(S): 5000 INJECTION INTRAVENOUS; SUBCUTANEOUS at 06:22

## 2020-01-07 RX ADMIN — Medication 25 MICROGRAM(S): at 06:23

## 2020-01-07 NOTE — PHYSICAL THERAPY INITIAL EVALUATION ADULT - PRECAUTIONS/LIMITATIONS, REHAB EVAL
+ stable chronic intermittent cough, brown watery diarrhea on lactulose. Denies fevers, chills, headaches, dizziness, cp, sob, abd pain, n/v, urinary symptoms, joint pain, numbness, weakness, rash. +sick contacts from family member with cough. Recent addition of spironolactone 2 weeks ago however pt had falls prior to initiation. Denies other recent change in meds. DX: Weakness, Fall, metabolic encephalopathy, KARRIE, hypercalcemia, COPD, Decompensated hepatic cirrhosis, Afib, DVT pp, dysphagia diet w/nectar thick liquids. fall precautions/+ stable chronic intermittent cough, brown watery diarrhea on lactulose. Denies fevers, chills, headaches, dizziness, cp, sob, abd pain, n/v, urinary symptoms, joint pain, numbness, weakness, rash. +sick contacts from family member with cough. Recent addition of spironolactone 2 weeks ago however pt had falls prior to initiation. Denies other recent change in meds. DX: Weakness, Fall, metabolic encephalopathy, KARRIE, hypercalcemia, COPD, Decompensated hepatic cirrhosis, Afib, DVT pp, dysphagia diet w/nectar thick liquids.

## 2020-01-07 NOTE — DIETITIAN INITIAL EVALUATION ADULT. - PROBLEM SELECTOR PLAN 9
DVT ppx: HSQ  Dysphagia diet with nectar thickened liquids  Fall precaution  Code status: DNR/DNI, yes to trial of bipap, yes to trial of IV pressors, MOLST in chart

## 2020-01-07 NOTE — DIETITIAN INITIAL EVALUATION ADULT. - PROBLEM SELECTOR PLAN 3
Family reports patient seemed slightly altered yesterday (described as seeming more tired, less disappointed than usual to not be able to go outside, trying to get her walker through a narrow space that she previously knew her walker didn’t fit in)  - May have been a/w dehydration   - Also hypercalcemic to 12  - Family and HHA at bedside reports that patient seems more alert and more herself now after 2L IVF  - Lactate improved from 4.2 to 2.9  - Patient AOx3, appropriate speech, following basic commands  - check TSH, B12, folate  - monitor BMP, lactate

## 2020-01-07 NOTE — PROGRESS NOTE ADULT - PROBLEM SELECTOR PLAN 2
- Per family, Hx of frequent falls attributed to gradual weakness with walking. Last fall on 12/31/19 at home c/b trauma to arms with mild subsequent arm pain. Family denies head trauma, LOC, seizure activity.   - Walks with walker at home.   - CTH (1/6): negative for acute pathology  - No fracture or dislocation on XR L shoulder/humerus, R elbow/forearm/wrist (1/6)  - CXR clear lungs  - Fall precautions  - PT eval

## 2020-01-07 NOTE — PROGRESS NOTE ADULT - PROBLEM SELECTOR PLAN 6
Stable  - c/w home budes 0.25 BID  - Duoneb PRN Stable  - c/w home budesonide 0.25 BID  - Duoneb PRN

## 2020-01-07 NOTE — PHYSICAL THERAPY INITIAL EVALUATION ADULT - GENERAL OBSERVATIONS, REHAB EVAL
Pt semi-supine in bed on Bedrest orders in NAD but very lethargic, IV lock, +permafit, + heel boots. Daughter present.

## 2020-01-07 NOTE — PHYSICAL THERAPY INITIAL EVALUATION ADULT - ADDITIONAL COMMENTS
Pt lives with her adult daughter in pvt split level house with 7 steps to enter and 6 steps to second level both with rails. Pt has had several falls in past 3 weeks and has been getting progressively weaker. Daughter states 5 months ago pt was independent ambulating w/AD indoors, however, there were always good days and bad days when pts performance fluctuated.

## 2020-01-07 NOTE — PROGRESS NOTE ADULT - ASSESSMENT
89 F w/PMH A fib on digoxin, severe AS, COPD, CLL (previously on Treanda and Rituxan), cirrhosis a/w HCC c/b esophageal varices s/p banding, CKD, PVD brought in by daughter with cc of acute on chronic weakness a/w recent mechanical falls at home, recent UTI treated with macrobid, poor PO intake, and diarrhea in setting of lactulose use.

## 2020-01-07 NOTE — DIETITIAN INITIAL EVALUATION ADULT. - REASON INDICATOR FOR ASSESSMENT
Nutrition consult received for "BMI<18"  Source: patient, daughter, previous RD notes, EMR    Per chart, pt is a 89 year old female with PMH of AFib on digoxin, severe AS, COPD, CLL, cirrhosis a/w HCC c/b esophageal varices s/p banding, CKD, PVD, admitted for weakness with FTT likely multifactorial including dehydration, possible recent UTI, progressive age and fall-related debility, possibly severe AS.

## 2020-01-07 NOTE — PROGRESS NOTE ADULT - PROBLEM SELECTOR PLAN 7
a/w HCC, not on DMT.  - hx of esophageal varices s/p banding  - prior HE; currently AOx3; c/w home lactulose and rifaximin  - no overt ascites  - daughter reports baseline systolic BP in 100s  - elevated Tbili  - check fractionated bili and INR

## 2020-01-07 NOTE — PROGRESS NOTE ADULT - SUBJECTIVE AND OBJECTIVE BOX
Karlos Haile, PGY1  Internal Medicine Resident  Pager: [phone] (NS), [phone] ([place])    Patient is a 89y old  Female who presents with a chief complaint of Weakness with FTT (2020 18:00)    SUBJECTIVE / OVERNIGHT EVENTS:  Admitted yesterday for weakness following a fall 1w ago.  At bedside, patient complaining that she wants to go home.  Did not sleep well.  Does not like the food.  Last bowel movement was 1x diarrhea yesterday.  On 2L NC.  Continues to have bilateral shoulder/arm pain.      ADDITIONAL REVIEW OF SYSTEMS:  Patient denies fevers/chills, chest pain, palpitations, cough/wheezing, abdominal pain, nausea, vomiting, or constipation.    MEDICATIONS  (STANDING):  buDESOnide    Inhalation Suspension 0.25 milliGRAM(s) Inhalation two times a day  digoxin     Tablet 0.125 milliGRAM(s) Oral every other day  heparin  Injectable 5000 Unit(s) SubCutaneous every 12 hours  lactulose Syrup 20 Gram(s) Oral four times a day  levothyroxine 25 MICROGram(s) Oral daily  nitrofurantoin monohydrate/macrocrystals (MACROBID) 100 milliGRAM(s) Oral two times a day with meals  propranolol 10 milliGRAM(s) Oral two times a day  rifAXIMin 550 milliGRAM(s) Oral two times a day  spironolactone 25 milliGRAM(s) Oral daily    MEDICATIONS  (PRN):  albuterol/ipratropium for Nebulization 3 milliLiter(s) Nebulizer every 6 hours PRN Shortness of Breath and/or Wheezing      CAPILLARY BLOOD GLUCOSE        I&O's Summary    2020 07:01  -  2020 07:00  --------------------------------------------------------  IN: 0 mL / OUT: 200 mL / NET: -200 mL        PHYSICAL EXAM:  Vital Signs Last 24 Hrs  T(C): 36.6 (2020 04:32), Max: 36.7 (2020 20:46)  T(F): 97.8 (2020 04:32), Max: 98 (2020 20:46)  HR: 72 (2020 05:53) (55 - 92)  BP: 137/53 (2020 04:32) (97/48 - 137/53)  BP(mean): --  RR: 18 (2020 04:32) (18 - 28)  SpO2: 95% (2020 05:53) (95% - 100%)    CONSTITUTIONAL: Upset; wants to go home, but otherwise no apparent distress.   EYES: PERRLA, EOMI, conjunctiva and sclera clear  ENMT: Moist oral mucosa, no pharyngeal injection or exudates; normal dentition.  Orange powder coating inside of mouth; pt says it is medication.  NECK: Supple, no palpable masses; no JVD  RESPIRATORY: Diffuse rhonchi bilateral lungs.   CARDIOVASCULAR: Irregular rhythm. Harsh holosystolic murmur.  Distal pulses 2+ bilaterally  ABDOMEN: Soft, nontender to palpation, normoactive bowel sounds, no rebound/guarding  MUSCULOSKELETAL:  No LE edema; extremities WWP, however BL shins very dark and dry, flaking skin.  Both shoulders/arms hurt; L>R.  PSYCH: A+O to person, place; affect appropriate  NEUROLOGY: CN II-XII grossly intact; no focal sensory or motor deficits; moving all extremities spontaneously  SKIN: No rashes; no palpable lesions    LABS:                        13.3   8.92  )-----------( 90       ( 2020 08:55 )             41.7     01-06    140  |  110<H>  |  59<H>  ----------------------------<  87  4.7   |  16<L>  |  1.44<H>    Ca    12.5<H>      2020 08:55    TPro  5.9<L>  /  Alb  3.2<L>  /  TBili  3.4<H>  /  DBili  x   /  AST  32  /  ALT  17  /  AlkPhos  103  01-06          Urinalysis Basic - ( 2020 10:03 )    Color: Yellow / Appearance: Clear / S.019 / pH: x  Gluc: x / Ketone: Trace  / Bili: Negative / Urobili: Negative   Blood: x / Protein: Negative / Nitrite: Negative   Leuk Esterase: Negative / RBC: x / WBC x   Sq Epi: x / Non Sq Epi: x / Bacteria: x          RADIOLOGY & ADDITIONAL TESTS:  Results Reviewed:   Imaging Personally Reviewed:  Electrocardiogram Personally Reviewed:    COORDINATION OF CARE:  Care Discussed with Consultants/Other Providers [Y/N]:  Prior or Outpatient Records Reviewed [Y/N]:

## 2020-01-07 NOTE — DIETITIAN INITIAL EVALUATION ADULT. - PROBLEM SELECTOR PLAN 2
Family reports history of frequent falls attributed to gradual weakness with walking. Last fall on 12/31/19 at home c/b trauma to arms with mild subsequent arm pain. Patient walks with walker at home. Family denies head trauma, LOC, seizure activity. Patient denies pain or associated symptoms.  - CTH negative for acute pathology  - No fracture or dislocation on XR L shoulder/humerus, R elbow/forearm/wrist  - CXR clear lungs  - Fall precautions  - PT eval

## 2020-01-07 NOTE — PROGRESS NOTE ADULT - PROBLEM SELECTOR PLAN 1
- Brought from home by daughter/primary caretaker for worsened weakness x1 day in the setting of recent fall, UTI, poor PO intake, and chronic diarrhea in setting of lactulose use.  - Likely multifactorial including dehydration, possible recent UTI, age and fall-related debility, possibly severe AS  - Currently improved per daughter at bedside s/p 2L IVF  - UA negative for infection  - check RVP  - Check TSH  - PT eval  - Fall and aspiration precautions - Brought from home by daughter/primary caretaker for worsened weakness x1 day in the setting of recent fall, UTI, poor PO intake, and chronic diarrhea in setting of lactulose use.  - Likely multifactorial including dehydration, possible recent UTI, age and fall-related debility, possibly severe AS  - Currently improved per daughter at bedside s/p 2L IVF  - UA negative for infection  - Negative Flu A/B or RSV (1/6)  - TSH 1.54 (WNL)  - PT eval  - Fall and aspiration precautions

## 2020-01-07 NOTE — PROGRESS NOTE ADULT - PROBLEM SELECTOR PLAN 4
sCr 1.44 from baseline 1  - suspect pre-renal at this time  - check response to 2L IVF  - check urine lytes  - monitor BMP, I/Os, avoid nephrotoxins, renally dose meds

## 2020-01-07 NOTE — PHYSICAL THERAPY INITIAL EVALUATION ADULT - DIAGNOSIS, PT EVAL
pt has decreased activity tolerance, decreased functional mobility capacity, decreased strength, impaired balance

## 2020-01-07 NOTE — DIETITIAN INITIAL EVALUATION ADULT. - OTHER INFO
Pt with good appetite/intake PTA and denies any recent changes in eating habits. Follows a strict low sodium diet, does not add salt to foods, avoids canned/processed items, and does not each sandwich meats. Endorses food allergy to shellfish (reaction: anaphylaxis). Reports PTA micronutrient supplementation includes vitamin D, vitamin C. Reports UBW of 107-110 pounds and denies recent weight changes. Reported UBW is consistent with in-house weight of 108 pounds (1/6), as well as weights from previous RD notes, suggesting weight maintenance x1 year. Pt refused Nutrition Focused Physical Exam. Per visual examination, pt appears with muscle wasting to temples; however, suspect age related depletion considering reported adequate PO intake and weight maintenance. Pt with good PO intake in house and reports tolerating current diet texture/consistency. Daughter requests pt receive DASH/TLC diet; RD spoke with provider. Pt/daughter did not wish to review verbal/written nutrition education on low sodium diet; RD availability made known.     Skin per chart: skin tears to right forearm, left upper arm  Edema per chart: none noted    Ht: 63 inches, Wt: 49 kg (1/6), BMI: 19.1 kg/m2, IBW: 115 pounds (+/-10%), %IBW: 91%

## 2020-01-07 NOTE — PROGRESS NOTE ADULT - PROBLEM SELECTOR PLAN 5
Ca 12.5 with mild generalized weakness  - ECG QTc 436  - symptomatically improved and denies any symptoms  - finishing 2nd liter of NS  - check PTH, PTHrP, vit D, iCa  - monitor BMP Ca 12.5 with mild generalized weakness  - ECG QTc 436  - symptomatically improved and denies any symptoms  - finishing 2nd liter of NS  - PTH 10 (low), 25-Vit D WNL.  - f/u PTHrP, 1,25-Vit D, trend iCa  - monitor BMP

## 2020-01-07 NOTE — PROGRESS NOTE ADULT - PROBLEM SELECTOR PLAN 8
Rate controlled  - not on AC per outpt neuro given prior CVA and concern for hemorrhagic conversion; also has frequent falls  - c/w home digoxin; level wnl  - c/w home propranolol

## 2020-01-07 NOTE — DIETITIAN INITIAL EVALUATION ADULT. - PROBLEM SELECTOR PLAN 1
Patient brought in from home by daughter/primary caretaker with concern for worsened weakness over the past day in the setting of recent fall, recent UTI, recent poor PO intake, and diarrhea in setting of lactulose use.  - Likely multifactorial including dehydration, possible recent UTI, progressive age and fall-related debility, possibly severe AS  - Currently improved per daughter at bedside s/p 2L IVF  - UA negative for infection  - check RVP  - Check TSH  - PT eval  - Fall and aspiration precautions

## 2020-01-07 NOTE — DIETITIAN INITIAL EVALUATION ADULT. - FACTORS AFF FOOD INTAKE
Daughter endorses pt with chewing/swallowing difficulties. Pt regularly sees speech pathologist who recommends bite-sized foods and nectar thick liqiuds. Endorses diarrhea 2/2 lactulose use. Denies nausea/vomiting, constipation or other acute GI distress at this time. Daughter reports pts last BM to be yesterday.

## 2020-01-07 NOTE — PROGRESS NOTE ADULT - PROBLEM SELECTOR PLAN 3
Family reports patient seemed slightly altered yesterday (described as seeming more tired, less disappointed than usual to not be able to go outside, trying to get her walker through a narrow space that she previously knew her walker didn’t fit in)  - May have been a/w dehydration   - Also hypercalcemic to 12  - Family and HHA at bedside reports that patient seems more alert and more herself now after 2L IVF  - Lactate improved from 4.2 to 2.9  - Patient AOx3, appropriate speech, following basic commands  - check TSH, B12, folate  - monitor BMP, lactate Family reports patient seemed slightly altered yesterday (described as seeming more tired, less disappointed than usual to not be able to go outside, trying to get her walker through a narrow space that she previously knew her walker didn’t fit in)  - May have been a/w dehydration   - Also hypercalcemic to 12  - Family and HHA at bedside reports that patient seems more alert and more herself now after 2L IVF  - Lactate improved from 4.2 to 2.9  - Patient AOx3, appropriate speech, following basic commands  - TSH, B12, and folate all normal  - monitor BMP, lactate

## 2020-01-07 NOTE — PROGRESS NOTE ADULT - PROBLEM SELECTOR PLAN 9
- DVT ppx: SQH  - Diet: Dysphagia with nectar thickened liquids  - Fall precaution  - Code status: DNR/DNI, yes to trial of BiPAP, yes to trial of IV pressors, MOLST in chart - DVT ppx: SQH  - Diet: [facility] soft low sodium  - Fall precaution  - Code status: DNR/DNI, yes to trial of BiPAP, yes to trial of IV pressors, MOLST in chart - DVT ppx: SQH  - Diet: Dysphagia 2 Nectar Consistency  - Fall precaution  - Code status: DNR/DNI, yes to trial of BiPAP, yes to trial of IV pressors, MOLST in chart

## 2020-01-07 NOTE — PHYSICAL THERAPY INITIAL EVALUATION ADULT - PERTINENT HX OF CURRENT PROBLEM, REHAB EVAL
89y F w/ PMH afib on digoxin, severe AS, COPD, CLL previously on treanda and rituxan, cirrhosis a/w HCC c/b esophageal varices s/p banding, CKD, PVD brought in for concern of worsened weakness w/fall 1 week ago. Daughter reports patient is getting gradually weaker. Pt had unwitnessed fall at home 1 wk ago, 3 falls in past few weeks. Pt denied any associated symptoms. Family denied associated head trauma, LOC, seizure activity. Pt diagnosed w/UTI 6 days ago & on a 7-day course of macrobid. ROS

## 2020-01-08 LAB
ANION GAP SERPL CALC-SCNC: 12 MMOL/L — SIGNIFICANT CHANGE UP (ref 5–17)
BUN SERPL-MCNC: 46 MG/DL — HIGH (ref 7–23)
CALCIUM SERPL-MCNC: 10.2 MG/DL — SIGNIFICANT CHANGE UP (ref 8.4–10.5)
CALCIUM UR-MCNC: 7.4 MG/DL — SIGNIFICANT CHANGE UP
CHLORIDE SERPL-SCNC: 116 MMOL/L — HIGH (ref 96–108)
CO2 SERPL-SCNC: 16 MMOL/L — LOW (ref 22–31)
CREAT ?TM UR-MCNC: 181 MG/DL — SIGNIFICANT CHANGE UP
CREAT SERPL-MCNC: 1.19 MG/DL — SIGNIFICANT CHANGE UP (ref 0.5–1.3)
GLUCOSE SERPL-MCNC: 80 MG/DL — SIGNIFICANT CHANGE UP (ref 70–99)
HCT VFR BLD CALC: 36 % — SIGNIFICANT CHANGE UP (ref 34.5–45)
HGB BLD-MCNC: 11.5 G/DL — SIGNIFICANT CHANGE UP (ref 11.5–15.5)
MAGNESIUM SERPL-MCNC: 1.6 MG/DL — SIGNIFICANT CHANGE UP (ref 1.6–2.6)
MCHC RBC-ENTMCNC: 31.9 GM/DL — LOW (ref 32–36)
MCHC RBC-ENTMCNC: 33.4 PG — SIGNIFICANT CHANGE UP (ref 27–34)
MCV RBC AUTO: 104.7 FL — HIGH (ref 80–100)
NRBC # BLD: 0 /100 WBCS — SIGNIFICANT CHANGE UP (ref 0–0)
PHOSPHATE SERPL-MCNC: 2.1 MG/DL — LOW (ref 2.5–4.5)
PLATELET # BLD AUTO: 71 K/UL — LOW (ref 150–400)
POTASSIUM SERPL-MCNC: 5 MMOL/L — SIGNIFICANT CHANGE UP (ref 3.5–5.3)
POTASSIUM SERPL-SCNC: 5 MMOL/L — SIGNIFICANT CHANGE UP (ref 3.5–5.3)
RBC # BLD: 3.44 M/UL — LOW (ref 3.8–5.2)
RBC # FLD: 15.5 % — HIGH (ref 10.3–14.5)
SODIUM SERPL-SCNC: 144 MMOL/L — SIGNIFICANT CHANGE UP (ref 135–145)
SODIUM UR-SCNC: 111 MMOL/L — SIGNIFICANT CHANGE UP
UUN UR-MCNC: 1320 MG/DL — SIGNIFICANT CHANGE UP
WBC # BLD: 5.24 K/UL — SIGNIFICANT CHANGE UP (ref 3.8–10.5)
WBC # FLD AUTO: 5.24 K/UL — SIGNIFICANT CHANGE UP (ref 3.8–10.5)

## 2020-01-08 PROCEDURE — 99232 SBSQ HOSP IP/OBS MODERATE 35: CPT | Mod: GC

## 2020-01-08 RX ADMIN — Medication 0.25 MILLIGRAM(S): at 17:46

## 2020-01-08 RX ADMIN — LACTULOSE 20 GRAM(S): 10 SOLUTION ORAL at 05:57

## 2020-01-08 RX ADMIN — LACTULOSE 20 GRAM(S): 10 SOLUTION ORAL at 00:35

## 2020-01-08 RX ADMIN — HEPARIN SODIUM 5000 UNIT(S): 5000 INJECTION INTRAVENOUS; SUBCUTANEOUS at 17:58

## 2020-01-08 RX ADMIN — HEPARIN SODIUM 5000 UNIT(S): 5000 INJECTION INTRAVENOUS; SUBCUTANEOUS at 05:57

## 2020-01-08 RX ADMIN — Medication 25 MICROGRAM(S): at 05:57

## 2020-01-08 RX ADMIN — LACTULOSE 20 GRAM(S): 10 SOLUTION ORAL at 13:24

## 2020-01-08 RX ADMIN — Medication 62.5 MILLIMOLE(S): at 11:07

## 2020-01-08 RX ADMIN — Medication 1 APPLICATION(S): at 13:28

## 2020-01-08 RX ADMIN — SPIRONOLACTONE 25 MILLIGRAM(S): 25 TABLET, FILM COATED ORAL at 05:57

## 2020-01-08 RX ADMIN — LACTULOSE 20 GRAM(S): 10 SOLUTION ORAL at 18:00

## 2020-01-08 RX ADMIN — Medication 0.25 MILLIGRAM(S): at 05:49

## 2020-01-08 RX ADMIN — Medication 0.12 MILLIGRAM(S): at 13:27

## 2020-01-08 NOTE — PROGRESS NOTE ADULT - PROBLEM SELECTOR PLAN 1
- Brought from home by daughter/primary caretaker for worsened weakness x1 day in the setting of recent fall, UTI, poor PO intake, and chronic diarrhea in setting of lactulose use.  - Likely multifactorial including dehydration, possible recent UTI, age and fall-related debility, possibly severe AS  - Currently improved per daughter at bedside s/p 2L IVF  - UA negative for infection  - Negative Flu A/B or RSV (1/6)  - TSH 1.54 (WNL)  - PT eval  - Fall and aspiration precautions

## 2020-01-08 NOTE — PROGRESS NOTE ADULT - PROBLEM SELECTOR PLAN 3
- Per family, pt seemed altered day before admission (more tired, less disappointed than usual to not be able to go outside, trying to get her walker through a narrow space that she previously knew her walker didn’t fit in)  - Possibly 2/2 dehydration or Hypercalcemia (Ca 12 on admit)  - Family and HHA at bedside reports that patient seems more alert and more herself now after 2L IVF  - Lactate improved from 4.2 to 2.9  - Patient AOx3, appropriate speech, following basic commands  - TSH, B12, and folate all normal  - Monitor BMP, lactate

## 2020-01-08 NOTE — PROGRESS NOTE ADULT - SUBJECTIVE AND OBJECTIVE BOX
Karlos Haile, PGY1  Internal Medicine Resident  Pager: [phone] (NS), [phone] (GLENNA)    Patient is a 89y old  Female who presents with a chief complaint of Weakness with FTT (2020 08:27)    SUBJECTIVE / OVERNIGHT EVENTS:  Night Float ordered 500mL NS 75 cc/hr per family request.  At bedside, patient resting in bed and slept well.  Patient's daughter at bedside, saying she does think the patient not look well.  Per daughter, has not been eating or drinking enough.  Daughter also requesting for her to not have straws.  Had diarrhea w/lactulose.  Breathing comfortably on room air.    ADDITIONAL REVIEW OF SYSTEMS:  Patient denies fevers/chills, chest pain, palpitations, SOB, cough/wheezing, abdominal pain, nausea, vomiting, or constipation.    MEDICATIONS  (STANDING):  AQUAPHOR (petrolatum Ointment) 1 Application(s) Topical daily  buDESOnide    Inhalation Suspension 0.25 milliGRAM(s) Inhalation two times a day  digoxin     Tablet 0.125 milliGRAM(s) Oral every other day  heparin  Injectable 5000 Unit(s) SubCutaneous every 12 hours  lactulose Syrup 20 Gram(s) Oral four times a day  levothyroxine 25 MICROGram(s) Oral daily  propranolol 10 milliGRAM(s) Oral two times a day  rifAXIMin 550 milliGRAM(s) Oral two times a day  sodium chloride 0.9%. 500 milliLiter(s) (75 mL/Hr) IV Continuous <Continuous>  sodium phosphate IVPB 15 milliMole(s) IV Intermittent once  spironolactone 25 milliGRAM(s) Oral daily    MEDICATIONS  (PRN):  albuterol/ipratropium for Nebulization 3 milliLiter(s) Nebulizer every 6 hours PRN Shortness of Breath and/or Wheezing      CAPILLARY BLOOD GLUCOSE        I&O's Summary      PHYSICAL EXAM:  Vital Signs Last 24 Hrs  T(C): 36.4 (2020 04:42), Max: 36.7 (2020 21:38)  T(F): 97.5 (2020 04:42), Max: 98.1 (2020 21:38)  HR: 92 (2020 05:47) (51 - 92)  BP: 146/90 (2020 04:42) (105/44 - 146/90)  BP(mean): --  RR: 18 (2020 04:42) (18 - 18)  SpO2: 97% (2020 05:47) (93% - 98%)    CONSTITUTIONAL: No apparent distress. Awake and conversive, cooperative with exam  EYES: PERRLA, EOMI, conjunctiva and sclera clear  ENMT: Moist oral mucosa, no pharyngeal injection or exudates; normal dentition.    NECK: Supple, no palpable masses; no JVD  RESPIRATORY: Diffuse coarse breath sounds bilateral lungs.   CARDIOVASCULAR: Irregular rhythm. Holosystolic murmur.  Distal pulses 2+ bilaterally  ABDOMEN: Soft, nontender to palpation, normoactive bowel sounds, no rebound/guarding  MUSCULOSKELETAL:  No LE edema; extremities WWP, however BL shins very dark and dry, flaking skin.  Both shoulders/arms hurt; L>R.  PSYCH: A+O to person, place; affect appropriate  NEUROLOGY: CN II-XII grossly intact; no focal sensory or motor deficits; moving all extremities spontaneously  SKIN: No rashes; no palpable lesions    LABS:                        11.5   5.24  )-----------( 71       ( 2020 06:49 )             36.0     01-08    144  |  116<H>  |  46<H>  ----------------------------<  80  5.0   |  16<L>  |  1.19    Ca    10.2      2020 06:49  Phos  2.1     01-08  Mg     1.6     01-08    TPro  x   /  Alb  x   /  TBili  x   /  DBili  0.5<H>  /  AST  x   /  ALT  x   /  AlkPhos  x   01-07    PT/INR - ( 2020 10:58 )   PT: 13.4 sec;   INR: 1.17 ratio               Urinalysis Basic - ( 2020 10:03 )    Color: Yellow / Appearance: Clear / S.019 / pH: x  Gluc: x / Ketone: Trace  / Bili: Negative / Urobili: Negative   Blood: x / Protein: Negative / Nitrite: Negative   Leuk Esterase: Negative / RBC: x / WBC x   Sq Epi: x / Non Sq Epi: x / Bacteria: x        Culture - Urine (collected 2020 14:17)  Source: .Urine Clean Catch (Midstream)  Final Report (2020 10:59):    <10,000 CFU/mL Normal Urogenital Stefanie        RADIOLOGY & ADDITIONAL TESTS:  Results Reviewed:   Imaging Personally Reviewed:  Electrocardiogram Personally Reviewed:    COORDINATION OF CARE:  Care Discussed with Consultants/Other Providers [Y/N]:  Prior or Outpatient Records Reviewed [Y/N]:

## 2020-01-08 NOTE — PROGRESS NOTE ADULT - PROBLEM SELECTOR PLAN 9
- DVT ppx: SQH  - Diet: Dysphagia 2 Nectar Consistency  - Fall precaution  - Code status: DNR/DNI, yes to trial of BiPAP, yes to trial of IV pressors, MOLST in chart - DVT ppx: SQH  - Diet: Dysphagia 2 Nectar Consistency  - Fall precaution  - Code status: DNR but yes to trial of BiPAP, yes to trial of IV pressors, MOLST in chart (scanned from 12/2018) - DVT ppx: SQH  - Diet: Dysphagia 2 Nectar Consistency  - Fall precaution  - Code status: DNR/DNI but yes to trial of BiPAP, yes to trial of IV pressors, MOLST in chart (scanned from 12/2018)

## 2020-01-08 NOTE — PROGRESS NOTE ADULT - PROBLEM SELECTOR PLAN 5
- Improving; Cr 10.2 (1/8)  - Ca 12.5 on admission in the setting of mild generalized weakness  - ECG QTc 436  - symptomatically improved and denies any symptoms  - finishing 2nd liter of NS  - PTH 10 (low), 25-Vit D WNL.  - f/u PTHrP, 1,25-Vit D, trend iCa  - monitor BMP

## 2020-01-08 NOTE — PROGRESS NOTE ADULT - PROBLEM SELECTOR PLAN 4
- Improving: Cr 1.19 (1/8)  - Cr 1.44 on admission (baseline 1.0)  - Likely pre-renal; improved following 2L IVF  - check urine lytes  - monitor BMP, I/Os, avoid nephrotoxins, renally dose meds

## 2020-01-09 ENCOUNTER — TRANSCRIPTION ENCOUNTER (OUTPATIENT)
Age: 85
End: 2020-01-09

## 2020-01-09 ENCOUNTER — OTHER (OUTPATIENT)
Age: 85
End: 2020-01-09

## 2020-01-09 VITALS
OXYGEN SATURATION: 99 % | SYSTOLIC BLOOD PRESSURE: 119 MMHG | DIASTOLIC BLOOD PRESSURE: 65 MMHG | TEMPERATURE: 98 F | RESPIRATION RATE: 18 BRPM | HEART RATE: 71 BPM

## 2020-01-09 PROCEDURE — 84295 ASSAY OF SERUM SODIUM: CPT

## 2020-01-09 PROCEDURE — 96361 HYDRATE IV INFUSION ADD-ON: CPT

## 2020-01-09 PROCEDURE — 83605 ASSAY OF LACTIC ACID: CPT

## 2020-01-09 PROCEDURE — 80048 BASIC METABOLIC PNL TOTAL CA: CPT

## 2020-01-09 PROCEDURE — 80162 ASSAY OF DIGOXIN TOTAL: CPT

## 2020-01-09 PROCEDURE — 85027 COMPLETE CBC AUTOMATED: CPT

## 2020-01-09 PROCEDURE — 84484 ASSAY OF TROPONIN QUANT: CPT

## 2020-01-09 PROCEDURE — 83519 RIA NONANTIBODY: CPT

## 2020-01-09 PROCEDURE — 82570 ASSAY OF URINE CREATININE: CPT

## 2020-01-09 PROCEDURE — 82248 BILIRUBIN DIRECT: CPT

## 2020-01-09 PROCEDURE — 82947 ASSAY GLUCOSE BLOOD QUANT: CPT

## 2020-01-09 PROCEDURE — 82330 ASSAY OF CALCIUM: CPT

## 2020-01-09 PROCEDURE — 73090 X-RAY EXAM OF FOREARM: CPT

## 2020-01-09 PROCEDURE — 73030 X-RAY EXAM OF SHOULDER: CPT

## 2020-01-09 PROCEDURE — 83735 ASSAY OF MAGNESIUM: CPT

## 2020-01-09 PROCEDURE — 87086 URINE CULTURE/COLONY COUNT: CPT

## 2020-01-09 PROCEDURE — 99239 HOSP IP/OBS DSCHRG MGMT >30: CPT

## 2020-01-09 PROCEDURE — 99285 EMERGENCY DEPT VISIT HI MDM: CPT | Mod: 25

## 2020-01-09 PROCEDURE — 84540 ASSAY OF URINE/UREA-N: CPT

## 2020-01-09 PROCEDURE — 82340 ASSAY OF CALCIUM IN URINE: CPT

## 2020-01-09 PROCEDURE — 97530 THERAPEUTIC ACTIVITIES: CPT

## 2020-01-09 PROCEDURE — 82310 ASSAY OF CALCIUM: CPT

## 2020-01-09 PROCEDURE — 82306 VITAMIN D 25 HYDROXY: CPT

## 2020-01-09 PROCEDURE — 81003 URINALYSIS AUTO W/O SCOPE: CPT

## 2020-01-09 PROCEDURE — 84100 ASSAY OF PHOSPHORUS: CPT

## 2020-01-09 PROCEDURE — 85610 PROTHROMBIN TIME: CPT

## 2020-01-09 PROCEDURE — 83970 ASSAY OF PARATHORMONE: CPT

## 2020-01-09 PROCEDURE — 84132 ASSAY OF SERUM POTASSIUM: CPT

## 2020-01-09 PROCEDURE — 87631 RESP VIRUS 3-5 TARGETS: CPT

## 2020-01-09 PROCEDURE — 94640 AIRWAY INHALATION TREATMENT: CPT

## 2020-01-09 PROCEDURE — 97161 PT EVAL LOW COMPLEX 20 MIN: CPT

## 2020-01-09 PROCEDURE — 93005 ELECTROCARDIOGRAM TRACING: CPT

## 2020-01-09 PROCEDURE — 82746 ASSAY OF FOLIC ACID SERUM: CPT

## 2020-01-09 PROCEDURE — 82803 BLOOD GASES ANY COMBINATION: CPT

## 2020-01-09 PROCEDURE — 73060 X-RAY EXAM OF HUMERUS: CPT

## 2020-01-09 PROCEDURE — 84300 ASSAY OF URINE SODIUM: CPT

## 2020-01-09 PROCEDURE — 80053 COMPREHEN METABOLIC PANEL: CPT

## 2020-01-09 PROCEDURE — 73080 X-RAY EXAM OF ELBOW: CPT

## 2020-01-09 PROCEDURE — 96360 HYDRATION IV INFUSION INIT: CPT

## 2020-01-09 PROCEDURE — 70450 CT HEAD/BRAIN W/O DYE: CPT

## 2020-01-09 PROCEDURE — 82607 VITAMIN B-12: CPT

## 2020-01-09 PROCEDURE — 82652 VIT D 1 25-DIHYDROXY: CPT

## 2020-01-09 PROCEDURE — 71045 X-RAY EXAM CHEST 1 VIEW: CPT

## 2020-01-09 PROCEDURE — 84443 ASSAY THYROID STIM HORMONE: CPT

## 2020-01-09 PROCEDURE — 85014 HEMATOCRIT: CPT

## 2020-01-09 PROCEDURE — 97110 THERAPEUTIC EXERCISES: CPT

## 2020-01-09 PROCEDURE — 82565 ASSAY OF CREATININE: CPT

## 2020-01-09 PROCEDURE — 82435 ASSAY OF BLOOD CHLORIDE: CPT

## 2020-01-09 PROCEDURE — 73110 X-RAY EXAM OF WRIST: CPT

## 2020-01-09 RX ORDER — HEPARIN SODIUM 5000 [USP'U]/ML
5000 INJECTION INTRAVENOUS; SUBCUTANEOUS
Qty: 0 | Refills: 0 | DISCHARGE
Start: 2020-01-09

## 2020-01-09 RX ORDER — LACTULOSE 10 G/15ML
30 SOLUTION ORAL
Qty: 0 | Refills: 0 | DISCHARGE
Start: 2020-01-09

## 2020-01-09 RX ORDER — IPRATROPIUM/ALBUTEROL SULFATE 18-103MCG
3 AEROSOL WITH ADAPTER (GRAM) INHALATION
Qty: 0 | Refills: 0 | DISCHARGE
Start: 2020-01-09

## 2020-01-09 RX ORDER — LEVOTHYROXINE SODIUM 125 MCG
1 TABLET ORAL
Qty: 0 | Refills: 0 | DISCHARGE
Start: 2020-01-09

## 2020-01-09 RX ORDER — IPRATROPIUM/ALBUTEROL SULFATE 18-103MCG
3 AEROSOL WITH ADAPTER (GRAM) INHALATION
Qty: 0 | Refills: 0 | DISCHARGE

## 2020-01-09 RX ORDER — SPIRONOLACTONE 25 MG/1
1 TABLET, FILM COATED ORAL
Qty: 0 | Refills: 0 | DISCHARGE
Start: 2020-01-09

## 2020-01-09 RX ORDER — PETROLATUM,WHITE
1 JELLY (GRAM) TOPICAL
Qty: 0 | Refills: 0 | DISCHARGE
Start: 2020-01-09

## 2020-01-09 RX ORDER — MAGNESIUM SULFATE 500 MG/ML
1 VIAL (ML) INJECTION ONCE
Refills: 0 | Status: COMPLETED | OUTPATIENT
Start: 2020-01-09 | End: 2020-01-09

## 2020-01-09 RX ORDER — DIGOXIN 250 MCG
1 TABLET ORAL
Qty: 0 | Refills: 0 | DISCHARGE
Start: 2020-01-09

## 2020-01-09 RX ORDER — PROPRANOLOL HCL 160 MG
1 CAPSULE, EXTENDED RELEASE 24HR ORAL
Qty: 0 | Refills: 0 | DISCHARGE
Start: 2020-01-09

## 2020-01-09 RX ADMIN — Medication 100 GRAM(S): at 12:29

## 2020-01-09 RX ADMIN — Medication 3 MILLILITER(S): at 15:59

## 2020-01-09 RX ADMIN — Medication 1 APPLICATION(S): at 15:53

## 2020-01-09 RX ADMIN — LACTULOSE 20 GRAM(S): 10 SOLUTION ORAL at 12:32

## 2020-01-09 RX ADMIN — Medication 0.25 MILLIGRAM(S): at 05:25

## 2020-01-09 NOTE — PROGRESS NOTE ADULT - SUBJECTIVE AND OBJECTIVE BOX
Karlos Haile, PGY1  Internal Medicine Resident  Pager: [phone] (NS), [phone] (GLENNA)    Patient is a 89y old  Female who presents with a chief complaint of Weakness with FTT (08 Jan 2020 08:32)    SUBJECTIVE / OVERNIGHT EVENTS:  No overnight events per Night Float.  At bedside, patient resting in bed and slept "OK".  Has been eating food and drinking water without issue.  Having diarrhea 2/2 lactulose.  Breathing comfortably on room air.  Oriented to person, place, year.    ADDITIONAL REVIEW OF SYSTEMS:  Patient denies fevers/chills, chest pain, palpitations, SOB, cough/wheezing, nausea, vomiting, or constipation.    MEDICATIONS  (STANDING):  AQUAPHOR (petrolatum Ointment) 1 Application(s) Topical daily  buDESOnide    Inhalation Suspension 0.25 milliGRAM(s) Inhalation two times a day  digoxin     Tablet 0.125 milliGRAM(s) Oral every other day  heparin  Injectable 5000 Unit(s) SubCutaneous every 12 hours  lactulose Syrup 20 Gram(s) Oral four times a day  levothyroxine 25 MICROGram(s) Oral daily  propranolol 10 milliGRAM(s) Oral two times a day  rifAXIMin 550 milliGRAM(s) Oral two times a day  spironolactone 25 milliGRAM(s) Oral daily    MEDICATIONS  (PRN):  albuterol/ipratropium for Nebulization 3 milliLiter(s) Nebulizer every 6 hours PRN Shortness of Breath and/or Wheezing      CAPILLARY BLOOD GLUCOSE        I&O's Summary    08 Jan 2020 07:01  -  09 Jan 2020 07:00  --------------------------------------------------------  IN: 60 mL / OUT: 150 mL / NET: -90 mL        PHYSICAL EXAM:  Vital Signs Last 24 Hrs  T(C): 36.6 (09 Jan 2020 04:11), Max: 36.8 (08 Jan 2020 20:54)  T(F): 97.9 (09 Jan 2020 04:11), Max: 98.2 (08 Jan 2020 20:54)  HR: 68 (09 Jan 2020 05:27) (51 - 86)  BP: 133/79 (09 Jan 2020 04:11) (90/51 - 133/79)  BP(mean): --  RR: 18 (09 Jan 2020 04:11) (17 - 18)  SpO2: 95% (09 Jan 2020 05:27) (95% - 97%)    CONSTITUTIONAL: No apparent distress. Awake and conversive, cooperative with exam  EYES: PERRLA, EOMI, conjunctiva and sclera clear  ENMT: Moist oral mucosa, no pharyngeal injection or exudates; normal dentition.    NECK: Supple, no palpable masses; no JVD  RESPIRATORY: Diffuse coarse breath sounds bilateral lungs.   CARDIOVASCULAR: Irregular rhythm. Holosystolic murmur.  Distal pulses 2+ bilaterally  ABDOMEN: Soft, nontender to palpation, normoactive bowel sounds, no rebound/guarding  MUSCULOSKELETAL:  No LE edema; extremities WWP, however BL shins very dark and dry, flaking skin.  Shoulders/arms ROM reduced but pain improving,   PSYCH: A+O to person, place, current year. But not birthday. Affect appropriate  NEUROLOGY: CN II-XII grossly intact; no focal sensory or motor deficits; moving all extremities spontaneously  SKIN: No rashes; no palpable lesions    LABS:                        11.5   5.24  )-----------( 71       ( 08 Jan 2020 06:49 )             36.0     01-08    144  |  116<H>  |  46<H>  ----------------------------<  80  5.0   |  16<L>  |  1.19    Ca    10.2      08 Jan 2020 06:49  Phos  2.1     01-08  Mg     1.6     01-08    TPro  x   /  Alb  x   /  TBili  x   /  DBili  0.5<H>  /  AST  x   /  ALT  x   /  AlkPhos  x   01-07    PT/INR - ( 07 Jan 2020 10:58 )   PT: 13.4 sec;   INR: 1.17 ratio        Culture - Urine (collected 06 Jan 2020 14:17)  Source: .Urine Clean Catch (Midstream)  Final Report (07 Jan 2020 10:59):    <10,000 CFU/mL Normal Urogenital Stefanie        RADIOLOGY & ADDITIONAL TESTS:  Results Reviewed:   Imaging Personally Reviewed:  Electrocardiogram Personally Reviewed:    COORDINATION OF CARE:  Care Discussed with Consultants/Other Providers [Y/N]:  Prior or Outpatient Records Reviewed [Y/N]:

## 2020-01-09 NOTE — DISCHARGE NOTE PROVIDER - HOSPITAL COURSE
89 F w/PMH A fib on digoxin, severe AS, COPD, CLL (previously on Treanda and Rituxan), cirrhosis a/w HCC c/b esophageal varices s/p banding, CKD, PVD brought in by daughter with cc of acute on chronic weakness a/w recent mechanical falls at home, recent UTI treated with macrobid, poor PO intake, and diarrhea (on lactulose). The weakness is likely multifactorial including dehydration, recent UTI, age and fall-related debility.  Improved after receiving IV fluids. Negative UA,Flu A/B or RSV, normal TSH, B12, and folate . PT evaluated and recommend subacute rehab. CT head was negative for acute pathology, and no fractures or dislocations were found on XR L shoulder/humerus, R elbow/forearm/wrist. Prerenal KARRIE on admission improved after IV fluids.  Physical therapy evaluated the patient, and recommended subacute rehab.  On X/X/2020, patient was medically cleared and hemodynamically stable for discharge to [facility] subcute rehab. 89 F w/PMH A fib on digoxin, severe AS, COPD, CLL (previously on Treanda and Rituxan), cirrhosis a/w HCC c/b esophageal varices s/p banding, CKD, PVD brought in by daughter with cc of acute on chronic weakness a/w recent mechanical falls at home, recent UTI treated with macrobid, poor PO intake, and diarrhea (on lactulose). The weakness is likely multifactorial including dehydration, recent UTI, age and fall-related debility.  Improved after receiving IV fluids. Negative UA,Flu A/B or RSV, normal TSH, B12, and folate . PT evaluated and recommend subacute rehab. CT head was negative for acute pathology, and no fractures or dislocations were found on XR L shoulder/humerus, R elbow/forearm/wrist. Prerenal KARRIE on admission improved after IV fluids.  Physical therapy evaluated the patient, and recommended subacute rehab.  On 1/9/2020, patient was medically [facility] and [facility]odynam[facility]

## 2020-01-09 NOTE — DISCHARGE NOTE NURSING/CASE MANAGEMENT/SOCIAL WORK - PATIENT PORTAL LINK FT
You can access the FollowMyHealth Patient Portal offered by [facility] by registering at the following website: [email or web address]. By joining [facility]’s FollowMyHealth portal, you will also be able to view your health information using other applications (apps) compatible with our system.

## 2020-01-09 NOTE — DISCHARGE NOTE PROVIDER - NSDCCPCAREPLAN_GEN_ALL_CORE_FT
PRINCIPAL DISCHARGE DIAGNOSIS  Diagnosis: Weakness  Assessment and Plan of Treatment: You were brought to the hospital by your daughter for weakness following a fall at home one week ago.  We performed a CT scan of your head, along with X-rays of your chest, left shoulder, right elbow and right wrist, which all did not show any fractures or dislocations. A urine test did not demonstrate a urinary tract infection. Overall, you improved after receiving IV fluids. A physical therapy evaluation recommended discharging to subacute rehab to improve your function and mobility.      SECONDARY DISCHARGE DIAGNOSES  Diagnosis: KARRIE (acute kidney injury)  Assessment and Plan of Treatment: You were found to have an acute kidney injury after arriving at the hospital.  Studies of your blood and urine suggest that this was due to dehydration.  Follow-up lab tests demonstrated that your acute kidney injury has resolved.  We encourage adequate hydration and to follow up with your primary care physician after leaving the hospital.

## 2020-01-09 NOTE — PROGRESS NOTE ADULT - ATTENDING COMMENTS
Pt seen and examined. Agree with above:    89F with cirrhosis pw worsening weakness/falls at home, found to have severe hypovolemia, s/p IVF with improvement in mental status. PT recommends TANI and both pt/daughters in agreement.     46 minutes spent on discharge process    Lorrie Molina MD  Division of Hospital Medicine  Cell: [phone]  Pager: [phone]  Office: [phone]
Pt seen and examined. Agree with above:    Pt with waxing/waning mental status but overall improved since admission. Attempted physical therapy yesterday but pt couldn't fully participate. Daughters concerned for persistent weakness and giving second thought about TANI for dispo planning. s/p IVF with resolution of metabolic derangements including acidosis. Continue supportive care, PT consult for safe dc planning.    Lorrie Molina MD  Division of Hospital Medicine  Cell: [phone]  Pager: [phone]  Office: [phone]
Pt seen and examined. Agree with above    89F with multiple comorbidities presents after multiple falls, weakness x week. As per daughter, pt has been somewhat functional up until last week but suddenly had worsening mental status with falls, weakness. Initially noted to have severe hypovolemia upon presentation, s/p IVF with improvement in mental status as well as metabolic acidosis. Pending PT consult for dc recommendation. Daughter still willing to take the patient back home but want to see how she does.     Lorrie Molina MD  Division of Hospital Medicine  Cell: [phone]  Pager: [phone]  Office: [phone]

## 2020-01-09 NOTE — DISCHARGE NOTE PROVIDER - NSDCMRMEDTOKEN_GEN_ALL_CORE_FT
ascorbic acid 500 mg oral tablet: 1 tab(s) orally 2 times a day  budesonide 0.25 mg/2 mL inhalation suspension: 2 milliliter(s) inhaled 2 times a day  digoxin 125 mcg (0.125 mg) oral tablet: 1 tab(s) orally every other day  ipratropium-albuterol 0.5 mg-2.5 mg/3 mLinhalation solution: 3 milliliter(s) inhaled every 6 hours  lactulose 10 g/15 mL oral syrup: 30 milliliter(s) orally once a day, As needed, IF NO BM AFTER SCHEDULED 3 DOSES OF LACTULOSE  lactulose 10 g/15 mL oral syrup: 30 milliliter(s) orally 4 times a day  levothyroxine 25 mcg (0.025 mg) oral tablet: 1 tab(s) orally once a day  propranolol 10 mg oral tablet: 1 tab(s) orally 2 times a day  rifAXIMin 550 mg oral tablet: 1 tab(s) orally 2 times a day  Vitamin D3 2000 intl units oral tablet: 1 tab(s) orally once a day ascorbic acid 500 mg oral tablet: 1 tab(s) orally 2 times a day  budesonide 0.25 mg/2 mL inhalation suspension: 2 milliliter(s) inhaled 2 times a day  digoxin 125 mcg (0.125 mg) oral tablet: 1 tab(s) orally every other day  heparin: 5000 unit(s) intramuscular 2 times a day  ipratropium-albuterol 0.5 mg-2.5 mg/3 mLinhalation solution: 3 milliliter(s) inhaled every 6 hours, As needed, Shortness of Breath and/or Wheezing  lactulose 10 g/15 mL oral syrup: 30 milliliter(s) orally 4 times a day  levothyroxine 25 mcg (0.025 mg) oral tablet: 1 tab(s) orally once a day  petrolatum topical ointment: 1 application topically once a day  propranolol 10 mg oral tablet: 1 tab(s) orally 2 times a day  rifAXIMin 550 mg oral tablet: 1 tab(s) orally 2 times a day  spironolactone 25 mg oral tablet: 1 tab(s) orally once a day  Vitamin D3 2000 intl units oral tablet: 1 tab(s) orally once a day ascorbic acid 500 mg oral tablet: 1 tab(s) orally 2 times a day  budesonide 0.25 mg/2 mL inhalation suspension: 2 milliliter(s) inhaled 2 times a day  digoxin 125 mcg (0.125 mg) oral tablet: 1 tab(s) orally every other day  DuoNeb 0.5 mg-2.5 mg/3 mL inhalation solution: 3 milliliter(s) inhaled 2 times a day  heparin: 5000 unit(s) subcutaneous 2 times a day  lactulose 10 g/15 mL oral syrup: 30 milliliter(s) orally 4 times a day  levothyroxine 25 mcg (0.025 mg) oral tablet: 1 tab(s) orally once a day  petrolatum topical ointment: 1 application topically once a day  propranolol 10 mg oral tablet: 1 tab(s) orally 2 times a day  rifAXIMin 550 mg oral tablet: 1 tab(s) orally 2 times a day  spironolactone 25 mg oral tablet: 1 tab(s) orally once a day  Vitamin D3 2000 intl units oral tablet: 1 tab(s) orally once a day ascorbic acid 500 mg oral tablet: 1 tab(s) orally 2 times a day  budesonide 0.25 mg/2 mL inhalation suspension: 2 milliliter(s) inhaled 2 times a day  digoxin 125 mcg (0.125 mg) oral tablet: 1 tab(s) orally every other day  heparin: 5000 unit(s) subcutaneous 2 times a day  ipratropium-albuterol 0.5 mg-2.5 mg/3 mLinhalation solution: 3 milliliter(s) inhaled every 12 hours  lactulose 10 g/15 mL oral syrup: 30 milliliter(s) orally 4 times a day  levothyroxine 25 mcg (0.025 mg) oral tablet: 1 tab(s) orally once a day  petrolatum topical ointment: 1 application topically once a day  propranolol 10 mg oral tablet: 1 tab(s) orally 2 times a day  rifAXIMin 550 mg oral tablet: 1 tab(s) orally 2 times a day  spironolactone 25 mg oral tablet: 1 tab(s) orally once a day  Vitamin D3 2000 intl units oral tablet: 1 tab(s) orally once a day

## 2020-01-09 NOTE — DISCHARGE NOTE PROVIDER - NSDCFUSCHEDAPPT_GEN_ALL_CORE_FT
HA JACQUES ; 02/21/2020 ; [facility] HA JACQUES ; 02/21/2020 ; [facility] Card[facility] HA JACQUES ; 02/21/2020 ; [facility]PP Cardio [facility] HA JACQUES ; 02/21/2020 ; NPP Cardio [facility]

## 2020-01-09 NOTE — DISCHARGE NOTE PROVIDER - CARE PROVIDER_API CALL
Fallon Celaya)  Critical Care Medicine; Internal Medicine; Pulmonary Disease  [address]  [place] [address]  Phone: [phone]  Fax: [phone]  Follow Up Time:

## 2020-01-09 NOTE — PROGRESS NOTE ADULT - PROBLEM SELECTOR PLAN 9
- DVT ppx: SQH  - Diet: Dysphagia 2 Nectar Consistency  - Fall precaution  - Code status: DNR/DNI but yes to trial of BiPAP, yes to trial of IV pressors, MOLST in chart (scanned from 12/2018)

## 2020-01-11 ENCOUNTER — FORM ENCOUNTER (OUTPATIENT)
Age: 85
End: 2020-01-11

## 2020-01-14 ENCOUNTER — RX RENEWAL (OUTPATIENT)
Age: 85
End: 2020-01-14

## 2020-01-14 LAB — PTH RELATED PROT SERPL-MCNC: <2 PMOL/L — SIGNIFICANT CHANGE UP

## 2020-01-19 ENCOUNTER — INPATIENT (INPATIENT)
Facility: HOSPITAL | Age: 85
LOS: 24 days | Discharge: NURSING, REHAB OR LONG-TERM CARE | DRG: 871 | End: 2020-02-13
Attending: HOSPITALIST | Admitting: STUDENT IN AN ORGANIZED HEALTH CARE EDUCATION/TRAINING PROGRAM
Payer: MEDICARE

## 2020-01-19 VITALS
OXYGEN SATURATION: 99 % | HEART RATE: 125 BPM | SYSTOLIC BLOOD PRESSURE: 84 MMHG | DIASTOLIC BLOOD PRESSURE: 54 MMHG | TEMPERATURE: 100 F | RESPIRATION RATE: 22 BRPM | WEIGHT: 89.95 LBS

## 2020-01-19 DIAGNOSIS — Z98.89 OTHER SPECIFIED POSTPROCEDURAL STATES: Chronic | ICD-10-CM

## 2020-01-19 DIAGNOSIS — A41.9 SEPSIS, UNSPECIFIED ORGANISM: ICD-10-CM

## 2020-01-19 LAB
ALBUMIN SERPL ELPH-MCNC: 2.3 G/DL — LOW (ref 3.3–5)
ALBUMIN SERPL ELPH-MCNC: 2.6 G/DL — LOW (ref 3.3–5)
ALP SERPL-CCNC: 87 U/L — SIGNIFICANT CHANGE UP (ref 40–120)
ALP SERPL-CCNC: 98 U/L — SIGNIFICANT CHANGE UP (ref 40–120)
ALT FLD-CCNC: 16 U/L — SIGNIFICANT CHANGE UP (ref 10–45)
ALT FLD-CCNC: 17 U/L — SIGNIFICANT CHANGE UP (ref 10–45)
AMMONIA BLD-MCNC: 26 UMOL/L — SIGNIFICANT CHANGE UP (ref 11–55)
ANION GAP SERPL CALC-SCNC: 10 MMOL/L — SIGNIFICANT CHANGE UP (ref 5–17)
ANION GAP SERPL CALC-SCNC: 12 MMOL/L — SIGNIFICANT CHANGE UP (ref 5–17)
ANISOCYTOSIS BLD QL: SLIGHT — SIGNIFICANT CHANGE UP
APPEARANCE UR: ABNORMAL
APTT BLD: 29 SEC — SIGNIFICANT CHANGE UP (ref 27.5–36.3)
AST SERPL-CCNC: 28 U/L — SIGNIFICANT CHANGE UP (ref 10–40)
AST SERPL-CCNC: 32 U/L — SIGNIFICANT CHANGE UP (ref 10–40)
BACTERIA # UR AUTO: ABNORMAL
BASE EXCESS BLDV CALC-SCNC: -4.4 MMOL/L — LOW (ref -2–2)
BASOPHILS # BLD AUTO: 0 K/UL — SIGNIFICANT CHANGE UP (ref 0–0.2)
BASOPHILS NFR BLD AUTO: 0 % — SIGNIFICANT CHANGE UP (ref 0–2)
BILIRUB SERPL-MCNC: 2.4 MG/DL — HIGH (ref 0.2–1.2)
BILIRUB SERPL-MCNC: 2.5 MG/DL — HIGH (ref 0.2–1.2)
BILIRUB UR-MCNC: NEGATIVE — SIGNIFICANT CHANGE UP
BUN SERPL-MCNC: 50 MG/DL — HIGH (ref 7–23)
BUN SERPL-MCNC: 52 MG/DL — HIGH (ref 7–23)
BURR CELLS BLD QL SMEAR: PRESENT — SIGNIFICANT CHANGE UP
CA-I SERPL-SCNC: 1.41 MMOL/L — HIGH (ref 1.12–1.3)
CALCIUM SERPL-MCNC: 10.2 MG/DL — SIGNIFICANT CHANGE UP (ref 8.4–10.5)
CALCIUM SERPL-MCNC: 9.6 MG/DL — SIGNIFICANT CHANGE UP (ref 8.4–10.5)
CHLORIDE BLDV-SCNC: 118 MMOL/L — HIGH (ref 96–108)
CHLORIDE SERPL-SCNC: 114 MMOL/L — HIGH (ref 96–108)
CHLORIDE SERPL-SCNC: 116 MMOL/L — HIGH (ref 96–108)
CO2 BLDV-SCNC: 21 MMOL/L — LOW (ref 22–30)
CO2 SERPL-SCNC: 18 MMOL/L — LOW (ref 22–31)
CO2 SERPL-SCNC: 19 MMOL/L — LOW (ref 22–31)
COLOR SPEC: YELLOW — SIGNIFICANT CHANGE UP
COMMENT - URINE: SIGNIFICANT CHANGE UP
CREAT SERPL-MCNC: 1.57 MG/DL — HIGH (ref 0.5–1.3)
CREAT SERPL-MCNC: 1.69 MG/DL — HIGH (ref 0.5–1.3)
DIFF PNL FLD: NEGATIVE — SIGNIFICANT CHANGE UP
DIGOXIN SERPL-MCNC: 0.9 NG/ML — SIGNIFICANT CHANGE UP (ref 0.8–2)
ELLIPTOCYTES BLD QL SMEAR: SLIGHT — SIGNIFICANT CHANGE UP
EOSINOPHIL # BLD AUTO: 0 K/UL — SIGNIFICANT CHANGE UP (ref 0–0.5)
EOSINOPHIL NFR BLD AUTO: 0 % — SIGNIFICANT CHANGE UP (ref 0–6)
EPI CELLS # UR: 9 /HPF — HIGH
GAS PNL BLDV: 143 MMOL/L — SIGNIFICANT CHANGE UP (ref 135–145)
GAS PNL BLDV: SIGNIFICANT CHANGE UP
GLUCOSE BLDV-MCNC: 118 MG/DL — HIGH (ref 70–99)
GLUCOSE SERPL-MCNC: 141 MG/DL — HIGH (ref 70–99)
GLUCOSE SERPL-MCNC: 78 MG/DL — SIGNIFICANT CHANGE UP (ref 70–99)
GLUCOSE UR QL: NEGATIVE — SIGNIFICANT CHANGE UP
HCO3 BLDV-SCNC: 20 MMOL/L — LOW (ref 21–29)
HCT VFR BLD CALC: 37.3 % — SIGNIFICANT CHANGE UP (ref 34.5–45)
HCT VFR BLDA CALC: 35 % — LOW (ref 39–50)
HGB BLD CALC-MCNC: 11.3 G/DL — LOW (ref 11.5–15.5)
HGB BLD-MCNC: 12 G/DL — SIGNIFICANT CHANGE UP (ref 11.5–15.5)
HYALINE CASTS # UR AUTO: 3 /LPF — HIGH (ref 0–2)
INR BLD: 1.44 RATIO — HIGH (ref 0.88–1.16)
KETONES UR-MCNC: NEGATIVE — SIGNIFICANT CHANGE UP
LACTATE BLDV-MCNC: 2.9 MMOL/L — HIGH (ref 0.7–2)
LEUKOCYTE ESTERASE UR-ACNC: NEGATIVE — SIGNIFICANT CHANGE UP
LYMPHOCYTES # BLD AUTO: 0.13 K/UL — LOW (ref 1–3.3)
LYMPHOCYTES # BLD AUTO: 1 % — LOW (ref 13–44)
MACROCYTES BLD QL: SLIGHT — SIGNIFICANT CHANGE UP
MANUAL SMEAR VERIFICATION: SIGNIFICANT CHANGE UP
MCHC RBC-ENTMCNC: 32.2 GM/DL — SIGNIFICANT CHANGE UP (ref 32–36)
MCHC RBC-ENTMCNC: 33.2 PG — SIGNIFICANT CHANGE UP (ref 27–34)
MCV RBC AUTO: 103.3 FL — HIGH (ref 80–100)
METAMYELOCYTES # FLD: 1 % — HIGH (ref 0–0)
MONOCYTES # BLD AUTO: 0.89 K/UL — SIGNIFICANT CHANGE UP (ref 0–0.9)
MONOCYTES NFR BLD AUTO: 7 % — SIGNIFICANT CHANGE UP (ref 2–14)
NEUTROPHILS # BLD AUTO: 11.62 K/UL — HIGH (ref 1.8–7.4)
NEUTROPHILS NFR BLD AUTO: 60 % — SIGNIFICANT CHANGE UP (ref 43–77)
NEUTS BAND # BLD: 31 % — HIGH (ref 0–8)
NITRITE UR-MCNC: POSITIVE
NRBC # BLD: 0 /100 — SIGNIFICANT CHANGE UP (ref 0–0)
PCO2 BLDV: 36 MMHG — SIGNIFICANT CHANGE UP (ref 35–50)
PH BLDV: 7.36 — SIGNIFICANT CHANGE UP (ref 7.35–7.45)
PH UR: 5.5 — SIGNIFICANT CHANGE UP (ref 5–8)
PLAT MORPH BLD: NORMAL — SIGNIFICANT CHANGE UP
PLATELET # BLD AUTO: 118 K/UL — LOW (ref 150–400)
PO2 BLDV: 32 MMHG — SIGNIFICANT CHANGE UP (ref 25–45)
POIKILOCYTOSIS BLD QL AUTO: SLIGHT — SIGNIFICANT CHANGE UP
POLYCHROMASIA BLD QL SMEAR: SLIGHT — SIGNIFICANT CHANGE UP
POTASSIUM BLDV-SCNC: 4.7 MMOL/L — SIGNIFICANT CHANGE UP (ref 3.5–5.3)
POTASSIUM SERPL-MCNC: 4.8 MMOL/L — SIGNIFICANT CHANGE UP (ref 3.5–5.3)
POTASSIUM SERPL-MCNC: 5.5 MMOL/L — HIGH (ref 3.5–5.3)
POTASSIUM SERPL-SCNC: 4.8 MMOL/L — SIGNIFICANT CHANGE UP (ref 3.5–5.3)
POTASSIUM SERPL-SCNC: 5.5 MMOL/L — HIGH (ref 3.5–5.3)
PROT SERPL-MCNC: 4.7 G/DL — LOW (ref 6–8.3)
PROT SERPL-MCNC: 5.1 G/DL — LOW (ref 6–8.3)
PROT UR-MCNC: ABNORMAL
PROTHROM AB SERPL-ACNC: 16.6 SEC — HIGH (ref 10–12.9)
RBC # BLD: 3.61 M/UL — LOW (ref 3.8–5.2)
RBC # FLD: 15.7 % — HIGH (ref 10.3–14.5)
RBC BLD AUTO: ABNORMAL
RBC CASTS # UR COMP ASSIST: 1 /HPF — SIGNIFICANT CHANGE UP (ref 0–4)
SAO2 % BLDV: 54 % — LOW (ref 67–88)
SODIUM SERPL-SCNC: 143 MMOL/L — SIGNIFICANT CHANGE UP (ref 135–145)
SODIUM SERPL-SCNC: 146 MMOL/L — HIGH (ref 135–145)
SP GR SPEC: 1.02 — SIGNIFICANT CHANGE UP (ref 1.01–1.02)
UROBILINOGEN FLD QL: NEGATIVE — SIGNIFICANT CHANGE UP
WBC # BLD: 12.77 K/UL — HIGH (ref 3.8–10.5)
WBC # FLD AUTO: 12.77 K/UL — HIGH (ref 3.8–10.5)
WBC UR QL: 1 /HPF — SIGNIFICANT CHANGE UP (ref 0–5)

## 2020-01-19 PROCEDURE — 99232 SBSQ HOSP IP/OBS MODERATE 35: CPT

## 2020-01-19 PROCEDURE — 71250 CT THORAX DX C-: CPT | Mod: 26

## 2020-01-19 PROCEDURE — 72170 X-RAY EXAM OF PELVIS: CPT | Mod: 26

## 2020-01-19 PROCEDURE — 72125 CT NECK SPINE W/O DYE: CPT | Mod: 26

## 2020-01-19 PROCEDURE — 70450 CT HEAD/BRAIN W/O DYE: CPT | Mod: 26

## 2020-01-19 PROCEDURE — 99291 CRITICAL CARE FIRST HOUR: CPT

## 2020-01-19 PROCEDURE — 76937 US GUIDE VASCULAR ACCESS: CPT | Mod: 26

## 2020-01-19 PROCEDURE — 71045 X-RAY EXAM CHEST 1 VIEW: CPT | Mod: 26

## 2020-01-19 PROCEDURE — 36556 INSERT NON-TUNNEL CV CATH: CPT | Mod: GC

## 2020-01-19 PROCEDURE — 99291 CRITICAL CARE FIRST HOUR: CPT | Mod: 25,GC

## 2020-01-19 RX ORDER — LEVOTHYROXINE SODIUM 125 MCG
25 TABLET ORAL DAILY
Refills: 0 | Status: DISCONTINUED | OUTPATIENT
Start: 2020-01-19 | End: 2020-01-20

## 2020-01-19 RX ORDER — MEROPENEM 1 G/30ML
1000 INJECTION INTRAVENOUS ONCE
Refills: 0 | Status: COMPLETED | OUTPATIENT
Start: 2020-01-19 | End: 2020-01-19

## 2020-01-19 RX ORDER — CHLORHEXIDINE GLUCONATE 213 G/1000ML
1 SOLUTION TOPICAL
Refills: 0 | Status: DISCONTINUED | OUTPATIENT
Start: 2020-01-19 | End: 2020-02-13

## 2020-01-19 RX ORDER — SODIUM CHLORIDE 9 MG/ML
1250 INJECTION, SOLUTION INTRAVENOUS ONCE
Refills: 0 | Status: COMPLETED | OUTPATIENT
Start: 2020-01-19 | End: 2020-01-19

## 2020-01-19 RX ORDER — LACTULOSE 10 G/15ML
20 SOLUTION ORAL
Refills: 0 | Status: DISCONTINUED | OUTPATIENT
Start: 2020-01-19 | End: 2020-01-20

## 2020-01-19 RX ORDER — VANCOMYCIN HCL 1 G
1000 VIAL (EA) INTRAVENOUS ONCE
Refills: 0 | Status: COMPLETED | OUTPATIENT
Start: 2020-01-19 | End: 2020-01-19

## 2020-01-19 RX ORDER — HYDROCORTISONE 20 MG
50 TABLET ORAL ONCE
Refills: 0 | Status: COMPLETED | OUTPATIENT
Start: 2020-01-19 | End: 2020-01-19

## 2020-01-19 RX ORDER — DIGOXIN 250 MCG
0.12 TABLET ORAL DAILY
Refills: 0 | Status: DISCONTINUED | OUTPATIENT
Start: 2020-01-19 | End: 2020-01-20

## 2020-01-19 RX ORDER — HEPARIN SODIUM 5000 [USP'U]/ML
5000 INJECTION INTRAVENOUS; SUBCUTANEOUS
Refills: 0 | Status: DISCONTINUED | OUTPATIENT
Start: 2020-01-19 | End: 2020-01-23

## 2020-01-19 RX ORDER — NOREPINEPHRINE BITARTRATE/D5W 8 MG/250ML
0.05 PLASTIC BAG, INJECTION (ML) INTRAVENOUS
Qty: 8 | Refills: 0 | Status: DISCONTINUED | OUTPATIENT
Start: 2020-01-19 | End: 2020-01-20

## 2020-01-19 RX ADMIN — Medication 1000 MILLIGRAM(S): at 20:23

## 2020-01-19 RX ADMIN — Medication 50 MILLIGRAM(S): at 20:22

## 2020-01-19 RX ADMIN — MEROPENEM 100 MILLIGRAM(S): 1 INJECTION INTRAVENOUS at 19:52

## 2020-01-19 RX ADMIN — MEROPENEM 1000 MILLIGRAM(S): 1 INJECTION INTRAVENOUS at 20:23

## 2020-01-19 RX ADMIN — Medication 3.83 MICROGRAM(S)/KG/MIN: at 19:53

## 2020-01-19 RX ADMIN — SODIUM CHLORIDE 1250 MILLILITER(S): 9 INJECTION, SOLUTION INTRAVENOUS at 20:23

## 2020-01-19 RX ADMIN — Medication 250 MILLIGRAM(S): at 18:47

## 2020-01-19 RX ADMIN — SODIUM CHLORIDE 1250 MILLILITER(S): 9 INJECTION, SOLUTION INTRAVENOUS at 18:47

## 2020-01-19 NOTE — H&P ADULT - ATTENDING COMMENTS
89 yo female with a PMHx of afib on digoxin (not on AC), severe AS, COPD, CLL (previously on Treanda and Rituxan), hypothyroidism, 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 portal htn, c/b esophageal varices s/p banding, CKD 3bl Cr 1.2, PVD, and recurrent UTI w/ hx of ESBL Klebsiella presenting with AMS found to be in septic shock with hypoxic respiratory failure, karrie/atn, metabolic encephalopathy secondary to pneumonia.     #neuro:  - Resolving encephalopathy from sepsis, no sigh of hepatic encephalopathy    #cardiovascular: Complicated By severe AS, appears to be volume resuscitated on exam.   Goal map of 65 with pressors with significant improvement in mental status with improved BPs.     #pulm: History of COPD? with no sigh of hypercapnia or wheeze, here with hypoxia secondary to PNA  - duonebs q6 hrs PRN   - domitila vanc and azithro, with cultures and legionella pending  - Chest pT    #renal:  KARRIE on CKD 3  - ATN in the setting of septic shock; also in setting of UTI  ID: PNA +/- UTI   -c/w meropenem for hx of ESBL, vanc and azithro as above.     #GI: Stable Cirrhosis with HCC, portal htn and hx of varcies, no sign off bleed or ascities,   ammonia ok, no sigh of SBP, no sign of bleed. c/w rifamaine and lactulose in am if ablet to take PO    #endo:  Hypothyroidism  - c/w levothyroxine 25 mcg    #heme:  DVT ppx   - hep 5000 BID     #GOC:  - DNR/DNI other wise ok with full care.   Attending with resident/fellow:  I have personally provided__45____minutes of critical care time concurrently with the resident/fellow. This time excludes time spent on separate procedures and time spent teaching. I have reviewed the resident/fellow’s documentation and I agree with the assessment and plan of care.

## 2020-01-19 NOTE — PATIENT PROFILE ADULT - NSASFUNCLEVELADLAMBULATE_GEN_A_NUR
to pneumonia. REVIEW OF SYSTEMS:  NEUROLOGIC:  The patient reports slight stroke several years ago. She  denies residual deficits. CARDIAC:  Chronic atrial fibrillation. She is on Eliquis for this. PULMONARY:  As detailed above. GI:  History of gastric bypass in the past.  This resulted in severe  electrolyte abnormalities and she ultimately underwent surgery for reversal  of this. CT of the chest with cuts of the abdomen demonstrate appearance  of ROUSE. : Chronic kidney disease stage III.  ENDOCRINE:  History of hypothyroidism. Remainder of 10-point review of systems is otherwise noncontributory. PHYSICAL EXAMINATION:  VITAL SIGNS:  Pulse is 82, respiratory rate is 18, blood pressure 126/64,  temp is 98.2. GENERAL:  Morbidly obese woman, currently sitting up at bedside, in no  acute distress. NEUROLOGIC:  Awake, alert, and oriented x3. Sensory and motor function are  symmetrically intact. CARDIAC:  Heart is irregularly irregular. There is a grade 2/6 systolic  ejection murmur noted. PULMONARY:  Auscultation of the lungs reveal diminished breath sounds in  the bases bilaterally. There is no wheeze, rhonchi, or crackles  appreciated. ABDOMEN:  Abdomen is distended, but soft. There is no guarding or rebound  appreciated. There is no palpable organomegaly noted on exam.  EXTREMITIES:  Distal pulses are intact and symmetric. There is no digital  clubbing noted on exam.  ENT:  The patient is Mallampati class IV. LABORATORY FINDINGS:  Chest x-ray demonstrates mild cardiomegaly. There is  some pulmonary vascular congestion. No significant effusions. Chemistries:  Sodium 137, potassium 3.5, chloride 94, bicarbonate is 35,  BUN 20, creatinine 1.4, glucose is 166. Transaminases are unremarkable. CBC:  White count 5.7, hemoglobin 8.9, hematocrit 31.7, platelets 302,546. INR is 1.4.   Arterial blood gases:  PH of 7.310, PaCO2 of 56.7, PaO2 of  93.4, bicarb 27.9 with an O2 sat of 95.6% on Trendelenburg will assist in decompressing the diaphragm  somewhat.  4.  Following the procedure, an arterial blood gas should be obtained  immediately to evaluate PaCO2.  5.  BiPAP support postprocedure. 6.  The patient should undergo outpatient sleep study as she will certainly  need BiPAP and possibly a Trilogy device for home mechanical ventilation. 7. PFTs as outpatient when she is fully compensated. We will follow along with you in the care of the patient. Once again, we  thank you for the opportunity to be involved in the care of your patient. If I can provide you with any further information, please feel free to  contact me directly.     Sincerely,        Ricardo Vera DO    D: 07/23/2018 13:26:46       T: 07/23/2018 19:11:50     [email or web address]  Job#: [id number]     Doc#: [id number]    CC: 1 = assistive equipment

## 2020-01-19 NOTE — ED PROVIDER NOTE - ATTENDING CONTRIBUTION TO CARE
Attending MD Chica Goldstein:  I personally have seen and examined this patient.  Resident note reviewed and agree on plan of care and except where noted.  See HPI, PE, and MDM for details.

## 2020-01-19 NOTE — ED PROVIDER NOTE - CLINICAL SUMMARY MEDICAL DECISION MAKING FREE TEXT BOX
Attending Chica Goldstein: 89 y/o female with multiple medical issues presenting with fever, hypotension and tachypnea. upon arrival pt ill appearing. pocus shows hyperdynamic LV, with diffuse B lines and small amount of respiratory variation to IVC. per family pt is DNR/DNI. with diffuse B lines concern for possible developing pulmonary edema as well as multi focal pna. reviewed old cultures. pt given meropenem and vanco. pt additionally does have valvular disease and concern with rapid administration of fluid for worsening respiratory status. pt given 1L IVF. pt does have B lines present. ct scan performed showing no evidence of rib fractures or traumatic process. abdomen soft on exam. found to have PNA and UTI. pan cultured. pt persistently hypotensive after IVF and started on levophed. will admit to micu

## 2020-01-19 NOTE — ED PROVIDER NOTE - PHYSICAL EXAMINATION
Attending Chica Goldstein: Gen: ill appearing, heent: atrauamtic, eomi, perrla, dry mucous membranes, op pink, uvula midline, neck; nttp, no nuchal rigidity, chest: nttp, no crepitus, cv: tachycardic, +murmur, lungs: diffuse rhonchi and crackles, abd: soft, nontender, nondistended, no peritoneal signs, +BS, no guarding, ext: cool lower extremities, multiple bruising, echymoses, skin: no rash, neuro: awake and alert, following commands, sensation and strength intact, no focal deficits

## 2020-01-19 NOTE — H&P ADULT - HISTORY OF PRESENT ILLNESS
89 female with a hx of afib on digoxin, severe AS, COPD, CLL (previously on Treanda and Rituxan), cirrhosis a/w HCC c/b esophageal varices s/p banding, CKD 3bl Cr 1.2, PVD, and recurrent UTI (treated with macrobid recently). Admitted 1/6-1/9 for weakness w/ mechanical fall with pre-renal KARRIE and resolution with IVF d/yohana to [facility]. Patient now presenting with difficulty breathing and cough and fever on day of presentation. Per family, patient was found on his bedroom floor this AM.     ED vitals: Tmax 101.2 (r), , BP 80/50 with maps <65, RR 22-24, SpO2 99% on 3L      Sig labs: leukocytosis at 12.77 (60% neut), INR 1.44, hypernatremia 146, hyperkalemia 5.5, bicarb 18,     ED course: patient with confusion in ED         Discharge Medications	ascorbic acid 500 mg oral tablet: 1 tab(s) orally 2 times a day budesonide 0.25 mg/2 mL inhalation suspension: 2 milliliter(s) inhaled 2 times a day digoxin 125 mcg (0.125 mg) oral tablet: 1 tab(s) orally every other day heparin: 5000 unit(s) subcutaneous 2 times a day ipratropium-albuterol 0.5 mg-2.5 mg/3 mLinhalation solution: 3 milliliter(s) inhaled every 12 hours lactulose 10 g/15 mL oral syrup: 30 milliliter(s) orally 4 times a day levothyroxine 25 mcg (0.025 mg) oral tablet: 1 tab(s) orally once a day petrolatum topical ointment: 1 application topically once a day propranolol 10 mg oral tablet: 1 tab(s) orally 2 times a day rifAXIMin 550 mg oral tablet: 1 tab(s) orally 2 times a day spironolactone 25 mg oral tablet: 1 tab(s) orally once a day Vitamin D3 2000 intl units oral tablet: 1 tab(s) orally once a day 89 yo female with a PMHx of afib on digoxin (not on AC), severe AS, COPD, CLL (previously on Treanda and Rituxan), 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 Admitted 1/6-1/9 for weakness w/ mechanical fall with pre-renal KARRIE and resolution with IVF d/yohana to [facility]. Of note, Ucx from 1/11 growing ESBL Klebsiella. Patient now presenting with difficulty breathing and cough and fever on day of presentation. Per family, patient was found on his bedroom floor this AM. Noted to be confused from bl in ED.     ED vitals: Tmax 101.2 (r), , BP 80/50 with maps <65, RR 22-24, SpO2 99% on 3L      Sig labs: leukocytosis at 12.77 (60% neut), INR 1.44, hypernatremia 146, hyperkalemia 5.5, bicarb 18,  venous pH 7.38, lactate 3.6, UA post for nitrites and many bacteria  CXR showin RLL pneumonia, CT chest with bb patchy opacities PNA vs asp pneumonitis, CT head without acute intracranial process or fx     ED course: s/p 1.25 L LR, s/p vancomycin and meropenem x 1, stress dose steroids. Patient remained hypotensive after IVH started on levophed. Admitted to MICU for septic shock 2/2 PNA and UTI. 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 Admitted 1/6-1/9 for weakness w/ mechanical fall with pre-renal KARRIE and resolution with IVF d/yohana to [facility]. Of note, Ucx from 1/11 growing ESBL Klebsiella. Patient now presenting with difficulty breathing and cough and fever on day of presentation. Per family, patient was found on his bedroom floor this AM. Noted to be confused from bl in ED.     ED vitals: Tmax 101.2 (r), , BP 80/50 with maps <65, RR 22-24, SpO2 99% on 3L      Sig labs: leukocytosis at 12.77 (60% neut), INR 1.44, hypernatremia 146, hyperkalemia 5.5, bicarb 18, BUN 52, Cr 1.69, venous pH 7.38, lactate 3.6. UA post for nitrites and many bacteria CXR w/ RLL pneumonia, CT chest with bb patchy opacities PNA vs asp pneumonitis     ED course: s/p 1.25 L LR, s/p vancomycin and meropenem x 1, stress dose steroids. Patient remained hypotensive after IVH started on levophed. Admitted to MICU for septic shock 2/2 PNA and UTI. 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 Admitted 1/6-1/9 for weakness w/ mechanical fall with pre-renal KARRIE and resolution with IVF d/yohana to [facility]. Of note, Ucx from 1/11 growing ESBL Klebsiella. Patient now presenting with difficulty breathing and cough and fever on day of presentation. Per family, patient was found on his bedroom floor this AM. Noted to be confused from bl in ED.     ED vitals: Tmax 101.2 (r), , BP 80/50 with maps <65, RR 22-24, SpO2 99% on 3L      Sig labs: leukocytosis at 12.77 (60% neut), INR 1.44, hypernatremia 146, hyperkalemia 5.5, bicarb 18, BUN 52, Cr 1.69, venous pH 7.38, lactate 3.6. UA post for nitrites and many bacteria CXR w/ RLL pneumonia, CT chest with bb patchy opacities PNA vs asp pneumonitis     ED course: s/p 1.25 L LR, s/p vancomycin and meropenem x 1, stress dose steroids. Patient remained hypotensive after IVH started on levophed. Admitted to MICU for septic shock 2/2 PNA and UTI. Patient confused. Hx obtained from chart review and daughters [place] at bedside.   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 Admitted 1/6-1/9 for weakness w/ mechanical fall with pre-renal KARRIE and resolution with IVF d/yohana to [facility]. [facility] and nonproductive cough and fever on day of presentation. Per family, patient had a fall out of bed found on the floor this AM. She has had waxing waning mentation over the last week. This typically happens when she has UTI. Of note[facility]  Noted to be confused from  in ED.     ED vitals: Tmax 101.2 (r), , BP 80/50 with maps <65, RR 22-24, SpO2 99% on 3L      Sig labs: leukocytosis at 12.77 (60% neut), INR 1.44, hypernatremia 146, hyperkalemia 5.5, bicarb 18, BUN 52, Cr 1.69, venous pH 7.38, lactate 3.6. UA post for nitrites and many bacteria CXR w/ RLL pneumonia, CT chest with bb patchy opacities PNA vs asp pneumonitis     ED course: s/p 1.25 L LR, s/p vancomycin and meropenem x 1, stress dose steroids. Patient remained hypotensive after IVH started on levophed. Admitted to MICU for septic shock 2/2 PNA and UTI.

## 2020-01-19 NOTE — H&P ADULT - ASSESSMENT
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 portal htn, c/b esophageal varices s/p banding, CKD 3bl Cr 1.2, PVD, and recurrent UTI w/ hx of ESBL Klebsiella presenting with difficulty breathing, cough, admitted to MICU for septic shock in the setting of PNA and UTI    #neuro:  - no active issues   - CT head neg for acute intracranial process     #cardiovascular:  Septic shock 2/2 PNA, UTI  - hotn in ED refractory to fluid hydration   - lactate 3.6 on presentation  - s/p 1.25 L LR, hydrocortisone 50mg x1   - now on levophed   - c/w stress dose steroids  - wean off of pressors   - goal map >65   Afib  - on digoxin 125mcg every other day   - no AC due to inc bleeding risk from varices   Severe AS:  - monitor fluid status  - limit IVH     #pulm:  PNA   - presenting with SOB, cough, and fever Tmax 101.2 in ED with leukocytosis  - CXR w/ RLL opacity   - CT chest w/ bb patchy opacities PNA vs asp pneumonitis   - f/u RVP  - c/w empiric vanc/meropenem     #renal:  KARRIE on CKD 3  - Cr elevated   UTI  -hx of frequent UTIs   -UA post for many bacteria, nitrites   -UCx drawn on 1/11 (for unclear reasons) growing ESBL Klebsiella, Sn to meropenem   -c/w meropenem 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 portal htn, c/b esophageal varices s/p banding, CKD 3bl Cr 1.2, PVD, and recurrent UTI w/ hx of ESBL Klebsiella presenting with difficulty breathing, cough, admitted to MICU for septic shock in the setting of PNA and UTI    #neuro:  - no active issues   - CT head neg for acute intracranial process     #cardiovascular:  Septic shock 2/2 PNA, UTI  - hotn in ED refractory to fluid hydration   - lactate 3.6 on presentation  - s/p 1.25 L LR, hydrocortisone 50mg x1   - now on levophed   - c/w stress dose steroids  - wean off of pressors   - goal map >65   Afib  - on digoxin 125mcg every other day   - no AC due to inc bleeding risk from varices   Severe AS:  - monitor fluid status  - limit IVH     #pulm:  PNA   - presenting with SOB, cough, and fever Tmax 101.2 in ED with leukocytosis  - CXR w/ RLL opacity   - CT chest w/ bb patchy opacities PNA vs asp pneumonitis   - f/u RVP  - c/w empiric abx as below     #renal:  KARRIE on CKD 3  - Cr elevated 1.69 (bl 1.2)  - likely pre-renal vs ATN in the setting of septic shock; also in setting of UTI  UTI  -hx of frequent UTIs   -UA post for many bacteria, nitrites   -UCx drawn on 1/11 (for unclear reasons) growing ESBL Klebsiella, Sn to meropenem   -c/w meropenem     #infectious disease:  - presenting with tmax 101.2, leukocytosis 12.77, lactate 3.6 in the setting of SOB and cough   - found to have PNA and UTI  - c/w meropenem for hx of ESBL klebsiella UTI   - start azithromycin   - f/u BCx  - f/u UCx  - f/u rvp  - f/u urine legionella   - CTM fever curve 89 yo female with a PMHx of afib on digoxin (not on AC), severe AS, COPD, CLL (previously on Treanda and Rituxan), hypothyroidism, 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 portal htn, c/b esophageal varices s/p banding, CKD 3bl Cr 1.2, PVD, and recurrent UTI w/ hx of ESBL Klebsiella presenting with difficulty breathing, cough, admitted to MICU for septic shock in the setting of PNA and UTI    #neuro:  - no active issues   - CT head neg for acute intracranial process     #cardiovascular:  Septic shock 2/2 PNA, UTI  - hotn in ED refractory to fluid hydration   - lactate 3.6 on presentation  - s/p 1.25 L LR, hydrocortisone 50mg x1   - now on levophed   - c/w stress dose steroids  - wean off of pressors   - goal map >65   Afib  - on digoxin 125mcg every other day   - no AC due to inc bleeding risk from varices   Severe AS:  - monitor fluid status  - limit IVH     #pulm:  PNA   - presenting with SOB, cough, and fever Tmax 101.2 in ED with leukocytosis  - CXR w/ RLL opacity   - CT chest w/ bb patchy opacities PNA vs asp pneumonitis   - f/u RVP  - c/w empiric abx as below   COPD  - duonebs q6 hrs PRN     #renal:  KARRIE on CKD 3  - Cr elevated 1.69 (bl 1.2)  - likely pre-renal vs ATN in the setting of septic shock; also in setting of UTI  UTI  -hx of frequent UTIs   -UA post for many bacteria, nitrites   -UCx drawn on 1/11 (for unclear reasons) growing ESBL Klebsiella, Sn to meropenem   -c/w meropenem     #infectious disease:  - presenting with tmax 101.2, leukocytosis 12.77, lactate 3.6 in the setting of SOB and cough   - found to have PNA and UTI  - c/w meropenem for hx of ESBL klebsiella UTI   - start azithromycin   - f/u BCx  - f/u UCx  - f/u rvp  - f/u urine legionella   - CTM fever curve    #GI:  Cirrhosis 2/2 HCC, c/b portal HTN and eso varices   - on propanolol 10 mg BID at home  - ammonia level 26   - hold bb in the setting of septic shock  - c/w rifaximin 550 mg bid  - c/w lactulose 30 cc QID     #endo:  Hypothyroidism  - c/w levothyroxine 25 mcg    #heme:  DVT ppx   - hep 5000 BID     #GOC:  - DNR/DNI 89 yo female with a PMHx of afib on digoxin (not on AC), severe AS, COPD, CLL (previously on Treanda and Rituxan), hypothyroidism, 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 portal htn, c/b esophageal varices s/p banding, CKD 3bl Cr 1.2, PVD, and recurrent UTI w/ hx of ESBL Klebsiella presenting with difficulty breathing, cough, and waxing waning mentation admitted to MICU for septic shock in the setting of PNA and UTI    #neuro:  - waxing waning mentation likely in the setting of sepsis  - likely component of hepatic encephalopathy   - CT head neg for acute intracranial process   - c/w lactulose, and rifaxamin    #cardiovascular:  Septic shock 2/2 PNA, UTI  - hotn in ED refractory to fluid hydration   - lactate 3.6 on presentation  - s/p 1.25 L LR, hydrocortisone 50mg x1   - now on levophed   - hold further stress dose steroids   - f/u AM cortisol   - wean off of pressors   - goal map >65   Afib  - on digoxin 125mcg every other day   - no AC due to inc bleeding risk from varices   - f/u dig level, redose by level   Severe AS:  - monitor fluid status  - limit IVH     #pulm:  AHRF 2/2 likely aspiration PNA in the setting of altered mentation   - presenting with SOB, cough, and fever Tmax 101.2 in ED with leukocytosis  - requiring 3L of O2, not on home O2  - CXR w/ RLL opacity   - CT chest w/ bb patchy opacities PNA vs asp pneumonitis   - f/u RVP  - c/w empiric abx as below   COPD  - duonebs q6 hrs   - bed percussion with duonebs    #renal:  KARRIE on CKD 3  - Cr elevated 1.69 (bl 1.2)  - likely pre-renal vs ATN in the setting of septic shock; also in setting of UTI  - avoid nephrotoxins  - renally dose medications   UTI  -hx of frequent UTIs   -UA post for many bacteria, nitrites   -UCx drawn on 1/11 for alt mentation growing ESBL Klebsiella, Sn to meropenem   -c/w meropenem     #infectious disease:  - presenting with tmax 101.2, leukocytosis 12.77, lactate 3.6 in the setting of SOB and cough   - found to have likely asp PNA and UTI  - c/w meropenem for hx of ESBL klebsiella UTI   - c/w epimeric vancomycin 750 mg q24   - start azithromycin   - f/u BCx  - f/u UCx  - f/u RVP  - f/u urine legionella   - CTM fever curve    #GI:  Cirrhosis 2/2 HCC, c/b portal HTN and eso varices   - asterixes on exam   - on propanolol 10 mg BID at home  - ammonia level 26   - hold bb in the setting of septic shock  - c/w rifaximin 550 mg bid  - c/w lactulose 30 cc QID     #endo:  Hypothyroidism  - c/w levothyroxine 25 mcg    #heme:  DVT ppx   - hep 5000 BID     #GOC:  - DNR/DNI  - molst form signed recent hospitalization  - Daughter Ephraim is 1st HCP 89 yo female with a PMHx of afib on digoxin (not on AC), severe AS, COPD, CLL (previously on Treanda and Rituxan), hypothyroidism, 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 portal htn, c/b esophageal varices s/p banding, CKD 3bl Cr 1.2, PVD, and recurrent UTI w/ hx of ESBL Klebsiella presenting with difficulty breathing, cough, and AMS, admitted to MICU for septic shock, AHRF, and karrie/atn 2/2 PNA, with UTI    #neuro:  Metabolic encephalopathy   - waxing waning mentation w/in last week likely in the setting of sepsis  - patient presented with confusion, now improving A&O x 2-3  - possible component of underlying hepatic encephalopathy   - CT head neg for acute intracranial process   - c/w lactulose, and rifaximin     #cardiovascular:  Septic shock 2/2 PNA, UTI  - hotn in ED refractory to fluid hydration   - lactate 3.6 on presentation  - s/p 1.25 L LR, hydrocortisone 50mg x1   - now on levophed   - hold further stress dose steroids   - f/u AM cortisol   - wean off of pressors   - goal map >65   Afib  - on digoxin 125mcg every other day   - no AC due to inc bleeding risk from varices   - f/u dig level, redose by level   Severe AS:  - monitor fluid status  - limit IVH     #pulm:  AHRF 2/2 likely aspiration PNA in the setting of altered mentation   - presenting with SOB, cough, and fever Tmax 101.2 in ED with leukocytosis  - requiring 3L of O2, not on home O2  - CXR w/ RLL opacity   - CT chest w/ bb patchy opacities PNA vs asp pneumonitis   - f/u RVP  - c/w empiric abx as below   COPD  - duonebs q6 hrs   - bed percussion with duonebs    #renal:  KARRIE on CKD 3  - Cr elevated 1.69 (bl 1.2)  - likely pre-renal vs ATN in the setting of septic shock; also in setting of UTI  - avoid nephrotoxins  - renally dose medications   UTI  -hx of frequent UTIs   -UA post for many bacteria, nitrites   -UCx drawn on 1/11 for alt mentation growing ESBL Klebsiella, Sn to meropenem   -c/w meropenem     #infectious disease:  - presenting with tmax 101.2, leukocytosis 12.77, lactate 3.6 in the setting of SOB and cough   - found to have likely asp PNA and UTI  - c/w meropenem for hx of ESBL klebsiella UTI   - c/w epimeric vancomycin 750 mg q24   - start azithromycin   - f/u BCx  - f/u UCx  - f/u RVP  - f/u urine legionella   - CTM fever curve    #GI:  Cirrhosis 2/2 HCC, c/b portal HTN and eso varices   - asterixes on exam   - on propanolol 10 mg BID at home  - ammonia level 26   - hold bb in the setting of septic shock  - c/w rifaximin 550 mg bid  - c/w lactulose 30 cc QID     #endo:  Hypothyroidism  - c/w levothyroxine 25 mcg    #heme:  DVT ppx   - hep 5000 BID     #GOC:  - DNR/DNI  - molst form signed recent hospitalization  - Daughter Ephraim is 1st HCP

## 2020-01-19 NOTE — H&P ADULT - NSHPSOCIALHISTORY_GEN_ALL_CORE
Lives at home with her daughter now presenting from [facility], has a HHA, walks with a walker   Hx of social smoking in youth   No current alcohol use  No recreational drug use

## 2020-01-19 NOTE — ED ADULT NURSE NOTE - OBJECTIVE STATEMENT
89 y/o female pmh CKD, CLL, COPD, GIB, afib, bleeding esophagael varices, PVD, aortic stenosis, arrives to ED sent from [facility] for AMS, fever. Patient was tachycardic, hypoxia, hypotensive. Patient arrives with two daughters at bedside. Patient awake and alert, not able to answer questions or provide name. Patient respirations labored, course breath sounds, requiring 4L O2 for low O2 sat 80%. Oatient arrives with dressed wound right wrist, left bicep and discoloration to BL LE. Patient placed on CM tachycardic to 130s on monitor. MD obtained BL 20g IV by ultrasound. Family states patient normally a/ox3. and ambulatory with walker. 89 y/o female pmh CKD, CLL, COPD, GIB, afib, bleeding esophagael varices, PVD, aortic stenosis, arrives to ED sent from [facility] for AMS, fever. Patient was tachycardic, hypoxia, hypotensive. Patient arrives with two daughters at bedside. Patient awake and alert, not able to answer questions or provide name. Patient respirations labored, course breath sounds, requiring 4L O2 for low O2 sat 80%. Oatient arrives with dressed wound right wrist, left bicep and discoloration to BL LE. Patient placed on CM tachycardic to 130s on monitor. MD obtained BL 20g IV by ultrasound. Family states patient normally a/ox3. and ambulatory with walker.    RN Katja: Report received from ALONZO Núñez, Pt lethargic but responsive to verbal stimuli. Pt afebrile, Vanco infusing from previous RN. Meropenem administered upon vanco completion. Pt received 1250 ml LR, hypotensive. MD notified and bedside for Triple Lumen central line placement in left IJ. Levo administered via central line, Pt BP and MAP improve. Pt taken to CT w/o RN approval or notification, Levo paused by CT. Pt reassessment reveals pt to be hypotensive. Levo restarted and increased from 0.05mg/kg/min to 0.1mg/kg/min per MD. Pt responds positively, Pt transported to MICU with RN and MD. See flowsheets/MAr for times and vital signs.

## 2020-01-19 NOTE — H&P ADULT - NSHPREVIEWOFSYSTEMS_GEN_ALL_CORE
REVIEW OF SYSTEMS:    CONSTITUTIONAL: No weakness, fevers or chills  EYES/ENT: No visual changes;  No vertigo or throat pain   NECK: No pain or stiffness  RESPIRATORY: No cough, wheezing, hemoptysis; No shortness of breath  CARDIOVASCULAR: No chest pain or palpitations  GASTROINTESTINAL: No abdominal or epigastric pain. No nausea, vomiting, or hematemesis; No diarrhea or constipation. No melena or hematochezia.  GENITOURINARY: No dysuria, frequency or hematuria  NEUROLOGICAL: No numbness or weakness  SKIN: No itching, burning, rashes, or lesions   All other review of systems is negative unless indicated above. REVIEW OF SYSTEMS:  Unable to assess as patient with waxing waning mentation

## 2020-01-19 NOTE — H&P ADULT - NSHPPHYSICALEXAM_GEN_ALL_CORE
PHYSICAL EXAM:    Vital Signs Last 24 Hrs  T(C): 38.4 (19 Jan 2020 18:42), Max: 38.4 (19 Jan 2020 18:42)  T(F): 101.2 (19 Jan 2020 18:42), Max: 101.2 (19 Jan 2020 18:42)  HR: 128 (19 Jan 2020 18:44) (105 - 128)  BP: 101/66 (19 Jan 2020 18:44) (84/54 - 101/66)  BP(mean): --  RR: 20 (19 Jan 2020 18:44) (20 - 24)  SpO2: 96% (19 Jan 2020 18:44) (96% - 99%)    General: No acute distress.  HEENT: NCAT.  PERRL.  EOMI.  No scleral icterus or injection.  Moist MM.  No oropharyngeal exudates.    Neck: Supple.  Full ROM.  No JVD.  No thyromegaly. No lymphadenopathy.   Heart: RRR.  Normal S1 and S2.  No murmurs, rubs, or gallops.   Lungs: CTAB. No wheezes, crackles, or rhonchi.    Abdomen: BS+, soft, NT/ND.  No organomegaly.  Skin: Warm and dry.  No rashes.  Extremities: No edema, clubbing, or cyanosis.  2+ peripheral pulses b/l.  Musculoskeletal: No deformities.  No spinal or paraspinal tenderness.  Neuro: A&Ox3.  CN II-XII intact.  5/5 strength in UE and LE b/l.  Tactile sensation intact in UE and LE b/l.  Cerebellar function intact PHYSICAL EXAM:    Vital Signs Last 24 Hrs  T(C): 38.4 (19 Jan 2020 18:42), Max: 38.4 (19 Jan 2020 18:42)  T(F): 101.2 (19 Jan 2020 18:42), Max: 101.2 (19 Jan 2020 18:42)  HR: 128 (19 Jan 2020 18:44) (105 - 128)  BP: 101/66 (19 Jan 2020 18:44) (84/54 - 101/66)  BP(mean): --  RR: 20 (19 Jan 2020 18:44) (20 - 24)  SpO2: 96% (19 Jan 2020 18:44) (96% - 99%)    General: elderly, cachetic female in NAD respiring on 3L NC  HEENT: NCAT.  PERRL.  EOMI.  No scleral icterus or injection.  Moist DRY. No oropharyngeal exudates.    Neck: Supple.  Full ROM.  No JVD.  No thyromegaly. No lymphadenopathy.   Heart: afib rhythm, grade 4 asc-dec murmur throughout the precordium   Lungs: course breath sounds bl throughout the lung fields, slight crackles RLL  Abdomen: BS+, soft, NT/ND.  No organomegaly.  Skin: skin xerosis of skin, sig purpura upper and lower ext  Extremities: 1+ bl edema   Neuro: A&Ox 2-3, waxing waning mentation, easily rouseable, follows commands, ids family

## 2020-01-19 NOTE — ED PROVIDER NOTE - PROGRESS NOTE DETAILS
Attending Chica Goldstein: on repeat exam pt is more hypotensive with cool extremities. d/w family pt is DNR/DNI. diffuserhonchi b/l. concern for septic shock. pt with tenuous respiratory status. given 1L of fluid. IVC is plethoric. concern for pulmonary edema and worsening respiratory status. will start levophed. MICU consulted Attending Chica Goldstein: pt on levophed. central line placed. will admit to micu

## 2020-01-19 NOTE — ED PROVIDER NOTE - OBJECTIVE STATEMENT
Attending Chica Goldstein: 89 y/o female with multiple medical issues including h/o valve disease, frequent UTI, liver cirrhosis, pulmonary htn presenting with fevers and difficulty breathing. per family reportedly found on the ground this am. unclear if rolled out of bed. family noticed has been having difficuty breathing and cough. fever reportedly today. did not take any tylenol or motrin. h/o frequent uti. upon arrival pt hypotensive and ill appearing. also reportedly more confused. pt normally walks with a walker. unclear if walked today.

## 2020-01-19 NOTE — PATIENT PROFILE ADULT - NSPROPTRIGHTCAREGIVER_GEN_A_NUR
There are no preventive care reminders to display for this patient.    Patient is up to date, no discussion needed.             yes

## 2020-01-20 LAB
ALBUMIN SERPL ELPH-MCNC: 2.4 G/DL — LOW (ref 3.3–5)
ALP SERPL-CCNC: 95 U/L — SIGNIFICANT CHANGE UP (ref 40–120)
ALT FLD-CCNC: 16 U/L — SIGNIFICANT CHANGE UP (ref 10–45)
ANION GAP SERPL CALC-SCNC: 9 MMOL/L — SIGNIFICANT CHANGE UP (ref 5–17)
APTT BLD: 30.1 SEC — SIGNIFICANT CHANGE UP (ref 27.5–36.3)
AST SERPL-CCNC: 33 U/L — SIGNIFICANT CHANGE UP (ref 10–40)
BASE EXCESS BLDV CALC-SCNC: -4.2 MMOL/L — LOW (ref -2–2)
BILIRUB SERPL-MCNC: 2.5 MG/DL — HIGH (ref 0.2–1.2)
BUN SERPL-MCNC: 53 MG/DL — HIGH (ref 7–23)
CA-I SERPL-SCNC: 1.41 MMOL/L — HIGH (ref 1.12–1.3)
CALCIUM SERPL-MCNC: 9.6 MG/DL — SIGNIFICANT CHANGE UP (ref 8.4–10.5)
CHLORIDE BLDV-SCNC: 118 MMOL/L — HIGH (ref 96–108)
CHLORIDE SERPL-SCNC: 115 MMOL/L — HIGH (ref 96–108)
CO2 BLDV-SCNC: 22 MMOL/L — SIGNIFICANT CHANGE UP (ref 22–30)
CO2 SERPL-SCNC: 18 MMOL/L — LOW (ref 22–31)
CORTIS AM PEAK SERPL-MCNC: 56.1 UG/DL — HIGH (ref 6–18.4)
CREAT SERPL-MCNC: 1.6 MG/DL — HIGH (ref 0.5–1.3)
DIGOXIN SERPL-MCNC: 0.9 NG/ML — SIGNIFICANT CHANGE UP (ref 0.8–2)
GAS PNL BLDV: 143 MMOL/L — SIGNIFICANT CHANGE UP (ref 135–145)
GAS PNL BLDV: SIGNIFICANT CHANGE UP
GAS PNL BLDV: SIGNIFICANT CHANGE UP
GLUCOSE BLDV-MCNC: 124 MG/DL — HIGH (ref 70–99)
GLUCOSE SERPL-MCNC: 126 MG/DL — HIGH (ref 70–99)
GRAM STN FLD: SIGNIFICANT CHANGE UP
HCO3 BLDV-SCNC: 21 MMOL/L — SIGNIFICANT CHANGE UP (ref 21–29)
HCT VFR BLD CALC: 35.8 % — SIGNIFICANT CHANGE UP (ref 34.5–45)
HCT VFR BLDA CALC: 36 % — LOW (ref 39–50)
HGB BLD CALC-MCNC: 11.9 G/DL — SIGNIFICANT CHANGE UP (ref 11.5–15.5)
HGB BLD-MCNC: 11.3 G/DL — LOW (ref 11.5–15.5)
HOROWITZ INDEX BLDV+IHG-RTO: 36 — SIGNIFICANT CHANGE UP
INR BLD: 1.45 RATIO — HIGH (ref 0.88–1.16)
LACTATE BLDV-MCNC: 2.7 MMOL/L — HIGH (ref 0.7–2)
LEGIONELLA AG UR QL: NEGATIVE — SIGNIFICANT CHANGE UP
MAGNESIUM SERPL-MCNC: 1.8 MG/DL — SIGNIFICANT CHANGE UP (ref 1.6–2.6)
MCHC RBC-ENTMCNC: 31.6 GM/DL — LOW (ref 32–36)
MCHC RBC-ENTMCNC: 33.3 PG — SIGNIFICANT CHANGE UP (ref 27–34)
MCV RBC AUTO: 105.6 FL — HIGH (ref 80–100)
METHOD TYPE: SIGNIFICANT CHANGE UP
NRBC # BLD: 0 /100 WBCS — SIGNIFICANT CHANGE UP (ref 0–0)
OTHER CELLS CSF MANUAL: 11 ML/DL — LOW (ref 18–22)
PCO2 BLDV: 40 MMHG — SIGNIFICANT CHANGE UP (ref 35–50)
PH BLDV: 7.34 — LOW (ref 7.35–7.45)
PHOSPHATE SERPL-MCNC: 3.2 MG/DL — SIGNIFICANT CHANGE UP (ref 2.5–4.5)
PLATELET # BLD AUTO: 107 K/UL — LOW (ref 150–400)
PO2 BLDV: 39 MMHG — SIGNIFICANT CHANGE UP (ref 25–45)
POTASSIUM BLDV-SCNC: 5.1 MMOL/L — SIGNIFICANT CHANGE UP (ref 3.5–5.3)
POTASSIUM SERPL-MCNC: 5.1 MMOL/L — SIGNIFICANT CHANGE UP (ref 3.5–5.3)
POTASSIUM SERPL-SCNC: 5.1 MMOL/L — SIGNIFICANT CHANGE UP (ref 3.5–5.3)
PROT SERPL-MCNC: 5 G/DL — LOW (ref 6–8.3)
PROTHROM AB SERPL-ACNC: 16.7 SEC — HIGH (ref 10–12.9)
RAPID RVP RESULT: SIGNIFICANT CHANGE UP
RBC # BLD: 3.39 M/UL — LOW (ref 3.8–5.2)
RBC # FLD: 15.8 % — HIGH (ref 10.3–14.5)
S MARCESCENS DNA BLD POS NAA+NON-PROBE: SIGNIFICANT CHANGE UP
SAO2 % BLDV: 66 % — LOW (ref 67–88)
SODIUM SERPL-SCNC: 142 MMOL/L — SIGNIFICANT CHANGE UP (ref 135–145)
SPECIMEN SOURCE: SIGNIFICANT CHANGE UP
WBC # BLD: 13.52 K/UL — HIGH (ref 3.8–10.5)
WBC # FLD AUTO: 13.52 K/UL — HIGH (ref 3.8–10.5)

## 2020-01-20 PROCEDURE — 76937 US GUIDE VASCULAR ACCESS: CPT | Mod: 26

## 2020-01-20 PROCEDURE — 93308 TTE F-UP OR LMTD: CPT | Mod: 26

## 2020-01-20 PROCEDURE — 99291 CRITICAL CARE FIRST HOUR: CPT

## 2020-01-20 PROCEDURE — 93010 ELECTROCARDIOGRAM REPORT: CPT

## 2020-01-20 RX ORDER — MIDODRINE HYDROCHLORIDE 2.5 MG/1
10 TABLET ORAL EVERY 8 HOURS
Refills: 0 | Status: DISCONTINUED | OUTPATIENT
Start: 2020-01-20 | End: 2020-01-21

## 2020-01-20 RX ORDER — IPRATROPIUM/ALBUTEROL SULFATE 18-103MCG
3 AEROSOL WITH ADAPTER (GRAM) INHALATION EVERY 6 HOURS
Refills: 0 | Status: DISCONTINUED | OUTPATIENT
Start: 2020-01-20 | End: 2020-01-22

## 2020-01-20 RX ORDER — LEVOTHYROXINE SODIUM 125 MCG
12.5 TABLET ORAL AT BEDTIME
Refills: 0 | Status: DISCONTINUED | OUTPATIENT
Start: 2020-01-20 | End: 2020-02-10

## 2020-01-20 RX ORDER — VANCOMYCIN HCL 1 G
750 VIAL (EA) INTRAVENOUS DAILY
Refills: 0 | Status: DISCONTINUED | OUTPATIENT
Start: 2020-01-21 | End: 2020-01-21

## 2020-01-20 RX ORDER — ONDANSETRON 8 MG/1
4 TABLET, FILM COATED ORAL ONCE
Refills: 0 | Status: COMPLETED | OUTPATIENT
Start: 2020-01-20 | End: 2020-01-20

## 2020-01-20 RX ORDER — VANCOMYCIN HCL 1 G
750 VIAL (EA) INTRAVENOUS ONCE
Refills: 0 | Status: COMPLETED | OUTPATIENT
Start: 2020-01-20 | End: 2020-01-20

## 2020-01-20 RX ORDER — VANCOMYCIN HCL 1 G
VIAL (EA) INTRAVENOUS
Refills: 0 | Status: DISCONTINUED | OUTPATIENT
Start: 2020-01-20 | End: 2020-01-21

## 2020-01-20 RX ORDER — AZITHROMYCIN 500 MG/1
500 TABLET, FILM COATED ORAL EVERY 24 HOURS
Refills: 0 | Status: DISCONTINUED | OUTPATIENT
Start: 2020-01-20 | End: 2020-01-21

## 2020-01-20 RX ORDER — LACTULOSE 10 G/15ML
200 SOLUTION ORAL EVERY 12 HOURS
Refills: 0 | Status: DISCONTINUED | OUTPATIENT
Start: 2020-01-20 | End: 2020-01-22

## 2020-01-20 RX ORDER — PHENYLEPHRINE HYDROCHLORIDE 10 MG/ML
0.13 INJECTION INTRAVENOUS
Qty: 40 | Refills: 0 | Status: DISCONTINUED | OUTPATIENT
Start: 2020-01-20 | End: 2020-01-21

## 2020-01-20 RX ORDER — AZITHROMYCIN 500 MG/1
500 TABLET, FILM COATED ORAL ONCE
Refills: 0 | Status: DISCONTINUED | OUTPATIENT
Start: 2020-01-20 | End: 2020-01-20

## 2020-01-20 RX ORDER — MEROPENEM 1 G/30ML
500 INJECTION INTRAVENOUS EVERY 12 HOURS
Refills: 0 | Status: DISCONTINUED | OUTPATIENT
Start: 2020-01-20 | End: 2020-01-26

## 2020-01-20 RX ORDER — LEVOTHYROXINE SODIUM 125 MCG
125 TABLET ORAL AT BEDTIME
Refills: 0 | Status: DISCONTINUED | OUTPATIENT
Start: 2020-01-20 | End: 2020-01-20

## 2020-01-20 RX ADMIN — Medication 250 MILLIGRAM(S): at 20:55

## 2020-01-20 RX ADMIN — LACTULOSE 200 GRAM(S): 10 SOLUTION ORAL at 18:57

## 2020-01-20 RX ADMIN — Medication 3.83 MICROGRAM(S)/KG/MIN: at 01:53

## 2020-01-20 RX ADMIN — Medication 3 MILLILITER(S): at 18:14

## 2020-01-20 RX ADMIN — Medication 12.5 MICROGRAM(S): at 22:55

## 2020-01-20 RX ADMIN — MEROPENEM 100 MILLIGRAM(S): 1 INJECTION INTRAVENOUS at 18:13

## 2020-01-20 RX ADMIN — HEPARIN SODIUM 5000 UNIT(S): 5000 INJECTION INTRAVENOUS; SUBCUTANEOUS at 05:33

## 2020-01-20 RX ADMIN — ONDANSETRON 4 MILLIGRAM(S): 8 TABLET, FILM COATED ORAL at 10:14

## 2020-01-20 RX ADMIN — Medication 3 MILLILITER(S): at 06:37

## 2020-01-20 RX ADMIN — MEROPENEM 100 MILLIGRAM(S): 1 INJECTION INTRAVENOUS at 05:26

## 2020-01-20 RX ADMIN — CHLORHEXIDINE GLUCONATE 1 APPLICATION(S): 213 SOLUTION TOPICAL at 05:26

## 2020-01-20 RX ADMIN — AZITHROMYCIN 250 MILLIGRAM(S): 500 TABLET, FILM COATED ORAL at 09:32

## 2020-01-20 RX ADMIN — ONDANSETRON 4 MILLIGRAM(S): 8 TABLET, FILM COATED ORAL at 10:51

## 2020-01-20 NOTE — PROGRESS NOTE ADULT - ATTENDING COMMENTS
89 y/o F w/decompensated cirrhosis (unclear etiology ? cryptogenic vs drug induced) c/b HCC, HFpEF, severe AS, Afib, hx of CLL previously on chemo, presenting with severe sepsis with septic shock likely secondary to PNA and UTI. KARRIE likely secondary to ATN/sepsis vs prerenal.    - Continue lactulose/rifaximin  - Switch pressors to phenylephrine for severe AS  - Midodrine once able to tolerate PO  - Fluid bolus  - Continue broad spectrum abx has previously grown ESBL  - Follow up cultures  - Trend Cr avoid nephrotoxins    Attending critical care time 40 minutes

## 2020-01-20 NOTE — PROGRESS NOTE ADULT - SUBJECTIVE AND OBJECTIVE BOX
Patient is a 89y old  Female who presents with a chief complaint of     24 hour events: ***    REVIEW OF SYSTEMS:  Constitutional: [ ] fevers [ ] chills [ ] weight loss [ ] weight gain  HEENT: [ ] dry eyes [ ] eye irritation [ ] postnasal drip [ ] nasal congestion  CV: [ ] chest pain [ ] orthopnea [ ] palpitations [ ] murmur  Resp: [ ] cough [ ] shortness of breath [ ] dyspnea [ ] wheezing [ ] sputum [ ] hemoptysis  GI: [ ] nausea [ ] vomiting [ ] diarrhea [ ] constipation [ ] abd pain [ ] dysphagia   : [ ] dysuria [ ] nocturia [ ] hematuria [ ] increased urinary frequency  Musculoskeletal: [ ] back pain [ ] myalgias [ ] arthralgias [ ] fracture  Skin: [ ] rash [ ] itch  Neurological: [ ] headache [ ] dizziness [ ] syncope [ ] weakness [ ] numbness  Psychiatric: [ ] anxiety [ ] depression  Endocrine: [ ] diabetes [ ] thyroid problem  Hematologic/Lymphatic: [ ] anemia [ ] bleeding problem  Allergic/Immunologic: [ ] itchy eyes [ ] nasal discharge [ ] hives [ ] angioedema  [ ] All other systems negative  [ ] Unable to assess ROS because ________    OBJECTIVE:  ICU Vital Signs Last 24 Hrs  T(C): 37.7 (2020 07:00), Max: 38.4 (2020 18:42)  T(F): 99.9 (2020 07:00), Max: 101.2 (2020 18:42)  HR: 72 (2020 08:30) (72 - 731)  BP: 104/55 (2020 08:15) (69/51 - 160/65)  BP(mean): 76 (2020 08:15) (51 - 94)  ABP: --  ABP(mean): --  RR: 15 (2020 08:30) (13 - 39)  SpO2: 100% (2020 08:30) (91% - 100%)         @ 07:01  -   @ 07:00  --------------------------------------------------------  IN: 115.2 mL / OUT: 0 mL / NET: 115.2 mL     @ 07:01   @ 08:35  --------------------------------------------------------  IN: 9 mL / OUT: 0 mL / NET: 9 mL      CAPILLARY BLOOD GLUCOSE          PHYSICAL EXAM:  GENERAL: NAD, well-developed  HEAD:  Atraumatic, Normocephalic  EYES: EOMI, PERRLA, conjunctiva and sclera clear  NECK: Supple, No JVD, Thyroid not palpable, non tender, Trachea midline  CHEST/LUNG: ( )ETT in place, ( )Tracheostomy in place, ( )no chest deformity,  ( )  Normal expansion/effort/palpation,  ( )Normal percussion/auscultation,  Clear to auscultation bilaterally; No wheeze  HEART: Regular rate and rhythm; No murmurs, rubs, or gallops, ( ) No JVD, ( )Normal Pulses, ( )Edema   ABDOMEN: Soft, Nontender, Nondistended; Bowel sounds presen, ( ) No Masses, (  ) No organomegaly,  (  ) Non-tender normal BS   : Paredes in Place, Voiding freely  Musculoskeletal/EXTREMITIES:(  ) Normal strength, movement, and tone, (  ) No focal atropy, (  ) Normal ROM, (  ) Normal digits and nails,  2+ Peripheral Pulses, No clubbing, cyanosis, or edema  PSYCH: AAOx3, (  ) Normal mood/affect/judgment/insight, (  ) Intact memory,   NEUROLOGY: non-focal, exam  SKIN: No rashes or lesions      LINES:    HOSPITAL MEDICATIONS:  MEDICATIONS  (STANDING):  albuterol/ipratropium for Nebulization 3 milliLiter(s) Nebulizer every 6 hours  azithromycin   Tablet 500 milliGRAM(s) Oral once  chlorhexidine 4% Liquid 1 Application(s) Topical <User Schedule>  heparin  Injectable 5000 Unit(s) SubCutaneous two times a day  lactulose Syrup 20 Gram(s) Oral four times a day  levothyroxine Injectable 12.5 MICROGram(s) IV Push at bedtime  meropenem  IVPB 500 milliGRAM(s) IV Intermittent every 12 hours  phenylephrine    Infusion 0.131 MICROgram(s)/kG/Min (2 mL/Hr) IV Continuous <Continuous>  rifAXIMin 550 milliGRAM(s) Oral two times a day  vancomycin  IVPB 750 milliGRAM(s) IV Intermittent once  vancomycin  IVPB        MEDICATIONS  (PRN):      LABS:                        11.3   13.52 )-----------( 107      ( 2020 00:24 )             35.8     Hgb Trend: 11.3<--, 12.0<--, 12.0<--, 11.9<--      142  |  115<H>  |  53<H>  ----------------------------<  126<H>  5.1   |  18<L>  |  1.60<H>    Ca    9.6      2020 00:24  Phos  3.2       Mg     1.8         TPro  5.0<L>  /  Alb  2.4<L>  /  TBili  2.5<H>  /  DBili  x   /  AST  33  /  ALT  16  /  AlkPhos  95      Creatinine Trend: 1.60<--, 1.57<--, 1.69<--, 1.24<--, 1.32<--, 1.29<--  PT/INR - ( 2020 00:24 )   PT: 16.7 sec;   INR: 1.45 ratio         PTT - ( 2020 00:24 )  PTT:30.1 sec  Urinalysis Basic - ( 2020 18:46 )    Color: Yellow / Appearance: Slightly Turbid / S.020 / pH: x  Gluc: x / Ketone: Negative  / Bili: Negative / Urobili: Negative   Blood: x / Protein: 30 mg/dL / Nitrite: Positive   Leuk Esterase: Negative / RBC: 1 /hpf / WBC 1 /HPF   Sq Epi: x / Non Sq Epi: 9 /hpf / Bacteria: Many        Venous Blood Gas:   @ 05:45  7.34/40/39/21/66  VBG Lactate: 2.7  Venous Blood Gas:   @ 20:48  7.36/36/32/20/54  VBG Lactate: 2.9  Venous Blood Gas:   @ 18:39  7.38/35/34/20/55  VBG Lactate: 3.6      EKG:    MICROBIOLOGY:     RADIOLOGY:  [ ] Reviewed and interpreted by me    EKG:  MICROBIOLOGY:     Radiology: ***    Bedside lung ultrasound: ***    Bedside ECHO: ***    EKG:    CENTRAL LINE: Y/N          DATE INSERTED:              REMOVE: Y/N    PAREDES: Y/N                        DATE INSERTED:              REMOVE: Y/N    A-LINE: Y/N                       DATE INSERTED:              REMOVE: Y/N    GLOBAL ISSUE/BEST PRACTICE:  Analgesia:  Sedation:  HOB elevation: yes  Stress ulcer prophylaxis:  VTE prophylaxis:  Glycemic control:  Nutrition:    CODE STATUS: ***  [facility] discussion: Y 89 yo female with a PMHx of afib on digoxin (not on AC), severe AS, COPD not on home O2, CLL (previously on Treanda and Rituxan), hypothyroidism, 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 Admitted - for weakness w/ mechanical fall with pre-renal KARRIE and resolution with IVF d/yohana to [facility]. [facility] and nonproductive cough and fever on day of presentation. Per family, patient had a fall out of bed found on the floor this AM. She has had waxing waning mentation over the last week. This typically happens when she has UTI. Of note[facility].  Noted to be confused from bl in ED which much improvement in ICU this am.  Patient at baseline currtently A and O x 2-3  ED vitals: Tmax 101.2 (r), , BP 80/50 with maps <65, RR 22-24, SpO2 99% on 3L    Sig labs: leukocytosis at 12.77 (60% neut), INR 1.44, hypernatremia 146, hyperkalemia 5.5, bicarb 18, BUN 52, Cr 1.69, venous pH 7.38, lactate 3.6. UA post for nitrites and many bacteria CXR w/ RLL pneumonia, CT chest with bb patchy opacities PNA vs asp pneumonitis   In ED patient received : 1.25 L LR, s/p vancomycin and meropenem, stress dose steroids which has been DCed. Patient remained hypotensive after IVH started on levophed. Levophed changed to Amilcar in MICU secondary severe Patient now in MICU for septic shock 2/2 PNA and UTI.         24 hour events:  MICU for Septic Shock secondary to PNA and UTI    REVIEW OF SYSTEMS:  [ ] Unable to assess ROS because od waxing and waning mentation      OBJECTIVE:  ICU Vital Signs Last 24 Hrs  T(C): 37.7 (2020 07:00), Max: 38.4 (2020 18:42)  T(F): 99.9 (2020 07:00), Max: 101.2 (2020 18:42)  HR: 72 (2020 08:30) (72 - 731)  BP: 104/55 (2020 08:15) (69/51 - 160/65)  BP(mean): 76 (2020 08:15) (51 - 94)  ABP: --  ABP(mean): --  RR: 15 (2020 08:30) (13 - 39)  SpO2: 100% (2020 08:30) (91% - 100%)         @ 07: @ 07:00  --------------------------------------------------------  IN: 115.2 mL / OUT: 0 mL / NET: 115.2 mL     @ 07: @ 08:35  --------------------------------------------------------  IN: 9 mL / OUT: 0 mL / NET: 9 mL    PHYSICAL EXAM:    	General: elderly, cachetic female in NAD respiring on 3L NC  	HEENT: NCAT.  PERRL.  EOMI.  No scleral icterus or injection.  Moist DRY. No oropharyngeal exudates.    	Neck: Supple.  Full ROM.  No JVD.  No thyromegaly. No lymphadenopathy.   	Heart: afib rhythm, grade 4 asc-dec murmur throughout the precordium   	Lungs: course breath sounds bl throughout the lung fields, slight crackles RLL  	Abdomen: BS+, soft, NT/ND.  No organomegaly.  	Skin: skin xerosis of skin, sig purpura upper and lower ext  	Extremities: 1+ bl edema   Neuro: A&Ox 2-3, waxing waning mentation, easily rouseable, follows commands, ids family    SKIN: No rashes or lesions      LINES: \[facility]\""    HOSPITAL MEDICATIONS:  MEDICATIONS  (STANDING):  albuterol/ipratropium for Nebulization 3 milliLiter(s) Nebulizer every 6 hours  azithromycin   Tablet 500 milliGRAM(s) Oral once  chlorhexidine 4% Liquid 1 Application(s) Topical <User Schedule>  heparin  Injectable 5000 Unit(s) SubCutaneous two times a day  lactulose Syrup 20 Gram(s) Oral four times a day  levothyroxine Injectable 12.5 MICROGram(s) IV Push at bedtime  meropenem  IVPB 500 milliGRAM(s) IV Intermittent every 12 hours  phenylephrine    Infusion 0.131 MICROgram(s)/kG/Min (2 mL/Hr) IV Continuous <Continuous>  rifAXIMin 550 milliGRAM(s) Oral two times a day  vancomycin  IVPB 750 milliGRAM(s) IV Intermittent once  vancomycin  IVPB        MEDICATIONS  (PRN):      LABS:                        11.3   13.52 )-----------( 107      ( 2020 00:24 )             35.8     Hgb Trend: 11.3<--, 12.0<--, 12.0<--, 11.9<--  01-20    142  |  115<H>  |  53<H>  ----------------------------<  126<H>  5.1   |  18<L>  |  1.60<H>    Ca    9.6      2020 00:24  Phos  3.2     -20  Mg     1.8     -20    TPro  5.0<L>  /  Alb  2.4<L>  /  TBili  2.5<H>  /  DBili  x   /  AST  33  /  ALT  16  /  AlkPhos  95  -20    Creatinine Trend: 1.60<--, 1.57<--, 1.69<--, 1.24<--, 1.32<--, 1.29<--  PT/INR - ( 2020 00:24 )   PT: 16.7 sec;   INR: 1.45 ratio         PTT - ( 2020 00:24 )  PTT:30.1 sec  Urinalysis Basic - ( 2020 18:46 )    Color: Yellow / Appearance: Slightly Turbid / S.020 / pH: x  Gluc: x / Ketone: Negative  / Bili: Negative / Urobili: Negative   Blood: x / Protein: 30 mg/dL / Nitrite: Positive   Leuk Esterase: Negative / RBC: 1 /hpf / WBC 1 /HPF   Sq Epi: x / Non Sq Epi: 9 /hpf / Bacteria: Many        Venous Blood Gas:   @ 05:45  7.34/40/39/21/66  VBG Lactate: 2.7  Venous Blood Gas:   @ 20:48  7.36/36/32/20/54  VBG Lactate: 2.9  Venous Blood Gas:   @ 18:39  7.38/35/34/20/55  VBG Lactate: 3.6      GLOBAL ISSUE/BEST PRACTICE:  HOB elevation: yes  Stress ulcer prophylaxis:  VTE prophylaxis: Heparin SQ  Nutrition: Dysphagia 3 diet with thickened Liquids     CODE STATUS: ***  GO discussion: Y, Patient is a DNR/DNI 89 yo female with a PMHx of afib on digoxin (not on AC), severe AS, COPD not on home O2, CLL (previously on Treanda and Rituxan), hypothyroidism, 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 Admitted - for weakness w/ mechanical fall with pre-renal KARRIE and resolution with IVF d/[facility]ana to [facility]. [facility] and nonproductive cough and fever on day of presentation. Per family, patient had a fall out of bed found on the floor this AM. She has had waxing waning mentation over the last week. This typically happens when she has UTI. Of note[facility]  Noted to be confused from bl in ED which much improvement in ICU this am.  Patient at baseline currtently A and O x 2-3  ED vitals: Tmax 101.2 (r), , BP 80/50 with maps <65, RR 22-24, SpO2 99% on 3L    Sig labs: leukocytosis at 12.77 (60% neut), INR 1.44, hypernatremia 146, hyperkalemia 5.5, bicarb 18, BUN 52, Cr 1.69, venous pH 7.38, lactate 3.6. UA post for nitrites and many bacteria CXR w/ RLL pneumonia, CT chest with bb patchy opacities PNA vs asp pneumonitis   In ED patient received : 1.25 L LR, s/p vancomycin and meropenem, stress dose steroids which has been DCed. Patient remained hypotensive after IVH started on levophed. Levophed changed to Amilcar in MICU secondary severe Patient now in MICU for septic shock 2/2 PNA and UTI.         24 hour events:  MICU for Septic Shock secondary to PNA and UTI    REVIEW OF SYSTEMS:  Unable to assess ROS because od waxing and waning mentation      OBJECTIVE:  ICU Vital Signs Last 24 Hrs  T(C): 37.7 (2020 07:00), Max: 38.4 (2020 18:42)  T(F): 99.9 (2020 07:00), Max: 101.2 (2020 18:42)  HR: 72 (2020 08:30) (72 - 731)  BP: 104/55 (2020 08:15) (69/51 - 160/65)  BP(mean): 76 (2020 08:15) (51 - 94)  ABP: --  ABP(mean): --  RR: 15 (2020 08:30) (13 - 39)  SpO2: 100% (2020 08:30) (91% - 100%)         @ 07: @ 07:00  --------------------------------------------------------  IN: 115.2 mL / OUT: 0 mL / NET: 115.2 mL     @ 07: @ 08:35  --------------------------------------------------------  IN: 9 mL / OUT: 0 mL / NET: 9 mL    PHYSICAL EXAM:    	General: elderly, cachetic female in NAD respiring on 3L NC  	HEENT: NCAT.  PERRL.  EOMI.  No scleral icterus or injection.  Moist DRY. No oropharyngeal exudates.    	Neck: Supple.  Full ROM.  No JVD.  No thyromegaly. No lymphadenopathy.   	Heart: afib rhythm, grade 4 asc-dec murmur throughout the precordium   	Lungs: course breath sounds bl throughout the lung fields, slight crackles RLL  	Abdomen: BS+, soft, NT/ND.  No organomegaly.  	Skin: skin xerosis of skin, sig purpura upper and lower ext  	Extremities: 1+ bl edema   Neuro: A&Ox 2-3, waxing waning mentation, easily rouseable, follows commands, ids family    SKIN: No rashes or lesions      LINES: hospitals    HOSPITAL MEDICATIONS:  MEDICATIONS  (STANDING):  albuterol/ipratropium for Nebulization 3 milliLiter(s) Nebulizer every 6 hours  azithromycin   Tablet 500 milliGRAM(s) Oral once  chlorhexidine 4% Liquid 1 Application(s) Topical <User Schedule>  heparin  Injectable 5000 Unit(s) SubCutaneous two times a day  lactulose Syrup 20 Gram(s) Oral four times a day  levothyroxine Injectable 12.5 MICROGram(s) IV Push at bedtime  meropenem  IVPB 500 milliGRAM(s) IV Intermittent every 12 hours  phenylephrine    Infusion 0.131 MICROgram(s)/kG/Min (2 mL/Hr) IV Continuous <Continuous>  rifAXIMin 550 milliGRAM(s) Oral two times a day  vancomycin  IVPB 750 milliGRAM(s) IV Intermittent once  vancomycin  IVPB        MEDICATIONS  (PRN):      LABS:                        11.3   13.52 )-----------( 107      ( 2020 00:24 )             35.8     Hgb Trend: 11.3<--, 12.0<--, 12.0<--, 11.9<--  01-20    142  |  115<H>  |  53<H>  ----------------------------<  126<H>  5.1   |  18<L>  |  1.60<H>    Ca    9.6      2020 00:24  Phos  3.2     -20  Mg     1.8     20    TPro  5.0<L>  /  Alb  2.4<L>  /  TBili  2.5<H>  /  DBili  x   /  AST  33  /  ALT  16  /  AlkPhos  95  -20    Creatinine Trend: 1.60<--, 1.57<--, 1.69<--, 1.24<--, 1.32<--, 1.29<--  PT/INR - ( 2020 00:24 )   PT: 16.7 sec;   INR: 1.45 ratio         PTT - ( 2020 00:24 )  PTT:30.1 sec  Urinalysis Basic - ( 2020 18:46 )    Color: Yellow / Appearance: Slightly Turbid / S.020 / pH: x  Gluc: x / Ketone: Negative  / Bili: Negative / Urobili: Negative   Blood: x / Protein: 30 mg/dL / Nitrite: Positive   Leuk Esterase: Negative / RBC: 1 /hpf / WBC 1 /HPF   Sq Epi: x / Non Sq Epi: 9 /hpf / Bacteria: Many        Venous Blood Gas:   @ 05:45  7.34/40/39/21/66  VBG Lactate: 2.7  Venous Blood Gas:   @ 20:48  7.36/36/32/20/54  VBG Lactate: 2.9  Venous Blood Gas:   @ 18:39  7.38/35/34//55  VBG Lactate: 3.6      GLOBAL ISSUE/BEST PRACTICE:  HOB elevation: yes  Stress ulcer prophylaxis:  VTE prophylaxis: Heparin SQ  Nutrition: Dysphagia 3 diet with thickened Liquids     CODE STATUS: ***  GOC discussion: Y, Patient is a DNR/DNI

## 2020-01-20 NOTE — PROGRESS NOTE ADULT - ASSESSMENT
Assessment and Plan:    Assessment:  · Assessment	  89 yo female with a PMHx of afib on digoxin (not on AC), severe AS, COPD, CLL (previously on Treanda and Rituxan), hypothyroidism, 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 portal htn, c/b esophageal varices s/p banding, CKD 3bl Cr 1.2, PVD, and recurrent UTI w/ hx of ESBL Klebsiella presenting with difficulty breathing, cough, and AMS, admitted to MICU for septic shock, AHRF, and karrie/atn 2/2 PNA, with UTI    #neuro:  Metabolic encephalopathy   - waxing waning mentation w/in last week likely in the setting of sepsis  - patient presented with confusion, now improving A&O x 2-3  - possible component of underlying hepatic encephalopathy   - CT head neg for acute intracranial process   - c/w lactulose, and rifaximin     #cardiovascular:  Septic shock 2/2 PNA, UTI  - hotn in ED refractory to fluid hydration   - lactate 3.6 on presentation  - s/p 1.25 L LR, hydrocortisone 50mg x1   - now on levophed   - hold further stress dose steroids   - f/u AM cortisol   - wean off of pressors   - goal map >65   Afib  - on digoxin 125mcg every other day   - no AC due to inc bleeding risk from varices   - f/u dig level, redose by level   Severe AS:  - monitor fluid status  - limit IVH     #pulm:  AHRF 2/2 likely aspiration PNA in the setting of altered mentation   - presenting with SOB, cough, and fever Tmax 101.2 in ED with leukocytosis  - requiring 3L of O2, not on home O2  - CXR w/ RLL opacity   - CT chest w/ bb patchy opacities PNA vs asp pneumonitis   - f/u RVP  - c/w empiric abx as below   COPD  - duonebs q6 hrs   - bed percussion with duonebs    #renal:  KARRIE on CKD 3  - Cr elevated 1.69 (bl 1.2)  - likely pre-renal vs ATN in the setting of septic shock; also in setting of UTI  - avoid nephrotoxins  - renally dose medications   UTI  -hx of frequent UTIs   -UA post for many bacteria, nitrites   -UCx drawn on 1/11 for alt mentation growing ESBL Klebsiella, Sn to meropenem   -c/w meropenem     #infectious disease:  - presenting with tmax 101.2, leukocytosis 12.77, lactate 3.6 in the setting of SOB and cough   - found to have likely asp PNA and UTI  - c/w meropenem for hx of ESBL klebsiella UTI   - c/w epimeric vancomycin 750 mg q24   - start azithromycin   - f/u BCx  - f/u UCx  - f/u RVP  - f/u urine legionella   - CTM fever curve    #GI:  Cirrhosis 2/2 HCC, c/b portal HTN and eso varices   - asterixes on exam   - on propanolol 10 mg BID at home  - ammonia level 26   - hold bb in the setting of septic shock  - c/w rifaximin 550 mg bid  - c/w lactulose 30 cc QID     #endo:  Hypothyroidism  - c/w levothyroxine 25 mcg    #heme:  DVT ppx   - hep 5000 BID     #GOC:  - DNR/DNI  - molst form signed recent hospitalization  - Daughter Ephraim is 1st HCP Assessment and Plan:     89 yo female with a PMHx of afib on digoxin (not on AC), severe AS, COPD, CLL (previously on Treanda and Rituxan), hypothyroidism, 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 portal htn, c/b esophageal varices s/p banding, CKD 3bl Cr 1.2, PVD, and recurrent UTI w/ hx of ESBL Klebsiella presenting with difficulty breathing, cough, and AMS, admitted to MICU for septic shock, AHRF, and karrie/atn 2/2 PNA, with UTI    #neuro:  Metabolic encephalopathy   - waxing waning mentation w/in last week likely in the setting of sepsis  - patient presented with confusion, now improving A&O x 2-3  - possible component of underlying hepatic encephalopathy   - CT head neg for acute intracranial process   - c/w lactulose, and rifaximin     #cardiovascular:  Septic shock 2/2 PNA, UTI  - hotn in ED refractory to fluid hydration   - lactate 3.6 on presentation  - s/p 1.25 L LR, hydrocortisone 50mg x1   -Changed on levophed to Amilcar secondary to severe AS  - Started Midodrine 10 q 8hrs   - hold further stress dose steroids   - f/u AM cortisol   - wean off of pressors   - goal map >65   Afib  - on digoxin 125mcg every other day   - no AC due to inc bleeding risk from varices   - f/u dig level, redose by level   Severe AS:  - monitor fluid status  - limit IVH     #pulm:  AHRF 2/2 likely aspiration PNA in the setting of altered mentation   - presenting with SOB, cough, and fever Tmax 101.2 in ED with leukocytosis  - requiring 3L of O2, not on home O2  - CXR w/ RLL opacity   - CT chest w/ bb patchy opacities PNA vs asp pneumonitis   - f/u RVP  - c/w empiric abx as below   COPD  - duonebs q6 hrs   - bed percussion with duonebs    #renal:  KARRIE on CKD 3  - Cr elevated 1.69 (bl 1.2)  - likely pre-renal vs ATN in the setting of septic shock; also in setting of UTI  - avoid nephrotoxins  - renally dose medications   UTI  -hx of frequent UTIs   -UA post for many bacteria, nitrites   -UCx drawn on 1/11 for alt mentation growing ESBL Klebsiella, Sn to meropenem   -c/w meropenem     #infectious disease:  - presenting with tmax 101.2, leukocytosis 12.77, lactate 3.6 in the setting of SOB and cough   - found to have likely asp PNA and UTI  - c/w meropenem for hx of ESBL klebsiella UTI   - c/w epimeric vancomycin 750 mg q24   - start azithromycin   - f/u BCx  - f/u UCx  - f/u RVP  - f/u urine legionella   - CTM fever curve    #GI:  Cirrhosis 2/2 HCC, c/b portal HTN and eso varices   - asterixes on exam   - on propanolol 10 mg BID at home  - ammonia level 26   - hold bb in the setting of septic shock  - c/w rifaximin 550 mg bid  - c/w lactulose 30 cc QID     #endo:  Hypothyroidism  - c/w levothyroxine 25 mcg    #heme:  DVT ppx   - hep 5000 BID     #GOC:  - DNR/DNI  - molst form signed recent hospitalization  - Daughter Ephraim is 1st HCP

## 2020-01-21 LAB
-  AMIKACIN: SIGNIFICANT CHANGE UP
-  AMPICILLIN/SULBACTAM: SIGNIFICANT CHANGE UP
-  AMPICILLIN: SIGNIFICANT CHANGE UP
-  AZTREONAM: SIGNIFICANT CHANGE UP
-  CEFAZOLIN: SIGNIFICANT CHANGE UP
-  CEFEPIME: SIGNIFICANT CHANGE UP
-  CEFOXITIN: SIGNIFICANT CHANGE UP
-  CEFTRIAXONE: SIGNIFICANT CHANGE UP
-  CIPROFLOXACIN: SIGNIFICANT CHANGE UP
-  ERTAPENEM: SIGNIFICANT CHANGE UP
-  GENTAMICIN: SIGNIFICANT CHANGE UP
-  IMIPENEM: SIGNIFICANT CHANGE UP
-  LEVOFLOXACIN: SIGNIFICANT CHANGE UP
-  MEROPENEM: SIGNIFICANT CHANGE UP
-  NITROFURANTOIN: SIGNIFICANT CHANGE UP
-  PIPERACILLIN/TAZOBACTAM: SIGNIFICANT CHANGE UP
-  TIGECYCLINE: SIGNIFICANT CHANGE UP
-  TOBRAMYCIN: SIGNIFICANT CHANGE UP
-  TRIMETHOPRIM/SULFAMETHOXAZOLE: SIGNIFICANT CHANGE UP
ALBUMIN SERPL ELPH-MCNC: 2.3 G/DL — LOW (ref 3.3–5)
ALP SERPL-CCNC: 83 U/L — SIGNIFICANT CHANGE UP (ref 40–120)
ALT FLD-CCNC: 17 U/L — SIGNIFICANT CHANGE UP (ref 10–45)
ANION GAP SERPL CALC-SCNC: 9 MMOL/L — SIGNIFICANT CHANGE UP (ref 5–17)
APTT BLD: 31.2 SEC — SIGNIFICANT CHANGE UP (ref 27.5–36.3)
AST SERPL-CCNC: 37 U/L — SIGNIFICANT CHANGE UP (ref 10–40)
BASE EXCESS BLDV CALC-SCNC: -4.5 MMOL/L — LOW (ref -2–2)
BILIRUB SERPL-MCNC: 1.8 MG/DL — HIGH (ref 0.2–1.2)
BUN SERPL-MCNC: 54 MG/DL — HIGH (ref 7–23)
CA-I SERPL-SCNC: 1.43 MMOL/L — HIGH (ref 1.12–1.3)
CALCIUM SERPL-MCNC: 9.4 MG/DL — SIGNIFICANT CHANGE UP (ref 8.4–10.5)
CHLORIDE BLDV-SCNC: 118 MMOL/L — HIGH (ref 96–108)
CHLORIDE SERPL-SCNC: 119 MMOL/L — HIGH (ref 96–108)
CO2 BLDV-SCNC: 22 MMOL/L — SIGNIFICANT CHANGE UP (ref 22–30)
CO2 SERPL-SCNC: 19 MMOL/L — LOW (ref 22–31)
CREAT SERPL-MCNC: 1.49 MG/DL — HIGH (ref 0.5–1.3)
CULTURE RESULTS: SIGNIFICANT CHANGE UP
GAS PNL BLDV: 146 MMOL/L — HIGH (ref 135–145)
GAS PNL BLDV: SIGNIFICANT CHANGE UP
GLUCOSE BLDC GLUCOMTR-MCNC: 100 MG/DL — HIGH (ref 70–99)
GLUCOSE BLDC GLUCOMTR-MCNC: 96 MG/DL — SIGNIFICANT CHANGE UP (ref 70–99)
GLUCOSE BLDV-MCNC: 83 MG/DL — SIGNIFICANT CHANGE UP (ref 70–99)
GLUCOSE SERPL-MCNC: 88 MG/DL — SIGNIFICANT CHANGE UP (ref 70–99)
GRAM STN FLD: SIGNIFICANT CHANGE UP
GRAM STN FLD: SIGNIFICANT CHANGE UP
HCO3 BLDV-SCNC: 21 MMOL/L — SIGNIFICANT CHANGE UP (ref 21–29)
HCT VFR BLD CALC: 32.8 % — LOW (ref 34.5–45)
HCT VFR BLDA CALC: 34 % — LOW (ref 39–50)
HGB BLD CALC-MCNC: 11.2 G/DL — LOW (ref 11.5–15.5)
HGB BLD-MCNC: 10.8 G/DL — LOW (ref 11.5–15.5)
HOROWITZ INDEX BLDV+IHG-RTO: 21 — SIGNIFICANT CHANGE UP
INR BLD: 1.5 RATIO — HIGH (ref 0.88–1.16)
LACTATE BLDV-MCNC: 2.3 MMOL/L — HIGH (ref 0.7–2)
MAGNESIUM SERPL-MCNC: 1.8 MG/DL — SIGNIFICANT CHANGE UP (ref 1.6–2.6)
MCHC RBC-ENTMCNC: 32.9 GM/DL — SIGNIFICANT CHANGE UP (ref 32–36)
MCHC RBC-ENTMCNC: 33.6 PG — SIGNIFICANT CHANGE UP (ref 27–34)
MCV RBC AUTO: 102.2 FL — HIGH (ref 80–100)
METHOD TYPE: SIGNIFICANT CHANGE UP
NRBC # BLD: 0 /100 WBCS — SIGNIFICANT CHANGE UP (ref 0–0)
ORGANISM # SPEC MICROSCOPIC CNT: SIGNIFICANT CHANGE UP
ORGANISM # SPEC MICROSCOPIC CNT: SIGNIFICANT CHANGE UP
OTHER CELLS CSF MANUAL: 11 ML/DL — LOW (ref 18–22)
PCO2 BLDV: 41 MMHG — SIGNIFICANT CHANGE UP (ref 35–50)
PH BLDV: 7.32 — LOW (ref 7.35–7.45)
PHOSPHATE SERPL-MCNC: 3 MG/DL — SIGNIFICANT CHANGE UP (ref 2.5–4.5)
PLATELET # BLD AUTO: 93 K/UL — LOW (ref 150–400)
PO2 BLDV: 43 MMHG — SIGNIFICANT CHANGE UP (ref 25–45)
POTASSIUM BLDV-SCNC: 4.6 MMOL/L — SIGNIFICANT CHANGE UP (ref 3.5–5.3)
POTASSIUM SERPL-MCNC: 4.8 MMOL/L — SIGNIFICANT CHANGE UP (ref 3.5–5.3)
POTASSIUM SERPL-SCNC: 4.8 MMOL/L — SIGNIFICANT CHANGE UP (ref 3.5–5.3)
PROT SERPL-MCNC: 4.7 G/DL — LOW (ref 6–8.3)
PROTHROM AB SERPL-ACNC: 17.5 SEC — HIGH (ref 10–12.9)
RBC # BLD: 3.21 M/UL — LOW (ref 3.8–5.2)
RBC # FLD: 15.7 % — HIGH (ref 10.3–14.5)
SAO2 % BLDV: 70 % — SIGNIFICANT CHANGE UP (ref 67–88)
SODIUM SERPL-SCNC: 147 MMOL/L — HIGH (ref 135–145)
SPECIMEN SOURCE: SIGNIFICANT CHANGE UP
WBC # BLD: 11.41 K/UL — HIGH (ref 3.8–10.5)
WBC # FLD AUTO: 11.41 K/UL — HIGH (ref 3.8–10.5)

## 2020-01-21 PROCEDURE — 76604 US EXAM CHEST: CPT | Mod: 26,GC

## 2020-01-21 PROCEDURE — 99291 CRITICAL CARE FIRST HOUR: CPT | Mod: 25

## 2020-01-21 PROCEDURE — 99223 1ST HOSP IP/OBS HIGH 75: CPT

## 2020-01-21 PROCEDURE — 93010 ELECTROCARDIOGRAM REPORT: CPT

## 2020-01-21 RX ORDER — SODIUM CHLORIDE 9 MG/ML
1000 INJECTION, SOLUTION INTRAVENOUS
Refills: 0 | Status: DISCONTINUED | OUTPATIENT
Start: 2020-01-21 | End: 2020-01-22

## 2020-01-21 RX ORDER — MIDODRINE HYDROCHLORIDE 2.5 MG/1
20 TABLET ORAL EVERY 8 HOURS
Refills: 0 | Status: DISCONTINUED | OUTPATIENT
Start: 2020-01-21 | End: 2020-01-21

## 2020-01-21 RX ADMIN — HEPARIN SODIUM 5000 UNIT(S): 5000 INJECTION INTRAVENOUS; SUBCUTANEOUS at 18:37

## 2020-01-21 RX ADMIN — Medication 12.5 MICROGRAM(S): at 21:35

## 2020-01-21 RX ADMIN — MEROPENEM 100 MILLIGRAM(S): 1 INJECTION INTRAVENOUS at 05:38

## 2020-01-21 RX ADMIN — LACTULOSE 200 GRAM(S): 10 SOLUTION ORAL at 18:37

## 2020-01-21 RX ADMIN — Medication 3 MILLILITER(S): at 18:37

## 2020-01-21 RX ADMIN — CHLORHEXIDINE GLUCONATE 1 APPLICATION(S): 213 SOLUTION TOPICAL at 05:39

## 2020-01-21 RX ADMIN — Medication 3 MILLILITER(S): at 00:03

## 2020-01-21 RX ADMIN — MEROPENEM 100 MILLIGRAM(S): 1 INJECTION INTRAVENOUS at 18:33

## 2020-01-21 RX ADMIN — Medication 3 MILLILITER(S): at 12:27

## 2020-01-21 NOTE — PHYSICAL THERAPY INITIAL EVALUATION ADULT - ADDITIONAL COMMENTS
Pt lives with her daughter in a split level house with 7 steps and the 6 steps inside, +HR. Pt has a HHA from 9AM to 6PM for 7 days. Pt required assistance for ADLs and functional mobility PTA. Pt used a RW for ambulation PTA.

## 2020-01-21 NOTE — DIETITIAN INITIAL EVALUATION ADULT. - NUTRITIONGOAL OUTCOME1
Pt to tolerate PO intake >80% of estimated nutrition needs Pt to tolerate >80% of estimated nutrition needs

## 2020-01-21 NOTE — CONSULT NOTE ADULT - SUBJECTIVE AND OBJECTIVE BOX
Reason for Consultation: SOB  Chief Complaint: SOB  Date of Admission: 1/19/20    HPI:  89 yo female with a PMHx of afib on digoxin (not on AC), severe AS, COPD not on home O2, CLL (previously on Treanda and Rituxan), hypothyroidism, 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 Admitted 1/6-1/9 for weakness w/ mechanical fall with pre-renal KARRIE and resolution with IVF d/yohana to [facility]. [facility] with difficulty breathing and nonproductive cough and fever on day of presentation. Per family, patient had a fall out of bed found on the floor this AM. She has had waxing waning mentation over the last week. This typically happens when she has UTI. Of note, [facility].  Noted to be confused from  in ED.     ED vitals: Tmax 101.2 (r), , BP 80/50 with maps <65, RR 22-24, SpO2 99% on 3L      Sig labs: leukocytosis at 12.77 (60% neut), INR 1.44, hypernatremia 146, hyperkalemia 5.5, bicarb 18, BUN 52, Cr 1.69, venous pH 7.38, lactate 3.6. UA post for nitrites and many bacteria CXR w/ RLL pneumonia, CT chest with bb patchy opacities PNA vs asp pneumonitis     ED course: s/p 1.25 L LR, s/p vancomycin and meropenem x 1, stress dose steroids. Patient remained hypotensive after IVH started on levophed. Admitted to MICU for septic shock 2/2 PNA and UTI. 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 1000 milliLiter(s) IV Continuous <Continuous>  heparin  Injectable 5000 Unit(s) SubCutaneous two times a day  lactulose Retention Enema 200 Gram(s) Rectal every 12 hours  levothyroxine Injectable 12.5 MICROGram(s) IV Push at bedtime  meropenem  IVPB 500 milliGRAM(s) IV Intermittent every 12 hours  midodrine 10 milliGRAM(s) Oral every 8 hours  phenylephrine    Infusion 0.131 MICROgram(s)/kG/Min IV Continuous <Continuous>  rifAXIMin 550 milliGRAM(s) Oral two times a day    Allergies:  adhesives (Other)  codeine (Rash)  erythromycin (Rash)  iv dye (Rash; Anaphylaxis; Short breath)  penicillin (Rash)  shellfish (Rash)  warfarin (Other)    FAMILY HISTORY:  FH: Alzheimers disease    Social History:  Smoking History: Denied  Alcohol Use: Denied   Drug Use: Denied    Review of Systems:  Constitutional: [ ] Fever [ ] Chills [ ] Fatigue [ ] Weight change   HEENT: [ ] Blurred vision [ ] Eye Pain [ ] Headache [ ] Runny nose [ ] Sore Throat   Respiratory: [ ] Cough [ ] Wheezing [ ] Shortness of breath  Cardiovascular: [ ] Chest Pain [ ] Palpitations [ ] LYNNE [ ] PND [ ] Orthopnea  Gastrointestinal: [ ] Abdominal Pain [ ] Diarrhea [ ] Constipation [ ] Hemorrhoids [ ] Nausea [ ] Vomiting  Genitourinary: [ ] Nocturia [ ] Dysuria [ ] Incontinence  Extremities: [ ] Swelling [ ] Joint Pain  Neurologic: [ ] Focal deficit [ ] Paresthesias [ ] Syncope  Lymphatic: [ ] Swelling [ ] Lymphadenopathy   Skin: [ ] Rash [ ] Ecchymoses [ ] Wounds [ ] Lesions  Psychiatry: [ ] Depression [ ] Suicidal/Homicidal Ideation [ ] Anxiety [ ] Sleep Disturbances  [x] 10 point review of systems is otherwise negative except as mentioned above              [ ] Unable to obtain    Vital Signs Last 24 Hrs  T(C): 36.2 (21 Jan 2020 07:15), Max: 37 (21 Jan 2020 04:00)  T(F): 97.1 (21 Jan 2020 07:15), Max: 98.6 (21 Jan 2020 04:00)  HR: 88 (21 Jan 2020 10:30) (67 - 95)  BP: 106/48 (21 Jan 2020 10:30) (74/50 - 234/123)  BP(mean): 69 (21 Jan 2020 10:30) (52 - 161)  RR: 15 (21 Jan 2020 10:30) (12 - 49)  SpO2: 100% (21 Jan 2020 10:30) (84% - 100%)    I&O's Summary    20 Jan 2020 07:01  -  21 Jan 2020 07:00  --------------------------------------------------------  IN: 990.4 mL / OUT: 450 mL / NET: 540.4 mL    21 Jan 2020 07:01  -  21 Jan 2020 11:01  --------------------------------------------------------  IN: 123 mL / OUT: 0 mL / NET: 123 mL    Physical Exam:  General: No distress. Comfortable  HEENT: EOMI	  Neck: JVP not elevated  Chest: Clear to auscultation bilaterally  CV: RRR. Normal S1 and S2. No murmurs, rubs, or gallops. No edema.  Abdomen: Soft, non-distended, non-tender  Skin: No rashes, ecchymoses, or skin breakdown  Extremities: Warm.  Neuro: Alert and oriented x 3. No focal deficits.  Psych: Mood and affect appropriate    Labs:               10.8   11.41 )-----------( 93       ( 21 Jan 2020 00:40 )             32.8     01-21    147<H>  |  119<H>  |  54<H>  ----------------------------<  88  4.8   |  19<L>  |  1.49<H>    Ca    9.4      21 Jan 2020 00:40  Phos  3.0     01-21  Mg     1.8     01-21    TPro  4.7<L>  /  Alb  2.3<L>  /  TBili  1.8<H>  /  DBili  x   /  AST  37  /  ALT  17  /  AlkPhos  83  01-21    PT/INR - ( 21 Jan 2020 00:40 )   PT: 17.5 sec;   INR: 1.50 ratio    PTT - ( 21 Jan 2020 00:40 )  PTT:31.2 sec    Serum Pro-Brain Natriuretic Peptide: 9585 pg/mL (09-10 @ 06:58)  Thyroid Stimulating Hormone, Serum: 1.54 uIU/mL (01-07 @ 15:31)    CARDIOVASCULAR DIAGNOSTIC TESTING:  [ ] Echocardiogram:  < from: Transthoracic Echocardiogram (12.20.18 @ 09:55) >  Conclusions:  1. Mitral annular calcification. Thickened mitral valve  leaflets with mildly decreased opening.  Severe mitral  regurgitation. Grossly at least mild-moderate mitral  stenosis.  2. Calcified aortic valve with decreased opening. Peak  transaortic valve gradient equals 88 mm Hg, mean  transaortic valve gradient equals 53 mm Hg, estimated  aortic valve area equals 0.7 sqcm (by continuity equation),  aortic valve velocity time integral equals 123 cm,  consistent with severe to critical aortic stenosis. Severe,  eccentric aortic regurgitation.  3. Severely dilated left atrium.  LA volume index = 88  cc/m2.  4. Prominent basal septum, otherwise normal left  ventricular internal dimensions and wall thickness.  5. Normal left ventricular systolic function. No obvious  segmental wall motion abnormalities.  6. Increased E/e'  is consistent with elevated left  ventricular filling pressure.  7. Severe right atrial enlargement.  8. Right ventricular enlargement with decreased right  ventricular systolic function.  9. Mild tricuspid regurgitation.  *** Compared with echocardiogram of 10/22/2018, no  significant changes noted. Reason for Consultation: SOB  Chief Complaint: SOB  Date of Admission: 1/19/20    HPI:  89 yo female with a PMHx of afib on digoxin (not on AC), severe AS, COPD not on home O2, CLL (previously on Treanda and Rituxan), hypothyroidism, 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    She presented to the ED with difficulty breathing and nonproductive cough and fever on day of presentation, and she was found to have septic shock 2/2 serratia bacteremia thought to be from urosepsis vs. PNA. Remained hypotensive despite IVF and started on levophed in MICU.   States that her shortness of breath has now resolved. Denies any chest pain, palpitations, orthopnea, LYNNE, PND, abdominal pain, sensory/motor deficit or headache. Endorses mild pedal edema, improving since last month when spironolactone was increased. Of note, patient has had 6 falls in past year. At baseline can walk with walker across room before feeling short of breath.     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 1000 milliLiter(s) IV Continuous <Continuous>  heparin  Injectable 5000 Unit(s) SubCutaneous two times a day  lactulose Retention Enema 200 Gram(s) Rectal every 12 hours  levothyroxine Injectable 12.5 MICROGram(s) IV Push at bedtime  meropenem  IVPB 500 milliGRAM(s) IV Intermittent every 12 hours  midodrine 10 milliGRAM(s) Oral every 8 hours  phenylephrine    Infusion 0.131 MICROgram(s)/kG/Min IV Continuous <Continuous>  rifAXIMin 550 milliGRAM(s) Oral two times a day    Allergies:  adhesives (Other)  codeine (Rash)  erythromycin (Rash)  iv dye (Rash; Anaphylaxis; Short breath)  penicillin (Rash)  shellfish (Rash)  warfarin (Other)    FAMILY HISTORY:  FH: Alzheimer's disease    Social History:  Smoking History: Denied  Alcohol Use: Denied   Drug Use: Denied    Review of Systems:  Constitutional: [ ] Fever [ ] Chills [ ] Fatigue [ ] Weight change   HEENT: [ ] Blurred vision [ ] Eye Pain [ ] Headache [ ] Runny nose [ ] Sore Throat   Respiratory: [ ] Cough [ ] Wheezing [ ] Shortness of breath  Cardiovascular: [ ] Chest Pain [ ] Palpitations [ ] LYNNE [ ] PND [ ] Orthopnea  Gastrointestinal: [ ] Abdominal Pain [ ] Diarrhea [ ] Constipation [ ] Hemorrhoids [ ] Nausea [ ] Vomiting  Genitourinary: [ ] Nocturia [ ] Dysuria [ ] Incontinence  Extremities: [ ] Swelling [ ] Joint Pain  Neurologic: [ ] Focal deficit [ ] Paresthesias [ ] Syncope  Lymphatic: [ ] Swelling [ ] Lymphadenopathy   Skin: [ ] Rash [ ] Ecchymoses [ ] Wounds [ ] Lesions  Psychiatry: [ ] Depression [ ] Suicidal/Homicidal Ideation [ ] Anxiety [ ] Sleep Disturbances  [x] 10 point review of systems is otherwise negative except as mentioned above              [ ] Unable to obtain    Vital Signs Last 24 Hrs  T(C): 36.2 (21 Jan 2020 07:15), Max: 37 (21 Jan 2020 04:00)  T(F): 97.1 (21 Jan 2020 07:15), Max: 98.6 (21 Jan 2020 04:00)  HR: 88 (21 Jan 2020 10:30) (67 - 95)  BP: 106/48 (21 Jan 2020 10:30) (74/50 - 234/123)  BP(mean): 69 (21 Jan 2020 10:30) (52 - 161)  RR: 15 (21 Jan 2020 10:30) (12 - 49)  SpO2: 100% (21 Jan 2020 10:30) (84% - 100%)    I&O's Summary    20 Jan 2020 07:01  -  21 Jan 2020 07:00  --------------------------------------------------------  IN: 990.4 mL / OUT: 450 mL / NET: 540.4 mL    21 Jan 2020 07:01  -  21 Jan 2020 11:01  --------------------------------------------------------  IN: 123 mL / OUT: 0 mL / NET: 123 mL    Physical Exam:  General: Cachectic. No distress. Comfortable  HEENT: EOMI	  Neck: JVP not elevated  Chest: Clear to auscultation bilaterally  CV: Irregular. Loud holosystolic RUSB murmur. Apical decrescendo murmur. Norubs, or gallops. Trace edema on ankles. No lower back edema.  Abdomen: Soft, non-distended, non-tender  Skin: No rashes, ecchymoses, or skin breakdown  Extremities: Warm.  Neuro: Alert and oriented x 3. No focal deficits.  Psych: Mood and affect appropriate    Labs:               10.8   11.41 )-----------( 93       ( 21 Jan 2020 00:40 )             32.8     01-21    147<H>  |  119<H>  |  54<H>  ----------------------------<  88  4.8   |  19<L>  |  1.49<H>    Ca    9.4      21 Jan 2020 00:40  Phos  3.0     01-21  Mg     1.8     01-21    TPro  4.7<L>  /  Alb  2.3<L>  /  TBili  1.8<H>  /  DBili  x   /  AST  37  /  ALT  17  /  AlkPhos  83  01-21    PT/INR - ( 21 Jan 2020 00:40 )   PT: 17.5 sec;   INR: 1.50 ratio    PTT - ( 21 Jan 2020 00:40 )  PTT:31.2 sec    Serum Pro-Brain Natriuretic Peptide: 9585 pg/mL (09-10 @ 06:58)  Thyroid Stimulating Hormone, Serum: 1.54 uIU/mL (01-07 @ 15:31)    CARDIOVASCULAR DIAGNOSTIC TESTING:  [ ] Echocardiogram:  < from: Transthoracic Echocardiogram (12.20.18 @ 09:55) >  Conclusions:  1. Mitral annular calcification. Thickened mitral valve  leaflets with mildly decreased opening.  Severe mitral  regurgitation. Grossly at least mild-moderate mitral  stenosis.  2. Calcified aortic valve with decreased opening. Peak  transaortic valve gradient equals 88 mm Hg, mean  transaortic valve gradient equals 53 mm Hg, estimated  aortic valve area equals 0.7 sqcm (by continuity equation),  aortic valve velocity time integral equals 123 cm,  consistent with severe to critical aortic stenosis. Severe,  eccentric aortic regurgitation.  3. Severely dilated left atrium.  LA volume index = 88  cc/m2.  4. Prominent basal septum, otherwise normal left  ventricular internal dimensions and wall thickness.  5. Normal left ventricular systolic function. No obvious  segmental wall motion abnormalities.  6. Increased E/e'  is consistent with elevated left  ventricular filling pressure.  7. Severe right atrial enlargement.  8. Right ventricular enlargement with decreased right  ventricular systolic function.  9. Mild tricuspid regurgitation.  *** Compared with echocardiogram of 10/22/2018, no  significant changes noted. Reason for Consultation: SOB  Chief Complaint: SOB  Date of Admission: 1/19/20    HPI:  89 yo female with a PMHx of afib on digoxin (not on AC), severe AS, COPD not on home O2, CLL (previously on Treanda and Rituxan), hypothyroidism, 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    She presented to the ED with difficulty breathing and nonproductive cough and fever on day of presentation, and she was found to have septic shock 2/2 serratia bacteremia thought to be from urosepsis vs. PNA. Remained hypotensive despite IVF and started on levophed in MICU.   States that her shortness of breath has now resolved. Denies any chest pain, palpitations, orthopnea, LYNNE, PND, abdominal pain, sensory/motor deficit or headache. Endorses mild pedal edema, improving since last month when spironolactone was increased. Of note, patient has had 6 falls in past year. At baseline can walk with walker across room before feeling short of breath.     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 1000 milliLiter(s) IV Continuous <Continuous>  heparin  Injectable 5000 Unit(s) SubCutaneous two times a day  lactulose Retention Enema 200 Gram(s) Rectal every 12 hours  levothyroxine Injectable 12.5 MICROGram(s) IV Push at bedtime  meropenem  IVPB 500 milliGRAM(s) IV Intermittent every 12 hours  midodrine 10 milliGRAM(s) Oral every 8 hours  phenylephrine    Infusion 0.131 MICROgram(s)/kG/Min IV Continuous <Continuous>  rifAXIMin 550 milliGRAM(s) Oral two times a day    Allergies:  adhesives (Other)  codeine (Rash)  erythromycin (Rash)  iv dye (Rash; Anaphylaxis; Short breath)  penicillin (Rash)  shellfish (Rash)  warfarin (Other)    FAMILY HISTORY:  FH: Alzheimer's disease    Social History:  Smoking History: Denied  Alcohol Use: Denied   Drug Use: Denied    Review of Systems:  Constitutional: [ ] Fever [ ] Chills [ ] Fatigue [ ] Weight change   HEENT: [ ] Blurred vision [ ] Eye Pain [ ] Headache [ ] Runny nose [ ] Sore Throat   Respiratory: [ ] Cough [ ] Wheezing [ ] Shortness of breath  Cardiovascular: [ ] Chest Pain [ ] Palpitations [ ] LYNNE [ ] PND [ ] Orthopnea  Gastrointestinal: [ ] Abdominal Pain [ ] Diarrhea [ ] Constipation [ ] Hemorrhoids [ ] Nausea [ ] Vomiting  Genitourinary: [ ] Nocturia [ ] Dysuria [ ] Incontinence  Extremities: [ ] Swelling [ ] Joint Pain  Neurologic: [ ] Focal deficit [ ] Paresthesias [ ] Syncope  Lymphatic: [ ] Swelling [ ] Lymphadenopathy   Skin: [ ] Rash [ ] Ecchymoses [ ] Wounds [ ] Lesions  Psychiatry: [ ] Depression [ ] Suicidal/Homicidal Ideation [ ] Anxiety [ ] Sleep Disturbances  [x] 10 point review of systems is otherwise negative except as mentioned above              [ ] Unable to obtain    Vital Signs Last 24 Hrs  T(C): 36.2 (21 Jan 2020 07:15), Max: 37 (21 Jan 2020 04:00)  T(F): 97.1 (21 Jan 2020 07:15), Max: 98.6 (21 Jan 2020 04:00)  HR: 88 (21 Jan 2020 10:30) (67 - 95)  BP: 106/48 (21 Jan 2020 10:30) (74/50 - 234/123)  BP(mean): 69 (21 Jan 2020 10:30) (52 - 161)  RR: 15 (21 Jan 2020 10:30) (12 - 49)  SpO2: 100% (21 Jan 2020 10:30) (84% - 100%)    I&O's Summary    20 Jan 2020 07:01  -  21 Jan 2020 07:00  --------------------------------------------------------  IN: 990.4 mL / OUT: 450 mL / NET: 540.4 mL    21 Jan 2020 07:01  -  21 Jan 2020 11:01  --------------------------------------------------------  IN: 123 mL / OUT: 0 mL / NET: 123 mL    Physical Exam:  General: Cachectic. No distress. Comfortable  HEENT: EOMI	  Neck: JVP not elevated  Chest: Tachypnea. Clear to auscultation bilaterally  CV: Irregular. Loud holosystolic RUSB murmur. Apical systolic decrescendo murmur. No rubs, or gallops. Trace edema on ankles. No lower back edema.  Abdomen: Soft, non-distended, non-tender  Skin: No rashes, ecchymoses, or skin breakdown  Extremities: Warm.  Neuro: Alert and oriented x 3. No focal deficits.  Psych: Mood and affect appropriate    Labs:               10.8   11.41 )-----------( 93       ( 21 Jan 2020 00:40 )             32.8     01-21    147<H>  |  119<H>  |  54<H>  ----------------------------<  88  4.8   |  19<L>  |  1.49<H>    Ca    9.4      21 Jan 2020 00:40  Phos  3.0     01-21  Mg     1.8     01-21    TPro  4.7<L>  /  Alb  2.3<L>  /  TBili  1.8<H>  /  DBili  x   /  AST  37  /  ALT  17  /  AlkPhos  83  01-21    PT/INR - ( 21 Jan 2020 00:40 )   PT: 17.5 sec;   INR: 1.50 ratio    PTT - ( 21 Jan 2020 00:40 )  PTT:31.2 sec    Serum Pro-Brain Natriuretic Peptide: 9585 pg/mL (09-10 @ 06:58)  Thyroid Stimulating Hormone, Serum: 1.54 uIU/mL (01-07 @ 15:31)    CARDIOVASCULAR DIAGNOSTIC TESTING:  [ ] Echocardiogram:  < from: Transthoracic Echocardiogram (12.20.18 @ 09:55) >  Conclusions:  1. Mitral annular calcification. Thickened mitral valve  leaflets with mildly decreased opening.  Severe mitral  regurgitation. Grossly at least mild-moderate mitral  stenosis.  2. Calcified aortic valve with decreased opening. Peak  transaortic valve gradient equals 88 mm Hg, mean  transaortic valve gradient equals 53 mm Hg, estimated  aortic valve area equals 0.7 sqcm (by continuity equation),  aortic valve velocity time integral equals 123 cm,  consistent with severe to critical aortic stenosis. Severe,  eccentric aortic regurgitation.  3. Severely dilated left atrium.  LA volume index = 88  cc/m2.  4. Prominent basal septum, otherwise normal left  ventricular internal dimensions and wall thickness.  5. Normal left ventricular systolic function. No obvious  segmental wall motion abnormalities.  6. Increased E/e'  is consistent with elevated left  ventricular filling pressure.  7. Severe right atrial enlargement.  8. Right ventricular enlargement with decreased right  ventricular systolic function.  9. Mild tricuspid regurgitation.  *** Compared with echocardiogram of 10/22/2018, no  significant changes noted.

## 2020-01-21 NOTE — PHYSICAL THERAPY INITIAL EVALUATION ADULT - GENERAL OBSERVATIONS, REHAB EVAL
Pt received semisupine in bed with +cardiac monitor, +pulse ox, +BP cuff, +IV, and +NGT. NAD noted. Pt received semisupine in bed with +cardiac monitor, +2L of O2 via NC, +pulse ox, +BP cuff, +IV, and +NGT. NAD noted.

## 2020-01-21 NOTE — PROGRESS NOTE ADULT - ATTENDING COMMENTS
1. Septic shock from Serratia bacteremia. Source likely UTI.  Pt still requiring pressors for BP support. Continue  meropenem. Await sensitives for  Serratia. Continue IV pressors and midodrine.  2. Bacterial pneumonia. Continue Meropenem. Await sputum cx.  3. KARRIE on CKD 3 . UO and creatinine improving.  Cardiac. H/O afib with severe AS. Hold all cardiac meds in setting of hypotension.

## 2020-01-21 NOTE — SWALLOW BEDSIDE ASSESSMENT ADULT - NS ASR SWALLOW FINDINGS DISCUS
Patient/Physician/Nursing/Resident Xiang; ALONZO Beltrán; Daughter Julio Resident Jayesh; ALONZO Beltrán; Daughter Julio/Patient/Physician/Nursing Nursing/Physician/Patient/MD Rhonda; ALONZO Beltrán; Daughter Julio

## 2020-01-21 NOTE — CHART NOTE - NSCHARTNOTEFT_GEN_A_CORE
:  ANABELLA WONG  INDICATION:  SHOCK  PROCEDURE:  [ ] LIMITED ECHO  [X ] LIMITED CHEST  [ ] LIMITED RETROPERITONEAL  [ ] LIMITED ABDOMINAL  [ ] LIMITED DVT  [ ] NEEDLE GUIDANCE VASCULAR  [ ] NEEDLE GUIDANCE THORACENTESIS  [ ] NEEDLE GUIDANCE PARACENTESIS  [ ] NEEDLE GUIDANCE PERICARDIOCENTESIS  [ ] OTHER    FINDINGS: CHEST; Bilateral focal B lines with irregular pleura. Consolidation RLL. Small R pleural effusion.      INTERPRETATION: Multifocal pneumonia with small R pleural effusion.

## 2020-01-21 NOTE — PHYSICAL THERAPY INITIAL EVALUATION ADULT - TRANSFER TRAINING, PT EVAL
GOAL: Patient will perform all transfers mod Ax1, in 4 weeks. GOAL: Patient will perform all transfers min Ax1, in 4 weeks.

## 2020-01-21 NOTE — PROGRESS NOTE ADULT - ASSESSMENT
Assessment and Plan:   89 yo female with a PMHx of afib on digoxin (not on AC), severe AS, COPD, CLL (previously on Treanda and Rituxan), 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 portal htn, c/b esophageal varices s/p banding s/p hepatic vessel coiling, CKD 3bl Cr 1.2, PVD, hypothyroidism, and recurrent UTI w/ hx of ESBL Klebsiella. Pt last admit 1/6-9 s/p fall OOB now [facility] w [facility] 2/2 Septic Shock 2/2 Serratia Bacteremia / urosepsis, and PNA.     Neuro: Metabolic Encephalopathy 2/2 Sepsis- Mental status now greatly improved, CT of the brain negative on admission  -c/w Neuro checks as per ICU  -c/w lactulose / rifaximin in the setting of pt hx of cirrhosis     CV: Hx Afib on digoxin w/o AC, Severe AS-   -c/w phenylephrine - titrate to maintain a MAP of 65, pt had been refractory to IVF resuscitation  -f/u digoxin level (normally on 125mcg every other day)- continue digoxin  -c/w hold AC in the setting of hx of cirrhosis w esophageal varices s/p banding hx   -c/w midodrine in an attempt to wean off of IV pressor support  -consider echo - in the setting now of bacteremia / re-assess - pt AS    Pulm: PNA - suspicion for aspiration- RRL opacity, RVP negative  -chest PT  -aspiration precautions  -supplemental O2- to maintain an O2 sat > 90%  -c/w meropenem in the setting bacteremia / urosepsis  -c/w duonebs    Renal: KARRIE on CKD 3 (baseline )    #renal:  KARRIE on CKD 3  - Cr elevated 1.69 (bl 1.2)  - likely pre-renal vs ATN in the setting of septic shock; also in setting of UTI  - avoid nephrotoxins  - renally dose medications   UTI  -hx of frequent UTIs   -UA post for many bacteria, nitrites   -UCx drawn on 1/11 for alt mentation growing ESBL Klebsiella, Sn to meropenem   -c/w meropenem     #infectious disease:  - presenting with tmax 101.2, leukocytosis 12.77, lactate 3.6 in the setting of SOB and cough   - found to have likely asp PNA and UTI  - c/w meropenem for hx of ESBL klebsiella UTI   - c/w epimeric vancomycin 750 mg q24   - start azithromycin   - f/u BCx  - f/u UCx  - f/u RVP  - f/u urine legionella   - CTM fever curve    #GI:  Cirrhosis 2/2 HCC, c/b portal HTN and eso varices   - asterixes on exam   - on propanolol 10 mg BID at home  - ammonia level 26   - hold bb in the setting of septic shock  - c/w rifaximin 550 mg bid  - c/w lactulose 30 cc QID     #endo:  Hypothyroidism  - c/w levothyroxine 25 mcg    #heme:  DVT ppx   - hep 5000 BID     #GOC:  - DNR/DNI  - molst form signed recent hospitalization  - Daughter Ephraim is 1st HCP        Attending Attestation:   89 y/o F w/decompensated cirrhosis (unclear etiology ? cryptogenic vs drug induced) c/b HCC, HFpEF, severe AS, Afib, hx of CLL previously on chemo, presenting with severe sepsis with septic shock likely secondary to PNA and UTI. KARRIE likely secondary to ATN/sepsis vs prerenal.    - Continue lactulose/rifaximin  - Switch pressors to phenylephrine for severe AS  - Midodrine once able to tolerate PO  - Fluid bolus  - Continue broad spectrum abx has previously grown ESBL  - Follow up cultures  - Trend Cr avoid nephrotoxins Assessment and Plan:   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 portal htn, c/b esophageal varices s/p banding s/p hepatic vessel coiling, CKD 3bl Cr 1.2, PVD, hypothyroidism, and recurrent UTI w/ hx of ESBL Klebsiella. Pt last admit 1/6-9 s/p fall OOB [facility] 2/2 Septic Shock 2/2 Serratia Bacteremia / urosepsis, and PNA.     Neuro: Metabolic Encephalopathy 2/2 Sepsis- Mental status now greatly improved, CT of the brain negative on admission  -c/w Neuro checks as per ICU  -c/w lactulose / rifaximin in the setting of pt hx of cirrhosis     CV: Hx Afib on digoxin w/o AC, Severe AS-   -c/w phenylephrine - titrate to maintain a MAP of 65, pt had been refractory to IVF resuscitation  -f/u digoxin level (normally on 125mcg every other day)- continue digoxin  -c/w hold AC in the setting of hx of cirrhosis w esophageal varices s/p banding hx   -c/w midodrine in an attempt to wean off of IV pressor support  -consider echo - in the setting now of bacteremia / re-assess - pt AS    Pulm: PNA - suspicion for aspiration- RRL opacity, RVP negative  -chest PT  -aspiration precautions  -supplemental O2- to maintain an O2 sat > 90%  -c/w meropenem in the setting bacteremia / urosepsis  -c/w duonebs    Renal: KARRIE on CKD 3 (baseline )  -renal function slightly improved- c/w trending  -bladder scan - st cath as needed  -IVF hydration in the setting of NPO / hypernatremia /KARRIE on CRI (creat baseline 1.2) a/w 1.69 - slightly improved 1.49    #renal:  KARRIE on CKD 3  - Cr elevated 1.69 (bl 1.2)  - likely pre-renal vs ATN in the setting of septic shock; also in setting of UTI  - avoid nephrotoxins  - renally dose medications   UTI  -hx of frequent UTIs   -UA post for many bacteria, nitrites   -UCx drawn on 1/11 for alt mentation growing ESBL Klebsiella, Sn to meropenem   -c/w meropenem     #infectious disease:  - presenting with tmax 101.2, leukocytosis 12.77, lactate 3.6 in the setting of SOB and cough   - found to have likely asp PNA and UTI  - c/w meropenem for hx of ESBL klebsiella UTI   - c/w epimeric vancomycin 750 mg q24   - start azithromycin   - f/u BCx  - f/u UCx  - f/u RVP  - f/u urine legionella   - CTM fever curve    #GI:  Cirrhosis 2/2 HCC, c/b portal HTN and eso varices   - asterixes on exam   - on propanolol 10 mg BID at home  - ammonia level 26   - hold bb in the setting of septic shock  - c/w rifaximin 550 mg bid  - c/w lactulose 30 cc QID     #endo:  Hypothyroidism  - c/w levothyroxine 25 mcg    #heme:  DVT ppx   - hep 5000 BID     #GOC:  - DNR/DNI  - molst form signed recent hospitalization  - Daughter Ephraim is 1st HCP        Attending Attestation:   89 y/o F w/decompensated cirrhosis (unclear etiology ? cryptogenic vs drug induced) c/b HCC, HFpEF, severe AS, Afib, hx of CLL previously on chemo, presenting with severe sepsis with septic shock likely secondary to PNA and UTI. KARRIE likely secondary to ATN/sepsis vs prerenal.    - Continue lactulose/rifaximin  - Switch pressors to phenylephrine for severe AS  - Midodrine once able to tolerate PO  - Fluid bolus  - Continue broad spectrum abx has previously grown ESBL  - Follow up cultures  - Trend Cr avoid nephrotoxins Assessment and Plan:   89 yo female with a PMHx of afib on digoxin (not on AC), severe AS, COPD, CLL (previously on Treanda and Rituxan), 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 portal htn, c/b esophageal varices s/p banding s/p hepatic vessel coiling, CKD 3bl Cr 1.2, PVD, hypothyroidism, and recurrent UTI w/ hx of ESBL Klebsiella. Pt last admit 1/6-9 s/p fall OOB [facility] 2/2 Septic Shock 2/2 Serratia Bacteremia / urosepsis, and PNA.     Neuro: Metabolic Encephalopathy 2/2 Sepsis- Mental status now greatly improved, CT of the brain negative on admission  -c/w Neuro checks as per ICU  -c/w lactulose / rifaximin in the setting of pt hx of cirrhosis     CV: Hx Afib on digoxin w/o AC, Severe AS-   -c/w phenylephrine - titrate to maintain a MAP of 65, pt had been refractory to IVF resuscitation  -f/u digoxin level (normally on 125mcg every other day)- continue digoxin  -c/w hold AC in the setting of hx of cirrhosis w esophageal varices s/p banding hx   -c/w midodrine in an attempt to wean off of IV pressor support  -consider echo - in the setting now of bacteremia / re-assess - pt AS    Pulm: PNA - suspicion for aspiration- RRL opacity, RVP negative  -chest PT  -aspiration precautions  -supplemental O2- to maintain an O2 sat > 90%  -c/w meropenem in the setting bacteremia / urosepsis  -c/w duonebs    Renal: KARRIE on CKD 3 (baseline )  -renal function slightly improved- c/w trending  -bladder scan - st cath as needed  -IVF hydration in the setting of NPO / hypernatremia /KARRIE on CRI (creat baseline 1.2) a/w 1.69 - slightly improved 1.49    ID: Chronic UTI's - last + Klebsiella- now a/w septic shock- +Serratia Bacteremia / Urosepsis / PNA, RVP neg / U. Legionella neg  -f/u cultures  -c/w meropenem   -d/c vanco- in the setting of G- rods      GI: Hx Cirrhosis 2/2 Hepatocellular Carcinoma c/b portal HTN / Esophogeal Varices s/p banding  -c/w holding AC   -c/w lactulose enema  in the setting of NPO  -speech / swallow- concern for aspiration- pt failed BS swallow- failed s/S 9/2019 w speech pathology- f/u re-assessment by speech- last rec- mechanical soft 2 / w honey to nectar thickened liquids  -c/w rifaximin once pt has PO intake-   -discuss w family need for ngt (lalo)      Endo: NPO- pt slightly becoming hypoglycemic  -IVF D5RL  -FS q 6 H to trend       GOC: pt with a DNR /DNI- Molst form on chart  -f/u GOC with family- pt refusing care overnight / early am  - c/w DNR/DNI

## 2020-01-21 NOTE — DIETITIAN INITIAL EVALUATION ADULT. - ETIOLOGY
related to inadequate protein energy intake related to inability to meet estimated nutrition needs secondary to poor appetite

## 2020-01-21 NOTE — PHYSICAL THERAPY INITIAL EVALUATION ADULT - PRECAUTIONS/LIMITATIONS, REHAB EVAL
CXR 1/19/2020: Left IJ central line present with tip in distal left PICC is again region near SVC junction. No post placement pneumothorax and no retained guidewire. Bibasilar increased markings and patchy opacities. Cardiomegaly and aortic calcifications. Trachea midline. Generalized osteopenia. Chest CT 1/19/2020: No displaced rib fracture. No pneumothorax. The left lower lobe bronchus and segmental branches of right lower lobe bronchus are obliterated by retained secretions. Patchy opacities within the bilateral lower lobes may represent pneumonia or aspiration pneumonitis. Cardiomegaly. Aortic valve and mitral annulus calcification. Coronary atherosclerosis. (-) Head CT 1/19/2020. (-) C-spine CT 1/19/2020. (-) Pelvis x-ray for fx 1/19/2020. CXR 1/19/2020: Left IJ central line present with tip in distal left PICC is again region near SVC junction. No post placement pneumothorax and no retained guidewire. Bibasilar increased markings and patchy opacities. Cardiomegaly and aortic calcifications. Trachea midline. Generalized osteopenia. Chest CT 1/19/2020: No displaced rib fracture. No pneumothorax. The left lower lobe bronchus and segmental branches of right lower lobe bronchus are obliterated by retained secretions. Patchy opacities within the bilateral lower lobes may represent pneumonia or aspiration pneumonitis. Cardiomegaly. Aortic valve and mitral annulus calcification. Coronary atherosclerosis. (-) Head CT 1/19/2020. (-) C-spine CT 1/19/2020. (-) Pelvis x-ray for fx 1/19/2020./fall precautions

## 2020-01-21 NOTE — SWALLOW BEDSIDE ASSESSMENT ADULT - COMMENTS
Pt presented w/ difficulty breathing, cough, and AMS, admitted to MICU for septic shock, AHRF, and tahir/atn 2/2 PNA, with UTI.  Metabolic encephalopathy. Waxing waning mentation w/in last week likely in the setting of sepsis. Pt presented with confusion, now improving A&O x 2-3. Possible component of underlying hepatic encephalopathy. Head CT 1/19: No displaced calvarial fracture or acute intracranial hemorrhage.  c/w lactulose, and rifaximin. Pulm:Tmax 101.2 in ED with leukocytosis, requiring 3L of O2, not on home O2.    CT chest 1/19: bb patchy opacities PNA vs asp pneumonitis. CXR 1/19: Right lower lung opacity, likely pneumonia. c/w empiric abx. +COPD--> duonebs. + UTI-->-c/w meropenem. ID: PNA & UTI: c/w meropenem for hx of ESBL klebsiella UTI,  c/w epimeric vancomycin & start azithromycin.      GOC: MOLST that states pt is ok with trial of pressors, Bipap, temporary NGT but not intubation CPR or peg. Pt presented w/ difficulty breathing, cough, and AMS, admitted to MICU for septic shock, AHRF, and tahir/atn 2/2 PNA, with UTI.  Metabolic encephalopathy. Waxing waning mentation w/in last week likely in the setting of sepsis. Pt presented with confusion, now improving A&O x 2-3. Possible component of underlying hepatic encephalopathy. Head CT 1/19: No displaced calvarial fracture or acute intracranial hemorrhage.  c/w lactulose, and rifaximin. Pulm:Tmax 101.2 in ED with leukocytosis, requiring 3L of O2, not on home O2.    CT chest 1/19: bb patchy opacities PNA vs asp pneumonitis. CXR 1/19: Right lower lung opacity, likely pneumonia. c/w empiric abx. +COPD--> duonebs. + UTI-->-c/w meropenem. ID: PNA & UTI: c/w meropenem for hx of ESBL klebsiella UTI,  c/w epimeric vancomycin & start azithromycin. MICU note 1/21: Mental status greatly improved.  GOC: MOLST that states pt is ok with trial of pressors, Bipap, temporary NGT but not intubation CPR or peg.    SWALLOW HISTORY: Bedside swallow 9/14/19: Dysphagia 3 with nectar thick liquids pending MBS (however pt is refusing objective testing at present time). Hisotry continued: Pt presented w/ difficulty breathing, cough, and AMS, admitted to MICU for septic shock, AHRF, and tahir/atn 2/2 PNA, with UTI.  Metabolic encephalopathy. Waxing waning mentation w/in last week likely in the setting of sepsis. Pt presented with confusion, now improving A&O x 2-3. Possible component of underlying hepatic encephalopathy. Head CT 1/19: No displaced calvarial fracture or acute intracranial hemorrhage.  c/w lactulose, and rifaximin. Pulm:Tmax 101.2 in ED with leukocytosis, requiring 3L of O2, not on home O2.    CT chest 1/19: bb patchy opacities PNA vs asp pneumonitis. CXR 1/19: Right lower lung opacity, likely pneumonia. c/w empiric abx. +COPD--> duonebs. + UTI-->-c/w meropenem. ID: PNA & UTI: c/w meropenem for hx of ESBL klebsiella UTI,  c/w epimeric vancomycin & start azithromycin. MICU note 1/21: Mental status greatly improved.  GOC: MOLST that states pt is ok with trial of pressors, Bipap, temporary NGT but not intubation CPR or peg.    SWALLOW HISTORY: Bedside swallow 9/14/19: Dysphagia 3 with nectar thick liquids pending MBS (however pt is refusing objective testing at present time). Pt presents with an oral and suspected pharyngeal dysphagia marked by prolonged mastication and delayed oral transit time (with mechanical soft and solid textures), reduced hyolaryngeal excursion, episodes of audible swallows, and reports of coughing post PO intake of thin liquids.

## 2020-01-21 NOTE — PHYSICAL THERAPY INITIAL EVALUATION ADULT - PLANNED THERAPY INTERVENTIONS, PT EVAL
gait training/GOAL: Stair Negotiation Training: Patient will be able to negotiate up & down 1 flight of stairs with unilateral rail, step to gait pattern, mod Ax1, in 4 weeks./balance training/bed mobility training/strengthening/transfer training

## 2020-01-21 NOTE — CONSULT NOTE ADULT - ASSESSMENT
89 yo female with a PMHx of AF on digoxin (not on AC), severe AS, COPD not on home O2, CLL, HCC cirrhosis with portal htn, c/b esophageal varices s/p banding, CKD 3bl Cr 1.2, and PVD. She presents with septic shock 2/2 serratia bacteremia from urosepsis vs. PNA for which she remains on pressors. Her last TTE was in 2018, which demonstrated severe aortic stenosis and moderate mitral regurgitation. She appears dry on exam, with hypernatremia/hyperkalemia consistent with overall hypovolemia, without any respiratory distress.    Problems:   Septic Shock  Continuous Atrial Fibrillation  Severe Aortic Stenosis  Mitral regurgitation    We recommend:   1: Continue to gentle IV fluid hydration with DW5.   2: Hold spironolactone given hypovolemia on exam.  3: Continue digoxin for appropriately rate controlled AF.  4: Not candidate for anticoagulation given history of esophageal varices and history of cerebral hemorrhage on warfarin.  5: Outpatient follow up for management of aortic valve and mitral valve disease. 89 yo female with a PMHx of AF on digoxin (not on AC), severe AS, COPD not on home O2, CLL, HCC cirrhosis with portal htn, c/b esophageal varices s/p banding, CKD 3bl Cr 1.2, and PVD. She presents with septic shock 2/2 serratia bacteremia from urosepsis vs. PNA for which she remains on pressors. Her last TTE was in 2018, which demonstrated severe aortic stenosis and moderate mitral regurgitation. She appears dry on exam, with hypernatremia/hyperkalemia consistent with overall hypovolemic status likely from poor PO intake.    Impressions:   Septic Shock  Continuous Atrial Fibrillation  Severe Aortic Stenosis  Mitral regurgitation    We recommend:   1: Continue to gentle IV fluid hydration with DW5.   2: Hold spironolactone given hypovolemia on exam.  3: Continue digoxin for appropriately rate controlled AF.  4: Not candidate for anticoagulation given history of esophageal varices and history of cerebral hemorrhage on warfarin.  5: Recent falls in past year concerning for symptomatic aortic stenosis. Will need outpatient follow up with cardiology (Dr. Boston) for ongoing care. 89 yo female with a PMHx of AF on digoxin (not on AC), severe AS, COPD not on home O2, CLL, HCC cirrhosis with portal htn, c/b esophageal varices s/p banding, CKD 3bl Cr 1.2, and PVD. She presents with septic shock 2/2 serratia bacteremia from urosepsis vs. PNA for which she remains on pressors. Her last TTE was in 2018, which demonstrated severe aortic stenosis and moderate mitral regurgitation. She appears dry with hypernatremia/hyperkalemia consistent with overall hypovolemic status likely from poor PO intake.    Impressions:   Septic Shock 2/2 serratia bacteremia from uropsepsis vs. PNA.   Paroxysmal  Atrial Fibrillation  Severe Aortic Stenosis    We recommend:   1: Continue to gentle IV fluid hydration with DW5 for total free water deficit of .5 L.   2: Hold spironolactone given hypovolemia on exam.  3: Continue digoxin for appropriately rate controlled AF.  4: Not candidate for anticoagulation given history of esophageal varices and history of cerebral hemorrhage on warfarin.  5: Recent falls in past year concerning for symptomatic aortic stenosis. Will need outpatient follow up with cardiology (Dr. Boston) for ongoing care. 89F with AF on digoxin (not on AC), Severe AS, COPD (not on home O2), CLL, HCC with Portal HTN c/b esophageal varices s/p banding, CKD3 (baseline Cr 1.2) and PVD a/w septic shock 2/2 serratia bacteremia from urosepsis vs. PNA. TTE 2018 demonstrated severe AS and moderate MR. Currently HDS on pressures. Appears dry with hypernatremia/hyperkalemia consistent with overall hypovolemic status.    Impressions:   Septic Shock 2/2 serratia bacteremia from uropsepsis vs. PNA.   Paroxysmal Atrial Fibrillation  Severe Aortic Stenosis    Recommendations:  1: Gentle IVF with DW5 for total free water deficit of .5 L   2: Hold spironolactone given hypovolemia  3: Continue digoxin for rate controlled AF.  4: Not candidate for anticoagulation given history of esophageal varices and history of cerebral hemorrhage on warfarin.  5: Recent falls in past year concerning for symptomatic aortic stenosis. Will need outpatient follow up with cardiology (Dr. Boston) for ongoing care.

## 2020-01-21 NOTE — PHYSICAL THERAPY INITIAL EVALUATION ADULT - GAIT TRAINING, PT EVAL
GOAL: Patient will ambulate 100 feet with appropriate assistive device as needed min Ax1, in 4 weeks.

## 2020-01-21 NOTE — DIETITIAN INITIAL EVALUATION ADULT. - SIGNS/SYMPTOMS
as evidenced by moderate muscle wasting and PO intake <75% of nutrition needs >7days as evidenced by moderate fat and muscle wasting and PO intake <75% of nutrition needs >7days

## 2020-01-21 NOTE — DIETITIAN INITIAL EVALUATION ADULT. - ADD RECOMMEND
1) When diet advanced, recommend DASH diet per previous daughter request. Defer texture to team, pt previously tolerated Dysphagia 2 Mechanical Soft - Nectar Thick Fluids. 2) RD to remain available for diet recommendations and education. 3) Continue to monitor tolerance to diet prescription, nutritional intake, weight trends, labs and skin integrity. 1) When diet advanced, recommend DASH diet per previous daughter request. Defer texture to team (pt pending speech & swallow). 2) RD to remain available for diet recommendations and education. 3) Continue to monitor tolerance to diet prescription, nutritional intake, weight trends, labs and skin integrity. 1) When diet advanced, recommend DASH diet per previous daughter request. Defer texture to team (pt pending speech & swallow). 2) Recommend Mighty Shakes [id number] (200kcal and 6 grams) when diet advanced. 3) RD to remain available for diet recommendations and education. 4) Continue to monitor tolerance to diet prescription, nutritional intake, weight trends, labs and skin integrity. 1) If diet advanced, recommend no diet restrictions, secondary to history of poor PO intake. Defer texture to speech pathologist. 2) Recommend Mighty Shakes [id number] (200kcal and 6 grams) when diet advanced. 3) RD to remain available for diet recommendations and education. 4) Continue to monitor tolerance to diet prescription, nutritional intake, weight trends, labs and skin integrity.

## 2020-01-21 NOTE — PHYSICAL THERAPY INITIAL EVALUATION ADULT - ACTIVE RANGE OF MOTION EXAMINATION, REHAB EVAL
bilateral upper extremity Active ROM was WFL (within functional limits)/Pt able to move R toes WFL. Pt refused BLE ROM examination despite max encouragement from PT and family. bilateral  lower extremity Active ROM was WFL (within functional limits)/bilateral upper extremity Active ROM was WFL (within functional limits)

## 2020-01-21 NOTE — DIETITIAN INITIAL EVALUATION ADULT. - FACTORS AFF FOOD INTAKE
change in mental status/other (specify)/Poor PO intake in setting of recurrent illness change in mental status/other (specify)/Pt is NPO and currently pending speech and swallow.

## 2020-01-21 NOTE — CHART NOTE - NSCHARTNOTEFT_GEN_A_CORE
Upon Nutritional Assessment by the Registered Dietitian your patient was determined to meet criteria / has evidence of the following diagnosis/diagnoses:          [ ]  Mild Protein Calorie Malnutrition        [x ]  Moderate Protein Calorie Malnutrition        [ ] Severe Protein Calorie Malnutrition        [ ] Unspecified Protein Calorie Malnutrition        [ ] Underweight / BMI <19        [ ] Morbid Obesity / BMI > 40      Findings as based on:  [x ] Comprehensive nutrition assessment   [ ] Nutrition Focused Physical Exam  [ ] Other:       Nutrition Plan/Recommendations:  pt currently NPO, pending swallow eval, if cleared for po, recommend no diet restrictions, will add high protein shakes        PROVIDER Section:     By signing this assessment you are acknowledging and agree with the diagnosis/diagnoses assigned by the Registered Dietitian    Comments:

## 2020-01-21 NOTE — DIETITIAN INITIAL EVALUATION ADULT. - PHYSICAL APPEARANCE
other (specify) Ht: 63 inches  Wt: 89.9 lbs  BMI: 15.9 kg/m2  IBW: 115 lbs (+/-10%)  IBW%: 78%  Skin per nursing documentation: No pressure injuries noted.   Edema per nursing documentation: +1 Sara. arm

## 2020-01-21 NOTE — PROGRESS NOTE ADULT - SUBJECTIVE AND OBJECTIVE BOX
CHIEF COMPLAINT: AMS / Hypoxic Respiratory Failure 2/2 PNA / Septic Shock 2/2 Bacteremia / Urosepsis    Interval Events: Remains on phenylephrine, +BC 4/4 Serratia / +Ucx- G- rods - high suspicion for Serratia, pt intermittently refusing medical treatment overnight.     89 yo female with a PMHx of afib on digoxin (not on AC), severe AS, hypothyroidism, COPD not on home O2, CLL (previously on Treanda and Rituxan), 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 Pt most recent admission -  admitted for weakness w/ mechanical fall and pre-renal KARRIE , and discharged to [facility].  Mrs Bustamante now re-admitted from [facility] for difficulty breathing, AMS / confused, febrile, hypotensive, w leukocytosis, lactic acidosis, worsening renal function, and was admitted to the MICU for pressor support. The pt remains on phenylephrine, now known to have +BC 4/4 Serratia and +Ucx- >100K G- rods- suspicious for Serratia.       REVIEW OF SYSTEMS:  Constitutional: [ ] fevers [ ] chills [ ] weight loss [ ] weight gain  HEENT: [ ] dry eyes [ ] eye irritation [ ] postnasal drip [ ] nasal congestion  CV: [ ] chest pain [ ] orthopnea [ ] palpitations [ ] murmur  Resp: [ ] cough [ ] shortness of breath [ ] dyspnea [ ] wheezing [ ] sputum [ ] hemoptysis  GI: [ ] nausea [ ] vomiting [ ] diarrhea [ ] constipation [ ] abd pain [ ] dysphagia   : [ ] dysuria [ ] nocturia [ ] hematuria [ ] increased urinary frequency  Musculoskeletal: [ ] back pain [ ] myalgias [ ] arthralgias [ ] fracture  Skin: [ ] rash [ ] itch  Neurological: [ ] headache [ ] dizziness [ ] syncope [ ] weakness [ ] numbness  Psychiatric: [ ] anxiety [ ] depression  Endocrine: [ ] diabetes [ ] thyroid problem  Hematologic/Lymphatic: [ ] anemia [ ] bleeding problem  Allergic/Immunologic: [ ] itchy eyes [ ] nasal discharge [ ] hives [ ] angioedema  [ ] All other systems negative  [ ] Unable to assess ROS because ________    OBJECTIVE:  ICU Vital Signs Last 24 Hrs  T(C): 37 (2020 04:00), Max: 37 (2020 11:00)  T(F): 98.6 (2020 04:00), Max: 98.6 (2020 11:00)  HR: 86 (2020 07:00) (64 - 86)  BP: 97/54 (2020 07:00) (80/41 - 234/123)  BP(mean): 70 (2020 07:00) (54 - 161)  ABP: --  ABP(mean): --  RR: 28 (2020 07:00) (12 - 49)  SpO2: 98% (2020 07:00) (84% - 100%)      I & O's     @ 07:01  -  - @ 07:00  --------------------------------------------------------  IN: 990.4 mL / OUT: 450 mL / NET: 540.4 mL      CAPILLARY BLOOD GLUCOSE          PHYSICAL EXAM:  General:   HEENT:   Lymph Nodes:  Neck:   Respiratory:   Cardiovascular:   Abdomen:   Extremities:   Skin:   Neurological:  Psychiatry:    LINES:    HOSPITAL MEDICATIONS:  MEDICATIONS  (STANDING):  albuterol/ipratropium for Nebulization 3 milliLiter(s) Nebulizer every 6 hours  chlorhexidine 4% Liquid 1 Application(s) Topical <User Schedule>  heparin  Injectable 5000 Unit(s) SubCutaneous two times a day  lactulose Retention Enema 200 Gram(s) Rectal every 12 hours  levothyroxine Injectable 12.5 MICROGram(s) IV Push at bedtime  meropenem  IVPB 500 milliGRAM(s) IV Intermittent every 12 hours  midodrine 10 milliGRAM(s) Oral every 8 hours  phenylephrine    Infusion 0.131 MICROgram(s)/kG/Min (2 mL/Hr) IV Continuous <Continuous>  rifAXIMin 550 milliGRAM(s) Oral two times a day    MEDICATIONS  (PRN):      LABS:                        10.8   11.41 )-----------( 93       ( 2020 00:40 )             32.8     Hgb Trend: 10.8<--, 11.3<--, 12.0<--, 12.0<--, 11.9<--      147<H>  |  119<H>  |  54<H>  ----------------------------<  88  4.8   |  19<L>  |  1.49<H>    Ca    9.4      2020 00:40  Phos  3.0       Mg     1.8         TPro  4.7<L>  /  Alb  2.3<L>  /  TBili  1.8<H>  /  DBili  x   /  AST  37  /  ALT  17  /  AlkPhos  83      Creatinine Trend: 1.49<--, 1.60<--, 1.57<--, 1.69<--, 1.24<--, 1.32<--  PT/INR - ( 2020 00:40 )   PT: 17.5 sec;   INR: 1.50 ratio         PTT - ( 2020 00:40 )  PTT:31.2 sec  Urinalysis Basic - ( 2020 18:46 )    Color: Yellow / Appearance: Slightly Turbid / S.020 / pH: x  Gluc: x / Ketone: Negative  / Bili: Negative / Urobili: Negative   Blood: x / Protein: 30 mg/dL / Nitrite: Positive   Leuk Esterase: Negative / RBC: 1 /hpf / WBC 1 /HPF   Sq Epi: x / Non Sq Epi: 9 /hpf / Bacteria: Many        Venous Blood Gas:   @ 00:32  7.32/41/43/21/70  VBG Lactate: 2.3  Venous Blood Gas:   @ 05:45  7.34/40/39//66  VBG Lactate: 2.7  Venous Blood Gas:   @ 20:48  7.36/36/32//54  VBG Lactate: 2.9  Venous Blood Gas:   @ 18:39  7.38/35/34//55  VBG Lactate: 3.6      MICROBIOLOGY:     RADIOLOGY:  [ ] Reviewed and interpreted by me    EKG: CHIEF COMPLAINT: AMS / Hypoxic Respiratory Failure 2/2 PNA / Septic Shock 2/2 Bacteremia / Urosepsis    Interval Events: Remains on phenylephrine, +BC 4/4 Serratia / +Ucx- G- rods - high suspicion for Serratia, pt intermittently refusing medical treatment overnight.     89 yo female with a PMHx of afib on digoxin (not on AC), severe AS, hypothyroidism, COPD not on home O2, CLL (previously on Treanda and Rituxan), 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 Pt most recent admission -  admitted for weakness w/ mechanical fall and pre-renal KARRIE , and discharged to [facility].  Mrs Bustamante now re-admitted from [facility] for difficulty breathing, AMS / confused, febrile, hypotensive, w leukocytosis, lactic acidosis, worsening renal function, and was admitted to the MICU for pressor support. The pt remains on phenylephrine, now known to have +BC 4/4 Serratia and +Ucx- >100K G- rods- suspicious for Serratia.       REVIEW OF SYSTEMS:  Constitutional: [ ] fevers [ ] chills [ ] weight loss [ ] weight gain  HEENT: [ ] dry eyes [ ] eye irritation [ ] postnasal drip [ ] nasal congestion  CV: [ ] chest pain [ ] orthopnea [ ] palpitations [ ] murmur  Resp: [ ] cough [ ] shortness of breath [ ] dyspnea [ ] wheezing [ ] sputum [ ] hemoptysis  GI: [ ] nausea [ ] vomiting [ ] diarrhea [ ] constipation [ ] abd pain [ ] dysphagia   : [ ] dysuria [ ] nocturia [ ] hematuria [ ] increased urinary frequency  Musculoskeletal: [ ] back pain [ ] myalgias [ ] arthralgias [ ] fracture  Skin: [ ] rash [ ] itch  Neurological: [ ] headache [ ] dizziness [ ] syncope [ ] weakness [ ] numbness  Psychiatric: [ ] anxiety [ ] depression  Endocrine: [ ] diabetes [ ] thyroid problem  Hematologic/Lymphatic: [ ] anemia [ ] bleeding problem  Allergic/Immunologic: [ ] itchy eyes [ ] nasal discharge [ ] hives [ ] angioedema  [ ] All other systems negative  [ ] Unable to assess ROS because ________    OBJECTIVE:  ICU Vital Signs Last 24 Hrs  T(C): 37 (2020 04:00), Max: 37 (2020 11:00)  T(F): 98.6 (2020 04:00), Max: 98.6 (2020 11:00)  HR: 86 (2020 07:00) (64 - 86)  BP: 97/54 (2020 07:00) (80/41 - 234/123)  BP(mean): 70 (2020 07:00) (54 - 161)  ABP: --  ABP(mean): --  RR: 28 (2020 07:00) (12 - 49)  SpO2: 98% (2020 07:00) (84% - 100%)      I & O's     @ 07:01  -  - @ 07:00  --------------------------------------------------------  IN: 990.4 mL / OUT: 450 mL / NET: 540.4 mL      CAPILLARY BLOOD GLUCOSE        PHYSICAL EXAM:  General:   HEENT:   Lymph Nodes:  Neck:   Respiratory:   Cardiovascular:   Abdomen:   Extremities:   Skin:   Neurological:  Psychiatry:    LINES:    HOSPITAL MEDICATIONS:  MEDICATIONS  (STANDING):  albuterol/ipratropium for Nebulization 3 milliLiter(s) Nebulizer every 6 hours  chlorhexidine 4% Liquid 1 Application(s) Topical <User Schedule>  heparin  Injectable 5000 Unit(s) SubCutaneous two times a day  lactulose Retention Enema 200 Gram(s) Rectal every 12 hours  levothyroxine Injectable 12.5 MICROGram(s) IV Push at bedtime  meropenem  IVPB 500 milliGRAM(s) IV Intermittent every 12 hours  midodrine 10 milliGRAM(s) Oral every 8 hours  phenylephrine    Infusion 0.131 MICROgram(s)/kG/Min (2 mL/Hr) IV Continuous <Continuous>  rifAXIMin 550 milliGRAM(s) Oral two times a day    MEDICATIONS  (PRN):      LABS:                        10.8   11.41 )-----------( 93       ( 2020 00:40 )             32.8     Hgb Trend: 10.8<--, 11.3<--, 12.0<--, 12.0<--, 11.9<--      147<H>  |  119<H>  |  54<H>  ----------------------------<  88  4.8   |  19<L>  |  1.49<H>    Ca    9.4      2020 00:40  Phos  3.0       Mg     1.8         TPro  4.7<L>  /  Alb  2.3<L>  /  TBili  1.8<H>  /  DBili  x   /  AST  37  /  ALT  17  /  Alk Phos  83      Creatinine Trend: 1.49<--, 1.60<--, 1.57<--, 1.69<--, 1.24<--, 1.32<--  PT/INR - ( 2020 00:40 )   PT: 17.5 sec;   INR: 1.50 ratio         PTT - ( 2020 00:40 )  PTT:31.2 sec  Urinalysis Basic - ( 2020 18:46 )    Color: Yellow / Appearance: Slightly Turbid / S.020 / pH: x  Gluc: x / Ketone: Negative  / Bili: Negative / Urobili: Negative   Blood: x / Protein: 30 mg/dL / Nitrite: Positive   Leuk Esterase: Negative / RBC: 1 /hpf / WBC 1 /HPF   Sq Epi: x / Non Sq Epi: 9 /hpf / Bacteria: Many        Venous Blood Gas:   @ 00:32  7.32/41/43/21/70  VBG Lactate: 2.3  Venous Blood Gas:   @ 05:45  7.34/40/39/21/66  VBG Lactate: 2.7  Venous Blood Gas:   @ 20:48  7.36/36/32/20/54  VBG Lactate: 2.9  Venous Blood Gas:   @ 18:39  7.38/35/34//55  VBG Lactate: 3.6      MICROBIOLOGY:   Culture - Blood (20 @ 22:02)    Gram Stain:   Growth in anaerobic bottle: Gram Negative Rods    Specimen Source: .Blood Blood-Peripheral    Culture Results:   Growth in anaerobic bottle: Gram Negative Rods    Culture - Blood (20 @ 22:02)    Gram Stain:   Growth in anaerobic bottle: Gram Negative Rods  Growth in aerobic bottle: Gram Negative Rods    -  Serratia marcescens: Detec    Specimen Source: .Blood Blood-Peripheral    Organism: Blood Culture PCR    Culture Results:   Growth in anaerobic bottle: Gram Negative Rods  Growth in aerobic bottle: Gram Negative Rods    Culture - Urine (20 @ 22:01)    Specimen Source: .Urine Clean Catch (Midstream)    Culture Results:   >100,000 CFU/ml Gram Negative Rods    Legionella pneumophila Antigen, Urine (20 @ 23:00)    Legionella Antigen, Urine: Negative    Rapid Respiratory Viral Panel (20 @ 23:13)    Rapid RVP Result: NotDetec: This Respiratory Panel uses polymerase chain reaction (PCR) to detect for      RADIOLOGY:  < from: CT Chest No Cont (20 @ 19:03) >  EXAM:  CT CHEST                        PROCEDURE DATE:  2020    INTERPRETATION:  CLINICAL INFORMATION: Fall, chest pain  COMPARISON: CT chest   FINDINGS: The quality of the images are degraded by respiratory motion.    AIRWAYS, LUNGS and PLEURA: The left lower lobe bronchus and segmental branches of right lower lobe bronchus are obliterated by retained secretions. Patchy opacities within the bilateral lower lobes may represent pneumonia or aspiration pneumonitis.    Trace right pleural effusion. No pneumothorax.    MEDIASTINUM AND JOHNATHAN: No lymphadenopathy.    HEART AND VESSELS: Four-chamber dilatation of the heart. Coronary atherosclerosis. Calcification of the aortic valve and mitral annulus. Dilated main pulmonary artery measuring 4 cm. No pericardial effusion. Thoracic aorta normal in diameter.    VISUALIZED UPPER ABDOMEN: Cholelithiasis. Bilateral renal cysts. Aortic atherosclerosis. Metallic densities possibly Embolization coils within the left lobe of the liver. Splenic aneurysms.    CHEST WALL AND BONES: No displaced rib fracture. No aggressive osseous lesion. Scoliosis.    LOWER NECK: Within normal limits.    IMPRESSION:  Motion limited exam.    No displaced rib fracture. No pneumothorax.    The left lower lobe bronchus and segmental branches of right lower lobe bronchus are obliterated by retained secretions. Patchy opacities within the bilateral lower lobes may represent pneumonia or aspiration pneumonitis.    Cardiomegaly. Aortic valve and mitral annulus calcification. Coronary atherosclerosis.        < from: CT Head No Cont (20 @ 19:00) >  EXAM:  CT CERVICAL SPINE                        EXAM:  CT BRAIN                        PROCEDURE DATE:  2020    INTERPRETATION:  CLINICAL INFORMATION: Head injury.  TECHNIQUE: Noncontrast CT scan of the head and cervical spinewas performed. The cervical spine CT was performed with thin section axial images. Sagittal and coronal reformations were created.    COMPARISON: No similar prior studies available for comparison.    FINDINGS:    HEAD:  No acute intracranial hemorrhage, mass effect, or midline shift. The basal cisterns are patent. No abnormal extra-axial collections.   Cerebral volume loss is present with proportional prominence of the sulci and ventricles. Moderate periventricular and subcortical white matter hypoattenuation without mass effect is noted, non-specific, but likely related to chronic small vessel ischemic changes. Chronic right frontal infarct with encephalomalacia/gliosis. Left basal ganglia lacunar infarct. Chronic left basal ganglia lacunar infarct. Small vessel white matter ischemic changes are noted.  The calvarium is intact. The tympanomastoid cavities appear free of acute disease. Left maxillary sinus mucosal thickening. Bilateral cataracts surgery.  CERVICAL SPINE:  The normal cervical lordosis is maintained. Mild anterolisthesis of C2 on C3 and C3 on C4. No acute fracture or prevertebral soft tissue swelling. The craniocervical junction is unremarkable.   No vertebral disc height loss throughout cervical spine. Multilevel degenerative changes of the cervical spine characterized by marginal osteophyte formation, small disc bulges, and uncovertebral and facet joint arthrosis. Ankylosis of the left C2-3 facet. Varying degrees of spinal canal and foraminal narrowing, not well evaluated at CT.  The lung apices are unremarkable.    IMPRESSION:  Head CT: No displaced calvarial fracture or acute intracranial hemorrhage.   Cervical spine CT: No acute fracture.                   EKG: CHIEF COMPLAINT: AMS / Hypoxic Respiratory Failure 2/2 PNA / Septic Shock 2/2 Bacteremia / Urosepsis    Interval Events: Remains on phenylephrine, +BC 4/4 Serratia / +Ucx- G- rods - high suspicion for Serratia, pt intermittently refusing medical treatment overnight.     89 yo female with a PMHx of afib on digoxin (not on AC), severe AS, hypothyroidism, COPD not on home O2, CLL (previously on Treanda and Rituxan), 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 Pt most recent admission -  admitted for weakness w/ mechanical fall and pre-renal KARRIE , and discharged to [facility].  Mrs Bustamante now re-admitted from [facility] for difficulty breathing, AMS / confused, febrile, hypotensive, w leukocytosis, lactic acidosis, worsening renal function, and was admitted to the MICU for pressor support. The pt remains on phenylephrine, now known to have +BC 4/4 Serratia and +Ucx- >100K G- rods- suspicious for Serratia.       REVIEW OF SYSTEMS:  unable to assess - pt refusing care- states " I do not want to be bothered."    OBJECTIVE:  ICU Vital Signs Last 24 Hrs  T(C): 37 (2020 04:00), Max: 37 (2020 11:00)  T(F): 98.6 (2020 04:00), Max: 98.6 (2020 11:00)  HR: 86 (2020 07:00) (64 - 86)  BP: 97/54 (2020 07:00) (80/41 - 234/123)  BP(mean): 70 (2020 07:00) (54 - 161)  ABP: --  ABP(mean): --  RR: 28 (2020 07:00) (12 - 49)  SpO2: 98% (2020 07:00) (84% - 100%)      I & O's     @ 07:01  -  - @ 07:00  --------------------------------------------------------  IN: 990.4 mL / OUT: 450 mL / NET: 540.4 mL      CAPILLARY BLOOD GLUCOSE        PHYSICAL EXAM:  General:   HEENT:   Lymph Nodes:  Neck:   Respiratory:   Cardiovascular:   Abdomen:   Extremities:   Skin:   Neurological:  Psychiatry:    LINES:    HOSPITAL MEDICATIONS:  MEDICATIONS  (STANDING):  albuterol/ipratropium for Nebulization 3 milliLiter(s) Nebulizer every 6 hours  chlorhexidine 4% Liquid 1 Application(s) Topical <User Schedule>  heparin  Injectable 5000 Unit(s) SubCutaneous two times a day  lactulose Retention Enema 200 Gram(s) Rectal every 12 hours  levothyroxine Injectable 12.5 MICROGram(s) IV Push at bedtime  meropenem  IVPB 500 milliGRAM(s) IV Intermittent every 12 hours  midodrine 10 milliGRAM(s) Oral every 8 hours  phenylephrine    Infusion 0.131 MICROgram(s)/kG/Min (2 mL/Hr) IV Continuous <Continuous>  rifAXIMin 550 milliGRAM(s) Oral two times a day    MEDICATIONS  (PRN):      LABS:                        10.8   11.41 )-----------( 93       ( 2020 00:40 )             32.8     Hgb Trend: 10.8<--, 11.3<--, 12.0<--, 12.0<--, 11.9<--  01    147<H>  |  119<H>  |  54<H>  ----------------------------<  88  4.8   |  19<L>  |  1.49<H>    Ca    9.4      2020 00:40  Phos  3.0       Mg     1.8         TPro  4.7<L>  /  Alb  2.3<L>  /  TBili  1.8<H>  /  DBili  x   /  AST  37  /  ALT  17  /  Alk Phos  83      Creatinine Trend: 1.49<--, 1.60<--, 1.57<--, 1.69<--, 1.24<--, 1.32<--  PT/INR - ( 2020 00:40 )   PT: 17.5 sec;   INR: 1.50 ratio         PTT - ( 2020 00:40 )  PTT:31.2 sec  Urinalysis Basic - ( 2020 18:46 )    Color: Yellow / Appearance: Slightly Turbid / S.020 / pH: x  Gluc: x / Ketone: Negative  / Bili: Negative / Urobili: Negative   Blood: x / Protein: 30 mg/dL / Nitrite: Positive   Leuk Esterase: Negative / RBC: 1 /hpf / WBC 1 /HPF   Sq Epi: x / Non Sq Epi: 9 /hpf / Bacteria: Many        Venous Blood Gas:   @ 00:32  7.32/41/43//70  VBG Lactate: 2.3  Venous Blood Gas:   @ 05:45  7.34/40/39//66  VBG Lactate: 2.7  Venous Blood Gas:   @ 20:48  7.36/36/32/20/54  VBG Lactate: 2.9  Venous Blood Gas:   18:39  7.38/35/34/20/55  VBG Lactate: 3.6      MICROBIOLOGY:   Culture - Blood (20 @ 22:02)    Gram Stain:   Growth in anaerobic bottle: Gram Negative Rods    Specimen Source: .Blood Blood-Peripheral    Culture Results:   Growth in anaerobic bottle: Gram Negative Rods    Culture - Blood (20 @ 22:02)    Gram Stain:   Growth in anaerobic bottle: Gram Negative Rods  Growth in aerobic bottle: Gram Negative Rods    -  Serratia marcescens: Detec    Specimen Source: .Blood Blood-Peripheral    Organism: Blood Culture PCR    Culture Results:   Growth in anaerobic bottle: Gram Negative Rods  Growth in aerobic bottle: Gram Negative Rods    Culture - Urine (20 @ 22:01)    Specimen Source: .Urine Clean Catch (Midstream)    Culture Results:   >100,000 CFU/ml Gram Negative Rods    Legionella pneumophila Antigen, Urine (20 @ 23:00)    Legionella Antigen, Urine: Negative    Rapid Respiratory Viral Panel (20 @ 23:13)    Rapid RVP Result: NotDetec: This Respiratory Panel uses polymerase chain reaction (PCR) to detect for      RADIOLOGY:  < from: CT Chest No Cont (20 @ 19:03) >  EXAM:  CT CHEST                        PROCEDURE DATE:  2020    INTERPRETATION:  CLINICAL INFORMATION: Fall, chest pain  COMPARISON: CT chest   FINDINGS: The quality of the images are degraded by respiratory motion.    AIRWAYS, LUNGS and PLEURA: The left lower lobe bronchus and segmental branches of right lower lobe bronchus are obliterated by retained secretions. Patchy opacities within the bilateral lower lobes may represent pneumonia or aspiration pneumonitis.    Trace right pleural effusion. No pneumothorax.    MEDIASTINUM AND JOHNATHAN: No lymphadenopathy.    HEART AND VESSELS: Four-chamber dilatation of the heart. Coronary atherosclerosis. Calcification of the aortic valve and mitral annulus. Dilated main pulmonary artery measuring 4 cm. No pericardial effusion. Thoracic aorta normal in diameter.    VISUALIZED UPPER ABDOMEN: Cholelithiasis. Bilateral renal cysts. Aortic atherosclerosis. Metallic densities possibly Embolization coils within the left lobe of the liver. Splenic aneurysms.    CHEST WALL AND BONES: No displaced rib fracture. No aggressive osseous lesion. Scoliosis.    LOWER NECK: Within normal limits.    IMPRESSION:  Motion limited exam.    No displaced rib fracture. No pneumothorax.    The left lower lobe bronchus and segmental branches of right lower lobe bronchus are obliterated by retained secretions. Patchy opacities within the bilateral lower lobes may represent pneumonia or aspiration pneumonitis.    Cardiomegaly. Aortic valve and mitral annulus calcification. Coronary atherosclerosis.        < from: CT Head No Cont (20 @ 19:00) >  EXAM:  CT CERVICAL SPINE                        EXAM:  CT BRAIN                        PROCEDURE DATE:  2020    INTERPRETATION:  CLINICAL INFORMATION: Head injury.  TECHNIQUE: Noncontrast CT scan of the head and cervical spinewas performed. The cervical spine CT was performed with thin section axial images. Sagittal and coronal reformations were created.    COMPARISON: No similar prior studies available for comparison.    FINDINGS:    HEAD:  No acute intracranial hemorrhage, mass effect, or midline shift. The basal cisterns are patent. No abnormal extra-axial collections.   Cerebral volume loss is present with proportional prominence of the sulci and ventricles. Moderate periventricular and subcortical white matter hypoattenuation without mass effect is noted, non-specific, but likely related to chronic small vessel ischemic changes. Chronic right frontal infarct with encephalomalacia/gliosis. Left basal ganglia lacunar infarct. Chronic left basal ganglia lacunar infarct. Small vessel white matter ischemic changes are noted.  The calvarium is intact. The tympanomastoid cavities appear free of acute disease. Left maxillary sinus mucosal thickening. Bilateral cataracts surgery.  CERVICAL SPINE:  The normal cervical lordosis is maintained. Mild anterolisthesis of C2 on C3 and C3 on C4. No acute fracture or prevertebral soft tissue swelling. The craniocervical junction is unremarkable.   No vertebral disc height loss throughout cervical spine. Multilevel degenerative changes of the cervical spine characterized by marginal osteophyte formation, small disc bulges, and uncovertebral and facet joint arthrosis. Ankylosis of the left C2-3 facet. Varying degrees of spinal canal and foraminal narrowing, not well evaluated at CT.  The lung apices are unremarkable.    IMPRESSION:  Head CT: No displaced calvarial fracture or acute intracranial hemorrhage.   Cervical spine CT: No acute fracture.           EKG: CHIEF COMPLAINT: AMS / Hypoxic Respiratory Failure 2/2 PNA / Septic Shock 2/2 Bacteremia / Urosepsis    Interval Events: Remains on phenylephrine, +BC 4/4 Serratia / +Ucx- G- rods - high suspicion for Serratia, pt intermittently refusing medical treatment overnight.     89 yo female with a PMHx of afib on digoxin (not on AC), severe AS, hypothyroidism, COPD not on home O2, CLL (previously on Treanda and Rituxan), 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 Pt most recent admission -  admitted for weakness w/ mechanical fall and pre-renal KARRIE , and discharged to [facility].  Mrs Bustamante now re-admitted from [facility] for difficulty breathing, AMS / confused, febrile, hypotensive, w leukocytosis, lactic acidosis, worsening renal function, and was admitted to the MICU for pressor support. The pt remains on phenylephrine, now known to have +BC 4/4 Serratia and +Ucx- >100K G- rods- suspicious for Serratia.       REVIEW OF SYSTEMS:  unable to assess - pt refusing care- states " I do not want to be bothered."    OBJECTIVE:  ICU Vital Signs Last 24 Hrs  T(C): 37 (2020 04:00), Max: 37 (2020 11:00)  T(F): 98.6 (2020 04:00), Max: 98.6 (2020 11:00)  HR: 86 (2020 07:00) (64 - 86)  BP: 97/54 (2020 07:00) (80/41 - 234/123)  BP(mean): 70 (2020 07:00) (54 - 161)  ABP: --  ABP(mean): --  RR: 28 (2020 07:00) (12 - 49)  SpO2: 98% (2020 07:00) (84% - 100%)      I & O's     @ 07:01  -  - @ 07:00  --------------------------------------------------------  IN: 990.4 mL / OUT: 450 mL / NET: 540.4 mL      CAPILLARY BLOOD GLUCOSE        PHYSICAL EXAM:  General: appears w/o distress / comfortably reclining in bed  HEENT: normocephalic, atraumatic, lips dry   Lymph Nodes: non palp  Neck: supple, neg JVD  Respiratory: cahry equal BS / CTA anterior chest  Cardiovascular: S1S2, Systolic murmur lV/v  Abdomen: non distended, +BS x 4 hypoactive, non tender on palp  Extremities: neg edema  Skin: dry / fragile  Neurological: +PERRL, EOMI, following commands appropriately, RODRIGUEZ w equal strength    Psychiatry:    LINES: [facility] MEDICATIONS:  MEDICATIONS  (STANDING):  albuterol/ipratropium for Nebulization 3 milliLiter(s) Nebulizer every 6 hours  chlorhexidine 4% Liquid 1 Application(s) Topical <User Schedule>  heparin  Injectable 5000 Unit(s) SubCutaneous two times a day  lactulose Retention Enema 200 Gram(s) Rectal every 12 hours  levothyroxine Injectable 12.5 MICROGram(s) IV Push at bedtime  meropenem  IVPB 500 milliGRAM(s) IV Intermittent every 12 hours  midodrine 10 milliGRAM(s) Oral every 8 hours  phenylephrine    Infusion 0.131 MICROgram(s)/kG/Min (2 mL/Hr) IV Continuous <Continuous>  rifAXIMin 550 milliGRAM(s) Oral two times a day    MEDICATIONS  (PRN):      LABS:                        10.8   11.41 )-----------( 93       ( 2020 00:40 )             32.8     Hgb Trend: 10.8<--, 11.3<--, 12.0<--, 12.0<--, 11.9<--      147<H>  |  119<H>  |  54<H>  ----------------------------<  88  4.8   |  19<L>  |  1.49<H>    Ca    9.4      2020 00:40  Phos  3.0       Mg     1.8         TPro  4.7<L>  /  Alb  2.3<L>  /  TBili  1.8<H>  /  DBili  x   /  AST  37  /  ALT  17  /  Alk Phos  83      Creatinine Trend: 1.49<--, 1.60<--, 1.57<--, 1.69<--, 1.24<--, 1.32<--  PT/INR - ( 2020 00:40 )   PT: 17.5 sec;   INR: 1.50 ratio         PTT - ( 2020 00:40 )  PTT:31.2 sec  Urinalysis Basic - ( 2020 18:46 )    Color: Yellow / Appearance: Slightly Turbid / S.020 / pH: x  Gluc: x / Ketone: Negative  / Bili: Negative / Urobili: Negative   Blood: x / Protein: 30 mg/dL / Nitrite: Positive   Leuk Esterase: Negative / RBC: 1 /hpf / WBC 1 /HPF   Sq Epi: x / Non Sq Epi: 9 /hpf / Bacteria: Many        Venous Blood Gas:   @ 00:32  7.32/41/43/21/70  VBG Lactate: 2.3  Venous Blood Gas:   @ 05:45  7.34/40/39/21/66  VBG Lactate: 2.7  Venous Blood Gas:   @ 20:48  7.36/36/32/20/54  VBG Lactate: 2.9  Venous Blood Gas:   @ 18:39  7.38/35/34/20/55  VBG Lactate: 3.6      MICROBIOLOGY:   Culture - Blood (20 @ 22:02)    Gram Stain:   Growth in anaerobic bottle: Gram Negative Rods    Specimen Source: .Blood Blood-Peripheral    Culture Results:   Growth in anaerobic bottle: Gram Negative Rods    Culture - Blood (20 @ 22:02)    Gram Stain:   Growth in anaerobic bottle: Gram Negative Rods  Growth in aerobic bottle: Gram Negative Rods    -  Serratia marcescens: Detec    Specimen Source: .Blood Blood-Peripheral    Organism: Blood Culture PCR    Culture Results:   Growth in anaerobic bottle: Gram Negative Rods  Growth in aerobic bottle: Gram Negative Rods    Culture - Urine (20 @ 22:01)    Specimen Source: .Urine Clean Catch (Midstream)    Culture Results:   >100,000 CFU/ml Gram Negative Rods    Legionella pneumophila Antigen, Urine (20 @ 23:00)    Legionella Antigen, Urine: Negative    Rapid Respiratory Viral Panel (20 @ 23:13)    Rapid RVP Result: NotDetec: This Respiratory Panel uses polymerase chain reaction (PCR) to detect for      RADIOLOGY:  < from: CT Chest No Cont (20 @ 19:03) >  EXAM:  CT CHEST                        PROCEDURE DATE:  2020    INTERPRETATION:  CLINICAL INFORMATION: Fall, chest pain  COMPARISON: CT chest   FINDINGS: The quality of the images are degraded by respiratory motion.    AIRWAYS, LUNGS and PLEURA: The left lower lobe bronchus and segmental branches of right lower lobe bronchus are obliterated by retained secretions. Patchy opacities within the bilateral lower lobes may represent pneumonia or aspiration pneumonitis.    Trace right pleural effusion. No pneumothorax.    MEDIASTINUM AND JOHNATHAN: No lymphadenopathy.    HEART AND VESSELS: Four-chamber dilatation of the heart. Coronary atherosclerosis. Calcification of the aortic valve and mitral annulus. Dilated main pulmonary artery measuring 4 cm. No pericardial effusion. Thoracic aorta normal in diameter.    VISUALIZED UPPER ABDOMEN: Cholelithiasis. Bilateral renal cysts. Aortic atherosclerosis. Metallic densities possibly Embolization coils within the left lobe of the liver. Splenic aneurysms.    CHEST WALL AND BONES: No displaced rib fracture. No aggressive osseous lesion. Scoliosis.    LOWER NECK: Within normal limits.    IMPRESSION:  Motion limited exam.    No displaced rib fracture. No pneumothorax.    The left lower lobe bronchus and segmental branches of right lower lobe bronchus are obliterated by retained secretions. Patchy opacities within the bilateral lower lobes may represent pneumonia or aspiration pneumonitis.    Cardiomegaly. Aortic valve and mitral annulus calcification. Coronary atherosclerosis.        < from: CT Head No Cont (20 @ 19:00) >  EXAM:  CT CERVICAL SPINE                        EXAM:  CT BRAIN                        PROCEDURE DATE:  2020    INTERPRETATION:  CLINICAL INFORMATION: Head injury.  TECHNIQUE: Noncontrast CT scan of the head and cervical spinewas performed. The cervical spine CT was performed with thin section axial images. Sagittal and coronal reformations were created.    COMPARISON: No similar prior studies available for comparison.    FINDINGS:    HEAD:  No acute intracranial hemorrhage, mass effect, or midline shift. The basal cisterns are patent. No abnormal extra-axial collections.   Cerebral volume loss is present with proportional prominence of the sulci and ventricles. Moderate periventricular and subcortical white matter hypoattenuation without mass effect is noted, non-specific, but likely related to chronic small vessel ischemic changes. Chronic right frontal infarct with encephalomalacia/gliosis. Left basal ganglia lacunar infarct. Chronic left basal ganglia lacunar infarct. Small vessel white matter ischemic changes are noted.  The calvarium is intact. The tympanomastoid cavities appear free of acute disease. Left maxillary sinus mucosal thickening. Bilateral cataracts surgery.  CERVICAL SPINE:  The normal cervical lordosis is maintained. Mild anterolisthesis of C2 on C3 and C3 on C4. No acute fracture or prevertebral soft tissue swelling. The craniocervical junction is unremarkable.   No vertebral disc height loss throughout cervical spine. Multilevel degenerative changes of the cervical spine characterized by marginal osteophyte formation, small disc bulges, and uncovertebral and facet joint arthrosis. Ankylosis of the left C2-3 facet. Varying degrees of spinal canal and foraminal narrowing, not well evaluated at CT.  The lung apices are unremarkable.    IMPRESSION:  Head CT: No displaced calvarial fracture or acute intracranial hemorrhage.   Cervical spine CT: No acute fracture.           EKG: Afib

## 2020-01-21 NOTE — DIETITIAN INITIAL EVALUATION ADULT. - OTHER INFO
Pt was admitted for septic shock related to PNA and UTI. Pt was asleep and unarousable. Per previous RD note (1/7/2020), pt has chewing and swallowing difficulties. "Pt regularly sees speech pathologist who recommends bite-sized foods and nectar thick liqiuds." Pt on lactulose with last BM 1/20. Per RD note (1/7), pt followed a low sodium diet at home. 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     Dosing wt: 89.9 lbs. Pt was 108 lbs during last admission (1/6). Indicating a 18 lbs wt loss x2wks. No ht in current chart,     Education: Deferred at this time. Pt was admitted for septic shock related to PNA and UTI. Pt with history of cirrhosis, KARRIE on CKD stage3. Pt was asleep and unarousable. Per previous RD note (1/7/2020), pt has chewing and swallowing difficulties. "Pt regularly sees speech pathologist who recommends bite-sized foods and nectar thick liqiuds." Pt on lactulose with last BM 1/20. Per RD note (1/7), pt followed a low sodium diet at home. 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     Dosing wt: 89.9 lbs. No daily wts to address. Pt was 108 lbs during last admission (1/6). Indicating a 18 lbs wt loss x2wks. No ht in current chart, per previous RD note, pt 63''    Education: Deferred at this time. Pt was admitted for septic shock related to PNA and UTI. Pt with history of cirrhosis, KARRIE on CKD stage3. Pt was asleep and unarousable. Per previous RD note (1/7/2020), pt has chewing and swallowing difficulties. "Pt regularly sees speech pathologist who recommends bite-sized foods and nectar thick liqiuds." Pt on lactulose with last BM 1/20. Per RD note (1/7), pt followed a low sodium diet at home. 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 Pt's daughter previously requested pt be put on DASH diet in-house.    Dosing wt: 89.9 lbs. No daily wts to address. Pt was 108 lbs during last admission (1/6). Indicating a 18 lbs wt loss x2wks. No ht in current chart, per previous RD note, pt 63''    Education: Deferred at this time. Pt was admitted for septic shock related to PNA and UTI. Pt with history of cirrhosis, KARRIE on CKD stage 3. Pt was asleep and unarousable. However daughter at bedside and able to provide information. Pt has a history of chewing and swallowing difficulties; sees a speech pathologist outpatient and was following a dysphagia 2 diet with nectar thick liquids. Pt was tolerating this diet consistency well however needs assistances with eating, per daughter. Daughter states that pt's PO intake has been poor for a few weeks in setting of recurrent illness. Pt followed a low sodium diet at home. Pt takes Vitamin C and Vitamin D supplement at home, and will supplement PO intake with Mighty Shakes. 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 Pt is pending speech and swallow, daughter wants to know what nutrition options are available before EN provision is instated. Pt's daughter previously requested pt be put on DASH diet in-house. Pt on lactulose with frequent diarrhea. Last BM 1/20.     Dosing wt: 89.9 lbs. No daily wts to address. Pt was 108 lbs during last admission (1/6).  Wt change questionable. Unable to obtain bedscale wt, as scale is not functioning. Daughter believes that pt has lost wt in setting of poor PO intake. No ht in current chart, per previous RD note, pt 63''    Education: Deferred at this time.    Nutrition focused physical exam is not appropriate at this time. However visual signs of moderate temporal muscle wasting. Pt was admitted for septic shock related to PNA and UTI. Pt with history of cirrhosis, KARRIE on CKD stage 3. Pt was asleep and unarousable. However daughter at bedside and able to provide information. Pt has a history of chewing and swallowing difficulties; sees a speech pathologist outpatient and was following a dysphagia 2 diet with nectar thick liquids. Pt was tolerating this diet consistency well however needs assistances with eating, per daughter. Daughter states that pt's PO intake has been poor for a few weeks in setting of recurrent illness. Pt followed a low sodium diet at home. Pt takes Vitamin C and Vitamin D supplement at home, and will supplement PO intake with Mighty Shakes. 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 Pt is pending speech and swallow, daughter wants to know what nutrition options are available before EN provision is initiated. Pt on lactulose with frequent diarrhea. Last BM 1/20.     Dosing wt: 89.9 lbs. No daily wts to address. Pt was 108 lbs during last admission (1/6). Upon last admission pt reported a UBW of 108-110lbs. Wt change questionable. Unable to obtain bed scale wt, as scale is not functioning. Daughter believes that pt has lost wt in setting of poor PO intake. No ht in current chart, per previous RD note, pt 63''    Education: Deferred at this time.    Nutrition focused physical exam is not appropriate at this time. However visual signs of moderate temporal muscle wasting and moderate fat orbital wasting.

## 2020-01-21 NOTE — PHYSICAL THERAPY INITIAL EVALUATION ADULT - BED MOBILITY TRAINING, PT EVAL
GOAL: Patient will perform bed mobility mod Ax1, in 4 weeks. GOAL: Patient will perform bed mobility min Ax1, in 4 weeks.

## 2020-01-22 LAB
-  AMIKACIN: SIGNIFICANT CHANGE UP
-  AMIKACIN: SIGNIFICANT CHANGE UP
-  AMOXICILLIN/CLAVULANIC ACID: SIGNIFICANT CHANGE UP
-  AMPICILLIN/SULBACTAM: SIGNIFICANT CHANGE UP
-  AMPICILLIN/SULBACTAM: SIGNIFICANT CHANGE UP
-  AMPICILLIN: SIGNIFICANT CHANGE UP
-  AMPICILLIN: SIGNIFICANT CHANGE UP
-  AZTREONAM: SIGNIFICANT CHANGE UP
-  AZTREONAM: SIGNIFICANT CHANGE UP
-  CEFAZOLIN: SIGNIFICANT CHANGE UP
-  CEFAZOLIN: SIGNIFICANT CHANGE UP
-  CEFEPIME: SIGNIFICANT CHANGE UP
-  CEFEPIME: SIGNIFICANT CHANGE UP
-  CEFOXITIN: SIGNIFICANT CHANGE UP
-  CEFOXITIN: SIGNIFICANT CHANGE UP
-  CEFTRIAXONE: SIGNIFICANT CHANGE UP
-  CEFTRIAXONE: SIGNIFICANT CHANGE UP
-  CIPROFLOXACIN: SIGNIFICANT CHANGE UP
-  CIPROFLOXACIN: SIGNIFICANT CHANGE UP
-  ERTAPENEM: SIGNIFICANT CHANGE UP
-  ERTAPENEM: SIGNIFICANT CHANGE UP
-  GENTAMICIN: SIGNIFICANT CHANGE UP
-  GENTAMICIN: SIGNIFICANT CHANGE UP
-  LEVOFLOXACIN: SIGNIFICANT CHANGE UP
-  LEVOFLOXACIN: SIGNIFICANT CHANGE UP
-  MEROPENEM: SIGNIFICANT CHANGE UP
-  MEROPENEM: SIGNIFICANT CHANGE UP
-  PIPERACILLIN/TAZOBACTAM: SIGNIFICANT CHANGE UP
-  PIPERACILLIN/TAZOBACTAM: SIGNIFICANT CHANGE UP
-  TOBRAMYCIN: SIGNIFICANT CHANGE UP
-  TOBRAMYCIN: SIGNIFICANT CHANGE UP
-  TRIMETHOPRIM/SULFAMETHOXAZOLE: SIGNIFICANT CHANGE UP
-  TRIMETHOPRIM/SULFAMETHOXAZOLE: SIGNIFICANT CHANGE UP
ALBUMIN SERPL ELPH-MCNC: 2.3 G/DL — LOW (ref 3.3–5)
ALP SERPL-CCNC: 81 U/L — SIGNIFICANT CHANGE UP (ref 40–120)
ALT FLD-CCNC: 20 U/L — SIGNIFICANT CHANGE UP (ref 10–45)
AMMONIA BLD-MCNC: 28 UMOL/L — SIGNIFICANT CHANGE UP (ref 11–55)
AMMONIA BLD-MCNC: 37 UMOL/L — SIGNIFICANT CHANGE UP (ref 11–55)
ANION GAP SERPL CALC-SCNC: 8 MMOL/L — SIGNIFICANT CHANGE UP (ref 5–17)
ANION GAP SERPL CALC-SCNC: 8 MMOL/L — SIGNIFICANT CHANGE UP (ref 5–17)
APTT BLD: 26.2 SEC — LOW (ref 27.5–36.3)
AST SERPL-CCNC: 36 U/L — SIGNIFICANT CHANGE UP (ref 10–40)
BASE EXCESS BLDV CALC-SCNC: -0.6 MMOL/L — SIGNIFICANT CHANGE UP (ref -2–2)
BILIRUB SERPL-MCNC: 1.7 MG/DL — HIGH (ref 0.2–1.2)
BUN SERPL-MCNC: 42 MG/DL — HIGH (ref 7–23)
BUN SERPL-MCNC: 49 MG/DL — HIGH (ref 7–23)
CA-I SERPL-SCNC: 1.34 MMOL/L — HIGH (ref 1.12–1.3)
CALCIUM SERPL-MCNC: 8.8 MG/DL — SIGNIFICANT CHANGE UP (ref 8.4–10.5)
CALCIUM SERPL-MCNC: 9.6 MG/DL — SIGNIFICANT CHANGE UP (ref 8.4–10.5)
CHLORIDE BLDV-SCNC: >120 MMOL/L — HIGH (ref 96–108)
CHLORIDE SERPL-SCNC: 116 MMOL/L — HIGH (ref 96–108)
CHLORIDE SERPL-SCNC: 121 MMOL/L — HIGH (ref 96–108)
CO2 BLDV-SCNC: 25 MMOL/L — SIGNIFICANT CHANGE UP (ref 22–30)
CO2 SERPL-SCNC: 20 MMOL/L — LOW (ref 22–31)
CO2 SERPL-SCNC: 21 MMOL/L — LOW (ref 22–31)
CREAT SERPL-MCNC: 1.23 MG/DL — SIGNIFICANT CHANGE UP (ref 0.5–1.3)
CREAT SERPL-MCNC: 1.32 MG/DL — HIGH (ref 0.5–1.3)
CULTURE RESULTS: SIGNIFICANT CHANGE UP
GAS PNL BLDV: 143 MMOL/L — SIGNIFICANT CHANGE UP (ref 135–145)
GAS PNL BLDV: SIGNIFICANT CHANGE UP
GAS PNL BLDV: SIGNIFICANT CHANGE UP
GLUCOSE BLDC GLUCOMTR-MCNC: 122 MG/DL — HIGH (ref 70–99)
GLUCOSE BLDV-MCNC: 104 MG/DL — HIGH (ref 70–99)
GLUCOSE SERPL-MCNC: 109 MG/DL — HIGH (ref 70–99)
GLUCOSE SERPL-MCNC: 150 MG/DL — HIGH (ref 70–99)
HCO3 BLDV-SCNC: 24 MMOL/L — SIGNIFICANT CHANGE UP (ref 21–29)
HCT VFR BLD CALC: 32.3 % — LOW (ref 34.5–45)
HCT VFR BLDA CALC: 34 % — LOW (ref 39–50)
HGB BLD CALC-MCNC: 10.9 G/DL — LOW (ref 11.5–15.5)
HGB BLD-MCNC: 10.5 G/DL — LOW (ref 11.5–15.5)
HOROWITZ INDEX BLDV+IHG-RTO: 24 — SIGNIFICANT CHANGE UP
INR BLD: 1.32 RATIO — HIGH (ref 0.88–1.16)
LACTATE BLDV-MCNC: 1.9 MMOL/L — SIGNIFICANT CHANGE UP (ref 0.7–2)
MAGNESIUM SERPL-MCNC: 1.7 MG/DL — SIGNIFICANT CHANGE UP (ref 1.6–2.6)
MAGNESIUM SERPL-MCNC: 2.8 MG/DL — HIGH (ref 1.6–2.6)
MCHC RBC-ENTMCNC: 32.5 GM/DL — SIGNIFICANT CHANGE UP (ref 32–36)
MCHC RBC-ENTMCNC: 33.3 PG — SIGNIFICANT CHANGE UP (ref 27–34)
MCV RBC AUTO: 102.5 FL — HIGH (ref 80–100)
METHOD TYPE: SIGNIFICANT CHANGE UP
METHOD TYPE: SIGNIFICANT CHANGE UP
NRBC # BLD: 0 /100 WBCS — SIGNIFICANT CHANGE UP (ref 0–0)
ORGANISM # SPEC MICROSCOPIC CNT: SIGNIFICANT CHANGE UP
OTHER CELLS CSF MANUAL: 10 ML/DL — LOW (ref 18–22)
PCO2 BLDV: 39 MMHG — SIGNIFICANT CHANGE UP (ref 35–50)
PH BLDV: 7.4 — SIGNIFICANT CHANGE UP (ref 7.35–7.45)
PHOSPHATE SERPL-MCNC: 2.3 MG/DL — LOW (ref 2.5–4.5)
PHOSPHATE SERPL-MCNC: 2.7 MG/DL — SIGNIFICANT CHANGE UP (ref 2.5–4.5)
PLATELET # BLD AUTO: 74 K/UL — LOW (ref 150–400)
PO2 BLDV: 36 MMHG — SIGNIFICANT CHANGE UP (ref 25–45)
POTASSIUM BLDV-SCNC: 4.5 MMOL/L — SIGNIFICANT CHANGE UP (ref 3.5–5.3)
POTASSIUM SERPL-MCNC: 4.9 MMOL/L — SIGNIFICANT CHANGE UP (ref 3.5–5.3)
POTASSIUM SERPL-MCNC: 5 MMOL/L — SIGNIFICANT CHANGE UP (ref 3.5–5.3)
POTASSIUM SERPL-SCNC: 4.9 MMOL/L — SIGNIFICANT CHANGE UP (ref 3.5–5.3)
POTASSIUM SERPL-SCNC: 5 MMOL/L — SIGNIFICANT CHANGE UP (ref 3.5–5.3)
PROT SERPL-MCNC: 5 G/DL — LOW (ref 6–8.3)
PROTHROM AB SERPL-ACNC: 15.2 SEC — HIGH (ref 10–12.9)
RBC # BLD: 3.15 M/UL — LOW (ref 3.8–5.2)
RBC # FLD: 15.7 % — HIGH (ref 10.3–14.5)
SAO2 % BLDV: 65 % — LOW (ref 67–88)
SODIUM SERPL-SCNC: 145 MMOL/L — SIGNIFICANT CHANGE UP (ref 135–145)
SODIUM SERPL-SCNC: 149 MMOL/L — HIGH (ref 135–145)
SPECIMEN SOURCE: SIGNIFICANT CHANGE UP
WBC # BLD: 5.38 K/UL — SIGNIFICANT CHANGE UP (ref 3.8–10.5)
WBC # FLD AUTO: 5.38 K/UL — SIGNIFICANT CHANGE UP (ref 3.8–10.5)

## 2020-01-22 PROCEDURE — 99233 SBSQ HOSP IP/OBS HIGH 50: CPT

## 2020-01-22 PROCEDURE — 71045 X-RAY EXAM CHEST 1 VIEW: CPT | Mod: 26

## 2020-01-22 PROCEDURE — 99233 SBSQ HOSP IP/OBS HIGH 50: CPT | Mod: GC

## 2020-01-22 RX ORDER — LACTULOSE 10 G/15ML
10 SOLUTION ORAL
Refills: 0 | Status: DISCONTINUED | OUTPATIENT
Start: 2020-01-22 | End: 2020-02-03

## 2020-01-22 RX ORDER — SODIUM CHLORIDE 9 MG/ML
1000 INJECTION, SOLUTION INTRAVENOUS
Refills: 0 | Status: DISCONTINUED | OUTPATIENT
Start: 2020-01-22 | End: 2020-01-22

## 2020-01-22 RX ORDER — POTASSIUM PHOSPHATE, MONOBASIC POTASSIUM PHOSPHATE, DIBASIC 236; 224 MG/ML; MG/ML
15 INJECTION, SOLUTION INTRAVENOUS ONCE
Refills: 0 | Status: COMPLETED | OUTPATIENT
Start: 2020-01-22 | End: 2020-01-22

## 2020-01-22 RX ORDER — DIGOXIN 250 MCG
0.12 TABLET ORAL DAILY
Refills: 0 | Status: DISCONTINUED | OUTPATIENT
Start: 2020-01-22 | End: 2020-01-22

## 2020-01-22 RX ORDER — IPRATROPIUM/ALBUTEROL SULFATE 18-103MCG
3 AEROSOL WITH ADAPTER (GRAM) INHALATION EVERY 6 HOURS
Refills: 0 | Status: DISCONTINUED | OUTPATIENT
Start: 2020-01-22 | End: 2020-02-13

## 2020-01-22 RX ORDER — DIGOXIN 250 MCG
0.12 TABLET ORAL EVERY OTHER DAY
Refills: 0 | Status: DISCONTINUED | OUTPATIENT
Start: 2020-01-22 | End: 2020-02-06

## 2020-01-22 RX ORDER — SODIUM CHLORIDE 9 MG/ML
500 INJECTION, SOLUTION INTRAVENOUS ONCE
Refills: 0 | Status: COMPLETED | OUTPATIENT
Start: 2020-01-22 | End: 2020-01-22

## 2020-01-22 RX ORDER — SODIUM CHLORIDE 9 MG/ML
1000 INJECTION, SOLUTION INTRAVENOUS
Refills: 0 | Status: DISCONTINUED | OUTPATIENT
Start: 2020-01-22 | End: 2020-01-23

## 2020-01-22 RX ORDER — MIDODRINE HYDROCHLORIDE 2.5 MG/1
10 TABLET ORAL EVERY 8 HOURS
Refills: 0 | Status: DISCONTINUED | OUTPATIENT
Start: 2020-01-22 | End: 2020-01-23

## 2020-01-22 RX ADMIN — LACTULOSE 10 GRAM(S): 10 SOLUTION ORAL at 13:47

## 2020-01-22 RX ADMIN — MIDODRINE HYDROCHLORIDE 10 MILLIGRAM(S): 2.5 TABLET ORAL at 22:21

## 2020-01-22 RX ADMIN — SODIUM CHLORIDE 50 MILLILITER(S): 9 INJECTION, SOLUTION INTRAVENOUS at 22:21

## 2020-01-22 RX ADMIN — Medication 12.5 MICROGRAM(S): at 22:21

## 2020-01-22 RX ADMIN — Medication 3 MILLILITER(S): at 11:52

## 2020-01-22 RX ADMIN — Medication 0.12 MILLIGRAM(S): at 13:47

## 2020-01-22 RX ADMIN — SODIUM CHLORIDE 2000 MILLILITER(S): 9 INJECTION, SOLUTION INTRAVENOUS at 18:00

## 2020-01-22 RX ADMIN — POTASSIUM PHOSPHATE, MONOBASIC POTASSIUM PHOSPHATE, DIBASIC 62.5 MILLIMOLE(S): 236; 224 INJECTION, SOLUTION INTRAVENOUS at 02:28

## 2020-01-22 RX ADMIN — MEROPENEM 100 MILLIGRAM(S): 1 INJECTION INTRAVENOUS at 06:21

## 2020-01-22 RX ADMIN — CHLORHEXIDINE GLUCONATE 1 APPLICATION(S): 213 SOLUTION TOPICAL at 06:22

## 2020-01-22 RX ADMIN — MEROPENEM 100 MILLIGRAM(S): 1 INJECTION INTRAVENOUS at 18:18

## 2020-01-22 RX ADMIN — LACTULOSE 200 GRAM(S): 10 SOLUTION ORAL at 06:23

## 2020-01-22 RX ADMIN — SODIUM CHLORIDE 75 MILLILITER(S): 9 INJECTION, SOLUTION INTRAVENOUS at 02:28

## 2020-01-22 RX ADMIN — HEPARIN SODIUM 5000 UNIT(S): 5000 INJECTION INTRAVENOUS; SUBCUTANEOUS at 06:21

## 2020-01-22 RX ADMIN — Medication 3 MILLILITER(S): at 18:33

## 2020-01-22 RX ADMIN — LACTULOSE 10 GRAM(S): 10 SOLUTION ORAL at 23:32

## 2020-01-22 RX ADMIN — HEPARIN SODIUM 5000 UNIT(S): 5000 INJECTION INTRAVENOUS; SUBCUTANEOUS at 18:17

## 2020-01-22 RX ADMIN — LACTULOSE 10 GRAM(S): 10 SOLUTION ORAL at 18:18

## 2020-01-22 NOTE — PROGRESS NOTE ADULT - ASSESSMENT
*****INCOMPLETE******  *****INCOMPLETE******  *****INCOMPLETE******  *****INCOMPLETE******  *****INCOMPLETE******  *****INCOMPLETE******  *****INCOMPLETE******  *****INCOMPLETE******  89F with AF on digoxin (not on AC), Severe AS, COPD (not on home O2), CLL, HCC with Portal HTN c/b esophageal varices s/p banding, CKD3 (baseline Cr 1.2) and PVD a/w septic shock 2/2 serratia bacteremia from urosepsis vs. PNA. TTE 2018 demonstrated severe AS and moderate MR. Currently HDS on pressures. Appears dry with hypernatremia/hyperkalemia consistent with overall hypovolemic status.    Impressions:   Septic Shock 2/2 serratia bacteremia from uropsepsis vs. PNA.   Paroxysmal Atrial Fibrillation  Severe Aortic Stenosis    Recommendations:  1: Agree with increased IVF with DW5 given hypernatremia and total free water deficit of 1.5 L.   2: Hold spironolactone given hypovolemia  3: Continue digoxin for rate controlled AF.  4: Nutrition consult for cachexia, severe malnutrition with hypoalbuminemia.   5: Not candidate for anticoagulation given history of esophageal varices and history of cerebral hemorrhage on warfarin.  6: Recent falls in past year concerning for symptomatic aortic stenosis. Will need outpatient follow up with cardiology (Dr. Boston) for ongoing care. 89F with AF on digoxin (not on AC), Severe AS, COPD (not on home O2), CLL, HCC with Portal HTN c/b esophageal varices s/p banding, CKD3 (baseline Cr 1.2) and PVD a/w septic shock 2/2 serratia bacteremia from urosepsis vs. PNA. TTE 2018 demonstrated severe AS and moderate MR. Currently HDS on pressures. Appears dry with hypernatremia/hyperkalemia consistent with overall hypovolemic status.    Impressions:   Septic Shock 2/2 serratia bacteremia from uropsepsis vs. PNA.   Paroxysmal Atrial Fibrillation  Severe Aortic Stenosis    Recommendations:  1: Agree with increased IVF with DW5 given hypernatremia and total free water deficit of 1.5 L.   2: Hold spironolactone given hypovolemia  3: Continue digoxin for rate controlled AF.  4: Nutrition consult for cachexia, severe malnutrition with hypoalbuminemia.   5: Not candidate for anticoagulation given history of esophageal varices and history of cerebral hemorrhage on warfarin.  6: Recent falls in past year concerning for symptomatic aortic stenosis. Will need outpatient follow up with cardiology (Dr. Boston) for ongoing care.

## 2020-01-22 NOTE — PROGRESS NOTE ADULT - SUBJECTIVE AND OBJECTIVE BOX
CARDIOLOGY PROGRESS NOTE    Subjective/24 hour events:   - No overnight events. successfully weaned off vasopressor at 5pm on 1/21.  - Denies chest pain, palpitations, SOB, lightheadedness, dizziness.    Telemetry: AF HR 90-110s. Some PVCs    Review of Systems:  Constitutional: [ ] Fever [ ] Chills [ ] Fatigue [ ] Weight change   HEENT: [ ] Headache [ ] Vision changes [ ] Eye Pain [ ] Difficulty Hearing [ ] Tinnitus [ ] Vertigo [ ] Runny nose [ ] Sore Throat   Respiratory: [ ] Cough [ ] Wheezing [ ] Shortness of breath [ ] Hemoptysis  Cardiovascular: [ ] Chest Pain [ ] Palpitations [ ] LYNNE [ ] PND [ ] Orthopnea [ ] Leg Swelling  Gastrointestinal: [ ] Nausea [ ] Vomiting [ ] Abdominal Pain [ ] Diarrhea [ ] Constipation [ ] Melena [ ] Hematochezia  Genitourinary: [ ] Nocturia [ ] Dysuria [ ] Incontinence  Musculoskeletal: [ ] Joint pain [ ] Joint swelling [ ] Muscle pain  Neurologic: [ ] Focal deficit [ ] Paresthesias [ ] Tremors [ ] Memory loss  Hematologic: [ ] Easy bruising  Endocrine: [ ] Cold intolerance [ ] Heat intolerance  Lymphatic: [ ] Swelling [ ] Lymphadenopathy   Skin: [ ] Rash [ ] Itching [ ] Ecchymoses [ ] Wounds [ ] Lesions  Psychiatry: [ ] Depression [ ] Anxiety [ ] Suicidal/Homicidal Ideation [ ] Sleep Disturbances  [x] 10 point review of systems is otherwise negative except as mentioned above              [ ] Unable to obtain    MEDICATIONS  (STANDING):  albuterol/ipratropium for Nebulization 3 milliLiter(s) Nebulizer every 6 hours  chlorhexidine 4% Liquid 1 Application(s) Topical <User Schedule>  dextrose 5%. 1000 milliLiter(s) (75 mL/Hr) IV Continuous <Continuous>  heparin  Injectable 5000 Unit(s) SubCutaneous two times a day  lactulose Retention Enema 200 Gram(s) Rectal every 12 hours  levothyroxine Injectable 12.5 MICROGram(s) IV Push at bedtime  meropenem  IVPB 500 milliGRAM(s) IV Intermittent every 12 hours  rifAXIMin 550 milliGRAM(s) Oral two times a day    MEDICATIONS  (PRN):      Vital Signs Last 24 Hrs  T(C): 36.7 (22 Jan 2020 07:00), Max: 36.7 (21 Jan 2020 12:00)  T(F): 98 (22 Jan 2020 07:00), Max: 98.1 (21 Jan 2020 12:00)  HR: 104 (22 Jan 2020 07:00) (78 - 133)  BP: 104/57 (22 Jan 2020 07:00) (74/50 - 299/227)  BP(mean): 74 (22 Jan 2020 07:00) (57 - 255)  RR: 18 (22 Jan 2020 07:00) (14 - 44)  SpO2: 98% (22 Jan 2020 07:00) (75% - 100%)    I&O's Summary    21 Jan 2020 07:01  -  22 Jan 2020 07:00  --------------------------------------------------------  IN: 1513.7 mL / OUT: 850 mL / NET: 663.7 mL        Physical Exam:  General: No distress. Comfortable  HEENT: EOMI	  Neck: JVP not elevated  Chest: Clear to auscultation bilaterally  CV: RRR. Normal S1 and S2. No murmurs, rubs, or gallops. No edema.  Abdomen: Soft, non-distended, non-tender  Skin: No rashes, ecchymoses, or skin breakdown  Extremities: Warm.  Neuro: Alert and oriented x 3. No focal deficits.  Psych: Mood and affect appropriate    Labs:                        10.5   5.38  )-----------( 74       ( 22 Jan 2020 01:04 )             32.3     01-22    149<H>  |  121<H>  |  49<H>  ----------------------------<  109<H>  5.0   |  20<L>  |  1.32<H>    Ca    9.6      22 Jan 2020 01:04  Phos  2.3     01-22  Mg     1.7     01-22    TPro  5.0<L>  /  Alb  2.3<L>  /  TBili  1.7<H>  /  DBili  x   /  AST  36  /  ALT  20  /  AlkPhos  81  01-22    PT/INR - ( 22 Jan 2020 01:04 )   PT: 15.2 sec;   INR: 1.32 ratio         PTT - ( 22 Jan 2020 01:04 )  PTT:26.2 sec        pro-BNP: Serum Pro-Brain Natriuretic Peptide: 9585 pg/mL (09-10 @ 06:58)    Lipid Profile:   HgA1c:   TSH:     CARDIOVASCULAR DIAGNOSTIC TESTING:  [] Echocardiogram:  [] Stress Test:  [] Catheterization:    RADIOLOGY: CARDIOLOGY PROGRESS NOTE    Subjective/24 hour events:   - No overnight events. successfully weaned off vasopressor at 5pm on 1/21.  - Denies chest pain, palpitations, SOB, lightheadedness, dizziness.    Telemetry: AF HR 90-110s. Some PVCs    Review of Systems:  Constitutional: [ ] Fever [ ] Chills [ ] Fatigue [ ] Weight change   HEENT: [ ] Headache [ ] Vision changes [ ] Eye Pain [ ] Difficulty Hearing [ ] Tinnitus [ ] Vertigo [ ] Runny nose [ ] Sore Throat   Respiratory: [ ] Cough [ ] Wheezing [ ] Shortness of breath [ ] Hemoptysis  Cardiovascular: [ ] Chest Pain [ ] Palpitations [ ] LYNNE [ ] PND [ ] Orthopnea [ ] Leg Swelling  Gastrointestinal: [ ] Nausea [ ] Vomiting [ ] Abdominal Pain [ ] Diarrhea [ ] Constipation [ ] Melena [ ] Hematochezia  Genitourinary: [ ] Nocturia [ ] Dysuria [ ] Incontinence  Musculoskeletal: [ ] Joint pain [ ] Joint swelling [ ] Muscle pain  Neurologic: [ ] Focal deficit [ ] Paresthesias [ ] Tremors [ ] Memory loss  Hematologic: [ ] Easy bruising  Endocrine: [ ] Cold intolerance [ ] Heat intolerance  Lymphatic: [ ] Swelling [ ] Lymphadenopathy   Skin: [ ] Rash [ ] Itching [ ] Ecchymoses [ ] Wounds [ ] Lesions  Psychiatry: [ ] Depression [ ] Anxiety [ ] Suicidal/Homicidal Ideation [ ] Sleep Disturbances  [x] 10 point review of systems is otherwise negative except as mentioned above              [ ] Unable to obtain    MEDICATIONS  (STANDING):  albuterol/ipratropium for Nebulization 3 milliLiter(s) Nebulizer every 6 hours  chlorhexidine 4% Liquid 1 Application(s) Topical <User Schedule>  dextrose 5%. 1000 milliLiter(s) (75 mL/Hr) IV Continuous <Continuous>  heparin  Injectable 5000 Unit(s) SubCutaneous two times a day  lactulose Retention Enema 200 Gram(s) Rectal every 12 hours  levothyroxine Injectable 12.5 MICROGram(s) IV Push at bedtime  meropenem  IVPB 500 milliGRAM(s) IV Intermittent every 12 hours  rifAXIMin 550 milliGRAM(s) Oral two times a day    MEDICATIONS  (PRN):      Vital Signs Last 24 Hrs  T(C): 36.7 (22 Jan 2020 07:00), Max: 36.7 (21 Jan 2020 12:00)  T(F): 98 (22 Jan 2020 07:00), Max: 98.1 (21 Jan 2020 12:00)  HR: 104 (22 Jan 2020 07:00) (78 - 133)  BP: 104/57 (22 Jan 2020 07:00) (74/50 - 299/227)  BP(mean): 74 (22 Jan 2020 07:00) (57 - 255)  RR: 18 (22 Jan 2020 07:00) (14 - 44)  SpO2: 98% (22 Jan 2020 07:00) (75% - 100%)    I&O's Summary    21 Jan 2020 07:01  -  22 Jan 2020 07:00  --------------------------------------------------------  IN: 1513.7 mL / OUT: 850 mL / NET: 663.7 mL        Physical Exam:  General: Cachexia. No distress. Comfortable.   HEENT: EOMI. Temporal wasting.   Neck: JVP not elevated  Chest: Clear to auscultation bilaterally  CV: RRR. Normal S1 and S2. No murmurs, rubs, or gallops. No edema.  Abdomen: Soft, non-distended, non-tender  Skin: No rashes, ecchymoses, or skin breakdown  Extremities: Warm.  Neuro: Alert and oriented x 3. No focal deficits.  Psych: Mood and affect appropriate    Labs:                        10.5   5.38  )-----------( 74       ( 22 Jan 2020 01:04 )             32.3     01-22    149<H>  |  121<H>  |  49<H>  ----------------------------<  109<H>  5.0   |  20<L>  |  1.32<H>    Ca    9.6      22 Jan 2020 01:04  Phos  2.3     01-22  Mg     1.7     01-22    TPro  5.0<L>  /  Alb  2.3<L>  /  TBili  1.7<H>  /  DBili  x   /  AST  36  /  ALT  20  /  AlkPhos  81  01-22    PT/INR - ( 22 Jan 2020 01:04 )   PT: 15.2 sec;   INR: 1.32 ratio         PTT - ( 22 Jan 2020 01:04 )  PTT:26.2 sec        pro-BNP: Serum Pro-Brain Natriuretic Peptide: 9585 pg/mL (09-10 @ 06:58)    Lipid Profile:   HgA1c:   TSH:     CARDIOVASCULAR DIAGNOSTIC TESTING:  [] Echocardiogram:  [] Stress Test:  [] Catheterization:    RADIOLOGY:

## 2020-01-22 NOTE — CHART NOTE - NSCHARTNOTEFT_GEN_A_CORE
MICU Transfer Note    Transfer from: MICU    Transfer to: (  ) Medicine    (  ) Telemetry     (   ) RCU        (    ) Palliative         (   ) Stroke Unit          (   ) __________________    [facility]          ASSESSMENT & PLAN:             For Followup:          Vital Signs Last 24 Hrs  T(C): 36.6 (22 Jan 2020 00:00), Max: 36.7 (21 Jan 2020 12:00)  T(F): 97.9 (22 Jan 2020 00:00), Max: 98.1 (21 Jan 2020 12:00)  HR: 133 (22 Jan 2020 02:00) (73 - 133)  BP: 138/60 (22 Jan 2020 02:00) (74/50 - 273/210)  BP(mean): 74 (22 Jan 2020 02:00) (52 - 236)  RR: 49 (22 Jan 2020 02:00) (14 - 49)  SpO2: 100% (22 Jan 2020 02:00) (75% - 100%)  I&O's Summary    20 Jan 2020 07:01  -  21 Jan 2020 07:00  --------------------------------------------------------  IN: 990.4 mL / OUT: 450 mL / NET: 540.4 mL    21 Jan 2020 07:01  -  22 Jan 2020 04:15  --------------------------------------------------------  IN: 963.7 mL / OUT: 350 mL / NET: 613.7 mL        MEDICATIONS  (STANDING):  albuterol/ipratropium for Nebulization 3 milliLiter(s) Nebulizer every 6 hours  chlorhexidine 4% Liquid 1 Application(s) Topical <User Schedule>  dextrose 5%. 1000 milliLiter(s) (75 mL/Hr) IV Continuous <Continuous>  heparin  Injectable 5000 Unit(s) SubCutaneous two times a day  lactulose Retention Enema 200 Gram(s) Rectal every 12 hours  levothyroxine Injectable 12.5 MICROGram(s) IV Push at bedtime  meropenem  IVPB 500 milliGRAM(s) IV Intermittent every 12 hours  rifAXIMin 550 milliGRAM(s) Oral two times a day    MEDICATIONS  (PRN):        LABS                                            10.5                  Neurophils% (auto):   x      (01-22 @ 01:04):    5.38 )-----------(74           Lymphocytes% (auto):  x                                             32.3                   Eosinphils% (auto):   x        Manual%: Neutrophils x    ; Lymphocytes x    ; Eosinophils x    ; Bands%: x    ; Blasts x                                    149    |  121    |  49                  Calcium: 9.6   / iCa: x      (01-22 @ 01:04)    ----------------------------<  109       Magnesium: 1.7                              5.0     |  20     |  1.32             Phosphorous: 2.3      TPro  5.0    /  Alb  2.3    /  TBili  1.7    /  DBili  x      /  AST  36     /  ALT  20     /  AlkPhos  81     22 Jan 2020 01:04    ( 01-22 @ 01:04 )   PT: 15.2 sec;   INR: 1.32 ratio  aPTT: 26.2 sec MICU Transfer Note    Transfer from: MICU    Transfer to: (x) Medicine    (  ) Telemetry     (   ) RCU        (    ) Palliative         (   ) Stroke Unit          (   ) __________________    Accepting Physician:       HPI:    89 yo F PMH AF on digoxin (not on AC), severe AS, COPD not on home O2, CLL (previously on Treanda and Rituxan), hypothyroidism, 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 Admitted 1/6-1/9 for weakness w/ mechanical fall with pre-renal KARRIE and resolution with IVF d/yohana to [facility]. Patient presented from [facility] 1/19 with difficulty breathing and nonproductive cough and fever x 1 day.  Of note, [facility]      ED vitals: Tmax 101.2 (r), , BP 80/50 with maps <65, RR 22-24, SpO2 99% on 3L    Significant labs: leukocytosis at 12.77 (60% neut), INR 1.44, hypernatremia 146, hyperkalemia 5.5, bicarb 18, BUN 52, Cr 1.69, venous pH 7.38, lactate 3.6. UA post for nitrites and many bacteria CXR w/ RLL pneumonia, CT chest with bb patchy opacities PNA vs asp pneumonitis   ED course: s/p 1.25 L LR, s/p vancomycin and meropenem x 1, stress dose steroids. Patient remained hypotensive after IVF started on levophed. Admitted to MICU for septic shock 2/2 PNA and UTI complicated by KARRIE likely secondary to ATN/pre-renal.      Found to have Serratia bacteremia and UTI secondary to Klebsiella ESBL. Initially requiring vasopressors to maintain MAP > 65, successfully weaned off around 5pm on 1/21.  Responding well to Meropenem. Clearance cultures pending from 1/21.            ASSESSMENT & PLAN:       89 yo female with a PMHx of afib on digoxin (not on AC), severe AS, COPD, CLL (previously on Treanda and Rituxan), 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 portal htn, c/b esophageal varices s/p banding s/p hepatic vessel coiling, CKD 3bl Cr 1.2, PVD, hypothyroidism, and recurrent UTI w/ hx of ESBL Klebsiella. Pt last admit 1/6-9 s/p fall OOB now re-admitted from [facility] w AMS 2/2 Septic Shock 2/2 Serratia Bacteremia / urosepsis, and PNA.     Neuro: Metabolic Encephalopathy 2/2 Sepsis- Mental status now greatly improved, CT of the brain negative on admission  -c/w Neuro checks per routine  -c/w lactulose / rifaximin in the setting of pt hx of cirrhosis     CV: Hx Afib on digoxin w/o AC, Severe AS-   -initially requiring vasopressors to maintain MAP > 65 in setting of septic shock; weaned off successfully 1/21 5pm  -f/u digoxin level (normally on 125mcg every other day)- continue digoxin  -c/w hold AC in the setting of hx of cirrhosis w esophageal varices s/p banding hx   -consider echo - in the setting now of bacteremia / re-assess - pt AS  -follow up cardiology prn    Pulm: PNA - suspicion for aspiration- RRL opacity, RVP negative  -chest PT  -aspiration precautions  -supplemental O2- to maintain an O2 sat > 90%  -c/w meropenem in the setting bacteremia / urosepsis  -c/w duonebs    GI: Hx Cirrhosis 2/2 Hepatocellular Carcinoma c/b portal HTN / Esophogeal Varices s/p banding  -c/w holding AC   -c/w lactulose enema  in the setting of NPO  -speech / swallow- concern for aspiration- pt failed BS swallow- failed s/S 9/2019 w speech pathology- f/u re-assessment by speech- last rec- mechanical soft 2 / w honey to nectar thickened liquids  -holding rifaximin as patient NPO  -multiple attempts to place NGT unsuccessful    Renal: KARRIE on CKD 3 (baseline )  -renal function improving   -bladder scan - st cath as needed  -IVF hydration in the setting of NPO / hypernatremia /KARRIE on CRI (creat baseline 1.2) a/w 1.69 - improved to 1.32    Endo: NPO-  -IVF D5  -FS q 6 H to monitor    ID:  -continue Meropenem for Serratia bacteremia, ESBL Klebsiella UTI      GOC: pt with a DNR /DNI- Molst form on chart  -f/u GOC with family:  - c/w DNR/DNI      For Followup:          Vital Signs Last 24 Hrs  T(C): 36.6 (22 Jan 2020 00:00), Max: 36.7 (21 Jan 2020 12:00)  T(F): 97.9 (22 Jan 2020 00:00), Max: 98.1 (21 Jan 2020 12:00)  HR: 133 (22 Jan 2020 02:00) (73 - 133)  BP: 138/60 (22 Jan 2020 02:00) (74/50 - 273/210)  BP(mean): 74 (22 Jan 2020 02:00) (52 - 236)  RR: 49 (22 Jan 2020 02:00) (14 - 49)  SpO2: 100% (22 Jan 2020 02:00) (75% - 100%)  I&O's Summary    20 Jan 2020 07:01  -  21 Jan 2020 07:00  --------------------------------------------------------  IN: 990.4 mL / OUT: 450 mL / NET: 540.4 mL    21 Jan 2020 07:01  -  22 Jan 2020 04:15  --------------------------------------------------------  IN: 963.7 mL / OUT: 350 mL / NET: 613.7 mL        MEDICATIONS  (STANDING):  albuterol/ipratropium for Nebulization 3 milliLiter(s) Nebulizer every 6 hours  chlorhexidine 4% Liquid 1 Application(s) Topical <User Schedule>  dextrose 5%. 1000 milliLiter(s) (75 mL/Hr) IV Continuous <Continuous>  heparin  Injectable 5000 Unit(s) SubCutaneous two times a day  lactulose Retention Enema 200 Gram(s) Rectal every 12 hours  levothyroxine Injectable 12.5 MICROGram(s) IV Push at bedtime  meropenem  IVPB 500 milliGRAM(s) IV Intermittent every 12 hours  rifAXIMin 550 milliGRAM(s) Oral two times a day    MEDICATIONS  (PRN):        LABS                                            10.5                  Neurophils% (auto):   x      (01-22 @ 01:04):    5.38 )-----------(74           Lymphocytes% (auto):  x                                             32.3                   Eosinphils% (auto):   x        Manual%: Neutrophils x    ; Lymphocytes x    ; Eosinophils x    ; Bands%: x    ; Blasts x                                    149    |  121    |  49                  Calcium: 9.6   / iCa: x      (01-22 @ 01:04)    ----------------------------<  109       Magnesium: 1.7                              5.0     |  20     |  1.32             Phosphorous: 2.3      TPro  5.0    /  Alb  2.3    /  TBili  1.7    /  DBili  x      /  AST  36     /  ALT  20     /  AlkPhos  81     22 Jan 2020 01:04    ( 01-22 @ 01:04 )   PT: 15.2 sec;   INR: 1.32 ratio  aPTT: 26.2 sec MICU Transfer Note    Transfer from: MICU    Transfer to: () Medicine    ( x ) Telemetry     (   ) RCU        (    ) Palliative         (   ) Stroke Unit          (   ) __________________    Accepting Physician: Dr. SCOTT Das (Hospitalist)      HPI:    89 yo F PMH AF on digoxin (not on AC), severe AS, COPD not on home O2, CLL (previously on Treanda and Rituxan), hypothyroidism, 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 Admitted 1/6-1/9 for weakness w/ mechanical fall with pre-renal KARRIE and resolution with IVF d/yohana to [facility]. Patient presented from [facility] 1/19 with difficulty breathing and nonproductive cough and fever x 1 day.  Of note, [facility]      ED vitals: Tmax 101.2 (r), , BP 80/50 with maps <65, RR 22-24, SpO2 99% on 3L    Significant labs: leukocytosis at 12.77 (60% neut), INR 1.44, hypernatremia 146, hyperkalemia 5.5, bicarb 18, BUN 52, Cr 1.69, venous pH 7.38, lactate 3.6. UA post for nitrites and many bacteria CXR w/ RLL pneumonia, CT chest with bb patchy opacities PNA vs asp pneumonitis   ED course: s/p 1.25 L LR, s/p vancomycin and meropenem x 1, stress dose steroids. Patient remained hypotensive after IVF started on levophed. Admitted to MICU for septic shock 2/2 PNA and UTI complicated by KARRIE likely secondary to ATN/pre-renal.      Found to have Serratia bacteremia and UTI secondary to Klebsiella ESBL. Initially requiring vasopressors to maintain MAP > 65, successfully weaned off around 5pm on 1/21.  Responding well to Meropenem. Clearance cultures pending from 1/21.            ASSESSMENT & PLAN:       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 portal htn, c/b esophageal varices s/p banding s/p hepatic vessel coiling, CKD 3bl Cr 1.2, PVD, hypothyroidism, and recurrent UTI w/ hx of ESBL Klebsiella. Pt last admit 1/6-9 s/p fall OOB [facility] 2[facility]2 Septic Shock 2/2 Serratia Bacteremia / urosepsis, and PNA.     Neuro: Metabolic Encephalopathy 2/2 Sepsis- Mental status now greatly improved, CT of the brain negative on admission  -c/w Neuro checks per routine  -c/w lactulose / rifaximin in the setting of pt hx of cirrhosis     CV: Hx Afib on digoxin w/o AC, Severe AS-   -initially requiring vasopressors to maintain MAP > 65 in setting of septic shock; weaned off successfully 1/21 5pm  -f/u digoxin level (normally on 125mcg every other day)- continue digoxin  -c/w hold AC in the setting of hx of cirrhosis w esophageal varices s/p banding hx   -consider echo - in the setting now of bacteremia / re-assess - pt AS  -follow up cardiology prn    Pulm: PNA - suspicion for aspiration- RRL opacity, RVP negative  -chest PT  -aspiration precautions  -supplemental O2- to maintain an O2 sat > 90%  -c/w meropenem in the setting bacteremia / urosepsis  -c/w duonebs    GI: Hx Cirrhosis 2/2 Hepatocellular Carcinoma c/b portal HTN / Esophogeal Varices s/p banding  -c/w holding AC   -c/w lactulose enema  in the setting of NPO  -speech / swallow- concern for aspiration- pt failed BS swallow- failed s/S 9/2019 w speech pathology- f/u re-assessment by speech- last rec- mechanical soft 2 / w honey to nectar thickened liquids  -holding rifaximin as patient NPO  -multiple attempts to place NGT unsuccessful    Renal: KARRIE on CKD 3 (baseline )  -renal function improving   -bladder scan - st cath as needed  -IVF hydration in the setting of NPO / hypernatremia /KARRIE on CRI (creat baseline 1.2) a/w 1.69 - improved to 1.32    Endo: NPO-  -IVF D5  -FS q 6 H to monitor    ID:  -continue Meropenem for Serratia bacteremia, ESBL Klebsiella UTI      GOC: pt with a DNR /DNI- Molst form on chart  -f/u GOC with family:  - c/w DNR/DNI      For Followup:          Vital Signs Last 24 Hrs  T(C): 36.6 (22 Jan 2020 00:00), Max: 36.7 (21 Jan 2020 12:00)  T(F): 97.9 (22 Jan 2020 00:00), Max: 98.1 (21 Jan 2020 12:00)  HR: 133 (22 Jan 2020 02:00) (73 - 133)  BP: 138/60 (22 Jan 2020 02:00) (74/50 - 273/210)  BP(mean): 74 (22 Jan 2020 02:00) (52 - 236)  RR: 49 (22 Jan 2020 02:00) (14 - 49)  SpO2: 100% (22 Jan 2020 02:00) (75% - 100%)  I&O's Summary    20 Jan 2020 07:01  -  21 Jan 2020 07:00  --------------------------------------------------------  IN: 990.4 mL / OUT: 450 mL / NET: 540.4 mL    21 Jan 2020 07:01  -  22 Jan 2020 04:15  --------------------------------------------------------  IN: 963.7 mL / OUT: 350 mL / NET: 613.7 mL        MEDICATIONS  (STANDING):  albuterol/ipratropium for Nebulization 3 milliLiter(s) Nebulizer every 6 hours  chlorhexidine 4% Liquid 1 Application(s) Topical <User Schedule>  dextrose 5%. 1000 milliLiter(s) (75 mL/Hr) IV Continuous <Continuous>  heparin  Injectable 5000 Unit(s) SubCutaneous two times a day  lactulose Retention Enema 200 Gram(s) Rectal every 12 hours  levothyroxine Injectable 12.5 MICROGram(s) IV Push at bedtime  meropenem  IVPB 500 milliGRAM(s) IV Intermittent every 12 hours  rifAXIMin 550 milliGRAM(s) Oral two times a day    MEDICATIONS  (PRN):        LABS                                            10.5                  Neurophils% (auto):   x      (01-22 @ 01:04):    5.38 )-----------(74           Lymphocytes% (auto):  x                                             32.3                   Eosinphils% (auto):   x        Manual%: Neutrophils x    ; Lymphocytes x    ; Eosinophils x    ; Bands%: x    ; Blasts x                                    149    |  121    |  49                  Calcium: 9.6   / iCa: x      (01-22 @ 01:04)    ----------------------------<  109       Magnesium: 1.7                              5.0     |  20     |  1.32             Phosphorous: 2.3      TPro  5.0    /  Alb  2.3    /  TBili  1.7    /  DBili  x      /  AST  36     /  ALT  20     /  AlkPhos  81     22 Jan 2020 01:04    ( 01-22 @ 01:04 )   PT: 15.2 sec;   INR: 1.32 ratio  aPTT: 26.2 sec MICU Transfer Note    Transfer from: MICU    Transfer to: () Medicine    ( x ) Telemetry     (   ) RCU        (    ) Palliative         (   ) Stroke Unit          (   ) __________________    Accepting Physician: Dr. SCOTT aDs (Hospitalist)      HPI:    89 yo F PMH AF on digoxin (not on AC), severe AS, COPD not on home O2, CLL (previously on Treanda and Rituxan), hypothyroidism, 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 Admitted 1/6-1/9 for weakness w/ mechanical fall with pre-renal KARRIE and resolution with IVF d/yohana to [facility]. Patient presented from [facility] 1/19 with difficulty breathing and nonproductive cough and fever x 1 day.  Of note, [facility]      ED vitals: Tmax 101.2 (r), , BP 80/50 with maps <65, RR 22-24, SpO2 99% on 3L    Significant labs: leukocytosis at 12.77 (60% neut), INR 1.44, hypernatremia 146, hyperkalemia 5.5, bicarb 18, BUN 52, Cr 1.69, venous pH 7.38, lactate 3.6. UA post for nitrites and many bacteria CXR w/ RLL pneumonia, CT chest with bb patchy opacities PNA vs asp pneumonitis   ED course: s/p 1.25 L LR, s/p vancomycin and meropenem x 1, stress dose steroids. Patient remained hypotensive after IVF started on levophed. Admitted to MICU for septic shock 2/2 PNA and UTI complicated by KARRIE likely secondary to ATN/pre-renal.      Found to have Serratia bacteremia, Serratia in the sputum and Klebsiella ESBL in the urine - all sensitive to Meropenem. Initially requiring vasopressors to maintain MAP > 65, successfully weaned off around 5pm on 1/21.  Responding well to Meropenem, cleared BC 1/21, off of norepinephrine        ASSESSMENT & PLAN:     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 portal htn, c/b esophageal varices s/p banding s/p hepatic vessel coiling, CKD 3bl Cr 1.2, PVD, hypothyroidism, and recurrent UTI w/ hx of ESBL Klebsiella. Pt last admit 1/6-9 s/p fall OOB [facility] 2/2 Septic Shock 2/2 Serratia Bacteremia / Klebsiella ESBL urosepsis, and PNA w Serratia in the sputum.     Neuro: Metabolic Encephalopathy 2/2 Sepsis- Mental status now greatly improved, CT of the brain negative on admission  -c/w Neuro checks per routine  -c/w lactulose / rifaximin in the setting of pt hx of cirrhosis     CV: Hx Afib on digoxin w/o AC, Severe AS-   initially requiring vasopressors to maintain MAP > 65 in setting of septic shock; weaned off successfully 1/21 5pm  -f/u digoxin level (normally on 125mcg every other day)- continue digoxin  -c/w hold AC in the setting of hx of cirrhosis w esophageal varices s/p banding hx   -F/U on echo done 1/23/20- in the setting now of bacteremia / re-assess - pt w severe AS  -follow up cardiology     Pulm: PNA - suspicion for aspiration- RRL opacity, RVP negative, + Serratia in the sputum  -chest PT  -aspiration precautions  -supplemental O2- to maintain an O2 sat > 90%  -c/w meropenem - PNA  -c/w duonebs    GI: Hx Cirrhosis 2/2 Hepatocellular Carcinoma c/b portal HTN / Esophogeal Varices s/p banding in the remote past  -c/w holding AC   -c/w lactulose- goal of 3-4BM  -speech / swallow- assessment done- Plan for Barium Swallow study   -c/w rifaximin   -c/w tube feeds- tolerating tube feeds     Renal: KARRIE on CKD 3 (baseline )- Renal Function improved  -bladder scan - st cath as needed  -strict I & O  -trend Na- pt had hypernatremia in the setting of dehydration- c/w free water bolus via ngt      ID: + Serratia in the Sputum, Serratia Bacteremia - last BC 1/22/20 - cleared, +Klebsiella ESBL Urosepsi   -c/w Meropenem for Serratia & ESBL Klebsiella UTI sensitive to meropenem      GOC: pt with a DNR /DNI- Molst form on chart  -f/u GOC with family:  -\c/w DNR/DNI      For Followup:  - Cultures  -Midodrine dosing  -Echo  -Barium Swallow Study Results- f/u with speech pathology for diet tolerance MICU Transfer Note    Transfer from: MICU    Transfer to: () Medicine    ( x ) Telemetry     (   ) RCU        (    ) Palliative         (   ) Stroke Unit          (   ) __________________    Accepting Physician: Dr. SCOTT Das (Hospitalist)      HPI:    89 yo F PMH AF on digoxin (not on AC), severe AS, COPD not on home O2, CLL (previously on Treanda and Rituxan), hypothyroidism, 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 Admitted 1/6-1/9 for weakness w/ mechanical fall with pre-renal KARRIE and resolution with IVF d/yohana to [facility]. Patient presented from [facility] 1/19 with difficulty breathing and nonproductive cough and fever x 1 day.  Of note, [facility]      ED vitals: Tmax 101.2 (r), , BP 80/50 with maps <65, RR 22-24, SpO2 99% on 3L    Significant labs: leukocytosis at 12.77 (60% neut), INR 1.44, hypernatremia 146, hyperkalemia 5.5, bicarb 18, BUN 52, Cr 1.69, venous pH 7.38, lactate 3.6. UA post for nitrites and many bacteria CXR w/ RLL pneumonia, CT chest with bb patchy opacities PNA vs asp pneumonitis   ED course: s/p 1.25 L LR, s/p vancomycin and meropenem x 1, stress dose steroids. Patient remained hypotensive after IVF started on levophed. Admitted to MICU for septic shock 2/2 PNA and UTI complicated by KARRIE likely secondary to ATN/pre-renal.      Found to have Serratia bacteremia, Serratia in the sputum and Klebsiella ESBL in the urine - all sensitive to Meropenem. Initially requiring vasopressors to maintain MAP > 65, successfully weaned off around 5pm on 1/21.  Responding well to Meropenem, cleared BC 1/21, off of norepinephrine        ASSESSMENT & PLAN:     89 yo female with a PMHx of afib on digoxin (not on AC), severe AS, COPD, CLL (previously on Treanda and Rituxan), 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 portal htn, c/b esophageal varices s/p banding s/p hepatic vessel coiling, CKD 3bl Cr 1.2, PVD, hypothyroidism, and recurrent UTI w/ hx of ESBL Klebsiella. Pt last admit 1/6-9 s/p fall OOB [facility] 2/2 Septic Shock 2/2 Serratia Bacteremia / Klebsiella ESBL urosepsis, and PNA w Serratia in the sputum.     Neuro: Metabolic Encephalopathy 2/2 Sepsis- Mental status now greatly improved, CT of the brain negative on admission  -c/w Neuro checks per routine  -c/w lactulose / rifaximin in the setting of pt hx of cirrhosis     CV: Hx Afib on digoxin w/o AC, Severe AS-   initially requiring vasopressors to maintain MAP > 65 in setting of septic shock; weaned off successfully 1/21 5pm  -f/u digoxin level (normally on 125mcg every other day)- continue digoxin  -c/w hold AC in the setting of hx of cirrhosis w esophageal varices s/p banding hx   -F/U on echo done 1/23/20- in the setting now of bacteremia / re-assess - pt w severe AS  -follow up cardiology     Pulm: PNA - suspicion for aspiration- RRL opacity, RVP negative, + Serratia in the sputum  -chest PT  -aspiration precautions  -supplemental O2- to maintain an O2 sat > 90%  -c/w meropenem - PNA  -c/w duonebs    GI: Hx Cirrhosis 2/2 Hepatocellular Carcinoma c/b portal HTN / Esophogeal Varices s/p banding in the remote past  -c/w holding AC   -c/w lactulose- goal of 3-4BM  -speech / swallow- assessment done- Plan for Barium Swallow study   -c/w rifaximin   -c/w tube feeds- tolerating tube feeds     Renal: KARRIE on CKD 3 (baseline )- Renal Function improved  -bladder scan - st cath as needed  -strict I & O  -trend Na- pt had hypernatremia in the setting of dehydration- c/w free water bolus via ngt      ID: + Serratia in the Sputum, Serratia Bacteremia - last BC 1/22/20 - cleared, +Klebsiella ESBL Urosepsi   -c/w Meropenem for Serratia & ESBL Klebsiella UTI sensitive to meropenem      GOC: pt with a DNR /DNI- Molst form on chart  -f/u GOC with family:  -\c/w DNR/DNI      For Followup:  - Cultures  -Midodrine dosing  -Echo  -Barium Swallow Study Results- f/u with speech pathology for diet tolerance      Addendum:1/24 the patient noted to have moderate amount of brown emesis and (+) NGT was changed to sup tube today will initiate feeds   continue with supportive care

## 2020-01-22 NOTE — SWALLOW BEDSIDE ASSESSMENT ADULT - COMMENTS
Hisotry continued: Pt presented w/ difficulty breathing, cough, and AMS, admitted to MICU for septic shock, AHRF, and tahir/atn 2/2 PNA, with UTI.  Metabolic encephalopathy. Waxing waning mentation w/in last week likely in the setting of sepsis. Pt presented with confusion, now improving A&O x 2-3. Possible component of underlying hepatic encephalopathy. Head CT 1/19: No displaced calvarial fracture or acute intracranial hemorrhage.  c/w lactulose, and rifaximin. Pulm:Tmax 101.2 in ED with leukocytosis, requiring 3L of O2, not on home O2.    CT chest 1/19: bb patchy opacities PNA vs asp pneumonitis. CXR 1/19: Right lower lung opacity, likely pneumonia. c/w empiric abx. +COPD--> duonebs. + UTI-->-c/w meropenem. ID: PNA & UTI: c/w meropenem for hx of ESBL klebsiella UTI,  c/w epimeric vancomycin & start azithromycin. MICU note 1/21: Mental status greatly improved.  GOC: MOLST that states pt is ok with trial of pressors, Bipap, temporary NGT but not intubation CPR or peg.    SWALLOW HISTORY: Bedside swallow 9/14/19: Dysphagia 3 with nectar thick liquids pending MBS (however pt is refusing objective testing at present time). Pt presents with an oral and suspected pharyngeal dysphagia marked by prolonged mastication and delayed oral transit time (with mechanical soft and solid textures), reduced hyolaryngeal excursion, episodes of audible swallows, and reports of coughing post PO intake of thin liquids. Pt encouraged to continued coughing/clearing throat post limited trials of honey thickened liquids via tsp. Pt initially noted with wet cough and able to clear to ultimately resume clear/dry vocal quality with VSS.

## 2020-01-22 NOTE — SWALLOW BEDSIDE ASSESSMENT ADULT - PHARYNGEAL PHASE
Multiple swallows/Delayed pharyngeal swallow/Decreased laryngeal elevation Throat clear post oral intake/Delayed pharyngeal swallow/Decreased laryngeal elevation/Multiple swallows/Cough post oral intake

## 2020-01-22 NOTE — SWALLOW BEDSIDE ASSESSMENT ADULT - SWALLOW EVAL: DIAGNOSIS
Pt seen for re-evaluation of swallow mechanism to assess candidacy for PO diet. Pt noted with slight improvement compared to previous encounter on 1/21, however, continues to present with clinical signs of an oropharyngeal dysphagia. Oral stage is marked by mild delay in oral transit time. Pharyngeal stage is marked by delayed swallow initiation, reduced laryngeal elevation upon palpation and multiple swallows per tsp bolus which may be suggestive of delayed pharyngeal transport. Immediate cough/throat clears post honey thickened liquids via teaspoon. No overt signs or symptoms of penetration or aspiration on puree thin or puree thick. After discussion with medical team, would plan to optimize patient and allow 1-2 more days for pt to recover prior to instrumental examination. Tentative plan for MBS on 1/24.

## 2020-01-22 NOTE — PROGRESS NOTE ADULT - ATTENDING COMMENTS
Agree with plan as above.     Merissa Craven MD, MPH, RAMYA, RPVI, FACC  Cardiovascular Specialist   [facility]  c: [phone]  e: [email or web address]

## 2020-01-22 NOTE — CHART NOTE - NSCHARTNOTEFT_GEN_A_CORE
Contact Note    Initial Nutrition Assessment completed yesterday, dx moderate malnutrition. ST eval done 1/21 noted dysphagia w/ recommendation for NPO. NGT placed, plan to initiate feeds today. Recommendation for Jevity 1.2 goal 65 cc/hr x 18 hrs provides 1404 kcals, 65 gm protein, 944 cc free water  meets 28 Kcal/Kg, 1.3Gm/kg usual wt of 49 kg. Will f/u on EN tolerance.

## 2020-01-22 NOTE — PROGRESS NOTE ADULT - SUBJECTIVE AND OBJECTIVE BOX
CHIEF COMPLAINT:    Interval Events:    REVIEW OF SYSTEMS:  Constitutional: [ ] fevers [ ] chills [ ] weight loss [ ] weight gain  HEENT: [ ] dry eyes [ ] eye irritation [ ] postnasal drip [ ] nasal congestion  CV: [ ] chest pain [ ] orthopnea [ ] palpitations [ ] murmur  Resp: [ ] cough [ ] shortness of breath [ ] dyspnea [ ] wheezing [ ] sputum [ ] hemoptysis  GI: [ ] nausea [ ] vomiting [ ] diarrhea [ ] constipation [ ] abd pain [ ] dysphagia   : [ ] dysuria [ ] nocturia [ ] hematuria [ ] increased urinary frequency  Musculoskeletal: [ ] back pain [ ] myalgias [ ] arthralgias [ ] fracture  Skin: [ ] rash [ ] itch  Neurological: [ ] headache [ ] dizziness [ ] syncope [ ] weakness [ ] numbness  Psychiatric: [ ] anxiety [ ] depression  Endocrine: [ ] diabetes [ ] thyroid problem  Hematologic/Lymphatic: [ ] anemia [ ] bleeding problem  Allergic/Immunologic: [ ] itchy eyes [ ] nasal discharge [ ] hives [ ] angioedema  [ ] All other systems negative  [ ] Unable to assess ROS because ________    OBJECTIVE:  ICU Vital Signs Last 24 Hrs  T(C): 36.7 (22 Jan 2020 07:00), Max: 36.7 (21 Jan 2020 12:00)  T(F): 98 (22 Jan 2020 07:00), Max: 98.1 (21 Jan 2020 12:00)  HR: 104 (22 Jan 2020 07:00) (78 - 133)  BP: 104/57 (22 Jan 2020 07:00) (74/50 - 299/227)  BP(mean): 74 (22 Jan 2020 07:00) (57 - 255)  ABP: --  ABP(mean): --  RR: 18 (22 Jan 2020 07:00) (14 - 44)  SpO2: 98% (22 Jan 2020 07:00) (75% - 100%)      I & O's    01-21 @ 07:01  -  01-22 @ 07:00  --------------------------------------------------------  IN: 1513.7 mL / OUT: 850 mL / NET: 663.7 mL      CAPILLARY BLOOD GLUCOSE      POCT Blood Glucose.: 122 mg/dL (22 Jan 2020 06:51)      PHYSICAL EXAM:  General:   HEENT:   Lymph Nodes:  Neck:   Respiratory:   Cardiovascular:   Abdomen:   Extremities:   Skin:   Neurological:  Psychiatry:    LINES:    HOSPITAL MEDICATIONS:  MEDICATIONS  (STANDING):  albuterol/ipratropium for Nebulization 3 milliLiter(s) Nebulizer every 6 hours  chlorhexidine 4% Liquid 1 Application(s) Topical <User Schedule>  dextrose 5%. 1000 milliLiter(s) (75 mL/Hr) IV Continuous <Continuous>  heparin  Injectable 5000 Unit(s) SubCutaneous two times a day  lactulose Retention Enema 200 Gram(s) Rectal every 12 hours  levothyroxine Injectable 12.5 MICROGram(s) IV Push at bedtime  meropenem  IVPB 500 milliGRAM(s) IV Intermittent every 12 hours  rifAXIMin 550 milliGRAM(s) Oral two times a day    MEDICATIONS  (PRN):      LABS:                        10.5   5.38  )-----------( 74       ( 22 Jan 2020 01:04 )             32.3     Hgb Trend: 10.5<--, 10.8<--, 11.3<--, 12.0<--, 12.0<--  01-22    149<H>  |  121<H>  |  49<H>  ----------------------------<  109<H>  5.0   |  20<L>  |  1.32<H>    Ca    9.6      22 Jan 2020 01:04  Phos  2.3     01-22  Mg     1.7     01-22    TPro  5.0<L>  /  Alb  2.3<L>  /  TBili  1.7<H>  /  DBili  x   /  AST  36  /  ALT  20  /  AlkPhos  81  01-22    Creatinine Trend: 1.32<--, 1.49<--, 1.60<--, 1.57<--, 1.69<--, 1.24<--  PT/INR - ( 22 Jan 2020 01:04 )   PT: 15.2 sec;   INR: 1.32 ratio         PTT - ( 22 Jan 2020 01:04 )  PTT:26.2 sec      Venous Blood Gas:  01-22 @ 00:57  7.40/39/36/24/65  VBG Lactate: 1.9  Venous Blood Gas:  01-21 @ 00:32  7.32/41/43/21/70  VBG Lactate: 2.3      MICROBIOLOGY:     RADIOLOGY:  [ ] Reviewed and interpreted by me    EKG: CHIEF COMPLAINT: AMS / Hypoxic Respiratory Failure 2/2 PNA / Septic Shock 2/2 Bacteremia / Urosepsis    Interval: Overnight- BC remain +x 2 for Serratia, Now + Sputum Serratia, + Ucx for Klebsiella ESBL    89 yo female with a PMHx of afib on digoxin (not on AC), severe AS, hypothyroidism, COPD not on home O2, CLL (previously on Treanda and Rituxan), 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 Pt most recent admission 1/6-1/9  admitted for weakness w/ mechanical fall and pre-renal KARRIE , and discharged to [facility].  Mrs Bustamante now re-admitted from [facility] for difficulty breathing, AMS / confused, febrile, hypotensive, w leukocytosis, lactic acidosis, worsening renal function, and was admitted to the MICU for pressor support. The pt now hemodynamically stable, remains off of phenylephrine, NGT inserted successfully this AM, and the pt is now stable for transfer to the medical floor.      REVIEW OF SYSTEMS:  Constitutional: [ ] fevers [ ] chills [ ] weight loss [ ] weight gain   (x) Denies  HEENT: [ ] dry eyes [ ] eye irritation [ ] postnasal drip [ ] nasal congestion  (x) Denies  CV: [ ] chest pain [ ] orthopnea [ ] palpitations [ ] murmur  (x) Denies  Resp: [ ] cough [ ] shortness of breath [ ] dyspnea [ ] wheezing [ ] sputum [ ] hemoptysis  (x) Denies  GI: [ ] nausea [ ] vomiting [ ] diarrhea [ ] constipation [ ] abd pain [ ] dysphagia   (x) Denies  : [ ] dysuria [ ] nocturia [ ] hematuria [ ] increased urinary frequency  (x) Denies  Musculoskeletal: [ ] back pain [ ] myalgias [ ] arthralgias [ ] fracture  (x) Denies  Skin: [ ] rash [ ] itch  (x) Denies  Neurological: [ ] headache [ ] dizziness [ ] syncope [ ] weakness [ ] numbness  (x) Denies  Psychiatric: [ ] anxiety [ ] depression  (x) Denies  Endocrine: [ ] diabetes [ ] thyroid problem  (x) Denies  Hematologic/Lymphatic: [ ] anemia [ ] bleeding problem  (x) Denies  Allergic/Immunologic: [ ] itchy eyes [ ] nasal discharge [ ] hives [ ] angioedema  (x) Denies  [ ] All other systems negative  [ ] Unable to assess ROS because ________    OBJECTIVE:  ICU Vital Signs Last 24 Hrs  T(C): 36.7 (22 Jan 2020 07:00), Max: 36.7 (21 Jan 2020 12:00)  T(F): 98 (22 Jan 2020 07:00), Max: 98.1 (21 Jan 2020 12:00)  HR: 104 (22 Jan 2020 07:00) (78 - 133)  BP: 104/57 (22 Jan 2020 07:00) (74/50 - 299/227)  BP(mean): 74 (22 Jan 2020 07:00) (57 - 255)  ABP: --  ABP(mean): --  RR: 18 (22 Jan 2020 07:00) (14 - 44)  SpO2: 98% (22 Jan 2020 07:00) (75% - 100%)      I & O's  01-21 @ 07:01  -  01-22 @ 07:00  --------------------------------------------------------  IN: 1513.7 mL / OUT: 850 mL / NET: 663.7 mL      CAPILLARY BLOOD GLUCOSE  POCT Blood Glucose.: 122 mg/dL (22 Jan 2020 06:51)      PHYSICAL EXAM:  General: alert, oriented x 2 self and place  HEENT: normocephalic/ trachea midline  Lymph Nodes: non palp  Neck: supple, negative JVD  Respiratory: bilaterally equal BS, bilateral course rhonchi, non productive cough noted  Cardiovascular: S1S2, IV/V Systolic Murmur, irregular  Abdomen: non distended, +BS x 4, non tender on deep palp, neg hepatomegaly / neg spleenomegaly  Extremities: neg edema  Skin: dry, thin,   Neurological: +PERRL, EOMI, +facial sym, oriented x 1-2, RODRIGUEZ weakly, follows commands at times w cooperation  Psychiatry:    LINES: [facility] MEDICATIONS:  MEDICATIONS  (STANDING):  albuterol/ipratropium for Nebulization 3 milliLiter(s) Nebulizer every 6 hours  chlorhexidine 4% Liquid 1 Application(s) Topical <User Schedule>  dextrose 5%. 1000 milliLiter(s) (75 mL/Hr) IV Continuous <Continuous>  heparin  Injectable 5000 Unit(s) SubCutaneous two times a day  lactulose Retention Enema 200 Gram(s) Rectal every 12 hours  levothyroxine Injectable 12.5 MICROGram(s) IV Push at bedtime  meropenem  IVPB 500 milliGRAM(s) IV Intermittent every 12 hours  rifAXIMin 550 milliGRAM(s) Oral two times a day    MEDICATIONS  (PRN):      LABS:                        10.5   5.38  )-----------( 74       ( 22 Jan 2020 01:04 )             32.3     Hgb Trend: 10.5<--, 10.8<--, 11.3<--, 12.0<--, 12.0<--  01-22    149<H>  |  121<H>  |  49<H>  ----------------------------<  109<H>  5.0   |  20<L>  |  1.32<H>    Ca    9.6      22 Jan 2020 01:04  Phos  2.3     01-22  Mg     1.7     01-22    TPro  5.0<L>  /  Alb  2.3<L>  /  TBili  1.7<H>  /  DBili  x   /  AST  36  /  ALT  20  /  AlkPhos  81  01-22    Creatinine Trend: 1.32<--, 1.49<--, 1.60<--, 1.57<--, 1.69<--, 1.24<--  PT/INR - ( 22 Jan 2020 01:04 )   PT: 15.2 sec;   INR: 1.32 ratio         PTT - ( 22 Jan 2020 01:04 )  PTT:26.2 sec      Venous Blood Gas:  01-22 @ 00:57  7.40/39/36/24/65  VBG Lactate: 1.9  Venous Blood Gas:  01-21 @ 00:32  7.32/41/43/21/70  VBG Lactate: 2.3      MICROBIOLOGY:     Culture - Sputum (collected 20 Jan 2020 15:19)  Source: .Sputum Sputum  Gram Stain (20 Jan 2020 19:21):    Moderate polymorphonuclear leukocytes per low power field    Numerous Squamous epithelial cells per low power field    Numerous Gram Negative Rods per oil power field    Moderate Yeast like cells per oil power field  Preliminary Report (21 Jan 2020 18:54):    Moderate Serratia marcescens    Normal Respiratory Stefanie present    Culture - Blood (collected 19 Jan 2020 22:02)  Source: .Blood Blood-Peripheral  Gram Stain (21 Jan 2020 17:06):    Growth in anaerobic bottle: Gram Negative Rods    Growth in aerobic bottle: Gram Negative Rods  Preliminary Report (21 Jan 2020 17:07):    Growth in anaerobic bottle: Serratia marcescens    Growth in aerobic bottle: Gram Negative Rods    See previous culture [id number]    Culture - Blood (collected 19 Jan 2020 22:02)  Source: .Blood Blood-Peripheral  Gram Stain (21 Jan 2020 05:11):    Growth in anaerobic bottle: Gram Negative Rods    Growth in aerobic bottle: Gram Negative Rods  Final Report (22 Jan 2020 11:44):    Growth in aerobic and anaerobic bottles: Serratia marcescens    ***Blood Panel PCR results on this specimen are available    approximately 3 hours after the Gram stain result.***    Gram stain, PCR, and/or culture results may not always    correspond due to difference in methodologies.    ************************************************************    This PCR assay was performed using [employer].    The following targets are tested for: Enterococcus,    vancomycin resistant enterococci, Listeria monocytogenes,    coagulase negative staphylococci, S. aureus,    methicillin resistant S. aureus, Streptococcus agalactiae    (Group B), S. pneumoniae, S. pyogenes (Group A),    Acinetobacter baumannii, Enterobacter cloacae, E. coli,    Klebsiella oxytoca, K. pneumoniae, Proteus sp.,    Serratia marcescens, Haemophilus influenzae,    Neisseria meningitidis, Pseudomonas aeruginosa, Candida    albicans, C. glabrata, C krusei, C parapsilosis,    C. tropicalis and the KPC resistance gene.  Organism: Blood Culture PCR  Serratia marcescens (22 Jan 2020 11:44)  Organism: Serratia marcescens (22 Jan 2020 11:44)  Organism: Blood Culture PCR (22 Jan 2020 11:44)    Culture - Urine (collected 19 Jan 2020 22:01)  Source: .Urine Clean Catch (Midstream)  Final Report (21 Jan 2020 17:38):    >100,000 CFU/ml Klebsiella pneumoniae ESBL  Organism: Klebsiella pneumoniae ESBL (21 Jan 2020 17:38)  Organism: Klebsiella pneumoniae ESBL (21 Jan 2020 17:38)    Culture - Urine (collected 19 Jan 2020 17:45)  Source: .Urine CATHETERIZED  Final Report (21 Jan 2020 17:10):    >100,000 CFU/ml Klebsiella pneumoniae ESBL  Organism: Klebsiella pneumoniae ESBL (21 Jan 2020 17:10)  Organism: Klebsiella pneumoniae ESBL (21 Jan 2020 17:10)      RADIOLOGY:  < from: CT Chest No Cont (01.19.20 @ 19:03) >  EXAM:  CT CHEST                        PROCEDURE DATE:  01/19/2020    INTERPRETATION:  CLINICAL INFORMATION: Fall, chest pain  COMPARISON: CT chest 9/8/201  FINDINGS: The quality of the images are degraded by respiratory motion.    AIRWAYS, LUNGS and PLEURA: The left lower lobe bronchus and segmental branches of right lower lobe bronchus are obliterated by retained secretions. Patchy opacities within the bilateral lower lobes may represent pneumonia or aspiration pneumonitis.    Trace right pleural effusion. No pneumothorax.    MEDIASTINUM AND JOHNATHAN: No lymphadenopathy.    HEART AND VESSELS: Four-chamber dilatation of the heart. Coronary atherosclerosis. Calcification of the aortic valve and mitral annulus. Dilated main pulmonary artery measuring 4 cm. No pericardial effusion. Thoracic aorta normal in diameter.    VISUALIZED UPPER ABDOMEN: Cholelithiasis. Bilateral renal cysts. Aortic atherosclerosis. Metallic densities possibly Embolization coils within the left lobe of the liver. Splenic aneurysms.    CHEST WALL AND BONES: No displaced rib fracture. No aggressive osseous lesion. Scoliosis.    LOWER NECK: Within normal limits.    IMPRESSION:  Motion limited exam.    No displaced rib fracture. No pneumothorax.    The left lower lobe bronchus and segmental branches of right lower lobe bronchus are obliterated by retained secretions. Patchy opacities within the bilateral lower lobes may represent pneumonia or aspiration pneumonitis.    Cardiomegaly. Aortic valve and mitral annulus calcification. Coronary atherosclerosis.        < from: CT Head No Cont (01.19.20 @ 19:00) >  EXAM:  CT CERVICAL SPINE                        EXAM:  CT BRAIN                        PROCEDURE DATE:  01/19/2020    INTERPRETATION:  CLINICAL INFORMATION: Head injury.  TECHNIQUE: Noncontrast CT scan of the head and cervical spinewas performed. The cervical spine CT was performed with thin section axial images. Sagittal and coronal reformations were created.    COMPARISON: No similar prior studies available for comparison.    FINDINGS:    HEAD:  No acute intracranial hemorrhage, mass effect, or midline shift. The basal cisterns are patent. No abnormal extra-axial collections.   Cerebral volume loss is present with proportional prominence of the sulci and ventricles. Moderate periventricular and subcortical white matter hypoattenuation without mass effect is noted, non-specific, but likely related to chronic small vessel ischemic changes. Chronic right frontal infarct with encephalomalacia/gliosis. Left basal ganglia lacunar infarct. Chronic left basal ganglia lacunar infarct. Small vessel white matter ischemic changes are noted.  The calvarium is intact. The tympanomastoid cavities appear free of acute disease. Left maxillary sinus mucosal thickening. Bilateral cataracts surgery.  CERVICAL SPINE:  The normal cervical lordosis is maintained. Mild anterolisthesis of C2 on C3 and C3 on C4. No acute fracture or prevertebral soft tissue swelling. The craniocervical junction is unremarkable.   No vertebral disc height loss throughout cervical spine. Multilevel degenerative changes of the cervical spine characterized by marginal osteophyte formation, small disc bulges, and uncovertebral and facet joint arthrosis. Ankylosis of the left C2-3 facet. Varying degrees of spinal canal and foraminal narrowing, not well evaluated at CT.  The lung apices are unremarkable.    IMPRESSION:  Head CT: No displaced calvarial fracture or acute intracranial hemorrhage.   Cervical spine CT: No acute fracture.

## 2020-01-22 NOTE — SWALLOW BEDSIDE ASSESSMENT ADULT - CONSISTENCIES ADMINISTERED
puree thin/puree thick honey thick Further PO trials deferred due to difficulty with conservative textures.

## 2020-01-22 NOTE — PROGRESS NOTE ADULT - ATTENDING COMMENTS
1. Septic shock from Serratia bacteremia. . Off IV . Continue  meropenem. Await sensitives for  Serratia. Continue  midodrine.  2. Bacterial pneumonia Serratia. UTI with ESBL Klebsiella. Continue Meropenem.   3. KARRIE on CKD 3 . UO and creatinine improving.  4Cardiac. H/O afib with severe AS.  restart digoxin today.

## 2020-01-22 NOTE — PROGRESS NOTE ADULT - ASSESSMENT
Assessment and Plan:   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 portal htn, c/b esophageal varices s/p banding s/p hepatic vessel coiling, CKD 3bl Cr 1.2, PVD, hypothyroidism, and recurrent UTI w/ hx of ESBL Klebsiella. Pt last admit 1/6-9 s/p fall OOB [facility] w AMS 2/2 Septic Shock 2/2 Serratia Bacteremia, + PNA w + Sputum for Serratia, and + UTI Klebsiella ESBL- sensitive to Meropenem. Of note Serratia + cultures- w sensitivity pending. Pt remains hemodynamically stable and w a stable respiratory status, is stable for tx to the medical floor.    Neuro: Metabolic Encephalopathy 2/2 Sepsis- Mental status now greatly improved, CT of the brain negative on admission  -c/w Neuro checks as per ICU  -c/w lactulose / rifaximin in the setting of pt hx of cirrhosis     CV: Hx Afib on digoxin w/o AC, Severe AS- , off of vasopressor support x > 24H now  -f/u digoxin level (normally on 125mcg every other day)- continue digoxin  -c/w hold AC in the setting of hx of cirrhosis w esophageal varices s/p banding hx   -consider echo - in the setting now of bacteremia / re-assess - pt AS    Pulm: PNA - suspicion for aspiration- RRL opacity, RVP negative  -chest PT- aggressive chest PT  -aspiration precautions  -supplemental O2- to maintain an O2 sat > 90%  -c/w meropenem - + serratia in the sputum f/u on sensitivity  -c/w duonebs    Renal: KARRIE on CKD 3 (baseline ) - improved renal function, now slightly hypernatremic  -renal function improved- c/w trending  -bladder scan - st cath as needed  -free water via  q 6H,  trend bmp for Na q 12 H  -strict I & O    ID: Chronic UTI's - last + Klebsiella- now a/w septic shock- +Serratia Bacteremia x 2 bottles. + PNA- + Serratia in the sputum, + Urosepsis - + Klebsiella ESBL sensitive to meropenem,  PNA, RVP neg / U. Legionella neg  -f/u cultures for sensitivities   -c/w meropenem     GI: Hx Cirrhosis 2/2 Hepatocellular Carcinoma c/b portal HTN / Esophogeal Varices s/p banding  -c/w holding AC   -c/w lactulose via NGT for goal of 3-4BM /day  -ammonia levels qd  -speech / swallow- concern for aspiration- pt failed BS swallow- failed s/S 9/2019 w speech pathology- f/u re-assessment by speech- last rec- mechanical soft 2 / w honey to nectar thickened liquids- await re-assessment today  -c/w rifaximin once pt has PO  via NGT  -start tube feeds- pt is malnourished - f/u Nutrition      GOC: pt with a DNR /DNI- Molst form on chart  - c/w DNR/DNI  - family continues to request full treatment modalities w the exception of intubation / +DNR

## 2020-01-23 LAB
ALBUMIN SERPL ELPH-MCNC: 2.1 G/DL — LOW (ref 3.3–5)
ALP SERPL-CCNC: 87 U/L — SIGNIFICANT CHANGE UP (ref 40–120)
ALT FLD-CCNC: 18 U/L — SIGNIFICANT CHANGE UP (ref 10–45)
AMMONIA BLD-MCNC: 30 UMOL/L — SIGNIFICANT CHANGE UP (ref 11–55)
ANION GAP SERPL CALC-SCNC: 8 MMOL/L — SIGNIFICANT CHANGE UP (ref 5–17)
ANION GAP SERPL CALC-SCNC: 9 MMOL/L — SIGNIFICANT CHANGE UP (ref 5–17)
ANISOCYTOSIS BLD QL: SLIGHT — SIGNIFICANT CHANGE UP
APTT BLD: 32.2 SEC — SIGNIFICANT CHANGE UP (ref 27.5–36.3)
AST SERPL-CCNC: 36 U/L — SIGNIFICANT CHANGE UP (ref 10–40)
BASE EXCESS BLDV CALC-SCNC: -2.6 MMOL/L — LOW (ref -2–2)
BASOPHILS # BLD AUTO: 0 K/UL — SIGNIFICANT CHANGE UP (ref 0–0.2)
BASOPHILS NFR BLD AUTO: 0 % — SIGNIFICANT CHANGE UP (ref 0–2)
BILIRUB SERPL-MCNC: 1.3 MG/DL — HIGH (ref 0.2–1.2)
BUN SERPL-MCNC: 36 MG/DL — HIGH (ref 7–23)
BUN SERPL-MCNC: 41 MG/DL — HIGH (ref 7–23)
BURR CELLS BLD QL SMEAR: PRESENT — SIGNIFICANT CHANGE UP
CA-I SERPL-SCNC: 1.41 MMOL/L — HIGH (ref 1.12–1.3)
CALCIUM SERPL-MCNC: 8.7 MG/DL — SIGNIFICANT CHANGE UP (ref 8.4–10.5)
CALCIUM SERPL-MCNC: 9.1 MG/DL — SIGNIFICANT CHANGE UP (ref 8.4–10.5)
CHLORIDE BLDV-SCNC: >120 MMOL/L — HIGH (ref 96–108)
CHLORIDE SERPL-SCNC: 117 MMOL/L — HIGH (ref 96–108)
CHLORIDE SERPL-SCNC: 118 MMOL/L — HIGH (ref 96–108)
CO2 BLDV-SCNC: 23 MMOL/L — SIGNIFICANT CHANGE UP (ref 22–30)
CO2 SERPL-SCNC: 20 MMOL/L — LOW (ref 22–31)
CO2 SERPL-SCNC: 21 MMOL/L — LOW (ref 22–31)
CREAT SERPL-MCNC: 1 MG/DL — SIGNIFICANT CHANGE UP (ref 0.5–1.3)
CREAT SERPL-MCNC: 1.17 MG/DL — SIGNIFICANT CHANGE UP (ref 0.5–1.3)
ELLIPTOCYTES BLD QL SMEAR: SLIGHT — SIGNIFICANT CHANGE UP
EOSINOPHIL # BLD AUTO: 0 K/UL — SIGNIFICANT CHANGE UP (ref 0–0.5)
EOSINOPHIL NFR BLD AUTO: 0 % — SIGNIFICANT CHANGE UP (ref 0–6)
GAS PNL BLDV: 143 MMOL/L — SIGNIFICANT CHANGE UP (ref 135–145)
GLUCOSE BLDC GLUCOMTR-MCNC: 148 MG/DL — HIGH (ref 70–99)
GLUCOSE BLDC GLUCOMTR-MCNC: 150 MG/DL — HIGH (ref 70–99)
GLUCOSE BLDV-MCNC: 133 MG/DL — HIGH (ref 70–99)
GLUCOSE SERPL-MCNC: 131 MG/DL — HIGH (ref 70–99)
GLUCOSE SERPL-MCNC: 145 MG/DL — HIGH (ref 70–99)
HCO3 BLDV-SCNC: 22 MMOL/L — SIGNIFICANT CHANGE UP (ref 21–29)
HCT VFR BLD CALC: 30.3 % — LOW (ref 34.5–45)
HCT VFR BLDA CALC: 32 % — LOW (ref 39–50)
HGB BLD CALC-MCNC: 10.3 G/DL — LOW (ref 11.5–15.5)
HGB BLD-MCNC: 9.8 G/DL — LOW (ref 11.5–15.5)
INR BLD: 1.3 RATIO — HIGH (ref 0.88–1.16)
LACTATE BLDV-MCNC: 2 MMOL/L — SIGNIFICANT CHANGE UP (ref 0.7–2)
LYMPHOCYTES # BLD AUTO: 0.05 K/UL — LOW (ref 1–3.3)
LYMPHOCYTES # BLD AUTO: 0.9 % — LOW (ref 13–44)
MACROCYTES BLD QL: SLIGHT — SIGNIFICANT CHANGE UP
MAGNESIUM SERPL-MCNC: 1.9 MG/DL — SIGNIFICANT CHANGE UP (ref 1.6–2.6)
MAGNESIUM SERPL-MCNC: 2.2 MG/DL — SIGNIFICANT CHANGE UP (ref 1.6–2.6)
MANUAL SMEAR VERIFICATION: SIGNIFICANT CHANGE UP
MCHC RBC-ENTMCNC: 32.3 GM/DL — SIGNIFICANT CHANGE UP (ref 32–36)
MCHC RBC-ENTMCNC: 33.3 PG — SIGNIFICANT CHANGE UP (ref 27–34)
MCV RBC AUTO: 103.1 FL — HIGH (ref 80–100)
MONOCYTES # BLD AUTO: 0.1 K/UL — SIGNIFICANT CHANGE UP (ref 0–0.9)
MONOCYTES NFR BLD AUTO: 1.7 % — LOW (ref 2–14)
NEUTROPHILS # BLD AUTO: 5.81 K/UL — SIGNIFICANT CHANGE UP (ref 1.8–7.4)
NEUTROPHILS NFR BLD AUTO: 97.4 % — HIGH (ref 43–77)
OTHER CELLS CSF MANUAL: 10 ML/DL — LOW (ref 18–22)
OVALOCYTES BLD QL SMEAR: SLIGHT — SIGNIFICANT CHANGE UP
PCO2 BLDV: 40 MMHG — SIGNIFICANT CHANGE UP (ref 35–50)
PH BLDV: 7.36 — SIGNIFICANT CHANGE UP (ref 7.35–7.45)
PHOSPHATE SERPL-MCNC: 2.1 MG/DL — LOW (ref 2.5–4.5)
PHOSPHATE SERPL-MCNC: 2.3 MG/DL — LOW (ref 2.5–4.5)
PLAT MORPH BLD: NORMAL — SIGNIFICANT CHANGE UP
PLATELET # BLD AUTO: 62 K/UL — LOW (ref 150–400)
PO2 BLDV: 39 MMHG — SIGNIFICANT CHANGE UP (ref 25–45)
POIKILOCYTOSIS BLD QL AUTO: SIGNIFICANT CHANGE UP
POTASSIUM BLDV-SCNC: 4.4 MMOL/L — SIGNIFICANT CHANGE UP (ref 3.5–5.3)
POTASSIUM SERPL-MCNC: 4.5 MMOL/L — SIGNIFICANT CHANGE UP (ref 3.5–5.3)
POTASSIUM SERPL-MCNC: 4.7 MMOL/L — SIGNIFICANT CHANGE UP (ref 3.5–5.3)
POTASSIUM SERPL-SCNC: 4.5 MMOL/L — SIGNIFICANT CHANGE UP (ref 3.5–5.3)
POTASSIUM SERPL-SCNC: 4.7 MMOL/L — SIGNIFICANT CHANGE UP (ref 3.5–5.3)
PROT SERPL-MCNC: 4.4 G/DL — LOW (ref 6–8.3)
PROTHROM AB SERPL-ACNC: 15.1 SEC — HIGH (ref 10–12.9)
RBC # BLD: 2.94 M/UL — LOW (ref 3.8–5.2)
RBC # FLD: 15.5 % — HIGH (ref 10.3–14.5)
RBC BLD AUTO: ABNORMAL
SAO2 % BLDV: 68 % — SIGNIFICANT CHANGE UP (ref 67–88)
SODIUM SERPL-SCNC: 146 MMOL/L — HIGH (ref 135–145)
SODIUM SERPL-SCNC: 147 MMOL/L — HIGH (ref 135–145)
TARGETS BLD QL SMEAR: SLIGHT — SIGNIFICANT CHANGE UP
WBC # BLD: 5.96 K/UL — SIGNIFICANT CHANGE UP (ref 3.8–10.5)
WBC # FLD AUTO: 5.96 K/UL — SIGNIFICANT CHANGE UP (ref 3.8–10.5)

## 2020-01-23 PROCEDURE — 93306 TTE W/DOPPLER COMPLETE: CPT | Mod: 26

## 2020-01-23 PROCEDURE — 99233 SBSQ HOSP IP/OBS HIGH 50: CPT | Mod: GC

## 2020-01-23 PROCEDURE — 99232 SBSQ HOSP IP/OBS MODERATE 35: CPT

## 2020-01-23 RX ORDER — SODIUM,POTASSIUM PHOSPHATES 278-250MG
1 POWDER IN PACKET (EA) ORAL EVERY 12 HOURS
Refills: 0 | Status: DISCONTINUED | OUTPATIENT
Start: 2020-01-23 | End: 2020-01-24

## 2020-01-23 RX ORDER — MIDODRINE HYDROCHLORIDE 2.5 MG/1
20 TABLET ORAL EVERY 8 HOURS
Refills: 0 | Status: DISCONTINUED | OUTPATIENT
Start: 2020-01-23 | End: 2020-01-23

## 2020-01-23 RX ORDER — MIDODRINE HYDROCHLORIDE 2.5 MG/1
30 TABLET ORAL EVERY 8 HOURS
Refills: 0 | Status: DISCONTINUED | OUTPATIENT
Start: 2020-01-23 | End: 2020-01-25

## 2020-01-23 RX ORDER — MIDODRINE HYDROCHLORIDE 2.5 MG/1
20 TABLET ORAL ONCE
Refills: 0 | Status: COMPLETED | OUTPATIENT
Start: 2020-01-23 | End: 2020-01-23

## 2020-01-23 RX ORDER — SODIUM CHLORIDE 9 MG/ML
500 INJECTION, SOLUTION INTRAVENOUS ONCE
Refills: 0 | Status: COMPLETED | OUTPATIENT
Start: 2020-01-23 | End: 2020-01-23

## 2020-01-23 RX ADMIN — MIDODRINE HYDROCHLORIDE 10 MILLIGRAM(S): 2.5 TABLET ORAL at 05:08

## 2020-01-23 RX ADMIN — Medication 3 MILLILITER(S): at 18:23

## 2020-01-23 RX ADMIN — MIDODRINE HYDROCHLORIDE 20 MILLIGRAM(S): 2.5 TABLET ORAL at 15:01

## 2020-01-23 RX ADMIN — CHLORHEXIDINE GLUCONATE 1 APPLICATION(S): 213 SOLUTION TOPICAL at 05:09

## 2020-01-23 RX ADMIN — Medication 12.5 MICROGRAM(S): at 21:15

## 2020-01-23 RX ADMIN — LACTULOSE 10 GRAM(S): 10 SOLUTION ORAL at 17:06

## 2020-01-23 RX ADMIN — Medication 3 MILLILITER(S): at 00:19

## 2020-01-23 RX ADMIN — HEPARIN SODIUM 5000 UNIT(S): 5000 INJECTION INTRAVENOUS; SUBCUTANEOUS at 05:09

## 2020-01-23 RX ADMIN — MIDODRINE HYDROCHLORIDE 30 MILLIGRAM(S): 2.5 TABLET ORAL at 21:15

## 2020-01-23 RX ADMIN — Medication 1 PACKET(S): at 17:08

## 2020-01-23 RX ADMIN — SODIUM CHLORIDE 500 MILLILITER(S): 9 INJECTION, SOLUTION INTRAVENOUS at 01:12

## 2020-01-23 RX ADMIN — Medication 3 MILLILITER(S): at 05:13

## 2020-01-23 RX ADMIN — MEROPENEM 100 MILLIGRAM(S): 1 INJECTION INTRAVENOUS at 17:06

## 2020-01-23 RX ADMIN — MEROPENEM 100 MILLIGRAM(S): 1 INJECTION INTRAVENOUS at 05:08

## 2020-01-23 RX ADMIN — LACTULOSE 10 GRAM(S): 10 SOLUTION ORAL at 23:09

## 2020-01-23 RX ADMIN — Medication 3 MILLILITER(S): at 11:48

## 2020-01-23 RX ADMIN — LACTULOSE 10 GRAM(S): 10 SOLUTION ORAL at 12:22

## 2020-01-23 RX ADMIN — MIDODRINE HYDROCHLORIDE 20 MILLIGRAM(S): 2.5 TABLET ORAL at 10:18

## 2020-01-23 NOTE — PROGRESS NOTE ADULT - SUBJECTIVE AND OBJECTIVE BOX
CHIEF COMPLAINT: Septic Shock - PNA, Bacteremia Urosepsis    Interval Events: Hypotensive given 500cc IVF bolus x 2. Midodrine increased to 20mg q8    HPI: This is 89yoF w a Hx of Afib on digoxin (not on AC), Severe AS, Hypothyroidism, COPD not on home O2, CLL (tx hx Treanda and Rituxan), cirrhosis in the setting HCC, w portal HTN c/b esophageal varices s/p banding, CKD w a baseline Cr 1.2, PVD, and recurrent Klebsiella UTI. Last Hospital admission 1/6-1/9/2020 s/p mechanical fall at home w c/o weakness. The pt was readmitted from [facility] on 1/21/2020 for hypoxic respiratory failure 2/2 PNA and in septic shock 2/2 Serratia PNA / Serratia Bacteremia, and Urosepsis- Klebsiella ESBL.     REVIEW OF SYSTEMS:  Constitutional: [ ] fevers [ ] chills [ ] weight loss [ ] weight gain  HEENT: [ ] dry eyes [ ] eye irritation [ ] postnasal drip [ ] nasal congestion  CV: [ ] chest pain [ ] orthopnea [ ] palpitations [ ] murmur  Resp: [ ] cough [ ] shortness of breath [ ] dyspnea [ ] wheezing [ ] sputum [ ] hemoptysis  GI: [ ] nausea [ ] vomiting [ ] diarrhea [ ] constipation [ ] abd pain [ ] dysphagia   : [ ] dysuria [ ] nocturia [ ] hematuria [ ] increased urinary frequency  Musculoskeletal: [ ] back pain [ ] myalgias [ ] arthralgias [ ] fracture  Skin: [ ] rash [ ] itch  Neurological: [ ] headache [ ] dizziness [ ] syncope [ ] weakness [ ] numbness  Psychiatric: [ ] anxiety [ ] depression  Endocrine: [ ] diabetes [ ] thyroid problem  Hematologic/Lymphatic: [ ] anemia [ ] bleeding problem  Allergic/Immunologic: [ ] itchy eyes [ ] nasal discharge [ ] hives [ ] angioedema  [ ] All other systems negative  [ ] Unable to assess ROS because ________    OBJECTIVE:  ICU Vital Signs Last 24 Hrs  T(C): 36.8 (23 Jan 2020 04:00), Max: 36.8 (22 Jan 2020 11:00)  T(F): 98.3 (23 Jan 2020 04:00), Max: 98.3 (23 Jan 2020 04:00)  HR: 89 (23 Jan 2020 07:00) (80 - 108)  BP: 112/49 (23 Jan 2020 07:00) (64/46 - 154/59)  BP(mean): 71 (23 Jan 2020 07:00) (51 - 107)  ABP: --  ABP(mean): --  RR: 30 (23 Jan 2020 07:00) (2 - 59)  SpO2: 99% (23 Jan 2020 07:00) (87% - 100%)      I & O's    01-22 @ 07:01  -  01-23 @ 07:00  --------------------------------------------------------  IN: 5165 mL / OUT: 1250 mL / NET: 3915 mL      CAPILLARY BLOOD GLUCOSE      POCT Blood Glucose.: 148 mg/dL (23 Jan 2020 05:07)      PHYSICAL EXAM:  General:   HEENT:   Lymph Nodes:  Neck:   Respiratory:   Cardiovascular:   Abdomen:   Extremities:   Skin:   Neurological:  Psychiatry:    LINES:    HOSPITAL MEDICATIONS:  MEDICATIONS  (STANDING):  albuterol/ipratropium for Nebulization 3 milliLiter(s) Nebulizer every 6 hours  chlorhexidine 4% Liquid 1 Application(s) Topical <User Schedule>  dextrose 5% + lactated ringers. 1000 milliLiter(s) (50 mL/Hr) IV Continuous <Continuous>  digoxin     Tablet 0.125 milliGRAM(s) Oral every other day  heparin  Injectable 5000 Unit(s) SubCutaneous two times a day  lactulose Syrup 10 Gram(s) Oral four times a day  levothyroxine Injectable 12.5 MICROGram(s) IV Push at bedtime  meropenem  IVPB 500 milliGRAM(s) IV Intermittent every 12 hours  midodrine 20 milliGRAM(s) Oral every 8 hours  rifAXIMin 550 milliGRAM(s) Oral two times a day    MEDICATIONS  (PRN):      LABS:                        9.8    5.96  )-----------( 62       ( 22 Jan 2020 23:57 )             30.3     Hgb Trend: 9.8<--, 10.5<--, 10.8<--, 11.3<--, 12.0<--  01-22    146<H>  |  117<H>  |  41<H>  ----------------------------<  145<H>  4.7   |  20<L>  |  1.17    Ca    9.1      22 Jan 2020 23:57  Phos  2.3     01-22  Mg     2.2     01-22    TPro  4.4<L>  /  Alb  2.1<L>  /  TBili  1.3<H>  /  DBili  x   /  AST  36  /  ALT  18  /  AlkPhos  87  01-22    Creatinine Trend: 1.17<--, 1.23<--, 1.32<--, 1.49<--, 1.60<--, 1.57<--  PT/INR - ( 22 Jan 2020 23:57 )   PT: 15.1 sec;   INR: 1.30 ratio         PTT - ( 22 Jan 2020 23:57 )  PTT:32.2 sec      Venous Blood Gas:  01-22 @ 23:55  7.36/40/39/22/68  VBG Lactate: 2.0  Venous Blood Gas:  01-22 @ 00:57  7.40/39/36/24/65  VBG Lactate: 1.9      MICROBIOLOGY:     RADIOLOGY:  [ ] Reviewed and interpreted by me    EKG: CHIEF COMPLAINT: Septic Shock - PNA, Bacteremia Urosepsis    Interval Events: Hypotensive given 500cc IVF bolus x 2. Midodrine increased to 20mg q8    HPI: This is 89yoF w a Hx of Afib on digoxin (not on AC), Severe AS, Hypothyroidism, COPD not on home O2, CLL (tx hx Treanda and Rituxan), cirrhosis in the setting HCC, w portal HTN c/b esophageal varices s/p banding, CKD w a baseline Cr 1.2, PVD, and recurrent Klebsiella UTI. Last Hospital admission 1/6-1/9/2020 s/p mechanical fall at home w c/o weakness. The pt was readmitted from [facility] on 1/21/2020 for hypoxic respiratory failure 2/2 PNA and in septic shock 2/2 Serratia PNA / Serratia Bacteremia, and Urosepsis- Klebsiella ESBL.     REVIEW OF SYSTEMS:  [ x] Unable to assess ROS because lethargic easily arousable________    OBJECTIVE:  ICU Vital Signs Last 24 Hrs  T(C): 36.8 (23 Jan 2020 04:00), Max: 36.8 (22 Jan 2020 11:00)  T(F): 98.3 (23 Jan 2020 04:00), Max: 98.3 (23 Jan 2020 04:00)  HR: 89 (23 Jan 2020 07:00) (80 - 108)  BP: 112/49 (23 Jan 2020 07:00) (64/46 - 154/59)  BP(mean): 71 (23 Jan 2020 07:00) (51 - 107)  ABP: --  ABP(mean): --  RR: 30 (23 Jan 2020 07:00) (2 - 59)  SpO2: 99% (23 Jan 2020 07:00) (87% - 100%)      I & O's    01-22 @ 07:01  -  01-23 @ 07:00  --------------------------------------------------------  IN: 5165 mL / OUT: 1250 mL / NET: 3915 mL      CAPILLARY BLOOD GLUCOSE  POCT Blood Glucose.: 148 mg/dL (23 Jan 2020 05:07)      PHYSICAL EXAM:  General: lethargic / easy to arouse , appears comfortable  HEENT: normocephalic, atraumatic, Trachea midline  Lymph Nodes: non palp  Neck: supple, neg JVD  Respiratory: bilaterally equal BS, Bilateral course rhonchi, non productive intermittent cough  Cardiovascular: S1S2 irregular , Afib at baseline, all pulses palp 2+/4  Abdomen: non distended, +BS x 4, non tender on palp, NGT - Keofeed in place-(soft), lactulose being administered - loose BM's  Extremities: negative edema  Skin: dry, warm, poor, acyanotic, neg for jaundice  Neurological: lethargic /easy to arouse,   Psychiatry:    LINES:    HOSPITAL MEDICATIONS:  MEDICATIONS  (STANDING):  albuterol/ipratropium for Nebulization 3 milliLiter(s) Nebulizer every 6 hours  chlorhexidine 4% Liquid 1 Application(s) Topical <User Schedule>  dextrose 5% + lactated ringers. 1000 milliLiter(s) (50 mL/Hr) IV Continuous <Continuous>  digoxin     Tablet 0.125 milliGRAM(s) Oral every other day  heparin  Injectable 5000 Unit(s) SubCutaneous two times a day  lactulose Syrup 10 Gram(s) Oral four times a day  levothyroxine Injectable 12.5 MICROGram(s) IV Push at bedtime  meropenem  IVPB 500 milliGRAM(s) IV Intermittent every 12 hours  midodrine 20 milliGRAM(s) Oral every 8 hours  rifAXIMin 550 milliGRAM(s) Oral two times a day    MEDICATIONS  (PRN):      LABS:                        9.8    5.96  )-----------( 62       ( 22 Jan 2020 23:57 )             30.3     Hgb Trend: 9.8<--, 10.5<--, 10.8<--, 11.3<--, 12.0<--  01-22    146<H>  |  117<H>  |  41<H>  ----------------------------<  145<H>  4.7   |  20<L>  |  1.17    Ca    9.1      22 Jan 2020 23:57  Phos  2.3     01-22  Mg     2.2     01-22    TPro  4.4<L>  /  Alb  2.1<L>  /  TBili  1.3<H>  /  DBili  x   /  AST  36  /  ALT  18  /  AlkPhos  87  01-22    Creatinine Trend: 1.17<--, 1.23<--, 1.32<--, 1.49<--, 1.60<--, 1.57<--  PT/INR - ( 22 Jan 2020 23:57 )   PT: 15.1 sec;   INR: 1.30 ratio         PTT - ( 22 Jan 2020 23:57 )  PTT:32.2 sec      Venous Blood Gas:  01-22 @ 23:55  7.36/40/39/22/68  VBG Lactate: 2.0  Venous Blood Gas:  01-22 @ 00:57  7.40/39/36/24/65  VBG Lactate: 1.9      MICROBIOLOGY:     RADIOLOGY:  [ ] Reviewed and interpreted by me    EKG: CHIEF COMPLAINT: Septic Shock - PNA, Bacteremia Urosepsis    Interval Events: Hypotensive given 500cc IVF bolus x 2. Midodrine increased to 20mg q8    HPI: This is 89yoF w a Hx of Afib on digoxin (not on AC), Severe AS, Hypothyroidism, COPD not on home O2, CLL (tx hx Treanda and Rituxan), cirrhosis in the setting HCC, w portal HTN c/b esophageal varices s/p banding, CKD w a baseline Cr 1.2, PVD, and recurrent Klebsiella UTI. Last Hospital admission 1/6-1/9/2020 s/p mechanical fall at home w c/o weakness. The pt was readmitted from [facility] on 1/21/2020 for hypoxic respiratory failure 2/2 PNA and in septic shock 2/2 Serratia PNA / Serratia Bacteremia, and Urosepsis- Klebsiella ESBL.     REVIEW OF SYSTEMS:  [ x] Unable to assess ROS because lethargic easily arousable________    OBJECTIVE:  ICU Vital Signs Last 24 Hrs  T(C): 36.8 (23 Jan 2020 04:00), Max: 36.8 (22 Jan 2020 11:00)  T(F): 98.3 (23 Jan 2020 04:00), Max: 98.3 (23 Jan 2020 04:00)  HR: 89 (23 Jan 2020 07:00) (80 - 108)  BP: 112/49 (23 Jan 2020 07:00) (64/46 - 154/59)  BP(mean): 71 (23 Jan 2020 07:00) (51 - 107)  ABP: --  ABP(mean): --  RR: 30 (23 Jan 2020 07:00) (2 - 59)  SpO2: 99% (23 Jan 2020 07:00) (87% - 100%)      I & O's    01-22 @ 07:01  -  01-23 @ 07:00  --------------------------------------------------------  IN: 5165 mL / OUT: 1250 mL / NET: 3915 mL      CAPILLARY BLOOD GLUCOSE  POCT Blood Glucose.: 148 mg/dL (23 Jan 2020 05:07)      PHYSICAL EXAM:  General: lethargic / easy to arouse , appears comfortable  HEENT: normocephalic, atraumatic, sclera white, oral mucosa pink / moist,  Trachea midline  Lymph Nodes: non palp  Neck: supple, neg JVD  Respiratory: bilaterally equal BS, Bilateral course rhonchi, non productive intermittent cough  Cardiovascular: S1S2 irregular , Afib at baseline, all pulses palp 2+/4  Abdomen: non distended, +BS x 4, non tender on palp, NGT - Keofeed in place-(soft), lactulose being administered - loose BM's  Extremities: negative edema  Skin: dry, warm, poor, acyanotic, neg for jaundice  Neurological: lethargic /easy to arouse, +PERRL, EOMI, tracks appropriately, MBUE freely, weakness BME   Psychiatry:    LINES: [facility] MEDICATIONS:  MEDICATIONS  (STANDING):  albuterol/ipratropium for Nebulization 3 milliLiter(s) Nebulizer every 6 hours  chlorhexidine 4% Liquid 1 Application(s) Topical <User Schedule>  dextrose 5% + lactated ringers. 1000 milliLiter(s) (50 mL/Hr) IV Continuous <Continuous>  digoxin     Tablet 0.125 milliGRAM(s) Oral every other day  heparin  Injectable 5000 Unit(s) SubCutaneous two times a day  lactulose Syrup 10 Gram(s) Oral four times a day  levothyroxine Injectable 12.5 MICROGram(s) IV Push at bedtime  meropenem  IVPB 500 milliGRAM(s) IV Intermittent every 12 hours  midodrine 20 milliGRAM(s) Oral every 8 hours  rifAXIMin 550 milliGRAM(s) Oral two times a day    MEDICATIONS  (PRN):      LABS:                        9.8    5.96  )-----------( 62       ( 22 Jan 2020 23:57 )             30.3     Hgb Trend: 9.8<--, 10.5<--, 10.8<--, 11.3<--, 12.0<--  01-22    146<H>  |  117<H>  |  41<H>  ----------------------------<  145<H>  4.7   |  20<L>  |  1.17    Ca    9.1      22 Jan 2020 23:57  Phos  2.3     01-22  Mg     2.2     01-22    TPro  4.4<L>  /  Alb  2.1<L>  /  TBili  1.3<H>  /  DBili  x   /  AST  36  /  ALT  18  /  AlkPhos  87  01-22    Creatinine Trend: 1.17<--, 1.23<--, 1.32<--, 1.49<--, 1.60<--, 1.57<--  PT/INR - ( 22 Jan 2020 23:57 )   PT: 15.1 sec;   INR: 1.30 ratio      PTT - ( 22 Jan 2020 23:57 )  PTT:32.2 sec    Venous Blood Gas:  01-22 @ 23:55  7.36/40/39/22/68    Culture - Blood (collected 21 Jan 2020 23:03)  Source: .Blood Blood-Venous  Preliminary Report (23 Jan 2020 01:01):    No growth to date.    Culture - Blood (collected 21 Jan 2020 15:02)  Source: .Blood Blood-Peripheral  Preliminary Report (22 Jan 2020 15:05):    No growth to date.    Culture - Sputum (collected 20 Jan 2020 15:19)  Source: .Sputum Sputum  Gram Stain (20 Jan 2020 19:21):    Moderate polymorphonuclear leukocytes per low power field    Numerous Squamous epithelial cells per low power field    Numerous Gram Negative Rods per oil power field    Moderate Yeast like cells per oil power field  Final Report (22 Jan 2020 17:11):    Moderate Serratia marcescens    Normal Respiratory Stefanie present  Organism: Serratia marcescens (22 Jan 2020 17:11)  Organism: Serratia marcescens (22 Jan 2020 17:11)    VBG Lactate: 2.0  Venous Blood Gas:  01-22 @ 00:57  7.40/39/36/24/65  VBG Lactate: 1.9    Culture - Blood (collected 21 Jan 2020 15:02)  Source: .Blood Blood-Peripheral  Preliminary Report (22 Jan 2020 15:05):    No growth to date.    Culture - Sputum (collected 20 Jan 2020 15:19)  Source: .Sputum Sputum  Gram Stain (20 Jan 2020 19:21):    Moderate polymorphonuclear leukocytes per low power field    Numerous Squamous epithelial cells per low power field    Numerous Gram Negative Rods per oil power field    Moderate Yeast like cells per oil power field  Final Report (22 Jan 2020 17:11):    Moderate Serratia marcescens    Normal Respiratory Stefanie present  Organism: Serratia marcescens (22 Jan 2020 17:11)  Organism: Serratia marcescens (22 Jan 2020 17:11)    Culture - Blood (01.19.20 @ 22:02)  + x 2    Gram Stain:   Growth in anaerobic bottle: Gram Negative Rods  Growth in aerobic bottle: Gram Negative Rods    Specimen Source: .Blood Blood-Peripheral    Culture Results:   Growth in aerobic and anaerobic bottles: Serratia marcescens    See previous culture [id number]    Culture - Urine (01.19.20 @ 22:01)      Klesiella +    -  Trimethoprim/Sulfamethoxazole: R >2/38    -  Imipenem: S <=1    -  Tigecycline: S <=2    -  Amikacin: S <=16    -  Gentamicin: R >8    -  Aztreonam: R >16    -  Ciprofloxacin: R >2    -  Ertapenem: S <=1    -  Ampicillin/Sulbactam: R >16/8 Enterobacter, Citrobacter, and Serratia may develop resistance during prolonged therapy (3-4 days)    -  Cefepime: R >16    -  Ceftriaxone: R >32 Enterobacter, Citrobacter, and Serratia may develop resistance during prolonged therapy    -  Piperacillin/Tazobactam: R <=16    -  Tobramycin: R >8    -  Ampicillin: R >16 These ampicillin results predict results for amoxicillin    -  Cefazolin: R >16 ([id number]_[id number]) For uncomplicated UTI with K. pneumoniae, E. coli, or P. mirablis: GALINDO <=16 is sensitive and GALINDO >=32 is resistant. This also predicts results for oral agents cefaclor, cefdinir, cefpodoxime, cefprozil, cefuroxime axetil, cephalexin and locarbef for uncomplicated UTI. Note that some isolates may be susceptible to these agents while testing resistant to cefazolin.    -  Cefoxitin: S <=8    -  Levofloxacin: R >4    -  Meropenem: S <=1    -  Nitrofurantoin: S <=32 Should not be used to treat pyelonephritis    Specimen Source: .Urine Clean Catch (Midstream)    Culture Results:   >100,000 CFU/ml Klebsiella pneumoniae ESBL    Organism Identification: Klebsiella pneumoniae ESBL    Organism: Klebsiella pneumoniae ESBL    Method Type: GALINDO        RADIOLOGY:  < from: CT Chest No Cont (01.19.20 @ 19:03) >  EXAM:  CT CHEST                        PROCEDURE DATE:  01/19/2020   INTERPRETATION:  CLINICAL INFORMATION: Fall, chest pain  COMPARISON: CT chest 9/8/2019    PROCEDURE:  CT of the Chest was performed without intravenous contrast.  Sagittal and coronal reformats were performed.    FINDINGS: The quality of the images are degraded by respiratory motion.    AIRWAYS, LUNGS and PLEURA: The left lower lobe bronchus and segmental branches of right lower lobe bronchus are obliterated by retained secretions. Patchy opacities within the bilateral lower lobes may represent pneumonia or aspiration pneumonitis.  Trace right pleural effusion. No pneumothorax.  MEDIASTINUM AND JOHNATHAN: No lymphadenopathy.  HEART AND VESSELS: Four-chamber dilatation of the heart. Coronary atherosclerosis. Calcification of the aortic valve and mitral annulus. Dilated main pulmonary artery measuring 4 cm. No pericardial effusion. Thoracic aorta normal in diameter.  VISUALIZED UPPER ABDOMEN: Cholelihiasis. Bilateral renal cysts. Aortic atherosclerosis. Metallic densities possibly Embolization coils within the left lobe of the liver. Splenic aneurysms.  CHEST WALL AND BONES: No displaced rib fracture. No aggressive osseous lesion. Scoliosis.  LOWER NECK: Within normal limits.  IMPRESSION:    Motion limited exam.  No displaced rib fracture. No pneumothorax.  The left lower lonchus and segmental branches of right lower lobe bronchus are obliterated by retained secretions. Patchy opacities within the bilateral lower lobes may represent pneumonia or aspiration pneumonitis.  Cardiomegaly. Aortic valve and mitral annulus calcification. Coronary atherosclerosis.      < from: CT Head No Cont (01.06.20 @ 11:46) >  EXAM:  CT SCAN         PROCEDURE DATE:  01/06/2020   INTERPRETATION:  HISTORY: Altered mental status. Status post fall. Evaluate for mass versus hemorrhage.  Technique: CT of the head was performed without contrast.  Multiple contiguous axial images were acquired from the skullbase to the vertex without the administration of intravenous contrast.  Coronal and sagittal reformations were made.  COMPARISON: Prior head CT dated  12/18/2018.  FINDINGS:  Redemonstration enlarged ventricles and sulci greater then expected for age is with unchanged volume loss. There is redemonstration nonspecific decreased aeration to the white matter, likely sequela microvascular disease, with remote lacunar and age indeterminate lacunar infarcts. No new intraparenchymal hematoma, mass effect or midline shift. No new abnormal extra-axial fluid collections are present. Carotid siphon calcification. Basal cisterns remain patent.  Calvarium is intact, with thickened diploic space. Absent orbital lenses with cataract surgery, left medial globe deviation with denervation atrophy of the left lateral rectus muscle.. Paranasal sinuses and mastoid air cells unchanged, with sclerosis and opacification left mastoid tip.  IMPRESSION:  Redemonstration volume loss, microvascular disease, age indeterminate lacunar infarct, carotid siphon calcification, no new hemorrhage or midline shift. If symptoms persist consider follow-up head CT or MR if no contraindications.

## 2020-01-23 NOTE — PROGRESS NOTE ADULT - ATTENDING COMMENTS
Continue plan as above. Overall hypovolemic and vasodilatory shock requiring pressor support; but was able to wean off most agents after being given 500 ml bolus and gentle fluids thereafter. ECHO consistent with prior demonstrating severe AS and hyperdynamic LV with diastolic dysfunction. She has an overall very poor prognosis with little additional medical interventions feasible beyond supportive care.     Merissa Craven MD, MPH, RAMYA, RPVI, FACC  Cardiovascular Specialist   [facility]  c: [phone]  e: [email or web address]

## 2020-01-23 NOTE — PROGRESS NOTE ADULT - SUBJECTIVE AND OBJECTIVE BOX
CARDIOLOGY PROGRESS NOTE    Subjective/24 hour events:   - No overnight events.   - Denies chest pain, palpitations, SOB, lightheadedness, dizziness.    Telemetry:    Review of Systems:  Constitutional: [ ] Fever [ ] Chills [ ] Fatigue [ ] Weight change   HEENT: [ ] Headache [ ] Vision changes [ ] Eye Pain [ ] Difficulty Hearing [ ] Tinnitus [ ] Vertigo [ ] Runny nose [ ] Sore Throat   Respiratory: [ ] Cough [ ] Wheezing [ ] Shortness of breath [ ] Hemoptysis  Cardiovascular: [ ] Chest Pain [ ] Palpitations [ ] LYNNE [ ] PND [ ] Orthopnea [ ] Leg Swelling  Gastrointestinal: [ ] Nausea [ ] Vomiting [ ] Abdominal Pain [ ] Diarrhea [ ] Constipation [ ] Melena [ ] Hematochezia  Genitourinary: [ ] Nocturia [ ] Dysuria [ ] Incontinence  Musculoskeletal: [ ] Joint pain [ ] Joint swelling [ ] Muscle pain  Neurologic: [ ] Focal deficit [ ] Paresthesias [ ] Tremors [ ] Memory loss  Hematologic: [ ] Easy bruising  Endocrine: [ ] Cold intolerance [ ] Heat intolerance  Lymphatic: [ ] Swelling [ ] Lymphadenopathy   Skin: [ ] Rash [ ] Itching [ ] Ecchymoses [ ] Wounds [ ] Lesions  Psychiatry: [ ] Depression [ ] Anxiety [ ] Suicidal/Homicidal Ideation [ ] Sleep Disturbances  [x] 10 point review of systems is otherwise negative except as mentioned above              [ ] Unable to obtain    MEDICATIONS  (STANDING):  albuterol/ipratropium for Nebulization 3 milliLiter(s) Nebulizer every 6 hours  chlorhexidine 4% Liquid 1 Application(s) Topical <User Schedule>  dextrose 5% + lactated ringers. 1000 milliLiter(s) (50 mL/Hr) IV Continuous <Continuous>  digoxin     Tablet 0.125 milliGRAM(s) Oral every other day  heparin  Injectable 5000 Unit(s) SubCutaneous two times a day  lactulose Syrup 10 Gram(s) Oral four times a day  levothyroxine Injectable 12.5 MICROGram(s) IV Push at bedtime  meropenem  IVPB 500 milliGRAM(s) IV Intermittent every 12 hours  midodrine 20 milliGRAM(s) Oral every 8 hours  rifAXIMin 550 milliGRAM(s) Oral two times a day    MEDICATIONS  (PRN):      Vital Signs Last 24 Hrs  T(C): 36.8 (23 Jan 2020 04:00), Max: 36.8 (22 Jan 2020 11:00)  T(F): 98.3 (23 Jan 2020 04:00), Max: 98.3 (23 Jan 2020 04:00)  HR: 89 (23 Jan 2020 07:00) (80 - 108)  BP: 112/49 (23 Jan 2020 07:00) (64/46 - 154/59)  BP(mean): 71 (23 Jan 2020 07:00) (51 - 107)  RR: 30 (23 Jan 2020 07:00) (2 - 59)  SpO2: 99% (23 Jan 2020 07:00) (87% - 100%)    I&O's Summary    22 Jan 2020 07:01  -  23 Jan 2020 07:00  --------------------------------------------------------  IN: 5165 mL / OUT: 1250 mL / NET: 3915 mL        Physical Exam:  General: Cachexia. No distress. Comfortable.   HEENT: EOMI. Temporal wasting.   Neck: JVP not elevated  Chest: Clear to auscultation bilaterally  CV: RRR. Normal S1 and S2. No murmurs, rubs, or gallops. No edema.  Abdomen: Soft, non-distended, non-tender  Skin: No rashes, ecchymoses, or skin breakdown  Extremities: Warm.  Neuro: Alert and oriented x 3. No focal deficits.  Psych: Mood and affect appropriate    Labs:                        9.8    5.96  )-----------( 62       ( 22 Jan 2020 23:57 )             30.3     01-22    146<H>  |  117<H>  |  41<H>  ----------------------------<  145<H>  4.7   |  20<L>  |  1.17    Ca    9.1      22 Jan 2020 23:57  Phos  2.3     01-22  Mg     2.2     01-22    TPro  4.4<L>  /  Alb  2.1<L>  /  TBili  1.3<H>  /  DBili  x   /  AST  36  /  ALT  18  /  AlkPhos  87  01-22    PT/INR - ( 22 Jan 2020 23:57 )   PT: 15.1 sec;   INR: 1.30 ratio         PTT - ( 22 Jan 2020 23:57 )  PTT:32.2 sec        pro-BNP: Serum Pro-Brain Natriuretic Peptide: 9585 pg/mL (09-10 @ 06:58)    Lipid Profile:   HgA1c:   TSH:     CARDIOVASCULAR DIAGNOSTIC TESTING:  [] Echocardiogram:  [] Stress Test:  [] Catheterization:    RADIOLOGY: CARDIOLOGY PROGRESS NOTE    Subjective/24 hour events:   - No overnight events.   - Denies chest pain, palpitations, SOB, lightheadedness, dizziness.    Telemetry:    Review of Systems:  Constitutional: [ ] Fever [ ] Chills [ ] Fatigue [ ] Weight change   HEENT: [ ] Headache [ ] Vision changes [ ] Eye Pain [ ] Difficulty Hearing [ ] Tinnitus [ ] Vertigo [ ] Runny nose [ ] Sore Throat   Respiratory: [ ] Cough [ ] Wheezing [ ] Shortness of breath [ ] Hemoptysis  Cardiovascular: [ ] Chest Pain [ ] Palpitations [ ] LYNNE [ ] PND [ ] Orthopnea [ ] Leg Swelling  Gastrointestinal: [ ] Nausea [ ] Vomiting [ ] Abdominal Pain [ ] Diarrhea [ ] Constipation [ ] Melena [ ] Hematochezia  Genitourinary: [ ] Nocturia [ ] Dysuria [ ] Incontinence  Musculoskeletal: [ ] Joint pain [ ] Joint swelling [ ] Muscle pain  Neurologic: [ ] Focal deficit [ ] Paresthesias [ ] Tremors [ ] Memory loss  Hematologic: [ ] Easy bruising  Endocrine: [ ] Cold intolerance [ ] Heat intolerance  Lymphatic: [ ] Swelling [ ] Lymphadenopathy   Skin: [ ] Rash [ ] Itching [ ] Ecchymoses [ ] Wounds [ ] Lesions  Psychiatry: [ ] Depression [ ] Anxiety [ ] Suicidal/Homicidal Ideation [ ] Sleep Disturbances  [x] 10 point review of systems is otherwise negative except as mentioned above              [ ] Unable to obtain    MEDICATIONS  (STANDING):  albuterol/ipratropium for Nebulization 3 milliLiter(s) Nebulizer every 6 hours  chlorhexidine 4% Liquid 1 Application(s) Topical <User Schedule>  dextrose 5% + lactated ringers. 1000 milliLiter(s) (50 mL/Hr) IV Continuous <Continuous>  digoxin     Tablet 0.125 milliGRAM(s) Oral every other day  heparin  Injectable 5000 Unit(s) SubCutaneous two times a day  lactulose Syrup 10 Gram(s) Oral four times a day  levothyroxine Injectable 12.5 MICROGram(s) IV Push at bedtime  meropenem  IVPB 500 milliGRAM(s) IV Intermittent every 12 hours  midodrine 20 milliGRAM(s) Oral every 8 hours  rifAXIMin 550 milliGRAM(s) Oral two times a day    MEDICATIONS  (PRN):      Vital Signs Last 24 Hrs  T(C): 36.8 (23 Jan 2020 04:00), Max: 36.8 (22 Jan 2020 11:00)  T(F): 98.3 (23 Jan 2020 04:00), Max: 98.3 (23 Jan 2020 04:00)  HR: 89 (23 Jan 2020 07:00) (80 - 108)  BP: 112/49 (23 Jan 2020 07:00) (64/46 - 154/59)  BP(mean): 71 (23 Jan 2020 07:00) (51 - 107)  RR: 30 (23 Jan 2020 07:00) (2 - 59)  SpO2: 99% (23 Jan 2020 07:00) (87% - 100%)    I&O's Summary    22 Jan 2020 07:01  -  23 Jan 2020 07:00  --------------------------------------------------------  IN: 5165 mL / OUT: 1250 mL / NET: 3915 mL        Physical Exam:  General: Cachexia. No distress. Comfortable.   HEENT: EOMI. Temporal wasting.   Neck: JVP not elevated  Chest: Clear to auscultation bilaterally  CV: RRR. Normal S1 and S2. No murmurs, rubs, or gallops. No edema.  Abdomen: Soft, non-distended, non-tender  Skin: No rashes, ecchymoses, or skin breakdown  Extremities: Warm.  Neuro: Alert and oriented x 3. No focal deficits.  Psych: Mood and affect appropriate    Labs:                        9.8    5.96  )-----------( 62       ( 22 Jan 2020 23:57 )             30.3     01-22    146<H>  |  117<H>  |  41<H>  ----------------------------<  145<H>  4.7   |  20<L>  |  1.17    Ca    9.1      22 Jan 2020 23:57  Phos  2.3     01-22  Mg     2.2     01-22    TPro  4.4<L>  /  Alb  2.1<L>  /  TBili  1.3<H>  /  DBili  x   /  AST  36  /  ALT  18  /  AlkPhos  87  01-22    PT/INR - ( 22 Jan 2020 23:57 )   PT: 15.1 sec;   INR: 1.30 ratio         PTT - ( 22 Jan 2020 23:57 )  PTT:32.2 sec        pro-BNP: Serum Pro-Brain Natriuretic Peptide: 9585 pg/mL (09-10 @ 06:58)    Lipid Profile:   HgA1c:   TSH:     CARDIOVASCULAR DIAGNOSTIC TESTING:  [ ] Echocardiogram:  < from: Transthoracic Echocardiogram (12.20.18 @ 09:55) >  Conclusions:  1. Mitral annular calcification. Thickened mitral valve  leaflets with mildly decreased opening.  Severe mitral  regurgitation. Grossly at least mild-moderate mitral  stenosis.  2. Calcified aortic valve with decreased opening. Peak  transaortic valve gradient equals 88 mm Hg, mean  transaortic valve gradient equals 53 mm Hg, estimated  aortic valve area equals 0.7 sqcm (by continuity equation),  aortic valve velocity time integral equals 123 cm,  consistent with severe to critical aortic stenosis. Severe,  eccentric aortic regurgitation.  3. Severely dilated left atrium.  LA volume index = 88  cc/m2.  4. Prominent basal septum, otherwise normal left  ventricular internal dimensions and wall thickness.  5. Normal left ventricular systolic function. No obvious  segmental wall motion abnormalities.  6. Increased E/e'  is consistent with elevated left  ventricular filling pressure.  7. Severe right atrial enlargement.  8. Right ventricular enlargement with decreased right  ventricular systolic function.  9. Mild tricuspid regurgitation.  *** Compared with echocardiogram of 10/22/2018, no  significant changes noted.    RADIOLOGY: CARDIOLOGY PROGRESS NOTE    Subjective/24 hour events:   - ON: Pt was hypotensive (103/44) and given a 500cc LR bolus with improvement in BP  - Denies chest pain, palpitations, SOB, lightheadedness, dizziness.    Telemetry: Rate controlled AF No events    Review of Systems:  Constitutional: [ ] Fever [ ] Chills [ ] Fatigue [ ] Weight change   HEENT: [ ] Headache [ ] Vision changes [ ] Eye Pain [ ] Difficulty Hearing [ ] Tinnitus [ ] Vertigo [ ] Runny nose [ ] Sore Throat   Respiratory: [ ] Cough [ ] Wheezing [ ] Shortness of breath [ ] Hemoptysis  Cardiovascular: [ ] Chest Pain [ ] Palpitations [ ] LYNNE [ ] PND [ ] Orthopnea [ ] Leg Swelling  Gastrointestinal: [ ] Nausea [ ] Vomiting [ ] Abdominal Pain [ ] Diarrhea [ ] Constipation [ ] Melena [ ] Hematochezia  Genitourinary: [ ] Nocturia [ ] Dysuria [ ] Incontinence  Musculoskeletal: [ ] Joint pain [ ] Joint swelling [ ] Muscle pain  Neurologic: [ ] Focal deficit [ ] Paresthesias [ ] Tremors [ ] Memory loss  Hematologic: [ ] Easy bruising  Endocrine: [ ] Cold intolerance [ ] Heat intolerance  Lymphatic: [ ] Swelling [ ] Lymphadenopathy   Skin: [ ] Rash [ ] Itching [ ] Ecchymoses [ ] Wounds [ ] Lesions  Psychiatry: [ ] Depression [ ] Anxiety [ ] Suicidal/Homicidal Ideation [ ] Sleep Disturbances  [x] 10 point review of systems is otherwise negative except as mentioned above              [ ] Unable to obtain    MEDICATIONS  (STANDING):  albuterol/ipratropium for Nebulization 3 milliLiter(s) Nebulizer every 6 hours  chlorhexidine 4% Liquid 1 Application(s) Topical <User Schedule>  dextrose 5% + lactated ringers. 1000 milliLiter(s) (50 mL/Hr) IV Continuous <Continuous>  digoxin     Tablet 0.125 milliGRAM(s) Oral every other day  heparin  Injectable 5000 Unit(s) SubCutaneous two times a day  lactulose Syrup 10 Gram(s) Oral four times a day  levothyroxine Injectable 12.5 MICROGram(s) IV Push at bedtime  meropenem  IVPB 500 milliGRAM(s) IV Intermittent every 12 hours  midodrine 20 milliGRAM(s) Oral every 8 hours  rifAXIMin 550 milliGRAM(s) Oral two times a day    MEDICATIONS  (PRN):      Vital Signs Last 24 Hrs  T(C): 36.8 (23 Jan 2020 04:00), Max: 36.8 (22 Jan 2020 11:00)  T(F): 98.3 (23 Jan 2020 04:00), Max: 98.3 (23 Jan 2020 04:00)  HR: 89 (23 Jan 2020 07:00) (80 - 108)  BP: 112/49 (23 Jan 2020 07:00) (64/46 - 154/59)  BP(mean): 71 (23 Jan 2020 07:00) (51 - 107)  RR: 30 (23 Jan 2020 07:00) (2 - 59)  SpO2: 99% (23 Jan 2020 07:00) (87% - 100%)    I&O's Summary    22 Jan 2020 07:01  -  23 Jan 2020 07:00  --------------------------------------------------------  IN: 5165 mL / OUT: 1250 mL / NET: 3915 mL        Physical Exam:  General: Cachexia. No distress. Comfortable.   HEENT: EOMI. Temporal wasting.   Neck: JVP not elevated  Chest: Clear to auscultation bilaterally  CV: RRR. Normal S1 and S2. No murmurs, rubs, or gallops. No edema.  Abdomen: Soft, non-distended, non-tender  Skin: No rashes, ecchymoses, or skin breakdown  Extremities: Warm.  Neuro: Alert and oriented x 3. No focal deficits.  Psych: Mood and affect appropriate    Labs:                        9.8    5.96  )-----------( 62       ( 22 Jan 2020 23:57 )             30.3     01-22    146<H>  |  117<H>  |  41<H>  ----------------------------<  145<H>  4.7   |  20<L>  |  1.17    Ca    9.1      22 Jan 2020 23:57  Phos  2.3     01-22  Mg     2.2     01-22    TPro  4.4<L>  /  Alb  2.1<L>  /  TBili  1.3<H>  /  DBili  x   /  AST  36  /  ALT  18  /  AlkPhos  87  01-22    PT/INR - ( 22 Jan 2020 23:57 )   PT: 15.1 sec;   INR: 1.30 ratio         PTT - ( 22 Jan 2020 23:57 )  PTT:32.2 sec        pro-BNP: Serum Pro-Brain Natriuretic Peptide: 9585 pg/mL (09-10 @ 06:58)    Lipid Profile:   HgA1c:   TSH:     CARDIOVASCULAR DIAGNOSTIC TESTING:  [ ] Echocardiogram:  < from: Transthoracic Echocardiogram (12.20.18 @ 09:55) >  Conclusions:  1. Mitral annular calcification. Thickened mitral valve  leaflets with mildly decreased opening.  Severe mitral  regurgitation. Grossly at least mild-moderate mitral  stenosis.  2. Calcified aortic valve with decreased opening. Peak  transaortic valve gradient equals 88 mm Hg, mean  transaortic valve gradient equals 53 mm Hg, estimated  aortic valve area equals 0.7 sqcm (by continuity equation),  aortic valve velocity time integral equals 123 cm,  consistent with severe to critical aortic stenosis. Severe,  eccentric aortic regurgitation.  3. Severely dilated left atrium.  LA volume index = 88  cc/m2.  4. Prominent basal septum, otherwise normal left  ventricular internal dimensions and wall thickness.  5. Normal left ventricular systolic function. No obvious  segmental wall motion abnormalities.  6. Increased E/e'  is consistent with elevated left  ventricular filling pressure.  7. Severe right atrial enlargement.  8. Right ventricular enlargement with decreased right  ventricular systolic function.  9. Mild tricuspid regurgitation.  *** Compared with echocardiogram of 10/22/2018, no  significant changes noted.    RADIOLOGY: CARDIOLOGY PROGRESS NOTE    Subjective/24 hour events:   - ON: Pt was hypotensive (103/44) and given a 500cc LR bolus with improvement in BP  - Denies chest pain, palpitations, SOB, lightheadedness, dizziness.    Telemetry: Rate controlled AF No events    Review of Systems:  Constitutional: [ ] Fever [ ] Chills [ ] Fatigue [ ] Weight change   HEENT: [ ] Headache [ ] Vision changes [ ] Eye Pain [ ] Difficulty Hearing [ ] Tinnitus [ ] Vertigo [ ] Runny nose [ ] Sore Throat   Respiratory: [ ] Cough [ ] Wheezing [ ] Shortness of breath [ ] Hemoptysis  Cardiovascular: [ ] Chest Pain [ ] Palpitations [ ] LYNNE [ ] PND [ ] Orthopnea [ ] Leg Swelling  Gastrointestinal: [ ] Nausea [ ] Vomiting [ ] Abdominal Pain [ ] Diarrhea [ ] Constipation [ ] Melena [ ] Hematochezia  Genitourinary: [ ] Nocturia [ ] Dysuria [ ] Incontinence  Musculoskeletal: [ ] Joint pain [ ] Joint swelling [ ] Muscle pain  Neurologic: [ ] Focal deficit [ ] Paresthesias [ ] Tremors [ ] Memory loss  Hematologic: [ ] Easy bruising  Endocrine: [ ] Cold intolerance [ ] Heat intolerance  Lymphatic: [ ] Swelling [ ] Lymphadenopathy   Skin: [ ] Rash [ ] Itching [ ] Ecchymoses [ ] Wounds [ ] Lesions  Psychiatry: [ ] Depression [ ] Anxiety [ ] Suicidal/Homicidal Ideation [ ] Sleep Disturbances  [x] 10 point review of systems is otherwise negative except as mentioned above              [ ] Unable to obtain    MEDICATIONS  (STANDING):  albuterol/ipratropium for Nebulization 3 milliLiter(s) Nebulizer every 6 hours  chlorhexidine 4% Liquid 1 Application(s) Topical <User Schedule>  dextrose 5% + lactated ringers. 1000 milliLiter(s) (50 mL/Hr) IV Continuous <Continuous>  digoxin     Tablet 0.125 milliGRAM(s) Oral every other day  heparin  Injectable 5000 Unit(s) SubCutaneous two times a day  lactulose Syrup 10 Gram(s) Oral four times a day  levothyroxine Injectable 12.5 MICROGram(s) IV Push at bedtime  meropenem  IVPB 500 milliGRAM(s) IV Intermittent every 12 hours  midodrine 20 milliGRAM(s) Oral every 8 hours  rifAXIMin 550 milliGRAM(s) Oral two times a day    Vital Signs Last 24 Hrs  T(C): 36.8 (23 Jan 2020 04:00), Max: 36.8 (22 Jan 2020 11:00)  T(F): 98.3 (23 Jan 2020 04:00), Max: 98.3 (23 Jan 2020 04:00)  HR: 89 (23 Jan 2020 07:00) (80 - 108)  BP: 112/49 (23 Jan 2020 07:00) (64/46 - 154/59)  BP(mean): 71 (23 Jan 2020 07:00) (51 - 107)  RR: 30 (23 Jan 2020 07:00) (2 - 59)  SpO2: 99% (23 Jan 2020 07:00) (87% - 100%)    I&O's Summary    22 Jan 2020 07:01  -  23 Jan 2020 07:00  --------------------------------------------------------  IN: 5165 mL / OUT: 1250 mL / NET: 3915 mL    Physical Exam:  General: Cachexia. No distress. Comfortable.   HEENT: EOMI. Temporal wasting.   Neck: JVP not elevated  Chest: Clear to auscultation bilaterally  CV: RRR. Normal S1 and S2. No murmurs, rubs, or gallops. No edema.  Abdomen: Soft, non-distended, non-tender  Skin: No rashes, ecchymoses, or skin breakdown  Extremities: Warm.  Neuro: Alert and oriented x 3. No focal deficits.  Psych: Mood and affect appropriate    Labs:                        9.8    5.96  )-----------( 62       ( 22 Jan 2020 23:57 )             30.3     01-22    146<H>  |  117<H>  |  41<H>  ----------------------------<  145<H>  4.7   |  20<L>  |  1.17    Ca    9.1      22 Jan 2020 23:57  Phos  2.3     01-22  Mg     2.2     01-22    TPro  4.4<L>  /  Alb  2.1<L>  /  TBili  1.3<H>  /  DBili  x   /  AST  36  /  ALT  18  /  AlkPhos  87  01-22    PT/INR - ( 22 Jan 2020 23:57 )   PT: 15.1 sec;   INR: 1.30 ratio         PTT - ( 22 Jan 2020 23:57 )  PTT:32.2 sec        pro-BNP: Serum Pro-Brain Natriuretic Peptide: 9585 pg/mL (09-10 @ 06:58)    Lipid Profile:   HgA1c:   TSH:     CARDIOVASCULAR DIAGNOSTIC TESTING:  [ ] Echocardiogram:  < from: Transthoracic Echocardiogram (12.20.18 @ 09:55) >  Conclusions:  1. Mitral annular calcification. Thickened mitral valve  leaflets with mildly decreased opening.  Severe mitral  regurgitation. Grossly at least mild-moderate mitral  stenosis.  2. Calcified aortic valve with decreased opening. Peak  transaortic valve gradient equals 88 mm Hg, mean  transaortic valve gradient equals 53 mm Hg, estimated  aortic valve area equals 0.7 sqcm (by continuity equation),  aortic valve velocity time integral equals 123 cm,  consistent with severe to critical aortic stenosis. Severe,  eccentric aortic regurgitation.  3. Severely dilated left atrium.  LA volume index = 88  cc/m2.  4. Prominent basal septum, otherwise normal left  ventricular internal dimensions and wall thickness.  5. Normal left ventricular systolic function. No obvious  segmental wall motion abnormalities.  6. Increased E/e'  is consistent with elevated left  ventricular filling pressure.  7. Severe right atrial enlargement.  8. Right ventricular enlargement with decreased right  ventricular systolic function.  9. Mild tricuspid regurgitation.  *** Compared with echocardiogram of 10/22/2018, no  significant changes noted.

## 2020-01-23 NOTE — CHART NOTE - NSCHARTNOTEFT_GEN_A_CORE
Nutrition Follow Up Note  Patient seen for: Malnutrition follow up. Chart reviewed and events noted. Pt was admitted for septic shock related to PNA and UTI. Pt with history of cirrhosis, KARRIE on CKD stage 3. Pt had swallow evaluation on  and  with noted dysphagia and recommendation for NPO. Possible MBS on .    Source:   Medical record and RN    Diet :   NPO with EN provision    Pt receiving feeds at goal and tolerating well with no reported emesis or abdominal distention. Pt with 7 BMs over the night likely related to lactulose. Last BM .     Enteral Nutrition:  Jevity 1.2 at goal 65 cc/hr x 18 hrs provides 1170cc, 1404 kcals, 65 gm protein, 944 cc free water. EN provision at goal meets 28 Kcal/Kg, 1.3 gm of protein/kg based on usual wt of 49 kg. Pt currently receiving 200cc q 6 hours free water.   Pt received >80% of EN provision x1day    Daily Weight in k () Dosing wt in k.8 (). Pt reports UBW 49kg    Pertinent Medications:   lactated ringers, lactulose syrup, Proamatine, Synthroid, Lanoxin     Pertinent Labs: () Na 146 mmol/L<H> Glu 145 mg/dL<H> BUN 41 mg/dL<H> Phos 2.3 mg/dL<L>    Finger Sticks:  POCT Blood Glucose.: 148 mg/dL ( @ 05:07)  POCT Blood Glucose.: 150 mg/dL ( @ 00:39)    Skin per nursing documentation: No pressure injuries  Edema per nursing documentation: +1 Sara. arm and foot    Estimated Needs:   [x] no change since previous assessment  8870-2390 kcals (25-30 calories/kg)  58.8-68.6 grams of protein (1.2-1.4 gm/kg)    Previous Nutrition Diagnosis: Moderate acute malnutrition  Nutrition Diagnosis is: ongoing and being addressed with EN provision at goal. Nutrition care plan is ongoing.     New Nutrition Diagnosis: n/a    Recommend  1) Continue current EN provision of Jevity 1.2 at goal 65 cc/hr x 18 hrs provides 1170cc, 1404 kcals, 65 gm protein, 944 cc free water. EN provision at goal meets 28 Kcal/Kg, 1.3 gm of protein/kg based on usual wt of 49 kg.   2) Continue to monitor tolerance to EN.     Monitoring and Evaluation:   Continue to monitor Nutritional intake, Tolerance to diet prescription, weights, labs, skin integrity    RD remains available upon request and will follow up per protocol  Niki Kimble, Nutrition Intern, Pager #[phone]

## 2020-01-23 NOTE — PROGRESS NOTE ADULT - ASSESSMENT
*****INCOMPLETE******  *****INCOMPLETE******  *****INCOMPLETE******  *****INCOMPLETE******  89F with AF on digoxin (not on AC), Severe AS, COPD (not on home O2), CLL, HCC with Portal HTN c/b esophageal varices s/p banding, CKD3 (baseline Cr 1.2) and PVD a/w septic shock 2/2 serratia bacteremia from urosepsis vs. PNA. TTE 2018 demonstrated severe AS and moderate MR. Currently HDS on pressures. Appears dry with hypernatremia/hyperkalemia consistent with overall hypovolemic status.    Impressions:   Septic Shock 2/2 serratia bacteremia from uropsepsis vs. PNA.   Paroxysmal Atrial Fibrillation  Severe Aortic Stenosis    Recommendations:  1: Agree with increased IVF with DW5 given hypernatremia and total free water deficit of 1.5 L.   2: Hold spironolactone given hypovolemia  3: Continue digoxin for rate controlled AF.  4: Nutrition consult for cachexia, severe malnutrition with hypoalbuminemia.   5: Not candidate for anticoagulation given history of esophageal varices and history of cerebral hemorrhage on warfarin.  6: Recent falls in past year concerning for symptomatic aortic stenosis. Will need outpatient follow up with cardiology (Dr. Boston) for ongoing care. *****INCOMPLETE******  *****INCOMPLETE******  *****INCOMPLETE******  *****INCOMPLETE******  89F with AF on digoxin (not on AC), Severe AS, COPD (not on home O2), CLL, HCC with Portal HTN c/b esophageal varices s/p banding, CKD3 (baseline Cr 1.2) and PVD a/w septic shock 2/2 serratia bacteremia from urosepsis vs. PNA. TTE 2018 demonstrated severe AS and moderate MR. Currently HDS on midodrine. Appears dry with hypernatremia/hyperkalemia consistent with overall hypovolemic status.    Impressions:   Septic Shock 2/2 serratia bacteremia from uropsepsis vs. PNA.   Paroxysmal Atrial Fibrillation  Severe Aortic Stenosis    Recommendations:  1: Agree with increased IVF with DW5 and free water flushes given hypernatremia.   2: Hold spironolactone given hypovolemia  3: Continue digoxin for rate controlled AF.  4: Nutrition consult for cachexia, severe malnutrition with hypoalbuminemia.   5: Not candidate for anticoagulation given history of esophageal varices and history of cerebral hemorrhage on warfarin.  6: Recent falls in past year concerning for symptomatic aortic stenosis. Will need outpatient follow up with cardiology (Dr. Boston) for ongoing care. 89F with AF on digoxin (not on AC), Severe AS, COPD (not on home O2), CLL, HCC with Portal HTN c/b esophageal varices s/p banding, CKD3 (baseline Cr 1.2) and PVD a/w septic shock 2/2 serratia bacteremia from urosepsis vs. PNA. TTE 2018 demonstrated severe AS and moderate MR. Currently HDS on midodrine. Appears dry with hypernatremia/hyperkalemia consistent with overall hypovolemic status.    Impressions:   Septic Shock 2/2 serratia bacteremia from uropsepsis vs. PNA.   Paroxysmal Atrial Fibrillation  Severe Aortic Stenosis    Recommendations:  1: Agree with increased IVF with DW5 and free water flushes given hypernatremia.   2: Hold spironolactone given hypovolemia  3: Continue digoxin for rate controlled AF.  4: Nutrition consult for cachexia, severe malnutrition with hypoalbuminemia.   5: Not candidate for anticoagulation given history of esophageal varices and history of cerebral hemorrhage on warfarin.

## 2020-01-23 NOTE — PROGRESS NOTE ADULT - ATTENDING COMMENTS
1. Septic shock from Serratia bacteremia. . Off IV pressors. Episode of hypotension last night responding to pressors Midodrine increased to 20mg tid. Continue  meropenem. Serratia sensitive to meropenem..   2. Bacterial pneumonia Serratia. UTI with ESBL Klebsiella. Continue Meropenem.   3. KARRIE on CKD 3 . UO and creatinine improving.  4Cardiac. H/O afib with severe AS.   Digoxin today QOD.

## 2020-01-23 NOTE — PROGRESS NOTE ADULT - ASSESSMENT
This is a 89yoF w a PMHX of afib on digoxin (QOD / no AC), severe AS, CLL (previously on Tranda / Rituxan), liver cirrhosis in the setting HCC w portal HTN c/b This is a 89yoF w a PMHX of afib on digoxin (QOD / no AC), severe AS, CLL (previously on Tranda / Rituxan), liver cirrhosis in the setting HCC w portal HTN c/b esophageal varices s/p banding in the remote past. Recently discharged to [facility] This is a 89yoF w a PMHX of afib on digoxin (QOD / no AC), severe AS, CLL (previously on Tranda / Rituxan), liver cirrhosis in the setting HCC w portal HTN c/b esophageal varices s/p banding in the remote past. Readmitted from [facility] on 1/19 s/p hosp stay 1/6-1/9/2010 s/p mechanical fall. The pt is now admitted in septic shock 2/2 Serratia Bacteremia- now cleared BC x 2 1/22, +Sputum w Serratia, and Urosepsis 2/2 Klebsiella ESBL- all sensitive to Meropenem. The pt hospital course is c/b ongoing intermittent hypotension.    Neuro: Lethargic easy to arouse w/o neuro deficits  -c/w neuro checks  -CT brain for any overt acute neuro changes    CV: Severe AS, Afib  -Echo- f/u AS   -c/w digoxin   -digoxin level  -c/w midodrine- increase to 20mg q 8H    Pulm: Hx COPD not on O2 at home  -c/w supplemental O2 - NC  -c/w Duonebs  -chest PT  -aspiration precautions    GI: Hx Cirrhosis 2/2 HCC c/b esophageal varices s/p banding in remote past  -c/w rifaxamin  -c/w lactulose  -c/w holding AC for Afib  +Dysphagia- assessed by speech pathology  -Barium Swallow  -c/w NGT feeding- family currently declining peg    Renal: KARRIE resolving  -strict I & O  -c/w free water bolus's- pt Hypernatremia improved  -c/w trending renal function and NA  -strict I&O- st cath as needed

## 2020-01-24 LAB
ALBUMIN SERPL ELPH-MCNC: 2 G/DL — LOW (ref 3.3–5)
ALP SERPL-CCNC: 107 U/L — SIGNIFICANT CHANGE UP (ref 40–120)
ALT FLD-CCNC: 23 U/L — SIGNIFICANT CHANGE UP (ref 10–45)
AMMONIA BLD-MCNC: 30 UMOL/L — SIGNIFICANT CHANGE UP (ref 11–55)
ANION GAP SERPL CALC-SCNC: 6 MMOL/L — SIGNIFICANT CHANGE UP (ref 5–17)
ANION GAP SERPL CALC-SCNC: 7 MMOL/L — SIGNIFICANT CHANGE UP (ref 5–17)
ANION GAP SERPL CALC-SCNC: 8 MMOL/L — SIGNIFICANT CHANGE UP (ref 5–17)
APPEARANCE UR: CLEAR — SIGNIFICANT CHANGE UP
APTT BLD: 31.8 SEC — SIGNIFICANT CHANGE UP (ref 27.5–36.3)
AST SERPL-CCNC: 35 U/L — SIGNIFICANT CHANGE UP (ref 10–40)
BASE EXCESS BLDV CALC-SCNC: 1.9 MMOL/L — SIGNIFICANT CHANGE UP (ref -2–2)
BASE EXCESS BLDV CALC-SCNC: 2 MMOL/L — SIGNIFICANT CHANGE UP (ref -2–2)
BILIRUB SERPL-MCNC: 1.1 MG/DL — SIGNIFICANT CHANGE UP (ref 0.2–1.2)
BILIRUB UR-MCNC: NEGATIVE — SIGNIFICANT CHANGE UP
BUN SERPL-MCNC: 38 MG/DL — HIGH (ref 7–23)
BUN SERPL-MCNC: 39 MG/DL — HIGH (ref 7–23)
BUN SERPL-MCNC: 40 MG/DL — HIGH (ref 7–23)
CA-I SERPL-SCNC: 1.4 MMOL/L — HIGH (ref 1.12–1.3)
CA-I SERPL-SCNC: 1.4 MMOL/L — HIGH (ref 1.12–1.3)
CALCIUM SERPL-MCNC: 9.1 MG/DL — SIGNIFICANT CHANGE UP (ref 8.4–10.5)
CALCIUM SERPL-MCNC: 9.2 MG/DL — SIGNIFICANT CHANGE UP (ref 8.4–10.5)
CALCIUM SERPL-MCNC: 9.3 MG/DL — SIGNIFICANT CHANGE UP (ref 8.4–10.5)
CHLORIDE BLDV-SCNC: 111 MMOL/L — HIGH (ref 96–108)
CHLORIDE BLDV-SCNC: 112 MMOL/L — HIGH (ref 96–108)
CHLORIDE SERPL-SCNC: 112 MMOL/L — HIGH (ref 96–108)
CHLORIDE SERPL-SCNC: 112 MMOL/L — HIGH (ref 96–108)
CHLORIDE SERPL-SCNC: 114 MMOL/L — HIGH (ref 96–108)
CO2 BLDV-SCNC: 28 MMOL/L — SIGNIFICANT CHANGE UP (ref 22–30)
CO2 BLDV-SCNC: 28 MMOL/L — SIGNIFICANT CHANGE UP (ref 22–30)
CO2 SERPL-SCNC: 22 MMOL/L — SIGNIFICANT CHANGE UP (ref 22–31)
CO2 SERPL-SCNC: 22 MMOL/L — SIGNIFICANT CHANGE UP (ref 22–31)
CO2 SERPL-SCNC: 26 MMOL/L — SIGNIFICANT CHANGE UP (ref 22–31)
COLOR SPEC: YELLOW — SIGNIFICANT CHANGE UP
CREAT SERPL-MCNC: 0.94 MG/DL — SIGNIFICANT CHANGE UP (ref 0.5–1.3)
CREAT SERPL-MCNC: 0.99 MG/DL — SIGNIFICANT CHANGE UP (ref 0.5–1.3)
CREAT SERPL-MCNC: 1.07 MG/DL — SIGNIFICANT CHANGE UP (ref 0.5–1.3)
DIFF PNL FLD: NEGATIVE — SIGNIFICANT CHANGE UP
GAS PNL BLDV: 141 MMOL/L — SIGNIFICANT CHANGE UP (ref 135–145)
GAS PNL BLDV: 143 MMOL/L — SIGNIFICANT CHANGE UP (ref 135–145)
GAS PNL BLDV: SIGNIFICANT CHANGE UP
GLUCOSE BLDC GLUCOMTR-MCNC: 112 MG/DL — HIGH (ref 70–99)
GLUCOSE BLDV-MCNC: 110 MG/DL — HIGH (ref 70–99)
GLUCOSE BLDV-MCNC: 113 MG/DL — HIGH (ref 70–99)
GLUCOSE SERPL-MCNC: 100 MG/DL — HIGH (ref 70–99)
GLUCOSE SERPL-MCNC: 118 MG/DL — HIGH (ref 70–99)
GLUCOSE SERPL-MCNC: 120 MG/DL — HIGH (ref 70–99)
GLUCOSE UR QL: NEGATIVE — SIGNIFICANT CHANGE UP
HCO3 BLDV-SCNC: 27 MMOL/L — SIGNIFICANT CHANGE UP (ref 21–29)
HCO3 BLDV-SCNC: 27 MMOL/L — SIGNIFICANT CHANGE UP (ref 21–29)
HCT VFR BLD CALC: 30.8 % — LOW (ref 34.5–45)
HCT VFR BLDA CALC: 31 % — LOW (ref 39–50)
HCT VFR BLDA CALC: 32 % — LOW (ref 39–50)
HGB BLD CALC-MCNC: 10.1 G/DL — LOW (ref 11.5–15.5)
HGB BLD CALC-MCNC: 10.3 G/DL — LOW (ref 11.5–15.5)
HGB BLD-MCNC: 10 G/DL — LOW (ref 11.5–15.5)
HOROWITZ INDEX BLDV+IHG-RTO: 28 — SIGNIFICANT CHANGE UP
INR BLD: 1.35 RATIO — HIGH (ref 0.88–1.16)
KETONES UR-MCNC: NEGATIVE — SIGNIFICANT CHANGE UP
LACTATE BLDV-MCNC: 1.5 MMOL/L — SIGNIFICANT CHANGE UP (ref 0.7–2)
LACTATE BLDV-MCNC: 1.7 MMOL/L — SIGNIFICANT CHANGE UP (ref 0.7–2)
LEUKOCYTE ESTERASE UR-ACNC: NEGATIVE — SIGNIFICANT CHANGE UP
MAGNESIUM SERPL-MCNC: 2 MG/DL — SIGNIFICANT CHANGE UP (ref 1.6–2.6)
MAGNESIUM SERPL-MCNC: 2 MG/DL — SIGNIFICANT CHANGE UP (ref 1.6–2.6)
MAGNESIUM SERPL-MCNC: 2.1 MG/DL — SIGNIFICANT CHANGE UP (ref 1.6–2.6)
MCHC RBC-ENTMCNC: 32.5 GM/DL — SIGNIFICANT CHANGE UP (ref 32–36)
MCHC RBC-ENTMCNC: 33.3 PG — SIGNIFICANT CHANGE UP (ref 27–34)
MCV RBC AUTO: 102.7 FL — HIGH (ref 80–100)
NITRITE UR-MCNC: NEGATIVE — SIGNIFICANT CHANGE UP
NRBC # BLD: 0 /100 WBCS — SIGNIFICANT CHANGE UP (ref 0–0)
OTHER CELLS CSF MANUAL: 10 ML/DL — LOW (ref 18–22)
OTHER CELLS CSF MANUAL: 9 ML/DL — LOW (ref 18–22)
PCO2 BLDV: 45 MMHG — SIGNIFICANT CHANGE UP (ref 35–50)
PCO2 BLDV: 45 MMHG — SIGNIFICANT CHANGE UP (ref 35–50)
PH BLDV: 7.39 — SIGNIFICANT CHANGE UP (ref 7.35–7.45)
PH BLDV: 7.39 — SIGNIFICANT CHANGE UP (ref 7.35–7.45)
PH UR: 5.5 — SIGNIFICANT CHANGE UP (ref 5–8)
PHOSPHATE SERPL-MCNC: 2.3 MG/DL — LOW (ref 2.5–4.5)
PHOSPHATE SERPL-MCNC: 4.1 MG/DL — SIGNIFICANT CHANGE UP (ref 2.5–4.5)
PHOSPHATE SERPL-MCNC: 4.5 MG/DL — SIGNIFICANT CHANGE UP (ref 2.5–4.5)
PLATELET # BLD AUTO: 70 K/UL — LOW (ref 150–400)
PO2 BLDV: 37 MMHG — SIGNIFICANT CHANGE UP (ref 25–45)
PO2 BLDV: 42 MMHG — SIGNIFICANT CHANGE UP (ref 25–45)
POTASSIUM BLDV-SCNC: 4.7 MMOL/L — SIGNIFICANT CHANGE UP (ref 3.5–5.3)
POTASSIUM BLDV-SCNC: 5.3 MMOL/L — SIGNIFICANT CHANGE UP (ref 3.5–5.3)
POTASSIUM SERPL-MCNC: 5 MMOL/L — SIGNIFICANT CHANGE UP (ref 3.5–5.3)
POTASSIUM SERPL-MCNC: 5.7 MMOL/L — HIGH (ref 3.5–5.3)
POTASSIUM SERPL-MCNC: 5.8 MMOL/L — HIGH (ref 3.5–5.3)
POTASSIUM SERPL-SCNC: 5 MMOL/L — SIGNIFICANT CHANGE UP (ref 3.5–5.3)
POTASSIUM SERPL-SCNC: 5.7 MMOL/L — HIGH (ref 3.5–5.3)
POTASSIUM SERPL-SCNC: 5.8 MMOL/L — HIGH (ref 3.5–5.3)
PROT SERPL-MCNC: 4.3 G/DL — LOW (ref 6–8.3)
PROT UR-MCNC: ABNORMAL
PROTHROM AB SERPL-ACNC: 15.5 SEC — HIGH (ref 10–12.9)
RAPID RVP RESULT: SIGNIFICANT CHANGE UP
RBC # BLD: 3 M/UL — LOW (ref 3.8–5.2)
RBC # FLD: 15.5 % — HIGH (ref 10.3–14.5)
SAO2 % BLDV: 66 % — LOW (ref 67–88)
SAO2 % BLDV: 70 % — SIGNIFICANT CHANGE UP (ref 67–88)
SODIUM SERPL-SCNC: 142 MMOL/L — SIGNIFICANT CHANGE UP (ref 135–145)
SODIUM SERPL-SCNC: 142 MMOL/L — SIGNIFICANT CHANGE UP (ref 135–145)
SODIUM SERPL-SCNC: 145 MMOL/L — SIGNIFICANT CHANGE UP (ref 135–145)
SP GR SPEC: 1.02 — SIGNIFICANT CHANGE UP (ref 1.01–1.02)
UROBILINOGEN FLD QL: ABNORMAL
WBC # BLD: 11.12 K/UL — HIGH (ref 3.8–10.5)
WBC # FLD AUTO: 11.12 K/UL — HIGH (ref 3.8–10.5)

## 2020-01-24 PROCEDURE — 99233 SBSQ HOSP IP/OBS HIGH 50: CPT | Mod: GC

## 2020-01-24 PROCEDURE — 99232 SBSQ HOSP IP/OBS MODERATE 35: CPT

## 2020-01-24 PROCEDURE — 93010 ELECTROCARDIOGRAM REPORT: CPT

## 2020-01-24 PROCEDURE — 71045 X-RAY EXAM CHEST 1 VIEW: CPT | Mod: 26

## 2020-01-24 PROCEDURE — 74018 RADEX ABDOMEN 1 VIEW: CPT | Mod: 26

## 2020-01-24 RX ORDER — ACETAMINOPHEN 500 MG
650 TABLET ORAL ONCE
Refills: 0 | Status: COMPLETED | OUTPATIENT
Start: 2020-01-24 | End: 2020-01-24

## 2020-01-24 RX ORDER — SODIUM CHLORIDE 9 MG/ML
1000 INJECTION, SOLUTION INTRAVENOUS
Refills: 0 | Status: DISCONTINUED | OUTPATIENT
Start: 2020-01-24 | End: 2020-01-24

## 2020-01-24 RX ORDER — POTASSIUM PHOSPHATE, MONOBASIC POTASSIUM PHOSPHATE, DIBASIC 236; 224 MG/ML; MG/ML
30 INJECTION, SOLUTION INTRAVENOUS ONCE
Refills: 0 | Status: COMPLETED | OUTPATIENT
Start: 2020-01-24 | End: 2020-01-24

## 2020-01-24 RX ORDER — FUROSEMIDE 40 MG
20 TABLET ORAL ONCE
Refills: 0 | Status: COMPLETED | OUTPATIENT
Start: 2020-01-24 | End: 2020-01-24

## 2020-01-24 RX ORDER — INSULIN HUMAN 100 [IU]/ML
5 INJECTION, SOLUTION SUBCUTANEOUS ONCE
Refills: 0 | Status: COMPLETED | OUTPATIENT
Start: 2020-01-24 | End: 2020-01-24

## 2020-01-24 RX ORDER — SODIUM ZIRCONIUM CYCLOSILICATE 10 G/10G
10 POWDER, FOR SUSPENSION ORAL ONCE
Refills: 0 | Status: COMPLETED | OUTPATIENT
Start: 2020-01-24 | End: 2020-01-24

## 2020-01-24 RX ORDER — SODIUM CHLORIDE 9 MG/ML
1000 INJECTION, SOLUTION INTRAVENOUS
Refills: 0 | Status: DISCONTINUED | OUTPATIENT
Start: 2020-01-24 | End: 2020-01-29

## 2020-01-24 RX ORDER — DEXTROSE 50 % IN WATER 50 %
50 SYRINGE (ML) INTRAVENOUS ONCE
Refills: 0 | Status: COMPLETED | OUTPATIENT
Start: 2020-01-24 | End: 2020-01-24

## 2020-01-24 RX ADMIN — Medication 50 MILLILITER(S): at 19:37

## 2020-01-24 RX ADMIN — CHLORHEXIDINE GLUCONATE 1 APPLICATION(S): 213 SOLUTION TOPICAL at 05:06

## 2020-01-24 RX ADMIN — Medication 3 MILLILITER(S): at 11:48

## 2020-01-24 RX ADMIN — POTASSIUM PHOSPHATE, MONOBASIC POTASSIUM PHOSPHATE, DIBASIC 83.33 MILLIMOLE(S): 236; 224 INJECTION, SOLUTION INTRAVENOUS at 01:43

## 2020-01-24 RX ADMIN — Medication 12.5 MICROGRAM(S): at 21:06

## 2020-01-24 RX ADMIN — SODIUM CHLORIDE 50 MILLILITER(S): 9 INJECTION, SOLUTION INTRAVENOUS at 10:28

## 2020-01-24 RX ADMIN — INSULIN HUMAN 5 UNIT(S): 100 INJECTION, SOLUTION SUBCUTANEOUS at 19:38

## 2020-01-24 RX ADMIN — MEROPENEM 100 MILLIGRAM(S): 1 INJECTION INTRAVENOUS at 05:05

## 2020-01-24 RX ADMIN — Medication 20 MILLIGRAM(S): at 19:38

## 2020-01-24 RX ADMIN — MIDODRINE HYDROCHLORIDE 30 MILLIGRAM(S): 2.5 TABLET ORAL at 21:02

## 2020-01-24 RX ADMIN — LACTULOSE 10 GRAM(S): 10 SOLUTION ORAL at 05:06

## 2020-01-24 RX ADMIN — Medication 3 MILLILITER(S): at 00:01

## 2020-01-24 RX ADMIN — SODIUM ZIRCONIUM CYCLOSILICATE 10 GRAM(S): 10 POWDER, FOR SUSPENSION ORAL at 20:56

## 2020-01-24 RX ADMIN — Medication 650 MILLIGRAM(S): at 02:00

## 2020-01-24 RX ADMIN — MIDODRINE HYDROCHLORIDE 30 MILLIGRAM(S): 2.5 TABLET ORAL at 13:18

## 2020-01-24 RX ADMIN — Medication 3 MILLILITER(S): at 18:04

## 2020-01-24 RX ADMIN — Medication 650 MILLIGRAM(S): at 01:02

## 2020-01-24 RX ADMIN — MIDODRINE HYDROCHLORIDE 30 MILLIGRAM(S): 2.5 TABLET ORAL at 05:06

## 2020-01-24 RX ADMIN — LACTULOSE 10 GRAM(S): 10 SOLUTION ORAL at 23:04

## 2020-01-24 RX ADMIN — MEROPENEM 100 MILLIGRAM(S): 1 INJECTION INTRAVENOUS at 17:06

## 2020-01-24 RX ADMIN — Medication 0.12 MILLIGRAM(S): at 13:18

## 2020-01-24 NOTE — PROGRESS NOTE ADULT - ATTENDING COMMENTS
1. Septic shock from Serratia bacteremia. . Off IV pressors.  Midodrine increased to 30mg tid. Continue  meropenem. Serratia sensitive to meropenem..   2. Bacterial pneumonia Serratia. UTI with ESBL Klebsiella. Continue Meropenem.   3. KARRIE on CKD 3 . UO and creatinine improving.  4Cardiac. H/O afib with severe AS.   Digoxin today QOD.  5. Fever last night. Vomiting this am. ? aspiration. Pt with ~90 mls removed via NGT. Hold tube feeds for today. No evidence of SBO in xray.

## 2020-01-24 NOTE — PROGRESS NOTE ADULT - ASSESSMENT
*****INCOMPLETE******  *****INCOMPLETE******  *****INCOMPLETE******  *****INCOMPLETE******    89F with AF on digoxin (not on AC), Severe AS, COPD (not on home O2), CLL, HCC with Portal HTN c/b esophageal varices s/p banding, CKD3 (baseline Cr 1.2) and PVD a/w septic shock 2/2 serratia bacteremia from urosepsis vs. PNA. TTE 2018 demonstrated severe AS and moderate MR. Currently HDS on midodrine. Appears dry with hypernatremia/hyperkalemia consistent with overall hypovolemic status.    Impressions:   Septic Shock 2/2 serratia bacteremia from uropsepsis vs. PNA.   Paroxysmal Atrial Fibrillation  Severe Aortic Stenosis    Recommendations:  1: IV DW5 for maintenance   2: Hold spironolactone given hypovolemia  3: Continue digoxin for rate controlled AF.  4: Nutrition consult for cachexia, severe malnutrition with hypoalbuminemia.   5: Not candidate for anticoagulation given history of esophageal varices and history of cerebral hemorrhage on warfarin. *****INCOMPLETE******  *****INCOMPLETE******  *****INCOMPLETE******  *****INCOMPLETE******    89F with AF on digoxin (not on AC), Severe AS, COPD (not on home O2), CLL, HCC with Portal HTN c/b esophageal varices s/p banding, CKD3 (baseline Cr 1.2) and PVD a/w septic shock 2/2 serratia bacteremia from urosepsis vs. PNA. TTE 2018 demonstrated severe AS and moderate MR. Currently HDS on midodrine. Appears dry with hypernatremia/hyperkalemia consistent with overall hypovolemic status, now improving.     Impressions:   Septic Shock 2/2 serratia bacteremia from uropsepsis vs. PNA.   Paroxysmal Atrial Fibrillation  Severe Aortic Stenosis  Moderate-Severe Mitral Regurgitation    Recommendations:  1: IV DW5 for maintenance   2: Hold spironolactone given hypovolemia  3: Continue digoxin for rate controlled AF.  4: Nutrition consult for cachexia, severe malnutrition with hypoalbuminemia.   5: Not candidate for anticoagulation given history of esophageal varices and history of cerebral hemorrhage on warfarin. 89F with AF on digoxin (not on AC), Severe AS, COPD (not on home O2), CLL, HCC with Portal HTN c/b esophageal varices s/p banding, CKD3 (baseline Cr 1.2) and PVD a/w septic shock 2/2 serratia bacteremia from urosepsis vs. PNA. TTE 2018 demonstrated severe AS and moderate MR. Currently HDS on midodrine. Appears euvolemic on exam  resolved hypernatremia s/p fluids.     Impressions:   Septic Shock 2/2 serratia bacteremia from uropsepsis vs. PNA.   Paroxysmal Atrial Fibrillation  Severe Aortic Stenosis  Moderate-Severe Mitral Regurgitation    Recommendations:  1: IV DW5 for maintenance in setting of poor PO intake.    2: Hold spironolactone given hypovolemia  3: Continue digoxin for rate controlled AF.  4: Nutrition consult for cachexia, severe malnutrition with hypoalbuminemia.   5: Not candidate for anticoagulation given history of esophageal varices and history of cerebral hemorrhage on warfarin. 89F with AF on digoxin (not on AC), Severe AS, COPD (not on home O2), CLL, HCC with Portal HTN c/b esophageal varices s/p banding, CKD3 (baseline Cr 1.2) and PVD a/w septic shock 2/2 serratia bacteremia from urosepsis vs. PNA.     Impressions:   Septic Shock 2/2 serratia bacteremia and PNA  Paroxysmal Atrial Fibrillation  Severe Aortic Stenosis (TAMRA 0..4sqcm, mean gradient 58mmHg)  Moderate MS  Moderate-Severe Mitral Regurgitation    Recommendations:  1: IV DW5 for maintenance in setting of poor PO intake.    2: Hold spironolactone given hypovolemia  3: Continue digoxin for rate controlled AF  4: Not candidate for AC given history of esophageal varices and cerebral hemorrhage on warfarin    Eugene Laureano M.D.  Cardiology Fellow  [phone]  For all Cardiology service contact information, go to [email or web address] and use "[email or web address]" to login.

## 2020-01-24 NOTE — PROGRESS NOTE ADULT - SUBJECTIVE AND OBJECTIVE BOX
CHIEF COMPLAINT: Septic Shock     Overnight:   (+) fever 101.4 s/p Reculture - (+) vomiting this am s/p Kaitlin o feed tube removed and replaced with Sump to LWS      Interval Events:  89 yo female with a PMHx of afib on digoxin (not on AC), severe AS, hypothyroidism, COPD not on home O2, CLL (previously on Treanda and Rituxan), 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 (+) Distributive shock now resolved.     REVIEW OF SYSTEMS:  Constitutional: [ ] fevers [ ] chills [ ] weight loss [ ] weight gain  HEENT: [ ] dry eyes [ ] eye irritation [ ] postnasal drip [ ] nasal congestion  CV: [ ] chest pain [ ] orthopnea [ ] palpitations [ ] murmur  Resp: [ ] cough [ ] shortness of breath [ ] dyspnea [ ] wheezing [ ] sputum [ ] hemoptysis  GI: [ ] nausea [ ] vomiting [ ] diarrhea [ ] constipation [ ] abd pain [ ] dysphagia   : [ ] dysuria [ ] nocturia [ ] hematuria [ ] increased urinary frequency  Musculoskeletal: [ ] back pain [ ] myalgias [ ] arthralgias [ ] fracture  Skin: [ ] rash [ ] itch  Neurological: [ ] headache [ ] dizziness [ ] syncope [ ] weakness [ ] numbness  Psychiatric: [ ] anxiety [ ] depression  Endocrine: [ ] diabetes [ ] thyroid problem  Hematologic/Lymphatic: [ ] anemia [ ] bleeding problem  Allergic/Immunologic: [ ] itchy eyes [ ] nasal discharge [ ] hives [ ] angioedema  [ ] All other systems negative  [ ] Unable to assess ROS because ________    OBJECTIVE:  ICU Vital Signs Last 24 Hrs  T(C): 36.7 (2020 04:00), Max: 38.6 (2020 01:00)  T(F): 98.1 (2020 04:00), Max: 101.4 (2020 01:00)  HR: 102 (2020 06:00) (79 - 102)  BP: 126/58 (2020 07:00) (88/52 - 161/69)  BP(mean): 83 (2020 07:00) (65 - 98)  ABP: --  ABP(mean): --  RR: 37 (2020 06:00) (14 - 59)  SpO2: 98% (2020 07:00) (91% - 100%)         @ 07:01  -   @ 07:00  --------------------------------------------------------  IN: 2959.8 mL / OUT: 1200 mL / NET: 1759.8 mL      CAPILLARY BLOOD GLUCOSE      POCT Blood Glucose.: 112 mg/dL (2020 05:24)      PHYSICAL EXAM:  General:   HEENT:   Lymph Nodes:  Neck:   Respiratory:   Cardiovascular:   Abdomen:   Extremities:   Skin:   Neurological:  Psychiatry:    LINES:    HOSPITAL MEDICATIONS:  MEDICATIONS  (STANDING):  albuterol/ipratropium for Nebulization 3 milliLiter(s) Nebulizer every 6 hours  chlorhexidine 4% Liquid 1 Application(s) Topical <User Schedule>  digoxin     Tablet 0.125 milliGRAM(s) Oral every other day  lactulose Syrup 10 Gram(s) Oral four times a day  levothyroxine Injectable 12.5 MICROGram(s) IV Push at bedtime  meropenem  IVPB 500 milliGRAM(s) IV Intermittent every 12 hours  midodrine 30 milliGRAM(s) Oral every 8 hours  rifAXIMin 550 milliGRAM(s) Oral two times a day    MEDICATIONS  (PRN):      LABS:                        10.0   11.12 )-----------( 70       ( 2020 00:21 )             30.8     Hgb Trend: 10.0<--, 9.8<--, 10.5<--, 10.8<--, 11.3<--      142  |  112<H>  |  38<H>  ----------------------------<  120<H>  5.0   |  22  |  1.07    Ca    9.3      2020 00:21  Phos  2.3       Mg     2.1         TPro  4.3<L>  /  Alb  2.0<L>  /  TBili  1.1  /  DBili  x   /  AST  35  /  ALT  23  /  AlkPhos  107      Creatinine Trend: 1.07<--, 1.00<--, 1.17<--, 1.23<--, 1.32<--, 1.49<--  PT/INR - ( 2020 00:21 )   PT: 15.5 sec;   INR: 1.35 ratio         PTT - ( 2020 00:21 )  PTT:31.8 sec  Urinalysis Basic - ( 2020 05:54 )    Color: Yellow / Appearance: Clear / S.023 / pH: x  Gluc: x / Ketone: Negative  / Bili: Negative / Urobili: 2 mg/dL   Blood: x / Protein: Trace / Nitrite: Negative   Leuk Esterase: Negative / RBC: 14 /hpf / WBC 3 /HPF   Sq Epi: x / Non Sq Epi: 1 / Bacteria: Negative        Venous Blood Gas:   @ 00:10  7.39/45/37/27/66  VBG Lactate: 1.7  Venous Blood Gas:   @ 23:55  7.36/40/39/22/68  VBG Lactate: 2.0      MICROBIOLOGY:   Culture - Blood (20 @ 23:03)    Specimen Source: .Blood Blood-Venous    Culture Results:   No growth to date.    Culture - Blood (20 @ 15:02)    Specimen Source: .Blood Blood-Peripheral    Culture Results:   No growth to date.    Culture - Sputum . (20 @ 15:19)    -  Levofloxacin: S <=2    -  Gentamicin: S <=2    -  Ertapenem: S <=0.5    -  Meropenem: S <=1    -  Piperacillin/Tazobactam: S <=8    -  Ampicillin/Sulbactam: R 8/4 Enterobacter, Citrobacter, and Serratia may develop resistance during prolonged therapy (3-4 days)    -  Ceftriaxone: S <=1 Enterobacter, Citrobacter, and Serratia may develop resistance during prolonged therapy    Gram Stain:   Moderate polymorphonuclear leukocytes per low power field  Numerous Squamous epithelial cells per low power field  Numerous Gram Negative Rods per oil power field  Moderate Yeast like cells per oil power field    -  Amikacin: S <=16    -  Aztreonam: S <=4    -  Ampicillin: R <=8 These ampicillin results predict results for amoxicillin    -  Ciprofloxacin: S <=1    -  Cefoxitin: R <=8    -  Cefepime: S <=2    -  Amoxicillin/Clavulanic Acid: R >16/8    -  Cefazolin: R >16 Enterobacter, Citrobacter, and Serratia may develop resistance during prolonged therapy (3-4 days)    -  Trimethoprim/Sulfamethoxazole: S <=2/38    -  Tobramycin: S 4    Specimen Source: .Sputum Sputum    Culture Results:   Moderate Serratia marcescens  Normal Respiratory Stefanie present    Organism Identification: Serratia marcescens    Organism: Serratia marcescens    Method Type: GALINDO        RADIOLOGY:  < from: Xray Chest 1 View- PORTABLE-Urgent (20 @ 09:02) >  MPRESSION:     Enteric tube terminates in the proximal stomach, side port is at the GE junction.        < end of copied text >    EKG:  < from: 12 Lead ECG (20 @ 21:43) >  Ventricular Rate 135 BPM    Atrial Rate 153 BPM    QRS Duration 80 ms    Q-T Interval 288 ms    QTC Calculation(Bezet) 432 ms    R Axis 43 degrees    T Axis 214 degrees    Diagnosis Line ATRIAL FIBRILLATION WITH RAPID VENTRICULAR RESPONSE  VOLTAGE CRITERIA FOR LEFT VENTRICULAR HYPERTROPHY  MARKED ST ABNORMALITY, POSSIBLE LATERAL SUBENDOCARDIAL INJURY  ABNORMAL ECG    Confirmed by MD TIA, DAMIEN ([id number]) on 2020 12:32:29 PM    < end of copied text >      Shauna Hernandez ACNP - [place]  ex [id number] CHIEF COMPLAINT: Septic Shock     Overnight:   (+) fever 101.4 s/p Reculture - (+) vomiting this am s/p Kaitlin o feed tube removed and replaced with Sump to LWS      Interval Events:  89 yo female with a PMHx of afib on digoxin (not on AC), severe AS, hypothyroidism, COPD not on home O2, CLL (previously on Treanda and Rituxan), 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 (+) Distributive shock now resolved.     REVIEW OF SYSTEMS:  Constitutional: [ ] fevers [ ] chills [ ] weight loss [ ] weight gain  HEENT: [ ] dry eyes [ ] eye irritation [ ] postnasal drip [ ] nasal congestion  CV: [ ] chest pain [ ] orthopnea [ ] palpitations [ ] murmur  Resp: [ ] cough [ ] shortness of breath [ ] dyspnea [ ] wheezing [ ] sputum [ ] hemoptysis  GI: [ ] nausea [ ] vomiting [ ] diarrhea [ ] constipation [ ] abd pain [ ] dysphagia   : [ ] dysuria [ ] nocturia [ ] hematuria [ ] increased urinary frequency  Musculoskeletal: [ ] back pain [ ] myalgias [ ] arthralgias [ ] fracture  Skin: [ ] rash [ ] itch  Neurological: [ ] headache [ ] dizziness [ ] syncope [ ] weakness [ ] numbness  Psychiatric: [ ] anxiety [ ] depression  Endocrine: [ ] diabetes [ ] thyroid problem  Hematologic/Lymphatic: [ ] anemia [ ] bleeding problem  Allergic/Immunologic: [ ] itchy eyes [ ] nasal discharge [ ] hives [ ] angioedema  [ ] All other systems negative  x ] Unable to assess ROS because limited able to state pain when NGT placed     OBJECTIVE:  ICU Vital Signs Last 24 Hrs  T(C): 36.7 (2020 04:00), Max: 38.6 (2020 01:00)  T(F): 98.1 (2020 04:00), Max: 101.4 (2020 01:00)  HR: 102 (2020 06:00) (79 - 102)  BP: 126/58 (2020 07:00) (88/52 - 161/69)  BP(mean): 83 (2020 07:00) (65 - 98)  ABP: --  ABP(mean): --  RR: 37 (2020 06:00) (14 - 59)  SpO2: 98% (2020 07:00) (91% - 100%)         @ 07:01  -   @ 07:00  --------------------------------------------------------  IN: 2959.8 mL / OUT: 1200 mL / NET: 1759.8 mL      CAPILLARY BLOOD GLUCOSE      POCT Blood Glucose.: 112 mg/dL (2020 05:24)      PHYSICAL EXAM:  General: (+) Vomiting   HEENT: PERRLA 4mm bilateral   Lymph Nodes:  Neck:   Respiratory:   Cardiovascular:   Abdomen:   Extremities:   Skin:   Neurological:  Psychiatry:    LINES:    HOSPITAL MEDICATIONS:  MEDICATIONS  (STANDING):  albuterol/ipratropium for Nebulization 3 milliLiter(s) Nebulizer every 6 hours  chlorhexidine 4% Liquid 1 Application(s) Topical <User Schedule>  digoxin     Tablet 0.125 milliGRAM(s) Oral every other day  lactulose Syrup 10 Gram(s) Oral four times a day  levothyroxine Injectable 12.5 MICROGram(s) IV Push at bedtime  meropenem  IVPB 500 milliGRAM(s) IV Intermittent every 12 hours  midodrine 30 milliGRAM(s) Oral every 8 hours  rifAXIMin 550 milliGRAM(s) Oral two times a day    MEDICATIONS  (PRN):      LABS:                        10.0   11.12 )-----------( 70       ( 2020 00:21 )             30.8     Hgb Trend: 10.0<--, 9.8<--, 10.5<--, 10.8<--, 11.3<--      142  |  112<H>  |  38<H>  ----------------------------<  120<H>  5.0   |  22  |  1.07    Ca    9.3      2020 00:21  Phos  2.3       Mg     2.1         TPro  4.3<L>  /  Alb  2.0<L>  /  TBili  1.1  /  DBili  x   /  AST  35  /  ALT  23  /  AlkPhos  107      Creatinine Trend: 1.07<--, 1.00<--, 1.17<--, 1.23<--, 1.32<--, 1.49<--  PT/INR - ( 2020 00:21 )   PT: 15.5 sec;   INR: 1.35 ratio         PTT - ( 2020 00:21 )  PTT:31.8 sec  Urinalysis Basic - ( 2020 05:54 )    Color: Yellow / Appearance: Clear / S.023 / pH: x  Gluc: x / Ketone: Negative  / Bili: Negative / Urobili: 2 mg/dL   Blood: x / Protein: Trace / Nitrite: Negative   Leuk Esterase: Negative / RBC: 14 /hpf / WBC 3 /HPF   Sq Epi: x / Non Sq Epi: 1 / Bacteria: Negative        Venous Blood Gas:   @ 00:10  7.39/45/37/27/66  VBG Lactate: 1.7  Venous Blood Gas:   @ 23:55  7.36/40/39/22/68  VBG Lactate: 2.0      MICROBIOLOGY:   Culture - Blood (20 @ 23:03)    Specimen Source: .Blood Blood-Venous    Culture Results:   No growth to date.    Culture - Blood (20 @ 15:02)    Specimen Source: .Blood Blood-Peripheral    Culture Results:   No growth to date.    Culture - Sputum . (20 @ 15:19)    -  Levofloxacin: S <=2    -  Gentamicin: S <=2    -  Ertapenem: S <=0.5    -  Meropenem: S <=1    -  Piperacillin/Tazobactam: S <=8    -  Ampicillin/Sulbactam: R 8/4 Enterobacter, Citrobacter, and Serratia may develop resistance during prolonged therapy (3-4 days)    -  Ceftriaxone: S <=1 Enterobacter, Citrobacter, and Serratia may develop resistance during prolonged therapy    Gram Stain:   Moderate polymorphonuclear leukocytes per low power field  Numerous Squamous epithelial cells per low power field  Numerous Gram Negative Rods per oil power field  Moderate Yeast like cells per oil power field    -  Amikacin: S <=16    -  Aztreonam: S <=4    -  Ampicillin: R <=8 These ampicillin results predict results for amoxicillin    -  Ciprofloxacin: S <=1    -  Cefoxitin: R <=8    -  Cefepime: S <=2    -  Amoxicillin/Clavulanic Acid: R >16/8    -  Cefazolin: R >16 Enterobacter, Citrobacter, and Serratia may develop resistance during prolonged therapy (3-4 days)    -  Trimethoprim/Sulfamethoxazole: S <=2/38    -  Tobramycin: S 4    Specimen Source: .Sputum Sputum    Culture Results:   Moderate Serratia marcescens  Normal Respiratory Stefanie present    Organism Identification: Serratia marcescens    Organism: Serratia marcescens    Method Type: GALINDO        RADIOLOGY:  < from: Xray Chest 1 View- PORTABLE-Urgent (20 @ 09:02) >  MPRESSION:     Enteric tube terminates in the proximal stomach, side port is at the GE junction.        < end of copied text >    EKG:  < from: 12 Lead ECG (20 @ 21:43) >  Ventricular Rate 135 BPM    Atrial Rate 153 BPM    QRS Duration 80 ms    Q-T Interval 288 ms    QTC Calculation(Bezet) 432 ms    R Axis 43 degrees    T Axis 214 degrees    Diagnosis Line ATRIAL FIBRILLATION WITH RAPID VENTRICULAR RESPONSE  VOLTAGE CRITERIA FOR LEFT VENTRICULAR HYPERTROPHY  MARKED ST ABNORMALITY, POSSIBLE LATERAL SUBENDOCARDIAL INJURY  ABNORMAL ECG    Confirmed by MD TIA, DAMIEN ([id number]) on 2020 12:32:29 PM    < end of copied text >      Shauna Hernandez ACNP - [place]  ex 0400 CHIEF COMPLAINT: Septic Shock     Overnight:   (+) fever 101.4 s/p Reculture - (+) vomiting this am s/p Kaitlin o feed tube removed and replaced with Sump to LWS      Interval Events:  89 yo female with a PMHx of afib on digoxin (not on AC), severe AS, hypothyroidism, COPD not on home O2, CLL (previously on Treanda and Rituxan), 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 (+) Distributive shock now resolved.     REVIEW OF SYSTEMS:  Constitutional: [ ] fevers [ ] chills [ ] weight loss [ ] weight gain  HEENT: [ ] dry eyes [ ] eye irritation [ ] postnasal drip [ ] nasal congestion  CV: [ ] chest pain [ ] orthopnea [ ] palpitations [ ] murmur  Resp: [ ] cough [ ] shortness of breath [ ] dyspnea [ ] wheezing [ ] sputum [ ] hemoptysis  GI: [ ] nausea [ ] vomiting [ ] diarrhea [ ] constipation [ ] abd pain [ ] dysphagia   : [ ] dysuria [ ] nocturia [ ] hematuria [ ] increased urinary frequency  Musculoskeletal: [ ] back pain [ ] myalgias [ ] arthralgias [ ] fracture  Skin: [ ] rash [ ] itch  Neurological: [ ] headache [ ] dizziness [ ] syncope [ ] weakness [ ] numbness  Psychiatric: [ ] anxiety [ ] depression  Endocrine: [ ] diabetes [ ] thyroid problem  Hematologic/Lymphatic: [ ] anemia [ ] bleeding problem  Allergic/Immunologic: [ ] itchy eyes [ ] nasal discharge [ ] hives [ ] angioedema  [ ] All other systems negative  x ] Unable to assess ROS because limited able to state pain when NGT placed     OBJECTIVE:  ICU Vital Signs Last 24 Hrs  T(C): 36.7 (2020 04:00), Max: 38.6 (2020 01:00)  T(F): 98.1 (2020 04:00), Max: 101.4 (2020 01:00)  HR: 102 (2020 06:00) (79 - 102)  BP: 126/58 (2020 07:00) (88/52 - 161/69)  BP(mean): 83 (2020 07:00) (65 - 98)  ABP: --  ABP(mean): --  RR: 37 (2020 06:00) (14 - 59)  SpO2: 98% (2020 07:00) (91% - 100%)         @ 07:01  -   @ 07:00  --------------------------------------------------------  IN: 2959.8 mL / OUT: 1200 mL / NET: 1759.8 mL      CAPILLARY BLOOD GLUCOSE      POCT Blood Glucose.: 112 mg/dL (2020 05:24)      PHYSICAL EXAM:  General: (+) Vomiting   HEENT: PERRLA 4mm bilateral   Lymph Nodes: no palpable lymph node adenopathy noted   Neck: supple   Respiratory: (+) rhonchi bilaterally (+) wet non productive cough   Cardiovascular:  S1 S2 No M/C/G/R  Abdomen: Soft (+) Non tender   Extremities: Warm Pink   Skin: Intact   Neurological: Lethargic   Psychiatry: unable to assess     LINES: Central Line     HOSPITAL MEDICATIONS:  MEDICATIONS  (STANDING):  albuterol/ipratropium for Nebulization 3 milliLiter(s) Nebulizer every 6 hours  chlorhexidine 4% Liquid 1 Application(s) Topical <User Schedule>  digoxin     Tablet 0.125 milliGRAM(s) Oral every other day  lactulose Syrup 10 Gram(s) Oral four times a day  levothyroxine Injectable 12.5 MICROGram(s) IV Push at bedtime  meropenem  IVPB 500 milliGRAM(s) IV Intermittent every 12 hours  midodrine 30 milliGRAM(s) Oral every 8 hours  rifAXIMin 550 milliGRAM(s) Oral two times a day    MEDICATIONS  (PRN):      LABS:                        10.0   11.12 )-----------( 70       ( 2020 00:21 )             30.8     Hgb Trend: 10.0<--, 9.8<--, 10.5<--, 10.8<--, 11.3<--      142  |  112<H>  |  38<H>  ----------------------------<  120<H>  5.0   |  22  |  1.07    Ca    9.3      2020 00:21  Phos  2.3       Mg     2.1         TPro  4.3<L>  /  Alb  2.0<L>  /  TBili  1.1  /  DBili  x   /  AST  35  /  ALT  23  /  AlkPhos  107      Creatinine Trend: 1.07<--, 1.00<--, 1.17<--, 1.23<--, 1.32<--, 1.49<--  PT/INR - ( 2020 00:21 )   PT: 15.5 sec;   INR: 1.35 ratio         PTT - ( 2020 00:21 )  PTT:31.8 sec  Urinalysis Basic - ( 2020 05:54 )    Color: Yellow / Appearance: Clear / S.023 / pH: x  Gluc: x / Ketone: Negative  / Bili: Negative / Urobili: 2 mg/dL   Blood: x / Protein: Trace / Nitrite: Negative   Leuk Esterase: Negative / RBC: 14 /hpf / WBC 3 /HPF   Sq Epi: x / Non Sq Epi: 1 / Bacteria: Negative        Venous Blood Gas:   @ 00:10  7.39/45/37/27/66  VBG Lactate: 1.7  Venous Blood Gas:   @ 23:55  7.36/40/39/22/68  VBG Lactate: 2.0      MICROBIOLOGY:   Culture - Blood (20 @ 23:03)    Specimen Source: .Blood Blood-Venous    Culture Results:   No growth to date.    Culture - Blood (20 @ 15:02)    Specimen Source: .Blood Blood-Peripheral    Culture Results:   No growth to date.    Culture - Sputum . (20 @ 15:19)    -  Levofloxacin: S <=2    -  Gentamicin: S <=2    -  Ertapenem: S <=0.5    -  Meropenem: S <=1    -  Piperacillin/Tazobactam: S <=8    -  Ampicillin/Sulbactam: R 8/4 Enterobacter, Citrobacter, and Serratia may develop resistance during prolonged therapy (3-4 days)    -  Ceftriaxone: S <=1 Enterobacter, Citrobacter, and Serratia may develop resistance during prolonged therapy    Gram Stain:   Moderate polymorphonuclear leukocytes per low power field  Numerous Squamous epithelial cells per low power field  Numerous Gram Negative Rods per oil power field  Moderate Yeast like cells per oil power field    -  Amikacin: S <=16    -  Aztreonam: S <=4    -  Ampicillin: R <=8 These ampicillin results predict results for amoxicillin    -  Ciprofloxacin: S <=1    -  Cefoxitin: R <=8    -  Cefepime: S <=2    -  Amoxicillin/Clavulanic Acid: R >16/8    -  Cefazolin: R >16 Enterobacter, Citrobacter, and Serratia may develop resistance during prolonged therapy (3-4 days)    -  Trimethoprim/Sulfamethoxazole: S <=2/38    -  Tobramycin: S 4    Specimen Source: .Sputum Sputum    Culture Results:   Moderate Serratia marcescens  Normal Respiratory Stefanie present    Organism Identification: Serratia marcescens    Organism: Serratia marcescens    Method Type: GALINDO        RADIOLOGY:  < from: Xray Chest 1 View- PORTABLE-Urgent (20 @ 09:02) >  MPRESSION:     Enteric tube terminates in the proximal stomach, side port is at the GE junction.        < end of copied text >    EKG:  < from: 12 Lead ECG (20 @ 21:43) >  Ventricular Rate 135 BPM    Atrial Rate 153 BPM    QRS Duration 80 ms    Q-T Interval 288 ms    QTC Calculation(Bezet) 432 ms    R Axis 43 degrees    T Axis 214 degrees    Diagnosis Line ATRIAL FIBRILLATION WITH RAPID VENTRICULAR RESPONSE  VOLTAGE CRITERIA FOR LEFT VENTRICULAR HYPERTROPHY  MARKED ST ABNORMALITY, POSSIBLE LATERAL SUBENDOCARDIAL INJURY  ABNORMAL ECG    Confirmed by MD TIA, DAMIEN ([id number]) on 2020 12:32:29 PM    < end of copied text >      Shauna Hernandez [facility] - [place]  ex [id number]

## 2020-01-24 NOTE — SWALLOW VFSS/MBS ASSESSMENT ADULT - DIAGNOSTIC IMPRESSIONS
Chart reviewed and discussed Pt with LAYLA Villatoro. Pt with tentative plan for MBS today, however, as of earlier this a.m, pt vomiting and febrile, therefore, inappropriate to participate in MBS. Exam canceled by NP. Will continue to follow and re-assess candidacy for instrumental exam once medically appropriate.

## 2020-01-24 NOTE — SWALLOW VFSS/MBS ASSESSMENT ADULT - COMMENTS
Hisotry continued: Pt presented w/ difficulty breathing, cough, and AMS, admitted to MICU for septic shock, AHRF, and tahir/atn 2/2 PNA, with UTI.  Metabolic encephalopathy. Waxing waning mentation w/in last week likely in the setting of sepsis. Pt presented with confusion, now improving A&O x 2-3. Possible component of underlying hepatic encephalopathy. Head CT 1/19: No displaced calvarial fracture or acute intracranial hemorrhage.  c/w lactulose, and rifaximin. Pulm:Tmax 101.2 in ED with leukocytosis, requiring 3L of O2, not on home O2.    CT chest 1/19: bb patchy opacities PNA vs asp pneumonitis. CXR 1/19: Right lower lung opacity, likely pneumonia. c/w empiric abx. +COPD--> duonebs. + UTI-->-c/w meropenem. ID: PNA & UTI: c/w meropenem for hx of ESBL klebsiella UTI,  c/w epimeric vancomycin & start azithromycin. MICU note 1/21: Mental status greatly improved.  GOC: MOLST that states pt is ok with trial of pressors, Bipap, temporary NGT but not intubation CPR or peg.    SWALLOW HISTORY: Bedside swallow 9/14/19: Dysphagia 3 with nectar thick liquids pending MBS (however pt is refusing objective testing at present time). Pt presents with an oral and suspected pharyngeal dysphagia marked by prolonged mastication and delayed oral transit time (with mechanical soft and solid textures), reduced hyolaryngeal excursion, episodes of audible swallows, and reports of coughing post PO intake of thin liquids.

## 2020-01-24 NOTE — PROGRESS NOTE ADULT - SUBJECTIVE AND OBJECTIVE BOX
CARDIOLOGY PROGRESS NOTE    Subjective/24 hour events:   -  Fevers and emesis overnight.   - Denies chest pain, palpitations, SOB, lightheadedness, dizziness.    Telemetry: Rate controlled AF.     Review of Systems:  Constitutional: [ ] Fever [ ] Chills [ ] Fatigue [ ] Weight change   HEENT: [ ] Headache [ ] Vision changes [ ] Eye Pain [ ] Difficulty Hearing [ ] Tinnitus [ ] Vertigo [ ] Runny nose [ ] Sore Throat   Respiratory: [ ] Cough [ ] Wheezing [ ] Shortness of breath [ ] Hemoptysis  Cardiovascular: [ ] Chest Pain [ ] Palpitations [ ] LYNNE [ ] PND [ ] Orthopnea [ ] Leg Swelling  Gastrointestinal: [ ] Nausea [ ] Vomiting [ ] Abdominal Pain [ ] Diarrhea [ ] Constipation [ ] Melena [ ] Hematochezia  Genitourinary: [ ] Nocturia [ ] Dysuria [ ] Incontinence  Musculoskeletal: [ ] Joint pain [ ] Joint swelling [ ] Muscle pain  Neurologic: [ ] Focal deficit [ ] Paresthesias [ ] Tremors [ ] Memory loss  Hematologic: [ ] Easy bruising  Endocrine: [ ] Cold intolerance [ ] Heat intolerance  Lymphatic: [ ] Swelling [ ] Lymphadenopathy   Skin: [ ] Rash [ ] Itching [ ] Ecchymoses [ ] Wounds [ ] Lesions  Psychiatry: [ ] Depression [ ] Anxiety [ ] Suicidal/Homicidal Ideation [ ] Sleep Disturbances  [x] 10 point review of systems is otherwise negative except as mentioned above              [ ] Unable to obtain    MEDICATIONS  (STANDING):  albuterol/ipratropium for Nebulization 3 milliLiter(s) Nebulizer every 6 hours  chlorhexidine 4% Liquid 1 Application(s) Topical <User Schedule>  digoxin     Tablet 0.125 milliGRAM(s) Oral every other day  lactulose Syrup 10 Gram(s) Oral four times a bunny  levothyroxine Injectable 12.5 MICROGram(s) IV Push at bedtime  meropenem  IVPB 500 milliGRAM(s) IV Intermittent every 12 hours  midodrine 30 milliGRAM(s) Oral every 8 hours  rifAXIMin 550 milliGRAM(s) Oral two times a day    MEDICATIONS  (PRN):      Vital Signs Last 24 Hrs  T(C): 36.3 (24 Jan 2020 08:00), Max: 38.6 (24 Jan 2020 01:00)  T(F): 97.4 (24 Jan 2020 08:00), Max: 101.4 (24 Jan 2020 01:00)  HR: 96 (24 Jan 2020 08:00) (79 - 102)  BP: 117/54 (24 Jan 2020 08:00) (88/52 - 161/69)  BP(mean): 78 (24 Jan 2020 08:00) (65 - 95)  RR: 24 (24 Jan 2020 08:00) (16 - 59)  SpO2: 96% (24 Jan 2020 08:00) (91% - 100%)    I&O's Summary    23 Jan 2020 07:01  -  24 Jan 2020 07:00  --------------------------------------------------------  IN: 2959.8 mL / OUT: 1200 mL / NET: 1759.8 mL        Physical Exam:  General: Cachexia. No distress. Comfortable.   HEENT: EOMI. Temporal wasting.   Neck: JVP not elevated  Chest: Clear to auscultation bilaterally  CV: RRR. Normal S1 and S2. No murmurs, rubs, or gallops. No edema.  Abdomen: Soft, non-distended, non-tender  Skin: No rashes, +ecchymoses, no skin breakdown  Extremities: Warm.  Neuro: Alert and oriented x 1 No focal deficits.  Psych: Mood and affect appropriate    Labs:                        10.0   11.12 )-----------( 70       ( 24 Jan 2020 00:21 )             30.8     01-24    142  |  112<H>  |  38<H>  ----------------------------<  120<H>  5.0   |  22  |  1.07    Ca    9.3      24 Jan 2020 00:21  Phos  2.3     01-24  Mg     2.1     01-24    TPro  4.3<L>  /  Alb  2.0<L>  /  TBili  1.1  /  DBili  x   /  AST  35  /  ALT  23  /  AlkPhos  107  01-24    PT/INR - ( 24 Jan 2020 00:21 )   PT: 15.5 sec;   INR: 1.35 ratio         PTT - ( 24 Jan 2020 00:21 )  PTT:31.8 sec        pro-BNP: Serum Pro-Brain Natriuretic Peptide: 9585 pg/mL (09-10 @ 06:58)    Lipid Profile:   HgA1c:   TSH:     CARDIOVASCULAR DIAGNOSTIC TESTING:  [] Echocardiogram:  [] Stress Test:  [] Catheterization:    RADIOLOGY: CARDIOLOGY PROGRESS NOTE    Subjective/24 hour events:   -  Fevers and emesis overnight.   - Denies chest pain, palpitations, SOB, lightheadedness, dizziness.    Telemetry: Rate controlled AF.     Review of Systems:  Constitutional: [ ] Fever [ ] Chills [ ] Fatigue [ ] Weight change   HEENT: [ ] Headache [ ] Vision changes [ ] Eye Pain [ ] Difficulty Hearing [ ] Tinnitus [ ] Vertigo [ ] Runny nose [ ] Sore Throat   Respiratory: [ ] Cough [ ] Wheezing [ ] Shortness of breath [ ] Hemoptysis  Cardiovascular: [ ] Chest Pain [ ] Palpitations [ ] LYNNE [ ] PND [ ] Orthopnea [ ] Leg Swelling  Gastrointestinal: [ ] Nausea [ ] Vomiting [ ] Abdominal Pain [ ] Diarrhea [ ] Constipation [ ] Melena [ ] Hematochezia  Genitourinary: [ ] Nocturia [ ] Dysuria [ ] Incontinence  Musculoskeletal: [ ] Joint pain [ ] Joint swelling [ ] Muscle pain  Neurologic: [ ] Focal deficit [ ] Paresthesias [ ] Tremors [ ] Memory loss  Hematologic: [ ] Easy bruising  Endocrine: [ ] Cold intolerance [ ] Heat intolerance  Lymphatic: [ ] Swelling [ ] Lymphadenopathy   Skin: [ ] Rash [ ] Itching [ ] Ecchymoses [ ] Wounds [ ] Lesions  Psychiatry: [ ] Depression [ ] Anxiety [ ] Suicidal/Homicidal Ideation [ ] Sleep Disturbances  [x] 10 point review of systems is otherwise negative except as mentioned above              [ ] Unable to obtain    MEDICATIONS  (STANDING):  albuterol/ipratropium for Nebulization 3 milliLiter(s) Nebulizer every 6 hours  chlorhexidine 4% Liquid 1 Application(s) Topical <User Schedule>  digoxin     Tablet 0.125 milliGRAM(s) Oral every other day  lactulose Syrup 10 Gram(s) Oral four times a bunny  levothyroxine Injectable 12.5 MICROGram(s) IV Push at bedtime  meropenem  IVPB 500 milliGRAM(s) IV Intermittent every 12 hours  midodrine 30 milliGRAM(s) Oral every 8 hours  rifAXIMin 550 milliGRAM(s) Oral two times a day    Vital Signs Last 24 Hrs  T(C): 36.3 (24 Jan 2020 08:00), Max: 38.6 (24 Jan 2020 01:00)  T(F): 97.4 (24 Jan 2020 08:00), Max: 101.4 (24 Jan 2020 01:00)  HR: 96 (24 Jan 2020 08:00) (79 - 102)  BP: 117/54 (24 Jan 2020 08:00) (88/52 - 161/69)  BP(mean): 78 (24 Jan 2020 08:00) (65 - 95)  RR: 24 (24 Jan 2020 08:00) (16 - 59)  SpO2: 96% (24 Jan 2020 08:00) (91% - 100%)    I&O's Summary  23 Jan 2020 07:01  -  24 Jan 2020 07:00  --------------------------------------------------------  IN: 2959.8 mL / OUT: 1200 mL / NET: 1759.8 mL    Physical Exam:  General: Cachexia. No distress. Comfortable.   HEENT: EOMI. Temporal wasting.   Neck: JVP not elevated  Chest: Clear to auscultation bilaterally  CV: RRR. Normal S1 and S2. No murmurs, rubs, or gallops. No edema.  Abdomen: Soft, non-distended, non-tender  Skin: No rashes, +ecchymoses, no skin breakdown  Extremities: Warm.  Neuro: Alert and oriented x 1 No focal deficits.  Psych: Mood and affect appropriate    Labs:             10.0   11.12 )-----------( 70       ( 24 Jan 2020 00:21 )             30.8     01-24    142  |  112<H>  |  38<H>  ----------------------------<  120<H>  5.0   |  22  |  1.07    Ca    9.3      24 Jan 2020 00:21  Phos  2.3     01-24  Mg     2.1     01-24    TPro  4.3<L>  /  Alb  2.0<L>  /  TBili  1.1  /  DBili  x   /  AST  35  /  ALT  23  /  AlkPhos  107  01-24    PT/INR - ( 24 Jan 2020 00:21 )   PT: 15.5 sec;   INR: 1.35 ratio    PTT - ( 24 Jan 2020 00:21 )  PTT:31.8 sec    pro-BNP: Serum Pro-Brain Natriuretic Peptide: 9585 pg/mL (09-10 @ 06:58)

## 2020-01-24 NOTE — PROGRESS NOTE ADULT - ASSESSMENT
89 yo female with a PMHx of afib on digoxin (not on AC), severe AS, hypothyroidism, COPD not on home O2, CLL (previously on Treanda and Rituxan), 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 A/W Septic Shock 89 yo female with a PMHx of afib on digoxin (not on AC), severe AS, hypothyroidism, COPD not on home O2, CLL (previously on Treanda and Rituxan), 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 A/W Septic Shock. (+_) Vomiting and fever over Night.     Neuro:   AMS  - continue with ICU erika checks   - F/u daily ammonia levels  - PT/ OT when clinically appropriate     CV:   Distributive Shock -   - continue with Midodrine 30 mg TID   - F/u with cultures  - monitor Lactate levels   - Albumin boluses as needed     Pulm:   Hypoxia   - Continue with Nasal Cannula   - Monitor Blood gas   - aspiration precautions   - Chest Pt as tolerated   - Duoneb q 6     GI:   Oropharyngeal  Dysphasia   - NGT - replaced to Sump tube (+) vomiting Fecal like matter   - F/u with xray Chest and ABd x ray     _ Cir 89 yo female with a PMHx of afib on digoxin (not on AC), severe AS, hypothyroidism, COPD not on home O2, CLL (previously on Treanda and Rituxan), 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 A/W Septic Shock. (+_) Vomiting and fever over Night.     Neuro:   AMS  - continue with ICU erika checks   - F/u daily ammonia levels  - PT/ OT when clinically appropriate     CV:   Distributive Shock -   - continue with Midodrine 30 mg TID   - F/u with cultures  - monitor Lactate levels   - Albumin boluses as needed     Pulm:   Hypoxia   - Continue with Nasal Cannula   - Monitor Blood gas   - aspiration precautions   - Chest Pt as tolerated   - Duoneb q 6     GI:   Oropharyngeal  Dysphasia   - NGT - replaced to Sump tube (+) vomiting Fecal like matter   - F/u with xray Chest and ABd x ray     - (+) Cirrhosis - with Varices- monitor for bleeding   - Hyperammonemia will continue with lactulose / Rifaximin as tolerated      :  No acute issues at this time   continue to monitor Strict I/O   Trend BMP mag and Phos     ID   Septic shock 2/2 ESBL seratia continue with Ijeoma.   - f/u with blood cultures    - will discuss with attending for possible addition of VAnco     DVT PPX - cont  with Lovenox

## 2020-01-24 NOTE — PROGRESS NOTE ADULT - ATTENDING COMMENTS
Continue supportive care and treatment of sepsis/bacteremia in the setting of severe AS. Extremely poor prognosis.     Merissa Craven MD, MPH, RAMYA, RPVI, FACC  Cardiovascular Specialist   [facility]  c: [phone]  e: [email or web address]

## 2020-01-25 DIAGNOSIS — Z02.9 ENCOUNTER FOR ADMINISTRATIVE EXAMINATIONS, UNSPECIFIED: ICD-10-CM

## 2020-01-25 DIAGNOSIS — C22.0 LIVER CELL CARCINOMA: ICD-10-CM

## 2020-01-25 DIAGNOSIS — G93.41 METABOLIC ENCEPHALOPATHY: ICD-10-CM

## 2020-01-25 DIAGNOSIS — I73.9 PERIPHERAL VASCULAR DISEASE, UNSPECIFIED: ICD-10-CM

## 2020-01-25 DIAGNOSIS — I85.01 ESOPHAGEAL VARICES WITH BLEEDING: ICD-10-CM

## 2020-01-25 DIAGNOSIS — E03.9 HYPOTHYROIDISM, UNSPECIFIED: ICD-10-CM

## 2020-01-25 DIAGNOSIS — J44.9 CHRONIC OBSTRUCTIVE PULMONARY DISEASE, UNSPECIFIED: ICD-10-CM

## 2020-01-25 DIAGNOSIS — N17.9 ACUTE KIDNEY FAILURE, UNSPECIFIED: ICD-10-CM

## 2020-01-25 DIAGNOSIS — I27.20 PULMONARY HYPERTENSION, UNSPECIFIED: ICD-10-CM

## 2020-01-25 DIAGNOSIS — I48.91 UNSPECIFIED ATRIAL FIBRILLATION: ICD-10-CM

## 2020-01-25 DIAGNOSIS — A41.9 SEPSIS, UNSPECIFIED ORGANISM: ICD-10-CM

## 2020-01-25 DIAGNOSIS — D69.6 THROMBOCYTOPENIA, UNSPECIFIED: ICD-10-CM

## 2020-01-25 DIAGNOSIS — K74.60 UNSPECIFIED CIRRHOSIS OF LIVER: ICD-10-CM

## 2020-01-25 DIAGNOSIS — Z29.9 ENCOUNTER FOR PROPHYLACTIC MEASURES, UNSPECIFIED: ICD-10-CM

## 2020-01-25 LAB
-  CANDIDA GLABRATA: SIGNIFICANT CHANGE UP
ALBUMIN SERPL ELPH-MCNC: 2 G/DL — LOW (ref 3.3–5)
ALP SERPL-CCNC: 105 U/L — SIGNIFICANT CHANGE UP (ref 40–120)
ALT FLD-CCNC: 19 U/L — SIGNIFICANT CHANGE UP (ref 10–45)
ANION GAP SERPL CALC-SCNC: 9 MMOL/L — SIGNIFICANT CHANGE UP (ref 5–17)
APTT BLD: 31.2 SEC — SIGNIFICANT CHANGE UP (ref 27.5–36.3)
AST SERPL-CCNC: 28 U/L — SIGNIFICANT CHANGE UP (ref 10–40)
BILIRUB SERPL-MCNC: 1.4 MG/DL — HIGH (ref 0.2–1.2)
BUN SERPL-MCNC: 39 MG/DL — HIGH (ref 7–23)
CALCIUM SERPL-MCNC: 9 MG/DL — SIGNIFICANT CHANGE UP (ref 8.4–10.5)
CHLORIDE SERPL-SCNC: 111 MMOL/L — HIGH (ref 96–108)
CO2 SERPL-SCNC: 23 MMOL/L — SIGNIFICANT CHANGE UP (ref 22–31)
CREAT SERPL-MCNC: 1.01 MG/DL — SIGNIFICANT CHANGE UP (ref 0.5–1.3)
GLUCOSE BLDC GLUCOMTR-MCNC: 91 MG/DL — SIGNIFICANT CHANGE UP (ref 70–99)
GLUCOSE SERPL-MCNC: 84 MG/DL — SIGNIFICANT CHANGE UP (ref 70–99)
GRAM STN FLD: SIGNIFICANT CHANGE UP
GRAM STN FLD: SIGNIFICANT CHANGE UP
HCT VFR BLD CALC: 29.7 % — LOW (ref 34.5–45)
HGB BLD-MCNC: 9.5 G/DL — LOW (ref 11.5–15.5)
INR BLD: 1.3 RATIO — HIGH (ref 0.88–1.16)
MAGNESIUM SERPL-MCNC: 1.8 MG/DL — SIGNIFICANT CHANGE UP (ref 1.6–2.6)
MCHC RBC-ENTMCNC: 32 GM/DL — SIGNIFICANT CHANGE UP (ref 32–36)
MCHC RBC-ENTMCNC: 32.8 PG — SIGNIFICANT CHANGE UP (ref 27–34)
MCV RBC AUTO: 102.4 FL — HIGH (ref 80–100)
METHOD TYPE: SIGNIFICANT CHANGE UP
NRBC # BLD: 0 /100 WBCS — SIGNIFICANT CHANGE UP (ref 0–0)
PHOSPHATE SERPL-MCNC: 3.7 MG/DL — SIGNIFICANT CHANGE UP (ref 2.5–4.5)
PLATELET # BLD AUTO: 76 K/UL — LOW (ref 150–400)
POTASSIUM SERPL-MCNC: 5.2 MMOL/L — SIGNIFICANT CHANGE UP (ref 3.5–5.3)
POTASSIUM SERPL-SCNC: 5.2 MMOL/L — SIGNIFICANT CHANGE UP (ref 3.5–5.3)
PROT SERPL-MCNC: 4.4 G/DL — LOW (ref 6–8.3)
PROTHROM AB SERPL-ACNC: 15.1 SEC — HIGH (ref 10–12.9)
RBC # BLD: 2.9 M/UL — LOW (ref 3.8–5.2)
RBC # FLD: 15.4 % — HIGH (ref 10.3–14.5)
SODIUM SERPL-SCNC: 143 MMOL/L — SIGNIFICANT CHANGE UP (ref 135–145)
WBC # BLD: 9.09 K/UL — SIGNIFICANT CHANGE UP (ref 3.8–10.5)
WBC # FLD AUTO: 9.09 K/UL — SIGNIFICANT CHANGE UP (ref 3.8–10.5)

## 2020-01-25 PROCEDURE — 99233 SBSQ HOSP IP/OBS HIGH 50: CPT

## 2020-01-25 RX ORDER — MIDODRINE HYDROCHLORIDE 2.5 MG/1
20 TABLET ORAL EVERY 8 HOURS
Refills: 0 | Status: DISCONTINUED | OUTPATIENT
Start: 2020-01-25 | End: 2020-02-06

## 2020-01-25 RX ORDER — HYDROMORPHONE HYDROCHLORIDE 2 MG/ML
0.25 INJECTION INTRAMUSCULAR; INTRAVENOUS; SUBCUTANEOUS ONCE
Refills: 0 | Status: DISCONTINUED | OUTPATIENT
Start: 2020-01-25 | End: 2020-01-26

## 2020-01-25 RX ADMIN — CHLORHEXIDINE GLUCONATE 1 APPLICATION(S): 213 SOLUTION TOPICAL at 05:15

## 2020-01-25 RX ADMIN — Medication 3 MILLILITER(S): at 22:19

## 2020-01-25 RX ADMIN — LACTULOSE 10 GRAM(S): 10 SOLUTION ORAL at 18:08

## 2020-01-25 RX ADMIN — MEROPENEM 100 MILLIGRAM(S): 1 INJECTION INTRAVENOUS at 18:09

## 2020-01-25 RX ADMIN — LACTULOSE 10 GRAM(S): 10 SOLUTION ORAL at 23:01

## 2020-01-25 RX ADMIN — Medication 12.5 MICROGRAM(S): at 22:18

## 2020-01-25 RX ADMIN — SODIUM CHLORIDE 50 MILLILITER(S): 9 INJECTION, SOLUTION INTRAVENOUS at 05:19

## 2020-01-25 RX ADMIN — Medication 3 MILLILITER(S): at 18:08

## 2020-01-25 RX ADMIN — MIDODRINE HYDROCHLORIDE 30 MILLIGRAM(S): 2.5 TABLET ORAL at 13:18

## 2020-01-25 RX ADMIN — MEROPENEM 100 MILLIGRAM(S): 1 INJECTION INTRAVENOUS at 05:18

## 2020-01-25 RX ADMIN — MIDODRINE HYDROCHLORIDE 20 MILLIGRAM(S): 2.5 TABLET ORAL at 22:19

## 2020-01-25 RX ADMIN — LACTULOSE 10 GRAM(S): 10 SOLUTION ORAL at 05:18

## 2020-01-25 RX ADMIN — MIDODRINE HYDROCHLORIDE 30 MILLIGRAM(S): 2.5 TABLET ORAL at 05:19

## 2020-01-25 RX ADMIN — Medication 3 MILLILITER(S): at 06:13

## 2020-01-25 RX ADMIN — LACTULOSE 10 GRAM(S): 10 SOLUTION ORAL at 11:31

## 2020-01-25 RX ADMIN — Medication 3 MILLILITER(S): at 12:50

## 2020-01-25 NOTE — PROGRESS NOTE ADULT - SUBJECTIVE AND OBJECTIVE BOX
CHIEF COMPLAINT:  Septic Shock PNA UTI     Interval Events:   89 yo female with a PMHx of afib on digoxin (not on AC), severe AS, hypothyroidism, COPD not on home O2, CLL (previously on Treanda and Rituxan), 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 (+) Distributive shock now resolved.       REVIEW OF SYSTEMS:  Constitutional: [ ] fevers [ ] chills [ ] weight loss [ ] weight gain  HEENT: [ ] dry eyes [ ] eye irritation [ ] postnasal drip [ ] nasal congestion  CV: [ ] chest pain [ ] orthopnea [ ] palpitations [ ] murmur  Resp: [ ] cough [ ] shortness of breath [ ] dyspnea [ ] wheezing [ ] sputum [ ] hemoptysis  GI: [ ] nausea [ ] vomiting [ ] diarrhea [ ] constipation [ ] abd pain [ ] dysphagia   : [ ] dysuria [ ] nocturia [ ] hematuria [ ] increased urinary frequency  Musculoskeletal: [ ] back pain [ ] myalgias [ ] arthralgias [ ] fracture  Skin: [ ] rash [ ] itch  Neurological: [ ] headache [ ] dizziness [ ] syncope [ ] weakness [ ] numbness  Psychiatric: [ ] anxiety [ ] depression  Endocrine: [ ] diabetes [ ] thyroid problem  Hematologic/Lymphatic: [ ] anemia [ ] bleeding problem  Allergic/Immunologic: [ ] itchy eyes [ ] nasal discharge [ ] hives [ ] angioedema  [ ] All other systems negative  [ x] Unable to assess ROS because ________    OBJECTIVE:  ICU Vital Signs Last 24 Hrs  T(C): 36.5 (2020 07:00), Max: 36.6 (2020 00:00)  T(F): 97.7 (2020 07:00), Max: 97.8 (2020 00:00)  HR: 96 (2020 07:00) (80 - 114)  BP: 109/75 (2020 07:00) (91/46 - 175/112)  BP(mean): 87 (2020 07:00) (66 - 129)  ABP: --  ABP(mean): --  RR: 21 (2020 07:00) (15 - 51)  SpO2: 100% (2020 07:00) (94% - 100%)         @ 07:01  -   @ 07:00  --------------------------------------------------------  IN: 1300 mL / OUT: 1200 mL / NET: 100 mL      CAPILLARY BLOOD GLUCOSE      POCT Blood Glucose.: 112 mg/dL (2020 05:24)      PHYSICAL EXAM:  General:   HEENT:   Lymph Nodes:  Neck:   Respiratory:   Cardiovascular:   Abdomen:   Extremities:   Skin:   Neurological:  Psychiatry:    LINES:    HOSPITAL MEDICATIONS:  MEDICATIONS  (STANDING):  albuterol/ipratropium for Nebulization 3 milliLiter(s) Nebulizer every 6 hours  chlorhexidine 4% Liquid 1 Application(s) Topical <User Schedule>  dextrose 5% + sodium chloride 0.45%. 1000 milliLiter(s) (50 mL/Hr) IV Continuous <Continuous>  digoxin     Tablet 0.125 milliGRAM(s) Oral every other day  lactulose Syrup 10 Gram(s) Oral four times a day  levothyroxine Injectable 12.5 MICROGram(s) IV Push at bedtime  meropenem  IVPB 500 milliGRAM(s) IV Intermittent every 12 hours  midodrine 30 milliGRAM(s) Oral every 8 hours  rifAXIMin 550 milliGRAM(s) Oral two times a day    MEDICATIONS  (PRN):      LABS:                        9.5    9.09  )-----------( 76       ( 2020 00:36 )             29.7     Hgb Trend: 9.5<--, 10.0<--, 9.8<--, 10.5<--, 10.8<--  25    143  |  111<H>  |  39<H>  ----------------------------<  84  5.2   |  23  |  1.01    Ca    9.0      2020 00:36  Phos  3.7       Mg     1.8         TPro  4.4<L>  /  Alb  2.0<L>  /  TBili  1.4<H>  /  DBili  x   /  AST  28  /  ALT  19  /  AlkPhos  105      Creatinine Trend: 1.01<--, 0.94<--, 0.99<--, 1.07<--, 1.00<--, 1.17<--  PT/INR - ( 2020 00:36 )   PT: 15.1 sec;   INR: 1.30 ratio         PTT - ( 2020 00:36 )  PTT:31.2 sec  Urinalysis Basic - ( 2020 05:54 )    Color: Yellow / Appearance: Clear / S.023 / pH: x  Gluc: x / Ketone: Negative  / Bili: Negative / Urobili: 2 mg/dL   Blood: x / Protein: Trace / Nitrite: Negative   Leuk Esterase: Negative / RBC: 14 /hpf / WBC 3 /HPF   Sq Epi: x / Non Sq Epi: 1 / Bacteria: Negative        Venous Blood Gas:   @ 13:52  7.39/45/42/27/70  VBG Lactate: 1.5  Venous Blood Gas:   @ 00:10  7.39/45/37/27/66  VBG Lactate: 1.7      MICROBIOLOGY:     Culture - Blood (20 @ 05:14)    Specimen Source: .Blood Blood-Venous    Culture Results:   No growth to date.    Culture - Blood (20 @ 05:14)    Specimen Source: .Blood Blood-Peripheral    Culture Results:   No growth to date.    RADIOLOGY:  < from: Xray Abdomen 1 View PORTABLE -Urgent (20 @ 10:00) >    IMPRESSION:   Nonobstructive bowel gas pattern.    < end of copied text >      EKG:  < from: 12 Lead ECG (20 @ 21:43) >  Ventricular Rate 135 BPM    Atrial Rate 153 BPM    QRS Duration 80 ms    Q-T Interval 288 ms    QTC Calculation(Bezet) 432 ms    R Axis 43 degrees    T Axis 214 degrees    Diagnosis Line ATRIAL FIBRILLATION WITH RAPID VENTRICULAR RESPONSE  VOLTAGE CRITERIA FOR LEFT VENTRICULAR HYPERTROPHY  MARKED ST ABNORMALITY, POSSIBLE LATERAL SUBENDOCARDIAL INJURY  ABNORMAL ECG    Confirmed by MD TIA, DAMIEN ([id number]) on 2020 12:32:29 PM    < end of copied text >      Shauna Hernandez [facility] [place]  ex [id number] CHIEF COMPLAINT:  Septic Shock PNA UTI     Interval Events:   89 yo female with a PMHx of afib on digoxin (not on AC), severe AS, hypothyroidism, COPD not on home O2, CLL (previously on Treanda and Rituxan), 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 (+) Distributive shock now resolved.       REVIEW OF SYSTEMS:  Constitutional: [ ] fevers [ ] chills [ ] weight loss [ ] weight gain  HEENT: [ ] dry eyes [ ] eye irritation [ ] postnasal drip [ ] nasal congestion  CV: [ ] chest pain [ ] orthopnea [ ] palpitations [ ] murmur  Resp: [ ] cough [ ] shortness of breath [ ] dyspnea [ ] wheezing [ ] sputum [ ] hemoptysis  GI: [ ] nausea [ ] vomiting [ ] diarrhea [ ] constipation [ ] abd pain [ ] dysphagia   : [ ] dysuria [ ] nocturia [ ] hematuria [ ] increased urinary frequency  Musculoskeletal: [ ] back pain [ ] myalgias [ ] arthralgias [ ] fracture  Skin: [ ] rash [ ] itch  Neurological: [ ] headache [ ] dizziness [ ] syncope [ ] weakness [ ] numbness  Psychiatric: [ ] anxiety [ ] depression  Endocrine: [ ] diabetes [ ] thyroid problem  Hematologic/Lymphatic: [ ] anemia [ ] bleeding problem  Allergic/Immunologic: [ ] itchy eyes [ ] nasal discharge [ ] hives [ ] angioedema  [ ] All other systems negative  [ x] Unable to assess ROS because patient responses are limited to shaking to head     OBJECTIVE:  ICU Vital Signs Last 24 Hrs  T(C): 36.5 (2020 07:00), Max: 36.6 (2020 00:00)  T(F): 97.7 (2020 07:00), Max: 97.8 (2020 00:00)  HR: 96 (2020 07:00) (80 - 114)  BP: 109/75 (2020 07:00) (91/46 - 175/112)  BP(mean): 87 (2020 07:00) (66 - 129)  ABP: --  ABP(mean): --  RR: 21 (2020 07:00) (15 - 51)  SpO2: 100% (2020 07:00) (94% - 100%)         @ 07:01  -   @ 07:00  --------------------------------------------------------  IN: 1300 mL / OUT: 1200 mL / NET: 100 mL      CAPILLARY BLOOD GLUCOSE      POCT Blood Glucose.: 112 mg/dL (2020 05:24)      PHYSICAL EXAM:  General: Some Moaning   HEENT: Perrla   Lymph Nodes: No Palpable lymphadenopathy noted   Neck: Supple   Respiratory: on Face tent  (+) course rhonchi bilaterally No respiratory distress at this time   Cardiovascular: S1 S2 No m/C/G/R   Abdomen: Soft (+) non  Sump tube in place minimal drainage   Extremities: warm (+) z flow boots   Skin: Intact   Neurological: awake able to respond appropriately   Psychiatry: Pleasant     LINES:  PIV ML   Right CP     HOSPITAL MEDICATIONS:  MEDICATIONS  (STANDING):  albuterol/ipratropium for Nebulization 3 milliLiter(s) Nebulizer every 6 hours  chlorhexidine 4% Liquid 1 Application(s) Topical <User Schedule>  dextrose 5% + sodium chloride 0.45%. 1000 milliLiter(s) (50 mL/Hr) IV Continuous <Continuous>  digoxin     Tablet 0.125 milliGRAM(s) Oral every other day  lactulose Syrup 10 Gram(s) Oral four times a day  levothyroxine Injectable 12.5 MICROGram(s) IV Push at bedtime  meropenem  IVPB 500 milliGRAM(s) IV Intermittent every 12 hours  midodrine 30 milliGRAM(s) Oral every 8 hours  rifAXIMin 550 milliGRAM(s) Oral two times a day    MEDICATIONS  (PRN):      LABS:                        9.5    9.09  )-----------( 76       ( 2020 00:36 )             29.7     Hgb Trend: 9.5<--, 10.0<--, 9.8<--, 10.5<--, 10.8<--      143  |  111<H>  |  39<H>  ----------------------------<  84  5.2   |  23  |  1.01    Ca    9.0      2020 00:36  Phos  3.7       Mg     1.8         TPro  4.4<L>  /  Alb  2.0<L>  /  TBili  1.4<H>  /  DBili  x   /  AST  28  /  ALT  19  /  AlkPhos  105      Creatinine Trend: 1.01<--, 0.94<--, 0.99<--, 1.07<--, 1.00<--, 1.17<--  PT/INR - ( 2020 00:36 )   PT: 15.1 sec;   INR: 1.30 ratio         PTT - ( 2020 00:36 )  PTT:31.2 sec  Urinalysis Basic - ( 2020 05:54 )    Color: Yellow / Appearance: Clear / S.023 / pH: x  Gluc: x / Ketone: Negative  / Bili: Negative / Urobili: 2 mg/dL   Blood: x / Protein: Trace / Nitrite: Negative   Leuk Esterase: Negative / RBC: 14 /hpf / WBC 3 /HPF   Sq Epi: x / Non Sq Epi: 1 / Bacteria: Negative        Venous Blood Gas:   @ 13:52  7.39/45/42/27/70  VBG Lactate: 1.5  Venous Blood Gas:   @ 00:10  7.39/45/37/27/66  VBG Lactate: 1.7      MICROBIOLOGY:     Culture - Blood (20 @ 05:14)    Specimen Source: .Blood Blood-Venous    Culture Results:   No growth to date.    Culture - Blood (20 @ 05:14)    Specimen Source: .Blood Blood-Peripheral    Culture Results:   No growth to date.    RADIOLOGY:  < from: Xray Abdomen 1 View PORTABLE -Urgent (20 @ 10:00) >    IMPRESSION:   Nonobstructive bowel gas pattern.    < end of copied text >      EKG:  < from: 12 Lead ECG (20 @ 21:43) >  Ventricular Rate 135 BPM    Atrial Rate 153 BPM    QRS Duration 80 ms    Q-T Interval 288 ms    QTC Calculation(Bezet) 432 ms    R Axis 43 degrees    T Axis 214 degrees    Diagnosis Line ATRIAL FIBRILLATION WITH RAPID VENTRICULAR RESPONSE  VOLTAGE CRITERIA FOR LEFT VENTRICULAR HYPERTROPHY  MARKED ST ABNORMALITY, POSSIBLE LATERAL SUBENDOCARDIAL INJURY  ABNORMAL ECG    Confirmed by MD TIA, DAMIEN ([id number]) on 2020 12:32:29 PM    < end of copied text >      Shauna Hernandez [facility] [place]  ex 2014

## 2020-01-25 NOTE — PROGRESS NOTE ADULT - PROBLEM SELECTOR PLAN 7
DVT: HSQ  Diet: tube feeds  Dispo: [facility] DVT: thrombocytopenic  Diet: tube feeds  Dispo: [facility] - c/w levothyroxine

## 2020-01-25 NOTE — PROGRESS NOTE ADULT - PROBLEM SELECTOR PLAN 3
- baseline CKD 3  -  KARRIE during hospitalization likely 2/2 sepsis  - resolving, appears to be at baseline

## 2020-01-25 NOTE — PROGRESS NOTE ADULT - ATTENDING COMMENTS
2017        Felix Merritt MD   [facility]    [address] [place] [address]      RE: Kimi Jacobson Boeckman   MRN: [id number]    : 1964              Dear Dr. Merritt:        Kimi Jacobson Boeckman returned for followup at the [facility].  We last saw her in 2016.  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  Her first symptoms began more than 3 years ago.  She has not been doing so well because her leg weakness has progressed.  She does not live with her mother anymore.  She lives in an assisted living facility.  She has qualified for home care and home care has visited her including PT and OT. Unfortunately there was a lot of tension with the patient, because she felt that she was doing too much exercise.  She has had a few falls lately. Previously, family members including brother were helping her with transfer but now one of the family members got a hernia and has to be operated.  She denies dysphagia, dysarthria, sialorrhea, head drop, difficulty chewing, pseudobulbar affect or other cranial nerve symptoms.  She has no major dyspnea, orthopnea, nonrefreshing sleep or morning headaches.  She has upper extremity weakness which does not appear particularly disturbing or limiting.  She has a manual wheelchair.  A power wheelchair does not fit in her apartment.  She has been recently approved on a CADI waiver but there has been difficulty finding a caregiver/PCA through their local agency because they do not have one right now.  A former student who is a neighbor and a nurse and her spouse decided to be her PCAs.  She has not completed any health care directive yet.  She does not take her riluzole because she had side effects on the medication.      Medications were reviewed and are as per Epic record.        On physical examination her blood  pressure is 137/91.  Pulse 94 and regular.  She weighs 222 pounds, 20 more than her last visit.  Height is 5 feet 3.5.  BMI is 38.89.  Pain score 4 out of 10.  O2 saturation 96% on room air.      Her spirometry indicates a vital capacity of 3.35 liters or 100% predicted for age, height and sex.  FEV-1 is similar.  MIP -95 cm of water.   cm of water.  The numbers are slightly changed from last visit but still well within normal limits.  I did not repeat a neuro examination.     IMPRESSION: Kenney has shown expected slow progression of her limb onset ALS with predominantly paraparesis compared to her last visit.  The main issues that arise have to do with homecare.  Based on my face-to-face encounter of 25 minutes today, I can attest that the patient requires 24-hour home care to allow basic activities of daily living to be accomplished, and for safety reasons.  We discussed with the patient the possibility of relocation to a nursing home or a higher level of care.  She will talk to our  today and meet our PT and OT therapists.  She does not have any bulbar or nutrition needs at present.   I also attest that she needs a power wheelchair to allow activities of daily living to be performed at her home.  This durable medical equipment is medically necessary and should be reimbursed based on Medicare criteria.  Her mobility problem is expected to worsen given that she has ALS, which is an incurable progressive neurodegenerative disorder.      Thank you for allowing me to participate in her care.  She will return to clinic for followup in about 6 months.       Sincerely,        MD MARIA INES Ortiz MD             D: 2017 10:25   T: 2017 10:49   MT: tb      Name:     JACOBSON BOECKMAN, KIMI   MRN:      [id number]        Account:      [id number]   :      1964           Service Date: 2017      Document: [id number]     1) Septic shock 2 to Serratia bacteremia/PNA 2) UTI 2 to ESBL Klebsiella 3) s/p KARRIE to ATN    Resp: Cont Supplemental O2  ID: Finish course of Meropenem.  CVS: Cont Midodrine 30mg tid   FEN: Enteral feeds + free H2O; follow Na  Renal: Follow BUN/Cr and UO  Social: May transfer out of ICU/ Pt is DNR

## 2020-01-25 NOTE — PROGRESS NOTE ADULT - SUBJECTIVE AND OBJECTIVE BOX
Alexei Hess MD, PGY-1  Pager #[phone]  After 7 PM on weekdays and 12 PM on weekends, for urgent issues please page #[phone].  ----------------------------------------------------------------  Patient is a 89y old  Female who presents with a chief complaint of Hypoxic Respiratory Failure 2/2 PNA / Septic Shock- bacteremia  / urosepis (2020 07:36)      SUBJECTIVE / OVERNIGHT EVENTS: Downgrade from MICU. No interval events. Feels ok today, denies any SOB, cp, nausea, vomiting, diarrhea, constipation, HA, fevers, chills.     MEDICATIONS  (STANDING):  albuterol/ipratropium for Nebulization 3 milliLiter(s) Nebulizer every 6 hours  chlorhexidine 4% Liquid 1 Application(s) Topical <User Schedule>  dextrose 5% + sodium chloride 0.45%. 1000 milliLiter(s) (50 mL/Hr) IV Continuous <Continuous>  digoxin     Tablet 0.125 milliGRAM(s) Oral every other day  lactulose Syrup 10 Gram(s) Oral four times a day  levothyroxine Injectable 12.5 MICROGram(s) IV Push at bedtime  meropenem  IVPB 500 milliGRAM(s) IV Intermittent every 12 hours  midodrine 30 milliGRAM(s) Oral every 8 hours  rifAXIMin 550 milliGRAM(s) Oral two times a day    MEDICATIONS  (PRN):      CAPILLARY BLOOD GLUCOSE    I&O's Summary    2020 07  -  2020 07:00  --------------------------------------------------------  IN: 1350 mL / OUT: 1200 mL / NET: 150 mL      -  2020 17:32  --------------------------------------------------------  IN: 650 mL / OUT: 160 mL / NET: 490 mL    PHYSICAL EXAM:  Vital Signs Last 24 Hrs  T(C): 36.5 (2020 15:00), Max: 36.6 (2020 00:00)  T(F): 97.7 (2020 15:00), Max: 97.8 (2020 00:00)  HR: 85 (2020 15:00) (76 - 114)  BP: 137/60 (2020 15:00) (91/46 - 140/63)  BP(mean): 89 (2020 14:00) (63 - 94)  RR: 20 (2020 15:00) (15 - 41)  SpO2: 99% (2020 15:00) (93% - 100%)    CONSTITUTIONAL: NAD  EYES: EOMI  RESPIRATORY: rhonchi diffusely  CARDIOVASCULAR: loud HSM murmur  ABDOMEN: soft, nt, nd  MUSCULOSKELETAL: no lower extremity swelling  PSYCH: AOx2  NEUROLOGY: CN 2-12 are intact and symmetric; no gross sensory deficits   SKIN: skin tears b/l UE, b/l ecchymoses LE     LABS:                        9.5    9.09  )-----------( 76       ( 2020 00:36 )             29.7     25    143  |  111<H>  |  39<H>  ----------------------------<  84  5.2   |  23  |  1.01    Ca    9.0      2020 00:36  Phos  3.7       Mg     1.8         TPro  4.4<L>  /  Alb  2.0<L>  /  TBili  1.4<H>  /  DBili  x   /  AST  28  /  ALT  19  /  AlkPhos  105  -25    PT/INR - ( 2020 00:36 )   PT: 15.1 sec;   INR: 1.30 ratio       PTT - ( 2020 00:36 )  PTT:31.2 sec    Urinalysis Basic - ( 2020 05:54 )    Color: Yellow / Appearance: Clear / S.023 / pH: x  Gluc: x / Ketone: Negative  / Bili: Negative / Urobili: 2 mg/dL   Blood: x / Protein: Trace / Nitrite: Negative   Leuk Esterase: Negative / RBC: 14 /hpf / WBC 3 /HPF   Sq Epi: x / Non Sq Epi: 1 / Bacteria: Negative    Culture - Blood (collected 2020 05:14)  Source: .Blood Blood-Venous  Preliminary Report (2020 06:01):    No growth to date.    Culture - Blood (collected 2020 05:14)  Source: .Blood Blood-Peripheral  Preliminary Report (2020 06:01):    No growth to date.    RADIOLOGY & ADDITIONAL TESTS:  Results Reviewed:   Imaging Personally Reviewed:     < from: Xray Abdomen 1 View PORTABLE -Urgent (20 @ 10:00) >  IMPRESSION:   Nonobstructive bowel gas pattern.    < end of copied text >    < from: Xray Chest 1 View- PORTABLE-Urgent (20 @ 08:33) >    IMPRESSION:     1.  Enteric tube with side-port in the proximal stomach.   2.  Unchanged right lower lung field patchy opacities.    < end of copied text >    Electrocardiogram Personally Reviewed:    COORDINATION OF CARE:  Care Discussed with Consultants/Other Providers [Y/N]:  Prior or Outpatient Records Reviewed [Y/N]:

## 2020-01-25 NOTE — CHART NOTE - NSCHARTNOTEFT_GEN_A_CORE
MAR MICU TRANSFER NOTE    Please refer to MICU transfer note for full details.    Briefly, this is a 89F  with a PMHx of afib on digoxin (not on AC), severe AS, hypothyroidism, COPD not on home O2, CLL (previously on Treanda and Rituxan), 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, admitted to MICU for septic shock secondary to serratia bacteremia (likely 2/2 to serratia PNA) and ESBL klebsiella UTI. She required vasopressors in the MICU, and was transitioned off, currently on midodrine 30q8 with improving pressures. Patient's blood cultures have cleared and she is currently on meropenem. Two days prior, patient had episode of vomitus whilst receiving NG tube feeds. Feeds were held, and NG tube placed to suction. Patient has not had any recurrent vomiting since that time, and tube feeds were restarted at 10cc/ hour.        Vital Signs Last 24 Hrs  T(C): 36.5 (25 Jan 2020 11:00), Max: 36.6 (25 Jan 2020 00:00)  T(F): 97.7 (25 Jan 2020 11:00), Max: 97.8 (25 Jan 2020 00:00)  HR: 90 (25 Jan 2020 14:00) (76 - 114)  BP: 140/63 (25 Jan 2020 14:00) (91/46 - 140/63)  BP(mean): 89 (25 Jan 2020 14:00) (63 - 94)  RR: 23 (25 Jan 2020 14:00) (15 - 41)  SpO2: 98% (25 Jan 2020 14:00) (93% - 100%)  I&O's Summary    24 Jan 2020 07:01  -  25 Jan 2020 07:00  --------------------------------------------------------  IN: 1350 mL / OUT: 1200 mL / NET: 150 mL    25 Jan 2020 07:01  -  25 Jan 2020 14:45  --------------------------------------------------------  IN: 650 mL / OUT: 160 mL / NET: 490 mL      Allergies    codeine (Rash)  erythromycin (Rash)  iv dye (Rash; Anaphylaxis; Short breath)  penicillin (Rash)  shellfish (Rash)    Intolerances    adhesives (Other)  warfarin (Other)    MEDICATIONS  (STANDING):  albuterol/ipratropium for Nebulization 3 milliLiter(s) Nebulizer every 6 hours  chlorhexidine 4% Liquid 1 Application(s) Topical <User Schedule>  dextrose 5% + sodium chloride 0.45%. 1000 milliLiter(s) (50 mL/Hr) IV Continuous <Continuous>  digoxin     Tablet 0.125 milliGRAM(s) Oral every other day  lactulose Syrup 10 Gram(s) Oral four times a day  levothyroxine Injectable 12.5 MICROGram(s) IV Push at bedtime  meropenem  IVPB 500 milliGRAM(s) IV Intermittent every 12 hours  midodrine 30 milliGRAM(s) Oral every 8 hours  rifAXIMin 550 milliGRAM(s) Oral two times a day    MEDICATIONS  (PRN):                                  9.5    9.09  )-----------( 76       ( 25 Jan 2020 00:36 )             29.7     01-25    143  |  111<H>  |  39<H>  ----------------------------<  84  5.2   |  23  |  1.01    Ca    9.0      25 Jan 2020 00:36  Phos  3.7     01-25  Mg     1.8     01-25    TPro  4.4<L>  /  Alb  2.0<L>  /  TBili  1.4<H>  /  DBili  x   /  AST  28  /  ALT  19  /  AlkPhos  105  01-25    PT/INR - ( 25 Jan 2020 00:36 )   PT: 15.1 sec;   INR: 1.30 ratio         PTT - ( 25 Jan 2020 00:36 )  PTT:31.2 sec        ASSESSMENT & PLAN:   89F with PMHx of afib on digoxin (not on AC), severe AS, hypothyroidism, COPD not on home O2, CLL (previously on Treanda and Rituxan), 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 portal htn, c/b esophageal varices s/p banding, CKD 3bl Cr 1.2, PVD, and recurrent UTI w/ hx of ESBL Klebsiella admitted with septic shock 2/2 to serratia bacteremia and ESBL UTI, now off IV vasopressors, transferred to the floors.    #Septic shock 2/2 serratia bacteremia and esbl klebsiella UTI  - currently on midodrine 30q8--> titrate as tolerated; BPs improving today to systolic 120s- 140s  - blood cultures have cleared  - continue with meropenem--> consider ID consult for duration of abx    #Vomitus  - patient unable to tolerate feeds 2/2 to nausea and vomiting  - off tube feeds > 24, now restarted at 10cc/ hour--> uptitrate as tolerated  - may have had aspiration --> continue to monitor respiratory status; s/p suctioning attempt by MICU, unable to suction out secretions        FOR FOLLOW UP:  [ ] titrate midodrine  [ ] tube feeds and s/s recs  [ ] meropenem duration    Ailyn Roberts MD  PGY-3 | Internal Medicine  [phone] / [phone] MAR MICU TRANSFER NOTE    Please refer to MICU transfer note for full details.    Briefly, this is a 89F  with a PMHx of afib on digoxin (not on AC), severe AS, hypothyroidism, COPD not on home O2, CLL (previously on Treanda and Rituxan), 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, admitted to MICU for septic shock secondary to serratia bacteremia (likely 2/2 to serratia PNA) and ESBL klebsiella UTI. She required vasopressors in the MICU, and was transitioned off, currently on midodrine 30q8 with improving pressures. Patient's blood cultures have cleared and she is currently on meropenem. Two days prior, patient had episode of vomitus whilst receiving NG tube feeds. Feeds were held, and NG tube placed to suction. Patient has not had any recurrent vomiting since that time, and tube feeds were restarted at 10cc/ hour.        Vital Signs Last 24 Hrs  T(C): 36.5 (25 Jan 2020 11:00), Max: 36.6 (25 Jan 2020 00:00)  T(F): 97.7 (25 Jan 2020 11:00), Max: 97.8 (25 Jan 2020 00:00)  HR: 90 (25 Jan 2020 14:00) (76 - 114)  BP: 140/63 (25 Jan 2020 14:00) (91/46 - 140/63)  BP(mean): 89 (25 Jan 2020 14:00) (63 - 94)  RR: 23 (25 Jan 2020 14:00) (15 - 41)  SpO2: 98% (25 Jan 2020 14:00) (93% - 100%)  I&O's Summary    24 Jan 2020 07:01  -  25 Jan 2020 07:00  --------------------------------------------------------  IN: 1350 mL / OUT: 1200 mL / NET: 150 mL    25 Jan 2020 07:01  -  25 Jan 2020 14:45  --------------------------------------------------------  IN: 650 mL / OUT: 160 mL / NET: 490 mL      Allergies    codeine (Rash)  erythromycin (Rash)  iv dye (Rash; Anaphylaxis; Short breath)  penicillin (Rash)  shellfish (Rash)    Intolerances    adhesives (Other)  warfarin (Other)    MEDICATIONS  (STANDING):  albuterol/ipratropium for Nebulization 3 milliLiter(s) Nebulizer every 6 hours  chlorhexidine 4% Liquid 1 Application(s) Topical <User Schedule>  dextrose 5% + sodium chloride 0.45%. 1000 milliLiter(s) (50 mL/Hr) IV Continuous <Continuous>  digoxin     Tablet 0.125 milliGRAM(s) Oral every other day  lactulose Syrup 10 Gram(s) Oral four times a day  levothyroxine Injectable 12.5 MICROGram(s) IV Push at bedtime  meropenem  IVPB 500 milliGRAM(s) IV Intermittent every 12 hours  midodrine 30 milliGRAM(s) Oral every 8 hours  rifAXIMin 550 milliGRAM(s) Oral two times a day    MEDICATIONS  (PRN):                                  9.5    9.09  )-----------( 76       ( 25 Jan 2020 00:36 )             29.7     01-25    143  |  111<H>  |  39<H>  ----------------------------<  84  5.2   |  23  |  1.01    Ca    9.0      25 Jan 2020 00:36  Phos  3.7     01-25  Mg     1.8     01-25    TPro  4.4<L>  /  Alb  2.0<L>  /  TBili  1.4<H>  /  DBili  x   /  AST  28  /  ALT  19  /  AlkPhos  105  01-25    PT/INR - ( 25 Jan 2020 00:36 )   PT: 15.1 sec;   INR: 1.30 ratio         PTT - ( 25 Jan 2020 00:36 )  PTT:31.2 sec        ASSESSMENT & PLAN:   89F with PMHx of afib on digoxin (not on AC), severe AS, hypothyroidism, COPD not on home O2, CLL (previously on Treanda and Rituxan), 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 portal htn, c/b esophageal varices s/p banding, CKD 3bl Cr 1.2, PVD, and recurrent UTI w/ hx of ESBL Klebsiella admitted with septic shock 2/2 to serratia bacteremia and ESBL UTI, now off IV vasopressors, transferred to the floors.    #Septic shock 2/2 serratia bacteremia and esbl klebsiella UTI  - currently on midodrine 30q8--> titrate as tolerated; BPs improving today to systolic 120s- 140s  - blood cultures have cleared  - continue with meropenem--> consider ID consult for duration of abx    #Emesis  - patient unable to tolerate feeds 2/2 to nausea and vomiting  - off tube feeds > 24, now restarted at 10cc/ hour--> uptitrate as tolerated  - may have had aspiration --> continue to monitor respiratory status; s/p suctioning attempt by MICU, unable to suction out secretions        FOR FOLLOW UP:  [ ] titrate midodrine  [ ] tube feeds and s/s recs  [ ] meropenem duration    Ailyn Roberts MD  PGY-3 | Internal Medicine  [phone] / [phone]

## 2020-01-25 NOTE — PROGRESS NOTE ADULT - PROBLEM SELECTOR PLAN 4
- cont dig  - hold AC in setting of cirrhosis w/ esophageal varices and banding hx  - f/u cards recs

## 2020-01-25 NOTE — PROGRESS NOTE ADULT - ASSESSMENT
89 yo female with a PMHx of afib on digoxin (not on AC), severe AS, hypothyroidism, COPD not on home O2, CLL (previously on Treanda and Rituxan), 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 A/W Septic Shock. (+_) Vomiting and fever over Night.     Neuro:   AMS  - continue with ICU erika checks   - F/u daily ammonia levels  - PT/ OT when clinically appropriate     CV:   Distributive Shock -   - continue with Midodrine 30 mg TID   - F/u with cultures  - monitor Lactate levels   - Albumin boluses as needed     Pulm:   Hypoxia   - Continue with Nasal Cannula   - Monitor Blood gas   - aspiration precautions   - Chest Pt as tolerated   - Duoneb q 6     GI:   Oropharyngeal  Dysphasia   - NGT - replaced to Sump tube (+) vomiting Fecal like matter 1/24     attempt to trickle feed today   - x ray without any obstruction     - (+) Cirrhosis - with Varices- monitor for bleeding   - Hyperammonemia will continue with lactulose / Rifaximin as tolerated      :  No acute issues at this time   continue to monitor Strict I/O   Trend BMP mag and Phos     ID   Septic shock 2/2 ESBL seratia continue with Ijeoma.   - f/u with blood cultures    - will discuss with attending for possible addition of VAnco     DVT PPX - cont  with Lovenox

## 2020-01-25 NOTE — PROGRESS NOTE ADULT - PROBLEM SELECTOR PLAN 5
- cirrhosis secondary to HCC  - lactulose titrating to 3-4 BM/day  - c/w rifaxamine  - restart beta blocker when off midodrine

## 2020-01-25 NOTE — PROGRESS NOTE ADULT - PROBLEM SELECTOR PLAN 1
# Sepsis   - p/w serratia bacteremia & sputum cx with serratia   - klebsiella ESBL UTI sensitive to domitila  - c/w meropenem for 2 week duration  - Will consult ID in AM regarding duration of abx for GNR bacteremia/PNA/UTI  - on midodrine 30 tid, will down titrate as tolerated    # PNA   - c/w w abx as above  - chest PT, duonebs  - aspiration precautions  - maintain SaO2 > 92% w/ supplemental O2 PRN    # UTI - klebsiella ESBL UTI   - also covered by domitila  - no dysuria endorsed, may be colonization? # Sepsis   - p/w serratia bacteremia & sputum cx with serratia   - klebsiella ESBL UTI sensitive to domitila  - c/w meropenem for 2 week duration  - Will consult ID in AM regarding duration of abx for GNR bacteremia/PNA/UTI  - on midodrine 30 tid, will down titrate as tolerated    # PNA   - c/w w abx as above  - chest PT, duonebs  - aspiration precautions  - maintain SaO2 > 92% w/ supplemental O2 PRN (on 3L face tent)    # UTI - klebsiella ESBL UTI   - also covered by domitila  - no dysuria endorsed, may be colonization?

## 2020-01-25 NOTE — PROGRESS NOTE ADULT - PROBLEM SELECTOR PLAN 8
- chronic, with thrombocytopenia noted since 2009  - no medical dvt prophylaxis at this time  - will monitor for acute s/s bleeding

## 2020-01-25 NOTE — PROGRESS NOTE ADULT - PROBLEM SELECTOR PLAN 2
- AOx1-2, down from her baseline  - multifactorial likely secondary to recent sepsis, ICU delirium, portosystemic encephalopathy  - c/b nausea/vomiting now with NGT on tube feeds  - will maintain NGT while patient is w/ metabolic encephalopathy for oral access  - ongoing discussion if pt would be amenable to PEG tube if needed in future. will f/u goc w/ family and pt when mental status improves. hold off on MBS until goals clarified  - given high risk for thromboembolic events, will repeat CTH if mental status does not improve

## 2020-01-25 NOTE — PROGRESS NOTE ADULT - ASSESSMENT
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 Pt last admit 1/6-9 s/p fall OOB now re-admitted from [facility] w AMS 2/2 Septic Shock 2/2 Serratia Bacteremia / Klebsiella ESBL urosepsis, and PNA w Serratia in the sputum.

## 2020-01-26 LAB
ALBUMIN SERPL ELPH-MCNC: 2 G/DL — LOW (ref 3.3–5)
ALP SERPL-CCNC: 110 U/L — SIGNIFICANT CHANGE UP (ref 40–120)
ALT FLD-CCNC: 18 U/L — SIGNIFICANT CHANGE UP (ref 10–45)
ANION GAP SERPL CALC-SCNC: 11 MMOL/L — SIGNIFICANT CHANGE UP (ref 5–17)
APTT BLD: 28.5 SEC — SIGNIFICANT CHANGE UP (ref 27.5–36.3)
AST SERPL-CCNC: 29 U/L — SIGNIFICANT CHANGE UP (ref 10–40)
BASE EXCESS BLDV CALC-SCNC: 3.3 MMOL/L — HIGH (ref -2–2)
BILIRUB SERPL-MCNC: 1.4 MG/DL — HIGH (ref 0.2–1.2)
BUN SERPL-MCNC: 39 MG/DL — HIGH (ref 7–23)
CA-I SERPL-SCNC: 1.32 MMOL/L — HIGH (ref 1.12–1.3)
CALCIUM SERPL-MCNC: 9 MG/DL — SIGNIFICANT CHANGE UP (ref 8.4–10.5)
CHLORIDE BLDV-SCNC: 110 MMOL/L — HIGH (ref 96–108)
CHLORIDE SERPL-SCNC: 110 MMOL/L — HIGH (ref 96–108)
CO2 BLDV-SCNC: 30 MMOL/L — SIGNIFICANT CHANGE UP (ref 22–30)
CO2 SERPL-SCNC: 22 MMOL/L — SIGNIFICANT CHANGE UP (ref 22–31)
CREAT SERPL-MCNC: 1.03 MG/DL — SIGNIFICANT CHANGE UP (ref 0.5–1.3)
GAS PNL BLDV: 138 MMOL/L — SIGNIFICANT CHANGE UP (ref 135–145)
GAS PNL BLDV: SIGNIFICANT CHANGE UP
GLUCOSE BLDC GLUCOMTR-MCNC: 91 MG/DL — SIGNIFICANT CHANGE UP (ref 70–99)
GLUCOSE BLDC GLUCOMTR-MCNC: 91 MG/DL — SIGNIFICANT CHANGE UP (ref 70–99)
GLUCOSE BLDC GLUCOMTR-MCNC: 95 MG/DL — SIGNIFICANT CHANGE UP (ref 70–99)
GLUCOSE BLDV-MCNC: 98 MG/DL — SIGNIFICANT CHANGE UP (ref 70–99)
GLUCOSE SERPL-MCNC: 105 MG/DL — HIGH (ref 70–99)
HCO3 BLDV-SCNC: 28 MMOL/L — SIGNIFICANT CHANGE UP (ref 21–29)
HCT VFR BLD CALC: 32.8 % — LOW (ref 34.5–45)
HCT VFR BLDA CALC: 32 % — LOW (ref 39–50)
HGB BLD CALC-MCNC: 10.3 G/DL — LOW (ref 11.5–15.5)
HGB BLD-MCNC: 10.2 G/DL — LOW (ref 11.5–15.5)
INR BLD: 1.24 RATIO — HIGH (ref 0.88–1.16)
LACTATE BLDV-MCNC: 2.1 MMOL/L — HIGH (ref 0.7–2)
LACTATE SERPL-SCNC: 2 MMOL/L — SIGNIFICANT CHANGE UP (ref 0.7–2)
MAGNESIUM SERPL-MCNC: 1.8 MG/DL — SIGNIFICANT CHANGE UP (ref 1.6–2.6)
MCHC RBC-ENTMCNC: 31.1 GM/DL — LOW (ref 32–36)
MCHC RBC-ENTMCNC: 32.4 PG — SIGNIFICANT CHANGE UP (ref 27–34)
MCV RBC AUTO: 104.1 FL — HIGH (ref 80–100)
NRBC # BLD: 0 /100 WBCS — SIGNIFICANT CHANGE UP (ref 0–0)
OTHER CELLS CSF MANUAL: 6 ML/DL — LOW (ref 18–22)
PCO2 BLDV: 49 MMHG — SIGNIFICANT CHANGE UP (ref 35–50)
PH BLDV: 7.38 — SIGNIFICANT CHANGE UP (ref 7.35–7.45)
PHOSPHATE SERPL-MCNC: 3.5 MG/DL — SIGNIFICANT CHANGE UP (ref 2.5–4.5)
PLATELET # BLD AUTO: 92 K/UL — LOW (ref 150–400)
PO2 BLDV: 28 MMHG — SIGNIFICANT CHANGE UP (ref 25–45)
POTASSIUM BLDV-SCNC: 4.6 MMOL/L — SIGNIFICANT CHANGE UP (ref 3.5–5.3)
POTASSIUM SERPL-MCNC: 5.3 MMOL/L — SIGNIFICANT CHANGE UP (ref 3.5–5.3)
POTASSIUM SERPL-SCNC: 5.3 MMOL/L — SIGNIFICANT CHANGE UP (ref 3.5–5.3)
PROT SERPL-MCNC: 4.5 G/DL — LOW (ref 6–8.3)
PROTHROM AB SERPL-ACNC: 14.2 SEC — HIGH (ref 10–12.9)
RBC # BLD: 3.15 M/UL — LOW (ref 3.8–5.2)
RBC # FLD: 15.4 % — HIGH (ref 10.3–14.5)
SAO2 % BLDV: 46 % — LOW (ref 67–88)
SODIUM SERPL-SCNC: 143 MMOL/L — SIGNIFICANT CHANGE UP (ref 135–145)
WBC # BLD: 7.35 K/UL — SIGNIFICANT CHANGE UP (ref 3.8–10.5)
WBC # FLD AUTO: 7.35 K/UL — SIGNIFICANT CHANGE UP (ref 3.8–10.5)

## 2020-01-26 PROCEDURE — 99223 1ST HOSP IP/OBS HIGH 75: CPT

## 2020-01-26 PROCEDURE — 99233 SBSQ HOSP IP/OBS HIGH 50: CPT | Mod: GC

## 2020-01-26 RX ORDER — ERTAPENEM SODIUM 1 G/1
500 INJECTION, POWDER, LYOPHILIZED, FOR SOLUTION INTRAMUSCULAR; INTRAVENOUS EVERY 24 HOURS
Refills: 0 | Status: COMPLETED | OUTPATIENT
Start: 2020-01-27 | End: 2020-02-03

## 2020-01-26 RX ORDER — HYDROMORPHONE HYDROCHLORIDE 2 MG/ML
0.25 INJECTION INTRAMUSCULAR; INTRAVENOUS; SUBCUTANEOUS ONCE
Refills: 0 | Status: DISCONTINUED | OUTPATIENT
Start: 2020-01-26 | End: 2020-01-26

## 2020-01-26 RX ORDER — MICAFUNGIN SODIUM 100 MG/1
INJECTION, POWDER, LYOPHILIZED, FOR SOLUTION INTRAVENOUS
Refills: 0 | Status: COMPLETED | OUTPATIENT
Start: 2020-01-26 | End: 2020-02-08

## 2020-01-26 RX ORDER — MICAFUNGIN SODIUM 100 MG/1
100 INJECTION, POWDER, LYOPHILIZED, FOR SOLUTION INTRAVENOUS ONCE
Refills: 0 | Status: COMPLETED | OUTPATIENT
Start: 2020-01-26 | End: 2020-01-26

## 2020-01-26 RX ORDER — MICAFUNGIN SODIUM 100 MG/1
100 INJECTION, POWDER, LYOPHILIZED, FOR SOLUTION INTRAVENOUS EVERY 24 HOURS
Refills: 0 | Status: COMPLETED | OUTPATIENT
Start: 2020-01-27 | End: 2020-02-08

## 2020-01-26 RX ADMIN — Medication 3 MILLILITER(S): at 05:22

## 2020-01-26 RX ADMIN — LACTULOSE 10 GRAM(S): 10 SOLUTION ORAL at 18:14

## 2020-01-26 RX ADMIN — HYDROMORPHONE HYDROCHLORIDE 0.25 MILLIGRAM(S): 2 INJECTION INTRAMUSCULAR; INTRAVENOUS; SUBCUTANEOUS at 00:13

## 2020-01-26 RX ADMIN — MICAFUNGIN SODIUM 105 MILLIGRAM(S): 100 INJECTION, POWDER, LYOPHILIZED, FOR SOLUTION INTRAVENOUS at 12:30

## 2020-01-26 RX ADMIN — HYDROMORPHONE HYDROCHLORIDE 0.25 MILLIGRAM(S): 2 INJECTION INTRAMUSCULAR; INTRAVENOUS; SUBCUTANEOUS at 05:52

## 2020-01-26 RX ADMIN — Medication 0.12 MILLIGRAM(S): at 12:31

## 2020-01-26 RX ADMIN — MIDODRINE HYDROCHLORIDE 20 MILLIGRAM(S): 2.5 TABLET ORAL at 23:01

## 2020-01-26 RX ADMIN — SODIUM CHLORIDE 50 MILLILITER(S): 9 INJECTION, SOLUTION INTRAVENOUS at 16:34

## 2020-01-26 RX ADMIN — MIDODRINE HYDROCHLORIDE 20 MILLIGRAM(S): 2.5 TABLET ORAL at 16:34

## 2020-01-26 RX ADMIN — Medication 3 MILLILITER(S): at 18:14

## 2020-01-26 RX ADMIN — LACTULOSE 10 GRAM(S): 10 SOLUTION ORAL at 05:22

## 2020-01-26 RX ADMIN — SODIUM CHLORIDE 50 MILLILITER(S): 9 INJECTION, SOLUTION INTRAVENOUS at 23:03

## 2020-01-26 RX ADMIN — Medication 3 MILLILITER(S): at 12:31

## 2020-01-26 RX ADMIN — MEROPENEM 100 MILLIGRAM(S): 1 INJECTION INTRAVENOUS at 18:14

## 2020-01-26 RX ADMIN — LACTULOSE 10 GRAM(S): 10 SOLUTION ORAL at 12:31

## 2020-01-26 RX ADMIN — MEROPENEM 100 MILLIGRAM(S): 1 INJECTION INTRAVENOUS at 05:22

## 2020-01-26 RX ADMIN — Medication 12.5 MICROGRAM(S): at 23:00

## 2020-01-26 RX ADMIN — HYDROMORPHONE HYDROCHLORIDE 0.25 MILLIGRAM(S): 2 INJECTION INTRAMUSCULAR; INTRAVENOUS; SUBCUTANEOUS at 05:23

## 2020-01-26 RX ADMIN — Medication 3 MILLILITER(S): at 23:00

## 2020-01-26 RX ADMIN — MIDODRINE HYDROCHLORIDE 20 MILLIGRAM(S): 2.5 TABLET ORAL at 05:22

## 2020-01-26 NOTE — PROGRESS NOTE ADULT - PROBLEM SELECTOR PLAN 3
- baseline CKD 3  -  KARRIE during hospitalization likely 2/2 sepsis  - resolving, appears to be at baseline - baseline CKD 3  -  KARRIE during hospitalization likely 2/2 sepsis  - improved back to baseline

## 2020-01-26 NOTE — PROGRESS NOTE ADULT - SUBJECTIVE AND OBJECTIVE BOX
Alexei Hess MD, PGY-1  Pager #[phone]  After 7 PM on weekdays and 12 PM on weekends, for urgent issues please page #[phone].  ----------------------------------------------------------------  Patient is a 89y old  Female who presents with a chief complaint of sepsis 2/2 to UTI vs PNA (25 Jan 2020 17:31)    SUBJECTIVE / OVERNIGHT EVENTS: No acute events overnight. Pt seen and examined at bedside. Pt more interactive today, denies any acute symptoms including chest pain, headache, sob, abd pain.     MEDICATIONS  (STANDING):  albuterol/ipratropium for Nebulization 3 milliLiter(s) Nebulizer every 6 hours  chlorhexidine 4% Liquid 1 Application(s) Topical <User Schedule>  dextrose 5% + sodium chloride 0.45%. 1000 milliLiter(s) (50 mL/Hr) IV Continuous <Continuous>  digoxin     Tablet 0.125 milliGRAM(s) Oral every other day  lactulose Syrup 10 Gram(s) Oral four times a day  levothyroxine Injectable 12.5 MICROGram(s) IV Push at bedtime  meropenem  IVPB 500 milliGRAM(s) IV Intermittent every 12 hours  micafungin IVPB      micafungin IVPB 100 milliGRAM(s) IV Intermittent once  midodrine 20 milliGRAM(s) Oral every 8 hours  rifAXIMin 550 milliGRAM(s) Oral two times a day    MEDICATIONS  (PRN):      CAPILLARY BLOOD GLUCOSE  POCT Blood Glucose.: 91 mg/dL (26 Jan 2020 00:00)  POCT Blood Glucose.: 91 mg/dL (25 Jan 2020 18:52)    I&O's Summary    25 Jan 2020 07:01  -  26 Jan 2020 07:00  --------------------------------------------------------  IN: 1820 mL / OUT: 260 mL / NET: 1560 mL    PHYSICAL EXAM:  Vital Signs Last 24 Hrs  T(C): 36.2 (26 Jan 2020 05:00), Max: 36.5 (25 Jan 2020 15:00)  T(F): 97.1 (26 Jan 2020 05:00), Max: 97.7 (25 Jan 2020 15:00)  HR: 85 (26 Jan 2020 05:00) (76 - 100)  BP: 107/58 (26 Jan 2020 04:52) (93/42 - 140/63)  BP(mean): 89 (25 Jan 2020 14:00) (63 - 89)  RR: 18 (26 Jan 2020 05:00) (15 - 23)  SpO2: 95% (26 Jan 2020 05:00) (95% - 100%)    CONSTITUTIONAL: NAD  EYES: EOMI  RESPIRATORY: rhonchi diffusely  CARDIOVASCULAR: loud HSM murmur  ABDOMEN: soft, nt, nd  MUSCULOSKELETAL: no lower extremity swelling  PSYCH: AOx2  NEUROLOGY: CN 2-12 are intact and symmetric; no gross sensory deficits   SKIN: skin tears b/l UE, b/l ecchymoses LE     LABS:                        10.2   7.35  )-----------( 92       ( 26 Jan 2020 05:40 )             32.8     01-26    143  |  110<H>  |  39<H>  ----------------------------<  105<H>  5.3   |  22  |  1.03    Ca    9.0      26 Jan 2020 05:40  Phos  3.5     01-26  Mg     1.8     01-26    TPro  4.5<L>  /  Alb  2.0<L>  /  TBili  1.4<H>  /  DBili  x   /  AST  29  /  ALT  18  /  AlkPhos  110  01-26    PT/INR - ( 26 Jan 2020 05:40 )   PT: 14.2 sec;   INR: 1.24 ratio         PTT - ( 26 Jan 2020 05:40 )  PTT:28.5 sec      Culture - Blood (collected 24 Jan 2020 05:14)  Source: .Blood Blood-Venous  Preliminary Report (25 Jan 2020 06:01):    No growth to date.    Culture - Blood (collected 24 Jan 2020 05:14)  Source: .Blood Blood-Peripheral  Preliminary Report (25 Jan 2020 06:01):    No growth to date.    RADIOLOGY & ADDITIONAL TESTS:  Results Reviewed:   Imaging Personally Reviewed:  Electrocardiogram Personally Reviewed:    COORDINATION OF CARE:  Care Discussed with Consultants/Other Providers [Y/N]:  Prior or Outpatient Records Reviewed [Y/N]:

## 2020-01-26 NOTE — PROGRESS NOTE ADULT - ATTENDING COMMENTS
89 yo F  with history of AF (not on A/C), severe AS, COPD, CLL, decompensated cirrhosis c/b HCC, EV s/p banding, PSE, CKD stage 3, PVD who presents initially to the MICU with septic shock from aspiration pneumonia, ESBL Klebsiella UTI, Serratia Bacteremia on course of IV meropenem, slowly improving but BCX 01/21 growing candida glabrata and Micafungin was started today.   MS is improving  Patient has multiple high risk co-morbidities. Prognosis is guarded.   MOLST reviewed from prior admission. DNR, Trial of NIPPV, DNI

## 2020-01-26 NOTE — PROGRESS NOTE ADULT - PROBLEM SELECTOR PLAN 1
# Sepsis   - p/w serratia bacteremia & sputum cx with serratia   - klebsiella ESBL UTI sensitive to domitila  - ID consumted regarding duration of abx for GNR bacteremia/PNA/UTI, antifungal coverage for candida glabrata, f/u recs  - c/w meropenem   - started on micafungin 100 mg qd  - on midodrine 20 tid, will down titrate as tolerated    # PNA   - c/w w abx as above  - chest PT, duonebs  - aspiration precautions  - maintain SaO2 > 92% w/ supplemental O2 PRN (on 3L face tent)    # UTI - klebsiella ESBL UTI   - also covered by domitila  - no dysuria endorsed, may be colonization? # Sepsis   - p/w serratia bacteremia & sputum cx with serratia   - klebsiella ESBL UTI sensitive to meropenem IV  - ID consulted regarding duration of abx for GNR bacteremia/PNA/UTI, antifungal coverage for candida glabrata, f/u recs  - c/w meropenem IV (01/20-  - started on IV micafungin 100 mg daily (01/27-  - on midodrine 20 tid, will down titrate as tolerated    # Bacterial PNA / gram negative and gram positive  - c/w w abx as above  - chest PT, duonebs  - aspiration precautions  - maintain SaO2 > 92% w/ supplemental O2 PRN (on 3L face tent)    # UTI - klebsiella ESBL UTI   - c/w meropenem IV (01/20-

## 2020-01-26 NOTE — PROGRESS NOTE ADULT - ASSESSMENT
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 Pt last admit 1/6-9 s/p fall OOB now re-admitted from [facility] w AMS 2/2 Septic Shock 2/2 Serratia Bacteremia / Klebsiella ESBL urosepsis, and PNA w Serratia in the sputum, with candida glabrata in blood cx from 1/21.

## 2020-01-26 NOTE — CONSULT NOTE ADULT - ASSESSMENT
89 yo F w afib, COPD not on home O2, CLL, hypothyroidism, 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 portal htn, c/b esophageal varices s/p banding, CKD, PVD, and recurrent UTI w/ hx of ESBL Klebsiella p/w hypoxia, septic shock 2/2 serratia bacteremia, candidemia, pna, ?uti. 89 yo F w afib, COPD not on home O2, CLL, hypothyroidism, 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 portal htn, c/b esophageal varices s/p banding, CKD, PVD, and recurrent UTI w/ hx of ESBL Klebsiella p/w hypoxia, septic shock 2/2 serratia bacteremia, candidemia, pna, ?uti.    - c/w micafungin  - ophthalmology evaluation  - repeat BCx x 2 until clear  - change meropenem to ertapenem 500 mg IV qdaily 89 yo F w afib, COPD not on home O2, CLL, hypothyroidism, 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 portal htn, c/b esophageal varices s/p banding, CKD, PVD, and recurrent UTI w/ hx of ESBL Klebsiella p/w hypoxia, septic shock 2/2 serratia bacteremia, candidemia, pna, ?uti.    - c/w micafungin  - ophthalmology evaluation  - repeat BCx x 2 until clear  - f/u susceptibilities   - change meropenem to ertapenem 500 mg IV qdaily

## 2020-01-26 NOTE — PROGRESS NOTE ADULT - SUBJECTIVE AND OBJECTIVE BOX
No events overnight. Denies CP, SOB, palp, dizziness.     MEDICATIONS:  digoxin     Tablet 0.125 milliGRAM(s) Oral every other day  midodrine 20 milliGRAM(s) Oral every 8 hours    meropenem  IVPB 500 milliGRAM(s) IV Intermittent every 12 hours  micafungin IVPB      micafungin IVPB 100 milliGRAM(s) IV Intermittent once  rifAXIMin 550 milliGRAM(s) Oral two times a day    albuterol/ipratropium for Nebulization 3 milliLiter(s) Nebulizer every 6 hours      lactulose Syrup 10 Gram(s) Oral four times a day    levothyroxine Injectable 12.5 MICROGram(s) IV Push at bedtime    chlorhexidine 4% Liquid 1 Application(s) Topical <User Schedule>  dextrose 5% + sodium chloride 0.45%. 1000 milliLiter(s) IV Continuous <Continuous>      REVIEW OF SYSTEMS:    CONSTITUTIONAL: No weakness, fevers or chills  EYES/ENT: No visual changes;  No dysphagia  RESPIRATORY: No cough, wheezing, hemoptysis; No shortness of breath  CARDIOVASCULAR: No chest pain or palpitations; No lower extremity edema  GASTROINTESTINAL: No abdominal or epigastric pain. No nausea, vomiting, or hematemesis  GENITOURINARY: No dysuria, frequency or hematuria  NEUROLOGICAL: No numbness or weakness  SKIN: No itching, burning, rashes, or lesions   HEME: No bleeding or bruising  MSK: No joint pains or muscle pains  All other review of systems is negative unless indicated above.    PHYSICAL EXAM:  T(C): 36.2 (01-26-20 @ 05:00), Max: 36.5 (01-25-20 @ 11:00)  HR: 85 (01-26-20 @ 05:00) (76 - 100)  BP: 107/58 (01-26-20 @ 04:52) (93/42 - 140/63)  RR: 18 (01-26-20 @ 05:00) (15 - 23)  SpO2: 95% (01-26-20 @ 05:00) (93% - 100%)  Wt(kg): --  I&O's Summary    25 Jan 2020 07:01  -  26 Jan 2020 07:00  --------------------------------------------------------  IN: 1820 mL / OUT: 260 mL / NET: 1560 mL        Appearance: Normal	  HEENT:   Normal oral mucosa  Cardiovascular: Normal S1 S2, No JVD, No murmurs, No edema  Respiratory: Lungs clear to auscultation in anterior fields  Psychiatry: A & O x 3, Mood & affect appropriate  Gastrointestinal:  Soft, Non-tender, + BS	  Skin: No rashes, +ecchymoses on extremities, No cyanosis	  Neurologic: Non-focal  Extremities: bruising on legs and arms        LABS:	 	    CBC Full  -  ( 26 Jan 2020 05:40 )  WBC Count : 7.35 K/uL  Hemoglobin : 10.2 g/dL  Hematocrit : 32.8 %  Platelet Count - Automated : 92 K/uL  Mean Cell Volume : 104.1 fl  Mean Cell Hemoglobin : 32.4 pg  Mean Cell Hemoglobin Concentration : 31.1 gm/dL  Auto Neutrophil # : x  Auto Lymphocyte # : x  Auto Monocyte # : x  Auto Eosinophil # : x  Auto Basophil # : x  Auto Neutrophil % : x  Auto Lymphocyte % : x  Auto Monocyte % : x  Auto Eosinophil % : x  Auto Basophil % : x    01-26    143  |  110<H>  |  39<H>  ----------------------------<  105<H>  5.3   |  22  |  1.03  01-25    143  |  111<H>  |  39<H>  ----------------------------<  84  5.2   |  23  |  1.01    Ca    9.0      26 Jan 2020 05:40  Ca    9.0      25 Jan 2020 00:36  Phos  3.5     01-26  Phos  3.7     01-25  Mg     1.8     01-26  Mg     1.8     01-25    TPro  4.5<L>  /  Alb  2.0<L>  /  TBili  1.4<H>  /  DBili  x   /  AST  29  /  ALT  18  /  AlkPhos  110  01-26  TPro  4.4<L>  /  Alb  2.0<L>  /  TBili  1.4<H>  /  DBili  x   /  AST  28  /  ALT  19  /  AlkPhos  105  01-25    TELEMETRY: 	  afib 70-90, PVCs, very brief spikes to 130s  	  ASSESSMENT/PLAN: 	  89F with AF on digoxin (not on AC), Severe AS, COPD (not on home O2), CLL, HCC with Portal HTN c/b esophageal varices s/p banding, CKD3 (baseline Cr 1.2) and PVD a/w septic shock 2/2 serratia bacteremia from urosepsis vs. PNA.     Impressions:   Septic Shock 2/2 serratia bacteremia and PNA  Paroxysmal Atrial Fibrillation  Severe Aortic Stenosis (TAMRA 0..4sqcm, mean gradient 58mmHg)  Moderate MS  Moderate-Severe Mitral Regurgitation    Recommendations:  1: Hold spironolactone given hypovolemia  2: Continue digoxin for rate controlled AF  3: Not candidate for AC given history of esophageal varices and cerebral hemorrhage on warfarin      Penelope Rocha MD  Cardiology Fellow, M-F 7:30A-5P  [phone]    All Cardiology service information can be found on [email or web address], password: [email or web address].

## 2020-01-26 NOTE — CONSULT NOTE ADULT - SUBJECTIVE AND OBJECTIVE BOX
HPI: 89 yo F w afib on digoxin (not on AC), severe AS, COPD not on home O2, CLL (previously on Treanda and Rituxan), hypothyroidism, 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 portal htn, c/b esophageal varices s/p banding, CKD 3bl Cr 1.2, PVD, and recurrent UTI w/ hx of ESBL Klebsiella p/w hypoxia, septic shock. Admitted to MICU 1/19 to 1/25 for pressor support. Found to have pna, serratia bacteremia. Improved and tx to floors. Bcx now showing candida glabrata.     Pt minimally answers questions. Denies any pain or nausea. Has cough.    PAST MEDICAL & SURGICAL HISTORY:  PVD (peripheral vascular disease)  Liver cell carcinoma  Severe aortic stenosis by prior echocardiogram  Pulmonary HTN: moderate  CKD (chronic kidney disease), stage 3 (moderate)  COPD (Chronic Obstructive Pulmonary Disease)  AF (Atrial Fibrillation)  Portal Hypertension  Bleeding Esophageal Varices  CLL (Chronic Lymphoblastic Leukemia)  GIB (Gastrointestinal Bleeding)  History of repair of hip fracture  Esophageal Rupture      Allergies    codeine (Rash)  erythromycin (Rash)  iv dye (Rash; Anaphylaxis; Short breath)  penicillin (Rash)  shellfish (Rash)    Intolerances    adhesives (Other)  warfarin (Other)      ANTIMICROBIALS:  meropenem  IVPB 500 every 12 hours  micafungin IVPB    micafungin IVPB 100 once  rifAXIMin 550 two times a day      OTHER MEDS:  albuterol/ipratropium for Nebulization 3 milliLiter(s) Nebulizer every 6 hours  chlorhexidine 4% Liquid 1 Application(s) Topical <User Schedule>  dextrose 5% + sodium chloride 0.45%. 1000 milliLiter(s) IV Continuous <Continuous>  digoxin     Tablet 0.125 milliGRAM(s) Oral every other day  lactulose Syrup 10 Gram(s) Oral four times a day  levothyroxine Injectable 12.5 MICROGram(s) IV Push at bedtime  midodrine 20 milliGRAM(s) Oral every 8 hours      SOCIAL HISTORY:  unable to obtain    FAMILY HISTORY:  FH: Alzheimers disease      ROS:  Unobtainable because: doesn't answer questions    Physical Exam:    General: looks frail  HEENT: atraumatic, normocephalic, normal sclera and conjunctiva  Cardio:    regular S1,S2  Respiratory:   coughing  abd:   soft, BS +, not tender, no hepatosplenomegaly  :     rivero  Musculoskeletal : no joint swelling, no edema  Skin:  lots of skin tears and bruising  Neurologic: no focal deficits  psych: normal affect      Drug Dosing Weight  Height (cm): 160.02 (06 Jan 2020 07:09)  Weight (kg): 40.8 (19 Jan 2020 17:28)  BMI (kg/m2): 15.9 (19 Jan 2020 17:28)  BSA (m2): 1.38 (19 Jan 2020 17:28)    Vital Signs Last 24 Hrs  T(F): 97.1 (01-26-20 @ 05:00), Max: 101.4 (01-24-20 @ 01:00)    Vital Signs Last 24 Hrs  HR: 85 (01-26-20 @ 05:00) (76 - 100)  BP: 107/58 (01-26-20 @ 04:52) (93/42 - 140/63)  RR: 18 (01-26-20 @ 05:00)  SpO2: 95% (01-26-20 @ 05:00) (95% - 100%)  Wt(kg): --                          10.2   7.35  )-----------( 92       ( 26 Jan 2020 05:40 )             32.8       01-26    143  |  110<H>  |  39<H>  ----------------------------<  105<H>  5.3   |  22  |  1.03    Ca    9.0      26 Jan 2020 05:40  Phos  3.5     01-26  Mg     1.8     01-26    TPro  4.5<L>  /  Alb  2.0<L>  /  TBili  1.4<H>  /  DBili  x   /  AST  29  /  ALT  18  /  AlkPhos  110  01-26          MICROBIOLOGY:    .Blood Blood-Peripheral  01-24-20   No growth to date.  --  --      .Blood Blood-Venous  01-21-20   No growth to date.  --  --      .Blood Blood-Peripheral  01-21-20   Growth in anaerobic bottle: Serratia marcescens  See previous culture [id number]  Growth in aerobic bottle: Yeast like cells  ***Blood Panel PCR results on this specimen are available  approximately 3 hours after the Gram stain result.***  Gram stain, PCR, and/or culture results may not always  correspond due to difference in methodologies.  ************************************************************  This PCR assay was performed using [employer].  The following targets are tested for: Enterococcus,  vancomycin resistant enterococci, Listeria monocytogenes,  coagulase negative staphylococci, S. aureus,  methicillin resistant S. aureus, Streptococcus agalactiae  (Group B), S. pneumoniae, S. pyogenes (Group A),  Acinetobacter baumannii, Enterobacter cloacae, E. coli,  Klebsiella oxytoca, K. pneumoniae, Proteus sp.,  Serratia marcescens, Haemophilus influenzae,  Neisseria meningitidis, Pseudomonas aeruginosa, Candida  albicans, C. glabrata, C krusei, C parapsilosis,  C. tropicalis and the KPC resistance gene.  --  Blood Culture PCR      .Sputum Sputum  01-20-20   Moderate Serratia marcescens  Normal Respiratory Stefanie present  --  Serratia marcescens      .Blood Blood-Peripheral  01-19-20   Growth in aerobic and anaerobic bottles: Serratia marcescens  ***Blood Panel PCR results on this specimen are available  approximately 3 hours after the Gram stain result.***  Gram stain, PCR, and/or culture results may not always  correspond due to difference in methodologies.  ************************************************************  This PCR assay was performed using [employer].  The following targets are tested for: Enterococcus,  vancomycin resistant enterococci, Listeria monocytogenes,  coagulase negative staphylococci, S. aureus,  methicillin resistant S. aureus, Streptococcus agalactiae  (Group B), S. pneumoniae, S. pyogenes (Group A),  Acinetobacter baumannii, Enterobacter cloacae, E. coli,  Klebsiella oxytoca, K. pneumoniae, Proteus sp.,  Serratia marcescens, Haemophilus influenzae,  Neisseria meningitidis, Pseudomonas aeruginosa, Candida  albicans, C. glabrata, C krusei, C parapsilosis,  C. tropicalis and the KPC resistance gene.  --  Blood Culture PCR  Serratia marcescens      .Urine Clean Catch (Midstream)  01-19-20   >100,000 CFU/ml Klebsiella pneumoniae ESBL  --  Klebsiella pneumoniae ESBL      .Urine CATHETERIZED  01-19-20   >100,000 CFU/ml Klebsiella pneumoniae ESBL  --  Klebsiella pneumoniae ESBL      .Urine CATHETERIZED  01-11-20   >100,000 CFU/ml Klebsiella pneumoniae ESBL  --  Gram Negative Rods      .Urine Clean Catch (Midstream)  01-06-20   <10,000 CFU/mL Normal Urogenital Stefanie  --  --          Rapid RVP Result: NotDetec (01-24 @ 05:54)  Rapid RVP Result: NotDetec (01-19 @ 23:13)          RADIOLOGY:  CT Chest 1/19/20- Motion limited exam.  No displaced rib fracture. No pneumothorax.  The left lower lobe bronchus and segmental branches of right lower lobe bronchus are obliterated by retained secretions. Patchy opacities within the bilateral lower lobes may represent pneumonia or aspiration pneumonitis.  Cardiomegaly. Aortic valve and mitral annulus calcification. Coronary atherosclerosis. HPI: 89 yo F w afib on digoxin (not on AC), severe AS, COPD not on home O2, CLL (previously on Treanda and Rituxan), hypothyroidism, 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 portal htn, c/b esophageal varices s/p banding, CKD 3bl Cr 1.2, PVD, and recurrent UTI w/ hx of ESBL Klebsiella p/w hypoxia, septic shock. Admitted to MICU 1/19 to 1/25 for pressor support. Found to have pna, serratia bacteremia. Improved and tx to floors. Bcx now showing candida glabrata.     Pt minimally answers questions. Denies any pain or nausea. Has cough.    PAST MEDICAL & SURGICAL HISTORY:  PVD (peripheral vascular disease)  Liver cell carcinoma  Severe aortic stenosis by prior echocardiogram  Pulmonary HTN: moderate  CKD (chronic kidney disease), stage 3 (moderate)  COPD (Chronic Obstructive Pulmonary Disease)  AF (Atrial Fibrillation)  Portal Hypertension  Bleeding Esophageal Varices  CLL (Chronic Lymphoblastic Leukemia)  GIB (Gastrointestinal Bleeding)  History of repair of hip fracture  Esophageal Rupture      Allergies    codeine (Rash)  erythromycin (Rash)  iv dye (Rash; Anaphylaxis; Short breath)  penicillin (Rash)  shellfish (Rash)    Intolerances    adhesives (Other)  warfarin (Other)      ANTIMICROBIALS:  meropenem  IVPB 500 every 12 hours  micafungin IVPB    micafungin IVPB 100 once  rifAXIMin 550 two times a day      OTHER MEDS:  albuterol/ipratropium for Nebulization 3 milliLiter(s) Nebulizer every 6 hours  chlorhexidine 4% Liquid 1 Application(s) Topical <User Schedule>  dextrose 5% + sodium chloride 0.45%. 1000 milliLiter(s) IV Continuous <Continuous>  digoxin     Tablet 0.125 milliGRAM(s) Oral every other day  lactulose Syrup 10 Gram(s) Oral four times a day  levothyroxine Injectable 12.5 MICROGram(s) IV Push at bedtime  midodrine 20 milliGRAM(s) Oral every 8 hours      SOCIAL HISTORY:  unable to obtain    FAMILY HISTORY:  FH: Alzheimers disease      ROS:  Unobtainable because: doesn't answer questions    Physical Exam:    General: looks frail  HEENT: atraumatic, normocephalic, normal sclera and conjunctiva  Cardio:    regular S1,S2  Respiratory:   coughing  abd:   soft, BS +, not tender, no hepatosplenomegaly  :     rivero  Musculoskeletal : no joint swelling, no edema  Skin:  lots of skin tears and bruising  Vascular: no central lines  Neurologic: no focal deficits  psych: screams/cries frequently       Drug Dosing Weight  Height (cm): 160.02 (06 Jan 2020 07:09)  Weight (kg): 40.8 (19 Jan 2020 17:28)  BMI (kg/m2): 15.9 (19 Jan 2020 17:28)  BSA (m2): 1.38 (19 Jan 2020 17:28)    Vital Signs Last 24 Hrs  T(F): 97.1 (01-26-20 @ 05:00), Max: 101.4 (01-24-20 @ 01:00)    Vital Signs Last 24 Hrs  HR: 85 (01-26-20 @ 05:00) (76 - 100)  BP: 107/58 (01-26-20 @ 04:52) (93/42 - 140/63)  RR: 18 (01-26-20 @ 05:00)  SpO2: 95% (01-26-20 @ 05:00) (95% - 100%)  Wt(kg): --                          10.2   7.35  )-----------( 92       ( 26 Jan 2020 05:40 )             32.8       01-26    143  |  110<H>  |  39<H>  ----------------------------<  105<H>  5.3   |  22  |  1.03    Ca    9.0      26 Jan 2020 05:40  Phos  3.5     01-26  Mg     1.8     01-26    TPro  4.5<L>  /  Alb  2.0<L>  /  TBili  1.4<H>  /  DBili  x   /  AST  29  /  ALT  18  /  AlkPhos  110  01-26      MICROBIOLOGY:  .Blood Blood-Peripheral  01-24-20   No growth to date.  --  --    .Blood Blood-Venous  01-21-20   No growth to date.  --  --    .Blood Blood-Peripheral  01-21-20   Growth in anaerobic bottle: Serratia marcescens  See previous culture [id number]  Growth in aerobic bottle: Yeast like cells  ***Blood Panel PCR results on this specimen are available  approximately 3 hours after the Gram stain result.***  Gram stain, PCR, and/or culture results may not always  correspond due to difference in methodologies.  ************************************************************  This PCR assay was performed using [employer].  The following targets are tested for: Enterococcus,  vancomycin resistant enterococci, Listeria monocytogenes,  coagulase negative staphylococci, S. aureus,  methicillin resistant S. aureus, Streptococcus agalactiae  (Group B), S. pneumoniae, S. pyogenes (Group A),  Acinetobacter baumannii, Enterobacter cloacae, E. coli,  Klebsiella oxytoca, K. pneumoniae, Proteus sp.,  Serratia marcescens, Haemophilus influenzae,  Neisseria meningitidis, Pseudomonas aeruginosa, Candida  albicans, C. glabrata, C krusei, C parapsilosis,  C. tropicalis and the KPC resistance gene.  --  Blood Culture PCR      .Sputum Sputum  01-20-20   Moderate Serratia marcescens  Normal Respiratory Stefanie present  --  Serratia marcescens      .Blood Blood-Peripheral  01-19-20   Growth in aerobic and anaerobic bottles: Serratia marcescens  ***Blood Panel PCR results on this specimen are available  approximately 3 hours after the Gram stain result.***  Gram stain, PCR, and/or culture results may not always  correspond due to difference in methodologies.  ************************************************************  This PCR assay was performed using [employer].  The following targets are tested for: Enterococcus,  vancomycin resistant enterococci, Listeria monocytogenes,  coagulase negative staphylococci, S. aureus,  methicillin resistant S. aureus, Streptococcus agalactiae  (Group B), S. pneumoniae, S. pyogenes (Group A),  Acinetobacter baumannii, Enterobacter cloacae, E. coli,  Klebsiella oxytoca, K. pneumoniae, Proteus sp.,  Serratia marcescens, Haemophilus influenzae,  Neisseria meningitidis, Pseudomonas aeruginosa, Candida  albicans, C. glabrata, C krusei, C parapsilosis,  C. tropicalis and the KPC resistance gene.  --  Blood Culture PCR  Serratia marcescens      .Urine Clean Catch (Midstream)  01-19-20   >100,000 CFU/ml Klebsiella pneumoniae ESBL  --  Klebsiella pneumoniae ESBL      .Urine CATHETERIZED  01-19-20   >100,000 CFU/ml Klebsiella pneumoniae ESBL  --  Klebsiella pneumoniae ESBL      .Urine CATHETERIZED  01-11-20   >100,000 CFU/ml Klebsiella pneumoniae ESBL  --  Gram Negative Rods      .Urine Clean Catch (Midstream)  01-06-20   <10,000 CFU/mL Normal Urogenital Stefanie  --  --          Rapid RVP Result: NotDetec (01-24 @ 05:54)  Rapid RVP Result: NotDetec (01-19 @ 23:13)          RADIOLOGY:  CT Chest 1/19/20- Motion limited exam.  No displaced rib fracture. No pneumothorax.  The left lower lobe bronchus and segmental branches of right lower lobe bronchus are obliterated by retained secretions. Patchy opacities within the bilateral lower lobes may represent pneumonia or aspiration pneumonitis.  Cardiomegaly. Aortic valve and mitral annulus calcification. Coronary atherosclerosis.

## 2020-01-26 NOTE — PROGRESS NOTE ADULT - PROBLEM SELECTOR PLAN 2
- AOx2 today, improved from yesterday  - multifactorial likely secondary to recent sepsis, ICU delirium, portosystemic encephalopathy  - c/b nausea/vomiting now with NGT on tube feeds  - will maintain NGT while patient is w/ metabolic encephalopathy for oral access  - ongoing discussion if pt would be amenable to PEG tube if needed in future. will f/u goc w/ family and pt when mental status improves. hold off on MBS until goals clarified  - given high risk for thromboembolic events, will repeat CTH if mental status does not improve

## 2020-01-27 LAB
ALBUMIN SERPL ELPH-MCNC: 2 G/DL — LOW (ref 3.3–5)
ALP SERPL-CCNC: 118 U/L — SIGNIFICANT CHANGE UP (ref 40–120)
ALT FLD-CCNC: 13 U/L — SIGNIFICANT CHANGE UP (ref 10–45)
ANION GAP SERPL CALC-SCNC: 10 MMOL/L — SIGNIFICANT CHANGE UP (ref 5–17)
AST SERPL-CCNC: 34 U/L — SIGNIFICANT CHANGE UP (ref 10–40)
BASOPHILS # BLD AUTO: 0 K/UL — SIGNIFICANT CHANGE UP (ref 0–0.2)
BASOPHILS NFR BLD AUTO: 0 % — SIGNIFICANT CHANGE UP (ref 0–2)
BILIRUB SERPL-MCNC: 1.5 MG/DL — HIGH (ref 0.2–1.2)
BUN SERPL-MCNC: 36 MG/DL — HIGH (ref 7–23)
CALCIUM SERPL-MCNC: 9.2 MG/DL — SIGNIFICANT CHANGE UP (ref 8.4–10.5)
CHLORIDE SERPL-SCNC: 105 MMOL/L — SIGNIFICANT CHANGE UP (ref 96–108)
CO2 SERPL-SCNC: 22 MMOL/L — SIGNIFICANT CHANGE UP (ref 22–31)
CREAT SERPL-MCNC: 0.93 MG/DL — SIGNIFICANT CHANGE UP (ref 0.5–1.3)
CULTURE RESULTS: SIGNIFICANT CHANGE UP
EOSINOPHIL # BLD AUTO: 0 K/UL — SIGNIFICANT CHANGE UP (ref 0–0.5)
EOSINOPHIL NFR BLD AUTO: 0 % — SIGNIFICANT CHANGE UP (ref 0–6)
GLUCOSE BLDC GLUCOMTR-MCNC: 100 MG/DL — HIGH (ref 70–99)
GLUCOSE BLDC GLUCOMTR-MCNC: 100 MG/DL — HIGH (ref 70–99)
GLUCOSE BLDC GLUCOMTR-MCNC: 96 MG/DL — SIGNIFICANT CHANGE UP (ref 70–99)
GLUCOSE BLDC GLUCOMTR-MCNC: 97 MG/DL — SIGNIFICANT CHANGE UP (ref 70–99)
GLUCOSE BLDC GLUCOMTR-MCNC: 98 MG/DL — SIGNIFICANT CHANGE UP (ref 70–99)
GLUCOSE SERPL-MCNC: 102 MG/DL — HIGH (ref 70–99)
HCT VFR BLD CALC: 28.9 % — LOW (ref 34.5–45)
HGB BLD-MCNC: 9.5 G/DL — LOW (ref 11.5–15.5)
LYMPHOCYTES # BLD AUTO: 0.77 K/UL — LOW (ref 1–3.3)
LYMPHOCYTES # BLD AUTO: 10 % — LOW (ref 13–44)
MAGNESIUM SERPL-MCNC: 1.8 MG/DL — SIGNIFICANT CHANGE UP (ref 1.6–2.6)
MANUAL SMEAR VERIFICATION: SIGNIFICANT CHANGE UP
MCHC RBC-ENTMCNC: 32.9 GM/DL — SIGNIFICANT CHANGE UP (ref 32–36)
MCHC RBC-ENTMCNC: 33.1 PG — SIGNIFICANT CHANGE UP (ref 27–34)
MCV RBC AUTO: 100.7 FL — HIGH (ref 80–100)
METAMYELOCYTES # FLD: 1 % — HIGH (ref 0–0)
MONOCYTES # BLD AUTO: 0.92 K/UL — HIGH (ref 0–0.9)
MONOCYTES NFR BLD AUTO: 12 % — SIGNIFICANT CHANGE UP (ref 2–14)
NEUTROPHILS # BLD AUTO: 5.9 K/UL — SIGNIFICANT CHANGE UP (ref 1.8–7.4)
NEUTROPHILS NFR BLD AUTO: 75 % — SIGNIFICANT CHANGE UP (ref 43–77)
NEUTS BAND # BLD: 2 % — SIGNIFICANT CHANGE UP (ref 0–8)
NRBC # BLD: 1 /100 — HIGH (ref 0–0)
PHOSPHATE SERPL-MCNC: 3.1 MG/DL — SIGNIFICANT CHANGE UP (ref 2.5–4.5)
PLAT MORPH BLD: NORMAL — SIGNIFICANT CHANGE UP
PLATELET # BLD AUTO: 109 K/UL — LOW (ref 150–400)
POTASSIUM SERPL-MCNC: 4.7 MMOL/L — SIGNIFICANT CHANGE UP (ref 3.5–5.3)
POTASSIUM SERPL-SCNC: 4.7 MMOL/L — SIGNIFICANT CHANGE UP (ref 3.5–5.3)
PROT SERPL-MCNC: 4.6 G/DL — LOW (ref 6–8.3)
RBC # BLD: 2.87 M/UL — LOW (ref 3.8–5.2)
RBC # FLD: 15 % — HIGH (ref 10.3–14.5)
RBC BLD AUTO: SIGNIFICANT CHANGE UP
SODIUM SERPL-SCNC: 137 MMOL/L — SIGNIFICANT CHANGE UP (ref 135–145)
SPECIMEN SOURCE: SIGNIFICANT CHANGE UP
WBC # BLD: 7.66 K/UL — SIGNIFICANT CHANGE UP (ref 3.8–10.5)
WBC # FLD AUTO: 7.66 K/UL — SIGNIFICANT CHANGE UP (ref 3.8–10.5)

## 2020-01-27 PROCEDURE — 99232 SBSQ HOSP IP/OBS MODERATE 35: CPT

## 2020-01-27 PROCEDURE — 99222 1ST HOSP IP/OBS MODERATE 55: CPT

## 2020-01-27 PROCEDURE — 99233 SBSQ HOSP IP/OBS HIGH 50: CPT | Mod: GC

## 2020-01-27 RX ORDER — HEPARIN SODIUM 5000 [USP'U]/ML
5000 INJECTION INTRAVENOUS; SUBCUTANEOUS
Refills: 0 | Status: DISCONTINUED | OUTPATIENT
Start: 2020-01-27 | End: 2020-02-13

## 2020-01-27 RX ADMIN — Medication 12.5 MICROGRAM(S): at 22:06

## 2020-01-27 RX ADMIN — Medication 3 MILLILITER(S): at 05:46

## 2020-01-27 RX ADMIN — SODIUM CHLORIDE 50 MILLILITER(S): 9 INJECTION, SOLUTION INTRAVENOUS at 22:02

## 2020-01-27 RX ADMIN — LACTULOSE 10 GRAM(S): 10 SOLUTION ORAL at 23:02

## 2020-01-27 RX ADMIN — ERTAPENEM SODIUM 100 MILLIGRAM(S): 1 INJECTION, POWDER, LYOPHILIZED, FOR SOLUTION INTRAMUSCULAR; INTRAVENOUS at 05:46

## 2020-01-27 RX ADMIN — MIDODRINE HYDROCHLORIDE 20 MILLIGRAM(S): 2.5 TABLET ORAL at 22:02

## 2020-01-27 RX ADMIN — Medication 3 MILLILITER(S): at 23:02

## 2020-01-27 RX ADMIN — LACTULOSE 10 GRAM(S): 10 SOLUTION ORAL at 18:26

## 2020-01-27 RX ADMIN — MICAFUNGIN SODIUM 105 MILLIGRAM(S): 100 INJECTION, POWDER, LYOPHILIZED, FOR SOLUTION INTRAVENOUS at 09:18

## 2020-01-27 RX ADMIN — CHLORHEXIDINE GLUCONATE 1 APPLICATION(S): 213 SOLUTION TOPICAL at 09:19

## 2020-01-27 RX ADMIN — HEPARIN SODIUM 5000 UNIT(S): 5000 INJECTION INTRAVENOUS; SUBCUTANEOUS at 18:28

## 2020-01-27 RX ADMIN — LACTULOSE 10 GRAM(S): 10 SOLUTION ORAL at 12:49

## 2020-01-27 RX ADMIN — MIDODRINE HYDROCHLORIDE 20 MILLIGRAM(S): 2.5 TABLET ORAL at 05:45

## 2020-01-27 RX ADMIN — Medication 3 MILLILITER(S): at 12:50

## 2020-01-27 RX ADMIN — MIDODRINE HYDROCHLORIDE 20 MILLIGRAM(S): 2.5 TABLET ORAL at 15:01

## 2020-01-27 RX ADMIN — LACTULOSE 10 GRAM(S): 10 SOLUTION ORAL at 05:46

## 2020-01-27 RX ADMIN — Medication 3 MILLILITER(S): at 18:26

## 2020-01-27 NOTE — PROGRESS NOTE ADULT - ATTENDING COMMENTS
d/w daughter Julio and rest of medical team, and Dr. Boston at bedside utility of MBS  at this time her MS is not completely back to baseline so we will defer for now and re eval tomorrow but I did stress we do not have a lot of time since I do not want to keep the NGT in for too much longer as it has already been in 4 days.  patient already fungemic with Candida glabrata likely from gut translocation as as long as she has the NGT she is at risk of relapse  at this time they may be considering PEG tube feedings and I explained to them that this does not decrease the risk of aspiration and she is at high risk of aspiration in general even from her secretions  we can maintain good oral hygiene and allow her sufficient time to chew with help at all times during meal   for now plan will be to re eval in AM and we will continue to have these conversations as I have delineated above    wean 02 as needed  BCX 01/24 NGTD  f/u speciation of candida from 1/21  appreciate ID recs

## 2020-01-27 NOTE — PROGRESS NOTE ADULT - PROBLEM SELECTOR PLAN 1
# Sepsis   - p/w serratia bacteremia & sputum cx with serratia   - klebsiella ESBL UTI sensitive to meropenem IV  - ID recs appreciated.   - c/w ertapenem (1/26 - ). previously on meropenem IV (1/20-1/26).  - started on IV micafungin 100 mg daily (01/27-  - on midodrine 20 tid, will down titrate as tolerated    # Bacterial PNA / gram negative and gram positive  - c/w w abx as above  - chest PT, duonebs  - aspiration precautions  - maintain SaO2 > 92% w/ supplemental O2 PRN (on 3L face tent)    # UTI - klebsiella ESBL UTI   - as above 2/2 serratia bacteremia & sputum cx with serratia  and klebsiella ESBL UTI sensitive to meropenem IV  - ID recs appreciated.   - c/w ertapenem (1/26 - ). previously on meropenem IV (1/20-1/26).  - started on IV micafungin 100 mg daily (01/27-  - on midodrine 20 tid, will down titrate as tolerated  # Bacterial PNA / gram negative and gram positive organisms  - c/w w abx as above  - chest PT, duonebs  - aspiration precautions  - maintain SaO2 > 92% w/ supplemental O2 PRN (on 3L face tent) 2/2 serratia bacteremia & sputum cx with serratia and klebsiella ESBL UTI sensitive to meropenem IV, and candida glabrata in 1/21  - ID recs appreciated.   - c/w ertapenem (1/26 - ). previously on meropenem IV (1/20-1/26).  - started on IV micafungin 100 mg daily (01/27-)  - on midodrine 20 tid, will down titrate as tolerated    # Bacterial PNA / gram negative and gram positive organisms  - c/w w abx as above  - chest PT, duonebs  - aspiration precautions  - maintain SaO2 > 92% w/ supplemental O2 PRN (on 3L face tent) 2/2 serratia bacteremia & sputum cx with serratia and klebsiella ESBL UTI sensitive to meropenem IV, and candida glabrata in 1/21  - ID recs appreciated.   - c/w ertapenem (1/26 - ). previously on meropenem IV (1/20-1/26).  - started on IV micafungin 100 mg daily (01/27-)  - on midodrine 20 tid, will down titrate as tolerated    # Bacterial PNA / gram negative and gram positive organisms  - c/w w abx as above  - chest PT, duonebs  - aspiration precautions  - maintain SaO2 > 92% w/ supplemental O2 PRN (on 3L face tent)    # Fungemia   - ophthalmology consulted to rule out fungal endophthalmitis  - f/u recs

## 2020-01-27 NOTE — PROGRESS NOTE ADULT - SUBJECTIVE AND OBJECTIVE BOX
Alexei Hess MD, PGY-1  Pager #[phone]  After 7 PM on weekdays and 12 PM on weekends, for urgent issues please page #[phone].  ----------------------------------------------------------------  Patient is a 89y old  Female who presents with a chief complaint of sepsis 2/2 to UTI vs PNA (26 Jan 2020 11:20)      SUBJECTIVE / OVERNIGHT EVENTS: No acute events overnight. Pt seen and examined at bedside. Pt denies any acute complaints including headache, fever, chills, nausea, vomiting, diarrhea, constipation, chest pain, shortness of breath.     MEDICATIONS  (STANDING):  albuterol/ipratropium for Nebulization 3 milliLiter(s) Nebulizer every 6 hours  chlorhexidine 4% Liquid 1 Application(s) Topical <User Schedule>  dextrose 5% + sodium chloride 0.45%. 1000 milliLiter(s) (50 mL/Hr) IV Continuous <Continuous>  digoxin     Tablet 0.125 milliGRAM(s) Oral every other day  ertapenem  IVPB 500 milliGRAM(s) IV Intermittent every 24 hours  lactulose Syrup 10 Gram(s) Oral four times a day  levothyroxine Injectable 12.5 MICROGram(s) IV Push at bedtime  micafungin IVPB      micafungin IVPB 100 milliGRAM(s) IV Intermittent every 24 hours  midodrine 20 milliGRAM(s) Oral every 8 hours  rifAXIMin 550 milliGRAM(s) Oral two times a day    MEDICATIONS  (PRN):      CAPILLARY BLOOD GLUCOSE  POCT Blood Glucose.: 96 mg/dL (27 Jan 2020 06:30)  POCT Blood Glucose.: 100 mg/dL (27 Jan 2020 00:22)  POCT Blood Glucose.: 95 mg/dL (26 Jan 2020 18:34)  POCT Blood Glucose.: 91 mg/dL (26 Jan 2020 12:22)    I&O's Summary    26 Jan 2020 07:01  -  27 Jan 2020 07:00  --------------------------------------------------------  IN: 330 mL / OUT: 0 mL / NET: 330 mL        PHYSICAL EXAM:  Vital Signs Last 24 Hrs  T(C): 36.3 (27 Jan 2020 04:31), Max: 36.5 (26 Jan 2020 12:15)  T(F): 97.3 (27 Jan 2020 04:31), Max: 97.7 (26 Jan 2020 12:15)  HR: 81 (27 Jan 2020 04:31) (76 - 91)  BP: 96/62 (27 Jan 2020 04:31) (94/55 - 117/65)  RR: 18 (27 Jan 2020 04:31) (18 - 18)  SpO2: 96% (27 Jan 2020 04:31) (94% - 97%)    CONSTITUTIONAL: NAD  EYES: EOMI  RESPIRATORY: rhonchi diffusely  CARDIOVASCULAR: loud HSM murmur  ABDOMEN: soft, nt, nd  MUSCULOSKELETAL: no lower extremity swelling  PSYCH: AOx2 (does not know date but knows president)  NEUROLOGY: CN 2-12 are intact and symmetric; no gross sensory deficits   SKIN: skin tears b/l UE, b/l ecchymoses LE     LABS:                        10.2   7.35  )-----------( 92       ( 26 Jan 2020 05:40 )             32.8     01-26    143  |  110<H>  |  39<H>  ----------------------------<  105<H>  5.3   |  22  |  1.03    Ca    9.0      26 Jan 2020 05:40  Phos  3.5     01-26  Mg     1.8     01-26    TPro  4.5<L>  /  Alb  2.0<L>  /  TBili  1.4<H>  /  DBili  x   /  AST  29  /  ALT  18  /  AlkPhos  110  01-26    PT/INR - ( 26 Jan 2020 05:40 )   PT: 14.2 sec;   INR: 1.24 ratio         PTT - ( 26 Jan 2020 05:40 )  PTT:28.5 sec    RADIOLOGY & ADDITIONAL TESTS:  Results Reviewed:   Imaging Personally Reviewed:  Electrocardiogram Personally Reviewed:    COORDINATION OF CARE:  Care Discussed with Consultants/Other Providers [Y/N]:  Prior or Outpatient Records Reviewed [Y/N]: Alexei Hess MD, PGY-1  Pager #[phone]  After 7 PM on weekdays and 12 PM on weekends, for urgent issues please page #[phone].  ----------------------------------------------------------------  Patient is a 89y old  Female who presents with a chief complaint of sepsis 2/2 to UTI vs PNA (26 Jan 2020 11:20)      SUBJECTIVE / OVERNIGHT EVENTS: No acute events overnight. Pt seen and examined at bedside. Pt denies any acute complaints including headache, fever, chills, nausea, vomiting, diarrhea, constipation, chest pain, shortness of breath.     MEDICATIONS  (STANDING):  albuterol/ipratropium for Nebulization 3 milliLiter(s) Nebulizer every 6 hours  chlorhexidine 4% Liquid 1 Application(s) Topical <User Schedule>  dextrose 5% + sodium chloride 0.45%. 1000 milliLiter(s) (50 mL/Hr) IV Continuous <Continuous>  digoxin     Tablet 0.125 milliGRAM(s) Oral every other day  ertapenem  IVPB 500 milliGRAM(s) IV Intermittent every 24 hours  lactulose Syrup 10 Gram(s) Oral four times a day  levothyroxine Injectable 12.5 MICROGram(s) IV Push at bedtime  micafungin IVPB      micafungin IVPB 100 milliGRAM(s) IV Intermittent every 24 hours  midodrine 20 milliGRAM(s) Oral every 8 hours  rifAXIMin 550 milliGRAM(s) Oral two times a day    MEDICATIONS  (PRN):      CAPILLARY BLOOD GLUCOSE  POCT Blood Glucose.: 96 mg/dL (27 Jan 2020 06:30)  POCT Blood Glucose.: 100 mg/dL (27 Jan 2020 00:22)  POCT Blood Glucose.: 95 mg/dL (26 Jan 2020 18:34)  POCT Blood Glucose.: 91 mg/dL (26 Jan 2020 12:22)    I&O's Summary    26 Jan 2020 07:01  -  27 Jan 2020 07:00  --------------------------------------------------------  IN: 330 mL / OUT: 0 mL / NET: 330 mL        PHYSICAL EXAM:  Vital Signs Last 24 Hrs  T(C): 36.3 (27 Jan 2020 04:31), Max: 36.5 (26 Jan 2020 12:15)  T(F): 97.3 (27 Jan 2020 04:31), Max: 97.7 (26 Jan 2020 12:15)  HR: 81 (27 Jan 2020 04:31) (76 - 91)  BP: 96/62 (27 Jan 2020 04:31) (94/55 - 117/65)  RR: 18 (27 Jan 2020 04:31) (18 - 18)  SpO2: 96% (27 Jan 2020 04:31) (94% - 97%)    CONSTITUTIONAL: NAD  EYES: EOMI  RESPIRATORY: rhonchi diffusely  CARDIOVASCULAR: loud HSM murmur  ABDOMEN: soft, nt, nd  MUSCULOSKELETAL: no lower extremity swelling  PSYCH: AOx2 (does not know date but knows president)  NEUROLOGY: CN 2-12 are intact and symmetric; no gross sensory deficits   SKIN: skin tears b/l UE, b/l ecchymoses LE     LABS:                        10.2   7.35  )-----------( 92       ( 26 Jan 2020 05:40 )             32.8     01-26    143  |  110<H>  |  39<H>  ----------------------------<  105<H>  5.3   |  22  |  1.03    Ca    9.0      26 Jan 2020 05:40  Phos  3.5     01-26  Mg     1.8     01-26    TPro  4.5<L>  /  Alb  2.0<L>  /  TBili  1.4<H>  /  DBili  x   /  AST  29  /  ALT  18  /  AlkPhos  110  01-26    PT/INR - ( 26 Jan 2020 05:40 )   PT: 14.2 sec;   INR: 1.24 ratio       PTT - ( 26 Jan 2020 05:40 )  PTT:28.5 sec    Tele: brief tachycardia to 147 2 seconds    RADIOLOGY & ADDITIONAL TESTS:  Results Reviewed:   Imaging Personally Reviewed:  Electrocardiogram Personally Reviewed:    COORDINATION OF CARE:  Care Discussed with Consultants/Other Providers [Y/N]:  Prior or Outpatient Records Reviewed [Y/N]:

## 2020-01-27 NOTE — PROGRESS NOTE ADULT - ASSESSMENT
89F with AF on digoxin (not on AC), Severe AS, COPD (not on home O2), CLL, HCC with Portal HTN c/b esophageal varices s/p banding, CKD3 (baseline Cr 1.2) and PVD a/w septic shock 2/2 serratia bacteremia from urosepsis vs. PNA.     Impressions:   Septic Shock 2/2 serratia bacteremia and PNA: resolved  Paroxysmal Atrial Fibrillation: rate-controlled  Severe Aortic Stenosis (TAMRA 0..4sqcm, mean gradient 58mmHg)  Moderate MS/Moderate-Severe Mitral Regurgitation    Recommendations:  1: Hold spironolactone given low normal SBP  2: Continue digoxin for rate controlled AF  3: Not candidate for AC given history of esophageal varices and cerebral hemorrhage on warfarin    Eugene Laureano M.D.  Cardiology Fellow  [phone]  For all Cardiology service contact information, go to [email or web address] and use "[email or web address]" to login. 89F with AF on digoxin (not on AC due to esophageal varices), severe AS (not a candidate for replacement), COPD, CLL, HCC with portal HTN c/b esophageal varices s/p banding, CKD3 (baseline Cr 1.2) and PVD.  A/W septic shock 2/2 serratia bacteremia from urosepsis vs. PNA.       Impressions:   Septic Shock 2/2 serratia bacteremia and PNA: resolved  Persistent Atrial Fibrillation: rate-controlled  Severe Aortic Stenosis (TAMRA 0..4sqcm, mean gradient 58mmHg)  Moderate MS/Moderate-Severe Mitral Regurgitation    Recommendations:  1: Hold spironolactone given low normal SBP  2: Continue digoxin for rate controlled AF  3: Not candidate for A/C given history of esophageal varices and cerebral hemorrhage on warfarin    Eugene Laureano M.D.  Cardiology Fellow  [phone]    Daniel Boston M.D.  Cardiology Attending, Consult Service  Beeper     All Cardiology service information can be found 24/7 on [email or web address], password: [email or web address] Child 89F with AF on digoxin (not on AC due to esophageal varices), severe AS (not a candidate for replacement), COPD, CLL, HCC with portal HTN c/b esophageal varices s/p banding, CKD3 (baseline Cr 1.2) and PVD.  A/W septic shock 2/2 serratia bacteremia from urosepsis vs. PNA.       Impressions:   Septic xhock 2/2 serratia bacteremia and PNA: resolved  Persistent atrial fibrillation: rate-controlled  Severe Aortic Stenosis (TAMRA 0..4sqcm, mean gradient 58mmHg)  Moderate MS/Moderate-Severe Mitral Regurgitation    Recommendations:  1: Hold spironolactone given low normal SBP  2: Continue digoxin for rate controlled AF  3: Not candidate for A/C given history of esophageal varices and cerebral hemorrhage on warfarin  4. Discussed possibility of feeding tube with patient, her daughter and with hospitalist.  Patient's daughter is ambivalent, but would not rule out feeding tube if needed; to consider f/u swallowing test.      Eugene Laureano M.D.  Cardiology Fellow  [phone]    Daniel Boston M.D.  Cardiology Attending, Consult Service  Beeper     All Cardiology service information can be found 24/7 on [email or web address], password: [email or web address]

## 2020-01-27 NOTE — PROGRESS NOTE ADULT - PROBLEM SELECTOR PLAN 2
- AOx2 today  - multifactorial likely secondary to recent sepsis, ICU delirium, portosystemic encephalopathy  - c/b nausea/vomiting now with NGT on tube feeds  - will maintain NGT while patient is w/ metabolic encephalopathy for oral access  - ongoing discussion if pt would be amenable to PEG tube if needed in future. will f/u goc w/ family and pt when mental status improves. hold off on MBS until goals clarified  - given high risk for thromboembolic events, will repeat CTH if mental status does not improve --would defer for now given improvements

## 2020-01-27 NOTE — PROGRESS NOTE ADULT - ASSESSMENT
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 Pt last admit 1/6-9 s/p fall OOB now re-admitted from [facility] w AMS 2/2 Septic Shock 2/2 Serratia Bacteremia / Klebsiella ESBL urosepsis, and PNA w Serratia in the sputum, with candida glabrata in blood cx from 1/21.

## 2020-01-27 NOTE — CONSULT NOTE ADULT - SUBJECTIVE AND OBJECTIVE BOX
[facility] Ophthalmology Consult Note    HPI: 89-year-old F with PMHx CLL, Afib (not on AC), HCC c/b portal HTN, cirrhosis, esophageal varices s/p banding, severe AS, COPD, hypothyroidism, CKD3, PVD, recurrent UTI with history of ESBL Klebsiella and POcHx CEIOL OU who presented from [facility] with dyspnea, nonproductive cough, fever found to be in septic shock 2/2 Serratia bacteremic PNA and also found to have Candida glabrata, ophthalmology consulted to rule out fungal endophthalmitis. Patient without any visual complaint, denies new flashes/floaters, eye pain.    PMH: As above  Meds:   · 	lactulose 10 g/15 mL oral syrup: 30 milliliter(s) orally 4 times a day  · 	heparin: 5000 unit(s) subcutaneous 2 times a day  · 	propranolol 10 mg oral tablet: 1 tab(s) orally 2 times a day  · 	digoxin 125 mcg (0.125 mg) oral tablet: 1 tab(s) orally every other day  · 	rifAXIMin 550 mg oral tablet: 1 tab(s) orally 2 times a day  · 	levothyroxine 25 mcg (0.025 mg) oral tablet: 1 tab(s) orally once a day  · 	spironolactone 25 mg oral tablet: 1 tab(s) orally once a day  · 	petrolatum topical ointment: 1 application topically once a day  · 	ascorbic acid 500 mg oral tablet: 1 tab(s) orally 2 times a day  · 	Vitamin D3 2000 intl units oral tablet: 1 tab(s) orally once a day  · 	budesonide 0.25 mg/2 mL inhalation suspension: 2 milliliter(s) inhaled 2 times a day          · 	ipratropium-albuterol 0.5 mg-2.5 mg/3 mLinhalation solution: 3 milliliter(s) inhaled every 12 hours  POcHx (including surgeries/lasers/trauma):  CEIOL OU  Drops: None  FamHx: None, Alzheimers  Social Hx: Lives with daughter but presenting from rehab, tobacco when younger, no alcohol or illicits, +HHA  Allergies:  	codeine: Drug, Rash  	erythromycin: Drug, Rash  	penicillin: Drug, Rash  	shellfish: Food, Rash  	iv dye: Miscellaneous, Rash, iv dye       Intolerances:  	warfarin: Drug, Other, 2/2016 intracranial microhemorrhage  	adhesives: Miscellaneous, Other, Skin tears      ROS:  General (generalized weakness), Vision (per HPI), Head and Neck (neg), Pulm (cough, dyspnea), CV (neg), GI (neg),  (neg), Musculoskeletal (neg), Skin/Integ (neg), Neuro (neg), Endocrine (neg), Heme (neg), All/Immuno (neg)    Mood and Affect Appropriate ( no ),  Oriented to Time, Place, and Person x 2 ( x )    Ophthalmology Exam    Visual acuity (cc near card): 20/40 OU (PHNI, component of poor effect)  Pupils: PERRL OU, no APD  Ttono: 14 OU  Extraocular movements (EOMs): Grossly full OU, no pain, no diplopia   Confrontational Visual Field (CVF):  Full OU  Color Plates: 10/12 OU    Pen Light Exam (PLE)  External:  Flat OU  Lids/Lashes/Lacrimal Ducts: Flat OU    Sclera/Conjunctiva:  W+Q OU  Cornea: Clear OU, arcus OU  Anterior Chamber: D+F OU  Iris:  Flat OU  Lens:  IOL OU    Fundus Exam: dilated with 1% tropicamide and 2.5% phenylephrine  Approval obtained from primary team for dilation  Patient aware that pupils can remained dilated for at least 4-6 hours  Exam performed with 20D lens    Vitreous: wnl OU  Disc, cup/disc: sharp and pink, 0.1 OU  Macula:  wnl OU  Vessels:  wnl OU  Periphery: peripheral drusen OU, limited 2/2 poor patient cooperation    Diagnostic Testing:     < from: CT Head No Cont (01.19.20 @ 19:00) >    EXAM:  CT CERVICAL SPINE                          EXAM:  CT BRAIN                            PROCEDURE DATE:  01/19/2020            INTERPRETATION:  CLINICAL INFORMATION: Head injury.    TECHNIQUE: Noncontrast CT scan of the head and cervical spinewas performed. The cervical spine CT was performed with thin section axial images. Sagittal and coronal reformations were created.    COMPARISON: No similar prior studies available for comparison.    FINDINGS:    HEAD:    No acute intracranial hemorrhage, mass effect, or midline shift. The basal cisterns are patent. No abnormal extra-axial collections.     Cerebral volume loss is present with proportional prominence of the sulci and ventricles. Moderate periventricular and subcortical white matter hypoattenuation without mass effect is noted, non-specific, but likely related to chronic small vessel ischemic changes. Chronic right frontal infarct with encephalomalacia/gliosis. Left basal ganglia lacunar infarct. Chronic left basal ganglia lacunar infarct. Small vessel white matter ischemic changes are noted.    The calvarium is intact. The tympanomastoid cavities appear free of acute disease. Left maxillary sinus mucosal thickening. Bilateral cataracts surgery.    CERVICAL SPINE:    The normal cervical lordosis is maintained. Mild anterolisthesis of C2 on C3 and C3 on C4. No acute fracture or prevertebral soft tissue swelling. The craniocervical junction is unremarkable.     No vertebral disc height loss throughout cervical spine. Multilevel degenerative changes of the cervical spine characterized by marginal osteophyte formation, small disc bulges, and uncovertebral and facet joint arthrosis. Ankylosis of the left C2-3 facet. Varying degrees of spinal canal and foraminal narrowing, not well evaluated at CT.    The lung apices are unremarkable.    IMPRESSION:    Head CT: No displaced calvarial fracture or acute intracranial hemorrhage.     Cervical spine CT: No acute fracture.     TEOFILO GRUBBS M.D., RADIOLOGYRESIDENT  This document has been electronically signed.  RUI GOMEZ M.D., ATTENDING RADIOLOGIST  This document has been electronically signed. Jan 19 2020  8:31PM      < end of copied text >      Assessment: 89-year-old F with PMHx CLL, Afib (not on AC), HCC c/b portal HTN, cirrhosis, esophageal varices s/p banding, severe AS, COPD, hypothyroidism, CKD3, PVD, recurrent UTI with history of ESBL Klebsiella and POcHx CEIOL OU who presented from [facility] with dyspnea, nonproductive cough, fever found to be in septic shock 2/2 Serratia bacteremic PNA and also found to have Candida glabrata, ophthalmology consulted to rule out fungal endophthalmitis. No eye complaints.    No evidence of vitreous opacities or retinal infiltrates, otherwise normal eye exam.    Plan:  - no acute ophthalmologic intervention  - antifungals and abx per primary team/ID  - rest of care per primary team  - plan discussed with patient, patient's daughter, and primary team    Follow-Up:  Patient should follow up with her ophthalmologist or in the [facility]mology Practice within 1 week of discharge.  [address] [place] [id number]  [phone]    S/D/W Dr Barkley (attending) [facility] Ophthalmology Consult Note    HPI: 89-year-old F with PMHx CLL, Afib (not on AC), HCC c/b portal HTN, cirrhosis, esophageal varices s/p banding, severe AS, COPD, hypothyroidism, CKD3, PVD, recurrent UTI with history of ESBL Klebsiella and POcHx CEIOL OU who presented from [facility] with dyspnea, nonproductive cough, fever found to be in septic shock 2/2 Serratia bacteremic PNA and also found to have Candida glabrata, ophthalmology consulted to rule out fungal endophthalmitis. Patient without any visual complaint, denies new flashes/floaters, eye pain.    PMH: As above  Meds:   · 	lactulose 10 g/15 mL oral syrup: 30 milliliter(s) orally 4 times a day  · 	heparin: 5000 unit(s) subcutaneous 2 times a day  · 	propranolol 10 mg oral tablet: 1 tab(s) orally 2 times a day  · 	digoxin 125 mcg (0.125 mg) oral tablet: 1 tab(s) orally every other day  · 	rifAXIMin 550 mg oral tablet: 1 tab(s) orally 2 times a day  · 	levothyroxine 25 mcg (0.025 mg) oral tablet: 1 tab(s) orally once a day  · 	spironolactone 25 mg oral tablet: 1 tab(s) orally once a day  · 	petrolatum topical ointment: 1 application topically once a day  · 	ascorbic acid 500 mg oral tablet: 1 tab(s) orally 2 times a day  · 	Vitamin D3 2000 intl units oral tablet: 1 tab(s) orally once a day  · 	budesonide 0.25 mg/2 mL inhalation suspension: 2 milliliter(s) inhaled 2 times a day          · 	ipratropium-albuterol 0.5 mg-2.5 mg/3 mLinhalation solution: 3 milliliter(s) inhaled every 12 hours  POcHx (including surgeries/lasers/trauma):  CEIOL OU  Drops: None  FamHx: None, Alzheimers  Social Hx: Lives with daughter but presenting from rehab, tobacco when younger, no alcohol or illicits, +HHA  Allergies:  	codeine: Drug, Rash  	erythromycin: Drug, Rash  	penicillin: Drug, Rash  	shellfish: Food, Rash  	iv dye: Miscellaneous, Rash, iv dye       Intolerances:  	warfarin: Drug, Other, 2/2016 intracranial microhemorrhage  	adhesives: Miscellaneous, Other, Skin tears      ROS:  General (generalized weakness), Vision (per HPI), Head and Neck (neg), Pulm (cough, dyspnea), CV (neg), GI (neg),  (neg), Musculoskeletal (neg), Skin/Integ (neg), Neuro (neg), Endocrine (neg), Heme (neg), All/Immuno (neg)    Mood and Affect Appropriate ( no ),  Oriented to Time, Place, and Person x 2 ( x )    Ophthalmology Exam    Visual acuity (cc near card): 20/40 OU (PHNI, component of poor effect)  Pupils: PERRL OU, no APD  Ttono: 14 OU  Extraocular movements (EOMs): Grossly full OU, no pain, no diplopia   Confrontational Visual Field (CVF):  Full OU  Color Plates: 10/12 OU    Pen Light Exam (PLE)  External:  Flat OU  Lids/Lashes/Lacrimal Ducts: Flat OU    Sclera/Conjunctiva:  W+Q OU  Cornea: Clear OU, arcus OU  Anterior Chamber: D+F OU  Iris:  Flat OU  Lens:  IOL OU    Fundus Exam: dilated with 1% tropicamide and 2.5% phenylephrine  Approval obtained from primary team for dilation  Patient aware that pupils can remained dilated for at least 4-6 hours  Exam performed with 20D lens    Vitreous: wnl OU, no fungus balls OU  Disc, cup/disc: sharp and pink, 0.1 OU  Macula:  wnl OU  Vessels:  wnl OU  Periphery: peripheral drusen OU, limited 2/2 poor patient cooperation, grossly no retinal infiltrates OU    Diagnostic Testing:     < from: CT Head No Cont (01.19.20 @ 19:00) >    EXAM:  CT CERVICAL SPINE                          EXAM:  CT BRAIN                            PROCEDURE DATE:  01/19/2020            INTERPRETATION:  CLINICAL INFORMATION: Head injury.    TECHNIQUE: Noncontrast CT scan of the head and cervical spinewas performed. The cervical spine CT was performed with thin section axial images. Sagittal and coronal reformations were created.    COMPARISON: No similar prior studies available for comparison.    FINDINGS:    HEAD:    No acute intracranial hemorrhage, mass effect, or midline shift. The basal cisterns are patent. No abnormal extra-axial collections.     Cerebral volume loss is present with proportional prominence of the sulci and ventricles. Moderate periventricular and subcortical white matter hypoattenuation without mass effect is noted, non-specific, but likely related to chronic small vessel ischemic changes. Chronic right frontal infarct with encephalomalacia/gliosis. Left basal ganglia lacunar infarct. Chronic left basal ganglia lacunar infarct. Small vessel white matter ischemic changes are noted.    The calvarium is intact. The tympanomastoid cavities appear free of acute disease. Left maxillary sinus mucosal thickening. Bilateral cataracts surgery.    CERVICAL SPINE:    The normal cervical lordosis is maintained. Mild anterolisthesis of C2 on C3 and C3 on C4. No acute fracture or prevertebral soft tissue swelling. The craniocervical junction is unremarkable.     No vertebral disc height loss throughout cervical spine. Multilevel degenerative changes of the cervical spine characterized by marginal osteophyte formation, small disc bulges, and uncovertebral and facet joint arthrosis. Ankylosis of the left C2-3 facet. Varying degrees of spinal canal and foraminal narrowing, not well evaluated at CT.    The lung apices are unremarkable.    IMPRESSION:    Head CT: No displaced calvarial fracture or acute intracranial hemorrhage.     Cervical spine CT: No acute fracture.     TEOFILO GRUBBS M.D., RADIOLOGYRESIDENT  This document has been electronically signed.  RUI GOMEZ M.D., ATTENDING RADIOLOGIST  This document has been electronically signed. Jan 19 2020  8:31PM      < end of copied text >      Assessment: 89-year-old F with PMHx CLL, Afib (not on AC), HCC c/b portal HTN, cirrhosis, esophageal varices s/p banding, severe AS, COPD, hypothyroidism, CKD3, PVD, recurrent UTI with history of ESBL Klebsiella and POcHx CEIOL OU who presented from [facility] with dyspnea, nonproductive cough, fever found to be in septic shock 2/2 Serratia bacteremic PNA and also found to have Candida glabrata, ophthalmology consulted to rule out fungal endophthalmitis. No eye complaints.    No evidence of vitreous opacities or retinal infiltrates, otherwise normal eye exam.    Plan:  - no acute ophthalmologic intervention  - antifungals and abx per primary team/ID  - rest of care per primary team  - plan discussed with patient, patient's daughter, and primary team  - pt advised to let team know if she notices any new eye complaints, flashes, floaters, change in her vision    Follow-Up:  Patient should follow up with her ophthalmologist or in the [facility] Practice within 1 week of discharge, sooner if symptoms worsen or change.  [address] [place] [address] [phone]    S/D/W Dr Barkley (attending)

## 2020-01-27 NOTE — PROGRESS NOTE ADULT - PROBLEM SELECTOR PLAN 8
- chronic, with thrombocytopenia noted since 2009, improving   - no medical dvt prophylaxis at this time  - will monitor for acute s/s bleeding

## 2020-01-27 NOTE — PROGRESS NOTE ADULT - ASSESSMENT
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   Fungemia 1/21 with Candida glabrata, possible transient gut translocation in the setting of shock. Less likely from the central line, now removed.   Transferred from MICU 1/25.   Cultures 1/24 negative.     Suggest  -Ertapenem   -Micafungin  -Ophthalmology evaluation  -f/u Candida susceptibilities     Daniel Medrano MD   Infectious Disease   Pager [phone]   After 5PM and on weekends please page fellow on call or call [phone] 89F with CLL, HCC with cirrhosis, severe AS and AR, admitted 1/19/20 with septic shock from Serratia bacteremic pneumonia.   Fungemia 1/21 with Candida glabrata, possible transient gut translocation in the setting of shock. Less likely from the central line, now removed.   Transferred from MICU 1/25.   Cultures 1/24 negative.     Suggest  -Ertapenem 500mg IV q24h   -Micafungin 100mg IV q24h   -Ophthalmology evaluation  -f/u Candida susceptibilities     Daniel Medrano MD   Infectious Disease   Pager [phone]   After 5PM and on weekends please page fellow on call or call [phone] 89F with CLL, HCC with cirrhosis, severe AS and AR, admitted 1/19/20 with septic shock from Serratia bacteremic pneumonia.   Fungemia 1/21 with Candida glabrata, possible transient gut translocation in the setting of shock. Less likely from the central line which was in briefly.   Transferred from MICU 1/25.   Cultures 1/24 negative.   Improving slowly     Suggest  -Ertapenem 500mg IV q24h, tentatively plan for another week of antibiotics   -Micafungin 100mg IV q24h, anticipate two week therapy, transition to PO depending on sensitivities   -f/u Candida susceptibilities   -Ophthalmology evaluation    Daniel Medrano MD   Infectious Disease   Pager [phone]   After 5PM and on weekends please page fellow on call or call [phone]

## 2020-01-27 NOTE — PROGRESS NOTE ADULT - PROBLEM SELECTOR PLAN 4
- cont digixin   - not a candidate for AC due to cirrhosis w/ esophageal varices and banding hx  - f/u cards recs

## 2020-01-27 NOTE — PROGRESS NOTE ADULT - SUBJECTIVE AND OBJECTIVE BOX
CARDIOLOGY PROGRESS NOTE    Subjective/24 hour events:   - No overnight events.   - Denies chest pain, palpitations, SOB, lightheadedness, dizziness.    Telemetry: AFib,     MEDICATIONS  (STANDING):  albuterol/ipratropium for Nebulization 3 milliLiter(s) Nebulizer every 6 hours  chlorhexidine 4% Liquid 1 Application(s) Topical <User Schedule>  dextrose 5% + sodium chloride 0.45%. 1000 milliLiter(s) (50 mL/Hr) IV Continuous <Continuous>  digoxin     Tablet 0.125 milliGRAM(s) Oral every other day  ertapenem  IVPB 500 milliGRAM(s) IV Intermittent every 24 hours  lactulose Syrup 10 Gram(s) Oral four times a day  levothyroxine Injectable 12.5 MICROGram(s) IV Push at bedtime  micafungin IVPB      micafungin IVPB 100 milliGRAM(s) IV Intermittent every 24 hours  midodrine 20 milliGRAM(s) Oral every 8 hours  rifAXIMin 550 milliGRAM(s) Oral two times a day    Vital Signs Last 24 Hrs  T(C): 36.3 (27 Jan 2020 04:31), Max: 36.5 (26 Jan 2020 12:15)  T(F): 97.3 (27 Jan 2020 04:31), Max: 97.7 (26 Jan 2020 12:15)  HR: 81 (27 Jan 2020 04:31) (76 - 91)  BP: 96/62 (27 Jan 2020 04:31) (94/55 - 117/65)  BP(mean): --  RR: 18 (27 Jan 2020 04:31) (18 - 18)  SpO2: 96% (27 Jan 2020 04:31) (94% - 97%)    I&O's Summary  26 Jan 2020 07:01  -  27 Jan 2020 07:00  --------------------------------------------------------  IN: 330 mL / OUT: 0 mL / NET: 330 mL    Physical Exam:  General: No distress. Comfortable  HEENT: EOMI	  Neck: JVP not elevated  Chest: Clear to auscultation bilaterally  CV: Irregularly Irregulr. Normal S1 and S2. No murmurs, rubs, or gallops. No edema.  Abdomen: Soft, non-distended, non-tender  Skin: No rashes, ecchymoses, or skin breakdown  Extremities: Warm.  Neuro: Alert and oriented x 3. No focal deficits.  Psych: Mood and affect appropriate    Labs:                        10.2   7.35  )-----------( 92       ( 26 Jan 2020 05:40 )             32.8     01-26    143  |  110<H>  |  39<H>  ----------------------------<  105<H>  5.3   |  22  |  1.03    Ca    9.0      26 Jan 2020 05:40  Phos  3.5     01-26  Mg     1.8     01-26    TPro  4.5<L>  /  Alb  2.0<L>  /  TBili  1.4<H>  /  DBili  x   /  AST  29  /  ALT  18  /  AlkPhos  110  01-26    PT/INR - ( 26 Jan 2020 05:40 )   PT: 14.2 sec;   INR: 1.24 ratio    PTT - ( 26 Jan 2020 05:40 )  PTT:28.5 sec    pro-BNP: Serum Pro-Brain Natriuretic Peptide: 9585 pg/mL (09-10 @ 06:58) CARDIOLOGY PROGRESS NOTE    Subjective/24 hour events:   - No overnight events.   - Denies chest pain, palpitations, SOB, lightheadedness, dizziness.    Telemetry: AFib,     MEDICATIONS  (STANDING):  albuterol/ipratropium for Nebulization 3 milliLiter(s) Nebulizer every 6 hours  chlorhexidine 4% Liquid 1 Application(s) Topical <User Schedule>  dextrose 5% + sodium chloride 0.45%. 1000 milliLiter(s) (50 mL/Hr) IV Continuous <Continuous>  digoxin     Tablet 0.125 milliGRAM(s) Oral every other day  ertapenem  IVPB 500 milliGRAM(s) IV Intermittent every 24 hours  lactulose Syrup 10 Gram(s) Oral four times a day  levothyroxine Injectable 12.5 MICROGram(s) IV Push at bedtime  micafungin IVPB      micafungin IVPB 100 milliGRAM(s) IV Intermittent every 24 hours  midodrine 20 milliGRAM(s) Oral every 8 hours  rifAXIMin 550 milliGRAM(s) Oral two times a day    Vital Signs Last 24 Hrs  T(C): 36.3 (27 Jan 2020 04:31), Max: 36.5 (26 Jan 2020 12:15)  T(F): 97.3 (27 Jan 2020 04:31), Max: 97.7 (26 Jan 2020 12:15)  HR: 81 (27 Jan 2020 04:31) (76 - 91)  BP: 96/62 (27 Jan 2020 04:31) (94/55 - 117/65)  RR: 18 (27 Jan 2020 04:31) (18 - 18)  SpO2: 96% (27 Jan 2020 04:31) (94% - 97%)    Physical Exam:  General: No distress. Comfortable  HEENT: EOMI	  Neck: JVP not elevated  Chest: Clear to auscultation bilaterally  CV: Irregularly Irregulr. Normal S1 and S2. No murmurs, rubs, or gallops. No edema.  Abdomen: Soft, non-distended, non-tender  Skin: No rashes, ecchymoses, or skin breakdown  Extremities: Warm.  Neuro: Alert and oriented x 3. No focal deficits.  Psych: Mood and affect appropriate    Labs:                      10.2   7.35  )-----------( 92       ( 26 Jan 2020 05:40 )             32.8     01-26  143  |  110<H>  |  39<H>  ----------------------------<  105<H>  5.3   |  22  |  1.03    Ca    9.0      26 Jan 2020 05:40  Phos  3.5     01-26  Mg     1.8     01-26    TPro  4.5<L>  /  Alb  2.0<L>  /  TBili  1.4<H>  /  DBili  x   /  AST  29  /  ALT  18  /  AlkPhos  110  01-26    PT/INR - ( 26 Jan 2020 05:40 )   PT: 14.2 sec;   INR: 1.24 ratio    PTT - ( 26 Jan 2020 05:40 )  PTT:28.5 sec    pro-BNP: Serum Pro-Brain Natriuretic Peptide: 9585 pg/mL (09-10 @ 06:58) CARDIOLOGY PROGRESS NOTE    Subjective/24 hour events:   - No overnight events.   - Denies chest pain, palpitations, SOB, lightheadedness, dizziness.    Telemetry: AFib,       MEDICATIONS  (STANDING):  albuterol/ipratropium for Nebulization 3 milliLiter(s) Nebulizer every 6 hours  chlorhexidine 4% Liquid 1 Application(s) Topical <User Schedule>  dextrose 5% + sodium chloride 0.45%. 1000 milliLiter(s) (50 mL/Hr) IV Continuous <Continuous>  digoxin     Tablet 0.125 milliGRAM(s) Oral every other day  ertapenem  IVPB 500 milliGRAM(s) IV Intermittent every 24 hours  lactulose Syrup 10 Gram(s) Oral four times a day  levothyroxine Injectable 12.5 MICROGram(s) IV Push at bedtime  micafungin IVPB      micafungin IVPB 100 milliGRAM(s) IV Intermittent every 24 hours  midodrine 20 milliGRAM(s) Oral every 8 hours  rifAXIMin 550 milliGRAM(s) Oral two times a day    Vital Signs Last 24 Hrs  T(C): 36.3 (27 Jan 2020 04:31), Max: 36.5 (26 Jan 2020 12:15)  T(F): 97.3 (27 Jan 2020 04:31), Max: 97.7 (26 Jan 2020 12:15)  HR: 81 (27 Jan 2020 04:31) (76 - 91)  BP: 96/62 (27 Jan 2020 04:31) (94/55 - 117/65)  RR: 18 (27 Jan 2020 04:31) (18 - 18)  SpO2: 96% (27 Jan 2020 04:31) (94% - 97%)    Physical Exam:  General: No distress. Comfortable  HEENT: EOMI	  Neck: JVP not elevated  Chest: Clear to auscultation bilaterally  CV: Irregularly Irregulr. Normal S1 and S2. No murmurs, rubs, or gallops. No edema.  Abdomen: Soft, non-distended, non-tender  Skin: No rashes, ecchymoses, or skin breakdown  Extremities: Warm.  Neuro: Alert and oriented x 3. No focal deficits.  Psych: Mood and affect appropriate    Labs:                      10.2   7.35  )-----------( 92       ( 26 Jan 2020 05:40 )             32.8     01-26  143  |  110<H>  |  39<H>  ----------------------------<  105<H>  5.3   |  22  |  1.03    Ca    9.0      26 Jan 2020 05:40  Phos  3.5     01-26  Mg     1.8     01-26    TPro  4.5<L>  /  Alb  2.0<L>  /  TBili  1.4<H>  /  DBili  x   /  AST  29  /  ALT  18  /  AlkPhos  110  01-26    PT/INR - ( 26 Jan 2020 05:40 )   PT: 14.2 sec;   INR: 1.24 ratio    PTT - ( 26 Jan 2020 05:40 )  PTT:28.5 sec    pro-BNP: Serum Pro-Brain Natriuretic Peptide: 9585 pg/mL (09-10 @ 06:58)

## 2020-01-27 NOTE — PROGRESS NOTE ADULT - SUBJECTIVE AND OBJECTIVE BOX
Follow Up:      Interval History/ROS:     Allergies  codeine (Rash)  erythromycin (Rash)  iv dye (Rash; Anaphylaxis; Short breath)  penicillin (Rash)  shellfish (Rash)        ANTIMICROBIALS:  ertapenem  IVPB 500 every 24 hours  micafungin IVPB    micafungin IVPB 100 every 24 hours  rifAXIMin 550 two times a day      OTHER MEDS:  albuterol/ipratropium for Nebulization 3 milliLiter(s) Nebulizer every 6 hours  chlorhexidine 4% Liquid 1 Application(s) Topical <User Schedule>  dextrose 5% + sodium chloride 0.45%. 1000 milliLiter(s) IV Continuous <Continuous>  digoxin     Tablet 0.125 milliGRAM(s) Oral every other day  lactulose Syrup 10 Gram(s) Oral four times a day  levothyroxine Injectable 12.5 MICROGram(s) IV Push at bedtime  midodrine 20 milliGRAM(s) Oral every 8 hours      Vital Signs Last 24 Hrs  T(C): 36.3 (27 Jan 2020 04:31), Max: 36.5 (26 Jan 2020 12:15)  T(F): 97.3 (27 Jan 2020 04:31), Max: 97.7 (26 Jan 2020 12:15)  HR: 81 (27 Jan 2020 04:31) (76 - 91)  BP: 96/62 (27 Jan 2020 04:31) (94/55 - 117/65)  BP(mean): --  RR: 18 (27 Jan 2020 04:31) (18 - 18)  SpO2: 96% (27 Jan 2020 04:31) (94% - 97%)    Physical Exam:  General: NAD, non toxic  Head: atraumatic, normocephalic  Eye: normal sclera and conjunctiva  ENT: no oropharyngeal lesions, no cervical lymphadenopathy   Cardio: regular rate and rhythm   Respiratory: nonlabored on room air, clear bilaterally, no wheezing  abd: soft, bowel sounds present, no tenderness  : no CVAT, no suprapubic tenderness, no  rivero  Musculoskeletal: no joint swelling, no edema  vascular: no phlebitis   Skin: no rash  Neurologic: no focal deficit  psych: normal affect                          10.2   7.35  )-----------( 92       ( 26 Jan 2020 05:40 )             32.8       01-26    143  |  110<H>  |  39<H>  ----------------------------<  105<H>  5.3   |  22  |  1.03    Ca    9.0      26 Jan 2020 05:40  Phos  3.5     01-26  Mg     1.8     01-26    TPro  4.5<L>  /  Alb  2.0<L>  /  TBili  1.4<H>  /  DBili  x   /  AST  29  /  ALT  18  /  AlkPhos  110  01-26      MICROBIOLOGY:  Culture - Blood (collected 01-24-20 @ 05:14)  Source: .Blood Blood-Venous  Preliminary Report (01-25-20 @ 06:01):    No growth to date.    Culture - Blood (collected 01-24-20 @ 05:14)  Source: .Blood Blood-Peripheral  Preliminary Report (01-25-20 @ 06:01):    No growth to date.    Culture - Blood (collected 01-21-20 @ 23:03)  Source: .Blood Blood-Venous  Final Report (01-27-20 @ 01:01):    No growth at 5 days.    Culture - Blood (collected 01-21-20 @ 15:02)  Source: .Blood Blood-Peripheral  Gram Stain (01-25-20 @ 19:04):    Growth in anaerobic bottle: Gram Negative Rods    Growth in aerobic bottle: Yeast like cells  Preliminary Report (01-26-20 @ 21:53):    Growth in anaerobic bottle: Serratia marcescens    See previous culture [id number]    Growth in aerobic bottle: Candida glabrata Referred to Mycology    Culture - Sputum (collected 01-20-20 @ 15:19)  Source: .Sputum Sputum  Gram Stain (01-20-20 @ 19:21):    Moderate polymorphonuclear leukocytes per low power field    Numerous Squamous epithelial cells per low power field    Numerous Gram Negative Rods per oil power field    Moderate Yeast like cells per oil power field  Final Report (01-22-20 @ 17:11):    Moderate Serratia marcescens    Normal Respiratory Stefanie present      -  Amikacin: S <=16      -  Amoxicillin/Clavulanic Acid: R >16/8      -  Ampicillin: R <=8 These ampicillin results predict results for amoxicillin      -  Ampicillin/Sulbactam: R 8/4 Enterobacter, Citrobacter, and Serratia may develop resistance during prolonged therapy (3-4 days)      -  Aztreonam: S <=4      -  Cefazolin: R >16 Enterobacter, Citrobacter, and Serratia may develop resistance during prolonged therapy (3-4 days)      -  Cefepime: S <=2      -  Cefoxitin: R <=8      -  Ceftriaxone: S <=1 Enterobacter, Citrobacter, and Serratia may develop resistance during prolonged therapy      -  Ciprofloxacin: S <=1      -  Ertapenem: S <=0.5      -  Gentamicin: S <=2      -  Levofloxacin: S <=2      -  Meropenem: S <=1      -  Piperacillin/Tazobactam: S <=8      -  Tobramycin: S 4      -  Trimethoprim/Sulfamethoxazole: S <=2/38      Method Type: GALINDO    Culture - Blood (01.19.20 @ 22:02)    Gram Stain:   Growth in anaerobic bottle: Gram Negative Rods  Growth in aerobic bottle: Gram Negative Rods    Specimen Source: .Blood Blood-Peripheral    Culture Results:   Growth in aerobic and anaerobic bottles: Serratia marcescens  See previous culture [id number]    Culture - Blood (01.19.20 @ 22:02)  Culture Results:   Growth in aerobic and anaerobic bottles: Serratia marcescens    -  Ampicillin/Sulbactam: R 8/4 Enterobacter, Citrobacter, and Serratia may develop resistance during prolonged therapy (3-4 days)    -  Ceftriaxone: S <=1 Enterobacter, Citrobacter, and Serratia may develop resistance during prolonged therapy    -  Ciprofloxacin: S <=1    -  Cefoxitin: R <=8    -  Cefepime: S <=2    -  Cefazolin: R >16 Enterobacter, Citrobacter, and Serratia may develop resistance during prolonged therapy (3-4 days)    -  Levofloxacin: S <=2    -  Ertapenem: S <=0.5    -  Gentamicin: S <=2    -  Meropenem: S <=1    -  Piperacillin/Tazobactam: S <=8    -  Trimethoprim/Sulfamethoxazole: S <=2/38    -  Tobramycin: S <=2    -  Amikacin: S <=16    -  Aztreonam: S <=4    -  Ampicillin: R 16 These ampicillin results predict results for amoxicillin    Culture - Urine (01.19.20 @ 17:45)    Specimen Source: .Urine CATHETERIZED    Culture Results:   >100,000 CFU/ml Klebsiella pneumoniae ESBL    -  Tobramycin: R >8    -  Trimethoprim/Sulfamethoxazole: R >2/38    -  Tigecycline: S <=2    -  Piperacillin/Tazobactam: R <=16    -  Nitrofurantoin: S <=32 Should not be used to treat pyelonephritis    -  Meropenem: S <=1    -  Gentamicin: R >8    -  Ertapenem: S <=1    -  Levofloxacin: R >4    -  Imipenem: S <=1    -  Cefazolin: R >16 ([email or web address]) For uncomplicated UTI with K. pneumoniae, E. coli, or P. mirablis: GALINDO <=16 is sensitive and GALINDO >=32 is resistant. This also predicts results for oral agents cefaclor, cefdinir, cefpodoxime, cefprozil, cefuroxime axetil, cephalexin and locarbef for uncomplicated UTI. Note that some isolates may be susceptible to these agents while testing resistant to cefazolin.    -  Cefepime: R >16    -  Cefoxitin: S <=8    -  Ciprofloxacin: R >2    -  Ampicillin: R >16 These ampicillin results predict results for amoxicillin    -  Aztreonam: R >16    -  Amikacin: S <=16    -  Ampicillin/Sulbactam: R >16/8 Enterobacter, Citrobacter, and Serratia may develop resistance during prolonged therapy (3-4 days)    -  Ceftriaxone: R >32 Enterobacter, Citrobacter, and Serratia may develop resistance during prolonged therapy    Rapid RVP Result: NotDetec (01-24 @ 05:54)    RADIOLOGY:  Images below reviewed personally  CT Chest No Cont (01.19.20 @ 19:03)   Motion limited exam.  No displaced rib fracture. No pneumothorax.  The left lower lobe bronchus and segmental branches of right lower lobe bronchus are obliterated by retained secretions. Patchy opacities within the bilateral lower lobes may represent pneumonia or aspiration pneumonitis.  Cardiomegaly. Aortic valve and mitral annulus calcification. Coronary atherosclerosis. Follow Up:      Interval History/ROS:     Allergies  codeine (Rash)  erythromycin (Rash)  iv dye (Rash; Anaphylaxis; Short breath)  penicillin (Rash)  shellfish (Rash)        ANTIMICROBIALS:  ertapenem  IVPB 500 every 24 hours  micafungin IVPB    micafungin IVPB 100 every 24 hours  rifAXIMin 550 two times a day      OTHER MEDS:  albuterol/ipratropium for Nebulization 3 milliLiter(s) Nebulizer every 6 hours  chlorhexidine 4% Liquid 1 Application(s) Topical <User Schedule>  dextrose 5% + sodium chloride 0.45%. 1000 milliLiter(s) IV Continuous <Continuous>  digoxin     Tablet 0.125 milliGRAM(s) Oral every other day  lactulose Syrup 10 Gram(s) Oral four times a day  levothyroxine Injectable 12.5 MICROGram(s) IV Push at bedtime  midodrine 20 milliGRAM(s) Oral every 8 hours      Vital Signs Last 24 Hrs  T(C): 36.3 (27 Jan 2020 04:31), Max: 36.5 (26 Jan 2020 12:15)  T(F): 97.3 (27 Jan 2020 04:31), Max: 97.7 (26 Jan 2020 12:15)  HR: 81 (27 Jan 2020 04:31) (76 - 91)  BP: 96/62 (27 Jan 2020 04:31) (94/55 - 117/65)  BP(mean): --  RR: 18 (27 Jan 2020 04:31) (18 - 18)  SpO2: 96% (27 Jan 2020 04:31) (94% - 97%)    Physical Exam:  General: NAD, non toxic  Head: atraumatic, normocephalic  Eye: normal sclera and conjunctiva  ENT: no oropharyngeal lesions, no cervical lymphadenopathy   Cardio: regular rate and rhythm   Respiratory: nonlabored on room air, clear bilaterally, no wheezing  abd: soft, bowel sounds present, no tenderness  : no CVAT, no suprapubic tenderness, no  rivero  Musculoskeletal: no joint swelling, no edema  vascular: no phlebitis   Skin: no rash  Neurologic: no focal deficit  psych: normal affect                          10.2   7.35  )-----------( 92       ( 26 Jan 2020 05:40 )             32.8       01-26    143  |  110<H>  |  39<H>  ----------------------------<  105<H>  5.3   |  22  |  1.03    Ca    9.0      26 Jan 2020 05:40  Phos  3.5     01-26  Mg     1.8     01-26    TPro  4.5<L>  /  Alb  2.0<L>  /  TBili  1.4<H>  /  DBili  x   /  AST  29  /  ALT  18  /  AlkPhos  110  01-26      MICROBIOLOGY:  Culture - Blood (collected 01-24-20 @ 05:14)  Source: .Blood Blood-Venous  Preliminary Report (01-25-20 @ 06:01):    No growth to date.    Culture - Blood (collected 01-24-20 @ 05:14)  Source: .Blood Blood-Peripheral  Preliminary Report (01-25-20 @ 06:01):    No growth to date.    Culture - Blood (collected 01-21-20 @ 23:03)  Source: .Blood Blood-Venous  Final Report (01-27-20 @ 01:01):    No growth at 5 days.    Culture - Blood (collected 01-21-20 @ 15:02)  Source: .Blood Blood-Peripheral  Gram Stain (01-25-20 @ 19:04):    Growth in anaerobic bottle: Gram Negative Rods    Growth in aerobic bottle: Yeast like cells  Preliminary Report (01-26-20 @ 21:53):    Growth in anaerobic bottle: Serratia marcescens    See previous culture [id number]    Growth in aerobic bottle: Candida glabrata Referred to Mycology    Culture - Sputum (collected 01-20-20 @ 15:19)  Source: .Sputum Sputum  Gram Stain (01-20-20 @ 19:21):    Moderate polymorphonuclear leukocytes per low power field    Numerous Squamous epithelial cells per low power field    Numerous Gram Negative Rods per oil power field    Moderate Yeast like cells per oil power field  Final Report (01-22-20 @ 17:11):    Moderate Serratia marcescens    Normal Respiratory Stefanie present      -  Amikacin: S <=16      -  Amoxicillin/Clavulanic Acid: R >16/8      -  Ampicillin: R <=8 These ampicillin results predict results for amoxicillin      -  Ampicillin/Sulbactam: R 8/4 Enterobacter, Citrobacter, and Serratia may develop resistance during prolonged therapy (3-4 days)      -  Aztreonam: S <=4      -  Cefazolin: R >16 Enterobacter, Citrobacter, and Serratia may develop resistance during prolonged therapy (3-4 days)      -  Cefepime: S <=2      -  Cefoxitin: R <=8      -  Ceftriaxone: S <=1 Enterobacter, Citrobacter, and Serratia may develop resistance during prolonged therapy      -  Ciprofloxacin: S <=1      -  Ertapenem: S <=0.5      -  Gentamicin: S <=2      -  Levofloxacin: S <=2      -  Meropenem: S <=1      -  Piperacillin/Tazobactam: S <=8      -  Tobramycin: S 4      -  Trimethoprim/Sulfamethoxazole: S <=2/38      Method Type: GALINDO    Culture - Blood (01.19.20 @ 22:02)    Gram Stain:   Growth in anaerobic bottle: Gram Negative Rods  Growth in aerobic bottle: Gram Negative Rods    Specimen Source: .Blood Blood-Peripheral    Culture Results:   Growth in aerobic and anaerobic bottles: Serratia marcescens  See previous culture [id number]    Culture - Blood (01.19.20 @ 22:02)  Culture Results:   Growth in aerobic and anaerobic bottles: Serratia marcescens    -  Ampicillin/Sulbactam: R 8/4 Enterobacter, Citrobacter, and Serratia may develop resistance during prolonged therapy (3-4 days)    -  Ceftriaxone: S <=1 Enterobacter, Citrobacter, and Serratia may develop resistance during prolonged therapy    -  Ciprofloxacin: S <=1    -  Cefoxitin: R <=8    -  Cefepime: S <=2    -  Cefazolin: R >16 Enterobacter, Citrobacter, and Serratia may develop resistance during prolonged therapy (3-4 days)    -  Levofloxacin: S <=2    -  Ertapenem: S <=0.5    -  Gentamicin: S <=2    -  Meropenem: S <=1    -  Piperacillin/Tazobactam: S <=8    -  Trimethoprim/Sulfamethoxazole: S <=2/38    -  Tobramycin: S <=2    -  Amikacin: S <=16    -  Aztreonam: S <=4    -  Ampicillin: R 16 These ampicillin results predict results for amoxicillin    Culture - Urine (01.19.20 @ 17:45)    Specimen Source: .Urine CATHETERIZED    Culture Results:   >100,000 CFU/ml Klebsiella pneumoniae ESBL    -  Tobramycin: R >8    -  Trimethoprim/Sulfamethoxazole: R >2/38    -  Tigecycline: S <=2    -  Piperacillin/Tazobactam: R <=16    -  Nitrofurantoin: S <=32 Should not be used to treat pyelonephritis    -  Meropenem: S <=1    -  Gentamicin: R >8    -  Ertapenem: S <=1    -  Levofloxacin: R >4    -  Imipenem: S <=1    -  Cefazolin: R >16 (MIC_CL_COM_[email or web address]) For uncomplicated UTI with K. pneumoniae, E. coli, or P. mirablis: GALINDO <=16 is sensitive and GALINDO >=32 is resistant. This also predicts results for oral agents cefaclor, cefdinir, cefpodoxime, cefprozil, cefuroxime axetil, cephalexin and locarbef for uncomplicated UTI. Note that some isolates may be susceptible to these agents while testing resistant to cefazolin.    -  Cefepime: R >16    -  Cefoxitin: S <=8    -  Ciprofloxacin: R >2    -  Ampicillin: R >16 These ampicillin results predict results for amoxicillin    -  Aztreonam: R >16    -  Amikacin: S <=16    -  Ampicillin/Sulbactam: R >16/8 Enterobacter, Citrobacter, and Serratia may develop resistance during prolonged therapy (3-4 days)    -  Ceftriaxone: R >32 Enterobacter, Citrobacter, and Serratia may develop resistance during prolonged therapy    Rapid RVP Result: NotDetec (01-24 @ 05:54)    RADIOLOGY:  Images below reviewed personally  CT Chest No Cont (01.19.20 @ 19:03)   Motion limited exam.  No displaced rib fracture. No pneumothorax.  The left lower lobe bronchus and segmental branches of right lower lobe bronchus are obliterated by retained secretions. Patchy opacities within the bilateral lower lobes may represent pneumonia or aspiration pneumonitis.  Cardiomegaly. Aortic valve and mitral annulus calcification. Coronary atherosclerosis. Follow Up: Sepsis    Interval History/ROS: Sleeping today. Overall better per daughter at bedside, more energy. No fever overnight.     Allergies  codeine (Rash)  erythromycin (Rash)  iv dye (Rash; Anaphylaxis; Short breath)  penicillin (Rash)  shellfish (Rash)    ANTIMICROBIALS:  ertapenem  IVPB 500 every 24 hours  micafungin IVPB    micafungin IVPB 100 every 24 hours  rifAXIMin 550 two times a day    OTHER MEDS:  albuterol/ipratropium for Nebulization 3 milliLiter(s) Nebulizer every 6 hours  chlorhexidine 4% Liquid 1 Application(s) Topical <User Schedule>  dextrose 5% + sodium chloride 0.45%. 1000 milliLiter(s) IV Continuous <Continuous>  digoxin     Tablet 0.125 milliGRAM(s) Oral every other day  lactulose Syrup 10 Gram(s) Oral four times a day  levothyroxine Injectable 12.5 MICROGram(s) IV Push at bedtime  midodrine 20 milliGRAM(s) Oral every 8 hours    Vital Signs Last 24 Hrs  T(C): 36.3 (27 Jan 2020 04:31), Max: 36.5 (26 Jan 2020 12:15)  T(F): 97.3 (27 Jan 2020 04:31), Max: 97.7 (26 Jan 2020 12:15)  HR: 81 (27 Jan 2020 04:31) (76 - 91)  BP: 96/62 (27 Jan 2020 04:31) (94/55 - 117/65)  BP(mean): --  RR: 18 (27 Jan 2020 04:31) (18 - 18)  SpO2: 96% (27 Jan 2020 04:31) (94% - 97%)    Physical Exam:   General: elderly, sleeping, wakes up briefly, non toxic  Head: atraumatic, normocephalic  ENT: NG tube. no cervical lymphadenopathy   Cardio: regular rate and rhythm   Respiratory: nonlabored on room air (face mask was off), scattered rhonchi but overall clear bilaterally, no wheezing  abd: soft, bowel sounds present, no tenderness  Musculoskeletal: no joint swelling, no edema  Skin: ecchymosis, skin tears                           10.2   7.35  )-----------( 92       ( 26 Jan 2020 05:40 )             32.8       01-26    143  |  110<H>  |  39<H>  ----------------------------<  105<H>  5.3   |  22  |  1.03    Ca    9.0      26 Jan 2020 05:40  Phos  3.5     01-26  Mg     1.8     01-26    TPro  4.5<L>  /  Alb  2.0<L>  /  TBili  1.4<H>  /  DBili  x   /  AST  29  /  ALT  18  /  AlkPhos  110  01-26      MICROBIOLOGY:  Culture - Blood (collected 01-24-20 @ 05:14)  Source: .Blood Blood-Venous  Preliminary Report (01-25-20 @ 06:01):    No growth to date.    Culture - Blood (collected 01-24-20 @ 05:14)  Source: .Blood Blood-Peripheral  Preliminary Report (01-25-20 @ 06:01):    No growth to date.    Culture - Blood (collected 01-21-20 @ 23:03)  Source: .Blood Blood-Venous  Final Report (01-27-20 @ 01:01):    No growth at 5 days.    Culture - Blood (collected 01-21-20 @ 15:02)  Source: .Blood Blood-Peripheral  Gram Stain (01-25-20 @ 19:04):    Growth in anaerobic bottle: Gram Negative Rods    Growth in aerobic bottle: Yeast like cells  Preliminary Report (01-26-20 @ 21:53):    Growth in anaerobic bottle: Serratia marcescens    See previous culture [id number]    Growth in aerobic bottle: Candida glabrata Referred to Mycology    Culture - Sputum (collected 01-20-20 @ 15:19)  Source: .Sputum Sputum  Gram Stain (01-20-20 @ 19:21):    Moderate polymorphonuclear leukocytes per low power field    Numerous Squamous epithelial cells per low power field    Numerous Gram Negative Rods per oil power field    Moderate Yeast like cells per oil power field  Final Report (01-22-20 @ 17:11):    Moderate Serratia marcescens    Normal Respiratory Stefanie present      -  Amikacin: S <=16      -  Amoxicillin/Clavulanic Acid: R >16/8      -  Ampicillin: R <=8 These ampicillin results predict results for amoxicillin      -  Ampicillin/Sulbactam: R 8/4 Enterobacter, Citrobacter, and Serratia may develop resistance during prolonged therapy (3-4 days)      -  Aztreonam: S <=4      -  Cefazolin: R >16 Enterobacter, Citrobacter, and Serratia may develop resistance during prolonged therapy (3-4 days)      -  Cefepime: S <=2      -  Cefoxitin: R <=8      -  Ceftriaxone: S <=1 Enterobacter, Citrobacter, and Serratia may develop resistance during prolonged therapy      -  Ciprofloxacin: S <=1      -  Ertapenem: S <=0.5      -  Gentamicin: S <=2      -  Levofloxacin: S <=2      -  Meropenem: S <=1      -  Piperacillin/Tazobactam: S <=8      -  Tobramycin: S 4      -  Trimethoprim/Sulfamethoxazole: S <=2/38      Method Type: GALINDO    Culture - Blood (01.19.20 @ 22:02)    Gram Stain:   Growth in anaerobic bottle: Gram Negative Rods  Growth in aerobic bottle: Gram Negative Rods    Specimen Source: .Blood Blood-Peripheral    Culture Results:   Growth in aerobic and anaerobic bottles: Serratia marcescens  See previous culture [id number]    Culture - Blood (01.19.20 @ 22:02)  Culture Results:   Growth in aerobic and anaerobic bottles: Serratia marcescens    -  Ampicillin/Sulbactam: R 8/4 Enterobacter, Citrobacter, and Serratia may develop resistance during prolonged therapy (3-4 days)    -  Ceftriaxone: S <=1 Enterobacter, Citrobacter, and Serratia may develop resistance during prolonged therapy    -  Ciprofloxacin: S <=1    -  Cefoxitin: R <=8    -  Cefepime: S <=2    -  Cefazolin: R >16 Enterobacter, Citrobacter, and Serratia may develop resistance during prolonged therapy (3-4 days)    -  Levofloxacin: S <=2    -  Ertapenem: S <=0.5    -  Gentamicin: S <=2    -  Meropenem: S <=1    -  Piperacillin/Tazobactam: S <=8    -  Trimethoprim/Sulfamethoxazole: S <=2/38    -  Tobramycin: S <=2    -  Amikacin: S <=16    -  Aztreonam: S <=4    -  Ampicillin: R 16 These ampicillin results predict results for amoxicillin    Culture - Urine (01.19.20 @ 17:45)    Specimen Source: .Urine CATHETERIZED    Culture Results:   >100,000 CFU/ml Klebsiella pneumoniae ESBL    -  Tobramycin: R >8    -  Trimethoprim/Sulfamethoxazole: R >2/38    -  Tigecycline: S <=2    -  Piperacillin/Tazobactam: R <=16    -  Nitrofurantoin: S <=32 Should not be used to treat pyelonephritis    -  Meropenem: S <=1    -  Gentamicin: R >8    -  Ertapenem: S <=1    -  Levofloxacin: R >4    -  Imipenem: S <=1    -  Cefazolin: R >16 (MIC_CL_COM_ENTERIC_CEFAZU) For uncomplicated UTI with K. pneumoniae, E. coli, or P. mirablis: GALINDO <=16 is sensitive and GALINDO >=32 is resistant. This also predicts results for oral agents cefaclor, cefdinir, cefpodoxime, cefprozil, cefuroxime axetil, cephalexin and locarbef for uncomplicated UTI. Note that some isolates may be susceptible to these agents while testing resistant to cefazolin.    -  Cefepime: R >16    -  Cefoxitin: S <=8    -  Ciprofloxacin: R >2    -  Ampicillin: R >16 These ampicillin results predict results for amoxicillin    -  Aztreonam: R >16    -  Amikacin: S <=16    -  Ampicillin/Sulbactam: R >16/8 Enterobacter, Citrobacter, and Serratia may develop resistance during prolonged therapy (3-4 days)    -  Ceftriaxone: R >32 Enterobacter, Citrobacter, and Serratia may develop resistance during prolonged therapy    Rapid RVP Result: NotDetec (01-24 @ 05:54)    RADIOLOGY:  Images below reviewed personally  CT Chest No Cont (01.19.20 @ 19:03)   Motion limited exam.  No displaced rib fracture. No pneumothorax.  The left lower lobe bronchus and segmental branches of right lower lobe bronchus are obliterated by retained secretions. Patchy opacities within the bilateral lower lobes may represent pneumonia or aspiration pneumonitis.  Cardiomegaly. Aortic valve and mitral annulus calcification. Coronary atherosclerosis.

## 2020-01-28 LAB
ALBUMIN SERPL ELPH-MCNC: 2.2 G/DL — LOW (ref 3.3–5)
ALP SERPL-CCNC: 128 U/L — HIGH (ref 40–120)
ALT FLD-CCNC: 14 U/L — SIGNIFICANT CHANGE UP (ref 10–45)
ANION GAP SERPL CALC-SCNC: 9 MMOL/L — SIGNIFICANT CHANGE UP (ref 5–17)
AST SERPL-CCNC: 30 U/L — SIGNIFICANT CHANGE UP (ref 10–40)
BASOPHILS # BLD AUTO: 0.04 K/UL — SIGNIFICANT CHANGE UP (ref 0–0.2)
BASOPHILS NFR BLD AUTO: 0.4 % — SIGNIFICANT CHANGE UP (ref 0–2)
BILIRUB SERPL-MCNC: 1.8 MG/DL — HIGH (ref 0.2–1.2)
BUN SERPL-MCNC: 31 MG/DL — HIGH (ref 7–23)
CALCIUM SERPL-MCNC: 9.2 MG/DL — SIGNIFICANT CHANGE UP (ref 8.4–10.5)
CHLORIDE SERPL-SCNC: 102 MMOL/L — SIGNIFICANT CHANGE UP (ref 96–108)
CO2 SERPL-SCNC: 23 MMOL/L — SIGNIFICANT CHANGE UP (ref 22–31)
CREAT SERPL-MCNC: 0.85 MG/DL — SIGNIFICANT CHANGE UP (ref 0.5–1.3)
EOSINOPHIL # BLD AUTO: 0.13 K/UL — SIGNIFICANT CHANGE UP (ref 0–0.5)
EOSINOPHIL NFR BLD AUTO: 1.2 % — SIGNIFICANT CHANGE UP (ref 0–6)
GLUCOSE BLDC GLUCOMTR-MCNC: 102 MG/DL — HIGH (ref 70–99)
GLUCOSE BLDC GLUCOMTR-MCNC: 104 MG/DL — HIGH (ref 70–99)
GLUCOSE BLDC GLUCOMTR-MCNC: 84 MG/DL — SIGNIFICANT CHANGE UP (ref 70–99)
GLUCOSE SERPL-MCNC: 86 MG/DL — SIGNIFICANT CHANGE UP (ref 70–99)
HCT VFR BLD CALC: 31.6 % — LOW (ref 34.5–45)
HGB BLD-MCNC: 10.1 G/DL — LOW (ref 11.5–15.5)
IMM GRANULOCYTES NFR BLD AUTO: 1.5 % — SIGNIFICANT CHANGE UP (ref 0–1.5)
LYMPHOCYTES # BLD AUTO: 1.11 K/UL — SIGNIFICANT CHANGE UP (ref 1–3.3)
LYMPHOCYTES # BLD AUTO: 10.2 % — LOW (ref 13–44)
MAGNESIUM SERPL-MCNC: 1.8 MG/DL — SIGNIFICANT CHANGE UP (ref 1.6–2.6)
MCHC RBC-ENTMCNC: 32 GM/DL — SIGNIFICANT CHANGE UP (ref 32–36)
MCHC RBC-ENTMCNC: 32.9 PG — SIGNIFICANT CHANGE UP (ref 27–34)
MCV RBC AUTO: 102.9 FL — HIGH (ref 80–100)
MONOCYTES # BLD AUTO: 0.96 K/UL — HIGH (ref 0–0.9)
MONOCYTES NFR BLD AUTO: 8.8 % — SIGNIFICANT CHANGE UP (ref 2–14)
NEUTROPHILS # BLD AUTO: 8.47 K/UL — HIGH (ref 1.8–7.4)
NEUTROPHILS NFR BLD AUTO: 77.9 % — HIGH (ref 43–77)
NRBC # BLD: 0 /100 WBCS — SIGNIFICANT CHANGE UP (ref 0–0)
PHOSPHATE SERPL-MCNC: 2.9 MG/DL — SIGNIFICANT CHANGE UP (ref 2.5–4.5)
PLATELET # BLD AUTO: 126 K/UL — LOW (ref 150–400)
POTASSIUM SERPL-MCNC: 4.9 MMOL/L — SIGNIFICANT CHANGE UP (ref 3.5–5.3)
POTASSIUM SERPL-SCNC: 4.9 MMOL/L — SIGNIFICANT CHANGE UP (ref 3.5–5.3)
PROT SERPL-MCNC: 5 G/DL — LOW (ref 6–8.3)
RBC # BLD: 3.07 M/UL — LOW (ref 3.8–5.2)
RBC # FLD: 15.3 % — HIGH (ref 10.3–14.5)
SODIUM SERPL-SCNC: 134 MMOL/L — LOW (ref 135–145)
WBC # BLD: 10.87 K/UL — HIGH (ref 3.8–10.5)
WBC # FLD AUTO: 10.87 K/UL — HIGH (ref 3.8–10.5)

## 2020-01-28 PROCEDURE — 99232 SBSQ HOSP IP/OBS MODERATE 35: CPT

## 2020-01-28 PROCEDURE — 99233 SBSQ HOSP IP/OBS HIGH 50: CPT | Mod: GC

## 2020-01-28 RX ADMIN — HEPARIN SODIUM 5000 UNIT(S): 5000 INJECTION INTRAVENOUS; SUBCUTANEOUS at 18:22

## 2020-01-28 RX ADMIN — MICAFUNGIN SODIUM 105 MILLIGRAM(S): 100 INJECTION, POWDER, LYOPHILIZED, FOR SOLUTION INTRAVENOUS at 08:59

## 2020-01-28 RX ADMIN — Medication 0.12 MILLIGRAM(S): at 13:49

## 2020-01-28 RX ADMIN — MIDODRINE HYDROCHLORIDE 20 MILLIGRAM(S): 2.5 TABLET ORAL at 21:55

## 2020-01-28 RX ADMIN — CHLORHEXIDINE GLUCONATE 1 APPLICATION(S): 213 SOLUTION TOPICAL at 09:04

## 2020-01-28 RX ADMIN — MIDODRINE HYDROCHLORIDE 20 MILLIGRAM(S): 2.5 TABLET ORAL at 06:15

## 2020-01-28 RX ADMIN — ERTAPENEM SODIUM 100 MILLIGRAM(S): 1 INJECTION, POWDER, LYOPHILIZED, FOR SOLUTION INTRAMUSCULAR; INTRAVENOUS at 07:02

## 2020-01-28 RX ADMIN — Medication 3 MILLILITER(S): at 18:22

## 2020-01-28 RX ADMIN — SODIUM CHLORIDE 50 MILLILITER(S): 9 INJECTION, SOLUTION INTRAVENOUS at 21:57

## 2020-01-28 RX ADMIN — HEPARIN SODIUM 5000 UNIT(S): 5000 INJECTION INTRAVENOUS; SUBCUTANEOUS at 06:23

## 2020-01-28 RX ADMIN — MIDODRINE HYDROCHLORIDE 20 MILLIGRAM(S): 2.5 TABLET ORAL at 13:50

## 2020-01-28 RX ADMIN — Medication 3 MILLILITER(S): at 13:48

## 2020-01-28 RX ADMIN — Medication 3 MILLILITER(S): at 06:15

## 2020-01-28 RX ADMIN — Medication 12.5 MICROGRAM(S): at 21:54

## 2020-01-28 RX ADMIN — LACTULOSE 10 GRAM(S): 10 SOLUTION ORAL at 13:49

## 2020-01-28 RX ADMIN — LACTULOSE 10 GRAM(S): 10 SOLUTION ORAL at 18:22

## 2020-01-28 RX ADMIN — LACTULOSE 10 GRAM(S): 10 SOLUTION ORAL at 06:16

## 2020-01-28 NOTE — CONSULT NOTE ADULT - ASSESSMENT
Impression:    Right anterior thigh skin tear Navarrete-Reji 2a  Atrophic skin    Recommend:  1.) Topical Therapy: Right anterior thigh skin tear - cleanse with NS, pat dry, apply adaptic Q 3 days  2.) Emollient therapy: Moisturize intact skin w/ SWEEN cream daily  3.) Nutrition optimization  4.) Consider protective garments for BUE and BLE, i.e. long sleeves and pants, remove sharp edges, obstacles from environment    Care as per medicine. will not follow. please recall for new issues.  Upon discharge f[facility]u [facility] outpatient [facility] [address] [place] [address] [phone]  Seen with Dr. Boswell and discussed with clinical nurse  Thank you for this consult  Charlotte Mann, NP-c, CWOCN [id number]

## 2020-01-28 NOTE — PROGRESS NOTE ADULT - ASSESSMENT
89F with AF on digoxin (not on AC due to esophageal varices), severe AS (not a candidate for replacement), COPD, CLL, HCC with portal HTN c/b esophageal varices s/p banding, CKD3 (baseline Cr 1.2) and PVD.  A/W septic shock 2/2 serratia bacteremia from urosepsis vs. PNA.       Impressions:   Septic xhock 2/2 serratia bacteremia and PNA: resolved  Persistent atrial fibrillation: rate-controlled  Severe Aortic Stenosis (TAMRA 0..4sqcm, mean gradient 58mmHg)  Moderate MS/Moderate-Severe Mitral Regurgitation    Recommendations:  1: Hold spironolactone given low normal SBP  2: Continue digoxin for rate controlled AF  3: Not candidate for A/C given history of esophageal varices and cerebral hemorrhage on warfarin  4. Discussed possibility of feeding tube with patient, her daughter and with hospitalist.  Patient's daughter is ambivalent, but would not rule out feeding tube if needed; to consider f/u swallowing test. 89F with AF on digoxin (not on AC due to esophageal varices), severe AS (not a candidate for replacement), moderate MS/moderate-severe mitral regurgitation, COPD, CLL, HCC with portal HTN c/b esophageal varices s/p banding, CKD3 (baseline Cr 1.2) and PVD.  A/W septic shock 2/2 serratia bacteremia from urosepsis vs. PNA.       REC:  1.  Persistent atrial fibrillation  - rate remains controlled, continue digoxin at current dose.  -  not candidate for A/C given history of esophageal varices and cerebral hemorrhage on warfarin    2.  Severe Aortic Stenosis (TAMRA 0..4sqcm, mean gradient 58mmHg)  - not a candidate for intervention.    3.  Hx. of CHF  - continue to hold spironolactone for now given low normal SBP      Daniel Boston M.D.  Office: [phone]  Beeper: [phone]

## 2020-01-28 NOTE — CONSULT NOTE ADULT - SUBJECTIVE AND OBJECTIVE BOX
Wound Surgery Consult Note:    HPI:  Patient confused. Hx obtained from chart review and daughters Ephraim and Dee Dee at bedside.   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 Admitted 1/6-1/9 for weakness w/ mechanical fall with pre-renal KARRIE and resolution with IVF d/[facility]ohana to [facility]. [facility] and nonproductive cough and fever on day of presentation. Per family, patient had a fall out of bed found on the floor this AM. She has had waxing waning mentation over the last week. This typically happens when she has UTI. Of note[facility]  Noted to be confused from  in ED.     ED vitals: Tmax 101.2 (r), , BP 80/50 with maps <65, RR 22-24, SpO2 99% on 3L      Sig labs: leukocytosis at 12.77 (60% neut), INR 1.44, hypernatremia 146, hyperkalemia 5.5, bicarb 18, BUN 52, Cr 1.69, venous pH 7.38, lactate 3.6. UA post for nitrites and many bacteria CXR w/ RLL pneumonia, CT chest with bb patchy opacities PNA vs asp pneumonitis     ED course: s/p 1.25 L LR, s/p vancomycin and meropenem x 1, stress dose steroids. Patient remained hypotensive after IVH started on levophed. Admitted to MICU for septic shock 2/2 PNA and UTI. (19 Jan 2020 20:56)    Ms. Bustamante was encountered in an alternating air with low air loss surface sleeping and difficult to arouse. Her daughter was at the bedside and gave an account of the patient's history and impression of review of systems. Ms. Bustamante has a history of frequent skin tears on BLEs and BUEs. The skin tear on her right anterior thigh is new since this admission as per her daughter. She was seen with Dr. Boswell.    PAST MEDICAL & SURGICAL HISTORY:  PVD (peripheral vascular disease)  Liver cell carcinoma  Severe aortic stenosis by prior echocardiogram  Pulmonary HTN: moderate  CKD (chronic kidney disease), stage 3 (moderate)  COPD (Chronic Obstructive Pulmonary Disease)  AF (Atrial Fibrillation)  Portal Hypertension  Bleeding Esophageal Varices  CLL (Chronic Lymphoblastic Leukemia)  GIB (Gastrointestinal Bleeding)  History of repair of hip fracture  Esophageal Rupture      REVIEW OF SYSTEMS  General:	recent fevers  Respiratory and Thorax: recent SOB and dry cough	  Cardiovascular:	no CP, severe aortic stenosis  Gastrointestinal:	no n/v  Genitourinary:	recurrent UTIs  Musculoskeletal:	 fall OOB at rehab prior to admission  Neurological: AMS	  Hematology/Lymphatics:	 CLL, Hepatic Cell Ca  Vascular: PVD  Skin: frequent skin tears	    MEDICATIONS  (STANDING):  albuterol/ipratropium for Nebulization 3 milliLiter(s) Nebulizer every 6 hours  chlorhexidine 4% Liquid 1 Application(s) Topical <User Schedule>  dextrose 5% + sodium chloride 0.45%. 1000 milliLiter(s) (50 mL/Hr) IV Continuous <Continuous>  digoxin     Tablet 0.125 milliGRAM(s) Oral every other day  ertapenem  IVPB 500 milliGRAM(s) IV Intermittent every 24 hours  heparin  Injectable 5000 Unit(s) SubCutaneous two times a day  lactulose Syrup 10 Gram(s) Oral four times a day  levothyroxine Injectable 12.5 MICROGram(s) IV Push at bedtime  micafungin IVPB      micafungin IVPB 100 milliGRAM(s) IV Intermittent every 24 hours  midodrine 20 milliGRAM(s) Oral every 8 hours  rifAXIMin 550 milliGRAM(s) Oral two times a day    MEDICATIONS  (PRN):    Allergies    codeine (Rash)  erythromycin (Rash)  iv dye (Rash; Anaphylaxis; Short breath)  penicillin (Rash)  shellfish (Rash)    Intolerances    adhesives (Other)  warfarin (Other)    SOCIAL HISTORY:  lives with adult children; Denies smoking, ETOH, drugs    FAMILY HISTORY:  FH: Alzheimers disease    Vital Signs Last 24 Hrs  T(C): 36.4 (28 Jan 2020 04:16), Max: 36.4 (27 Jan 2020 20:23)  T(F): 97.6 (28 Jan 2020 04:16), Max: 97.6 (27 Jan 2020 20:23)  HR: 75 (28 Jan 2020 04:16) (75 - 82)  BP: 117/70 (28 Jan 2020 04:16) (117/47 - 117/70)  BP(mean): --  RR: 18 (28 Jan 2020 04:16) (18 - 18)  SpO2: 97% (28 Jan 2020 04:16) (95% - 97%)    Physical Exam:  General: somnolent, cachectic, frail  ENMT: NGT in right nare in place  Respiratory: no SOB with O2 face tent applied  Gastrointestinal soft NT/ND   Neurology: minimally verbal, not following commands  Musculoskeletal: no contractures or deformities  Vascular: no BLE edema, BLE equally warm, no acute ischemia noted  Skin: BLE hemosiderin staining, skin with atrophy, scattered ecchymosis w/o hematoma on BLE and BUEs  Right anterior thigh with skin tear with skin flap 95% in place and 5% covered with dry, firm, fixed scab, no drainage, weeping from area of intact skin on posterior right lower leg, No odor, erythema, increased warmth, tenderness, induration, fluctuance    LABS:  01-28    134<L>  |  102  |  31<H>  ----------------------------<  86  4.9   |  23  |  0.85    Ca    9.2      28 Jan 2020 06:34  Phos  2.9     01-28  Mg     1.8     01-28    TPro  5.0<L>  /  Alb  2.2<L>  /  TBili  1.8<H>  /  DBili  x   /  AST  30  /  ALT  14  /  AlkPhos  128<H>  01-28                          10.1   10.87 )-----------( 126      ( 28 Jan 2020 06:34 )             31.6

## 2020-01-28 NOTE — PROGRESS NOTE ADULT - ATTENDING COMMENTS
a bit more encephalopathic today, can tell me name  last BM 3pm 1/27, would titrate up lactulose to 4 BMS today  monitor closely  defer MBS for now  rest of plan as above

## 2020-01-28 NOTE — PROGRESS NOTE ADULT - SUBJECTIVE AND OBJECTIVE BOX
Follow Up:  PNA, bacteremia, fungemia     Interval History/ROS: Afebrile overnight. More awake and alert today. Not coughing much anymore. Not feeling short of breath. No abdominal pain or diarrhea.     Allergies  codeine (Rash)  erythromycin (Rash)  iv dye (Rash; Anaphylaxis; Short breath)  penicillin (Rash)  shellfish (Rash)        ANTIMICROBIALS:  ertapenem  IVPB 500 every 24 hours  micafungin IVPB    micafungin IVPB 100 every 24 hours  rifAXIMin 550 two times a day      OTHER MEDS:  albuterol/ipratropium for Nebulization 3 milliLiter(s) Nebulizer every 6 hours  chlorhexidine 4% Liquid 1 Application(s) Topical <User Schedule>  dextrose 5% + sodium chloride 0.45%. 1000 milliLiter(s) IV Continuous <Continuous>  digoxin     Tablet 0.125 milliGRAM(s) Oral every other day  heparin  Injectable 5000 Unit(s) SubCutaneous two times a day  lactulose Syrup 10 Gram(s) Oral four times a day  levothyroxine Injectable 12.5 MICROGram(s) IV Push at bedtime  midodrine 20 milliGRAM(s) Oral every 8 hours      Vital Signs Last 24 Hrs  T(C): 36.4 (28 Jan 2020 12:00), Max: 36.4 (27 Jan 2020 20:23)  T(F): 97.5 (28 Jan 2020 12:00), Max: 97.6 (27 Jan 2020 20:23)  HR: 88 (28 Jan 2020 12:00) (75 - 88)  BP: 105/67 (28 Jan 2020 12:00) (105/67 - 117/70)  BP(mean): --  RR: 19 (28 Jan 2020 12:00) (18 - 19)  SpO2: 96% (28 Jan 2020 12:00) (95% - 97%)    Physical Exam:  General: elderly, frail, fatigued but more awake and energetic than yesterday   Head: atraumatic, normocephalic  Eye: normal sclera and conjunctiva  Cardio: regular rate and rhythm   Respiratory: nonlabored on supplemental oxygen via facemask. scattered rhonchi bilaterally  abd: soft, bowel sounds present, no tenderness  Musculoskeletal: no joint swelling, no edema  Skin: ecchymosis, fragile skin, superficial tears   Neurologic: no focal deficit  psych: normal affect                          10.1   10.87 )-----------( 126      ( 28 Jan 2020 06:34 )             31.6       01-28    134<L>  |  102  |  31<H>  ----------------------------<  86  4.9   |  23  |  0.85    Ca    9.2      28 Jan 2020 06:34  Phos  2.9     01-28  Mg     1.8     01-28    TPro  5.0<L>  /  Alb  2.2<L>  /  TBili  1.8<H>  /  DBili  x   /  AST  30  /  ALT  14  /  AlkPhos  128<H>  01-28    MICROBIOLOGY:  Culture - Blood (collected 01-24-20 @ 05:14)  Source: .Blood Blood-Venous  Preliminary Report (01-25-20 @ 06:01):    No growth to date.    Culture - Blood (collected 01-24-20 @ 05:14)  Source: .Blood Blood-Peripheral  Preliminary Report (01-25-20 @ 06:01):    No growth to date.    Culture - Blood (collected 01-21-20 @ 23:03)  Source: .Blood Blood-Venous  Final Report (01-27-20 @ 01:01):    No growth at 5 days.    Culture - Blood (01.21.20 @ 15:02)    Gram Stain:   Growth in anaerobic bottle: Gram Negative Rods  Growth in aerobic bottle: Yeast like cells    -  Candida glabrata: Detec    Specimen Source: .Blood Blood-Peripheral    Organism: Blood Culture PCR    Culture Results:   Growth in anaerobic bottle: Serratia marcescens  See previous culture [id number]  Growth in aerobic bottle: Candida glabrata Susceptibility to follow.    Rapid RVP Result: NotDetec (01-24 @ 05:54)    RADIOLOGY:  Images below reviewed personally  CT Chest No Cont (01.19.20 @ 19:03)   Motion limited exam.  No displaced rib fracture. No pneumothorax.  The left lower lobe bronchus and segmental branches of right lower lobe bronchus are obliterated by retained secretions. Patchy opacities within the bilateral lower lobes may represent pneumonia or aspiration pneumonitis.  Cardiomegaly. Aortic valve and mitral annulus calcification. Coronary atherosclerosis.

## 2020-01-28 NOTE — PROGRESS NOTE ADULT - PROBLEM SELECTOR PLAN 2
- AOx2 today  - multifactorial likely secondary to recent sepsis, ICU delirium, portosystemic encephalopathy   - c/b nausea/vomiting now with NGT on tube feeds  - will maintain NGT while patient is w/ metabolic encephalopathy for oral access  - ongoing discussion if pt would be amenable to PEG tube if needed in future. daughter wanted to defer s/s given acute illness, will continue to f/u goc w/ family. pursue MBS when amenability to PEG tube clarified  - given high risk for thromboembolic events, will repeat CTH if mental status does not improve --would defer for now given improvements

## 2020-01-28 NOTE — PROGRESS NOTE ADULT - SUBJECTIVE AND OBJECTIVE BOX
Alexei Hess MD, PGY-1  Pager #[phone]  After 7 PM on weekdays and 12 PM on weekends, for urgent issues please page #[phone].  ----------------------------------------------------------------  Patient is a 89y old  Female who presents with a chief complaint of sepsis 2/2 to UTI vs PNA (27 Jan 2020 08:25)      SUBJECTIVE / OVERNIGHT EVENTS: No acute events overnight. Pt seen and examined at bedside. Pt denies any acute complaints including headache, fever, chills, nausea, vomiting, diarrhea, constipation, chest pain, shortness of breath.     MEDICATIONS  (STANDING):  albuterol/ipratropium for Nebulization 3 milliLiter(s) Nebulizer every 6 hours  chlorhexidine 4% Liquid 1 Application(s) Topical <User Schedule>  dextrose 5% + sodium chloride 0.45%. 1000 milliLiter(s) (50 mL/Hr) IV Continuous <Continuous>  digoxin     Tablet 0.125 milliGRAM(s) Oral every other day  ertapenem  IVPB 500 milliGRAM(s) IV Intermittent every 24 hours  heparin  Injectable 5000 Unit(s) SubCutaneous two times a day  lactulose Syrup 10 Gram(s) Oral four times a day  levothyroxine Injectable 12.5 MICROGram(s) IV Push at bedtime  micafungin IVPB      micafungin IVPB 100 milliGRAM(s) IV Intermittent every 24 hours  midodrine 20 milliGRAM(s) Oral every 8 hours  rifAXIMin 550 milliGRAM(s) Oral two times a day    MEDICATIONS  (PRN):      CAPILLARY BLOOD GLUCOSE  POCT Blood Glucose.: 84 mg/dL (28 Jan 2020 06:19)  POCT Blood Glucose.: 100 mg/dL (27 Jan 2020 23:37)  POCT Blood Glucose.: 98 mg/dL (27 Jan 2020 18:52)  POCT Blood Glucose.: 97 mg/dL (27 Jan 2020 12:06)    I&O's Summary    27 Jan 2020 07:01  -  28 Jan 2020 07:00  --------------------------------------------------------  IN: 1240 mL / OUT: 0 mL / NET: 1240 mL    PHYSICAL EXAM:  Vital Signs Last 24 Hrs  T(C): 36.4 (28 Jan 2020 04:16), Max: 36.4 (27 Jan 2020 11:50)  T(F): 97.6 (28 Jan 2020 04:16), Max: 97.6 (27 Jan 2020 11:50)  HR: 75 (28 Jan 2020 04:16) (73 - 82)  BP: 117/70 (28 Jan 2020 04:16) (100/63 - 117/70)  RR: 18 (28 Jan 2020 04:16) (18 - 18)  SpO2: 97% (28 Jan 2020 04:16) (95% - 97%)    CONSTITUTIONAL: NAD  EYES: EOMI  RESPIRATORY: CTABL  CARDIOVASCULAR: 3/6 harsh systolic murmur   ABDOMEN: soft, nt, nd  MUSCULOSKELETAL: no lower extremity swelling  PSYCH: AOx2 (does not know date but knows president)  NEUROLOGY: CN 2-12 are intact and symmetric; no gross sensory deficits   SKIN: skin tears b/l UE, b/l ecchymoses LE       LABS:                        10.1   10.87 )-----------( 126      ( 28 Jan 2020 06:34 )             31.6     01-28    134<L>  |  102  |  31<H>  ----------------------------<  86  4.9   |  23  |  0.85    Ca    9.2      28 Jan 2020 06:34  Phos  2.9     01-28  Mg     1.8     01-28    TPro  5.0<L>  /  Alb  2.2<L>  /  TBili  1.8<H>  /  DBili  x   /  AST  30  /  ALT  14  /  AlkPhos  128<H>  01-28    RADIOLOGY & ADDITIONAL TESTS:  Results Reviewed:   Imaging Personally Reviewed:  Electrocardiogram Personally Reviewed:    COORDINATION OF CARE:  Care Discussed with Consultants/Other Providers [Y/N]:  Prior or Outpatient Records Reviewed [Y/N]:

## 2020-01-28 NOTE — PROGRESS NOTE ADULT - PROBLEM SELECTOR PLAN 4
- cont digixin   - not a candidate for AC due to cirrhosis w/ esophageal varices and banding hx  - hold spironolactine per cards - cont digoxin   - not a candidate for AC due to cirrhosis w/ esophageal varices and banding hx  - hold spironolactone per cards

## 2020-01-28 NOTE — PROGRESS NOTE ADULT - SUBJECTIVE AND OBJECTIVE BOX
Medications:  albuterol/ipratropium for Nebulization 3 milliLiter(s) Nebulizer every 6 hours  chlorhexidine 4% Liquid 1 Application(s) Topical <User Schedule>  dextrose 5% + sodium chloride 0.45%. 1000 milliLiter(s) IV Continuous <Continuous>  digoxin     Tablet 0.125 milliGRAM(s) Oral every other day  ertapenem  IVPB 500 milliGRAM(s) IV Intermittent every 24 hours  heparin  Injectable 5000 Unit(s) SubCutaneous two times a day  lactulose Syrup 10 Gram(s) Oral four times a day  levothyroxine Injectable 12.5 MICROGram(s) IV Push at bedtime  micafungin IVPB      micafungin IVPB 100 milliGRAM(s) IV Intermittent every 24 hours  midodrine 20 milliGRAM(s) Oral every 8 hours  rifAXIMin 550 milliGRAM(s) Oral two times a day    PMH/PSH/FH/SH:  Unchanged    ROS:  Unchanged    Vitals:  T(C): 36.4 (20 @ 04:16), Max: 36.4 (20 @ 20:23)  HR: 75 (20 @ 04:16) (75 - 82)  BP: 117/70 (20 @ 04:16) (117/47 - 117/70)  RR: 18 (20 @ 04:16) (18 - 18)  SpO2: 97% (20 @ 04:16) (95% - 97%)  Daily Weight in k.2 (2020 04:16)      Physical Exam:  General: No distress. Comfortable  HEENT: EOMI	  Neck: JVP not elevated  Chest: Clear to auscultation bilaterally  CV: Irregularly Irregulr. Normal S1 and S2. No murmurs, rubs, or gallops. No edema.  Abdomen: Soft, non-distended, non-tender  Skin: No rashes, ecchymoses, or skin breakdown  Extremities: Warm.  Neuro: Alert and oriented x 3. No focal deficits.  Psych: Mood and affect appropriate      I&O's Summary  2020 07:01  -  2020 07:00  --------------------------------------------------------  IN: 1240 mL / OUT: 0 mL / NET: 1240 mL      LABS:                      10.1   10.87 )-----------( 126      ( 2020 06:34 )             31.6       134<L>  |  102  |  31<H>  ----------------------------<  86  4.9   |  23  |  0.85    Ca    9.2      2020 06:34  Phos  2.9       Mg     1.8         TPro  5.0<L>  /  Alb  2.2<L>  /  TBili  1.8<H>  /  DBili  x   /  AST  30  /  ALT  14  /  AlkPhos  128<H>   Alert, no distress.  No cardiac sxs; no CP, palps or dyspnea       Medications:  albuterol/ipratropium for Nebulization 3 milliLiter(s) Nebulizer every 6 hours  chlorhexidine 4% Liquid 1 Application(s) Topical <User Schedule>  dextrose 5% + sodium chloride 0.45%. 1000 milliLiter(s) IV Continuous <Continuous>  digoxin     Tablet 0.125 milliGRAM(s) Oral every other day  ertapenem  IVPB 500 milliGRAM(s) IV Intermittent every 24 hours  heparin  Injectable 5000 Unit(s) SubCutaneous two times a day  lactulose Syrup 10 Gram(s) Oral four times a day  levothyroxine Injectable 12.5 MICROGram(s) IV Push at bedtime  micafungin IVPB      micafungin IVPB 100 milliGRAM(s) IV Intermittent every 24 hours  midodrine 20 milliGRAM(s) Oral every 8 hours  rifAXIMin 550 milliGRAM(s) Oral two times a day    PMH/PSH/FH/SH:  Unchanged    ROS:  Unchanged    Vitals:  T(C): 36.4 (20 @ 04:16), Max: 36.4 (20 @ 20:23)  HR: 75 (20 @ 04:16) (75 - 82)  BP: 117/70 (20 @ 04:16) (117/47 - 117/70)  RR: 18 (20 @ 04:16) (18 - 18)  SpO2: 97% (20 @ 04:16) (95% - 97%)  Daily Weight in k.2 (2020 04:16)      Physical Exam:  General: No distress. Comfortable  HEENT: EOMI	  Neck: JVP not elevated  Chest: Clear to auscultation bilaterally  CV: Irregularly Irregulr. Normal S1 and S2. No murmurs, rubs, or gallops. No edema.  Abdomen: Soft, non-distended, non-tender  Skin: No rashes, ecchymoses, or skin breakdown  Extremities: Warm.  Neuro: Alert and oriented x 3. No focal deficits.  Psych: Mood and affect appropriate      TELE:  A. fib with moderate VR 70 - 90; occasional VPCs      LABS:                      10.1   10.87 )-----------( 126      ( 2020 06:34 )             31.6       134<L>  |  102  |  31<H>  ----------------------------<  86  4.9   |  23  |  0.85    Ca    9.2      2020 06:34  Phos  2.9       Mg     1.8         TPro  5.0<L>  /  Alb  2.2<L>  /  TBili  1.8<H>  /  DBili  x   /  AST  30  /  ALT  14  /  AlkPhos  128<H>

## 2020-01-28 NOTE — PROGRESS NOTE ADULT - ASSESSMENT
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 Pt last admit 1/6-9 s/p fall OOB now re-admitted from [facility] w AMS 2/2 Septic Shock 2/2 Serratia Bacteremia / Klebsiella ESBL urosepsis, and PNA w Serratia in the sputum, with candida glabrata in blood cx from 1/21.

## 2020-01-28 NOTE — PROGRESS NOTE ADULT - PROBLEM SELECTOR PLAN 1
2/2 serratia bacteremia & sputum cx with serratia and klebsiella ESBL UTI sensitive to meropenem IV, and candida glabrata in 1/21  - ID recs appreciated- continue abx for 1 week (end 2/3), antifungal for 2 weeks (end 2/10) tentatively  - c/w ertapenem (1/26 - ). previously on meropenem IV (1/20-1/26).  - started on IV micafungin 100 mg daily (1/27- )  - on midodrine 20 tid, will down titrate as tolerated    # Bacterial PNA / gram negative and gram positive organisms  - c/w w abx as above  - chest PT, duonebs  - aspiration precautions  - maintain SaO2 > 92% w/ supplemental O2 PRN (on 3L face tent)    # Fungemia   - ophthalmology f/u as OP in 1 week, no signs of endophthalmitis  - c/w micafungin as above

## 2020-01-28 NOTE — PROGRESS NOTE ADULT - ASSESSMENT
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   Fungemia 1/21 with Candida glabrata, possible transient gut translocation in the setting of shock. Less likely from the central line which was in briefly. No ophthalmologic findings.   Transferred from MICU 1/25.   Cultures 1/24 negative.   Improving slowly     Suggest  -Ertapenem 500mg IV q24h, last day 2/3   -Micafungin 100mg IV q24h, last day 2/8, transition to PO depends on sensitivities   -f/u Candida susceptibilities     I will be away tomorrow, returning Thurs 1/30. Please call office [phone] if follow up is needed before then.     Daniel Medrano MD   Infectious Disease   Pager [phone]   After 5PM and on weekends please page fellow on call or call [phone]

## 2020-01-29 LAB
-  5-FLUCYTOSINE: SIGNIFICANT CHANGE UP
-  AMPHOTERICIN B: SIGNIFICANT CHANGE UP
-  ANIDULAFUNGIN: SIGNIFICANT CHANGE UP
-  CASPOFUNGIN: SIGNIFICANT CHANGE UP
-  FLUCONAZOLE: SIGNIFICANT CHANGE UP
-  INTERPRETATION: SIGNIFICANT CHANGE UP
-  ITRACONAZOLE: SIGNIFICANT CHANGE UP
-  MICAFUNGIN: SIGNIFICANT CHANGE UP
-  POSACONAZOLE: SIGNIFICANT CHANGE UP
-  VORICONAZOLE: SIGNIFICANT CHANGE UP
ALBUMIN SERPL ELPH-MCNC: 2 G/DL — LOW (ref 3.3–5)
ALP SERPL-CCNC: 131 U/L — HIGH (ref 40–120)
ALT FLD-CCNC: 12 U/L — SIGNIFICANT CHANGE UP (ref 10–45)
ANION GAP SERPL CALC-SCNC: 8 MMOL/L — SIGNIFICANT CHANGE UP (ref 5–17)
AST SERPL-CCNC: 28 U/L — SIGNIFICANT CHANGE UP (ref 10–40)
BASE EXCESS BLDV CALC-SCNC: 1.1 MMOL/L — SIGNIFICANT CHANGE UP (ref -2–2)
BASOPHILS # BLD AUTO: 0.04 K/UL — SIGNIFICANT CHANGE UP (ref 0–0.2)
BASOPHILS NFR BLD AUTO: 0.3 % — SIGNIFICANT CHANGE UP (ref 0–2)
BILIRUB SERPL-MCNC: 1.6 MG/DL — HIGH (ref 0.2–1.2)
BUN SERPL-MCNC: 26 MG/DL — HIGH (ref 7–23)
CA-I SERPL-SCNC: 1.31 MMOL/L — HIGH (ref 1.12–1.3)
CALCIUM SERPL-MCNC: 9.3 MG/DL — SIGNIFICANT CHANGE UP (ref 8.4–10.5)
CHLORIDE BLDV-SCNC: 103 MMOL/L — SIGNIFICANT CHANGE UP (ref 96–108)
CHLORIDE SERPL-SCNC: 103 MMOL/L — SIGNIFICANT CHANGE UP (ref 96–108)
CO2 BLDV-SCNC: 27 MMOL/L — SIGNIFICANT CHANGE UP (ref 22–30)
CO2 SERPL-SCNC: 21 MMOL/L — LOW (ref 22–31)
CREAT SERPL-MCNC: 0.8 MG/DL — SIGNIFICANT CHANGE UP (ref 0.5–1.3)
CULTURE RESULTS: SIGNIFICANT CHANGE UP
EOSINOPHIL # BLD AUTO: 0.09 K/UL — SIGNIFICANT CHANGE UP (ref 0–0.5)
EOSINOPHIL NFR BLD AUTO: 0.6 % — SIGNIFICANT CHANGE UP (ref 0–6)
GAS PNL BLDV: 130 MMOL/L — LOW (ref 135–145)
GAS PNL BLDV: SIGNIFICANT CHANGE UP
GAS PNL BLDV: SIGNIFICANT CHANGE UP
GLUCOSE BLDC GLUCOMTR-MCNC: 100 MG/DL — HIGH (ref 70–99)
GLUCOSE BLDC GLUCOMTR-MCNC: 105 MG/DL — HIGH (ref 70–99)
GLUCOSE BLDC GLUCOMTR-MCNC: 107 MG/DL — HIGH (ref 70–99)
GLUCOSE BLDC GLUCOMTR-MCNC: 116 MG/DL — HIGH (ref 70–99)
GLUCOSE BLDC GLUCOMTR-MCNC: 131 MG/DL — HIGH (ref 70–99)
GLUCOSE BLDV-MCNC: 108 MG/DL — HIGH (ref 70–99)
GLUCOSE SERPL-MCNC: 114 MG/DL — HIGH (ref 70–99)
HCO3 BLDV-SCNC: 26 MMOL/L — SIGNIFICANT CHANGE UP (ref 21–29)
HCT VFR BLD CALC: 30.5 % — LOW (ref 34.5–45)
HCT VFR BLDA CALC: 28 % — LOW (ref 39–50)
HGB BLD CALC-MCNC: 9.2 G/DL — LOW (ref 11.5–15.5)
HGB BLD-MCNC: 10 G/DL — LOW (ref 11.5–15.5)
IMM GRANULOCYTES NFR BLD AUTO: 1.2 % — SIGNIFICANT CHANGE UP (ref 0–1.5)
LACTATE BLDV-MCNC: 1.5 MMOL/L — SIGNIFICANT CHANGE UP (ref 0.7–2)
LYMPHOCYTES # BLD AUTO: 0.88 K/UL — LOW (ref 1–3.3)
LYMPHOCYTES # BLD AUTO: 6.3 % — LOW (ref 13–44)
MAGNESIUM SERPL-MCNC: 1.8 MG/DL — SIGNIFICANT CHANGE UP (ref 1.6–2.6)
MCHC RBC-ENTMCNC: 32.8 GM/DL — SIGNIFICANT CHANGE UP (ref 32–36)
MCHC RBC-ENTMCNC: 33 PG — SIGNIFICANT CHANGE UP (ref 27–34)
MCV RBC AUTO: 100.7 FL — HIGH (ref 80–100)
METHOD TYPE: SIGNIFICANT CHANGE UP
MONOCYTES # BLD AUTO: 1.19 K/UL — HIGH (ref 0–0.9)
MONOCYTES NFR BLD AUTO: 8.6 % — SIGNIFICANT CHANGE UP (ref 2–14)
NEUTROPHILS # BLD AUTO: 11.53 K/UL — HIGH (ref 1.8–7.4)
NEUTROPHILS NFR BLD AUTO: 83 % — HIGH (ref 43–77)
NRBC # BLD: 0 /100 WBCS — SIGNIFICANT CHANGE UP (ref 0–0)
ORGANISM # SPEC MICROSCOPIC CNT: SIGNIFICANT CHANGE UP
OTHER CELLS CSF MANUAL: 9 ML/DL — LOW (ref 18–22)
PCO2 BLDV: 42 MMHG — SIGNIFICANT CHANGE UP (ref 35–50)
PH BLDV: 7.4 — SIGNIFICANT CHANGE UP (ref 7.35–7.45)
PHOSPHATE SERPL-MCNC: 2.8 MG/DL — SIGNIFICANT CHANGE UP (ref 2.5–4.5)
PLATELET # BLD AUTO: 149 K/UL — LOW (ref 150–400)
PO2 BLDV: 42 MMHG — SIGNIFICANT CHANGE UP (ref 25–45)
POTASSIUM BLDV-SCNC: 4.2 MMOL/L — SIGNIFICANT CHANGE UP (ref 3.5–5.3)
POTASSIUM SERPL-MCNC: 4.4 MMOL/L — SIGNIFICANT CHANGE UP (ref 3.5–5.3)
POTASSIUM SERPL-SCNC: 4.4 MMOL/L — SIGNIFICANT CHANGE UP (ref 3.5–5.3)
PROT SERPL-MCNC: 5.1 G/DL — LOW (ref 6–8.3)
RAPID RVP RESULT: SIGNIFICANT CHANGE UP
RBC # BLD: 3.03 M/UL — LOW (ref 3.8–5.2)
RBC # FLD: 15.2 % — HIGH (ref 10.3–14.5)
SAO2 % BLDV: 72 % — SIGNIFICANT CHANGE UP (ref 67–88)
SODIUM SERPL-SCNC: 132 MMOL/L — LOW (ref 135–145)
SPECIMEN SOURCE: SIGNIFICANT CHANGE UP
WBC # BLD: 13.9 K/UL — HIGH (ref 3.8–10.5)
WBC # FLD AUTO: 13.9 K/UL — HIGH (ref 3.8–10.5)

## 2020-01-29 PROCEDURE — 99232 SBSQ HOSP IP/OBS MODERATE 35: CPT

## 2020-01-29 PROCEDURE — 99233 SBSQ HOSP IP/OBS HIGH 50: CPT | Mod: GC

## 2020-01-29 RX ADMIN — MIDODRINE HYDROCHLORIDE 20 MILLIGRAM(S): 2.5 TABLET ORAL at 17:37

## 2020-01-29 RX ADMIN — SODIUM CHLORIDE 50 MILLILITER(S): 9 INJECTION, SOLUTION INTRAVENOUS at 13:08

## 2020-01-29 RX ADMIN — MIDODRINE HYDROCHLORIDE 20 MILLIGRAM(S): 2.5 TABLET ORAL at 06:29

## 2020-01-29 RX ADMIN — MICAFUNGIN SODIUM 105 MILLIGRAM(S): 100 INJECTION, POWDER, LYOPHILIZED, FOR SOLUTION INTRAVENOUS at 08:49

## 2020-01-29 RX ADMIN — Medication 3 MILLILITER(S): at 13:08

## 2020-01-29 RX ADMIN — CHLORHEXIDINE GLUCONATE 1 APPLICATION(S): 213 SOLUTION TOPICAL at 09:09

## 2020-01-29 RX ADMIN — LACTULOSE 10 GRAM(S): 10 SOLUTION ORAL at 17:36

## 2020-01-29 RX ADMIN — LACTULOSE 10 GRAM(S): 10 SOLUTION ORAL at 00:32

## 2020-01-29 RX ADMIN — Medication 3 MILLILITER(S): at 00:32

## 2020-01-29 RX ADMIN — Medication 3 MILLILITER(S): at 06:28

## 2020-01-29 RX ADMIN — HEPARIN SODIUM 5000 UNIT(S): 5000 INJECTION INTRAVENOUS; SUBCUTANEOUS at 06:28

## 2020-01-29 RX ADMIN — LACTULOSE 10 GRAM(S): 10 SOLUTION ORAL at 13:08

## 2020-01-29 RX ADMIN — LACTULOSE 10 GRAM(S): 10 SOLUTION ORAL at 06:28

## 2020-01-29 RX ADMIN — HEPARIN SODIUM 5000 UNIT(S): 5000 INJECTION INTRAVENOUS; SUBCUTANEOUS at 17:37

## 2020-01-29 RX ADMIN — Medication 12.5 MICROGRAM(S): at 22:08

## 2020-01-29 RX ADMIN — Medication 3 MILLILITER(S): at 17:36

## 2020-01-29 RX ADMIN — ERTAPENEM SODIUM 100 MILLIGRAM(S): 1 INJECTION, POWDER, LYOPHILIZED, FOR SOLUTION INTRAMUSCULAR; INTRAVENOUS at 06:26

## 2020-01-29 NOTE — CHART NOTE - NSCHARTNOTEFT_GEN_A_CORE
Nutrition Follow Up Note  Patient seen for: malnutrition follow up.     Source: Comprehensive chart review and family at bedside.     Chart reviewed, events noted. Pertinent chart information: pt s/p failed MBS on ; SLP recommends continue NPO.    Diet : NPO with EN provisions.     Subjective: Pt observed to be very lethargic; interview deferred to family at bedside. Pt denies any acute GI issues at this time. Family expressed concerns about pt not receive adequate nutrient from tube feeding. Family made aware that tube feeding rate was reduced due to previous episodes of emesis during hospital stay, and that the medical team will increase rate as feasible and tolerated.         Enteral /Parenteral Nutrition: Jevity 1.2 @ 20ml/hr x24hrs continuous. Total feed provides 480ml total volume, 576kcal/day, 26.6g protein/day, and 387ml fluid/day based on 49kg.       Daily Weight in k.5 (), Weight in k.2 (), Weight in k.3 (), Weight in k.1 ()  % Weight Change -weight change noted likely due to fluid shifts.    Pertinent Medications: MEDICATIONS  (STANDING):  albuterol/ipratropium for Nebulization 3 milliLiter(s) Nebulizer every 6 hours  chlorhexidine 4% Liquid 1 Application(s) Topical <User Schedule>  dextrose 5% + sodium chloride 0.45%. 1000 milliLiter(s) (50 mL/Hr) IV Continuous <Continuous>  digoxin     Tablet 0.125 milliGRAM(s) Oral every other day  ertapenem  IVPB 500 milliGRAM(s) IV Intermittent every 24 hours  heparin  Injectable 5000 Unit(s) SubCutaneous two times a day  lactulose Syrup 10 Gram(s) Oral four times a day  levothyroxine Injectable 12.5 MICROGram(s) IV Push at bedtime  micafungin IVPB      micafungin IVPB 100 milliGRAM(s) IV Intermittent every 24 hours  midodrine 20 milliGRAM(s) Oral every 8 hours  rifAXIMin 550 milliGRAM(s) Oral two times a day    MEDICATIONS  (PRN):    Pertinent Labs:  @ 09:15: Na 132<L>, BUN 26<H>, Cr 0.80, <H>, K+ 4.4, Phos 2.8, Mg 1.8, Alk Phos 131<H>, ALT/SGPT 12, AST/SGOT 28, HbA1c --    Finger Sticks:  POCT Blood Glucose.: 116 mg/dL ( @ 17:32)  POCT Blood Glucose.: 131 mg/dL ( @ 13:01)  POCT Blood Glucose.: 105 mg/dL ( @ 06:47)  POCT Blood Glucose.: 100 mg/dL ( @ 00:55)  POCT Blood Glucose.: 102 mg/dL ( @ 18:07)      Skin per nursing documentation: no pressure injury noted  Edema: 2+ (generalized)    Estimated Needs: based on 49kg  [x] no change since previous assessment  7458-0474 kcals (25-30 calories/kg)  58.8-68.6 grams of protein (1.2-1.4 gm/kg)    Previous Nutrition Diagnosis: moderate malnutrition  Nutrition Diagnosis is: ongoing; c/b suboptimal EN infusion    New Nutrition Diagnosis: n/a       Interventions:     Recommend  1) As medically feasible, increase TF to Jevity 1.2 at goal 50 cc/hr x 18 hrs provides 1200cc, 1440 kcals, 67 gm protein, 944 cc free water. EN provision at goal meets 29 Kcal/Kg, 1.37 gm of protein/kg based on usual wt of 49 kg.   2) Monitor electrolytes and replete as necessary.       Monitoring and Evaluation:     Continue to monitor Nutritional intake, Tolerance to diet prescription, weights, labs, skin integrity    RD remains available upon request and will follow up per protocol    Edd Chao RD pager # [phone] Nutrition Follow Up Note  Patient seen for: malnutrition follow up.     Source: Comprehensive chart review and family at bedside.     Chart reviewed, events noted. Pertinent chart information: pt s/p failed MBS on ; SLP recommends continue NPO.    Diet : NPO with EN provisions.     Subjective: Pt observed to be very lethargic; interview deferred to family at bedside. Pt denies any acute GI issues at this time. Family expressed concerns about pt not receive adequate nutrient from tube feeding. Family made aware that tube feeding rate was reduced due to previous episodes of emesis during hospital stay, and that the medical team will increase rate as feasible and tolerated.         Enteral /Parenteral Nutrition: Jevity 1.2 @ 20ml/hr x24hrs continuous. Total feed provides 480ml total volume, 576kcal/day, 26.6g protein/day, and 387ml fluid/day based on 49kg.       Daily Weight in k.5 (), Weight in k.2 (), Weight in k.3 (), Weight in k.1 ()  % Weight Change -weight change noted likely due to fluid shifts.    Pertinent Medications: MEDICATIONS  (STANDING):  albuterol/ipratropium for Nebulization 3 milliLiter(s) Nebulizer every 6 hours  chlorhexidine 4% Liquid 1 Application(s) Topical <User Schedule>  dextrose 5% + sodium chloride 0.45%. 1000 milliLiter(s) (50 mL/Hr) IV Continuous <Continuous>  digoxin     Tablet 0.125 milliGRAM(s) Oral every other day  ertapenem  IVPB 500 milliGRAM(s) IV Intermittent every 24 hours  heparin  Injectable 5000 Unit(s) SubCutaneous two times a day  lactulose Syrup 10 Gram(s) Oral four times a day  levothyroxine Injectable 12.5 MICROGram(s) IV Push at bedtime  micafungin IVPB      micafungin IVPB 100 milliGRAM(s) IV Intermittent every 24 hours  midodrine 20 milliGRAM(s) Oral every 8 hours  rifAXIMin 550 milliGRAM(s) Oral two times a day    MEDICATIONS  (PRN):    Pertinent Labs:  @ 09:15: Na 132<L>, BUN 26<H>, Cr 0.80, <H>, K+ 4.4, Phos 2.8, Mg 1.8, Alk Phos 131<H>, ALT/SGPT 12, AST/SGOT 28, HbA1c --    Finger Sticks:  POCT Blood Glucose.: 116 mg/dL ( @ 17:32)  POCT Blood Glucose.: 131 mg/dL ( @ 13:01)  POCT Blood Glucose.: 105 mg/dL ( @ 06:47)  POCT Blood Glucose.: 100 mg/dL ( @ 00:55)  POCT Blood Glucose.: 102 mg/dL ( @ 18:07)      Skin per nursing documentation: no pressure injury noted  Edema: 2+ (generalized)    Estimated Needs: based on 49kg  [x] no change since previous assessment  7767-2904 kcals (25-30 calories/kg)  58.8-68.6 grams of protein (1.2-1.4 gm/kg)    Previous Nutrition Diagnosis: moderate malnutrition  Nutrition Diagnosis is: ongoing; c/b suboptimal EN infusion    New Nutrition Diagnosis: n/a       Interventions:     Recommend  1) As medically feasible, increase TF to Jevity 1.2 at by 10ml/hr to goal 50 cc/hr x 24hrs. At goal TF provides 1200cc, 1440 kcals, 67 gm protein, 944 cc free water. EN provision at goal meets 29 Kcal/Kg, 1.37 gm of protein/kg based on usual wt of 49 kg.  2) Monitor electrolytes and replete as necessary.       Monitoring and Evaluation:     Continue to monitor Nutritional intake, Tolerance to diet prescription, weights, labs, skin integrity    RD remains available upon request and will follow up per protocol    Edd Chao RD pager # [phone]

## 2020-01-29 NOTE — PROGRESS NOTE ADULT - SUBJECTIVE AND OBJECTIVE BOX
Medications:  albuterol/ipratropium for Nebulization 3 milliLiter(s) Nebulizer every 6 hours  chlorhexidine 4% Liquid 1 Application(s) Topical <User Schedule>  dextrose 5% + sodium chloride 0.45%. 1000 milliLiter(s) IV Continuous <Continuous>  digoxin     Tablet 0.125 milliGRAM(s) Oral every other day  ertapenem  IVPB 500 milliGRAM(s) IV Intermittent every 24 hours  heparin  Injectable 5000 Unit(s) SubCutaneous two times a day  lactulose Syrup 10 Gram(s) Oral four times a day  levothyroxine Injectable 12.5 MICROGram(s) IV Push at bedtime  micafungin IVPB 100 milliGRAM(s) IV Intermittent every 24 hours  midodrine 20 milliGRAM(s) Oral every 8 hours  rifAXIMin 550 milliGRAM(s) Oral two times a day    PMH/PSH/FH/SH:  Unchanged    ROS:  Unchanged    Vitals:  T(C): 36.8 (20 @ 04:50), Max: 36.8 (20 @ 04:50)  HR: 70 (20 @ 07:42) (70 - 90)  BP: 124/70 (20 @ 07:42) (105/67 - 124/70)  RR: 18 (20 @ 04:50) (18 - 19)  SpO2: 99% (20 @ 04:50) (96% - 99%)  Daily Weight in k.5 (2020 04:50)      Physical Exam:  General: No distress. Comfortable  HEENT: EOMI	  Neck: JVP not elevated  Chest: Clear to auscultation bilaterally  CV: Irregularly Irregulr. Normal S1 and S2. No murmurs, rubs, or gallops. No edema.  Abdomen: Soft, non-distended, non-tender  Skin: No rashes, ecchymoses, or skin breakdown  Extremities: Warm.  Neuro: Alert and oriented x 3. No focal deficits.  Psych: Mood and affect appropriate    TELE:    LABS:                      10.0   13.90 )-----------( 149      ( 2020 09:15 )             30.5       132<L>  |  103  |  26<H>  ----------------------------<  114<H>  4.4   |  21<L>  |  0.80    Ca    9.3      2020 09:15  Phos  2.8       Mg     1.8         TPro  5.1<L>  /  Alb  2.0<L>  /  TBili  1.6<H>  /  DBili  x   /  AST  28  /  ALT  12  /  AlkPhos  131<H>   Alert, no distress.  Denies CP, palps or dyspnea     Medications:  albuterol/ipratropium for Nebulization 3 milliLiter(s) Nebulizer every 6 hours  chlorhexidine 4% Liquid 1 Application(s) Topical <User Schedule>  dextrose 5% + sodium chloride 0.45%. 1000 milliLiter(s) IV Continuous <Continuous>  digoxin     Tablet 0.125 milliGRAM(s) Oral every other day  ertapenem  IVPB 500 milliGRAM(s) IV Intermittent every 24 hours  heparin  Injectable 5000 Unit(s) SubCutaneous two times a day  lactulose Syrup 10 Gram(s) Oral four times a day  levothyroxine Injectable 12.5 MICROGram(s) IV Push at bedtime  micafungin IVPB 100 milliGRAM(s) IV Intermittent every 24 hours  midodrine 20 milliGRAM(s) Oral every 8 hours  rifAXIMin 550 milliGRAM(s) Oral two times a day    PMH/PSH/FH/SH:  Unchanged    ROS:  Unchanged    Vitals:  T(C): 36.8 (20 @ 04:50), Max: 36.8 (20 @ 04:50)  HR: 70 (20 @ 07:42) (70 - 90)  BP: 124/70 (20 @ 07:42) (105/67 - 124/70)  RR: 18 (20 @ 04:50) (18 - 19)  SpO2: 99% (20 @ 04:50) (96% - 99%)  Daily Weight in k.5 (2020 04:50)      Physical Exam:  General: No distress. Comfortable  HEENT: EOMI	  Neck: JVP not elevated  Chest: Clear to auscultation bilaterally  CV: Irregularly Irregulr. Normal S1 and S2. No murmurs, rubs, or gallops. No edema.  Abdomen: Soft, non-distended, non-tender  Skin: No rashes, ecchymoses, or skin breakdown  Extremities: Warm.  Neuro: Alert and oriented x 2. No focal deficits.  Psych: Mood and affect appropriate    TELE:  A fib with moderate VR; occasional VPCs, rare V couplets.    LABS:                      10.0   13.90 )-----------( 149      ( 2020 09:15 )             30.5     01-29  132<L>  |  103  |  26<H>  ----------------------------<  114<H>  4.4   |  21<L>  |  0.80    Ca    9.3      2020 09:15  Phos  2.8       Mg     1.8         TPro  5.1<L>  /  Alb  2.0<L>  /  TBili  1.6<H>  /  DBili  x   /  AST  28  /  ALT  12  /  AlkPhos  131<H>

## 2020-01-29 NOTE — PROGRESS NOTE ADULT - ATTENDING COMMENTS
check RVP  check ABG  we are doing daily GOCm, patient not ready for MBS and even if we do it we know she will not pass and will have to deal with the same question after. I doubt it will be of any value  daughter asked us to come back in afternoon to make decision on PEG uptrending leukocytosis, Candida glabrata sensitivies have not returned, call lab all other BCX check and have not changed  BCX clear from 1/24   check RVP  check ABG  we are doing daily GOC, patient not ready for MBS and even if we do it we know she will not pass and will have to deal with the same question after. I doubt it will be of any value  daughter asked us to come back in afternoon to make decision on PEG

## 2020-01-29 NOTE — PROGRESS NOTE ADULT - PROBLEM SELECTOR PLAN 4
- cont digoxin   - not a candidate for AC due to cirrhosis w/ esophageal varices and banding hx  - hold spironolactone per cards

## 2020-01-29 NOTE — PROGRESS NOTE ADULT - PROBLEM SELECTOR PLAN 5
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

## 2020-01-29 NOTE — PROGRESS NOTE ADULT - ASSESSMENT
89F with AF on digoxin (not on AC due to esophageal varices), severe AS (not a candidate for replacement), moderate MS/moderate-severe mitral regurgitation, COPD, CLL, HCC with portal HTN c/b esophageal varices s/p banding, CKD3 (baseline Cr 1.2) and PVD.  A/W septic shock 2/2 serratia bacteremia from urosepsis vs. PNA.       REC:  1.  Persistent atrial fibrillation  - rate remains controlled, continue digoxin at current dose.  -  not candidate for A/C given history of esophageal varices and cerebral hemorrhage on warfarin    2.  Severe Aortic Stenosis (TAMRA 0..4sqcm, mean gradient 58mmHg)  - not a candidate for intervention.    3.  Hx. of CHF  - continue to hold spironolactone for now given low normal SBP      Daniel Boston M.D.  Office: [phone]  Beeper: [phone] 89F with AF on digoxin (not on AC due to esophageal varices), severe AS (not a candidate for replacement), moderate MS/moderate-severe mitral regurgitation, COPD, CLL, HCC with portal HTN c/b esophageal varices s/p banding, CKD3 (baseline Cr 1.2) and PVD.  A/W septic shock 2/2 serratia bacteremia from urosepsis vs. PNA.     REC:  1.  Persistent atrial fibrillation  - rate remains controlled, continue digoxin at current dose.  - not candidate for A/C given history of esophageal varices and cerebral hemorrhage on warfarin    2.  Severe Aortic Stenosis (TAMRA 0..4sqcm, mean gradient 58mmHg)  - not a candidate for intervention.    3.  Hx. of CHF  - continue to hold spironolactone for now given low normal SBP    4.  Sepsis  - continue anti-microbials per ID recommendations    5.  Spoke to patient's daughter, Julio, regarding feeding tube.  Given ongoing concerns regarding swallowing, this seems reasonable option.  See no cardiac contraindication to procedure.      Daniel Boston M.D.  Office: [phone]  Beeper: [phone]

## 2020-01-29 NOTE — PROGRESS NOTE ADULT - PROBLEM SELECTOR PLAN 2
- AOx2 today, lethargic this AM, will reassess in PM  - multifactorial likely secondary to recent sepsis, ICU delirium, portosystemic encephalopathy   - c/b nausea/vomiting now with NGT on tube feeds  - will maintain NGT while patient is w/ metabolic encephalopathy for oral access  - ongoing discussion if pt would be amenable to PEG tube if needed in future. daughter wanted to defer s/s given acute illness, will continue to f/u goc w/ family. pursue MBS when amenability to PEG tube clarified  - given high risk for thromboembolic events, will repeat CTH if mental status does not improve --would defer for now given improvements

## 2020-01-29 NOTE — PROGRESS NOTE ADULT - SUBJECTIVE AND OBJECTIVE BOX
Alexei Hess MD, PGY-1  Pager #[phone]  After 7 PM on weekdays and 12 PM on weekends, for urgent issues please page #[phone].  ----------------------------------------------------------------  Patient is a 89y old  Female who presents with a chief complaint of sepsis (28 Jan 2020 12:45)    SUBJECTIVE / OVERNIGHT EVENTS: No acute events overnight. Pt seen and examined at bedside. Pt denies any acute complaints including headache, fever, chills, nausea, vomiting, diarrhea, constipation, chest pain, shortness of breath.     MEDICATIONS  (STANDING):  albuterol/ipratropium for Nebulization 3 milliLiter(s) Nebulizer every 6 hours  chlorhexidine 4% Liquid 1 Application(s) Topical <User Schedule>  dextrose 5% + sodium chloride 0.45%. 1000 milliLiter(s) (50 mL/Hr) IV Continuous <Continuous>  digoxin     Tablet 0.125 milliGRAM(s) Oral every other day  ertapenem  IVPB 500 milliGRAM(s) IV Intermittent every 24 hours  heparin  Injectable 5000 Unit(s) SubCutaneous two times a day  lactulose Syrup 10 Gram(s) Oral four times a day  levothyroxine Injectable 12.5 MICROGram(s) IV Push at bedtime  micafungin IVPB      micafungin IVPB 100 milliGRAM(s) IV Intermittent every 24 hours  midodrine 20 milliGRAM(s) Oral every 8 hours  rifAXIMin 550 milliGRAM(s) Oral two times a day    MEDICATIONS  (PRN):      CAPILLARY BLOOD GLUCOSE      POCT Blood Glucose.: 105 mg/dL (29 Jan 2020 06:47)  POCT Blood Glucose.: 100 mg/dL (29 Jan 2020 00:55)  POCT Blood Glucose.: 102 mg/dL (28 Jan 2020 18:07)  POCT Blood Glucose.: 104 mg/dL (28 Jan 2020 13:45)    I&O's Summary    28 Jan 2020 07:01  -  29 Jan 2020 07:00  --------------------------------------------------------  IN: 2380 mL / OUT: 0 mL / NET: 2380 mL        PHYSICAL EXAM:  Vital Signs Last 24 Hrs  T(C): 36.8 (29 Jan 2020 04:50), Max: 36.8 (29 Jan 2020 04:50)  T(F): 98.2 (29 Jan 2020 04:50), Max: 98.2 (29 Jan 2020 04:50)  HR: 70 (29 Jan 2020 07:42) (70 - 90)  BP: 124/70 (29 Jan 2020 07:42) (105/67 - 124/70)  RR: 18 (29 Jan 2020 04:50) (18 - 19)  SpO2: 99% (29 Jan 2020 04:50) (96% - 99%)    CONSTITUTIONAL: NAD, elderly female lethargic  RESPIRATORY: CTABL  CARDIOVASCULAR: 3/6 harsh systolic murmur   ABDOMEN: soft, nt, nd  MUSCULOSKELETAL: no lower extremity swelling  PSYCH: AOx2 self and hospital  NEUROLOGY: grossly intact, no focal deficits  SKIN: skin tears b/l UE, b/l ecchymoses LE, bandaged c/d/i      LABS:                        10.1   10.87 )-----------( 126      ( 28 Jan 2020 06:34 )             31.6     01-28    134<L>  |  102  |  31<H>  ----------------------------<  86  4.9   |  23  |  0.85    Ca    9.2      28 Jan 2020 06:34  Phos  2.9     01-28  Mg     1.8     01-28    TPro  5.0<L>  /  Alb  2.2<L>  /  TBili  1.8<H>  /  DBili  x   /  AST  30  /  ALT  14  /  AlkPhos  128<H>  01-28                RADIOLOGY & ADDITIONAL TESTS:  Results Reviewed:   Imaging Personally Reviewed:  Electrocardiogram Personally Reviewed:    COORDINATION OF CARE:  Care Discussed with Consultants/Other Providers [Y/N]:  Prior or Outpatient Records Reviewed [Y/N]:

## 2020-01-29 NOTE — PROGRESS NOTE ADULT - ASSESSMENT
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 Pt last admit 1/6-9 s/p fall OOB now re-admitted from [facility] w AMS 2/2 Septic Shock 2/2 Serratia Bacteremia / Klebsiella ESBL urosepsis, and PNA w Serratia in the sputum, with candida glabrata in blood cx from 1/21.

## 2020-01-30 LAB
ALBUMIN SERPL ELPH-MCNC: 1.8 G/DL — LOW (ref 3.3–5)
ALP SERPL-CCNC: 147 U/L — HIGH (ref 40–120)
ALT FLD-CCNC: 13 U/L — SIGNIFICANT CHANGE UP (ref 10–45)
ANION GAP SERPL CALC-SCNC: 9 MMOL/L — SIGNIFICANT CHANGE UP (ref 5–17)
ANISOCYTOSIS BLD QL: SLIGHT — SIGNIFICANT CHANGE UP
AST SERPL-CCNC: 32 U/L — SIGNIFICANT CHANGE UP (ref 10–40)
BASOPHILS # BLD AUTO: 0 K/UL — SIGNIFICANT CHANGE UP (ref 0–0.2)
BASOPHILS NFR BLD AUTO: 0 % — SIGNIFICANT CHANGE UP (ref 0–2)
BILIRUB SERPL-MCNC: 1.2 MG/DL — SIGNIFICANT CHANGE UP (ref 0.2–1.2)
BUN SERPL-MCNC: 26 MG/DL — HIGH (ref 7–23)
BURR CELLS BLD QL SMEAR: PRESENT — SIGNIFICANT CHANGE UP
CALCIUM SERPL-MCNC: 9.4 MG/DL — SIGNIFICANT CHANGE UP (ref 8.4–10.5)
CHLORIDE SERPL-SCNC: 104 MMOL/L — SIGNIFICANT CHANGE UP (ref 96–108)
CO2 SERPL-SCNC: 21 MMOL/L — LOW (ref 22–31)
CREAT SERPL-MCNC: 0.79 MG/DL — SIGNIFICANT CHANGE UP (ref 0.5–1.3)
DACRYOCYTES BLD QL SMEAR: SLIGHT — SIGNIFICANT CHANGE UP
ELLIPTOCYTES BLD QL SMEAR: SIGNIFICANT CHANGE UP
EOSINOPHIL # BLD AUTO: 0 K/UL — SIGNIFICANT CHANGE UP (ref 0–0.5)
EOSINOPHIL NFR BLD AUTO: 0 % — SIGNIFICANT CHANGE UP (ref 0–6)
GIANT PLATELETS BLD QL SMEAR: PRESENT — SIGNIFICANT CHANGE UP
GLUCOSE BLDC GLUCOMTR-MCNC: 101 MG/DL — HIGH (ref 70–99)
GLUCOSE BLDC GLUCOMTR-MCNC: 105 MG/DL — HIGH (ref 70–99)
GLUCOSE BLDC GLUCOMTR-MCNC: 106 MG/DL — HIGH (ref 70–99)
GLUCOSE BLDC GLUCOMTR-MCNC: 120 MG/DL — HIGH (ref 70–99)
GLUCOSE SERPL-MCNC: 96 MG/DL — SIGNIFICANT CHANGE UP (ref 70–99)
HCT VFR BLD CALC: 30.5 % — LOW (ref 34.5–45)
HGB BLD-MCNC: 9.6 G/DL — LOW (ref 11.5–15.5)
LYMPHOCYTES # BLD AUTO: 0.38 K/UL — LOW (ref 1–3.3)
LYMPHOCYTES # BLD AUTO: 3.6 % — LOW (ref 13–44)
MACROCYTES BLD QL: SLIGHT — SIGNIFICANT CHANGE UP
MAGNESIUM SERPL-MCNC: 1.8 MG/DL — SIGNIFICANT CHANGE UP (ref 1.6–2.6)
MANUAL SMEAR VERIFICATION: SIGNIFICANT CHANGE UP
MCHC RBC-ENTMCNC: 31.5 GM/DL — LOW (ref 32–36)
MCHC RBC-ENTMCNC: 33.1 PG — SIGNIFICANT CHANGE UP (ref 27–34)
MCV RBC AUTO: 105.2 FL — HIGH (ref 80–100)
MONOCYTES # BLD AUTO: 0.19 K/UL — SIGNIFICANT CHANGE UP (ref 0–0.9)
MONOCYTES NFR BLD AUTO: 1.8 % — LOW (ref 2–14)
NEUTROPHILS # BLD AUTO: 9.89 K/UL — HIGH (ref 1.8–7.4)
NEUTROPHILS NFR BLD AUTO: 94.6 % — HIGH (ref 43–77)
PHOSPHATE SERPL-MCNC: 3.2 MG/DL — SIGNIFICANT CHANGE UP (ref 2.5–4.5)
PLAT MORPH BLD: NORMAL — SIGNIFICANT CHANGE UP
PLATELET # BLD AUTO: 136 K/UL — LOW (ref 150–400)
POIKILOCYTOSIS BLD QL AUTO: SLIGHT — SIGNIFICANT CHANGE UP
POLYCHROMASIA BLD QL SMEAR: SLIGHT — SIGNIFICANT CHANGE UP
POTASSIUM SERPL-MCNC: 5.2 MMOL/L — SIGNIFICANT CHANGE UP (ref 3.5–5.3)
POTASSIUM SERPL-SCNC: 5.2 MMOL/L — SIGNIFICANT CHANGE UP (ref 3.5–5.3)
PROT SERPL-MCNC: 5 G/DL — LOW (ref 6–8.3)
RBC # BLD: 2.9 M/UL — LOW (ref 3.8–5.2)
RBC # FLD: 15.7 % — HIGH (ref 10.3–14.5)
RBC BLD AUTO: ABNORMAL
SMUDGE CELLS # BLD: PRESENT — SIGNIFICANT CHANGE UP
SODIUM SERPL-SCNC: 134 MMOL/L — LOW (ref 135–145)
WBC # BLD: 10.45 K/UL — SIGNIFICANT CHANGE UP (ref 3.8–10.5)
WBC # FLD AUTO: 10.45 K/UL — SIGNIFICANT CHANGE UP (ref 3.8–10.5)

## 2020-01-30 PROCEDURE — 99233 SBSQ HOSP IP/OBS HIGH 50: CPT | Mod: GC

## 2020-01-30 PROCEDURE — 99232 SBSQ HOSP IP/OBS MODERATE 35: CPT

## 2020-01-30 PROCEDURE — 99223 1ST HOSP IP/OBS HIGH 75: CPT | Mod: GC

## 2020-01-30 RX ORDER — PETROLATUM,WHITE
1 JELLY (GRAM) TOPICAL DAILY
Refills: 0 | Status: DISCONTINUED | OUTPATIENT
Start: 2020-01-30 | End: 2020-02-13

## 2020-01-30 RX ORDER — ACETAMINOPHEN 500 MG
500 TABLET ORAL ONCE
Refills: 0 | Status: COMPLETED | OUTPATIENT
Start: 2020-01-30 | End: 2020-01-30

## 2020-01-30 RX ADMIN — LACTULOSE 10 GRAM(S): 10 SOLUTION ORAL at 11:43

## 2020-01-30 RX ADMIN — Medication 0.12 MILLIGRAM(S): at 11:43

## 2020-01-30 RX ADMIN — MIDODRINE HYDROCHLORIDE 20 MILLIGRAM(S): 2.5 TABLET ORAL at 21:52

## 2020-01-30 RX ADMIN — Medication 3 MILLILITER(S): at 00:57

## 2020-01-30 RX ADMIN — LACTULOSE 10 GRAM(S): 10 SOLUTION ORAL at 06:18

## 2020-01-30 RX ADMIN — Medication 3 MILLILITER(S): at 17:34

## 2020-01-30 RX ADMIN — LACTULOSE 10 GRAM(S): 10 SOLUTION ORAL at 16:48

## 2020-01-30 RX ADMIN — Medication 3 MILLILITER(S): at 06:18

## 2020-01-30 RX ADMIN — Medication 12.5 MICROGRAM(S): at 21:51

## 2020-01-30 RX ADMIN — MIDODRINE HYDROCHLORIDE 20 MILLIGRAM(S): 2.5 TABLET ORAL at 00:57

## 2020-01-30 RX ADMIN — MICAFUNGIN SODIUM 105 MILLIGRAM(S): 100 INJECTION, POWDER, LYOPHILIZED, FOR SOLUTION INTRAVENOUS at 09:46

## 2020-01-30 RX ADMIN — ERTAPENEM SODIUM 100 MILLIGRAM(S): 1 INJECTION, POWDER, LYOPHILIZED, FOR SOLUTION INTRAMUSCULAR; INTRAVENOUS at 06:17

## 2020-01-30 RX ADMIN — HEPARIN SODIUM 5000 UNIT(S): 5000 INJECTION INTRAVENOUS; SUBCUTANEOUS at 16:47

## 2020-01-30 RX ADMIN — Medication 1 APPLICATION(S): at 21:52

## 2020-01-30 RX ADMIN — Medication 3 MILLILITER(S): at 11:43

## 2020-01-30 RX ADMIN — Medication 500 MILLIGRAM(S): at 17:34

## 2020-01-30 RX ADMIN — LACTULOSE 10 GRAM(S): 10 SOLUTION ORAL at 00:57

## 2020-01-30 RX ADMIN — HEPARIN SODIUM 5000 UNIT(S): 5000 INJECTION INTRAVENOUS; SUBCUTANEOUS at 06:18

## 2020-01-30 RX ADMIN — Medication 500 MILLIGRAM(S): at 16:46

## 2020-01-30 NOTE — PROGRESS NOTE ADULT - PROBLEM SELECTOR PLAN 8
- chronic, with thrombocytopenia noted since 2009, improving   - no medical dvt prophylaxis at this time  - will monitor for acute s/s bleeding - chronic, with thrombocytopenia noted since 2009, improving   - will monitor for acute s/s bleeding

## 2020-01-30 NOTE — PROGRESS NOTE ADULT - PROBLEM SELECTOR PLAN 2
- AOx2, alert this AM  - multifactorial likely secondary to recent sepsis, ICU delirium, portosystemic encephalopathy   - c/b nausea/vomiting now with NGT on tube feeds  - will maintain NGT while patient is w/ metabolic encephalopathy for oral access  - per family, wants assessment for PEG for caloric support as mental status of pt waxes and wanes and may not be able to tolerate PO consistently  - given high risk for thromboembolic events, will repeat CTH if mental status does not improve --would defer for now given improvements

## 2020-01-30 NOTE — PROGRESS NOTE ADULT - SUBJECTIVE AND OBJECTIVE BOX
Alexei Hess MD, PGY-1  Pager #[phone]  After 7 PM on weekdays and 12 PM on weekends, for urgent issues please page #[phone].  ----------------------------------------------------------------  Patient is a 89y old  Female who presents with a chief complaint of sepsis (28 Jan 2020 12:45)      SUBJECTIVE / OVERNIGHT EVENTS: No acute events overnight. Pt seen and examined at bedside. This am alert, denies any acute complaints including headache, nausea, vomiting, diarrhea, constipation, pain anywhere. Feels tired and wants to be left alone.     MEDICATIONS  (STANDING):  albuterol/ipratropium for Nebulization 3 milliLiter(s) Nebulizer every 6 hours  chlorhexidine 4% Liquid 1 Application(s) Topical <User Schedule>  digoxin     Tablet 0.125 milliGRAM(s) Oral every other day  ertapenem  IVPB 500 milliGRAM(s) IV Intermittent every 24 hours  heparin  Injectable 5000 Unit(s) SubCutaneous two times a day  lactulose Syrup 10 Gram(s) Oral four times a day  levothyroxine Injectable 12.5 MICROGram(s) IV Push at bedtime  micafungin IVPB      micafungin IVPB 100 milliGRAM(s) IV Intermittent every 24 hours  midodrine 20 milliGRAM(s) Oral every 8 hours  rifAXIMin 550 milliGRAM(s) Oral two times a day    MEDICATIONS  (PRN):    CAPILLARY BLOOD GLUCOSE  POCT Blood Glucose.: 101 mg/dL (30 Jan 2020 06:56)  POCT Blood Glucose.: 105 mg/dL (30 Jan 2020 01:09)  POCT Blood Glucose.: 107 mg/dL (29 Jan 2020 21:20)  POCT Blood Glucose.: 116 mg/dL (29 Jan 2020 17:32)  POCT Blood Glucose.: 131 mg/dL (29 Jan 2020 13:01)    I&O's Summary    29 Jan 2020 07:01  -  30 Jan 2020 07:00  --------------------------------------------------------  IN: 2275 mL / OUT: 0 mL / NET: 2275 mL    PHYSICAL EXAM:  Vital Signs Last 24 Hrs  T(C): 36.6 (30 Jan 2020 05:26), Max: 36.7 (29 Jan 2020 14:00)  T(F): 97.8 (30 Jan 2020 05:26), Max: 98 (29 Jan 2020 14:00)  HR: 77 (30 Jan 2020 05:26) (56 - 84)  BP: 119/66 (30 Jan 2020 05:26) (92/53 - 120/73)  RR: 18 (30 Jan 2020 05:26) (18 - 18)  SpO2: 97% (30 Jan 2020 05:26) (97% - 98%)    CONSTITUTIONAL: NAD, elderly female   RESPIRATORY: CTABL  CARDIOVASCULAR: 3/6 harsh systolic murmur   ABDOMEN: soft, nt, nd  MUSCULOSKELETAL: no lower extremity swelling  PSYCH: AOx2 self and hospital, alert today  NEUROLOGY: grossly intact, no focal deficits  SKIN: skin tears b/l UE, b/l ecchymoses LE, bandaged c/d/i    LABS:                        10.0   13.90 )-----------( 149      ( 29 Jan 2020 09:15 )             30.5     01-29    132<L>  |  103  |  26<H>  ----------------------------<  114<H>  4.4   |  21<L>  |  0.80    Ca    9.3      29 Jan 2020 09:15  Phos  2.8     01-29  Mg     1.8     01-29    TPro  5.1<L>  /  Alb  2.0<L>  /  TBili  1.6<H>  /  DBili  x   /  AST  28  /  ALT  12  /  AlkPhos  131<H>  01-29      RADIOLOGY & ADDITIONAL TESTS:  Results Reviewed:   Imaging Personally Reviewed:  Electrocardiogram Personally Reviewed:    COORDINATION OF CARE:  Care Discussed with Consultants/Other Providers [Y/N]:  Prior or Outpatient Records Reviewed [Y/N]:

## 2020-01-30 NOTE — PROGRESS NOTE ADULT - ASSESSMENT
89F with AF on digoxin (not on AC due to esophageal varices), severe AS (not a candidate for replacement), moderate MS/moderate-severe mitral regurgitation, COPD, CLL, HCC with portal HTN c/b esophageal varices s/p banding, CKD3 (baseline Cr 1.2) and PVD.  A/W septic shock 2/2 serratia bacteremia from urosepsis vs. PNA.     REC:  1.  Persistent atrial fibrillation  - rate remains controlled, continue digoxin at current dose.  - not candidate for A/C given history of esophageal varices and cerebral hemorrhage on warfarin    2.  Severe Aortic Stenosis (TAMRA 0..4sqcm, mean gradient 58mmHg)  - not a candidate for intervention.    3.  Hx. of CHF  - continue to hold spironolactone for now given low normal SBP    4.  Sepsis  - continue anti-microbials per ID recommendations    5.  Spoke to patient's daughter, Julio, regarding feeding tube.  Given ongoing concerns regarding swallowing, this seems reasonable option.  See no cardiac contraindication to procedure.      Daniel Boston M.D.  Office: [phone]  Beeper: [phone] 89F with AF on digoxin (not on AC due to esophageal varices), severe AS (not a candidate for replacement), moderate MS/moderate-severe mitral regurgitation, COPD, CLL, HCC with portal HTN c/b esophageal varices s/p banding, CKD3 (baseline Cr 1.2) and PVD.  A/W septic shock 2/2 serratia bacteremia from urosepsis vs. PNA.     REC:  1.  Persistent atrial fibrillation  - rate remains controlled, continue digoxin at current dose.  - not candidate for A/C given history of esophageal varices and cerebral hemorrhage on warfarin    2.  Severe Aortic Stenosis (TAMRA 0..4sqcm, mean gradient 58mmHg)  - not a candidate for intervention.    3.  Hx. of CHF  - continue to hold spironolactone for now    4.  Sepsis  - continue anti-microbials per ID recommendations    5.  Spoke to patient's daughter, Julio, regarding feeding tube.  Given ongoing concerns regarding swallowing, this seems reasonable option.  See no cardiac contraindication to procedure.      Daniel Boston M.D.  Office: [phone]  Beeper: [phone]

## 2020-01-30 NOTE — CONSULT NOTE ADULT - SUBJECTIVE AND OBJECTIVE BOX
Chief Complaint:  Patient is a 89y old  Female who presents with a chief complaint of sepsis 2/2 to UTI vs PNA (2020 09:04)      HPI: Pt is a 89yo female with a PMHx of afib on digoxin (not on AC), severe AS, COPD, CLL (previously on Treanda and Rituxan), cirrhosis with hx of HCC s/p ablation c/b with portal htn, c/b esophageal varices s/p banding s/p hepatic vessel coiling, CKD, PVD, hypothyroidism, and recurrent UTI w/ hx of ESBL Klebsiella. Pt admitted with AMS 2/2 Septic Shock 2/2 Serratia Bacteremia / Klebsiella ESBL urosepsis, and PNA w Serratia in the sputum, with candida glabrata in blood cx from . Pt now out of the MICU with continued encepholpathy unable to take po. GI consulted for PEG. No recent endoscopic eval for varices. Pt unable to provide hx given mentation.    Allergies:  adhesives (Other)  codeine (Rash)  erythromycin (Rash)  iv dye (Rash; Anaphylaxis; Short breath)  penicillin (Rash)  shellfish (Rash)  warfarin (Other)      Home Medications:    Hospital Medications:  albuterol/ipratropium for Nebulization 3 milliLiter(s) Nebulizer every 6 hours  chlorhexidine 4% Liquid 1 Application(s) Topical <User Schedule>  digoxin     Tablet 0.125 milliGRAM(s) Oral every other day  ertapenem  IVPB 500 milliGRAM(s) IV Intermittent every 24 hours  heparin  Injectable 5000 Unit(s) SubCutaneous two times a day  lactulose Syrup 10 Gram(s) Oral four times a day  levothyroxine Injectable 12.5 MICROGram(s) IV Push at bedtime  micafungin IVPB      micafungin IVPB 100 milliGRAM(s) IV Intermittent every 24 hours  midodrine 20 milliGRAM(s) Oral every 8 hours  rifAXIMin 550 milliGRAM(s) Oral two times a day      PMHX/PSHX:  PVD (peripheral vascular disease)  Liver cell carcinoma  Severe aortic stenosis by prior echocardiogram  Pulmonary HTN  CKD (chronic kidney disease), stage 3 (moderate)  COPD (Chronic Obstructive Pulmonary Disease)  AF (Atrial Fibrillation)  Portal Hypertension  Pneumonia  Metastasis to Liver  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  HTN (Hypertension)  Bleeding Esophageal Varices  CLL (Chronic Lymphoblastic Leukemia)  GIB (Gastrointestinal Bleeding)  History of repair of hip fracture  Esophageal Rupture      Family history:  FH: Alzheimers disease  No pertinent family history in first degree relatives  Family history of hyperlipidemia  No pertinent family history in first degree relatives      Social History:     ROS:     General:  No wt loss, fevers, chills, night sweats, fatigue,   Eyes:  Good vision, no reported pain  ENT:  No sore throat, pain, runny nose, dysphagia  CV:  No pain, palpitations, hypo/hypertension  Resp:  No dyspnea, cough, tachypnea, wheezing  GI:  See HPI  :  No pain, bleeding, incontinence, nocturia  Muscle:  No pain, weakness  Neuro:  No weakness, tingling, memory problems  Psych:  No fatigue, insomnia, mood problems, depression  Endocrine:  No polyuria, polydipsia, cold/heat intolerance  Heme:  No petechiae, ecchymosis, easy bruisability  Skin:  No rash, edema      PHYSICAL EXAM:     Vital Signs:  Vital Signs Last 24 Hrs  T(C): 36.7 (2020 12:43), Max: 36.7 (2020 12:43)  T(F): 98.1 (2020 12:43), Max: 98.1 (2020 12:43)  HR: 80 (2020 14:00) (70 - 84)  BP: 119/66 (2020 14:00) (92/53 - 119/66)  BP(mean): --  RR: 18 (2020 12:43) (18 - 18)  SpO2: 98% (2020 12:43) (97% - 98%)  Daily     Daily Weight in k.6 (2020 05:26)    GENERAL:  frail and lethargic  HEENT:  NGT in place  CHEST:  neck mask on, no increased effort  HEART:  Regular rhythm, no JVD  ABDOMEN:  Soft, non-tender, non-distended, normoactive bowel sounds,  no masses , no hepatosplenomegaly  EXTREMITIES:  no cyanosis, clubbing or edema  NEURO:  AAOx0, confused    LABS:                        9.6    10.45 )-----------( 136      ( 2020 12:15 )             30.5     01-30    134<L>  |  104  |  26<H>  ----------------------------<  96  5.2   |  21<L>  |  0.79    Ca    9.4      2020 12:15  Phos  3.2       Mg     1.8         TPro  5.0<L>  /  Alb  1.8<L>  /  TBili  1.2  /  DBili  x   /  AST  32  /  ALT  13  /  AlkPhos  147<H>      LIVER FUNCTIONS - ( 2020 12:15 )  Alb: 1.8 g/dL / Pro: 5.0 g/dL / ALK PHOS: 147 U/L / ALT: 13 U/L / AST: 32 U/L / GGT: x                   Imaging: Chief Complaint:  Patient is a 89y old  Female who presents with a chief complaint of sepsis 2/2 to UTI vs PNA (2020 09:04)      HPI: Pt is a 89yo female with a PMHx of dementia, afib on digoxin (not on AC), severe AS, COPD, CLL (previously on Treanda and Rituxan), cirrhosis with hx of HCC s/p ablation c/b with portal htn, c/b esophageal varices s/p banding s/p hepatic vessel coiling, CKD, PVD, hypothyroidism, and recurrent UTI w/ hx of ESBL Klebsiella. Pt admitted with AMS 2/2 Septic Shock 2/2 Serratia Bacteremia / Klebsiella ESBL urosepsis, and PNA w Serratia in the sputum, with candida glabrata in blood cx from . Pt now out of the MICU with continued encepholpathy unable to take po. GI consulted for PEG. No recent endoscopic eval for varices. Pt unable to provide hx given mentation, which has worsened since this hospitalization. She is unable to provide history. Per daughters at bedside, they are concerned that patient is not taking enough calories and thus asked for PEG evaluation. She has no history of ascites.    Allergies:  adhesives (Other)  codeine (Rash)  erythromycin (Rash)  iv dye (Rash; Anaphylaxis; Short breath)  penicillin (Rash)  shellfish (Rash)  warfarin (Other)      Home Medications:    Hospital Medications:  albuterol/ipratropium for Nebulization 3 milliLiter(s) Nebulizer every 6 hours  chlorhexidine 4% Liquid 1 Application(s) Topical <User Schedule>  digoxin     Tablet 0.125 milliGRAM(s) Oral every other day  ertapenem  IVPB 500 milliGRAM(s) IV Intermittent every 24 hours  heparin  Injectable 5000 Unit(s) SubCutaneous two times a day  lactulose Syrup 10 Gram(s) Oral four times a day  levothyroxine Injectable 12.5 MICROGram(s) IV Push at bedtime  micafungin IVPB      micafungin IVPB 100 milliGRAM(s) IV Intermittent every 24 hours  midodrine 20 milliGRAM(s) Oral every 8 hours  rifAXIMin 550 milliGRAM(s) Oral two times a day      PMHX/PSHX:  PVD (peripheral vascular disease)  Liver cell carcinoma  Severe aortic stenosis by prior echocardiogram  Pulmonary HTN  CKD (chronic kidney disease), stage 3 (moderate)  COPD (Chronic Obstructive Pulmonary Disease)  AF (Atrial Fibrillation)  Portal Hypertension  Pneumonia  Metastasis to Liver  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  HTN (Hypertension)  Bleeding Esophageal Varices  CLL (Chronic Lymphoblastic Leukemia)  GIB (Gastrointestinal Bleeding)  History of repair of hip fracture  Esophageal Rupture      Family history:  FH: Alzheimers disease  No pertinent family history in first degree relatives  Family history of hyperlipidemia  No pertinent family history in first degree relatives      Social History: No illicit drugs; previous ETOH use    ROS:     General:  No wt loss, fevers, chills, night sweats, fatigue,   Eyes:  Good vision, no reported pain  ENT:  No sore throat, pain, runny nose, dysphagia  CV:  No pain, palpitations, hypo/hypertension  Resp:  No dyspnea, cough, tachypnea, wheezing  GI:  See HPI  :  No pain, bleeding, incontinence, nocturia  Muscle:  No pain, weakness  Neuro:  No weakness, tingling, memory problems  Psych:  No fatigue, insomnia, mood problems, depression  Endocrine:  No polyuria, polydipsia, cold/heat intolerance  Heme:  No petechiae, ecchymosis, easy bruisability  Skin:  No rash, edema      PHYSICAL EXAM:     Vital Signs:  Vital Signs Last 24 Hrs  T(C): 36.7 (2020 12:43), Max: 36.7 (2020 12:43)  T(F): 98.1 (2020 12:43), Max: 98.1 (2020 12:43)  HR: 80 (2020 14:00) (70 - 84)  BP: 119/66 (2020 14:00) (92/53 - 119/66)  BP(mean): --  RR: 18 (2020 12:43) (18 - 18)  SpO2: 98% (2020 12:43) (97% - 98%)  Daily     Daily Weight in k.6 (2020 05:26)    GENERAL:  frail and lethargic  HEENT:  NGT in place  CHEST:  neck mask on, no increased effort  HEART:  Regular rhythm, no JVD  ABDOMEN:  Soft, non-tender, non-distended, normoactive bowel sounds,  no masses , no hepatosplenomegaly; no abdominal scars  EXTREMITIES:  no cyanosis, clubbing or edema  NEURO:  AAOx0, confused    LABS:                        9.6    10.45 )-----------( 136      ( 2020 12:15 )             30.5         134<L>  |  104  |  26<H>  ----------------------------<  96  5.2   |  21<L>  |  0.79    Ca    9.4      2020 12:15  Phos  3.2       Mg     1.8         TPro  5.0<L>  /  Alb  1.8<L>  /  TBili  1.2  /  DBili  x   /  AST  32  /  ALT  13  /  AlkPhos  147<H>      LIVER FUNCTIONS - ( 2020 12:15 )  Alb: 1.8 g/dL / Pro: 5.0 g/dL / ALK PHOS: 147 U/L / ALT: 13 U/L / AST: 32 U/L / GGT: x

## 2020-01-30 NOTE — PROGRESS NOTE ADULT - SUBJECTIVE AND OBJECTIVE BOX
Medications:  albuterol/ipratropium for Nebulization 3 milliLiter(s) Nebulizer every 6 hours  chlorhexidine 4% Liquid 1 Application(s) Topical <User Schedule>  digoxin     Tablet 0.125 milliGRAM(s) Oral every other day  ertapenem  IVPB 500 milliGRAM(s) IV Intermittent every 24 hours  heparin  Injectable 5000 Unit(s) SubCutaneous two times a day  lactulose Syrup 10 Gram(s) Oral four times a day  levothyroxine Injectable 12.5 MICROGram(s) IV Push at bedtime  micafungin IVPB      micafungin IVPB 100 milliGRAM(s) IV Intermittent every 24 hours  midodrine 20 milliGRAM(s) Oral every 8 hours  rifAXIMin 550 milliGRAM(s) Oral two times a day    PMH/PSH/FH/SH:  Unchanged    ROS:  Unchanged    Vitals:  T(C): 36.6 (20 @ 05:26), Max: 36.7 (20 @ 14:00)  HR: 77 (20 @ 05:26) (56 - 84)  BP: 119/66 (20 @ 05:26) (92/53 - 120/73)  RR: 18 (20 @ 05:26) (18 - 18)  SpO2: 97% (20 @ 05:26) (97% - 98%)  Daily Weight in k.6 (2020 05:26)      Physical Exam:  General: No distress. Comfortable  HEENT: EOMI	  Neck: JVP not elevated  Chest: Clear to auscultation bilaterally  CV: Irregularly Irregulr. Normal S1 and S2. No murmurs, rubs, or gallops. No edema.  Abdomen: Soft, non-distended, non-tender  Skin: No rashes, ecchymoses, or skin breakdown  Extremities: Warm.  Neuro: Alert and oriented x 2. No focal deficits.  Psych: Mood and affect appropriate    TELE:  A fib with moderate VR; occasional VPCs, rare V couplets.        I&O's Summary  2020 07:01  -  2020 07:00  --------------------------------------------------------  IN: 2275 mL / OUT: 0 mL / NET: 2275 mL      LABS:                      10.0   13.90 )-----------( 149      ( 2020 09:15 )             30.5       132<L>  |  103  |  26<H>  ----------------------------<  114<H>  4.4   |  21<L>  |  0.80    Ca    9.3      2020 09:15  Phos  2.8       Mg     1.8         TPro  5.1<L>  /  Alb  2.0<L>  /  TBili  1.6<H>  /  DBili  x   /  AST  28  /  ALT  12  /  AlkPhos  131<H>   No distress.  No cardiac sxs; no CP, palps or dyspnea       Medications:  albuterol/ipratropium for Nebulization 3 milliLiter(s) Nebulizer every 6 hours  chlorhexidine 4% Liquid 1 Application(s) Topical <User Schedule>  digoxin     Tablet 0.125 milliGRAM(s) Oral every other day  ertapenem  IVPB 500 milliGRAM(s) IV Intermittent every 24 hours  heparin  Injectable 5000 Unit(s) SubCutaneous two times a day  lactulose Syrup 10 Gram(s) Oral four times a day  levothyroxine Injectable 12.5 MICROGram(s) IV Push at bedtime  micafungin IVPB      micafungin IVPB 100 milliGRAM(s) IV Intermittent every 24 hours  midodrine 20 milliGRAM(s) Oral every 8 hours  rifAXIMin 550 milliGRAM(s) Oral two times a day    PMH/PSH/FH/SH:  Unchanged    ROS:  Unchanged    Vitals:  T(C): 36.6 (20 @ 05:26), Max: 36.7 (20 @ 14:00)  HR: 77 (20 @ 05:26) (56 - 84)  BP: 119/66 (20 @ 05:26) (92/53 - 120/73)  RR: 18 (20 @ 05:26) (18 - 18)  SpO2: 97% (20 @ 05:26) (97% - 98%)  Daily Weight in k.6 (2020 05:26)      Physical Exam:  General: No distress. Comfortable  HEENT: EOMI	  Neck: JVP not elevated  Chest: Clear to auscultation bilaterally  CV: Irregularly Irregulr. Normal S1 and S2. No murmurs, rubs, or gallops. No edema.  Abdomen: Soft, non-distended, non-tender  Skin: No rashes, ecchymoses, or skin breakdown  Extremities: Warm.  Neuro: Alert and oriented x 2. No focal deficits.  Psych: Mood and affect appropriate    TELE:  A fib with moderate VR; occasional VPCs.    LABS:                      10.0   13.90 )-----------( 149      ( 2020 09:15 )             30.5       132<L>  |  103  |  26<H>  ----------------------------<  114<H>  4.4   |  21<L>  |  0.80    Ca    9.3      2020 09:15  Phos  2.8       Mg     1.8         TPro  5.1<L>  /  Alb  2.0<L>  /  TBili  1.6<H>  /  DBili  x   /  AST  28  /  ALT  12  /  AlkPhos  131<H>

## 2020-01-30 NOTE — PROGRESS NOTE ADULT - ATTENDING COMMENTS
Ertapenem through 2/3  Micafungin --> 2/8  ID follow up  monitor leukocytosis  family and patient want PEG although I do not agree since loss of appetite is natural way of dying and it will not decrease aspiration risk nor add quality to her life  dc planning   would give RX once confirmed with ID and peg placed

## 2020-01-30 NOTE — CONSULT NOTE ADULT - ASSESSMENT
Impression:  # AMS: discussed with family option for PEG. Pt without recent imaging to check for ascites which is a contrandication to peg. Additionally explained at length risk for patient given hx of cirrhosis and varices that is small gastric varcies present but not seen on EGD, that peg placement through varices can be fatal. Also exaplined risk of peritonitis if peg is dislodged. Explained risk os aspiration persists, and presented option of pleasure feeds. Explained to patient risk of sedation especially in setting of cardiomyopathy and severe AS. At this time, family unsure and wants to consider options.   # Septic shock: resolving  # Toxic metabolic encepholpathy in setting of cirrhosis: on antibiotics and lactulose  # Cirrhosis with hx of EV and HCC  # Severe AS    Recommendation:  - please check abdominal ultrasound to assess for ascites  - continued ongoing GOC discussion, consider palliative care to discuss options of pleasure feeding given family main concern is patient nutrition but they are concerned for risk of PEG placement which is elevated given her comorbidities  - if no ascites, labs remain optimized and family agreeable, can consider PEG placement at that time  - check CBC, CMP, INR tmro morning  - make NPO at midnight for tentative placement tmro although pending issues will need to be addressed  - supportive care    David Gaines  [phone]  [phone]  Please call/page on call fellow on weekends and weekdays after 5pm Impression:  # AMS: discussed with family option for PEG. Pt without recent imaging to check for ascites which is a contrandication to peg. Additionally explained at length risk for patient given hx of cirrhosis and varices that is small gastric varcies present but not seen on EGD, that peg placement through varices can be fatal. Also exaplined risk of peritonitis if peg is dislodged. Explained risk os aspiration persists, and presented option of pleasure feeds. Explained to patient risk of sedation especially in setting of cardiomyopathy and severe AS. At this time, family unsure and wants to consider options.   # Septic shock: resolving  # Toxic metabolic encepholpathy in setting of cirrhosis: on antibiotics and lactulose  # Cirrhosis with hx of EV and HCC  # Severe AS    Recommendation:  - please check abdominal ultrasound to assess for ascites  - continued ongoing GOC discussion, consider palliative care to discuss options of pleasure feeding given family main concern is patient nutrition but they are concerned for risk of PEG placement which is elevated given her comorbidities; they want to discuss amongst themselves and are not sure if they will proceed at this point  - if no ascites, labs remain optimized and family agreeable, can consider PEG placement at that time  - check CBC, CMP, INR tmro morning  - make NPO at midnight for tentative placement tmro although pending issues will need to be addressed  - supportive care    David Gaines  [phone]  [phone]  Please call/page on call fellow on weekends and weekdays after 5pm

## 2020-01-30 NOTE — PROGRESS NOTE ADULT - ASSESSMENT
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 Pt last admit 1/6-9 s/p fall OOB now re-admitted from [facility] w AMS 2/2 Septic Shock 2/2 Serratia Bacteremia / Klebsiella ESBL urosepsis, and PNA w Serratia in the sputum, with candida glabrata in blood cx from 1/21.

## 2020-01-30 NOTE — PROGRESS NOTE ADULT - PROBLEM SELECTOR PLAN 1
2/2 serratia bacteremia & sputum cx with serratia and klebsiella ESBL UTI sensitive to meropenem IV, and candida glabrata in 1/21  - ID recs appreciated- continue abx for 1 week (end 2/3), antifungal for 2 weeks (end 2/10) tentatively  - c/w ertapenem (1/26 - ). previously on meropenem IV (1/20-1/26).  - started on IV micafungin 100 mg daily (1/27- )  - on midodrine 20 tid, will down titrate as tolerated    # Bacterial PNA / gram negative and gram positive organisms  - c/w w abx as above  - chest PT, duonebs  - aspiration precautions  - maintain SaO2 > 92% w/ supplemental O2 PRN (on 3L face tent)    # Fungemia   - ophthalmology f/u as OP in 1 week, no signs of endophthalmitis  - c/w micafungin as above 2/2 serratia bacteremia & sputum cx with serratia and klebsiella ESBL UTI sensitive to meropenem IV, and candida glabrata in 1/21  - ID recs appreciated- continue abx for 1 week (end 2/3), antifungal for 2 weeks (end 2/8) tentatively  - c/w ertapenem (1/26 - ). previously on meropenem IV (1/20-1/26).  - started on IV micafungin 100 mg daily (1/27- )  - on midodrine 20 tid, will down titrate as tolerated    # Bacterial PNA / gram negative and gram positive organisms  - c/w w abx as above  - chest PT, duonebs  - aspiration precautions  - maintain SaO2 > 92% w/ supplemental O2 PRN (on 3L face tent)    # Fungemia   - ophthalmology f/u as OP in 1 week, no signs of endophthalmitis  - c/w micafungin as above

## 2020-01-30 NOTE — PROGRESS NOTE ADULT - SUBJECTIVE AND OBJECTIVE BOX
Follow Up: Sepsis, bacteremia, fungemia     Interval History/ROS: Daughter says she's doing better every day, more energy. Currently very tired but tried to get out of bed earlier. No fevers. Not coughing much.     Allergies  codeine (Rash)  erythromycin (Rash)  iv dye (Rash; Anaphylaxis; Short breath)  penicillin (Rash)  shellfish (Rash)    ANTIMICROBIALS:  ertapenem  IVPB 500 every 24 hours  micafungin IVPB    micafungin IVPB 100 every 24 hours  rifAXIMin 550 two times a day      OTHER MEDS:  albuterol/ipratropium for Nebulization 3 milliLiter(s) Nebulizer every 6 hours  chlorhexidine 4% Liquid 1 Application(s) Topical <User Schedule>  digoxin     Tablet 0.125 milliGRAM(s) Oral every other day  heparin  Injectable 5000 Unit(s) SubCutaneous two times a day  lactulose Syrup 10 Gram(s) Oral four times a day  levothyroxine Injectable 12.5 MICROGram(s) IV Push at bedtime  midodrine 20 milliGRAM(s) Oral every 8 hours      Vital Signs Last 24 Hrs  T(C): 36.7 (30 Jan 2020 12:43), Max: 36.7 (30 Jan 2020 12:43)  T(F): 98.1 (30 Jan 2020 12:43), Max: 98.1 (30 Jan 2020 12:43)  HR: 80 (30 Jan 2020 14:00) (70 - 84)  BP: 119/66 (30 Jan 2020 14:00) (92/53 - 119/66)  BP(mean): --  RR: 18 (30 Jan 2020 12:43) (18 - 18)  SpO2: 98% (30 Jan 2020 12:43) (97% - 98%)    Physical Exam:  General: elderly, frail, fatigued, non toxic  Eye: normal sclera and conjunctiva  ENT: NG tube   Cardio: regular rate and rhythm   Respiratory: nonlabored on face mask oxygen, scattered crackles, good air entry bilaterally.   abd: soft, bowel sounds present, no tenderness  vascular: no phlebitis   Skin: ecchymosis and skin tears                           9.6    10.45 )-----------( 136      ( 30 Jan 2020 12:15 )             30.5       01-30    134<L>  |  104  |  26<H>  ----------------------------<  96  5.2   |  21<L>  |  0.79    Ca    9.4      30 Jan 2020 12:15  Phos  3.2     01-30  Mg     1.8     01-30    TPro  5.0<L>  /  Alb  1.8<L>  /  TBili  1.2  /  DBili  x   /  AST  32  /  ALT  13  /  AlkPhos  147<H>  01-30    MICROBIOLOGY:  Culture - Blood (collected 01-24-20 @ 05:14)  Source: .Blood Blood-Venous  Final Report (01-29-20 @ 06:00):    No growth at 5 days.    Culture - Blood (collected 01-24-20 @ 05:14)  Source: .Blood Blood-Peripheral  Final Report (01-29-20 @ 06:00):    No growth at 5 days.    Rapid RVP Result: NotDetec (01-29 @ 11:31)  Rapid RVP Result: NotDetec (01-24 @ 05:54)    RADIOLOGY:  Images below reviewed personally  Xray Chest 1 View- PORTABLE-Urgent (01.24.20 @ 08:33)   1.  Enteric tube with side-port in the proximal stomach.   2.  Unchanged right lower lung field patchy opacities.

## 2020-01-30 NOTE — PROGRESS NOTE ADULT - ASSESSMENT
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   Fungemia 1/21 with Candida glabrata, possible transient gut translocation in the setting of shock. Less likely from the central line which was in briefly. No ophthalmologic findings.   Cultures 1/24 negative. Afebrile since 1/24.   Transferred from MICU 1/25.   Improving slowly     Suggest  -Ertapenem 500mg IV q24h, last day 2/3   -Micafungin 100mg IV q24h, last day 2/8, transition to Fluconazole is possible but would require higher dosing (12mg/kg) and there's potential for drug interactions (none significant seen with current meds) so prefer IV     Daniel Medrano MD   Infectious Disease   Pager [phone]   After 5PM and on weekends please page fellow on call or call [phone]

## 2020-01-30 NOTE — CONSULT NOTE ADULT - ATTENDING COMMENTS
I have interviewed and examined the patient and reviewed the residents note including the history, exam, assessment, and plan.  I agree with the residents assessment and plan.    89-year-old F with PMHx CLL, Afib (not on AC), HCC c/b portal HTN, cirrhosis, esophageal varices s/p banding, severe AS, COPD, hypothyroidism, CKD3, PVD, recurrent UTI with history of ESBL Klebsiella and POcHx CEIOL OU who presented from [facility] with dyspnea, nonproductive cough, fever found to be in septic shock 2/2 Serratia bacteremic PNA and also found to have Candida glabrata, ophthalmology consulted to rule out fungal endophthalmitis. No eye complaints.    No evidence of vitreous opacities or retinal infiltrates, otherwise normal eye exam.    Plan:  - no acute ophthalmologic intervention  - antifungals and abx per primary team/ID  - rest of care per primary team  - plan discussed with patient, patient's daughter, and primary team  - pt advised to let team know if she notices any new eye complaints, flashes, floaters, change in her vision    Follow-Up:  Patient should follow up with her ophthalmologist or in the [facility] within 1 week of discharge, sooner if symptoms worsen or change.  Korina Barkley MD
Above noted   89 yo female , non icteric, who has a daughter, retired psychologist , present at bedside  Op f/u info provided
Patient seen and examined. Agree with above. Explained how endoscopic PEG is performed. Explained the risks of bleeding, infection, missed lesions, perforation, as well as increased risk of bleeding in a patient with decompensated cirrhosis and history of varices. She has not had any recent EGD, thus unclear if she will have gastric varices or ascites. Daughters at bedside was explained these risks and are not sure if they wish to proceed. They will discuss amongst themselves. Would check abdominal ultrasound to evaluate for ascites, which would also be another contraindication for PEG. Also explained the risk of early dislodgement which can end in peritonitis, and offered the alternative which is pleasure feeds by mouth. They understand that PEGs do not decrease the risks of aspiration.
Candida glabrata may have come from transient gut translocation in the setting of septic shock. Less likely from the central line, appears to have been placed 1/19 and yeast grew 1/21.     Daniel Medrano MD   Infectious Disease   Pager [phone]   After 5PM and on weekends please page fellow on call or call [phone]
89 yo female with past medical history of afib on digoxin (not on AC), severe AS, COPD not on home O2, CLL (previously on Treanda and Rituxan), hypothyroidism, 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 portal HTN complicated by esophageal varices s/p banding, CKD 3 (baseline Cr 1.2), PVD, and recurrent UTI presenting with septic shock with serratia bacteremia requiring pressor support and being appropriately treated with antibiotics and gentle hydration.Labs remarkable for hyperkalemia, hypernatremia and KARRIE suggesting dehydration. Recommend BNP to assess intravascular volume. Noted severe malnutrition with albumin 2.3 and recommend food supplementation when possible such as Ensure but a nutrition consult would be ideal.     Merissa Craven MD, MPH, RAMYA, RPVI, FACC  Cardiovascular Specialist   Margarita Olivia [facility]  c: [phone]  e: [email or web address]

## 2020-01-31 LAB
ALBUMIN SERPL ELPH-MCNC: 2 G/DL — LOW (ref 3.3–5)
ALP SERPL-CCNC: 150 U/L — HIGH (ref 40–120)
ALT FLD-CCNC: 15 U/L — SIGNIFICANT CHANGE UP (ref 10–45)
ANION GAP SERPL CALC-SCNC: 9 MMOL/L — SIGNIFICANT CHANGE UP (ref 5–17)
AST SERPL-CCNC: 36 U/L — SIGNIFICANT CHANGE UP (ref 10–40)
BASOPHILS # BLD AUTO: 0.03 K/UL — SIGNIFICANT CHANGE UP (ref 0–0.2)
BASOPHILS NFR BLD AUTO: 0.3 % — SIGNIFICANT CHANGE UP (ref 0–2)
BILIRUB SERPL-MCNC: 1.9 MG/DL — HIGH (ref 0.2–1.2)
BUN SERPL-MCNC: 30 MG/DL — HIGH (ref 7–23)
CALCIUM SERPL-MCNC: 9.6 MG/DL — SIGNIFICANT CHANGE UP (ref 8.4–10.5)
CHLORIDE SERPL-SCNC: 105 MMOL/L — SIGNIFICANT CHANGE UP (ref 96–108)
CO2 SERPL-SCNC: 23 MMOL/L — SIGNIFICANT CHANGE UP (ref 22–31)
CREAT SERPL-MCNC: 0.84 MG/DL — SIGNIFICANT CHANGE UP (ref 0.5–1.3)
EOSINOPHIL # BLD AUTO: 0.05 K/UL — SIGNIFICANT CHANGE UP (ref 0–0.5)
EOSINOPHIL NFR BLD AUTO: 0.4 % — SIGNIFICANT CHANGE UP (ref 0–6)
GLUCOSE BLDC GLUCOMTR-MCNC: 105 MG/DL — HIGH (ref 70–99)
GLUCOSE BLDC GLUCOMTR-MCNC: 82 MG/DL — SIGNIFICANT CHANGE UP (ref 70–99)
GLUCOSE BLDC GLUCOMTR-MCNC: 85 MG/DL — SIGNIFICANT CHANGE UP (ref 70–99)
GLUCOSE BLDC GLUCOMTR-MCNC: 93 MG/DL — SIGNIFICANT CHANGE UP (ref 70–99)
GLUCOSE SERPL-MCNC: 83 MG/DL — SIGNIFICANT CHANGE UP (ref 70–99)
HCT VFR BLD CALC: 31.8 % — LOW (ref 34.5–45)
HGB BLD-MCNC: 9.9 G/DL — LOW (ref 11.5–15.5)
IMM GRANULOCYTES NFR BLD AUTO: 0.8 % — SIGNIFICANT CHANGE UP (ref 0–1.5)
LYMPHOCYTES # BLD AUTO: 0.96 K/UL — LOW (ref 1–3.3)
LYMPHOCYTES # BLD AUTO: 8.5 % — LOW (ref 13–44)
MAGNESIUM SERPL-MCNC: 1.9 MG/DL — SIGNIFICANT CHANGE UP (ref 1.6–2.6)
MCHC RBC-ENTMCNC: 31.1 GM/DL — LOW (ref 32–36)
MCHC RBC-ENTMCNC: 32 PG — SIGNIFICANT CHANGE UP (ref 27–34)
MCV RBC AUTO: 102.9 FL — HIGH (ref 80–100)
MONOCYTES # BLD AUTO: 1.01 K/UL — HIGH (ref 0–0.9)
MONOCYTES NFR BLD AUTO: 9 % — SIGNIFICANT CHANGE UP (ref 2–14)
NEUTROPHILS # BLD AUTO: 9.13 K/UL — HIGH (ref 1.8–7.4)
NEUTROPHILS NFR BLD AUTO: 81 % — HIGH (ref 43–77)
NRBC # BLD: 0 /100 WBCS — SIGNIFICANT CHANGE UP (ref 0–0)
PHOSPHATE SERPL-MCNC: 3.2 MG/DL — SIGNIFICANT CHANGE UP (ref 2.5–4.5)
PLATELET # BLD AUTO: 152 K/UL — SIGNIFICANT CHANGE UP (ref 150–400)
POTASSIUM SERPL-MCNC: 5.2 MMOL/L — SIGNIFICANT CHANGE UP (ref 3.5–5.3)
POTASSIUM SERPL-SCNC: 5.2 MMOL/L — SIGNIFICANT CHANGE UP (ref 3.5–5.3)
PROT SERPL-MCNC: 5.3 G/DL — LOW (ref 6–8.3)
RBC # BLD: 3.09 M/UL — LOW (ref 3.8–5.2)
RBC # FLD: 16 % — HIGH (ref 10.3–14.5)
SODIUM SERPL-SCNC: 137 MMOL/L — SIGNIFICANT CHANGE UP (ref 135–145)
WBC # BLD: 11.27 K/UL — HIGH (ref 3.8–10.5)
WBC # FLD AUTO: 11.27 K/UL — HIGH (ref 3.8–10.5)

## 2020-01-31 PROCEDURE — 99232 SBSQ HOSP IP/OBS MODERATE 35: CPT

## 2020-01-31 PROCEDURE — 76705 ECHO EXAM OF ABDOMEN: CPT | Mod: 26

## 2020-01-31 PROCEDURE — 73110 X-RAY EXAM OF WRIST: CPT | Mod: 26,RT

## 2020-01-31 PROCEDURE — 99233 SBSQ HOSP IP/OBS HIGH 50: CPT | Mod: GC

## 2020-01-31 RX ORDER — ACETAMINOPHEN 500 MG
500 TABLET ORAL EVERY 6 HOURS
Refills: 0 | Status: DISCONTINUED | OUTPATIENT
Start: 2020-01-31 | End: 2020-02-13

## 2020-01-31 RX ADMIN — Medication 12.5 MICROGRAM(S): at 23:02

## 2020-01-31 RX ADMIN — HEPARIN SODIUM 5000 UNIT(S): 5000 INJECTION INTRAVENOUS; SUBCUTANEOUS at 06:02

## 2020-01-31 RX ADMIN — HEPARIN SODIUM 5000 UNIT(S): 5000 INJECTION INTRAVENOUS; SUBCUTANEOUS at 19:23

## 2020-01-31 RX ADMIN — Medication 3 MILLILITER(S): at 19:24

## 2020-01-31 RX ADMIN — Medication 3 MILLILITER(S): at 12:55

## 2020-01-31 RX ADMIN — LACTULOSE 10 GRAM(S): 10 SOLUTION ORAL at 12:55

## 2020-01-31 RX ADMIN — LACTULOSE 10 GRAM(S): 10 SOLUTION ORAL at 19:23

## 2020-01-31 RX ADMIN — Medication 3 MILLILITER(S): at 23:02

## 2020-01-31 RX ADMIN — MIDODRINE HYDROCHLORIDE 20 MILLIGRAM(S): 2.5 TABLET ORAL at 16:40

## 2020-01-31 RX ADMIN — ERTAPENEM SODIUM 100 MILLIGRAM(S): 1 INJECTION, POWDER, LYOPHILIZED, FOR SOLUTION INTRAMUSCULAR; INTRAVENOUS at 06:02

## 2020-01-31 RX ADMIN — Medication 500 MILLIGRAM(S): at 19:24

## 2020-01-31 RX ADMIN — LACTULOSE 10 GRAM(S): 10 SOLUTION ORAL at 23:02

## 2020-01-31 RX ADMIN — CHLORHEXIDINE GLUCONATE 1 APPLICATION(S): 213 SOLUTION TOPICAL at 08:21

## 2020-01-31 RX ADMIN — MICAFUNGIN SODIUM 105 MILLIGRAM(S): 100 INJECTION, POWDER, LYOPHILIZED, FOR SOLUTION INTRAVENOUS at 10:34

## 2020-01-31 RX ADMIN — LACTULOSE 10 GRAM(S): 10 SOLUTION ORAL at 06:01

## 2020-01-31 RX ADMIN — Medication 1 APPLICATION(S): at 11:23

## 2020-01-31 RX ADMIN — Medication 3 MILLILITER(S): at 06:01

## 2020-01-31 RX ADMIN — Medication 500 MILLIGRAM(S): at 20:02

## 2020-01-31 NOTE — SWALLOW BEDSIDE ASSESSMENT ADULT - PHARYNGEAL PHASE
Decreased laryngeal elevation/Cough post oral intake/Delayed pharyngeal swallow +wet/gurgly breath sounds post teaspoon trials

## 2020-01-31 NOTE — PROGRESS NOTE ADULT - PROBLEM SELECTOR PLAN 1
2/2 serratia bacteremia & sputum cx with serratia and klebsiella ESBL UTI sensitive to meropenem IV, and candida glabrata in 1/21  - ID recs appreciated- continue abx for 1 week (end 2/3), antifungal for 2 weeks (end 2/8) tentatively  - c/w ertapenem (1/26 - ). previously on meropenem IV (1/20-1/26).  - started on IV micafungin 100 mg daily (1/27- )  - on midodrine 20 tid, will down titrate as tolerated    # Bacterial PNA / gram negative and gram positive organisms  - c/w w abx as above  - chest PT, duonebs  - aspiration precautions  - maintain SaO2 > 92% w/ supplemental O2 PRN (on 3L face tent)    # Fungemia   - ophthalmology f/u as OP in 1 week, no signs of endophthalmitis  - c/w micafungin as above 2/2 serratia bacteremia & sputum cx with serratia and klebsiella ESBL UTI sensitive to meropenem IV, and candida glabrata in 1/21  - ID recs appreciated- continue abx for 1 week (end 2/3), antifungal for 2 weeks (end 2/8) tentatively  - c/w ertapenem (1/26 - )/ last day will be 2/3 previously on meropenem IV (1/20-1/26).  - started on IV micafungin 100 mg daily (1/27- ), last day 2/8  - on midodrine 20 tid, have been unable to titrate due to low BPs and patient has been requiring it    # Bacterial PNA / gram negative and gram positive organisms  - c/w w abx as above  - chest PT, duonebs  - aspiration precautions  - maintain SaO2 > 92% w/ supplemental O2 PRN (on 3L face tent)    # Fungemia   - ophthalmology f/u as OP in 1 week, no signs of endophthalmitis  - c/w micafungin as above

## 2020-01-31 NOTE — PROGRESS NOTE ADULT - SUBJECTIVE AND OBJECTIVE BOX
Follow Up: Sepsis    Interval History/ROS: More awake and alert today, feels better and more energetic. Denies pain but daughter says her right arm has been very painful (skin tears). Says she feels so-so. Denies diarrhea.     Allergies  codeine (Rash)  erythromycin (Rash)  iv dye (Rash; Anaphylaxis; Short breath)  penicillin (Rash)  shellfish (Rash)        ANTIMICROBIALS:  ertapenem  IVPB 500 every 24 hours  micafungin IVPB    micafungin IVPB 100 every 24 hours  rifAXIMin 550 two times a day      OTHER MEDS:  acetaminophen   Tablet .. 500 milliGRAM(s) Oral every 6 hours PRN  albuterol/ipratropium for Nebulization 3 milliLiter(s) Nebulizer every 6 hours  AQUAPHOR (petrolatum Ointment) 1 Application(s) Topical daily  chlorhexidine 4% Liquid 1 Application(s) Topical <User Schedule>  digoxin     Tablet 0.125 milliGRAM(s) Oral every other day  heparin  Injectable 5000 Unit(s) SubCutaneous two times a day  lactulose Syrup 10 Gram(s) Oral four times a day  levothyroxine Injectable 12.5 MICROGram(s) IV Push at bedtime  midodrine 20 milliGRAM(s) Oral every 8 hours      Vital Signs Last 24 Hrs  T(C): 36.3 (31 Jan 2020 13:00), Max: 36.6 (31 Jan 2020 00:34)  T(F): 97.4 (31 Jan 2020 13:00), Max: 97.8 (31 Jan 2020 00:34)  HR: 91 (31 Jan 2020 13:00) (67 - 91)  BP: 130/74 (31 Jan 2020 05:20) (100/97 - 130/74)  BP(mean): --  RR: 16 (31 Jan 2020 13:00) (16 - 18)  SpO2: 100% (31 Jan 2020 13:00) (96% - 100%)    Physical Exam:  General: elderly, frail, awake and more energetic today, non toxic  Head: atraumatic, normocephalic  ENT: NG tube   Cardio: regular rate and rhythm   Respiratory: nonlabored on face mask. scattered rhonchi  abd: soft, bowel sounds present, no tenderness  vascular: right arm peripheral IV wrapped in gauze   Skin: ecchymosis, skin tears dressed   psych: normal affect                          9.9    11.27 )-----------( 152      ( 31 Jan 2020 13:00 )             31.8       01-31    137  |  105  |  30<H>  ----------------------------<  83  5.2   |  23  |  0.84    Ca    9.6      31 Jan 2020 13:00  Phos  3.2     01-31  Mg     1.9     01-31    TPro  5.3<L>  /  Alb  2.0<L>  /  TBili  1.9<H>  /  DBili  x   /  AST  36  /  ALT  15  /  AlkPhos  150<H>  01-31      MICROBIOLOGY:  Culture - Blood (01.24.20 @ 05:14)    Specimen Source: .Blood Blood-Venous    Culture Results:   No growth at 5 days.    Culture - Blood (01.24.20 @ 05:14)    Specimen Source: .Blood Blood-Peripheral    Culture Results:   No growth at 5 days.    Culture - Blood (01.21.20 @ 23:03)    Specimen Source: .Blood Blood-Venous    Culture Results:   No growth at 5 days.    Rapid RVP Result: NotDetec (01-29 @ 11:31)    Culture - Blood (01.21.20 @ 15:02)    Gram Stain:   Growth in anaerobic bottle: Gram Negative Rods  Growth in aerobic bottle: Yeast like cells    -  Candida glabrata: Detec    -  5-Flucytosine: N/I For Candida species, no interpretation available for any invitro susceptibility testing.    -  Amphotericin B: N/I 0.5    -  Andulafungin: S <=0.015    -  Caspofungin: S 0.03 CASPOFUNGIN: is indicated in the treatment of candidemia, intra-abdominal abscess, peritonitis, pleural space infections and esophageal candidiasis.    -  Fluconazole: SDD 8 Fluconazole = Data established for mucosal disease, and is generally applicable for invasive disease.  Susceptibility is dependent on achieving the maximal possible blood level.  Doses of 400 MG/day or more may be required in adults with normalrenalfunction and body habitus.    -  Itraconazole: N/I For Candida species, no interpretation available for any invitro susceptibility testing.    -  Micafungin: S 0.015    -  Posaconazole: N/I 1    -  Voriconazole: N/I 0.25 For Candida glabrata and voriconazole , current data are insufficient to demonstrate a correlation between in vitro susceptibility testing and clinical outcome.    VORICONAZOLE: The GALINDO has been determined by the colormetric microdilution method.    -  Interpretation: See note This test method is not approved by the FDA and is for research use only.  The performance characteristics of this assay may have been determined by [employer] and [facility], [facility].                            N/I - No interpretation available                                                         SDD – Sensitive Dose Dependent    Specimen Source: .Blood Blood-Peripheral    Organism: Blood Culture PCR    Organism: Candida (Torulopsis) glabrata    Culture Results:   Growth in anaerobic bottle: Serratia marcescens  See previous culture [id number]  Growth in aerobic bottle: Candida glabrata  ***Blood Panel PCR results on this specimen are available  approximately 3 hours after the Gram stain result.***  Gram stain, PCR, and/or culture results may not always  correspond due to difference in methodologies.  ************************************************************  This PCR assay was performed using [employer].  The following targets are tested for: Enterococcus,  vancomycin resistant enterococci, Listeria monocytogenes,  coagulase negative staphylococci, S. aureus,  methicillin resistant S. aureus, Streptococcus agalactiae  (Group B), S. pneumoniae, S. pyogenes (Group A),  Acinetobacter baumannii, Enterobacter cloacae, E. coli,  Klebsiella oxytoca, K. pneumoniae, Proteus sp.,  Serratia marcescens, Haemophilus influenzae,  Neisseria meningitidis, Pseudomonas aeruginosa, Candida  albicans, C. glabrata, C krusei, C parapsilosis,  C. tropicalis and the KPC resistance gene.    Organism Identification: Blood Culture PCR  Candida (Torulopsis) glabrata    Method Type: PCR    Method Type: YSTMIC    RADIOLOGY:  Images below reviewed personally  US Abdomen Limited (01.31.20 @ 09:47)   Small right upper quadrant and trace left upper quadrant ascites.

## 2020-01-31 NOTE — SWALLOW BEDSIDE ASSESSMENT ADULT - DIET PRIOR TO ADMI
"chopped with nectar thickened liquids" as per daughter, Julio at bedside.

## 2020-01-31 NOTE — PROVIDER CONTACT NOTE (OTHER) - ASSESSMENT
Pt was sleeping during episode, Pt denies cp and sob. Vital signs T 97.3, 67 HR, 130/74, 96% O2, 18 RR

## 2020-01-31 NOTE — PROGRESS NOTE ADULT - SUBJECTIVE AND OBJECTIVE BOX
Alexei Hess MD, PGY-1  Pager #[phone]  After 7 PM on weekdays and 12 PM on weekends, for urgent issues please page #[phone].  ----------------------------------------------------------------  Patient is a 89y old  Female who presents with a chief complaint of Sepsis due to UTI (30 Jan 2020 15:55)      SUBJECTIVE / OVERNIGHT EVENTS: No acute events overnight. Pt seen and examined at bedside. Awake and alert this AM. NPO for possible PEG. Denies any acute complaints including headache, fever, chills, nausea, vomiting, diarrhea, constipation, chest pain, shortness of breath.     MEDICATIONS  (STANDING):  albuterol/ipratropium for Nebulization 3 milliLiter(s) Nebulizer every 6 hours  AQUAPHOR (petrolatum Ointment) 1 Application(s) Topical daily  chlorhexidine 4% Liquid 1 Application(s) Topical <User Schedule>  digoxin     Tablet 0.125 milliGRAM(s) Oral every other day  ertapenem  IVPB 500 milliGRAM(s) IV Intermittent every 24 hours  heparin  Injectable 5000 Unit(s) SubCutaneous two times a day  lactulose Syrup 10 Gram(s) Oral four times a day  levothyroxine Injectable 12.5 MICROGram(s) IV Push at bedtime  micafungin IVPB      micafungin IVPB 100 milliGRAM(s) IV Intermittent every 24 hours  midodrine 20 milliGRAM(s) Oral every 8 hours  rifAXIMin 550 milliGRAM(s) Oral two times a day    MEDICATIONS  (PRN):    CAPILLARY BLOOD GLUCOSE    POCT Blood Glucose.: 85 mg/dL (31 Jan 2020 06:07)  POCT Blood Glucose.: 93 mg/dL (31 Jan 2020 00:52)  POCT Blood Glucose.: 106 mg/dL (30 Jan 2020 17:19)  POCT Blood Glucose.: 120 mg/dL (30 Jan 2020 13:17)    I&O's Summary    30 Jan 2020 07:01  -  31 Jan 2020 07:00  --------------------------------------------------------  IN: 850 mL / OUT: 0 mL / NET: 850 mL    PHYSICAL EXAM:  Vital Signs Last 24 Hrs  T(C): 36.3 (31 Jan 2020 05:20), Max: 36.7 (30 Jan 2020 12:43)  T(F): 97.3 (31 Jan 2020 05:20), Max: 98.1 (30 Jan 2020 12:43)  HR: 67 (31 Jan 2020 05:20) (67 - 90)  BP: 130/74 (31 Jan 2020 05:20) (100/97 - 130/74)  RR: 18 (31 Jan 2020 05:20) (18 - 18)  SpO2: 96% (31 Jan 2020 05:20) (96% - 98%)    CONSTITUTIONAL: NAD, elderly female   RESPIRATORY: CTABL  CARDIOVASCULAR: 3/6 harsh systolic murmur   ABDOMEN: soft, nt, nd  MUSCULOSKELETAL: no lower extremity swelling  PSYCH: AOx2 self and hospital, alert today  NEUROLOGY: grossly intact, no focal deficits  SKIN: skin tears b/l UE, b/l ecchymoses LE, bandaged c/d/i    LABS:                        9.6    10.45 )-----------( 136      ( 30 Jan 2020 12:15 )             30.5     01-30    134<L>  |  104  |  26<H>  ----------------------------<  96  5.2   |  21<L>  |  0.79    Ca    9.4      30 Jan 2020 12:15  Phos  3.2     01-30  Mg     1.8     01-30    TPro  5.0<L>  /  Alb  1.8<L>  /  TBili  1.2  /  DBili  x   /  AST  32  /  ALT  13  /  AlkPhos  147<H>  01-30    RADIOLOGY & ADDITIONAL TESTS:  Results Reviewed:   Imaging Personally Reviewed:  Electrocardiogram Personally Reviewed:    COORDINATION OF CARE:  Care Discussed with Consultants/Other Providers [Y/N]:  Prior or Outpatient Records Reviewed [Y/N]: Alexei Hess MD, PGY-1  Pager #[phone]  After 7 PM on weekdays and 12 PM on weekends, for urgent issues please page #[phone].  ----------------------------------------------------------------  Patient is a 89y old  Female who presents with a chief complaint of Sepsis due to UTI (30 Jan 2020 15:55)      SUBJECTIVE / OVERNIGHT EVENTS: No acute events overnight. Pt seen and examined at bedside. Awake and alert this AM. NPO for possible PEG. Denies any acute complaints including headache, fever, chills, nausea, vomiting, diarrhea, constipation, chest pain, shortness of breath. Per HCP and patient, will pursue PEG tube.     MEDICATIONS  (STANDING):  albuterol/ipratropium for Nebulization 3 milliLiter(s) Nebulizer every 6 hours  AQUAPHOR (petrolatum Ointment) 1 Application(s) Topical daily  chlorhexidine 4% Liquid 1 Application(s) Topical <User Schedule>  digoxin     Tablet 0.125 milliGRAM(s) Oral every other day  ertapenem  IVPB 500 milliGRAM(s) IV Intermittent every 24 hours  heparin  Injectable 5000 Unit(s) SubCutaneous two times a day  lactulose Syrup 10 Gram(s) Oral four times a day  levothyroxine Injectable 12.5 MICROGram(s) IV Push at bedtime  micafungin IVPB      micafungin IVPB 100 milliGRAM(s) IV Intermittent every 24 hours  midodrine 20 milliGRAM(s) Oral every 8 hours  rifAXIMin 550 milliGRAM(s) Oral two times a day    MEDICATIONS  (PRN):    CAPILLARY BLOOD GLUCOSE    POCT Blood Glucose.: 85 mg/dL (31 Jan 2020 06:07)  POCT Blood Glucose.: 93 mg/dL (31 Jan 2020 00:52)  POCT Blood Glucose.: 106 mg/dL (30 Jan 2020 17:19)  POCT Blood Glucose.: 120 mg/dL (30 Jan 2020 13:17)    I&O's Summary    30 Jan 2020 07:01  -  31 Jan 2020 07:00  --------------------------------------------------------  IN: 850 mL / OUT: 0 mL / NET: 850 mL    PHYSICAL EXAM:  Vital Signs Last 24 Hrs  T(C): 36.3 (31 Jan 2020 05:20), Max: 36.7 (30 Jan 2020 12:43)  T(F): 97.3 (31 Jan 2020 05:20), Max: 98.1 (30 Jan 2020 12:43)  HR: 67 (31 Jan 2020 05:20) (67 - 90)  BP: 130/74 (31 Jan 2020 05:20) (100/97 - 130/74)  RR: 18 (31 Jan 2020 05:20) (18 - 18)  SpO2: 96% (31 Jan 2020 05:20) (96% - 98%)    CONSTITUTIONAL: NAD, elderly female   RESPIRATORY: CTABL  CARDIOVASCULAR: 3/6 harsh systolic murmur   ABDOMEN: soft, nt, nd  MUSCULOSKELETAL: no lower extremity swelling  PSYCH: AOx2 self and hospital, alert today  NEUROLOGY: grossly intact, no focal deficits  SKIN: skin tears b/l UE, b/l ecchymoses LE, bandaged c/d/i    LABS:                        9.6    10.45 )-----------( 136      ( 30 Jan 2020 12:15 )             30.5     01-30    134<L>  |  104  |  26<H>  ----------------------------<  96  5.2   |  21<L>  |  0.79    Ca    9.4      30 Jan 2020 12:15  Phos  3.2     01-30  Mg     1.8     01-30    TPro  5.0<L>  /  Alb  1.8<L>  /  TBili  1.2  /  DBili  x   /  AST  32  /  ALT  13  /  AlkPhos  147<H>  01-30    RADIOLOGY & ADDITIONAL TESTS:  Results Reviewed:   Imaging Personally Reviewed:  Electrocardiogram Personally Reviewed:    COORDINATION OF CARE:  Care Discussed with Consultants/Other Providers [Y/N]:  Prior or Outpatient Records Reviewed [Y/N]:

## 2020-01-31 NOTE — PROGRESS NOTE ADULT - ASSESSMENT
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   Fungemia 1/21 with Candida glabrata, possible transient gut translocation in the setting of shock. Less likely from the central line which was in briefly. No ophthalmologic findings.   Cultures 1/24 negative. Afebrile since 1/24.   Transferred from MICU 1/25.   Improving slowly     Suggest  -Ertapenem 500mg IV q24h, last day 2/3   -Micafungin 100mg IV q24h, last day 2/8, would not transition to Fluconazole given issues with dysphagia and ascites precluding PEG placement and it would require a high dose   -can place midline if discharge planning     Spoke with primary team     I will be rotating to [facility] next week. ID service will follow     Daniel Medrano MD   Infectious Disease   Pager [phone]   After 5PM and on weekends please page fellow on call or call [phone]

## 2020-01-31 NOTE — SWALLOW BEDSIDE ASSESSMENT ADULT - SPECIFY REASON(S)
to assess the swallow mechanism; r/o dysphagia.

## 2020-01-31 NOTE — SWALLOW BEDSIDE ASSESSMENT ADULT - ADDITIONAL RECOMMENDATIONS
maintain good oral hygiene
Suggest diligent oral care, minimum of 4-6x/day  Tentative plan for MBS on 1/24 pending medical clearance by team as pt with moments of tachypnea as per team
diligent oral care

## 2020-01-31 NOTE — PROGRESS NOTE ADULT - SUBJECTIVE AND OBJECTIVE BOX
Alert, in good spirits; no distress.    No cardiac sxs; no CP, palps or dyspnea     Medications:  acetaminophen   Tablet .. 500 milliGRAM(s) Oral every 6 hours PRN  albuterol/ipratropium for Nebulization 3 milliLiter(s) Nebulizer every 6 hours  AQUAPHOR (petrolatum Ointment) 1 Application(s) Topical daily  chlorhexidine 4% Liquid 1 Application(s) Topical <User Schedule>  digoxin     Tablet 0.125 milliGRAM(s) Oral every other day  ertapenem  IVPB 500 milliGRAM(s) IV Intermittent every 24 hours  heparin  Injectable 5000 Unit(s) SubCutaneous two times a day  lactulose Syrup 10 Gram(s) Oral four times a day  levothyroxine Injectable 12.5 MICROGram(s) IV Push at bedtime  micafungin IVPB 100 milliGRAM(s) IV Intermittent every 24 hours  midodrine 20 milliGRAM(s) Oral every 8 hours  rifAXIMin 550 milliGRAM(s) Oral two times a day    PMH/PSH/FH/SH:  Unchanged    ROS:  Unchanged    Vitals:  T(C): 36.3 (20 @ 13:00), Max: 36.6 (20 @ 00:34)  HR: 91 (20 @ 13:00) (67 - 91)  BP: 130/74 (20 @ 05:20) (100/97 - 130/74)  RR: 16 (20 @ 13:00) (16 - 18)  SpO2: 100% (20 @ 13:00) (96% - 100%)  Daily Weight in k.3 (2020 05:20)      Physical Exam:  General: No distress. Comfortable  HEENT: EOMI	  Neck: JVP not elevated  Chest: Clear to auscultation bilaterally  CV: Irregularly Irregulr. Normal S1 and S2. No murmurs, rubs, or gallops. No edema.  Abdomen: Soft, non-distended, non-tender  Skin: No rashes, ecchymoses, or skin breakdown  Extremities: Warm.  Neuro: Alert and oriented x 2. No focal deficits.  Psych: Mood and affect appropriate    TELE:  A fib with moderate VR; occasional VPCs.      LABS:                      9.9    11.27 )-----------( 152      ( 2020 13:00 )             31.8       137  |  105  |  30<H>  ----------------------------<  83  5.2   |  23  |  0.84    Ca    9.6      2020 13:00  Phos  3.2       Mg     1.9         TPro  5.3<L>  /  Alb  2.0<L>  /  TBili  1.9<H>  /  DBili  x   /  AST  36  /  ALT  15  /  AlkPhos  150<H>

## 2020-01-31 NOTE — SWALLOW BEDSIDE ASSESSMENT ADULT - SLP PERTINENT HISTORY OF CURRENT PROBLEM
89 yo female with a PMHx of afib on digoxin (not on AC), severe AS, COPD not on home O2, CLL (previously on Treanda and Rituxan), hypothyroidism, 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 Admitted 1/6-1/9 for weakness w/ mechanical fall with pre-renal KARRIE and resolution with IVF d/yohana to [facility]. Patient now presenting from [facility] with difficulty breathing and nonproductive cough and fever on day of presentation. Per family, patient had a fall out of bed found on the floor this AM. She has had waxing waning mentation over the last week. This typically happens when she has UTI. Of note[facility]  Noted to be confused from bl in ED.
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 Admitted 1/6-1/9 for weakness w/ mechanical fall with pre-renal KARRIE and resolution with IVF d/yohana to [facility]. Patient now presenting from [facility] with difficulty breathing and nonproductive cough and fever on day of presentation. Per family, patient had a fall out of bed found on the floor this AM. She has had waxing waning mentation over the last week. This typically happens when she has UTI. Of note[facility]  Noted to be confused from bl in ED.
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 Admitted 1/6-1/9 for weakness w/ mechanical fall with pre-renal KARRIE and resolution with IVF d/yohana to [facility]. Patient now presenting from [facility] with difficulty breathing and nonproductive cough and fever on day of presentation. Per family, patient had a fall out of bed found on the floor this AM. She has had waxing waning mentation over the last week. This typically happens when she has UTI. Of note[facility]  Noted to be confused from bl in ED.

## 2020-01-31 NOTE — SWALLOW BEDSIDE ASSESSMENT ADULT - ASR SWALLOW RECOMMEND DIAG
instrumental examination not clinically appropriate
Tentative plan for MBS on 1/24 pending clearance by team/VFSS/MBS
re-evaluation at bedside to assess candidacy for PO diet vs. instrumental exam only when deemed appropriate

## 2020-01-31 NOTE — CHART NOTE - NSCHARTNOTEFT_GEN_A_CORE
I discussed the swallow evaluation with HCP Dee Dee at bedside with the patient. Per swallow evaluation, the patient was noted to have signs of oropharyngeal dysphagia as well as signs of penetration and aspiration. The current recommendation is NPO with non-oral nutrition. Per discussion with HCP, she would want the NGT maintained for the time being while the patient is in this weak and disoriented state. She understands the risks of maintaining an NGT for long term includes infection, erosions, ulcers, and bleeding. NGT continued for the time being.

## 2020-01-31 NOTE — CHART NOTE - NSCHARTNOTEFT_GEN_A_CORE
Gi addenenum    Discussed with daughters at bedside again regarding PEG placement. After consideration of the risks/benefits as we discussed yesterday in detail, they still want to pursue PEG placement. Made them aware if ultrasound is done today showing no ascites, we can attempt EGD first to evaluate for presence of varices, and then proceed to PEG placement if there are no clear contraindications on endoscopic exam. They are well aware of the risks of increased bleeding, as well as the usual risks of infection, perforation, and peritonitis if PEG is dislodged prior to tract healing associated with PEG placement, and that it will NOT decrease risks of aspiration or change long term outcome. They have been offered pleasure feeds as the alternative. Discussed with Dr. Quinn.    Vasu Lopez MD  [phone] No

## 2020-01-31 NOTE — PROGRESS NOTE ADULT - ASSESSMENT
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 Pt last admit 1/6-9 s/p fall OOB now re-admitted from [facility] w AMS 2/2 Septic Shock 2/2 Serratia Bacteremia / Klebsiella ESBL urosepsis, and PNA w Serratia in the sputum, with candida glabrata in blood cx from 1/21.

## 2020-01-31 NOTE — PROGRESS NOTE ADULT - PROBLEM SELECTOR PLAN 2
- AOx2, alert this AM  - multifactorial likely secondary to recent sepsis, ICU delirium, portosystemic encephalopathy   - c/b nausea/vomiting now with NGT on tube feeds  - will maintain NGT while patient is w/ metabolic encephalopathy for oral access  - per family, wants assessment for PEG for caloric support as mental status of pt waxes and wanes and may not be able to tolerate PO consistently  - per GI, abd US r/o ascites before any possible PEG. NPO for possible PEG today pending final HCP agreement and no ascites visualized on US - AOx2, alert this AM  - multifactorial likely secondary to recent sepsis, ICU delirium, portosystemic encephalopathy   - c/b nausea/vomiting now with NGT on tube feeds  - will maintain NGT while patient is w/ metabolic encephalopathy for oral access  - per family, wants assessment for PEG for caloric support as mental status of pt waxes and wanes and may not be able to tolerate PO consistently  - per GI, abd US r/o ascites before any possible PEG. NPO for possible PEG today no ascites visualized on US

## 2020-01-31 NOTE — PROGRESS NOTE ADULT - ASSESSMENT
89F with AF on digoxin (not on AC due to esophageal varices), severe AS (not a candidate for replacement), moderate MS/moderate-severe mitral regurgitation, COPD, CLL, HCC with portal HTN c/b esophageal varices s/p banding, CKD3 (baseline Cr 1.2) and PVD.  A/W septic shock 2/2 serratia bacteremia from urosepsis vs. PNA.     REC:  1.  Persistent atrial fibrillation  - rate remains controlled, continue digoxin at current dose.  - not candidate for A/C given history of esophageal varices and cerebral hemorrhage on warfarin    2.  Severe Aortic Stenosis (TAMRA 0..4sqcm, mean gradient 58mmHg)  - not a candidate for intervention.    3.  Hx. of CHF  - continue to hold spironolactone for now    4.  Sepsis  - continue anti-microbials    5.  Apparently, feeding tube placement attempted, but not feasible due to presence of ascites.      Daniel Boston M.D.  Office: [phone]  Beeper: [phone]

## 2020-01-31 NOTE — CHART NOTE - NSCHARTNOTEFT_GEN_A_CORE
Discussed with family at length. U/s showing ascites which is a contrandication to peg placement. Awaiting repeat speech eval. If family agreeable to peg and still pursuing, woud recommend diuresis to remove ascites vs possible paracentesis and drainage. If ascites removed can reconsider peg placement.     D/w attending

## 2020-01-31 NOTE — SWALLOW BEDSIDE ASSESSMENT ADULT - ASPIRATION PRECAUTIONS
for secretions and enteral feeds/yes
for secretions and enteral feeds/yes
yes/for secretions and enteral feeds if initiated

## 2020-01-31 NOTE — PROGRESS NOTE ADULT - ATTENDING COMMENTS
give RX to CM to facilitate dc planning  plan for PEG today  I have had multiple discussions with both daughters, HCP Ephraim and Dee Dee in regards to PEG tube not being recommended in this situation. It will not provide quality of life, not prolong life and it will not decrease the risk of aspiration. Despite the risks of PEG tube explained by Dr. oLpez, family is still opting to go for placement of PEG.

## 2020-01-31 NOTE — SWALLOW BEDSIDE ASSESSMENT ADULT - SWALLOW EVAL: DIAGNOSIS
Pt seen for repeat bedside swallow evaluation. Pt continues to present with clinical signs of an oropharyngeal dysphagia. Delayed oral transit time with delayed swallow initiation and reduced laryngeal elevation upon palpation. Immediate coughing post puree thin and wet/gurgly breath sounds post honey thickened liquids. These signs are suggestive of penetration/aspiration. Further PO trials deferred due to difficulty tolerating conservative textures.

## 2020-01-31 NOTE — SWALLOW BEDSIDE ASSESSMENT ADULT - SWALLOW EVAL: RECOMMENDED DIET
NPO, with non-oral nutrition, hydration, medications is recommended based upon the results of this examination; however, would suggest Palliative Care consult to determine the GOC with re: to provision of nutrition in this patient
NPO, with non-oral nutrition/hydration/medications.
NPO, with non-oral nutrition/hydration/medications.

## 2020-01-31 NOTE — SWALLOW BEDSIDE ASSESSMENT ADULT - SLP GENERAL OBSERVATIONS
Pt encountered bedside, +NGT; +4LO2NC. Pt AA&Ox1 only. Pt's daughters present at bedside. Pt able to follow simple commands for purposes of evaluation. Pt's daughters express an awareness of likely chronic dysphagia with report of recurrent PNA's (2-3 in the past 2 years). Educated pt and family re: continued risk of aspiration on conservative textures. Suggested palliative consult as pt and family do not request an instrumental test as family states that patient is "not likely going to do well on it and is not strong enough" per daughter Ephraim's report. Pt and family given swallow exercises per request. Discussed suggestion for palliative consult with possible pleasure feeds with Dr. Alexei Hess.
Pt encountered bedside, AA&Ox2 (person, place). Pt with improved alertness/responsiveness/increased vocal volume compared to previous encounter. Pt able to follow simple directives and answer simple biographical yes/no questions. Pt mildly pleasantly confused "I just ate" despite NPO status. +KFT; +3L02NC. SPO2 96-97; ; RR 20. Pt amenable to MBS once medically stable. As per discussion with Resident Otoole, would plan to optimize patient with tentative plan for MBS for 1/24.
Pt encountered bedside, AA&Ox1 (person). +2L02NC, SPO2 97%. RR 20-24. Pt able to follow simple commands only and pleasantly confused. +Mild dysarthria. Pt's daughter stated "my mom's swallowing worsens whenever she is hospitalized and then it gets better when her overall status improves".

## 2020-02-01 DIAGNOSIS — R78.81 BACTEREMIA: ICD-10-CM

## 2020-02-01 LAB
ALBUMIN SERPL ELPH-MCNC: 2 G/DL — LOW (ref 3.3–5)
ALP SERPL-CCNC: 167 U/L — HIGH (ref 40–120)
ALT FLD-CCNC: 13 U/L — SIGNIFICANT CHANGE UP (ref 10–45)
ANION GAP SERPL CALC-SCNC: 10 MMOL/L — SIGNIFICANT CHANGE UP (ref 5–17)
AST SERPL-CCNC: 31 U/L — SIGNIFICANT CHANGE UP (ref 10–40)
BILIRUB SERPL-MCNC: 1.1 MG/DL — SIGNIFICANT CHANGE UP (ref 0.2–1.2)
BUN SERPL-MCNC: 36 MG/DL — HIGH (ref 7–23)
CALCIUM SERPL-MCNC: 9.7 MG/DL — SIGNIFICANT CHANGE UP (ref 8.4–10.5)
CHLORIDE SERPL-SCNC: 106 MMOL/L — SIGNIFICANT CHANGE UP (ref 96–108)
CO2 SERPL-SCNC: 22 MMOL/L — SIGNIFICANT CHANGE UP (ref 22–31)
CREAT SERPL-MCNC: 0.9 MG/DL — SIGNIFICANT CHANGE UP (ref 0.5–1.3)
GLUCOSE BLDC GLUCOMTR-MCNC: 108 MG/DL — HIGH (ref 70–99)
GLUCOSE BLDC GLUCOMTR-MCNC: 111 MG/DL — HIGH (ref 70–99)
GLUCOSE BLDC GLUCOMTR-MCNC: 113 MG/DL — HIGH (ref 70–99)
GLUCOSE BLDC GLUCOMTR-MCNC: 116 MG/DL — HIGH (ref 70–99)
GLUCOSE BLDC GLUCOMTR-MCNC: 99 MG/DL — SIGNIFICANT CHANGE UP (ref 70–99)
GLUCOSE SERPL-MCNC: 115 MG/DL — HIGH (ref 70–99)
HCT VFR BLD CALC: 30.2 % — LOW (ref 34.5–45)
HGB BLD-MCNC: 9.5 G/DL — LOW (ref 11.5–15.5)
MAGNESIUM SERPL-MCNC: 2 MG/DL — SIGNIFICANT CHANGE UP (ref 1.6–2.6)
MCHC RBC-ENTMCNC: 31.5 GM/DL — LOW (ref 32–36)
MCHC RBC-ENTMCNC: 33.1 PG — SIGNIFICANT CHANGE UP (ref 27–34)
MCV RBC AUTO: 105.2 FL — HIGH (ref 80–100)
NRBC # BLD: 0 /100 WBCS — SIGNIFICANT CHANGE UP (ref 0–0)
PHOSPHATE SERPL-MCNC: 3.5 MG/DL — SIGNIFICANT CHANGE UP (ref 2.5–4.5)
PLATELET # BLD AUTO: 151 K/UL — SIGNIFICANT CHANGE UP (ref 150–400)
POTASSIUM SERPL-MCNC: 5.4 MMOL/L — HIGH (ref 3.5–5.3)
POTASSIUM SERPL-SCNC: 5.4 MMOL/L — HIGH (ref 3.5–5.3)
PROT SERPL-MCNC: 5.2 G/DL — LOW (ref 6–8.3)
RBC # BLD: 2.87 M/UL — LOW (ref 3.8–5.2)
RBC # FLD: 16.4 % — HIGH (ref 10.3–14.5)
SODIUM SERPL-SCNC: 138 MMOL/L — SIGNIFICANT CHANGE UP (ref 135–145)
WBC # BLD: 10.53 K/UL — HIGH (ref 3.8–10.5)
WBC # FLD AUTO: 10.53 K/UL — HIGH (ref 3.8–10.5)

## 2020-02-01 PROCEDURE — 99233 SBSQ HOSP IP/OBS HIGH 50: CPT | Mod: GC

## 2020-02-01 RX ADMIN — LACTULOSE 10 GRAM(S): 10 SOLUTION ORAL at 06:01

## 2020-02-01 RX ADMIN — Medication 500 MILLIGRAM(S): at 12:02

## 2020-02-01 RX ADMIN — Medication 500 MILLIGRAM(S): at 02:53

## 2020-02-01 RX ADMIN — CHLORHEXIDINE GLUCONATE 1 APPLICATION(S): 213 SOLUTION TOPICAL at 08:40

## 2020-02-01 RX ADMIN — Medication 3 MILLILITER(S): at 13:41

## 2020-02-01 RX ADMIN — HEPARIN SODIUM 5000 UNIT(S): 5000 INJECTION INTRAVENOUS; SUBCUTANEOUS at 06:01

## 2020-02-01 RX ADMIN — Medication 500 MILLIGRAM(S): at 13:56

## 2020-02-01 RX ADMIN — LACTULOSE 10 GRAM(S): 10 SOLUTION ORAL at 13:55

## 2020-02-01 RX ADMIN — HEPARIN SODIUM 5000 UNIT(S): 5000 INJECTION INTRAVENOUS; SUBCUTANEOUS at 18:08

## 2020-02-01 RX ADMIN — Medication 500 MILLIGRAM(S): at 03:30

## 2020-02-01 RX ADMIN — Medication 500 MILLIGRAM(S): at 17:49

## 2020-02-01 RX ADMIN — Medication 12.5 MICROGRAM(S): at 22:34

## 2020-02-01 RX ADMIN — Medication 0.12 MILLIGRAM(S): at 13:56

## 2020-02-01 RX ADMIN — Medication 1 APPLICATION(S): at 13:56

## 2020-02-01 RX ADMIN — MIDODRINE HYDROCHLORIDE 20 MILLIGRAM(S): 2.5 TABLET ORAL at 22:35

## 2020-02-01 RX ADMIN — MIDODRINE HYDROCHLORIDE 20 MILLIGRAM(S): 2.5 TABLET ORAL at 13:55

## 2020-02-01 RX ADMIN — Medication 500 MILLIGRAM(S): at 08:51

## 2020-02-01 RX ADMIN — Medication 3 MILLILITER(S): at 18:08

## 2020-02-01 RX ADMIN — MICAFUNGIN SODIUM 105 MILLIGRAM(S): 100 INJECTION, POWDER, LYOPHILIZED, FOR SOLUTION INTRAVENOUS at 08:38

## 2020-02-01 RX ADMIN — MIDODRINE HYDROCHLORIDE 20 MILLIGRAM(S): 2.5 TABLET ORAL at 06:02

## 2020-02-01 RX ADMIN — Medication 3 MILLILITER(S): at 06:01

## 2020-02-01 RX ADMIN — ERTAPENEM SODIUM 100 MILLIGRAM(S): 1 INJECTION, POWDER, LYOPHILIZED, FOR SOLUTION INTRAMUSCULAR; INTRAVENOUS at 06:01

## 2020-02-01 RX ADMIN — Medication 500 MILLIGRAM(S): at 22:35

## 2020-02-01 RX ADMIN — Medication 500 MILLIGRAM(S): at 23:30

## 2020-02-01 RX ADMIN — LACTULOSE 10 GRAM(S): 10 SOLUTION ORAL at 18:08

## 2020-02-01 NOTE — PROGRESS NOTE ADULT - PROBLEM SELECTOR PLAN 5
- cirrhosis secondary to HCC  - lactulose titrating to 3-4 BM/day  - stool counts noted, 3 bm o/n  - c/w rifaxamine  - restart beta blocker when off midodrine - cirrhosis secondary to HCC  - lactulose titrating to 3-4 BM/day  - c/w rifaxamine  - restart beta blocker when off midodrine

## 2020-02-01 NOTE — PROGRESS NOTE ADULT - PROBLEM SELECTOR PLAN 9
DVT: HSQ  Diet: tube feeds, goal rate of 50, npo for possible peg  Dispo: [facility] DVT: HSQ  Diet: tube feeds, goal rate of 50  Dispo: [facility]

## 2020-02-01 NOTE — PROGRESS NOTE ADULT - ASSESSMENT
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 Pt last admit 1/6-9 s/p fall OOB now re-admitted from [facility] w AMS 2/2 Septic Shock 2/2 Serratia Bacteremia / Klebsiella ESBL urosepsis, and PNA w Serratia in the sputum, with candida glabrata in blood cx from 1/21.

## 2020-02-01 NOTE — PROGRESS NOTE ADULT - SUBJECTIVE AND OBJECTIVE BOX
PROGRESS NOTE:   Holden Hugo MD, PGY-2  [facility] Pager [phone]  [facility] Pager [phone]    Patient is a 89y old  Female who presents with a chief complaint of sepsis 2/2 to UTI vs PNA (31 Jan 2020 07:45)      SUBJECTIVE / OVERNIGHT EVENTS:    ADDITIONAL REVIEW OF SYSTEMS:    MEDICATIONS  (STANDING):  albuterol/ipratropium for Nebulization 3 milliLiter(s) Nebulizer every 6 hours  AQUAPHOR (petrolatum Ointment) 1 Application(s) Topical daily  chlorhexidine 4% Liquid 1 Application(s) Topical <User Schedule>  digoxin     Tablet 0.125 milliGRAM(s) Oral every other day  ertapenem  IVPB 500 milliGRAM(s) IV Intermittent every 24 hours  heparin  Injectable 5000 Unit(s) SubCutaneous two times a day  lactulose Syrup 10 Gram(s) Oral four times a day  levothyroxine Injectable 12.5 MICROGram(s) IV Push at bedtime  micafungin IVPB      micafungin IVPB 100 milliGRAM(s) IV Intermittent every 24 hours  midodrine 20 milliGRAM(s) Oral every 8 hours  rifAXIMin 550 milliGRAM(s) Oral two times a day    MEDICATIONS  (PRN):  acetaminophen   Tablet .. 500 milliGRAM(s) Oral every 6 hours PRN Temp greater or equal to 38C (100.4F), Mild Pain (1 - 3)      CAPILLARY BLOOD GLUCOSE      POCT Blood Glucose.: 111 mg/dL (01 Feb 2020 06:28)  POCT Blood Glucose.: 113 mg/dL (01 Feb 2020 01:55)  POCT Blood Glucose.: 105 mg/dL (31 Jan 2020 18:16)  POCT Blood Glucose.: 82 mg/dL (31 Jan 2020 12:53)    I&O's Summary      PHYSICAL EXAM:  Vital Signs Last 24 Hrs  T(C): 36.4 (01 Feb 2020 04:19), Max: 36.6 (31 Jan 2020 21:47)  T(F): 97.6 (01 Feb 2020 04:19), Max: 97.8 (31 Jan 2020 21:47)  HR: 92 (01 Feb 2020 04:19) (90 - 92)  BP: 109/54 (01 Feb 2020 04:19) (109/54 - 111/55)  BP(mean): --  RR: 18 (01 Feb 2020 04:19) (16 - 18)  SpO2: 97% (01 Feb 2020 04:19) (97% - 100%)    CONSTITUTIONAL: NAD, lying in bed  ENT: NGT in place, no visible oral lesions  RESPIRATORY: clear to auscultation bilaterally anteriorly, no wheezes or rales  CARDIOVASCULAR: Regular rate and rhythm, normal S1 and S2, no murmur/rub/gallop, no LE edema  ABDOMEN: nontender, nondistended, no rebound/guarding  MUSCLOSKELETAL: no clubbing or cyanosis of digits; no joint swelling or tenderness to palpation  NEURO: opens eyes to verbal stimuli, non-verbal      LABS:                        9.9    11.27 )-----------( 152      ( 31 Jan 2020 13:00 )             31.8     01-31    137  |  105  |  30<H>  ----------------------------<  83  5.2   |  23  |  0.84    Ca    9.6      31 Jan 2020 13:00  Phos  3.2     01-31  Mg     1.9     01-31    TPro  5.3<L>  /  Alb  2.0<L>  /  TBili  1.9<H>  /  DBili  x   /  AST  36  /  ALT  15  /  AlkPhos  150<H>  01-31        RADIOLOGY & ADDITIONAL TESTS:  Results Reviewed: Yes  Imaging Personally Reviewed: Yes  Electrocardiogram Personally Reviewed: Yes    COORDINATION OF CARE:  Care Discussed with Consultants/Other Providers [Y/N]:  Y  Prior or Outpatient Records Reviewed [Y/N]: Y PROGRESS NOTE:   Holden Hugo MD, PGY-2  [facility] Pager [phone]  [facility] Pager [phone]    Patient is a 89y old  Female who presents with a chief complaint of sepsis 2/2 to UTI vs PNA (31 Jan 2020 07:45)      SUBJECTIVE / OVERNIGHT EVENTS:  No acute overnight events. Patient asleep at time of exam, opens eyes to verbal stimuli but non-verbal. No family members at baseline. NGT in place, receiving feeds.    ADDITIONAL REVIEW OF SYSTEMS:  Unable to obtain 2/2 AMS    MEDICATIONS  (STANDING):  albuterol/ipratropium for Nebulization 3 milliLiter(s) Nebulizer every 6 hours  AQUAPHOR (petrolatum Ointment) 1 Application(s) Topical daily  chlorhexidine 4% Liquid 1 Application(s) Topical <User Schedule>  digoxin     Tablet 0.125 milliGRAM(s) Oral every other day  ertapenem  IVPB 500 milliGRAM(s) IV Intermittent every 24 hours  heparin  Injectable 5000 Unit(s) SubCutaneous two times a day  lactulose Syrup 10 Gram(s) Oral four times a day  levothyroxine Injectable 12.5 MICROGram(s) IV Push at bedtime  micafungin IVPB      micafungin IVPB 100 milliGRAM(s) IV Intermittent every 24 hours  midodrine 20 milliGRAM(s) Oral every 8 hours  rifAXIMin 550 milliGRAM(s) Oral two times a day    MEDICATIONS  (PRN):  acetaminophen   Tablet .. 500 milliGRAM(s) Oral every 6 hours PRN Temp greater or equal to 38C (100.4F), Mild Pain (1 - 3)      CAPILLARY BLOOD GLUCOSE      POCT Blood Glucose.: 111 mg/dL (01 Feb 2020 06:28)  POCT Blood Glucose.: 113 mg/dL (01 Feb 2020 01:55)  POCT Blood Glucose.: 105 mg/dL (31 Jan 2020 18:16)  POCT Blood Glucose.: 82 mg/dL (31 Jan 2020 12:53)    I&O's Summary      PHYSICAL EXAM:  Vital Signs Last 24 Hrs  T(C): 36.4 (01 Feb 2020 04:19), Max: 36.6 (31 Jan 2020 21:47)  T(F): 97.6 (01 Feb 2020 04:19), Max: 97.8 (31 Jan 2020 21:47)  HR: 92 (01 Feb 2020 04:19) (90 - 92)  BP: 109/54 (01 Feb 2020 04:19) (109/54 - 111/55)  BP(mean): --  RR: 18 (01 Feb 2020 04:19) (16 - 18)  SpO2: 97% (01 Feb 2020 04:19) (97% - 100%)    CONSTITUTIONAL: NAD, lying in bed  ENT: NGT in place, no visible oral lesions  RESPIRATORY: clear to auscultation bilaterally anteriorly, no wheezes or rales  CARDIOVASCULAR: Regular rate and rhythm, normal S1 and S2, no murmur/rub/gallop, no LE edema  ABDOMEN: nontender, nondistended, no rebound/guarding  MUSCLOSKELETAL: no clubbing or cyanosis of digits; no joint swelling or tenderness to palpation  NEURO: opens eyes to verbal stimuli, non-verbal      LABS:                        9.9    11.27 )-----------( 152      ( 31 Jan 2020 13:00 )             31.8     01-31    137  |  105  |  30<H>  ----------------------------<  83  5.2   |  23  |  0.84    Ca    9.6      31 Jan 2020 13:00  Phos  3.2     01-31  Mg     1.9     01-31    TPro  5.3<L>  /  Alb  2.0<L>  /  TBili  1.9<H>  /  DBili  x   /  AST  36  /  ALT  15  /  AlkPhos  150<H>  01-31        RADIOLOGY & ADDITIONAL TESTS:  Results Reviewed: Yes  Imaging Personally Reviewed: Yes  Electrocardiogram Personally Reviewed: Yes    COORDINATION OF CARE:  Care Discussed with Consultants/Other Providers [Y/N]:  Y  Prior or Outpatient Records Reviewed [Y/N]: Y PROGRESS NOTE:   Holden Hugo MD, PGY-2  [facility] Pager [phone]  [facility] Pager [phone]    Patient is a 89y old  Female who presents with a chief complaint of sepsis 2/2 to UTI vs PNA (31 Jan 2020 07:45)      SUBJECTIVE / OVERNIGHT EVENTS:  No acute overnight events. Patient asleep at time of exam, opens eyes, responds with yes/no. Moaning but denies pain. Daugher at bedside NGT in place, receiving feeds.    ADDITIONAL REVIEW OF SYSTEMS:  Unable to obtain 2/2 AMS    MEDICATIONS  (STANDING):  albuterol/ipratropium for Nebulization 3 milliLiter(s) Nebulizer every 6 hours  AQUAPHOR (petrolatum Ointment) 1 Application(s) Topical daily  chlorhexidine 4% Liquid 1 Application(s) Topical <User Schedule>  digoxin     Tablet 0.125 milliGRAM(s) Oral every other day  ertapenem  IVPB 500 milliGRAM(s) IV Intermittent every 24 hours  heparin  Injectable 5000 Unit(s) SubCutaneous two times a day  lactulose Syrup 10 Gram(s) Oral four times a day  levothyroxine Injectable 12.5 MICROGram(s) IV Push at bedtime  micafungin IVPB      micafungin IVPB 100 milliGRAM(s) IV Intermittent every 24 hours  midodrine 20 milliGRAM(s) Oral every 8 hours  rifAXIMin 550 milliGRAM(s) Oral two times a day    MEDICATIONS  (PRN):  acetaminophen   Tablet .. 500 milliGRAM(s) Oral every 6 hours PRN Temp greater or equal to 38C (100.4F), Mild Pain (1 - 3)      CAPILLARY BLOOD GLUCOSE      POCT Blood Glucose.: 111 mg/dL (01 Feb 2020 06:28)  POCT Blood Glucose.: 113 mg/dL (01 Feb 2020 01:55)  POCT Blood Glucose.: 105 mg/dL (31 Jan 2020 18:16)  POCT Blood Glucose.: 82 mg/dL (31 Jan 2020 12:53)    I&O's Summary      PHYSICAL EXAM:  Vital Signs Last 24 Hrs  T(C): 36.4 (01 Feb 2020 04:19), Max: 36.6 (31 Jan 2020 21:47)  T(F): 97.6 (01 Feb 2020 04:19), Max: 97.8 (31 Jan 2020 21:47)  HR: 92 (01 Feb 2020 04:19) (90 - 92)  BP: 109/54 (01 Feb 2020 04:19) (109/54 - 111/55)  BP(mean): --  RR: 18 (01 Feb 2020 04:19) (16 - 18)  SpO2: 97% (01 Feb 2020 04:19) (97% - 100%)    CONSTITUTIONAL: NAD, lying in bed  ENT: NGT in place, no visible oral lesions  RESPIRATORY: clear to auscultation bilaterally anteriorly, no wheezes or rales  CARDIOVASCULAR: Regular rate and rhythm, normal S1 and S2, no murmur/rub/gallop, no LE edema  ABDOMEN: nontender, nondistended, no rebound/guarding  MUSCLOSKELETAL: no clubbing or cyanosis of digits; no joint swelling or tenderness to palpation  NEURO: opens eyes to verbal stimuli, non-verbal      LABS:                        9.9    11.27 )-----------( 152      ( 31 Jan 2020 13:00 )             31.8     01-31    137  |  105  |  30<H>  ----------------------------<  83  5.2   |  23  |  0.84    Ca    9.6      31 Jan 2020 13:00  Phos  3.2     01-31  Mg     1.9     01-31    TPro  5.3<L>  /  Alb  2.0<L>  /  TBili  1.9<H>  /  DBili  x   /  AST  36  /  ALT  15  /  AlkPhos  150<H>  01-31        RADIOLOGY & ADDITIONAL TESTS:  Results Reviewed: Yes  Imaging Personally Reviewed: Yes  Electrocardiogram Personally Reviewed: Yes    COORDINATION OF CARE:  Care Discussed with Consultants/Other Providers [Y/N]:  Y  Prior or Outpatient Records Reviewed [Y/N]: Y

## 2020-02-01 NOTE — PROGRESS NOTE ADULT - PROBLEM SELECTOR PLAN 1
2/2 serratia bacteremia & sputum cx with serratia and klebsiella ESBL UTI sensitive to meropenem IV, and candida glabrata in 1/21  - ID recs appreciated- continue abx for 1 week (end 2/3), antifungal for 2 weeks (end 2/8) tentatively  - c/w ertapenem (1/26 - )/ last day will be 2/3 previously on meropenem IV (1/20-1/26).  - started on IV micafungin 100 mg daily (1/27- ), last day 2/8  - on midodrine 20 tid, have been unable to titrate due to low BPs and patient has been requiring it    # Bacterial PNA / gram negative and gram positive organisms  - c/w w abx as above  - chest PT, duonebs  - aspiration precautions  - maintain SaO2 > 92% w/ supplemental O2 PRN (on 3L face tent)    # Fungemia   - ophthalmology f/u as OP in 1 week, no signs of endophthalmitis  - c/w micafungin as above Serratia bacteremia, sputum cx with serratia. Klebsiella ESBL UTI sensitive to meropenem IV. Also with candida glabrata fungemia (1/21).  - c/w ertapenem (1/26 - ) -last day will be 2/3   - c/w IV micafungin 100mg daily (1/27- ), last day 2/8  - on midodrine 20 tid, have been unable to downtitrate due to low BPs  - ophthalmology f/u as OP in 1 week, no signs of endophthalmitis

## 2020-02-01 NOTE — PROGRESS NOTE ADULT - PROBLEM SELECTOR PLAN 2
- AOx2, alert this AM  - multifactorial likely secondary to recent sepsis, ICU delirium, portosystemic encephalopathy   - c/b nausea/vomiting now with NGT on tube feeds  - will maintain NGT while patient is w/ metabolic encephalopathy for oral access  - per family, wants assessment for PEG for caloric support as mental status of pt waxes and wanes and may not be able to tolerate PO consistently  - per GI, abd US r/o ascites before any possible PEG. NPO for possible PEG today no ascites visualized on US - multifactorial likely secondary to recent sepsis, ICU delirium, portosystemic encephalopathy   - c/b nausea/vomiting now with NGT on tube feeds  - will maintain NGT while patient is w/ metabolic encephalopathy for oral access  - not a candidate for PEG given ascites as per GI

## 2020-02-02 LAB
ALBUMIN SERPL ELPH-MCNC: 1.9 G/DL — LOW (ref 3.3–5)
ALP SERPL-CCNC: 152 U/L — HIGH (ref 40–120)
ALT FLD-CCNC: 13 U/L — SIGNIFICANT CHANGE UP (ref 10–45)
ANION GAP SERPL CALC-SCNC: 7 MMOL/L — SIGNIFICANT CHANGE UP (ref 5–17)
AST SERPL-CCNC: 27 U/L — SIGNIFICANT CHANGE UP (ref 10–40)
BASOPHILS # BLD AUTO: 0.04 K/UL — SIGNIFICANT CHANGE UP (ref 0–0.2)
BASOPHILS NFR BLD AUTO: 0.4 % — SIGNIFICANT CHANGE UP (ref 0–2)
BILIRUB SERPL-MCNC: 1.3 MG/DL — HIGH (ref 0.2–1.2)
BUN SERPL-MCNC: 41 MG/DL — HIGH (ref 7–23)
CALCIUM SERPL-MCNC: 9.4 MG/DL — SIGNIFICANT CHANGE UP (ref 8.4–10.5)
CHLORIDE SERPL-SCNC: 107 MMOL/L — SIGNIFICANT CHANGE UP (ref 96–108)
CO2 SERPL-SCNC: 23 MMOL/L — SIGNIFICANT CHANGE UP (ref 22–31)
CREAT SERPL-MCNC: 0.89 MG/DL — SIGNIFICANT CHANGE UP (ref 0.5–1.3)
EOSINOPHIL # BLD AUTO: 0.08 K/UL — SIGNIFICANT CHANGE UP (ref 0–0.5)
EOSINOPHIL NFR BLD AUTO: 0.9 % — SIGNIFICANT CHANGE UP (ref 0–6)
GLUCOSE BLDC GLUCOMTR-MCNC: 108 MG/DL — HIGH (ref 70–99)
GLUCOSE BLDC GLUCOMTR-MCNC: 119 MG/DL — HIGH (ref 70–99)
GLUCOSE BLDC GLUCOMTR-MCNC: 82 MG/DL — SIGNIFICANT CHANGE UP (ref 70–99)
GLUCOSE SERPL-MCNC: 81 MG/DL — SIGNIFICANT CHANGE UP (ref 70–99)
HCT VFR BLD CALC: 27.9 % — LOW (ref 34.5–45)
HGB BLD-MCNC: 8.8 G/DL — LOW (ref 11.5–15.5)
IMM GRANULOCYTES NFR BLD AUTO: 0.8 % — SIGNIFICANT CHANGE UP (ref 0–1.5)
LYMPHOCYTES # BLD AUTO: 1.26 K/UL — SIGNIFICANT CHANGE UP (ref 1–3.3)
LYMPHOCYTES # BLD AUTO: 13.9 % — SIGNIFICANT CHANGE UP (ref 13–44)
MAGNESIUM SERPL-MCNC: 2 MG/DL — SIGNIFICANT CHANGE UP (ref 1.6–2.6)
MCHC RBC-ENTMCNC: 31.5 GM/DL — LOW (ref 32–36)
MCHC RBC-ENTMCNC: 33 PG — SIGNIFICANT CHANGE UP (ref 27–34)
MCV RBC AUTO: 104.5 FL — HIGH (ref 80–100)
MONOCYTES # BLD AUTO: 1.04 K/UL — HIGH (ref 0–0.9)
MONOCYTES NFR BLD AUTO: 11.4 % — SIGNIFICANT CHANGE UP (ref 2–14)
NEUTROPHILS # BLD AUTO: 6.6 K/UL — SIGNIFICANT CHANGE UP (ref 1.8–7.4)
NEUTROPHILS NFR BLD AUTO: 72.6 % — SIGNIFICANT CHANGE UP (ref 43–77)
NRBC # BLD: 0 /100 WBCS — SIGNIFICANT CHANGE UP (ref 0–0)
PHOSPHATE SERPL-MCNC: 3.7 MG/DL — SIGNIFICANT CHANGE UP (ref 2.5–4.5)
PLATELET # BLD AUTO: 153 K/UL — SIGNIFICANT CHANGE UP (ref 150–400)
POTASSIUM SERPL-MCNC: 5.3 MMOL/L — SIGNIFICANT CHANGE UP (ref 3.5–5.3)
POTASSIUM SERPL-SCNC: 5.3 MMOL/L — SIGNIFICANT CHANGE UP (ref 3.5–5.3)
PROT SERPL-MCNC: 4.7 G/DL — LOW (ref 6–8.3)
RBC # BLD: 2.67 M/UL — LOW (ref 3.8–5.2)
RBC # FLD: 16.5 % — HIGH (ref 10.3–14.5)
SODIUM SERPL-SCNC: 137 MMOL/L — SIGNIFICANT CHANGE UP (ref 135–145)
WBC # BLD: 9.09 K/UL — SIGNIFICANT CHANGE UP (ref 3.8–10.5)
WBC # FLD AUTO: 9.09 K/UL — SIGNIFICANT CHANGE UP (ref 3.8–10.5)

## 2020-02-02 PROCEDURE — 99233 SBSQ HOSP IP/OBS HIGH 50: CPT | Mod: GC

## 2020-02-02 PROCEDURE — 93010 ELECTROCARDIOGRAM REPORT: CPT

## 2020-02-02 RX ADMIN — Medication 3 MILLILITER(S): at 00:00

## 2020-02-02 RX ADMIN — Medication 12.5 MICROGRAM(S): at 23:28

## 2020-02-02 RX ADMIN — LACTULOSE 10 GRAM(S): 10 SOLUTION ORAL at 05:39

## 2020-02-02 RX ADMIN — MICAFUNGIN SODIUM 105 MILLIGRAM(S): 100 INJECTION, POWDER, LYOPHILIZED, FOR SOLUTION INTRAVENOUS at 07:54

## 2020-02-02 RX ADMIN — Medication 1 APPLICATION(S): at 17:39

## 2020-02-02 RX ADMIN — CHLORHEXIDINE GLUCONATE 1 APPLICATION(S): 213 SOLUTION TOPICAL at 07:54

## 2020-02-02 RX ADMIN — Medication 500 MILLIGRAM(S): at 20:30

## 2020-02-02 RX ADMIN — MIDODRINE HYDROCHLORIDE 20 MILLIGRAM(S): 2.5 TABLET ORAL at 17:39

## 2020-02-02 RX ADMIN — Medication 3 MILLILITER(S): at 05:39

## 2020-02-02 RX ADMIN — MIDODRINE HYDROCHLORIDE 20 MILLIGRAM(S): 2.5 TABLET ORAL at 21:07

## 2020-02-02 RX ADMIN — Medication 3 MILLILITER(S): at 23:28

## 2020-02-02 RX ADMIN — MIDODRINE HYDROCHLORIDE 20 MILLIGRAM(S): 2.5 TABLET ORAL at 05:40

## 2020-02-02 RX ADMIN — Medication 500 MILLIGRAM(S): at 19:37

## 2020-02-02 RX ADMIN — ERTAPENEM SODIUM 100 MILLIGRAM(S): 1 INJECTION, POWDER, LYOPHILIZED, FOR SOLUTION INTRAMUSCULAR; INTRAVENOUS at 06:43

## 2020-02-02 RX ADMIN — LACTULOSE 10 GRAM(S): 10 SOLUTION ORAL at 17:39

## 2020-02-02 RX ADMIN — HEPARIN SODIUM 5000 UNIT(S): 5000 INJECTION INTRAVENOUS; SUBCUTANEOUS at 05:39

## 2020-02-02 RX ADMIN — Medication 3 MILLILITER(S): at 17:39

## 2020-02-02 RX ADMIN — HEPARIN SODIUM 5000 UNIT(S): 5000 INJECTION INTRAVENOUS; SUBCUTANEOUS at 19:35

## 2020-02-02 RX ADMIN — Medication 3 MILLILITER(S): at 19:35

## 2020-02-02 NOTE — PROGRESS NOTE ADULT - PROBLEM SELECTOR PLAN 2
- multifactorial likely secondary to recent sepsis, ICU delirium, portosystemic encephalopathy   - c/b nausea/vomiting now with NGT on tube feeds  - will maintain NGT while patient is w/ metabolic encephalopathy for oral access  - not a candidate for PEG given ascites as per GI

## 2020-02-02 NOTE — PROGRESS NOTE ADULT - ASSESSMENT
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 Pt last admit 1/6-9 s/p fall OOB now re-admitted from [facility] w AMS 2/2 Septic Shock 2/2 Serratia Bacteremia / Klebsiella ESBL urosepsis, and PNA w Serratia in the sputum, with candida glabrata in blood cx from 1/21.

## 2020-02-02 NOTE — PROGRESS NOTE ADULT - SUBJECTIVE AND OBJECTIVE BOX
Alexei Hess MD, PGY-1  Pager #[phone]  After 7 PM on weekdays and 12 PM on weekends, for urgent issues please page #[phone].  ----------------------------------------------------------------  Patient is a 89y old  Female who presents with a chief complaint of sepsis 2/2 to UTI vs PNA (31 Jan 2020 07:45)      SUBJECTIVE / OVERNIGHT EVENTS: No acute events overnight. Pt seen and examined at bedside. This AM very lethargic, minimally responsive to verbal and tactile stimuli. Will reassess in PM.     MEDICATIONS  (STANDING):  albuterol/ipratropium for Nebulization 3 milliLiter(s) Nebulizer every 6 hours  AQUAPHOR (petrolatum Ointment) 1 Application(s) Topical daily  chlorhexidine 4% Liquid 1 Application(s) Topical <User Schedule>  digoxin     Tablet 0.125 milliGRAM(s) Oral every other day  ertapenem  IVPB 500 milliGRAM(s) IV Intermittent every 24 hours  heparin  Injectable 5000 Unit(s) SubCutaneous two times a day  lactulose Syrup 10 Gram(s) Oral four times a day  levothyroxine Injectable 12.5 MICROGram(s) IV Push at bedtime  micafungin IVPB      micafungin IVPB 100 milliGRAM(s) IV Intermittent every 24 hours  midodrine 20 milliGRAM(s) Oral every 8 hours  rifAXIMin 550 milliGRAM(s) Oral two times a day    MEDICATIONS  (PRN):  acetaminophen   Tablet .. 500 milliGRAM(s) Oral every 6 hours PRN Temp greater or equal to 38C (100.4F), Mild Pain (1 - 3)      CAPILLARY BLOOD GLUCOSE      POCT Blood Glucose.: 82 mg/dL (02 Feb 2020 06:05)  POCT Blood Glucose.: 99 mg/dL (01 Feb 2020 23:56)  POCT Blood Glucose.: 108 mg/dL (01 Feb 2020 17:38)  POCT Blood Glucose.: 116 mg/dL (01 Feb 2020 13:28)    I&O's Summary    01 Feb 2020 07:01  -  02 Feb 2020 07:00  --------------------------------------------------------  IN: 1200 mL / OUT: 0 mL / NET: 1200 mL      PHYSICAL EXAM:  Vital Signs Last 24 Hrs  T(C): 36.6 (02 Feb 2020 04:39), Max: 36.6 (01 Feb 2020 16:00)  T(F): 97.8 (02 Feb 2020 04:39), Max: 97.8 (01 Feb 2020 16:00)  HR: 99 (02 Feb 2020 04:39) (80 - 99)  BP: 117/61 (02 Feb 2020 04:39) (110/56 - 118/57)  RR: 18 (02 Feb 2020 04:39) (18 - 18)  SpO2: 96% (02 Feb 2020 04:39) (96% - 96%)    CONSTITUTIONAL: NAD, elderly female   RESPIRATORY: CTABL  CARDIOVASCULAR: 3/6 harsh systolic murmur   ABDOMEN: soft, nt, nd  MUSCULOSKELETAL: no lower extremity swelling  PSYCH: AOx2 self and hospital, alert today  NEUROLOGY: grossly intact, no focal deficits  SKIN: skin tears b/l UE, b/l ecchymoses LE, bandaged c/d/i    LABS:                        8.8    9.09  )-----------( 153      ( 02 Feb 2020 06:13 )             27.9     02-02    137  |  107  |  41<H>  ----------------------------<  81  5.3   |  23  |  0.89    Ca    9.4      02 Feb 2020 06:13  Phos  3.7     02-02  Mg     2.0     02-02    TPro  4.7<L>  /  Alb  1.9<L>  /  TBili  1.3<H>  /  DBili  x   /  AST  27  /  ALT  13  /  AlkPhos  152<H>  02-02    RADIOLOGY & ADDITIONAL TESTS:  Results Reviewed:   Imaging Personally Reviewed:  Electrocardiogram Personally Reviewed:    COORDINATION OF CARE:  Care Discussed with Consultants/Other Providers [Y/N]:  Prior or Outpatient Records Reviewed [Y/N]:

## 2020-02-02 NOTE — PROGRESS NOTE ADULT - ATTENDING COMMENTS
Patient with multimorbidity, chronically critically ill appearing, functional quadraplegia, severe protein calorie malnutrition with bacteremia/fungemia, dysphagia with NGT, although family wanted to pursue PEG, GI cannot do as patient has ascites. Patient with history of cirrhosis/varices.   Spironolactone held due to sepsis. Now with k 5.4 hyperkalemia. /49 - likely would not tolerate lasix. Numerous skin tears.  d/w daughter at bedside 2/1 - explained patient with poor prognosis, appears she is suffering. Daughter understands, she says her mother wants to continue fighting. She understands mother is very sick but hopeful she will bounce back. She is waiting for GI to come back next week to re-evaluate.  They would like the NGT to stay until GI makes final recommendation   Patient is DNR

## 2020-02-02 NOTE — PROGRESS NOTE ADULT - PROBLEM SELECTOR PLAN 1
Serratia bacteremia, sputum cx with serratia. Klebsiella ESBL UTI sensitive to meropenem IV. Also with candida glabrata fungemia (1/21).  - c/w ertapenem (1/26 - ) -last day will be 2/3   - c/w IV micafungin 100mg daily (1/27- ), last day 2/8  - on midodrine 20 tid, have been unable to downtitrate due to low BPs  - ophthalmology f/u as OP in 1 week, no signs of endophthalmitis

## 2020-02-03 DIAGNOSIS — Z51.5 ENCOUNTER FOR PALLIATIVE CARE: ICD-10-CM

## 2020-02-03 DIAGNOSIS — R13.10 DYSPHAGIA, UNSPECIFIED: ICD-10-CM

## 2020-02-03 DIAGNOSIS — R53.2 FUNCTIONAL QUADRIPLEGIA: ICD-10-CM

## 2020-02-03 DIAGNOSIS — Z71.89 OTHER SPECIFIED COUNSELING: ICD-10-CM

## 2020-02-03 LAB
ALBUMIN SERPL ELPH-MCNC: 1.9 G/DL — LOW (ref 3.3–5)
ALP SERPL-CCNC: 163 U/L — HIGH (ref 40–120)
ALT FLD-CCNC: 11 U/L — SIGNIFICANT CHANGE UP (ref 10–45)
ANION GAP SERPL CALC-SCNC: 10 MMOL/L — SIGNIFICANT CHANGE UP (ref 5–17)
ANION GAP SERPL CALC-SCNC: 6 MMOL/L — SIGNIFICANT CHANGE UP (ref 5–17)
AST SERPL-CCNC: 35 U/L — SIGNIFICANT CHANGE UP (ref 10–40)
BILIRUB SERPL-MCNC: 1.1 MG/DL — SIGNIFICANT CHANGE UP (ref 0.2–1.2)
BUN SERPL-MCNC: 42 MG/DL — HIGH (ref 7–23)
BUN SERPL-MCNC: 42 MG/DL — HIGH (ref 7–23)
CALCIUM SERPL-MCNC: 9.4 MG/DL — SIGNIFICANT CHANGE UP (ref 8.4–10.5)
CALCIUM SERPL-MCNC: 9.6 MG/DL — SIGNIFICANT CHANGE UP (ref 8.4–10.5)
CHLORIDE SERPL-SCNC: 101 MMOL/L — SIGNIFICANT CHANGE UP (ref 96–108)
CHLORIDE SERPL-SCNC: 105 MMOL/L — SIGNIFICANT CHANGE UP (ref 96–108)
CO2 SERPL-SCNC: 22 MMOL/L — SIGNIFICANT CHANGE UP (ref 22–31)
CO2 SERPL-SCNC: 24 MMOL/L — SIGNIFICANT CHANGE UP (ref 22–31)
CREAT SERPL-MCNC: 0.87 MG/DL — SIGNIFICANT CHANGE UP (ref 0.5–1.3)
CREAT SERPL-MCNC: 0.9 MG/DL — SIGNIFICANT CHANGE UP (ref 0.5–1.3)
GLUCOSE BLDC GLUCOMTR-MCNC: 106 MG/DL — HIGH (ref 70–99)
GLUCOSE BLDC GLUCOMTR-MCNC: 113 MG/DL — HIGH (ref 70–99)
GLUCOSE BLDC GLUCOMTR-MCNC: 131 MG/DL — HIGH (ref 70–99)
GLUCOSE SERPL-MCNC: 112 MG/DL — HIGH (ref 70–99)
GLUCOSE SERPL-MCNC: 120 MG/DL — HIGH (ref 70–99)
MAGNESIUM SERPL-MCNC: 2 MG/DL — SIGNIFICANT CHANGE UP (ref 1.6–2.6)
PHOSPHATE SERPL-MCNC: 3.7 MG/DL — SIGNIFICANT CHANGE UP (ref 2.5–4.5)
POTASSIUM SERPL-MCNC: 5.2 MMOL/L — SIGNIFICANT CHANGE UP (ref 3.5–5.3)
POTASSIUM SERPL-MCNC: 5.8 MMOL/L — HIGH (ref 3.5–5.3)
POTASSIUM SERPL-SCNC: 5.2 MMOL/L — SIGNIFICANT CHANGE UP (ref 3.5–5.3)
POTASSIUM SERPL-SCNC: 5.8 MMOL/L — HIGH (ref 3.5–5.3)
PROT SERPL-MCNC: 5.2 G/DL — LOW (ref 6–8.3)
SODIUM SERPL-SCNC: 133 MMOL/L — LOW (ref 135–145)
SODIUM SERPL-SCNC: 135 MMOL/L — SIGNIFICANT CHANGE UP (ref 135–145)

## 2020-02-03 PROCEDURE — 93010 ELECTROCARDIOGRAM REPORT: CPT

## 2020-02-03 PROCEDURE — 99497 ADVNCD CARE PLAN 30 MIN: CPT | Mod: 25

## 2020-02-03 PROCEDURE — 99233 SBSQ HOSP IP/OBS HIGH 50: CPT | Mod: GC

## 2020-02-03 PROCEDURE — 99223 1ST HOSP IP/OBS HIGH 75: CPT | Mod: 25

## 2020-02-03 PROCEDURE — 99232 SBSQ HOSP IP/OBS MODERATE 35: CPT

## 2020-02-03 RX ORDER — SODIUM ZIRCONIUM CYCLOSILICATE 10 G/10G
5 POWDER, FOR SUSPENSION ORAL ONCE
Refills: 0 | Status: COMPLETED | OUTPATIENT
Start: 2020-02-03 | End: 2020-02-03

## 2020-02-03 RX ORDER — LACTULOSE 10 G/15ML
10 SOLUTION ORAL
Refills: 0 | Status: DISCONTINUED | OUTPATIENT
Start: 2020-02-03 | End: 2020-02-06

## 2020-02-03 RX ADMIN — Medication 0.12 MILLIGRAM(S): at 12:34

## 2020-02-03 RX ADMIN — HEPARIN SODIUM 5000 UNIT(S): 5000 INJECTION INTRAVENOUS; SUBCUTANEOUS at 06:14

## 2020-02-03 RX ADMIN — Medication 500 MILLIGRAM(S): at 12:00

## 2020-02-03 RX ADMIN — Medication 500 MILLIGRAM(S): at 10:48

## 2020-02-03 RX ADMIN — SODIUM ZIRCONIUM CYCLOSILICATE 5 GRAM(S): 10 POWDER, FOR SUSPENSION ORAL at 15:24

## 2020-02-03 RX ADMIN — Medication 1 APPLICATION(S): at 12:39

## 2020-02-03 RX ADMIN — HEPARIN SODIUM 5000 UNIT(S): 5000 INJECTION INTRAVENOUS; SUBCUTANEOUS at 18:29

## 2020-02-03 RX ADMIN — ERTAPENEM SODIUM 100 MILLIGRAM(S): 1 INJECTION, POWDER, LYOPHILIZED, FOR SOLUTION INTRAMUSCULAR; INTRAVENOUS at 06:13

## 2020-02-03 RX ADMIN — Medication 3 MILLILITER(S): at 18:29

## 2020-02-03 RX ADMIN — MICAFUNGIN SODIUM 105 MILLIGRAM(S): 100 INJECTION, POWDER, LYOPHILIZED, FOR SOLUTION INTRAVENOUS at 12:33

## 2020-02-03 RX ADMIN — MIDODRINE HYDROCHLORIDE 20 MILLIGRAM(S): 2.5 TABLET ORAL at 21:38

## 2020-02-03 RX ADMIN — MIDODRINE HYDROCHLORIDE 20 MILLIGRAM(S): 2.5 TABLET ORAL at 15:24

## 2020-02-03 RX ADMIN — Medication 500 MILLIGRAM(S): at 03:04

## 2020-02-03 RX ADMIN — LACTULOSE 10 GRAM(S): 10 SOLUTION ORAL at 18:29

## 2020-02-03 RX ADMIN — Medication 12.5 MICROGRAM(S): at 21:38

## 2020-02-03 RX ADMIN — LACTULOSE 10 GRAM(S): 10 SOLUTION ORAL at 06:13

## 2020-02-03 RX ADMIN — MIDODRINE HYDROCHLORIDE 20 MILLIGRAM(S): 2.5 TABLET ORAL at 06:13

## 2020-02-03 RX ADMIN — LACTULOSE 10 GRAM(S): 10 SOLUTION ORAL at 12:34

## 2020-02-03 RX ADMIN — CHLORHEXIDINE GLUCONATE 1 APPLICATION(S): 213 SOLUTION TOPICAL at 13:29

## 2020-02-03 RX ADMIN — Medication 500 MILLIGRAM(S): at 04:00

## 2020-02-03 RX ADMIN — Medication 3 MILLILITER(S): at 06:13

## 2020-02-03 RX ADMIN — Medication 3 MILLILITER(S): at 12:37

## 2020-02-03 NOTE — CONSULT NOTE ADULT - ASSESSMENT
89 Y/O FEMALE KNOWN TO ME FROM A PREVIOUS ADMISSION, WITH MULTIPLE MEDICAL ISSUES ADMITTED WITH SOB, COUGH FEVER FROM MARTIN 2/2 SERRATIA BACTEREMIA/FUNGEMIA CALLED FOR GOALS OF CARE.

## 2020-02-03 NOTE — PROGRESS NOTE ADULT - ATTENDING COMMENTS
patient awake alert, but somnolent, this is her baseline. she had 4 BMs yesterday  awaiting further Gi recs  ongoing C  picc line---> abx  rest of plan as above

## 2020-02-03 NOTE — CONSULT NOTE ADULT - SUBJECTIVE AND OBJECTIVE BOX
HPI:  Patient confused. Hx obtained from chart review and daughters Donavan at bedside.   89 yo female with a PMHx of afib on digoxin (not on AC), severe AS, COPD not on home O2, CLL (previously on Treanda and Rituxan), hypothyroidism, 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 Admitted 1/6-1/9 for weakness w/ mechanical fall with pre-renal KARRIE and resolution with IVF d/yohana to [facility]. [facility] and nonproductive cough and fever on day of presentation. Per family, patient had a fall out of bed found on the floor this AM. She has had waxing waning mentation over the last week. This typically happens when she has UTI. Of note[facility]  Noted to be confused from  in ED.     ED vitals: Tmax 101.2 (r), , BP 80/50 with maps <65, RR 22-24, SpO2 99% on 3L      Sig labs: leukocytosis at 12.77 (60% neut), INR 1.44, hypernatremia 146, hyperkalemia 5.5, bicarb 18, BUN 52, Cr 1.69, venous pH 7.38, lactate 3.6. UA post for nitrites and many bacteria CXR w/ RLL pneumonia, CT chest with bb patchy opacities PNA vs asp pneumonitis     ED course: s/p 1.25 L LR, s/p vancomycin and meropenem x 1, stress dose steroids. Patient remained hypotensive after IVH started on levophed. Admitted to MICU for septic shock 2/2 PNA and UTI. 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  HTN (Hypertension)  Bleeding Esophageal Varices  CLL (Chronic Lymphoblastic Leukemia)  GIB (Gastrointestinal Bleeding)    History of repair of hip fracture  Esophageal Rupture    FAMILY HISTORY:  FH: Alzheimers disease    ITEMS NOT CHECKED ARE NOT PRESENT    SOCIAL HISTORY:   Significant other/partner[ ]  Children[X ]  Bahai/Spirituality:Catholic  Substance hx:  [ ]   Tobacco hx:  [ ]   Alcohol hx: [ ]   Home Opioid hx:  [ ] I-Stop Reference No:  Living Situation: [X ]Home  [ ]Long term care  [ ]Rehab [ ]Other    ADVANCE DIRECTIVES:    DNR  Yes  MOLST  [ ]  Living Will  [ ]   DECISION MAKER(s):  [X ] Health Care Proxy(s)  [ ] Surrogate(s)  [ ] Guardian           Name(s): Phone Number(s):  MARYBETH   BASELINE (I)ADL(s) (prior to admission):  [place]: [ ]Total  [ ] Moderate [X ]Dependent    Allergies    codeine (Rash)  erythromycin (Rash)  iv dye (Rash; Anaphylaxis; Short breath)  penicillin (Rash)  shellfish (Rash)    Intolerances    adhesives (Other)  warfarin (Other)  MEDICATIONS  (STANDING):  albuterol/ipratropium for Nebulization 3 milliLiter(s) Nebulizer every 6 hours  AQUAPHOR (petrolatum Ointment) 1 Application(s) Topical daily  chlorhexidine 4% Liquid 1 Application(s) Topical <User Schedule>  digoxin     Tablet 0.125 milliGRAM(s) Oral every other day  heparin  Injectable 5000 Unit(s) SubCutaneous two times a day  lactulose Syrup 10 Gram(s) Oral four times a day  levothyroxine Injectable 12.5 MICROGram(s) IV Push at bedtime  micafungin IVPB      micafungin IVPB 100 milliGRAM(s) IV Intermittent every 24 hours  midodrine 20 milliGRAM(s) Oral every 8 hours  rifAXIMin 550 milliGRAM(s) Oral two times a day  sodium zirconium cyclosilicate 5 Gram(s) Oral once    MEDICATIONS  (PRN):  acetaminophen   Tablet .. 500 milliGRAM(s) Oral every 6 hours PRN Temp greater or equal to 38C (100.4F), Mild Pain (1 - 3)    PRESENT SYMPTOMS: [ ]Unable to obtain due to poor mentation   Source if other than patient:  [ ]Family   [ ]Team     Pain: [X ]yes [ ]no  "ALL OVER"  UNABLE TO QUANTIFY/DESCRIBE   QOL impact -   Location -                    Aggravating factors -  Quality -  Radiation -  Timing-  Severity (0-10 scale):  Minimal acceptable level (0-10 scale):     PAIN AD Score:     http://geriatrictoolkit.[facility]/cog/painad.pdf (press ctrl +  left click to view)    Dyspnea:                           [ ]Mild [X ]Moderate [ ]Severe  Anxiety:                             [ ]Mild [X ]Moderate [ ]Severe  Fatigue:                             [ ]Mild [X]Moderate [ ]Severe  Nausea:                             [ ]Mild [ ]Moderate [ ]Severe  Loss of appetite:              [ ]Mild [ ]Moderate [X ]Severe  Constipation:                    [ ]Mild [ ]Moderate [ ]Severe    Other Symptoms:  [X ]All other review of systems negative     Palliative Performance Status Version 2:     20    %    http://[facility].org/files/news/palliative_performance_scale_ppsv2.pdf  PHYSICAL EXAM:  Vital Signs Last 24 Hrs  T(C): 36.3 (03 Feb 2020 13:36), Max: 36.6 (02 Feb 2020 21:25)  T(F): 97.4 (03 Feb 2020 13:36), Max: 97.9 (03 Feb 2020 04:59)  HR: 96 (03 Feb 2020 14:27) (79 - 98)  BP: 108/65 (03 Feb 2020 14:27) (98/76 - 128/53)  BP(mean): --  RR: 18 (03 Feb 2020 14:27) (18 - 18)  SpO2: 98% (03 Feb 2020 14:27) (96% - 99%) I&O's Summary    02 Feb 2020 07:01  -  03 Feb 2020 07:00  --------------------------------------------------------  IN: 2100 mL / OUT: 0 mL / NET: 2100 mL      GENERAL:  [ ]Alert  [ ]Oriented x   [ X]Lethargic  [X ]Cachexia  [ ]Unarousable  [ X]Verbal  [ ]Non-Verbal  Behavioral:   [ ] Anxiety  [ X] Delirium [ ] Agitation [ ] Other  HEENT:  [ ]Normal   [X ]Dry mouth   [ ]ET Tube/Trach  [ ]Oral lesions  PULMONARY:   [ ]Clear [X ]Tachypnea  [X ]Audible excessive secretions   [ ]Rhonchi        [ ]Right [ ]Left [ ]Bilateral  [ ]Crackles        [ ]Right [ ]Left [ ]Bilateral  [ ]Wheezing     [ ]Right [ ]Left [ ]Bilatera  [X ]Diminished breath sounds [ ]right [ ]left [ ]bilateral  CARDIOVASCULAR:    [ ]Regular [X ]Irregular [ X]Tachy  [ ]Kwadwo [ ]Murmur [ ]Other  GASTROINTESTINAL:  [X ]Soft  [ ]Distended   [ X]+BS  [X ]Non tender [ ]Tender  [ ]PEG X[X ]OGT/ NGT  Last BM:     GENITOURINARY:  [ ]Normal [X ] Incontinent   [ ]Oliguria/Anuria   [ ]Irwin  MUSCULOSKELETAL:   [ ]Normal   [ ]Weakness  [X ]Bed/Wheelchair bound [ ]Edema  NEUROLOGIC:   [X ]No focal deficits  [ ]Cognitive impairment  [X]Dysphagia [ ]Dysarthria [ ]Paresis [ ]Other   SKIN:   [ ]Normal    [ ]Rash  [ ]Pressure ulcer(s)       Present on admission [ ]y [ ]n  MULTIPLE SKIN TEARS/ECCHIMOSIS THROUGHOUT UPPER AND LOWER EXTREMITIES     CRITICAL CARE:  [ ] Shock Present  [X ]Septic [ ]Cardiogenic [ ]Neurologic [ ]Hypovolemic  [ ]  Vasopressors [ ]  Inotropes   [ ]Respiratory failure present [ ]Mechanical ventilation [ ]Non-invasive ventilatory support [ ]High flow  [ ]Acute  [ ]Chronic [ ]Hypoxic  [ ]Hypercarbic [ ]Other  [ ]Other organ failure     LABS:                        8.8    9.09  )-----------( 153      ( 02 Feb 2020 06:13 )             27.9   02-03    135  |  105  |  42<H>  ----------------------------<  112<H>  5.8<H>   |  24  |  0.90    Ca    9.6      03 Feb 2020 09:55  Phos  3.7     02-03  Mg     2.0     02-03    TPro  5.2<L>  /  Alb  1.9<L>  /  TBili  1.1  /  DBili  x   /  AST  35  /  ALT  11  /  AlkPhos  163<H>  02-03        RADIOLOGY & ADDITIONAL STUDIES:    < from: US Abdomen Limited (01.31.20 @ 09:47) >        INTERPRETATION:  CLINICAL INFORMATION: Altered mental status, hepatocellular carcinoma; rule out ascites.    COMPARISON: Abdominal ultrasound 10/28/2019.    TECHNIQUE: Limited four quadrant sonography of the abdomen for ascites.     FINDINGS:    Small volume ascites in the right upper quadrant. Trace ascites in the left upper quadrant. No ascites the bilateral lower quadrants.  Incidentally noted bilateral pleural effusions.    IMPRESSION:     Small right upper quadrant and trace left upper quadrant ascites.    < from: US TTE 2D F/U, Limited w/o Contrast (ED) (01.20.20 @ 00:46) >    INTERPRETATION:  A focused transthoracic cardiac ultrasound examination was performed.   No significant pericardial effusion was present.  No global wall motion abnormality was identified  Right ventricular enlargement  Diffuse anterior B lines seen  Bilateral pleural effusions seen  Consolidation visualized bilaterally    IMPRESSION:   No significant pericardial effusion present  Anterior B lines seen            < end of copied text >            PROTEIN CALORIE MALNUTRITION PRESENT: [ ]mild [ ]moderate [X]severe [ ]underweight [ ]morbid obesity  https://www.andeal.org/vault/2440/web/files/ONC/Table_Clinical%20Characteristics%20to%20Document%20Malnutrition-White%20JV%20et%20al%061936.pdf    Height (cm): 160.02 (01-06-20 @ 07:09), 162.56 (09-08-19 @ 03:34), 162.56 (03-18-19 @ 11:35)  Weight (kg): 40.8 (01-19-20 @ 17:28), 49 (01-06-20 @ 07:09), 50.8 (09-08-19 @ 03:34)  BMI (kg/m2): 15.9 (01-19-20 @ 17:28), 19.1 (01-06-20 @ 07:09), 19.2 (09-08-19 @ 03:34)    [ ]PPSV2 < or = to 30% [ ]significant weight loss  [X ]poor nutritional intake  [ ]anasarca     Albumin, Serum: 1.9 g/dL (02-03-20 @ 09:55)   [ ]Artificial Nutrition      REFERRALS:   [ ]Chaplaincy  [ ]Hospice  [ ]Child Life  [ ]Social Work  [X ]Case management [ ]Holistic Therapy     Goals of Care Document: TORRI Molina (09-08-19 @ 17:37)  Goals of Care Conversation:   Participants:  · Participants  Patient    Advance Directives:  · Does patient have Advance Directive  Yes  · Indicate Type  Health Care Proxy (HCP); Do Not Resuscitate (DNR); Medical Orders for Life-Sustaining Treatment (MOLST)  · Agent's Name  Jenny Bustamante  · Phone #  [phone]  · Are any of the items on the chart  Yes  · Specify which ones are on chart  Medical Orders for Life-Sustaining Treatment (MOLST)  · Caregiver:  declines    Conversation Discussion:  · Conversation  MOLST Discussed  · Conversation Details  Clarified MOLST form with patient and patient's daughter at bedside. Patient is DNR but not DNI at this time. they agree that they do no want CPR in the case of cardiac arrest. If patient's clinical status were to decline to the point of requiring intubation, for any reason, patient's daughters would like to be notified first so they can make the decision then.    Sada Molina, PGY-1  Internal Medicine  Pager #: [phone] / [facility] [phone]      Electronic Signatures:  Sada Molina)  (Signed 08-Sep-2019 17:43)  	Authored: Goals of Care Conversation      Last Updated: 08-Sep-2019 17:43 by Sada Molina)

## 2020-02-03 NOTE — PROGRESS NOTE ADULT - ASSESSMENT
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   Fungemia 1/21 with Candida glabrata, possible transient gut translocation in the setting of shock. Less likely from the central line which was in briefly. No ophthalmologic findings.   Cultures 1/24 negative. Afebrile since 1/24.   Transferred from MICU 1/25.     Suggest  -Complete ertapenem 500mg IV q24h today   -Micafungin 100mg IV q24h, last day 2/8, would not transition to Fluconazole given issues with dysphagia and ascites precluding PEG placement and it would require a high dose     Ha Chiang MD  Pager [phone]  After 5pm/weekends call [phone]

## 2020-02-03 NOTE — PROGRESS NOTE ADULT - PROBLEM SELECTOR PLAN 1
Serratia bacteremia, sputum cx with serratia. Klebsiella ESBL UTI sensitive to meropenem IV. Also with candida glabrata fungemia (1/21).  - c/w ertapenem (1/26 - 2/3)  - c/w IV micafungin 100mg daily (1/27- ), last day 2/8  - on midodrine 20 tid, have been unable to downtitrate due to low BPs  - ophthalmology f/u as OP in 1 week, no signs of endophthalmitis Serratia bacteremia, sputum cx with serratia. Klebsiella ESBL UTI sensitive to meropenem IV. Also with candida glabrata fungemia (1/21).  - c/w ertapenem (1/26 - 2/3)  - c/w IV micafungin 100mg daily (1/27- ), last day 2/8  - on midodrine 20 tid, have been unable to downtitrate due to low BPs  - ophthalmology f/u as OP, no signs of endophthalmitis

## 2020-02-03 NOTE — CONSULT NOTE ADULT - PROBLEM SELECTOR RECOMMENDATION 2
NGT IN SITU  ARTIFICIAL TUBE FEEDS  SWALLOW EVAL WITH SIGNS OF OROPHARYGEAL DYSPHAGIA, PENETRATION AND ASPIRATION signs    The current recommendation is NPO with non-oral nutrition. Per discussion with HCP, she would want the NGT maintained for the time being while the patient is in this weak and disoriented state.

## 2020-02-03 NOTE — CONSULT NOTE ADULT - PROBLEM SELECTOR RECOMMENDATION 5
SPENT 30 MINUTES WITH DAUGHTER ONEIL,  DAUGHTER MARYBETH (BY PHONE) AND SISTER IN LAW [place]. DISCUSSED PATIENTS OVERWHELMING DEBILITY, POOR NUTRITIONAL INTAKE, MULTIPLE MEDICAL ISSUES. MARYBETH VERY ANXIOUS BY PHONE, NOT WELL HERSELF (GI ISSUES ) UNABLE TO BE PRESENT FOR HER MOTHER.  BOTH SISTERS REMAIN UNREALISTIC ABOUT OVERALL PROGNOSIS FOR THEIR MOTHER. WE DISCUSSED HOSPICE WHICH WAS DECLINED, DISCUSSED COMFORT CARE AND PAIN MANAGEMENT .  DAUGHTERS FEELS PATIENT IS NOT IN PAIN AND THAT SHE ALWAYS SAYS YES TO ANYTHING YOU ASK HER.  I RECOMMENDED  LOW DOSE ORAL MORPHINE SOLUTION BUT MARYBETH DOESN'T WANT ANY OPIOIDS ORDERED UNTIL SHE CAN BE WITH HER MOTHER.  MEETING SCHEDULED FOR TOMORROW PENDING MARYBETH'S ACUTE ILLNESS.

## 2020-02-03 NOTE — PROGRESS NOTE ADULT - SUBJECTIVE AND OBJECTIVE BOX
CC: Patient is a 89y old  Female who presents with a chief complaint of sepsis 2/2 bacteremia and fungemia (03 Feb 2020 08:27)    ID following for Serratia bacteremia, Fungemia    Interval History/ROS: Patient arousable. No fevers, no chills.    Rest of ROS negative.    Allergies  codeine (Rash)  erythromycin (Rash)  iv dye (Rash; Anaphylaxis; Short breath)  penicillin (Rash)  shellfish (Rash)        ANTIMICROBIALS:  micafungin IVPB    micafungin IVPB 100 every 24 hours  rifAXIMin 550 two times a day      OTHER MEDS:  acetaminophen   Tablet .. 500 milliGRAM(s) Oral every 6 hours PRN  albuterol/ipratropium for Nebulization 3 milliLiter(s) Nebulizer every 6 hours  AQUAPHOR (petrolatum Ointment) 1 Application(s) Topical daily  chlorhexidine 4% Liquid 1 Application(s) Topical <User Schedule>  digoxin     Tablet 0.125 milliGRAM(s) Oral every other day  heparin  Injectable 5000 Unit(s) SubCutaneous two times a day  lactulose Syrup 10 Gram(s) Oral four times a day  levothyroxine Injectable 12.5 MICROGram(s) IV Push at bedtime  midodrine 20 milliGRAM(s) Oral every 8 hours  sodium zirconium cyclosilicate 5 Gram(s) Oral once      PE:    Vital Signs Last 24 Hrs  T(C): 36.6 (03 Feb 2020 04:59), Max: 36.6 (02 Feb 2020 21:25)  T(F): 97.9 (03 Feb 2020 04:59), Max: 97.9 (03 Feb 2020 04:59)  HR: 98 (03 Feb 2020 04:59) (79 - 98)  BP: 128/53 (03 Feb 2020 04:59) (98/76 - 128/53)  BP(mean): --  RR: 18 (03 Feb 2020 04:59) (18 - 18)  SpO2: 98% (03 Feb 2020 04:59) (96% - 98%)    Gen: Arousable, lethargic, NAD  HEENT: NGT  CV: S1+S2 normal, + murmur  Resp: Clear bilat, no resp distress  Abd: Soft, nontender, +BS  Ext: No LE edema, bruising to upper extremities  : No CVA tenderness  IV/Skin: No thrombophlebitis, left arm midline intact  Neuro: lethargic, arousable, not answering questions    LABS:                          8.8    9.09  )-----------( 153      ( 02 Feb 2020 06:13 )             27.9       02-03    135  |  105  |  42<H>  ----------------------------<  112<H>  5.8<H>   |  24  |  0.90    Ca    9.6      03 Feb 2020 09:55  Phos  3.7     02-03  Mg     2.0     02-03    TPro  5.2<L>  /  Alb  1.9<L>  /  TBili  1.1  /  DBili  x   /  AST  35  /  ALT  11  /  AlkPhos  163<H>  02-03          MICROBIOLOGY:  v  .Blood Blood-Peripheral  01-24-20   No growth at 5 days.  --  --      .Blood Blood-Venous  01-21-20   No growth at 5 days.  --  --      .Blood Blood-Peripheral  01-21-20   Growth in anaerobic bottle: Serratia marcescens  See previous culture [id number]  Growth in aerobic bottle: Candida glabrata  ***Blood Panel PCR results on this specimen are available  approximately 3 hours after the Gram stain result.***  Gram stain, PCR, and/or culture results may not always  correspond due to difference in methodologies.  ************************************************************  This PCR assay was performed using [employer].  The following targets are tested for: Enterococcus,  vancomycin resistant enterococci, Listeria monocytogenes,  coagulase negative staphylococci, S. aureus,  methicillin resistant S. aureus, Streptococcus agalactiae  (Group B), S. pneumoniae, S. pyogenes (Group A),  Acinetobacter baumannii, Enterobacter cloacae, E. coli,  Klebsiella oxytoca, K. pneumoniae, Proteus sp.,  Serratia marcescens, Haemophilus influenzae,  Neisseria meningitidis, Pseudomonas aeruginosa, Candida  albicans, C. glabrata, C krusei, C parapsilosis,  C. tropicalis and the KPC resistance gene.  --  Blood Culture PCR  Candida (Torulopsis) glabrata      .Sputum Sputum  01-20-20   Moderate Serratia marcescens  Normal Respiratory Stefanie present  --  Serratia marcescens      .Blood Blood-Peripheral  01-19-20   Growth in aerobic and anaerobic bottles: Serratia marcescens  ***Blood Panel PCR results on this specimen are available  approximately 3 hours after the Gram stain result.***  Gram stain, PCR, and/or culture results may not always  correspond due to difference in methodologies.  ************************************************************  This PCR assay was performed using [employer].  The following targets are tested for: Enterococcus,  vancomycin resistant enterococci, Listeria monocytogenes,  coagulase negative staphylococci, S. aureus,  methicillin resistant S. aureus, Streptococcus agalactiae  (Group B), S. pneumoniae, S. pyogenes (Group A),  Acinetobacter baumannii, Enterobacter cloacae, E. coli,  Klebsiella oxytoca, K. pneumoniae, Proteus sp.,  Serratia marcescens, Haemophilus influenzae,  Neisseria meningitidis, Pseudomonas aeruginosa, Candida  albicans, C. glabrata, C krusei, C parapsilosis,  C. tropicalis and the KPC resistance gene.  --  Blood Culture PCR  Serratia marcescens      .Urine Clean Catch (Midstream)  01-19-20   >100,000 CFU/ml Klebsiella pneumoniae ESBL  --  Klebsiella pneumoniae ESBL      .Urine CATHETERIZED  01-19-20   >100,000 CFU/ml Klebsiella pneumoniae ESBL  --  Klebsiella pneumoniae ESBL      .Urine CATHETERIZED  01-11-20   >100,000 CFU/ml Klebsiella pneumoniae ESBL  --  Gram Negative Rods      .Urine Clean Catch (Midstream)  01-06-20   <10,000 CFU/mL Normal Urogenital Stefanie  --  --    Rapid RVP Result: NotDetec (01-29 @ 11:31)    RADIOLOGY:    < from: Xray Wrist 3 Views, Right (01.31.20 @ 22:46) >  IMPRESSION:     No acute fracture or dislocation. 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 Chondrocalcinosis.       < end of copied text >

## 2020-02-03 NOTE — PROGRESS NOTE ADULT - PROBLEM SELECTOR PLAN 2
- multifactorial likely secondary to recent sepsis, ICU delirium, portosystemic encephalopathy   - c/b nausea/vomiting in MICU, now with NGT on tube feeds  - will maintain NGT while patient is w/ metabolic encephalopathy for oral access  - not a candidate for PEG given ascites as per GI

## 2020-02-03 NOTE — PROGRESS NOTE ADULT - ASSESSMENT
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 Pt last admit 1/6-9 s/p fall OOB now re-admitted from [facility] w AMS 2/2 Septic Shock 2/2 Serratia Bacteremia / Klebsiella ESBL urosepsis, and PNA w Serratia in the sputum, with candida glabrata in blood cx from 1/21.

## 2020-02-03 NOTE — PROGRESS NOTE ADULT - SUBJECTIVE AND OBJECTIVE BOX
Alexei Hess MD, PGY-1  Pager #[phone]  After 7 PM on weekdays and 12 PM on weekends, for urgent issues please page #[phone].  ----------------------------------------------------------------  Patient is a 89y old  Female who presents with a chief complaint of sepsis 2/2 bacteremia and fungemia (02 Feb 2020 08:45)      SUBJECTIVE / OVERNIGHT EVENTS: No acute events overnight. Pt seen and examined at bedside. Still with NGT, this AM wanted to be left alone repeatedly saying "no!".     MEDICATIONS  (STANDING):  albuterol/ipratropium for Nebulization 3 milliLiter(s) Nebulizer every 6 hours  AQUAPHOR (petrolatum Ointment) 1 Application(s) Topical daily  chlorhexidine 4% Liquid 1 Application(s) Topical <User Schedule>  digoxin     Tablet 0.125 milliGRAM(s) Oral every other day  heparin  Injectable 5000 Unit(s) SubCutaneous two times a day  lactulose Syrup 10 Gram(s) Oral four times a day  levothyroxine Injectable 12.5 MICROGram(s) IV Push at bedtime  micafungin IVPB      micafungin IVPB 100 milliGRAM(s) IV Intermittent every 24 hours  midodrine 20 milliGRAM(s) Oral every 8 hours  rifAXIMin 550 milliGRAM(s) Oral two times a day    MEDICATIONS  (PRN):  acetaminophen   Tablet .. 500 milliGRAM(s) Oral every 6 hours PRN Temp greater or equal to 38C (100.4F), Mild Pain (1 - 3)      CAPILLARY BLOOD GLUCOSE  POCT Blood Glucose.: 106 mg/dL (03 Feb 2020 06:35)  POCT Blood Glucose.: 108 mg/dL (02 Feb 2020 17:59)  POCT Blood Glucose.: 119 mg/dL (02 Feb 2020 12:32)    I&O's Summary    02 Feb 2020 07:01  -  03 Feb 2020 07:00  --------------------------------------------------------  IN: 1000 mL / OUT: 0 mL / NET: 1000 mL    PHYSICAL EXAM:  Vital Signs Last 24 Hrs  T(C): 36.6 (03 Feb 2020 04:59), Max: 36.6 (02 Feb 2020 21:25)  T(F): 97.9 (03 Feb 2020 04:59), Max: 97.9 (03 Feb 2020 04:59)  HR: 98 (03 Feb 2020 04:59) (79 - 98)  BP: 128/53 (03 Feb 2020 04:59) (95/61 - 128/53)  RR: 18 (03 Feb 2020 04:59) (16 - 18)  SpO2: 98% (03 Feb 2020 04:59) (96% - 98%)    CONSTITUTIONAL: NAD, elderly female, alert  RESPIRATORY: CTABL  CARDIOVASCULAR: 3/6 harsh systolic murmur   ABDOMEN: soft, nt, nd  MUSCULOSKELETAL: no lower extremity swelling  PSYCH: unable to assess  NEUROLOGY: grossly intact, no focal deficits  SKIN: skin tears b/l UE, b/l ecchymoses LE, bandaged c/d/i    LABS:                        8.8    9.09  )-----------( 153      ( 02 Feb 2020 06:13 )             27.9     02-02    137  |  107  |  41<H>  ----------------------------<  81  5.3   |  23  |  0.89    Ca    9.4      02 Feb 2020 06:13  Phos  3.7     02-02  Mg     2.0     02-02    TPro  4.7<L>  /  Alb  1.9<L>  /  TBili  1.3<H>  /  DBili  x   /  AST  27  /  ALT  13  /  AlkPhos  152<H>  02-02    RADIOLOGY & ADDITIONAL TESTS:  Results Reviewed:   Imaging Personally Reviewed:  Electrocardiogram Personally Reviewed:    COORDINATION OF CARE:  Care Discussed with Consultants/Other Providers [Y/N]:  Prior or Outpatient Records Reviewed [Y/N]:

## 2020-02-04 LAB
ALBUMIN SERPL ELPH-MCNC: 2.1 G/DL — LOW (ref 3.3–5)
ALP SERPL-CCNC: 186 U/L — HIGH (ref 40–120)
ALT FLD-CCNC: 12 U/L — SIGNIFICANT CHANGE UP (ref 10–45)
ANION GAP SERPL CALC-SCNC: 7 MMOL/L — SIGNIFICANT CHANGE UP (ref 5–17)
ANISOCYTOSIS BLD QL: SLIGHT — SIGNIFICANT CHANGE UP
AST SERPL-CCNC: 31 U/L — SIGNIFICANT CHANGE UP (ref 10–40)
BASOPHILS # BLD AUTO: 0 K/UL — SIGNIFICANT CHANGE UP (ref 0–0.2)
BASOPHILS NFR BLD AUTO: 0 % — SIGNIFICANT CHANGE UP (ref 0–2)
BILIRUB SERPL-MCNC: 0.8 MG/DL — SIGNIFICANT CHANGE UP (ref 0.2–1.2)
BUN SERPL-MCNC: 42 MG/DL — HIGH (ref 7–23)
CALCIUM SERPL-MCNC: 9.9 MG/DL — SIGNIFICANT CHANGE UP (ref 8.4–10.5)
CHLORIDE SERPL-SCNC: 102 MMOL/L — SIGNIFICANT CHANGE UP (ref 96–108)
CO2 SERPL-SCNC: 27 MMOL/L — SIGNIFICANT CHANGE UP (ref 22–31)
CREAT SERPL-MCNC: 0.83 MG/DL — SIGNIFICANT CHANGE UP (ref 0.5–1.3)
DACRYOCYTES BLD QL SMEAR: SLIGHT — SIGNIFICANT CHANGE UP
EOSINOPHIL # BLD AUTO: 0 K/UL — SIGNIFICANT CHANGE UP (ref 0–0.5)
EOSINOPHIL NFR BLD AUTO: 0 % — SIGNIFICANT CHANGE UP (ref 0–6)
GLUCOSE BLDC GLUCOMTR-MCNC: 118 MG/DL — HIGH (ref 70–99)
GLUCOSE BLDC GLUCOMTR-MCNC: 126 MG/DL — HIGH (ref 70–99)
GLUCOSE SERPL-MCNC: 144 MG/DL — HIGH (ref 70–99)
HCT VFR BLD CALC: 31.4 % — LOW (ref 34.5–45)
HGB BLD-MCNC: 9.6 G/DL — LOW (ref 11.5–15.5)
LYMPHOCYTES # BLD AUTO: 0.62 K/UL — LOW (ref 1–3.3)
LYMPHOCYTES # BLD AUTO: 5.3 % — LOW (ref 13–44)
MACROCYTES BLD QL: SLIGHT — SIGNIFICANT CHANGE UP
MAGNESIUM SERPL-MCNC: 2 MG/DL — SIGNIFICANT CHANGE UP (ref 1.6–2.6)
MANUAL SMEAR VERIFICATION: SIGNIFICANT CHANGE UP
MCHC RBC-ENTMCNC: 30.6 GM/DL — LOW (ref 32–36)
MCHC RBC-ENTMCNC: 32.9 PG — SIGNIFICANT CHANGE UP (ref 27–34)
MCV RBC AUTO: 107.5 FL — HIGH (ref 80–100)
MONOCYTES # BLD AUTO: 0.2 K/UL — SIGNIFICANT CHANGE UP (ref 0–0.9)
MONOCYTES NFR BLD AUTO: 1.7 % — LOW (ref 2–14)
MYELOCYTES NFR BLD: 0.9 % — HIGH (ref 0–0)
NEUTROPHILS # BLD AUTO: 10.79 K/UL — HIGH (ref 1.8–7.4)
NEUTROPHILS NFR BLD AUTO: 92.1 % — HIGH (ref 43–77)
PHOSPHATE SERPL-MCNC: 3.3 MG/DL — SIGNIFICANT CHANGE UP (ref 2.5–4.5)
PLAT MORPH BLD: NORMAL — SIGNIFICANT CHANGE UP
PLATELET # BLD AUTO: 174 K/UL — SIGNIFICANT CHANGE UP (ref 150–400)
POIKILOCYTOSIS BLD QL AUTO: SLIGHT — SIGNIFICANT CHANGE UP
POLYCHROMASIA BLD QL SMEAR: SLIGHT — SIGNIFICANT CHANGE UP
POTASSIUM SERPL-MCNC: 5.2 MMOL/L — SIGNIFICANT CHANGE UP (ref 3.5–5.3)
POTASSIUM SERPL-SCNC: 5.2 MMOL/L — SIGNIFICANT CHANGE UP (ref 3.5–5.3)
PROT SERPL-MCNC: 5.6 G/DL — LOW (ref 6–8.3)
RBC # BLD: 2.92 M/UL — LOW (ref 3.8–5.2)
RBC # FLD: 16.9 % — HIGH (ref 10.3–14.5)
RBC BLD AUTO: ABNORMAL
SODIUM SERPL-SCNC: 136 MMOL/L — SIGNIFICANT CHANGE UP (ref 135–145)
SPHEROCYTES BLD QL SMEAR: SLIGHT — SIGNIFICANT CHANGE UP
TARGETS BLD QL SMEAR: SLIGHT — SIGNIFICANT CHANGE UP
WBC # BLD: 11.72 K/UL — HIGH (ref 3.8–10.5)
WBC # FLD AUTO: 11.72 K/UL — HIGH (ref 3.8–10.5)

## 2020-02-04 PROCEDURE — 99232 SBSQ HOSP IP/OBS MODERATE 35: CPT

## 2020-02-04 PROCEDURE — 99498 ADVNCD CARE PLAN ADDL 30 MIN: CPT | Mod: 25

## 2020-02-04 PROCEDURE — 99233 SBSQ HOSP IP/OBS HIGH 50: CPT | Mod: 25

## 2020-02-04 PROCEDURE — 99233 SBSQ HOSP IP/OBS HIGH 50: CPT | Mod: GC

## 2020-02-04 PROCEDURE — 99497 ADVNCD CARE PLAN 30 MIN: CPT | Mod: 25

## 2020-02-04 RX ADMIN — Medication 500 MILLIGRAM(S): at 14:00

## 2020-02-04 RX ADMIN — CHLORHEXIDINE GLUCONATE 1 APPLICATION(S): 213 SOLUTION TOPICAL at 13:19

## 2020-02-04 RX ADMIN — Medication 3 MILLILITER(S): at 18:27

## 2020-02-04 RX ADMIN — Medication 12.5 MICROGRAM(S): at 22:06

## 2020-02-04 RX ADMIN — HEPARIN SODIUM 5000 UNIT(S): 5000 INJECTION INTRAVENOUS; SUBCUTANEOUS at 18:27

## 2020-02-04 RX ADMIN — HEPARIN SODIUM 5000 UNIT(S): 5000 INJECTION INTRAVENOUS; SUBCUTANEOUS at 05:36

## 2020-02-04 RX ADMIN — Medication 3 MILLILITER(S): at 13:14

## 2020-02-04 RX ADMIN — MIDODRINE HYDROCHLORIDE 20 MILLIGRAM(S): 2.5 TABLET ORAL at 13:21

## 2020-02-04 RX ADMIN — MIDODRINE HYDROCHLORIDE 20 MILLIGRAM(S): 2.5 TABLET ORAL at 21:56

## 2020-02-04 RX ADMIN — LACTULOSE 10 GRAM(S): 10 SOLUTION ORAL at 05:36

## 2020-02-04 RX ADMIN — MICAFUNGIN SODIUM 105 MILLIGRAM(S): 100 INJECTION, POWDER, LYOPHILIZED, FOR SOLUTION INTRAVENOUS at 13:20

## 2020-02-04 RX ADMIN — Medication 1 APPLICATION(S): at 13:18

## 2020-02-04 RX ADMIN — Medication 3 MILLILITER(S): at 00:16

## 2020-02-04 RX ADMIN — Medication 3 MILLILITER(S): at 05:36

## 2020-02-04 RX ADMIN — LACTULOSE 10 GRAM(S): 10 SOLUTION ORAL at 00:15

## 2020-02-04 RX ADMIN — LACTULOSE 10 GRAM(S): 10 SOLUTION ORAL at 18:27

## 2020-02-04 RX ADMIN — Medication 500 MILLIGRAM(S): at 13:15

## 2020-02-04 RX ADMIN — LACTULOSE 10 GRAM(S): 10 SOLUTION ORAL at 13:14

## 2020-02-04 NOTE — PROGRESS NOTE ADULT - PROBLEM SELECTOR PLAN 5
SPENT 46 MINUTES WITH DAUGHTER MARYBETH AND ONEIL, DAYAN AVILA, GAP SW MARYBETH. ONCE AGAIN DISCUSSED OVERALL DEBILITY:  NUTRITIONALLY COMPROMISED, SKIN BREAKDOWN, BACTEREMIA WITH A HIGH PROBABILITY FOR DECOMPENSATION AND DEATH. DISCUSSED PALLIATIVE VS HOSPICE. MARYBETH TEARFUL, STATES SHE DOESN'T WANT TO "STARVE MY MOTHER TO DEATH".  DEFINED "STARVATION" AS THE PURPOSEFUL AND MINDFUL WITHHOLDING OF FOOD FROM A PERSON WHO IS ABLE TO EAT.  WE FURTHER DISCUSSED DISEASE PROCESS AND INABILITY TO EAT SAFELY IN ADDITION TO THE NORMAL SEQUELA OF A DYING PATIENT WHO PROGRESSES TO POOR PO INTAKE, SUGGESTED PLEASURE FEEDS.  FAMILY CONTINUES TO STRUGGLE WITH PEG PLACEMENT .  WILL FOLLOW UP AGAIN TOMORROW.  JOHNATHON REVIEWED FROM 12/2018, NO CHANGES AT THIS TIME.

## 2020-02-04 NOTE — PROGRESS NOTE ADULT - SUBJECTIVE AND OBJECTIVE BOX
SUBJECTIVE AND OBJECTIVE:  INTERVAL HPI/OVERNIGHT EVENTS:  REMAINS LETHARGIC, NG TUBE IN SITU, APPEARS COMFORTABLE IN NAD    DNR on chart: Yes    Allergies    codeine (Rash)  erythromycin (Rash)  iv dye (Rash; Anaphylaxis; Short breath)  penicillin (Rash)  shellfish (Rash)    Intolerances    adhesives (Other)  warfarin (Other)  MEDICATIONS  (STANDING):  albuterol/ipratropium for Nebulization 3 milliLiter(s) Nebulizer every 6 hours  AQUAPHOR (petrolatum Ointment) 1 Application(s) Topical daily  chlorhexidine 4% Liquid 1 Application(s) Topical <User Schedule>  digoxin     Tablet 0.125 milliGRAM(s) Oral every other day  heparin  Injectable 5000 Unit(s) SubCutaneous two times a day  lactulose Syrup 10 Gram(s) Oral four times a day  levothyroxine Injectable 12.5 MICROGram(s) IV Push at bedtime  micafungin IVPB      micafungin IVPB 100 milliGRAM(s) IV Intermittent every 24 hours  midodrine 20 milliGRAM(s) Oral every 8 hours  rifAXIMin 550 milliGRAM(s) Oral two times a day    MEDICATIONS  (PRN):  acetaminophen   Tablet .. 500 milliGRAM(s) Oral every 6 hours PRN Temp greater or equal to 38C (100.4F), Mild Pain (1 - 3)      ITEMS UNCHECKED ARE NOT PRESENT    PRESENT SYMPTOMS: [X]Unable to obtain due to poor mentation   Source if other than patient:  [ ]Family   [ ]Team     Pain:  [ ]yes [ ]no  QOL impact -   Location -                    Aggravating factors -  Quality -  Radiation -  Timing-  Severity (0-10 scale):  Minimal acceptable level (0-10 scale):     Dyspnea:                           [ ]Mild [ ]Moderate [ ]Severe  Anxiety:                             [ ]Mild [ ]Moderate [ ]Severe  Fatigue:                             [ ]Mild [ ]Moderate [ ]Severe  Nausea:                             [ ]Mild [ ]Moderate [ ]Severe  Loss of appetite:              [ ]Mild [ ]Moderate [ ]Severe  Constipation:                    [ ]Mild [ ]Moderate [ ]Severe    PAIN AD Score:	0  http://geriatrictoolkit.[place].[email or web address]/cog/painad.pdf (Ctrl + left click to view)    Other Symptoms:  [X ]All other review of systems negative     Palliative Performance Status Version 2:   20      %      http://npcrc.org/files/news/palliative_performance_scale_ppsv2.pdf  PHYSICAL EXAM:  Vital Signs Last 24 Hrs  T(C): 36.3 (04 Feb 2020 14:24), Max: 36.7 (04 Feb 2020 05:25)  T(F): 97.3 (04 Feb 2020 14:24), Max: 98.1 (04 Feb 2020 05:25)  HR: 116 (04 Feb 2020 14:24) (80 - 116)  BP: 121/68 (04 Feb 2020 14:24) (100/51 - 145/79)  BP(mean): --  RR: 19 (04 Feb 2020 14:24) (18 - 19)  SpO2: 99% (04 Feb 2020 14:24) (92% - 99%) I&O's Summary    03 Feb 2020 07:01  -  04 Feb 2020 07:00  --------------------------------------------------------  IN: 2220 mL / OUT: 0 mL / NET: 2220 mL    04 Feb 2020 07:01  -  04 Feb 2020 20:14  --------------------------------------------------------  IN: 1120 mL / OUT: 0 mL / NET: 1120 mL       GENERAL:  [ ]Alert  [ ]Oriented x   [ X]Lethargic  [X ]Cachexia  [ ]Unarousable  [X ]MINIMALLY Verbal  [ ]Non-Verbal  Behavioral:   [ ]Anxiety  [ ]Delirium [ ]Agitation [ ]Other  HEENT:  [ ]Normal   [X ]Dry mouth   [ ]ET Tube/Trach  [ ]Oral lesions  PULMONARY: POOR INSPIRATORY EFFORT   [ ]Clear [XX ]Tachypnea  [ ]Audible excessive secretions   [ ]Rhonchi        [ ]Right [ ]Left [ ]Bilateral  [ ]Crackles        [ ]Right [ ]Left [ ]Bilateral  [ ]Wheezing     [ ]Right [ ]Left [ ]Bilateral  [X ]Diminished BS [ ] Right [ ]Left [ ]Bilateral  CARDIOVASCULAR:    [ ]Regular [ X]Irregular [ X]Tachy  [ ]Kwadwo [ ]Murmur [ ]Other  GASTROINTESTINAL:  [ ]Soft  [ ]Distended   [X ]+BS  [X ]Non tender [ ]Tender  [ ]PEG [X ]OGT/ NGT   Last BM:      GENITOURINARY:  [ ]Normal [X ]Incontinent   [ ]Oliguria/Anuria   [ ]Irwin  MUSCULOSKELETAL:   [ ]Normal   [ ]Weakness  [X ]Bed/Wheelchair bound [ ]Edema  NEUROLOGIC:   [ ]No focal deficits  [X ] Cognitive impairment  [ ] Dysphagia [ ]Dysarthria [ ] Paresis [ ]Other   SKIN:   [ ]Normal  [ ]Rash   [ ]Pressure ulcer(s) [ ]y [ ]n present on admission  MULTIPLE AREAS OF SKIN TEARS BILAT UE WITH EXTENSIVE ECCHYMOSIS THROUGHOUT     CRITICAL CARE:  [ ]Shock Present  [ ]Septic [ ]Cardiogenic [ ]Neurologic [ ]Hypovolemic  [ ]Vasopressors [ ]Inotropes  [X ]Respiratory failure present [ ]Mechanical Ventilation [ X]Non-invasive ventilatory support [ ]High-Flow  X[ ]Acute  [X ]Chronic [ ]Hypoxic  [ ]Hypercarbic [ ]Other  [ ]Other organ failure     LABS:                        9.6    11.72 )-----------( 174      ( 04 Feb 2020 12:45 )             31.4   02-04    136  |  102  |  42<H>  ----------------------------<  144<H>  5.2   |  27  |  0.83    Ca    9.9      04 Feb 2020 12:45  Phos  3.3     02-04  Mg     2.0     02-04    TPro  5.6<L>  /  Alb  2.1<L>  /  TBili  0.8  /  DBili  x   /  AST  31  /  ALT  12  /  AlkPhos  186<H>  02-04        RADIOLOGY & ADDITIONAL STUDIES:      < from: US Abdomen Limited (01.31.20 @ 09:47) >      INTERPRETATION:  CLINICAL INFORMATION: Altered mental status, hepatocellular carcinoma; rule out ascites.    COMPARISON: Abdominal ultrasound 10/28/2019.    TECHNIQUE: Limited four quadrant sonography of the abdomen for ascites.     FINDINGS:    Small volume ascites in the right upper quadrant. Trace ascites in the left upper quadrant. No ascites the bilateral lower quadrants.  Incidentally noted bilateral pleural effusions.    IMPRESSION:     Small right upper quadrant and trace left upper quadrant ascites.        < from: US TTE 2D F/U, Limited w/o Contrast (ED) (01.20.20 @ 00:46) >      INTERPRETATION:  A focused transthoracic cardiac ultrasound examination was performed.   No significant pericardial effusion was present.  No global wall motion abnormality was identified  Right ventricular enlargement  Diffuse anterior B lines seen  Bilateral pleural effusions seen  Consolidation visualized bilaterally    IMPRESSION:   No significant pericardial effusion present  Anterior B lines seen        < end of copied text >      Protein Calorie Malnutrition Present: [ ]mild [ ]moderate [ ]severe [ ]underweight [ ]morbid obesity  https://www.andeal.org/vault/2440/web/files/ONC/Table_Clinical%20Characteristics%20to%20Document%20Malnutrition-White%20JV%20et%20al%414676.pdf    Height (cm): 160.02 (01-06-20 @ 07:09), 162.56 (09-08-19 @ 03:34), 162.56 (03-18-19 @ 11:35)  Weight (kg): 40.8 (01-19-20 @ 17:28), 49 (01-06-20 @ 07:09), 50.8 (09-08-19 @ 03:34)  BMI (kg/m2): 15.9 (01-19-20 @ 17:28), 19.1 (01-06-20 @ 07:09), 19.2 (09-08-19 @ 03:34)    [ ]PPSV2 < or = 30%  [ ]significant weight loss [ ]poor nutritional intake [ ]anasarca   Albumin, Serum: 2.1 g/dL (02-04-20 @ 12:45)   [ ]Artificial Nutrition    REFERRALS:   [ ]Chaplaincy  [ ]Hospice  [ ]Child Life  [ ]Social Work  [ ]Case management [ ]Holistic Therapy     Goals of Care Document:  TORRI Molina (09-08-19 @ 17:37)  Goals of Care Conversation:   Participants:  · Participants  Patient    Advance Directives:  · Does patient have Advance Directive  Yes  · Indicate Type  Health Care Proxy (HCP); Do Not Resuscitate (DNR); Medical Orders for Life-Sustaining Treatment (MOLST)  · Agent's Name  Jenny Bustamante  · Phone #  [phone]  · Are any of the items on the chart  Yes  · Specify which ones are on chart  Medical Orders for Life-Sustaining Treatment (MOLST)  · Caregiver:  declines    Conversation Discussion:  · Conversation  MOLST Discussed  · Conversation Details  Clarified MOLST form with patient and patient's daughter at bedside. Patient is DNR but not DNI at this time. they agree that they do no want CPR in the case of cardiac arrest. If patient's clinical status were to decline to the point of requiring intubation, for any reason, patient's daughters would like to be notified first so they can make the decision then.    Sada Molina, PGY-1  Internal Medicine  Pager #: [phone] / [facility] [phone]      Electronic Signatures:  Sada Molina)  (Signed 08-Sep-2019 17:43)  	Authored: Goals of Care Conversation      Last Updated: 08-Sep-2019 17:43 by Sada Molina)

## 2020-02-04 NOTE — PROGRESS NOTE ADULT - ASSESSMENT
89 Y/O FEMALE KNOWN TO ME FROM A PREVIOUS ADMISSION, WITH MULTIPLE MEDICAL ISSUES ADMITTED WITH SOB, COUGH FEVER FROM [facility]IN 2/2 SERRATIA BACTEREMIA/FUNGEMIA CALLED FOR GOALS OF CARE.

## 2020-02-04 NOTE — PROGRESS NOTE ADULT - SUBJECTIVE AND OBJECTIVE BOX
Alexei Hess MD, PGY-1  Pager #[phone]  After 7 PM on weekdays and 12 PM on weekends, for urgent issues please page #[phone].  ----------------------------------------------------------------  Patient is a 89y old  Female who presents with a chief complaint of SOB, COUGH, FEVER (03 Feb 2020 14:41)      SUBJECTIVE / OVERNIGHT EVENTS:  ADDITIONAL REVIEW OF SYSTEMS:    MEDICATIONS  (STANDING):  albuterol/ipratropium for Nebulization 3 milliLiter(s) Nebulizer every 6 hours  AQUAPHOR (petrolatum Ointment) 1 Application(s) Topical daily  chlorhexidine 4% Liquid 1 Application(s) Topical <User Schedule>  digoxin     Tablet 0.125 milliGRAM(s) Oral every other day  heparin  Injectable 5000 Unit(s) SubCutaneous two times a day  lactulose Syrup 10 Gram(s) Oral four times a day  levothyroxine Injectable 12.5 MICROGram(s) IV Push at bedtime  micafungin IVPB      micafungin IVPB 100 milliGRAM(s) IV Intermittent every 24 hours  midodrine 20 milliGRAM(s) Oral every 8 hours  rifAXIMin 550 milliGRAM(s) Oral two times a day    MEDICATIONS  (PRN):  acetaminophen   Tablet .. 500 milliGRAM(s) Oral every 6 hours PRN Temp greater or equal to 38C (100.4F), Mild Pain (1 - 3)      CAPILLARY BLOOD GLUCOSE      POCT Blood Glucose.: 131 mg/dL (03 Feb 2020 17:33)  POCT Blood Glucose.: 113 mg/dL (03 Feb 2020 13:18)    I&O's Summary    03 Feb 2020 07:01  -  04 Feb 2020 07:00  --------------------------------------------------------  IN: 2220 mL / OUT: 0 mL / NET: 2220 mL        PHYSICAL EXAM:  Vital Signs Last 24 Hrs  T(C): 36.7 (04 Feb 2020 05:25), Max: 36.7 (04 Feb 2020 05:25)  T(F): 98.1 (04 Feb 2020 05:25), Max: 98.1 (04 Feb 2020 05:25)  HR: 92 (04 Feb 2020 05:25) (80 - 96)  BP: 120/76 (04 Feb 2020 05:25) (100/51 - 145/79)  BP(mean): --  RR: 18 (04 Feb 2020 05:25) (18 - 18)  SpO2: 92% (04 Feb 2020 05:25) (92% - 99%)  CONSTITUTIONAL: NAD, well-developed, well-groomed  EYES: PERRLA; conjunctiva and sclera clear  ENMT: Moist oral mucosa, no pharyngeal injection or exudates; normal dentition  NECK: Supple, no palpable masses; no thyromegaly  RESPIRATORY: Normal respiratory effort; lungs are clear to auscultation bilaterally  CARDIOVASCULAR: Regular rate and rhythm, normal S1 and S2, no murmur/rub/gallop; No lower extremity edema; Peripheral pulses are 2+ bilaterally  ABDOMEN: Nontender to palpation, normoactive bowel sounds, no rebound/guarding; No hepatosplenomegaly  MUSCULOSKELETAL:  Normal gait; no clubbing or cyanosis of digits; no joint swelling or tenderness to palpation  PSYCH: A+O to person, place, and time; affect appropriate  NEUROLOGY: CN 2-12 are intact and symmetric; no gross sensory deficits   SKIN: No rashes; no palpable lesions    LABS:    02-03    133<L>  |  101  |  42<H>  ----------------------------<  120<H>  5.2   |  22  |  0.87    Ca    9.4      03 Feb 2020 17:26  Phos  3.7     02-03  Mg     2.0     02-03    TPro  5.2<L>  /  Alb  1.9<L>  /  TBili  1.1  /  DBili  x   /  AST  35  /  ALT  11  /  AlkPhos  163<H>  02-03        RADIOLOGY & ADDITIONAL TESTS:  Results Reviewed:   Imaging Personally Reviewed:  Electrocardiogram Personally Reviewed:    COORDINATION OF CARE:  Care Discussed with Consultants/Other Providers [Y/N]:  Prior or Outpatient Records Reviewed [Y/N]: Alexei Hess MD, PGY-1  Pager #[phone]  After 7 PM on weekdays and 12 PM on weekends, for urgent issues please page #[phone].  ----------------------------------------------------------------  Patient is a 89y old  Female who presents with a chief complaint of SOB, COUGH, FEVER (03 Feb 2020 14:41)      SUBJECTIVE / OVERNIGHT EVENTS:  ADDITIONAL REVIEW OF SYSTEMS:    MEDICATIONS  (STANDING):  albuterol/ipratropium for Nebulization 3 milliLiter(s) Nebulizer every 6 hours  AQUAPHOR (petrolatum Ointment) 1 Application(s) Topical daily  chlorhexidine 4% Liquid 1 Application(s) Topical <User Schedule>  digoxin     Tablet 0.125 milliGRAM(s) Oral every other day  heparin  Injectable 5000 Unit(s) SubCutaneous two times a day  lactulose Syrup 10 Gram(s) Oral four times a day  levothyroxine Injectable 12.5 MICROGram(s) IV Push at bedtime  micafungin IVPB      micafungin IVPB 100 milliGRAM(s) IV Intermittent every 24 hours  midodrine 20 milliGRAM(s) Oral every 8 hours  rifAXIMin 550 milliGRAM(s) Oral two times a day    MEDICATIONS  (PRN):  acetaminophen   Tablet .. 500 milliGRAM(s) Oral every 6 hours PRN Temp greater or equal to 38C (100.4F), Mild Pain (1 - 3)      CAPILLARY BLOOD GLUCOSE      POCT Blood Glucose.: 131 mg/dL (03 Feb 2020 17:33)  POCT Blood Glucose.: 113 mg/dL (03 Feb 2020 13:18)    I&O's Summary    03 Feb 2020 07:01  -  04 Feb 2020 07:00  --------------------------------------------------------  IN: 2220 mL / OUT: 0 mL / NET: 2220 mL        PHYSICAL EXAM:  Vital Signs Last 24 Hrs  T(C): 36.7 (04 Feb 2020 05:25), Max: 36.7 (04 Feb 2020 05:25)  T(F): 98.1 (04 Feb 2020 05:25), Max: 98.1 (04 Feb 2020 05:25)  HR: 92 (04 Feb 2020 05:25) (80 - 96)  BP: 120/76 (04 Feb 2020 05:25) (100/51 - 145/79)  BP(mean): --  RR: 18 (04 Feb 2020 05:25) (18 - 18)  SpO2: 92% (04 Feb 2020 05:25) (92% - 99%)    CONSTITUTIONAL: NAD, well-developed, well-groomed  ENMT: Moist oral mucosa, no pharyngeal injection or exudates; normal dentition  NECK: Supple, no palpable masses; no thyromegaly  RESPIRATORY: Normal respiratory effort; lungs are clear to auscultation bilaterally  CARDIOVASCULAR: Regular rate and rhythm, normal S1 and S2, no murmur/rub/gallop; No lower extremity edema; Peripheral pulses are 2+ bilaterally  ABDOMEN: Nontender to palpation, normoactive bowel sounds, no rebound/guarding; No hepatosplenomegaly  PSYCH: A+O to person, place, and time; affect appropriate  NEUROLOGY: CN 2-12 are intact and symmetric; no gross sensory deficits       LABS:    02-03    133<L>  |  101  |  42<H>  ----------------------------<  120<H>  5.2   |  22  |  0.87    Ca    9.4      03 Feb 2020 17:26  Phos  3.7     02-03  Mg     2.0     02-03    TPro  5.2<L>  /  Alb  1.9<L>  /  TBili  1.1  /  DBili  x   /  AST  35  /  ALT  11  /  AlkPhos  163<H>  02-03        RADIOLOGY & ADDITIONAL TESTS:  Results Reviewed:   Imaging Personally Reviewed:  Electrocardiogram Personally Reviewed:    COORDINATION OF CARE:  Care Discussed with Consultants/Other Providers [Y/N]:  Prior or Outpatient Records Reviewed [Y/N]: Alexei Hess MD, PGY-1  Pager #[phone]  After 7 PM on weekdays and 12 PM on weekends, for urgent issues please page #[phone].  ----------------------------------------------------------------  Patient is a 89y old  Female who presents with a chief complaint of SOB, COUGH, FEVER (03 Feb 2020 14:41)      SUBJECTIVE / OVERNIGHT EVENTS: No acute events overnight. Pt seen and examined at bedside. Pt interactive this morning, complaining of feeling hungry, then complaining of wanting to vomit, but without emesis. Pt also wanted provider to leave, but also demanded the provider stay. Pt denied any acute complaints including headache, wrist pain.     MEDICATIONS  (STANDING):  albuterol/ipratropium for Nebulization 3 milliLiter(s) Nebulizer every 6 hours  AQUAPHOR (petrolatum Ointment) 1 Application(s) Topical daily  chlorhexidine 4% Liquid 1 Application(s) Topical <User Schedule>  digoxin     Tablet 0.125 milliGRAM(s) Oral every other day  heparin  Injectable 5000 Unit(s) SubCutaneous two times a day  lactulose Syrup 10 Gram(s) Oral four times a day  levothyroxine Injectable 12.5 MICROGram(s) IV Push at bedtime  micafungin IVPB      micafungin IVPB 100 milliGRAM(s) IV Intermittent every 24 hours  midodrine 20 milliGRAM(s) Oral every 8 hours  rifAXIMin 550 milliGRAM(s) Oral two times a day    MEDICATIONS  (PRN):  acetaminophen   Tablet .. 500 milliGRAM(s) Oral every 6 hours PRN Temp greater or equal to 38C (100.4F), Mild Pain (1 - 3)      CAPILLARY BLOOD GLUCOSE      POCT Blood Glucose.: 131 mg/dL (03 Feb 2020 17:33)  POCT Blood Glucose.: 113 mg/dL (03 Feb 2020 13:18)    I&O's Summary    03 Feb 2020 07:01  -  04 Feb 2020 07:00  --------------------------------------------------------  IN: 2220 mL / OUT: 0 mL / NET: 2220 mL        PHYSICAL EXAM:  Vital Signs Last 24 Hrs  T(C): 36.7 (04 Feb 2020 05:25), Max: 36.7 (04 Feb 2020 05:25)  T(F): 98.1 (04 Feb 2020 05:25), Max: 98.1 (04 Feb 2020 05:25)  HR: 92 (04 Feb 2020 05:25) (80 - 96)  BP: 120/76 (04 Feb 2020 05:25) (100/51 - 145/79)  BP(mean): --  RR: 18 (04 Feb 2020 05:25) (18 - 18)  SpO2: 92% (04 Feb 2020 05:25) (92% - 99%)    CONSTITUTIONAL: NAD, elderly female, alert and awake this AM  RESPIRATORY: CTABL  CARDIOVASCULAR: 3/6 harsh systolic murmur   ABDOMEN: soft, nt, nd  MUSCULOSKELETAL: no lower extremity swelling  PSYCH: AOx2-3 today (not to date, but to president)  NEUROLOGY: grossly intact, no focal deficits  SKIN: skin tears b/l UE, b/l ecchymoses LE, bandaged c/d/i      LABS:    02-03    133<L>  |  101  |  42<H>  ----------------------------<  120<H>  5.2   |  22  |  0.87    Ca    9.4      03 Feb 2020 17:26  Phos  3.7     02-03  Mg     2.0     02-03    TPro  5.2<L>  /  Alb  1.9<L>  /  TBili  1.1  /  DBili  x   /  AST  35  /  ALT  11  /  AlkPhos  163<H>  02-03        RADIOLOGY & ADDITIONAL TESTS:  Results Reviewed:   Imaging Personally Reviewed:  Electrocardiogram Personally Reviewed:    COORDINATION OF CARE:  Care Discussed with Consultants/Other Providers [Y/N]:  Prior or Outpatient Records Reviewed [Y/N]:

## 2020-02-04 NOTE — PROGRESS NOTE ADULT - SUBJECTIVE AND OBJECTIVE BOX
CC: Patient is a 89y old  Female who presents with a chief complaint of SOB, COUGH, FEVER (03 Feb 2020 14:41)    ID following for bacteremia, candidemia    Interval History/ROS: Patient more awake today. AAOx2, answering questions, has no complaints. No fevers, no chills.    Rest of ROS negative.    Allergies  codeine (Rash)  erythromycin (Rash)  iv dye (Rash; Anaphylaxis; Short breath)  penicillin (Rash)  shellfish (Rash)    ANTIMICROBIALS:  micafungin IVPB    micafungin IVPB 100 every 24 hours  rifAXIMin 550 two times a day      OTHER MEDS:  acetaminophen   Tablet .. 500 milliGRAM(s) Oral every 6 hours PRN  albuterol/ipratropium for Nebulization 3 milliLiter(s) Nebulizer every 6 hours  AQUAPHOR (petrolatum Ointment) 1 Application(s) Topical daily  chlorhexidine 4% Liquid 1 Application(s) Topical <User Schedule>  digoxin     Tablet 0.125 milliGRAM(s) Oral every other day  heparin  Injectable 5000 Unit(s) SubCutaneous two times a day  lactulose Syrup 10 Gram(s) Oral four times a day  levothyroxine Injectable 12.5 MICROGram(s) IV Push at bedtime  midodrine 20 milliGRAM(s) Oral every 8 hours      PE:    Vital Signs Last 24 Hrs  T(C): 36.7 (04 Feb 2020 05:25), Max: 36.7 (04 Feb 2020 05:25)  T(F): 98.1 (04 Feb 2020 05:25), Max: 98.1 (04 Feb 2020 05:25)  HR: 92 (04 Feb 2020 05:25) (80 - 96)  BP: 120/76 (04 Feb 2020 05:25) (100/51 - 145/79)  BP(mean): --  RR: 18 (04 Feb 2020 05:25) (18 - 18)  SpO2: 92% (04 Feb 2020 05:25) (92% - 99%)    Gen: More awake, alert, NAD  HEENT: NGT  CV: S1+S2 normal, + murmur  Resp: Clear bilat, no resp distress  Abd: Soft, nontender, +BS  Ext: No LE edema, bruising to upper extremities  : No CVA tenderness  IV/Skin: No thrombophlebitis, left arm midline intact  Neuro: AAOx2    LABS:        02-03    133<L>  |  101  |  42<H>  ----------------------------<  120<H>  5.2   |  22  |  0.87    Ca    9.4      03 Feb 2020 17:26  Phos  3.7     02-03  Mg     2.0     02-03    TPro  5.2<L>  /  Alb  1.9<L>  /  TBili  1.1  /  DBili  x   /  AST  35  /  ALT  11  /  AlkPhos  163<H>  02-03          MICROBIOLOGY:  v  .Blood Blood-Peripheral  01-24-20   No growth at 5 days.  --  --      .Blood Blood-Venous  01-21-20   No growth at 5 days.  --  --      .Blood Blood-Peripheral  01-21-20   Growth in anaerobic bottle: Serratia marcescens  See previous culture [id number]  Growth in aerobic bottle: Candida glabrata  ***Blood Panel PCR results on this specimen are available  approximately 3 hours after the Gram stain result.***  Gram stain, PCR, and/or culture results may not always  correspond due to difference in methodologies.  ************************************************************  This PCR assay was performed using Trial[employer].  The following targets are tested for: Enterococcus,  vancomycin resistant enterococci, Listeria monocytogenes,  coagulase negative staphylococci, S. aureus,  methicillin resistant S. aureus, Streptococcus agalactiae  (Group B), S. pneumoniae, S. pyogenes (Group A),  Acinetobacter baumannii, Enterobacter cloacae, E. coli,  Klebsiella oxytoca, K. pneumoniae, Proteus sp.,  Serratia marcescens, Haemophilus influenzae,  Neisseria meningitidis, Pseudomonas aeruginosa, Candida  albicans, C. glabrata, C krusei, C parapsilosis,  C. tropicalis and the KPC resistance gene.  --  Blood Culture PCR  Candida (Torulopsis) glabrata      .Sputum Sputum  01-20-20   Moderate Serratia marcescens  Normal Respiratory Stefanie present  --  Serratia marcescens      .Blood Blood-Peripheral  01-19-20   Growth in aerobic and anaerobic bottles: Serratia marcescens  ***Blood Panel PCR results on this specimen are available  approximately 3 hours after the Gram stain result.***  Gram stain, PCR, and/or culture results may not always  correspond due to difference in methodologies.  ************************************************************  This PCR assay was performed using [employer].  The following targets are tested for: Enterococcus,  vancomycin resistant enterococci, Listeria monocytogenes,  coagulase negative staphylococci, S. aureus,  methicillin resistant S. aureus, Streptococcus agalactiae  (Group B), S. pneumoniae, S. pyogenes (Group A),  Acinetobacter baumannii, Enterobacter cloacae, E. coli,  Klebsiella oxytoca, K. pneumoniae, Proteus sp.,  Serratia marcescens, Haemophilus influenzae,  Neisseria meningitidis, Pseudomonas aeruginosa, Candida  albicans, C. glabrata, C krusei, C parapsilosis,  C. tropicalis and the KPC resistance gene.  --  Blood Culture PCR  Serratia marcescens      .Urine Clean Catch (Midstream)  01-19-20   >100,000 CFU/ml Klebsiella pneumoniae ESBL  --  Klebsiella pneumoniae ESBL      .Urine CATHETERIZED  01-19-20   >100,000 CFU/ml Klebsiella pneumoniae ESBL  --  Klebsiella pneumoniae ESBL      .Urine CATHETERIZED  01-11-20   >100,000 CFU/ml Klebsiella pneumoniae ESBL  --  Gram Negative Rods      .Urine Clean Catch (Midstream)  01-06-20   <10,000 CFU/mL Normal Urogenital Stefanie  --  --          Rapid RVP Result: NotDetec (01-29 @ 11:31)        RADIOLOGY:  < from: Xray Wrist 3 Views, Right (01.31.20 @ 22:46) >  IMPRESSION:     No acute fracture or dislocation. 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 Chondrocalcinosis.       < end of copied text >

## 2020-02-04 NOTE — PROGRESS NOTE ADULT - PROBLEM SELECTOR PLAN 10
Transitions of Care Status:  1.  Name of PCP:  2.  PCP Contacted on Admission: [ ] Y    [ ] N    3.  PCP contacted at Discharge: [ ] Y    [ ] N    [ ] N/A  4.  Post-Discharge Appointment Date and Location:  5.  Summary of Handoff given to PCP: Transitions of Care Status:  1.  Name of PCP: Fallon Celaya  2.  PCP Contacted on Admission: [ ] Y    [ ] N    3.  PCP contacted at Discharge: [ ] Y    [ ] N    [ ] N/A  4.  Post-Discharge Appointment Date and Location:  5.  Summary of Handoff given to PCP: updated PCP 2/4

## 2020-02-04 NOTE — PROGRESS NOTE ADULT - ASSESSMENT
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   Fungemia 1/21 with Candida glabrata, possible transient gut translocation in the setting of shock. Less likely from the central line which was in briefly. No ophthalmologic findings.   Cultures 1/24 negative. Afebrile since 1/24.   Transferred from MICU 1/25.     Suggest  -Completed ertapenem 2/3/20   -Micafungin 100mg IV q24h, last day 2/8, would not transition to Fluconazole given issues with dysphagia and ascites precluding PEG placement and it would require a high dose     Ha Chiang MD  Pager [phone]  After 5pm/weekends call [phone]

## 2020-02-04 NOTE — CHART NOTE - NSCHARTNOTEFT_GEN_A_CORE
Spoke to Dr. Boston, patient's cardiologist who states that patient is no appropriate for IV Lasix as she has tenuous blood pressures and she is on midodrine. Dr. Boston also states that if patient is volume overloaded patient can be treated with spironolactone 25mg, 1-2 x a day. Patient should not be diuresed to reduce ascites. Spoke to Dr. Boston, patient's cardiologist who states that patient is no appropriate for IV Lasix as she has tenuous blood pressures and she is on midodrine. Dr. Boston also states that if patient is volume overloaded patient can be treated with spironolactone 25mg, 1-2 x a day. Patient should not be diuresed to reduce ascites.      Adry Adames  PGY-3   pgr: [phone]

## 2020-02-04 NOTE — PROGRESS NOTE ADULT - PROBLEM SELECTOR PLAN 2
- multifactorial likely secondary to recent sepsis, ICU delirium, portosystemic encephalopathy   - c/b nausea/vomiting in MICU, now with NGT on tube feeds  - will maintain NGT while patient is w/ metabolic encephalopathy for oral access  - not a candidate for PEG given ascites as per GI - multifactorial likely secondary to recent sepsis, ICU delirium, portosystemic encephalopathy   - c/b nausea/vomiting in MICU, now with NGT on tube feeds  - will maintain NGT while patient is w/ metabolic encephalopathy for oral access  - not a candidate for PEG given ascites as per GI  - per cards, would not diuresis, further diuretics would not likely clear ascites, PEG unrealistic for this patient  - given h/o esophageal varices, would not maintain NGT in patient. explained risks and benefits to family. family optimistic that pt will recover function enough to eat and maintain nutritional status through PO.

## 2020-02-04 NOTE — PROGRESS NOTE ADULT - PROBLEM SELECTOR PLAN 1
Serratia bacteremia, sputum cx with serratia. Klebsiella ESBL UTI sensitive to meropenem IV. Also with candida glabrata fungemia (1/21).  - c/w ertapenem (1/26 - 2/3)  - c/w IV micafungin 100mg daily (1/27- ), last day 2/8  - on midodrine 20 tid, have been unable to downtitrate due to low BPs  - ophthalmology f/u as OP, no signs of endophthalmitis

## 2020-02-04 NOTE — CHART NOTE - NSCHARTNOTEFT_GEN_A_CORE
Nutrition Follow Up Note  Patient seen for: nurse request -pt c/o being hungry    · Reason for Admission	SOB, COUGH, FEVER      Source: nurse, chart    Diet : ENTERAL, NPO/ENTERAL  NO SHELLFISH    Patient is confused at times, disoriented to time and situation         Enteral /Parenteral Nutrition:   Jevity 1.2 at 50 cc/hr x 24hrs. At goal TF provides 1200cc, 1440 kcals, 67 gm protein, 944 cc free water. EN provision at goal meets 29 Kcal/Kg, 1.37 gm of protein/kg based on usual wt of 49 kg.    Daily Weight in k.2 (), Weight in k.1 (), Weight in k.3 (), Weight in k.3 (), Weight in k.3 (), Weight in k.6 (), Weight in k.5 ()  % Weight Change: 26% gain since previous assess on -most likely related to +3 generalized edema    Pertinent Medications: MEDICATIONS  (STANDING):  albuterol/ipratropium for Nebulization 3 milliLiter(s) Nebulizer every 6 hours  AQUAPHOR (petrolatum Ointment) 1 Application(s) Topical daily  chlorhexidine 4% Liquid 1 Application(s) Topical <User Schedule>  digoxin     Tablet 0.125 milliGRAM(s) Oral every other day  heparin  Injectable 5000 Unit(s) SubCutaneous two times a day  lactulose Syrup 10 Gram(s) Oral four times a day  levothyroxine Injectable 12.5 MICROGram(s) IV Push at bedtime  micafungin IVPB      micafungin IVPB 100 milliGRAM(s) IV Intermittent every 24 hours  midodrine 20 milliGRAM(s) Oral every 8 hours  rifAXIMin 550 milliGRAM(s) Oral two times a day    MEDICATIONS  (PRN):  acetaminophen   Tablet .. 500 milliGRAM(s) Oral every 6 hours PRN Temp greater or equal to 38C (100.4F), Mild Pain (1 - 3)    Pertinent Labs:  @ 17:26: Na 133<L>, BUN 42<H>, Cr 0.87, <H>, K+ 5.2   @ 09:55: Na 135, BUN 42<H>, Cr 0.90, <H>, K+ 5.8<H>, Phos 3.7, Mg 2.0, Alk Phos 163<H>, ALT/SGPT 11, AST/SGOT 35  1/7: Vit D 25 Hydroxy 18.9    Finger Sticks:  POCT Blood Glucose.: 131 mg/dL ( @ 17:33)  POCT Blood Glucose.: 113 mg/dL ( @ 13:18)      Skin per nursing documentation: no pressure injury  Edema: +3 generalized edema    Estimated Needs:   [ x] no change since previous assessment  [ ] recalculated:     Previous Nutrition Diagnosis: moderate malnutrition  Nutrition Diagnosis is: plan is achieved      Recommend  1) consider Vit D3 supplement  2) consider 100ml free water x 3 daily secondary to pt c/o of hunger which may actually be thirst. consider pt with stage 3 pre-renal and +3 generalized edema.       RD remains available upon request and will follow up per protocol  Charline Saunders MA, HARMEET, CDN #[phone] Nutrition Follow Up Note  Patient seen for: nurse request -pt c/o being hungry    · Reason for Admission	SOB, COUGH, FEVER  pt now being seen by Palliative.       Source: nurse, chart    Diet : ENTERAL, NPO/ENTERAL  NO SHELLFISH    Patient is confused at times, disoriented to time and situation         Enteral /Parenteral Nutrition:   Jevity 1.2 at 50 cc/hr x 24hrs. At goal TF provides 1200cc, 1440 kcals, 67 gm protein, 944 cc free water. EN provision at goal meets 29 Kcal/Kg, 1.37 gm of protein/kg based on usual wt of 49 kg.    Daily Weight in k.2 (), Weight in k.1 (), Weight in k.3 (), Weight in k.3 (), Weight in k.3 (), Weight in k.6 (), Weight in k.5 ()  % Weight Change: 14% gain since previous assess on     Pertinent Medications: MEDICATIONS  (STANDING):  albuterol/ipratropium for Nebulization 3 milliLiter(s) Nebulizer every 6 hours  AQUAPHOR (petrolatum Ointment) 1 Application(s) Topical daily  chlorhexidine 4% Liquid 1 Application(s) Topical <User Schedule>  digoxin     Tablet 0.125 milliGRAM(s) Oral every other day  heparin  Injectable 5000 Unit(s) SubCutaneous two times a day  lactulose Syrup 10 Gram(s) Oral four times a day  levothyroxine Injectable 12.5 MICROGram(s) IV Push at bedtime  micafungin IVPB      micafungin IVPB 100 milliGRAM(s) IV Intermittent every 24 hours  midodrine 20 milliGRAM(s) Oral every 8 hours  rifAXIMin 550 milliGRAM(s) Oral two times a day    MEDICATIONS  (PRN):  acetaminophen   Tablet .. 500 milliGRAM(s) Oral every 6 hours PRN Temp greater or equal to 38C (100.4F), Mild Pain (1 - 3)    Pertinent Labs:  @ 17:26: Na 133<L>, BUN 42<H>, Cr 0.87, <H>, K+ 5.2   @ 09:55: Na 135, BUN 42<H>, Cr 0.90, <H>, K+ 5.8<H>, Phos 3.7, Mg 2.0, Alk Phos 163<H>, ALT/SGPT 11, AST/SGOT 35  : Vit D 25 Hydroxy 18.9    Finger Sticks:  POCT Blood Glucose.: 131 mg/dL ( @ 17:33)  POCT Blood Glucose.: 113 mg/dL ( @ 13:18)      Skin per nursing documentation: no pressure injury  Edema: +3 generalized edema    Estimated Needs:   [ ] no change since previous assessment  [x ] recalculated:     Previous Nutrition Diagnosis: moderate malnutrition  Nutrition Diagnosis is: plan in progress      Recommend  1) consider Vit D3 supplement given low Vit D 25 Hydroxy  2) Jevity 1.2 @ 60ml/hr x 24hr. provides 1728kcal/79grams protein. 29kcal/kg/1.3gramprotein/kg. based on daily weight of 59.1 today. monitor renal indices and need to change rate and/or formula to [place]. provides 1162ml H20  3)Free water deferred to providers given generalized edema and pt already receiving fluids as per nurse.         RD remains available upon request and will follow up per protocol  Charline Saunders MA, HARMEET, CDN #[phone]

## 2020-02-05 LAB
ALBUMIN SERPL ELPH-MCNC: 1.9 G/DL — LOW (ref 3.3–5)
ALP SERPL-CCNC: 188 U/L — HIGH (ref 40–120)
ALT FLD-CCNC: 14 U/L — SIGNIFICANT CHANGE UP (ref 10–45)
ANION GAP SERPL CALC-SCNC: 8 MMOL/L — SIGNIFICANT CHANGE UP (ref 5–17)
AST SERPL-CCNC: 30 U/L — SIGNIFICANT CHANGE UP (ref 10–40)
BASOPHILS # BLD AUTO: 0.03 K/UL — SIGNIFICANT CHANGE UP (ref 0–0.2)
BASOPHILS NFR BLD AUTO: 0.4 % — SIGNIFICANT CHANGE UP (ref 0–2)
BILIRUB SERPL-MCNC: 0.8 MG/DL — SIGNIFICANT CHANGE UP (ref 0.2–1.2)
BUN SERPL-MCNC: 44 MG/DL — HIGH (ref 7–23)
CALCIUM SERPL-MCNC: 9.4 MG/DL — SIGNIFICANT CHANGE UP (ref 8.4–10.5)
CHLORIDE SERPL-SCNC: 103 MMOL/L — SIGNIFICANT CHANGE UP (ref 96–108)
CO2 SERPL-SCNC: 27 MMOL/L — SIGNIFICANT CHANGE UP (ref 22–31)
CREAT SERPL-MCNC: 0.82 MG/DL — SIGNIFICANT CHANGE UP (ref 0.5–1.3)
EOSINOPHIL # BLD AUTO: 0.07 K/UL — SIGNIFICANT CHANGE UP (ref 0–0.5)
EOSINOPHIL NFR BLD AUTO: 0.9 % — SIGNIFICANT CHANGE UP (ref 0–6)
GLUCOSE BLDC GLUCOMTR-MCNC: 112 MG/DL — HIGH (ref 70–99)
GLUCOSE BLDC GLUCOMTR-MCNC: 116 MG/DL — HIGH (ref 70–99)
GLUCOSE BLDC GLUCOMTR-MCNC: 122 MG/DL — HIGH (ref 70–99)
GLUCOSE BLDC GLUCOMTR-MCNC: 131 MG/DL — HIGH (ref 70–99)
GLUCOSE SERPL-MCNC: 130 MG/DL — HIGH (ref 70–99)
HCT VFR BLD CALC: 28.8 % — LOW (ref 34.5–45)
HGB BLD-MCNC: 8.7 G/DL — LOW (ref 11.5–15.5)
IMM GRANULOCYTES NFR BLD AUTO: 0.5 % — SIGNIFICANT CHANGE UP (ref 0–1.5)
LYMPHOCYTES # BLD AUTO: 0.76 K/UL — LOW (ref 1–3.3)
LYMPHOCYTES # BLD AUTO: 9.6 % — LOW (ref 13–44)
MAGNESIUM SERPL-MCNC: 2 MG/DL — SIGNIFICANT CHANGE UP (ref 1.6–2.6)
MCHC RBC-ENTMCNC: 30.2 GM/DL — LOW (ref 32–36)
MCHC RBC-ENTMCNC: 33.2 PG — SIGNIFICANT CHANGE UP (ref 27–34)
MCV RBC AUTO: 109.9 FL — HIGH (ref 80–100)
MONOCYTES # BLD AUTO: 0.95 K/UL — HIGH (ref 0–0.9)
MONOCYTES NFR BLD AUTO: 11.9 % — SIGNIFICANT CHANGE UP (ref 2–14)
NEUTROPHILS # BLD AUTO: 6.1 K/UL — SIGNIFICANT CHANGE UP (ref 1.8–7.4)
NEUTROPHILS NFR BLD AUTO: 76.7 % — SIGNIFICANT CHANGE UP (ref 43–77)
NRBC # BLD: 0 /100 WBCS — SIGNIFICANT CHANGE UP (ref 0–0)
PHOSPHATE SERPL-MCNC: 3.2 MG/DL — SIGNIFICANT CHANGE UP (ref 2.5–4.5)
PLATELET # BLD AUTO: 152 K/UL — SIGNIFICANT CHANGE UP (ref 150–400)
POTASSIUM SERPL-MCNC: 5.4 MMOL/L — HIGH (ref 3.5–5.3)
POTASSIUM SERPL-SCNC: 5.4 MMOL/L — HIGH (ref 3.5–5.3)
PROT SERPL-MCNC: 4.8 G/DL — LOW (ref 6–8.3)
RBC # BLD: 2.62 M/UL — LOW (ref 3.8–5.2)
RBC # FLD: 17.4 % — HIGH (ref 10.3–14.5)
SODIUM SERPL-SCNC: 138 MMOL/L — SIGNIFICANT CHANGE UP (ref 135–145)
WBC # BLD: 7.95 K/UL — SIGNIFICANT CHANGE UP (ref 3.8–10.5)
WBC # FLD AUTO: 7.95 K/UL — SIGNIFICANT CHANGE UP (ref 3.8–10.5)

## 2020-02-05 PROCEDURE — 99232 SBSQ HOSP IP/OBS MODERATE 35: CPT

## 2020-02-05 PROCEDURE — 99233 SBSQ HOSP IP/OBS HIGH 50: CPT | Mod: GC

## 2020-02-05 RX ORDER — SODIUM ZIRCONIUM CYCLOSILICATE 10 G/10G
5 POWDER, FOR SUSPENSION ORAL
Refills: 0 | Status: DISCONTINUED | OUTPATIENT
Start: 2020-02-05 | End: 2020-02-13

## 2020-02-05 RX ADMIN — Medication 12.5 MICROGRAM(S): at 23:10

## 2020-02-05 RX ADMIN — Medication 3 MILLILITER(S): at 23:11

## 2020-02-05 RX ADMIN — Medication 500 MILLIGRAM(S): at 14:45

## 2020-02-05 RX ADMIN — CHLORHEXIDINE GLUCONATE 1 APPLICATION(S): 213 SOLUTION TOPICAL at 09:12

## 2020-02-05 RX ADMIN — Medication 3 MILLILITER(S): at 18:54

## 2020-02-05 RX ADMIN — LACTULOSE 10 GRAM(S): 10 SOLUTION ORAL at 05:22

## 2020-02-05 RX ADMIN — Medication 3 MILLILITER(S): at 00:13

## 2020-02-05 RX ADMIN — Medication 500 MILLIGRAM(S): at 14:15

## 2020-02-05 RX ADMIN — HEPARIN SODIUM 5000 UNIT(S): 5000 INJECTION INTRAVENOUS; SUBCUTANEOUS at 05:22

## 2020-02-05 RX ADMIN — HEPARIN SODIUM 5000 UNIT(S): 5000 INJECTION INTRAVENOUS; SUBCUTANEOUS at 18:54

## 2020-02-05 RX ADMIN — MICAFUNGIN SODIUM 105 MILLIGRAM(S): 100 INJECTION, POWDER, LYOPHILIZED, FOR SOLUTION INTRAVENOUS at 18:57

## 2020-02-05 RX ADMIN — LACTULOSE 10 GRAM(S): 10 SOLUTION ORAL at 18:55

## 2020-02-05 RX ADMIN — Medication 0.12 MILLIGRAM(S): at 12:15

## 2020-02-05 RX ADMIN — Medication 1 APPLICATION(S): at 12:22

## 2020-02-05 RX ADMIN — MIDODRINE HYDROCHLORIDE 20 MILLIGRAM(S): 2.5 TABLET ORAL at 14:15

## 2020-02-05 RX ADMIN — LACTULOSE 10 GRAM(S): 10 SOLUTION ORAL at 12:15

## 2020-02-05 RX ADMIN — SODIUM ZIRCONIUM CYCLOSILICATE 5 GRAM(S): 10 POWDER, FOR SUSPENSION ORAL at 22:09

## 2020-02-05 RX ADMIN — Medication 3 MILLILITER(S): at 12:14

## 2020-02-05 RX ADMIN — Medication 3 MILLILITER(S): at 05:23

## 2020-02-05 NOTE — PROGRESS NOTE ADULT - PROBLEM SELECTOR PLAN 2
- multifactorial likely secondary to recent sepsis, ICU delirium, portosystemic encephalopathy   - c/b nausea/vomiting in MICU, now with NGT on tube feeds  - will maintain NGT while patient is w/ metabolic encephalopathy for oral access  - not a candidate for PEG given ascites as per GI  - per cards, would not diuresis, further diuretics would not likely clear ascites, PEG unrealistic for this patient  - given h/o grade IV esophageal varices that has bled in the past, would not maintain NGT in this patient as she is a very high risk for bleed. explained risks and benefits to family. family optimistic that pt will recover function enough to eat and maintain nutritional status through PO.

## 2020-02-05 NOTE — PROGRESS NOTE ADULT - ASSESSMENT
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 Pt last admit 1/6-9 s/p fall OOB now re-admitted from [facility] w AMS 2/2 Septic Shock 2/2 Serratia Bacteremia / Klebsiella ESBL urosepsis, and PNA w Serratia in the sputum, with candida glabrata in blood cx from 1/21.

## 2020-02-05 NOTE — PROGRESS NOTE ADULT - SUBJECTIVE AND OBJECTIVE BOX
Chief Complaint:  Patient is a 89y old  Female who presents with a chief complaint of melena (2020 08:34)      Interval Events: Reconsulted for patient to see if patient can have NGT to go home. Pt remains with NG tube and otherwise lethargic.     Allergies:  adhesives (Other)  codeine (Rash)  erythromycin (Rash)  iv dye (Rash; Anaphylaxis; Short breath)  penicillin (Rash)  shellfish (Rash)  warfarin (Other)      Hospital Medications:  acetaminophen   Tablet .. 500 milliGRAM(s) Oral every 6 hours PRN  albuterol/ipratropium for Nebulization 3 milliLiter(s) Nebulizer every 6 hours  AQUAPHOR (petrolatum Ointment) 1 Application(s) Topical daily  chlorhexidine 4% Liquid 1 Application(s) Topical <User Schedule>  digoxin     Tablet 0.125 milliGRAM(s) Oral every other day  heparin  Injectable 5000 Unit(s) SubCutaneous two times a day  lactulose Syrup 10 Gram(s) Oral four times a day  levothyroxine Injectable 12.5 MICROGram(s) IV Push at bedtime  micafungin IVPB      micafungin IVPB 100 milliGRAM(s) IV Intermittent every 24 hours  midodrine 20 milliGRAM(s) Oral every 8 hours  rifAXIMin 550 milliGRAM(s) Oral two times a day  sodium zirconium cyclosilicate 5 Gram(s) Oral two times a day      PMHX/PSHX:  PVD (peripheral vascular disease)  Liver cell carcinoma  Severe aortic stenosis by prior echocardiogram  Pulmonary HTN  CKD (chronic kidney disease), stage 3 (moderate)  COPD (Chronic Obstructive Pulmonary Disease)  AF (Atrial Fibrillation)  Portal Hypertension  Pneumonia  Metastasis to Liver  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  HTN (Hypertension)  Bleeding Esophageal Varices  CLL (Chronic Lymphoblastic Leukemia)  GIB (Gastrointestinal Bleeding)  History of repair of hip fracture  Esophageal Rupture      Family history:  FH: Alzheimers disease  No pertinent family history in first degree relatives  Family history of hyperlipidemia  No pertinent family history in first degree relatives      ROS:     General:  No wt loss, fevers, chills, night sweats, fatigue,   Eyes:  Good vision, no reported pain  ENT:  No sore throat, pain, runny nose, dysphagia  CV:  No pain, palpitations, hypo/hypertension  Resp:  No dyspnea, cough, tachypnea, wheezing  GI:  See HPI  :  No pain, bleeding, incontinence, nocturia  Muscle:  No pain, weakness  Neuro:  No weakness, tingling, memory problems  Psych:  No fatigue, insomnia, mood problems, depression  Endocrine:  No polyuria, polydipsia, cold/heat intolerance  Heme:  No petechiae, ecchymosis, easy bruisability  Skin:  No rash, edema      PHYSICAL EXAM:     Vital Signs:  Vital Signs Last 24 Hrs  T(C): 36.4 (2020 14:10), Max: 36.6 (2020 21:36)  T(F): 97.6 (2020 14:10), Max: 97.9 (2020 21:36)  HR: 109 (2020 14:10) (68 - 109)  BP: 98/56 (2020 14:10) (98/56 - 129/61)  BP(mean): --  RR: 19 (2020 14:10) (18 - 19)  SpO2: 98% (2020 14:10) (98% - 99%)  Daily     Daily Weight in k.5 (2020 05:13)    GENERAL:  appears comfortable, no acute distress  HEENT:  NC/AT,  conjunctivae clear, sclera -anicteric, NGT in place  CHEST:  no increased effort  HEART:  Regular rate and rhythm  ABDOMEN:  Soft, non-tender, non-distended,  no masses   EXTREMITIES:  no cyanosis, clubbing or edema  SKIN:  No rash/erythema/ecchymoses/petechiae/wounds  NEURO:  nonfocal    LABS:                        8.7    7.95  )-----------( 152      ( 2020 11:10 )             28.8     02-05    138  |  103  |  44<H>  ----------------------------<  130<H>  5.4<H>   |  27  |  0.82    Ca    9.4      2020 11:10  Phos  3.2     02-05  Mg     2.0     02-05    TPro  4.8<L>  /  Alb  1.9<L>  /  TBili  0.8  /  DBili  x   /  AST  30  /  ALT  14  /  AlkPhos  188<H>  02-05    LIVER FUNCTIONS - ( 2020 11:10 )  Alb: 1.9 g/dL / Pro: 4.8 g/dL / ALK PHOS: 188 U/L / ALT: 14 U/L / AST: 30 U/L / GGT: x                   Imaging:    < from: US Abdomen Limited (20 @ 09:47) >  FINDINGS:    Small volume ascites in the right upper quadrant. Trace ascites in the left upper quadrant. No ascites the bilateral lower quadrants.  Incidentally noted bilateral pleural effusions.    IMPRESSION:     Small right upper quadrant and trace left upper quadrant ascites.    < end of copied text >

## 2020-02-05 NOTE — PROGRESS NOTE ADULT - ASSESSMENT
Impression:  # AMS: Patient family discussing if patient can go home with NG tube. Prior discussed with family option for PEG. Pt with recent imaging showing ascites which is a contraindication to peg. Also, explained at length risk for patient given hx of cirrhosis and varices that is small gastric varcies present but not seen on EGD, that peg placement through varices can be fatal. Also explained risk of peritonitis if peg is dislodged. Explained risk os aspiration persists, and presented option of pleasure feeds. Explained to patient risk of sedation especially in setting of cardiomyopathy and severe AS.  # Septic shock: resolved  # Toxic metabolic encepholpathy in setting of cirrhosis: on antibiotics and lactulose  # Cirrhosis with hx of EV and HCC  # Severe AS    Recommendation:  - No absolute contrandication for NGT long term  - however, family should be aware there is a risk of unknown percentage for development of significant sinusitis with fungemia, submucosal erosions, esophageal stricture, fistula formation  - continued ongoing GOC discussion, consider pleasure feeding as alternative given family main concern is patient nutrition but they are concerned for risk of PEG placement which is elevated given her comorbidities  - supportive care

## 2020-02-05 NOTE — PROGRESS NOTE ADULT - SUBJECTIVE AND OBJECTIVE BOX
CC: Patient is a 89y old  Female who presents with a chief complaint of melena (05 Feb 2020 08:34)    ID following for bacteremia, fungemia    Interval History/ROS: Patient more awake. Has no complaints. No fevers, no chills. Remains with NGT.    Rest of ROS negative.    Allergies  codeine (Rash)  erythromycin (Rash)  iv dye (Rash; Anaphylaxis; Short breath)  penicillin (Rash)  shellfish (Rash)    ANTIMICROBIALS:  micafungin IVPB    micafungin IVPB 100 every 24 hours  rifAXIMin 550 two times a day    OTHER MEDS:  acetaminophen   Tablet .. 500 milliGRAM(s) Oral every 6 hours PRN  albuterol/ipratropium for Nebulization 3 milliLiter(s) Nebulizer every 6 hours  AQUAPHOR (petrolatum Ointment) 1 Application(s) Topical daily  chlorhexidine 4% Liquid 1 Application(s) Topical <User Schedule>  digoxin     Tablet 0.125 milliGRAM(s) Oral every other day  heparin  Injectable 5000 Unit(s) SubCutaneous two times a day  lactulose Syrup 10 Gram(s) Oral four times a day  levothyroxine Injectable 12.5 MICROGram(s) IV Push at bedtime  midodrine 20 milliGRAM(s) Oral every 8 hours    PE:    Vital Signs Last 24 Hrs  T(C): 36.4 (05 Feb 2020 05:13), Max: 36.6 (04 Feb 2020 21:36)  T(F): 97.5 (05 Feb 2020 05:13), Max: 97.9 (04 Feb 2020 21:36)  HR: 88 (05 Feb 2020 05:13) (68 - 116)  BP: 129/61 (05 Feb 2020 05:13) (100/55 - 129/61)  BP(mean): --  RR: 18 (05 Feb 2020 05:13) (18 - 19)  SpO2: 99% (05 Feb 2020 05:13) (99% - 99%)    Gen: AOx2, NAD, non-toxic  HEENT: NGT  CV: S1+S2 normal, + murmur  Resp: Clear bilat, no resp distress  Abd: Soft, nontender, +BS  Ext: No LE edema, bruising to upper extremities  : No CVA tenderness  IV/Skin: No thrombophlebitis, left arm midline intact  Neuro: AAOx2    LABS:                          9.6    11.72 )-----------( 174      ( 04 Feb 2020 12:45 )             31.4       02-04    136  |  102  |  42<H>  ----------------------------<  144<H>  5.2   |  27  |  0.83    Ca    9.9      04 Feb 2020 12:45  Phos  3.3     02-04  Mg     2.0     02-04    TPro  5.6<L>  /  Alb  2.1<L>  /  TBili  0.8  /  DBili  x   /  AST  31  /  ALT  12  /  AlkPhos  186<H>  02-04          MICROBIOLOGY:  v  .Blood Blood-Peripheral  01-24-20   No growth at 5 days.  --  --      .Blood Blood-Venous  01-21-20   No growth at 5 days.  --  --      .Blood Blood-Peripheral  01-21-20   Growth in anaerobic bottle: Serratia marcescens  See previous culture [id number]  Growth in aerobic bottle: Candida glabrata  ***Blood Panel PCR results on this specimen are available  approximately 3 hours after the Gram stain result.***  Gram stain, PCR, and/or culture results may not always  correspond due to difference in methodologies.  ************************************************************  This PCR assay was performed using [employer].  The following targets are tested for: Enterococcus,  vancomycin resistant enterococci, Listeria monocytogenes,  coagulase negative staphylococci, S. aureus,  methicillin resistant S. aureus, Streptococcus agalactiae  (Group B), S. pneumoniae, S. pyogenes (Group A),  Acinetobacter baumannii, Enterobacter cloacae, E. coli,  Klebsiella oxytoca, K. pneumoniae, Proteus sp.,  Serratia marcescens, Haemophilus influenzae,  Neisseria meningitidis, Pseudomonas aeruginosa, Candida  albicans, C. glabrata, C krusei, C parapsilosis,  C. tropicalis and the KPC resistance gene.  --  Blood Culture PCR  Candida (Torulopsis) glabrata      .Sputum Sputum  01-20-20   Moderate Serratia marcescens  Normal Respiratory Stefanie present  --  Serratia marcescens      .Blood Blood-Peripheral  01-19-20   Growth in aerobic and anaerobic bottles: Serratia marcescens  ***Blood Panel PCR results on this specimen are available  approximately 3 hours after the Gram stain result.***  Gram stain, PCR, and/or culture results may not always  correspond due to difference in methodologies.  ************************************************************  This PCR assay was performed using [employer].  The following targets are tested for: Enterococcus,  vancomycin resistant enterococci, Listeria monocytogenes,  coagulase negative staphylococci, S. aureus,  methicillin resistant S. aureus, Streptococcus agalactiae  (Group B), S. pneumoniae, S. pyogenes (Group A),  Acinetobacter baumannii, Enterobacter cloacae, E. coli,  Klebsiella oxytoca, K. pneumoniae, Proteus sp.,  Serratia marcescens, Haemophilus influenzae,  Neisseria meningitidis, Pseudomonas aeruginosa, Candida  albicans, C. glabrata, C krusei, C parapsilosis,  C. tropicalis and the KPC resistance gene.  --  Blood Culture PCR  Serratia marcescens      .Urine Clean Catch (Midstream)  01-19-20   >100,000 CFU/ml Klebsiella pneumoniae ESBL  --  Klebsiella pneumoniae ESBL      .Urine CATHETERIZED  01-19-20   >100,000 CFU/ml Klebsiella pneumoniae ESBL  --  Klebsiella pneumoniae ESBL      .Urine CATHETERIZED  01-11-20   >100,000 CFU/ml Klebsiella pneumoniae ESBL  --  Gram Negative Rods      .Urine Clean Catch (Midstream)  01-06-20   <10,000 CFU/mL Normal Urogenital Stefanie  --  --    Rapid RVP Result: NotDetec (01-29 @ 11:31)    RADIOLOGY:    < from: Xray Wrist 3 Views, Right (01.31.20 @ 22:46) >  IMPRESSION:     No acute fracture or dislocation. 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 Chondrocalcinosis.     < end of copied text >

## 2020-02-05 NOTE — PROGRESS NOTE ADULT - SUBJECTIVE AND OBJECTIVE BOX
Alexei Hess MD, PGY-1  Pager #[phone]  After 7 PM on weekdays and 12 PM on weekends, for urgent issues please page #[phone].  ----------------------------------------------------------------  Patient is a 89y old  Female who presents with a chief complaint of SOB, COUGH, FEVER (04 Feb 2020 20:13)      SUBJECTIVE / OVERNIGHT EVENTS: No acute events overnight. Pt seen and examined at bedside. Pt alert this AM, smiling, endorses feeling well and denies any pain or acute complaints.     MEDICATIONS  (STANDING):  albuterol/ipratropium for Nebulization 3 milliLiter(s) Nebulizer every 6 hours  AQUAPHOR (petrolatum Ointment) 1 Application(s) Topical daily  chlorhexidine 4% Liquid 1 Application(s) Topical <User Schedule>  digoxin     Tablet 0.125 milliGRAM(s) Oral every other day  heparin  Injectable 5000 Unit(s) SubCutaneous two times a day  lactulose Syrup 10 Gram(s) Oral four times a day  levothyroxine Injectable 12.5 MICROGram(s) IV Push at bedtime  micafungin IVPB      micafungin IVPB 100 milliGRAM(s) IV Intermittent every 24 hours  midodrine 20 milliGRAM(s) Oral every 8 hours  rifAXIMin 550 milliGRAM(s) Oral two times a day    MEDICATIONS  (PRN):  acetaminophen   Tablet .. 500 milliGRAM(s) Oral every 6 hours PRN Temp greater or equal to 38C (100.4F), Mild Pain (1 - 3)      CAPILLARY BLOOD GLUCOSE  POCT Blood Glucose.: 122 mg/dL (05 Feb 2020 07:13)  POCT Blood Glucose.: 126 mg/dL (04 Feb 2020 17:47)  POCT Blood Glucose.: 118 mg/dL (04 Feb 2020 12:44)    I&O's Summary    04 Feb 2020 07:01  -  05 Feb 2020 07:00  --------------------------------------------------------  IN: 2340 mL / OUT: 0 mL / NET: 2340 mL        PHYSICAL EXAM:  Vital Signs Last 24 Hrs  T(C): 36.4 (05 Feb 2020 05:13), Max: 36.6 (04 Feb 2020 21:36)  T(F): 97.5 (05 Feb 2020 05:13), Max: 97.9 (04 Feb 2020 21:36)  HR: 88 (05 Feb 2020 05:13) (68 - 116)  BP: 129/61 (05 Feb 2020 05:13) (100/55 - 129/61)  BP(mean): --  RR: 18 (05 Feb 2020 05:13) (18 - 19)  SpO2: 99% (05 Feb 2020 05:13) (99% - 99%)    CONSTITUTIONAL: NAD, elderly female, alert and awake this AM  RESPIRATORY: CTABL  CARDIOVASCULAR: 3/6 harsh systolic murmur   ABDOMEN: soft, nt, nd  MUSCULOSKELETAL: no lower extremity swelling  PSYCH: AOx2-3 today (not to date, but to president)  NEUROLOGY: grossly intact, no focal deficits  SKIN: skin tears b/l UE, b/l ecchymoses LE, bandaged c/d/i    LABS:                        9.6    11.72 )-----------( 174      ( 04 Feb 2020 12:45 )             31.4     02-04    136  |  102  |  42<H>  ----------------------------<  144<H>  5.2   |  27  |  0.83    Ca    9.9      04 Feb 2020 12:45  Phos  3.3     02-04  Mg     2.0     02-04    TPro  5.6<L>  /  Alb  2.1<L>  /  TBili  0.8  /  DBili  x   /  AST  31  /  ALT  12  /  AlkPhos  186<H>  02-04    RADIOLOGY & ADDITIONAL TESTS:  Results Reviewed:   Imaging Personally Reviewed:  Electrocardiogram Personally Reviewed:    COORDINATION OF CARE:  Care Discussed with Consultants/Other Providers [Y/N]:  Prior or Outpatient Records Reviewed [Y/N]:

## 2020-02-05 NOTE — PROGRESS NOTE ADULT - PROBLEM SELECTOR PLAN 10
Transitions of Care Status:  1.  Name of PCP: Fallon Celaya  2.  PCP Contacted on Admission: [ ] Y    [ ] N    3.  PCP contacted at Discharge: [ ] Y    [ ] N    [ ] N/A  4.  Post-Discharge Appointment Date and Location:  5.  Summary of Handoff given to PCP: updated PCP 2/4

## 2020-02-05 NOTE — PROGRESS NOTE ADULT - PROBLEM SELECTOR PLAN 3
1. I was told the name of the physician that took care of my child while in the hospital.    2. I have been told about any important findings on my child's physical exam and my child's plan of care.    3. The doctor clearly explained my child's diagnosis and other possible diagnoses that were considered.    4. My child's doctor explained all the tests that were done and their results (if available). I understand that some of the test results may not be ready before we go home and I was told how I can get these results. I understand that a summary of my child's hospitalization and important test results will be shared with my child's outpatient doctor.    5. My child's doctor talked to me about what I need to do when we go home.    6. I understand what signs and symptoms to watch for. I understand what symptoms I would need to call my doctor for and/or return to the hospital.    7. I have the phone number to call the hospital for results and/or questions after I leave the hospital. - baseline CKD 3  -  KARRIE during hospitalization likely 2/2 sepsis  - improved back to baseline

## 2020-02-05 NOTE — PROGRESS NOTE ADULT - ASSESSMENT
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   Fungemia 1/21 with Candida glabrata, possible transient gut translocation in the setting of shock. Less likely from the central line which was in briefly. No ophthalmologic findings.   Cultures 1/24 negative. Afebrile since 1/24.   Transferred from MICU 1/25.     Suggest  -Completed ertapenem 2/3/20   -Micafungin 100mg IV q24h, last day 2/8, would not transition to Fluconazole given issues with dysphagia and ascites precluding PEG placement and it would require a high dose     Ha hCiang MD  Pager [phone]  After 5pm/weekends call [phone]

## 2020-02-05 NOTE — PROGRESS NOTE ADULT - ATTENDING COMMENTS
plan as above  spoke with daughter Dee Dee, leaning towards removing NGT tomorrow AM and starting dysphagia diet  lokelma x 1 for hyperK, repeat BMP in afternoon

## 2020-02-06 LAB
ALBUMIN SERPL ELPH-MCNC: 1.9 G/DL — LOW (ref 3.3–5)
ALP SERPL-CCNC: 182 U/L — HIGH (ref 40–120)
ALT FLD-CCNC: 16 U/L — SIGNIFICANT CHANGE UP (ref 10–45)
ANION GAP SERPL CALC-SCNC: 5 MMOL/L — SIGNIFICANT CHANGE UP (ref 5–17)
AST SERPL-CCNC: 30 U/L — SIGNIFICANT CHANGE UP (ref 10–40)
BASOPHILS # BLD AUTO: 0.03 K/UL — SIGNIFICANT CHANGE UP (ref 0–0.2)
BASOPHILS NFR BLD AUTO: 0.4 % — SIGNIFICANT CHANGE UP (ref 0–2)
BILIRUB SERPL-MCNC: 0.8 MG/DL — SIGNIFICANT CHANGE UP (ref 0.2–1.2)
BUN SERPL-MCNC: 42 MG/DL — HIGH (ref 7–23)
CALCIUM SERPL-MCNC: 9.3 MG/DL — SIGNIFICANT CHANGE UP (ref 8.4–10.5)
CHLORIDE SERPL-SCNC: 102 MMOL/L — SIGNIFICANT CHANGE UP (ref 96–108)
CO2 SERPL-SCNC: 27 MMOL/L — SIGNIFICANT CHANGE UP (ref 22–31)
CREAT SERPL-MCNC: 0.74 MG/DL — SIGNIFICANT CHANGE UP (ref 0.5–1.3)
EOSINOPHIL # BLD AUTO: 0.07 K/UL — SIGNIFICANT CHANGE UP (ref 0–0.5)
EOSINOPHIL NFR BLD AUTO: 1 % — SIGNIFICANT CHANGE UP (ref 0–6)
GLUCOSE BLDC GLUCOMTR-MCNC: 103 MG/DL — HIGH (ref 70–99)
GLUCOSE BLDC GLUCOMTR-MCNC: 117 MG/DL — HIGH (ref 70–99)
GLUCOSE SERPL-MCNC: 126 MG/DL — HIGH (ref 70–99)
HCT VFR BLD CALC: 26.3 % — LOW (ref 34.5–45)
HGB BLD-MCNC: 8.1 G/DL — LOW (ref 11.5–15.5)
IMM GRANULOCYTES NFR BLD AUTO: 0.3 % — SIGNIFICANT CHANGE UP (ref 0–1.5)
LYMPHOCYTES # BLD AUTO: 0.84 K/UL — LOW (ref 1–3.3)
LYMPHOCYTES # BLD AUTO: 12.1 % — LOW (ref 13–44)
MAGNESIUM SERPL-MCNC: 1.9 MG/DL — SIGNIFICANT CHANGE UP (ref 1.6–2.6)
MCHC RBC-ENTMCNC: 30.8 GM/DL — LOW (ref 32–36)
MCHC RBC-ENTMCNC: 33.6 PG — SIGNIFICANT CHANGE UP (ref 27–34)
MCV RBC AUTO: 109.1 FL — HIGH (ref 80–100)
MONOCYTES # BLD AUTO: 0.83 K/UL — SIGNIFICANT CHANGE UP (ref 0–0.9)
MONOCYTES NFR BLD AUTO: 12 % — SIGNIFICANT CHANGE UP (ref 2–14)
NEUTROPHILS # BLD AUTO: 5.13 K/UL — SIGNIFICANT CHANGE UP (ref 1.8–7.4)
NEUTROPHILS NFR BLD AUTO: 74.2 % — SIGNIFICANT CHANGE UP (ref 43–77)
NRBC # BLD: 0 /100 WBCS — SIGNIFICANT CHANGE UP (ref 0–0)
PHOSPHATE SERPL-MCNC: 3.1 MG/DL — SIGNIFICANT CHANGE UP (ref 2.5–4.5)
PLATELET # BLD AUTO: 146 K/UL — LOW (ref 150–400)
POTASSIUM SERPL-MCNC: 5.2 MMOL/L — SIGNIFICANT CHANGE UP (ref 3.5–5.3)
POTASSIUM SERPL-SCNC: 5.2 MMOL/L — SIGNIFICANT CHANGE UP (ref 3.5–5.3)
PROT SERPL-MCNC: 4.6 G/DL — LOW (ref 6–8.3)
RBC # BLD: 2.41 M/UL — LOW (ref 3.8–5.2)
RBC # FLD: 17.8 % — HIGH (ref 10.3–14.5)
SODIUM SERPL-SCNC: 134 MMOL/L — LOW (ref 135–145)
WBC # BLD: 6.92 K/UL — SIGNIFICANT CHANGE UP (ref 3.8–10.5)
WBC # FLD AUTO: 6.92 K/UL — SIGNIFICANT CHANGE UP (ref 3.8–10.5)

## 2020-02-06 PROCEDURE — 99233 SBSQ HOSP IP/OBS HIGH 50: CPT | Mod: GC

## 2020-02-06 PROCEDURE — 99232 SBSQ HOSP IP/OBS MODERATE 35: CPT

## 2020-02-06 RX ORDER — DIGOXIN 250 MCG
0.12 TABLET ORAL EVERY OTHER DAY
Refills: 0 | Status: DISCONTINUED | OUTPATIENT
Start: 2020-02-06 | End: 2020-02-13

## 2020-02-06 RX ORDER — MIDODRINE HYDROCHLORIDE 2.5 MG/1
20 TABLET ORAL EVERY 8 HOURS
Refills: 0 | Status: DISCONTINUED | OUTPATIENT
Start: 2020-02-06 | End: 2020-02-07

## 2020-02-06 RX ORDER — LACTULOSE 10 G/15ML
20 SOLUTION ORAL
Refills: 0 | Status: DISCONTINUED | OUTPATIENT
Start: 2020-02-06 | End: 2020-02-07

## 2020-02-06 RX ADMIN — LACTULOSE 20 GRAM(S): 10 SOLUTION ORAL at 23:18

## 2020-02-06 RX ADMIN — Medication 3 MILLILITER(S): at 13:17

## 2020-02-06 RX ADMIN — CHLORHEXIDINE GLUCONATE 1 APPLICATION(S): 213 SOLUTION TOPICAL at 09:36

## 2020-02-06 RX ADMIN — MICAFUNGIN SODIUM 105 MILLIGRAM(S): 100 INJECTION, POWDER, LYOPHILIZED, FOR SOLUTION INTRAVENOUS at 09:36

## 2020-02-06 RX ADMIN — Medication 3 MILLILITER(S): at 05:05

## 2020-02-06 RX ADMIN — SODIUM ZIRCONIUM CYCLOSILICATE 5 GRAM(S): 10 POWDER, FOR SUSPENSION ORAL at 23:16

## 2020-02-06 RX ADMIN — LACTULOSE 10 GRAM(S): 10 SOLUTION ORAL at 05:05

## 2020-02-06 RX ADMIN — SODIUM ZIRCONIUM CYCLOSILICATE 5 GRAM(S): 10 POWDER, FOR SUSPENSION ORAL at 07:44

## 2020-02-06 RX ADMIN — MIDODRINE HYDROCHLORIDE 20 MILLIGRAM(S): 2.5 TABLET ORAL at 13:18

## 2020-02-06 RX ADMIN — Medication 3 MILLILITER(S): at 17:36

## 2020-02-06 RX ADMIN — HEPARIN SODIUM 5000 UNIT(S): 5000 INJECTION INTRAVENOUS; SUBCUTANEOUS at 17:36

## 2020-02-06 RX ADMIN — LACTULOSE 10 GRAM(S): 10 SOLUTION ORAL at 17:35

## 2020-02-06 RX ADMIN — Medication 1 APPLICATION(S): at 21:57

## 2020-02-06 RX ADMIN — Medication 12.5 MICROGRAM(S): at 21:58

## 2020-02-06 RX ADMIN — HEPARIN SODIUM 5000 UNIT(S): 5000 INJECTION INTRAVENOUS; SUBCUTANEOUS at 05:05

## 2020-02-06 RX ADMIN — MIDODRINE HYDROCHLORIDE 20 MILLIGRAM(S): 2.5 TABLET ORAL at 21:58

## 2020-02-06 RX ADMIN — LACTULOSE 10 GRAM(S): 10 SOLUTION ORAL at 13:17

## 2020-02-06 NOTE — PROGRESS NOTE ADULT - ASSESSMENT
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   Fungemia 1/21 with Candida glabrata, possible transient gut translocation in the setting of shock. Less likely from the central line which was in briefly. No ophthalmologic findings.   Cultures 1/24 negative. Afebrile since 1/24.   Transferred from MICU 1/25.     Suggest  -Completed ertapenem 2/3/20   -Micafungin 100mg IV q24h, last day 2/8    Ha Chiang MD  Pager [phone]  After 5pm/weekends call [phone]

## 2020-02-06 NOTE — PROGRESS NOTE ADULT - ASSESSMENT
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 Pt last admit 1/6-9 s/p fall OOB now re-admitted from [facility] w AMS 2/2 Septic Shock 2/2 Serratia Bacteremia / Klebsiella ESBL urosepsis, and PNA w Serratia in the sputum, with candida glabrata in blood cx from 1/21.

## 2020-02-06 NOTE — PROGRESS NOTE ADULT - ATTENDING COMMENTS
pleasure feeding dysphagia diet  pending home hospice   micafungin to be complete 2/8  aspiration precautions, assist with all meals, HOB 30 degree angle, maintain good oral hygiene

## 2020-02-06 NOTE — PROGRESS NOTE ADULT - SUBJECTIVE AND OBJECTIVE BOX
Alexei Hess MD, PGY-1  Pager #[phone]  After 7 PM on weekdays and 12 PM on weekends, for urgent issues please page #[phone].  ----------------------------------------------------------------  Patient is a 89y old  Female who presents with a chief complaint of melena (05 Feb 2020 08:34)    SUBJECTIVE / OVERNIGHT EVENTS: No acute events overnight. Pt seen and examined at bedside. Pt lethargic this AM, did not answer questions. Will reassess in afternoon.     MEDICATIONS  (STANDING):  albuterol/ipratropium for Nebulization 3 milliLiter(s) Nebulizer every 6 hours  AQUAPHOR (petrolatum Ointment) 1 Application(s) Topical daily  chlorhexidine 4% Liquid 1 Application(s) Topical <User Schedule>  digoxin     Tablet 0.125 milliGRAM(s) Oral every other day  heparin  Injectable 5000 Unit(s) SubCutaneous two times a day  lactulose Syrup 10 Gram(s) Oral four times a day  levothyroxine Injectable 12.5 MICROGram(s) IV Push at bedtime  micafungin IVPB      micafungin IVPB 100 milliGRAM(s) IV Intermittent every 24 hours  midodrine 20 milliGRAM(s) Oral every 8 hours  rifAXIMin 550 milliGRAM(s) Oral two times a day  sodium zirconium cyclosilicate 5 Gram(s) Oral two times a day    MEDICATIONS  (PRN):  acetaminophen   Tablet .. 500 milliGRAM(s) Oral every 6 hours PRN Temp greater or equal to 38C (100.4F), Mild Pain (1 - 3)      CAPILLARY BLOOD GLUCOSE  POCT Blood Glucose.: 116 mg/dL (05 Feb 2020 23:57)  POCT Blood Glucose.: 112 mg/dL (05 Feb 2020 17:43)  POCT Blood Glucose.: 131 mg/dL (05 Feb 2020 12:49)    I&O's Summary    05 Feb 2020 07:01  -  06 Feb 2020 07:00  --------------------------------------------------------  IN: 2240 mL / OUT: 0 mL / NET: 2240 mL    PHYSICAL EXAM:  Vital Signs Last 24 Hrs  T(C): 37 (06 Feb 2020 04:52), Max: 37 (06 Feb 2020 04:52)  T(F): 98.6 (06 Feb 2020 04:52), Max: 98.6 (06 Feb 2020 04:52)  HR: 86 (06 Feb 2020 04:52) (86 - 109)  BP: 142/76 (06 Feb 2020 04:52) (98/56 - 142/76)  RR: 18 (06 Feb 2020 04:52) (18 - 19)  SpO2: 100% (06 Feb 2020 04:52) (98% - 100%)    CONSTITUTIONAL: NAD, elderly female, alert and awake this AM  RESPIRATORY: CTABL  CARDIOVASCULAR: 3/6 harsh systolic murmur   ABDOMEN: soft, nt, nd  MUSCULOSKELETAL: no lower extremity swelling  PSYCH: AOx2-3 today (not to date, but to president)  NEUROLOGY: grossly intact, no focal deficits  SKIN: skin tears b/l UE, b/l ecchymoses LE, bandaged c/d/i    LABS:                        8.7    7.95  )-----------( 152      ( 05 Feb 2020 11:10 )             28.8     02-05    138  |  103  |  44<H>  ----------------------------<  130<H>  5.4<H>   |  27  |  0.82    Ca    9.4      05 Feb 2020 11:10  Phos  3.2     02-05  Mg     2.0     02-05    TPro  4.8<L>  /  Alb  1.9<L>  /  TBili  0.8  /  DBili  x   /  AST  30  /  ALT  14  /  AlkPhos  188<H>  02-05    RADIOLOGY & ADDITIONAL TESTS:  Results Reviewed:   Imaging Personally Reviewed:  Electrocardiogram Personally Reviewed:    COORDINATION OF CARE:  Care Discussed with Consultants/Other Providers [Y/N]:  Prior or Outpatient Records Reviewed [Y/N]:

## 2020-02-06 NOTE — PROGRESS NOTE ADULT - SUBJECTIVE AND OBJECTIVE BOX
CC: Patient is a 89y old  Female who presents with a chief complaint of melena (06 Feb 2020 08:28)    ID following for fungemia    Interval History/ROS: Patient sitting in chair, has no complaints. No fevers, no chills. Eating dinner. NGT removed.    Rest of ROS negative.    Allergies  codeine (Rash)  erythromycin (Rash)  iv dye (Rash; Anaphylaxis; Short breath)  penicillin (Rash)  shellfish (Rash)    ANTIMICROBIALS:  micafungin IVPB    micafungin IVPB 100 every 24 hours  rifAXIMin 550 two times a day    OTHER MEDS:  acetaminophen   Tablet .. 500 milliGRAM(s) Oral every 6 hours PRN  albuterol/ipratropium for Nebulization 3 milliLiter(s) Nebulizer every 6 hours  AQUAPHOR (petrolatum Ointment) 1 Application(s) Topical daily  chlorhexidine 4% Liquid 1 Application(s) Topical <User Schedule>  digoxin     Tablet 0.125 milliGRAM(s) Oral every other day  heparin  Injectable 5000 Unit(s) SubCutaneous two times a day  lactulose Syrup 10 Gram(s) Oral four times a day  levothyroxine Injectable 12.5 MICROGram(s) IV Push at bedtime  midodrine 20 milliGRAM(s) Oral every 8 hours  sodium zirconium cyclosilicate 5 Gram(s) Oral two times a day      PE:    Vital Signs Last 24 Hrs  T(C): 36.6 (06 Feb 2020 12:17), Max: 37 (06 Feb 2020 04:52)  T(F): 97.9 (06 Feb 2020 12:17), Max: 98.6 (06 Feb 2020 04:52)  HR: 78 (06 Feb 2020 13:20) (78 - 102)  BP: 102/58 (06 Feb 2020 13:20) (102/52 - 142/76)  BP(mean): --  RR: 18 (06 Feb 2020 12:17) (18 - 18)  SpO2: 98% (06 Feb 2020 12:17) (98% - 100%)    Gen: AOx2, NAD, non-toxic  CV: S1+S2 normal, no murmurs  Resp: Clear bilat, no resp distress  Abd: Soft, nontender, +BS  Ext: No LE edema, no wounds  : No Irwin  IV/Skin: No thrombophlebitis  Neuro: no focal deficits    LABS:                          8.1    6.92  )-----------( 146      ( 06 Feb 2020 10:51 )             26.3       02-06    134<L>  |  102  |  42<H>  ----------------------------<  126<H>  5.2   |  27  |  0.74    Ca    9.3      06 Feb 2020 10:51  Phos  3.1     02-06  Mg     1.9     02-06    TPro  4.6<L>  /  Alb  1.9<L>  /  TBili  0.8  /  DBili  x   /  AST  30  /  ALT  16  /  AlkPhos  182<H>  02-06          MICROBIOLOGY:  v  .Blood Blood-Peripheral  01-24-20   No growth at 5 days.  --  --      .Blood Blood-Venous  01-21-20   No growth at 5 days.  --  --      .Blood Blood-Peripheral  01-21-20   Growth in anaerobic bottle: Serratia marcescens  See previous culture [id number]  Growth in aerobic bottle: Candida glabrata  ***Blood Panel PCR results on this specimen are available  approximately 3 hours after the Gram stain result.***  Gram stain, PCR, and/or culture results may not always  correspond due to difference in methodologies.  ************************************************************  This PCR assay was performed using [employer].  The following targets are tested for: Enterococcus,  vancomycin resistant enterococci, Listeria monocytogenes,  coagulase negative staphylococci, S. aureus,  methicillin resistant S. aureus, Streptococcus agalactiae  (Group B), S. pneumoniae, S. pyogenes (Group A),  Acinetobacter baumannii, Enterobacter cloacae, E. coli,  Klebsiella oxytoca, K. pneumoniae, Proteus sp.,  Serratia marcescens, Haemophilus influenzae,  Neisseria meningitidis, Pseudomonas aeruginosa, Candida  albicans, C. glabrata, C krusei, C parapsilosis,  C. tropicalis and the KPC resistance gene.  --  Blood Culture PCR  Candida (Torulopsis) glabrata      .Sputum Sputum  01-20-20   Moderate Serratia marcescens  Normal Respiratory Stefanie present  --  Serratia marcescens      .Blood Blood-Peripheral  01-19-20   Growth in aerobic and anaerobic bottles: Serratia marcescens  ***Blood Panel PCR results on this specimen are available  approximately 3 hours after the Gram stain result.***  Gram stain, PCR, and/or culture results may not always  correspond due to difference in methodologies.  ************************************************************  This PCR assay was performed using [employer].  The following targets are tested for: Enterococcus,  vancomycin resistant enterococci, Listeria monocytogenes,  coagulase negative staphylococci, S. aureus,  methicillin resistant S. aureus, Streptococcus agalactiae  (Group B), S. pneumoniae, S. pyogenes (Group A),  Acinetobacter baumannii, Enterobacter cloacae, E. coli,  Klebsiella oxytoca, K. pneumoniae, Proteus sp.,  Serratia marcescens, Haemophilus influenzae,  Neisseria meningitidis, Pseudomonas aeruginosa, Candida  albicans, C. glabrata, C krusei, C parapsilosis,  C. tropicalis and the KPC resistance gene.  --  Blood Culture PCR  Serratia marcescens      .Urine Clean Catch (Midstream)  01-19-20   >100,000 CFU/ml Klebsiella pneumoniae ESBL  --  Klebsiella pneumoniae ESBL      .Urine CATHETERIZED  01-19-20   >100,000 CFU/ml Klebsiella pneumoniae ESBL  --  Klebsiella pneumoniae ESBL      .Urine CATHETERIZED  01-11-20   >100,000 CFU/ml Klebsiella pneumoniae ESBL  --  Gram Negative Rods    RADIOLOGY:    < from: Xray Wrist 3 Views, Right (01.31.20 @ 22:46) >    IMPRESSION:     No acute fracture or dislocation. 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 Chondrocalcinosis.     < end of copied text >

## 2020-02-07 LAB
ALBUMIN SERPL ELPH-MCNC: 2.3 G/DL — LOW (ref 3.3–5)
ALP SERPL-CCNC: 182 U/L — HIGH (ref 40–120)
ALT FLD-CCNC: 16 U/L — SIGNIFICANT CHANGE UP (ref 10–45)
ANION GAP SERPL CALC-SCNC: 10 MMOL/L — SIGNIFICANT CHANGE UP (ref 5–17)
AST SERPL-CCNC: 34 U/L — SIGNIFICANT CHANGE UP (ref 10–40)
BASOPHILS # BLD AUTO: 0.03 K/UL — SIGNIFICANT CHANGE UP (ref 0–0.2)
BASOPHILS NFR BLD AUTO: 0.5 % — SIGNIFICANT CHANGE UP (ref 0–2)
BILIRUB SERPL-MCNC: 1.2 MG/DL — SIGNIFICANT CHANGE UP (ref 0.2–1.2)
BLD GP AB SCN SERPL QL: NEGATIVE — SIGNIFICANT CHANGE UP
BUN SERPL-MCNC: 41 MG/DL — HIGH (ref 7–23)
CALCIUM SERPL-MCNC: 10 MG/DL — SIGNIFICANT CHANGE UP (ref 8.4–10.5)
CHLORIDE SERPL-SCNC: 102 MMOL/L — SIGNIFICANT CHANGE UP (ref 96–108)
CO2 SERPL-SCNC: 27 MMOL/L — SIGNIFICANT CHANGE UP (ref 22–31)
CREAT SERPL-MCNC: 0.78 MG/DL — SIGNIFICANT CHANGE UP (ref 0.5–1.3)
EOSINOPHIL # BLD AUTO: 0.11 K/UL — SIGNIFICANT CHANGE UP (ref 0–0.5)
EOSINOPHIL NFR BLD AUTO: 1.9 % — SIGNIFICANT CHANGE UP (ref 0–6)
FERRITIN SERPL-MCNC: 578 NG/ML — HIGH (ref 15–150)
GLUCOSE SERPL-MCNC: 80 MG/DL — SIGNIFICANT CHANGE UP (ref 70–99)
HCT VFR BLD CALC: 31.3 % — LOW (ref 34.5–45)
HCYS SERPL-MCNC: 16.6 UMOL/L — HIGH
HGB BLD-MCNC: 9.6 G/DL — LOW (ref 11.5–15.5)
IMM GRANULOCYTES NFR BLD AUTO: 0.8 % — SIGNIFICANT CHANGE UP (ref 0–1.5)
IRON SATN MFR SERPL: 38 % — SIGNIFICANT CHANGE UP (ref 14–50)
IRON SATN MFR SERPL: 67 UG/DL — SIGNIFICANT CHANGE UP (ref 30–160)
LYMPHOCYTES # BLD AUTO: 1.21 K/UL — SIGNIFICANT CHANGE UP (ref 1–3.3)
LYMPHOCYTES # BLD AUTO: 20.5 % — SIGNIFICANT CHANGE UP (ref 13–44)
MAGNESIUM SERPL-MCNC: 2.1 MG/DL — SIGNIFICANT CHANGE UP (ref 1.6–2.6)
MCHC RBC-ENTMCNC: 30.7 GM/DL — LOW (ref 32–36)
MCHC RBC-ENTMCNC: 33.7 PG — SIGNIFICANT CHANGE UP (ref 27–34)
MCV RBC AUTO: 109.8 FL — HIGH (ref 80–100)
MONOCYTES # BLD AUTO: 0.56 K/UL — SIGNIFICANT CHANGE UP (ref 0–0.9)
MONOCYTES NFR BLD AUTO: 9.5 % — SIGNIFICANT CHANGE UP (ref 2–14)
NEUTROPHILS # BLD AUTO: 3.94 K/UL — SIGNIFICANT CHANGE UP (ref 1.8–7.4)
NEUTROPHILS NFR BLD AUTO: 66.8 % — SIGNIFICANT CHANGE UP (ref 43–77)
NRBC # BLD: 0 /100 WBCS — SIGNIFICANT CHANGE UP (ref 0–0)
PHOSPHATE SERPL-MCNC: 3.3 MG/DL — SIGNIFICANT CHANGE UP (ref 2.5–4.5)
PLATELET # BLD AUTO: 166 K/UL — SIGNIFICANT CHANGE UP (ref 150–400)
POTASSIUM SERPL-MCNC: 5.4 MMOL/L — HIGH (ref 3.5–5.3)
POTASSIUM SERPL-SCNC: 5.4 MMOL/L — HIGH (ref 3.5–5.3)
PROT SERPL-MCNC: 5.4 G/DL — LOW (ref 6–8.3)
RBC # BLD: 2.85 M/UL — LOW (ref 3.8–5.2)
RBC # FLD: 18.1 % — HIGH (ref 10.3–14.5)
RH IG SCN BLD-IMP: POSITIVE — SIGNIFICANT CHANGE UP
SODIUM SERPL-SCNC: 139 MMOL/L — SIGNIFICANT CHANGE UP (ref 135–145)
TIBC SERPL-MCNC: 177 UG/DL — LOW (ref 220–430)
TRANSFERRIN SERPL-MCNC: 152 MG/DL — LOW (ref 200–360)
UIBC SERPL-MCNC: 110 UG/DL — SIGNIFICANT CHANGE UP (ref 110–370)
WBC # BLD: 5.9 K/UL — SIGNIFICANT CHANGE UP (ref 3.8–10.5)
WBC # FLD AUTO: 5.9 K/UL — SIGNIFICANT CHANGE UP (ref 3.8–10.5)

## 2020-02-07 PROCEDURE — 99233 SBSQ HOSP IP/OBS HIGH 50: CPT | Mod: GC

## 2020-02-07 PROCEDURE — 99232 SBSQ HOSP IP/OBS MODERATE 35: CPT

## 2020-02-07 RX ORDER — LACTULOSE 10 G/15ML
30 SOLUTION ORAL
Refills: 0 | Status: DISCONTINUED | OUTPATIENT
Start: 2020-02-07 | End: 2020-02-07

## 2020-02-07 RX ORDER — LACTULOSE 10 G/15ML
10 SOLUTION ORAL 4 TIMES DAILY
Qty: 3600 | Refills: 3 | Status: ACTIVE | COMMUNITY
Start: 2018-01-18 | End: 1900-01-01

## 2020-02-07 RX ORDER — MIDODRINE HYDROCHLORIDE 2.5 MG/1
15 TABLET ORAL EVERY 8 HOURS
Refills: 0 | Status: DISCONTINUED | OUTPATIENT
Start: 2020-02-07 | End: 2020-02-08

## 2020-02-07 RX ORDER — FOLIC ACID 0.8 MG
1 TABLET ORAL DAILY
Refills: 0 | Status: DISCONTINUED | OUTPATIENT
Start: 2020-02-07 | End: 2020-02-13

## 2020-02-07 RX ORDER — LACTULOSE 10 G/15ML
30 SOLUTION ORAL
Refills: 0 | Status: DISCONTINUED | OUTPATIENT
Start: 2020-02-07 | End: 2020-02-08

## 2020-02-07 RX ORDER — PREGABALIN 225 MG/1
1000 CAPSULE ORAL DAILY
Refills: 0 | Status: DISCONTINUED | OUTPATIENT
Start: 2020-02-07 | End: 2020-02-13

## 2020-02-07 RX ADMIN — SODIUM ZIRCONIUM CYCLOSILICATE 5 GRAM(S): 10 POWDER, FOR SUSPENSION ORAL at 09:17

## 2020-02-07 RX ADMIN — Medication 3 MILLILITER(S): at 23:22

## 2020-02-07 RX ADMIN — Medication 0.12 MILLIGRAM(S): at 11:24

## 2020-02-07 RX ADMIN — MIDODRINE HYDROCHLORIDE 20 MILLIGRAM(S): 2.5 TABLET ORAL at 05:39

## 2020-02-07 RX ADMIN — Medication 3 MILLILITER(S): at 05:38

## 2020-02-07 RX ADMIN — HEPARIN SODIUM 5000 UNIT(S): 5000 INJECTION INTRAVENOUS; SUBCUTANEOUS at 05:39

## 2020-02-07 RX ADMIN — Medication 3 MILLILITER(S): at 18:16

## 2020-02-07 RX ADMIN — MICAFUNGIN SODIUM 105 MILLIGRAM(S): 100 INJECTION, POWDER, LYOPHILIZED, FOR SOLUTION INTRAVENOUS at 09:17

## 2020-02-07 RX ADMIN — CHLORHEXIDINE GLUCONATE 1 APPLICATION(S): 213 SOLUTION TOPICAL at 11:23

## 2020-02-07 RX ADMIN — LACTULOSE 20 GRAM(S): 10 SOLUTION ORAL at 11:24

## 2020-02-07 RX ADMIN — MIDODRINE HYDROCHLORIDE 20 MILLIGRAM(S): 2.5 TABLET ORAL at 14:20

## 2020-02-07 RX ADMIN — SODIUM ZIRCONIUM CYCLOSILICATE 5 GRAM(S): 10 POWDER, FOR SUSPENSION ORAL at 20:08

## 2020-02-07 RX ADMIN — LACTULOSE 30 GRAM(S): 10 SOLUTION ORAL at 18:17

## 2020-02-07 RX ADMIN — HEPARIN SODIUM 5000 UNIT(S): 5000 INJECTION INTRAVENOUS; SUBCUTANEOUS at 18:15

## 2020-02-07 RX ADMIN — MIDODRINE HYDROCHLORIDE 15 MILLIGRAM(S): 2.5 TABLET ORAL at 23:59

## 2020-02-07 RX ADMIN — Medication 1 APPLICATION(S): at 11:26

## 2020-02-07 RX ADMIN — Medication 12.5 MICROGRAM(S): at 23:21

## 2020-02-07 RX ADMIN — Medication 3 MILLILITER(S): at 11:23

## 2020-02-07 RX ADMIN — LACTULOSE 20 GRAM(S): 10 SOLUTION ORAL at 05:39

## 2020-02-07 NOTE — PROGRESS NOTE ADULT - PROBLEM SELECTOR PLAN 7
- c/w levothyroxine 12.5mg IV daily, will transition to PO tomorrow AM - c/w levothyroxine 12.5mg IV daily,

## 2020-02-07 NOTE — PROGRESS NOTE ADULT - SUBJECTIVE AND OBJECTIVE BOX
CC: Patient is a 89y old  Female who presents with a chief complaint of melena (07 Feb 2020 08:53)    ID following for candidemia    Interval History/ROS: Patient more lethargic this morning. No complaints. No fevers, no chills.    Rest of ROS negative.    Allergies  codeine (Rash)  erythromycin (Rash)  iv dye (Rash; Anaphylaxis; Short breath)  penicillin (Rash)  shellfish (Rash)    ANTIMICROBIALS:  micafungin IVPB    micafungin IVPB 100 every 24 hours  rifAXIMin 550 two times a day      OTHER MEDS:  acetaminophen   Tablet .. 500 milliGRAM(s) Oral every 6 hours PRN  albuterol/ipratropium for Nebulization 3 milliLiter(s) Nebulizer every 6 hours  AQUAPHOR (petrolatum Ointment) 1 Application(s) Topical daily  chlorhexidine 4% Liquid 1 Application(s) Topical <User Schedule>  digoxin     Tablet 0.125 milliGRAM(s) Oral every other day  heparin  Injectable 5000 Unit(s) SubCutaneous two times a day  lactulose Syrup 20 Gram(s) Oral four times a day  levothyroxine Injectable 12.5 MICROGram(s) IV Push at bedtime  midodrine 20 milliGRAM(s) Oral every 8 hours  sodium zirconium cyclosilicate 5 Gram(s) Oral two times a day      PE:    Vital Signs Last 24 Hrs  T(C): 36.8 (07 Feb 2020 05:10), Max: 36.8 (07 Feb 2020 05:10)  T(F): 98.2 (07 Feb 2020 05:10), Max: 98.2 (07 Feb 2020 05:10)  HR: 100 (07 Feb 2020 05:10) (78 - 101)  BP: 109/61 (07 Feb 2020 05:10) (101/63 - 119/78)  BP(mean): --  RR: 18 (07 Feb 2020 05:10) (18 - 18)  SpO2: 98% (07 Feb 2020 05:10) (96% - 98%)    Gen: Awake, NAD, non-toxic  CV: S1+S2 normal, no murmurs  Resp: Clear bilat, no resp distress  Abd: Soft, nontender, +BS  Ext: No LE edema, no wounds  : No Irwin  IV/Skin: No thrombophlebitis  Neuro: no focal deficits, more lethargic today    LABS:                          9.6    5.90  )-----------( 166      ( 07 Feb 2020 01:17 )             31.3       02-07    139  |  102  |  41<H>  ----------------------------<  80  5.4<H>   |  27  |  0.78    Ca    10.0      07 Feb 2020 00:56  Phos  3.3     02-07  Mg     2.1     02-07    TPro  5.4<L>  /  Alb  2.3<L>  /  TBili  1.2  /  DBili  x   /  AST  34  /  ALT  16  /  AlkPhos  182<H>  02-07          MICROBIOLOGY:  v  .Blood Blood-Peripheral  01-24-20   No growth at 5 days.  --  --      .Blood Blood-Venous  01-21-20   No growth at 5 days.  --  --      .Blood Blood-Peripheral  01-21-20   Growth in anaerobic bottle: Serratia marcescens  See previous culture [id number]  Growth in aerobic bottle: Candida glabrata  ***Blood Panel PCR results on this specimen are available  approximately 3 hours after the Gram stain result.***  Gram stain, PCR, and/or culture results may not always  correspond due to difference in methodologies.  ************************************************************  This PCR assay was performed using [employer].  The following targets are tested for: Enterococcus,  vancomycin resistant enterococci, Listeria monocytogenes,  coagulase negative staphylococci, S. aureus,  methicillin resistant S. aureus, Streptococcus agalactiae  (Group B), S. pneumoniae, S. pyogenes (Group A),  Acinetobacter baumannii, Enterobacter cloacae, E. coli,  Klebsiella oxytoca, K. pneumoniae, Proteus sp.,  Serratia marcescens, Haemophilus influenzae,  Neisseria meningitidis, Pseudomonas aeruginosa, Candida  albicans, C. glabrata, C krusei, C parapsilosis,  C. tropicalis and the KPC resistance gene.  --  Blood Culture PCR  Candida (Torulopsis) glabrata      .Sputum Sputum  01-20-20   Moderate Serratia marcescens  Normal Respiratory Stefanie present  --  Serratia marcescens      .Blood Blood-Peripheral  01-19-20   Growth in aerobic and anaerobic bottles: Serratia marcescens  ***Blood Panel PCR results on this specimen are available  approximately 3 hours after the Gram stain result.***  Gram stain, PCR, and/or culture results may not always  correspond due to difference in methodologies.  ************************************************************  This PCR assay was performed using [employer].  The following targets are tested for: Enterococcus,  vancomycin resistant enterococci, Listeria monocytogenes,  coagulase negative staphylococci, S. aureus,  methicillin resistant S. aureus, Streptococcus agalactiae  (Group B), S. pneumoniae, S. pyogenes (Group A),  Acinetobacter baumannii, Enterobacter cloacae, E. coli,  Klebsiella oxytoca, K. pneumoniae, Proteus sp.,  Serratia marcescens, Haemophilus influenzae,  Neisseria meningitidis, Pseudomonas aeruginosa, Candida  albicans, C. glabrata, C krusei, C parapsilosis,  C. tropicalis and the KPC resistance gene.  --  Blood Culture PCR  Serratia marcescens      .Urine Clean Catch (Midstream)  01-19-20   >100,000 CFU/ml Klebsiella pneumoniae ESBL  --  Klebsiella pneumoniae ESBL      .Urine CATHETERIZED  01-19-20   >100,000 CFU/ml Klebsiella pneumoniae ESBL  --  Klebsiella pneumoniae ESBL      .Urine CATHETERIZED  01-11-20   >100,000 CFU/ml Klebsiella pneumoniae ESBL  --  Gram Negative Rods    RADIOLOGY:    < from: Xray Wrist 3 Views, Right (01.31.20 @ 22:46) >  IMPRESSION:     No acute fracture or dislocation. 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 Chondrocalcinosis.     < end of copied text >

## 2020-02-07 NOTE — PROGRESS NOTE ADULT - SUBJECTIVE AND OBJECTIVE BOX
Alexei Hess MD, PGY-1  Pager #[phone]  After 7 PM on weekdays and 12 PM on weekends, for urgent issues please page #[phone].  ----------------------------------------------------------------  Patient is a 89y old  Female who presents with a chief complaint of melena (06 Feb 2020 08:28)      SUBJECTIVE / OVERNIGHT EVENTS: Pulled NGT, was able to eat by mouth, no emesis. No overnight events. Pt comfortable this AM, sleepy. Denies any acute complaints including headache, chest pain, SOB, nausea, vomiting, dysuria.      MEDICATIONS  (STANDING):  albuterol/ipratropium for Nebulization 3 milliLiter(s) Nebulizer every 6 hours  AQUAPHOR (petrolatum Ointment) 1 Application(s) Topical daily  chlorhexidine 4% Liquid 1 Application(s) Topical <User Schedule>  digoxin     Tablet 0.125 milliGRAM(s) Oral every other day  heparin  Injectable 5000 Unit(s) SubCutaneous two times a day  lactulose Syrup 20 Gram(s) Oral four times a day  levothyroxine Injectable 12.5 MICROGram(s) IV Push at bedtime  micafungin IVPB      micafungin IVPB 100 milliGRAM(s) IV Intermittent every 24 hours  midodrine 20 milliGRAM(s) Oral every 8 hours  rifAXIMin 550 milliGRAM(s) Oral two times a day  sodium zirconium cyclosilicate 5 Gram(s) Oral two times a day    MEDICATIONS  (PRN):  acetaminophen   Tablet .. 500 milliGRAM(s) Oral every 6 hours PRN Temp greater or equal to 38C (100.4F), Mild Pain (1 - 3)    CAPILLARY BLOOD GLUCOSE    POCT Blood Glucose.: 103 mg/dL (06 Feb 2020 15:24)    I&O's Summary    06 Feb 2020 07:01  -  07 Feb 2020 07:00  --------------------------------------------------------  IN: 200 mL / OUT: 0 mL / NET: 200 mL    PHYSICAL EXAM:  Vital Signs Last 24 Hrs  T(C): 36.8 (07 Feb 2020 05:10), Max: 36.8 (07 Feb 2020 05:10)  T(F): 98.2 (07 Feb 2020 05:10), Max: 98.2 (07 Feb 2020 05:10)  HR: 100 (07 Feb 2020 05:10) (78 - 101)  BP: 109/61 (07 Feb 2020 05:10) (101/63 - 119/78)  RR: 18 (07 Feb 2020 05:10) (18 - 18)  SpO2: 98% (07 Feb 2020 05:10) (96% - 98%)    CONSTITUTIONAL: NAD, pleasant elderly female, lethargic but arousable  RESPIRATORY: CTABL  CARDIOVASCULAR: 3/6 harsh systolic murmur   ABDOMEN: soft, nt, nd  MUSCULOSKELETAL: wwp  PSYCH: AOx2-3 today  NEUROLOGY: grossly intact  SKIN: skin tears b/l UE, b/l ecchymoses LE, bandaged c/d/i    LABS:             9.6    5.90  )-----------( 166      ( 07 Feb 2020 01:17 )             31.3     02-07    139  |  102  |  41<H>  ----------------------------<  80  5.4<H>   |  27  |  0.78    Ca    10.0      07 Feb 2020 00:56  Phos  3.3     02-07  Mg     2.1     02-07    TPro  5.4<L>  /  Alb  2.3<L>  /  TBili  1.2  /  DBili  x   /  AST  34  /  ALT  16  /  AlkPhos  182<H>  02-07    RADIOLOGY & ADDITIONAL TESTS:  Results Reviewed:   Imaging Personally Reviewed:  Electrocardiogram Personally Reviewed:    COORDINATION OF CARE:  Care Discussed with Consultants/Other Providers [Y/N]:  Prior or Outpatient Records Reviewed [Y/N]:

## 2020-02-07 NOTE — PROGRESS NOTE ADULT - ASSESSMENT
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 Pt last admit 1/6-9 s/p fall OOB now re-admitted from [facility] w AMS 2/2 Septic Shock 2/2 Serratia Bacteremia / Klebsiella ESBL urosepsis, and PNA w Serratia in the sputum, with candida glabrata in blood cx from 1/21.

## 2020-02-07 NOTE — PROGRESS NOTE ADULT - PROBLEM SELECTOR PLAN 2
- multifactorial likely secondary to recent sepsis, ICU delirium, portosystemic encephalopathy, waxing and waning. At baseline?  - c/b nausea/vomiting in MICU, NGT removed 2/6.   - not a candidate for PEG given ascites as per GI  - per cards, would not diuresis, further diuretics would not likely clear ascites, PEG unrealistic for this patient  - given h/o grade IV esophageal varices that has bled in the past, pt at high risk for variceal rupture w/ NGT placement

## 2020-02-07 NOTE — PROGRESS NOTE ADULT - ATTENDING COMMENTS
assist with meals, HOB 30 degrees  dysphagia diet  wean 02, 1L this am I do not thinks she needs it  ensure 3-4 BM a day  micafungin to end tomorrow  plan for home w hospice services   d/w daughters Ephraim and Dee Dee

## 2020-02-07 NOTE — PROGRESS NOTE ADULT - ASSESSMENT
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   Fungemia 1/21 with Candida glabrata, possible transient gut translocation in the setting of shock. Less likely from the central line which was in briefly. No ophthalmologic findings.   Cultures 1/24 negative. Afebrile since 1/24.   Transferred from MICU 1/25.     Suggest  -Completed ertapenem 2/3/20   -Micafungin 100mg IV q24h, last day 2/8    Ha Chiang MD  Pager [phone]  After 5pm/weekends call [phone]

## 2020-02-07 NOTE — PROGRESS NOTE ADULT - PROBLEM SELECTOR PLAN 9
DVT: HSQ  Diet: dysphagia 2 w/ honey thickened  Dispo: pending family discussion, home hospice potentially DVT: HSQ  Diet: dysphagia 2 w/ honey thickened  Dispo:  home hospice

## 2020-02-08 ENCOUNTER — TRANSCRIPTION ENCOUNTER (OUTPATIENT)
Age: 85
End: 2020-02-08

## 2020-02-08 DIAGNOSIS — E03.9 HYPOTHYROIDISM, UNSPECIFIED: ICD-10-CM

## 2020-02-08 LAB
ANION GAP SERPL CALC-SCNC: 10 MMOL/L — SIGNIFICANT CHANGE UP (ref 5–17)
BUN SERPL-MCNC: 43 MG/DL — HIGH (ref 7–23)
CALCIUM SERPL-MCNC: 10 MG/DL — SIGNIFICANT CHANGE UP (ref 8.4–10.5)
CHLORIDE SERPL-SCNC: 103 MMOL/L — SIGNIFICANT CHANGE UP (ref 96–108)
CO2 SERPL-SCNC: 27 MMOL/L — SIGNIFICANT CHANGE UP (ref 22–31)
CREAT SERPL-MCNC: 0.87 MG/DL — SIGNIFICANT CHANGE UP (ref 0.5–1.3)
GLUCOSE SERPL-MCNC: 84 MG/DL — SIGNIFICANT CHANGE UP (ref 70–99)
POTASSIUM SERPL-MCNC: 5 MMOL/L — SIGNIFICANT CHANGE UP (ref 3.5–5.3)
POTASSIUM SERPL-SCNC: 5 MMOL/L — SIGNIFICANT CHANGE UP (ref 3.5–5.3)
SODIUM SERPL-SCNC: 140 MMOL/L — SIGNIFICANT CHANGE UP (ref 135–145)

## 2020-02-08 PROCEDURE — 99233 SBSQ HOSP IP/OBS HIGH 50: CPT | Mod: GC

## 2020-02-08 PROCEDURE — 99232 SBSQ HOSP IP/OBS MODERATE 35: CPT

## 2020-02-08 RX ORDER — LACTULOSE 10 G/15ML
20 SOLUTION ORAL
Refills: 0 | Status: DISCONTINUED | OUTPATIENT
Start: 2020-02-08 | End: 2020-02-13

## 2020-02-08 RX ORDER — MIDODRINE HYDROCHLORIDE 2.5 MG/1
10 TABLET ORAL EVERY 8 HOURS
Refills: 0 | Status: DISCONTINUED | OUTPATIENT
Start: 2020-02-08 | End: 2020-02-10

## 2020-02-08 RX ADMIN — CHLORHEXIDINE GLUCONATE 1 APPLICATION(S): 213 SOLUTION TOPICAL at 07:30

## 2020-02-08 RX ADMIN — MICAFUNGIN SODIUM 105 MILLIGRAM(S): 100 INJECTION, POWDER, LYOPHILIZED, FOR SOLUTION INTRAVENOUS at 10:00

## 2020-02-08 RX ADMIN — Medication 1 MILLIGRAM(S): at 11:51

## 2020-02-08 RX ADMIN — LACTULOSE 20 GRAM(S): 10 SOLUTION ORAL at 18:29

## 2020-02-08 RX ADMIN — HEPARIN SODIUM 5000 UNIT(S): 5000 INJECTION INTRAVENOUS; SUBCUTANEOUS at 18:29

## 2020-02-08 RX ADMIN — Medication 3 MILLILITER(S): at 06:08

## 2020-02-08 RX ADMIN — SODIUM ZIRCONIUM CYCLOSILICATE 5 GRAM(S): 10 POWDER, FOR SUSPENSION ORAL at 09:03

## 2020-02-08 RX ADMIN — Medication 3 MILLILITER(S): at 11:51

## 2020-02-08 RX ADMIN — HEPARIN SODIUM 5000 UNIT(S): 5000 INJECTION INTRAVENOUS; SUBCUTANEOUS at 06:08

## 2020-02-08 RX ADMIN — SODIUM ZIRCONIUM CYCLOSILICATE 5 GRAM(S): 10 POWDER, FOR SUSPENSION ORAL at 21:44

## 2020-02-08 RX ADMIN — Medication 3 MILLILITER(S): at 18:28

## 2020-02-08 RX ADMIN — LACTULOSE 30 GRAM(S): 10 SOLUTION ORAL at 06:09

## 2020-02-08 RX ADMIN — Medication 12.5 MICROGRAM(S): at 21:44

## 2020-02-08 RX ADMIN — LACTULOSE 20 GRAM(S): 10 SOLUTION ORAL at 11:51

## 2020-02-08 RX ADMIN — MIDODRINE HYDROCHLORIDE 10 MILLIGRAM(S): 2.5 TABLET ORAL at 15:00

## 2020-02-08 RX ADMIN — Medication 1 APPLICATION(S): at 11:52

## 2020-02-08 RX ADMIN — PREGABALIN 1000 MICROGRAM(S): 225 CAPSULE ORAL at 11:51

## 2020-02-08 NOTE — PROGRESS NOTE ADULT - ASSESSMENT
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 Pt last admit 1/6-9 s/p fall OOB now re-admitted from [facility] w AMS 2/2 Septic Shock 2/2 Serratia Bacteremia / Klebsiella ESBL urosepsis, and PNA w Serratia in the sputum, with candida glabrata in blood cx from 1/21. 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 Pt last admit 1/6-9 s/p fall OOB now re-admitted from [facility] w AMS 2/2 Septic Shock 2/2 Serratia Bacteremia / Klebsiella ESBL urosepsis, and PNA w Serratia in the sputum, with candida glabrata in blood cx from 1/21.

## 2020-02-08 NOTE — DISCHARGE NOTE PROVIDER - HOSPITAL COURSE
HPI and MICU Course    89 yo F PMH AF on digoxin (not on AC), severe AS, COPD not on home O2, CLL (previously on Treanda and Rituxan), hypothyroidism, 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 Admitted 1/6-1/9 for weakness w/ mechanical fall with pre-renal KARRIE and resolution with IVF d/yohana to [facility]. [facility] breathing and nonproductive cough and fever x 1 day.  Of note, [facility]          ED vitals: Tmax 101.2 (r), , BP 80/50 with maps <65, RR 22-24, SpO2 99% on 3L      Significant labs: leukocytosis at 12.77 (60% neut), INR 1.44, hypernatremia 146, hyperkalemia 5.5, bicarb 18, BUN 52, Cr 1.69, venous pH 7.38, lactate 3.6. UA post for nitrites and many bacteria CXR w/ RLL pneumonia, CT chest with bb patchy opacities PNA vs asp pneumonitis     ED course: s/p 1.25 L LR, s/p vancomycin and meropenem x 1, stress dose steroids. Patient remained hypotensive after IVF started on levophed. Admitted to MICU for septic shock 2/2 PNA and UTI complicated by KARRIE likely secondary to ATN/pre-renal.          Found to have Serratia bacteremia, Serratia in the sputum and Klebsiella ESBL in the urine - all sensitive to Meropenem. Initially requiring vasopressors to maintain MAP > 65, successfully weaned off around 5pm on 1/21.  Responding well to Meropenem, cleared BC 1/21, off of norepinephrine. During MICU stay, patient developed nausea/vomiting on tube feeds, which later the tube was placed to suction and emesis resolved.         Hospital Course    On floors, the patient was found to have candida glabrata fungemia and started on micafungin. The patient was switched to ertapenem for the klebsiella ESBL and serratia bacteremia. The patient was continued with tube feeds given her waxing and waning mental status which was thought to be secondary to hepatic encephalopathy vs. recent sepsis vs. ICU delirium. The patient's mental status gradually improved day by day. Extensive goals of care conversations held with daughters, who are the joint HCPs. The patient was evaluated for candidacy for PEG tube, and was found to have ascites which is a contraindication to PEG. Cardiology recommended not increasing diuresis due to soft blood pressures. Patient's family agreeable to removing NGT given high risk of erosion and increased danger with pt's history of esophageal varices. Speech and swallow evaluated the patient and she remained a high risk for aspiration events. The NGT was successfully removed and the HCPs decided on pleasure feeds. The pt completed the recommended course of ertapenem and micafungin. HPI and MICU Course    89 yo F PMH AF on digoxin (not on AC), severe AS, COPD not on home O2, CLL (previously on Treanda and Rituxan), hypothyroidism, 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 Admitted 1/6-1/9 for weakness w/ mechanical fall with pre-renal KARRIE and resolution [facility]F [facility]. [facility] with difficulty breathing and nonproductive cough and fever x 1 day.  Of note, [facility]BL Klebs[facility].          ED vitals: Tmax 101.2 (r), , BP 80/50 with maps <65, RR 22-24, SpO2 99% on 3L      Significant labs: leukocytosis at 12.77 (60% neut), INR 1.44, hypernatremia 146, hyperkalemia 5.5, bicarb 18, BUN 52, Cr 1.69, venous pH 7.38, lactate 3.6. UA post for nitrites and many bacteria CXR w/ RLL pneumonia, CT chest with bb patchy opacities PNA vs asp pneumonitis     ED course: s/p 1.25 L LR, s/p vancomycin and meropenem x 1, stress dose steroids. Patient remained hypotensive after IVF started on levophed. Admitted to MICU for septic shock 2/2 PNA and UTI complicated by KARRIE likely secondary to ATN/pre-renal.          Found to have Serratia bacteremia, Serratia in the sputum and Klebsiella ESBL in the urine - all sensitive to Meropenem. Initially requiring vasopressors to maintain MAP > 65, successfully weaned off around 5pm on 1/21.  Responding well to Meropenem, cleared BC 1/21, off of norepinephrine. During MICU stay, patient developed nausea/vomiting on tube feeds, which later the tube was placed to suction and emesis resolved.         Hospital Course    On floors, the patient was found to have candida glabrata fungemia and started on micafungin. The patient was switched to ertapenem for the klebsiella ESBL and serratia bacteremia. The patient was continued with tube feeds given her waxing and waning mental status which was thought to be secondary to hepatic encephalopathy vs. recent sepsis vs. ICU delirium. The patient's mental status gradually improved and eventually returned to his baseline. Extensive goals of care conversations held with daughters, who are the joint HCPs. The patient was evaluated for candidacy for PEG tube, and was found to have ascites which is a contraindication to PEG. Cardiology recommended not increasing diuresis due to soft blood pressures. Patient's family agreeable to removing NGT given high risk of erosion and increased danger with pt's history of esophageal varices. Speech and swallow evaluated the patient and she remained a high risk for aspiration events. The NGT was successfully removed and the HCPs decided on pleasure feeds. The pt completed the recommended course of ertapenem and micafungin. Patient's midodrine was weaned down as tolerated. Per discussion with family, decision was made for patient to be discharged with home hospice. HPI and MICU Course    89 yo F PMH AF on digoxin (not on AC), severe AS, COPD not on home O2, CLL (previously on Treanda and Rituxan), hypothyroidism, 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 Admitted 1/6-1/9 for weakness w/ mechanical fall with pre-renal KARRIE and resolution with IVF d/[facility]ohana to [facility]. [facility] with difficulty breathing and nonproductive cough and fever x 1 day.  Of note, [facility]          ED vitals: Tmax 101.2 (r), , BP 80/50 with maps <65, RR 22-24, SpO2 99% on 3L      Significant labs: leukocytosis at 12.77 (60% neut), INR 1.44, hypernatremia 146, hyperkalemia 5.5, bicarb 18, BUN 52, Cr 1.69, venous pH 7.38, lactate 3.6. UA post for nitrites and many bacteria CXR w/ RLL pneumonia, CT chest with bb patchy opacities PNA vs asp pneumonitis     ED course: s/p 1.25 L LR, s/p vancomycin and meropenem x 1, stress dose steroids. Patient remained hypotensive after IVF started on levophed. Admitted to MICU for septic shock 2/2 PNA and UTI complicated by KARRIE likely secondary to ATN/pre-renal.          Found to have Serratia bacteremia, Serratia in the sputum and Klebsiella ESBL in the urine - all sensitive to Meropenem. Initially requiring vasopressors to maintain MAP > 65, successfully weaned off around 5pm on 1/21.  Responding well to Meropenem, cleared BC 1/21, off of norepinephrine. During MICU stay, patient developed nausea/vomiting on tube feeds, which later the tube was placed to suction and emesis resolved.         Hospital Course    On floors, the patient was found to have candida glabrata fungemia and started on micafungin. The patient was switched to ertapenem for the klebsiella ESBL and serratia bacteremia. The patient was continued with tube feeds given her waxing and waning mental status which was thought to be secondary to hepatic encephalopathy vs. recent sepsis vs. ICU delirium. The patient's mental status gradually improved and eventually returned to his baseline. Extensive goals of care conversations held with daughters, who are the joint HCPs. The patient was evaluated for candidacy for PEG tube, and was found to have ascites which is a contraindication to PEG. Cardiology recommended not increasing diuresis due to soft blood pressures. Patient's family agreeable to removing NGT given high risk of erosion and increased danger with pt's history of esophageal varices. Speech and swallow evaluated the patient and she remained a high risk for aspiration events. The NGT was successfully removed and the HCPs decided on pleasure feeds. The pt completed the recommended course of ertapenem and micafungin. Patient's midodrine was weaned down as tolerated. However, patient's blood pressure did not allow for complete discontinuation of her midodrine. Thus, she will be discharged on Midodrine 5 mg q8h. Additionally, patient's spironolactone and propanolol were discontinued. Patient should follow-up with her PCP in order to monitor her blood pressure and assess when it is safe to resume these medications. Per discussion with family, decision was made for patient to be discharged with home hospice. HPI and MICU Course    89 yo F PMH AF on digoxin (not on AC), severe AS, COPD not on home O2, CLL (previously on Treanda and Rituxan), hypothyroidism, 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 Admitted 1/6-1/9 for weakness w/ mechanical fall with pre-renal KARRIE and resolution with IVF d/[facility]ohana to [facility]. [facility] difficulty breathing and nonproductive cough and fever x 1 day.  Of note, [facility]          ED vitals: Tmax 101.2 (r), , BP 80/50 with maps <65, RR 22-24, SpO2 99% on 3L      Significant labs: leukocytosis at 12.77 (60% neut), INR 1.44, hypernatremia 146, hyperkalemia 5.5, bicarb 18, BUN 52, Cr 1.69, venous pH 7.38, lactate 3.6. UA post for nitrites and many bacteria CXR w/ RLL pneumonia, CT chest with bb patchy opacities PNA vs asp pneumonitis     ED course: s/p 1.25 L LR, s/p vancomycin and meropenem x 1, stress dose steroids. Patient remained hypotensive after IVF started on levophed. Admitted to MICU for septic shock 2/2 PNA and UTI complicated by KARRIE likely secondary to ATN/pre-renal.          Found to have Serratia bacteremia, Serratia in the sputum and Klebsiella ESBL in the urine - all sensitive to Meropenem. Initially requiring vasopressors to maintain MAP > 65, successfully weaned off around 5pm on 1/21.  Responding well to Meropenem, cleared BC 1/21, off of norepinephrine. During MICU stay, patient developed nausea/vomiting on tube feeds, which later the tube was placed to suction and emesis resolved.         Hospital Course    On floors, the patient was found to have candida glabrata fungemia and started on micafungin. The patient was switched to ertapenem for the klebsiella ESBL and serratia bacteremia. The patient was continued with tube feeds given her waxing and waning mental status which was thought to be secondary to hepatic encephalopathy vs. recent sepsis vs. ICU delirium. The patient's mental status gradually improved and eventually returned to his baseline. Extensive goals of care conversations held with daughters, who are the joint HCPs. The patient was evaluated for candidacy for PEG tube, and was found to have ascites which is a contraindication to PEG. Cardiology recommended not increasing diuresis due to soft blood pressures. Patient's family agreeable to removing NGT given high risk of erosion and increased danger with pt's history of esophageal varices. Speech and swallow evaluated the patient and she remained a high risk for aspiration events. The NGT was successfully removed and the HCPs decided on pleasure feeds. The pt completed the recommended course of ertapenem and micafungin. Patient's midodrine was weaned down as tolerated. However, patient's blood pressure did not allow for complete discontinuation of her midodrine. Thus, she will be discharged on Midodrine 5 mg q8h. Additionally, patient's spironolactone and propanolol were discontinued. Patient should follow-up with her PCP in order to monitor her blood pressure and assess when it is safe to resume these medications. Per discussion with family, decision was made for patient to be discharged home.         Patient improved clinically and remained hemodynamically stable throughout admission. Patient is now medically cleared for discharge. Patient should follow-up with their PCP within 1 week of discharge for further monitoring.

## 2020-02-08 NOTE — PROGRESS NOTE ADULT - ATTENDING COMMENTS
Patient seen and examined. Plan discussed w/ Dr. Hess -- today is last day of IV Micafungin, ID recommendations appreciated. Continue to wean Midodrine only as tolerated. Dispo planning for home hospice ongoing. Code Status: DNR/DNI.

## 2020-02-08 NOTE — DISCHARGE NOTE PROVIDER - NSDCCPCAREPLAN_GEN_ALL_CORE_FT
PRINCIPAL DISCHARGE DIAGNOSIS  Diagnosis: Septic shock  Assessment and Plan of Treatment: You had a bacterial infection in your blood stream likely from a pneumonia. You also had a fungus growing in your blood. You also had a bacteria growing in your urinary tract infection. You received antibiotics and antifungal medication for these infections. Please follow-up with your PCP in 2-4 weeks and sooner if you have any fevers.      SECONDARY DISCHARGE DIAGNOSES  Diagnosis: Dysphagia  Assessment and Plan of Treatment: You have trouble swallowing. Our speech and swallow evaluation showed that you are at high risk for aspiration, or getting stomach contents into your lungs. This puts you at risk for infection. Please maintain precautions while eating such as taking small bites and eating while sitting up. Your dietary recommendations are to eat soft foods and thickened liquids.    Diagnosis: Hypothyroidism  Assessment and Plan of Treatment: Hypothyroidism PRINCIPAL DISCHARGE DIAGNOSIS  Diagnosis: Septic shock  Assessment and Plan of Treatment: You had a bacterial infection in your blood stream likely from a pneumonia. You also had a fungus growing in your blood. You also had a bacteria growing in your urinary tract infection. You received antibiotics and antifungal medication for these infections and you improved clinically. The infection caused your blood pressure to become low, for which you were started on a NEW medication (midodrine) to help maintain your blood pressure, please continue to take this medication following discharge from the hospital. Additionally, in order to maintain your blood pressure your propanolol and spironolactone were STOPPED. Please do NOT take these medications after leaving the hospital.  Please follow-up with her PCP in order to monitor your blood pressure and assess when it is safe to resume these medications.      SECONDARY DISCHARGE DIAGNOSES  Diagnosis: Dysphagia  Assessment and Plan of Treatment: You have trouble swallowing. Our speech and swallow evaluation showed that you are at high risk for aspiration, or getting stomach contents into your lungs. This puts you at risk for infection. Please maintain precautions while eating such as taking small bites and eating while sitting up. Your dietary recommendations are to eat soft foods and thickened liquids. PRINCIPAL DISCHARGE DIAGNOSIS  Diagnosis: Septic shock  Assessment and Plan of Treatment: You had a bacterial infection in your blood stream likely from a pneumonia. You also had a fungus growing in your blood. You also had a bacteria growing in your urinary tract infection. You received antibiotics and antifungal medication for these infections and you improved clinically. The infection caused your blood pressure to become low, for which you were started on a NEW medication (midodrine) to help maintain your blood pressure, please continue to take this medication following discharge from the hospital. Additionally, in order to maintain your blood pressure your propanolol and spironolactone were STOPPED. Please do NOT take these medications after leaving the hospital.  Please follow-up with her PCP in order to monitor your blood pressure and assess when it is safe to resume these medications.      SECONDARY DISCHARGE DIAGNOSES  Diagnosis: Dysphagia  Assessment and Plan of Treatment: You have trouble swallowing. Our speech and swallow evaluation showed that you are at high risk for aspiration, or getting stomach contents into your lungs. This puts you at risk for infection. Please maintain precautions while eating such as taking small bites and eating while sitting up. Your dietary recommendations are to eat soft foods and thickened liquids.    Diagnosis: Acute kidney injury superimposed on chronic kidney disease  Assessment and Plan of Treatment: Your kidney function is back to your baseline. Please have your kidney function and electrolyte levels checked 2 times a week while you are on Lokelma which can lower your potassium. Please follow up with your primary care doctor.

## 2020-02-08 NOTE — DISCHARGE NOTE PROVIDER - NSDCFUSCHEDAPPT_GEN_ALL_CORE_FT
HA JACQEUS ; 02/21/2020 ; NPP Cardio [facility] HA JACQUES ; 02/21/2020 ; NPP Cardio [facility] HA JACQUES ; 02/21/2020 ; NPP Cardio 101[facility] HA JACQUES ; 02/21/2020 ; NPP Cardio 1010 [facility] HA JACQUES ; 02/21/2020 ; NPP Cardio 1010 [facility] HA JACQUES ; 02/21/2020 ; NPP Cardio 1010 [facility] HA JACQUES ; 02/21/2020 ; NPP Cardio 101[facility] HA JACQUES ; 02/21/2020 ; NPP Cardio 101[facility] HA JACQUES ; 02/21/2020 ; NPP Cardio 1010 [facility]

## 2020-02-08 NOTE — PROGRESS NOTE ADULT - SUBJECTIVE AND OBJECTIVE BOX
CC: Patient is a 89y old  Female who presents with a chief complaint of melena (08 Feb 2020 08:48)    ID following for fungemia    Interval History/ROS: Patient has no complaints. Eating breakfast. No fevers, no chills.    Rest of ROS negative.    Allergies  codeine (Rash)  erythromycin (Rash)  iv dye (Rash; Anaphylaxis; Short breath)  penicillin (Rash)  shellfish (Rash)    ANTIMICROBIALS:  rifAXIMin 550 two times a day    OTHER MEDS:  acetaminophen   Tablet .. 500 milliGRAM(s) Oral every 6 hours PRN  albuterol/ipratropium for Nebulization 3 milliLiter(s) Nebulizer every 6 hours  AQUAPHOR (petrolatum Ointment) 1 Application(s) Topical daily  chlorhexidine 4% Liquid 1 Application(s) Topical <User Schedule>  cyanocobalamin 1000 MICROGram(s) Oral daily  digoxin     Tablet 0.125 milliGRAM(s) Oral every other day  folic acid 1 milliGRAM(s) Oral daily  heparin  Injectable 5000 Unit(s) SubCutaneous two times a day  lactulose Syrup 20 Gram(s) Oral four times a day  levothyroxine Injectable 12.5 MICROGram(s) IV Push at bedtime  midodrine 10 milliGRAM(s) Oral every 8 hours  sodium zirconium cyclosilicate 5 Gram(s) Oral two times a day    PE:    Vital Signs Last 24 Hrs  T(C): 36.4 (08 Feb 2020 04:14), Max: 36.8 (07 Feb 2020 21:08)  T(F): 97.5 (08 Feb 2020 04:14), Max: 98.2 (07 Feb 2020 21:08)  HR: 94 (08 Feb 2020 06:05) (79 - 108)  BP: 112/67 (08 Feb 2020 06:05) (90/52 - 117/54)  BP(mean): --  RR: 18 (08 Feb 2020 06:05) (18 - 19)  SpO2: 98% (08 Feb 2020 06:05) (96% - 99%)    Gen: Awake, alert, NAD  CV: S1+S2 normal, no murmurs  Resp: Clear bilat, no resp distress  Abd: Soft, nontender, +BS  Ext: No LE edema, no wounds  : No Irwin  IV/Skin: No thrombophlebitis  Neuro: no focal deficits    LABS:                          9.6    5.90  )-----------( 166      ( 07 Feb 2020 01:17 )             31.3       02-07    139  |  102  |  41<H>  ----------------------------<  80  5.4<H>   |  27  |  0.78    Ca    10.0      07 Feb 2020 00:56  Phos  3.3     02-07  Mg     2.1     02-07    TPro  5.4<L>  /  Alb  2.3<L>  /  TBili  1.2  /  DBili  x   /  AST  34  /  ALT  16  /  AlkPhos  182<H>  02-07          MICROBIOLOGY:  v  .Blood Blood-Peripheral  01-24-20   No growth at 5 days.  --  --      .Blood Blood-Venous  01-21-20   No growth at 5 days.  --  --      .Blood Blood-Peripheral  01-21-20   Growth in anaerobic bottle: Serratia marcescens  See previous culture [id number]  Growth in aerobic bottle: Candida glabrata  ***Blood Panel PCR results on this specimen are available  approximately 3 hours after the Gram stain result.***  Gram stain, PCR, and/or culture results may not always  correspond due to difference in methodologies.  ************************************************************  This PCR assay was performed using [facility].  The following targets are tested for: Enterococcus,  vancomycin resistant enterococci, Listeria monocytogenes,  coagulase negative staphylococci, S. aureus,  methicillin resistant S. aureus, Streptococcus agalactiae  (Group B), S. pneumoniae, S. pyogenes (Group A),  Acinetobacter baumannii, Enterobacter cloacae, E. coli,  Klebsiella oxytoca, K. pneumoniae, Proteus sp.,  Serratia marcescens, Haemophilus influenzae,  Neisseria meningitidis, Pseudomonas aeruginosa, Candida  albicans, C. glabrata, C krusei, C parapsilosis,  C. tropicalis and the KPC resistance gene.  --  Blood Culture PCR  Candida (Torulopsis) glabrata      .Sputum Sputum  01-20-20   Moderate Serratia marcescens  Normal Respiratory Stefanie present  --  Serratia marcescens      .Blood Blood-Peripheral  01-19-20   Growth in aerobic and anaerobic bottles: Serratia marcescens  ***Blood Panel PCR results on this specimen are available  approximately 3 hours after the Gram stain result.***  Gram stain, PCR, and/or culture results may not always  correspond due to difference in methodologies.  ************************************************************  This PCR assay was performed using [facility].  The following targets are tested for: Enterococcus,  vancomycin resistant enterococci, Listeria monocytogenes,  coagulase negative staphylococci, S. aureus,  methicillin resistant S. aureus, Streptococcus agalactiae  (Group B), S. pneumoniae, S. pyogenes (Group A),  Acinetobacter baumannii, Enterobacter cloacae, E. coli,  Klebsiella oxytoca, K. pneumoniae, Proteus sp.,  Serratia marcescens, Haemophilus influenzae,  Neisseria meningitidis, Pseudomonas aeruginosa, Candida  albicans, C. glabrata, C krusei, C parapsilosis,  C. tropicalis and the KPC resistance gene.  --  Blood Culture PCR  Serratia marcescens      .Urine Clean Catch (Midstream)  01-19-20   >100,000 CFU/ml Klebsiella pneumoniae ESBL  --  Klebsiella pneumoniae ESBL      .Urine CATHETERIZED  01-19-20   >100,000 CFU/ml Klebsiella pneumoniae ESBL  --  Klebsiella pneumoniae ESBL      .Urine CATHETERIZED  01-11-20   >100,000 CFU/ml Klebsiella pneumoniae ESBL  --  Gram Negative Rods    RADIOLOGY:    < from: Xray Wrist 3 Views, Right (01.31.20 @ 22:46) >  IMPRESSION:     No acute fracture or dislocation. 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 Chondrocalcinosis.         < end of copied text >

## 2020-02-08 NOTE — DISCHARGE NOTE PROVIDER - NSDCMRMEDTOKEN_GEN_ALL_CORE_FT
ascorbic acid 500 mg oral tablet: 1 tab(s) orally 2 times a day  budesonide 0.25 mg/2 mL inhalation suspension: 2 milliliter(s) inhaled 2 times a day  digoxin 125 mcg (0.125 mg) oral tablet: 1 tab(s) orally every other day  heparin: 5000 unit(s) subcutaneous 2 times a day  ipratropium-albuterol 0.5 mg-2.5 mg/3 mLinhalation solution: 3 milliliter(s) inhaled every 12 hours  lactulose 10 g/15 mL oral syrup: 30 milliliter(s) orally 4 times a day  levothyroxine 25 mcg (0.025 mg) oral tablet: 1 tab(s) orally once a day  petrolatum topical ointment: 1 application topically once a day  propranolol 10 mg oral tablet: 1 tab(s) orally 2 times a day  rifAXIMin 550 mg oral tablet: 1 tab(s) orally 2 times a day  spironolactone 25 mg oral tablet: 1 tab(s) orally once a day  Vitamin D3 2000 intl units oral tablet: 1 tab(s) orally once a day ascorbic acid 500 mg oral tablet: 1 tab(s) orally 2 times a day  cyanocobalamin 1000 mcg oral tablet: 1 tab(s) orally once a day  digoxin 125 mcg (0.125 mg) oral tablet: 1 tab(s) orally every other day  folic acid 1 mg oral tablet: 1 tab(s) orally once a day  heparin: 5000 unit(s) subcutaneous 2 times a day  ipratropium-albuterol 0.5 mg-2.5 mg/3 mLinhalation solution: 3 milliliter(s) inhaled every 12 hours  lactulose 10 g/15 mL oral syrup: 30 milliliter(s) orally 4 times a day  levothyroxine 25 mcg (0.025 mg) oral tablet: 1 tab(s) orally once a day  midodrine 5 mg oral tablet: 1 tab(s) orally every 8 hours  petrolatum topical ointment: 1 application topically once a day  rifAXIMin 550 mg oral tablet: 1 tab(s) orally 2 times a day  Vitamin D3 2000 intl units oral tablet: 1 tab(s) orally once a day ascorbic acid 500 mg oral tablet: 1 tab(s) orally 2 times a day  budesonide 0.25 mg/2 mL inhalation suspension: 2 milliliter(s) inhaled 2 times a day  cyanocobalamin 1000 mcg oral tablet: 1 tab(s) orally once a day  digoxin 125 mcg (0.125 mg) oral tablet: 1 tab(s) orally every other day  folic acid 1 mg oral tablet: 1 tab(s) orally once a day  ipratropium-albuterol 0.5 mg-2.5 mg/3 mLinhalation solution: 3 milliliter(s) inhaled every 12 hours  lactulose 10 g/15 mL oral syrup: 30 milliliter(s) orally 4 times a day, titrate to 2-3 BMs daily  levothyroxine 25 mcg (0.025 mg) oral tablet: 1 tab(s) orally once a day  midodrine 5 mg oral tablet: 1 tab(s) orally every 8 hours  petrolatum topical ointment: 1 application topically once a day  rifAXIMin 550 mg oral tablet: 1 tab(s) orally 2 times a day  Vitamin D3 2000 intl units oral tablet: 1 tab(s) orally once a day ascorbic acid 500 mg oral tablet: 1 tab(s) orally 2 times a day  budesonide 0.25 mg/2 mL inhalation suspension: 2 milliliter(s) inhaled 2 times a day  cyanocobalamin 1000 mcg oral tablet: 1 tab(s) orally once a day  digoxin 125 mcg (0.125 mg) oral tablet: 1 tab(s) orally every other day  folic acid 1 mg oral tablet: 1 tab(s) orally once a day  ipratropium-albuterol 0.5 mg-2.5 mg/3 mLinhalation solution: 3 milliliter(s) inhaled every 12 hours  lactulose 10 g/15 mL oral syrup: 30 milliliter(s) orally 4 times a day, titrate to 2-3 BMs daily  levothyroxine 25 mcg (0.025 mg) oral tablet: 1 tab(s) orally once a day  midodrine 5 mg oral tablet: 1 tab(s) orally every 8 hours  petrolatum topical ointment: 1 application topically once a day  rifAXIMin 550 mg oral tablet: 1 tab(s) orally 2 times a day  sodium zirconium cyclosilicate 5 g oral powder for reconstitution: 5 gram(s) orally 2 times a day   Vitamin D3 2000 intl units oral tablet: 1 tab(s) orally once a day

## 2020-02-08 NOTE — DISCHARGE NOTE PROVIDER - NSDCFUADDINST_GEN_ALL_CORE_FT
Please follow-up with your PCP within 1 week for further monitoring. Please follow-up with your PCP within 1 week for further monitoring of potassium and for medication management Please follow-up with your PCP within 1 week for further monitoring of potassium and for medication management and repeat BMP

## 2020-02-08 NOTE — DISCHARGE NOTE PROVIDER - CARE PROVIDER_API CALL
Fallon Celaya)  Critical Care Medicine; Internal Medicine; Pulmonary Disease  [address] [facility] [address]  [place] [address]  Phone: [phone]  Fax: [phone]  Follow Up Time: 1 week

## 2020-02-08 NOTE — PROGRESS NOTE ADULT - ASSESSMENT
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   Fungemia 1/21 with Candida glabrata, possible transient gut translocation in the setting of shock. Less likely from the central line which was in briefly. No ophthalmologic findings.   Cultures 1/24 negative. Afebrile since 1/24.   Transferred from MICU 1/25.     Suggest  -Completed ertapenem 2/3/20   -Micafungin 100mg IV q24h, last day today 2/8    Ha Chiang MD  Pager [phone]  After 5pm/weekends call [phone]    Please call with questions.

## 2020-02-08 NOTE — PROGRESS NOTE ADULT - PROBLEM SELECTOR PLAN 1
Serratia bacteremia, sputum cx with serratia. Klebsiella ESBL UTI sensitive to meropenem IV. Also with candida glabrata fungemia (1/21).  - c/w ertapenem (1/26 - 2/3)  - c/w IV micafungin 100mg daily (1/27- 2/8)  - titrating midodrine as tolerated  - ophthalmology f/u as OP, no signs of endophthalmitis Serratia bacteremia, sputum cx with serratia. Klebsiella ESBL UTI sensitive to meropenem IV. Also with candida glabrata fungemia (1/21).  - s/p ertapenem (1/26 - 2/3)  - c/w IV micafungin 100mg daily (1/27- 2/8) - last day today  - titrating midodrine as tolerated  - ophthalmology f/u as OP, no signs of endophthalmitis

## 2020-02-08 NOTE — PROGRESS NOTE ADULT - SUBJECTIVE AND OBJECTIVE BOX
Alexei Hess MD, PGY-1  Pager #[phone]  After 7 PM on weekdays and 12 PM on weekends, for urgent issues please page #[phone].  ----------------------------------------------------------------  Patient is a 89y old  Female who presents with a chief complaint of melena (07 Feb 2020 08:53)      SUBJECTIVE / OVERNIGHT EVENTS: No acute events overnight. Pt seen and examined at bedside. This AM awake and alert, denies any acute complaints including chest pain, shortness of breath.     MEDICATIONS  (STANDING):  albuterol/ipratropium for Nebulization 3 milliLiter(s) Nebulizer every 6 hours  AQUAPHOR (petrolatum Ointment) 1 Application(s) Topical daily  chlorhexidine 4% Liquid 1 Application(s) Topical <User Schedule>  cyanocobalamin 1000 MICROGram(s) Oral daily  digoxin     Tablet 0.125 milliGRAM(s) Oral every other day  folic acid 1 milliGRAM(s) Oral daily  heparin  Injectable 5000 Unit(s) SubCutaneous two times a day  lactulose Syrup 20 Gram(s) Oral four times a day  levothyroxine Injectable 12.5 MICROGram(s) IV Push at bedtime  micafungin IVPB      micafungin IVPB 100 milliGRAM(s) IV Intermittent every 24 hours  midodrine 15 milliGRAM(s) Oral every 8 hours  rifAXIMin 550 milliGRAM(s) Oral two times a day  sodium zirconium cyclosilicate 5 Gram(s) Oral two times a day    MEDICATIONS  (PRN):  acetaminophen   Tablet .. 500 milliGRAM(s) Oral every 6 hours PRN Temp greater or equal to 38C (100.4F), Mild Pain (1 - 3)    CAPILLARY BLOOD GLUCOSE    I&O's Summary    07 Feb 2020 07:01  -  08 Feb 2020 07:00  --------------------------------------------------------  IN: 280 mL / OUT: 0 mL / NET: 280 mL    PHYSICAL EXAM:  Vital Signs Last 24 Hrs  T(C): 36.4 (08 Feb 2020 04:14), Max: 36.8 (07 Feb 2020 21:08)  T(F): 97.5 (08 Feb 2020 04:14), Max: 98.2 (07 Feb 2020 21:08)  HR: 94 (08 Feb 2020 06:05) (79 - 108)  BP: 112/67 (08 Feb 2020 06:05) (90/52 - 117/54)  RR: 18 (08 Feb 2020 06:05) (18 - 19)  SpO2: 98% (08 Feb 2020 06:05) (96% - 99%)    CONSTITUTIONAL: NAD, pleasant elderly female  RESPIRATORY: CTABL  CARDIOVASCULAR: 3/6 harsh systolic murmur   ABDOMEN: soft, nt, nd  MUSCULOSKELETAL: wwp  PSYCH: AOx2-3 today  NEUROLOGY: grossly intact  SKIN: skin tears b/l UE, b/l ecchymoses LE, bandaged c/d/i    LABS:             9.6    5.90  )-----------( 166      ( 07 Feb 2020 01:17 )             31.3     02-07    139  |  102  |  41<H>  ----------------------------<  80  5.4<H>   |  27  |  0.78    Ca    10.0      07 Feb 2020 00:56  Phos  3.3     02-07  Mg     2.1     02-07    TPro  5.4<L>  /  Alb  2.3<L>  /  TBili  1.2  /  DBili  x   /  AST  34  /  ALT  16  /  AlkPhos  182<H>  02-07    RADIOLOGY & ADDITIONAL TESTS:  Results Reviewed:   Imaging Personally Reviewed:  Electrocardiogram Personally Reviewed:    COORDINATION OF CARE:  Care Discussed with Consultants/Other Providers [Y/N]:  Prior or Outpatient Records Reviewed [Y/N]: Alexei Hess MD, PGY-1  Pager #[phone]  After 7 PM on weekdays and 12 PM on weekends, for urgent issues please page #[phone].  ----------------------------------------------------------------  Patient is a 89y old  Female who presents with a chief complaint of melena (07 Feb 2020 08:53)    SUBJECTIVE / OVERNIGHT EVENTS: No acute events overnight. Pt seen and examined at bedside. This AM awake and alert, denies any acute complaints including chest pain, shortness of breath.     MEDICATIONS  (STANDING):  albuterol/ipratropium for Nebulization 3 milliLiter(s) Nebulizer every 6 hours  AQUAPHOR (petrolatum Ointment) 1 Application(s) Topical daily  chlorhexidine 4% Liquid 1 Application(s) Topical <User Schedule>  cyanocobalamin 1000 MICROGram(s) Oral daily  digoxin     Tablet 0.125 milliGRAM(s) Oral every other day  folic acid 1 milliGRAM(s) Oral daily  heparin  Injectable 5000 Unit(s) SubCutaneous two times a day  lactulose Syrup 20 Gram(s) Oral four times a day  levothyroxine Injectable 12.5 MICROGram(s) IV Push at bedtime  micafungin IVPB      micafungin IVPB 100 milliGRAM(s) IV Intermittent every 24 hours  midodrine 15 milliGRAM(s) Oral every 8 hours  rifAXIMin 550 milliGRAM(s) Oral two times a day  sodium zirconium cyclosilicate 5 Gram(s) Oral two times a day    MEDICATIONS  (PRN):  acetaminophen   Tablet .. 500 milliGRAM(s) Oral every 6 hours PRN Temp greater or equal to 38C (100.4F), Mild Pain (1 - 3)    CAPILLARY BLOOD GLUCOSE    I&O's Summary    07 Feb 2020 07:01  -  08 Feb 2020 07:00  --------------------------------------------------------  IN: 280 mL / OUT: 0 mL / NET: 280 mL    PHYSICAL EXAM:  Vital Signs Last 24 Hrs  T(C): 36.4 (08 Feb 2020 04:14), Max: 36.8 (07 Feb 2020 21:08)  T(F): 97.5 (08 Feb 2020 04:14), Max: 98.2 (07 Feb 2020 21:08)  HR: 94 (08 Feb 2020 06:05) (79 - 108)  BP: 112/67 (08 Feb 2020 06:05) (90/52 - 117/54)  RR: 18 (08 Feb 2020 06:05) (18 - 19)  SpO2: 98% (08 Feb 2020 06:05) (96% - 99%)    CONSTITUTIONAL: NAD, pleasant elderly female  RESPIRATORY: CTABL  CARDIOVASCULAR: 3/6 harsh systolic murmur   ABDOMEN: soft, nt, nd  MUSCULOSKELETAL: wwp  PSYCH: AOx2-3 today  NEUROLOGY: grossly intact  SKIN: skin tears b/l UE, b/l ecchymoses LE, bandaged c/d/i    LABS:             9.6    5.90  )-----------( 166      ( 07 Feb 2020 01:17 )             31.3     02-07    139  |  102  |  41<H>  ----------------------------<  80  5.4<H>   |  27  |  0.78    Ca    10.0      07 Feb 2020 00:56  Phos  3.3     02-07  Mg     2.1     02-07    TPro  5.4<L>  /  Alb  2.3<L>  /  TBili  1.2  /  DBili  x   /  AST  34  /  ALT  16  /  AlkPhos  182<H>  02-07    RADIOLOGY & ADDITIONAL TESTS:  Results Reviewed:   Imaging Personally Reviewed:  Electrocardiogram Personally Reviewed:    COORDINATION OF CARE:  Care Discussed with Consultants/Other Providers [Y/N]:  Prior or Outpatient Records Reviewed [Y/N]:

## 2020-02-09 PROCEDURE — 99233 SBSQ HOSP IP/OBS HIGH 50: CPT | Mod: GC

## 2020-02-09 RX ADMIN — MIDODRINE HYDROCHLORIDE 10 MILLIGRAM(S): 2.5 TABLET ORAL at 13:22

## 2020-02-09 RX ADMIN — LACTULOSE 20 GRAM(S): 10 SOLUTION ORAL at 05:50

## 2020-02-09 RX ADMIN — Medication 3 MILLILITER(S): at 23:17

## 2020-02-09 RX ADMIN — LACTULOSE 20 GRAM(S): 10 SOLUTION ORAL at 00:13

## 2020-02-09 RX ADMIN — Medication 0.12 MILLIGRAM(S): at 11:06

## 2020-02-09 RX ADMIN — LACTULOSE 20 GRAM(S): 10 SOLUTION ORAL at 23:17

## 2020-02-09 RX ADMIN — Medication 12.5 MICROGRAM(S): at 21:55

## 2020-02-09 RX ADMIN — Medication 3 MILLILITER(S): at 05:50

## 2020-02-09 RX ADMIN — SODIUM ZIRCONIUM CYCLOSILICATE 5 GRAM(S): 10 POWDER, FOR SUSPENSION ORAL at 05:49

## 2020-02-09 RX ADMIN — Medication 3 MILLILITER(S): at 11:06

## 2020-02-09 RX ADMIN — Medication 1 APPLICATION(S): at 11:11

## 2020-02-09 RX ADMIN — Medication 3 MILLILITER(S): at 00:12

## 2020-02-09 RX ADMIN — HEPARIN SODIUM 5000 UNIT(S): 5000 INJECTION INTRAVENOUS; SUBCUTANEOUS at 17:55

## 2020-02-09 RX ADMIN — Medication 1 MILLIGRAM(S): at 11:06

## 2020-02-09 RX ADMIN — SODIUM ZIRCONIUM CYCLOSILICATE 5 GRAM(S): 10 POWDER, FOR SUSPENSION ORAL at 17:56

## 2020-02-09 RX ADMIN — LACTULOSE 20 GRAM(S): 10 SOLUTION ORAL at 17:55

## 2020-02-09 RX ADMIN — LACTULOSE 20 GRAM(S): 10 SOLUTION ORAL at 11:06

## 2020-02-09 RX ADMIN — CHLORHEXIDINE GLUCONATE 1 APPLICATION(S): 213 SOLUTION TOPICAL at 08:30

## 2020-02-09 RX ADMIN — PREGABALIN 1000 MICROGRAM(S): 225 CAPSULE ORAL at 11:06

## 2020-02-09 RX ADMIN — Medication 3 MILLILITER(S): at 20:30

## 2020-02-09 RX ADMIN — HEPARIN SODIUM 5000 UNIT(S): 5000 INJECTION INTRAVENOUS; SUBCUTANEOUS at 05:49

## 2020-02-09 NOTE — PROGRESS NOTE ADULT - ASSESSMENT
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 Pt last admit 1/6-9 s/p fall OOB now re-admitted from [facility] w AMS 2/2 Septic Shock 2/2 Serratia Bacteremia / Klebsiella ESBL urosepsis, and PNA w Serratia in the sputum, with candida glabrata in blood cx from 1/21.

## 2020-02-09 NOTE — PROGRESS NOTE ADULT - SUBJECTIVE AND OBJECTIVE BOX
Patient is a 89y old  Female who presents with a chief complaint of sepsis (08 Feb 2020 22:24)      SUBJECTIVE / OVERNIGHT EVENTS:  Patient seen and examined at bedside. No acute events overnight.     MEDICATIONS  (STANDING):  albuterol/ipratropium for Nebulization 3 milliLiter(s) Nebulizer every 6 hours  AQUAPHOR (petrolatum Ointment) 1 Application(s) Topical daily  chlorhexidine 4% Liquid 1 Application(s) Topical <User Schedule>  cyanocobalamin 1000 MICROGram(s) Oral daily  digoxin     Tablet 0.125 milliGRAM(s) Oral every other day  folic acid 1 milliGRAM(s) Oral daily  heparin  Injectable 5000 Unit(s) SubCutaneous two times a day  lactulose Syrup 20 Gram(s) Oral four times a day  levothyroxine Injectable 12.5 MICROGram(s) IV Push at bedtime  midodrine 10 milliGRAM(s) Oral every 8 hours  rifAXIMin 550 milliGRAM(s) Oral two times a day  sodium zirconium cyclosilicate 5 Gram(s) Oral two times a day    MEDICATIONS  (PRN):  acetaminophen   Tablet .. 500 milliGRAM(s) Oral every 6 hours PRN Temp greater or equal to 38C (100.4F), Mild Pain (1 - 3)      Vital Signs Last 24 Hrs  T(C): 36.3 (09 Feb 2020 08:00), Max: 36.5 (08 Feb 2020 21:32)  T(F): 97.4 (09 Feb 2020 08:00), Max: 97.7 (08 Feb 2020 21:32)  HR: 75 (09 Feb 2020 08:00) (75 - 110)  BP: 113/64 (09 Feb 2020 08:00) (97/51 - 116/48)  BP(mean): --  RR: 17 (09 Feb 2020 08:00) (17 - 18)  SpO2: 96% (09 Feb 2020 08:00) (93% - 100%)  CAPILLARY BLOOD GLUCOSE        I&O's Summary    08 Feb 2020 07:01  -  09 Feb 2020 07:00  --------------------------------------------------------  IN: 0 mL / OUT: 200 mL / NET: -200 mL        PHYSICAL EXAM:  GENERAL: NAD, well-developed  HEAD:  Atraumatic, Normocephalic  EYES: EOMI,  conjunctiva and sclera clear  NECK: Supple  CHEST/LUNG: Clear to auscultation bilaterally; No wheeze  HEART: Regular rate and rhythm; No murmurs, rubs, or gallops  ABDOMEN: Soft, Nontender, Nondistended; Bowel sounds present  EXTREMITIES:  No clubbing, cyanosis, or edema  PSYCH: AAOx3  NEUROLOGY: non-focal  SKIN: No rashes or lesions    LABS:    WBC Trend: 5.90<--, 6.92<--, 7.95<--  02-08    140  |  103  |  43<H>  ----------------------------<  84  5.0   |  27  |  0.87    Ca    10.0      08 Feb 2020 09:46      Creatinine Trend: 0.87<--, 0.78<--, 0.74<--, 0.82<--, 0.83<--, 0.87<--              RADIOLOGY & ADDITIONAL TESTS:    Imaging Personally Reviewed:    Consultant(s) Notes Reviewed:      Care Discussed with Consultants/Other Providers: Patient is a 89y old  Female who presents with a chief complaint of sepsis (08 Feb 2020 22:24)      SUBJECTIVE / OVERNIGHT EVENTS:  Patient seen and examined at bedside. No acute events overnight. Patient denies any complaints. Reports no N/V/D. States desire to leave the hospital and go home.     MEDICATIONS  (STANDING):  albuterol/ipratropium for Nebulization 3 milliLiter(s) Nebulizer every 6 hours  AQUAPHOR (petrolatum Ointment) 1 Application(s) Topical daily  chlorhexidine 4% Liquid 1 Application(s) Topical <User Schedule>  cyanocobalamin 1000 MICROGram(s) Oral daily  digoxin     Tablet 0.125 milliGRAM(s) Oral every other day  folic acid 1 milliGRAM(s) Oral daily  heparin  Injectable 5000 Unit(s) SubCutaneous two times a day  lactulose Syrup 20 Gram(s) Oral four times a day  levothyroxine Injectable 12.5 MICROGram(s) IV Push at bedtime  midodrine 10 milliGRAM(s) Oral every 8 hours  rifAXIMin 550 milliGRAM(s) Oral two times a day  sodium zirconium cyclosilicate 5 Gram(s) Oral two times a day    MEDICATIONS  (PRN):  acetaminophen   Tablet .. 500 milliGRAM(s) Oral every 6 hours PRN Temp greater or equal to 38C (100.4F), Mild Pain (1 - 3)      Vital Signs Last 24 Hrs  T(C): 36.3 (09 Feb 2020 08:00), Max: 36.5 (08 Feb 2020 21:32)  T(F): 97.4 (09 Feb 2020 08:00), Max: 97.7 (08 Feb 2020 21:32)  HR: 75 (09 Feb 2020 08:00) (75 - 110)  BP: 113/64 (09 Feb 2020 08:00) (97/51 - 116/48)  BP(mean): --  RR: 17 (09 Feb 2020 08:00) (17 - 18)  SpO2: 96% (09 Feb 2020 08:00) (93% - 100%)  CAPILLARY BLOOD GLUCOSE        I&O's Summary    08 Feb 2020 07:01  -  09 Feb 2020 07:00  --------------------------------------------------------  IN: 0 mL / OUT: 200 mL / NET: -200 mL          CONSTITUTIONAL: NAD, pleasant elderly female, conversant   RESPIRATORY: CTABL  CARDIOVASCULAR: 3/6 harsh systolic murmur   ABDOMEN: soft, nt, nd  MUSCULOSKELETAL: wwp  PSYCH: AOx2-3 today  NEUROLOGY: grossly intact  SKIN: skin tears b/l UE, b/l ecchymoses LE, bandaged c/d/i    LABS:    WBC Trend: 5.90<--, 6.92<--, 7.95<--  02-08    140  |  103  |  43<H>  ----------------------------<  84  5.0   |  27  |  0.87    Ca    10.0      08 Feb 2020 09:46      Creatinine Trend: 0.87<--, 0.78<--, 0.74<--, 0.82<--, 0.83<--, 0.87<--              RADIOLOGY & ADDITIONAL TESTS:    Imaging Personally Reviewed:    Consultant(s) Notes Reviewed:      Care Discussed with Consultants/Other Providers: Adry Gay  PGY 3   [phone]     Patient is a 89y old  Female who presents with a chief complaint of sepsis (08 Feb 2020 22:24)      SUBJECTIVE / OVERNIGHT EVENTS:  Patient seen and examined at bedside. No acute events overnight. Patient denies any complaints. Reports no N/V/D. States desire to leave the hospital and go home.     MEDICATIONS  (STANDING):  albuterol/ipratropium for Nebulization 3 milliLiter(s) Nebulizer every 6 hours  AQUAPHOR (petrolatum Ointment) 1 Application(s) Topical daily  chlorhexidine 4% Liquid 1 Application(s) Topical <User Schedule>  cyanocobalamin 1000 MICROGram(s) Oral daily  digoxin     Tablet 0.125 milliGRAM(s) Oral every other day  folic acid 1 milliGRAM(s) Oral daily  heparin  Injectable 5000 Unit(s) SubCutaneous two times a day  lactulose Syrup 20 Gram(s) Oral four times a day  levothyroxine Injectable 12.5 MICROGram(s) IV Push at bedtime  midodrine 10 milliGRAM(s) Oral every 8 hours  rifAXIMin 550 milliGRAM(s) Oral two times a day  sodium zirconium cyclosilicate 5 Gram(s) Oral two times a day    MEDICATIONS  (PRN):  acetaminophen   Tablet .. 500 milliGRAM(s) Oral every 6 hours PRN Temp greater or equal to 38C (100.4F), Mild Pain (1 - 3)      Vital Signs Last 24 Hrs  T(C): 36.3 (09 Feb 2020 08:00), Max: 36.5 (08 Feb 2020 21:32)  T(F): 97.4 (09 Feb 2020 08:00), Max: 97.7 (08 Feb 2020 21:32)  HR: 75 (09 Feb 2020 08:00) (75 - 110)  BP: 113/64 (09 Feb 2020 08:00) (97/51 - 116/48)  BP(mean): --  RR: 17 (09 Feb 2020 08:00) (17 - 18)  SpO2: 96% (09 Feb 2020 08:00) (93% - 100%)  CAPILLARY BLOOD GLUCOSE        I&O's Summary    08 Feb 2020 07:01  -  09 Feb 2020 07:00  --------------------------------------------------------  IN: 0 mL / OUT: 200 mL / NET: -200 mL          CONSTITUTIONAL: NAD, pleasant elderly female, conversant   RESPIRATORY: CTABL  CARDIOVASCULAR: 3/6 harsh systolic murmur   ABDOMEN: soft, nt, nd  MUSCULOSKELETAL: wwp  PSYCH: AOx2-3 today  NEUROLOGY: grossly intact  SKIN: skin tears b/l UE, b/l ecchymoses LE, bandaged c/d/i    LABS:    WBC Trend: 5.90<--, 6.92<--, 7.95<--  02-08    140  |  103  |  43<H>  ----------------------------<  84  5.0   |  27  |  0.87    Ca    10.0      08 Feb 2020 09:46      Creatinine Trend: 0.87<--, 0.78<--, 0.74<--, 0.82<--, 0.83<--, 0.87<--              RADIOLOGY & ADDITIONAL TESTS:    Imaging Personally Reviewed:    Consultant(s) Notes Reviewed:      Care Discussed with Consultants/Other Providers:

## 2020-02-09 NOTE — PROGRESS NOTE ADULT - PROBLEM SELECTOR PLAN 5
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

## 2020-02-09 NOTE — PROGRESS NOTE ADULT - PROBLEM SELECTOR PLAN 1
Serratia bacteremia, sputum cx with serratia. Klebsiella ESBL UTI sensitive to meropenem IV. Also with candida glabrata fungemia (1/21).  - s/p ertapenem (1/26 - 2/3)  - c/w IV micafungin 100mg daily (1/27- 2/8) - last day today  - titrating midodrine as tolerated  - ophthalmology f/u as OP, no signs of endophthalmitis Serratia bacteremia, sputum cx with serratia. Klebsiella ESBL UTI sensitive to meropenem IV. Also with candida glabrata fungemia (1/21).  - s/p ertapenem (1/26 - 2/3)  - c/w IV micafungin 100mg daily (1/27- 2/8)   - titrating midodrine as tolerated  - ophthalmology f/u as OP, no signs of endophthalmitis Serratia bacteremia, sputum cx with serratia. Klebsiella ESBL UTI sensitive to meropenem IV. Also with candida glabrata fungemia (1/21).  - s/p ertapenem (1/26 - 2/3)  - s/p IV micafungin 100mg daily (1/27- 2/8)   - titrating midodrine as tolerated  - ophthalmology f/u as OP, no signs of endophthalmitis

## 2020-02-09 NOTE — PROGRESS NOTE ADULT - PROBLEM SELECTOR PLAN 4
- cont digoxin   - not a candidate for AC due to cirrhosis w/ esophageal varices and banding hx  - hold spironolactone per cards - cont digoxin   - not a candidate for AC due to cirrhosis w/ esophageal varices and banding hx  - cont to hold spironolactone per cards

## 2020-02-09 NOTE — PROGRESS NOTE ADULT - ATTENDING COMMENTS
Patient seen and examined. Plan discussed w/ Dr. Adames; complete Micafungin course on 2/8, ID recommendations appreciated. Continue to wean Midodrine only as tolerated. Dispo planning for home hospice ongoing. Code Status: DNR/DNI.

## 2020-02-10 LAB
ANION GAP SERPL CALC-SCNC: 9 MMOL/L — SIGNIFICANT CHANGE UP (ref 5–17)
BUN SERPL-MCNC: 38 MG/DL — HIGH (ref 7–23)
CALCIUM SERPL-MCNC: 9.9 MG/DL — SIGNIFICANT CHANGE UP (ref 8.4–10.5)
CHLORIDE SERPL-SCNC: 107 MMOL/L — SIGNIFICANT CHANGE UP (ref 96–108)
CO2 SERPL-SCNC: 26 MMOL/L — SIGNIFICANT CHANGE UP (ref 22–31)
CREAT SERPL-MCNC: 0.83 MG/DL — SIGNIFICANT CHANGE UP (ref 0.5–1.3)
GLUCOSE SERPL-MCNC: 78 MG/DL — SIGNIFICANT CHANGE UP (ref 70–99)
MAGNESIUM SERPL-MCNC: 2.2 MG/DL — SIGNIFICANT CHANGE UP (ref 1.6–2.6)
PHOSPHATE SERPL-MCNC: 2.9 MG/DL — SIGNIFICANT CHANGE UP (ref 2.5–4.5)
POTASSIUM SERPL-MCNC: 4.1 MMOL/L — SIGNIFICANT CHANGE UP (ref 3.5–5.3)
POTASSIUM SERPL-SCNC: 4.1 MMOL/L — SIGNIFICANT CHANGE UP (ref 3.5–5.3)
SODIUM SERPL-SCNC: 142 MMOL/L — SIGNIFICANT CHANGE UP (ref 135–145)

## 2020-02-10 PROCEDURE — 99233 SBSQ HOSP IP/OBS HIGH 50: CPT | Mod: GC

## 2020-02-10 RX ORDER — MIDODRINE HYDROCHLORIDE 2.5 MG/1
5 TABLET ORAL EVERY 8 HOURS
Refills: 0 | Status: DISCONTINUED | OUTPATIENT
Start: 2020-02-10 | End: 2020-02-13

## 2020-02-10 RX ORDER — LEVOTHYROXINE SODIUM 125 MCG
25 TABLET ORAL DAILY
Refills: 0 | Status: DISCONTINUED | OUTPATIENT
Start: 2020-02-11 | End: 2020-02-13

## 2020-02-10 RX ADMIN — PREGABALIN 1000 MICROGRAM(S): 225 CAPSULE ORAL at 11:56

## 2020-02-10 RX ADMIN — Medication 3 MILLILITER(S): at 11:55

## 2020-02-10 RX ADMIN — MIDODRINE HYDROCHLORIDE 5 MILLIGRAM(S): 2.5 TABLET ORAL at 21:35

## 2020-02-10 RX ADMIN — HEPARIN SODIUM 5000 UNIT(S): 5000 INJECTION INTRAVENOUS; SUBCUTANEOUS at 06:38

## 2020-02-10 RX ADMIN — Medication 3 MILLILITER(S): at 06:38

## 2020-02-10 RX ADMIN — LACTULOSE 20 GRAM(S): 10 SOLUTION ORAL at 11:56

## 2020-02-10 RX ADMIN — Medication 1 MILLIGRAM(S): at 11:56

## 2020-02-10 RX ADMIN — SODIUM ZIRCONIUM CYCLOSILICATE 5 GRAM(S): 10 POWDER, FOR SUSPENSION ORAL at 17:42

## 2020-02-10 RX ADMIN — SODIUM ZIRCONIUM CYCLOSILICATE 5 GRAM(S): 10 POWDER, FOR SUSPENSION ORAL at 06:39

## 2020-02-10 RX ADMIN — LACTULOSE 20 GRAM(S): 10 SOLUTION ORAL at 06:39

## 2020-02-10 RX ADMIN — Medication 3 MILLILITER(S): at 23:02

## 2020-02-10 RX ADMIN — Medication 1 APPLICATION(S): at 11:57

## 2020-02-10 RX ADMIN — MIDODRINE HYDROCHLORIDE 10 MILLIGRAM(S): 2.5 TABLET ORAL at 06:38

## 2020-02-10 RX ADMIN — LACTULOSE 20 GRAM(S): 10 SOLUTION ORAL at 17:42

## 2020-02-10 RX ADMIN — LACTULOSE 20 GRAM(S): 10 SOLUTION ORAL at 23:02

## 2020-02-10 RX ADMIN — HEPARIN SODIUM 5000 UNIT(S): 5000 INJECTION INTRAVENOUS; SUBCUTANEOUS at 17:43

## 2020-02-10 RX ADMIN — CHLORHEXIDINE GLUCONATE 1 APPLICATION(S): 213 SOLUTION TOPICAL at 09:34

## 2020-02-10 RX ADMIN — Medication 3 MILLILITER(S): at 17:43

## 2020-02-10 NOTE — PROGRESS NOTE ADULT - PROBLEM SELECTOR PLAN 4
- cont digoxin   - not a candidate for AC due to cirrhosis w/ esophageal varices and banding hx  - cont to hold spironolactone per cards - resume as tolerated

## 2020-02-10 NOTE — PROGRESS NOTE ADULT - ASSESSMENT
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 Pt last admit 1/6-9 s/p fall OOB now re-admitted from [facility] w AMS 2/2 Septic Shock 2/2 Serratia Bacteremia / Klebsiella ESBL urosepsis, and PNA w Serratia in the sputum, with candida glabrata in blood cx from 1/21.

## 2020-02-10 NOTE — PROGRESS NOTE ADULT - ATTENDING COMMENTS
participated in PT.  trying oral feeds.  daughters at bedside.  home hospice when family agreeable    Ken Solis MD, MHA, FACP, [facility]  Pager: [phone]  If no response or off-hours, page [phone]

## 2020-02-10 NOTE — PROGRESS NOTE ADULT - SUBJECTIVE AND OBJECTIVE BOX
Lisa Fabian MD   [facility] Medicine  Pager [phone]      PROGRESS NOTE:     Patient is a 89y old  Female who presents with a chief complaint of sepsis (08 Feb 2020 22:24)      SUBJECTIVE / OVERNIGHT EVENTS:    No acute events overnight. Patient examined at bedside with no acute complaints.     Denies SOB, chest pain, nausea, vomiting, diarrhea, constipation.    MEDICATIONS  (STANDING):  albuterol/ipratropium for Nebulization 3 milliLiter(s) Nebulizer every 6 hours  AQUAPHOR (petrolatum Ointment) 1 Application(s) Topical daily  chlorhexidine 4% Liquid 1 Application(s) Topical <User Schedule>  cyanocobalamin 1000 MICROGram(s) Oral daily  digoxin     Tablet 0.125 milliGRAM(s) Oral every other day  folic acid 1 milliGRAM(s) Oral daily  heparin  Injectable 5000 Unit(s) SubCutaneous two times a day  lactulose Syrup 20 Gram(s) Oral four times a day  midodrine 5 milliGRAM(s) Oral every 8 hours  rifAXIMin 550 milliGRAM(s) Oral two times a day  sodium zirconium cyclosilicate 5 Gram(s) Oral two times a day    MEDICATIONS  (PRN):  acetaminophen   Tablet .. 500 milliGRAM(s) Oral every 6 hours PRN Temp greater or equal to 38C (100.4F), Mild Pain (1 - 3)      CAPILLARY BLOOD GLUCOSE        I&O's Summary    09 Feb 2020 07:01  -  10 Feb 2020 07:00  --------------------------------------------------------  IN: 480 mL / OUT: 400 mL / NET: 80 mL    10 Feb 2020 07:01  -  10 Feb 2020 11:55  --------------------------------------------------------  IN: 60 mL / OUT: 200 mL / NET: -140 mL        PHYSICAL EXAM:  Vital Signs Last 24 Hrs  T(C): 36.6 (10 Feb 2020 04:25), Max: 36.7 (09 Feb 2020 21:51)  T(F): 97.8 (10 Feb 2020 04:25), Max: 98.1 (09 Feb 2020 21:51)  HR: 79 (10 Feb 2020 04:25) (74 - 81)  BP: 109/63 (10 Feb 2020 04:25) (108/63 - 123/55)  BP(mean): --  RR: 18 (10 Feb 2020 04:25) (17 - 18)  SpO2: 100% (10 Feb 2020 04:25) (97% - 100%)    CONSTITUTIONAL: NAD, pleasant elderly female, conversant   RESPIRATORY: CTABL  CARDIOVASCULAR: 3/6 harsh systolic murmur   ABDOMEN: soft, nt, nd  MUSCULOSKELETAL: wwp  PSYCH: AOx3   NEUROLOGY: grossly intact  SKIN: skin tears b/l UE, b/l ecchymoses LE, bandaged c/d/i    LABS:    02-10    142  |  107  |  38<H>  ----------------------------<  78  4.1   |  26  |  0.83    Ca    9.9      10 Feb 2020 06:29  Phos  2.9     02-10  Mg     2.2     02-10                  RADIOLOGY & ADDITIONAL TESTS:  Results Reviewed:   Imaging Personally Reviewed:  Electrocardiogram Personally Reviewed:    COORDINATION OF CARE:  Care Discussed with Consultants/Other Providers [Y/N]:  Prior or Outpatient Records Reviewed [Y/N]:

## 2020-02-10 NOTE — PROGRESS NOTE ADULT - PROBLEM SELECTOR PLAN 2
- multifactorial likely secondary to recent sepsis, ICU delirium, portosystemic encephalopathy, waxing and waning. now returned to baseline  - c/b nausea/vomiting in MICU, NGT removed 2/6.   - not a candidate for PEG given ascites as per GI  - per cards, would not diuresis, further diuretics would not likely clear ascites, PEG unrealistic for this patient  - given h/o grade IV esophageal varices that has bled in the past, pt at high risk for variceal rupture w/ NGT placement

## 2020-02-10 NOTE — PROGRESS NOTE ADULT - PROBLEM SELECTOR PLAN 1
Serratia bacteremia, sputum cx with serratia. Klebsiella ESBL UTI sensitive to meropenem IV. Also with candida glabrata fungemia (1/21).  - s/p ertapenem (1/26 - 2/3)  - s/p IV micafungin 100mg daily (1/27- 2/8)   - titrating midodrine as tolerated  - ophthalmology f/u as OP, no signs of endophthalmitis

## 2020-02-10 NOTE — PROGRESS NOTE ADULT - PROBLEM SELECTOR PLAN 5
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

## 2020-02-10 NOTE — CHART NOTE - NSCHARTNOTEFT_GEN_A_CORE
Nutrition Follow Up Note  Patient seen for: malnutrition follow up    SOB, COUGH, FEVER    Source: family at bedside, chart    Diet : dysphagia 2    Patient confused at times, sleeping at time of visit. family at bedside. discussed preferences and supplements with family.      PO intake : 1/3 of meals provided     Source for PO intake: family        Daily Weight in k.5 (02-10), Weight in k.1 (), Weight in k.3 (), Weight in k.3 (), Weight in k.4 (), Weight in k.5 (), Weight in k.1 ()  % Weight Change: 8% loss since previous assess on     Pertinent Medications: MEDICATIONS  (STANDING):  albuterol/ipratropium for Nebulization 3 milliLiter(s) Nebulizer every 6 hours  AQUAPHOR (petrolatum Ointment) 1 Application(s) Topical daily  chlorhexidine 4% Liquid 1 Application(s) Topical <User Schedule>  cyanocobalamin 1000 MICROGram(s) Oral daily  digoxin     Tablet 0.125 milliGRAM(s) Oral every other day  folic acid 1 milliGRAM(s) Oral daily  heparin  Injectable 5000 Unit(s) SubCutaneous two times a day  lactulose Syrup 20 Gram(s) Oral four times a day  midodrine 5 milliGRAM(s) Oral every 8 hours  rifAXIMin 550 milliGRAM(s) Oral two times a day  sodium zirconium cyclosilicate 5 Gram(s) Oral two times a day    MEDICATIONS  (PRN):  acetaminophen   Tablet .. 500 milliGRAM(s) Oral every 6 hours PRN Temp greater or equal to 38C (100.4F), Mild Pain (1 - 3)    Pertinent Labs: 02-10 @ 06:29: Na 142, BUN 38<H>, Cr 0.83, BG 78, K+ 4.1, Phos 2.9, Mg 2.2          Skin per nursing documentation:  no pressure injury  Edema: +3 generalized edema    Estimated Needs:   [x ] no change since previous assessment  [ ] recalculated:     Previous Nutrition Diagnosis: moderate malnutrition  Nutrition Diagnosis is: ongoing, pt changed to po diet since previous assessment, preferences and supplements being adjusted as needed.         Recommend  1)    Monitoring and Evaluation:     Continue to monitor Nutritional intake, Tolerance to diet prescription, weights, labs, skin integrity    RD remains available upon request and will follow up per protocol Nutrition Follow Up Note  Patient seen for: malnutrition follow up    SOB, COUGH, FEVER    Source: family at bedside, chart    Diet : dysphagia 2    Patient confused at times, sleeping at time of visit. family at bedside. discussed preferences and supplements with family.      PO intake : 1/3 of meals provided     Source for PO intake: family        Daily Weight in k.5 (02-10), Weight in k.1 (), Weight in k.3 (), Weight in k.3 (), Weight in k.4 (), Weight in k.5 (), Weight in k.1 ()  % Weight Change: 8% loss since previous assess on     Pertinent Medications: MEDICATIONS  (STANDING):  albuterol/ipratropium for Nebulization 3 milliLiter(s) Nebulizer every 6 hours  AQUAPHOR (petrolatum Ointment) 1 Application(s) Topical daily  chlorhexidine 4% Liquid 1 Application(s) Topical <User Schedule>  cyanocobalamin 1000 MICROGram(s) Oral daily  digoxin     Tablet 0.125 milliGRAM(s) Oral every other day  folic acid 1 milliGRAM(s) Oral daily  heparin  Injectable 5000 Unit(s) SubCutaneous two times a day  lactulose Syrup 20 Gram(s) Oral four times a day  midodrine 5 milliGRAM(s) Oral every 8 hours  rifAXIMin 550 milliGRAM(s) Oral two times a day  sodium zirconium cyclosilicate 5 Gram(s) Oral two times a day    MEDICATIONS  (PRN):  acetaminophen   Tablet .. 500 milliGRAM(s) Oral every 6 hours PRN Temp greater or equal to 38C (100.4F), Mild Pain (1 - 3)    Pertinent Labs: 02-10 @ 06:29: Na 142, BUN 38<H>, Cr 0.83, BG 78, K+ 4.1, Phos 2.9, Mg 2.2          Skin per nursing documentation:  no pressure injury  Edema: +3 generalized edema    Estimated Needs:   [x ] no change since previous assessment  [ ] recalculated:     Previous Nutrition Diagnosis: moderate malnutrition  Nutrition Diagnosis is: ongoing, pt changed to po diet since previous assessment, preferences and supplements being adjusted as needed.         Recommend  1) consider Vit D3 supplement given low Vit D 25 Hydroxy  2) continue Dysphagia 2 nectar consistency  3) changed mighty shakes from 2 at breakfast to 1 @ breakfast and 1 @ lunch  4) family considering low sodium diet depending upon how much sodium is provided in the soups. RD waiting upon Diet Office for return call.   5) obtained and honored preferences          RD remains available upon request and will follow up per protocol    Charline Saunders MA, HARMEET, CDN #[phone]

## 2020-02-11 LAB
ANION GAP SERPL CALC-SCNC: 11 MMOL/L — SIGNIFICANT CHANGE UP (ref 5–17)
BASOPHILS # BLD AUTO: 0.03 K/UL — SIGNIFICANT CHANGE UP (ref 0–0.2)
BASOPHILS NFR BLD AUTO: 0.6 % — SIGNIFICANT CHANGE UP (ref 0–2)
BUN SERPL-MCNC: 36 MG/DL — HIGH (ref 7–23)
CALCIUM SERPL-MCNC: 9.6 MG/DL — SIGNIFICANT CHANGE UP (ref 8.4–10.5)
CHLORIDE SERPL-SCNC: 104 MMOL/L — SIGNIFICANT CHANGE UP (ref 96–108)
CO2 SERPL-SCNC: 27 MMOL/L — SIGNIFICANT CHANGE UP (ref 22–31)
CREAT SERPL-MCNC: 0.85 MG/DL — SIGNIFICANT CHANGE UP (ref 0.5–1.3)
EOSINOPHIL # BLD AUTO: 0.05 K/UL — SIGNIFICANT CHANGE UP (ref 0–0.5)
EOSINOPHIL NFR BLD AUTO: 0.9 % — SIGNIFICANT CHANGE UP (ref 0–6)
GLUCOSE SERPL-MCNC: 106 MG/DL — HIGH (ref 70–99)
HCT VFR BLD CALC: 28.2 % — LOW (ref 34.5–45)
HGB BLD-MCNC: 8.7 G/DL — LOW (ref 11.5–15.5)
IMM GRANULOCYTES NFR BLD AUTO: 0.6 % — SIGNIFICANT CHANGE UP (ref 0–1.5)
LYMPHOCYTES # BLD AUTO: 0.73 K/UL — LOW (ref 1–3.3)
LYMPHOCYTES # BLD AUTO: 13.8 % — SIGNIFICANT CHANGE UP (ref 13–44)
MAGNESIUM SERPL-MCNC: 2.1 MG/DL — SIGNIFICANT CHANGE UP (ref 1.6–2.6)
MCHC RBC-ENTMCNC: 30.9 GM/DL — LOW (ref 32–36)
MCHC RBC-ENTMCNC: 34.8 PG — HIGH (ref 27–34)
MCV RBC AUTO: 112.8 FL — HIGH (ref 80–100)
MONOCYTES # BLD AUTO: 0.58 K/UL — SIGNIFICANT CHANGE UP (ref 0–0.9)
MONOCYTES NFR BLD AUTO: 10.9 % — SIGNIFICANT CHANGE UP (ref 2–14)
NEUTROPHILS # BLD AUTO: 3.88 K/UL — SIGNIFICANT CHANGE UP (ref 1.8–7.4)
NEUTROPHILS NFR BLD AUTO: 73.2 % — SIGNIFICANT CHANGE UP (ref 43–77)
NRBC # BLD: 0 /100 WBCS — SIGNIFICANT CHANGE UP (ref 0–0)
PHOSPHATE SERPL-MCNC: 2.7 MG/DL — SIGNIFICANT CHANGE UP (ref 2.5–4.5)
PLATELET # BLD AUTO: 137 K/UL — LOW (ref 150–400)
POTASSIUM SERPL-MCNC: 3.7 MMOL/L — SIGNIFICANT CHANGE UP (ref 3.5–5.3)
POTASSIUM SERPL-SCNC: 3.7 MMOL/L — SIGNIFICANT CHANGE UP (ref 3.5–5.3)
RBC # BLD: 2.5 M/UL — LOW (ref 3.8–5.2)
RBC # FLD: 19.7 % — HIGH (ref 10.3–14.5)
SODIUM SERPL-SCNC: 140 MMOL/L — SIGNIFICANT CHANGE UP (ref 135–145)
WBC # BLD: 5.3 K/UL — SIGNIFICANT CHANGE UP (ref 3.8–10.5)
WBC # FLD AUTO: 5.3 K/UL — SIGNIFICANT CHANGE UP (ref 3.8–10.5)

## 2020-02-11 PROCEDURE — 99233 SBSQ HOSP IP/OBS HIGH 50: CPT | Mod: GC

## 2020-02-11 RX ADMIN — Medication 3 MILLILITER(S): at 17:56

## 2020-02-11 RX ADMIN — Medication 3 MILLILITER(S): at 14:05

## 2020-02-11 RX ADMIN — Medication 1 MILLIGRAM(S): at 14:06

## 2020-02-11 RX ADMIN — Medication 3 MILLILITER(S): at 05:04

## 2020-02-11 RX ADMIN — Medication 25 MICROGRAM(S): at 05:07

## 2020-02-11 RX ADMIN — PREGABALIN 1000 MICROGRAM(S): 225 CAPSULE ORAL at 17:56

## 2020-02-11 RX ADMIN — HEPARIN SODIUM 5000 UNIT(S): 5000 INJECTION INTRAVENOUS; SUBCUTANEOUS at 17:56

## 2020-02-11 RX ADMIN — LACTULOSE 20 GRAM(S): 10 SOLUTION ORAL at 14:04

## 2020-02-11 RX ADMIN — LACTULOSE 20 GRAM(S): 10 SOLUTION ORAL at 05:04

## 2020-02-11 RX ADMIN — MIDODRINE HYDROCHLORIDE 5 MILLIGRAM(S): 2.5 TABLET ORAL at 21:45

## 2020-02-11 RX ADMIN — SODIUM ZIRCONIUM CYCLOSILICATE 5 GRAM(S): 10 POWDER, FOR SUSPENSION ORAL at 05:04

## 2020-02-11 RX ADMIN — MIDODRINE HYDROCHLORIDE 5 MILLIGRAM(S): 2.5 TABLET ORAL at 14:06

## 2020-02-11 RX ADMIN — Medication 1 APPLICATION(S): at 14:04

## 2020-02-11 RX ADMIN — LACTULOSE 20 GRAM(S): 10 SOLUTION ORAL at 17:56

## 2020-02-11 RX ADMIN — CHLORHEXIDINE GLUCONATE 1 APPLICATION(S): 213 SOLUTION TOPICAL at 13:37

## 2020-02-11 RX ADMIN — HEPARIN SODIUM 5000 UNIT(S): 5000 INJECTION INTRAVENOUS; SUBCUTANEOUS at 05:04

## 2020-02-11 RX ADMIN — SODIUM ZIRCONIUM CYCLOSILICATE 5 GRAM(S): 10 POWDER, FOR SUSPENSION ORAL at 17:57

## 2020-02-11 RX ADMIN — Medication 0.12 MILLIGRAM(S): at 14:05

## 2020-02-11 NOTE — PROGRESS NOTE ADULT - ASSESSMENT
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 Pt last admit 1/6-9 s/p fall OOB now re-admitted from [facility] w AMS 2/2 Septic Shock 2/2 Serratia Bacteremia / Klebsiella ESBL urosepsis, and PNA w Serratia in the sputum, with candida glabrata in blood cx from 1/21, now s/p antibiotic.

## 2020-02-11 NOTE — PROGRESS NOTE ADULT - PROBLEM SELECTOR PLAN 9
DVT: HSQ  Diet: dysphagia 2 w/ honey thickened  Dispo:  home hospice DVT: HSQ  Diet: dysphagia 2 w/ honey thickened  Dispo: home hospice

## 2020-02-11 NOTE — PROGRESS NOTE ADULT - ATTENDING COMMENTS
stable clinical status  daily discussions with family at bedside about discharge plan - seem non-committal about discharge disposition and timeline despite our best efforts to educate and inform.

## 2020-02-11 NOTE — PROGRESS NOTE ADULT - SUBJECTIVE AND OBJECTIVE BOX
Lisa Fabian MD   [facility] Medicine  Pager [phone]      PROGRESS NOTE:     Patient is a 89y old  Female who presents with a chief complaint of serratia bacteremia (10 Feb 2020 11:54)      SUBJECTIVE / OVERNIGHT EVENTS:    No acute events overnight. Patient examined at bedside with no acute complaints.     Denies SOB, chest pain, nausea, vomiting, diarrhea, constipation.    MEDICATIONS  (STANDING):  albuterol/ipratropium for Nebulization 3 milliLiter(s) Nebulizer every 6 hours  AQUAPHOR (petrolatum Ointment) 1 Application(s) Topical daily  chlorhexidine 4% Liquid 1 Application(s) Topical <User Schedule>  cyanocobalamin 1000 MICROGram(s) Oral daily  digoxin     Tablet 0.125 milliGRAM(s) Oral every other day  folic acid 1 milliGRAM(s) Oral daily  heparin  Injectable 5000 Unit(s) SubCutaneous two times a day  lactulose Syrup 20 Gram(s) Oral four times a day  levothyroxine 25 MICROGram(s) Oral daily  midodrine 5 milliGRAM(s) Oral every 8 hours  rifAXIMin 550 milliGRAM(s) Oral two times a day  sodium zirconium cyclosilicate 5 Gram(s) Oral two times a day    MEDICATIONS  (PRN):  acetaminophen   Tablet .. 500 milliGRAM(s) Oral every 6 hours PRN Temp greater or equal to 38C (100.4F), Mild Pain (1 - 3)      CAPILLARY BLOOD GLUCOSE        I&O's Summary    10 Feb 2020 07:01  -  11 Feb 2020 07:00  --------------------------------------------------------  IN: 180 mL / OUT: 450 mL / NET: -270 mL        PHYSICAL EXAM:  Vital Signs Last 24 Hrs  T(C): 36.8 (11 Feb 2020 04:30), Max: 36.8 (11 Feb 2020 04:30)  T(F): 98.3 (11 Feb 2020 04:30), Max: 98.3 (11 Feb 2020 04:30)  HR: 88 (11 Feb 2020 04:30) (68 - 88)  BP: 127/55 (11 Feb 2020 04:30) (102/58 - 127/55)  BP(mean): --  RR: 15 (11 Feb 2020 04:30) (15 - 19)  SpO2: 100% (11 Feb 2020 04:30) (96% - 100%)    CONSTITUTIONAL: NAD, pleasant elderly female, conversant   RESPIRATORY: CTABL  CARDIOVASCULAR: 3/6 harsh systolic murmur   ABDOMEN: soft, nt, nd  PSYCH: AOx3   NEUROLOGY: grossly intact  SKIN: skin tears b/l UE, b/l ecchymoses LE, bandaged c/d/i    LABS:    02-10    142  |  107  |  38<H>  ----------------------------<  78  4.1   |  26  |  0.83    Ca    9.9      10 Feb 2020 06:29  Phos  2.9     02-10  Mg     2.2     02-10                  RADIOLOGY & ADDITIONAL TESTS:  Results Reviewed:   Imaging Personally Reviewed:  Electrocardiogram Personally Reviewed:    COORDINATION OF CARE:  Care Discussed with Consultants/Other Providers [Y/N]:  Prior or Outpatient Records Reviewed [Y/N]:

## 2020-02-12 PROCEDURE — 99233 SBSQ HOSP IP/OBS HIGH 50: CPT | Mod: GC

## 2020-02-12 RX ORDER — BUDESONIDE, MICRONIZED 100 %
2 POWDER (GRAM) MISCELLANEOUS
Qty: 0 | Refills: 0 | DISCHARGE

## 2020-02-12 RX ORDER — PREGABALIN 225 MG/1
1 CAPSULE ORAL
Qty: 30 | Refills: 0
Start: 2020-02-12 | End: 2020-03-12

## 2020-02-12 RX ORDER — FOLIC ACID 0.8 MG
1 TABLET ORAL
Qty: 30 | Refills: 0
Start: 2020-02-12 | End: 2020-03-12

## 2020-02-12 RX ORDER — MIDODRINE HYDROCHLORIDE 2.5 MG/1
1 TABLET ORAL
Qty: 90 | Refills: 0
Start: 2020-02-12 | End: 2020-03-12

## 2020-02-12 RX ADMIN — MIDODRINE HYDROCHLORIDE 5 MILLIGRAM(S): 2.5 TABLET ORAL at 13:24

## 2020-02-12 RX ADMIN — SODIUM ZIRCONIUM CYCLOSILICATE 5 GRAM(S): 10 POWDER, FOR SUSPENSION ORAL at 15:49

## 2020-02-12 RX ADMIN — LACTULOSE 20 GRAM(S): 10 SOLUTION ORAL at 18:31

## 2020-02-12 RX ADMIN — Medication 3 MILLILITER(S): at 00:29

## 2020-02-12 RX ADMIN — PREGABALIN 1000 MICROGRAM(S): 225 CAPSULE ORAL at 13:26

## 2020-02-12 RX ADMIN — MIDODRINE HYDROCHLORIDE 5 MILLIGRAM(S): 2.5 TABLET ORAL at 05:38

## 2020-02-12 RX ADMIN — Medication 3 MILLILITER(S): at 18:31

## 2020-02-12 RX ADMIN — CHLORHEXIDINE GLUCONATE 1 APPLICATION(S): 213 SOLUTION TOPICAL at 11:00

## 2020-02-12 RX ADMIN — Medication 1 MILLIGRAM(S): at 13:24

## 2020-02-12 RX ADMIN — HEPARIN SODIUM 5000 UNIT(S): 5000 INJECTION INTRAVENOUS; SUBCUTANEOUS at 18:31

## 2020-02-12 RX ADMIN — Medication 3 MILLILITER(S): at 13:24

## 2020-02-12 RX ADMIN — LACTULOSE 20 GRAM(S): 10 SOLUTION ORAL at 05:38

## 2020-02-12 RX ADMIN — Medication 25 MICROGRAM(S): at 05:38

## 2020-02-12 RX ADMIN — LACTULOSE 20 GRAM(S): 10 SOLUTION ORAL at 13:17

## 2020-02-12 RX ADMIN — HEPARIN SODIUM 5000 UNIT(S): 5000 INJECTION INTRAVENOUS; SUBCUTANEOUS at 05:38

## 2020-02-12 RX ADMIN — LACTULOSE 20 GRAM(S): 10 SOLUTION ORAL at 00:28

## 2020-02-12 RX ADMIN — Medication 3 MILLILITER(S): at 05:38

## 2020-02-12 RX ADMIN — LACTULOSE 20 GRAM(S): 10 SOLUTION ORAL at 23:21

## 2020-02-12 RX ADMIN — Medication 1 APPLICATION(S): at 13:25

## 2020-02-12 RX ADMIN — SODIUM ZIRCONIUM CYCLOSILICATE 5 GRAM(S): 10 POWDER, FOR SUSPENSION ORAL at 08:25

## 2020-02-12 RX ADMIN — Medication 3 MILLILITER(S): at 23:21

## 2020-02-12 NOTE — CHART NOTE - NSCHARTNOTEFT_GEN_A_CORE
Letter of medical necessity for standard wheelchair -     DX: COPD ICD J44.9     Due to condition, patient has severe mobility limitation and requires a standard wheelchair to assist in daily ADLs. Patient cannot ambulate safely with a cane or walker. Patient has sufficient upper body strength to self propel said wheelchair and has not expressed an unwillingness to use wheelchair. Patient has ample room in the home and a caregiver to assist with the wheelchair. Use of a manual wheelchair will significantly improve the patient's ability to participate in ADLs and the patient will use it on a regular basis in the home.

## 2020-02-12 NOTE — PROGRESS NOTE ADULT - ATTENDING COMMENTS
comfortably sleeping, arousable  continue med mgmt  await family to take her home with/without hospice  no further inpatient needs

## 2020-02-12 NOTE — PROGRESS NOTE ADULT - ASSESSMENT
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 Pt last admit 1/6-9 s/p fall OOB now re-admitted from [facility] w AMS 2/2 Septic Shock 2/2 Serratia Bacteremia / Klebsiella ESBL urosepsis, and PNA w Serratia in the sputum, with candida glabrata in blood cx from 1/21, now s/p antibiotic.

## 2020-02-12 NOTE — GOALS OF CARE CONVERSATION - ADVANCED CARE PLANNING - CONVERSATION DETAILS
Pt was referred by the Palliative Care Team. Met w/ the pt's dtrs Paula, hospice services discussed at length. Family is interested in home hospice services; awaiting approval for admission from the [facility] Medical Director. Thank you.
[facility] - Received telephone call from the Patient's Daughter Dee Dee. Patient/Family will be pursuing treatment options. Will not be in need of hospice services at this time.
Adrianne contreras and mauri present at bedside, and reaffirm the previously signed MOLSTs that states patietn is ok with rial of pressors, Bipap, temporary NGT but not intubation CPR or peg.
Palliative consulted on this case to assist with GOC discussion in Patient with advanced illness. TRISTIN and NP Capri met with Patient's daughters Marisa and Julio, as well as sister-in-law Lorrie khan for family meeting.    Capri first provided overview of Patient's current medical status, and discussed the next available steps in Patient's healthcare moving forward, including PEG placement and artificial nutrition. Capri discussed the risks and benefits to PEG placement, including that the risk of aspiration is not decreased from PEG placement, and Patient's family verbalized understanding. Patient's family states that, at present, they are unsure about the appropriate next steps for Patient, as Patient has presented to them as intermittently lucid, and they are hesitant to make any major decisions with Patient in this state. Patient's family also states that Patient is scheduled to continue anti-fungal medications until 4/8, and they would like to see Patient's mental status at that point prior to making any decisions. Patient's daughters state they plan to continue discussing artificial nutrition further amongst themselves in the meantime, however, in the event Patient's mental status has not improved.    Patient's family then inquired into the difference between hospice and palliative medicine. Capri and TRISTIN provided overviews of both programs, and discussed that Palliative assists in identifying goals and managing symptoms, while hospice managed symptoms at end-of-life. Patient's family verbalized understanding.    TRISTIN and Capri inquired into Patient's family coping, and Patient's daughters note feeling that they are coping appropriately, but feeling overwhelmed with Patient's care. Patient's primary caregiver Julio states she has begun to feel back pain due to the phsyical assistance Patient requires daily. Julio states that, in Patient's current condition, d/c home may not be the most appropriate plan, even with hospice services. TRISTIN and Capri encouraged Patient's family to consider this in the goals for their mother, while also encouraging self-care for Patient's children. Much emotional support, active listening, and room for ventilation of feelings provided.    Palliative will continue to follow this case for ongoing GOC discussion.

## 2020-02-12 NOTE — GOALS OF CARE CONVERSATION - ADVANCED CARE PLANNING - NS PRO AD PATIENT TYPE
Do Not Resuscitate (DNR)/Health Care Proxy (HCP)
Health Care Proxy (HCP)/Do Not Resuscitate (DNR)
Do Not Resuscitate (DNR)/Health Care Proxy (HCP)

## 2020-02-12 NOTE — PROGRESS NOTE ADULT - SUBJECTIVE AND OBJECTIVE BOX
Lisa Fabian MD   [facility] Medicine  Pager [phone]      PROGRESS NOTE:     Patient is a 89y old  Female who presents with a chief complaint of bacteremia (11 Feb 2020 07:57)      SUBJECTIVE / OVERNIGHT EVENTS:    No acute events overnight. Patient examined at bedside with no acute complaints.     Denies SOB, chest pain, nausea, vomiting, diarrhea, constipation.    MEDICATIONS  (STANDING):  albuterol/ipratropium for Nebulization 3 milliLiter(s) Nebulizer every 6 hours  AQUAPHOR (petrolatum Ointment) 1 Application(s) Topical daily  chlorhexidine 4% Liquid 1 Application(s) Topical <User Schedule>  cyanocobalamin 1000 MICROGram(s) Oral daily  digoxin     Tablet 0.125 milliGRAM(s) Oral every other day  folic acid 1 milliGRAM(s) Oral daily  heparin  Injectable 5000 Unit(s) SubCutaneous two times a day  lactulose Syrup 20 Gram(s) Oral four times a day  levothyroxine 25 MICROGram(s) Oral daily  midodrine 5 milliGRAM(s) Oral every 8 hours  rifAXIMin 550 milliGRAM(s) Oral two times a day  sodium zirconium cyclosilicate 5 Gram(s) Oral two times a day    MEDICATIONS  (PRN):  acetaminophen   Tablet .. 500 milliGRAM(s) Oral every 6 hours PRN Temp greater or equal to 38C (100.4F), Mild Pain (1 - 3)      CAPILLARY BLOOD GLUCOSE        I&O's Summary    11 Feb 2020 07:01  -  12 Feb 2020 07:00  --------------------------------------------------------  IN: 460 mL / OUT: 400 mL / NET: 60 mL        PHYSICAL EXAM:  Vital Signs Last 24 Hrs  T(C): 37 (12 Feb 2020 04:12), Max: 37 (12 Feb 2020 04:12)  T(F): 98.6 (12 Feb 2020 04:12), Max: 98.6 (12 Feb 2020 04:12)  HR: 94 (12 Feb 2020 04:22) (81 - 97)  BP: 104/58 (12 Feb 2020 04:22) (99/40 - 111/63)  BP(mean): --  RR: 18 (12 Feb 2020 04:12) (18 - 18)  SpO2: 96% (12 Feb 2020 04:12) (96% - 98%)    CONSTITUTIONAL: NAD, pleasant elderly female, conversant   RESPIRATORY: CTABL  CARDIOVASCULAR: 3/6 harsh systolic murmur   ABDOMEN: soft, nt, nd  PSYCH: AOx3   NEUROLOGY: grossly intact  SKIN: skin tears b/l UE, b/l ecchymoses LE, bandaged c/d/i    LABS:                        8.7    5.30  )-----------( 137      ( 11 Feb 2020 11:22 )             28.2     02-11    140  |  104  |  36<H>  ----------------------------<  106<H>  3.7   |  27  |  0.85    Ca    9.6      11 Feb 2020 11:22  Phos  2.7     02-11  Mg     2.1     02-11                  RADIOLOGY & ADDITIONAL TESTS:  Results Reviewed:   Imaging Personally Reviewed:  Electrocardiogram Personally Reviewed:    COORDINATION OF CARE:  Care Discussed with Consultants/Other Providers [Y/N]:  Prior or Outpatient Records Reviewed [Y/N]:

## 2020-02-13 ENCOUNTER — TRANSCRIPTION ENCOUNTER (OUTPATIENT)
Age: 85
End: 2020-02-13

## 2020-02-13 VITALS
OXYGEN SATURATION: 94 % | DIASTOLIC BLOOD PRESSURE: 72 MMHG | SYSTOLIC BLOOD PRESSURE: 137 MMHG | RESPIRATION RATE: 18 BRPM

## 2020-02-13 LAB — METHYLMALONATE SERPL-SCNC: 328 NMOL/L — SIGNIFICANT CHANGE UP (ref 0–378)

## 2020-02-13 PROCEDURE — 87040 BLOOD CULTURE FOR BACTERIA: CPT

## 2020-02-13 PROCEDURE — 36569 INSJ PICC 5 YR+ W/O IMAGING: CPT

## 2020-02-13 PROCEDURE — 85027 COMPLETE CBC AUTOMATED: CPT

## 2020-02-13 PROCEDURE — 71045 X-RAY EXAM CHEST 1 VIEW: CPT

## 2020-02-13 PROCEDURE — 96365 THER/PROPH/DIAG IV INF INIT: CPT | Mod: XU

## 2020-02-13 PROCEDURE — 83921 ORGANIC ACID SINGLE QUANT: CPT

## 2020-02-13 PROCEDURE — 99239 HOSP IP/OBS DSCHRG MGMT >30: CPT

## 2020-02-13 PROCEDURE — 83605 ASSAY OF LACTIC ACID: CPT

## 2020-02-13 PROCEDURE — 87150 DNA/RNA AMPLIFIED PROBE: CPT

## 2020-02-13 PROCEDURE — 96375 TX/PRO/DX INJ NEW DRUG ADDON: CPT | Mod: XU

## 2020-02-13 PROCEDURE — 96368 THER/DIAG CONCURRENT INF: CPT

## 2020-02-13 PROCEDURE — 71250 CT THORAX DX C-: CPT

## 2020-02-13 PROCEDURE — 84132 ASSAY OF SERUM POTASSIUM: CPT

## 2020-02-13 PROCEDURE — 86901 BLOOD TYPING SEROLOGIC RH(D): CPT

## 2020-02-13 PROCEDURE — 85610 PROTHROMBIN TIME: CPT

## 2020-02-13 PROCEDURE — 94664 DEMO&/EVAL PT USE INHALER: CPT

## 2020-02-13 PROCEDURE — 72125 CT NECK SPINE W/O DYE: CPT

## 2020-02-13 PROCEDURE — 82962 GLUCOSE BLOOD TEST: CPT

## 2020-02-13 PROCEDURE — 84466 ASSAY OF TRANSFERRIN: CPT

## 2020-02-13 PROCEDURE — 87449 NOS EACH ORGANISM AG IA: CPT

## 2020-02-13 PROCEDURE — 82947 ASSAY GLUCOSE BLOOD QUANT: CPT

## 2020-02-13 PROCEDURE — 84100 ASSAY OF PHOSPHORUS: CPT

## 2020-02-13 PROCEDURE — 72170 X-RAY EXAM OF PELVIS: CPT

## 2020-02-13 PROCEDURE — 92610 EVALUATE SWALLOWING FUNCTION: CPT

## 2020-02-13 PROCEDURE — C1751: CPT

## 2020-02-13 PROCEDURE — 70450 CT HEAD/BRAIN W/O DYE: CPT

## 2020-02-13 PROCEDURE — 83540 ASSAY OF IRON: CPT

## 2020-02-13 PROCEDURE — 82803 BLOOD GASES ANY COMBINATION: CPT

## 2020-02-13 PROCEDURE — 87186 SC STD MICRODIL/AGAR DIL: CPT

## 2020-02-13 PROCEDURE — 80053 COMPREHEN METABOLIC PANEL: CPT

## 2020-02-13 PROCEDURE — 93306 TTE W/DOPPLER COMPLETE: CPT

## 2020-02-13 PROCEDURE — 97110 THERAPEUTIC EXERCISES: CPT

## 2020-02-13 PROCEDURE — 76705 ECHO EXAM OF ABDOMEN: CPT

## 2020-02-13 PROCEDURE — 99291 CRITICAL CARE FIRST HOUR: CPT | Mod: 25

## 2020-02-13 PROCEDURE — 87086 URINE CULTURE/COLONY COUNT: CPT

## 2020-02-13 PROCEDURE — 87070 CULTURE OTHR SPECIMN AEROBIC: CPT

## 2020-02-13 PROCEDURE — 94640 AIRWAY INHALATION TREATMENT: CPT

## 2020-02-13 PROCEDURE — 97112 NEUROMUSCULAR REEDUCATION: CPT

## 2020-02-13 PROCEDURE — 82330 ASSAY OF CALCIUM: CPT

## 2020-02-13 PROCEDURE — 97530 THERAPEUTIC ACTIVITIES: CPT

## 2020-02-13 PROCEDURE — 82728 ASSAY OF FERRITIN: CPT

## 2020-02-13 PROCEDURE — 81001 URINALYSIS AUTO W/SCOPE: CPT

## 2020-02-13 PROCEDURE — 97161 PT EVAL LOW COMPLEX 20 MIN: CPT

## 2020-02-13 PROCEDURE — 92526 ORAL FUNCTION THERAPY: CPT

## 2020-02-13 PROCEDURE — 84295 ASSAY OF SERUM SODIUM: CPT

## 2020-02-13 PROCEDURE — 83090 ASSAY OF HOMOCYSTEINE: CPT

## 2020-02-13 PROCEDURE — 97162 PT EVAL MOD COMPLEX 30 MIN: CPT

## 2020-02-13 PROCEDURE — 82565 ASSAY OF CREATININE: CPT

## 2020-02-13 PROCEDURE — 93308 TTE F-UP OR LMTD: CPT

## 2020-02-13 PROCEDURE — 82140 ASSAY OF AMMONIA: CPT

## 2020-02-13 PROCEDURE — 36555 INSERT NON-TUNNEL CV CATH: CPT

## 2020-02-13 PROCEDURE — 87798 DETECT AGENT NOS DNA AMP: CPT

## 2020-02-13 PROCEDURE — 74018 RADEX ABDOMEN 1 VIEW: CPT

## 2020-02-13 PROCEDURE — 82533 TOTAL CORTISOL: CPT

## 2020-02-13 PROCEDURE — 76937 US GUIDE VASCULAR ACCESS: CPT

## 2020-02-13 PROCEDURE — 86850 RBC ANTIBODY SCREEN: CPT

## 2020-02-13 PROCEDURE — 87486 CHLMYD PNEUM DNA AMP PROBE: CPT

## 2020-02-13 PROCEDURE — 86900 BLOOD TYPING SEROLOGIC ABO: CPT

## 2020-02-13 PROCEDURE — 85014 HEMATOCRIT: CPT

## 2020-02-13 PROCEDURE — 82435 ASSAY OF BLOOD CHLORIDE: CPT

## 2020-02-13 PROCEDURE — 83735 ASSAY OF MAGNESIUM: CPT

## 2020-02-13 PROCEDURE — 80162 ASSAY OF DIGOXIN TOTAL: CPT

## 2020-02-13 PROCEDURE — 73110 X-RAY EXAM OF WRIST: CPT

## 2020-02-13 PROCEDURE — 87633 RESP VIRUS 12-25 TARGETS: CPT

## 2020-02-13 PROCEDURE — 93005 ELECTROCARDIOGRAM TRACING: CPT

## 2020-02-13 PROCEDURE — 97116 GAIT TRAINING THERAPY: CPT

## 2020-02-13 PROCEDURE — 83550 IRON BINDING TEST: CPT

## 2020-02-13 PROCEDURE — 87181 SC STD AGAR DILUTION PER AGT: CPT

## 2020-02-13 PROCEDURE — 80048 BASIC METABOLIC PNL TOTAL CA: CPT

## 2020-02-13 PROCEDURE — 82550 ASSAY OF CK (CPK): CPT

## 2020-02-13 PROCEDURE — 87581 M.PNEUMON DNA AMP PROBE: CPT

## 2020-02-13 PROCEDURE — 96366 THER/PROPH/DIAG IV INF ADDON: CPT

## 2020-02-13 PROCEDURE — 85730 THROMBOPLASTIN TIME PARTIAL: CPT

## 2020-02-13 RX ORDER — MIDODRINE HYDROCHLORIDE 2.5 MG/1
1 TABLET ORAL
Qty: 90 | Refills: 0
Start: 2020-02-13 | End: 2020-03-13

## 2020-02-13 RX ORDER — LACTULOSE 10 G/15ML
20 SOLUTION ORAL ONCE
Refills: 0 | Status: COMPLETED | OUTPATIENT
Start: 2020-02-13 | End: 2020-02-13

## 2020-02-13 RX ORDER — SODIUM ZIRCONIUM CYCLOSILICATE 10 G/10G
5 POWDER, FOR SUSPENSION ORAL
Qty: 300 | Refills: 0
Start: 2020-02-13 | End: 2020-03-13

## 2020-02-13 RX ADMIN — Medication 500 MILLIGRAM(S): at 00:36

## 2020-02-13 RX ADMIN — Medication 3 MILLILITER(S): at 13:17

## 2020-02-13 RX ADMIN — LACTULOSE 20 GRAM(S): 10 SOLUTION ORAL at 09:21

## 2020-02-13 RX ADMIN — Medication 1 MILLIGRAM(S): at 13:17

## 2020-02-13 RX ADMIN — LACTULOSE 20 GRAM(S): 10 SOLUTION ORAL at 05:03

## 2020-02-13 RX ADMIN — LACTULOSE 20 GRAM(S): 10 SOLUTION ORAL at 13:16

## 2020-02-13 RX ADMIN — Medication 500 MILLIGRAM(S): at 01:06

## 2020-02-13 RX ADMIN — Medication 3 MILLILITER(S): at 05:02

## 2020-02-13 RX ADMIN — Medication 25 MICROGRAM(S): at 05:03

## 2020-02-13 RX ADMIN — Medication 1 APPLICATION(S): at 13:18

## 2020-02-13 RX ADMIN — HEPARIN SODIUM 5000 UNIT(S): 5000 INJECTION INTRAVENOUS; SUBCUTANEOUS at 05:03

## 2020-02-13 RX ADMIN — Medication 0.12 MILLIGRAM(S): at 13:16

## 2020-02-13 RX ADMIN — PREGABALIN 1000 MICROGRAM(S): 225 CAPSULE ORAL at 13:17

## 2020-02-13 RX ADMIN — MIDODRINE HYDROCHLORIDE 5 MILLIGRAM(S): 2.5 TABLET ORAL at 13:17

## 2020-02-13 RX ADMIN — MIDODRINE HYDROCHLORIDE 5 MILLIGRAM(S): 2.5 TABLET ORAL at 05:03

## 2020-02-13 RX ADMIN — CHLORHEXIDINE GLUCONATE 1 APPLICATION(S): 213 SOLUTION TOPICAL at 11:56

## 2020-02-13 NOTE — DISCHARGE NOTE NURSING/CASE MANAGEMENT/SOCIAL WORK - PATIENT PORTAL LINK FT
You can access the FollowMyHealth Patient Portal offered by [facility] by registering at the following website: [email or web address]. By joining [employer]’s FollowMyHealth portal, you will also be able to view your health information using other applications (apps) compatible with our system.

## 2020-02-13 NOTE — PROGRESS NOTE ADULT - PROBLEM SELECTOR PLAN 9
DVT: HSQ  Diet: dysphagia 2 w/ honey thickened  Dispo: home hospice DVT: HSQ  Diet: dysphagia 2 w/ honey thickened  Dispo: home

## 2020-02-13 NOTE — PROGRESS NOTE ADULT - REASON FOR ADMISSION
AMS/LYNNE- Hypoxia / Septic Shock
Hypoxic Respiratory Failure 2/2 PNA / Septic Shock- bacteremia  / urosepis
sepsis 2/2 to UTI vs PNA
SOB, COUGH, FEVER
sepsis 2/2 to UTI vs PNA
bacteremia
lizz
sepsis 2/2 bacteremia and fungemia
sepsis 2/2 bacteremia and fungemia
sepsis 2/2 to UTI vs PNA
serratia bacteremia

## 2020-02-13 NOTE — PROGRESS NOTE ADULT - SUBJECTIVE AND OBJECTIVE BOX
Lisa Fabian MD   [facility] Medicine  Pager [phone]      PROGRESS NOTE:     Patient is a 89y old  Female who presents with a chief complaint of bacteremia (12 Feb 2020 07:46)      SUBJECTIVE / OVERNIGHT EVENTS:    No acute events overnight. Patient examined at bedside with no acute complaints.     Denies SOB, chest pain, nausea, vomiting, diarrhea, constipation.    MEDICATIONS  (STANDING):  albuterol/ipratropium for Nebulization 3 milliLiter(s) Nebulizer every 6 hours  AQUAPHOR (petrolatum Ointment) 1 Application(s) Topical daily  chlorhexidine 4% Liquid 1 Application(s) Topical <User Schedule>  cyanocobalamin 1000 MICROGram(s) Oral daily  digoxin     Tablet 0.125 milliGRAM(s) Oral every other day  folic acid 1 milliGRAM(s) Oral daily  heparin  Injectable 5000 Unit(s) SubCutaneous two times a day  lactulose Syrup 20 Gram(s) Oral four times a day  levothyroxine 25 MICROGram(s) Oral daily  midodrine 5 milliGRAM(s) Oral every 8 hours  rifAXIMin 550 milliGRAM(s) Oral two times a day  sodium zirconium cyclosilicate 5 Gram(s) Oral two times a day    MEDICATIONS  (PRN):  acetaminophen   Tablet .. 500 milliGRAM(s) Oral every 6 hours PRN Temp greater or equal to 38C (100.4F), Mild Pain (1 - 3)      CAPILLARY BLOOD GLUCOSE        I&O's Summary    12 Feb 2020 07:01  -  13 Feb 2020 07:00  --------------------------------------------------------  IN: 360 mL / OUT: 300 mL / NET: 60 mL        PHYSICAL EXAM:  Vital Signs Last 24 Hrs  T(C): 36.6 (13 Feb 2020 04:24), Max: 36.8 (12 Feb 2020 22:14)  T(F): 97.8 (13 Feb 2020 04:24), Max: 98.3 (12 Feb 2020 22:14)  HR: 93 (13 Feb 2020 04:24) (70 - 97)  BP: 110/71 (13 Feb 2020 04:24) (103/57 - 116/65)  BP(mean): --  RR: 18 (13 Feb 2020 04:24) (16 - 19)  SpO2: 97% (13 Feb 2020 04:24) (96% - 100%)    CONSTITUTIONAL: NAD, pleasant elderly female, conversant   RESPIRATORY: CTABL  CARDIOVASCULAR: 3/6 harsh systolic murmur   ABDOMEN: soft, nt, nd  PSYCH: AOx3   NEUROLOGY: grossly intact  SKIN: skin tears b/l UE, b/l ecchymoses LE, bandaged c/d/i    LABS:                        8.7    5.30  )-----------( 137      ( 11 Feb 2020 11:22 )             28.2     02-11    140  |  104  |  36<H>  ----------------------------<  106<H>  3.7   |  27  |  0.85    Ca    9.6      11 Feb 2020 11:22  Phos  2.7     02-11  Mg     2.1     02-11                  RADIOLOGY & ADDITIONAL TESTS:  Results Reviewed:   Imaging Personally Reviewed:  Electrocardiogram Personally Reviewed:    COORDINATION OF CARE:  Care Discussed with Consultants/Other Providers [Y/N]:  Prior or Outpatient Records Reviewed [Y/N]:

## 2020-02-13 NOTE — PROGRESS NOTE ADULT - ASSESSMENT
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 Pt last admit 1/6-9 s/p fall OOB now re-admitted from [facility] w AMS 2/2 Septic Shock 2/2 Serratia Bacteremia / Klebsiella ESBL urosepsis, and PNA w Serratia in the sputum, with candida glabrata in blood cx from 1/21, now s/p antibiotic.

## 2020-02-13 NOTE — PROGRESS NOTE ADULT - PROBLEM SELECTOR PLAN 10
Transitions of Care Status:  1.  Name of PCP: Fallon Celaya  2.  PCP Contacted on Admission: [ ] Y    [ ] N    3.  PCP contacted at Discharge: [ ] Y    [ ] N    [ ] N/A  4.  Post-Discharge Appointment Date and Location:  5.  Summary of Handoff given to PCP: updated PCP 2/4 Transitions of Care Status:  1.  Name of PCP: Fallon Celaya  2.  PCP Contacted on Admission: [ ] Y    [ ] N    3.  PCP contacted at Discharge: [x ] Y    [ ] N    [ ] N/A  4.  Post-Discharge Appointment Date and Location:  5.  Summary of Handoff given to PCP: Emailed PCP upon discharge

## 2020-02-13 NOTE — PROGRESS NOTE ADULT - PROVIDER SPECIALTY LIST ADULT
Cardiology
Gastroenterology
Infectious Disease
Internal Medicine
MICU
Palliative Care
Internal Medicine

## 2020-02-13 NOTE — PROGRESS NOTE ADULT - NSHPATTENDINGPLANDISCUSS_GEN_ALL_CORE
cardiology fellow; patient seen and examined.       I was physically present for the key portions of the evaluation and management (E/M) service provided.    I agree with the above history, physical, and plan which I have reviewed and edited where appropriate.
ALMA DELIA
MDT, daughter
ALMA DELIA
ALMA DELIA

## 2020-02-13 NOTE — PROGRESS NOTE ADULT - ATTENDING COMMENTS
getting salon services in chair  appears happy and comfortable  eager to go home  family agreeable for discharge.  no further inpatient needs  no asterixis

## 2020-02-13 NOTE — PROGRESS NOTE ADULT - PROBLEM SELECTOR PROBLEM 9
Prophylactic measure

## 2020-02-18 ENCOUNTER — APPOINTMENT (OUTPATIENT)
Dept: HEPATOLOGY | Facility: CLINIC | Age: 85
End: 2020-02-18

## 2020-02-21 ENCOUNTER — APPOINTMENT (OUTPATIENT)
Dept: CARDIOLOGY | Facility: CLINIC | Age: 85
End: 2020-02-21

## 2020-03-02 ENCOUNTER — RX RENEWAL (OUTPATIENT)
Age: 85
End: 2020-03-02

## 2020-03-02 ENCOUNTER — INPATIENT (INPATIENT)
Facility: HOSPITAL | Age: 85
LOS: 11 days | DRG: 871 | End: 2020-03-14
Attending: HOSPITALIST | Admitting: HOSPITALIST
Payer: MEDICARE

## 2020-03-02 VITALS — WEIGHT: 132.28 LBS | OXYGEN SATURATION: 90 % | HEART RATE: 110 BPM | HEIGHT: 65 IN | SYSTOLIC BLOOD PRESSURE: 80 MMHG

## 2020-03-02 DIAGNOSIS — Z98.89 OTHER SPECIFIED POSTPROCEDURAL STATES: Chronic | ICD-10-CM

## 2020-03-02 LAB
ALBUMIN SERPL ELPH-MCNC: 2 G/DL — LOW (ref 3.3–5)
ALP SERPL-CCNC: 146 U/L — HIGH (ref 40–120)
ALT FLD-CCNC: 17 U/L — SIGNIFICANT CHANGE UP (ref 10–45)
ANION GAP SERPL CALC-SCNC: 15 MMOL/L — SIGNIFICANT CHANGE UP (ref 5–17)
APTT BLD: 28.8 SEC — SIGNIFICANT CHANGE UP (ref 27.5–36.3)
AST SERPL-CCNC: 39 U/L — SIGNIFICANT CHANGE UP (ref 10–40)
BASE EXCESS BLDV CALC-SCNC: -0.7 MMOL/L — SIGNIFICANT CHANGE UP (ref -2–2)
BASOPHILS # BLD AUTO: 0.03 K/UL — SIGNIFICANT CHANGE UP (ref 0–0.2)
BASOPHILS NFR BLD AUTO: 0.3 % — SIGNIFICANT CHANGE UP (ref 0–2)
BILIRUB SERPL-MCNC: 1.6 MG/DL — HIGH (ref 0.2–1.2)
BUN SERPL-MCNC: 34 MG/DL — HIGH (ref 7–23)
CALCIUM SERPL-MCNC: 7.7 MG/DL — LOW (ref 8.4–10.5)
CHLORIDE SERPL-SCNC: 108 MMOL/L — SIGNIFICANT CHANGE UP (ref 96–108)
CO2 BLDV-SCNC: 28 MMOL/L — SIGNIFICANT CHANGE UP (ref 22–30)
CO2 SERPL-SCNC: 19 MMOL/L — LOW (ref 22–31)
CREAT SERPL-MCNC: 1.13 MG/DL — SIGNIFICANT CHANGE UP (ref 0.5–1.3)
EOSINOPHIL # BLD AUTO: 0.04 K/UL — SIGNIFICANT CHANGE UP (ref 0–0.5)
EOSINOPHIL NFR BLD AUTO: 0.4 % — SIGNIFICANT CHANGE UP (ref 0–6)
GAS PNL BLDV: SIGNIFICANT CHANGE UP
GLUCOSE SERPL-MCNC: 109 MG/DL — HIGH (ref 70–99)
HCO3 BLDV-SCNC: 26 MMOL/L — SIGNIFICANT CHANGE UP (ref 21–29)
HCT VFR BLD CALC: 38 % — SIGNIFICANT CHANGE UP (ref 34.5–45)
HGB BLD-MCNC: 11.5 G/DL — SIGNIFICANT CHANGE UP (ref 11.5–15.5)
IMM GRANULOCYTES NFR BLD AUTO: 1.2 % — SIGNIFICANT CHANGE UP (ref 0–1.5)
INR BLD: 1.24 RATIO — HIGH (ref 0.88–1.16)
LYMPHOCYTES # BLD AUTO: 1.09 K/UL — SIGNIFICANT CHANGE UP (ref 1–3.3)
LYMPHOCYTES # BLD AUTO: 11.6 % — LOW (ref 13–44)
MCHC RBC-ENTMCNC: 30.3 GM/DL — LOW (ref 32–36)
MCHC RBC-ENTMCNC: 32.2 PG — SIGNIFICANT CHANGE UP (ref 27–34)
MCV RBC AUTO: 106.4 FL — HIGH (ref 80–100)
MONOCYTES # BLD AUTO: 0.53 K/UL — SIGNIFICANT CHANGE UP (ref 0–0.9)
MONOCYTES NFR BLD AUTO: 5.6 % — SIGNIFICANT CHANGE UP (ref 2–14)
NEUTROPHILS # BLD AUTO: 7.6 K/UL — HIGH (ref 1.8–7.4)
NEUTROPHILS NFR BLD AUTO: 80.9 % — HIGH (ref 43–77)
NRBC # BLD: 0 /100 WBCS — SIGNIFICANT CHANGE UP (ref 0–0)
PCO2 BLDV: 56 MMHG — HIGH (ref 35–50)
PH BLDV: 7.28 — LOW (ref 7.35–7.45)
PLATELET # BLD AUTO: 154 K/UL — SIGNIFICANT CHANGE UP (ref 150–400)
PO2 BLDV: 21 MMHG — LOW (ref 25–45)
POTASSIUM SERPL-MCNC: 4.3 MMOL/L — SIGNIFICANT CHANGE UP (ref 3.5–5.3)
POTASSIUM SERPL-SCNC: 4.3 MMOL/L — SIGNIFICANT CHANGE UP (ref 3.5–5.3)
PROT SERPL-MCNC: 4.8 G/DL — LOW (ref 6–8.3)
PROTHROM AB SERPL-ACNC: 14.4 SEC — HIGH (ref 10–12.9)
RBC # BLD: 3.57 M/UL — LOW (ref 3.8–5.2)
RBC # FLD: 15.1 % — HIGH (ref 10.3–14.5)
SAO2 % BLDV: 16 % — LOW (ref 67–88)
SODIUM SERPL-SCNC: 142 MMOL/L — SIGNIFICANT CHANGE UP (ref 135–145)
WBC # BLD: 9.4 K/UL — SIGNIFICANT CHANGE UP (ref 3.8–10.5)
WBC # FLD AUTO: 9.4 K/UL — SIGNIFICANT CHANGE UP (ref 3.8–10.5)

## 2020-03-02 PROCEDURE — 99285 EMERGENCY DEPT VISIT HI MDM: CPT | Mod: GC

## 2020-03-02 PROCEDURE — 93010 ELECTROCARDIOGRAM REPORT: CPT | Mod: GC

## 2020-03-02 RX ORDER — SODIUM CHLORIDE 9 MG/ML
1000 INJECTION INTRAMUSCULAR; INTRAVENOUS; SUBCUTANEOUS ONCE
Refills: 0 | Status: COMPLETED | OUTPATIENT
Start: 2020-03-02 | End: 2020-03-02

## 2020-03-02 RX ADMIN — SODIUM CHLORIDE 1000 MILLILITER(S): 9 INJECTION INTRAMUSCULAR; INTRAVENOUS; SUBCUTANEOUS at 23:48

## 2020-03-02 NOTE — ED PROVIDER NOTE - CLINICAL SUMMARY MEDICAL DECISION MAKING FREE TEXT BOX
Pt tba for dehydration correction and correction of any infx or metabolic deragements s/p syncopal episode 2/2 persistent watery diarrhea. Pt now A&O x 3 but appears very frail. Pt w/ mult comorbidities, low threshold for icu consult if labs not WNL. Sx control in meantime -[place]

## 2020-03-02 NOTE — ED ADULT NURSE NOTE - CHPI ED NUR SYMPTOMS NEG
no vomiting/no nausea/no numbness/no loss of consciousness/no blurred vision/no dizziness/no fever/no weakness

## 2020-03-02 NOTE — ED PROVIDER NOTE - OBJECTIVE STATEMENT
2 yo F PMH AF on digoxin (not on AC), severe AS, COPD not on home O2, CLL (previously on Treanda and Rituxan), hypothyroidism, 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 portal htn, c/b esophageal varices s/p banding, CKD 3bl Cr 1.2, PVD, and recurrent UTI w/ hx of ESBL Klebsiella, p/w syncopal episode on the toilet. Pt has been having diarrhea all afternoon, watery in nature and NB. Pt denies prior recent illness, denies fevers/chills. Pt now reporting generalized weakness. No AMS. No head trauma during the syncopal event.

## 2020-03-02 NOTE — ED PROVIDER NOTE - PROGRESS NOTE DETAILS
AV: patient signed out to me at the usual time by Dr. Grubbs, diarrhea, syncope. AV: patient transiently hypotensive, responsive to fluids.

## 2020-03-02 NOTE — ED PROVIDER NOTE - ATTENDING CONTRIBUTION TO CARE
I have seen and evaluated this patient with the resident.   I agree with the findings  unless other wise stated.  I have made appropriate changes in documentations where needed, After my face to face bedside evaluation, I am further  [age] yo F PMH AF on digoxin (not on AC), severe AS, COPD not on home O2, CLL (previously on Treanda and Rituxan), hypothyroidism, 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 portal htn, c/b esophageal varices s/p banding, CKD 3bl Cr 1.2, PVD, and recurrent UTI w/ hx of ESBL Klebsiella, p/w syncopal episode on the toilet. Pt has been having diarrhea all afternoon, watery in nature and NB. Pt denies prior recent illness, denies fevers/chills. Pt now reporting generalized weakness. No AMS. No head trauma during the syncopal event. Pt tba for dehydration correction and correction of any infx or metabolic deragements s/p syncopal episode 2/2 persistent watery diarrhea. Pt now A&O x 3 but appears very frail. Pt w/ mult comorbidities, low threshold for icu consult if labs not WNL. Sx control in meantime --Grubbs

## 2020-03-02 NOTE — ED ADULT NURSE NOTE - OBJECTIVE STATEMENT
89 year old female presents to ED complaining of "unresponsive event" and diarrhea x4 days. Per EMS pt had "unresponsive event" at home on couch with family, no LOC. Upon assessment A&O x3, oriented to self, location, and year. PERRL. Strength equal in upper and lower extremities. No facial droop noted. Right arm dressed in bandage due to skin care per family. Skin purple in all four extremities. Placed on 10L non-rebreather, stating at 100%. Placed on bedside cardiac monitor. Ultrasound guided IV to be placed for lab work. Denies HA, chest pain, SOB, n/v, fevers at home, and chills at this time. Family at bedside. Copy of MOLST placed in chart. Bed locked and in lowest position. Comfort and safety measures. 89 year old female presents to ED complaining of "unresponsive event" and diarrhea x4 days. Per EMS pt had "unresponsive event" at home on couch with family, no LOC. Upon assessment A&O x3, oriented to self, location, and year. PERRL. Strength equal in upper and lower extremities. No facial droop noted. Right arm dressed, right and left legs in bandages due to skin tear per family. Skin purple in all four extremities. Placed on 10L non-rebreather, stating at 100%. Placed on bedside cardiac monitor. Ultrasound guided IV to be placed for lab work. Two stage two 2-3 cm pressure ulcers noted to coccyx. Wounds cleaned and dressed to prevent further injury. Denies HA, chest pain, SOB, n/v, fevers at home, and chills at this time. Family at bedside. Copy of MOLST placed in chart. Bed locked and in lowest position. Comfort and safety measures.

## 2020-03-03 DIAGNOSIS — E86.0 DEHYDRATION: ICD-10-CM

## 2020-03-03 DIAGNOSIS — I35.0 NONRHEUMATIC AORTIC (VALVE) STENOSIS: ICD-10-CM

## 2020-03-03 DIAGNOSIS — R55 SYNCOPE AND COLLAPSE: ICD-10-CM

## 2020-03-03 DIAGNOSIS — R19.7 DIARRHEA, UNSPECIFIED: ICD-10-CM

## 2020-03-03 DIAGNOSIS — T07.XXXA UNSPECIFIED MULTIPLE INJURIES, INITIAL ENCOUNTER: ICD-10-CM

## 2020-03-03 DIAGNOSIS — I48.91 UNSPECIFIED ATRIAL FIBRILLATION: ICD-10-CM

## 2020-03-03 DIAGNOSIS — J44.9 CHRONIC OBSTRUCTIVE PULMONARY DISEASE, UNSPECIFIED: ICD-10-CM

## 2020-03-03 DIAGNOSIS — Z02.9 ENCOUNTER FOR ADMINISTRATIVE EXAMINATIONS, UNSPECIFIED: ICD-10-CM

## 2020-03-03 DIAGNOSIS — Z79.899 OTHER LONG TERM (CURRENT) DRUG THERAPY: ICD-10-CM

## 2020-03-03 DIAGNOSIS — K74.60 UNSPECIFIED CIRRHOSIS OF LIVER: ICD-10-CM

## 2020-03-03 LAB
APPEARANCE UR: CLEAR — SIGNIFICANT CHANGE UP
BACTERIA # UR AUTO: NEGATIVE — SIGNIFICANT CHANGE UP
BILIRUB UR-MCNC: NEGATIVE — SIGNIFICANT CHANGE UP
COLOR SPEC: YELLOW — SIGNIFICANT CHANGE UP
DIFF PNL FLD: NEGATIVE — SIGNIFICANT CHANGE UP
EPI CELLS # UR: 1 /HPF — SIGNIFICANT CHANGE UP
GAS PNL BLDV: SIGNIFICANT CHANGE UP
GLUCOSE UR QL: NEGATIVE — SIGNIFICANT CHANGE UP
HYALINE CASTS # UR AUTO: 4 /LPF — HIGH (ref 0–2)
KETONES UR-MCNC: NEGATIVE — SIGNIFICANT CHANGE UP
LEUKOCYTE ESTERASE UR-ACNC: NEGATIVE — SIGNIFICANT CHANGE UP
NITRITE UR-MCNC: NEGATIVE — SIGNIFICANT CHANGE UP
PH UR: 6 — SIGNIFICANT CHANGE UP (ref 5–8)
PROT UR-MCNC: ABNORMAL
RBC CASTS # UR COMP ASSIST: 5 /HPF — HIGH (ref 0–4)
SP GR SPEC: 1.02 — SIGNIFICANT CHANGE UP (ref 1.01–1.02)
UROBILINOGEN FLD QL: ABNORMAL
WBC UR QL: 1 /HPF — SIGNIFICANT CHANGE UP (ref 0–5)

## 2020-03-03 PROCEDURE — 99223 1ST HOSP IP/OBS HIGH 75: CPT

## 2020-03-03 PROCEDURE — 71045 X-RAY EXAM CHEST 1 VIEW: CPT | Mod: 26

## 2020-03-03 RX ORDER — SODIUM CHLORIDE 9 MG/ML
1000 INJECTION INTRAMUSCULAR; INTRAVENOUS; SUBCUTANEOUS ONCE
Refills: 0 | Status: COMPLETED | OUTPATIENT
Start: 2020-03-03 | End: 2020-03-03

## 2020-03-03 RX ORDER — PETROLATUM,WHITE
1 JELLY (GRAM) TOPICAL DAILY
Refills: 0 | Status: DISCONTINUED | OUTPATIENT
Start: 2020-03-03 | End: 2020-03-14

## 2020-03-03 RX ORDER — LACTULOSE 10 G/15ML
10 SOLUTION ORAL THREE TIMES A DAY
Refills: 0 | Status: DISCONTINUED | OUTPATIENT
Start: 2020-03-03 | End: 2020-03-04

## 2020-03-03 RX ORDER — IPRATROPIUM/ALBUTEROL SULFATE 18-103MCG
3 AEROSOL WITH ADAPTER (GRAM) INHALATION
Qty: 0 | Refills: 0 | DISCHARGE

## 2020-03-03 RX ORDER — ENOXAPARIN SODIUM 100 MG/ML
40 INJECTION SUBCUTANEOUS DAILY
Refills: 0 | Status: DISCONTINUED | OUTPATIENT
Start: 2020-03-03 | End: 2020-03-06

## 2020-03-03 RX ORDER — CHOLECALCIFEROL (VITAMIN D3) 125 MCG
2000 CAPSULE ORAL DAILY
Refills: 0 | Status: DISCONTINUED | OUTPATIENT
Start: 2020-03-03 | End: 2020-03-14

## 2020-03-03 RX ORDER — CHOLECALCIFEROL (VITAMIN D3) 125 MCG
1 CAPSULE ORAL
Qty: 0 | Refills: 0 | DISCHARGE

## 2020-03-03 RX ORDER — ASCORBIC ACID 60 MG
500 TABLET,CHEWABLE ORAL
Refills: 0 | Status: DISCONTINUED | OUTPATIENT
Start: 2020-03-03 | End: 2020-03-14

## 2020-03-03 RX ORDER — FOLIC ACID 0.8 MG
1 TABLET ORAL DAILY
Refills: 0 | Status: DISCONTINUED | OUTPATIENT
Start: 2020-03-03 | End: 2020-03-14

## 2020-03-03 RX ORDER — DIGOXIN 250 MCG
0.12 TABLET ORAL EVERY OTHER DAY
Refills: 0 | Status: DISCONTINUED | OUTPATIENT
Start: 2020-03-03 | End: 2020-03-14

## 2020-03-03 RX ORDER — ALBUTEROL 90 UG/1
2 AEROSOL, METERED ORAL EVERY 6 HOURS
Refills: 0 | Status: DISCONTINUED | OUTPATIENT
Start: 2020-03-03 | End: 2020-03-07

## 2020-03-03 RX ORDER — TIOTROPIUM BROMIDE 18 UG/1
1 CAPSULE ORAL; RESPIRATORY (INHALATION) DAILY
Refills: 0 | Status: DISCONTINUED | OUTPATIENT
Start: 2020-03-03 | End: 2020-03-14

## 2020-03-03 RX ORDER — LEVOTHYROXINE SODIUM 125 MCG
25 TABLET ORAL DAILY
Refills: 0 | Status: DISCONTINUED | OUTPATIENT
Start: 2020-03-03 | End: 2020-03-14

## 2020-03-03 RX ORDER — SPIRONOLACTONE 25 MG/1
1 TABLET, FILM COATED ORAL
Qty: 0 | Refills: 0 | DISCHARGE

## 2020-03-03 RX ORDER — MIDODRINE HYDROCHLORIDE 2.5 MG/1
5 TABLET ORAL EVERY 8 HOURS
Refills: 0 | Status: DISCONTINUED | OUTPATIENT
Start: 2020-03-03 | End: 2020-03-07

## 2020-03-03 RX ORDER — BUDESONIDE, MICRONIZED 100 %
2 POWDER (GRAM) MISCELLANEOUS
Qty: 0 | Refills: 0 | DISCHARGE

## 2020-03-03 RX ORDER — BUDESONIDE, MICRONIZED 100 %
0.25 POWDER (GRAM) MISCELLANEOUS
Refills: 0 | Status: DISCONTINUED | OUTPATIENT
Start: 2020-03-03 | End: 2020-03-07

## 2020-03-03 RX ORDER — PROPRANOLOL HCL 160 MG
1 CAPSULE, EXTENDED RELEASE 24HR ORAL
Qty: 0 | Refills: 0 | DISCHARGE

## 2020-03-03 RX ORDER — PREGABALIN 225 MG/1
1000 CAPSULE ORAL DAILY
Refills: 0 | Status: DISCONTINUED | OUTPATIENT
Start: 2020-03-03 | End: 2020-03-14

## 2020-03-03 RX ADMIN — Medication 500 MILLIGRAM(S): at 19:28

## 2020-03-03 RX ADMIN — Medication 2000 UNIT(S): at 13:24

## 2020-03-03 RX ADMIN — Medication 1 MILLIGRAM(S): at 13:24

## 2020-03-03 RX ADMIN — ENOXAPARIN SODIUM 40 MILLIGRAM(S): 100 INJECTION SUBCUTANEOUS at 13:24

## 2020-03-03 RX ADMIN — MIDODRINE HYDROCHLORIDE 5 MILLIGRAM(S): 2.5 TABLET ORAL at 22:19

## 2020-03-03 RX ADMIN — PREGABALIN 1000 MICROGRAM(S): 225 CAPSULE ORAL at 13:24

## 2020-03-03 RX ADMIN — MIDODRINE HYDROCHLORIDE 5 MILLIGRAM(S): 2.5 TABLET ORAL at 13:24

## 2020-03-03 RX ADMIN — Medication 0.25 MILLIGRAM(S): at 19:28

## 2020-03-03 RX ADMIN — SODIUM CHLORIDE 1000 MILLILITER(S): 9 INJECTION INTRAMUSCULAR; INTRAVENOUS; SUBCUTANEOUS at 03:47

## 2020-03-03 RX ADMIN — LACTULOSE 10 GRAM(S): 10 SOLUTION ORAL at 15:20

## 2020-03-03 NOTE — ED ADULT NURSE REASSESSMENT NOTE - NS ED NURSE REASSESS COMMENT FT1
report received from selina rhodes. patient resting on stretcher. patient complaining of generalized pain. patient has bruising noted to her upper and lower extremities bilaterally. patient noted two stage II pressure ulcers noted to the coccyx.   area is dressed. dressing clean, dry and intact. left knee abrasion noted upon assessment,. dressing placed and clean, dry, intact. patient is aaox3, lungs CTA bilaterally, abd soft, nondistended, nontender, cap refill <3, radial and pedal pulses +2 bilaterally. patient denies chest pain, shortness of breath, ha, dizziness, n/v/d, cough, fever, chills, abd pain, back pain, changes in bowel or bladder, hematuria, bloody stools. family at bedside. a-fib on monitor. patient comfort and safety provided. report received from selina rhodes. patient resting on stretcher. patient complaining of generalized pain throughout body. patient unable to describe pain further. patient has bruising noted to her upper and lower extremities bilaterally. patient noted two stage II pressure ulcers noted to the coccyx.  area is dressed with a clean, dry and intact dressing. left knee abrasion noted upon assessment,. dressing placed and clean, dry, intact. patient is aaox3, lungs CTA bilaterally, abd soft, nondistended, nontender, cap refill <3, radial and pedal pulses +2 bilaterally. patient denies chest pain, shortness of breath, ha, dizziness, n/v/d, cough, fever, chills, abd pain, back pain, changes in bowel or bladder, hematuria, bloody stools. family at bedside. a-fib on monitor. patient comfort and safety provided.

## 2020-03-03 NOTE — H&P ADULT - PROBLEM SELECTOR PLAN 4
-wound care consult   -multiple wounds from bed sores as well as skin tears from falling  -fall risk protocol

## 2020-03-03 NOTE — H&P ADULT - PROBLEM SELECTOR PLAN 7
-medical management for now  -pt is volume depleted, replete gently   -monitor for signs of fluid overload   -pt does have pleural effusion but does not have respiratory symptoms at this time

## 2020-03-03 NOTE — H&P ADULT - PROBLEM SELECTOR PLAN 2
-in the setting of lactulose which is a home medication for patient, pt has had 1 BM since coming to the hospital which was formed   -check GI PCR and Cdiff (pt was on long term abx previously for serratia bacteremia and micafungin for candida glabrata fungemia)   -holding off on abx for now given non-tender abdomen, absence of leukocytosis and absence of fever -in the setting of lactulose which is a home medication for patient, pt has had 1 BM since coming to the hospital which was formed   -check GI PCR and Cdiff (pt was on long term abx previously for serratia bacteremia and micafungin for candida glabrata fungemia)   -holding off on abx for now given non-tender abdomen, absence of leukocytosis and absence of fever  -abdominal upright x-ray ordered

## 2020-03-03 NOTE — H&P ADULT - PROBLEM SELECTOR PLAN 9
-per pharmacy med rec and per patient's daughter, pt was started on propranolol 10 mg BID and spironolactone 25mg BID as an outpt by her cardiologist.   -will hold both inpatient given hypotension and dehydration

## 2020-03-03 NOTE — H&P ADULT - HISTORY OF PRESENT ILLNESS
89 y/o F with history of Afib on dig (not on AC), severe AS, COPD, CLL (s/p treatment with Treanda and Rituxan), HCC+cirrhosis with portal HTN, esophageal varices s/p banding and hepatic vessel coiling, CKD, hypothyroidism, recurrent UTIs with ESBL Klebsiella, recent admission in February 2020 for serratia bacteremia and candida glabrata fungemia s/p treatment, presenting from home after a syncopal episode. The patient is on lactulose for her liver disease, and takes it 2 times a day. She normally has soft BMs after the lactulose. Yesterday she had a prolonged BM with large volume diarrhea, and during this episode she became unresponsive. The patient herself does not remember this episode. The patient's daughter witnessed the episode and states that she slumped over with a dazed look. Her body went rigid. She was not responding to her name. The pt's daughter called EMS, and by the time they arrived, she was more responsive. Since arriving at the ED, the pt received IVF and she has improved per her family. Overall the pt has been declining at home since the previously hospitalization. She sustained 2 falls (one in rehab, one at home), and now has multiple skin tears for which she has been getting wound care at home. She has not had fever, dyspnea, chest pain, dysuria, 89 y/o F with history of Afib on dig (not on AC), severe AS, COPD, CLL (s/p treatment with Treanda and Rituxan), HCC+cirrhosis with portal HTN, esophageal varices s/p banding and hepatic vessel coiling, CKD, hypothyroidism, recurrent UTIs with ESBL Klebsiella, recent admission in February 2020 for serratia bacteremia and candida glabrata fungemia s/p treatment, presenting from home after a syncopal episode. The patient is on lactulose for her liver disease, and takes it 2 times a day. She normally has soft BMs after the lactulose. Yesterday she had a prolonged BM with large volume diarrhea, and during this episode she became unresponsive. The patient herself does not remember this episode. The patient's daughter witnessed the episode and states that she slumped over with a dazed look. Her body went rigid. She was not responding to her name. The pt's daughter called EMS, and by the time they arrived, she was more responsive. Since arriving at the ED, the pt received IVF and she has improved per her family. Overall the pt has been declining at home since the previously hospitalization. She sustained 2 falls (one in rehab, one at home), and now has multiple skin tears for which she has been getting wound care at home. She has not had fever, dyspnea, chest pain, dysuria, no sick contacts. Has not had any unusual foods.   Diarrhea has not been worsening prior to this.

## 2020-03-03 NOTE — H&P ADULT - NSICDXPASTMEDICALHX_GEN_ALL_CORE_FT
PAST MEDICAL HISTORY:  AF (Atrial Fibrillation)     Bleeding Esophageal Varices     CKD (chronic kidney disease), stage 3 (moderate)     CLL (Chronic Lymphoblastic Leukemia)     COPD (Chronic Obstructive Pulmonary Disease)     GIB (Gastrointestinal Bleeding)     History of bacteremia     Liver cell carcinoma     Portal Hypertension     Pulmonary HTN moderate    PVD (peripheral vascular disease)     Severe aortic stenosis by prior echocardiogram

## 2020-03-03 NOTE — H&P ADULT - PROBLEM SELECTOR PLAN 1
-likely secondary to dehydration vs vasovagal syncope as pt was on toilet having a BM when it happened   -admit to tele  -high sensitivity trops negx2   -EKG without acute ischemic changes -likely secondary to dehydration vs vasovagal syncope as pt was on toilet having a BM when it happened   -pt was also recently started on propranolol and spironolactone as an outpt, which may have contributed to symptoms as well as dehydration. Will hold inpatient.   -admit to tele  -high sensitivity trops negx2   -EKG without acute ischemic changes

## 2020-03-03 NOTE — H&P ADULT - PROBLEM SELECTOR PLAN 10
Transitions of Care Status:  1.  Name of PCP: Fallon Celaya   2.  PCP Contacted on Admission: [ x] Y    [ ] N    3.  PCP contacted at Discharge: [ ] Y    [ ] N    [ ] N/A  4.  Post-Discharge Appointment Date and Location:  5.  Summary of Handoff given to PCP:

## 2020-03-03 NOTE — H&P ADULT - ASSESSMENT
89 y/o F with history of Afib on dig (not on AC), severe AS, COPD, CLL (s/p treatment with Treanda and Rituxan), HCC+cirrhosis with portal HTN, esophageal varices s/p banding and hepatic vessel coiling, CKD, hypothyroidism, recurrent UTIs with ESBL Klebsiella, recent admission in February 2020 for serratia bacteremia and candida glabrata fungemia s/p treatment, presenting from home after a syncopal episode:

## 2020-03-03 NOTE — H&P ADULT. - PROBLEM SELECTOR PLAN 2
-not on A/c given previous variceal bleed  -maintain SBP>110
-Patient is s/p ablation  -Continue with pain control  -Resume cirrhosis medications as blood pressure stabilizes
5

## 2020-03-03 NOTE — ED ADULT NURSE REASSESSMENT NOTE - NS ED NURSE REASSESS COMMENT FT1
Pt changed and repositioned for comfort. Family at bedside. Provided pillow and heat packs for comfort. BP at documented. Bed locked and in lowest position. Comfort and safety measures maintained.

## 2020-03-03 NOTE — H&P ADULT - HEMATOLOGY/LYMPHATICS
Second attempt. Not Scheduled at [facility]. Patient does not want procedure at this time. Pt getting surgery and wants to wait. Will call back when ready.     
negative

## 2020-03-03 NOTE — H&P ADULT - PROBLEM SELECTOR PLAN 3
-overall poor PO intake at home and poor nutritional status  -s/p IVF in ED, will have to be careful given pt's history of severe AS   -gentle IV hydration   -encourage PO intake as tolerated   -Cr is elevated from baseline -overall poor PO intake at home and poor nutritional status  -holding spironolactone   -s/p IVF in ED, will have to be careful given pt's history of severe AS   -gentle IV hydration   -encourage PO intake as tolerated   -Cr is elevated from baseline

## 2020-03-03 NOTE — ED ADULT NURSE REASSESSMENT NOTE - NS ED NURSE REASSESS COMMENT FT1
Pt straight cath for clean urine sample. Two RNs at bedside to maintain sterile technique. Urine analysis and culture sent to lab. Pt resting comfortably at this time. Family at bedside. Bed remains locked and in lowest position. Comfort and safety measures maintained.

## 2020-03-04 LAB
ANION GAP SERPL CALC-SCNC: 19 MMOL/L — HIGH (ref 5–17)
BUN SERPL-MCNC: 53 MG/DL — HIGH (ref 7–23)
CALCIUM SERPL-MCNC: 9 MG/DL — SIGNIFICANT CHANGE UP (ref 8.4–10.5)
CHLORIDE SERPL-SCNC: 105 MMOL/L — SIGNIFICANT CHANGE UP (ref 96–108)
CO2 SERPL-SCNC: 15 MMOL/L — LOW (ref 22–31)
CREAT SERPL-MCNC: 1.99 MG/DL — HIGH (ref 0.5–1.3)
CULTURE RESULTS: SIGNIFICANT CHANGE UP
GLUCOSE BLDC GLUCOMTR-MCNC: 149 MG/DL — HIGH (ref 70–99)
GLUCOSE SERPL-MCNC: 141 MG/DL — HIGH (ref 70–99)
HCT VFR BLD CALC: 36.2 % — SIGNIFICANT CHANGE UP (ref 34.5–45)
HGB BLD-MCNC: 10.5 G/DL — LOW (ref 11.5–15.5)
MCHC RBC-ENTMCNC: 29 GM/DL — LOW (ref 32–36)
MCHC RBC-ENTMCNC: 32.4 PG — SIGNIFICANT CHANGE UP (ref 27–34)
MCV RBC AUTO: 111.7 FL — HIGH (ref 80–100)
NRBC # BLD: 0 /100 WBCS — SIGNIFICANT CHANGE UP (ref 0–0)
PLATELET # BLD AUTO: 106 K/UL — LOW (ref 150–400)
POTASSIUM SERPL-MCNC: 5.6 MMOL/L — HIGH (ref 3.5–5.3)
POTASSIUM SERPL-SCNC: 5.6 MMOL/L — HIGH (ref 3.5–5.3)
RBC # BLD: 3.24 M/UL — LOW (ref 3.8–5.2)
RBC # FLD: 15.5 % — HIGH (ref 10.3–14.5)
SODIUM SERPL-SCNC: 139 MMOL/L — SIGNIFICANT CHANGE UP (ref 135–145)
SPECIMEN SOURCE: SIGNIFICANT CHANGE UP
WBC # BLD: 12.05 K/UL — HIGH (ref 3.8–10.5)
WBC # FLD AUTO: 12.05 K/UL — HIGH (ref 3.8–10.5)

## 2020-03-04 PROCEDURE — 99233 SBSQ HOSP IP/OBS HIGH 50: CPT

## 2020-03-04 PROCEDURE — 74018 RADEX ABDOMEN 1 VIEW: CPT | Mod: 26

## 2020-03-04 RX ORDER — LACTULOSE 10 G/15ML
10 SOLUTION ORAL THREE TIMES A DAY
Refills: 0 | Status: DISCONTINUED | OUTPATIENT
Start: 2020-03-04 | End: 2020-03-05

## 2020-03-04 RX ORDER — PROPRANOLOL HYDROCHLORIDE 10 MG/1
10 TABLET ORAL
Qty: 180 | Refills: 3 | Status: ACTIVE | COMMUNITY
Start: 2020-03-04 | End: 1900-01-01

## 2020-03-04 RX ORDER — MULTIVIT-MIN/FERROUS GLUCONATE 9 MG/15 ML
1 LIQUID (ML) ORAL DAILY
Refills: 0 | Status: DISCONTINUED | OUTPATIENT
Start: 2020-03-04 | End: 2020-03-14

## 2020-03-04 RX ORDER — PROPRANOLOL HYDROCHLORIDE 10 MG/1
10 TABLET ORAL
Qty: 180 | Refills: 0 | Status: DISCONTINUED | COMMUNITY
Start: 2017-10-09 | End: 2020-03-04

## 2020-03-04 RX ORDER — LEVOTHYROXINE SODIUM 125 MCG
12.5 TABLET ORAL ONCE
Refills: 0 | Status: COMPLETED | OUTPATIENT
Start: 2020-03-04 | End: 2020-03-04

## 2020-03-04 RX ADMIN — Medication 2000 UNIT(S): at 09:01

## 2020-03-04 RX ADMIN — Medication 0.12 MILLIGRAM(S): at 09:00

## 2020-03-04 RX ADMIN — TIOTROPIUM BROMIDE 1 CAPSULE(S): 18 CAPSULE ORAL; RESPIRATORY (INHALATION) at 12:16

## 2020-03-04 RX ADMIN — ENOXAPARIN SODIUM 40 MILLIGRAM(S): 100 INJECTION SUBCUTANEOUS at 12:42

## 2020-03-04 RX ADMIN — MIDODRINE HYDROCHLORIDE 5 MILLIGRAM(S): 2.5 TABLET ORAL at 21:21

## 2020-03-04 RX ADMIN — LACTULOSE 10 GRAM(S): 10 SOLUTION ORAL at 21:21

## 2020-03-04 RX ADMIN — LACTULOSE 10 GRAM(S): 10 SOLUTION ORAL at 12:47

## 2020-03-04 RX ADMIN — MIDODRINE HYDROCHLORIDE 5 MILLIGRAM(S): 2.5 TABLET ORAL at 13:37

## 2020-03-04 RX ADMIN — MIDODRINE HYDROCHLORIDE 5 MILLIGRAM(S): 2.5 TABLET ORAL at 08:56

## 2020-03-04 RX ADMIN — Medication 25 MICROGRAM(S): at 08:58

## 2020-03-04 RX ADMIN — Medication 0.25 MILLIGRAM(S): at 18:43

## 2020-03-04 RX ADMIN — Medication 1 MILLIGRAM(S): at 09:01

## 2020-03-04 RX ADMIN — Medication 1 APPLICATION(S): at 13:36

## 2020-03-04 RX ADMIN — ALBUTEROL 2 PUFF(S): 90 AEROSOL, METERED ORAL at 12:43

## 2020-03-04 RX ADMIN — ALBUTEROL 2 PUFF(S): 90 AEROSOL, METERED ORAL at 18:42

## 2020-03-04 RX ADMIN — Medication 500 MILLIGRAM(S): at 18:12

## 2020-03-04 RX ADMIN — ALBUTEROL 2 PUFF(S): 90 AEROSOL, METERED ORAL at 00:05

## 2020-03-04 RX ADMIN — PREGABALIN 1000 MICROGRAM(S): 225 CAPSULE ORAL at 13:36

## 2020-03-04 NOTE — CHART NOTE - NSCHARTNOTEFT_GEN_A_CORE
Upon Nutritional Assessment by the Registered Dietitian your patient was determined to meet criteria / has evidence of the following diagnosis/diagnoses:          [ ]  Mild Protein Calorie Malnutrition        [ ]  Moderate Protein Calorie Malnutrition        [ x] Severe Protein Calorie Malnutrition        [ ] Unspecified Protein Calorie Malnutrition        [x ] Underweight / BMI <19        [ ] Morbid Obesity / BMI > 40      Findings as based on:  [x ] Comprehensive nutrition assessment (BMI 14.8)  [x ] Nutrition Focused Physical Exam: Patient consented to nutrition focused physical exam. Findings as follows: severe muscle wasting from temples and clavicles, severe squaring of the shoulders. Moderate fat loss from orbitals.  [ ] Other:       Nutrition Plan/Recommendations:    continue Dysphagia 2 nectar consistency, adjusted mighty shakes to provide 4oz with each meal. recommend multivitamin with minerals. continue Vitamin C. recommend Jacob x 2 daily.      PROVIDER Section:     By signing this assessment you are acknowledging and agree with the diagnosis/diagnoses assigned by the Registered Dietitian    Comments:

## 2020-03-04 NOTE — PROVIDER CONTACT NOTE (OTHER) - ASSESSMENT
Patient is A&Ox3, attempted several times by different RN to administer medication, get vitals and labs. Patient keeps refusing AM care including medications, vital signs, lab work.

## 2020-03-04 NOTE — ADVANCED PRACTICE NURSE CONSULT - REASON FOR CONSULT
Requested by staff to assess skin status of pt a/w multiple wounds. PMH is noted:  89 y/o F with history of Afib on dig (not on AC), severe AS, COPD, CLL (s/p treatment with Treanda and Rituxan), HCC+cirrhosis with portal HTN, esophageal varices s/p banding and hepatic vessel coiling, CKD, hypothyroidism, recurrent UTIs with ESBL Klebsiella, recent admission in February 2020 for serratia bacteremia and candida glabrata fungemia s/p treatment, presenting from home after a syncopal episode. The patient is on lactulose for her liver disease, and takes it 2 times a day. She normally has soft BMs after the lactulose. Yesterday she had a prolonged BM with large volume diarrhea, and during this episode she became unresponsive. The patient herself does not remember this episode. The patient's daughter witnessed the episode and states that she slumped over with a dazed look. Her body went rigid. She was not responding to her name. The pt's daughter called EMS, and by the time they arrived, she was more responsive. Since arriving at the ED, the pt received IVF and she has improved per her family. Overall the pt has been declining at home since the previously hospitalization. She sustained 2 falls (one in rehab, one at home), and now has multiple skin tears for which she has been getting wound care at home. She has not had fever, dyspnea, chest pain, dysuria, no sick contacts. Has not had any unusual foods.   Diarrhea has not been worsening prior to this.

## 2020-03-04 NOTE — PHYSICAL THERAPY INITIAL EVALUATION ADULT - GENERAL OBSERVATIONS, REHAB EVAL
Pt rec'd in bed +primafit, +BP cuff, BUE wrapped for weeping edema, RN Natalee present. Pt A&Ox3-4 (person, place, situation, wrong year)

## 2020-03-04 NOTE — DIETITIAN INITIAL EVALUATION ADULT. - PHYSICAL APPEARANCE
emaciated/Patient consented to nutrition focused physical exam. Findings as follows: severe muscle wasting from temples and clavicles, severe squaring of the shoulders. Moderate fat loss from orbitals.

## 2020-03-04 NOTE — PROGRESS NOTE ADULT - PROBLEM SELECTOR PLAN 7
-medical management for now  -pt is volume depleted, replated gently   -monitor for signs of fluid overload   -pt does have pleural effusion but does not have respiratory symptoms at this time

## 2020-03-04 NOTE — PROVIDER CONTACT NOTE (OTHER) - ASSESSMENT
Patient is A&Ox3, attempted several times by different RN to administer medication. able to convince patient to take midodrine  VS: 82/48 HR: 96

## 2020-03-04 NOTE — PROGRESS NOTE ADULT - PROBLEM SELECTOR PLAN 2
-in the setting of lactulose which is a home medication for patient, pt has had well formed BMs in hospital  -check GI PCR, if diarrhea returns will check c. diff  -holding off on abx for now given non-tender abdomen and absence of fever  -mild leukocytosis today, will trend  -check bedside xray

## 2020-03-04 NOTE — PHYSICAL THERAPY INITIAL EVALUATION ADULT - ASR EQUIP NEEDS DISCH PT EVAL
PT recommendation TANI, if pt returns home, pt would need private aide, and would benefit from hospital bed and chair lift.

## 2020-03-04 NOTE — PROGRESS NOTE ADULT - PROBLEM SELECTOR PLAN 5
-continue with digoxin, dig level pending  -not on AC given previous history of ICH  -continue home midodrine for hypotension

## 2020-03-04 NOTE — ADVANCED PRACTICE NURSE CONSULT - RECOMMEDATIONS
Will recommend the followin. Sween 24 cream to dry skin daily.  2. Right arm wounds: Aquacel dressing to the wounds, follow with gauze and secure with ly- change daily and prn for drainage.  3. Right lateral thigh: Adaptic dressing follow with gauze and wrap with ly, change secondary dressing daily, Adaptic may stay in place x 5 days.  4. B/l buttocks: apply Cavilon daily. Continue with Prima-fit catheter  5. Z-abby boots to off-load the heels  6. consider further evaluation of the right hand wound by Plastic surgery- discussed with NP,. Pt may f/u with Dermatology upon d/c for raised skin lesions.   7. Continue to encourage mobility  8. Nutrition support as pt condition allows  Tx plan discussed with RN

## 2020-03-04 NOTE — DIETITIAN INITIAL EVALUATION ADULT. - OTHER INFO
interview limited, pt disoriented to situation and place  Reason for admission : syncope and diarrhea  Diet PTA : whatever her daughter gives her  Intake : RD observed >75% of breakfast  Denies nausea/vomit :  Denies difficulty chewing /swallow :  Last BM : 3/3 diarrhea  Allergy to shellfish  IBW +/- 10%= 125pounds  Ht:65"  Ht taken from heading on EMR  Usual Weight PTA: unable to assess  BMI: 14.8  BMI calculated using wt from flow sheet on 3/3  BMI calculated using ht from heading on EMR  Education Provided : N/A  skin: coccyx midline stage 2, coccyx stage 2  edema: +1 generalized, +3 left arm, right arm, left leg, right leg

## 2020-03-04 NOTE — DIETITIAN INITIAL EVALUATION ADULT. - PERTINENT MEDS FT
ALBUTerol    90 MICROgram(s) HFA Inhaler 2  AQUAPHOR (petrolatum Ointment) 1  ascorbic acid 500  buDESOnide    Inhalation Suspension 0.25  cholecalciferol 2000  cyanocobalamin 1000  digoxin     Tablet 0.125  enoxaparin Injectable 40  folic acid 1  lactulose Syrup 10  levothyroxine 25  midodrine 5  rifAXIMin 550  tiotropium 18 MICROgram(s) Capsule 1

## 2020-03-04 NOTE — PHYSICAL THERAPY INITIAL EVALUATION ADULT - PERTINENT HX OF CURRENT PROBLEM, REHAB EVAL
Pt is a 89 y/o F /c admitted s/p syncopal episode (3/3) /c recent hospital stay in Feb 2020 for serratia bacteremia. PMH: Afib, severe aortic stenosis, COPD, CLL, cirrhosis /c portal HTN, CKD, hypothyroidism. CXR shows right pleural effusion.

## 2020-03-04 NOTE — PROGRESS NOTE ADULT - PROBLEM SELECTOR PLAN 1
-likely secondary to dehydration vs vasovagal syncope as pt was on toilet having a BM when it happened   -pt was also recently started on propranolol and spironolactone as an outpt, which may have contributed to symptoms as well as dehydration. Will hold inpatient.   -admit to tele- in afib, rate controlled. continue monitoring  -decreased dosing of lactulose and will hold if more diarrhea  -s/p 2L IVF  -high sensitivity trops negx2   -EKG without acute ischemic changes  -continue home midodrine, will consider titrating off this admission

## 2020-03-04 NOTE — PHYSICAL THERAPY INITIAL EVALUATION ADULT - ADDITIONAL COMMENTS
Prior to admission pt required assist for all functional mobility. Pt lives in a [facility] level /c daughter /c 7 steps to enter. Pt has private aide 9hrs/day 7 days/wk. Pt owns a w/c, RW, commode, grab bars in the shower. Per dtr, she is looking into getting a chair lift and hospital bed.

## 2020-03-04 NOTE — PHYSICAL THERAPY INITIAL EVALUATION ADULT - PRECAUTIONS/LIMITATIONS, REHAB EVAL
Discussed condition and exacerbating conditions/situations (e.g., dry/arid environments, overhead fans, air conditioners, side effect of medications). fall precautions

## 2020-03-04 NOTE — ADVANCED PRACTICE NURSE CONSULT - ASSESSMENT
Pt reports that she has had multiple falls of late. She states that her wounds are the result of these falls but cannot state how long the wounds have been open.  The pts skin is very fragile overall with numerous areas of bruising and ecchymosis- the skin is dry and flaking. There are scattered macules and papules- pt denies going to a dermatologist. Discussed that pt may f/u with this upon discharge.  Pt presents with the following wounds.  Right hand: wound with a pale pink wound bed, + serous drainage, the wound measures 4cm x 2.2cm x 0.2cm. There is undermining from 8-11 o'clock with the greatest length noted at 9 o'clock.  The right arm, posterior and  adjacent to the right elbow is a wound with fibrinous exudate measuring 4.2cm x 3cm x0.2cm. + serosanguinous drainage.  The right lateral thigh with a partial-thicxkness wound measuring 10cm x 5cm x0cm Wound bed is red, moist with sacnyt serosanguinous drainage.  Left lateral leg with a large black scab.  Pt with incontinence of bowel and bladder- staff have applied a prima-fit catheter to diver urine formt hes kin, on the left buttocks and guteal cleft are 2 ssmall superficial wounds measuring less tahn 0.5cm round with surrounding blachable erythema. This skin alteration is most likely secondarto incontinece.  Cavilon was applied. Pt reports that she has had multiple falls of late. She states that her wounds are the result of these falls but cannot state how long the wounds have been open.  The pts skin is very fragile overall with numerous areas of bruising and ecchymosis- the skin is dry and flaking. There are scattered macules and papules- pt denies going to a dermatologist. Discussed that pt may f/u with this upon discharge.  Pt presents with the following wounds.  Right hand: wound with a pale pink wound bed, + serous drainage, the wound measures 4cm x 2.2cm x 0.2cm. There is undermining from 8-11 o'clock with the greatest length of 2cm noted at 9 o'clock.  The right arm, posterior and  adjacent to the right elbow is a wound with fibrinous exudate measuring 4.2cm x 3cm x0.2cm. + serosanguinous drainage.  The right lateral thigh with a partial-thickness wound measuring 10cm x 5cm x0cm Wound bed is red, moist with scant serosanguinous drainage.  Left lateral leg with a large black scab.  Pt with incontinence of bowel and bladder- staff have applied a prima-fit catheter to divert urine from the skin, on the left buttocks and gluteal cleft are 2 small superficial wounds measuring less than 0.5cm round with surrounding blanchable erythema. This skin alteration is most likely secondary to  incontinence. Would not classify as pressure injuries.   Cavilon was applied.  The pt is on a [employer] P 500 support surface for low air-loss and pressure redistribution features. she is being assisted with turning and positioning, staff is getting her OOB to the chair.  She was seen by Nutrition with a dx of severe malnutrition noted.

## 2020-03-04 NOTE — PROGRESS NOTE ADULT - SUBJECTIVE AND OBJECTIVE BOX
[facility] Division of Hospital Medicine  Nury Milligan MD  Pager (M-F, 8A-5P): [phone]  Other Times:  [phone]    Patient is a 89y old  Female who presents with a chief complaint of syncope and diarrhea (03 Mar 2020 11:35)      SUBJECTIVE / OVERNIGHT EVENTS:    no overnight events. pt resting in bed- sleepy. no complaints. denies being in pain. denies sob/cp.    tele: afib     ADDITIONAL REVIEW OF SYSTEMS:    MEDICATIONS  (STANDING):  ALBUTerol    90 MICROgram(s) HFA Inhaler 2 Puff(s) Inhalation every 6 hours  AQUAPHOR (petrolatum Ointment) 1 Application(s) Topical daily  ascorbic acid 500 milliGRAM(s) Oral two times a day  buDESOnide    Inhalation Suspension 0.25 milliGRAM(s) Inhalation two times a day  cholecalciferol 2000 Unit(s) Oral daily  cyanocobalamin 1000 MICROGram(s) Oral daily  digoxin     Tablet 0.125 milliGRAM(s) Oral every other day  enoxaparin Injectable 40 milliGRAM(s) SubCutaneous daily  folic acid 1 milliGRAM(s) Oral daily  lactulose Syrup 10 Gram(s) Oral three times a day  levothyroxine 25 MICROGram(s) Oral daily  midodrine 5 milliGRAM(s) Oral every 8 hours  multivitamin/minerals 1 Tablet(s) Oral daily  rifAXIMin 550 milliGRAM(s) Oral two times a day  tiotropium 18 MICROgram(s) Capsule 1 Capsule(s) Inhalation daily    MEDICATIONS  (PRN):      CAPILLARY BLOOD GLUCOSE      POCT Blood Glucose.: 149 mg/dL (04 Mar 2020 13:10)    I&O's Summary    04 Mar 2020 07:01  -  04 Mar 2020 15:33  --------------------------------------------------------  IN: 120 mL / OUT: 0 mL / NET: 120 mL        PHYSICAL EXAM:  Vital Signs Last 24 Hrs  T(C): 36.6 (04 Mar 2020 12:47), Max: 36.7 (04 Mar 2020 08:55)  T(F): 97.8 (04 Mar 2020 12:47), Max: 98 (04 Mar 2020 08:55)  HR: 78 (04 Mar 2020 14:37) (62 - 100)  BP: 98/51 (04 Mar 2020 14:37) (82/48 - 123/77)  BP(mean): --  RR: 16 (04 Mar 2020 14:37) (16 - 20)  SpO2: 100% (04 Mar 2020 14:37) (95% - 100%)  · Constitutional	detailed exam	  · Constitutional Comments	frail appearing, NAD	  · Eyes	detailed exam	  · Eyes Details	conjunctiva clear	  · Neck	detailed exam	  · Neck Details	supple	  · Respiratory	detailed exam	  · Respiratory Details	breath sounds equal; good air movement; respirations non-labored; clear to auscultation bilaterally	  · Cardiovascular	detailed exam	  · Cardiovascular Details	positive S1; positive S2	  · Gastrointestinal	detailed exam	  · GI Normal	soft; nontender; no distention	  · Extremities	detailed exam	  · Extremities Comments	multiple skin tears and wounds with dressing in place in the upper and lower extremities b/l	  · Neurological	detailed exam	  · Neurological Details	alert and oriented x 3	  · Skin	detailed exam	  · Skin Comments	fragile skin with multiple lesions as above	  · Musculoskeletal	detailed exam	  · Musculoskeletal Details	no joint swelling; no joint erythema	      LABS:                        10.5   12.05 )-----------( 106      ( 04 Mar 2020 15:20 )             36.2     03-    142  |  108  |  34<H>  ----------------------------<  109<H>  4.3   |  19<L>  |  1.13    Ca    7.7<L>      02 Mar 2020 23:03    TPro  4.8<L>  /  Alb  2.0<L>  /  TBili  1.6<H>  /  DBili  x   /  AST  39  /  ALT  17  /  AlkPhos  146<H>  03-    PT/INR - ( 02 Mar 2020 23:03 )   PT: 14.4 sec;   INR: 1.24 ratio         PTT - ( 02 Mar 2020 23:03 )  PTT:28.8 sec      Urinalysis Basic - ( 03 Mar 2020 01:27 )    Color: Yellow / Appearance: Clear / S.023 / pH: x  Gluc: x / Ketone: Negative  / Bili: Negative / Urobili: 2 mg/dL   Blood: x / Protein: Trace / Nitrite: Negative   Leuk Esterase: Negative / RBC: 5 /hpf / WBC 1 /HPF   Sq Epi: x / Non Sq Epi: 1 /hpf / Bacteria: Negative        Culture - Urine (collected 03 Mar 2020 06:34)  Source: .Urine Catheterized  Final Report (04 Mar 2020 04:29):    <10,000 CFU/mL Normal Urogenital Stefanie      COORDINATION OF CARE:  Care Discussed with Consultants/Other Providers [Y/N]: yes  Prior or Outpatient Records Reviewed [Y/N]: yes

## 2020-03-04 NOTE — DIETITIAN INITIAL EVALUATION ADULT. - ADD RECOMMEND
continue Dysphagia 2 nectar consistency, adjusted mighty shakes to provide 4oz with each meal. recommend multivitamin with minerals. continue Vitamin C. recommend Jacob x 2 daily.

## 2020-03-04 NOTE — DIETITIAN INITIAL EVALUATION ADULT. - PROBLEM SELECTOR PLAN 3
-overall poor PO intake at home and poor nutritional status  -holding spironolactone   -s/p IVF in ED, will have to be careful given pt's history of severe AS   -gentle IV hydration   -encourage PO intake as tolerated   -Cr is elevated from baseline

## 2020-03-04 NOTE — DIETITIAN INITIAL EVALUATION ADULT. - PROBLEM SELECTOR PLAN 2
-in the setting of lactulose which is a home medication for patient, pt has had 1 BM since coming to the hospital which was formed   -check GI PCR and Cdiff (pt was on long term abx previously for serratia bacteremia and micafungin for candida glabrata fungemia)   -holding off on abx for now given non-tender abdomen, absence of leukocytosis and absence of fever  -abdominal upright x-ray ordered

## 2020-03-04 NOTE — PROGRESS NOTE ADULT - PROBLEM SELECTOR PLAN 9
-per pharmacy med rec and per patient's daughter, pt was started on propranolol 10 mg BID and spironolactone 25mg BID as an outpt by her cardiologist.   -will hold both inpatient given hypotension and dehydration. afib is mainly rate controlled.
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
## 2020-03-04 NOTE — PROGRESS NOTE ADULT - PROBLEM SELECTOR PLAN 3
-overall poor PO intake at home and poor nutritional status  -holding spironolactone   -s/p IVF in ED, will have to be careful given pt's history of severe AS   -s/p 2L IVF- will hold further at this time  -encourage PO intake as tolerated   -Cr is elevated from baseline

## 2020-03-04 NOTE — PHYSICAL THERAPY INITIAL EVALUATION ADULT - PATIENT PROFILE REVIEW, REHAB EVAL
yes [FreeTextEntry1] : General: Awake and alert in no acute distress\par Psych: normal mood and affect\par HEENT: NC/AT, normal visual tracking\par Pulmonary: no respiratory distress, chest expansion appears symmetric\par CV: extremities are warm and well perfused\par Abd: non-distended\par Ext: no c/c/e\par \par \par Inspection: Non-antalgic gait, normal muscle bulk without asymmetry\par Palpation: +tenderness along piriformis, greater trochanter, SI joints; no tenderness to palpation along lumbar paraspinals, iliac crests\par \par ROM: full and without pain\par \par Strength: HF, KE, KF, DF, PF, EHL all 5/5 bilaterally\par \par Sensation: Intact at L2-S1 dermatomes bilaterally to light touch\par \par Reflexes: 2+/4 at bilateral Patellar (L2-4) and Achilles (S1). Mute Babinski, no clonus bilaterally.\par \par SLR: negative bilaterally.\par Gaenslen positive on left.\par BRANDI negative bilaterally. FADIR with some pain in the back on left.

## 2020-03-04 NOTE — DIETITIAN INITIAL EVALUATION ADULT. - PROBLEM SELECTOR PLAN 1
-likely secondary to dehydration vs vasovagal syncope as pt was on toilet having a BM when it happened   -pt was also recently started on propranolol and spironolactone as an outpt, which may have contributed to symptoms as well as dehydration. Will hold inpatient.   -admit to tele  -high sensitivity trops negx2   -EKG without acute ischemic changes

## 2020-03-05 LAB
ALBUMIN SERPL ELPH-MCNC: 2.1 G/DL — LOW (ref 3.3–5)
ALP SERPL-CCNC: 144 U/L — HIGH (ref 40–120)
ALT FLD-CCNC: 16 U/L — SIGNIFICANT CHANGE UP (ref 10–45)
ANION GAP SERPL CALC-SCNC: 14 MMOL/L — SIGNIFICANT CHANGE UP (ref 5–17)
AST SERPL-CCNC: 50 U/L — HIGH (ref 10–40)
BILIRUB SERPL-MCNC: 1.5 MG/DL — HIGH (ref 0.2–1.2)
BUN SERPL-MCNC: 57 MG/DL — HIGH (ref 7–23)
CALCIUM SERPL-MCNC: 8.4 MG/DL — SIGNIFICANT CHANGE UP (ref 8.4–10.5)
CHLORIDE SERPL-SCNC: 106 MMOL/L — SIGNIFICANT CHANGE UP (ref 96–108)
CO2 SERPL-SCNC: 20 MMOL/L — LOW (ref 22–31)
CREAT SERPL-MCNC: 2.32 MG/DL — HIGH (ref 0.5–1.3)
GLUCOSE SERPL-MCNC: 107 MG/DL — HIGH (ref 70–99)
HCT VFR BLD CALC: 33.6 % — LOW (ref 34.5–45)
HGB BLD-MCNC: 10.6 G/DL — LOW (ref 11.5–15.5)
MAGNESIUM SERPL-MCNC: 2.1 MG/DL — SIGNIFICANT CHANGE UP (ref 1.6–2.6)
MCHC RBC-ENTMCNC: 31.5 GM/DL — LOW (ref 32–36)
MCHC RBC-ENTMCNC: 33.1 PG — SIGNIFICANT CHANGE UP (ref 27–34)
MCV RBC AUTO: 105 FL — HIGH (ref 80–100)
NRBC # BLD: 0 /100 WBCS — SIGNIFICANT CHANGE UP (ref 0–0)
PLATELET # BLD AUTO: 116 K/UL — LOW (ref 150–400)
POTASSIUM SERPL-MCNC: 5 MMOL/L — SIGNIFICANT CHANGE UP (ref 3.5–5.3)
POTASSIUM SERPL-SCNC: 5 MMOL/L — SIGNIFICANT CHANGE UP (ref 3.5–5.3)
PROT SERPL-MCNC: 4.9 G/DL — LOW (ref 6–8.3)
RBC # BLD: 3.2 M/UL — LOW (ref 3.8–5.2)
RBC # FLD: 15.6 % — HIGH (ref 10.3–14.5)
SODIUM SERPL-SCNC: 140 MMOL/L — SIGNIFICANT CHANGE UP (ref 135–145)
WBC # BLD: 12.79 K/UL — HIGH (ref 3.8–10.5)
WBC # FLD AUTO: 12.79 K/UL — HIGH (ref 3.8–10.5)

## 2020-03-05 PROCEDURE — 99233 SBSQ HOSP IP/OBS HIGH 50: CPT

## 2020-03-05 PROCEDURE — 77001 FLUOROGUIDE FOR VEIN DEVICE: CPT | Mod: 26

## 2020-03-05 PROCEDURE — 76937 US GUIDE VASCULAR ACCESS: CPT | Mod: 26

## 2020-03-05 PROCEDURE — 36556 INSERT NON-TUNNEL CV CATH: CPT

## 2020-03-05 RX ORDER — ONDANSETRON 8 MG/1
4 TABLET, FILM COATED ORAL ONCE
Refills: 0 | Status: COMPLETED | OUTPATIENT
Start: 2020-03-05 | End: 2020-03-05

## 2020-03-05 RX ORDER — ACETAMINOPHEN 500 MG
650 TABLET ORAL EVERY 6 HOURS
Refills: 0 | Status: DISCONTINUED | OUTPATIENT
Start: 2020-03-05 | End: 2020-03-14

## 2020-03-05 RX ORDER — ONDANSETRON 8 MG/1
4 TABLET, FILM COATED ORAL ONCE
Refills: 0 | Status: DISCONTINUED | OUTPATIENT
Start: 2020-03-05 | End: 2020-03-05

## 2020-03-05 RX ORDER — CEPHALEXIN 500 MG
250 CAPSULE ORAL DAILY
Refills: 0 | Status: DISCONTINUED | OUTPATIENT
Start: 2020-03-05 | End: 2020-03-06

## 2020-03-05 RX ORDER — SODIUM CHLORIDE 9 MG/ML
1000 INJECTION INTRAMUSCULAR; INTRAVENOUS; SUBCUTANEOUS
Refills: 0 | Status: DISCONTINUED | OUTPATIENT
Start: 2020-03-05 | End: 2020-03-08

## 2020-03-05 RX ORDER — CEPHALEXIN 500 MG
250 CAPSULE ORAL EVERY 12 HOURS
Refills: 0 | Status: DISCONTINUED | OUTPATIENT
Start: 2020-03-05 | End: 2020-03-05

## 2020-03-05 RX ORDER — CHLORHEXIDINE GLUCONATE 213 G/1000ML
1 SOLUTION TOPICAL
Refills: 0 | Status: DISCONTINUED | OUTPATIENT
Start: 2020-03-05 | End: 2020-03-14

## 2020-03-05 RX ORDER — SODIUM CHLORIDE 9 MG/ML
10 INJECTION INTRAMUSCULAR; INTRAVENOUS; SUBCUTANEOUS
Refills: 0 | Status: DISCONTINUED | OUTPATIENT
Start: 2020-03-05 | End: 2020-03-14

## 2020-03-05 RX ADMIN — Medication 1 TABLET(S): at 12:35

## 2020-03-05 RX ADMIN — MIDODRINE HYDROCHLORIDE 5 MILLIGRAM(S): 2.5 TABLET ORAL at 16:00

## 2020-03-05 RX ADMIN — LACTULOSE 10 GRAM(S): 10 SOLUTION ORAL at 05:43

## 2020-03-05 RX ADMIN — Medication 250 MILLIGRAM(S): at 21:50

## 2020-03-05 RX ADMIN — Medication 25 MICROGRAM(S): at 05:42

## 2020-03-05 RX ADMIN — Medication 500 MILLIGRAM(S): at 05:42

## 2020-03-05 RX ADMIN — ALBUTEROL 2 PUFF(S): 90 AEROSOL, METERED ORAL at 05:43

## 2020-03-05 RX ADMIN — TIOTROPIUM BROMIDE 1 CAPSULE(S): 18 CAPSULE ORAL; RESPIRATORY (INHALATION) at 12:35

## 2020-03-05 RX ADMIN — MIDODRINE HYDROCHLORIDE 5 MILLIGRAM(S): 2.5 TABLET ORAL at 05:42

## 2020-03-05 RX ADMIN — Medication 500 MILLIGRAM(S): at 17:15

## 2020-03-05 RX ADMIN — Medication 0.25 MILLIGRAM(S): at 17:15

## 2020-03-05 RX ADMIN — ALBUTEROL 2 PUFF(S): 90 AEROSOL, METERED ORAL at 17:15

## 2020-03-05 RX ADMIN — ALBUTEROL 2 PUFF(S): 90 AEROSOL, METERED ORAL at 00:18

## 2020-03-05 RX ADMIN — Medication 1 APPLICATION(S): at 12:34

## 2020-03-05 RX ADMIN — ALBUTEROL 2 PUFF(S): 90 AEROSOL, METERED ORAL at 12:35

## 2020-03-05 RX ADMIN — Medication 650 MILLIGRAM(S): at 12:09

## 2020-03-05 RX ADMIN — Medication 650 MILLIGRAM(S): at 12:40

## 2020-03-05 RX ADMIN — SODIUM CHLORIDE 50 MILLILITER(S): 9 INJECTION INTRAMUSCULAR; INTRAVENOUS; SUBCUTANEOUS at 17:16

## 2020-03-05 RX ADMIN — PREGABALIN 1000 MICROGRAM(S): 225 CAPSULE ORAL at 12:35

## 2020-03-05 RX ADMIN — Medication 0.25 MILLIGRAM(S): at 05:42

## 2020-03-05 RX ADMIN — Medication 1 MILLIGRAM(S): at 12:35

## 2020-03-05 RX ADMIN — ENOXAPARIN SODIUM 40 MILLIGRAM(S): 100 INJECTION SUBCUTANEOUS at 12:35

## 2020-03-05 RX ADMIN — Medication 2000 UNIT(S): at 12:35

## 2020-03-05 RX ADMIN — ONDANSETRON 4 MILLIGRAM(S): 8 TABLET, FILM COATED ORAL at 12:10

## 2020-03-05 NOTE — PROGRESS NOTE ADULT - PROBLEM SELECTOR PLAN 2
-in the setting of lactulose which is a home medication for patient, large loose BMs overnight  -hold lactulose  -noted KARRIE on CKD- like dehydration/prerenal mediated. Will start gentle IVF today, but needs to obtain IV access first. check bladder scan, check urine lytes and UA  -check GI PCR, if diarrhea returns will check c. diff  -holding off on abx for now given non-tender abdomen and absence of fever  -mild leukocytosis today, will trend  -xray with non obstructive bowel pattern

## 2020-03-05 NOTE — PROGRESS NOTE ADULT - SUBJECTIVE AND OBJECTIVE BOX
[facility] Division of Hospital Medicine  Nury Milligan MD  Pager (M-F, 8A-5P): [phone]  Other Times:  [phone]    Patient is a 89y old  Female who presents with a chief complaint of syncope and diarrhea (05 Mar 2020 14:08)      SUBJECTIVE / OVERNIGHT EVENTS:    pt lost IV access overnight- failed attempts by IV team to place peripheral IVs in arms. recommedning central line in IR  pt wants to be left alone  denies cp, sob, abdominal pain  per daughter- poor po intake and fluid intake  per nursing staff- pt with several large BMS     TELE: afib 60-90      ADDITIONAL REVIEW OF SYSTEMS:    MEDICATIONS  (STANDING):  ALBUTerol    90 MICROgram(s) HFA Inhaler 2 Puff(s) Inhalation every 6 hours  AQUAPHOR (petrolatum Ointment) 1 Application(s) Topical daily  ascorbic acid 500 milliGRAM(s) Oral two times a day  buDESOnide    Inhalation Suspension 0.25 milliGRAM(s) Inhalation two times a day  cephalexin Suspension 50 mG/mL 250 milliGRAM(s) Oral daily  cholecalciferol 2000 Unit(s) Oral daily  cyanocobalamin 1000 MICROGram(s) Oral daily  digoxin     Tablet 0.125 milliGRAM(s) Oral every other day  enoxaparin Injectable 40 milliGRAM(s) SubCutaneous daily  folic acid 1 milliGRAM(s) Oral daily  levothyroxine 25 MICROGram(s) Oral daily  midodrine 5 milliGRAM(s) Oral every 8 hours  multivitamin/minerals 1 Tablet(s) Oral daily  rifAXIMin 550 milliGRAM(s) Oral two times a day  sodium chloride 0.9%. 1000 milliLiter(s) (50 mL/Hr) IV Continuous <Continuous>  tiotropium 18 MICROgram(s) Capsule 1 Capsule(s) Inhalation daily    MEDICATIONS  (PRN):  acetaminophen    Suspension .. 650 milliGRAM(s) Oral every 6 hours PRN Temp greater or equal to 38C (100.4F), Mild Pain (1 - 3), Moderate Pain (4 - 6)      CAPILLARY BLOOD GLUCOSE        I&O's Summary    04 Mar 2020 07:01  -  05 Mar 2020 07:00  --------------------------------------------------------  IN: 120 mL / OUT: 0 mL / NET: 120 mL    05 Mar 2020 07:01  -  05 Mar 2020 15:13  --------------------------------------------------------  IN: 100 mL / OUT: 0 mL / NET: 100 mL        PHYSICAL EXAM:  Vital Signs Last 24 Hrs  T(C): 36.3 (05 Mar 2020 12:17), Max: 36.4 (05 Mar 2020 00:30)  T(F): 97.4 (05 Mar 2020 12:17), Max: 97.6 (05 Mar 2020 00:30)  HR: 75 (05 Mar 2020 12:17) (75 - 86)  BP: 126/72 (05 Mar 2020 12:17) (99/51 - 126/72)  BP(mean): --  RR: 18 (05 Mar 2020 12:17) (18 - 18)  SpO2: 98% (05 Mar 2020 12:17) (98% - 100%)  · Constitutional	detailed exam	  · Constitutional Comments	frail appearing, NAD	  		  		  		  		  · Respiratory	detailed exam	  · Respiratory Details	breath sounds equal; good air movement; respirations non-labored; clear to auscultation bilaterally	  · Cardiovascular	detailed exam	  · Cardiovascular Details	positive S1; positive S2	  · Gastrointestinal	detailed exam	  · GI Normal	soft; nontender; no distention	  · Extremities	detailed exam	  · Extremities Comments	multiple skin tears and wounds with dressing in place in the upper and lower extremities b/l	  · Neurological	detailed exam	  · Neurological Details	alert and oriented x 3	  · Skin	detailed exam	  · Skin Comments	fragile skin with multiple lesions as above	  		  		      LABS:                        10.6   12.79 )-----------( 116      ( 05 Mar 2020 10:12 )             33.6     03-05    140  |  106  |  57<H>  ----------------------------<  107<H>  5.0   |  20<L>  |  2.32<H>    Ca    8.4      05 Mar 2020 10:12  Mg     2.1     03-05    TPro  4.9<L>  /  Alb  2.1<L>  /  TBili  1.5<H>  /  DBili  x   /  AST  50<H>  /  ALT  16  /  AlkPhos  144<H>  03-05              Culture - Blood (collected 03 Mar 2020 18:27)  Source: .Blood Blood-Peripheral  Preliminary Report (04 Mar 2020 19:01):    No growth to date.    Culture - Urine (collected 03 Mar 2020 06:34)  Source: .Urine Catheterized  Final Report (04 Mar 2020 04:29):    <10,000 CFU/mL Normal Urogenital Stefanie        COORDINATION OF CARE:  Care Discussed with Consultants/Other Providers [Y/N]: yes  Prior or Outpatient Records Reviewed [Y/N]: yes

## 2020-03-05 NOTE — PROGRESS NOTE ADULT - PROBLEM SELECTOR PLAN 3
-overall poor PO intake at home and poor nutritional status  -now with KARRIE on CKD, likely prerenal  -holding spironolactone   -s/p IVF in ED, will resume today, pending IV access  -encourage PO intake as tolerated

## 2020-03-05 NOTE — PROGRESS NOTE ADULT - ASSESSMENT
89 y/o F with history of Afib on dig (not on AC), severe AS, COPD, CLL (s/p treatment with Treanda and Rituxan), HCC+cirrhosis with portal HTN, esophageal varices s/p banding and hepatic vessel coiling, CKD, hypothyroidism, recurrent UTIs with ESBL Klebsiella, recent admission in February 2020 for serratia bacteremia and candida glabrata fungemia s/p treatment, presenting from home after a syncopal episode:

## 2020-03-05 NOTE — PROGRESS NOTE ADULT - SUBJECTIVE AND OBJECTIVE BOX
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 Pt referred to IR for TLC placement for venous access.     PAST MEDICAL & SURGICAL HISTORY:  History of bacteremia  PVD (peripheral vascular disease)  Liver cell carcinoma  Severe aortic stenosis by prior echocardiogram  Pulmonary HTN: moderate  CKD (chronic kidney disease), stage 3 (moderate)  COPD (Chronic Obstructive Pulmonary Disease)  AF (Atrial Fibrillation)  Portal Hypertension  Bleeding Esophageal Varices  CLL (Chronic Lymphoblastic Leukemia)  GIB (Gastrointestinal Bleeding)  History of repair of hip fracture  Esophageal Rupture        Pertinent labs:                      10.6   12.79 )-----------( 116      ( 05 Mar 2020 10:12 )             33.6   03-05    140  |  106  |  57<H>  ----------------------------<  107<H>  5.0   |  20<L>  |  2.32<H>    Ca    8.4      05 Mar 2020 10:12  Mg     2.1     03-05    TPro  4.9<L>  /  Alb  2.1<L>  /  TBili  1.5<H>  /  DBili  x   /  AST  50<H>  /  ALT  16  /  AlkPhos  144<H>  03-05      Consent: Procedure/risks/ Benefits explained. Informed consent obtained from daughter. DNR - affirmed. Pt's daughter verbalizes understanding.

## 2020-03-05 NOTE — PROGRESS NOTE ADULT - PROBLEM SELECTOR PLAN 1
-likely secondary to dehydration vs vasovagal syncope as pt was on toilet having a BM when it happened   -pt was also recently started on propranolol and spironolactone as an outpt, which may have contributed to symptoms as well as dehydration. Will hold inpatient.   -cont tele- in afib, rate controlled. continue monitoring for now  -hold lactulose in setting of large BMs and new KARRIE  -s/p 2L IVF  -high sensitivity trops negx2   -EKG without acute ischemic changes  -continue home midodrine, will consider titrating off this admission

## 2020-03-05 NOTE — PROGRESS NOTE ADULT - PROBLEM SELECTOR PLAN 8
-continue with rifaximin  -hold lactulose in setting of KARRIE, diarrhea  -hold propanolol and spironolactone as well -continue with rifaximin  -hold lactulose in setting of KARRIE, diarrhea  -hold propanolol and spironolactone as well  -noted mild transaminitis, thrombocytopenia, anemia- cont to trend.

## 2020-03-05 NOTE — PROGRESS NOTE ADULT - SUBJECTIVE AND OBJECTIVE BOX
Podiatry pager #: [phone]/ [phone]    Patient is a 89y old  Female who presents with a chief complaint of syncope and diarrhea (05 Mar 2020 15:10) Podiatry seen today for painful toenails of both feet      HPI:  89 y/o F with history of Afib on dig (not on AC), severe AS, COPD, CLL (s/p treatment with Treanda and Rituxan), HCC+cirrhosis with portal HTN, esophageal varices s/p banding and hepatic vessel coiling, CKD, hypothyroidism, recurrent UTIs with ESBL Klebsiella, recent admission in February 2020 for serratia bacteremia and candida glabrata fungemia s/p treatment, presenting from home after a syncopal episode. The patient is on lactulose for her liver disease, and takes it 2 times a day. She normally has soft BMs after the lactulose. Yesterday she had a prolonged BM with large volume diarrhea, and during this episode she became unresponsive. The patient herself does not remember this episode. The patient's daughter witnessed the episode and states that she slumped over with a dazed look. Her body went rigid. She was not responding to her name. The pt's daughter called EMS, and by the time they arrived, she was more responsive. Since arriving at the ED, the pt received IVF and she has improved per her family. Overall the pt has been declining at home since the previously hospitalization. She sustained 2 falls (one in rehab, one at home), and now has multiple skin tears for which she has been getting wound care at home. She has not had fever, dyspnea, chest pain, dysuria, no sick contacts. Has not had any unusual foods.   Diarrhea has not been worsening prior to this. (03 Mar 2020 11:35)      PAST MEDICAL & SURGICAL HISTORY:  History of bacteremia  PVD (peripheral vascular disease)  Liver cell carcinoma  Severe aortic stenosis by prior echocardiogram  Pulmonary HTN: moderate  CKD (chronic kidney disease), stage 3 (moderate)  COPD (Chronic Obstructive Pulmonary Disease)  AF (Atrial Fibrillation)  Portal Hypertension  Bleeding Esophageal Varices  CLL (Chronic Lymphoblastic Leukemia)  GIB (Gastrointestinal Bleeding)  History of repair of hip fracture  Esophageal Rupture      MEDICATIONS  (STANDING):  ALBUTerol    90 MICROgram(s) HFA Inhaler 2 Puff(s) Inhalation every 6 hours  AQUAPHOR (petrolatum Ointment) 1 Application(s) Topical daily  ascorbic acid 500 milliGRAM(s) Oral two times a day  buDESOnide    Inhalation Suspension 0.25 milliGRAM(s) Inhalation two times a day  cephalexin Suspension 50 mG/mL 250 milliGRAM(s) Oral daily  chlorhexidine 4% Liquid 1 Application(s) Topical <User Schedule>  cholecalciferol 2000 Unit(s) Oral daily  cyanocobalamin 1000 MICROGram(s) Oral daily  digoxin     Tablet 0.125 milliGRAM(s) Oral every other day  enoxaparin Injectable 40 milliGRAM(s) SubCutaneous daily  folic acid 1 milliGRAM(s) Oral daily  levothyroxine 25 MICROGram(s) Oral daily  midodrine 5 milliGRAM(s) Oral every 8 hours  multivitamin/minerals 1 Tablet(s) Oral daily  rifAXIMin 550 milliGRAM(s) Oral two times a day  sodium chloride 0.9%. 1000 milliLiter(s) (50 mL/Hr) IV Continuous <Continuous>  tiotropium 18 MICROgram(s) Capsule 1 Capsule(s) Inhalation daily    MEDICATIONS  (PRN):  acetaminophen    Suspension .. 650 milliGRAM(s) Oral every 6 hours PRN Temp greater or equal to 38C (100.4F), Mild Pain (1 - 3), Moderate Pain (4 - 6)  sodium chloride 0.9% lock flush 10 milliLiter(s) IV Push every 1 hour PRN Pre/post blood products, medications, blood draw, and to maintain line patency      Allergies    codeine (Rash)  erythromycin (Rash)  iv dye (Rash; Anaphylaxis; Short breath)  penicillin (Rash)  shellfish (Rash)    Intolerances    adhesives (Other)  warfarin (Other)      VITALS:    Vital Signs Last 24 Hrs  T(C): 36.4 (05 Mar 2020 16:58), Max: 36.4 (05 Mar 2020 00:30)  T(F): 97.6 (05 Mar 2020 16:58), Max: 97.6 (05 Mar 2020 00:30)  HR: 79 (05 Mar 2020 16:58) (75 - 86)  BP: 86/46 (05 Mar 2020 16:58) (86/46 - 126/72)  BP(mean): --  RR: 18 (05 Mar 2020 16:58) (18 - 18)  SpO2: 100% (05 Mar 2020 16:58) (98% - 100%)    LABS:                          10.6   12.79 )-----------( 116      ( 05 Mar 2020 10:12 )             33.6       03-05    140  |  106  |  57<H>  ----------------------------<  107<H>  5.0   |  20<L>  |  2.32<H>    Ca    8.4      05 Mar 2020 10:12  Mg     2.1     03-05    TPro  4.9<L>  /  Alb  2.1<L>  /  TBili  1.5<H>  /  DBili  x   /  AST  50<H>  /  ALT  16  /  AlkPhos  144<H>  03-05      CAPILLARY BLOOD GLUCOSE              LOWER EXTREMITY PHYSICAL EXAM:    Vasular: DP/PT _1/4, B/L, CFT <2_ seconds B/L, Temperature gradient wnl_, B/L.   Neuro: Epicritic sensation intact to the level of _toes, B/L.  Skin: Positive dystrophic, hypertrophic, brittle toenails with subungual debris x10. No foot ulcerations of clinical signs of infection      RADIOLOGY & ADDITIONAL STUDIES:

## 2020-03-05 NOTE — PROGRESS NOTE ADULT - ASSESSMENT
Assessment/Plan:    --Onychomycosis secondary to dermatophytes      --aseptic debridemont of mycotic nails x10 with betadine applied  --recommend continued routine podiatric foot care as outpatient  --thank you for consult

## 2020-03-05 NOTE — PROGRESS NOTE ADULT - PROBLEM SELECTOR PLAN 4
-wound care consult appreciated  -plastic surgery saw pt for R. hand wound- appreciate recs. short course of keflex prophylactically   -multiple wounds from bed sores as well as skin tears from falling  -fall risk protocol

## 2020-03-05 NOTE — CONSULT NOTE ADULT - SUBJECTIVE AND OBJECTIVE BOX
See dictated note.  Discussed w medical team and pt's daughter and private home nurse at bedside.  Imp: Multiple wounds of RUE and RLE.  Rec: Local wound care, eg, wet to dry dressing, Q12hr. Consider a short course of po abx, eg Keflex or Augmentin.  I will be available for further eval as in- or out-pt.

## 2020-03-05 NOTE — PROGRESS NOTE ADULT - PROBLEM SELECTOR PLAN 5
-continue with digoxin  -not on AC given previous history of ICH  -continue home midodrine for hypotension

## 2020-03-06 DIAGNOSIS — N39.0 URINARY TRACT INFECTION, SITE NOT SPECIFIED: ICD-10-CM

## 2020-03-06 DIAGNOSIS — E46 UNSPECIFIED PROTEIN-CALORIE MALNUTRITION: ICD-10-CM

## 2020-03-06 DIAGNOSIS — N17.9 ACUTE KIDNEY FAILURE, UNSPECIFIED: ICD-10-CM

## 2020-03-06 DIAGNOSIS — D72.829 ELEVATED WHITE BLOOD CELL COUNT, UNSPECIFIED: ICD-10-CM

## 2020-03-06 LAB
ALBUMIN SERPL ELPH-MCNC: 2 G/DL — LOW (ref 3.3–5)
ALP SERPL-CCNC: 166 U/L — HIGH (ref 40–120)
ALT FLD-CCNC: 20 U/L — SIGNIFICANT CHANGE UP (ref 10–45)
AMMONIA BLD-MCNC: 40 UMOL/L — SIGNIFICANT CHANGE UP (ref 11–55)
ANION GAP SERPL CALC-SCNC: 15 MMOL/L — SIGNIFICANT CHANGE UP (ref 5–17)
ANION GAP SERPL CALC-SCNC: 16 MMOL/L — SIGNIFICANT CHANGE UP (ref 5–17)
APPEARANCE UR: ABNORMAL
AST SERPL-CCNC: 39 U/L — SIGNIFICANT CHANGE UP (ref 10–40)
BILIRUB SERPL-MCNC: 1.6 MG/DL — HIGH (ref 0.2–1.2)
BILIRUB UR-MCNC: NEGATIVE — SIGNIFICANT CHANGE UP
BUN SERPL-MCNC: 59 MG/DL — HIGH (ref 7–23)
BUN SERPL-MCNC: 64 MG/DL — HIGH (ref 7–23)
CALCIUM SERPL-MCNC: 8.3 MG/DL — LOW (ref 8.4–10.5)
CALCIUM SERPL-MCNC: 8.5 MG/DL — SIGNIFICANT CHANGE UP (ref 8.4–10.5)
CHLORIDE SERPL-SCNC: 107 MMOL/L — SIGNIFICANT CHANGE UP (ref 96–108)
CHLORIDE SERPL-SCNC: 109 MMOL/L — HIGH (ref 96–108)
CO2 SERPL-SCNC: 17 MMOL/L — LOW (ref 22–31)
CO2 SERPL-SCNC: 20 MMOL/L — LOW (ref 22–31)
COLOR SPEC: ABNORMAL
CREAT ?TM UR-MCNC: 184 MG/DL — SIGNIFICANT CHANGE UP
CREAT SERPL-MCNC: 2.43 MG/DL — HIGH (ref 0.5–1.3)
CREAT SERPL-MCNC: 2.5 MG/DL — HIGH (ref 0.5–1.3)
DIFF PNL FLD: NEGATIVE — SIGNIFICANT CHANGE UP
GLUCOSE SERPL-MCNC: 116 MG/DL — HIGH (ref 70–99)
GLUCOSE SERPL-MCNC: 91 MG/DL — SIGNIFICANT CHANGE UP (ref 70–99)
GLUCOSE UR QL: NEGATIVE — SIGNIFICANT CHANGE UP
HCT VFR BLD CALC: 38.2 % — SIGNIFICANT CHANGE UP (ref 34.5–45)
HGB BLD-MCNC: 11.6 G/DL — SIGNIFICANT CHANGE UP (ref 11.5–15.5)
KETONES UR-MCNC: SIGNIFICANT CHANGE UP
LEUKOCYTE ESTERASE UR-ACNC: ABNORMAL
MAGNESIUM SERPL-MCNC: 2.2 MG/DL — SIGNIFICANT CHANGE UP (ref 1.6–2.6)
MCHC RBC-ENTMCNC: 30.4 GM/DL — LOW (ref 32–36)
MCHC RBC-ENTMCNC: 32.9 PG — SIGNIFICANT CHANGE UP (ref 27–34)
MCV RBC AUTO: 108.2 FL — HIGH (ref 80–100)
NITRITE UR-MCNC: NEGATIVE — SIGNIFICANT CHANGE UP
NRBC # BLD: 0 /100 WBCS — SIGNIFICANT CHANGE UP (ref 0–0)
PH UR: 5.5 — SIGNIFICANT CHANGE UP (ref 5–8)
PLATELET # BLD AUTO: 84 K/UL — LOW (ref 150–400)
POTASSIUM SERPL-MCNC: 4.4 MMOL/L — SIGNIFICANT CHANGE UP (ref 3.5–5.3)
POTASSIUM SERPL-MCNC: 5.3 MMOL/L — SIGNIFICANT CHANGE UP (ref 3.5–5.3)
POTASSIUM SERPL-SCNC: 4.4 MMOL/L — SIGNIFICANT CHANGE UP (ref 3.5–5.3)
POTASSIUM SERPL-SCNC: 5.3 MMOL/L — SIGNIFICANT CHANGE UP (ref 3.5–5.3)
PROT SERPL-MCNC: 5 G/DL — LOW (ref 6–8.3)
PROT UR-MCNC: ABNORMAL
RBC # BLD: 3.53 M/UL — LOW (ref 3.8–5.2)
RBC # FLD: 15.8 % — HIGH (ref 10.3–14.5)
SODIUM SERPL-SCNC: 140 MMOL/L — SIGNIFICANT CHANGE UP (ref 135–145)
SODIUM SERPL-SCNC: 144 MMOL/L — SIGNIFICANT CHANGE UP (ref 135–145)
SODIUM UR-SCNC: 45 MMOL/L — SIGNIFICANT CHANGE UP
SP GR SPEC: 1.03 — HIGH (ref 1.01–1.02)
UROBILINOGEN FLD QL: ABNORMAL
WBC # BLD: 18.26 K/UL — HIGH (ref 3.8–10.5)
WBC # FLD AUTO: 18.26 K/UL — HIGH (ref 3.8–10.5)

## 2020-03-06 PROCEDURE — 76700 US EXAM ABDOM COMPLETE: CPT | Mod: 26

## 2020-03-06 PROCEDURE — 99233 SBSQ HOSP IP/OBS HIGH 50: CPT

## 2020-03-06 PROCEDURE — 71045 X-RAY EXAM CHEST 1 VIEW: CPT | Mod: 26

## 2020-03-06 RX ORDER — CEFTRIAXONE 500 MG/1
1000 INJECTION, POWDER, FOR SOLUTION INTRAMUSCULAR; INTRAVENOUS EVERY 24 HOURS
Refills: 0 | Status: DISCONTINUED | OUTPATIENT
Start: 2020-03-06 | End: 2020-03-09

## 2020-03-06 RX ORDER — HEPARIN SODIUM 5000 [USP'U]/ML
5000 INJECTION INTRAVENOUS; SUBCUTANEOUS EVERY 12 HOURS
Refills: 0 | Status: DISCONTINUED | OUTPATIENT
Start: 2020-03-06 | End: 2020-03-06

## 2020-03-06 RX ORDER — MIRTAZAPINE 45 MG/1
7.5 TABLET, ORALLY DISINTEGRATING ORAL AT BEDTIME
Refills: 0 | Status: DISCONTINUED | OUTPATIENT
Start: 2020-03-06 | End: 2020-03-07

## 2020-03-06 RX ORDER — LACTULOSE 10 G/15ML
10 SOLUTION ORAL
Refills: 0 | Status: DISCONTINUED | OUTPATIENT
Start: 2020-03-06 | End: 2020-03-07

## 2020-03-06 RX ADMIN — Medication 0.25 MILLIGRAM(S): at 06:01

## 2020-03-06 RX ADMIN — MIRTAZAPINE 7.5 MILLIGRAM(S): 45 TABLET, ORALLY DISINTEGRATING ORAL at 22:42

## 2020-03-06 RX ADMIN — CEFTRIAXONE 100 MILLIGRAM(S): 500 INJECTION, POWDER, FOR SOLUTION INTRAMUSCULAR; INTRAVENOUS at 09:19

## 2020-03-06 RX ADMIN — TIOTROPIUM BROMIDE 1 CAPSULE(S): 18 CAPSULE ORAL; RESPIRATORY (INHALATION) at 12:01

## 2020-03-06 RX ADMIN — MIDODRINE HYDROCHLORIDE 5 MILLIGRAM(S): 2.5 TABLET ORAL at 00:53

## 2020-03-06 RX ADMIN — MIDODRINE HYDROCHLORIDE 5 MILLIGRAM(S): 2.5 TABLET ORAL at 13:35

## 2020-03-06 RX ADMIN — Medication 1 TABLET(S): at 12:00

## 2020-03-06 RX ADMIN — Medication 500 MILLIGRAM(S): at 17:41

## 2020-03-06 RX ADMIN — ALBUTEROL 2 PUFF(S): 90 AEROSOL, METERED ORAL at 22:41

## 2020-03-06 RX ADMIN — MIDODRINE HYDROCHLORIDE 5 MILLIGRAM(S): 2.5 TABLET ORAL at 22:41

## 2020-03-06 RX ADMIN — Medication 2000 UNIT(S): at 12:00

## 2020-03-06 RX ADMIN — ALBUTEROL 2 PUFF(S): 90 AEROSOL, METERED ORAL at 12:00

## 2020-03-06 RX ADMIN — Medication 0.25 MILLIGRAM(S): at 17:40

## 2020-03-06 RX ADMIN — Medication 1 MILLIGRAM(S): at 12:00

## 2020-03-06 RX ADMIN — Medication 25 MICROGRAM(S): at 06:01

## 2020-03-06 RX ADMIN — SODIUM CHLORIDE 50 MILLILITER(S): 9 INJECTION INTRAMUSCULAR; INTRAVENOUS; SUBCUTANEOUS at 09:20

## 2020-03-06 RX ADMIN — SODIUM CHLORIDE 50 MILLILITER(S): 9 INJECTION INTRAMUSCULAR; INTRAVENOUS; SUBCUTANEOUS at 22:40

## 2020-03-06 RX ADMIN — ALBUTEROL 2 PUFF(S): 90 AEROSOL, METERED ORAL at 00:59

## 2020-03-06 RX ADMIN — ALBUTEROL 2 PUFF(S): 90 AEROSOL, METERED ORAL at 17:41

## 2020-03-06 RX ADMIN — LACTULOSE 10 GRAM(S): 10 SOLUTION ORAL at 17:47

## 2020-03-06 RX ADMIN — PREGABALIN 1000 MICROGRAM(S): 225 CAPSULE ORAL at 12:01

## 2020-03-06 RX ADMIN — MIDODRINE HYDROCHLORIDE 5 MILLIGRAM(S): 2.5 TABLET ORAL at 06:01

## 2020-03-06 RX ADMIN — Medication 1 APPLICATION(S): at 11:59

## 2020-03-06 RX ADMIN — Medication 500 MILLIGRAM(S): at 06:01

## 2020-03-06 RX ADMIN — ALBUTEROL 2 PUFF(S): 90 AEROSOL, METERED ORAL at 06:30

## 2020-03-06 RX ADMIN — CHLORHEXIDINE GLUCONATE 1 APPLICATION(S): 213 SOLUTION TOPICAL at 10:00

## 2020-03-06 RX ADMIN — Medication 0.12 MILLIGRAM(S): at 12:01

## 2020-03-06 NOTE — PROGRESS NOTE ADULT - ATTENDING COMMENTS
Pt is DNR, MOLST in chart. Pt is DNR, MOLST in chart.  on HSQ- hold if plts <50,000 Pt is DNR, MOLST in chart. discussed with daughter Julio at bedside.  on HSQ- hold if plts <50,000

## 2020-03-06 NOTE — PROGRESS NOTE ADULT - PROBLEM SELECTOR PLAN 4
-overall poor PO intake at home and poor nutritional status  -now with KARRIE on CKD, likely prerenal  -holding spironolactone   -s/p IVF in ED, will resume today, pending IV access  -encourage PO intake as tolerated may have been from too much lactulose at home. diarrhea now resolved no BM in a day. abdomen soft, non-tender. however now pt with renal failure as above.  -resume lactulose at lower dose- 10g bid   -monitor for further diarrhea- if recurs would check GI PCR (never collected) and dc lactulose  -unlikely cause of leukocytosis

## 2020-03-06 NOTE — PROGRESS NOTE ADULT - PROBLEM SELECTOR PLAN 8
-continue with rifaximin  -hold lactulose in setting of KARRIE, diarrhea  -hold propanolol and spironolactone as well  -noted mild transaminitis, thrombocytopenia, anemia- cont to trend. pt with intermittent hepatic encephalopathy, hx of varices. managed medically  -continue with rifaximin  -slow resumption of lactulose as diarrhea resolved  -hold spirono and propanolol for now  -noted mild transaminitis, thrombocytopenia, anemia- cont to trend.  -abdominal sono- RUQ today for elevated ALP and to assess for ascites

## 2020-03-06 NOTE — PROGRESS NOTE ADULT - SUBJECTIVE AND OBJECTIVE BOX
[facility] Division of Hospital Medicine  Nury Milligan MD  Pager (M-F, 8A-5P): [phone]  Other Times:  [phone]    Patient is a 89y old  Female who presents with a chief complaint of syncope and diarrhea (05 Mar 2020 15:10)      SUBJECTIVE / OVERNIGHT EVENTS:  ADDITIONAL REVIEW OF SYSTEMS:    MEDICATIONS  (STANDING):  ALBUTerol    90 MICROgram(s) HFA Inhaler 2 Puff(s) Inhalation every 6 hours  AQUAPHOR (petrolatum Ointment) 1 Application(s) Topical daily  ascorbic acid 500 milliGRAM(s) Oral two times a day  buDESOnide    Inhalation Suspension 0.25 milliGRAM(s) Inhalation two times a day  cefTRIAXone   IVPB 1 milliGRAM(s) IV Intermittent every 24 hours  chlorhexidine 4% Liquid 1 Application(s) Topical <User Schedule>  cholecalciferol 2000 Unit(s) Oral daily  cyanocobalamin 1000 MICROGram(s) Oral daily  digoxin     Tablet 0.125 milliGRAM(s) Oral every other day  enoxaparin Injectable 40 milliGRAM(s) SubCutaneous daily  folic acid 1 milliGRAM(s) Oral daily  levothyroxine 25 MICROGram(s) Oral daily  midodrine 5 milliGRAM(s) Oral every 8 hours  multivitamin/minerals 1 Tablet(s) Oral daily  rifAXIMin 550 milliGRAM(s) Oral two times a day  sodium chloride 0.9%. 1000 milliLiter(s) (50 mL/Hr) IV Continuous <Continuous>  tiotropium 18 MICROgram(s) Capsule 1 Capsule(s) Inhalation daily    MEDICATIONS  (PRN):  acetaminophen    Suspension .. 650 milliGRAM(s) Oral every 6 hours PRN Temp greater or equal to 38C (100.4F), Mild Pain (1 - 3), Moderate Pain (4 - 6)  sodium chloride 0.9% lock flush 10 milliLiter(s) IV Push every 1 hour PRN Pre/post blood products, medications, blood draw, and to maintain line patency      CAPILLARY BLOOD GLUCOSE        I&O's Summary    05 Mar 2020 07:01  -  06 Mar 2020 07:00  --------------------------------------------------------  IN: 100 mL / OUT: 150 mL / NET: -50 mL        PHYSICAL EXAM:  Vital Signs Last 24 Hrs  T(C): 36.3 (06 Mar 2020 04:41), Max: 36.4 (05 Mar 2020 16:58)  T(F): 97.4 (06 Mar 2020 04:41), Max: 97.6 (05 Mar 2020 16:58)  HR: 79 (06 Mar 2020 05:58) (73 - 86)  BP: 120/58 (06 Mar 2020 05:58) (86/46 - 126/72)  BP(mean): --  RR: 18 (06 Mar 2020 05:58) (18 - 19)  SpO2: 95% (06 Mar 2020 05:58) (95% - 100%)  CONSTITUTIONAL: NAD, well-developed, well-groomed  EYES: PERRLA; conjunctiva and sclera clear  ENMT: Moist oral mucosa, no pharyngeal injection or exudates; normal dentition  NECK: Supple, no palpable masses; no thyromegaly  RESPIRATORY: Normal respiratory effort; lungs are clear to auscultation bilaterally  CARDIOVASCULAR: Regular rate and rhythm, normal S1 and S2, no murmur/rub/gallop; No lower extremity edema; Peripheral pulses are 2+ bilaterally  ABDOMEN: Nontender to palpation, normoactive bowel sounds, no rebound/guarding; No hepatosplenomegaly  MUSCULOSKELETAL:  Normal gait; no clubbing or cyanosis of digits; no joint swelling or tenderness to palpation  PSYCH: A+O to person, place, and time; affect appropriate  NEUROLOGY: CN 2-12 are intact and symmetric; no gross sensory deficits   SKIN: No rashes; no palpable lesions    LABS:                        11.6   18.26 )-----------( 84       ( 06 Mar 2020 06:17 )             38.2     03-06    140  |  107  |  59<H>  ----------------------------<  91  5.3   |  17<L>  |  2.43<H>    Ca    8.5      06 Mar 2020 06:17  Mg     2.2     03-06    TPro  5.0<L>  /  Alb  2.0<L>  /  TBili  1.6<H>  /  DBili  x   /  AST  39  /  ALT  20  /  AlkPhos  166<H>  03-06          Urinalysis Basic - ( 06 Mar 2020 06:18 )    Color: Dark Yellow / Appearance: Turbid / S.033 / pH: x  Gluc: x / Ketone: Trace  / Bili: Negative / Urobili: 2 mg/dL   Blood: x / Protein: 30 mg/dL / Nitrite: Negative   Leuk Esterase: Large / RBC: 15 /hpf /  /HPF   Sq Epi: x / Non Sq Epi: 15 /hpf / Bacteria: Many        Culture - Blood (collected 03 Mar 2020 18:27)  Source: .Blood Blood-Peripheral  Preliminary Report (04 Mar 2020 19:01):    No growth to date.        RADIOLOGY & ADDITIONAL TESTS:  Results Reviewed:   Imaging Personally Reviewed:  Electrocardiogram Personally Reviewed:    COORDINATION OF CARE:  Care Discussed with Consultants/Other Providers [Y/N]:  Prior or Outpatient Records Reviewed [Y/N]: [facility] Division of Hospital Medicine  Nury Milligan MD  Pager (M-F, 9W-4F): [phone]  Other Times:  [phone]    Patient is a 89y old  Female who presents with a chief complaint of syncope and diarrhea (05 Mar 2020 15:10)      SUBJECTIVE / OVERNIGHT EVENTS:    pt had to have a central line placed yesterday 2/2 poor access and multiple failed attemps  eating minimally- daughter asking about a central line  deneis pain, sob.   more alert today    ADDITIONAL REVIEW OF SYSTEMS:    MEDICATIONS  (STANDING):  ALBUTerol    90 MICROgram(s) HFA Inhaler 2 Puff(s) Inhalation every 6 hours  AQUAPHOR (petrolatum Ointment) 1 Application(s) Topical daily  ascorbic acid 500 milliGRAM(s) Oral two times a day  buDESOnide    Inhalation Suspension 0.25 milliGRAM(s) Inhalation two times a day  cefTRIAXone   IVPB 1 milliGRAM(s) IV Intermittent every 24 hours  chlorhexidine 4% Liquid 1 Application(s) Topical <User Schedule>  cholecalciferol 2000 Unit(s) Oral daily  cyanocobalamin 1000 MICROGram(s) Oral daily  digoxin     Tablet 0.125 milliGRAM(s) Oral every other day  enoxaparin Injectable 40 milliGRAM(s) SubCutaneous daily  folic acid 1 milliGRAM(s) Oral daily  levothyroxine 25 MICROGram(s) Oral daily  midodrine 5 milliGRAM(s) Oral every 8 hours  multivitamin/minerals 1 Tablet(s) Oral daily  rifAXIMin 550 milliGRAM(s) Oral two times a day  sodium chloride 0.9%. 1000 milliLiter(s) (50 mL/Hr) IV Continuous <Continuous>  tiotropium 18 MICROgram(s) Capsule 1 Capsule(s) Inhalation daily    MEDICATIONS  (PRN):  acetaminophen    Suspension .. 650 milliGRAM(s) Oral every 6 hours PRN Temp greater or equal to 38C (100.4F), Mild Pain (1 - 3), Moderate Pain (4 - 6)  sodium chloride 0.9% lock flush 10 milliLiter(s) IV Push every 1 hour PRN Pre/post blood products, medications, blood draw, and to maintain line patency      CAPILLARY BLOOD GLUCOSE        I&O's Summary    05 Mar 2020 07:01  -  06 Mar 2020 07:00  --------------------------------------------------------  IN: 100 mL / OUT: 150 mL / NET: -50 mL        PHYSICAL EXAM:  Vital Signs Last 24 Hrs  T(C): 36.3 (06 Mar 2020 04:41), Max: 36.4 (05 Mar 2020 16:58)  T(F): 97.4 (06 Mar 2020 04:41), Max: 97.6 (05 Mar 2020 16:58)  HR: 79 (06 Mar 2020 05:58) (73 - 86)  BP: 120/58 (06 Mar 2020 05:58) (86/46 - 126/72)  BP(mean): --  RR: 18 (06 Mar 2020 05:58) (18 - 19)  SpO2: 95% (06 Mar 2020 05:58) (95% - 100%)  CONSTITUTIONAL: frail, thin, chronically ill apearring  RESPIRATORY: Normal respiratory effort; lungs are clear to auscultation bilaterally anteriorly. NC in place  CARDIOVASCULAR: irregularly irregular  ABDOMEN: Nontender to palpation, normoactive bowel sounds, no rebound/guarding  PSYCH: sleepy but arousable and oriented. wants to be left alone  SKIN: multiple skin tears UEs and LEs- some wrapped    LABS:                        11.6   18.26 )-----------( 84       ( 06 Mar 2020 06:17 )             38.2     03-06    140  |  107  |  59<H>  ----------------------------<  91  5.3   |  17<L>  |  2.43<H>    Ca    8.5      06 Mar 2020 06:17  Mg     2.2     03-06    TPro  5.0<L>  /  Alb  2.0<L>  /  TBili  1.6<H>  /  DBili  x   /  AST  39  /  ALT  20  /  AlkPhos  166<H>  03-06          Urinalysis Basic - ( 06 Mar 2020 06:18 )    Color: Dark Yellow / Appearance: Turbid / S.033 / pH: x  Gluc: x / Ketone: Trace  / Bili: Negative / Urobili: 2 mg/dL   Blood: x / Protein: 30 mg/dL / Nitrite: Negative   Leuk Esterase: Large / RBC: 15 /hpf /  /HPF   Sq Epi: x / Non Sq Epi: 15 /hpf / Bacteria: Many        Culture - Blood (collected 03 Mar 2020 18:27)  Source: .Blood Blood-Peripheral  Preliminary Report (04 Mar 2020 19:01):    No growth to date.      COORDINATION OF CARE:  Care Discussed with Consultants/Other Providers [Y/N]: yes  Prior or Outpatient Records Reviewed [Y/N]: yes

## 2020-03-06 NOTE — PROGRESS NOTE ADULT - PROBLEM SELECTOR PLAN 9
-per pharmacy med rec and per patient's daughter, pt was started on propranolol 10 mg BID and spironolactone 25mg BID as an outpt by her cardiologist.   -will hold both inpatient given hypotension and dehydration. afib is mainly rate controlled. severe. appreciate nutrition recs  -discussed possible appetite stimulant with daughter (she wants one). we discussed Remeron today. will start 7.5 mg bedtime as trial

## 2020-03-06 NOTE — PROGRESS NOTE ADULT - PROBLEM SELECTOR PLAN 6
-continue with digoxin  -not on AC given previous history of ICH  -continue home midodrine for hypotension -continue with digoxin  -not on AC given previous history of ICH  -continue home midodrine for hypotension  -propranolol on hold for now- if remains stable would resume over weekend -continue with digoxin  -follow up dig level (pending)  -not on AC given previous history of ICH  -continue home midodrine for hypotension  -propranolol on hold for now- if remains stable would resume over weekend

## 2020-03-06 NOTE — PROGRESS NOTE ADULT - PROBLEM SELECTOR PLAN 2
-likely secondary to dehydration vs vasovagal syncope as pt was on toilet having a BM when it happened   -pt was also recently started on propranolol and spironolactone as an outpt, which may have contributed to symptoms as well as dehydration. Will hold inpatient.   -cont tele- in afib, rate controlled. continue monitoring for now  -hold lactulose in setting of large BMs and new KARRIE  -s/p 2L IVF  -high sensitivity trops negx2   -EKG without acute ischemic changes  -continue home midodrine, will consider titrating off this admission -likely multifactorial- overall poor PO intake at home and poor nutritional status and was having diarrhea (prerenal causes). also now noted to have UTI and acute urinary retention causing post-obstructive KARRIE  -straight cath for now, possible rivero  -UTI treatment as above  -holding spironolactone   -gentle IVF  -repeat BMP 4pm today (rising K of 5.3)  -renal sono  -encourage PO intake as tolerated  -if renal fx not improving after rivero placement and IVF will want renal consult tomorrow

## 2020-03-06 NOTE — PROGRESS NOTE ADULT - PROBLEM SELECTOR PLAN 3
-in the setting of lactulose which is a home medication for patient, large loose BMs overnight  -hold lactulose  -noted KARRIE on CKD- like dehydration/prerenal mediated. Will start gentle IVF today, but needs to obtain IV access first. check bladder scan, check urine lytes and UA  -check GI PCR, if diarrhea returns will check c. diff  -holding off on abx for now given non-tender abdomen and absence of fever  -xray with non obstructive bowel pattern -pt presented with syncope on admission  -likely secondary to dehydration vs vasovagal syncope as pt was on toilet having a BM when it happened   -pt was also recently started on propranolol and spironolactone as an outpt, which may have contributed to symptoms as well as dehydration. Have been holding in setting of diarrhea and now infection  -cont tele- in afib, rate controlled. continue monitoring for now  -gentle IVF as pt with poor po intake  -high sensitivity trops negx2   -EKG without acute ischemic changes  -continue home midodrine- plan was for pt to titrate off of it slowly as an outpatient after being on propranolol, spironolactone for some time (being managed by Dr Boston)

## 2020-03-06 NOTE — PROGRESS NOTE ADULT - PROBLEM SELECTOR PLAN 5
-wound care consult appreciated  -plastic surgery saw pt for R. hand wound- appreciate recs. short course of keflex prophylactically   -multiple wounds from bed sores as well as skin tears from falling  -fall risk protocol -wound care consult appreciated  -plastic surgery saw pt for R. hand wound- appreciate recs. short course of keflex prophylactically   -multiple wounds from bed sores as well as skin tears from falling  -likely related to patients poor nutritional status- pt with severe protein calorie malnutrition. Appreciate recs of dietician.   -fall risk protocol

## 2020-03-06 NOTE — PROGRESS NOTE ADULT - PROBLEM SELECTOR PLAN 1
rising leukocytosis, increased lethargy and now positive UA. Afebrile. suspect UTI likely from diarrhea. UTI likely present on admission  -send urine cx first  -then start ceftriaxone 1g daily  -follow up sensitivities to adjust antibiotic as needed  -likely cause of urinary retention/tahir   -monitor for fever, worsening mental status, or increasing leukocytosis

## 2020-03-06 NOTE — CHART NOTE - NSCHARTNOTEFT_GEN_A_CORE
Nutrition Follow Up Note  Patient seen for: malnutrition follow up    · Reason for Admission	syncope and diarrhea      Source: pt, pt daughter at bedside, chart    Diet : Dysphagia 2, nectar consistency    Patient reports: disoriented to situation. spoke with daughter. pt reports drinking mighty shakes, daughter reports her taking 1-2 sips. daughter requesting discussion with MD regarding appetite stimulant-RD made NP aware.      PO intake : <25%      Source for PO intake: daughter          Daily Weight in k.4 (), Weight in k.9 (), Weight in k.4 (), Weight in k.5 ()  % Weight Change: 4% loss since 3/5-    Pertinent Medications: MEDICATIONS  (STANDING):  ALBUTerol    90 MICROgram(s) HFA Inhaler 2 Puff(s) Inhalation every 6 hours  AQUAPHOR (petrolatum Ointment) 1 Application(s) Topical daily  ascorbic acid 500 milliGRAM(s) Oral two times a day  buDESOnide    Inhalation Suspension 0.25 milliGRAM(s) Inhalation two times a day  cefTRIAXone   IVPB 1000 milliGRAM(s) IV Intermittent every 24 hours  chlorhexidine 4% Liquid 1 Application(s) Topical <User Schedule>  cholecalciferol 2000 Unit(s) Oral daily  cyanocobalamin 1000 MICROGram(s) Oral daily  digoxin     Tablet 0.125 milliGRAM(s) Oral every other day  folic acid 1 milliGRAM(s) Oral daily  levothyroxine 25 MICROGram(s) Oral daily  midodrine 5 milliGRAM(s) Oral every 8 hours  multivitamin/minerals 1 Tablet(s) Oral daily  rifAXIMin 550 milliGRAM(s) Oral two times a day  sodium chloride 0.9%. 1000 milliLiter(s) (50 mL/Hr) IV Continuous <Continuous>  tiotropium 18 MICROgram(s) Capsule 1 Capsule(s) Inhalation daily    MEDICATIONS  (PRN):  acetaminophen    Suspension .. 650 milliGRAM(s) Oral every 6 hours PRN Temp greater or equal to 38C (100.4F), Mild Pain (1 - 3), Moderate Pain (4 - 6)  sodium chloride 0.9% lock flush 10 milliLiter(s) IV Push every 1 hour PRN Pre/post blood products, medications, blood draw, and to maintain line patency    Pertinent Labs:  @ 06:17: Na 140, BUN 59<H>, Cr 2.43<H>, BG 91, K+ 5.3, Mg 2.2, Alk Phos 166<H>, ALT/SGPT 20, AST/SGOT 39  03-05 @ 10:12: Na 140, BUN 57<H>, Cr 2.32<H>, <H>, K+ 5.0, Mg 2.1, Alk Phos 144<H>, ALT/SGPT 16, AST/SGOT 50<H>          Skin per nursing documentation: coccyx stage2, left uppr arm stage 1,   Edema: +2 left arm, right arm    Estimated Needs:   [x ] no change since previous assessment  [ ] recalculated:     Previous Nutrition Diagnosis: 1. severe malnutrition  2. increased nutrient needs  Nutrition Diagnosis is: 1 and 2 plan is achieved with supplements.          Recommend  continue Dysphagia 2 nectar consistency  continue mighty shakes 4oz with each meal  continue multivitamin with minerals, Vitamin C and Jacob x 2 daily  monitor need to restrict protein and change supplement if po intake increases secondary creatinine trending up    Monitoring and Evaluation:     Continue to monitor Nutritional intake, Tolerance to diet prescription, weights, labs, skin integrity    RD remains available upon request and will follow up per protocol  Charline Saunders MA, RD, CDN #[phone]

## 2020-03-07 LAB
ANION GAP SERPL CALC-SCNC: 15 MMOL/L — SIGNIFICANT CHANGE UP (ref 5–17)
BUN SERPL-MCNC: 67 MG/DL — HIGH (ref 7–23)
CALCIUM SERPL-MCNC: 8.4 MG/DL — SIGNIFICANT CHANGE UP (ref 8.4–10.5)
CHLORIDE SERPL-SCNC: 111 MMOL/L — HIGH (ref 96–108)
CO2 SERPL-SCNC: 18 MMOL/L — LOW (ref 22–31)
CREAT SERPL-MCNC: 2.61 MG/DL — HIGH (ref 0.5–1.3)
DIGITOXIN SERPL-MCNC: SIGNIFICANT CHANGE UP NG/ML
GLUCOSE SERPL-MCNC: 95 MG/DL — SIGNIFICANT CHANGE UP (ref 70–99)
HCT VFR BLD CALC: 34.7 % — SIGNIFICANT CHANGE UP (ref 34.5–45)
HGB BLD-MCNC: 10.9 G/DL — LOW (ref 11.5–15.5)
MAGNESIUM SERPL-MCNC: 2.1 MG/DL — SIGNIFICANT CHANGE UP (ref 1.6–2.6)
MCHC RBC-ENTMCNC: 31.4 GM/DL — LOW (ref 32–36)
MCHC RBC-ENTMCNC: 33.3 PG — SIGNIFICANT CHANGE UP (ref 27–34)
MCV RBC AUTO: 106.1 FL — HIGH (ref 80–100)
NRBC # BLD: 0 /100 WBCS — SIGNIFICANT CHANGE UP (ref 0–0)
PHOSPHATE SERPL-MCNC: 4.1 MG/DL — SIGNIFICANT CHANGE UP (ref 2.5–4.5)
PLATELET # BLD AUTO: 81 K/UL — LOW (ref 150–400)
POTASSIUM SERPL-MCNC: 4.5 MMOL/L — SIGNIFICANT CHANGE UP (ref 3.5–5.3)
POTASSIUM SERPL-SCNC: 4.5 MMOL/L — SIGNIFICANT CHANGE UP (ref 3.5–5.3)
RBC # BLD: 3.27 M/UL — LOW (ref 3.8–5.2)
RBC # FLD: 16.1 % — HIGH (ref 10.3–14.5)
SODIUM SERPL-SCNC: 144 MMOL/L — SIGNIFICANT CHANGE UP (ref 135–145)
WBC # BLD: 12.07 K/UL — HIGH (ref 3.8–10.5)
WBC # FLD AUTO: 12.07 K/UL — HIGH (ref 3.8–10.5)

## 2020-03-07 PROCEDURE — 99233 SBSQ HOSP IP/OBS HIGH 50: CPT

## 2020-03-07 RX ORDER — LACTULOSE 10 G/15ML
20 SOLUTION ORAL
Refills: 0 | Status: DISCONTINUED | OUTPATIENT
Start: 2020-03-07 | End: 2020-03-08

## 2020-03-07 RX ORDER — BUDESONIDE, MICRONIZED 100 %
0.25 POWDER (GRAM) MISCELLANEOUS EVERY 12 HOURS
Refills: 0 | Status: DISCONTINUED | OUTPATIENT
Start: 2020-03-07 | End: 2020-03-14

## 2020-03-07 RX ORDER — MIDODRINE HYDROCHLORIDE 2.5 MG/1
5 TABLET ORAL EVERY 8 HOURS
Refills: 0 | Status: DISCONTINUED | OUTPATIENT
Start: 2020-03-07 | End: 2020-03-14

## 2020-03-07 RX ORDER — IPRATROPIUM/ALBUTEROL SULFATE 18-103MCG
3 AEROSOL WITH ADAPTER (GRAM) INHALATION EVERY 6 HOURS
Refills: 0 | Status: DISCONTINUED | OUTPATIENT
Start: 2020-03-07 | End: 2020-03-14

## 2020-03-07 RX ADMIN — CEFTRIAXONE 100 MILLIGRAM(S): 500 INJECTION, POWDER, FOR SOLUTION INTRAMUSCULAR; INTRAVENOUS at 09:39

## 2020-03-07 RX ADMIN — Medication 25 MICROGRAM(S): at 06:34

## 2020-03-07 RX ADMIN — Medication 1 APPLICATION(S): at 11:45

## 2020-03-07 RX ADMIN — CHLORHEXIDINE GLUCONATE 1 APPLICATION(S): 213 SOLUTION TOPICAL at 06:41

## 2020-03-07 RX ADMIN — LACTULOSE 10 GRAM(S): 10 SOLUTION ORAL at 06:33

## 2020-03-07 RX ADMIN — Medication 3 MILLILITER(S): at 18:40

## 2020-03-07 RX ADMIN — Medication 1 TABLET(S): at 09:39

## 2020-03-07 RX ADMIN — Medication 500 MILLIGRAM(S): at 06:34

## 2020-03-07 RX ADMIN — Medication 3 MILLILITER(S): at 23:14

## 2020-03-07 RX ADMIN — Medication 2000 UNIT(S): at 09:39

## 2020-03-07 RX ADMIN — Medication 500 MILLIGRAM(S): at 18:22

## 2020-03-07 RX ADMIN — Medication 0.25 MILLIGRAM(S): at 18:22

## 2020-03-07 RX ADMIN — LACTULOSE 20 GRAM(S): 10 SOLUTION ORAL at 18:21

## 2020-03-07 RX ADMIN — LACTULOSE 20 GRAM(S): 10 SOLUTION ORAL at 14:04

## 2020-03-07 RX ADMIN — SODIUM CHLORIDE 50 MILLILITER(S): 9 INJECTION INTRAMUSCULAR; INTRAVENOUS; SUBCUTANEOUS at 09:59

## 2020-03-07 RX ADMIN — ALBUTEROL 2 PUFF(S): 90 AEROSOL, METERED ORAL at 06:33

## 2020-03-07 RX ADMIN — Medication 0.25 MILLIGRAM(S): at 06:33

## 2020-03-07 RX ADMIN — MIDODRINE HYDROCHLORIDE 5 MILLIGRAM(S): 2.5 TABLET ORAL at 14:14

## 2020-03-07 RX ADMIN — MIDODRINE HYDROCHLORIDE 5 MILLIGRAM(S): 2.5 TABLET ORAL at 06:34

## 2020-03-07 RX ADMIN — Medication 1 MILLIGRAM(S): at 09:39

## 2020-03-07 RX ADMIN — PREGABALIN 1000 MICROGRAM(S): 225 CAPSULE ORAL at 09:39

## 2020-03-07 NOTE — PROGRESS NOTE ADULT - PROBLEM SELECTOR PLAN 3
-pt presented with syncope on admission  -likely secondary to dehydration vs vasovagal syncope as pt was on toilet having a BM when it happened   -pt was also recently started on propranolol and spironolactone as an outpt, which may have contributed to symptoms as well as dehydration. Have been holding in setting of diarrhea and now infection  -cont tele- in afib, rate controlled. continue monitoring for now  -gentle IVF as pt with poor po intake  -high sensitivity trops negx2   -EKG without acute ischemic changes  -continue home midodrine- plan was for pt to titrate off of it slowly as an outpatient after being on propranolol, spironolactone for some time (being managed by Dr Boston)

## 2020-03-07 NOTE — PROGRESS NOTE ADULT - PROBLEM SELECTOR PLAN 9
severe. appreciate nutrition recs  -discussed possible appetite stimulant with daughter (she wants one). we discussed Remeron today. will start 7.5 mg bedtime as trial

## 2020-03-07 NOTE — PROGRESS NOTE ADULT - ASSESSMENT
89 y/o F with history of Afib on dig (not on AC), severe AS, COPD, CLL (s/p treatment with Treanda and Rituxan), HCC+cirrhosis with portal HTN, esophageal varices s/p banding and hepatic vessel coiling, CKD, hypothyroidism, recurrent UTIs with ESBL Klebsiella, recent admission in February 2020 for serratia bacteremia and candida glabrata fungemia s/p treatment, presenting from home after a syncopal episode.

## 2020-03-07 NOTE — PROGRESS NOTE ADULT - PROBLEM SELECTOR PLAN 8
pt with intermittent hepatic encephalopathy, hx of varices. managed medically  -continue with rifaximin  -slow resumption of lactulose as diarrhea resolved  -hold spirono and propanolol for now  -noted mild transaminitis, thrombocytopenia, anemia- cont to trend.  -abdominal sono- RUQ today for elevated ALP and to assess for ascites

## 2020-03-07 NOTE — PROGRESS NOTE ADULT - PROBLEM SELECTOR PLAN 6
-continue with digoxin  -follow up dig level (pending)  -not on AC given previous history of ICH  -continue home midodrine for hypotension  -propranolol on hold for now- if remains stable would resume over weekend

## 2020-03-07 NOTE — PROGRESS NOTE ADULT - PROBLEM SELECTOR PLAN 4
may have been from too much lactulose at home. diarrhea now resolved no BM in a day. abdomen soft, non-tender. however now pt with renal failure as above.  -resume lactulose at lower dose- 10g bid   -monitor for further diarrhea- if recurs would check GI PCR (never collected) and dc lactulose  -unlikely cause of leukocytosis

## 2020-03-07 NOTE — PROGRESS NOTE ADULT - PROBLEM SELECTOR PLAN 5
-wound care consult appreciated  -plastic surgery saw pt for R. hand wound- appreciate recs. short course of keflex prophylactically   -multiple wounds from bed sores as well as skin tears from falling  -likely related to patients poor nutritional status- pt with severe protein calorie malnutrition. Appreciate recs of dietician.   -fall risk protocol

## 2020-03-07 NOTE — PROGRESS NOTE ADULT - PROBLEM SELECTOR PLAN 2
-likely multifactorial- overall poor PO intake at home and poor nutritional status and was having diarrhea (prerenal causes) now without diarrhea. also now noted to have UTI and acute urinary retention causing post-obstructive KARRIE  -straight cath for now, possible rivero  -UTI treatment as above  -holding spironolactone   -c/w gentle IVF  -renal sono  -encourage PO intake as tolerated  Renal consult

## 2020-03-07 NOTE — PROGRESS NOTE ADULT - PROBLEM SELECTOR PLAN 1
rising leukocytosis, increased lethargy and now positive UA. Afebrile. suspect UTI likely from diarrhea. UTI likely present on admission  -send urine cx first  -c/w ceftriaxone 1g daily  -follow up sensitivities to adjust antibiotic as needed  -likely cause of urinary retention/tahir   -monitor for fever, worsening mental status, or increasing leukocytosis

## 2020-03-07 NOTE — PROGRESS NOTE ADULT - SUBJECTIVE AND OBJECTIVE BOX
Patient is a 89y old  Female who presents with a chief complaint of syncope and diarrhea (06 Mar 2020 07:42)        SUBJECTIVE / OVERNIGHT EVENTS: no acute complaints       MEDICATIONS  (STANDING):  albuterol/ipratropium for Nebulization 3 milliLiter(s) Nebulizer every 6 hours  AQUAPHOR (petrolatum Ointment) 1 Application(s) Topical daily  ascorbic acid 500 milliGRAM(s) Oral two times a day  buDESOnide    Inhalation Suspension 0.25 milliGRAM(s) Nebulizer every 12 hours  cefTRIAXone   IVPB 1000 milliGRAM(s) IV Intermittent every 24 hours  chlorhexidine 4% Liquid 1 Application(s) Topical <User Schedule>  cholecalciferol 2000 Unit(s) Oral daily  cyanocobalamin 1000 MICROGram(s) Oral daily  digoxin     Tablet 0.125 milliGRAM(s) Oral every other day  folic acid 1 milliGRAM(s) Oral daily  lactulose Syrup 20 Gram(s) Oral four times a day  levothyroxine 25 MICROGram(s) Oral daily  midodrine 5 milliGRAM(s) Oral every 8 hours  multivitamin/minerals 1 Tablet(s) Oral daily  rifAXIMin 550 milliGRAM(s) Oral two times a day  sodium chloride 0.9%. 1000 milliLiter(s) (50 mL/Hr) IV Continuous <Continuous>  tiotropium 18 MICROgram(s) Capsule 1 Capsule(s) Inhalation daily    MEDICATIONS  (PRN):  acetaminophen    Suspension .. 650 milliGRAM(s) Oral every 6 hours PRN Temp greater or equal to 38C (100.4F), Mild Pain (1 - 3), Moderate Pain (4 - 6)  sodium chloride 0.9% lock flush 10 milliLiter(s) IV Push every 1 hour PRN Pre/post blood products, medications, blood draw, and to maintain line patency      Vital Signs Last 24 Hrs  T(C): 36.4 (07 Mar 2020 12:15), Max: 36.6 (06 Mar 2020 22:04)  T(F): 97.5 (07 Mar 2020 12:15), Max: 97.8 (06 Mar 2020 22:04)  HR: 73 (07 Mar 2020 12:15) (73 - 94)  BP: 141/81 (07 Mar 2020 12:15) (101/69 - 157/83)  BP(mean): --  RR: 17 (07 Mar 2020 12:15) (17 - 18)  SpO2: 95% (07 Mar 2020 12:15) (95% - 100%)  CAPILLARY BLOOD GLUCOSE        I&O's Summary    06 Mar 2020 07:01  -  07 Mar 2020 07:00  --------------------------------------------------------  IN: 1060 mL / OUT: 650 mL / NET: 410 mL    07 Mar 2020 06:01  -  07 Mar 2020 19:18  --------------------------------------------------------  IN: 880 mL / OUT: 0 mL / NET: 880 mL        PHYSICAL EXAM:  GENERAL:  frail, thin, breathing normal, awake and engaged, sipping water (by herself)  HEAD:  Atraumatic, Normocephalic  EYES: conjunctiva and sclera clear  NECK: supple, No JVD  CHEST/LUNG: CTA b/l  HEART: S1 S2 RRR  ABDOMEN: +BS Soft, NT/ND  EXTREMITIES:  2+ DP Pulses, No c/c. no LE edema  NEUROLOGY: AAOx3, no facial droop, no focal deficits   SKIN: multiple skin tears UEs and LEs- some wrapped    LABS:                        10.9   12.07 )-----------( 81       ( 07 Mar 2020 06:52 )             34.7     03-07    144  |  111<H>  |  67<H>  ----------------------------<  95  4.5   |  18<L>  |  2.61<H>    Ca    8.4      07 Mar 2020 06:52  Phos  4.1     03-07  Mg     2.1     03-07    TPro  5.0<L>  /  Alb  2.0<L>  /  TBili  1.6<H>  /  DBili  x   /  AST  39  /  ALT  20  /  AlkPhos  166<H>  03-06          Urinalysis Basic - ( 06 Mar 2020 06:18 )    Color: Dark Yellow / Appearance: Turbid / S.033 / pH: x  Gluc: x / Ketone: Trace  / Bili: Negative / Urobili: 2 mg/dL   Blood: x / Protein: 30 mg/dL / Nitrite: Negative   Leuk Esterase: Large / RBC: 15 /hpf /  /HPF   Sq Epi: x / Non Sq Epi: 15 /hpf / Bacteria: Many        RADIOLOGY & ADDITIONAL TESTS:    Imaging Personally Reviewed:  Consultant(s) Notes Reviewed:    Care Discussed with Consultants/Other Providers:

## 2020-03-08 DIAGNOSIS — E87.8 OTHER DISORDERS OF ELECTROLYTE AND FLUID BALANCE, NOT ELSEWHERE CLASSIFIED: ICD-10-CM

## 2020-03-08 DIAGNOSIS — N17.9 ACUTE KIDNEY FAILURE, UNSPECIFIED: ICD-10-CM

## 2020-03-08 LAB
-  AMIKACIN: SIGNIFICANT CHANGE UP
-  AMPICILLIN/SULBACTAM: SIGNIFICANT CHANGE UP
-  AMPICILLIN: SIGNIFICANT CHANGE UP
-  AZTREONAM: SIGNIFICANT CHANGE UP
-  CEFAZOLIN: SIGNIFICANT CHANGE UP
-  CEFEPIME: SIGNIFICANT CHANGE UP
-  CEFOXITIN: SIGNIFICANT CHANGE UP
-  CEFTRIAXONE: SIGNIFICANT CHANGE UP
-  CIPROFLOXACIN: SIGNIFICANT CHANGE UP
-  ERTAPENEM: SIGNIFICANT CHANGE UP
-  GENTAMICIN: SIGNIFICANT CHANGE UP
-  IMIPENEM: SIGNIFICANT CHANGE UP
-  LEVOFLOXACIN: SIGNIFICANT CHANGE UP
-  MEROPENEM: SIGNIFICANT CHANGE UP
-  NITROFURANTOIN: SIGNIFICANT CHANGE UP
-  PIPERACILLIN/TAZOBACTAM: SIGNIFICANT CHANGE UP
-  TIGECYCLINE: SIGNIFICANT CHANGE UP
-  TOBRAMYCIN: SIGNIFICANT CHANGE UP
-  TRIMETHOPRIM/SULFAMETHOXAZOLE: SIGNIFICANT CHANGE UP
ALBUMIN SERPL ELPH-MCNC: 2.2 G/DL — LOW (ref 3.3–5)
ALP SERPL-CCNC: 152 U/L — HIGH (ref 40–120)
ALT FLD-CCNC: 17 U/L — SIGNIFICANT CHANGE UP (ref 10–45)
ANION GAP SERPL CALC-SCNC: 19 MMOL/L — HIGH (ref 5–17)
AST SERPL-CCNC: 26 U/L — SIGNIFICANT CHANGE UP (ref 10–40)
BILIRUB SERPL-MCNC: 1.1 MG/DL — SIGNIFICANT CHANGE UP (ref 0.2–1.2)
BUN SERPL-MCNC: 72 MG/DL — HIGH (ref 7–23)
CALCIUM SERPL-MCNC: 8.7 MG/DL — SIGNIFICANT CHANGE UP (ref 8.4–10.5)
CHLORIDE SERPL-SCNC: 112 MMOL/L — HIGH (ref 96–108)
CO2 SERPL-SCNC: 16 MMOL/L — LOW (ref 22–31)
CREAT SERPL-MCNC: 2.96 MG/DL — HIGH (ref 0.5–1.3)
CULTURE RESULTS: SIGNIFICANT CHANGE UP
CULTURE RESULTS: SIGNIFICANT CHANGE UP
GLUCOSE SERPL-MCNC: 92 MG/DL — SIGNIFICANT CHANGE UP (ref 70–99)
HCT VFR BLD CALC: 35.3 % — SIGNIFICANT CHANGE UP (ref 34.5–45)
HGB BLD-MCNC: 10.7 G/DL — LOW (ref 11.5–15.5)
MCHC RBC-ENTMCNC: 30.3 GM/DL — LOW (ref 32–36)
MCHC RBC-ENTMCNC: 32.6 PG — SIGNIFICANT CHANGE UP (ref 27–34)
MCV RBC AUTO: 107.6 FL — HIGH (ref 80–100)
METHOD TYPE: SIGNIFICANT CHANGE UP
NRBC # BLD: 0 /100 WBCS — SIGNIFICANT CHANGE UP (ref 0–0)
ORGANISM # SPEC MICROSCOPIC CNT: SIGNIFICANT CHANGE UP
ORGANISM # SPEC MICROSCOPIC CNT: SIGNIFICANT CHANGE UP
PLATELET # BLD AUTO: 68 K/UL — LOW (ref 150–400)
POTASSIUM SERPL-MCNC: 3.9 MMOL/L — SIGNIFICANT CHANGE UP (ref 3.5–5.3)
POTASSIUM SERPL-SCNC: 3.9 MMOL/L — SIGNIFICANT CHANGE UP (ref 3.5–5.3)
PROT SERPL-MCNC: 4.8 G/DL — LOW (ref 6–8.3)
RBC # BLD: 3.28 M/UL — LOW (ref 3.8–5.2)
RBC # FLD: 16.9 % — HIGH (ref 10.3–14.5)
SODIUM SERPL-SCNC: 147 MMOL/L — HIGH (ref 135–145)
SPECIMEN SOURCE: SIGNIFICANT CHANGE UP
SPECIMEN SOURCE: SIGNIFICANT CHANGE UP
WBC # BLD: 9.64 K/UL — SIGNIFICANT CHANGE UP (ref 3.8–10.5)
WBC # FLD AUTO: 9.64 K/UL — SIGNIFICANT CHANGE UP (ref 3.8–10.5)

## 2020-03-08 PROCEDURE — 99233 SBSQ HOSP IP/OBS HIGH 50: CPT

## 2020-03-08 PROCEDURE — 99223 1ST HOSP IP/OBS HIGH 75: CPT | Mod: GC

## 2020-03-08 RX ORDER — SODIUM CHLORIDE 9 MG/ML
1000 INJECTION, SOLUTION INTRAVENOUS
Refills: 0 | Status: DISCONTINUED | OUTPATIENT
Start: 2020-03-08 | End: 2020-03-10

## 2020-03-08 RX ORDER — ALBUMIN HUMAN 25 %
50 VIAL (ML) INTRAVENOUS EVERY 6 HOURS
Refills: 0 | Status: COMPLETED | OUTPATIENT
Start: 2020-03-08 | End: 2020-03-08

## 2020-03-08 RX ORDER — LACTULOSE 10 G/15ML
20 SOLUTION ORAL THREE TIMES A DAY
Refills: 0 | Status: DISCONTINUED | OUTPATIENT
Start: 2020-03-08 | End: 2020-03-09

## 2020-03-08 RX ADMIN — Medication 2000 UNIT(S): at 13:59

## 2020-03-08 RX ADMIN — Medication 0.25 MILLIGRAM(S): at 05:25

## 2020-03-08 RX ADMIN — Medication 1 MILLIGRAM(S): at 13:56

## 2020-03-08 RX ADMIN — SODIUM CHLORIDE 50 MILLILITER(S): 9 INJECTION INTRAMUSCULAR; INTRAVENOUS; SUBCUTANEOUS at 05:26

## 2020-03-08 RX ADMIN — LACTULOSE 20 GRAM(S): 10 SOLUTION ORAL at 21:02

## 2020-03-08 RX ADMIN — Medication 3 MILLILITER(S): at 13:54

## 2020-03-08 RX ADMIN — Medication 3 MILLILITER(S): at 05:25

## 2020-03-08 RX ADMIN — Medication 50 MILLILITER(S): at 16:05

## 2020-03-08 RX ADMIN — CEFTRIAXONE 100 MILLIGRAM(S): 500 INJECTION, POWDER, FOR SOLUTION INTRAMUSCULAR; INTRAVENOUS at 14:22

## 2020-03-08 RX ADMIN — Medication 0.25 MILLIGRAM(S): at 18:09

## 2020-03-08 RX ADMIN — MIDODRINE HYDROCHLORIDE 5 MILLIGRAM(S): 2.5 TABLET ORAL at 22:05

## 2020-03-08 RX ADMIN — Medication 500 MILLIGRAM(S): at 18:08

## 2020-03-08 RX ADMIN — PREGABALIN 1000 MICROGRAM(S): 225 CAPSULE ORAL at 13:57

## 2020-03-08 RX ADMIN — Medication 3 MILLILITER(S): at 18:08

## 2020-03-08 RX ADMIN — LACTULOSE 20 GRAM(S): 10 SOLUTION ORAL at 13:58

## 2020-03-08 RX ADMIN — Medication 25 MICROGRAM(S): at 05:25

## 2020-03-08 RX ADMIN — Medication 1 APPLICATION(S): at 14:02

## 2020-03-08 RX ADMIN — Medication 500 MILLIGRAM(S): at 05:25

## 2020-03-08 RX ADMIN — CHLORHEXIDINE GLUCONATE 1 APPLICATION(S): 213 SOLUTION TOPICAL at 05:27

## 2020-03-08 RX ADMIN — MIDODRINE HYDROCHLORIDE 5 MILLIGRAM(S): 2.5 TABLET ORAL at 14:00

## 2020-03-08 RX ADMIN — Medication 1 TABLET(S): at 13:56

## 2020-03-08 RX ADMIN — Medication 0.12 MILLIGRAM(S): at 14:22

## 2020-03-08 RX ADMIN — TIOTROPIUM BROMIDE 1 CAPSULE(S): 18 CAPSULE ORAL; RESPIRATORY (INHALATION) at 18:12

## 2020-03-08 RX ADMIN — Medication 50 MILLILITER(S): at 21:10

## 2020-03-08 NOTE — PROGRESS NOTE ADULT - PROBLEM SELECTOR PLAN 8
pt with intermittent hepatic encephalopathy, hx of varices. managed medically  -continue with rifaximin  -slow resumption of lactulose as diarrhea resolved  -hold spirono and propanolol for now  -noted mild transaminitis, thrombocytopenia, anemia- cont to trend.  -abdominal sono without ascities

## 2020-03-08 NOTE — PROGRESS NOTE ADULT - PROBLEM SELECTOR PLAN 4
may have been from too much lactulose at home. diarrhea improved and restarted on lactulose - monitor bowel movements.   pt with renal failure as above.  leukocytosis resolved

## 2020-03-08 NOTE — PROGRESS NOTE ADULT - PROBLEM SELECTOR PLAN 3
-pt presented with syncope on admission  -likely secondary to dehydration vs vasovagal syncope as pt was on toilet having a BM when it happened   -pt was also recently started on propranolol and spironolactone as an outpt, which may have contributed to symptoms as well as dehydration. Have been holding in setting of diarrhea and now infection  -cont tele- in afib, rate controlled. continue monitoring for now  -gentle IVF as pt with poor po intake  -high sensitivity trops negx2   -EKG without acute ischemic changes  -continue home midodrine would hold for elevated bp- plan was for pt to titrate off of it slowly as an outpatient after being on propranolol, spironolactone for some time (being managed by Dr Boston)

## 2020-03-08 NOTE — PROGRESS NOTE ADULT - PROBLEM SELECTOR PLAN 5
-wound care consult appreciated  -plastic surgery saw pt for R. hand wound- appreciate recs. short course of keflex prophylactically - currently on ceftriaxone for UTI  -multiple wounds from bed sores as well as skin tears from falling  -likely related to patients poor nutritional status- pt with severe protein calorie malnutrition. Appreciate recs of dietician.   -fall risk protocol

## 2020-03-08 NOTE — CONSULT NOTE ADULT - ASSESSMENT
89F PMHx Afib (not on AC), severe AS, COPD, CLL (s/p treatment with Treanda and Rituxan), HCC+cirrhosis with portal HTN, esophageal varices s/p banding and hepatic vessel coiling, hypothyroidism, recurrent UTIs with ESBL Klebsiella, recent admission in February 2020 for serratia bacteremia and candida glabrata fungemia present with syncopal episode in setting diarrhea (on lactulose for cirrhosis) - admitted for sepsis from UTI (GNR) treated with ceftriaxone.  Hospital course complicated worsening KARRIE.    Nephrology consulted for worsening kidney function.  On admission sCr 1.99 on 3/2 (prior sCr range 0.8-1.0 in Feb 2020) and sCr uptrend to 2.96 on 3/8 hence nephrology consulted.  On admission hypotensive 80s/50sUrinalysis on admission showed RBC 5 w/o protein/wbc and repeat urinalysis on 3/8 showed wbc 134, rbc 15, protein 20, bacteria/LE.  U/S abdomen showed no hydronephrosis (no renal u/s).      KARRIE unclear etiology w/ differential d 89F PMHx Afib (not on AC), severe AS, COPD, CLL (s/p treatment with Treanda and Rituxan), HCC+cirrhosis with portal HTN, esophageal varices s/p banding and hepatic vessel coiling, hypothyroidism, recurrent UTIs with ESBL Klebsiella, recent admission in February 2020 for serratia bacteremia and candida glabrata fungemia present with syncopal episode in setting diarrhea (on lactulose for cirrhosis) - admitted for sepsis from UTI (GNR) treated with ceftriaxone.  Hospital course complicated worsening KARRIE.    Nephrology consulted for KARRIE.

## 2020-03-08 NOTE — PROGRESS NOTE ADULT - PROBLEM SELECTOR PLAN 6
-continue with digoxin  -follow up dig level (pending)  -not on AC given previous history of ICH  -continue home midodrine for hypotension (hold for elevated bp)  -propranolol on hold for now

## 2020-03-08 NOTE — CONSULT NOTE ADULT - SUBJECTIVE AND OBJECTIVE BOX
[facility] DIVISION OF KIDNEY DISEASES AND HYPERTENSION -- INITIAL CONSULT NOTE  Filomena Puentes  Nephrology Fellow  NS Pager: [phone]  [facility] Pager: [phone]  After 5pm or on weekend, please page the on-call fellow)  --------------------------------------------------------------------------------  HPI:  89F PMHx Afib (not on AC), severe AS, COPD, CLL (s/p treatment with Treanda and Rituxan), HCC+cirrhosis with portal HTN, esophageal varices s/p banding and hepatic vessel coiling, hypothyroidism, recurrent UTIs with ESBL Klebsiella, recent admission in February 2020 for serratia bacteremia and candida glabrata fungemia s/p treatment who present to ED from home after a syncopal episode in setting diarrhea (on lactulose for cirrhosis) - admitted for sepsis from UTI (GNR) treated with ceftriaxone.  Hospital course complicated worsening KARRIE.    Nephrology consulted for worsening kidney function.  On admission sCr 1.99 on 3/2 (prior sCr range 0.8-1.0 in Feb 2020) and sCr uptrend to 2.96 on 3/8 hence nephrology consulted.  On admission hypotensive 80s/50sUrinalysis on admission showed RBC 5 w/o protein/wbc and repeat urinalysis on 3/8 showed wbc 134, rbc 15, protein 20, bacteria/LE.  U/S abdomen showed no hydronephrosis (no renal u/s).        PAST HISTORY  --------------------------------------------------------------------------------  PAST MEDICAL & SURGICAL HISTORY:  History of bacteremia  PVD (peripheral vascular disease)  Liver cell carcinoma  Severe aortic stenosis by prior echocardiogram  Pulmonary HTN: moderate  CKD (chronic kidney disease), stage 3 (moderate)  COPD (Chronic Obstructive Pulmonary Disease)  AF (Atrial Fibrillation)  Portal Hypertension  Bleeding Esophageal Varices  CLL (Chronic Lymphoblastic Leukemia)  GIB (Gastrointestinal Bleeding)  History of repair of hip fracture  Esophageal Rupture    FAMILY HISTORY:  FH: Alzheimers disease    PAST SOCIAL HISTORY:    ALLERGIES & MEDICATIONS  --------------------------------------------------------------------------------  Allergies    codeine (Rash)  erythromycin (Rash)  iv dye (Rash; Anaphylaxis; Short breath)  penicillin (Rash)  shellfish (Rash)    Intolerances    adhesives (Other)  warfarin (Other)    Standing Inpatient Medications  albuterol/ipratropium for Nebulization 3 milliLiter(s) Nebulizer every 6 hours  AQUAPHOR (petrolatum Ointment) 1 Application(s) Topical daily  ascorbic acid 500 milliGRAM(s) Oral two times a day  buDESOnide    Inhalation Suspension 0.25 milliGRAM(s) Nebulizer every 12 hours  cefTRIAXone   IVPB 1000 milliGRAM(s) IV Intermittent every 24 hours  chlorhexidine 4% Liquid 1 Application(s) Topical <User Schedule>  cholecalciferol 2000 Unit(s) Oral daily  cyanocobalamin 1000 MICROGram(s) Oral daily  digoxin     Tablet 0.125 milliGRAM(s) Oral every other day  folic acid 1 milliGRAM(s) Oral daily  lactulose Syrup 20 Gram(s) Oral three times a day  levothyroxine 25 MICROGram(s) Oral daily  midodrine 5 milliGRAM(s) Oral every 8 hours  multivitamin/minerals 1 Tablet(s) Oral daily  rifAXIMin 550 milliGRAM(s) Oral two times a day  sodium chloride 0.45%. 1000 milliLiter(s) IV Continuous <Continuous>  tiotropium 18 MICROgram(s) Capsule 1 Capsule(s) Inhalation daily    PRN Inpatient Medications  acetaminophen    Suspension .. 650 milliGRAM(s) Oral every 6 hours PRN  sodium chloride 0.9% lock flush 10 milliLiter(s) IV Push every 1 hour PRN      REVIEW OF SYSTEMS  --------------------------------------------------------------------------------  Gen: No weight changes, fatigue, fevers/chills, weakness  Skin: No rashes  Head/Eyes/Ears/Mouth: No headache; Normal hearing; Normal vision w/o blurriness; No sinus pain/discomfort, sore throat  Respiratory: No dyspnea, cough, wheezing, hemoptysis  CV: No chest pain, PND, orthopnea  GI: No abdominal pain, diarrhea, constipation, nausea, vomiting, melena, hematochezia  : No increased frequency, dysuria, hematuria, nocturia  MSK: No joint pain/swelling; no back pain; no edema  Neuro: No dizziness/lightheadedness, weakness, seizures, numbness, tingling  Heme: No easy bruising or bleeding  Endo: No heat/cold intolerance  Psych: No significant nervousness, anxiety, stress, depression    All other systems were reviewed and are negative, except as noted.    VITALS/PHYSICAL EXAM  --------------------------------------------------------------------------------  T(C): 36.3 (03-08-20 @ 05:38), Max: 36.8 (03-08-20 @ 00:58)  HR: 102 (03-08-20 @ 05:38) (73 - 102)  BP: 134/77 (03-08-20 @ 05:38) (122/93 - 141/81)  RR: 18 (03-08-20 @ 05:38) (17 - 18)  SpO2: 98% (03-08-20 @ 05:38) (95% - 98%)  Wt(kg): --        03-07-20 @ 06:01  -  03-08-20 @ 07:00  --------------------------------------------------------  IN: 1700 mL / OUT: 400 mL / NET: 1300 mL      Physical Exam:  	Gen: NAD, well-appearing  	HEENT: PERRL, supple neck, clear oropharynx  	Pulm: CTA B/L  	CV: RRR, S1S2; no rub  	Back: No spinal or CVA tenderness; no sacral edema  	Abd: +BS, soft, nontender/nondistended  	: No suprapubic tenderness  	UE: Warm, FROM, no clubbing, intact strength; no edema; no asterixis  	LE: Warm, FROM, no clubbing, intact strength; no edema  	Neuro: No focal deficits, intact gait  	Psych: Normal affect and mood  	Skin: Warm, without rashes  	Vascular access:    LABS/STUDIES  --------------------------------------------------------------------------------              10.7   9.64  >-----------<  68       [03-08-20 @ 07:46]              35.3     147  |  112  |  72  ----------------------------<  92      [03-08-20 @ 07:46]  3.9   |  16  |  2.96        Ca     8.7     [03-08-20 @ 07:46]      Mg     2.1     [03-07-20 @ 06:52]      Phos  4.1     [03-07-20 @ 06:52]    TPro  4.8  /  Alb  2.2  /  TBili  1.1  /  DBili  x   /  AST  26  /  ALT  17  /  AlkPhos  152  [03-08-20 @ 07:46]    Creatinine Trend:  SCr 2.96 [03-08 @ 07:46]  SCr 2.61 [03-07 @ 06:52]  SCr 2.50 [03-06 @ 16:49]  SCr 2.43 [03-06 @ 06:17]  SCr 2.32 [03-05 @ 10:12]    Urinalysis - [03-06-20 @ 06:18]      Color Dark Yellow / Appearance Turbid / SG 1.033 / pH 5.5      Gluc Negative / Ketone Trace  / Bili Negative / Urobili 2 mg/dL       Blood Negative / Protein 30 mg/dL / Leuk Est Large / Nitrite Negative      RBC 15 /  / Hyaline 5 / Gran  / Sq Epi  / Non Sq Epi 15 / Bacteria Many    Urine Creatinine 184      [03-06-20 @ 06:18]  Urine Sodium 45      [03-06-20 @ 06:18]    Iron 67, TIBC 177, %sat 38      [02-07-20 @ 03:54]  Ferritin 578      [02-07-20 @ 03:54]  PTH -- (Ca 10.8)      [01-07-20 @ 15:31]   10  Vitamin D (25OH) 50.5      [01-07-20 @ 15:31]  HbA1c 4.7      [12-22-18 @ 07:50]  TSH 1.54      [01-07-20 @ 15:31]    HBsAg Nonreact      [02-12-16 @ 09:59]  HCV 0.08, Nonreact      [02-12-16 @ 09:59]    ONEL: titer 1:320, pattern Speckled      [02-12-16 @ 09:59]  Syphilis Screen (Treponema Pallidum Ab) Negative      [02-07-16 @ 08:34]  Immunofixation Serum: Reference Range: None Detected      [02-12-16 @ 09:59]  SPEP Interpretation: Weak Gamma-Migrating Paraprotein Identified      [02-12-16 @ 09:59] [facility] DIVISION OF KIDNEY DISEASES AND HYPERTENSION -- INITIAL CONSULT NOTE  Filomena Puentes  Nephrology Fellow  NS Pager: [phone]  [facility] Pager: [phone]  After 5pm or on weekend, please page the on-call fellow)  --------------------------------------------------------------------------------  HPI:  89F PMHx Afib (not on AC), severe AS, COPD, CLL (s/p treatment with Treanda and Rituxan), HCC+cirrhosis with portal HTN, esophageal varices s/p banding and hepatic vessel coiling, hypothyroidism, recurrent UTIs with ESBL Klebsiella, recent admission in February 2020 for serratia bacteremia and candida glabrata fungemia s/p treatment who present to ED from home after a syncopal episode in setting diarrhea (on lactulose for cirrhosis) - admitted for sepsis from UTI (GNR) treated with ceftriaxone.  Hospital course complicated worsening KARRIE.    Nephrology consulted for worsening kidney function.  On admission sCr 1.99 on 3/2 (prior sCr range 0.8-1.0 in Feb 2020) and sCr uptrend to 2.96 on 3/8 hence nephrology consulted.  On admission hypotensive 80s/50sUrinalysis on admission showed RBC 5 w/o protein/wbc and repeat urinalysis on 3/8 showed wbc 134, rbc 15, protein 20, bacteria/LE.  U/S abdomen showed no hydronephrosis (no renal u/s).        PAST HISTORY  --------------------------------------------------------------------------------  PAST MEDICAL & SURGICAL HISTORY:  History of bacteremia  PVD (peripheral vascular disease)  Liver cell carcinoma  Severe aortic stenosis by prior echocardiogram  Pulmonary HTN: moderate  CKD (chronic kidney disease), stage 3 (moderate)  COPD (Chronic Obstructive Pulmonary Disease)  AF (Atrial Fibrillation)  Portal Hypertension  Bleeding Esophageal Varices  CLL (Chronic Lymphoblastic Leukemia)  GIB (Gastrointestinal Bleeding)  History of repair of hip fracture  Esophageal Rupture    FAMILY HISTORY:  FH: Alzheimers disease    PAST SOCIAL HISTORY:    ALLERGIES & MEDICATIONS  --------------------------------------------------------------------------------  Allergies    codeine (Rash)  erythromycin (Rash)  iv dye (Rash; Anaphylaxis; Short breath)  penicillin (Rash)  shellfish (Rash)    Intolerances    adhesives (Other)  warfarin (Other)    Standing Inpatient Medications  albuterol/ipratropium for Nebulization 3 milliLiter(s) Nebulizer every 6 hours  AQUAPHOR (petrolatum Ointment) 1 Application(s) Topical daily  ascorbic acid 500 milliGRAM(s) Oral two times a day  buDESOnide    Inhalation Suspension 0.25 milliGRAM(s) Nebulizer every 12 hours  cefTRIAXone   IVPB 1000 milliGRAM(s) IV Intermittent every 24 hours  chlorhexidine 4% Liquid 1 Application(s) Topical <User Schedule>  cholecalciferol 2000 Unit(s) Oral daily  cyanocobalamin 1000 MICROGram(s) Oral daily  digoxin     Tablet 0.125 milliGRAM(s) Oral every other day  folic acid 1 milliGRAM(s) Oral daily  lactulose Syrup 20 Gram(s) Oral three times a day  levothyroxine 25 MICROGram(s) Oral daily  midodrine 5 milliGRAM(s) Oral every 8 hours  multivitamin/minerals 1 Tablet(s) Oral daily  rifAXIMin 550 milliGRAM(s) Oral two times a day  sodium chloride 0.45%. 1000 milliLiter(s) IV Continuous <Continuous>  tiotropium 18 MICROgram(s) Capsule 1 Capsule(s) Inhalation daily    PRN Inpatient Medications  acetaminophen    Suspension .. 650 milliGRAM(s) Oral every 6 hours PRN  sodium chloride 0.9% lock flush 10 milliLiter(s) IV Push every 1 hour PRN      REVIEW OF SYSTEMS  unable to obtain d/t lethargy    VITALS/PHYSICAL EXAM  --------------------------------------------------------------------------------  T(C): 36.3 (03-08-20 @ 05:38), Max: 36.8 (03-08-20 @ 00:58)  HR: 102 (03-08-20 @ 05:38) (73 - 102)  BP: 134/77 (03-08-20 @ 05:38) (122/93 - 141/81)  RR: 18 (03-08-20 @ 05:38) (17 - 18)  SpO2: 98% (03-08-20 @ 05:38) (95% - 98%)  Wt(kg): --        03-07-20 @ 06:01  -  03-08-20 @ 07:00  --------------------------------------------------------  IN: 1700 mL / OUT: 400 mL / NET: 1300 mL      Physical Exam:  	Gen: lethargic appears in no acute distress on room air  	HEENT: dry lips, no JVD  	Pulm: CTA B/L anteriorly auscultated   	CV: RRR, S1S2; no rub/murmur  	GI: +BS, soft, nondistended  	: rivero catheter in place  	MSK: Warm, no edema, RUE wrapped in guaze              Neuro: lethargy  	Psych: lethargy  	Skin: dry skin      LABS/STUDIES  --------------------------------------------------------------------------------              10.7   9.64  >-----------<  68       [03-08-20 @ 07:46]              35.3     147  |  112  |  72  ----------------------------<  92      [03-08-20 @ 07:46]  3.9   |  16  |  2.96        Ca     8.7     [03-08-20 @ 07:46]      Mg     2.1     [03-07-20 @ 06:52]      Phos  4.1     [03-07-20 @ 06:52]    TPro  4.8  /  Alb  2.2  /  TBili  1.1  /  DBili  x   /  AST  26  /  ALT  17  /  AlkPhos  152  [03-08-20 @ 07:46]    Creatinine Trend:  SCr 2.96 [03-08 @ 07:46]  SCr 2.61 [03-07 @ 06:52]  SCr 2.50 [03-06 @ 16:49]  SCr 2.43 [03-06 @ 06:17]  SCr 2.32 [03-05 @ 10:12]    Urinalysis - [03-06-20 @ 06:18]      Color Dark Yellow / Appearance Turbid / SG 1.033 / pH 5.5      Gluc Negative / Ketone Trace  / Bili Negative / Urobili 2 mg/dL       Blood Negative / Protein 30 mg/dL / Leuk Est Large / Nitrite Negative      RBC 15 /  / Hyaline 5 / Gran  / Sq Epi  / Non Sq Epi 15 / Bacteria Many    Urine Creatinine 184      [03-06-20 @ 06:18]  Urine Sodium 45      [03-06-20 @ 06:18]    Iron 67, TIBC 177, %sat 38      [02-07-20 @ 03:54]  Ferritin 578      [02-07-20 @ 03:54]  PTH -- (Ca 10.8)      [01-07-20 @ 15:31]   10  Vitamin D (25OH) 50.5      [01-07-20 @ 15:31]  HbA1c 4.7      [12-22-18 @ 07:50]  TSH 1.54      [01-07-20 @ 15:31]    HBsAg Nonreact      [02-12-16 @ 09:59]  HCV 0.08, Nonreact      [02-12-16 @ 09:59]    ONEL: titer 1:320, pattern Speckled      [02-12-16 @ 09:59]  Syphilis Screen (Treponema Pallidum Ab) Negative      [02-07-16 @ 08:34]  Immunofixation Serum: Reference Range: None Detected      [02-12-16 @ 09:59]  SPEP Interpretation: Weak Gamma-Migrating Paraprotein Identified      [02-12-16 @ 09:59] [facility] DIVISION OF KIDNEY DISEASES AND HYPERTENSION -- INITIAL CONSULT NOTE  Filomena Puentes  Nephrology Fellow  NS Pager: [phone]  [facility] Pager: [phone]  After 5pm or on weekend, please page the on-call fellow)  --------------------------------------------------------------------------------  HPI:  89F PMHx Afib (not on AC), severe AS, COPD, CLL (s/p treatment with Treanda and Rituxan), HCC+cirrhosis with portal HTN, esophageal varices s/p banding and hepatic vessel coiling, hypothyroidism, recurrent UTIs with ESBL Klebsiella, recent admission in February 2020 for serratia bacteremia and candida glabrata fungemia s/p treatment who present to ED from home after a syncopal episode in setting diarrhea (on lactulose for cirrhosis) - admitted for sepsis from UTI (GNR) treated with ceftriaxone.  Hospital course complicated worsening KARRIE.    Nephrology consulted for worsening kidney function.  On admission sCr 1.99 on 3/2 (prior sCr range 0.8-1.0 in Feb 2020) and sCr uptrend to 2.96 on 3/8 hence nephrology consulted.  On admission hypotensive 80s/50sUrinalysis on admission showed RBC 5 w/o protein/wbc and repeat urinalysis on 3/8 showed wbc 134, rbc 15, protein 20, bacteria/LE.  U/S abdomen showed no hydronephrosis (no renal u/s).      History obtained from daughter at bedside, who mentioned that the patient was restarted on propranolol and aldactone ~3 days prior to admission. Daughter denies any use of antibiotics, NSAIDs at home.       PAST HISTORY  --------------------------------------------------------------------------------  PAST MEDICAL & SURGICAL HISTORY:  History of bacteremia  PVD (peripheral vascular disease)  Liver cell carcinoma  Severe aortic stenosis by prior echocardiogram  Pulmonary HTN: moderate  CKD (chronic kidney disease), stage 3 (moderate)  COPD (Chronic Obstructive Pulmonary Disease)  AF (Atrial Fibrillation)  Portal Hypertension  Bleeding Esophageal Varices  CLL (Chronic Lymphoblastic Leukemia)  GIB (Gastrointestinal Bleeding)  History of repair of hip fracture  Esophageal Rupture    FAMILY HISTORY:  FH: Alzheimers disease    PAST SOCIAL HISTORY:    ALLERGIES & MEDICATIONS  --------------------------------------------------------------------------------  Allergies    codeine (Rash)  erythromycin (Rash)  iv dye (Rash; Anaphylaxis; Short breath)  penicillin (Rash)  shellfish (Rash)    Intolerances    adhesives (Other)  warfarin (Other)    Standing Inpatient Medications  albuterol/ipratropium for Nebulization 3 milliLiter(s) Nebulizer every 6 hours  AQUAPHOR (petrolatum Ointment) 1 Application(s) Topical daily  ascorbic acid 500 milliGRAM(s) Oral two times a day  buDESOnide    Inhalation Suspension 0.25 milliGRAM(s) Nebulizer every 12 hours  cefTRIAXone   IVPB 1000 milliGRAM(s) IV Intermittent every 24 hours  chlorhexidine 4% Liquid 1 Application(s) Topical <User Schedule>  cholecalciferol 2000 Unit(s) Oral daily  cyanocobalamin 1000 MICROGram(s) Oral daily  digoxin     Tablet 0.125 milliGRAM(s) Oral every other day  folic acid 1 milliGRAM(s) Oral daily  lactulose Syrup 20 Gram(s) Oral three times a day  levothyroxine 25 MICROGram(s) Oral daily  midodrine 5 milliGRAM(s) Oral every 8 hours  multivitamin/minerals 1 Tablet(s) Oral daily  rifAXIMin 550 milliGRAM(s) Oral two times a day  sodium chloride 0.45%. 1000 milliLiter(s) IV Continuous <Continuous>  tiotropium 18 MICROgram(s) Capsule 1 Capsule(s) Inhalation daily    PRN Inpatient Medications  acetaminophen    Suspension .. 650 milliGRAM(s) Oral every 6 hours PRN  sodium chloride 0.9% lock flush 10 milliLiter(s) IV Push every 1 hour PRN      REVIEW OF SYSTEMS  unable to obtain d/t lethargy    VITALS/PHYSICAL EXAM  --------------------------------------------------------------------------------  T(C): 36.3 (03-08-20 @ 05:38), Max: 36.8 (03-08-20 @ 00:58)  HR: 102 (03-08-20 @ 05:38) (73 - 102)  BP: 134/77 (03-08-20 @ 05:38) (122/93 - 141/81)  RR: 18 (03-08-20 @ 05:38) (17 - 18)  SpO2: 98% (03-08-20 @ 05:38) (95% - 98%)  Wt(kg): --        03-07-20 @ 06:01  -  03-08-20 @ 07:00  --------------------------------------------------------  IN: 1700 mL / OUT: 400 mL / NET: 1300 mL      Physical Exam:  	Gen: lethargic appears in no acute distress on room air  	HEENT: dry lips, no JVD  	Pulm: CTA B/L anteriorly auscultated   	CV: RRR, S1S2; no rub/murmur  	GI: +BS, soft, nondistended  	: rivero catheter in place  	MSK: Warm, no edema, RUE wrapped in guaze              Neuro: lethargy  	Psych: lethargy  	Skin: dry skin      LABS/STUDIES  --------------------------------------------------------------------------------              10.7   9.64  >-----------<  68       [03-08-20 @ 07:46]              35.3     147  |  112  |  72  ----------------------------<  92      [03-08-20 @ 07:46]  3.9   |  16  |  2.96        Ca     8.7     [03-08-20 @ 07:46]      Mg     2.1     [03-07-20 @ 06:52]      Phos  4.1     [03-07-20 @ 06:52]    TPro  4.8  /  Alb  2.2  /  TBili  1.1  /  DBili  x   /  AST  26  /  ALT  17  /  AlkPhos  152  [03-08-20 @ 07:46]    Creatinine Trend:  SCr 2.96 [03-08 @ 07:46]  SCr 2.61 [03-07 @ 06:52]  SCr 2.50 [03-06 @ 16:49]  SCr 2.43 [03-06 @ 06:17]  SCr 2.32 [03-05 @ 10:12]    Urinalysis - [03-06-20 @ 06:18]      Color Dark Yellow / Appearance Turbid / SG 1.033 / pH 5.5      Gluc Negative / Ketone Trace  / Bili Negative / Urobili 2 mg/dL       Blood Negative / Protein 30 mg/dL / Leuk Est Large / Nitrite Negative      RBC 15 /  / Hyaline 5 / Gran  / Sq Epi  / Non Sq Epi 15 / Bacteria Many    Urine Creatinine 184      [03-06-20 @ 06:18]  Urine Sodium 45      [03-06-20 @ 06:18]    Iron 67, TIBC 177, %sat 38      [02-07-20 @ 03:54]  Ferritin 578      [02-07-20 @ 03:54]  PTH -- (Ca 10.8)      [01-07-20 @ 15:31]   10  Vitamin D (25OH) 50.5      [01-07-20 @ 15:31]  HbA1c 4.7      [12-22-18 @ 07:50]  TSH 1.54      [01-07-20 @ 15:31]    HBsAg Nonreact      [02-12-16 @ 09:59]  HCV 0.08, Nonreact      [02-12-16 @ 09:59]    ONEL: titer 1:320, pattern Speckled      [02-12-16 @ 09:59]  Syphilis Screen (Treponema Pallidum Ab) Negative      [02-07-16 @ 08:34]  Immunofixation Serum: Reference Range: None Detected      [02-12-16 @ 09:59]  SPEP Interpretation: Weak Gamma-Migrating Paraprotein Identified      [02-12-16 @ 09:59]

## 2020-03-08 NOTE — PROGRESS NOTE ADULT - SUBJECTIVE AND OBJECTIVE BOX
Patient is a 89y old  Female who presents with a chief complaint of syncope and diarrhea (08 Mar 2020 09:57)        SUBJECTIVE / OVERNIGHT EVENTS: sleepy but opening eyes to name and responding to questions appropriately      MEDICATIONS  (STANDING):  albumin human 25% IVPB 50 milliLiter(s) IV Intermittent every 6 hours  albuterol/ipratropium for Nebulization 3 milliLiter(s) Nebulizer every 6 hours  AQUAPHOR (petrolatum Ointment) 1 Application(s) Topical daily  ascorbic acid 500 milliGRAM(s) Oral two times a day  buDESOnide    Inhalation Suspension 0.25 milliGRAM(s) Nebulizer every 12 hours  cefTRIAXone   IVPB 1000 milliGRAM(s) IV Intermittent every 24 hours  chlorhexidine 4% Liquid 1 Application(s) Topical <User Schedule>  cholecalciferol 2000 Unit(s) Oral daily  cyanocobalamin 1000 MICROGram(s) Oral daily  digoxin     Tablet 0.125 milliGRAM(s) Oral every other day  folic acid 1 milliGRAM(s) Oral daily  lactulose Syrup 20 Gram(s) Oral three times a day  levothyroxine 25 MICROGram(s) Oral daily  midodrine 5 milliGRAM(s) Oral every 8 hours  multivitamin/minerals 1 Tablet(s) Oral daily  rifAXIMin 550 milliGRAM(s) Oral two times a day  sodium chloride 0.45%. 1000 milliLiter(s) (75 mL/Hr) IV Continuous <Continuous>  tiotropium 18 MICROgram(s) Capsule 1 Capsule(s) Inhalation daily    MEDICATIONS  (PRN):  acetaminophen    Suspension .. 650 milliGRAM(s) Oral every 6 hours PRN Temp greater or equal to 38C (100.4F), Mild Pain (1 - 3), Moderate Pain (4 - 6)  sodium chloride 0.9% lock flush 10 milliLiter(s) IV Push every 1 hour PRN Pre/post blood products, medications, blood draw, and to maintain line patency      Vital Signs Last 24 Hrs  T(C): 36.6 (08 Mar 2020 16:02), Max: 37 (08 Mar 2020 14:42)  T(F): 97.8 (08 Mar 2020 16:02), Max: 98.6 (08 Mar 2020 14:42)  HR: 101 (08 Mar 2020 16:02) (71 - 102)  BP: 129/77 (08 Mar 2020 16:02) (122/83 - 134/77)  BP(mean): --  RR: 18 (08 Mar 2020 16:02) (18 - 18)  SpO2: 98% (08 Mar 2020 16:02) (97% - 98%)  CAPILLARY BLOOD GLUCOSE        I&O's Summary    07 Mar 2020 06:01  -  08 Mar 2020 07:00  --------------------------------------------------------  IN: 1700 mL / OUT: 400 mL / NET: 1300 mL    08 Mar 2020 07:01  -  08 Mar 2020 20:06  --------------------------------------------------------  IN: 120 mL / OUT: 200 mL / NET: -80 mL        PHYSICAL EXAM:  GENERAL:  frail, thin, breathing normal, sleepy today   HEAD:  Atraumatic, Normocephalic  EYES: conjunctiva and sclera clear  NECK: supple, No JVD  CHEST/LUNG: CTA b/l  HEART: S1 S2 RRR  ABDOMEN: +BS Soft, NT/ND  EXTREMITIES:  2+ DP Pulses, No c/c. no LE edema  NEUROLOGY: AAOx3, no facial droop, no focal deficits   SKIN: multiple skin tears UEs and LEs- some wrapped      LABS:                        10.7   9.64  )-----------( 68       ( 08 Mar 2020 07:46 )             35.3     03-08    147<H>  |  112<H>  |  72<H>  ----------------------------<  92  3.9   |  16<L>  |  2.96<H>    Ca    8.7      08 Mar 2020 07:46  Phos  4.1     03-07  Mg     2.1     03-07    TPro  4.8<L>  /  Alb  2.2<L>  /  TBili  1.1  /  DBili  x   /  AST  26  /  ALT  17  /  AlkPhos  152<H>  03-08              RADIOLOGY & ADDITIONAL TESTS:    Imaging Personally Reviewed:   Consultant(s) Notes Reviewed:    Care Discussed with Consultants/Other Providers: d/w Renal- Dr. Belle regarding albumin infusions

## 2020-03-08 NOTE — CONSULT NOTE ADULT - PROBLEM SELECTOR RECOMMENDATION 9
KARRIE likely 2/2 hemodynamically meditated ATN from sepsis UTI, hypotension and/or volume depletion diarrhea, decrease PO intake, less likely HRS  On admission hypotensive 80s/50 with sCr 1.99 on 3/3, uptrend to 3.96 on 3/8 (baseline sCr 0.8-1.0), UCx GNR.  U/S abdomen show no hydronephrosis.  On-going diarrhea in hospital, s/p rivero     Plan:  - continue IVF for now (monitor respiratory status), maintain normotensive, continue abx per primary team  - agree holding aldactone for now, consider holding lactulose if BM>3  - avoid nephrotoxic agents (ACEI, ARB, NSAIDS), renally dose medications per GFR  - trend BMP daily, strict I/O, low potassium diet for now KARRIE likely 2/2 hemodynamically meditated in the setting of sepsis UTI, hypotension and/or volume depletion from diarrhea, decrease PO intake, less likely HRS (urine sodium is 45). pt. also with urinary retention, s/p rivero catheter placement.  On admission hypotensive 80s/50 with sCr 1.99 on 3/3, uptrend to 3.96 on 3/8 (baseline sCr 0.8-1.0), UCx GNR.  U/S abdomen show no hydronephrosis.  On-going diarrhea in hospital, s/p rivero placement (had urinary retention in the hsp).    Plan:  - continue IV hydration for now (monitor respiratory status), maintain normotensive, continue abx per primary team, can switch to albumin.  - agree holding aldactone for now, consider decreasing lactulose if BM>3  - avoid nephrotoxic agents (ACEI, ARB, NSAIDS), renally dose medications per GFR  - trend BMP daily, strict I/O, low potassium diet for now

## 2020-03-08 NOTE — PROGRESS NOTE ADULT - PROBLEM SELECTOR PLAN 2
-likely multifactorial- overall poor PO intake at home and poor nutritional status and was having diarrhea on admission with hypotension   renal consult appreciated - ?ATN from hypotension on admission   reocmmend albumin infusions.   also now noted to have UTI and acute urinary retention causing post-obstructive KARRIE  s/p rivero placement   -UTI treatment as above  -holding spironolactone   -c/w gentle IVF, periodic albumin infusions x 2   -abdominal sono - no ascities  -encourage PO intake as tolerated  Renal consult appreciated

## 2020-03-09 DIAGNOSIS — A49.9 BACTERIAL INFECTION, UNSPECIFIED: ICD-10-CM

## 2020-03-09 LAB
ANION GAP SERPL CALC-SCNC: 16 MMOL/L — SIGNIFICANT CHANGE UP (ref 5–17)
BUN SERPL-MCNC: 80 MG/DL — HIGH (ref 7–23)
CALCIUM SERPL-MCNC: 8.5 MG/DL — SIGNIFICANT CHANGE UP (ref 8.4–10.5)
CHLORIDE SERPL-SCNC: 109 MMOL/L — HIGH (ref 96–108)
CO2 SERPL-SCNC: 17 MMOL/L — LOW (ref 22–31)
CREAT SERPL-MCNC: 2.9 MG/DL — HIGH (ref 0.5–1.3)
GLUCOSE SERPL-MCNC: 74 MG/DL — SIGNIFICANT CHANGE UP (ref 70–99)
HCT VFR BLD CALC: 29 % — LOW (ref 34.5–45)
HGB BLD-MCNC: 9.1 G/DL — LOW (ref 11.5–15.5)
LACTATE SERPL-SCNC: 2.1 MMOL/L — HIGH (ref 0.7–2)
MAGNESIUM SERPL-MCNC: 2.2 MG/DL — SIGNIFICANT CHANGE UP (ref 1.6–2.6)
MCHC RBC-ENTMCNC: 31.4 GM/DL — LOW (ref 32–36)
MCHC RBC-ENTMCNC: 33.3 PG — SIGNIFICANT CHANGE UP (ref 27–34)
MCV RBC AUTO: 106.2 FL — HIGH (ref 80–100)
NRBC # BLD: 0 /100 WBCS — SIGNIFICANT CHANGE UP (ref 0–0)
PHOSPHATE SERPL-MCNC: 4.3 MG/DL — SIGNIFICANT CHANGE UP (ref 2.5–4.5)
PLATELET # BLD AUTO: 48 K/UL — LOW (ref 150–400)
POTASSIUM SERPL-MCNC: 4.1 MMOL/L — SIGNIFICANT CHANGE UP (ref 3.5–5.3)
POTASSIUM SERPL-SCNC: 4.1 MMOL/L — SIGNIFICANT CHANGE UP (ref 3.5–5.3)
RBC # BLD: 2.73 M/UL — LOW (ref 3.8–5.2)
RBC # FLD: 17.3 % — HIGH (ref 10.3–14.5)
SODIUM SERPL-SCNC: 142 MMOL/L — SIGNIFICANT CHANGE UP (ref 135–145)
WBC # BLD: 8.43 K/UL — SIGNIFICANT CHANGE UP (ref 3.8–10.5)
WBC # FLD AUTO: 8.43 K/UL — SIGNIFICANT CHANGE UP (ref 3.8–10.5)

## 2020-03-09 PROCEDURE — 99233 SBSQ HOSP IP/OBS HIGH 50: CPT | Mod: GC

## 2020-03-09 PROCEDURE — 99222 1ST HOSP IP/OBS MODERATE 55: CPT

## 2020-03-09 PROCEDURE — 99233 SBSQ HOSP IP/OBS HIGH 50: CPT

## 2020-03-09 RX ORDER — ERTAPENEM SODIUM 1 G/1
500 INJECTION, POWDER, LYOPHILIZED, FOR SOLUTION INTRAMUSCULAR; INTRAVENOUS EVERY 24 HOURS
Refills: 0 | Status: COMPLETED | OUTPATIENT
Start: 2020-03-09 | End: 2020-03-13

## 2020-03-09 RX ORDER — LACTULOSE 10 G/15ML
20 SOLUTION ORAL
Refills: 0 | Status: DISCONTINUED | OUTPATIENT
Start: 2020-03-09 | End: 2020-03-14

## 2020-03-09 RX ADMIN — Medication 3 MILLILITER(S): at 23:03

## 2020-03-09 RX ADMIN — Medication 3 MILLILITER(S): at 13:08

## 2020-03-09 RX ADMIN — LACTULOSE 20 GRAM(S): 10 SOLUTION ORAL at 23:03

## 2020-03-09 RX ADMIN — Medication 0.25 MILLIGRAM(S): at 17:03

## 2020-03-09 RX ADMIN — TIOTROPIUM BROMIDE 1 CAPSULE(S): 18 CAPSULE ORAL; RESPIRATORY (INHALATION) at 13:09

## 2020-03-09 RX ADMIN — Medication 25 MICROGRAM(S): at 06:20

## 2020-03-09 RX ADMIN — ERTAPENEM SODIUM 100 MILLIGRAM(S): 1 INJECTION, POWDER, LYOPHILIZED, FOR SOLUTION INTRAMUSCULAR; INTRAVENOUS at 17:02

## 2020-03-09 RX ADMIN — Medication 1 MILLIGRAM(S): at 13:10

## 2020-03-09 RX ADMIN — Medication 1 APPLICATION(S): at 13:08

## 2020-03-09 RX ADMIN — Medication 2000 UNIT(S): at 13:09

## 2020-03-09 RX ADMIN — Medication 3 MILLILITER(S): at 00:11

## 2020-03-09 RX ADMIN — LACTULOSE 20 GRAM(S): 10 SOLUTION ORAL at 17:02

## 2020-03-09 RX ADMIN — SODIUM CHLORIDE 75 MILLILITER(S): 9 INJECTION, SOLUTION INTRAVENOUS at 21:11

## 2020-03-09 RX ADMIN — Medication 3 MILLILITER(S): at 17:01

## 2020-03-09 RX ADMIN — PREGABALIN 1000 MICROGRAM(S): 225 CAPSULE ORAL at 13:09

## 2020-03-09 RX ADMIN — Medication 0.25 MILLIGRAM(S): at 06:27

## 2020-03-09 RX ADMIN — Medication 1 TABLET(S): at 13:09

## 2020-03-09 RX ADMIN — Medication 500 MILLIGRAM(S): at 06:20

## 2020-03-09 RX ADMIN — Medication 650 MILLIGRAM(S): at 18:20

## 2020-03-09 RX ADMIN — Medication 500 MILLIGRAM(S): at 17:03

## 2020-03-09 RX ADMIN — LACTULOSE 20 GRAM(S): 10 SOLUTION ORAL at 06:20

## 2020-03-09 RX ADMIN — MIDODRINE HYDROCHLORIDE 5 MILLIGRAM(S): 2.5 TABLET ORAL at 13:10

## 2020-03-09 RX ADMIN — Medication 650 MILLIGRAM(S): at 17:19

## 2020-03-09 RX ADMIN — CHLORHEXIDINE GLUCONATE 1 APPLICATION(S): 213 SOLUTION TOPICAL at 06:22

## 2020-03-09 RX ADMIN — Medication 3 MILLILITER(S): at 06:55

## 2020-03-09 NOTE — CONSULT NOTE ADULT - SUBJECTIVE AND OBJECTIVE BOX
HA JACQUES 89y Female  MRN-[id number]    Patient is a 89y old  Female who presents with a chief complaint of syncope and diarrhea (09 Mar 2020 11:56)      HPI:  89 y/o F with history of Afib on dig (not on AC), severe AS, COPD, CLL (s/p treatment with Treanda and Rituxan), HCC+cirrhosis with portal HTN, esophageal varices s/p banding and hepatic vessel coiling, CKD, hypothyroidism, recurrent UTIs with ESBL Klebsiella, recent admission in February 2020 for serratia bacteremia and candida glabrata fungemia s/p treatment, presenting from home after a syncopal episode. The patient is on lactulose for her liver disease, and takes it 2 times a day. She normally has soft BMs after the lactulose. Yesterday she had a prolonged BM with large volume diarrhea, and during this episode she became unresponsive. The patient herself does not remember this episode. The patient's daughter witnessed the episode and states that she slumped over with a dazed look. Her body went rigid. She was not responding to her name. The pt's daughter called EMS, and by the time they arrived, she was more responsive. Since arriving at the ED, the pt received IVF and she has improved per her family. Overall the pt has been declining at home since the previously hospitalization. She sustained 2 falls (one in rehab, one at home), and now has multiple skin tears for which she has been getting wound care at home. She has not had fever, dyspnea, chest pain, dysuria, no sick contacts. Has not had any unusual foods.   Diarrhea has not been worsening prior to this. (03 Mar 2020 11:35)      PAST MEDICAL & SURGICAL HISTORY:  History of bacteremia  PVD (peripheral vascular disease)  Liver cell carcinoma  Severe aortic stenosis by prior echocardiogram  Pulmonary HTN: moderate  CKD (chronic kidney disease), stage 3 (moderate)  COPD (Chronic Obstructive Pulmonary Disease)  AF (Atrial Fibrillation)  Portal Hypertension  Bleeding Esophageal Varices  CLL (Chronic Lymphoblastic Leukemia)  GIB (Gastrointestinal Bleeding)  History of repair of hip fracture  Esophageal Rupture      Allergies    codeine (Rash)  erythromycin (Rash)  iv dye (Rash; Anaphylaxis; Short breath)  penicillin (Rash)  shellfish (Rash)    Intolerances    adhesives (Other)  warfarin (Other)      ANTIMICROBIALS:  rifAXIMin 550 two times a day      MEDICATIONS  (STANDING):  albuterol/ipratropium for Nebulization 3 milliLiter(s) Nebulizer every 6 hours  AQUAPHOR (petrolatum Ointment) 1 Application(s) Topical daily  ascorbic acid 500 milliGRAM(s) Oral two times a day  buDESOnide    Inhalation Suspension 0.25 milliGRAM(s) Nebulizer every 12 hours  chlorhexidine 4% Liquid 1 Application(s) Topical <User Schedule>  cholecalciferol 2000 Unit(s) Oral daily  cyanocobalamin 1000 MICROGram(s) Oral daily  digoxin     Tablet 0.125 milliGRAM(s) Oral every other day  folic acid 1 milliGRAM(s) Oral daily  lactulose Syrup 20 Gram(s) Oral four times a day  levothyroxine 25 MICROGram(s) Oral daily  midodrine 5 milliGRAM(s) Oral every 8 hours  multivitamin/minerals 1 Tablet(s) Oral daily  rifAXIMin 550 milliGRAM(s) Oral two times a day  sodium chloride 0.45%. 1000 milliLiter(s) (75 mL/Hr) IV Continuous <Continuous>  tiotropium 18 MICROgram(s) Capsule 1 Capsule(s) Inhalation daily      Social History  Smoking:  Etoh:  Drug use:      FAMILY HISTORY:  FH: Alzheimers disease      Vital Signs Last 24 Hrs  T(C): 36.4 (09 Mar 2020 12:36), Max: 37 (08 Mar 2020 14:42)  T(F): 97.5 (09 Mar 2020 12:36), Max: 98.6 (08 Mar 2020 14:42)  HR: 82 (09 Mar 2020 12:36) (66 - 101)  BP: 106/70 (09 Mar 2020 12:36) (103/70 - 129/77)  BP(mean): --  RR: 18 (09 Mar 2020 12:36) (18 - 18)  SpO2: 97% (09 Mar 2020 12:36) (96% - 98%)    CBC Full  -  ( 09 Mar 2020 08:54 )  WBC Count : 8.43 K/uL  RBC Count : 2.73 M/uL  Hemoglobin : 9.1 g/dL  Hematocrit : 29.0 %  Platelet Count - Automated : 48 K/uL  Mean Cell Volume : 106.2 fl  Mean Cell Hemoglobin : 33.3 pg  Mean Cell Hemoglobin Concentration : 31.4 gm/dL  Auto Neutrophil # : x  Auto Lymphocyte # : x  Auto Monocyte # : x  Auto Eosinophil # : x  Auto Basophil # : x  Auto Neutrophil % : x  Auto Lymphocyte % : x  Auto Monocyte % : x  Auto Eosinophil % : x  Auto Basophil % : x    03-09    142  |  109<H>  |  80<H>  ----------------------------<  74  4.1   |  17<L>  |  2.90<H>    Ca    8.5      09 Mar 2020 08:54  Phos  4.3     03-09  Mg     2.2     03-09    TPro  4.8<L>  /  Alb  2.2<L>  /  TBili  1.1  /  DBili  x   /  AST  26  /  ALT  17  /  AlkPhos  152<H>  03-08    LIVER FUNCTIONS - ( 08 Mar 2020 07:46 )  Alb: 2.2 g/dL / Pro: 4.8 g/dL / ALK PHOS: 152 U/L / ALT: 17 U/L / AST: 26 U/L / GGT: x               MICROBIOLOGY:  .Urine Catheterized  03-06-20   >100,000 CFU/ml Klebsiella pneumoniae ESBL  --  Klebsiella pneumoniae ESBL      .Blood Blood  03-06-20   No growth to date.  --  --      .Blood Blood-Peripheral  03-03-20   No growth at 5 days.  --  --      .Urine Catheterized  03-03-20   <10,000 CFU/mL Normal Urogenital Stefanie  --  --        RADIOLOGY  < from: US Abdomen Complete (03.06.20 @ 14:59) >  Cholelithiasis.    < end of copied text > HA JACQUES 89y Female  MRN-[id number]    Patient is a 89y old  Female who presents with a chief complaint of syncope and diarrhea (09 Mar 2020 11:56)      HPI:  89 y/o F with history of Afib on dig (not on AC), severe AS, COPD, CLL (s/p treatment with Treanda and Rituxan), HCC+cirrhosis with portal HTN, esophageal varices s/p banding and hepatic vessel coiling, CKD, hypothyroidism, recurrent UTIs with ESBL Klebsiella, recent admission in February 2020 for serratia bacteremia and candida glabrata fungemia s/p treatment, presenting from home after a syncopal episode. The patient is on lactulose for her liver disease, and takes it 2 times a day. She normally has soft BMs after the lactulose. Yesterday she had a prolonged BM with large volume diarrhea, and during this episode she became unresponsive. The patient herself does not remember this episode. The patient's daughter witnessed the episode and states that she slumped over with a dazed look. Her body went rigid. She was not responding to her name. The pt's daughter called EMS, and by the time they arrived, she was more responsive. Since arriving at the ED, the pt received IVF and she has improved per her family. Overall the pt has been declining at home since the previously hospitalization. She sustained 2 falls (one in rehab, one at home), and now has multiple skin tears for which she has been getting wound care at home. She has not had fever, dyspnea, chest pain, dysuria, no sick contacts. Has not had any unusual foods.   Diarrhea has not been worsening prior to this. (03 Mar 2020 11:35)    More lethargic o/n. urine cx positive with ESBL      PAST MEDICAL & SURGICAL HISTORY:  History of bacteremia  PVD (peripheral vascular disease)  Liver cell carcinoma  Severe aortic stenosis by prior echocardiogram  Pulmonary HTN: moderate  CKD (chronic kidney disease), stage 3 (moderate)  COPD (Chronic Obstructive Pulmonary Disease)  AF (Atrial Fibrillation)  Portal Hypertension  Bleeding Esophageal Varices  CLL (Chronic Lymphoblastic Leukemia)  GIB (Gastrointestinal Bleeding)  History of repair of hip fracture  Esophageal Rupture      Allergies    codeine (Rash)  erythromycin (Rash)  iv dye (Rash; Anaphylaxis; Short breath)  penicillin (Rash)  shellfish (Rash)    Intolerances    adhesives (Other)  warfarin (Other)      ANTIMICROBIALS:  rifAXIMin 550 two times a day      MEDICATIONS  (STANDING):  albuterol/ipratropium for Nebulization 3 milliLiter(s) Nebulizer every 6 hours  AQUAPHOR (petrolatum Ointment) 1 Application(s) Topical daily  ascorbic acid 500 milliGRAM(s) Oral two times a day  buDESOnide    Inhalation Suspension 0.25 milliGRAM(s) Nebulizer every 12 hours  chlorhexidine 4% Liquid 1 Application(s) Topical <User Schedule>  cholecalciferol 2000 Unit(s) Oral daily  cyanocobalamin 1000 MICROGram(s) Oral daily  digoxin     Tablet 0.125 milliGRAM(s) Oral every other day  folic acid 1 milliGRAM(s) Oral daily  lactulose Syrup 20 Gram(s) Oral four times a day  levothyroxine 25 MICROGram(s) Oral daily  midodrine 5 milliGRAM(s) Oral every 8 hours  multivitamin/minerals 1 Tablet(s) Oral daily  rifAXIMin 550 milliGRAM(s) Oral two times a day  sodium chloride 0.45%. 1000 milliLiter(s) (75 mL/Hr) IV Continuous <Continuous>  tiotropium 18 MICROgram(s) Capsule 1 Capsule(s) Inhalation daily      Social History  Smoking: no  Etoh: no  Drug use: no      FAMILY HISTORY:  FH: Alzheimers disease      Vital Signs Last 24 Hrs  T(C): 36.4 (09 Mar 2020 12:36), Max: 37 (08 Mar 2020 14:42)  T(F): 97.5 (09 Mar 2020 12:36), Max: 98.6 (08 Mar 2020 14:42)  HR: 82 (09 Mar 2020 12:36) (66 - 101)  BP: 106/70 (09 Mar 2020 12:36) (103/70 - 129/77)  BP(mean): --  RR: 18 (09 Mar 2020 12:36) (18 - 18)  SpO2: 97% (09 Mar 2020 12:36) (96% - 98%)    CBC Full  -  ( 09 Mar 2020 08:54 )  WBC Count : 8.43 K/uL  RBC Count : 2.73 M/uL  Hemoglobin : 9.1 g/dL  Hematocrit : 29.0 %  Platelet Count - Automated : 48 K/uL  Mean Cell Volume : 106.2 fl  Mean Cell Hemoglobin : 33.3 pg  Mean Cell Hemoglobin Concentration : 31.4 gm/dL  Auto Neutrophil # : x  Auto Lymphocyte # : x  Auto Monocyte # : x  Auto Eosinophil # : x  Auto Basophil # : x  Auto Neutrophil % : x  Auto Lymphocyte % : x  Auto Monocyte % : x  Auto Eosinophil % : x  Auto Basophil % : x    03-09    142  |  109<H>  |  80<H>  ----------------------------<  74  4.1   |  17<L>  |  2.90<H>    Ca    8.5      09 Mar 2020 08:54  Phos  4.3     03-09  Mg     2.2     03-09    TPro  4.8<L>  /  Alb  2.2<L>  /  TBili  1.1  /  DBili  x   /  AST  26  /  ALT  17  /  AlkPhos  152<H>  03-08    LIVER FUNCTIONS - ( 08 Mar 2020 07:46 )  Alb: 2.2 g/dL / Pro: 4.8 g/dL / ALK PHOS: 152 U/L / ALT: 17 U/L / AST: 26 U/L / GGT: x               MICROBIOLOGY:  .Urine Catheterized  03-06-20   >100,000 CFU/ml Klebsiella pneumoniae ESBL  --  Klebsiella pneumoniae ESBL      .Blood Blood  03-06-20   No growth to date.  --  --      .Blood Blood-Peripheral  03-03-20   No growth at 5 days.  --  --      .Urine Catheterized  03-03-20   <10,000 CFU/mL Normal Urogenital Stefanie  --  --        RADIOLOGY  < from: US Abdomen Complete (03.06.20 @ 14:59) >  Cholelithiasis.    < end of copied text >

## 2020-03-09 NOTE — PROGRESS NOTE ADULT - ASSESSMENT
89F PMHx Afib (not on AC), severe AS, COPD, CLL (s/p treatment with Treanda and Rituxan), HCC+cirrhosis with portal HTN, esophageal varices s/p banding and hepatic vessel coiling, hypothyroidism, recurrent UTIs with ESBL Klebsiella, recent admission in February 2020 for serratia bacteremia and candida glabrata fungemia present with syncopal episode in setting diarrhea (on lactulose for cirrhosis) - admitted for sepsis from UTI (GNR) treated with ceftriaxone.  Hospital course complicated worsening KARRIE.    Nephrology consulted for KARRIE.     Problem/Recommendation - 1:  Problem: KARRIE (acute kidney injury). Recommendation: KARRIE likely 2/2 hemodynamically meditated in the setting of sepsis UTI, hypotension and/or volume depletion from diarrhea, decrease PO intake, less likely HRS (urine sodium is 45). pt. also with urinary retention, s/p rivero catheter placement.  On admission hypotensive 80s/50 with sCr 1.99 on 3/3, uptrend to 3.96 on 3/8 (baseline sCr 0.8-1.0), UCx GNR.  U/S abdomen show no hydronephrosis.  On-going diarrhea in hospital, s/p rivero placement (had urinary retention in the hsp). Overall likely ATN in plateau phase.    Plan:  - continue IV hydration for now (monitor respiratory status), maintain normotensive, continue abx per primary team, can switch to albumin.  - agree holding aldactone for now, consider decreasing lactulose if BM>3  - avoid nephrotoxic agents (ACEI, ARB, NSAIDS), renally dose medications per GFR  - trend BMP daily, strict I/O, low potassium diet for now.- please stop Vitamin C       Problem/Recommendation - 2:  ·  Problem: Low bicarbonate level.  Recommendation: likely metabolic acidosis in the setting of volume depletion and diarrhea  check blood gas  continue to monitor lactate

## 2020-03-09 NOTE — PROGRESS NOTE ADULT - PROBLEM SELECTOR PLAN 8
pt with intermittent hepatic encephalopathy, hx of varices. managed medically  -continue with rifaximin  -restarted lactulose   -hold spironolactone and propanolol for now  -noted mild transaminitis, thrombocytopenia, anemia- cont to trend.  -abdominal sono without ascities

## 2020-03-09 NOTE — PROGRESS NOTE ADULT - SUBJECTIVE AND OBJECTIVE BOX
Patient is a 89y old  Female who presents with a chief complaint of syncope and diarrhea (09 Mar 2020 13:17)        SUBJECTIVE / OVERNIGHT EVENTS: no acute complaints       MEDICATIONS  (STANDING):  albuterol/ipratropium for Nebulization 3 milliLiter(s) Nebulizer every 6 hours  AQUAPHOR (petrolatum Ointment) 1 Application(s) Topical daily  ascorbic acid 500 milliGRAM(s) Oral two times a day  buDESOnide    Inhalation Suspension 0.25 milliGRAM(s) Nebulizer every 12 hours  chlorhexidine 4% Liquid 1 Application(s) Topical <User Schedule>  cholecalciferol 2000 Unit(s) Oral daily  cyanocobalamin 1000 MICROGram(s) Oral daily  digoxin     Tablet 0.125 milliGRAM(s) Oral every other day  ertapenem  IVPB 500 milliGRAM(s) IV Intermittent every 24 hours  folic acid 1 milliGRAM(s) Oral daily  lactulose Syrup 20 Gram(s) Oral four times a day  levothyroxine 25 MICROGram(s) Oral daily  midodrine 5 milliGRAM(s) Oral every 8 hours  multivitamin/minerals 1 Tablet(s) Oral daily  rifAXIMin 550 milliGRAM(s) Oral two times a day  sodium chloride 0.45%. 1000 milliLiter(s) (75 mL/Hr) IV Continuous <Continuous>  tiotropium 18 MICROgram(s) Capsule 1 Capsule(s) Inhalation daily    MEDICATIONS  (PRN):  acetaminophen    Suspension .. 650 milliGRAM(s) Oral every 6 hours PRN Temp greater or equal to 38C (100.4F), Mild Pain (1 - 3), Moderate Pain (4 - 6)  sodium chloride 0.9% lock flush 10 milliLiter(s) IV Push every 1 hour PRN Pre/post blood products, medications, blood draw, and to maintain line patency      Vital Signs Last 24 Hrs  T(C): 36.4 (09 Mar 2020 12:36), Max: 36.7 (09 Mar 2020 05:32)  T(F): 97.5 (09 Mar 2020 12:36), Max: 98 (09 Mar 2020 05:32)  HR: 82 (09 Mar 2020 12:36) (66 - 96)  BP: 106/70 (09 Mar 2020 12:36) (103/70 - 127/96)  BP(mean): --  RR: 18 (09 Mar 2020 12:36) (18 - 18)  SpO2: 97% (09 Mar 2020 12:36) (96% - 98%)  CAPILLARY BLOOD GLUCOSE        I&O's Summary    08 Mar 2020 07:01  -  09 Mar 2020 07:00  --------------------------------------------------------  IN: 1890 mL / OUT: 600 mL / NET: 1290 mL    09 Mar 2020 07:01  -  09 Mar 2020 17:28  --------------------------------------------------------  IN: 300 mL / OUT: 200 mL / NET: 100 mL        PHYSICAL EXAM:  GENERAL:  frail, thin, breathing normal, sleepy today   HEAD:  Atraumatic, Normocephalic  EYES: conjunctiva and sclera clear  NECK: supple, No JVD  CHEST/LUNG: CTA b/l  HEART: S1 S2 RRR  ABDOMEN: +BS Soft, NT/ND  EXTREMITIES:  2+ DP Pulses, No c/c. no LE edema  NEUROLOGY: AAOx3, no facial droop, no focal deficits   SKIN: multiple skin tears UEs and LEs- some wrapped    LABS:                        9.1    8.43  )-----------( 48       ( 09 Mar 2020 08:54 )             29.0     03-09    142  |  109<H>  |  80<H>  ----------------------------<  74  4.1   |  17<L>  |  2.90<H>    Ca    8.5      09 Mar 2020 08:54  Phos  4.3     03-09  Mg     2.2     03-09    TPro  4.8<L>  /  Alb  2.2<L>  /  TBili  1.1  /  DBili  x   /  AST  26  /  ALT  17  /  AlkPhos  152<H>  03-08              RADIOLOGY & ADDITIONAL TESTS:    Imaging Personally Reviewed: bladder scan reviewed -122cc residual  Consultant(s) Notes Reviewed:    Care Discussed with Consultants/Other Providers: d/w Renal - Dr. Belle regarding creatinine

## 2020-03-09 NOTE — CONSULT NOTE ADULT - PROBLEM SELECTOR RECOMMENDATION 9
-start on invanz 500 mg iv q24 given lethargy  -daughter also states coughing with eating - ?aspiration

## 2020-03-09 NOTE — CONSULT NOTE ADULT - PROBLEM SELECTOR RECOMMENDATION 2
likely metabolic acidosis in the setting of volume depletion and diarrhea  check blood gas  check lactate
-cont invanz

## 2020-03-09 NOTE — CONSULT NOTE ADULT - ASSESSMENT
89 y/o F with history of Afib on dig (not on AC), severe AS, COPD, CLL (s/p treatment with Treanda and Rituxan), HCC+cirrhosis with portal HTN, esophageal varices s/p banding and hepatic vessel coiling, CKD, hypothyroidism, recurrent UTIs with ESBL Klebsiella, recent admission in February 2020 for serratia bacteremia and candida glabrata fungemia s/p treatment, presenting from home after a syncopal episode with increase lethargy and likely UTI with ESBL Klebsiella pneumoniae

## 2020-03-09 NOTE — PROGRESS NOTE ADULT - SUBJECTIVE AND OBJECTIVE BOX
[facility] Division of Kidney Diseases & Hypertension  FOLLOW UP NOTE  [phone]--------------------------------------------------------------------------------  Chief Complaint:Syncope and collapse      24 hour events/subjective: No acute events overnight. Patient resting comfortably in bed. Labs, vitals, and medications reviewed. Vital signs stable. Labs significant for stable creatinine of 2.9 and grossly stable electrolytes.        PAST HISTORY  --------------------------------------------------------------------------------  No significant changes to PMH, PSH, FHx, SHx, unless otherwise noted    ALLERGIES & MEDICATIONS  --------------------------------------------------------------------------------  Allergies    codeine (Rash)  erythromycin (Rash)  iv dye (Rash; Anaphylaxis; Short breath)  penicillin (Rash)  shellfish (Rash)    Intolerances    adhesives (Other)  warfarin (Other)    Standing Inpatient Medications  albuterol/ipratropium for Nebulization 3 milliLiter(s) Nebulizer every 6 hours  AQUAPHOR (petrolatum Ointment) 1 Application(s) Topical daily  ascorbic acid 500 milliGRAM(s) Oral two times a day  buDESOnide    Inhalation Suspension 0.25 milliGRAM(s) Nebulizer every 12 hours  chlorhexidine 4% Liquid 1 Application(s) Topical <User Schedule>  cholecalciferol 2000 Unit(s) Oral daily  cyanocobalamin 1000 MICROGram(s) Oral daily  digoxin     Tablet 0.125 milliGRAM(s) Oral every other day  folic acid 1 milliGRAM(s) Oral daily  lactulose Syrup 20 Gram(s) Oral three times a day  levothyroxine 25 MICROGram(s) Oral daily  midodrine 5 milliGRAM(s) Oral every 8 hours  multivitamin/minerals 1 Tablet(s) Oral daily  rifAXIMin 550 milliGRAM(s) Oral two times a day  sodium chloride 0.45%. 1000 milliLiter(s) IV Continuous <Continuous>  tiotropium 18 MICROgram(s) Capsule 1 Capsule(s) Inhalation daily    PRN Inpatient Medications  acetaminophen    Suspension .. 650 milliGRAM(s) Oral every 6 hours PRN  sodium chloride 0.9% lock flush 10 milliLiter(s) IV Push every 1 hour PRN      REVIEW OF SYSTEMS  --------------------------------------------------------------------------------  Gen: No  fevers/chills  Skin: No rashes  Head/Eyes/Ears/Mouth: No headache; Normal hearing; Normal vision w/o blurriness  Respiratory: No dyspnea, cough, wheezing, hemoptysis  CV: No chest pain, PND, orthopnea  GI: No abdominal pain, diarrhea, constipation, nausea, vomiting  : No increased frequency, dysuria, hematuria, nocturia  MSK: No joint pain/swelling; no back pain; no edema  Neuro: No dizziness/lightheadedness, weakness, seizures, numbness, tingling  Psych: Non-focal      All other systems were reviewed and are negative, except as noted.    VITALS/PHYSICAL EXAM  --------------------------------------------------------------------------------  T(C): 36.7 (03-09-20 @ 05:32), Max: 37 (03-08-20 @ 14:42)  HR: 96 (03-09-20 @ 05:32) (66 - 101)  BP: 127/96 (03-09-20 @ 05:32) (103/70 - 129/77)  RR: 18 (03-09-20 @ 05:32) (18 - 18)  SpO2: 98% (03-09-20 @ 05:32) (96% - 98%)  Wt(kg): --        03-08-20 @ 07:01  -  03-09-20 @ 07:00  --------------------------------------------------------  IN: 1890 mL / OUT: 600 mL / NET: 1290 mL      Physical Exam:  	Gen: lethargic appears in no acute distress on room air  	HEENT: dry lips, no JVD  	Pulm: CTA B/L anteriorly auscultated   	CV: RRR, S1S2; no rub/murmur  	GI: +BS, soft, nondistended  	: rivero catheter in place  	MSK: Warm, no edema, RUE wrapped in guaze              Neuro: lethargy  	Psych: lethargy  	Skin: dry skin  LABS/STUDIES  --------------------------------------------------------------------------------              9.1    8.43  >-----------<  48       [03-09-20 @ 08:54]              29.0     142  |  109  |  80  ----------------------------<  74      [03-09-20 @ 08:54]  4.1   |  17  |  2.90        Ca     8.5     [03-09-20 @ 08:54]      Mg     2.2     [03-09-20 @ 08:54]      Phos  4.3     [03-09-20 @ 08:54]    TPro  4.8  /  Alb  2.2  /  TBili  1.1  /  DBili  x   /  AST  26  /  ALT  17  /  AlkPhos  152  [03-08-20 @ 07:46]          Creatinine Trend:  SCr 2.90 [03-09 @ 08:54]  SCr 2.96 [03-08 @ 07:46]  SCr 2.61 [03-07 @ 06:52]  SCr 2.50 [03-06 @ 16:49]  SCr 2.43 [03-06 @ 06:17]    Urinalysis - [03-06-20 @ 06:18]      Color Dark Yellow / Appearance Turbid / SG 1.033 / pH 5.5      Gluc Negative / Ketone Trace  / Bili Negative / Urobili 2 mg/dL       Blood Negative / Protein 30 mg/dL / Leuk Est Large / Nitrite Negative      RBC 15 /  / Hyaline 5 / Gran  / Sq Epi  / Non Sq Epi 15 / Bacteria Many    Urine Creatinine 184      [03-06-20 @ 06:18]  Urine Sodium 45      [03-06-20 @ 06:18]

## 2020-03-09 NOTE — PROGRESS NOTE ADULT - ATTENDING COMMENTS
#KARRIE ATN- cr still slight uptrending/stable; monitor  -check renal sono  -has rivero, monitor UO  #met acidosis- monitor bicarb trend  #hypotension-monitor BP; ivf

## 2020-03-10 ENCOUNTER — APPOINTMENT (OUTPATIENT)
Dept: WOUND CARE | Facility: CLINIC | Age: 85
End: 2020-03-10

## 2020-03-10 LAB
ANION GAP SERPL CALC-SCNC: 15 MMOL/L — SIGNIFICANT CHANGE UP (ref 5–17)
ANION GAP SERPL CALC-SCNC: 16 MMOL/L — SIGNIFICANT CHANGE UP (ref 5–17)
BUN SERPL-MCNC: 83 MG/DL — HIGH (ref 7–23)
BUN SERPL-MCNC: 85 MG/DL — HIGH (ref 7–23)
CALCIUM SERPL-MCNC: 8.4 MG/DL — SIGNIFICANT CHANGE UP (ref 8.4–10.5)
CALCIUM SERPL-MCNC: 8.4 MG/DL — SIGNIFICANT CHANGE UP (ref 8.4–10.5)
CHLORIDE SERPL-SCNC: 110 MMOL/L — HIGH (ref 96–108)
CHLORIDE SERPL-SCNC: 111 MMOL/L — HIGH (ref 96–108)
CO2 SERPL-SCNC: 15 MMOL/L — LOW (ref 22–31)
CO2 SERPL-SCNC: 16 MMOL/L — LOW (ref 22–31)
CREAT SERPL-MCNC: 2.71 MG/DL — HIGH (ref 0.5–1.3)
CREAT SERPL-MCNC: 2.73 MG/DL — HIGH (ref 0.5–1.3)
GLUCOSE SERPL-MCNC: 89 MG/DL — SIGNIFICANT CHANGE UP (ref 70–99)
GLUCOSE SERPL-MCNC: 99 MG/DL — SIGNIFICANT CHANGE UP (ref 70–99)
HCT VFR BLD CALC: 29.1 % — LOW (ref 34.5–45)
HGB BLD-MCNC: 9.4 G/DL — LOW (ref 11.5–15.5)
MAGNESIUM SERPL-MCNC: 2.1 MG/DL — SIGNIFICANT CHANGE UP (ref 1.6–2.6)
MCHC RBC-ENTMCNC: 32.3 GM/DL — SIGNIFICANT CHANGE UP (ref 32–36)
MCHC RBC-ENTMCNC: 33.6 PG — SIGNIFICANT CHANGE UP (ref 27–34)
MCV RBC AUTO: 103.9 FL — HIGH (ref 80–100)
NRBC # BLD: 0 /100 WBCS — SIGNIFICANT CHANGE UP (ref 0–0)
PHOSPHATE SERPL-MCNC: 3.7 MG/DL — SIGNIFICANT CHANGE UP (ref 2.5–4.5)
PLATELET # BLD AUTO: 64 K/UL — LOW (ref 150–400)
POTASSIUM SERPL-MCNC: 3.9 MMOL/L — SIGNIFICANT CHANGE UP (ref 3.5–5.3)
POTASSIUM SERPL-MCNC: 4 MMOL/L — SIGNIFICANT CHANGE UP (ref 3.5–5.3)
POTASSIUM SERPL-SCNC: 3.9 MMOL/L — SIGNIFICANT CHANGE UP (ref 3.5–5.3)
POTASSIUM SERPL-SCNC: 4 MMOL/L — SIGNIFICANT CHANGE UP (ref 3.5–5.3)
RBC # BLD: 2.8 M/UL — LOW (ref 3.8–5.2)
RBC # FLD: 17.4 % — HIGH (ref 10.3–14.5)
SODIUM SERPL-SCNC: 141 MMOL/L — SIGNIFICANT CHANGE UP (ref 135–145)
SODIUM SERPL-SCNC: 142 MMOL/L — SIGNIFICANT CHANGE UP (ref 135–145)
WBC # BLD: 7.86 K/UL — SIGNIFICANT CHANGE UP (ref 3.8–10.5)
WBC # FLD AUTO: 7.86 K/UL — SIGNIFICANT CHANGE UP (ref 3.8–10.5)

## 2020-03-10 PROCEDURE — 99233 SBSQ HOSP IP/OBS HIGH 50: CPT | Mod: GC

## 2020-03-10 PROCEDURE — 70450 CT HEAD/BRAIN W/O DYE: CPT | Mod: 26

## 2020-03-10 PROCEDURE — 99233 SBSQ HOSP IP/OBS HIGH 50: CPT

## 2020-03-10 PROCEDURE — 71250 CT THORAX DX C-: CPT | Mod: 26

## 2020-03-10 RX ADMIN — Medication 3 MILLILITER(S): at 05:40

## 2020-03-10 RX ADMIN — Medication 0.12 MILLIGRAM(S): at 12:21

## 2020-03-10 RX ADMIN — CHLORHEXIDINE GLUCONATE 1 APPLICATION(S): 213 SOLUTION TOPICAL at 08:16

## 2020-03-10 RX ADMIN — Medication 1 TABLET(S): at 08:34

## 2020-03-10 RX ADMIN — Medication 25 MICROGRAM(S): at 05:35

## 2020-03-10 RX ADMIN — Medication 3 MILLILITER(S): at 12:22

## 2020-03-10 RX ADMIN — Medication 1 MILLIGRAM(S): at 08:35

## 2020-03-10 RX ADMIN — Medication 2000 UNIT(S): at 08:34

## 2020-03-10 RX ADMIN — PREGABALIN 1000 MICROGRAM(S): 225 CAPSULE ORAL at 08:34

## 2020-03-10 RX ADMIN — Medication 0.25 MILLIGRAM(S): at 05:40

## 2020-03-10 RX ADMIN — MIDODRINE HYDROCHLORIDE 5 MILLIGRAM(S): 2.5 TABLET ORAL at 12:21

## 2020-03-10 RX ADMIN — ERTAPENEM SODIUM 100 MILLIGRAM(S): 1 INJECTION, POWDER, LYOPHILIZED, FOR SOLUTION INTRAMUSCULAR; INTRAVENOUS at 17:29

## 2020-03-10 RX ADMIN — LACTULOSE 20 GRAM(S): 10 SOLUTION ORAL at 05:35

## 2020-03-10 RX ADMIN — Medication 3 MILLILITER(S): at 17:33

## 2020-03-10 RX ADMIN — Medication 1 APPLICATION(S): at 08:33

## 2020-03-10 RX ADMIN — TIOTROPIUM BROMIDE 1 CAPSULE(S): 18 CAPSULE ORAL; RESPIRATORY (INHALATION) at 08:34

## 2020-03-10 RX ADMIN — MIDODRINE HYDROCHLORIDE 5 MILLIGRAM(S): 2.5 TABLET ORAL at 22:29

## 2020-03-10 RX ADMIN — Medication 500 MILLIGRAM(S): at 17:33

## 2020-03-10 RX ADMIN — Medication 0.25 MILLIGRAM(S): at 17:34

## 2020-03-10 RX ADMIN — Medication 500 MILLIGRAM(S): at 05:35

## 2020-03-10 NOTE — PROGRESS NOTE ADULT - SUBJECTIVE AND OBJECTIVE BOX
[facility] Division of Kidney Diseases & Hypertension  FOLLOW UP NOTE  [phone]--------------------------------------------------------------------------------  Chief Complaint:Syncope and collapse      24 hour events/subjective: No acute events overnight. Patient resting in bed. Labs, vitals, and medications reviewed. Vital signs stable. Labs show creatinine stable at 2.71, CO2 of 15 and otherwise stable electrolytes.         PAST HISTORY  --------------------------------------------------------------------------------  No significant changes to PMH, PSH, FHx, SHx, unless otherwise noted    ALLERGIES & MEDICATIONS  --------------------------------------------------------------------------------  Allergies    codeine (Rash)  erythromycin (Rash)  iv dye (Rash; Anaphylaxis; Short breath)  penicillin (Rash)  shellfish (Rash)    Intolerances    adhesives (Other)  warfarin (Other)    Standing Inpatient Medications  albuterol/ipratropium for Nebulization 3 milliLiter(s) Nebulizer every 6 hours  AQUAPHOR (petrolatum Ointment) 1 Application(s) Topical daily  ascorbic acid 500 milliGRAM(s) Oral two times a day  buDESOnide    Inhalation Suspension 0.25 milliGRAM(s) Nebulizer every 12 hours  chlorhexidine 4% Liquid 1 Application(s) Topical <User Schedule>  cholecalciferol 2000 Unit(s) Oral daily  cyanocobalamin 1000 MICROGram(s) Oral daily  digoxin     Tablet 0.125 milliGRAM(s) Oral every other day  ertapenem  IVPB 500 milliGRAM(s) IV Intermittent every 24 hours  folic acid 1 milliGRAM(s) Oral daily  lactulose Syrup 20 Gram(s) Oral four times a day  levothyroxine 25 MICROGram(s) Oral daily  midodrine 5 milliGRAM(s) Oral every 8 hours  multivitamin/minerals 1 Tablet(s) Oral daily  rifAXIMin 550 milliGRAM(s) Oral two times a day  tiotropium 18 MICROgram(s) Capsule 1 Capsule(s) Inhalation daily    PRN Inpatient Medications  acetaminophen    Suspension .. 650 milliGRAM(s) Oral every 6 hours PRN  sodium chloride 0.9% lock flush 10 milliLiter(s) IV Push every 1 hour PRN      REVIEW OF SYSTEMS  --------------------------------------------------------------------------------  Gen: No  fevers/chills  Skin: No rashes  Head/Eyes/Ears/Mouth: No headache; Normal hearing; Normal vision w/o blurriness  Respiratory: No dyspnea, cough, wheezing, hemoptysis  CV: No chest pain, PND, orthopnea  GI: No abdominal pain, diarrhea, constipation, nausea, vomiting  : No increased frequency, dysuria, hematuria, nocturia  MSK: No joint pain/swelling; no back pain; no edema  Neuro: No dizziness/lightheadedness, weakness, seizures, numbness, tingling  Psych: Non-focal      All other systems were reviewed and are negative, except as noted.    VITALS/PHYSICAL EXAM  --------------------------------------------------------------------------------  T(C): 36.4 (03-10-20 @ 04:07), Max: 36.4 (03-09-20 @ 12:36)  HR: 82 (03-10-20 @ 04:07) (80 - 87)  BP: 118/76 (03-10-20 @ 04:07) (106/70 - 118/76)  RR: 18 (03-10-20 @ 04:07) (18 - 18)  SpO2: 94% (03-10-20 @ 04:07) (94% - 97%)  Wt(kg): --        03-09-20 @ 07:01  -  03-10-20 @ 07:00  --------------------------------------------------------  IN: 2538 mL / OUT: 700 mL / NET: 1838 mL      Physical Exam:  	Gen: lethargic appears in no acute distress on room air  	HEENT: dry lips, no JVD  	Pulm: CTA B/L anteriorly auscultated   	CV: RRR, S1S2; no rub/murmur  	GI: +BS, soft, nondistended  	: rivero catheter in place  	MSK: Warm, no edema, RUE wrapped in guaze              Neuro: lethargy  	Psych: lethargy  	Skin: dry skin    LABS/STUDIES  --------------------------------------------------------------------------------              9.4    7.86  >-----------<  64       [03-10-20 @ 05:21]              29.1     142  |  111  |  83  ----------------------------<  89      [03-10-20 @ 05:21]  3.9   |  15  |  2.71        Ca     8.4     [03-10-20 @ 05:21]      Mg     2.1     [03-10-20 @ 01:26]      Phos  3.7     [03-10-20 @ 01:26]            Creatinine Trend:  SCr 2.71 [03-10 @ 05:21]  SCr 2.73 [03-10 @ 01:26]  SCr 2.90 [03-09 @ 08:54]  SCr 2.96 [03-08 @ 07:46]  SCr 2.61 [03-07 @ 06:52]    Urinalysis - [03-06-20 @ 06:18]      Color Dark Yellow / Appearance Turbid / SG 1.033 / pH 5.5      Gluc Negative / Ketone Trace  / Bili Negative / Urobili 2 mg/dL       Blood Negative / Protein 30 mg/dL / Leuk Est Large / Nitrite Negative      RBC 15 /  / Hyaline 5 / Gran  / Sq Epi  / Non Sq Epi 15 / Bacteria Many    Urine Creatinine 184      [03-06-20 @ 06:18]  Urine Sodium 45      [03-06-20 @ 06:18]

## 2020-03-10 NOTE — PROGRESS NOTE ADULT - PROBLEM SELECTOR PLAN 1
rising leukocytosis, increased lethargy and now positive UA. Afebrile. suspect UTI likely from diarrhea. UTI likely present on admission  UCX with ESBL Klebsiella - started on invanz per ID - to be continued likely for 5 days.  -likely cause of urinary retention/tahir   -monitor for fever, worsening mental status, or increasing leukocytosis

## 2020-03-10 NOTE — PROGRESS NOTE ADULT - PROBLEM SELECTOR PLAN 3
-pt presented with syncope on admission  -likely secondary to dehydration vs vasovagal syncope as pt was on toilet having a BM when it happened   -pt was also recently started on propranolol and spironolactone as an outpt, which may have contributed to symptoms as well as dehydration. Have been holding in setting of diarrhea and now infection  -cont tele- in afib, rate controlled. continue monitoring for now  hold IVF - patient developed pleural effusions (seen on US)  -high sensitivity trops negx2   -EKG without acute ischemic changes  -continue home midodrine would hold for elevated bp- plan was for pt to titrate off of it slowly as an outpatient after being on propranolol, spironolactone for some time (being managed by Dr Boston)  ectopy on telemetry - ?due to volume overload - continue to monitor on telemetry

## 2020-03-10 NOTE — PROGRESS NOTE ADULT - ASSESSMENT
89 y/o F with history of Afib on dig (not on AC), severe AS, COPD, CLL (s/p treatment with Treanda and Rituxan), HCC+cirrhosis with portal HTN, esophageal varices s/p banding and hepatic vessel coiling, CKD, hypothyroidism, recurrent UTIs with ESBL Klebsiella, recent admission in February 2020 for serratia bacteremia and candida glabrata fungemia s/p treatment, presenting from home after a syncopal episode. 89 y/o F with history of Afib on dig (not on AC), severe AS, COPD, CLL (s/p treatment with Treanda and Rituxan), HCC+cirrhosis with portal HTN, esophageal varices s/p banding and hepatic vessel coiling, CKD, hypothyroidism, recurrent UTIs with ESBL Klebsiella, recent admission in February 2020 for serratia bacteremia and candida glabrata fungemia s/p treatment, presenting from home after a syncopal episode.     AMS today no focal deficits on exam - check CT chest  pleural effusions seen on lung us - with episode of sob overnight - check CT chest, fluids stopped

## 2020-03-10 NOTE — CHART NOTE - NSCHARTNOTEFT_GEN_A_CORE
Medicine NP note    CC: 11 Beats of WCT  Notified by RN that patient had 11 beats of wide complex tachycardia. tele reviewed  Patient at baseline Afib with Hr in 70's  Evaluated the pt at bedside at bedside, pt  symptomatic, Denies any complaints    Vital Signs Last 24 Hrs  T(C): 36.4 (10 Mar 2020 00:04), Max: 36.7 (09 Mar 2020 05:32)  T(F): 97.5 (10 Mar 2020 00:04), Max: 98 (09 Mar 2020 05:32)  HR: 87 (10 Mar 2020 00:04) (80 - 96)  BP: 107/62 (10 Mar 2020 00:04) (103/70 - 127/96)  BP(mean): --  RR: 18 (10 Mar 2020 00:04) (18 - 18)  SpO2: 95% (10 Mar 2020 00:04) (95% - 98%)    89 y/o F with history of Afib on dig (not on AC), severe AS, COPD, CLL (s/p treatment with Treanda and Rituxan), HCC+cirrhosis with portal HTN, esophageal varices s/p banding and hepatic vessel coiling, CKD, hypothyroidism, recurrent UTIs with ESBL Klebsiella, recent hx of sepsis presenting from home after a syncopal episode.  Patient now with 11 beats of WCT  Asymptomatic  HD stable  Keep k+ >4.0, mg>2.0, phos> 3.0  Pt not on BB  Consider cardiology consult am  Will follow    Mane López [facility]  [id number]

## 2020-03-10 NOTE — PROGRESS NOTE ADULT - PROBLEM SELECTOR PLAN 2
-likely multifactorial- overall poor PO intake at home and poor nutritional status and was having diarrhea on admission with hypotension   renal consult appreciated - ?ATN from hypotension on admission   creatinine improving   also now noted to have UTI and acute urinary retention causing post-obstructive KARRIE  s/p rivero placement   -UTI treatment as above  -holding spironolactone   -abdominal sono - no ascities  hold IVF  -encourage PO intake as tolerated  Renal consult appreciated

## 2020-03-10 NOTE — PROGRESS NOTE ADULT - ATTENDING COMMENTS
#KARRIE ATN -hypotension/UTI- cr improving  -renal sono no hydro  -has rivero, monitor UO  #met acidosis- monitor bicarb trend  #hypotension-monitor BP; ivf off now; encourage po hydration  #UTI Klebsiella-antibiotics

## 2020-03-10 NOTE — CDI QUERY NOTE - NSCDIOTHERTXTBX_GEN_ALL_CORE_HH
Patient presented with diarrhea and syncopal event.  On admission HR 91, BP 86/53, Lactate 5, neutrophils 80.5%, VBG 7.28/56/21/26/16% admitted with Dehydration and Syncope.  Patient also found to have KARRIE with a creatinine of 1.12 with a documented baseline of 0.8.      Nephrology consulted with KARRIE, creatinine increased to 2.96 [facility].      Nephrology notes document patient admitted with Sepsis 2/2 UTI -      "Problem/Recommendation - 1:  Problem: KARRIE (acute kidney injury). Recommendation: KARRIE likely 2/2 hemodynamically meditated in the setting of sepsis UTI, hypotension and/or volume depletion from diarrhea, decrease PO intake, less likely HRS (urine sodium is 45). pt. also with urinary retention, s/p rivero catheter placement.  On admission hypotensive 80s/50 with sCr 1.99 on 3/3, uptrend to 3.96 on 3/8 (baseline sCr 0.8-1.0), UCx GNR.  U/S abdomen show no hydronephrosis.  On-going diarrhea in hospital, s/p rivero placement (had urinary retention in the hsp). Overall likely ATN in plateau phase."    Please clarify the status of Sepsis -    (1) Patient admitted with Sepsis 2/2 UTI with Severe Sepsis with acute organ dysfunction      (KARRIE)  (2) Sepsis ruled out  (3) Other (please specify)  (4) Clinically Undetermined    Thank you for your help,    Cathy/SOFIE

## 2020-03-10 NOTE — PROGRESS NOTE ADULT - ASSESSMENT
89F PMHx Afib (not on AC), severe AS, COPD, CLL (s/p treatment with Treanda and Rituxan), HCC+cirrhosis with portal HTN, esophageal varices s/p banding and hepatic vessel coiling, hypothyroidism, recurrent UTIs with ESBL Klebsiella, recent admission in February 2020 for serratia bacteremia and candida glabrata fungemia present with syncopal episode in setting diarrhea (on lactulose for cirrhosis) - admitted for sepsis from UTI (GNR) treated with ceftriaxone.  Hospital course complicated worsening KARRIE.    Nephrology consulted for KARRIE.     Problem/Recommendation - 1:  Problem: KARRIE (acute kidney injury). Recommendation: KARRIE likely 2/2 hemodynamically meditated in the setting of sepsis UTI, hypotension and/or volume depletion from diarrhea, decrease PO intake, less likely HRS (urine sodium is 45). pt. also with urinary retention, s/p rivero catheter placement.  On admission hypotensive 80s/50 with sCr 1.99 on 3/3, uptrend to 3.96 on 3/8 (baseline sCr 0.8-1.0), UCx GNR.  U/S abdomen show no hydronephrosis.  On-going diarrhea in hospital, s/p rivero placement (had urinary retention in the hsp). Overall likely ATN in plateau phase.    Plan:  - continue IV hydration with albumin  - agree holding aldactone for now, consider decreasing lactulose if BM>3  - avoid nephrotoxic agents (ACEI, ARB, NSAIDS), renally dose medications per GFR  - trend BMP daily, strict I/O, low potassium diet for now.- please stop Vitamin C       Problem/Recommendation - 2:  ·  Problem: Low bicarbonate level.  Recommendation: likely metabolic acidosis in the setting of volume depletion and diarrhea  check blood gas  continue to monitor lactate  start bicarbonate 1300 mg TID

## 2020-03-10 NOTE — PROGRESS NOTE ADULT - SUBJECTIVE AND OBJECTIVE BOX
Patient is a 89y old  Female who presents with a chief complaint of syncope and diarrhea (10 Mar 2020 08:04)        SUBJECTIVE / OVERNIGHT EVENTS: per daughters, patient very sleepy today but ate breakfast this morning   patient had 3 BM per nurse today   patient herself, denies any  pain and participated during physical exam      MEDICATIONS  (STANDING):  albuterol/ipratropium for Nebulization 3 milliLiter(s) Nebulizer every 6 hours  AQUAPHOR (petrolatum Ointment) 1 Application(s) Topical daily  ascorbic acid 500 milliGRAM(s) Oral two times a day  buDESOnide    Inhalation Suspension 0.25 milliGRAM(s) Nebulizer every 12 hours  chlorhexidine 4% Liquid 1 Application(s) Topical <User Schedule>  cholecalciferol 2000 Unit(s) Oral daily  cyanocobalamin 1000 MICROGram(s) Oral daily  digoxin     Tablet 0.125 milliGRAM(s) Oral every other day  ertapenem  IVPB 500 milliGRAM(s) IV Intermittent every 24 hours  folic acid 1 milliGRAM(s) Oral daily  lactulose Syrup 20 Gram(s) Oral four times a day  levothyroxine 25 MICROGram(s) Oral daily  midodrine 5 milliGRAM(s) Oral every 8 hours  multivitamin/minerals 1 Tablet(s) Oral daily  rifAXIMin 550 milliGRAM(s) Oral two times a day  tiotropium 18 MICROgram(s) Capsule 1 Capsule(s) Inhalation daily    MEDICATIONS  (PRN):  acetaminophen    Suspension .. 650 milliGRAM(s) Oral every 6 hours PRN Temp greater or equal to 38C (100.4F), Mild Pain (1 - 3), Moderate Pain (4 - 6)  sodium chloride 0.9% lock flush 10 milliLiter(s) IV Push every 1 hour PRN Pre/post blood products, medications, blood draw, and to maintain line patency      Vital Signs Last 24 Hrs  T(C): 36.4 (10 Mar 2020 04:07), Max: 36.4 (10 Mar 2020 00:04)  T(F): 97.5 (10 Mar 2020 04:07), Max: 97.5 (10 Mar 2020 00:04)  HR: 82 (10 Mar 2020 04:07) (80 - 87)  BP: 118/76 (10 Mar 2020 04:07) (107/62 - 118/76)  BP(mean): --  RR: 18 (10 Mar 2020 04:07) (18 - 18)  SpO2: 94% (10 Mar 2020 04:07) (94% - 96%)  CAPILLARY BLOOD GLUCOSE        I&O's Summary    09 Mar 2020 07:01  -  10 Mar 2020 07:00  --------------------------------------------------------  IN: 2538 mL / OUT: 700 mL / NET: 1838 mL    10 Mar 2020 07:01  -  10 Mar 2020 15:56  --------------------------------------------------------  IN: 240 mL / OUT: 250 mL / NET: -10 mL        PHYSICAL EXAM:  GENERAL:  frail, thin, breathing normal, sleepy today with mild slurred speech  HEAD:  Atraumatic, Normocephalic  EYES: conjunctiva and sclera clear  NECK: supple, No JVD  CHEST/LUNG: CTA b/l  HEART: S1 S2 RRR  ABDOMEN: +BS Soft, NT/ND  EXTREMITIES:  2+ DP Pulses, No c/c. no LE edema  NEUROLOGY: AAOx3, no facial droop, no focal deficits   SKIN: multiple skin tears UEs and LEs- some wrapped    LABS:                        9.4    7.86  )-----------( 64       ( 10 Mar 2020 05:21 )             29.1     03-10    142  |  111<H>  |  83<H>  ----------------------------<  89  3.9   |  15<L>  |  2.71<H>    Ca    8.4      10 Mar 2020 05:21  Phos  3.7     03-10  Mg     2.1     03-10                RADIOLOGY & ADDITIONAL TESTS:    Imaging Personally Reviewed:  Consultant(s) Notes Reviewed:    Care Discussed with Consultants/Other Providers:

## 2020-03-10 NOTE — PROGRESS NOTE ADULT - PROBLEM SELECTOR PLAN 6
-continue with digoxin  -not on AC given previous history of ICH  -continue home midodrine for hypotension (hold for elevated bp)  -propranolol on hold for now

## 2020-03-11 ENCOUNTER — RESULT REVIEW (OUTPATIENT)
Age: 85
End: 2020-03-11

## 2020-03-11 LAB
ANION GAP SERPL CALC-SCNC: 15 MMOL/L — SIGNIFICANT CHANGE UP (ref 5–17)
B PERT IGG+IGM PNL SER: CLEAR — SIGNIFICANT CHANGE UP
BUN SERPL-MCNC: 95 MG/DL — HIGH (ref 7–23)
CALCIUM SERPL-MCNC: 6.9 MG/DL — LOW (ref 8.4–10.5)
CHLORIDE SERPL-SCNC: 112 MMOL/L — HIGH (ref 96–108)
CO2 SERPL-SCNC: 17 MMOL/L — LOW (ref 22–31)
COLOR FLD: YELLOW — SIGNIFICANT CHANGE UP
CREAT SERPL-MCNC: 2.65 MG/DL — HIGH (ref 0.5–1.3)
CULTURE RESULTS: SIGNIFICANT CHANGE UP
FLUID INTAKE SUBSTANCE CLASS: SIGNIFICANT CHANGE UP
FLUID SEGMENTED GRANULOCYTES: 56 % — SIGNIFICANT CHANGE UP
GLUCOSE FLD-MCNC: 104 MG/DL — SIGNIFICANT CHANGE UP
GLUCOSE SERPL-MCNC: 92 MG/DL — SIGNIFICANT CHANGE UP (ref 70–99)
HCT VFR BLD CALC: 29.6 % — LOW (ref 34.5–45)
HGB BLD-MCNC: 9.5 G/DL — LOW (ref 11.5–15.5)
LDH SERPL L TO P-CCNC: 69 U/L — SIGNIFICANT CHANGE UP
LYMPHOCYTES # FLD: 16 % — SIGNIFICANT CHANGE UP
MCHC RBC-ENTMCNC: 32.1 GM/DL — SIGNIFICANT CHANGE UP (ref 32–36)
MCHC RBC-ENTMCNC: 33.1 PG — SIGNIFICANT CHANGE UP (ref 27–34)
MCV RBC AUTO: 103.1 FL — HIGH (ref 80–100)
MESOTHL CELL # FLD: 4 % — SIGNIFICANT CHANGE UP
MONOS+MACROS # FLD: 24 % — SIGNIFICANT CHANGE UP
NRBC # BLD: 0 /100 WBCS — SIGNIFICANT CHANGE UP (ref 0–0)
PH FLD: 7.53 — SIGNIFICANT CHANGE UP
PLATELET # BLD AUTO: 176 K/UL — SIGNIFICANT CHANGE UP (ref 150–400)
POTASSIUM SERPL-MCNC: 5.2 MMOL/L — SIGNIFICANT CHANGE UP (ref 3.5–5.3)
POTASSIUM SERPL-SCNC: 5.2 MMOL/L — SIGNIFICANT CHANGE UP (ref 3.5–5.3)
PROT FLD-MCNC: 0.8 G/DL — SIGNIFICANT CHANGE UP
RBC # BLD: 2.87 M/UL — LOW (ref 3.8–5.2)
RBC # FLD: 17.8 % — HIGH (ref 10.3–14.5)
RCV VOL RI: 270 /UL — HIGH (ref 0–0)
SODIUM SERPL-SCNC: 144 MMOL/L — SIGNIFICANT CHANGE UP (ref 135–145)
SPECIMEN SOURCE: SIGNIFICANT CHANGE UP
TOTAL NUCLEATED CELL COUNT, BODY FLUID: 74 /UL — SIGNIFICANT CHANGE UP
TUBE TYPE: SIGNIFICANT CHANGE UP
WBC # BLD: 7.91 K/UL — SIGNIFICANT CHANGE UP (ref 3.8–10.5)
WBC # FLD AUTO: 7.91 K/UL — SIGNIFICANT CHANGE UP (ref 3.8–10.5)

## 2020-03-11 PROCEDURE — 88112 CYTOPATH CELL ENHANCE TECH: CPT | Mod: 26

## 2020-03-11 PROCEDURE — 99232 SBSQ HOSP IP/OBS MODERATE 35: CPT

## 2020-03-11 PROCEDURE — 99223 1ST HOSP IP/OBS HIGH 75: CPT

## 2020-03-11 PROCEDURE — 32555 ASPIRATE PLEURA W/ IMAGING: CPT

## 2020-03-11 PROCEDURE — 99233 SBSQ HOSP IP/OBS HIGH 50: CPT

## 2020-03-11 PROCEDURE — 88305 TISSUE EXAM BY PATHOLOGIST: CPT | Mod: 26

## 2020-03-11 PROCEDURE — 99233 SBSQ HOSP IP/OBS HIGH 50: CPT | Mod: GC

## 2020-03-11 PROCEDURE — 99223 1ST HOSP IP/OBS HIGH 75: CPT | Mod: GC,25

## 2020-03-11 RX ADMIN — Medication 25 MICROGRAM(S): at 05:19

## 2020-03-11 RX ADMIN — Medication 3 MILLILITER(S): at 23:43

## 2020-03-11 RX ADMIN — TIOTROPIUM BROMIDE 1 CAPSULE(S): 18 CAPSULE ORAL; RESPIRATORY (INHALATION) at 12:19

## 2020-03-11 RX ADMIN — LACTULOSE 20 GRAM(S): 10 SOLUTION ORAL at 23:43

## 2020-03-11 RX ADMIN — Medication 500 MILLIGRAM(S): at 16:22

## 2020-03-11 RX ADMIN — Medication 500 MILLIGRAM(S): at 05:19

## 2020-03-11 RX ADMIN — Medication 3 MILLILITER(S): at 17:03

## 2020-03-11 RX ADMIN — Medication 1 TABLET(S): at 09:16

## 2020-03-11 RX ADMIN — Medication 1 MILLIGRAM(S): at 09:16

## 2020-03-11 RX ADMIN — Medication 2000 UNIT(S): at 09:16

## 2020-03-11 RX ADMIN — Medication 0.25 MILLIGRAM(S): at 05:19

## 2020-03-11 RX ADMIN — Medication 3 MILLILITER(S): at 00:01

## 2020-03-11 RX ADMIN — MIDODRINE HYDROCHLORIDE 5 MILLIGRAM(S): 2.5 TABLET ORAL at 22:26

## 2020-03-11 RX ADMIN — ERTAPENEM SODIUM 100 MILLIGRAM(S): 1 INJECTION, POWDER, LYOPHILIZED, FOR SOLUTION INTRAMUSCULAR; INTRAVENOUS at 16:22

## 2020-03-11 RX ADMIN — LACTULOSE 20 GRAM(S): 10 SOLUTION ORAL at 12:20

## 2020-03-11 RX ADMIN — MIDODRINE HYDROCHLORIDE 5 MILLIGRAM(S): 2.5 TABLET ORAL at 12:20

## 2020-03-11 RX ADMIN — Medication 3 MILLILITER(S): at 12:20

## 2020-03-11 RX ADMIN — Medication 0.25 MILLIGRAM(S): at 16:22

## 2020-03-11 RX ADMIN — Medication 3 MILLILITER(S): at 05:19

## 2020-03-11 RX ADMIN — CHLORHEXIDINE GLUCONATE 1 APPLICATION(S): 213 SOLUTION TOPICAL at 09:07

## 2020-03-11 RX ADMIN — PREGABALIN 1000 MICROGRAM(S): 225 CAPSULE ORAL at 12:19

## 2020-03-11 RX ADMIN — Medication 1 APPLICATION(S): at 12:12

## 2020-03-11 RX ADMIN — LACTULOSE 20 GRAM(S): 10 SOLUTION ORAL at 16:22

## 2020-03-11 NOTE — CONSULT NOTE ADULT - SUBJECTIVE AND OBJECTIVE BOX
MRN-[id number]    CHIEF COMPLAINT:  Patient is a 89y old  Female who presents with a chief complaint of syncope and diarrhea (11 Mar 2020 10:50)      HISTORY OF PRESENT ILLNESS:  HA JACQUES is a 89y Female patient with past medical history of *** presenting with ***.     Allergies    codeine (Rash)  erythromycin (Rash)  iv dye (Rash; Anaphylaxis; Short breath)  penicillin (Rash)  shellfish (Rash)    Intolerances    adhesives (Other)  warfarin (Other)  	    PAST MEDICAL & SURGICAL HISTORY:  History of bacteremia  PVD (peripheral vascular disease)  Liver cell carcinoma  Severe aortic stenosis by prior echocardiogram  Pulmonary HTN: moderate  CKD (chronic kidney disease), stage 3 (moderate)  COPD (Chronic Obstructive Pulmonary Disease)  AF (Atrial Fibrillation)  Portal Hypertension  Bleeding Esophageal Varices  CLL (Chronic Lymphoblastic Leukemia)  GIB (Gastrointestinal Bleeding)  History of repair of hip fracture  Esophageal Rupture      FAMILY HISTORY:  FH: Alzheimers disease      SOCIAL HISTORY:    [ ] Non-smoker  [ ] Smoker  [ ] Alcohol    REVIEW OF SYSTEMS:  CONSTITUTIONAL: No fever, weight loss, or fatigue  EYES: No eye pain, visual disturbances, or discharge  ENMT:  No difficulty hearing, tinnitus, vertigo; No sinus or throat pain  NECK: No pain or stiffness  RESPIRATORY: No cough, wheezing, chills or hemoptysis; No Shortness of Breath  CARDIOVASCULAR: No chest pain, palpitations, passing out, dizziness, or leg swelling  GASTROINTESTINAL: No abdominal or epigastric pain. No nausea, vomiting, or hematemesis; No diarrhea or constipation. No melena or hematochezia.  GENITOURINARY: No dysuria, frequency, hematuria, or incontinence  NEUROLOGICAL: No headaches, memory loss, loss of strength, numbness, or tremors  SKIN: No itching, burning, rashes, or lesions   LYMPH Nodes: No enlarged glands  ENDOCRINE: No heat or cold intolerance; No hair loss  MUSCULOSKELETAL: No joint pain or swelling; No muscle, back, or extremity pain  PSYCHIATRIC: No depression, anxiety, mood swings, or difficulty sleeping  HEME/LYMPH: No easy bruising, or bleeding gums  ALLERY AND IMMUNOLOGIC: No hives or eczema	    [ ] All others negative	  [ ] Unable to obtain    I&O's Summary    10 Mar 2020 07:01  -  11 Mar 2020 07:00  --------------------------------------------------------  IN: 290 mL / OUT: 850 mL / NET: -560 mL    11 Mar 2020 07:01  -  11 Mar 2020 11:25  --------------------------------------------------------  IN: 100 mL / OUT: 200 mL / NET: -100 mL        PHYSICAL EXAM:  Vital Signs Last 24 Hrs  T(C): 36.6 (11 Mar 2020 08:24), Max: 37.2 (11 Mar 2020 04:00)  T(F): 97.9 (11 Mar 2020 08:24), Max: 98.9 (11 Mar 2020 04:00)  HR: 80 (11 Mar 2020 08:24) (80 - 96)  BP: 126/66 (11 Mar 2020 08:24) (111/71 - 126/66)  BP(mean): --  RR: 18 (11 Mar 2020 08:24) (18 - 18)  SpO2: 100% (11 Mar 2020 08:24) (100% - 100%)  Appearance: Normal	  HEENT:   Normal oral mucosa, PERRL, EOMI	  Lymphatic: No lymphadenopathy  Cardiovascular: Normal S1 S2, No JVD, No murmurs, No edema  Respiratory: Lungs clear to auscultation	  Psychiatry: A & O x 3, Mood & affect appropriate  Gastrointestinal:  Soft, Non-tender, + BS	  Skin: No rashes, No ecchymoses, No cyanosis	  Neurologic: Non-focal  Extremities: Normal range of motion, No clubbing, cyanosis or edema  Vascular: Peripheral pulses palpable 2+ bilaterally    MEDICATIONS:  MEDICATIONS  (STANDING):  albuterol/ipratropium for Nebulization 3 milliLiter(s) Nebulizer every 6 hours  AQUAPHOR (petrolatum Ointment) 1 Application(s) Topical daily  ascorbic acid 500 milliGRAM(s) Oral two times a day  buDESOnide    Inhalation Suspension 0.25 milliGRAM(s) Nebulizer every 12 hours  chlorhexidine 4% Liquid 1 Application(s) Topical <User Schedule>  cholecalciferol 2000 Unit(s) Oral daily  cyanocobalamin 1000 MICROGram(s) Oral daily  digoxin     Tablet 0.125 milliGRAM(s) Oral every other day  ertapenem  IVPB 500 milliGRAM(s) IV Intermittent every 24 hours  folic acid 1 milliGRAM(s) Oral daily  lactulose Syrup 20 Gram(s) Oral four times a day  levothyroxine 25 MICROGram(s) Oral daily  midodrine 5 milliGRAM(s) Oral every 8 hours  multivitamin/minerals 1 Tablet(s) Oral daily  rifAXIMin 550 milliGRAM(s) Oral two times a day  tiotropium 18 MICROgram(s) Capsule 1 Capsule(s) Inhalation daily    MEDICATIONS  (PRN):  acetaminophen    Suspension .. 650 milliGRAM(s) Oral every 6 hours PRN Temp greater or equal to 38C (100.4F), Mild Pain (1 - 3), Moderate Pain (4 - 6)  sodium chloride 0.9% lock flush 10 milliLiter(s) IV Push every 1 hour PRN Pre/post blood products, medications, blood draw, and to maintain line patency      Home Medications:  Aldactone 25 mg oral tablet: 1 tab(s) orally 2 times a day (03 Mar 2020 12:15)  ascorbic acid 500 mg oral tablet: 1 tab(s) orally 2 times a day (03 Mar 2020 12:15)  budesonide 0.25 mg/2 mL inhalation suspension: 2 milliliter(s) inhaled 2 times a day (03 Mar 2020 12:15)  digoxin 125 mcg (0.125 mg) oral tablet: 1 tab(s) orally every other day (03 Mar 2020 12:15)  ipratropium-albuterol 0.5 mg-2.5 mg/3 mLinhalation solution: 3 milliliter(s) inhaled every 12 hours (03 Mar 2020 12:15)  lactulose 10 g/15 mL oral syrup: 30 milliliter(s) orally 4 times a day, titrate to 2-3 BMs daily (03 Mar 2020 12:15)  levothyroxine 25 mcg (0.025 mg) oral tablet: 1 tab(s) orally once a day (03 Mar 2020 12:15)  petrolatum topical ointment: 1 application topically once a day (03 Mar 2020 12:15)  propranolol 10 mg oral tablet: 1 tab(s) orally 2 times a day     (03 Mar 2020 12:15)  rifAXIMin 550 mg oral tablet: 1 tab(s) orally 2 times a day (03 Mar 2020 12:15)  Vitamin D3 2000 intl units oral tablet: 1 tab(s) orally once a day (03 Mar 2020 12:15)      LABS:	 	  CBC Full  -  ( 11 Mar 2020 09:25 )  WBC Count : 7.91 K/uL  Hemoglobin : 9.5 g/dL  Hematocrit : 29.6 %  Platelet Count - Automated : 176 K/uL  Mean Cell Volume : 103.1 fl  Mean Cell Hemoglobin : 33.1 pg  Mean Cell Hemoglobin Concentration : 32.1 gm/dL  Auto Neutrophil # : x  Auto Lymphocyte # : x  Auto Monocyte # : x  Auto Eosinophil # : x  Auto Basophil # : x  Auto Neutrophil % : x  Auto Lymphocyte % : x  Auto Monocyte % : x  Auto Eosinophil % : x  Auto Basophil % : x    03-11    144  |  112<H>  |  95<H>  ----------------------------<  92  5.2   |  17<L>  |  2.65<H>  03-10    142  |  111<H>  |  83<H>  ----------------------------<  89  3.9   |  15<L>  |  2.71<H>    Ca    6.9<L>      11 Mar 2020 09:25  Ca    8.4      10 Mar 2020 05:21  Phos  3.7     03-10  Mg     2.1     03-10              proBNP:   Lipid Profile:   HgA1c:   TSH:     TELEMETRY: 	    ECG:  	  RADIOLOGY:  OTHER: 	    CARDIAC TESTING/STUDIES:    [ ] Echocardiogram:  [ ]  Catheterization:  [ ] Stress Test:  	  	  ASSESSMENT/PLAN: 	      Merissa Craven MD, MPH, RAMYA  Cardiovascular Specialist Attending  [facility]  C: [phone]  E: [email or web address] [id number]    CHIEF COMPLAINT:  Syncope and diarrhea    HISTORY OF PRESENT ILLNESS:  HA JACQUES is a 89y Female patient with past medical history of afib on digoxin (not on AC), severe AS, COPD not on home O2, CLL (previously on Treanda and Rituxan), hypothyroidism, 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 portal htn, c/b esophageal varices s/p banding, CKD 3bl Cr 1.2, PVD, and recurrent UTI w/ hx of ESBL Klebsiella presenting with syncope/hypotension after excessive diarrhea and hypovolemia. Cardiology consulted for further management.    Allergies:  codeine (Rash)  erythromycin (Rash)  iv dye (Rash; Anaphylaxis; Short breath)  penicillin (Rash)  shellfish (Rash)    Intolerance:  adhesives (Other)  warfarin (Other)    PAST MEDICAL & SURGICAL HISTORY:  History of bacteremia  PVD (peripheral vascular disease)  Liver cell carcinoma  Severe aortic stenosis by prior echocardiogram  Pulmonary HTN: moderate  CKD (chronic kidney disease), stage 3 (moderate)  COPD (Chronic Obstructive Pulmonary Disease)  AF (Atrial Fibrillation)  Portal Hypertension  Bleeding Esophageal Varices  CLL (Chronic Lymphoblastic Leukemia)  GIB (Gastrointestinal Bleeding)  History of repair of hip fracture  Esophageal Rupture    FAMILY HISTORY:  FH: Alzheimers disease    SOCIAL HISTORY:    Non-smoker    REVIEW OF SYSTEMS:	    [ ] All others negative	  [X] Unable to obtain due to AMS/Fatigue    I&O's Summary    10 Mar 2020 07:01  -  11 Mar 2020 07:00  --------------------------------------------------------  IN: 290 mL / OUT: 850 mL / NET: -560 mL    11 Mar 2020 07:01  -  11 Mar 2020 11:25  --------------------------------------------------------  IN: 100 mL / OUT: 200 mL / NET: -100 mL    PHYSICAL EXAM:  Vital Signs Last 24 Hrs  T(C): 36.6 (11 Mar 2020 08:24), Max: 37.2 (11 Mar 2020 04:00)  T(F): 97.9 (11 Mar 2020 08:24), Max: 98.9 (11 Mar 2020 04:00)  HR: 80 (11 Mar 2020 08:24) (80 - 96)  BP: 126/66 (11 Mar 2020 08:24) (111/71 - 126/66)  RR: 18 (11 Mar 2020 08:24) (18 - 18)  SpO2: 100% (11 Mar 2020 08:24) (100% - 100%)    Appearance: lethargic, weak, chronically ill appearing.   HEENT: Dry oral mucosa	  Lymphatic: No lymphadenopathy  Cardiovascular: Normal S1 S2, No edema. systolic ejection murmur grade 4/6.   Respiratory: reduced/absent lung sounds bibasilar with associated crackles. 	  Psychiatry: A & O x 1-2  Gastrointestinal:  Soft, Non-tender  Skin: No rashes, No ecchymoses, No cyanosis  Neurologic: Non-focal  Extremities: No clubbing, cyanosis   Vascular: Peripheral pulses palpable 2+ bilaterally    MEDICATIONS:  MEDICATIONS  (STANDING):  albuterol/ipratropium for Nebulization 3 milliLiter(s) Nebulizer every 6 hours  AQUAPHOR (petrolatum Ointment) 1 Application(s) Topical daily  ascorbic acid 500 milliGRAM(s) Oral two times a day  buDESOnide    Inhalation Suspension 0.25 milliGRAM(s) Nebulizer every 12 hours  chlorhexidine 4% Liquid 1 Application(s) Topical <User Schedule>  cholecalciferol 2000 Unit(s) Oral daily  cyanocobalamin 1000 MICROGram(s) Oral daily  digoxin     Tablet 0.125 milliGRAM(s) Oral every other day  ertapenem  IVPB 500 milliGRAM(s) IV Intermittent every 24 hours  folic acid 1 milliGRAM(s) Oral daily  lactulose Syrup 20 Gram(s) Oral four times a day  levothyroxine 25 MICROGram(s) Oral daily  midodrine 5 milliGRAM(s) Oral every 8 hours  multivitamin/minerals 1 Tablet(s) Oral daily  rifAXIMin 550 milliGRAM(s) Oral two times a day  tiotropium 18 MICROgram(s) Capsule 1 Capsule(s) Inhalation daily    MEDICATIONS  (PRN):  acetaminophen    Suspension .. 650 milliGRAM(s) Oral every 6 hours PRN Temp greater or equal to 38C (100.4F), Mild Pain (1 - 3), Moderate Pain (4 - 6)  sodium chloride 0.9% lock flush 10 milliLiter(s) IV Push every 1 hour PRN Pre/post blood products, medications, blood draw, and to maintain line patency    Home Medications:  Aldactone 25 mg oral tablet: 1 tab(s) orally 2 times a day (03 Mar 2020 12:15)  ascorbic acid 500 mg oral tablet: 1 tab(s) orally 2 times a day (03 Mar 2020 12:15)  budesonide 0.25 mg/2 mL inhalation suspension: 2 milliliter(s) inhaled 2 times a day (03 Mar 2020 12:15)  digoxin 125 mcg (0.125 mg) oral tablet: 1 tab(s) orally every other day (03 Mar 2020 12:15)  ipratropium-albuterol 0.5 mg-2.5 mg/3 mLinhalation solution: 3 milliliter(s) inhaled every 12 hours (03 Mar 2020 12:15)  lactulose 10 g/15 mL oral syrup: 30 milliliter(s) orally 4 times a day, titrate to 2-3 BMs daily (03 Mar 2020 12:15)  levothyroxine 25 mcg (0.025 mg) oral tablet: 1 tab(s) orally once a day (03 Mar 2020 12:15)  petrolatum topical ointment: 1 application topically once a day (03 Mar 2020 12:15)  propranolol 10 mg oral tablet: 1 tab(s) orally 2 times a day     (03 Mar 2020 12:15)  rifAXIMin 550 mg oral tablet: 1 tab(s) orally 2 times a day (03 Mar 2020 12:15)  Vitamin D3 2000 intl units oral tablet: 1 tab(s) orally once a day (03 Mar 2020 12:15)    LABS:	 	  CBC Full  -  ( 11 Mar 2020 09:25 )  WBC Count : 7.91 K/uL  Hemoglobin : 9.5 g/dL  Hematocrit : 29.6 %  Platelet Count - Automated : 176 K/uL  Mean Cell Volume : 103.1 fl  Mean Cell Hemoglobin : 33.1 pg  Mean Cell Hemoglobin Concentration : 32.1 gm/dL    03-11    144  |  112<H>  |  95<H>  ----------------------------<  92  5.2   |  17<L>  |  2.65<H>  03-10    142  |  111<H>  |  83<H>  ----------------------------<  89  3.9   |  15<L>  |  2.71<H>    Ca    6.9<L>      11 Mar 2020 09:25  Ca    8.4      10 Mar 2020 05:21  Phos  3.7     03-10  Mg     2.1     03-10    CARDIAC TESTING/STUDIES:    Transthoracic Echocardiogram (12.20.18 @ 09:55)   Conclusions:  1. Mitral annular calcification. Thickened mitral valve  leaflets with mildly decreased opening.  Severe mitral  regurgitation. Grossly at least mild-moderate mitral  stenosis.  2. Calcified aortic valve with decreased opening. Peak  transaortic valve gradient equals 88 mm Hg, mean  transaortic valve gradient equals 53 mm Hg, estimated  aortic valve area equals 0.7 sqcm (by continuity equation),  aortic valve velocity time integral equals 123 cm,  consistent with severe to critical aortic stenosis. Severe,  eccentric aortic regurgitation.  3. Severely dilated left atrium.  LA volume index = 88 cc/m2.  4. Prominent basal septum, otherwise normal left  ventricular internal dimensions and wall thickness.  5. Normal left ventricular systolic function. No obvious  segmental wall motion abnormalities.  6. Increased E/e'  is consistent with elevated left  ventricular filling pressure.  7. Severe right atrial enlargement.  8. Right ventricular enlargement with decreased right  ventricular systolic function.  9. Mild tricuspid regurgitation.  *** Compared with echocardiogram of 10/22/2018, no  significant changes noted.    ASSESSMENT/PLAN: 	  89y Female patient with past medical history of afib on digoxin (not on AC), severe AS, COPD not on home O2, CLL (previously on Treanda and Rituxan), hypothyroidism, 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 portal htn, c/b esophageal varices s/p banding, CKD 3bl Cr 1.2, PVD, and recurrent UTI w/ hx of ESBL Klebsiella presenting with syncope/hypotension after excessive diarrhea and hypovolemia.    1) Hypotension  Multifactorial, primarily due to excessive diarrhea (lactulose), severe AS, etc.     ·	Titrate lactulose to ~3 BM/ day or less.   ·	Maintain euvolemia and use BNP to help assess and trend volume status prn.   ·	Midodrine prn   ·	Treat secondary causes such as UTI currently on ABx and consideration for thoracentesis for improved breathing.     2) AF   Elevated CHADS2-Vasc A/C indicated but contraindicated due to elevated bleeding risk and fall risk.     ·	Continue rate control with digoxin 125 mcg daily.     3) Severe AS   Not a candidate for intervention.      ·	Continue supportive care as above.     Please call with questions.     Merissa Craven MD, MPH, RAMYA  Cardiovascular Specialist Attending  [facility]  C: [phone]  E: [email or web address]

## 2020-03-11 NOTE — PROGRESS NOTE ADULT - PROBLEM SELECTOR PLAN 3
-pt presented with syncope on admission  -likely secondary to dehydration vs vasovagal syncope as pt was on toilet having a BM when it happened   -pt was also recently started on propranolol and spironolactone as an outpt, which may have contributed to symptoms as well as dehydration. Have been holding in setting of diarrhea and now infection  -cont tele- in afib, rate controlled. continue monitoring for now  hold IVF - patient developed pleural effusions (seen on US - right appears complex)  -high sensitivity trops negx2   -EKG without acute ischemic changes  -continue home midodrine would hold for elevated bp- plan was for pt to titrate off of it slowly as an outpatient after being on propranolol, spironolactone for some time (being managed by Dr Boston)  cards consult called - critical AS on TTE, ectopy on telemetry - ?due to volume overload - continue to monitor on telemetry

## 2020-03-11 NOTE — CONSULT NOTE ADULT - ASSESSMENT
PRELIM RECS  PLEFF  Prior imaging with right sided pleural effusions since 2018; smaller in size on prior imaging. Etiology may be related to valvular disease vs cirrhosis. Now with complicated 89 y/o F with history of Afib on dig (not on AC), severe AS, COPD, CLL (s/p treatment with Treanda and Rituxan), HCC+cirrhosis with portal HTN, esophageal varices s/p banding and hepatic vessel coiling, CKD, hypothyroidism, recurrent UTIs with ESBL Klebsiella, recent admission in February 2020 for serratia bacteremia and candida glabrata fungemia s/p treatment, presenting from home after a syncopal episode. Pulmonary consulted for large Right pleural effusion.    #Right pleural effusion  Prior imaging reveals right sided pleural effusions at least since 2018. Prior effusion may have been related to underlying valvular disease vs cirrhosis. Now with complicated right pleural effusion on POCUS.   -Check ABG or at least VBG for AMS  -Spoke with daughter at length; certainly would be reasonable to perform diagnostic and therapeutic thoracentesis. Daughter unsure of any procedures at this time and would like to discuss with family  -Continue antibiotics; follow up ID recs  -If opting against thoracentesis, consider diuresis  -Renal recs appreciated    Ghulam Meyers MD, PGY5  Pulmonary and Critical Care Fellow  [phone]

## 2020-03-11 NOTE — PROVIDER CONTACT NOTE (OTHER) - ASSESSMENT
Patient is Alert and Oriented times Four. Patient's Neurological Assessment is Within Normal Limits. No Neuro Deficits noted. Vital Signs stable. Patient denies chest pain, discomfort, shortness of breath, dizziness and lightheadedness. Patient noted to be pocketing food in her mouth at times. Patient instructed at those times to swallow all the food in her mouth and clear out her mouth. Patient follows instructions and swallows all the food in her mouth and clears her mouth whenever patient is instructed to do so.

## 2020-03-11 NOTE — PROGRESS NOTE ADULT - ASSESSMENT
89F PMHx Afib (not on AC), severe AS, COPD, CLL (s/p treatment with Treanda and Rituxan), HCC+cirrhosis with portal HTN, esophageal varices s/p banding and hepatic vessel coiling, hypothyroidism, recurrent UTIs with ESBL Klebsiella, recent admission in February 2020 for serratia bacteremia and candida glabrata fungemia present with syncopal episode in setting diarrhea (on lactulose for cirrhosis) - admitted for sepsis from UTI (GNR) treated with ceftriaxone.  Hospital course complicated worsening KARRIE.    Nephrology consulted for KARRIE.     Problem/Recommendation - 1:  Problem: KARRIE (acute kidney injury). Recommendation: KARRIE likely 2/2 hemodynamically meditated in the setting of sepsis UTI, hypotension and/or volume depletion from diarrhea, decrease PO intake, less likely HRS (urine sodium is 45). pt. also with urinary retention, s/p rivero catheter placement.  On admission hypotensive 80s/50 with sCr 1.99 on 3/3, uptrend to 3.96 on 3/8 (baseline sCr 0.8-1.0), UCx GNR.  U/S abdomen show no hydronephrosis.  On-going diarrhea in hospital, s/p rivero placement (had urinary retention in the hsp). Overall likely ATN in plateau phase.    Plan:  - continue IV hydration with albumin  - agree holding aldactone for now, consider decreasing lactulose if BM>3  - Maintain -150 range  - avoid nephrotoxic agents (ACEI, ARB, NSAIDS), renally dose medications per GFR  - trend BMP daily, strict I/O, low potassium diet for now.- please stop Vitamin C       Problem/Recommendation - 2:  ·  Problem: Low bicarbonate level.  Recommendation: likely metabolic acidosis in the setting of volume depletion and diarrhea  check blood gas  continue to monitor lactate  start bicarbonate 1300 mg TID

## 2020-03-11 NOTE — PROGRESS NOTE ADULT - PROBLEM SELECTOR PLAN 5
-wound care consult appreciated  -plastic surgery saw pt for R. hand wound- recommended short course abx (now completed)  -multiple wounds from bed sores as well as skin tears from falling  -likely related to patients poor nutritional status- pt with severe protein calorie malnutrition. Appreciate recs of dietician.   -fall risk protocol

## 2020-03-11 NOTE — PROGRESS NOTE ADULT - ATTENDING COMMENTS
Donavon Bagley  Attending Physician   Division of Infectious Disease  Pager #[phone]  After 5pm/weekend or no response, call #[phone] Donavon Bagley  Attending Physician   Division of Infectious Disease  Pager #[phone]  After 5pm/weekend or no response, call #[phone]    Will sign off, recall ID if needed #[phone].

## 2020-03-11 NOTE — PROGRESS NOTE ADULT - SUBJECTIVE AND OBJECTIVE BOX
HA JACQUES 89y [id number]    Patient is a 89y old  Female who presents with a chief complaint of syncope and diarrhea (11 Mar 2020 10:50)      Follow Up/CC:  ID following for uti    Interval History/ROS: no fever    Allergies    codeine (Rash)  erythromycin (Rash)  iv dye (Rash; Anaphylaxis; Short breath)  penicillin (Rash)  shellfish (Rash)    Intolerances    adhesives (Other)  warfarin (Other)      ANTIMICROBIALS:  ertapenem  IVPB 500 every 24 hours  rifAXIMin 550 two times a day      MEDICATIONS  (STANDING):  albuterol/ipratropium for Nebulization 3 milliLiter(s) Nebulizer every 6 hours  AQUAPHOR (petrolatum Ointment) 1 Application(s) Topical daily  ascorbic acid 500 milliGRAM(s) Oral two times a day  buDESOnide    Inhalation Suspension 0.25 milliGRAM(s) Nebulizer every 12 hours  chlorhexidine 4% Liquid 1 Application(s) Topical <User Schedule>  cholecalciferol 2000 Unit(s) Oral daily  cyanocobalamin 1000 MICROGram(s) Oral daily  digoxin     Tablet 0.125 milliGRAM(s) Oral every other day  ertapenem  IVPB 500 milliGRAM(s) IV Intermittent every 24 hours  folic acid 1 milliGRAM(s) Oral daily  lactulose Syrup 20 Gram(s) Oral four times a day  levothyroxine 25 MICROGram(s) Oral daily  midodrine 5 milliGRAM(s) Oral every 8 hours  multivitamin/minerals 1 Tablet(s) Oral daily  rifAXIMin 550 milliGRAM(s) Oral two times a day  tiotropium 18 MICROgram(s) Capsule 1 Capsule(s) Inhalation daily    MEDICATIONS  (PRN):  acetaminophen    Suspension .. 650 milliGRAM(s) Oral every 6 hours PRN Temp greater or equal to 38C (100.4F), Mild Pain (1 - 3), Moderate Pain (4 - 6)  sodium chloride 0.9% lock flush 10 milliLiter(s) IV Push every 1 hour PRN Pre/post blood products, medications, blood draw, and to maintain line patency        Vital Signs Last 24 Hrs  T(C): 36.6 (11 Mar 2020 08:24), Max: 37.2 (11 Mar 2020 04:00)  T(F): 97.9 (11 Mar 2020 08:24), Max: 98.9 (11 Mar 2020 04:00)  HR: 80 (11 Mar 2020 08:24) (80 - 96)  BP: 126/66 (11 Mar 2020 08:24) (111/71 - 126/66)  BP(mean): --  RR: 18 (11 Mar 2020 08:24) (18 - 18)  SpO2: 100% (11 Mar 2020 08:24) (100% - 100%)    CBC Full  -  ( 11 Mar 2020 09:25 )  WBC Count : 7.91 K/uL  RBC Count : 2.87 M/uL  Hemoglobin : 9.5 g/dL  Hematocrit : 29.6 %  Platelet Count - Automated : 176 K/uL  Mean Cell Volume : 103.1 fl  Mean Cell Hemoglobin : 33.1 pg  Mean Cell Hemoglobin Concentration : 32.1 gm/dL  Auto Neutrophil # : x  Auto Lymphocyte # : x  Auto Monocyte # : x  Auto Eosinophil # : x  Auto Basophil # : x  Auto Neutrophil % : x  Auto Lymphocyte % : x  Auto Monocyte % : x  Auto Eosinophil % : x  Auto Basophil % : x    03-11    144  |  112<H>  |  95<H>  ----------------------------<  92  5.2   |  17<L>  |  2.65<H>    Ca    6.9<L>      11 Mar 2020 09:25  Phos  3.7     03-10  Mg     2.1     03-10            MICROBIOLOGY:  .Urine Catheterized  03-06-20   >100,000 CFU/ml Klebsiella pneumoniae ESBL  --  Klebsiella pneumoniae ESBL      .Blood Blood  03-06-20   No growth to date.  --  --      .Blood Blood-Peripheral  03-03-20   No growth at 5 days.  --  --      .Urine Catheterized  03-03-20   <10,000 CFU/mL Normal Urogenital Stefanie  --  --      RADIOLOGY    < from: CT Chest No Cont (03.10.20 @ 15:15) >  Large right pleural effusion with adjacent passive atelectasis. Trace left effusion with passive atelectasis.    Subcutaneous emphysema at site of tunneled line insertion extending to the anterior mediastinum.    < end of copied text >

## 2020-03-11 NOTE — PROGRESS NOTE ADULT - SUBJECTIVE AND OBJECTIVE BOX
Interventional Radiology Brief Note    Consulted for pneumomediastinum, worsening SOB and increasing oxygen requirement with placement of a central venous catheter one week prior. Patient's increasing SOB and oxygen requirements are likely secondary to large right pleural effusion and tracheal secretions. Pneumomediastinum is of likely little clinical significance and etiology is likely due to patient's cough.     Georgi Minor MD  PGY-6, IR

## 2020-03-11 NOTE — PROGRESS NOTE ADULT - ASSESSMENT
89 y/o F with history of Afib on dig (not on AC), severe AS, COPD, CLL (s/p treatment with Treanda and Rituxan), HCC+cirrhosis with portal HTN, esophageal varices s/p banding and hepatic vessel coiling, CKD, hypothyroidism, recurrent UTIs with ESBL Klebsiella, recent admission in February 2020 for serratia bacteremia and candida glabrata fungemia s/p treatment, presenting from home after a syncopal episode.     AMS today no focal deficits on exam - check CT chest  pleural effusions seen on lung us - with episode of sob overnight - check CT chest, fluids stopped

## 2020-03-11 NOTE — PROGRESS NOTE ADULT - ATTENDING COMMENTS
#KARRIE -ATN -hypotension/UTI- cr improving  -renal sono no hydro  -has rivero, monitor UO  #met acidosis- monitor bicarb trend; start sodium bicarb tabs 1300mg po tid  #hypotension-monitor BP; ivf off now; encourage po hydration  #UTI Klebsiella-antibiotics

## 2020-03-11 NOTE — CONSULT NOTE ADULT - ATTENDING COMMENTS
Donavon Bagley  Attending Physician   Division of Infectious Disease  Pager #[phone]  After 5pm/weekend or no response, call #[phone]
Agree with above    89 y/o F with history of Afib on dig (not on AC), severe AS, COPD, CLL (s/p treatment with Treanda and Rituxan), HCC+cirrhosis with portal HTN, esophageal varices s/p banding and hepatic vessel coiling, CKD, hypothyroidism, recurrent UTIs with ESBL Klebsiella, recent admission in February 2020 for serratia bacteremia and candida glabrata fungemia s/p treatment, presenting from home after a syncopal episode. Pulmonary consulted for large Right pleural effusion.    - Will perform thoracentesis  - Cont. antibiotics  - F/u thoracentesis labs
Katya Belle MD  Cell : [phone]  Office : [phone]

## 2020-03-11 NOTE — CONSULT NOTE ADULT - SUBJECTIVE AND OBJECTIVE BOX
CHIEF COMPLAINT: Syncope    HPI:  89 y/o F with history of Afib on dig (not on AC), severe AS, COPD, CLL (s/p treatment with Treanda and Rituxan), HCC+cirrhosis with portal HTN, esophageal varices s/p banding and hepatic vessel coiling, CKD, hypothyroidism, recurrent UTIs with ESBL Klebsiella, recent admission in February 2020 for serratia bacteremia and candida glabrata fungemia s/p treatment, presenting from home after a syncopal episode. Pulmonary was consulted for hypoxemia and R pleural effusion. Patient initially presented after syncopal episode in the setting of prolonged bowel movement (?vasovagal). The patient is on lactulose for her liver disease, and takes it 2 times a day. She normally has soft BMs after the lactulose. Yesterday she had a prolonged BM with large volume diarrhea, and during this episode she became unresponsive. The patient herself does not remember this episode. The patient's daughter witnessed the episode and states that she slumped over with a dazed look. Her body went rigid. She was not responding to her name. Since admission, she was started on ertapenem for suspected ESBL klebsiella UTI. TLC placed due to poor venous access and now has some subcutaneous emphysema with some extension into anterior mediastinum. Patient is fairly somnolent and unable to provide meaningful history. Daughter supplemented the history. Patient has history of coughing/choking while eating. She has not had fevers, chills but is having occasional coughing.       PAST MEDICAL & SURGICAL HISTORY:  History of bacteremia  PVD (peripheral vascular disease)  Liver cell carcinoma  Severe aortic stenosis by prior echocardiogram  Pulmonary HTN: moderate  CKD (chronic kidney disease), stage 3 (moderate)  COPD (Chronic Obstructive Pulmonary Disease)  AF (Atrial Fibrillation)  Portal Hypertension  Bleeding Esophageal Varices  CLL (Chronic Lymphoblastic Leukemia)  GIB (Gastrointestinal Bleeding)  History of repair of hip fracture  Esophageal Rupture      FAMILY HISTORY:  FH: Alzheimers disease      SOCIAL HISTORY:  Smoking: [ ] Never Smoked [ x] Former Smoker (__ packs x ___ years)--remote history [ ] Current Smoker  (__ packs x ___ years)      Allergies    codeine (Rash)  erythromycin (Rash)  iv dye (Rash; Anaphylaxis; Short breath)  penicillin (Rash)  shellfish (Rash)    Intolerances    adhesives (Other)  warfarin (Other)      HOME MEDICATIONS:  Home Medications:  Aldactone 25 mg oral tablet: 1 tab(s) orally 2 times a day (03 Mar 2020 12:15)  ascorbic acid 500 mg oral tablet: 1 tab(s) orally 2 times a day (03 Mar 2020 12:15)  budesonide 0.25 mg/2 mL inhalation suspension: 2 milliliter(s) inhaled 2 times a day (03 Mar 2020 12:15)  digoxin 125 mcg (0.125 mg) oral tablet: 1 tab(s) orally every other day (03 Mar 2020 12:15)  ipratropium-albuterol 0.5 mg-2.5 mg/3 mLinhalation solution: 3 milliliter(s) inhaled every 12 hours (03 Mar 2020 12:15)  lactulose 10 g/15 mL oral syrup: 30 milliliter(s) orally 4 times a day, titrate to 2-3 BMs daily (03 Mar 2020 12:15)  levothyroxine 25 mcg (0.025 mg) oral tablet: 1 tab(s) orally once a day (03 Mar 2020 12:15)  petrolatum topical ointment: 1 application topically once a day (03 Mar 2020 12:15)  propranolol 10 mg oral tablet: 1 tab(s) orally 2 times a day     (03 Mar 2020 12:15)  rifAXIMin 550 mg oral tablet: 1 tab(s) orally 2 times a day (03 Mar 2020 12:15)  Vitamin D3 2000 intl units oral tablet: 1 tab(s) orally once a day (03 Mar 2020 12:15)      REVIEW OF SYSTEMS:  Constitutional: [ ] negative [ ] fevers [ ] chills [ ] weight loss [ ] weight gain [] as noted in the HPI  HEENT: [ ] negative [ ] dry eyes [ ] eye irritation [ ] postnasal drip [ ] nasal congestion  [] as noted in the HPI  CV: [ ] negative  [ ] chest pain [ ] orthopnea [ ] palpitations [ ] murmur [] as noted in the HPI  Resp: [ ] negative [ ] cough [ ] shortness of breath [ ] dyspnea [ ] wheezing [ ] sputum [ ] hemoptysis [] as noted in the HPI  GI: [ ] negative [ ] nausea [ ] vomiting [ ] diarrhea [ ] constipation [ ] abd pain [ ] dysphagia [] as noted in the HPI  : [ ] negative [ ] dysuria [ ] nocturia [ ] hematuria [ ] increased urinary frequency [] as noted in the HPI  Musculoskeletal: [ ] negative [ ] back pain [ ] myalgias [ ] arthralgias [ ] fracture [] as noted in the HPI  Skin: [ ] negative [ ] rash [ ] itch [] as noted in the HPI  Neurological: [ ] negative [ ] headache [ ] dizziness [ ] syncope [ ] weakness [ ] numbness [] as noted in the HPI  Psychiatric: [ ] negative [ ] anxiety [ ] depression [] as noted in the HPI  Endocrine: [ ] negative [ ] diabetes [ ] thyroid problem [] as noted in the HPI  Hematologic/Lymphatic: [ ] negative [ ] anemia [ ] bleeding problem [] as noted in the HPI  Allergic/Immunologic: [ ] negative [ ] itchy eyes [ ] nasal discharge [ ] hives [ ] angioedema [] as noted in the HPI  [x ] All other systems negative--limited due to mental status  [ ] Unable to assess ROS because ________    OBJECTIVE:  ICU Vital Signs Last 24 Hrs  T(C): 36.6 (11 Mar 2020 08:24), Max: 37.2 (11 Mar 2020 04:00)  T(F): 97.9 (11 Mar 2020 08:24), Max: 98.9 (11 Mar 2020 04:00)  HR: 80 (11 Mar 2020 08:24) (80 - 96)  BP: 126/66 (11 Mar 2020 08:24) (111/71 - 126/66)  BP(mean): --  ABP: --  ABP(mean): --  RR: 18 (11 Mar 2020 08:24) (18 - 18)  SpO2: 100% (11 Mar 2020 08:24) (100% - 100%)        03-10 @ 07:01 - 03-11 @ 07:00  --------------------------------------------------------  IN: 290 mL / OUT: 850 mL / NET: -560 mL    03-11 @ 07:01 - 03-11 @ 10:56  --------------------------------------------------------  IN: 100 mL / OUT: 200 mL / NET: -100 mL      CAPILLARY BLOOD GLUCOSE          PHYSICAL EXAM:  General: somnolent, no respiratory distress  HEENT: NC/AT, anicteric,  Lymph Nodes: no cervical LAD   Neck: mild subQ emphysema  Respiratory: respiratory effort normal, decreased breath sounds on the right, some rhonchi on left  Cardiovascular: s1s2, systolic murmurs  Abdomen: soft, ntnd  Extremities: anasarca  Skin: multiple wounds well dressed, subQ emphysmea  Neurological: MAEO, limited exam    HOSPITAL MEDICATIONS:  Standing Meds:  albuterol/ipratropium for Nebulization 3 milliLiter(s) Nebulizer every 6 hours  AQUAPHOR (petrolatum Ointment) 1 Application(s) Topical daily  ascorbic acid 500 milliGRAM(s) Oral two times a day  buDESOnide    Inhalation Suspension 0.25 milliGRAM(s) Nebulizer every 12 hours  chlorhexidine 4% Liquid 1 Application(s) Topical <User Schedule>  cholecalciferol 2000 Unit(s) Oral daily  cyanocobalamin 1000 MICROGram(s) Oral daily  digoxin     Tablet 0.125 milliGRAM(s) Oral every other day  ertapenem  IVPB 500 milliGRAM(s) IV Intermittent every 24 hours  folic acid 1 milliGRAM(s) Oral daily  lactulose Syrup 20 Gram(s) Oral four times a day  levothyroxine 25 MICROGram(s) Oral daily  midodrine 5 milliGRAM(s) Oral every 8 hours  multivitamin/minerals 1 Tablet(s) Oral daily  rifAXIMin 550 milliGRAM(s) Oral two times a day  tiotropium 18 MICROgram(s) Capsule 1 Capsule(s) Inhalation daily      PRN Meds:  acetaminophen    Suspension .. 650 milliGRAM(s) Oral every 6 hours PRN  sodium chloride 0.9% lock flush 10 milliLiter(s) IV Push every 1 hour PRN      LABS:                        9.5    7.91  )-----------( 176      ( 11 Mar 2020 09:25 )             29.6     Hgb Trend: 9.5<--, 9.4<--, 9.1<--, 10.7<--, 10.9<--  03-11    144  |  112<H>  |  95<H>  ----------------------------<  92  5.2   |  17<L>  |  2.65<H>    Ca    6.9<L>      11 Mar 2020 09:25  Phos  3.7     03-10  Mg     2.1     03-10      Creatinine Trend: 2.65<--, 2.71<--, 2.73<--, 2.90<--, 2.96<--, 2.61<--            MICROBIOLOGY:       =================================================================================================    RADIOLOGY:    [ x] Reviewed and interpreted by me  < from: CT Chest No Cont (03.10.20 @ 15:15) >    FINDINGS:    LUNGS AND AIRWAYS: Crescentic narrowing of the trachea with mucous secretions, consistent with tracheomalacia. Large right pleural effusion with adjacent passive atelectasis. Trace left effusion with passive atelectasis.     MEDIASTINUM AND JOHNATHAN: No lymphadenopathy.    VESSELS: Left tunneled central line with tip in SVC. Aortic atherosclerotic disease.    HEART: Aortic valve and coronary artery calcifications. Heart size is normal. Small pericardial effusion.    CHEST WALL AND LOWER NECK: Subcutaneous emphysema at site of tunneled line insertion extending to the anterior mediastinum.    VISUALIZED UPPER ABDOMEN: Cholelithiasis. Cirrhotic liver morphology. Calcified granuloma in right lobe of liver. High attenuation material in left hepatic lobe is likely related to prior chemoembolization procedure. Right sided renal cyst.    BONES: Scoliosis and degenerative changes of the spine. Degenerative changes of the bilateral shoulders.    IMPRESSION:     Large right pleural effusion with adjacent passive atelectasis. Trace left effusion with passive atelectasis.    Subcutaneous emphysema at site of tunneled line insertion extending to the anterior mediastinum.    < end of copied text >      < from: Transthoracic Echocardiogram (01.23.20 @ 15:32) >  1. Mitral annular calcification and calcified mitral  leaflets with decreased diastolic opening. Moderate-severe  mitral regurgitation. At least moderate mitral stenosis.  2. Calcified aortic valve with decreased opening. Peak  transaortic valve gradient equals 95 mm Hg, mean  transaortic valve gradient equals 58 mm Hg, estimated  aortic valve area equals 0.4 sqcm (by continuity equation),  aortic valve velocity time integral equals 115 cm,  consistent with severe aortic stenosis. Severe aortic  regurgitation.  3. Severely dilated left atrium.  LA volume index = 73  cc/m2.  4. Hyperdynamic left ventricular systolic function.  5. Moderate right atrial enlargement.  6. The right ventricle is not well visualized; grossly  normal right ventricular systolic function.  *** Compared with echocardiogram of 12/20/2018, no  significant changes noted.    < end of copied text >      =================================================================================================  Point of Care Ultrasound Findings:  CHEST: Large Right pleural effusion tracking anteriorly with a small loculations but areas of complexity  ECHO: Hyperdynamic LV, signficant AS, MR, MS with calcified leaflets, biatrial enlargement

## 2020-03-11 NOTE — PROGRESS NOTE ADULT - PROBLEM SELECTOR PLAN 7
cards consult   -monitor for signs of fluid overload   with large right pleural effusion on ct chest

## 2020-03-11 NOTE — PROCEDURE NOTE - NSPROCDETAILS_GEN_ALL_CORE
sterile dressing applied/sterile technique, catheter placed/ultrasound guidance/guidewire recovered/lumen(s) aspirated and flushed
connection to syringe/ultrasound assessment of effusion (localization)/location identified, draped/prepped, sterile technique used, needle inserted/introduced/catheter inserted over needle/ultrasound assessment of effusion (size)

## 2020-03-11 NOTE — PROGRESS NOTE ADULT - SUBJECTIVE AND OBJECTIVE BOX
Patient is a 89y old  Female who presents with a chief complaint of syncope and diarrhea (11 Mar 2020 14:32)        SUBJECTIVE / OVERNIGHT EVENTS: patient awake but falling asleep intermittently,       MEDICATIONS  (STANDING):  albuterol/ipratropium for Nebulization 3 milliLiter(s) Nebulizer every 6 hours  AQUAPHOR (petrolatum Ointment) 1 Application(s) Topical daily  ascorbic acid 500 milliGRAM(s) Oral two times a day  buDESOnide    Inhalation Suspension 0.25 milliGRAM(s) Nebulizer every 12 hours  chlorhexidine 4% Liquid 1 Application(s) Topical <User Schedule>  cholecalciferol 2000 Unit(s) Oral daily  cyanocobalamin 1000 MICROGram(s) Oral daily  digoxin     Tablet 0.125 milliGRAM(s) Oral every other day  ertapenem  IVPB 500 milliGRAM(s) IV Intermittent every 24 hours  folic acid 1 milliGRAM(s) Oral daily  lactulose Syrup 20 Gram(s) Oral four times a day  levothyroxine 25 MICROGram(s) Oral daily  midodrine 5 milliGRAM(s) Oral every 8 hours  multivitamin/minerals 1 Tablet(s) Oral daily  rifAXIMin 550 milliGRAM(s) Oral two times a day  tiotropium 18 MICROgram(s) Capsule 1 Capsule(s) Inhalation daily    MEDICATIONS  (PRN):  acetaminophen    Suspension .. 650 milliGRAM(s) Oral every 6 hours PRN Temp greater or equal to 38C (100.4F), Mild Pain (1 - 3), Moderate Pain (4 - 6)  sodium chloride 0.9% lock flush 10 milliLiter(s) IV Push every 1 hour PRN Pre/post blood products, medications, blood draw, and to maintain line patency      Vital Signs Last 24 Hrs  T(C): 36.4 (11 Mar 2020 15:44), Max: 37.2 (11 Mar 2020 04:00)  T(F): 97.5 (11 Mar 2020 15:44), Max: 98.9 (11 Mar 2020 04:00)  HR: 82 (11 Mar 2020 15:44) (80 - 96)  BP: 117/73 (11 Mar 2020 15:44) (111/71 - 126/66)  BP(mean): --  RR: 18 (11 Mar 2020 15:44) (18 - 18)  SpO2: 100% (11 Mar 2020 15:44) (99% - 100%)  CAPILLARY BLOOD GLUCOSE        I&O's Summary    10 Mar 2020 07:01  -  11 Mar 2020 07:00  --------------------------------------------------------  IN: 290 mL / OUT: 850 mL / NET: -560 mL    11 Mar 2020 07:01  -  11 Mar 2020 18:25  --------------------------------------------------------  IN: 450 mL / OUT: 650 mL / NET: -200 mL        PHYSICAL EXAM:  GENERAL:  frail, thin, breathing normal, intermittently sleepy today with minimal speech but following commands  HEAD:  Atraumatic, Normocephalic  EYES: conjunctiva and sclera clear  NECK: supple, No JVD  CHEST/LUNG: decreased bs b/l bases worse on R>L, slight rhonchi left lung  HEART: S1 S2 RRR  ABDOMEN: +BS Soft, NT/ND  EXTREMITIES:  2+ DP Pulses, No c/c. no LE edema  NEUROLOGY: AAOx3, no facial droop, no focal deficits   SKIN: multiple skin tears UEs and LEs- some wrapped            LABS:                        9.5    7.91  )-----------( 176      ( 11 Mar 2020 09:25 )             29.6     03-11    144  |  112<H>  |  95<H>  ----------------------------<  92  5.2   |  17<L>  |  2.65<H>    Ca    6.9<L>      11 Mar 2020 09:25  Phos  3.7     03-10  Mg     2.1     03-10                RADIOLOGY & ADDITIONAL TESTS:    Imaging Personally Reviewed:  Consultant(s) Notes Reviewed:    Care Discussed with Consultants/Other Providers:

## 2020-03-11 NOTE — PROGRESS NOTE ADULT - SUBJECTIVE AND OBJECTIVE BOX
[facility] Division of Kidney Diseases & Hypertension  FOLLOW UP NOTE  [phone]--------------------------------------------------------------------------------  Chief Complaint:Syncope and collapse      24 hour events/subjective: No acute events overnight. Patient resting comfortably in bed. Labs, vitals, and medications reviewed. Vital signs stable. Labs not resulted from today yet. UOP ~850 cc in 24 hrs.        PAST HISTORY  --------------------------------------------------------------------------------  No significant changes to PMH, PSH, FHx, SHx, unless otherwise noted    ALLERGIES & MEDICATIONS  --------------------------------------------------------------------------------  Allergies    codeine (Rash)  erythromycin (Rash)  iv dye (Rash; Anaphylaxis; Short breath)  penicillin (Rash)  shellfish (Rash)    Intolerances    adhesives (Other)  warfarin (Other)    Standing Inpatient Medications  albuterol/ipratropium for Nebulization 3 milliLiter(s) Nebulizer every 6 hours  AQUAPHOR (petrolatum Ointment) 1 Application(s) Topical daily  ascorbic acid 500 milliGRAM(s) Oral two times a day  buDESOnide    Inhalation Suspension 0.25 milliGRAM(s) Nebulizer every 12 hours  chlorhexidine 4% Liquid 1 Application(s) Topical <User Schedule>  cholecalciferol 2000 Unit(s) Oral daily  cyanocobalamin 1000 MICROGram(s) Oral daily  digoxin     Tablet 0.125 milliGRAM(s) Oral every other day  ertapenem  IVPB 500 milliGRAM(s) IV Intermittent every 24 hours  folic acid 1 milliGRAM(s) Oral daily  lactulose Syrup 20 Gram(s) Oral four times a day  levothyroxine 25 MICROGram(s) Oral daily  midodrine 5 milliGRAM(s) Oral every 8 hours  multivitamin/minerals 1 Tablet(s) Oral daily  rifAXIMin 550 milliGRAM(s) Oral two times a day  tiotropium 18 MICROgram(s) Capsule 1 Capsule(s) Inhalation daily    PRN Inpatient Medications  acetaminophen    Suspension .. 650 milliGRAM(s) Oral every 6 hours PRN  sodium chloride 0.9% lock flush 10 milliLiter(s) IV Push every 1 hour PRN      REVIEW OF SYSTEMS  --------------------------------------------------------------------------------  Gen: No  fevers/chills  Skin: No rashes  Head/Eyes/Ears/Mouth: No headache; Normal hearing; Normal vision w/o blurriness  Respiratory: No dyspnea, cough, wheezing, hemoptysis  CV: No chest pain, PND, orthopnea  GI: No abdominal pain, diarrhea, constipation, nausea, vomiting  : No increased frequency, dysuria, hematuria, nocturia  MSK: No joint pain/swelling; no back pain; no edema  Neuro: No dizziness/lightheadedness, weakness, seizures, numbness, tingling  Psych: Non-focal      All other systems were reviewed and are negative, except as noted.    VITALS/PHYSICAL EXAM  --------------------------------------------------------------------------------  T(C): 37.2 (03-11-20 @ 04:00), Max: 37.2 (03-11-20 @ 04:00)  HR: 85 (03-11-20 @ 04:00) (85 - 96)  BP: 124/68 (03-11-20 @ 05:20) (111/71 - 124/68)  RR: 18 (03-11-20 @ 04:00) (18 - 18)  SpO2: 100% (03-11-20 @ 04:00) (100% - 100%)  Wt(kg): --        03-10-20 @ 07:01  -  03-11-20 @ 07:00  --------------------------------------------------------  IN: 290 mL / OUT: 850 mL / NET: -560 mL      Physical Exam:  	Gen: lethargic appears in no acute distress on room air  	HEENT: dry lips, no JVD  	Pulm: CTA B/L anteriorly auscultated   	CV: RRR, S1S2; no rub/murmur  	GI: +BS, soft, nondistended  	: rivero catheter in place  	MSK: Warm, no edema, RUE wrapped in guaze              Neuro: lethargy  	Psych: lethargy  	Skin: dry skin    LABS/STUDIES  --------------------------------------------------------------------------------              9.4    7.86  >-----------<  64       [03-10-20 @ 05:21]              29.1     142  |  111  |  83  ----------------------------<  89      [03-10-20 @ 05:21]  3.9   |  15  |  2.71        Ca     8.4     [03-10-20 @ 05:21]      Mg     2.1     [03-10-20 @ 01:26]      Phos  3.7     [03-10-20 @ 01:26]            Creatinine Trend:  SCr 2.71 [03-10 @ 05:21]  SCr 2.73 [03-10 @ 01:26]  SCr 2.90 [03-09 @ 08:54]  SCr 2.96 [03-08 @ 07:46]  SCr 2.61 [03-07 @ 06:52]    Urinalysis - [03-06-20 @ 06:18]      Color Dark Yellow / Appearance Turbid / SG 1.033 / pH 5.5      Gluc Negative / Ketone Trace  / Bili Negative / Urobili 2 mg/dL       Blood Negative / Protein 30 mg/dL / Leuk Est Large / Nitrite Negative      RBC 15 /  / Hyaline 5 / Gran  / Sq Epi  / Non Sq Epi 15 / Bacteria Many    Urine Creatinine 184      [03-06-20 @ 06:18]  Urine Sodium 45      [03-06-20 @ 06:18]

## 2020-03-11 NOTE — CONSULT NOTE ADULT - REASON FOR ADMISSION
syncope and diarrhea

## 2020-03-12 LAB
ANION GAP SERPL CALC-SCNC: 14 MMOL/L — SIGNIFICANT CHANGE UP (ref 5–17)
BUN SERPL-MCNC: 101 MG/DL — HIGH (ref 7–23)
CALCIUM SERPL-MCNC: 8.9 MG/DL — SIGNIFICANT CHANGE UP (ref 8.4–10.5)
CHLORIDE SERPL-SCNC: 117 MMOL/L — HIGH (ref 96–108)
CO2 SERPL-SCNC: 13 MMOL/L — LOW (ref 22–31)
CREAT SERPL-MCNC: 2.66 MG/DL — HIGH (ref 0.5–1.3)
GLUCOSE SERPL-MCNC: 62 MG/DL — LOW (ref 70–99)
GRAM STN FLD: SIGNIFICANT CHANGE UP
HCT VFR BLD CALC: 34.3 % — LOW (ref 34.5–45)
HGB BLD-MCNC: 10.4 G/DL — LOW (ref 11.5–15.5)
MCHC RBC-ENTMCNC: 30.3 GM/DL — LOW (ref 32–36)
MCHC RBC-ENTMCNC: 32.5 PG — SIGNIFICANT CHANGE UP (ref 27–34)
MCV RBC AUTO: 107.2 FL — HIGH (ref 80–100)
NRBC # BLD: 0 /100 WBCS — SIGNIFICANT CHANGE UP (ref 0–0)
PLATELET # BLD AUTO: 42 K/UL — LOW (ref 150–400)
POTASSIUM SERPL-MCNC: 4.4 MMOL/L — SIGNIFICANT CHANGE UP (ref 3.5–5.3)
POTASSIUM SERPL-SCNC: 4.4 MMOL/L — SIGNIFICANT CHANGE UP (ref 3.5–5.3)
RAPID RVP RESULT: SIGNIFICANT CHANGE UP
RBC # BLD: 3.2 M/UL — LOW (ref 3.8–5.2)
RBC # FLD: 18 % — HIGH (ref 10.3–14.5)
SODIUM SERPL-SCNC: 144 MMOL/L — SIGNIFICANT CHANGE UP (ref 135–145)
SPECIMEN SOURCE: SIGNIFICANT CHANGE UP
WBC # BLD: 9.16 K/UL — SIGNIFICANT CHANGE UP (ref 3.8–10.5)
WBC # FLD AUTO: 9.16 K/UL — SIGNIFICANT CHANGE UP (ref 3.8–10.5)

## 2020-03-12 PROCEDURE — 99233 SBSQ HOSP IP/OBS HIGH 50: CPT

## 2020-03-12 PROCEDURE — 99233 SBSQ HOSP IP/OBS HIGH 50: CPT | Mod: GC

## 2020-03-12 PROCEDURE — 99497 ADVNCD CARE PLAN 30 MIN: CPT

## 2020-03-12 RX ORDER — SODIUM BICARBONATE 1 MEQ/ML
1300 SYRINGE (ML) INTRAVENOUS THREE TIMES A DAY
Refills: 0 | Status: DISCONTINUED | OUTPATIENT
Start: 2020-03-12 | End: 2020-03-14

## 2020-03-12 RX ORDER — ALBUMIN HUMAN 25 %
50 VIAL (ML) INTRAVENOUS EVERY 6 HOURS
Refills: 0 | Status: COMPLETED | OUTPATIENT
Start: 2020-03-12 | End: 2020-03-12

## 2020-03-12 RX ORDER — SODIUM CHLORIDE 9 MG/ML
500 INJECTION INTRAMUSCULAR; INTRAVENOUS; SUBCUTANEOUS
Refills: 0 | Status: DISCONTINUED | OUTPATIENT
Start: 2020-03-12 | End: 2020-03-13

## 2020-03-12 RX ADMIN — Medication 50 MILLILITER(S): at 18:55

## 2020-03-12 RX ADMIN — Medication 3 MILLILITER(S): at 11:06

## 2020-03-12 RX ADMIN — CHLORHEXIDINE GLUCONATE 1 APPLICATION(S): 213 SOLUTION TOPICAL at 08:49

## 2020-03-12 RX ADMIN — Medication 1 TABLET(S): at 09:24

## 2020-03-12 RX ADMIN — Medication 25 MICROGRAM(S): at 05:35

## 2020-03-12 RX ADMIN — Medication 500 MILLIGRAM(S): at 05:35

## 2020-03-12 RX ADMIN — LACTULOSE 20 GRAM(S): 10 SOLUTION ORAL at 11:06

## 2020-03-12 RX ADMIN — Medication 500 MILLIGRAM(S): at 18:20

## 2020-03-12 RX ADMIN — Medication 3 MILLILITER(S): at 23:19

## 2020-03-12 RX ADMIN — PREGABALIN 1000 MICROGRAM(S): 225 CAPSULE ORAL at 09:25

## 2020-03-12 RX ADMIN — TIOTROPIUM BROMIDE 1 CAPSULE(S): 18 CAPSULE ORAL; RESPIRATORY (INHALATION) at 09:25

## 2020-03-12 RX ADMIN — ERTAPENEM SODIUM 100 MILLIGRAM(S): 1 INJECTION, POWDER, LYOPHILIZED, FOR SOLUTION INTRAMUSCULAR; INTRAVENOUS at 18:15

## 2020-03-12 RX ADMIN — Medication 0.25 MILLIGRAM(S): at 05:35

## 2020-03-12 RX ADMIN — LACTULOSE 20 GRAM(S): 10 SOLUTION ORAL at 18:20

## 2020-03-12 RX ADMIN — MIDODRINE HYDROCHLORIDE 5 MILLIGRAM(S): 2.5 TABLET ORAL at 12:00

## 2020-03-12 RX ADMIN — LACTULOSE 20 GRAM(S): 10 SOLUTION ORAL at 23:19

## 2020-03-12 RX ADMIN — Medication 1300 MILLIGRAM(S): at 21:56

## 2020-03-12 RX ADMIN — Medication 1 APPLICATION(S): at 09:21

## 2020-03-12 RX ADMIN — Medication 0.12 MILLIGRAM(S): at 09:25

## 2020-03-12 RX ADMIN — Medication 1300 MILLIGRAM(S): at 12:00

## 2020-03-12 RX ADMIN — MIDODRINE HYDROCHLORIDE 5 MILLIGRAM(S): 2.5 TABLET ORAL at 05:35

## 2020-03-12 RX ADMIN — Medication 1 MILLIGRAM(S): at 09:25

## 2020-03-12 RX ADMIN — Medication 3 MILLILITER(S): at 05:35

## 2020-03-12 RX ADMIN — Medication 50 MILLILITER(S): at 11:06

## 2020-03-12 RX ADMIN — SODIUM CHLORIDE 50 MILLILITER(S): 9 INJECTION INTRAMUSCULAR; INTRAVENOUS; SUBCUTANEOUS at 09:00

## 2020-03-12 RX ADMIN — MIDODRINE HYDROCHLORIDE 5 MILLIGRAM(S): 2.5 TABLET ORAL at 21:56

## 2020-03-12 RX ADMIN — Medication 3 MILLILITER(S): at 18:20

## 2020-03-12 RX ADMIN — Medication 0.25 MILLIGRAM(S): at 18:20

## 2020-03-12 RX ADMIN — Medication 2000 UNIT(S): at 09:25

## 2020-03-12 RX ADMIN — LACTULOSE 20 GRAM(S): 10 SOLUTION ORAL at 05:35

## 2020-03-12 NOTE — SWALLOW BEDSIDE ASSESSMENT ADULT - SLP GENERAL OBSERVATIONS
Patient encountered in room sitting partially upright in bed, raised to fully upright position. NC in place (patient receiving 4 L O2). IV in place. Patient with dysphonic voice, at baseline, able to achieve phonation at the single syllable level when cued for diaphragmatic breathing and loud voice, otherwise with whisper-like quality. Patient appeared fatigued at baseline, answering basic questions with one word answers and sometimes not at all. Patient oriented to self, month, year and place independently. During assessment, patient with confused and exacerbated look on face, and grimacing during po intake, although endorsed no pain throughout assessment. Patient nodded head in agreement when asked if she had disinterest in po intake given presentation during po trials.

## 2020-03-12 NOTE — SWALLOW BEDSIDE ASSESSMENT ADULT - COMMENTS
Per Pulm 3/11:  Pulmonary consulted for large Right pleural effusion. Prior imaging reveals right sided pleural effusions at least since 2018. Prior effusion may have been related to underlying valvular disease vs cirrhosis. Now with complicated right pleural effusion on POCUS. Plan: - Will perform thoracentesis 3/11 - Cont. antibiotics- F/u thoracentesis labs .     Hospital Course:   3/11: Chart Note Event:   Patient noted to be pocketing food in her mouth at times. Patient instructed at those times to swallow all the food in her mouth and clear out her mouth. Patient follows instructions and swallows all the food in her mouth and clears her mouth whenever patient is instructed to do so. NP ordered Speech and Swallow.    CT Chest 3/10: IMPRESSION:   Large right pleural effusion with adjacent passive atelectasis. Trace left effusion with passive atelectasis. Subcutaneous emphysema at site of tunneled line insertion extending to the anterior mediastinum.    CT Head 3/10: IMPRESSION:    No acute intracranial hemorrhage. 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    Patient is known to this service, seen on last admission 1/31/20: Pt seen for repeat bedside swallow evaluation. Pt continues to present with clinical signs of an oropharyngeal dysphagia. Delayed oral transit time with delayed swallow initiation and reduced laryngeal elevation upon palpation. Immediate coughing post puree thin and wet/gurgly breath sounds post honey thickened liquids. These signs are suggestive of penetration/aspiration. Further PO trials deferred due to difficulty tolerating conservative textures. Recommendation NPO.

## 2020-03-12 NOTE — PROGRESS NOTE ADULT - ASSESSMENT
89 y/o F with history of Afib on dig (not on AC), severe AS, COPD, CLL (s/p treatment with Treanda and Rituxan), HCC+cirrhosis with portal HTN, esophageal varices s/p banding and hepatic vessel coiling, CKD, hypothyroidism, recurrent UTIs with ESBL Klebsiella, recent admission in February 2020 for serratia bacteremia and candida glabrata fungemia s/p treatment, presenting from home after a syncopal episode.     AMS overall unchanged - awakes to name and follows commands but weak and sleepy - no focal deficits on exam  differential includes encephalopathy from cirrhosis (c/w lactulose), uremia with elevated bun (no dialysis per family), infection (ct chest negative for pna, pleural fluid transudative, will check RVP)  pleural effusions seen on lung us s/p thoracentesis with improved lung exam  severe protein calorie malnutrition - supplements with diet    Long discussion with daughters Advanced care planning - face to face time 28 minutes- patient is DNR/DNI no invasive measures, does not want dialysis, has considered feeding tube in the past but felt she could not get at the time due to ascities. diet changed to puree today will monitor for improvement. 89 y/o F with history of Afib on dig (not on AC), severe AS, COPD, CLL (s/p treatment with Treanda and Rituxan), HCC+cirrhosis with portal HTN, esophageal varices s/p banding and hepatic vessel coiling, CKD, hypothyroidism, recurrent UTIs with ESBL Klebsiella, recent admission in February 2020 for serratia bacteremia and candida glabrata fungemia s/p treatment, presenting from home after a syncopal episode.     AMS overall unchanged - awakes to name and follows commands but weak and sleepy intermittently - no focal deficits on exam  differential includes encephalopathy from cirrhosis (c/w lactulose), uremia with elevated bun (no dialysis per family), infection (ct chest negative for pna, pleural fluid transudative, will check RVP)  pleural effusions seen on lung us s/p thoracentesis with improved lung exam    Long discussion with daughters Advanced care planning - face to face time 28 minutes- patient is DNR/DNI no invasive measures, does not want dialysis, has considered feeding tube in the past but felt she could not get at the time due to ascities. diet changed to puree today will monitor for improvement.

## 2020-03-12 NOTE — PROGRESS NOTE ADULT - PROBLEM SELECTOR PLAN 6
-continue with digoxin  -not on AC given previous history of ICH  -continue home midodrine for hypotension (hold for elevated bp)  -propranolol on hold for now oral

## 2020-03-12 NOTE — PROGRESS NOTE ADULT - SUBJECTIVE AND OBJECTIVE BOX
[facility] Division of Kidney Diseases & Hypertension  FOLLOW UP NOTE  [phone]--------------------------------------------------------------------------------  Chief Complaint:Syncope and collapse      24 hour events/subjective: No acute events overnight. Patient resting comfortably in bed. Labs, vitals, and medications reviewed. Vital signs stable. Labs show creatinine stable at 2.65 with stable electrolytes.        PAST HISTORY  --------------------------------------------------------------------------------  No significant changes to PMH, PSH, FHx, SHx, unless otherwise noted    ALLERGIES & MEDICATIONS  --------------------------------------------------------------------------------  Allergies    codeine (Rash)  erythromycin (Rash)  iv dye (Rash; Anaphylaxis; Short breath)  penicillin (Rash)  shellfish (Rash)    Intolerances    adhesives (Other)  warfarin (Other)    Standing Inpatient Medications  albuterol/ipratropium for Nebulization 3 milliLiter(s) Nebulizer every 6 hours  AQUAPHOR (petrolatum Ointment) 1 Application(s) Topical daily  ascorbic acid 500 milliGRAM(s) Oral two times a day  buDESOnide    Inhalation Suspension 0.25 milliGRAM(s) Nebulizer every 12 hours  chlorhexidine 4% Liquid 1 Application(s) Topical <User Schedule>  cholecalciferol 2000 Unit(s) Oral daily  cyanocobalamin 1000 MICROGram(s) Oral daily  digoxin     Tablet 0.125 milliGRAM(s) Oral every other day  ertapenem  IVPB 500 milliGRAM(s) IV Intermittent every 24 hours  folic acid 1 milliGRAM(s) Oral daily  lactulose Syrup 20 Gram(s) Oral four times a day  levothyroxine 25 MICROGram(s) Oral daily  midodrine 5 milliGRAM(s) Oral every 8 hours  multivitamin/minerals 1 Tablet(s) Oral daily  rifAXIMin 550 milliGRAM(s) Oral two times a day  tiotropium 18 MICROgram(s) Capsule 1 Capsule(s) Inhalation daily    PRN Inpatient Medications  acetaminophen    Suspension .. 650 milliGRAM(s) Oral every 6 hours PRN  sodium chloride 0.9% lock flush 10 milliLiter(s) IV Push every 1 hour PRN      REVIEW OF SYSTEMS  --------------------------------------------------------------------------------  Gen: No  fevers/chills  Skin: No rashes  Head/Eyes/Ears/Mouth: No headache; Normal hearing; Normal vision w/o blurriness  Respiratory: No dyspnea, cough, wheezing, hemoptysis  CV: No chest pain, PND, orthopnea  GI: No abdominal pain, diarrhea, constipation, nausea, vomiting  : No increased frequency, dysuria, hematuria, nocturia  MSK: No joint pain/swelling; no back pain; no edema  Neuro: No dizziness/lightheadedness, weakness, seizures, numbness, tingling  Psych: Non-focal      All other systems were reviewed and are negative, except as noted.    VITALS/PHYSICAL EXAM  --------------------------------------------------------------------------------  T(C): 36.7 (03-12-20 @ 05:13), Max: 36.7 (03-12-20 @ 05:13)  HR: 78 (03-12-20 @ 05:13) (78 - 85)  BP: 107/58 (03-12-20 @ 05:13) (107/58 - 133/53)  RR: 18 (03-12-20 @ 05:13) (18 - 18)  SpO2: 98% (03-12-20 @ 05:13) (98% - 100%)  Wt(kg): --        03-11-20 @ 07:01  -  03-12-20 @ 07:00  --------------------------------------------------------  IN: 510 mL / OUT: 1350 mL / NET: -840 mL      Physical Exam:  	Gen: lethargic appears in no acute distress on room air  	HEENT: dry lips, no JVD  	Pulm: CTA B/L anteriorly auscultated   	CV: RRR, S1S2; no rub/murmur  	GI: +BS, soft, nondistended  	: rivero catheter in place  	MSK: Warm, no edema, RUE wrapped in guaze              Neuro: lethargy  	Psych: lethargy  	Skin: dry skin      LABS/STUDIES  --------------------------------------------------------------------------------              10.4   9.16  >-----------<  42       [03-12-20 @ 07:04]              34.3     144  |  117  |  101  ----------------------------<  62      [03-12-20 @ 07:01]  4.4   |  13  |  2.66        Ca     8.9     [03-12-20 @ 07:01]            Creatinine Trend:  SCr 2.66 [03-12 @ 07:01]  SCr 2.65 [03-11 @ 09:25]  SCr 2.71 [03-10 @ 05:21]  SCr 2.73 [03-10 @ 01:26]  SCr 2.90 [03-09 @ 08:54]    Urinalysis - [03-06-20 @ 06:18]      Color Dark Yellow / Appearance Turbid / SG 1.033 / pH 5.5      Gluc Negative / Ketone Trace  / Bili Negative / Urobili 2 mg/dL       Blood Negative / Protein 30 mg/dL / Leuk Est Large / Nitrite Negative      RBC 15 /  / Hyaline 5 / Gran  / Sq Epi  / Non Sq Epi 15 / Bacteria Many    Urine Creatinine 184      [03-06-20 @ 06:18]  Urine Sodium 45      [03-06-20 @ 06:18]

## 2020-03-12 NOTE — SWALLOW BEDSIDE ASSESSMENT ADULT - NS SPL SWALLOW CLINIC TRIAL FT
Suggest findings of this assessment may be limited due to limited number of trials presented. Recommendations to continue PO diet are guarded at this time, and patient should be closely monitored for s/s penetration/aspiration.

## 2020-03-12 NOTE — PROGRESS NOTE ADULT - SUBJECTIVE AND OBJECTIVE BOX
Patient is a 89y old  Female who presents with a chief complaint of syncope and diarrhea (12 Mar 2020 08:22)        SUBJECTIVE / OVERNIGHT EVENTS: no acute complaints       MEDICATIONS  (STANDING):  albumin human 25% IVPB 50 milliLiter(s) IV Intermittent every 6 hours  albuterol/ipratropium for Nebulization 3 milliLiter(s) Nebulizer every 6 hours  AQUAPHOR (petrolatum Ointment) 1 Application(s) Topical daily  ascorbic acid 500 milliGRAM(s) Oral two times a day  buDESOnide    Inhalation Suspension 0.25 milliGRAM(s) Nebulizer every 12 hours  chlorhexidine 4% Liquid 1 Application(s) Topical <User Schedule>  cholecalciferol 2000 Unit(s) Oral daily  cyanocobalamin 1000 MICROGram(s) Oral daily  digoxin     Tablet 0.125 milliGRAM(s) Oral every other day  ertapenem  IVPB 500 milliGRAM(s) IV Intermittent every 24 hours  folic acid 1 milliGRAM(s) Oral daily  lactulose Syrup 20 Gram(s) Oral four times a day  levothyroxine 25 MICROGram(s) Oral daily  midodrine 5 milliGRAM(s) Oral every 8 hours  multivitamin/minerals 1 Tablet(s) Oral daily  rifAXIMin 550 milliGRAM(s) Oral two times a day  sodium bicarbonate 1300 milliGRAM(s) Oral three times a day  sodium chloride 0.9%. 500 milliLiter(s) (50 mL/Hr) IV Continuous <Continuous>  tiotropium 18 MICROgram(s) Capsule 1 Capsule(s) Inhalation daily    MEDICATIONS  (PRN):  acetaminophen    Suspension .. 650 milliGRAM(s) Oral every 6 hours PRN Temp greater or equal to 38C (100.4F), Mild Pain (1 - 3), Moderate Pain (4 - 6)  sodium chloride 0.9% lock flush 10 milliLiter(s) IV Push every 1 hour PRN Pre/post blood products, medications, blood draw, and to maintain line patency      Vital Signs Last 24 Hrs  T(C): 36.5 (12 Mar 2020 11:56), Max: 36.7 (12 Mar 2020 05:13)  T(F): 97.7 (12 Mar 2020 11:56), Max: 98 (12 Mar 2020 05:13)  HR: 72 (12 Mar 2020 11:56) (72 - 85)  BP: 118/64 (12 Mar 2020 11:56) (107/58 - 133/53)  BP(mean): --  RR: 18 (12 Mar 2020 11:56) (18 - 18)  SpO2: 96% (12 Mar 2020 11:56) (96% - 100%)  CAPILLARY BLOOD GLUCOSE        I&O's Summary    11 Mar 2020 07:01  -  12 Mar 2020 07:00  --------------------------------------------------------  IN: 510 mL / OUT: 1350 mL / NET: -840 mL    12 Mar 2020 07:01  -  12 Mar 2020 14:39  --------------------------------------------------------  IN: 290 mL / OUT: 350 mL / NET: -60 mL      PHYSICAL EXAM:  GENERAL:  frail, thin, breathing normal, intermittently sleepy today with minimal speech but following commands  HEAD:  Atraumatic, Normocephalic  EYES: conjunctiva and sclera clear  NECK: supple, No JVD  CHEST/LUNG: decreased bs b/l bases worse on R>L, slight rhonchi left lung  HEART: S1 S2 RRR  ABDOMEN: +BS Soft, NT/ND  EXTREMITIES:  2+ DP Pulses, No c/c. no LE edema  NEUROLOGY: AAOx3, no facial droop, no focal deficits   SKIN: multiple skin tears UEs and LEs- some wrapped    LABS:                        10.4   9.16  )-----------( 42       ( 12 Mar 2020 07:04 )             34.3     03-12    144  |  117<H>  |  101<H>  ----------------------------<  62<L>  4.4   |  13<L>  |  2.66<H>    Ca    8.9      12 Mar 2020 07:01                RADIOLOGY & ADDITIONAL TESTS:    Imaging Personally Reviewed:  Consultant(s) Notes Reviewed:    Care Discussed with Consultants/Other Providers: Patient is a 89y old  Female who presents with a chief complaint of syncope and diarrhea (12 Mar 2020 08:22)        SUBJECTIVE / OVERNIGHT EVENTS: no acute complaints       MEDICATIONS  (STANDING):  albumin human 25% IVPB 50 milliLiter(s) IV Intermittent every 6 hours  albuterol/ipratropium for Nebulization 3 milliLiter(s) Nebulizer every 6 hours  AQUAPHOR (petrolatum Ointment) 1 Application(s) Topical daily  ascorbic acid 500 milliGRAM(s) Oral two times a day  buDESOnide    Inhalation Suspension 0.25 milliGRAM(s) Nebulizer every 12 hours  chlorhexidine 4% Liquid 1 Application(s) Topical <User Schedule>  cholecalciferol 2000 Unit(s) Oral daily  cyanocobalamin 1000 MICROGram(s) Oral daily  digoxin     Tablet 0.125 milliGRAM(s) Oral every other day  ertapenem  IVPB 500 milliGRAM(s) IV Intermittent every 24 hours  folic acid 1 milliGRAM(s) Oral daily  lactulose Syrup 20 Gram(s) Oral four times a day  levothyroxine 25 MICROGram(s) Oral daily  midodrine 5 milliGRAM(s) Oral every 8 hours  multivitamin/minerals 1 Tablet(s) Oral daily  rifAXIMin 550 milliGRAM(s) Oral two times a day  sodium bicarbonate 1300 milliGRAM(s) Oral three times a day  sodium chloride 0.9%. 500 milliLiter(s) (50 mL/Hr) IV Continuous <Continuous>  tiotropium 18 MICROgram(s) Capsule 1 Capsule(s) Inhalation daily    MEDICATIONS  (PRN):  acetaminophen    Suspension .. 650 milliGRAM(s) Oral every 6 hours PRN Temp greater or equal to 38C (100.4F), Mild Pain (1 - 3), Moderate Pain (4 - 6)  sodium chloride 0.9% lock flush 10 milliLiter(s) IV Push every 1 hour PRN Pre/post blood products, medications, blood draw, and to maintain line patency      Vital Signs Last 24 Hrs  T(C): 36.5 (12 Mar 2020 11:56), Max: 36.7 (12 Mar 2020 05:13)  T(F): 97.7 (12 Mar 2020 11:56), Max: 98 (12 Mar 2020 05:13)  HR: 72 (12 Mar 2020 11:56) (72 - 85)  BP: 118/64 (12 Mar 2020 11:56) (107/58 - 133/53)  BP(mean): --  RR: 18 (12 Mar 2020 11:56) (18 - 18)  SpO2: 96% (12 Mar 2020 11:56) (96% - 100%)  CAPILLARY BLOOD GLUCOSE        I&O's Summary    11 Mar 2020 07:01  -  12 Mar 2020 07:00  --------------------------------------------------------  IN: 510 mL / OUT: 1350 mL / NET: -840 mL    12 Mar 2020 07:01  -  12 Mar 2020 14:39  --------------------------------------------------------  IN: 290 mL / OUT: 350 mL / NET: -60 mL      PHYSICAL EXAM:  GENERAL:  frail, thin, breathing normal, intermittently sleepy today with minimal speech but following commands  HEAD:  Atraumatic, Normocephalic  EYES: conjunctiva and sclera clear  NECK: supple, No JVD  CHEST/LUNG: crackles b/l bases but overall right lung with improved bs. no wheezing appreciated  HEART: S1 S2 RRR  ABDOMEN: +BS Soft, NT/ND  EXTREMITIES:  2+ DP Pulses, No c/c. no LE edema  NEUROLOGY: AAOx3, no facial droop, no focal deficits   SKIN: multiple skin tears UEs and LEs- some wrapped    LABS:                        10.4   9.16  )-----------( 42       ( 12 Mar 2020 07:04 )             34.3     03-12    144  |  117<H>  |  101<H>  ----------------------------<  62<L>  4.4   |  13<L>  |  2.66<H>    Ca    8.9      12 Mar 2020 07:01                RADIOLOGY & ADDITIONAL TESTS:    Imaging Personally Reviewed:  Consultant(s) Notes Reviewed:    Care Discussed with Consultants/Other Providers: Patient is a 89y old  Female who presents with a chief complaint of syncope and diarrhea (12 Mar 2020 08:22)        SUBJECTIVE / OVERNIGHT EVENTS: no acute complaints       MEDICATIONS  (STANDING):  albumin human 25% IVPB 50 milliLiter(s) IV Intermittent every 6 hours  albuterol/ipratropium for Nebulization 3 milliLiter(s) Nebulizer every 6 hours  AQUAPHOR (petrolatum Ointment) 1 Application(s) Topical daily  ascorbic acid 500 milliGRAM(s) Oral two times a day  buDESOnide    Inhalation Suspension 0.25 milliGRAM(s) Nebulizer every 12 hours  chlorhexidine 4% Liquid 1 Application(s) Topical <User Schedule>  cholecalciferol 2000 Unit(s) Oral daily  cyanocobalamin 1000 MICROGram(s) Oral daily  digoxin     Tablet 0.125 milliGRAM(s) Oral every other day  ertapenem  IVPB 500 milliGRAM(s) IV Intermittent every 24 hours  folic acid 1 milliGRAM(s) Oral daily  lactulose Syrup 20 Gram(s) Oral four times a day  levothyroxine 25 MICROGram(s) Oral daily  midodrine 5 milliGRAM(s) Oral every 8 hours  multivitamin/minerals 1 Tablet(s) Oral daily  rifAXIMin 550 milliGRAM(s) Oral two times a day  sodium bicarbonate 1300 milliGRAM(s) Oral three times a day  sodium chloride 0.9%. 500 milliLiter(s) (50 mL/Hr) IV Continuous <Continuous>  tiotropium 18 MICROgram(s) Capsule 1 Capsule(s) Inhalation daily    MEDICATIONS  (PRN):  acetaminophen    Suspension .. 650 milliGRAM(s) Oral every 6 hours PRN Temp greater or equal to 38C (100.4F), Mild Pain (1 - 3), Moderate Pain (4 - 6)  sodium chloride 0.9% lock flush 10 milliLiter(s) IV Push every 1 hour PRN Pre/post blood products, medications, blood draw, and to maintain line patency      Vital Signs Last 24 Hrs  T(C): 36.5 (12 Mar 2020 11:56), Max: 36.7 (12 Mar 2020 05:13)  T(F): 97.7 (12 Mar 2020 11:56), Max: 98 (12 Mar 2020 05:13)  HR: 72 (12 Mar 2020 11:56) (72 - 85)  BP: 118/64 (12 Mar 2020 11:56) (107/58 - 133/53)  BP(mean): --  RR: 18 (12 Mar 2020 11:56) (18 - 18)  SpO2: 96% (12 Mar 2020 11:56) (96% - 100%)  CAPILLARY BLOOD GLUCOSE        I&O's Summary    11 Mar 2020 07:01  -  12 Mar 2020 07:00  --------------------------------------------------------  IN: 510 mL / OUT: 1350 mL / NET: -840 mL    12 Mar 2020 07:01  -  12 Mar 2020 14:39  --------------------------------------------------------  IN: 290 mL / OUT: 350 mL / NET: -60 mL      PHYSICAL EXAM:  GENERAL:  frail, thin, breathing normal, intermittently sleepy today with minimal speech but following commands  HEAD:  Atraumatic, Normocephalic  EYES: conjunctiva and sclera clear  NECK: supple, No JVD  CHEST/LUNG: crackles b/l bases but overall right lung with improved bs. no wheezing appreciated  HEART: S1 S2 RRR  ABDOMEN: +BS Soft, NT/ND  EXTREMITIES:  2+ DP Pulses, No c/c. no LE edema  NEUROLOGY: AAOx3, no facial droop, moving all extremities  SKIN: multiple skin tears UEs and LEs- some wrapped    LABS:                        10.4   9.16  )-----------( 42       ( 12 Mar 2020 07:04 )             34.3     03-12    144  |  117<H>  |  101<H>  ----------------------------<  62<L>  4.4   |  13<L>  |  2.66<H>    Ca    8.9      12 Mar 2020 07:01                RADIOLOGY & ADDITIONAL TESTS:    Imaging Personally Reviewed:  Consultant(s) Notes Reviewed:    Care Discussed with Consultants/Other Providers:

## 2020-03-12 NOTE — PROGRESS NOTE ADULT - PROBLEM SELECTOR PLAN 7
cards consult   -monitor for signs of fluid overload   with large right pleural effusion on ct chest cards consult   -monitor for signs of fluid overload   with large right pleural effusion on ct chest s/p thoracentesis c/w transudative fluid

## 2020-03-12 NOTE — SWALLOW BEDSIDE ASSESSMENT ADULT - SLP PERTINENT HISTORY OF CURRENT PROBLEM
89 y/o F with history of Afib on dig (not on AC), severe AS, COPD, CLL (s/p treatment with Treanda and Rituxan), HCC+cirrhosis with portal HTN, esophageal varices s/p banding and hepatic vessel coiling, CKD, hypothyroidism, recurrent UTIs with ESBL Klebsiella, recent admission in February 2020 for serratia bacteremia and candida glabrata fungemia s/p treatment, presenting from home after a syncopal episode.

## 2020-03-12 NOTE — PROGRESS NOTE ADULT - ASSESSMENT
89 y/o F with history of Afib on dig (not on AC), severe AS, COPD, CLL (s/p treatment with Treanda and Rituxan), HCC+cirrhosis with portal HTN, esophageal varices s/p banding and hepatic vessel coiling, CKD, hypothyroidism, recurrent UTIs with ESBL Klebsiella, recent admission in February 2020 for serratia bacteremia and candida glabrata fungemia s/p treatment, presenting from home after a syncopal episode. Pulmonary consulted for large Right pleural effusion now s/p thoracentesis with 1L transudative effusion drained    #Right pleural effusion  Prior imaging reveals right sided pleural effusions at least since 2018. Prior effusion may have been related to underlying valvular disease vs cirrhosis. Now with complicated right pleural effusion on POCUS. s/p thoracentesis. Fluid studies reveal transudative process.   -s/p thora: transudative effusion in the setting of liver/renal/CV diseases  -pleural fluid extremely unlike source of infection  -follow up pleural studies for culture and cyto  -pulmonary will sign off; please call back with questions    Ghulam Meyers MD, PGY5  Pulmonary and Critical Care Fellow  [phone]

## 2020-03-12 NOTE — SWALLOW BEDSIDE ASSESSMENT ADULT - ASR SWALLOW ASPIRATION MONITOR
Monitor for s/s aspiration/laryngeal penetration. If noted:  D/C p.o. intake, provide non-oral nutrition/hydration/meds, and contact this service @ [phone]/fever/throat clearing/upper respiratory infection/pneumonia/cough/gurgly voice/change of breathing pattern

## 2020-03-12 NOTE — SWALLOW BEDSIDE ASSESSMENT ADULT - ADDITIONAL RECOMMENDATIONS
Consider ENT consult to assess dysphonia.    Frequent oral care to improve oral hygiene.     Dysphagia 1 with nectar thick liquids recommended, however, would suggest formal GOC discussion with provider and patient/patient's family re: the provision of nutrition in this patient given significant disinterest in any po intake as well as overall presentation of patient (including fatigue, facial grimacing during all po trials despite lack of endorsement of pain, and limited verbal output).

## 2020-03-12 NOTE — PROGRESS NOTE ADULT - ASSESSMENT
89F PMHx Afib (not on AC), severe AS, COPD, CLL (s/p treatment with Treanda and Rituxan), HCC+cirrhosis with portal HTN, esophageal varices s/p banding and hepatic vessel coiling, hypothyroidism, recurrent UTIs with ESBL Klebsiella, recent admission in February 2020 for serratia bacteremia and candida glabrata fungemia present with syncopal episode in setting diarrhea (on lactulose for cirrhosis) - admitted for sepsis from UTI (GNR) treated with ceftriaxone.  Hospital course complicated worsening KARRIE.    Nephrology consulted for KARRIE.     Problem/Recommendation - 1:  Problem: AKRRIE (acute kidney injury). Recommendation: KARRIE likely 2/2 hemodynamically meditated in the setting of sepsis UTI, hypotension and/or volume depletion from diarrhea, decrease PO intake, less likely HRS (urine sodium is 45). pt. also with urinary retention, s/p rivero catheter placement.  On admission hypotensive 80s/50 with sCr 1.99 on 3/3, uptrend to 3.96 on 3/8 (baseline sCr 0.8-1.0), UCx GNR.  U/S abdomen show no hydronephrosis.  On-going diarrhea in hospital, s/p rivero placement (had urinary retention in the [facility]). Overall likely ATN in plateau phase.    Plan:  - continue IV hydration with albumin  - agree holding aldactone for now, consider decreasing lactulose if BM>3  - Maintain -150 range  - avoid nephrotoxic agents (ACEI, ARB, NSAIDS), renally dose medications per GFR  - trend BMP daily, strict I/O, low potassium diet for now.- please stop Vitamin C       Problem/Recommendation - 2:  ·  Problem: Low bicarbonate level.  Recommendation: likely metabolic acidosis in the setting of volume depletion and diarrhea  check blood gas  continue to monitor lactate  start bicarbonate 1300 mg TID

## 2020-03-12 NOTE — PROGRESS NOTE ADULT - PROBLEM SELECTOR PLAN 1
rising leukocytosis, increased lethargy and now positive UA. Afebrile. suspect UTI likely from diarrhea. UTI likely present on admission  UCX with ESBL Klebsiella - started on invanz per ID - to be continued likely for 5 days.  -likely cause of urinary retention/tahir   -monitor for fever, worsening mental status, or increasing leukocytosis rising leukocytosis, increased lethargy and positive UA/UCx ESBL Klebsiella. Afebrile. UTI likely present on admission  UCX with ESBL Klebsiella - started on invanz per ID (day 4 of 5)  -monitor for fever, worsening mental status, or increasing leukocytosis

## 2020-03-12 NOTE — SWALLOW BEDSIDE ASSESSMENT ADULT - SWALLOW EVAL: DIAGNOSIS
Patient admitted following syncopal episode, and now with oropharyngeal dysphagia. Patient is known to this service, and was last seen in 1/20 with recommendation for NPO. Patient's dysphagia characterized by oral defensiveness, delayed oral transit time across liquids and solids up to 3-5 seconds, delayed pharyngeal swallow trigger (3 seconds for liquids, 9 seconds for solids) and multiple swallows indicative of possible pharyngeal residue for liquids (x2-3). Patient initially agreed to accept po trial x1 of thin puree, but then required maximal encouragement for continued intake. Actual po trials accepted during this examination limited (x1 honey cup sip, x3 nectar cup sips, x2 thick puree, x2 thin puree). While no overt s/s penetration/aspiration observed, Suggest findings of this assessment may be limited due to limited number of trials presented. Recommendations to continue PO diet are guarded at this time, and patient should be closely monitored for s/s penetration/aspiration.

## 2020-03-12 NOTE — PROGRESS NOTE ADULT - SUBJECTIVE AND OBJECTIVE BOX
CHIEF COMPLAINT:    Interval Events:    REVIEW OF SYSTEMS:  [x] All other systems negative  [ ] Unable to assess ROS because ________    OBJECTIVE:  ICU Vital Signs Last 24 Hrs  T(C): 36.7 (12 Mar 2020 05:13), Max: 36.7 (12 Mar 2020 05:13)  T(F): 98 (12 Mar 2020 05:13), Max: 98 (12 Mar 2020 05:13)  HR: 78 (12 Mar 2020 05:13) (78 - 85)  BP: 107/58 (12 Mar 2020 05:13) (107/58 - 133/53)  BP(mean): --  ABP: --  ABP(mean): --  RR: 18 (12 Mar 2020 05:13) (18 - 18)  SpO2: 98% (12 Mar 2020 05:13) (98% - 100%)        03-11 @ 07:01  -  03-12 @ 07:00  --------------------------------------------------------  IN: 510 mL / OUT: 1350 mL / NET: -840 mL      CAPILLARY BLOOD GLUCOSE          PHYSICAL EXAM:    General: more awake, no respiratory distress  HEENT: NC/AT, anicteric,  Lymph Nodes: no cervical LAD   Neck: mild subQ emphysema  Respiratory: respiratory effort normal, decreased breath sounds on the right --some improved aeration compared to yestrerday, some rhonchi on left  Cardiovascular: s1s2, systolic murmurs  Abdomen: soft, ntnd  Extremities: anasarca  Skin: multiple wounds well dressed, subQ emphysmea  Neurological: MAEO, limited exam    HOSPITAL MEDICATIONS:  MEDICATIONS  (STANDING):  albuterol/ipratropium for Nebulization 3 milliLiter(s) Nebulizer every 6 hours  AQUAPHOR (petrolatum Ointment) 1 Application(s) Topical daily  ascorbic acid 500 milliGRAM(s) Oral two times a day  buDESOnide    Inhalation Suspension 0.25 milliGRAM(s) Nebulizer every 12 hours  chlorhexidine 4% Liquid 1 Application(s) Topical <User Schedule>  cholecalciferol 2000 Unit(s) Oral daily  cyanocobalamin 1000 MICROGram(s) Oral daily  digoxin     Tablet 0.125 milliGRAM(s) Oral every other day  ertapenem  IVPB 500 milliGRAM(s) IV Intermittent every 24 hours  folic acid 1 milliGRAM(s) Oral daily  lactulose Syrup 20 Gram(s) Oral four times a day  levothyroxine 25 MICROGram(s) Oral daily  midodrine 5 milliGRAM(s) Oral every 8 hours  multivitamin/minerals 1 Tablet(s) Oral daily  rifAXIMin 550 milliGRAM(s) Oral two times a day  tiotropium 18 MICROgram(s) Capsule 1 Capsule(s) Inhalation daily    MEDICATIONS  (PRN):  acetaminophen    Suspension .. 650 milliGRAM(s) Oral every 6 hours PRN Temp greater or equal to 38C (100.4F), Mild Pain (1 - 3), Moderate Pain (4 - 6)  sodium chloride 0.9% lock flush 10 milliLiter(s) IV Push every 1 hour PRN Pre/post blood products, medications, blood draw, and to maintain line patency      LABS:                        9.5    7.91  )-----------( 176      ( 11 Mar 2020 09:25 )             29.6     03-11    144  |  112<H>  |  95<H>  ----------------------------<  92  5.2   |  17<L>  |  2.65<H>    Ca    6.9<L>      11 Mar 2020 09:25                MICROBIOLOGY:     Culture - Body Fluid with Gram Stain (collected 11 Mar 2020 22:32)  Source: .Body Fluid Pleural Fluid  Gram Stain (12 Mar 2020 06:02):    polymorphonuclear leukocytes seen    No organisms seen    by cytocentrifuge      =============================================================================================  RADIOLOGY:  [ ] Reviewed and interpreted by me    ============================================================================================  Point of Care Ultrasound Findings: CHIEF COMPLAINT: syncope    Interval Events:  -Feeling better this morning; stating breathing is easier  -Fluid studies show transudative pleural effusion and not suggestive of infection    REVIEW OF SYSTEMS:  [x] All other systems negative  [ ] Unable to assess ROS because ________    OBJECTIVE:  ICU Vital Signs Last 24 Hrs  T(C): 36.7 (12 Mar 2020 05:13), Max: 36.7 (12 Mar 2020 05:13)  T(F): 98 (12 Mar 2020 05:13), Max: 98 (12 Mar 2020 05:13)  HR: 78 (12 Mar 2020 05:13) (78 - 85)  BP: 107/58 (12 Mar 2020 05:13) (107/58 - 133/53)  BP(mean): --  ABP: --  ABP(mean): --  RR: 18 (12 Mar 2020 05:13) (18 - 18)  SpO2: 98% (12 Mar 2020 05:13) (98% - 100%)        03-11 @ 07:01  -  03-12 @ 07:00  --------------------------------------------------------  IN: 510 mL / OUT: 1350 mL / NET: -840 mL      CAPILLARY BLOOD GLUCOSE          PHYSICAL EXAM:    General: more awake, no respiratory distress  HEENT: NC/AT, anicteric,  Lymph Nodes: no cervical LAD   Neck: mild subQ emphysema  Respiratory: respiratory effort normal, decreased breath sounds on the right --some improved aeration compared to yestrerday, some rhonchi on left  Cardiovascular: s1s2, systolic murmurs  Abdomen: soft, ntnd  Extremities: anasarca  Skin: multiple wounds well dressed, subQ emphysmea  Neurological: MAEO, limited exam    HOSPITAL MEDICATIONS:  MEDICATIONS  (STANDING):  albuterol/ipratropium for Nebulization 3 milliLiter(s) Nebulizer every 6 hours  AQUAPHOR (petrolatum Ointment) 1 Application(s) Topical daily  ascorbic acid 500 milliGRAM(s) Oral two times a day  buDESOnide    Inhalation Suspension 0.25 milliGRAM(s) Nebulizer every 12 hours  chlorhexidine 4% Liquid 1 Application(s) Topical <User Schedule>  cholecalciferol 2000 Unit(s) Oral daily  cyanocobalamin 1000 MICROGram(s) Oral daily  digoxin     Tablet 0.125 milliGRAM(s) Oral every other day  ertapenem  IVPB 500 milliGRAM(s) IV Intermittent every 24 hours  folic acid 1 milliGRAM(s) Oral daily  lactulose Syrup 20 Gram(s) Oral four times a day  levothyroxine 25 MICROGram(s) Oral daily  midodrine 5 milliGRAM(s) Oral every 8 hours  multivitamin/minerals 1 Tablet(s) Oral daily  rifAXIMin 550 milliGRAM(s) Oral two times a day  tiotropium 18 MICROgram(s) Capsule 1 Capsule(s) Inhalation daily    MEDICATIONS  (PRN):  acetaminophen    Suspension .. 650 milliGRAM(s) Oral every 6 hours PRN Temp greater or equal to 38C (100.4F), Mild Pain (1 - 3), Moderate Pain (4 - 6)  sodium chloride 0.9% lock flush 10 milliLiter(s) IV Push every 1 hour PRN Pre/post blood products, medications, blood draw, and to maintain line patency      LABS:                        9.5    7.91  )-----------( 176      ( 11 Mar 2020 09:25 )             29.6     03-11    144  |  112<H>  |  95<H>  ----------------------------<  92  5.2   |  17<L>  |  2.65<H>    Ca    6.9<L>      11 Mar 2020 09:25        MICROBIOLOGY:     Culture - Body Fluid with Gram Stain (collected 11 Mar 2020 22:32)  Source: .Body Fluid Pleural Fluid  Gram Stain (12 Mar 2020 06:02):    polymorphonuclear leukocytes seen    No organisms seen    by cytocentrifuge      =============================================================================================  RADIOLOGY:  [ ] Reviewed and interpreted by me

## 2020-03-12 NOTE — PROGRESS NOTE ADULT - PROBLEM SELECTOR PLAN 3
-pt presented with syncope on admission  -likely secondary to dehydration vs vasovagal syncope as pt was on toilet having a BM when it happened   -pt was also recently started on propranolol and spironolactone as an outpt, which may have contributed to symptoms as well as dehydration. Have been holding in setting of diarrhea and now infection  -cont tele- in afib, rate controlled. continue monitoring for now  hold IVF - patient developed pleural effusions (seen on US - right appears complex)  -high sensitivity trops negx2   -EKG without acute ischemic changes  -continue home midodrine would hold for elevated bp- plan was for pt to titrate off of it slowly as an outpatient after being on propranolol, spironolactone for some time (being managed by Dr Boston)  cards consult called - critical AS on TTE, ectopy on telemetry - ?due to volume overload - continue to monitor on telemetry -pt presented with syncope on admission  -likely secondary to dehydration vs vasovagal syncope as pt was on toilet having a BM when it happened   -pt was also recently started on propranolol and spironolactone as an outpt, which may have contributed to symptoms as well as dehydration. Have been holding in setting of diarrhea and now infection  -cont tele- in afib, rate controlled. continue monitoring for now  -high sensitivity trops negx2   -EKG without acute ischemic changes  -continue home midodrine would hold for elevated bp- plan was for pt to titrate off of it slowly as an outpatient after being on propranolol, spironolactone for some time (being managed by Dr Boston)  cards consult appreciated - critical AS on TTE - not candidate for intervention, ectopy on telemetry - ?due to volume overload - continue to monitor

## 2020-03-12 NOTE — PROGRESS NOTE ADULT - PROBLEM SELECTOR PLAN 8
pt with intermittent hepatic encephalopathy, hx of varices. managed medically  -continue with rifaximin  -restarted lactulose   -hold spironolactone and propanolol for now  -noted mild transaminitis, thrombocytopenia, anemia- cont to trend.  -abdominal sono without ascities pt with intermittent hepatic encephalopathy, hx of varices. managed medically  -continue with rifaximin  -restarted lactulose   -holding spironolactone and propanolol for now  -noted mild transaminitis, thrombocytopenia, anemia- cont to trend.  -abdominal sono without ascities

## 2020-03-12 NOTE — SWALLOW BEDSIDE ASSESSMENT ADULT - SWALLOW EVAL: RECOMMENDED FEEDING/EATING TECHNIQUES
Only feed patient when awake/alert!/oral hygiene/alternate food with liquid/crush medication (when feasible)/small sips/bites/check mouth frequently for oral residue/pocketing/position upright (90 degrees)

## 2020-03-12 NOTE — PROGRESS NOTE ADULT - ATTENDING COMMENTS
89 y/o F with history of Afib on dig (not on AC), severe AS, COPD, CLL (s/p treatment with Treanda and Rituxan), HCC+cirrhosis with portal HTN, esophageal varices s/p banding and hepatic vessel coiling, CKD, hypothyroidism, recurrent UTIs with ESBL Klebsiella, recent admission in February 2020 for serratia bacteremia and candida glabrata fungemia s/p treatment, presenting from home after a syncopal episode. Pulmonary consulted for large Right pleural effusion now s/p thoracentesis with 1L transudative effusion drained.    - Transudative effusion on fluid studies  - No further intervention from a pulmonary stand point.  - If fluid reaccumulates would attempt diuresis first before considering thoracentesis.   - Maintain oxygen >90%    Thank you for your consult. Please call back if you have further questions regarding the care of this patient.

## 2020-03-12 NOTE — PROGRESS NOTE ADULT - PROBLEM SELECTOR PLAN 2
-likely multifactorial- overall poor PO intake at home and poor nutritional status and was having diarrhea on admission with hypotension   renal consult appreciated - ?ATN from hypotension on admission   creatinine improving   also now noted to have UTI and acute urinary retention causing post-obstructive KARRIE  s/p rivero placement   -UTI treatment as above  -holding spironolactone   -abdominal sono - no ascities  hold IVF  -encourage PO intake as tolerated  Renal consult appreciated -likely multifactorial- overall poor PO intake at home and poor nutritional status and was having diarrhea on admission with hypotension   renal consult appreciated - ?ATN from hypotension on admission   creatinine improving though bun increase today - will restart juana IVF with albumin infusions  also now noted to have UTI and acute urinary retention causing post-obstructive KARRIE  s/p rivero placement   -UTI treatment as above  -holding spironolactone   -encourage PO intake as tolerated  Renal f/u appreciated - start sodium bicarb

## 2020-03-12 NOTE — CHART NOTE - NSCHARTNOTEFT_GEN_A_CORE
Nutrition Follow Up Note  Patient seen for: malnutrition follow up    · Reason for Admission	syncope and diarrhea      Source: chart    Diet : Dysphagia 2  nectar consistency Danactive Yogurt Drink  Jacob x 2 daily  vanilla mighty shakes with meals    Patient reports: forgetful at times. pt unable to respond effectively to interview questions. she looked at me with confusion when I attempted to ask her interview questions.      PO intake : as per nurse pt is eating "awful"     Source for PO intake: nurse          Daily Weight in k.1 (-), Weight in k.9 (-), Weight in k.3 (-10), Weight in k.4 (), Weight in k.2 (), Weight in k (), Weight in k.4 ()  % Weight Change:  1% loss since previous assess on 3/6    Pertinent Medications: MEDICATIONS  (STANDING):  albuterol/ipratropium for Nebulization 3 milliLiter(s) Nebulizer every 6 hours  AQUAPHOR (petrolatum Ointment) 1 Application(s) Topical daily  ascorbic acid 500 milliGRAM(s) Oral two times a day  buDESOnide    Inhalation Suspension 0.25 milliGRAM(s) Nebulizer every 12 hours  chlorhexidine 4% Liquid 1 Application(s) Topical <User Schedule>  cholecalciferol 2000 Unit(s) Oral daily  cyanocobalamin 1000 MICROGram(s) Oral daily  digoxin     Tablet 0.125 milliGRAM(s) Oral every other day  ertapenem  IVPB 500 milliGRAM(s) IV Intermittent every 24 hours  folic acid 1 milliGRAM(s) Oral daily  lactulose Syrup 20 Gram(s) Oral four times a day  levothyroxine 25 MICROGram(s) Oral daily  midodrine 5 milliGRAM(s) Oral every 8 hours  multivitamin/minerals 1 Tablet(s) Oral daily  rifAXIMin 550 milliGRAM(s) Oral two times a day  tiotropium 18 MICROgram(s) Capsule 1 Capsule(s) Inhalation daily    MEDICATIONS  (PRN):  acetaminophen    Suspension .. 650 milliGRAM(s) Oral every 6 hours PRN Temp greater or equal to 38C (100.4F), Mild Pain (1 - 3), Moderate Pain (4 - 6)  sodium chloride 0.9% lock flush 10 milliLiter(s) IV Push every 1 hour PRN Pre/post blood products, medications, blood draw, and to maintain line patency    Pertinent Labs:  @ 07:01: Na 144, <H>, Cr 2.66<H>, BG 62<L>, K+ 4.4   @ 09:25: Na 144, BUN 95<H>, Cr 2.65<H>, BG 92, K+ 5.2        Skin per nursing documentation: stage 2 coccyx, left glutel cleft, stage 2 coccyx lateral  Edema: +2 right leg, left leg, right foot, left foot, left ankle, right ankle    Estimated Needs:   [x ] no change since previous assessment  [ ] recalculated:     Previous Nutrition Diagnosis: 1. severe malnutrition  2. increased nutrient needs  Nutrition Diagnosis is: 1 and 2 plan is achieved with supplements.    Recommend  continue Dysphagia 2 nectar consistency  continue mighty shakes 4oz with each meal  continue multivitamin with minerals, Vitamin C and nectar consistency Jacob x 2 daily  monitor need to restrict protein and change supplement if po intake increases secondary elevated creatinine   may need to consider nutrition support if in line with goals of care- if nutrition support initiated pt may be at risk for "refeeding syndrome" secondary to poor po intake.     Monitoring and Evaluation:     Continue to monitor Nutritional intake, Tolerance to diet prescription, weights, labs, skin integrity    RD remains available upon request and will follow up per protocol  Charline Saunders MA, HARMEET, CDN #[phone]

## 2020-03-12 NOTE — PROGRESS NOTE ADULT - ATTENDING COMMENTS
#KARRIE -ATN -hypotension/UTI- cr stable/bun uptrending  -renal sono no hydro  -has rivero, monitor UO  #met acidosis- monitor bicarb trend; start sodium bicarb tabs 1300mg po tid  #hypotension-monitor BP; ivf off now; encourage po hydration  #UTI Klebsiella-antibiotics

## 2020-03-13 LAB
ALBUMIN SERPL ELPH-MCNC: 2.3 G/DL — LOW (ref 3.3–5)
ALBUMIN SERPL ELPH-MCNC: 2.3 G/DL — LOW (ref 3.3–5)
ALP SERPL-CCNC: 131 U/L — HIGH (ref 40–120)
ALT FLD-CCNC: 14 U/L — SIGNIFICANT CHANGE UP (ref 10–45)
ANION GAP SERPL CALC-SCNC: 17 MMOL/L — SIGNIFICANT CHANGE UP (ref 5–17)
AST SERPL-CCNC: 32 U/L — SIGNIFICANT CHANGE UP (ref 10–40)
BILIRUB SERPL-MCNC: 1.3 MG/DL — HIGH (ref 0.2–1.2)
BUN SERPL-MCNC: 101 MG/DL — HIGH (ref 7–23)
CALCIUM SERPL-MCNC: 8.9 MG/DL — SIGNIFICANT CHANGE UP (ref 8.4–10.5)
CHLORIDE SERPL-SCNC: 119 MMOL/L — HIGH (ref 96–108)
CO2 SERPL-SCNC: 16 MMOL/L — LOW (ref 22–31)
CREAT SERPL-MCNC: 2.63 MG/DL — HIGH (ref 0.5–1.3)
GLUCOSE SERPL-MCNC: 81 MG/DL — SIGNIFICANT CHANGE UP (ref 70–99)
HCT VFR BLD CALC: 28.4 % — LOW (ref 34.5–45)
HGB BLD-MCNC: 8.8 G/DL — LOW (ref 11.5–15.5)
MCHC RBC-ENTMCNC: 31 GM/DL — LOW (ref 32–36)
MCHC RBC-ENTMCNC: 32.6 PG — SIGNIFICANT CHANGE UP (ref 27–34)
MCV RBC AUTO: 105.2 FL — HIGH (ref 80–100)
NON-GYNECOLOGICAL CYTOLOGY STUDY: SIGNIFICANT CHANGE UP
NRBC # BLD: 0 /100 WBCS — SIGNIFICANT CHANGE UP (ref 0–0)
PLATELET # BLD AUTO: 51 K/UL — LOW (ref 150–400)
POTASSIUM SERPL-MCNC: 3.3 MMOL/L — LOW (ref 3.5–5.3)
POTASSIUM SERPL-SCNC: 3.3 MMOL/L — LOW (ref 3.5–5.3)
PROT SERPL-MCNC: 4.4 G/DL — LOW (ref 6–8.3)
RBC # BLD: 2.7 M/UL — LOW (ref 3.8–5.2)
RBC # FLD: 18.3 % — HIGH (ref 10.3–14.5)
SODIUM SERPL-SCNC: 152 MMOL/L — HIGH (ref 135–145)
WBC # BLD: 7.09 K/UL — SIGNIFICANT CHANGE UP (ref 3.8–10.5)
WBC # FLD AUTO: 7.09 K/UL — SIGNIFICANT CHANGE UP (ref 3.8–10.5)

## 2020-03-13 PROCEDURE — 99233 SBSQ HOSP IP/OBS HIGH 50: CPT

## 2020-03-13 PROCEDURE — 99233 SBSQ HOSP IP/OBS HIGH 50: CPT | Mod: GC

## 2020-03-13 RX ORDER — SODIUM CHLORIDE 9 MG/ML
1000 INJECTION, SOLUTION INTRAVENOUS
Refills: 0 | Status: DISCONTINUED | OUTPATIENT
Start: 2020-03-13 | End: 2020-03-14

## 2020-03-13 RX ORDER — ALBUMIN HUMAN 25 %
50 VIAL (ML) INTRAVENOUS EVERY 6 HOURS
Refills: 0 | Status: COMPLETED | OUTPATIENT
Start: 2020-03-13 | End: 2020-03-14

## 2020-03-13 RX ORDER — DEXTROSE MONOHYDRATE, SODIUM CHLORIDE, AND POTASSIUM CHLORIDE 50; .745; 4.5 G/1000ML; G/1000ML; G/1000ML
1000 INJECTION, SOLUTION INTRAVENOUS
Refills: 0 | Status: DISCONTINUED | OUTPATIENT
Start: 2020-03-13 | End: 2020-03-13

## 2020-03-13 RX ADMIN — MIDODRINE HYDROCHLORIDE 5 MILLIGRAM(S): 2.5 TABLET ORAL at 05:52

## 2020-03-13 RX ADMIN — CHLORHEXIDINE GLUCONATE 1 APPLICATION(S): 213 SOLUTION TOPICAL at 15:53

## 2020-03-13 RX ADMIN — Medication 3 MILLILITER(S): at 15:00

## 2020-03-13 RX ADMIN — Medication 3 MILLILITER(S): at 05:52

## 2020-03-13 RX ADMIN — Medication 25 MICROGRAM(S): at 05:52

## 2020-03-13 RX ADMIN — ERTAPENEM SODIUM 100 MILLIGRAM(S): 1 INJECTION, POWDER, LYOPHILIZED, FOR SOLUTION INTRAMUSCULAR; INTRAVENOUS at 19:25

## 2020-03-13 RX ADMIN — Medication 1300 MILLIGRAM(S): at 23:02

## 2020-03-13 RX ADMIN — Medication 50 MILLILITER(S): at 20:19

## 2020-03-13 RX ADMIN — LACTULOSE 20 GRAM(S): 10 SOLUTION ORAL at 15:50

## 2020-03-13 RX ADMIN — Medication 0.25 MILLIGRAM(S): at 05:52

## 2020-03-13 RX ADMIN — MIDODRINE HYDROCHLORIDE 5 MILLIGRAM(S): 2.5 TABLET ORAL at 23:38

## 2020-03-13 RX ADMIN — Medication 1 APPLICATION(S): at 15:51

## 2020-03-13 RX ADMIN — SODIUM CHLORIDE 50 MILLILITER(S): 9 INJECTION, SOLUTION INTRAVENOUS at 18:12

## 2020-03-13 RX ADMIN — Medication 1300 MILLIGRAM(S): at 05:52

## 2020-03-13 RX ADMIN — Medication 500 MILLIGRAM(S): at 05:51

## 2020-03-13 RX ADMIN — Medication 3 MILLILITER(S): at 23:02

## 2020-03-13 RX ADMIN — LACTULOSE 20 GRAM(S): 10 SOLUTION ORAL at 05:52

## 2020-03-13 RX ADMIN — DEXTROSE MONOHYDRATE, SODIUM CHLORIDE, AND POTASSIUM CHLORIDE 50 MILLILITER(S): 50; .745; 4.5 INJECTION, SOLUTION INTRAVENOUS at 15:53

## 2020-03-13 NOTE — PROGRESS NOTE ADULT - PROBLEM SELECTOR PLAN 7
cards consult   -monitor for signs of fluid overload   with large right pleural effusion on ct chest s/p thoracentesis c/w transudative fluid

## 2020-03-13 NOTE — PROGRESS NOTE ADULT - PROBLEM SELECTOR PLAN 2
-likely multifactorial- overall poor PO intake at home and poor nutritional status and was having diarrhea on admission with hypotension   renal consult appreciated - ?ATN from hypotension on admission   creatinine stable though elevated bun - c/w juana IVF with albumin infusions  also now noted to have UTI and acute urinary retention causing post-obstructive KARRIE  s/p rivero placement   -UTI treatment as above  -holding spironolactone   -encourage PO intake as tolerated  Renal f/u appreciated - started sodium bicarb

## 2020-03-13 NOTE — PROGRESS NOTE ADULT - SUBJECTIVE AND OBJECTIVE BOX
[facility] DIVISION OF KIDNEY DISEASES AND HYPERTENSION -- FOLLOW UP NOTE  --------------------------------------------------------------------------------  Chief Complaint:/subjective: no acute events    24 hour events:as above        PAST HISTORY  --------------------------------------------------------------------------------  No significant changes to PMH, PSH, FHx, SHx, unless otherwise noted    ALLERGIES & MEDICATIONS  --------------------------------------------------------------------------------  Allergies    codeine (Rash)  erythromycin (Rash)  iv dye (Rash; Anaphylaxis; Short breath)  penicillin (Rash)  shellfish (Rash)    Intolerances    adhesives (Other)  warfarin (Other)    Standing Inpatient Medications  albuterol/ipratropium for Nebulization 3 milliLiter(s) Nebulizer every 6 hours  AQUAPHOR (petrolatum Ointment) 1 Application(s) Topical daily  ascorbic acid 500 milliGRAM(s) Oral two times a day  buDESOnide    Inhalation Suspension 0.25 milliGRAM(s) Nebulizer every 12 hours  chlorhexidine 4% Liquid 1 Application(s) Topical <User Schedule>  cholecalciferol 2000 Unit(s) Oral daily  cyanocobalamin 1000 MICROGram(s) Oral daily  digoxin     Tablet 0.125 milliGRAM(s) Oral every other day  ertapenem  IVPB 500 milliGRAM(s) IV Intermittent every 24 hours  folic acid 1 milliGRAM(s) Oral daily  lactulose Syrup 20 Gram(s) Oral four times a day  levothyroxine 25 MICROGram(s) Oral daily  midodrine 5 milliGRAM(s) Oral every 8 hours  multivitamin/minerals 1 Tablet(s) Oral daily  rifAXIMin 550 milliGRAM(s) Oral two times a day  sodium bicarbonate 1300 milliGRAM(s) Oral three times a day  tiotropium 18 MICROgram(s) Capsule 1 Capsule(s) Inhalation daily    PRN Inpatient Medications  acetaminophen    Suspension .. 650 milliGRAM(s) Oral every 6 hours PRN  sodium chloride 0.9% lock flush 10 milliLiter(s) IV Push every 1 hour PRN      REVIEW OF SYSTEMS  --------------------------------------------------------------------------------  All other systems were reviewed   except as noted.    VITALS/PHYSICAL EXAM  --------------------------------------------------------------------------------  T(C): 36.3 (03-13-20 @ 04:37), Max: 36.6 (03-12-20 @ 15:45)  HR: 85 (03-13-20 @ 04:37) (71 - 85)  BP: 105/57 (03-13-20 @ 04:37) (105/55 - 126/64)  RR: 18 (03-13-20 @ 04:37) (18 - 18)  SpO2: 100% (03-13-20 @ 04:37) (96% - 100%)  Wt(kg): --  Adult Advanced Hemodynamics Last 24 Hrs  ABP: --  ABP(mean): --  CVP(mm Hg): --  CO: --  CI: --  PA: --  PA(mean): --  PCWP: --  SVR: --  SVRI: --        03-12-20 @ 07:01  -  03-13-20 @ 07:00  --------------------------------------------------------  IN: 1130 mL / OUT: 1250 mL / NET: -120 mL    03-13-20 @ 07:01  -  03-13-20 @ 13:17  --------------------------------------------------------  IN: 0 mL / OUT: 0 mL / NET: 0 mL      Physical Exam:  	Gen: NAD   	HEENT:   no jvp  	Pulm: CTA B/L  	CV: RRR, S1S2; no rub  	Back:  no sacral edema  	Abd: +BS, soft, nontender/nondistended  	: No suprapubic tenderness  	Ext: no edema  	Neuro:awake  	Psych: alert  	Skin: Warm,    	Vascular access:    LABS/STUDIES  --------------------------------------------------------------------------------              8.8    7.09  >-----------<  51       [03-13-20 @ 06:09]              28.4     Hemoglobin: 8.8 g/dL (03-13-20 @ 06:09)  Hemoglobin: 10.4 g/dL (03-12-20 @ 07:04)    Platelet Count - Automated: 51 K/uL (03-13-20 @ 06:09)  Platelet Count - Automated: 42 K/uL (03-12-20 @ 07:04)    152  |  119  |  101  ----------------------------<  81      [03-13-20 @ 06:09]  3.3   |  16  |  2.63        Ca     8.9     [03-13-20 @ 06:09]    TPro  4.4  /  Alb  2.3  /  TBili  1.3  /  DBili  x   /  AST  32  /  ALT  14  /  AlkPhos  131  [03-13-20 @ 06:09]          Creatinine Trend:  SCr 2.63 [03-13 @ 06:09]  SCr 2.66 [03-12 @ 07:01]  SCr 2.65 [03-11 @ 09:25]  SCr 2.71 [03-10 @ 05:21]  SCr 2.73 [03-10 @ 01:26]    Urinalysis - [03-06-20 @ 06:18]      Color Dark Yellow / Appearance Turbid / SG 1.033 / pH 5.5      Gluc Negative / Ketone Trace  / Bili Negative / Urobili 2 mg/dL       Blood Negative / Protein 30 mg/dL / Leuk Est Large / Nitrite Negative      RBC 15 /  / Hyaline 5 / Gran  / Sq Epi  / Non Sq Epi 15 / Bacteria Many      Iron 67, TIBC 177, %sat 38      [02-07-20 @ 03:54]  Ferritin 578      [02-07-20 @ 03:54]  PTH -- (Ca 10.8)      [01-07-20 @ 15:31]   10  Vitamin D (25OH) 50.5      [01-07-20 @ 15:31]  HbA1c 4.7      [12-22-18 @ 07:50]  TSH 1.54      [01-07-20 @ 15:31]

## 2020-03-13 NOTE — SWALLOW BEDSIDE ASSESSMENT ADULT - ASR SWALLOW LABIAL MOBILITY
impaired retraction/impaired pursing/impaired seal/impaired coordination/reduced strength and range of motion noted
unable to assess 2/2 occasional lethargy

## 2020-03-13 NOTE — PROGRESS NOTE ADULT - ASSESSMENT
89F PMHx Afib (not on AC), severe AS, COPD, CLL (s/p treatment with Treanda and Rituxan), HCC+cirrhosis with portal HTN, esophageal varices s/p banding and hepatic vessel coiling, hypothyroidism, recurrent UTIs with ESBL Klebsiella, recent admission in February 2020 for serratia bacteremia and candida glabrata fungemia present with syncopal episode in setting diarrhea (on lactulose for cirrhosis) - admitted for sepsis from UTI (GNR) treated with ceftriaxone.  Hospital course complicated worsening KARRIE.    Nephrology consulted for KARRIE.

## 2020-03-13 NOTE — SWALLOW BEDSIDE ASSESSMENT ADULT - NS ASR SWALLOW FINDINGS DISCUS
Nursing Nursing/RN LAYLA Mercado Patient/RN Jess, LAYLA Delgado/Nursing RN Jess, NP Sandy, Daughter Julio contacted via telephone/Patient/Family/Nursing

## 2020-03-13 NOTE — PROGRESS NOTE ADULT - SUBJECTIVE AND OBJECTIVE BOX
Patient is a 89y old  Female who presents with a chief complaint of syncope and diarrhea (13 Mar 2020 13:17)        SUBJECTIVE / OVERNIGHT EVENTS: awake but sleepy, responding to questions appropriately      MEDICATIONS  (STANDING):  albuterol/ipratropium for Nebulization 3 milliLiter(s) Nebulizer every 6 hours  AQUAPHOR (petrolatum Ointment) 1 Application(s) Topical daily  ascorbic acid 500 milliGRAM(s) Oral two times a day  buDESOnide    Inhalation Suspension 0.25 milliGRAM(s) Nebulizer every 12 hours  chlorhexidine 4% Liquid 1 Application(s) Topical <User Schedule>  cholecalciferol 2000 Unit(s) Oral daily  cyanocobalamin 1000 MICROGram(s) Oral daily  dextrose 5% + sodium chloride 0.45% with potassium chloride 20 mEq/L 1000 milliLiter(s) (50 mL/Hr) IV Continuous <Continuous>  digoxin     Tablet 0.125 milliGRAM(s) Oral every other day  ertapenem  IVPB 500 milliGRAM(s) IV Intermittent every 24 hours  folic acid 1 milliGRAM(s) Oral daily  lactulose Syrup 20 Gram(s) Oral four times a day  levothyroxine 25 MICROGram(s) Oral daily  midodrine 5 milliGRAM(s) Oral every 8 hours  multivitamin/minerals 1 Tablet(s) Oral daily  rifAXIMin 550 milliGRAM(s) Oral two times a day  sodium bicarbonate 1300 milliGRAM(s) Oral three times a day  tiotropium 18 MICROgram(s) Capsule 1 Capsule(s) Inhalation daily    MEDICATIONS  (PRN):  acetaminophen    Suspension .. 650 milliGRAM(s) Oral every 6 hours PRN Temp greater or equal to 38C (100.4F), Mild Pain (1 - 3), Moderate Pain (4 - 6)  sodium chloride 0.9% lock flush 10 milliLiter(s) IV Push every 1 hour PRN Pre/post blood products, medications, blood draw, and to maintain line patency      Vital Signs Last 24 Hrs  T(C): 36.3 (13 Mar 2020 14:56), Max: 36.3 (13 Mar 2020 00:19)  T(F): 97.3 (13 Mar 2020 14:56), Max: 97.3 (13 Mar 2020 00:19)  HR: 88 (13 Mar 2020 14:56) (79 - 88)  BP: 109/50 (13 Mar 2020 14:56) (105/55 - 123/74)  BP(mean): --  RR: 20 (13 Mar 2020 14:56) (18 - 20)  SpO2: 99% (13 Mar 2020 14:56) (99% - 100%)  CAPILLARY BLOOD GLUCOSE        I&O's Summary    12 Mar 2020 07:01  -  13 Mar 2020 07:00  --------------------------------------------------------  IN: 1130 mL / OUT: 1250 mL / NET: -120 mL    13 Mar 2020 07:01  -  13 Mar 2020 17:03  --------------------------------------------------------  IN: 100 mL / OUT: 150 mL / NET: -50 mL        PHYSICAL EXAM:  GENERAL:  frail, thin, breathing normal, intermittently sleepy today with minimal speech but following commands  HEAD:  Atraumatic, Normocephalic  EYES: conjunctiva and sclera clear  NECK: supple, No JVD  CHEST/LUNG: crackles b/l bases but overall right lung with improved bs. no wheezing appreciated  HEART: S1 S2 RRR  ABDOMEN: +BS Soft, NT/ND  EXTREMITIES:  2+ DP Pulses, No c/c. no LE edema  NEUROLOGY: AAOx3, no facial droop, moving all extremities  SKIN: multiple skin tears UEs and LEs- some wrapped    LABS:                        8.8    7.09  )-----------( 51       ( 13 Mar 2020 06:09 )             28.4     03-13    152<H>  |  119<H>  |  101<H>  ----------------------------<  81  3.3<L>   |  16<L>  |  2.63<H>    Ca    8.9      13 Mar 2020 06:09    TPro  4.4<L>  /  Alb  2.3<L>  /  TBili  1.3<H>  /  DBili  x   /  AST  32  /  ALT  14  /  AlkPhos  131<H>  03-13              RADIOLOGY & ADDITIONAL TESTS:    Imaging Personally Reviewed:   Consultant(s) Notes Reviewed:    Care Discussed with Consultants/Other Providers: d/w Cards- Dr. Craven regarding fluids - okay to continue for now

## 2020-03-13 NOTE — SWALLOW BEDSIDE ASSESSMENT ADULT - ORAL PREPARATORY PHASE
Patient with oral defensiveness, accepting limited trials by mouth with maximal encouragement/Refuses to accept bolus into oral cavity
Reduced oral grading/Refuses to accept bolus into oral cavity

## 2020-03-13 NOTE — PROGRESS NOTE ADULT - ASSESSMENT
89 y/o F with history of Afib on dig (not on AC), severe AS, COPD, CLL (s/p treatment with Treanda and Rituxan), HCC+cirrhosis with portal HTN, esophageal varices s/p banding and hepatic vessel coiling, CKD, hypothyroidism, recurrent UTIs with ESBL Klebsiella, recent admission in February 2020 for serratia bacteremia and candida glabrata fungemia s/p treatment, presenting from home after a syncopal episode.     AMS overall unchanged - awakes to name and follows commands but weak and sleepy intermittently - no focal deficits on exam  differential includes encephalopathy from cirrhosis (c/w lactulose), uremia with elevated bun (no dialysis per family), infection (ct chest negative for pna, pleural fluid transudative, will check RVP)  pleural effusions seen on lung us s/p thoracentesis with improved lung exam    Long discussion with daughters Advanced care planning - face to face time 28 minutes- patient is DNR/DNI no invasive measures, does not want dialysis, has considered feeding tube in the past but felt she could not get at the time due to ascities. diet changed to puree today will monitor for improvement.

## 2020-03-13 NOTE — PROGRESS NOTE ADULT - PROBLEM SELECTOR PLAN 3
-pt presented with syncope on admission  -likely secondary to dehydration vs vasovagal syncope as pt was on toilet having a BM when it happened   -pt was also recently started on propranolol and spironolactone as an outpt, which may have contributed to symptoms as well as dehydration. Have been holding in setting of diarrhea and now infection  -cont tele- in afib, rate controlled. continue monitoring for now  -high sensitivity trops negx2   -EKG without acute ischemic changes  -continue home midodrine would hold for elevated bp- plan was for pt to titrate off of it slowly as an outpatient after being on propranolol, spironolactone for some time (being managed by Dr Boston)  cards consult appreciated - critical AS on TTE - not candidate for intervention, ectopy on telemetry - ?due to volume overload - continue to monitor

## 2020-03-13 NOTE — SWALLOW BEDSIDE ASSESSMENT ADULT - SWALLOW EVAL: DIAGNOSIS
Pt admitted following syncopal episode, now with oropharyngeal dysphagia as per bedside swallow evaluation on 3/12. Pt placed on a dysphagia 1 with nectar thick liquid diet. SLP attempted re-assessment of swallow today to monitor diet texture tolerance, however, pt lethargic with difficulty to rouse despite being provided with verbal and tactile stimuli. No PO trials administered at this time. Case discussed with ALONZO Mercado who states pt fluctuates throughout the day re: arousal level. This service to follow-up in the PM. Pt admitted for syncope, now presents with oropharyngeal dysphagia marked by significantly delayed oral transit time, delayed initiation of swallow trigger, multiple swallows, audible swallows suggestive if discoordination of swallow. There is a behavioral component to the dysphagia marked by reduced acceptance of PO trials. Pt accepted 1 1/2 tsp of puree textures, and 1 tsp of nectar thick liquids during exam. While no overt s/s of laryngeal penetration/aspiration were observed, findings of this assessment may be limited due to minimal number of trials able to be provided.

## 2020-03-13 NOTE — SWALLOW BEDSIDE ASSESSMENT ADULT - SLP GENERAL OBSERVATIONS
Pt found asleep in bed partially reclined on 4L O2 via nasal cannula. Pt lethargic and difficult to rouse despite increased verbal and tactile cues provided by SLP. When asked orientation questions, pt essentially nonverbal and demonstrated difficulty phonating despite being provided with cues for diaphragmatic breathing techniques. Pt found asleep in bed partially reclined on 4L O2 via nasal cannula. Pt lethargic, however, able to be roused for short periods of time when provided with increased verbal and tactile cues per SLP. Pt with dysphonia marked by breathy vocal quality and hypophonia. Pt able to improve vocal intensity when cued by SLP for diaphragmatic breathing techniques.

## 2020-03-13 NOTE — SWALLOW BEDSIDE ASSESSMENT ADULT - ASR SWALLOW LINGUAL MOBILITY
reduced strength and range of motion noted/impaired protrusion/impaired anterior elevation/impaired left lateral movement/impaired right lateral movement
impaired protrusion/lingual weakness

## 2020-03-13 NOTE — SWALLOW BEDSIDE ASSESSMENT ADULT - PHARYNGEAL PHASE
Delayed pharyngeal swallow/pharyngeal swallow trigger delay up to 3 seconds for liquids, and up to 9 seconds for solids/Multiple swallows
2-3 swallows, audible swallows suggestive of discoordination of swallow/Delayed pharyngeal swallow/Multiple swallows

## 2020-03-13 NOTE — SWALLOW BEDSIDE ASSESSMENT ADULT - COMMENTS
Per Pulm 3/11:  Pulmonary consulted for large Right pleural effusion. Prior imaging reveals right sided pleural effusions at least since 2018. Prior effusion may have been related to underlying valvular disease vs cirrhosis. Now with complicated right pleural effusion on POCUS. Plan: - Will perform thoracentesis 3/11 - Cont. antibiotics- F/u thoracentesis labs .     Hospital Course:   3/11: Chart Note Event:   Patient noted to be pocketing food in her mouth at times. Patient instructed at those times to swallow all the food in her mouth and clear out her mouth. Patient follows instructions and swallows all the food in her mouth and clears her mouth whenever patient is instructed to do so. NP ordered Speech and Swallow.    CT Chest 3/10: IMPRESSION: Large right pleural effusion with adjacent passive atelectasis. Trace left effusion with passive atelectasis. Subcutaneous emphysema at site of tunneled line insertion extending to the anterior mediastinum. CT Head 3/10: IMPRESSION: No acute intracranial hemorrhage. 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    3/12: Pt seen for bedside swallow evaluation with recommendations as follows: dysphagia 1 with nectar thick liquids  not indicated at this time  alternate food with liquid; check mouth frequently for oral residue/pocketing; crush medication (when feasible); oral hygiene; position upright (90 degrees); small sips/bites; Only feed patient when awake/alert! dependent  1:1 assist Per attending on 3/4, EKG completed due to syncope, no acute ischemic changes found. Pt given gentle IVF. Pt with dehydration 2/2 poor PO intake. Pt with mild transaminitis thrombocytopenia, anemia, continue to trend. On 3/6 pt with increased lethargy, now with positive UA, UTI suspected from diarrhea, treated with abx for UTI. 3/8 per attending hospitalist, pt with acute on chronic renal failure with sonogram of abdomen showing no ascites.   Pulmonary consulted on 3/11 for large Right pleural effusion. Prior imaging reveals right sided pleural effusions at least since 2018. Prior effusion may have been related to underlying valvular disease vs cirrhosis. Now with complicated right pleural effusion on POCUS. Plan: - Will perform thoracentesis 3/11 - Cont. antibiotics- F/u thoracentesis labs . On 3/11 patient noted to be pocketing food in her mouth at times. Patient instructed at those times to swallow all the food in her mouth and clear out her mouth. Patient follows instructions and swallows all the food in her mouth and clears her mouth whenever patient is instructed to do so. NP ordered Speech and Swallow.    CT Chest 3/10: IMPRESSION: Large right pleural effusion with adjacent passive atelectasis. Trace left effusion with passive atelectasis. Subcutaneous emphysema at site of tunneled line insertion extending to the anterior mediastinum. CT Head 3/10: IMPRESSION: No acute intracranial hemorrhage. 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    3/12: Pt seen for bedside swallow evaluation with recommendations as follows: dysphagia 1 with nectar thick liquids  not indicated at this time  alternate food with liquid; check mouth frequently for oral residue/pocketing; crush medication (when feasible); oral hygiene; position upright (90 degrees); small sips/bites; Only feed patient when awake/alert! dependent  1:1 assist

## 2020-03-13 NOTE — PROGRESS NOTE ADULT - PROBLEM SELECTOR PLAN 8
pt with intermittent hepatic encephalopathy, hx of varices. managed medically  -continue with rifaximin  -restarted lactulose   -holding spironolactone and propanolol for now  -noted mild transaminitis, thrombocytopenia, anemia- cont to trend.  -abdominal sono without ascities

## 2020-03-13 NOTE — SWALLOW BEDSIDE ASSESSMENT ADULT - SWALLOW EVAL: RECOMMENDED DIET
This service to follow-up in the afternoon, however, consider NPO, with temporary alternative means of nutrition/hydration given reduced mentation placing pt at high aspiration risk. This service to follow-up in the afternoon, however, consider NPO, with temporary alternative means of nutrition/hydration given reduced mentation placing pt at high aspiration risk. As per discussion with LAYLA Delgado, family GOC to be determined. Defer to MD recommendations regarding nutrition/hydration as this exam was limited. As per discussion with LAYLA Delgado, GOC to be further discussed. This service to follow-up as clinically indicated.

## 2020-03-13 NOTE — SWALLOW BEDSIDE ASSESSMENT ADULT - ORAL PHASE
delay across tested consistencies of 3-5 seconds/Delayed oral transit time
Delayed oral transit time/suspected premature spillage

## 2020-03-13 NOTE — PROGRESS NOTE ADULT - ATTENDING COMMENTS
#KARRIE -ATN -hypotension/UTI- cr stable  -renal sono no hydro  -has rivero, monitor UO  #met acidosis- monitor bicarb trend; start sodium bicarb tabs 1300mg po tid  #hypotension-monitor BP; ivf off now; encourage po hydration  #UTI Klebsiella-antibiotics  #hypernatremia- not taking enough po hydration- start 1/2NS@75cc/hr; monitor lung exam #KARRIE -ATN -hypotension/UTI- cr stable  -renal sono no hydro  -has rivero, monitor UO  #met acidosis- monitor bicarb trend; start sodium bicarb tabs 1300mg po tid  #hypotension-monitor BP; ivf off now; encourage po hydration  #UTI Klebsiella-antibiotics  #hypernatremia- not taking enough po hydration- start 1/2NS@75cc/hr; monitor lung exam;

## 2020-03-13 NOTE — PROGRESS NOTE ADULT - PROBLEM SELECTOR PLAN 1
rising leukocytosis, increased lethargy and positive UA/UCx ESBL Klebsiella. Afebrile. UTI likely present on admission  UCX with ESBL Klebsiella - started on invanz per ID (day 5 of 5) - d/c after today's dose  -monitor for fever, worsening mental status, or increasing leukocytosis

## 2020-03-14 VITALS — SYSTOLIC BLOOD PRESSURE: 68 MMHG | DIASTOLIC BLOOD PRESSURE: 44 MMHG | HEART RATE: 87 BPM

## 2020-03-14 DIAGNOSIS — Z71.89 OTHER SPECIFIED COUNSELING: ICD-10-CM

## 2020-03-14 PROCEDURE — 82140 ASSAY OF AMMONIA: CPT

## 2020-03-14 PROCEDURE — 94640 AIRWAY INHALATION TREATMENT: CPT

## 2020-03-14 PROCEDURE — 87086 URINE CULTURE/COLONY COUNT: CPT

## 2020-03-14 PROCEDURE — 82040 ASSAY OF SERUM ALBUMIN: CPT

## 2020-03-14 PROCEDURE — 70450 CT HEAD/BRAIN W/O DYE: CPT

## 2020-03-14 PROCEDURE — 85610 PROTHROMBIN TIME: CPT

## 2020-03-14 PROCEDURE — 87186 SC STD MICRODIL/AGAR DIL: CPT

## 2020-03-14 PROCEDURE — 76700 US EXAM ABDOM COMPLETE: CPT

## 2020-03-14 PROCEDURE — 77001 FLUOROGUIDE FOR VEIN DEVICE: CPT

## 2020-03-14 PROCEDURE — 51701 INSERT BLADDER CATHETER: CPT

## 2020-03-14 PROCEDURE — 84300 ASSAY OF URINE SODIUM: CPT

## 2020-03-14 PROCEDURE — 81001 URINALYSIS AUTO W/SCOPE: CPT

## 2020-03-14 PROCEDURE — 87040 BLOOD CULTURE FOR BACTERIA: CPT

## 2020-03-14 PROCEDURE — 84132 ASSAY OF SERUM POTASSIUM: CPT

## 2020-03-14 PROCEDURE — 76937 US GUIDE VASCULAR ACCESS: CPT

## 2020-03-14 PROCEDURE — 85027 COMPLETE CBC AUTOMATED: CPT

## 2020-03-14 PROCEDURE — 84295 ASSAY OF SERUM SODIUM: CPT

## 2020-03-14 PROCEDURE — 83735 ASSAY OF MAGNESIUM: CPT

## 2020-03-14 PROCEDURE — 83986 ASSAY PH BODY FLUID NOS: CPT

## 2020-03-14 PROCEDURE — 92610 EVALUATE SWALLOWING FUNCTION: CPT

## 2020-03-14 PROCEDURE — 82435 ASSAY OF BLOOD CHLORIDE: CPT

## 2020-03-14 PROCEDURE — 82945 GLUCOSE OTHER FLUID: CPT

## 2020-03-14 PROCEDURE — P9047: CPT

## 2020-03-14 PROCEDURE — 97530 THERAPEUTIC ACTIVITIES: CPT

## 2020-03-14 PROCEDURE — 84157 ASSAY OF PROTEIN OTHER: CPT

## 2020-03-14 PROCEDURE — 87633 RESP VIRUS 12-25 TARGETS: CPT

## 2020-03-14 PROCEDURE — 74018 RADEX ABDOMEN 1 VIEW: CPT

## 2020-03-14 PROCEDURE — 71045 X-RAY EXAM CHEST 1 VIEW: CPT

## 2020-03-14 PROCEDURE — 97116 GAIT TRAINING THERAPY: CPT

## 2020-03-14 PROCEDURE — C1751: CPT

## 2020-03-14 PROCEDURE — 82803 BLOOD GASES ANY COMBINATION: CPT

## 2020-03-14 PROCEDURE — 87581 M.PNEUMON DNA AMP PROBE: CPT

## 2020-03-14 PROCEDURE — 80048 BASIC METABOLIC PNL TOTAL CA: CPT

## 2020-03-14 PROCEDURE — 83605 ASSAY OF LACTIC ACID: CPT

## 2020-03-14 PROCEDURE — 97162 PT EVAL MOD COMPLEX 30 MIN: CPT

## 2020-03-14 PROCEDURE — 99285 EMERGENCY DEPT VISIT HI MDM: CPT | Mod: 25

## 2020-03-14 PROCEDURE — 82947 ASSAY GLUCOSE BLOOD QUANT: CPT

## 2020-03-14 PROCEDURE — 85730 THROMBOPLASTIN TIME PARTIAL: CPT

## 2020-03-14 PROCEDURE — 87486 CHLMYD PNEUM DNA AMP PROBE: CPT

## 2020-03-14 PROCEDURE — 83615 LACTATE (LD) (LDH) ENZYME: CPT

## 2020-03-14 PROCEDURE — 88112 CYTOPATH CELL ENHANCE TECH: CPT

## 2020-03-14 PROCEDURE — 87798 DETECT AGENT NOS DNA AMP: CPT

## 2020-03-14 PROCEDURE — 80053 COMPREHEN METABOLIC PANEL: CPT

## 2020-03-14 PROCEDURE — C1894: CPT

## 2020-03-14 PROCEDURE — 80299 QUANTITATIVE ASSAY DRUG: CPT

## 2020-03-14 PROCEDURE — 87205 SMEAR GRAM STAIN: CPT

## 2020-03-14 PROCEDURE — C1769: CPT

## 2020-03-14 PROCEDURE — 82962 GLUCOSE BLOOD TEST: CPT

## 2020-03-14 PROCEDURE — 85014 HEMATOCRIT: CPT

## 2020-03-14 PROCEDURE — 87075 CULTR BACTERIA EXCEPT BLOOD: CPT

## 2020-03-14 PROCEDURE — 82330 ASSAY OF CALCIUM: CPT

## 2020-03-14 PROCEDURE — 88305 TISSUE EXAM BY PATHOLOGIST: CPT

## 2020-03-14 PROCEDURE — 36556 INSERT NON-TUNNEL CV CATH: CPT

## 2020-03-14 PROCEDURE — 84100 ASSAY OF PHOSPHORUS: CPT

## 2020-03-14 PROCEDURE — 97110 THERAPEUTIC EXERCISES: CPT

## 2020-03-14 PROCEDURE — 89051 BODY FLUID CELL COUNT: CPT

## 2020-03-14 PROCEDURE — 87070 CULTURE OTHR SPECIMN AEROBIC: CPT

## 2020-03-14 PROCEDURE — 93005 ELECTROCARDIOGRAM TRACING: CPT | Mod: XU

## 2020-03-14 PROCEDURE — 71250 CT THORAX DX C-: CPT

## 2020-03-14 PROCEDURE — 99233 SBSQ HOSP IP/OBS HIGH 50: CPT

## 2020-03-14 PROCEDURE — 82570 ASSAY OF URINE CREATININE: CPT

## 2020-03-14 RX ORDER — ALBUMIN HUMAN 25 %
50 VIAL (ML) INTRAVENOUS ONCE
Refills: 0 | Status: COMPLETED | OUTPATIENT
Start: 2020-03-14 | End: 2020-03-14

## 2020-03-14 RX ORDER — HYDROMORPHONE HYDROCHLORIDE 2 MG/ML
0.2 INJECTION INTRAMUSCULAR; INTRAVENOUS; SUBCUTANEOUS EVERY 4 HOURS
Refills: 0 | Status: DISCONTINUED | OUTPATIENT
Start: 2020-03-14 | End: 2020-03-14

## 2020-03-14 RX ADMIN — Medication 1 APPLICATION(S): at 11:46

## 2020-03-14 RX ADMIN — Medication 50 MILLILITER(S): at 02:49

## 2020-03-14 RX ADMIN — Medication 50 MILLILITER(S): at 04:40

## 2020-03-14 NOTE — PROVIDER CONTACT NOTE (OTHER) - SITUATION
patient is DNR on comfort  care . no respiration and no pulse / no heart sound
Patient refused AM care including medications, vital signs, lab work
Patient refusing bedtime medications and care
Patient's daughter states that patient is keeping the food in her mouth and not swallowing all her food and patient started this behavior yesterday.
Pt had 11 beats wide complex on tele monitor
Pt's severely hypotensive, BP 67/46...unable to read manually. Unable to read oxygen saturation

## 2020-03-14 NOTE — PROGRESS NOTE ADULT - REASON FOR ADMISSION
syncope and diarrhea

## 2020-03-14 NOTE — PROGRESS NOTE ADULT - PROBLEM SELECTOR PROBLEM 5
Wounds, multiple
AF (Atrial Fibrillation)
AF (Atrial Fibrillation)
Diarrhea
Wounds, multiple

## 2020-03-14 NOTE — PROGRESS NOTE ADULT - PROBLEM SELECTOR PLAN 5
may have been from too much lactulose at home. diarrhea improved  now off lactulose as focus is comfort

## 2020-03-14 NOTE — PROGRESS NOTE ADULT - PROBLEM SELECTOR PROBLEM 7
Severe aortic stenosis by prior echocardiogram
AF (Atrial Fibrillation)
Severe aortic stenosis by prior echocardiogram

## 2020-03-14 NOTE — PROGRESS NOTE ADULT - ASSESSMENT
89 y/o F with history of Afib on dig (not on AC), severe AS, COPD, CLL (s/p treatment with Treanda and Rituxan), HCC+cirrhosis with portal HTN, esophageal varices s/p banding and hepatic vessel coiling, CKD, hypothyroidism, recurrent UTIs with ESBL Klebsiella, recent admission in February 2020 for serratia bacteremia and candida glabrata fungemia s/p treatment, presenting from home after a syncopal episode.     AMS overall unchanged - awakes to name and follows commands but weak and sleepy intermittently - no focal deficits on exam  differential includes encephalopathy from cirrhosis (c/w lactulose), uremia with elevated bun (no dialysis per family), infection   pleural effusions seen on lung us s/p thoracentesis with improved lung exam

## 2020-03-14 NOTE — PROGRESS NOTE ADULT - PROBLEM SELECTOR PROBLEM 6
AF (Atrial Fibrillation)
COPD (Chronic Obstructive Pulmonary Disease)
COPD (Chronic Obstructive Pulmonary Disease)
Wounds, multiple
AF (Atrial Fibrillation)

## 2020-03-14 NOTE — PROGRESS NOTE ADULT - PROBLEM SELECTOR PROBLEM 10
Discharge planning issues
Protein calorie malnutrition
Discharge planning issues

## 2020-03-14 NOTE — PROVIDER CONTACT NOTE (OTHER) - ASSESSMENT
Pt more lethargic than last night. Opens eyes spontaneously and nods in response to RN. BP 67/46, , Mews score of 8.

## 2020-03-14 NOTE — PROGRESS NOTE ADULT - ATTENDING COMMENTS
discussed with all daughters at bedside that pt likely to pass this weekend. we are focusing on only comfort measures at this time. theyre in agreement with understanding.

## 2020-03-14 NOTE — PROGRESS NOTE ADULT - PROBLEM SELECTOR PROBLEM 8
Liver cirrhosis
Severe aortic stenosis by prior echocardiogram
Liver cirrhosis

## 2020-03-14 NOTE — PROVIDER CONTACT NOTE (OTHER) - DATE AND TIME:
14-Mar-2020 15:25
03-Mar-2020 23:06
04-Mar-2020 06:45
09-Mar-2020 22:30
11-Mar-2020 10:00
14-Mar-2020 04:00

## 2020-03-14 NOTE — PROVIDER CONTACT NOTE (OTHER) - ACTION/TREATMENT ORDERED:
in to see patient /  patient  is /
Continue encouraging care. and repeat VS q4h
Continue encouraging, and 1 time dose of synthroid 12.5mg IVP
BMP to be ordered and sent, will continue to monitor
DO & NP aware & at bedside assessing patient & reviewed all orders, labs, test results, history & plan. NP ordered Speech and Swallow.
NP immediately evaluated pt. Albumin ordered Family called. Pt appears comfortable at this time.

## 2020-03-14 NOTE — PROGRESS NOTE ADULT - PROBLEM SELECTOR PLAN 3
-likely multifactorial- possible ATN from hypotension on admission  -per discussions now with family, comfort measures only  -dc meds

## 2020-03-14 NOTE — DISCHARGE NOTE FOR THE EXPIRED PATIENT - HOSPITAL COURSE
to be done 89 y/o F with history of Afib on dig (not on AC), severe AS, COPD, CLL (s/p treatment with Treanda and Rituxan), HCC and decompensated cirrhosis with portal HTN, esophageal varices s/p banding and hepatic vessel coiling, CKD, hypothyroidism, recurrent UTIs with ESBL Klebsiella, recent admission in February 2020 for serratia bacteremia and candida glabrata fungemia s/p treatment, presenting from home after a syncopal episode likely from diarrhea in setting of taking lactulose for liver failure/cirrhosis. Complicated by urinary tract infection with ESBL s/p treatment. Hospital course also complicated by acute kidney injury and metabolic encephalopathy likely secondary to uremia from renal failure and hepatic encephalopathy from hepatic failure as well as possibly contributed from infection (UTI). pt with multiorgan dysfunction, severe protein calorie malnutrition, unable to take PO. Family decided to pursue comfort measures, and all medications were stopped. patient passed away comfortably on 3/14.     cause of death: hepatic failure (decompensated cirrhosis) 89 y/o F with history of Afib on dig (not on AC), severe AS, COPD, CLL (s/p treatment with Treanda and Rituxan), HCC and decompensated cirrhosis with portal HTN, esophageal varices s/p banding and hepatic vessel coiling, CKD, hypothyroidism, recurrent UTIs with ESBL Klebsiella, recent admission in February 2020 for serratia bacteremia and candida glabrata fungemia s/p treatment, presenting from home after a syncopal episode likely from diarrhea in setting of taking lactulose for liver failure/cirrhosis. Sepsis present on admission from urinary tract infection with ESBL s/p treatment. Hospital course also complicated by acute kidney injury and metabolic encephalopathy likely secondary to uremia from renal failure and hepatic encephalopathy from hepatic failure as well as possibly contributed from infection (UTI). pt with multiorgan dysfunction, severe protein calorie malnutrition, unable to take PO. Family decided to pursue comfort measures, and all medications were stopped. patient passed away comfortably on 3/14.     cause of death: hepatic failure (decompensated cirrhosis)

## 2020-03-14 NOTE — PROGRESS NOTE ADULT - PROBLEM SELECTOR PLAN 1
Advanced care planning - patient is DNR/DNI no invasive measures, does not want dialysis, has considered feeding tube in the past but felt she could not get at the time due to ascities. comfort feeding only. per discussion as above pt is now comfort measures only. likely will pass in hospital. prognosis hours to days  -prn dilaudid 0.2mg for respiratory distress or pain

## 2020-03-14 NOTE — PROVIDER CONTACT NOTE (OTHER) - NAME OF MD/NP/PA/DO NOTIFIED:
np april mayer
Ignacia Sanches and Natalee Briseno
LAYLA Gordillo
LAYLA Gordillo
LAYLA Morrow
Mane, NP

## 2020-03-14 NOTE — PROVIDER CONTACT NOTE (OTHER) - BACKGROUND
all 4 daughter are by bedside
89y/o female admitted with syncope, Afib on telemonitoring. DNR
Patient admitted for Syncope and Collapse, Renal Failure, Urinary Tract Infection, Liver Cirrhosis, Chronic Obstructive Pulmonary Disease, Dehydration.
Pt admitted for syncope/UTI.
Syncope and collapse,   Hx: cirrhosis, afib, CKD
Syncope and collapse,   Hx: cirrhosis, afib, CKD,

## 2020-03-14 NOTE — PROGRESS NOTE ADULT - PROBLEM SELECTOR PLAN 10
Transitions of Care Status:  1.  Name of PCP: Fallon Celaya   2.  PCP Contacted on Admission: [ x] Y    [ ] N    3.  PCP contacted at Discharge: [ ] Y    [ ] N    [ ] N/A  4.  Post-Discharge Appointment Date and Location:  5.  Summary of Handoff given to PCP:
severe. appreciate nutrition recs
Transitions of Care Status:  1.  Name of PCP: Fallon Celaya   2.  PCP Contacted on Admission: [ x] Y    [ ] N    3.  PCP contacted at Discharge: [ ] Y    [ ] N    [ ] N/A  4.  Post-Discharge Appointment Date and Location:  5.  Summary of Handoff given to PCP:

## 2020-03-14 NOTE — CHART NOTE - NSCHARTNOTEFT_GEN_A_CORE
Called by RN, pt BP systolic 50s with worsening lethargy. Pt seen at bedside, BP unattainable with manual cuff.  Pt able to respond to verbal questions and nod, but unable to keep her eyes open and follow commands.   89 y/o F with history of Afib on dig (not on AC), severe AS, COPD, CLL (s/p treatment with Treanda and Rituxan), HCC+cirrhosis with portal HTN, esophageal varices s/p banding and hepatic vessel coiling, CKD, hypothyroidism, recurrent UTIs with ESBL Klebsiella, recent admission in February 2020 for serratia bacteremia and candida glabrata fungemia s/p treatment, presenting from home after a syncopal episode.   Pt with AMS unchanged since admission as per documentation, likely encephalopathy from cirrhosis (c/w lactulose), uremia with elevated bun (no dialysis per family), infection on Invanz for UTI. PT also s/p pleural effusions seen on lung us s/p thoracentesis with improved lung exam. Hospitalist have spoken to daughters about  Advanced care, patient is DNR/DNI no invasive measures, does not want dialysis, has considered feeding tube in the past but felt she could not get at the time due to ascites. Pt with minimal p.o. intake on IVFs w/ dextrose.   Pt given Albumin, one time to hold BP, presently unable to take midodrine as previously ordered.  Daughter notified of poor prognosis, likely actively passing away.   Daughters came to bedside to say good bye. Will continue to monitor patient.    Cristhian Ohara, LAYLA  x [phone]

## 2020-03-14 NOTE — PROGRESS NOTE ADULT - SUBJECTIVE AND OBJECTIVE BOX
[facility] Division of Hospital Medicine  Nury Milligan MD  Pager (M-F, 8A-5P): [phone]  Other Times:  [phone]    Patient is a 89y old  Female who presents with a chief complaint of syncope and diarrhea (13 Mar 2020 17:02)      SUBJECTIVE / OVERNIGHT EVENTS:    pt became hypotensive overnight. minimally arousable. unable to take PO midodrine or other meds. overnight NP discussed with family and I confirmed this morning- full comfort measures in place. they understand she will likely pass away.       ADDITIONAL REVIEW OF SYSTEMS:    MEDICATIONS  (STANDING):  albuterol/ipratropium for Nebulization 3 milliLiter(s) Nebulizer every 6 hours  AQUAPHOR (petrolatum Ointment) 1 Application(s) Topical daily  buDESOnide    Inhalation Suspension 0.25 milliGRAM(s) Nebulizer every 12 hours  chlorhexidine 4% Liquid 1 Application(s) Topical <User Schedule>  dextrose 5% + sodium chloride 0.45%. 1000 milliLiter(s) (50 mL/Hr) IV Continuous <Continuous>  tiotropium 18 MICROgram(s) Capsule 1 Capsule(s) Inhalation daily    MEDICATIONS  (PRN):  acetaminophen    Suspension .. 650 milliGRAM(s) Oral every 6 hours PRN Temp greater or equal to 38C (100.4F), Mild Pain (1 - 3), Moderate Pain (4 - 6)  HYDROmorphone  Injectable 0.2 milliGRAM(s) IV Push every 4 hours PRN dyspnea or pain  sodium chloride 0.9% lock flush 10 milliLiter(s) IV Push every 1 hour PRN Pre/post blood products, medications, blood draw, and to maintain line patency      CAPILLARY BLOOD GLUCOSE        I&O's Summary    13 Mar 2020 07:01  -  14 Mar 2020 07:00  --------------------------------------------------------  IN: 590 mL / OUT: 350 mL / NET: 240 mL        PHYSICAL EXAM:  Vital Signs Last 24 Hrs  T(C): 36.6 (14 Mar 2020 04:00), Max: 36.6 (14 Mar 2020 04:00)  T(F): 97.8 (14 Mar 2020 04:00), Max: 97.8 (14 Mar 2020 04:00)  HR: 87 (14 Mar 2020 08:30) (87 - 101)  BP: 68/44 (14 Mar 2020 08:30) (52/33 - 109/50)  BP(mean): --  RR: 14 (14 Mar 2020 07:44) (14 - 20)  SpO2: 99% (13 Mar 2020 21:45) (99% - 99%)  CONSTITUTIONAL: chronically ill. but appears comfortabe. minimal agonal breathing. no grimacing or signs of pain.    LABS:                        8.8    7.09  )-----------( 51       ( 13 Mar 2020 06:09 )             28.4     03-13    152<H>  |  119<H>  |  101<H>  ----------------------------<  81  3.3<L>   |  16<L>  |  2.63<H>    Ca    8.9      13 Mar 2020 06:09    TPro  4.4<L>  /  Alb  2.3<L>  /  TBili  1.3<H>  /  DBili  x   /  AST  32  /  ALT  14  /  AlkPhos  131<H>  03-13              Culture - Body Fluid with Gram Stain (collected 11 Mar 2020 22:32)  Source: .Body Fluid Pleural Fluid  Gram Stain (12 Mar 2020 06:02):    polymorphonuclear leukocytes seen    No organisms seen    by cytocentrifuge  Preliminary Report (12 Mar 2020 18:09):    No growth        RADIOLOGY & ADDITIONAL TESTS:  Results Reviewed:  yes      COORDINATION OF CARE:  Care Discussed with Consultants/Other Providers [Y/N]: yes  Prior or Outpatient Records Reviewed [Y/N]: yes

## 2020-03-16 LAB
CULTURE RESULTS: SIGNIFICANT CHANGE UP
SPECIMEN SOURCE: SIGNIFICANT CHANGE UP

## 2020-11-11 NOTE — H&P ADULT. - LIVES WITH, PROFILE
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 Device Type:Positive for CAD  Positive for hypertension  Positive for hyperlipidemia    GI/HEPATIC/RENAL Review:    Positive for renal disease - chronic renal insufficiency    End/Other Review:  Positive for diabetes  Positive for anemia    Overall Review of Systems Comments:   TTE October 2020  1. Left ventricle: The cavity size is normal. Wall thickness is normal.     The ejection fraction was measured by visual estimation. The tissue     Doppler parameters are abnormal. The study is not technically     sufficient to allow evaluation of LV diastolic function. The ejection     fraction is 40%-45%.  2. Regional wall motion abnormalities: Hypokinesis of the mid-apical     anterior, mid anteroseptal, apical inferior, mid anterolateral, apical     septal, apical lateral, and apical myocardium.  3. Aortic valve: Mild regurgitation.  4. Mitral valve: Mild regurgitation.  5. Right ventricle: Pacemaker lead noted in right ventricle.  6. Right atrium: Pacemaker lead noted in right atrium.  7. Tricuspid valve: Mild regurgitation.  8. Pericardium, extracardiac: There is no pericardial effusion    Stress echo August 2020  Impression:     1) Abnormal myocardial perfusion study with large fixed defect in the apical, anteroapical and apical-mid anterior wall with mild evi-infarct ischemia  2) Gated SPECT showed stress LVEF of 40%, apical and anterior hypokinesis    July 2020 Cath Report  · Left main:  no stenosis  · Prox LAD lesion with 50% stenosis.  · Mid LAD lesion with 90% stenosis.  · Mid Cx lesion with 70% stenosis.  · Prox RCA lesion with 40% stenosis.  · Dist RCA lesion with 60% stenosis.  Additional Results:  EKG:  Encounter Date: 10/01/20  -Electrocardiogram 12-Lead       Result                      Value                           Ventricular Rate EKG/M*     74                              Atrial Rate (BPM)           74                              MA-Interval (MSEC)          150                             QRS-Interval (MSEC)         172                             QT-Interval (MSEC)          456                             QTc                         506                             P Axis (Degrees)            28                              R Axis (Degrees)            -95                             T Axis (Degrees)            85                              REPORT TEXT                                             Normal sinus rhythm   ventricular-paced complexes   Abnormal ECG   When compared with ECG of   24-JUL-2020 12:30,   Electronic ventricular pacemaker   present   Confirmed by JENNIFER SEVILLA MD ([id number]) on 10/1/2020 5:37:04 PM    Echo:  Ejection Fraction       Date                     Value               Ref Range           Status                09/04/2020               40                  >/=50%              Final            ----------   ALLERGIES:  No Known Allergies       Lab Results       Component                Value               Date                       WBC                      6.4                 10/06/2020                 RBC                      4.08 (L)            10/06/2020                 HGB                      12.7 (L)            10/06/2020                 HCT                      37.7 (L)            10/06/2020                 MCHC                     33.7                10/06/2020                 SODIUM                   134 (L)             10/06/2020                 POTASSIUM                 3.7                 10/06/2020                 CHLORIDE                 101                 10/06/2020                 CO2                      26                  10/06/2020                 GLUCOSE                  156 (H)             10/06/2020                 BUN                      32 (H)              10/06/2020                 CREATININE               1.25 (H)            10/06/2020                 CALCIUM                  8.8                 10/06/2020                 PLT                      228                 10/06/2020                 PTT                      25                  10/01/2020                 INR                      1.0                 10/01/2020               Prior to Admission medications :  Medication levETIRAcetam (KepPRA) 500 MG tablet, Sig Take 1 tablet by mouth every 12 hours for 2 days., Start Date 10/6/20, End Date 10/21/20, Taking? , Authorizing Provider Tyrone Marin MD    Medication acetaminophen (Tylenol) 325 MG tablet, Sig Take 2 tablets by mouth every 4 hours as needed for Pain., Start Date 10/6/20, End Date , Taking? , Authorizing Provider Tyrone Marin MD    Medication spironolactone (ALDACTONE) 25 MG tablet, Sig Take 1 tablet by mouth daily., Start Date 9/1/20, End Date , Taking? , Authorizing Provider Jesus Alexander, DO    Medication potassium CHLORIDE (KLOR-CON M) 20 MEQ jazmine ER tablet, Sig Take 1 tablet by mouth daily (with breakfast)., Start Date 9/1/20, End Date , Taking? , Authorizing Provider Jesus Alexander, DO    Medication atorvastatin (LIPITOR) 80 MG tablet, Sig Take 1 tablet by mouth nightly., Start Date 9/1/20, End Date , Taking? , Authorizing Provider Jesus Alexander, DO    Medication furosemide (LASIX) 20 MG tablet, Sig Take 1 tablet by mouth daily., Start Date 9/1/20, End Date , Taking? , Authorizing Provider Jesus Alexander, DO    Medication metoPROLOL succinate (TOPROL-XL) 25 MG 24 hr tablet, Sig Take 1 tablet by mouth daily., Start Date 9/1/20,  End Date , Taking? , Authorizing Provider Jesus Alexander, DO    Medication glimepiride (AMARYL) 1 MG tablet, Sig TAKE 1 TABLET BY MOUTH DAILY (BEFORE BREAKFAST)., Start Date 8/27/20, End Date , Taking? , Authorizing Provider Mary Dawson, DO            Relevant Problems   No relevant active problems       Physical Exam     Airway   Mallampati: II  TM Distance: >3 FB  Neck ROM: Full  TMJ Mobility: Good  Unable to Examine: Uncooperative    Cardiovascular  Cardiovascular exam normal  Cardio Rhythm: Regular  Cardio Rate: Normal  Cardiovascular Note: Pacemaker device over left anterior chest    Head Assessment  Head assessment: Normocephalic    General Assessment  General Assessment: Alert and oriented    Dental Exam  Dental exam normal  Dental Note: Denies loose teeth    Pulmonary Exam  Pulmonary exam normal  Breath sounds clear to auscultation:  Yes    Abdominal Exam  Abdominal exam normal      Anesthesia Plan    ASA Status: 4    Anesthesia Type: General    Induction: Intravenous  Preferred Airway Type: ETT  Maintenance: Inhalational  Premedication: None      Risks Discussed: Nausea, Vomiting, Dental Injury, Sore Throat, Headache, Hypotension and ICU Admit    Post-op Pain Management: Per Surgeon      Checklist  Reviewed: NPO Status, Allergies, Medications, Problem list, Past Med History, Lab Results, EKG, Beta Blocker Status and DNR Status    Monitors  Discussed risks of the following Invasive monitors with patient: Arterial Line    Informed Consent  The proposed anesthetic plan, including its risks and benefits, have been discussed with the Patient  - along with the risks and benefits of alternatives.  Questions were encouraged and answered and the patient and/or representative understands and agrees to proceed.     Blood Products: Not Anticipated     children

## 2020-11-19 NOTE — DIETITIAN INITIAL EVALUATION ADULT. - NS AS NUTRI INTERV MEALS SNACK
3rd Attempt; No Answer- Left HIPAA compliant voicemail with Non-Urgent Heart Failure Resource Line number for call back.     Ernie Naidu HF BSN-RN  Heart Failure Navigator  [address] [place] [address]  [phone] Consider liberalize diet to soft, low sodium. Encourage po intake with nutrient dense foods. Provide food preferences as able. Monitor weight, lab values, po intake and GI tolerance. RD to remain available for further nutrition interventions as indicated.

## 2020-12-15 NOTE — PATIENT PROFILE ADULT. - CAREGIVER NAME
Julio Bustamante Body Location Override (Optional - Billing Will Still Be Based On Selected Body Map Location If Applicable): Right Nasal Ala

## 2020-12-21 PROBLEM — J06.9 VIRAL URI WITH COUGH: Status: RESOLVED | Noted: 2018-11-29 | Resolved: 2020-12-21

## 2020-12-23 NOTE — PATIENT PROFILE ADULT. - ASSIST WITH
atorvastatin (LIPITOR) 40 MG tablet 90 tablet 2 4/8/2020     Sig - Route: Take 1 tablet by mouth daily. - Oral      Refilled per protocol.    walking/toileting/standing

## 2021-01-20 NOTE — ED ADULT NURSE NOTE - NS ED NURSE DISCH DISPOSITION
From: Robbie Cruz  To: Camron Gamino  Sent: 1/20/2021 9:13 AM CST  Subject: Non-Urgent Medical Question    I spoke to Akial yesterday and forgot to ask if anyone has any knowledge as to when the covid19 vaccines will be available to me. Will there be a registration procedure to get on a waiting list.   Thank you  Onel   Admitted

## 2021-02-28 NOTE — DIETITIAN INITIAL EVALUATION ADULT. - ADD RECOMMEND
Add DASH/TLC therapeutic restriction at request of daughter. Defer texture/consistency to medical team/speech pathologisy; pt currently tolerating Dysphagia 2 Mechanical Soft - [place] Thick Fluids. RD availability to review nutrition education made known. Monitor tolerance to diet prescription, nutritional intake, weight trends, labs and skin integrity.
Agree

## 2021-04-09 NOTE — PROVIDER CONTACT NOTE (OTHER) - SITUATION
Patient reports that she has been having dark, almost black drainage from left incision site s/p double mastectectomy. Patient has bilateral drains in place. Denies fevers.   
Heart rate sustaining in upper 40s
HR 47 briefly on tele monitor.

## 2021-04-26 NOTE — PATIENT PROFILE ADULT. - TEACHING/LEARNING LEARNING PREFERENCES
· Refill requested by: Pharmacy Interface  · Last office visit for Cardio/Vasodilators: 3/2/2021  · Next office visit: 6/8/2021  Medication(s) Requested: amLODIPine (NORVASC) 2.5 MG tablet  · Last refill: 2/25/21  · Dosage: 2.5 mg   · Sig:  TAKE 1 TABLET BY MOUTH EVERY DAY  · Quantity requested: 60  Last 2 blood pressure readings:    BP Readings from Last 2 Encounters:   03/02/21 126/72   02/01/21 (!) 140/70     BP<140/90 at last OV/Nurse Visit  Refill if seen within the last 6 months  Filled per protocol.     audio

## 2021-06-08 NOTE — PATIENT PROFILE ADULT - HAVE YOU EXPERIENCED VIOLENCE OR A TRAUMATIC EVENT?
PT Evaluation     Today's date: 2021  Patient name: Jona Parra  : 1939  MRN: [id number]  Referring provider: Kelsy Diaz PA-C  Dx:   Encounter Diagnosis     ICD-10-CM    1  Urge incontinence  N39 41                   Assessment  Assessment details: Rosa Herrera is an 80year old female with history of urge incontinence that has persisted despite various treatment modalities  She has had pelvic PT in the past with some temporary improvement then symptoms returned  She presents now with impairments and functional limitations as outlined and could benefit from a trial of pelvic  PT to improve overall quality of life and reach goals as outlined  Impairments: abnormal coordination, abnormal muscle tone, activity intolerance, impaired physical strength and lacks appropriate home exercise program  Understanding of Dx/Px/POC: good   Prognosis: fair    Goals  ST  Patient will demonstrate independence in an ongoing home exercise program that will be ongoing throughout episode of care  2  Patient will complete a two day bladder diary within two weeks  3   Patient will demonstrate proper posture for best voiding within two weeks  LT  Patient will demonstrate use of a functional PFM contraction by performing a pre-contraction ("knack") to reduce UI during a cough or sneeze  2   Patient will perform 30 minutes of exercise without urinary leakage  3   Patient will have increased time of      2   hours between voids for increased participation in social and recreational activities within twelve weeks  4   Increased PFM strength to      at 7 second hold for 10 repetitions within 12 weeks  5   Patient will demonstrate successful use of urge suppression techniques to minimize episodes of UUI    Plan  Patient would benefit from: skilled physical therapy  Planned modality interventions: biofeedback  Planned therapy interventions: manual therapy, motor coordination training, neuromuscular re-education, behavior modification, therapeutic activities, therapeutic exercise and home exercise program  Frequency: 1x week  Duration in weeks: 12  Plan of Care beginning date: 2021  Plan of Care expiration date: 2021        PT Pelvic Floor Subjective:   History of Present Illness:     Patient is an 17-year-old female with a history of urinary incontinence and overactive bladder  Has undergone several overactive bladder modalities including tibial nerve stimulation, VESIcare and Myrbetriq and Botox 3 years ago without any relief  Recent cystoscopy negative  Now referred for pelvic PT trial  Patient had PT several years ago and cannot remember if it is helpful  Notes leakage on the way the bathroom in the morning  Notes nocturia one time per night at most but leakage on the way to the bathroom with first morning void and strong urge with leakage after sitting during the day  Notes medication helps some  She will have Botox in August      Quality of life: good    Social Support:     Lives with:  Alone    Relationship status: never     Work status: retired    Life stress severity: mild  Diet and Exercise:      stretching and Kegels  Bladder Function:     Voiding Difficulties positive for: urgency and frequent urination      Voiding Difficulties comments:     Voiding frequency: every 1-2 hours    Urinary leakage: urine leakage    Urinary leakage aggravated by: standing up and walking to the bathroom    Nocturia (episodes per night): 0 and 1    Fluid Intake Type:   Water  Incontinence Management:     Pads/Diaper Use:  24 hours  Bowel Function:     Bowel frequency: daily  Sexual Function:     Sexually Active:  Not sexually active  Pain:     Current pain ratin    At best pain ratin    Location:  Low back pain  Treatments:     Current treatment: medication    Patient Goals:     Patient goals for therapy:  Improved bladder or bowel function    Other patient goals:  More control      Objective     Postural Observations    Additional Postural Observation Details  Decreased lumbar anterior curve with elevation left pelvis versus right  Neurological Testing     Sensation     Lumbar   Left   Intact: light touch    Right   Intact: light touch    Active Range of Motion     Lumbar   Flexion: 55 degrees   Extension: 10 degrees   Left lateral flexion: 10 degrees       Right lateral flexion: 15 degrees     General Comments:    Lower quarter screen   Knees: unremarkable  Foot/ankle: unremarkable    Hip Comments   Left hip piriformis tightness  B SLR at 60 degrees  Strenght grossly 4-/5 hip ABD/ADD and flexion  Pelvic Floor Exam     General Perineum Exam:   perineum intact  Visual Inspection of Perineum:   Excursion of perineal body in cephalad direction with contraction of pelvic floor muscles (PFM): fair   Involuntary contraction with coughing: no    Pelvic Organ Prolapse   Position: hook-lying  At rest: mild and mild    Pelvic Floor Muscle Exam     Palpation   No increased muscle tension in the bulbospongiosus, ischiocavernosus and super transverse perineal    Muscle Contraction: overflow  Breathing pattern with contraction: holding breath  Pelvic floor muscle relaxation is delayed       PERFECT Score   Power right: 2+/5  Power left: 2+/5  Endurance (seconds to max): 5  Repetitions (before fatigue): 5  Fast flicks (in 10 seconds): 4               Precautions: LBP, L1 fracture history      Manuals                                                                 Neuro Re-Ed                                                                                                        Ther Ex             Endurance Kegel 5"/10"  10x  HEP            Quick  10x  HEP                                                                                          Ther Activity                                       Gait Training                                       Modalities no

## 2021-07-09 NOTE — PATIENT PROFILE ADULT. - NS PRO OT REFERRAL QUES 2 YN
Daily Note     Today's date: 2021  Patient name: Paolo Ahn  : 1955  MRN: [id number]  Referring provider: Javier Mayo MD  Dx:   Encounter Diagnosis     ICD-10-CM    1  Lumbar radiculopathy  M54 16        Start Time: 1000  Stop Time: 423  Total time in clinic (min): 45 minutes    Subjective: "My back is about the same  I can't move without pain  I always have pain but this is worse "      Objective: See treatment diary below      Assessment: Tolerated treatment fair  Trial of using lumbar roll on nu-step, slight relief with increased support noted  Trial of long axis distraction to B LE  Pt prefer single leg at a time vs double legs  Decreased burning noted in bilateral thighs with increased lumbar mobility at end of session  Decreased pain level noted  Patient demonstrated fatigue post treatment and would benefit from continued PT      Plan: Continue per plan of care  Progress treatment as tolerated         Precautions: CAD, FALLS, CA hx       Re-eval Date:     Date    Visit Count 1 2/8 3/8 4/8 5/8   FOTO        Pain In See IE  5/10 6/10 7/10 7/10   Pain Out See IE   5/10  6/10         Manuals    Trial gentle CPA mobs L/S grades 1-3 as tolerated   Trial gentle  L/S soft tissue mvt  tolerated 10' Trial gentle  L/S soft tissue mvt  tolerated 10' Trial gentle  L/S soft tissue mvt  tolerated 10' Trial gentle  L/S soft tissue mvt  Tolerated; long axis distraction B LE and single 15'                           Neuro Re-Ed        TA supine      TA with bent knee fallouts   20x/3-5"      20x/3-5" 20x/3-5"      20x/3-5"       20x/3-5"       20x/3-5"   TA with heel slides         Dead bugs       Pallof press         grn 10x10" ea       grn 10x10" ea       grn 10x10" ea      Lifts/chops with tband                                 Ther Ex        Nustep    LTR     Reviewed  1x ea side L/R    Standing repeated L S ext to tolerance AROM 10x   Reviewed  L3 10'      10x10"      Standing repeated L S ext to tolerance AROM 10x3-5" L3 10'      10x10"      Standing repeated L S ext to tolerance AROM 2x10/3-5" L3 10' no UE      10x10"      Standing repeated L S ext to tolerance AROM 2x10/3-5" L3 10' no UE with lumbar roll        Standing repeated L S ext to tolerance AROM 2x10/3-5" throughout session with position changes   DKTC      Piriformis stretch Reviewed 2x 10-15"    1x30" ea  Reviewed  10x10"      4x30" 10x10"      4x30" 10x10"      4x30"   10x10"      4x30"     Hip flexor stretch         SLRs      Bridges  Flex 2x10/3-5" Flex/abd 2x10/3-5" Flex  2x10/3-5" Flex  2x10/3-5"   clamshells   2x10 resume    Leg press      Step ups         Knee extension    HS curls                Ther Activity                        Gait Training                        Modalities prn no

## 2021-09-14 NOTE — ED ADULT TRIAGE NOTE - SPO2 (%)
BEHAVIORAL TEAM DISCUSSION    Participants:  Ida Zaman MD, Kourtney PARK, Resident, Tasha, RN, Ese, RN, Ca, RN, Sergio [facility]  Progress:  Continuing to assess  Anticipated length of stay: Unknown  Continued Stay Criteria/Rationale: Waiting for funding approval from [facility] in order for patient to be admitted to [facility].   Medical/Physical:  Per Internal Medicine  Precautions:   Behavioral Orders   Procedures    Cheeking Precautions (behavioral units)     Patient Observed swallowing PO medications; Patient asked to drink water after swallowing medication; Patient in Staff line of sight for 15 minutes after medication given; Mouth checks after PO administration (patient asked to open mouth and stick out their tongue).    Code 2    Code 3     For walks in the [place] with staff and per staff discretion    Electroconvulsive therapy     Series of up to 12 treatments. Begin Date: 5/26/21     Treating Psychiatrist providing ECT:  Dr. Lubin     Notified on:  5/21/21    Electroconvulsive therapy     As long as we get the green light from risk management and pt is medically cleared, begin ECT every Monday, Wednesday, and Friday    Electroconvulsive therapy     Series of up to 12 treatments. Begin Date: 5/25/21     Treating Psychiatrist providing ECT:  Amee     Notified on:  5/24/21    Elopement precautions    Fall precautions    Mcgrath Calderon    Routine Programming     As clinically indicated    Status 15     Every 15 minutes.     Plan: Attending psychiatrist will meet with patient daily to discuss treatment plan and answer medication questions/concerns. Patient is encouraged to attend therapy groups and participate in unit programming. Patient has been accepted to [facility] pending funding approval from [facility] for [facility]. [facility] will coordinate disposition and aftercare plan.   Rationale for change in precautions or plan:  No change         99

## 2021-10-12 NOTE — PHYSICAL THERAPY INITIAL EVALUATION ADULT - IMPAIRMENTS FOUND, PT EVAL
October 20, 2021        Hardy Wesley  [address] [address] [facility] [address]        Dear Hardy Wesley,     We have tried to contact you by telephone regarding your lab appointment at [facility].    Please call us at [phone] at your earliest convenience.      Thank You,    Gaby Cordero MD  [address] [address] [phone]        
muscle strength/aerobic capacity/endurance/gait, locomotion, and balance

## 2022-01-05 NOTE — PROGRESS NOTE ADULT - ASSESSMENT
88 y/o F hx liver cirrhosis c/b varices and hepatic encephalopathy, atrial fibrillation not on anticoagulation, severe AS, severe MR, COPD not on home oxygen, pulmonary hypertension, CKD3, CLL presents with viral syndrome secondary to RSV. Detail Level: Detailed Quality 226: Preventive Care And Screening: Tobacco Use: Screening And Cessation Intervention: Patient screened for tobacco use and is an ex/non-smoker Quality 130: Documentation Of Current Medications In The Medical Record: Current Medications Documented Detail Level: Generalized

## 2022-03-25 NOTE — H&P PST ADULT - TOBACCO USE
Writer returned call to patient and discussed all imaging results and PCP recommendations. Writer educated patient on dietary intake of calcium and vitamin D. Patient admits to not having a good daily source of calcium and will start taking 1200 mg daily. Patient is already taking OTC Vitamin D 2000 IUs daily. Writer also advised patient to continue with regular weight bearing exercise and repeat in 2 years. Patient verbalized understanding and declined further questions at this time.     Former smoker

## 2022-04-19 NOTE — H&P PST ADULT - LAST CARDIAC ANGIOGRAM/IMAGING
Pt remains in paper scrubs, resting comfortably in stretcher. No signs of distress noted. Pt aware of plan of care. PEC complete and in patient's chart. Pt belongings are removed from room, labeled, and locked away. No unnecessary equipment, cords, or wires left in room. Sitter at bedside recording Q 15 minute checks. Sitter belongings are out of room.   denies

## 2022-06-27 NOTE — DIETITIAN INITIAL EVALUATION ADULT. - TIME FRAME
Pharmacy prescription refill request for Humira. Per Pilar: Okay to refill. Labs ordered.    Patient seen 2/28/22. Prescription refill reordered to preferred pharmacy.     ~ 1 year

## 2022-06-28 NOTE — PROGRESS NOTE ADULT - PROBLEM SELECTOR PROBLEM 1
What Type Of Note Output Would You Prefer (Optional)?: Standard Output Hpi Title: Evaluation of Skin Lesions How Severe Are Your Spot(S)?: mild Bacteremia Have Your Spot(S) Been Treated In The Past?: has not been treated

## 2022-09-14 NOTE — ED ADULT NURSE NOTE - OBJECTIVE STATEMENT
excederin/Yes 89 YO female PMHx of COPD, CKD, CLL, HCC/cirrhosis, a-fib on digoxin, hypothyroid, BIBEMS for fever. Daughter at bedside reports that patient was making sounds during the night and when the daughter went to check on her patient was less responsive than usual. Daughter took her temperature at home and she was 102F oral. Daughter also reports that patient has had a cough and breathing has been more labored Daughter reports that patient saw her PCP earlier this week for a cough and was prescribed Doxycycline, but to only take it temperature goes above 101F or if sputum becomes colored. Patient currently A&OX1, orientated to self only, lethargic, opening eyes to painful stimuli. family reports that this is not her baseline and she is usually more responsive. Airway patent, breathing spontaneous, patient spo2 82% on RA, placed on 4L NC, spo2 increased to 99%, respirations appeared less labored. Equal and symmetric chest rise and fall. Skin warm, dry and pink. denies chest pain, SOB, HA, N/V/D, abdominal pain, fever/chills, urinary symptoms, hematuria, weakness, dizziness, numbness, tinging. Peripheral pulses present b/l. Skin warm, dry and pink. Pt placed in position of comfort. Pt educated on call bell system and provided call bell. Bed in lowest position, wheels locked, appropriate side rails raised. Pt denies needs at this time.

## 2022-09-22 NOTE — DISCHARGE NOTE FOR THE EXPIRED PATIENT - NS PRELIMINARY CAUSE OF DEATH_RESTRICTED
Jackie Stack was seen and treated in our emergency department on 9/22/2022  No restrictions            Diagnosis: Upper respiratory infection    Akbar Barrow  may return to work on return date  He may return on this date: 09/26/2022    This patient has a COVID result in their [employer]iann  If this result is positive he needs to quarantine until the date listed above  Current recommendations are that this patient can return to work on the above date if his symptoms have improved and if he has not had a fever for 24 hours without the use of medications (ie, acetaminophen, ibuprofen)  If you have any questions or concerns, please don't hesitate to call        Nelly Saldana MD    ______________________________           _______________          _______________  Hospital Representative                              Date                                Time Cardiopulmonary Arrest Multi-organ Dysfunction Syndrome Hepatic Failure

## 2022-09-24 NOTE — PROGRESS NOTE ADULT - PROBLEM SELECTOR PLAN 4
- rate controlled  - c/w digoxin every other day (next dose tomorrow) no chest pain, no cough, and no shortness of breath.

## 2022-10-01 NOTE — PROGRESS NOTE ADULT - ATTENDING COMMENTS
-s/p lrNIPT  -growths and ANT per GDMA2 guidelines   Patient with multimorbidity, chronically critically ill appearing, functional quadraplegia, severe protein calorie malnutrition with bacteremia/fungemia, dysphagia with NGT, although family wanted to pursue PEG, GI cannot do as patient has ascites. Patient with history of cirrhosis/varices.   Spironolactone held due to sepsis. Now with k 5.4 hyperkalemia. /49 - likely would not tolerate lasix. Numerous skin tears.  d/w daughter at bedside for 30 min - explained patient with poor prognosis, appears she is suffering. Daughter understands, she says her mother wants to continue fighting. She understands mother is very sick but hopeful she will bounce back. She is waiting for GI to come back next week to re-evaluate.   Patient is DNR

## 2022-10-18 NOTE — SWALLOW BEDSIDE ASSESSMENT ADULT - SWALLOW EVAL: VELAR ELEVATION
11/18/2022     Eladio Wagner  [address] [place] [id number]    Dear Eladio Wagner,    This letter is to confirm that you are scheduled for the following.    Surgeon: Nila Serrano MD   Procedure: EP Study / ALF / possible atrial flutter ablation with possible pacemaker Implant   Date of procedure: Wednesday, December 28, 2022      Arrive for check-in at : 5:30 AM.  Hospital/facility: [facility] - [address] [place] [address], [place]. Use entrance A located at the front of the hospital.  You will check in on the 2nd floor.      We try our best to stay on schedule with our procedures; however, due to the nature of our specialty, emergencies frequently occur. We may need to change your check- in date and/or time  with short notice or  you may have to wait for emergency patients to be cared for as a priority.  We apologize in advance if there is a wait.  Thank you for your understanding.    Location: [facility]- [address] [place] [address] in [place]. Use the MAIN entrance, take elevators to 2nd floor, turn left off elevators, turn right at first hallway.  The surgery registration desk is located past the restrooms.      Important things to remember:  • Do not have anything to eat or drink after midnight the night before procedure, including no mints, gum, or hard candy. Failure to follow this policy may  result in cancellation of procedure.   • No alcoholic beverages for 24 hours prior to the procedure.   • Medication instructions: On the morning of surgery, take your usual medications with a small sip of water EXCEPT: Hold Xarelto 48 hours prior to procedure.  • DO NOT bring your medications with you to the procedure.   • Be prepared for an overnight stay and bring a small overnight bag.   • It is a hospital requirement to have someone  drive you home after your procedure and to oversee your care for the first 24 hours. If you develop any signs of infection, such as  fever, cough, chills, have open wounds or sores, diarrhea, or recent onset of urinary tract symptoms within 2 weeks prior to the procedure, please call our office. This is important because if you have an infection your procedure may need to be rescheduled for a later date.   • Although we extend the courtesy of obtaining pre-certification with your insurance company for your procedure(s), we cannot accept responsibility for obtaining information regarding your insurance’s referral requirements, in or out-of-network co-payments, or your individual deductible status.       Important dates to remember:  • Your pre-op appointment is on Friday, December 9th at 1:40 pm with Nila Serrano MD. at [facility], [address] [place] [address] [address], [place]. This appointment is required prior to your procedure. All questions will be answered at this appointment.              Your post -op appointment will be scheduled after the surgery is completed.                  If you get a COVID-19 test (in a lab or at home) before your appointment above, because of illness or possible exposure, please contact your surgeon's office.  The office will assist you with next steps based on your results.    It is very important to avoid any potential exposure to COVID-19. Please follow these instructions until your surgery or procedure has been completed.     · Avoid unnecessary outings from home. Leave ONLY for essential needs and medical appointments.    · Do NOT travel out of home area (city/town).  · Wash hands frequently and thoroughly with soap and water (lather at least 20 seconds) or disinfect with alcohol-based hand  before eating, before touching face/mouth/eyes, after touching shared objects or high touch surfaces.  · Regularly clean cell phones, door handles, light switches, faucet and toilet handles, bathrooms and kitchen surfaces using household disinfectant or hot soapy water.    · Report any new  symptoms or suspected/known exposures to persons with COVID-19 to your surgeon's office at [phone].  · When it is necessary to leave your home for work, school or other essential needs please maintain physical distancing from others (at least 6 feet) and wear a mask when outside the house unless you are alone.            If you should have any other questions, please call the clinic at [phone].                     intact

## 2022-10-30 NOTE — ED ADULT NURSE NOTE - HARM RISK FACTORS
Emergency PRN Medication Administration Note:      Patient is Agitated as evidence by increased gross motor, rapid disorganized speech, poor social boundaries, stating he is having trouble controlling thoughts and  the past from present. Staff attempted to find and relieve the distress by Talking to patient, Refocusing on new activity, Offering suggestions, Checking for undiagnosed pain, and Administer PRN medications Patient is currently accepted PRN medications. Medication Administered as prescribed: (Haldol 5mg, Ativan 2mg). Patient Tolerated medication administration. Will continue to monitor, offer support, and reassess. no

## 2022-11-16 NOTE — ED PROVIDER NOTE - CHIEF COMPLAINT
The patient is a 89y Female complaining of altered mental status.
Patient/Caregiver requests family/friend to interpret.

## 2022-12-10 NOTE — PROGRESS NOTE ADULT - SUBJECTIVE AND OBJECTIVE BOX
Cardiology Follow Up  ==========================================  CC: AF, AS    HPI: No significant cardiac events overnight. Patient feels better today.     Review Of Systems:  Negative except as documented above    Medications:    buDESOnide   0.25 milliGRAM(s) Respule   0.25 milliGRAM(s) Inhalation (10-26-18 @ 18:58)   0.25 milliGRAM(s) Inhalation (10-26-18 @ 05:46)    digoxin     Tablet   0.125 milliGRAM(s) Oral (10-26-18 @ 05:46)    lactulose Syrup   20 Gram(s) Oral (10-26-18 @ 18:58)   20 Gram(s) Oral (10-26-18 @ 05:45)   20 Gram(s) Oral (10-25-18 @ 23:07)    levothyroxine   25 MICROGram(s) Oral (10-26-18 @ 05:46)    potassium phosphate IVPB   62.5 mL/Hr IV Intermittent (10-26-18 @ 13:17)    propranolol   10 milliGRAM(s) Oral (10-26-18 @ 18:58)   10 milliGRAM(s) Oral (10-26-18 @ 05:46)    rifaximin   550 milliGRAM(s) Oral (10-26-18 @ 18:58)   550 milliGRAM(s) Oral (10-26-18 @ 05:45)        Telemetry:     Vital Signs Last 24 Hrs  T(C): 36.6 (26 Oct 2018 20:38), Max: 36.7 (26 Oct 2018 04:18)  T(F): 97.8 (26 Oct 2018 20:38), Max: 98 (26 Oct 2018 04:18)  HR: 60 (26 Oct 2018 20:38) (60 - 80)  BP: 102/50 (26 Oct 2018 20:38) (102/50 - 133/78)  BP(mean): --  RR: 19 (26 Oct 2018 20:38) (17 - 19)  SpO2: 95% (26 Oct 2018 20:38) (94% - 100%)  I&O's Summary    25 Oct 2018 07:01  -  26 Oct 2018 07:00  --------------------------------------------------------  IN: 700 mL / OUT: 0 mL / NET: 700 mL    26 Oct 2018 07:01  -  26 Oct 2018 22:23  --------------------------------------------------------  IN: 580 mL / OUT: 0 mL / NET: 580 mL        Physical Exam:  General: [x ] NAD  HEENT: [x ] EOMI [ x] PERRL  Cor: [x ] S1,S2 [x ] IRIR [x ] No M/R/G   Lungs: [x ] CTA B/L [ x] Normal effort  Abd: [x ] Soft [x ] Non-tender [x ] +BS  Ext: [ x] No edema [x ] No cyanosis    Labs:                        11.1   5.0   )-----------( 86       ( 26 Oct 2018 11:31 )             33.0     10-26    145  |  112<H>  |  37<H>  ----------------------------<  125<H>  4.0   |  22  |  0.88    Ca    9.6      26 Oct 2018 11:29  Phos  2.1     10-26  Mg     1.7     10-26    TPro  5.3<L>  /  Alb  3.1<L>  /  TBili  1.9<H>  /  DBili  x   /  AST  26  /  ALT  16  /  AlkPhos  82  10-26 no

## 2022-12-12 NOTE — PROGRESS NOTE ADULT - ASSESSMENT
89 year old female with a pmhx of COPD, CKD, CLL, HCC/cirrhosis, a-fib, AS, and hypothyroid, admitted to MICU for septic shock, now improving on abx, afebrile, and transferred to medicine floor in hemodynamically stable condition. DNR but not DNI, MOLST in paper chart. Pt improving but still frail and endorsing a cough with rhinorrhea. Today, she is on meropenem day 4 of 7. She is able to be breathe comfortably >92% O2 saturation on room air and is hemodynamically stable on tapered dose of midocrine. At this time, she requires inpatient hospitalization to receive IV antibiotics and to monitor BP as we taper midodrine. Detail Level: Detailed Depth Of Biopsy: dermis Was A Bandage Applied: Yes Size Of Lesion In Cm: 0 Biopsy Type: H and E Biopsy Method: Dermablade Anesthesia Type: 1% lidocaine with epinephrine Anesthesia Volume In Cc (Will Not Render If 0): 0.5 Hemostasis: Aluminum Chloride and Electrocautery Wound Care: Vaseline Dressing: bandage Destruction After The Procedure: No 89 year old female with a pmhx of COPD, CKD, CLL, HCC/cirrhosis, a-fib, AS, and hypothyroid, admitted to MICU for septic shock, now improving on abx, afebrile, and transferred to medicine floor in hemodynamically stable condition. DNR but not DNI, MOLST in paper chart. Pt improving but still frail and endorsing a cough with rhinorrhea. Today, she is on meropenem day 4 of 5. She is able to be breathe comfortably >92% O2 saturation on room air and is hemodynamically stable on tapered dose of midocrine. At this time, she requires inpatient hospitalization to receive IV antibiotics and to monitor BP as we taper midodrine. Type Of Destruction Used: Curettage Lab: 6 Lab Facility: 3 Billing Type: Third-Party Bill Information: Selecting Yes will display possible errors in your note based on the variables you have selected. This validation is only offered as a suggestion for you. PLEASE NOTE THAT THE VALIDATION TEXT WILL BE REMOVED WHEN YOU FINALIZE YOUR NOTE. IF YOU WANT TO FAX A PRELIMINARY NOTE YOU WILL NEED TO TOGGLE THIS TO 'NO' IF YOU DO NOT WANT IT IN YOUR FAXED NOTE.

## 2023-01-20 NOTE — DISCHARGE NOTE FOR THE EXPIRED PATIENT - ORGAN DONOR #
[id number] Simponi Counseling:  I discussed with the patient the risks of golimumab including but not limited to myelosuppression, immunosuppression, autoimmune hepatitis, demyelinating diseases, lymphoma, and serious infections.  The patient understands that monitoring is required including a PPD at baseline and must alert us or the primary physician if symptoms of infection or other concerning signs are noted.

## 2023-02-04 NOTE — PHYSICAL THERAPY INITIAL EVALUATION ADULT - PHYSICAL ASSIST/NONPHYSICAL ASSIST: STAIR NEGOTIATION, REHAB EVAL
[FreeTextEntry1] : - Pt / family were notified that rapid strep pharyngitis testing was POSITIVE.  Strep pharyngitis etiology, natural course, possible complications, and treatment options were discussed.  Pt will start antibiotic therapy as ordered.  \par - Discussed with pt /family that pt is contagious for 24 hours after start of antibiotic therapy and the importance of completing full course of antibiotic therapy.  \par - Recommended replacement of oral care products after three days of antibiotic therapy to reduce risk reinoculation with strep. \par - Observe for signs/symptoms of strep in pt contacts. \par - Family to call back if not better in 3 days or worsens. \par \par rapid covid test negative. 
1 person assist

## 2023-03-01 NOTE — H&P ADULT - NSHPLABSRESULTS_GEN_ALL_CORE
143  |  114<H>  |  50<H>  ----------------------------<  141<H>  4.8   |  19<L>  |  1.57<H>      146<H>  |  116<H>  |  52<H>  ----------------------------<  78  5.5<H>   |  18<L>  |  1.69<H>      147<H>  |  118<H>  |  44<H>  ----------------------------<  96  5.2   |  18<L>  |  1.24    Ca    9.6      2020 20:48  Ca    10.2      2020 18:20  Ca    10.2      2020 07:05    TPro  4.7<L>  /  Alb  2.3<L>  /  TBili  2.4<H>  /  DBili  x   /  AST  28  /  ALT  16  /  AlkPhos  87    TPro  5.1<L>  /  Alb  2.6<L>  /  TBili  2.5<H>  /  DBili  x   /  AST  32  /  ALT  17  /  AlkPhos  98            PT/INR - ( 2020 18:26 )   PT: 16.6 sec;   INR: 1.44 ratio         PTT - ( 2020 18:26 )  PTT:29.0 sec              Urinalysis Basic - ( 2020 18:46 )    Color: Yellow / Appearance: Slightly Turbid / S.020 / pH: x  Gluc: x / Ketone: Negative  / Bili: Negative / Urobili: Negative   Blood: x / Protein: 30 mg/dL / Nitrite: Positive   Leuk Esterase: Negative / RBC: 1 /hpf / WBC 1 /HPF   Sq Epi: x / Non Sq Epi: 9 /hpf / Bacteria: Many                              12.0   12.77 )-----------( 118      ( 2020 18:26 )             37.3                         12.0   10.81 )-----------( 89       ( 2020 09:52 )             36.8     CAPILLARY BLOOD GLUCOSE    < from: Xray Chest 1 View AP/PA (20 @ 18:12) >      INTERPRETATION:  Right lower lung opacity, likely pneumonia.    < end of copied text >    < from: CT Chest No Cont (20 @ 19:03) >      The left lower lobe bronchus and segmental branches of right lower lobe bronchus are obliterated by retained secretions. Patchy opacities within the bilateral lower lobes may represent pneumonia or aspiration pneumonitis.    < end of copied text >    < from: CT Head No Cont (20 @ 19:00) >      IMPRESSION:    Head CT: No displaced calvarial fracture or acute intracranial hemorrhage.     Cervical spine CT: No acute fracture.     < end of copied text >            Blood Gas Source Venous: Venous (20 @ 20:48)  Blood Gas Source Venous: Venous (20 @ 18:39)     143  |  114<H>  |  50<H>  ----------------------------<  141<H>  4.8   |  19<L>  |  1.57<H>      146<H>  |  116<H>  |  52<H>  ----------------------------<  78  5.5<H>   |  18<L>  |  1.69<H>      147<H>  |  118<H>  |  44<H>  ----------------------------<  96  5.2   |  18<L>  |  1.24    Ca    9.6      2020 20:48  Ca    10.2      2020 18:20  Ca    10.2      2020 07:05    TPro  4.7<L>  /  Alb  2.3<L>  /  TBili  2.4<H>  /  DBili  x   /  AST  28  /  ALT  16  /  AlkPhos  87    TPro  5.1<L>  /  Alb  2.6<L>  /  TBili  2.5<H>  /  DBili  x   /  AST  32  /  ALT  17  /  AlkPhos  98            PT/INR - ( 2020 18:26 )   PT: 16.6 sec;   INR: 1.44 ratio         PTT - ( 2020 18:26 )  PTT:29.0 sec              Urinalysis Basic - ( 2020 18:46 )    Color: Yellow / Appearance: Slightly Turbid / S.020 / pH: x  Gluc: x / Ketone: Negative  / Bili: Negative / Urobili: Negative   Blood: x / Protein: 30 mg/dL / Nitrite: Positive   Leuk Esterase: Negative / RBC: 1 /hpf / WBC 1 /HPF   Sq Epi: x / Non Sq Epi: 9 /hpf / Bacteria: Many                              12.0   12.77 )-----------( 118      ( 2020 18:26 )             37.3                         12.0   10.81 )-----------( 89       ( 2020 09:52 )             36.8     CAPILLARY BLOOD GLUCOSE    < from: Xray Chest 1 View AP/PA (20 @ 18:12) >      INTERPRETATION:  Right lower lung opacity, likely pneumonia.    < end of copied text >    < from: CT Chest No Cont (20 @ 19:03) >      The left lower lobe bronchus and segmental branches of right lower lobe bronchus are obliterated by retained secretions. Patchy opacities within the bilateral lower lobes may represent pneumonia or aspiration pneumonitis.    < end of copied text >    < from: CT Head No Cont (20 @ 19:00) >      IMPRESSION:    Head CT: No displaced calvarial fracture or acute intracranial hemorrhage.     Cervical spine CT: No acute fracture.     < end of copied text >    EKG: ST dep II, avf; V4-V5; unchanged from prior         Blood Gas Source Venous: Venous (20 @ 20:48)  Blood Gas Source Venous: Venous (20 @ 18:39) Mastoid Interpolation Flap Text: A decision was made to reconstruct the defect utilizing an interpolation axial flap and a staged reconstruction.  A telfa template was made of the defect.  This telfa template was then used to outline the mastoid interpolation flap.  The donor area for the pedicle flap was then injected with anesthesia.  The flap was excised through the skin and subcutaneous tissue down to the layer of the underlying musculature.  The pedicle flap was carefully excised within this deep plane to maintain its blood supply.  The edges of the donor site were undermined.   The donor site was closed in a primary fashion.  The pedicle was then rotated into position and sutured.  Once the tube was sutured into place, adequate blood supply was confirmed with blanching and refill.  The pedicle was then wrapped with xeroform gauze and dressed appropriately with a telfa and gauze bandage to ensure continued blood supply and protect the attached pedicle.

## 2023-04-30 NOTE — PROGRESS NOTE ADULT - PROBLEM SELECTOR PLAN 2
Physical Therapy    Patient not seen in therapy.     Re-attempt plan: tomorrow    Orders received, chart reviewed. Attempted patient x 3 today. During first 2 attempts, patient with ECHO and then eating meal. During 3rd attempt, patient just got back to bed after walking with nursing staff and requesting to rest.       OBJECTIVE                      Documented in the chart in the following areas: Assessment.      Therapy procedure time and total treatment time can be found documented on the Time Entry flowsheet   Patient relative leukopenia and fever, clinical features of respiratory viral infection including productive cough, myalgias, weakness and decreased appetite. RVP positive for RSV. Chest imaging appears grossly clear but pt spiked high grade fevers. Afebrile over past 48 hours with marked improvement in symptoms.  - Monitor fever curve  - BCxs negative  - C/w cefepime (day 5)  - Monitor white count

## 2023-05-02 NOTE — PROGRESS NOTE ADULT - PROBLEM SELECTOR PLAN 2
[facility] Neurosurgery Progress Note    Patient: Damon Bonnerhaw Date: 2023   : 1973 Attending: Dyllan Kline MD   Admit Date: 2023 Room/Bed: V89136/5   50 year old male      Subjective: No acute events overnight. Seen on morning rounds with attending Dr. Young. Patient is slouched in bed with C collar on; his wife presents to bedside during visit.     Pt today states neck pain and strength are stable. Continues to work with therapies. Wife states patient became weak after laminectomies at outside hospital; notes they were told patient had a stroke during surgery. He denies headache, visual changes, worsening pain or paresthesia, burning sensation. They are eager to get patient back to rehab facility.    Dr. Young reviewed cervical imaging at the bedside. Discussed indication for surgical procedure to stabilize and prevent swan neck deformity/cord injury at cervical spine. He reviewed the benefits as well as risks and alternatives to procedure. Informed them this is recommended once infection has cleared and patient is optimized for surgery. Do not recommend placing hardware in setting of infection. They expressed understanding and all questions answered to their satisfaction.    Objective:  Physical Exam:   General: alert, cooperative,   Neuro: GCS 15, oriented, speech fluent  PERRL, EOMI  No facial droop noted  RUE strength: deltoid/bicep/ 5/5, tricep 4/5  LUE strength: bicep/ 5/5, deltoid/tricep 4-/5  RLE strength: HF/KF/KE 2/5, PF 4/5, DF/EHL 1-2/5  LLE strength: HF/KE 3/5, KF 4/5, PF 4+/5, DF/EHL 4/5  SILT b/l V123, UE, torso, LE  No Garvey's or clonus noted    Posterior cervical incision healing well, C/D/I without erythema or fluctuance    Recent Labs   Lab 23  0515 23  0528 23  0443   SODIUM 136 132* 133*   POTASSIUM 3.9 4.2 4.7   CHLORIDE 103 102 105   CO2 26 21 22   BUN 28* 42* 30*   CREATININE 3.40* 3.97* 2.99*   GLUCOSE 89 124* 94   CALCIUM 9.0 9.1  8.9         Recent Labs   Lab 05/02/23  0515   WBC 11.8*   RBC 2.85*   HGB 7.6*   HCT 24.2*   *       I/O:  Date 05/01/23 1500 - 05/02/23 0659 05/02/23 0700 - 05/03/23 0659   Shift 7298-8719 9240-7103 24 Hour Total 8990-9398 0364-7883 3006-8959 24 Hour Total   INTAKE   P.O. 120  360 120   120   IV Piggyback    228.4   228.4   Shift Total 120  360 348.4   348.4   OUTPUT   Urine 125 100 225       Other   2000         Net Fluid Removed Without Blood   2000         Blood Product Volume Removed   0       Shift Total        Weight (kg) 99.8 99.8 99.8 99.8 99.8 99.8 99.8       Imaging:  No results found for any visits on 04/29/23 (from the past 48 hour(s)).      Last Nursing Mobility/Ambulation Documented:  Activity: Resting in bed            Weight Bearing Status: Weight bearing as tolerated  Mobility Assistive Device: Friction reducing device/Slide sheet  Level of Assistance: Moderate assist      Impression/Diagnoses:  51y/o M with complicated medical history including recent MSSA bacteremia complicated by cervical & lumbar spine disc/osteo with cervical epidural collection s/p C2-C6 decompression and evacuation (Dr. Driscoll, 3/16/23) admitted for low hemoglobin. Neurosurgery consulted based on CT imaging revealing progressive destructive changes to C5-C7 and stable destructive changes to L4-L5. Progressive kyphosis of cervical spine.    PMH: ESRD on HD, chronic anemia, sacral wound, CVA, HTN, DM      Plans/Recommendations:  - CT C/T/L and MRI C/T/L from current admission previously reviewed with attending neurosurgeon. Progressive destructive changes at C5-C7 with mild progression of kyphosis noted.  - No acute neurosurgical intervention  - Plan for surgical intervention in outpatient setting after antibiotic course has completed/infection cleared:    -Recommend C5-C6 corpectomy, C1-T2 anterior and posterior cervical fusion    -Will obtain repeat MRIs wwout following completion of antibiotic course.  Can be done as outpatient.   -Patient to follow up in outpatient clinic with Dr. Young after repeat MRIs and upright cervical XRs obtained to review imaging and plan for surgery  - , CRP 13  - Maintain cervical collar at all times.  - Antibiotics per ID (vanc, meropenem); repeat blood cultures NGTD x 3 days. Discussed with ID NP - patient previously on oxacillin at rehab. He is scheduled to complete vancomycin and meropenem 5/11 (~ 8 week course). This may change depending on ENT recommendations for prevertebral abscess.  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 Ok from NS perspective.    We will continue to follow as needed. Follow up after antibiotic course has completed (after 5/11) with repeat cervical and lumbar MRIs with and without contrast as well as cervical Xray. Information in discharge. Please notify neurosurgery if plan for antibiotic duration changes.     Patient and wife expressed understanding of the plan above and all questions answered to their satisfaction  Discussed and seen with Dr. Young who agrees to the plan above  Discussed with ID and RN  Please [facility] \"[facility]\" with questions/concerns    Abimael Curry PA-C  Neurosurgery  5/2/2023     Neurosurgery Attending Staff    Patient interviewed and examined at bedside. Wife also present. Events surrounding his previous presentation and surgery (other provider) discussed. He indicates that after surgery he was densely paraparetic, but since then he has been improving. Exam as above. We reviewed imaging. He appears to have developed some progressive destructive changes, primarily at C5 and C6, as well as kyphosis. We discussed his therapeutic  options. In the end, recommended that he complete his antibiotic regimen. Will see as outpatient with repeat MRI +/- caroline as well as plain imaging. Continue collar at all times for now. Will likely need anterior posterior decompression and instrumented fusion (C5 C6 corpectomy, cage reconstruction and screw plate fixation, followed by posterior C1-T1 instrumented fusion. We discussed the pros/cons, risks/benefits, efficacy rates and what can be expected in the way of recovery. In the meantime we asked to be informed of any changes in his condition. He will continue to follow-up with Infectious Disease.    -likely multifactorial- overall poor PO intake at home and poor nutritional status and was having diarrhea on admission with hypotension   renal consult appreciated - ?ATN from hypotension on admission   recommend albumin infusions - will reorder albumin today  also now noted to have UTI and acute urinary retention causing post-obstructive KARRIE  s/p rivero placement   -UTI treatment as above  -holding spironolactone   -c/w gentle IVF, periodic albumin infusions  -abdominal sono - no ascities  -encourage PO intake as tolerated  Renal consult appreciated

## 2023-05-13 NOTE — SWALLOW VFSS/MBS ASSESSMENT ADULT - ROSENBEK'S PENETRATION ASPIRATION SCALE
Feeling well, no HA/vision changes/RUQ/epigastric pain  BP stable in mild range  GBS previously collected, SVE unchanged  Reviewed plan for induction of labor on 5/17, questions answered.    Pregnancy complications:  1. Short cervix             -rescue cerclage placed at 22w6d, removed 5/9 due to bleeding  2. Gestational HTN   - IOL scheduled for 5/17 at 0500     Birth plan: see birth plan  Epidural  Minimal to no visitors  Music/TV/etc for distraction  Delayed cord clamping/Jayjay will cut cord, skin/skin  Breast- has to wait until after birth for pump. Desires no pacifiers/formula/bottles  All baby meds ok   (2) contrast enters airway, remains above the vocal cords, no residue remains (penetration) (8) contrast passes glottis, visible subglottic residue remains, absent patient response (aspiration) (3) contrast remains above the vocal cords, visible residue remains (penetration) (5) contrast contacts vocal cords, visible residue remains (penetration) (1) no aspiration, contrast does not enter airway

## 2023-05-18 NOTE — H&P ADULT - PROBLEM SELECTOR PLAN 7
Initial Size Of Lesion: 1.1 - c/w home dose levothyroxine IV - unable to use home inhalers given mental status  - will start on duonebs  - saturating well on RA - unable to use home inhalers given mental status  - will start on duonebs prn  - saturating well on RA

## 2023-06-28 NOTE — PROCEDURE NOTE - ATTENDING PROVIDER
Titi Soolantra Pregnancy And Lactation Text: This medication is Pregnancy Category C. This medication is considered safe during breast feeding.

## 2023-08-01 NOTE — PHYSICAL THERAPY INITIAL EVALUATION ADULT - ADL SKILLS, REHAB EVAL
needed assist Curettage Text: The wound bed was treated with curettage after the biopsy was performed.

## 2023-08-17 NOTE — DISCHARGE NOTE ADULT - NS MD DC FALL RISK RISK
For information on Fall & Injury Prevention, visit [email or web address]
numerical 0-10

## 2023-10-13 NOTE — DISCHARGE NOTE NURSING/CASE MANAGEMENT/SOCIAL WORK - NSDCPETBCESMAN_GEN_ALL_CORE
If you are a smoker, it is important for your health to stop smoking. Please be aware that second hand smoke is also harmful.
GILA Fine: Patient received on signout.  Patient remained stable with no acute complaints.  Patient seen by GYN and has repeat beta-hCG 10.2.  GYN team states patient should have weekly hCGs until hCG decreases to less than 5, however recommends patient follow-up on Monday with her private OB/GYN doctor (Dr. Medina) that is affiliated with [facility].  Discussed strict return precautions and prompt follow-up.  Patient verbalized an understanding and agrees with the plan.  Patient discharged home.

## 2023-10-22 NOTE — COUNSELING
[Weight management counseling provided] : Weight management [Activity counseling provided] : activity [Healthy eating counseling provided] : healthy eating [Weight Self Once Weekly] : Weight self once weekly [Needs reinforcement, provided] : Patient needs reinforcement on understanding of disease, goals and obesity follow-up plan; reinforcement was provided [Low Salt Diet] : Low salt diet [None] : None [Good understanding] : Patient has a good understanding of lifestyle changes and the steps needed to achieve self management goals Specialty Care (Immediate)...

## 2023-10-30 NOTE — ED PROVIDER NOTE - RATE
54 You can access the FollowMyHealth Patient Portal offered by [facility] by registering at the following website: [email or web address]. By joining [facility]’s FollowMyHealth portal, you will also be able to view your health information using other applications (apps) compatible with our system.

## 2023-11-18 NOTE — PATIENT PROFILE ADULT - NSPROPOAPRESSUREINJURY_GEN_A_NUR
Problem: OCCUPATIONAL THERAPY ADULT  Goal: Performs self-care activities at highest level of function for planned discharge setting. See evaluation for individualized goals. Description: Treatment Interventions: ADL retraining, Functional transfer training, UE strengthening/ROM, Endurance training, Patient/family training, Cognitive reorientation, Equipment evaluation/education, Activityengagement          See flowsheet documentation for full assessment, interventions and recommendations. Note: Limitation: Decreased ADL status, Decreased UE strength, Decreased Safe judgement during ADL, Decreased endurance, Decreased cognition, Decreased self-care trans, Decreased high-level ADLs     Assessment: Pt is a 80 y.o. female seen for OT evaluation s/p admit to [facility] on 11/16/2023 w/ Acute hypoxic respiratory failure ([address]). Comorbidities affecting pt's functional performance at time of assessment include:  sepsis, hyponatremia, throbocytopenia, MINNIE, CHF, anemia, hypotension, CKD, hypomagnesemia, hypokalemia, HTN, hiatal hernia, tachycardia . Personal factors affecting pt at time of IE include:steps to enter environment, behavioral pattern, difficulty performing ADLS, difficulty performing IADLS , limited insight into deficits, decreased initiation and engagement , and health management . Prior to admission, pt was (A) with ADLs and IADLs with use of RW during mobility. Upon evaluation: Pt requires min-max (A) x1-2 with use of RW during mobility 2* the following deficits impacting occupational performance: weakness, decreased strength, decreased balance, decreased tolerance, impaired initiation, impaired memory, impaired sequencing, impaired problem solving, decreased safety awareness, and impaired interpersonal skills. Pt to benefit from continued skilled OT tx while in the hospital to address deficits as defined above and maximize level of functional independence w ADL's and functional mobility.  Occupational Performance areas to address include: grooming, bathing/shower, toilet hygiene, dressing, functional mobility, community mobility, and clothing management. The patient's raw score on the -PAC Daily Activity Inpatient Short Form is 17. A raw score of less than 19 suggests the patient may benefit from discharge to post-acute rehabilitation services. Discharge recommendation at this time is level II moderate resource intensity. Pt benefited from co-evaluation of skilled OT and PT therapists in order to most appropriately address functional deficits d/t extensive assistance required for safe functional mobility, decreased activity tolerance, and regression from functioning level prior to admission and/or onset of present illness. OT/PT objectives were addressed separately; please see PT note for specific goal areas targeted.      Rehab Resource Intensity Level, OT: II (Moderate Resource Intensity) no

## 2024-01-03 NOTE — PROGRESS NOTE ADULT - PROBLEM SELECTOR PROBLEM 5
Patient stopped into clinic today. Patient's ortho provider for her knee recommends Dr. LESLIE Kowalski at [facility] for balance issues. Patient requesting referral. Referral approved per verbal order Dr. Samuels.  
AF (Atrial Fibrillation)
Nutrition, metabolism, and development symptoms

## 2024-02-08 NOTE — PATIENT PROFILE ADULT. - PRO ARRIVE FROM
Phone call to JANIYA on patient's behalf.  Was told that patient's Medicaid is active and she shouldn't have any issues with using the medicaid card unless the provider is out of network.  Was told that patient will receive information in regards to choosing a managed care provider soon.        home

## 2024-03-27 NOTE — CHART NOTE - NSCHARTNOTEFT_GEN_A_CORE
Julio , patients daughter, signed hospice consents.  When medically ready will go home with hospice services.   Goals clearly defined, no further role for palliative care. Please reconsult with any acute needs. This refill request is a duplicate request, previously received or sent.   Sent denial notification to pharmacy.  MURPHY Hernandez  [facility]

## 2024-04-24 NOTE — CURRENT MEDS
Refill request sent to pharmacy on 4/24/24 for Singulair, last OV on 1/25/24   [Takes medication as prescribed] : takes [None] : Patient does not have any barriers to medication adherence

## 2024-05-08 NOTE — ED PROVIDER NOTE - NS_EDPROVIDERDISPOUSERTYPE_ED_A_ED
BEST     RUR 14 %     Met with Patient again With  CM Lizet Mcclelland.  Patient wants to appeal his discharge.   He indicates he did not know how to do the process and that Nupur-the CM from last admission did it for him. Reviewed again with patient. Called MsMiri Espitia to discuss with patient he made the call last time.   Patient tried to dial on his cell phone- some issues with reception.  Called the  from his room phone- and had them connect for patient to call.    Patient made call  Case # [id number].  [facility]  [phone].     Kylee KURTZ RN    [phone]    Attending Attestation (For Attendings USE Only)...

## 2024-06-07 NOTE — DISCHARGE NOTE PROVIDER - NSDCQMAMI_CARD_ALL_CORE
The first balloon was inserted into the left anterior descending and left anterior descending diagonal.Max pressure = 18 marycruz. Total duration = 35 seconds. No

## 2024-08-21 NOTE — PROGRESS NOTE ADULT - PROBLEM SELECTOR PLAN 3
- baseline CKD 3  - KARRIE during hospitalization likely 2/2 sepsis  - improved back to baseline stated

## 2024-09-18 NOTE — ED ADULT NURSE NOTE - BREATHING, MLM
Spontaneous, unlabored and symmetrical Detail Level: Zone Discontinue Regimen: Doxycycline 100mg Initiate Treatment: Minocycline 50mg one tablet twice a day for 1-2 weeks with flares \\nOTC 10% Benzoyl Peroxide wash in shower daily to the chest/shoulders/back Modify Regimen: Increase- Clindamycin 1% Solution apply to face twice a day \\nIncrease- RetinA 0.025% Cream apply to face every other night \\nIncrease - Cetaphil Moisturizer to twice a day Continue Regimen: Cetaphil cleanser twice a day to face Render In Strict Bullet Format?: No

## 2024-10-14 NOTE — ED ADULT NURSE NOTE - DOES PATIENT HAVE ADVANCE DIRECTIVE
As per our discussion,    Usually, you experienced seizure activity when you ran out of medications.  However, you did have one episode yesterday and you were taking both of the medications and you did not miss any dosing.    For that, will obtain a 24-hour EEG to evaluate for seizures and will obtain a brain MRI without contrast to look for any abnormality.    Continue Keppra 750 mg twice a day, and Vimpat 50 mg twice a day. I went ahead and sent additional prescription of Keppra and Vimpat to your pharmacy.  We will make recommendations based on what the MRI and EEG shows.    For seizure safety:  - No driving, height/ladders, tub baths, pools, bodies of water, power tools until seizure free x 6 months  - If you have another seizure and the shaking last under 5 minutes, you are not injured, and you return to yourself quickly, no need to call EMS, just call my office at the number above.    - If you have  A seizure and the shaking lasts longer than 5 minutes, you are hurt or you have multiple seizures back to back without returning to yourself then call EMS.    Make sure you sleep, rest, prevent exertion, and quit alcohol intake to prevent breakthrough seizures.    It was a pleasure to see you in [place] today    Will see you back in 4 months or sooner if needed    Please do not hesitate to reach out for any questions or concerns     No

## 2024-10-26 NOTE — ED ADULT NURSE NOTE - CAS DISCH ACCOMP BY
PAST SURGICAL HISTORY:  S/P  x3 , . 1981    S/P ORIF (open reduction internal fixation) fracture left olecranon -2015    S/P tubal ligation 1981     Self

## 2024-11-24 NOTE — PHYSICAL THERAPY INITIAL EVALUATION ADULT - REHAB POTENTIAL, PT EVAL
Pt to ER from home via ems with concerns of infection around colostomy bag. Pt noticed redness around ostomy site. Pt also missed 2 days of dialysis due to being in [place] and getting back this morning. Pt denies pain. A/O4. Bed bound.      good, to achieve stated therapy goals

## 2025-01-14 NOTE — DISCHARGE NOTE ADULT - INSTRUCTIONS
Treatment Goal Explanation (Does Not Render In The Note): Stable for the purposes of categorizing medical decision making is defined by the specific treatment goals for an individual patient. A patient that is not at their treatment goal is not stable, even if the condition has not changed and there is no short- term threat to life or function. Treatment Goal Met?: no follow up care

## 2025-06-10 NOTE — DISCHARGE NOTE ADULT - FUNCTIONAL SCREEN CURRENT LEVEL: AMBULATION, MLM
Instructions: This plan will send the code FBSE to the PM system.  DO NOT or CHANGE the price. 4 = completely dependent Price (Do Not Change): 0.00 Detail Level: Simple 3 = assistive equipment and person

## 2025-07-17 NOTE — PROGRESS NOTE ADULT - PROBLEM SELECTOR PROBLEM 3
Chief Complaint   Patient presents with   • Follow-up     Dm check rm 18   • Abdominal Pain     Upper right, nausea        HISTORY OF PRESENT ILLNESS     Radha Howe is a(n) 44 year old female here today to discuss the following:     - T2DM: dx >15yrs ago, last A1c 12/2022 - 9.8%, GFR 6/2022 >90, normal MAC. Currently managed on glimepiride 4mg BID, metformin 1000mg XL BID, Mounjaro 7.5mg weekly (switched from Trulicity 12/2022); atorva 40mg daily, losartan 100mg daily. Formerly but no longer following with Endo ([facility]). Former med trials: Jardiance (UTI/yeast), Trulicity (limited glycemic control). Denies foot ulcers/lesions, tingling/numbness. Admits with several members of household sick has not been very diabetic compliant the last several weeks; notes it's also hard as her partner does not have the same dietary restrictions she has. Started working with Diabetic Educator. Denies hypoglycemic episodes.     PAST MEDICAL, FAMILY AND SOCIAL HISTORY     Past Medical History:   Diagnosis Date   • Anxiety    • Arthritis     Back   • Chronic pain    • Coronary artery disease    • Depression    • DM2 (diabetes mellitus, type 2) (CMS/HCC)    • Gastroesophageal reflux disease    • HTN (hypertension)    • Hyperlipidemia    • Malignant neoplasm (CMS/HCC)     cervical   • Obesity    • Wears glasses        Past Surgical History:   Procedure Laterality Date   • Cervical biopsy  05/27/2021   • Conization cervix,knife/laser N/A 05/27/2021   • Hb stent insertion cardiac  2016    2 stents   • Robotic assisted hysterectomy  08/23/2021    Bilateral salpingectomy       Social History     Tobacco Use   • Smoking status: Never   • Smokeless tobacco: Never   Vaping Use   • Vaping Use: never used   Substance Use Topics   • Alcohol use: Yes     Alcohol/week: 0.0 - 1.0 standard drinks     Comment: 1 monthly   • Drug use: No       Family History   Problem Relation Age of Onset   • Diabetes Father    • Heart disease Father    • Cancer  Father    • Heart disease Paternal Grandmother        Current Outpatient Medications   Medication Sig   • [START ON 3/7/2023] tirzepatide (Mounjaro) 10 MG/0.5ML Solution Pen-injector Inject 10 mg into the skin every 7 days. Do not start before March 7, 2023.   • buPROPion XL (WELLBUTRIN XL) 300 MG 24 hr tablet Take 1 tablet by mouth daily.   • meloxicam (MOBIC) 15 MG tablet Take 1 tablet by mouth daily as needed for Pain. Avoid ibuprofen/NSAIDs while using, tylenol OK.   • glimepiride (AMARYL) 4 MG tablet TAKE ONE TABLET BY MOUTH TWICE A DAY (MORNING AND EVENING)   • tirzepatide (Mounjaro) 7.5 MG/0.5ML Solution Pen-injector Inject 7.5 mg into the skin every 7 days.   • ondansetron (ZOFRAN ODT) 4 MG disintegrating tablet DISSOLVE ONE TABLET BY MOUTH EVERY 8 HOURS AS NEEDED FOR NAUSEA   • glycopyrrolate (ROBINUL) 2 MG tablet TAKE ONE TO TWO TABLETS BY MOUTH EVERY DAY AS NEEDED FOR SWEATING   • metFORMIN (GLUCOPHAGE-XR) 500 MG 24 hr tablet Take 2 tablets by mouth in the morning and 2 tablets in the evening. Take with meals.   • Trulicity 4.5 MG/0.5ML Solution Pen-injector INJECT 4.5MG INTO THE SKIN EVERY 7 DAYS   • losartan (COZAAR) 100 MG tablet TAKE ONE TABLET BY MOUTH DAILY   • sertraline (ZOLOFT) 50 MG tablet TAKE ONE TABLET BY MOUTH DAILY   • omeprazole (PrilOSEC) 20 MG capsule TAKE ONE CAPSULE BY MOUTH DAILY   • pregabalin (LYRICA) 50 MG capsule Take 1 capsule by mouth every 12 hours. Do not start before September 16, 2022. Use driving precautions.   • metoPROLOL succinate (TOPROL-XL) 50 MG 24 hr tablet Take 1 tablet by mouth daily. CALL OUR OFFICE TO HAVE APPOINTMENT SCHEDULE FOR 4/23 WITH DR. ARGUETA   • atorvastatin (LIPITOR) 40 MG tablet Take 1 tablet by mouth daily.   • triamcinolone (ARISTOCORT) 0.1 % cream APPLY TO AFFECTED AREA TWO TIMES A DAY FOR 10 DAYS AND THEN TWO TIMES A DAY AS NEEDED   • acetaminophen (TYLENOL) 500 MG tablet Take 2 tablets by mouth every 8 hours.   • ASPIRIN LOW DOSE 81 MG EC  tablet TAKE ONE TABLET BY MOUTH DAILY   • ACCU-CHEK FASTCLIX LANCETS Misc Test blood sugars twice a day.   • Melatonin 5 MG Cap Take 5 mg by mouth nightly.    • Multiple Vitamins-Minerals (MULTIVITAMIN PO) Take 1 tablet by mouth daily.      No current facility-administered medications for this visit.       ALLERGIES:   Allergen Reactions   • Empagliflozin RASH       REVIEW OF SYSTEMS     Eye: negative  ENT: negative  Cardiovascular: negative  Respiratory: negative  Gastro-intestinal: negative  Genito-urinary: negative  Musculoskeletal: negative  Integumentary: negative  Neurological: negative  Psychiatric: negative  Endocrine: negative  Hematologic and/or Lymphatic: negative    PHYSICAL EXAM     Visit Vitals  /80   Pulse 74   Temp 98.6 °F (37 °C) (Oral)   Ht 5' 11\" (1.803 m)   Wt (!) 152 kg (335 lb)   LMP 2021 (LMP Unknown)   BMI 46.72 kg/m²       General:  Well developed, obese. In no acute distress.    Eyes:  Conjunctivae pink, sclerae anicteric. PERRL, EOMI, wearing glasses.   HENT:  Normocephalic. Normal b/l external ears and nose. Normal b/l external auditory canal. Normal lips, gums, and teeth. Oropharynx is clear.  Respiratory:  Normal WOB and chest expansion. No chest wall tenderness. Lungs CTAB.   Cardiovascular:  RRR, no murmurs, rubs, or gallops.   Musculoskeletal:  No nail clubbing or cyanosis. Normal gait and station.   Neurologic: Cranial nerves II-XII intact, no facial asymmetry/weakness.   Integumentary:  Warm and dry, normal turgor to palpation.   Psychiatric: Alert and oriented x3. Appropriate mood and affect. Normal judgment, insight, and memory.     POC A1c: 10.2%    ASSESSMENT/PLAN     Radha Howe is a(n) 44 year old female here to discuss the followin. Type 2 diabetes mellitus with hyperglycemia, without long-term current use of insulin (CMS/[facility])  POC A1c - 10.2%, suboptimal control on glimepiride 4mg BID, metformin 1000mg XL BID, Mounjaro 7.5mg weekly;, atorva 40mg daily,  losartan 100mg daily. Discussed the extreme importance of diabetic dietary changes and common pitfalls (eg carbohydrates vs sweets). Discussed given otherwise good tolerability increasing Mounjaro right away to 10.0mg weekly dose and increasing more quickly every 2 weeks moving forward; she will message with how things are going in a week or two prior to next fill. Continue Diabetic Education, focusing long-term on weight control with Mounjaro and increasing wellbutrin per below.   - ondansetron (ZOFRAN ODT) 4 MG disintegrating tablet; Place 1 tablet onto the tongue every 8 hours as needed for Nausea.  Dispense: 30 tablet; Refill: 1  - tirzepatide (Mounjaro) 10 MG/0.5ML Solution Pen-injector; Inject 10 mg into the skin every 7 days. Do not start before March 7, 2023.  Dispense: 2 mL; Refill: 2  - POCT GLYCOHEMOGLOBIN ANALYZER    2. Essential hypertension  /80, stable on losartan 100mg daily. Continue mgmt.     3. Moderate major depression (CMS/HCC)  C/b anxiety, relatively stable on sertraline 50mg daily, wellbutrin 150mg XL daily. Discussed additional trial increase of wellbutrin to 300mg XL daily in light of weight loss efforts and mood benefit, reviewed possible med SE.   - buPROPion XL (WELLBUTRIN XL) 300 MG 24 hr tablet; Take 1 tablet by mouth daily.  Dispense: 90 tablet; Refill: 1    Return in about 6 months (around 8/22/2023) for CPE, f/u T2DM.    My total time spent caring for the patient on the day of the encounter including chart review, face-to-face, and documentation was 45 minutes.     Tri Hill MD     KARRIE (acute kidney injury) Heterosexual